# Patient Record
Sex: MALE | Race: WHITE | NOT HISPANIC OR LATINO | URBAN - METROPOLITAN AREA
[De-identification: names, ages, dates, MRNs, and addresses within clinical notes are randomized per-mention and may not be internally consistent; named-entity substitution may affect disease eponyms.]

---

## 2017-05-01 ENCOUNTER — EMERGENCY (EMERGENCY)
Facility: HOSPITAL | Age: 32
LOS: 1 days | Discharge: HOME | End: 2017-05-01
Attending: EMERGENCY MEDICINE | Admitting: EMERGENCY MEDICINE
Payer: COMMERCIAL

## 2017-05-01 VITALS
HEART RATE: 100 BPM | TEMPERATURE: 99 F | DIASTOLIC BLOOD PRESSURE: 79 MMHG | RESPIRATION RATE: 18 BRPM | SYSTOLIC BLOOD PRESSURE: 148 MMHG | OXYGEN SATURATION: 100 %

## 2017-05-01 VITALS
OXYGEN SATURATION: 98 % | HEART RATE: 85 BPM | SYSTOLIC BLOOD PRESSURE: 135 MMHG | RESPIRATION RATE: 18 BRPM | DIASTOLIC BLOOD PRESSURE: 72 MMHG

## 2017-05-01 DIAGNOSIS — R11.0 NAUSEA: ICD-10-CM

## 2017-05-01 DIAGNOSIS — R10.9 UNSPECIFIED ABDOMINAL PAIN: ICD-10-CM

## 2017-05-01 DIAGNOSIS — I10 ESSENTIAL (PRIMARY) HYPERTENSION: ICD-10-CM

## 2017-05-01 PROCEDURE — 76775 US EXAM ABDO BACK WALL LIM: CPT

## 2017-05-01 PROCEDURE — 81001 URINALYSIS AUTO W/SCOPE: CPT

## 2017-05-01 PROCEDURE — 76775 US EXAM ABDO BACK WALL LIM: CPT | Mod: 26

## 2017-05-01 PROCEDURE — 99285 EMERGENCY DEPT VISIT HI MDM: CPT

## 2017-05-01 PROCEDURE — 87086 URINE CULTURE/COLONY COUNT: CPT

## 2017-05-01 PROCEDURE — 96374 THER/PROPH/DIAG INJ IV PUSH: CPT

## 2017-05-01 PROCEDURE — 99284 EMERGENCY DEPT VISIT MOD MDM: CPT | Mod: 25

## 2017-05-01 PROCEDURE — 85027 COMPLETE CBC AUTOMATED: CPT

## 2017-05-01 PROCEDURE — 80053 COMPREHEN METABOLIC PANEL: CPT

## 2017-05-01 RX ORDER — OXYCODONE HYDROCHLORIDE 5 MG/1
5 TABLET ORAL ONCE
Qty: 0 | Refills: 0 | Status: DISCONTINUED | OUTPATIENT
Start: 2017-05-01 | End: 2017-05-01

## 2017-05-01 RX ORDER — SODIUM CHLORIDE 9 MG/ML
2000 INJECTION INTRAMUSCULAR; INTRAVENOUS; SUBCUTANEOUS ONCE
Qty: 0 | Refills: 0 | Status: COMPLETED | OUTPATIENT
Start: 2017-05-01 | End: 2017-05-01

## 2017-05-01 RX ORDER — KETOROLAC TROMETHAMINE 30 MG/ML
15 SYRINGE (ML) INJECTION ONCE
Qty: 0 | Refills: 0 | Status: DISCONTINUED | OUTPATIENT
Start: 2017-05-01 | End: 2017-05-01

## 2017-05-01 RX ORDER — OXYCODONE HYDROCHLORIDE 5 MG/1
1 TABLET ORAL
Qty: 12 | Refills: 0
Start: 2017-05-01 | End: 2017-05-04

## 2017-05-01 RX ADMIN — Medication 15 MILLIGRAM(S): at 18:24

## 2017-05-01 RX ADMIN — SODIUM CHLORIDE 2000 MILLILITER(S): 9 INJECTION INTRAMUSCULAR; INTRAVENOUS; SUBCUTANEOUS at 17:54

## 2017-05-01 RX ADMIN — Medication 15 MILLIGRAM(S): at 17:54

## 2017-05-01 RX ADMIN — OXYCODONE HYDROCHLORIDE 5 MILLIGRAM(S): 5 TABLET ORAL at 18:24

## 2017-05-01 NOTE — ED PROVIDER NOTE - MEDICAL DECISION MAKING DETAILS
31M hx renal colic presents with worsening L flank pain.  well-appearing.  concern for ureteral stone.  No concerning signs/symptoms for infected stone v pyelo.  will obtain urine, renal function studies.  obtain ultrasound or CT a/p stone hunt at this time given known hx and previous radiation exposure.  ivf bolus, analgesia, reassess

## 2017-05-01 NOTE — ED PROVIDER NOTE - PLAN OF CARE
Stay well-hydrated.  Use Motrin 600mg every 6 hours.  Supplement with Tylenol 650mg or Oxycodone 5mg every 6 hours as needed for breakthrough pain.  Do not drive while taking oxycodone.  Follow-up with Urology.  Return for any new/worsening symptoms such as uncontrollable pain, fever/chills, uncontrollable vomiting or any other concerns.

## 2017-05-01 NOTE — ED PROVIDER NOTE - OBJECTIVE STATEMENT
31M hx renal colic, HTN presents with L flank pain. 31M hx renal colic, HTN presents with L flank pain x 3 days.  Constant but waxing and waning in severity.  Associated with mild nausea, no vomiting.  Pain improved with ibuprofen but not dosed today.  Denies fever/chills, abdominal pain, dysuria, hematuria, or testicular pain

## 2017-05-01 NOTE — ED ADULT NURSE NOTE - OBJECTIVE STATEMENT
31 yr old male coming in with left sided flank pain x3 days; radiating to left groin. Pt denies hematuria/dysuria. No fevers/chills. States he has had a kidney stone in the past but patient unable to provide more information regarding past diagnosis. Denies taking meds prior to arrival.

## 2017-05-01 NOTE — ED PROVIDER NOTE - CARE PLAN
Principal Discharge DX:	Flank pain  Instructions for follow-up, activity and diet:	Stay well-hydrated.  Use Motrin 600mg every 6 hours.  Supplement with Tylenol 650mg or Oxycodone 5mg every 6 hours as needed for breakthrough pain.  Do not drive while taking oxycodone.  Follow-up with Urology.  Return for any new/worsening symptoms such as uncontrollable pain, fever/chills, uncontrollable vomiting or any other concerns.

## 2017-05-01 NOTE — ED PROVIDER NOTE - ATTENDING CONTRIBUTION TO CARE
patient with prior renal stones presenting with renal colic. plan to eval for hydronephrosis, jada, analgesia, reassess.

## 2017-05-02 LAB
CULTURE RESULTS: NO GROWTH — SIGNIFICANT CHANGE UP
SPECIMEN SOURCE: SIGNIFICANT CHANGE UP

## 2017-09-11 ENCOUNTER — EMERGENCY (EMERGENCY)
Facility: HOSPITAL | Age: 32
LOS: 1 days | Discharge: HOME | End: 2017-09-11
Attending: EMERGENCY MEDICINE | Admitting: EMERGENCY MEDICINE
Payer: COMMERCIAL

## 2017-09-11 VITALS
SYSTOLIC BLOOD PRESSURE: 152 MMHG | DIASTOLIC BLOOD PRESSURE: 81 MMHG | TEMPERATURE: 100 F | HEART RATE: 81 BPM | RESPIRATION RATE: 18 BRPM | OXYGEN SATURATION: 94 %

## 2017-09-11 VITALS
RESPIRATION RATE: 18 BRPM | DIASTOLIC BLOOD PRESSURE: 70 MMHG | HEART RATE: 76 BPM | SYSTOLIC BLOOD PRESSURE: 118 MMHG | OXYGEN SATURATION: 97 % | TEMPERATURE: 99 F

## 2017-09-11 LAB
ALBUMIN SERPL ELPH-MCNC: 4.8 G/DL — SIGNIFICANT CHANGE UP (ref 3.3–5)
ALP SERPL-CCNC: 64 U/L — SIGNIFICANT CHANGE UP (ref 40–120)
ALT FLD-CCNC: 33 U/L RC — SIGNIFICANT CHANGE UP (ref 10–45)
ANION GAP SERPL CALC-SCNC: 14 MMOL/L — SIGNIFICANT CHANGE UP (ref 5–17)
AST SERPL-CCNC: 20 U/L — SIGNIFICANT CHANGE UP (ref 10–40)
BASOPHILS # BLD AUTO: 0 K/UL — SIGNIFICANT CHANGE UP (ref 0–0.2)
BASOPHILS NFR BLD AUTO: 0.4 % — SIGNIFICANT CHANGE UP (ref 0–2)
BILIRUB SERPL-MCNC: 0.4 MG/DL — SIGNIFICANT CHANGE UP (ref 0.2–1.2)
BUN SERPL-MCNC: 29 MG/DL — HIGH (ref 7–23)
CALCIUM SERPL-MCNC: 9.7 MG/DL — SIGNIFICANT CHANGE UP (ref 8.4–10.5)
CHLORIDE SERPL-SCNC: 102 MMOL/L — SIGNIFICANT CHANGE UP (ref 96–108)
CK MB BLD-MCNC: 1.5 % — SIGNIFICANT CHANGE UP (ref 0–3.5)
CK MB CFR SERPL CALC: 2.3 NG/ML — SIGNIFICANT CHANGE UP (ref 0–6.7)
CK SERPL-CCNC: 155 U/L — SIGNIFICANT CHANGE UP (ref 30–200)
CO2 SERPL-SCNC: 26 MMOL/L — SIGNIFICANT CHANGE UP (ref 22–31)
CREAT SERPL-MCNC: 1.21 MG/DL — SIGNIFICANT CHANGE UP (ref 0.5–1.3)
EOSINOPHIL # BLD AUTO: 0.1 K/UL — SIGNIFICANT CHANGE UP (ref 0–0.5)
EOSINOPHIL NFR BLD AUTO: 1.4 % — SIGNIFICANT CHANGE UP (ref 0–6)
GLUCOSE SERPL-MCNC: 89 MG/DL — SIGNIFICANT CHANGE UP (ref 70–99)
HCT VFR BLD CALC: 39.9 % — SIGNIFICANT CHANGE UP (ref 39–50)
HGB BLD-MCNC: 14 G/DL — SIGNIFICANT CHANGE UP (ref 13–17)
LYMPHOCYTES # BLD AUTO: 1.7 K/UL — SIGNIFICANT CHANGE UP (ref 1–3.3)
LYMPHOCYTES # BLD AUTO: 35.3 % — SIGNIFICANT CHANGE UP (ref 13–44)
MCHC RBC-ENTMCNC: 32 PG — SIGNIFICANT CHANGE UP (ref 27–34)
MCHC RBC-ENTMCNC: 35.1 GM/DL — SIGNIFICANT CHANGE UP (ref 32–36)
MCV RBC AUTO: 91.1 FL — SIGNIFICANT CHANGE UP (ref 80–100)
MONOCYTES # BLD AUTO: 0.5 K/UL — SIGNIFICANT CHANGE UP (ref 0–0.9)
MONOCYTES NFR BLD AUTO: 9.8 % — SIGNIFICANT CHANGE UP (ref 2–14)
NEUTROPHILS # BLD AUTO: 2.6 K/UL — SIGNIFICANT CHANGE UP (ref 1.8–7.4)
NEUTROPHILS NFR BLD AUTO: 53.1 % — SIGNIFICANT CHANGE UP (ref 43–77)
PLATELET # BLD AUTO: 177 K/UL — SIGNIFICANT CHANGE UP (ref 150–400)
POTASSIUM SERPL-MCNC: 4.2 MMOL/L — SIGNIFICANT CHANGE UP (ref 3.5–5.3)
POTASSIUM SERPL-SCNC: 4.2 MMOL/L — SIGNIFICANT CHANGE UP (ref 3.5–5.3)
PROT SERPL-MCNC: 7.7 G/DL — SIGNIFICANT CHANGE UP (ref 6–8.3)
RBC # BLD: 4.38 M/UL — SIGNIFICANT CHANGE UP (ref 4.2–5.8)
RBC # FLD: 11.3 % — SIGNIFICANT CHANGE UP (ref 10.3–14.5)
SODIUM SERPL-SCNC: 142 MMOL/L — SIGNIFICANT CHANGE UP (ref 135–145)
TROPONIN T SERPL-MCNC: <0.01 NG/ML — SIGNIFICANT CHANGE UP (ref 0–0.06)
TROPONIN T SERPL-MCNC: <0.01 NG/ML — SIGNIFICANT CHANGE UP (ref 0–0.06)
WBC # BLD: 4.8 K/UL — SIGNIFICANT CHANGE UP (ref 3.8–10.5)
WBC # FLD AUTO: 4.8 K/UL — SIGNIFICANT CHANGE UP (ref 3.8–10.5)

## 2017-09-11 PROCEDURE — 71020: CPT | Mod: 26

## 2017-09-11 PROCEDURE — 84484 ASSAY OF TROPONIN QUANT: CPT

## 2017-09-11 PROCEDURE — 93010 ELECTROCARDIOGRAM REPORT: CPT

## 2017-09-11 PROCEDURE — 93005 ELECTROCARDIOGRAM TRACING: CPT

## 2017-09-11 PROCEDURE — 99285 EMERGENCY DEPT VISIT HI MDM: CPT | Mod: 25

## 2017-09-11 PROCEDURE — 85027 COMPLETE CBC AUTOMATED: CPT

## 2017-09-11 PROCEDURE — 71046 X-RAY EXAM CHEST 2 VIEWS: CPT

## 2017-09-11 PROCEDURE — 80053 COMPREHEN METABOLIC PANEL: CPT

## 2017-09-11 PROCEDURE — 82553 CREATINE MB FRACTION: CPT

## 2017-09-11 PROCEDURE — 82550 ASSAY OF CK (CPK): CPT

## 2017-09-11 PROCEDURE — 99284 EMERGENCY DEPT VISIT MOD MDM: CPT | Mod: 25

## 2017-09-11 NOTE — ED PROVIDER NOTE - OBJECTIVE STATEMENT
32y Male PMH HTN, renal stones complaining of chest pain. Started to have this for several weeks. Was worse on Friday, happened when he was eating. mainly on the left chest, felt as if he was going to pass out. No obvious sick contacts, no recent travel, no leg swelling or pain. Pain is sharp, comes out of nowhere. No ameliorating or worsening factors. No rash. No cough. Denies fevers, chills, nausea, vomiting, diarrhea, constipation, or dyspnea. Mother with triple bypass at age 37yo, father with MI at 55yo. 32y Male PMH HTN, renal stones complaining of chest pain. Started to have this for several weeks, comes on for a second and resolves. Was worse on Friday and today. mainly on the left chest, felt as if he was going to pass out. No obvious sick contacts, no recent travel, no leg swelling or pain. Pain is sharp, comes out of nowhere. No ameliorating or worsening factors. No rash. No cough. Denies fevers, chills, nausea, vomiting, diarrhea, constipation, or dyspnea. Mother with triple bypass at age 37yo, father with MI at 55yo.    PCP: Mehul Myrick (cardiologist)

## 2017-09-11 NOTE — ED PROVIDER NOTE - ATTENDING CONTRIBUTION TO CARE
ATTENDING MD:  Williams SCHULTZ, personally have seen and examined this patient.  I have discussed all aspects of care with the resident physician. Resident note reviewed and agree on plan of care and except where noted.  See HPI, PE, and MDM for details.     VITALS: reviewed  GEN: NAD, A & O x 4  HEAD/EYES: NCAT, PERRL, EOMI, anicteric sclerae, no conjunctival pallor  ENT: mucus membranes moist, oropharynx WNL, trachea midline, no JVD  CHEST: lungs CTA with equal breath sounds bilaterally, chest wall nontender and atraumatic  CV: heart with reg rhythm S1, S2, no murmurs, rubs, or gallops; distal pulses 2+ and symmetric bilaterally  ABDOMEN: normoactive bowel sounds, soft, nondistended, nontender, no masses  : no CVAT  MSK: extremities atraumatic and nontender, no edema. the back is without midline or lateral tenderness, there is no spinal deformity or stepoff and the back is ranged painlessly.   SKIN: warm, dry, no rash, no bruising, no cyanosis. color appropriate for ethnicity  NEURO: alert, mentating appropriately, no facial asymmetry. gross sensation, motor, coordination are intact  PSYCH: Affect appropriate     MDM: atypical chest pain x 1 month. low risk by heart. well's low risk, perc neg, no concern for PE. no signs of pericarditis on EKG or exam. low risk for emergent chest pain. CXR, EKG, tele, 2 sets cardiac  abbrieviated r/o per low risk heart score, f/u with his PCP who is a cardiologist for further eval and workup if negative.

## 2017-09-11 NOTE — ED PROVIDER NOTE - PLAN OF CARE
1) Please follow-up with your primary care doctor / cardiologist Dr. Myrick within the next 1-2 days.  Please call today or tomorrow for an appointment.  If you cannot follow-up with your doctor(s), please return to the ED for any urgent issues.  2) If you have any worsening of symptoms or any other concerns please return to the ED immediately.  3) Please continue taking your home medications as directed.  4) You may have been given a copy of your labs and/or imaging.  Please go over these with your primary care doctor.

## 2017-09-11 NOTE — ED ADULT NURSE NOTE - OBJECTIVE STATEMENT
33 y/o male c/o chest pain for several weeks, but worse on Friday and today.   Pt report chest pain mainly on the  left  and he feels like he was going to pass out. No recent travel, no leg swelling or pain. No rash. No cough. Denies fevers, chills, n/v/d, constipation, or dyspnea.  No acute respiratory distress noted.

## 2017-09-11 NOTE — ED PROVIDER NOTE - NS ED ROS FT
ROS: GENERAL: no fevers, no chills HEENT: no epistaxis, no eye pain, no ear pain, no throat pain CARDIAC: + chest pain, no shortness of breath PULM: no cough, no shortness of breath GI: no nausea, no vomiting, no diarrhea, no abdominal pain, no hematemesis, no bright red blood per rectum : no dysuria, no hematuria EXTREMITIES: no arm pain, no leg pain, no back pain SKIN: no purpura, no petechiae NEURO: no headache, no neck pain, no loss of strength/sensation HEME: no easy bruising, no easy bleeding

## 2017-09-11 NOTE — ED PROVIDER NOTE - CARE PLAN
Principal Discharge DX:	Chest pain  Instructions for follow-up, activity and diet:	1) Please follow-up with your primary care doctor / cardiologist Dr. Myrick within the next 1-2 days.  Please call today or tomorrow for an appointment.  If you cannot follow-up with your doctor(s), please return to the ED for any urgent issues.  2) If you have any worsening of symptoms or any other concerns please return to the ED immediately.  3) Please continue taking your home medications as directed.  4) You may have been given a copy of your labs and/or imaging.  Please go over these with your primary care doctor. Principal Discharge DX:	Atypical chest pain  Instructions for follow-up, activity and diet:	1) Please follow-up with your primary care doctor / cardiologist Dr. Myrick within the next 1-2 days.  Please call today or tomorrow for an appointment.  If you cannot follow-up with your doctor(s), please return to the ED for any urgent issues.  2) If you have any worsening of symptoms or any other concerns please return to the ED immediately.  3) Please continue taking your home medications as directed.  4) You may have been given a copy of your labs and/or imaging.  Please go over these with your primary care doctor.

## 2019-07-01 ENCOUNTER — EMERGENCY (EMERGENCY)
Facility: HOSPITAL | Age: 34
LOS: 1 days | Discharge: HOME | End: 2019-07-01
Attending: EMERGENCY MEDICINE
Payer: COMMERCIAL

## 2019-07-01 VITALS
SYSTOLIC BLOOD PRESSURE: 135 MMHG | WEIGHT: 199.96 LBS | DIASTOLIC BLOOD PRESSURE: 87 MMHG | HEART RATE: 82 BPM | HEIGHT: 72 IN | TEMPERATURE: 98 F | RESPIRATION RATE: 16 BRPM | OXYGEN SATURATION: 100 %

## 2019-07-01 VITALS
TEMPERATURE: 99 F | OXYGEN SATURATION: 100 % | SYSTOLIC BLOOD PRESSURE: 132 MMHG | RESPIRATION RATE: 16 BRPM | HEART RATE: 79 BPM | DIASTOLIC BLOOD PRESSURE: 80 MMHG

## 2019-07-01 DIAGNOSIS — S69.91XA UNSPECIFIED INJURY OF RIGHT WRIST, HAND AND FINGER(S), INITIAL ENCOUNTER: ICD-10-CM

## 2019-07-01 DIAGNOSIS — M79.89 OTHER SPECIFIED SOFT TISSUE DISORDERS: ICD-10-CM

## 2019-07-01 LAB
ALBUMIN SERPL ELPH-MCNC: 4.9 G/DL — SIGNIFICANT CHANGE UP (ref 3.3–5)
ALP SERPL-CCNC: 61 U/L — SIGNIFICANT CHANGE UP (ref 40–120)
ALT FLD-CCNC: 47 U/L — HIGH (ref 10–45)
ANION GAP SERPL CALC-SCNC: 13 MMOL/L — SIGNIFICANT CHANGE UP (ref 5–17)
APTT BLD: 31.1 SEC — SIGNIFICANT CHANGE UP (ref 27.5–36.3)
AST SERPL-CCNC: 28 U/L — SIGNIFICANT CHANGE UP (ref 10–40)
BASOPHILS # BLD AUTO: 0 K/UL — SIGNIFICANT CHANGE UP (ref 0–0.2)
BASOPHILS NFR BLD AUTO: 0.3 % — SIGNIFICANT CHANGE UP (ref 0–2)
BILIRUB SERPL-MCNC: 1.1 MG/DL — SIGNIFICANT CHANGE UP (ref 0.2–1.2)
BUN SERPL-MCNC: 19 MG/DL — SIGNIFICANT CHANGE UP (ref 7–23)
CALCIUM SERPL-MCNC: 10 MG/DL — SIGNIFICANT CHANGE UP (ref 8.4–10.5)
CHLORIDE SERPL-SCNC: 100 MMOL/L — SIGNIFICANT CHANGE UP (ref 96–108)
CO2 SERPL-SCNC: 27 MMOL/L — SIGNIFICANT CHANGE UP (ref 22–31)
CREAT SERPL-MCNC: 1.1 MG/DL — SIGNIFICANT CHANGE UP (ref 0.5–1.3)
EOSINOPHIL # BLD AUTO: 0 K/UL — SIGNIFICANT CHANGE UP (ref 0–0.5)
EOSINOPHIL NFR BLD AUTO: 1.4 % — SIGNIFICANT CHANGE UP (ref 0–6)
GLUCOSE SERPL-MCNC: 115 MG/DL — HIGH (ref 70–99)
HCT VFR BLD CALC: 42.4 % — SIGNIFICANT CHANGE UP (ref 39–50)
HGB BLD-MCNC: 14.9 G/DL — SIGNIFICANT CHANGE UP (ref 13–17)
INR BLD: 1 RATIO — SIGNIFICANT CHANGE UP (ref 0.88–1.16)
LYMPHOCYTES # BLD AUTO: 0.9 K/UL — LOW (ref 1–3.3)
LYMPHOCYTES # BLD AUTO: 27.9 % — SIGNIFICANT CHANGE UP (ref 13–44)
MCHC RBC-ENTMCNC: 32 PG — SIGNIFICANT CHANGE UP (ref 27–34)
MCHC RBC-ENTMCNC: 35.1 GM/DL — SIGNIFICANT CHANGE UP (ref 32–36)
MCV RBC AUTO: 91.1 FL — SIGNIFICANT CHANGE UP (ref 80–100)
MONOCYTES # BLD AUTO: 0.3 K/UL — SIGNIFICANT CHANGE UP (ref 0–0.9)
MONOCYTES NFR BLD AUTO: 8.9 % — SIGNIFICANT CHANGE UP (ref 2–14)
NEUTROPHILS # BLD AUTO: 1.9 K/UL — SIGNIFICANT CHANGE UP (ref 1.8–7.4)
NEUTROPHILS NFR BLD AUTO: 61.5 % — SIGNIFICANT CHANGE UP (ref 43–77)
PLAT MORPH BLD: NORMAL — SIGNIFICANT CHANGE UP
PLATELET # BLD AUTO: 186 K/UL — SIGNIFICANT CHANGE UP (ref 150–400)
POTASSIUM SERPL-MCNC: 4 MMOL/L — SIGNIFICANT CHANGE UP (ref 3.5–5.3)
POTASSIUM SERPL-SCNC: 4 MMOL/L — SIGNIFICANT CHANGE UP (ref 3.5–5.3)
PROT SERPL-MCNC: 7.8 G/DL — SIGNIFICANT CHANGE UP (ref 6–8.3)
PROTHROM AB SERPL-ACNC: 11.5 SEC — SIGNIFICANT CHANGE UP (ref 10–12.9)
RBC # BLD: 4.65 M/UL — SIGNIFICANT CHANGE UP (ref 4.2–5.8)
RBC # FLD: 12 % — SIGNIFICANT CHANGE UP (ref 10.3–14.5)
RBC BLD AUTO: NORMAL — SIGNIFICANT CHANGE UP
SODIUM SERPL-SCNC: 140 MMOL/L — SIGNIFICANT CHANGE UP (ref 135–145)
WBC # BLD: 3.1 K/UL — LOW (ref 3.8–10.5)
WBC # FLD AUTO: 3.1 K/UL — LOW (ref 3.8–10.5)

## 2019-07-01 PROCEDURE — 99253 IP/OBS CNSLTJ NEW/EST LOW 45: CPT

## 2019-07-01 PROCEDURE — 80053 COMPREHEN METABOLIC PANEL: CPT

## 2019-07-01 PROCEDURE — 93971 EXTREMITY STUDY: CPT

## 2019-07-01 PROCEDURE — 99284 EMERGENCY DEPT VISIT MOD MDM: CPT

## 2019-07-01 PROCEDURE — 85730 THROMBOPLASTIN TIME PARTIAL: CPT

## 2019-07-01 PROCEDURE — 93971 EXTREMITY STUDY: CPT | Mod: 26

## 2019-07-01 PROCEDURE — 85610 PROTHROMBIN TIME: CPT

## 2019-07-01 PROCEDURE — 85027 COMPLETE CBC AUTOMATED: CPT

## 2019-07-01 PROCEDURE — 93931 UPPER EXTREMITY STUDY: CPT

## 2019-07-01 PROCEDURE — 93931 UPPER EXTREMITY STUDY: CPT | Mod: 26

## 2019-07-01 NOTE — ED PROVIDER NOTE - NSPTACCESSSVCSAPPTDETAILS_ED_ALL_ED_FT
Follow up with your medical doctor in 2-3 days or call our clinic at [phone] and state you were seen in the Emergency Department and would like to be seen in clinic. You may also call [phone] to speak with a representative to assist follow up care with medicine, surgery, or specialists.    Take Tylenol/acetaminophen 1 g every six hours and supplement (if allowed by your physician) with ibuprofen 600 mg, with food or milk/maalox, every six hours which can be taken three hours apart from the Tylenol to have a layered effect.     Be sure to take no more than 4000mg or 4g of Tylenol/acetaminophen in a 24 hour period. Be sure to check your other medications to see if they include Tylenol/acetaminophen and include them in your calculations to ensure you do not take more than 4000mg or 4g of Tylenol/acetaminophen a day.    Drink at least 2 Liters or 64 Ounces of water each day (UNLESS you are supposed to restrict fluids or have a history of congestive heart failure (CHF)).    Return for any persistent, worsening symptoms, or ANY concerns at all.

## 2019-07-01 NOTE — ED PROVIDER NOTE - CLINICAL SUMMARY MEDICAL DECISION MAKING FREE TEXT BOX
Patient with right hand swelling, taj test intact, will doppler, will consult vascular surgery and reassess with cbc, cmp, pt/inr  Will follow up on labs, analgesia, imaging, reassess and disposition as clinically indicated.

## 2019-07-01 NOTE — ED PROVIDER NOTE - CARE PROVIDER_API CALL
Sahil Tay)  Vascular Surgery  [address], [address]  [place] [address]  Phone: [phone]  Fax: [phone]  Follow Up Time:

## 2019-07-01 NOTE — ED ADULT NURSE NOTE - OBJECTIVE STATEMENT
33 yr old male amb to ED c/o rt hand numbness at 7am with rt hand cooler to touch than l. 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 Has h/o HTN. Able to move fingers. Pos sensation. Denies fever or chills. Denies chest pain or SOB. parent at bedside. 33 yr old male amb to ED c/o rt hand numbness at 7am with rt hand cooler to touch than l. 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  Has h/o HTN. Able to move fingers. Pos sensation. Cap refill WNL with palp rt rad pulse.  Denies fever or chills. Denies chest pain or SOB. parent at bedside.

## 2019-07-01 NOTE — CONSULT NOTE ADULT - ASSESSMENT
32 y/o with no PMhx presenting with swelling in right hand    - full note to follow 32 y/o with no PMhx presenting with swelling in right hand    - patient seen and examined with attending   - no indication for surgical intervention at this time   - imaging negative for any venous thrombosis no evidence of pseudoaneurysm    - patient okay for discharge may follow-up with Dr. Tay as outpatient 32 y/o with no PMhx presenting with swelling in right hand    - patient seen and examined with attending   - no indication for surgical intervention at this time   - imaging negative for any venous thrombosis no evidence of pseudoaneurysm    - patient  is cleared for d/c  advised pt to avoid rt hand repeated injury and use of jackhammer if possible and to rto if any signs recurr

## 2019-07-01 NOTE — ED PROVIDER NOTE - PHYSICAL EXAMINATION
GEN: NAD, awake, eyes open spontaneously  HEENT: NCAT, MMM, Trachea midline, normal conjunctiva, perrl  CHEST/LUNGS: Non-tachypneic, CTAB, bilateral breath sounds  CARDIAC: Non-tachycardic, normal perfusion  ABDOMEN: Soft, NTND, No rebound/guarding  MSK: No edema, no gross deformity of extremities, right arm with negative Colt's test, temperature feels symmetric and bilateral hands are cool, well perfused, there is slight swelling over the thenar eminence and mild swelling to the skin overlying the dorsal 4th5th digits of the right hand  SKIN: No rashes, no petechiae, no vesicles  NEURO: CN grossly intact, normal coordination, no focal motor or sensory deficits  PSYCH: Alert, appropriate, cooperative, with capacity and insight

## 2019-07-01 NOTE — CONSULT NOTE ADULT - SUBJECTIVE AND OBJECTIVE BOX
CC: 33y old Male admitted with a chief complaint of , now swollen right hand     HPI:  32 y/o with no Pmhx presenting with swelling of his right hand. Reports that right hand was cooler than left when he woke up this morning. Has associated numbness and tingling in the hand. Patient works as a . Denies any previous episodes similar to this in the past.     PMHx: Hypertension  No pertinent past medical history    PSHx:   Medications (inpatient):   Medications (PRN):  Allergies: No Known Allergies  (Intolerances: )  Social Hx:   Family Hx:     Physical Exam  T(C): 36.7  HR: 82 (82 - 82)  BP: 135/87 (135/87 - 135/87)  RR: 16 (16 - 16)  SpO2: 100% (100% - 100%)  Tmax: T(C): , Max: 36.7 (07-01-19 @ 09:44)    General: well developed, well nourished, NAD  Neuro: alert and oriented, no focal deficits, moves all extremities spontaneously  HEENT: NCAT, EOMI, anicteric, mucosa moist  Respiratory: airway patent, respirations unlabored  CVS: regular rate and rhythm  Abdomen: soft, nontender, nondistended  Extremities: no edema, mild decrease sensation in right hand, good capillary refill, radial and ulnar pulses palpable movement grossly intact  Skin: warm, dry, appropriate color    Labs:                        14.9   3.1   )-----------( 186      ( 01 Jul 2019 11:48 )             42.4     PT/INR - ( 01 Jul 2019 11:48 )   PT: 11.5 sec;   INR: 1.00 ratio         PTT - ( 01 Jul 2019 11:48 )  PTT:31.1 sec  07-01    140  |  100  |  19  ----------------------------<  115<H>  4.0   |  27  |  1.10    Ca    10.0      01 Jul 2019 11:48    TPro  7.8  /  Alb  4.9  /  TBili  1.1  /  DBili  x   /  AST  28  /  ALT  47<H>  /  AlkPhos  61  07-01            Imaging and other studies:  < from: [facility] Duplex Upper Extrem Arterial Limited, Right (07.01.19 @ 13:55) >  INTERPRETATION:  History: Painful cold right hand, evaluate arterial   blood supply to the right upper extremity.    There is a normal pattern of antegrade flow through the right innominate   artery.    There is a normal, triphasic pattern of antegrade flow through the right   subclavian, axillary, brachial and ulnar arteries.    Impression: This is a normal examination. CC: 33y old Male admitted with a chief complaint of , now swollen right hand     HPI:  34 y/o with no Pmhx presenting with swelling of his right hand. Reports that right hand was cooler than left when he woke up this morning. Has associated numbness and tingling in the hand. Patient works as a . Denies any previous episodes similar to this in the past.   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       PMHx: Hypertension  No pertinent past medical history    PSHx:   Medications (inpatient):   Medications (PRN):  Allergies: No Known Allergies  (Intolerances: )  Social Hx:   Family Hx:     Physical Exam  T(C): 36.7  HR: 82 (82 - 82)  BP: 135/87 (135/87 - 135/87)  RR: 16 (16 - 16)  SpO2: 100% (100% - 100%)  Tmax: T(C): , Max: 36.7 (07-01-19 @ 09:44)    General: well developed, well nourished, NAD  Neuro: alert and oriented, no focal deficits, moves all extremities spontaneously  HEENT: NCAT, EOMI, anicteric, mucosa moist  Respiratory: airway patent, respirations unlabored  CVS: regular rate and rhythm  Abdomen: soft, nontender, nondistended  Extremities: no edema, mild decrease sensation in right hand, good capillary refill, radial and ulnar pulses palpable movement grossly intact  Skin: warm, dry, appropriate color    Labs:                        14.9   3.1   )-----------( 186      ( 01 Jul 2019 11:48 )             42.4     PT/INR - ( 01 Jul 2019 11:48 )   PT: 11.5 sec;   INR: 1.00 ratio         PTT - ( 01 Jul 2019 11:48 )  PTT:31.1 sec  07-01    140  |  100  |  19  ----------------------------<  115<H>  4.0   |  27  |  1.10    Ca    10.0      01 Jul 2019 11:48    TPro  7.8  /  Alb  4.9  /  TBili  1.1  /  DBili  x   /  AST  28  /  ALT  47<H>  /  AlkPhos  61  07-01            Imaging and other studies:  < from: VA Duplex Upper Extrem Arterial Limited, Right (07.01.19 @ 13:55) >  INTERPRETATION:  History: Painful cold right hand, evaluate arterial   blood supply to the right upper extremity.    There is a normal pattern of antegrade flow through the right innominate   artery.    There is a normal, triphasic pattern of antegrade flow through the right   subclavian, axillary, brachial and ulnar arteries.    Impression: This is a normal examination.

## 2019-07-01 NOTE — CONSULT NOTE ADULT - ATTENDING COMMENTS
Never smoker
I performed a history and physical exam of the patient and discussed  the findings and plan with the house officer. I reviewed the resident note and agree with the findings and plan

## 2019-07-01 NOTE — ED PROVIDER NOTE - OBJECTIVE STATEMENT
Patient with chief complaint of right hand swelling and coolness starting from this AM. patient sleeps on his stomach and states that he places his hands up above his shoulders with sleeping but upon waking this am his right hand was swollen, hand an engorged vein to right thenar eminence. No fever. No chills.   Patient does not use iv drugs, takes protein but no aggressive working out or trauma to the area.

## 2019-07-01 NOTE — ED PROVIDER NOTE - PROGRESS NOTE DETAILS
vascular surgery consulted and will discuss  bedside doppler with intact doppler pulses to radial and ulnar artery Segundo Marsh MD, FACEP Patient seen by attending vascular surgeon, will discharge home to  follow up as an outpatient. Patient serially evaluated throughout emergency department course. There was no acute deterioration up to this time in the department. Patient has demonstrated marked clinical improvement, feels better at this time according to emergency department team. Agree with goals/plan of emergency department care as described in electronic medical record, including diagnostics, therapeutics and consultation as clinically warranted. Will discharge home with close outpatient followup with primary care physician/provider and specialist if necessary. Patient educated on concerning signs and features to return to the emergency department including but not limited to: nausea, vomiting, fever, chills, persistent/worsening symptoms or any concerns at all.  No immediate life threatening issues present on history or clinical exam. Patient is a safe disposition home, has capacity and insight into their condition, is ambulatory in the Emergency Department with no further questions and will follow up with their doctor(s) this week. Patient understands anticipatory guidance and was given strict return and follow up precautions. The patient has been informed of all concerning signs and symptoms to return to Emergency Department, the necessity to follow up with the PMD/Clinic/follow up provided within 2-3 days was explained, and the patient reports understanding of above with capacity and insight.

## 2019-07-01 NOTE — CONSULT NOTE ADULT - VASCULAR DETAILS
kellen ue palp +2 radial and ulnar art pulses   excellent cap refill and perf  all fingers kellen hands

## 2020-01-28 ENCOUNTER — EMERGENCY (EMERGENCY)
Facility: HOSPITAL | Age: 35
LOS: 1 days | Discharge: HOME | End: 2020-01-28
Attending: EMERGENCY MEDICINE
Payer: COMMERCIAL

## 2020-01-28 VITALS
DIASTOLIC BLOOD PRESSURE: 99 MMHG | HEART RATE: 128 BPM | HEIGHT: 72 IN | RESPIRATION RATE: 20 BRPM | SYSTOLIC BLOOD PRESSURE: 157 MMHG | WEIGHT: 214.95 LBS | TEMPERATURE: 98 F | OXYGEN SATURATION: 98 %

## 2020-01-28 LAB
ALBUMIN SERPL ELPH-MCNC: 4.9 G/DL — SIGNIFICANT CHANGE UP (ref 3.3–5)
ALP SERPL-CCNC: 98 U/L — SIGNIFICANT CHANGE UP (ref 40–120)
ALT FLD-CCNC: 170 U/L — HIGH (ref 10–45)
ANION GAP SERPL CALC-SCNC: 14 MMOL/L — SIGNIFICANT CHANGE UP (ref 5–17)
AST SERPL-CCNC: 82 U/L — HIGH (ref 10–40)
BASOPHILS # BLD AUTO: 0.02 K/UL — SIGNIFICANT CHANGE UP (ref 0–0.2)
BASOPHILS NFR BLD AUTO: 0.3 % — SIGNIFICANT CHANGE UP (ref 0–2)
BILIRUB SERPL-MCNC: 0.8 MG/DL — SIGNIFICANT CHANGE UP (ref 0.2–1.2)
BUN SERPL-MCNC: 14 MG/DL — SIGNIFICANT CHANGE UP (ref 7–23)
CALCIUM SERPL-MCNC: 10 MG/DL — SIGNIFICANT CHANGE UP (ref 8.4–10.5)
CHLORIDE SERPL-SCNC: 97 MMOL/L — SIGNIFICANT CHANGE UP (ref 96–108)
CO2 SERPL-SCNC: 26 MMOL/L — SIGNIFICANT CHANGE UP (ref 22–31)
CREAT SERPL-MCNC: 1.16 MG/DL — SIGNIFICANT CHANGE UP (ref 0.5–1.3)
EOSINOPHIL # BLD AUTO: 0.01 K/UL — SIGNIFICANT CHANGE UP (ref 0–0.5)
EOSINOPHIL NFR BLD AUTO: 0.2 % — SIGNIFICANT CHANGE UP (ref 0–6)
GLUCOSE SERPL-MCNC: 103 MG/DL — HIGH (ref 70–99)
HCT VFR BLD CALC: 43.6 % — SIGNIFICANT CHANGE UP (ref 39–50)
HGB BLD-MCNC: 14.6 G/DL — SIGNIFICANT CHANGE UP (ref 13–17)
IMM GRANULOCYTES NFR BLD AUTO: 0.7 % — SIGNIFICANT CHANGE UP (ref 0–1.5)
LIDOCAIN IGE QN: 14 U/L — SIGNIFICANT CHANGE UP (ref 7–60)
LYMPHOCYTES # BLD AUTO: 1.51 K/UL — SIGNIFICANT CHANGE UP (ref 1–3.3)
LYMPHOCYTES # BLD AUTO: 24.8 % — SIGNIFICANT CHANGE UP (ref 13–44)
MCHC RBC-ENTMCNC: 29.9 PG — SIGNIFICANT CHANGE UP (ref 27–34)
MCHC RBC-ENTMCNC: 33.5 GM/DL — SIGNIFICANT CHANGE UP (ref 32–36)
MCV RBC AUTO: 89.2 FL — SIGNIFICANT CHANGE UP (ref 80–100)
MONOCYTES # BLD AUTO: 0.79 K/UL — SIGNIFICANT CHANGE UP (ref 0–0.9)
MONOCYTES NFR BLD AUTO: 13 % — SIGNIFICANT CHANGE UP (ref 2–14)
NEUTROPHILS # BLD AUTO: 3.71 K/UL — SIGNIFICANT CHANGE UP (ref 1.8–7.4)
NEUTROPHILS NFR BLD AUTO: 61 % — SIGNIFICANT CHANGE UP (ref 43–77)
NRBC # BLD: 0 /100 WBCS — SIGNIFICANT CHANGE UP (ref 0–0)
PLATELET # BLD AUTO: 244 K/UL — SIGNIFICANT CHANGE UP (ref 150–400)
POTASSIUM SERPL-MCNC: 3.8 MMOL/L — SIGNIFICANT CHANGE UP (ref 3.5–5.3)
POTASSIUM SERPL-SCNC: 3.8 MMOL/L — SIGNIFICANT CHANGE UP (ref 3.5–5.3)
PROT SERPL-MCNC: 8 G/DL — SIGNIFICANT CHANGE UP (ref 6–8.3)
RBC # BLD: 4.89 M/UL — SIGNIFICANT CHANGE UP (ref 4.2–5.8)
RBC # FLD: 11.9 % — SIGNIFICANT CHANGE UP (ref 10.3–14.5)
SODIUM SERPL-SCNC: 137 MMOL/L — SIGNIFICANT CHANGE UP (ref 135–145)
WBC # BLD: 6.08 K/UL — SIGNIFICANT CHANGE UP (ref 3.8–10.5)
WBC # FLD AUTO: 6.08 K/UL — SIGNIFICANT CHANGE UP (ref 3.8–10.5)

## 2020-01-28 PROCEDURE — 99285 EMERGENCY DEPT VISIT HI MDM: CPT

## 2020-01-28 PROCEDURE — 93010 ELECTROCARDIOGRAM REPORT: CPT

## 2020-01-28 RX ORDER — ONDANSETRON 8 MG/1
4 TABLET, FILM COATED ORAL ONCE
Refills: 0 | Status: COMPLETED | OUTPATIENT
Start: 2020-01-28 | End: 2020-01-28

## 2020-01-28 RX ORDER — SODIUM CHLORIDE 9 MG/ML
1000 INJECTION, SOLUTION INTRAVENOUS ONCE
Refills: 0 | Status: COMPLETED | OUTPATIENT
Start: 2020-01-28 | End: 2020-01-28

## 2020-01-28 RX ORDER — ONDANSETRON 8 MG/1
4 TABLET, FILM COATED ORAL ONCE
Refills: 0 | Status: DISCONTINUED | OUTPATIENT
Start: 2020-01-28 | End: 2020-01-28

## 2020-01-28 RX ADMIN — ONDANSETRON 4 MILLIGRAM(S): 8 TABLET, FILM COATED ORAL at 23:43

## 2020-01-28 RX ADMIN — SODIUM CHLORIDE 2000 MILLILITER(S): 9 INJECTION, SOLUTION INTRAVENOUS at 23:44

## 2020-01-28 NOTE — ED ADULT NURSE NOTE - OBJECTIVE STATEMENT
Patient is a 34y male presenting to the ED ambulatory from home with c/o vomiting. Patient A&Ox4. Patient reports nausea and multiple episodes of vomiting starting today. Denies any blood in emesis. Denies diarrhea. Reports going on a cruise two weeks ago followed by a trip to the [facility] [place] before returning home yesterday. Patient is a 34y male presenting to the ED ambulatory from home with c/o vomiting. Patient A&Ox4. Patient reports nausea and multiple episodes of vomiting starting today. Denies any blood in emesis. Denies diarrhea. Reports going on a cruise two weeks ago followed by a trip to the [facility] [place] before returning home yesterday. Reports having "flu-like" symptoms while in the [facility] [place] approximately 1 week ago which have since resolved. Abdomen is soft, non-distended and non-tender upon palpation. Denies chest pain, SOB, headache, abdominal pain, fever/chills, burning upon urination or difficulty urinating, hematuria. Denies any pertinent medical history or daily medication use. Patient is a 34y male presenting to the ED ambulatory from home with c/o vomiting. Patient A&Ox4. Patient reports nausea and multiple episodes of vomiting starting today. Reports feeling "flushed." Denies any blood in emesis. Denies diarrhea. Reports going on a cruise two weeks ago followed by a trip to the [facility] [place] before returning home yesterday. Reports having "flu-like" symptoms while in the [facility] [place] approximately 1 week ago which have since resolved. Abdomen is soft, non-distended and non-tender upon palpation. Denies chest pain, SOB, headache, abdominal pain, fever/chills, burning upon urination or difficulty urinating, hematuria. Denies any pertinent medical history or daily medication use.

## 2020-01-28 NOTE — ED PROVIDER NOTE - NSFOLLOWUPINSTRUCTIONS_ED_ALL_ED_FT
You were seen in the emergency department for cough and vomiting. Please follow up with your primary doctor. Take ibuprofen 400 milligrams every 4 hours as needed for fever. Take zofran 4 up to every 6 hours if needed for continued nausea/vomiting. Return to the emergency department immediately if you experience difficulty breathing, inability to keep food down or any other concerning symptoms.

## 2020-01-28 NOTE — ED PROVIDER NOTE - PROGRESS NOTE DETAILS
Elizabeth Goldberger PGY-3: flu neg, labs unrem other than for mild transaminitis. Tolerated PO. Will dc Attending MD Sánchez: This patient was seen and orders were placed by the PA.  I was present in the Emergency Department and available to the PA during the time this patient was seen and evaluated.  Although I was available for any questions or concerns, I was not consulted on or asked to evaluate this patient.  I did not perform a face to face diagnostic evaluation nor did I discuss the history, exam and plan of care with the PA.

## 2020-01-28 NOTE — ED PROVIDER NOTE - RAPID ASSESSMENT
34y M w/ PMHx of HTN p/w vomiting that started two weeks ago after a cruise to the [facility], reported feeling better after taking TheraFlu for a few days and then went to another trip to the [place], came back yesterday and reports an increase in urination, nausea with last PO intake 8 am yesterday, a dry cough, and night sweats since yesterday. Also reports his feet feel like they are on "fire." Pt denies any abd pain, diarrhea, sick contact, dysuria, or any discharge in urine.    **Pt seen in waiting room by Balaji Fernando  documentation completed by Carlo Valiente. Pt to be sent to main ED for further evaluation - all orders placed to be followed by MD in the main ED** 34y M w/ PMHx of HTN p/w vomiting.  Patient reports that he developed flu like symptoms 1-2 weeks ago.  Cough has persisted, but other symptoms resolved.  Went on vacation in the [place] and returned yesterday.  Since returning patient c/o nausea, vomiting, night sweats and urinary frequency.      **Pt seen in waiting room by Balaji Fernando  documentation completed by Carlo Valiente. Pt to be sent to main ED for further evaluation - all orders placed to be followed by MD in the main ED**

## 2020-01-28 NOTE — ED PROVIDER NOTE - ATTENDING CONTRIBUTION TO CARE
Attending MD Mahan:  I personally have seen and examined this patient.  Resident note reviewed and agree on plan of care and except where noted.  See HPI, PE, and MDM for details.

## 2020-01-28 NOTE — ED PROVIDER NOTE - OBJECTIVE STATEMENT
34m w hx HTN here today for dry cough x2 wks. Initially also w fever, chills and night sweats all of which resolved other than cough. Sx started after returned from trip to [facility]. Subsequently went on vacation to  and just returned, and for past 1 day has nausea w vomiting. Denies abd pain. No cp or SOB. Has also noted urinary frequency w/o dysuria or hematuria.

## 2020-01-28 NOTE — ED PROVIDER NOTE - PATIENT PORTAL LINK FT
You can access the FollowMyHealth Patient Portal offered by [facility] by registering at the following website: [email or web address]. By joining [facility]’s FollowMyHealth portal, you will also be able to view your health information using other applications (apps) compatible with our system.

## 2020-01-29 VITALS
HEART RATE: 103 BPM | RESPIRATION RATE: 18 BRPM | TEMPERATURE: 98 F | SYSTOLIC BLOOD PRESSURE: 128 MMHG | DIASTOLIC BLOOD PRESSURE: 75 MMHG | OXYGEN SATURATION: 98 %

## 2020-01-29 LAB
APPEARANCE UR: CLEAR — SIGNIFICANT CHANGE UP
BACTERIA # UR AUTO: NEGATIVE — SIGNIFICANT CHANGE UP
BILIRUB UR-MCNC: NEGATIVE — SIGNIFICANT CHANGE UP
COLOR SPEC: SIGNIFICANT CHANGE UP
DIFF PNL FLD: NEGATIVE — SIGNIFICANT CHANGE UP
FLU A RESULT: SIGNIFICANT CHANGE UP
FLU A RESULT: SIGNIFICANT CHANGE UP
FLUAV AG NPH QL: SIGNIFICANT CHANGE UP
FLUBV AG NPH QL: SIGNIFICANT CHANGE UP
GLUCOSE UR QL: NEGATIVE — SIGNIFICANT CHANGE UP
HIV 1 & 2 AB SERPL IA.RAPID: SIGNIFICANT CHANGE UP
KETONES UR-MCNC: NEGATIVE — SIGNIFICANT CHANGE UP
LEUKOCYTE ESTERASE UR-ACNC: NEGATIVE — SIGNIFICANT CHANGE UP
NITRITE UR-MCNC: NEGATIVE — SIGNIFICANT CHANGE UP
PH UR: 6 — SIGNIFICANT CHANGE UP (ref 5–8)
PROT UR-MCNC: NEGATIVE — SIGNIFICANT CHANGE UP
RBC CASTS # UR COMP ASSIST: 1 /HPF — SIGNIFICANT CHANGE UP (ref 0–4)
RSV RESULT: SIGNIFICANT CHANGE UP
RSV RNA RESP QL NAA+PROBE: SIGNIFICANT CHANGE UP
SP GR SPEC: 1.01 — SIGNIFICANT CHANGE UP (ref 1.01–1.02)
UROBILINOGEN FLD QL: NEGATIVE — SIGNIFICANT CHANGE UP
WBC UR QL: SIGNIFICANT CHANGE UP

## 2020-01-29 PROCEDURE — 83735 ASSAY OF MAGNESIUM: CPT

## 2020-01-29 PROCEDURE — 87086 URINE CULTURE/COLONY COUNT: CPT

## 2020-01-29 PROCEDURE — 96374 THER/PROPH/DIAG INJ IV PUSH: CPT

## 2020-01-29 PROCEDURE — 71046 X-RAY EXAM CHEST 2 VIEWS: CPT

## 2020-01-29 PROCEDURE — 80053 COMPREHEN METABOLIC PANEL: CPT

## 2020-01-29 PROCEDURE — 85027 COMPLETE CBC AUTOMATED: CPT

## 2020-01-29 PROCEDURE — 71046 X-RAY EXAM CHEST 2 VIEWS: CPT | Mod: 26

## 2020-01-29 PROCEDURE — 86703 HIV-1/HIV-2 1 RESULT ANTBDY: CPT

## 2020-01-29 PROCEDURE — 87631 RESP VIRUS 3-5 TARGETS: CPT

## 2020-01-29 PROCEDURE — 99284 EMERGENCY DEPT VISIT MOD MDM: CPT | Mod: 25

## 2020-01-29 PROCEDURE — 83690 ASSAY OF LIPASE: CPT

## 2020-01-29 PROCEDURE — 87040 BLOOD CULTURE FOR BACTERIA: CPT

## 2020-01-29 PROCEDURE — 81001 URINALYSIS AUTO W/SCOPE: CPT

## 2020-01-29 PROCEDURE — 93005 ELECTROCARDIOGRAM TRACING: CPT

## 2020-01-29 RX ORDER — ONDANSETRON 8 MG/1
1 TABLET, FILM COATED ORAL
Qty: 12 | Refills: 0
Start: 2020-01-29 | End: 2020-01-31

## 2020-01-30 LAB
CULTURE RESULTS: NO GROWTH — SIGNIFICANT CHANGE UP
SPECIMEN SOURCE: SIGNIFICANT CHANGE UP

## 2020-02-03 LAB
CULTURE RESULTS: SIGNIFICANT CHANGE UP
CULTURE RESULTS: SIGNIFICANT CHANGE UP
SPECIMEN SOURCE: SIGNIFICANT CHANGE UP
SPECIMEN SOURCE: SIGNIFICANT CHANGE UP

## 2020-06-02 ENCOUNTER — TRANSCRIPTION ENCOUNTER (OUTPATIENT)
Age: 35
End: 2020-06-02

## 2020-06-02 ENCOUNTER — APPOINTMENT (OUTPATIENT)
Dept: DERMATOLOGY | Facility: CLINIC | Age: 35
End: 2020-06-02
Payer: COMMERCIAL

## 2020-06-02 VITALS — BODY MASS INDEX: 27.72 KG/M2 | HEIGHT: 71 IN | WEIGHT: 198 LBS

## 2020-06-02 PROCEDURE — 99203 OFFICE O/P NEW LOW 30 MIN: CPT | Mod: 25

## 2020-06-02 PROCEDURE — 17110 DESTRUCTION B9 LES UP TO 14: CPT

## 2020-07-14 ENCOUNTER — APPOINTMENT (OUTPATIENT)
Dept: DERMATOLOGY | Facility: CLINIC | Age: 35
End: 2020-07-14
Payer: COMMERCIAL

## 2020-07-14 VITALS — HEIGHT: 71 IN | WEIGHT: 200 LBS | BODY MASS INDEX: 28 KG/M2

## 2020-07-14 VITALS — TEMPERATURE: 97.5 F

## 2020-07-14 PROCEDURE — 17111 DESTRUCTION B9 LESIONS 15/>: CPT

## 2020-07-14 PROCEDURE — 99213 OFFICE O/P EST LOW 20 MIN: CPT | Mod: 25

## 2020-08-18 ENCOUNTER — APPOINTMENT (OUTPATIENT)
Dept: DERMATOLOGY | Facility: CLINIC | Age: 35
End: 2020-08-18
Payer: COMMERCIAL

## 2020-08-18 VITALS — TEMPERATURE: 97.1 F

## 2020-08-18 VITALS — WEIGHT: 210 LBS | HEIGHT: 71 IN | BODY MASS INDEX: 29.4 KG/M2

## 2020-08-18 PROCEDURE — 99213 OFFICE O/P EST LOW 20 MIN: CPT | Mod: 25

## 2020-08-18 PROCEDURE — 17111 DESTRUCTION B9 LESIONS 15/>: CPT

## 2020-09-21 ENCOUNTER — APPOINTMENT (OUTPATIENT)
Dept: DERMATOLOGY | Facility: CLINIC | Age: 35
End: 2020-09-21
Payer: COMMERCIAL

## 2020-09-21 VITALS — HEIGHT: 71 IN | WEIGHT: 210 LBS | BODY MASS INDEX: 29.4 KG/M2

## 2020-09-21 VITALS — TEMPERATURE: 97.3 F

## 2020-09-21 PROCEDURE — 99213 OFFICE O/P EST LOW 20 MIN: CPT | Mod: 25

## 2020-09-21 PROCEDURE — 17110 DESTRUCTION B9 LES UP TO 14: CPT

## 2020-11-23 ENCOUNTER — APPOINTMENT (OUTPATIENT)
Dept: DERMATOLOGY | Facility: CLINIC | Age: 35
End: 2020-11-23

## 2021-07-30 ENCOUNTER — INPATIENT (INPATIENT)
Facility: HOSPITAL | Age: 36
LOS: 29 days | Discharge: HOME | DRG: 834 | End: 2021-08-29
Attending: INTERNAL MEDICINE | Admitting: HOSPITALIST
Payer: COMMERCIAL

## 2021-07-30 VITALS
HEIGHT: 72 IN | SYSTOLIC BLOOD PRESSURE: 117 MMHG | OXYGEN SATURATION: 100 % | WEIGHT: 229.94 LBS | TEMPERATURE: 98 F | RESPIRATION RATE: 22 BRPM | DIASTOLIC BLOOD PRESSURE: 71 MMHG | HEART RATE: 140 BPM

## 2021-07-30 DIAGNOSIS — Z29.9 ENCOUNTER FOR PROPHYLACTIC MEASURES, UNSPECIFIED: ICD-10-CM

## 2021-07-30 DIAGNOSIS — R10.9 UNSPECIFIED ABDOMINAL PAIN: ICD-10-CM

## 2021-07-30 DIAGNOSIS — D75.89 OTHER SPECIFIED DISEASES OF BLOOD AND BLOOD-FORMING ORGANS: ICD-10-CM

## 2021-07-30 DIAGNOSIS — C95.00 ACUTE LEUKEMIA OF UNSPECIFIED CELL TYPE NOT HAVING ACHIEVED REMISSION: ICD-10-CM

## 2021-07-30 DIAGNOSIS — R65.10 SYSTEMIC INFLAMMATORY RESPONSE SYNDROME (SIRS) OF NON-INFECTIOUS ORIGIN WITHOUT ACUTE ORGAN DYSFUNCTION: ICD-10-CM

## 2021-07-30 DIAGNOSIS — I10 ESSENTIAL (PRIMARY) HYPERTENSION: ICD-10-CM

## 2021-07-30 LAB
ALBUMIN SERPL ELPH-MCNC: 4.4 G/DL — SIGNIFICANT CHANGE UP (ref 3.3–5)
ALBUMIN SERPL ELPH-MCNC: 4.4 G/DL — SIGNIFICANT CHANGE UP (ref 3.3–5)
ALP SERPL-CCNC: 76 U/L — SIGNIFICANT CHANGE UP (ref 40–120)
ALP SERPL-CCNC: 79 U/L — SIGNIFICANT CHANGE UP (ref 40–120)
ALT FLD-CCNC: 30 U/L — SIGNIFICANT CHANGE UP (ref 10–45)
ALT FLD-CCNC: 30 U/L — SIGNIFICANT CHANGE UP (ref 10–45)
ANION GAP SERPL CALC-SCNC: 12 MMOL/L — SIGNIFICANT CHANGE UP (ref 5–17)
ANION GAP SERPL CALC-SCNC: 13 MMOL/L — SIGNIFICANT CHANGE UP (ref 5–17)
ANISOCYTOSIS BLD QL: SLIGHT — SIGNIFICANT CHANGE UP
APPEARANCE UR: CLEAR — SIGNIFICANT CHANGE UP
APTT BLD: 29.7 SEC — SIGNIFICANT CHANGE UP (ref 27.5–35.5)
AST SERPL-CCNC: 15 U/L — SIGNIFICANT CHANGE UP (ref 10–40)
AST SERPL-CCNC: 16 U/L — SIGNIFICANT CHANGE UP (ref 10–40)
BACTERIA # UR AUTO: NEGATIVE — SIGNIFICANT CHANGE UP
BASE EXCESS BLDV CALC-SCNC: 1.5 MMOL/L — SIGNIFICANT CHANGE UP (ref -2–2)
BASE EXCESS BLDV CALC-SCNC: 2.3 MMOL/L — HIGH (ref -2–2)
BASOPHILS # BLD AUTO: 0 K/UL — SIGNIFICANT CHANGE UP (ref 0–0.2)
BASOPHILS NFR BLD AUTO: 0 % — SIGNIFICANT CHANGE UP (ref 0–2)
BILIRUB SERPL-MCNC: 0.4 MG/DL — SIGNIFICANT CHANGE UP (ref 0.2–1.2)
BILIRUB SERPL-MCNC: 0.5 MG/DL — SIGNIFICANT CHANGE UP (ref 0.2–1.2)
BILIRUB UR-MCNC: NEGATIVE — SIGNIFICANT CHANGE UP
BLASTS # FLD: 16.4 % — HIGH (ref 0–0)
BLASTS # FLD: 17.1 % — HIGH (ref 0–0)
BUN SERPL-MCNC: 13 MG/DL — SIGNIFICANT CHANGE UP (ref 7–23)
BUN SERPL-MCNC: 15 MG/DL — SIGNIFICANT CHANGE UP (ref 7–23)
CA-I SERPL-SCNC: 1.14 MMOL/L — SIGNIFICANT CHANGE UP (ref 1.12–1.3)
CA-I SERPL-SCNC: 1.18 MMOL/L — SIGNIFICANT CHANGE UP (ref 1.12–1.3)
CALCIUM SERPL-MCNC: 9.5 MG/DL — SIGNIFICANT CHANGE UP (ref 8.4–10.5)
CALCIUM SERPL-MCNC: 9.9 MG/DL — SIGNIFICANT CHANGE UP (ref 8.4–10.5)
CHLORIDE BLDV-SCNC: 107 MMOL/L — SIGNIFICANT CHANGE UP (ref 96–108)
CHLORIDE BLDV-SCNC: 109 MMOL/L — HIGH (ref 96–108)
CHLORIDE SERPL-SCNC: 102 MMOL/L — SIGNIFICANT CHANGE UP (ref 96–108)
CHLORIDE SERPL-SCNC: 105 MMOL/L — SIGNIFICANT CHANGE UP (ref 96–108)
CK MB BLD-MCNC: 0.9 % — SIGNIFICANT CHANGE UP (ref 0–3.5)
CK MB CFR SERPL CALC: 1 NG/ML — SIGNIFICANT CHANGE UP (ref 0–6.7)
CK SERPL-CCNC: 112 U/L — SIGNIFICANT CHANGE UP (ref 30–200)
CLOSURE TME COLL+EPINEP BLD: 42 K/UL — LOW (ref 150–400)
CO2 BLDV-SCNC: 27 MMOL/L — SIGNIFICANT CHANGE UP (ref 22–30)
CO2 BLDV-SCNC: 28 MMOL/L — SIGNIFICANT CHANGE UP (ref 22–30)
CO2 SERPL-SCNC: 22 MMOL/L — SIGNIFICANT CHANGE UP (ref 22–31)
CO2 SERPL-SCNC: 23 MMOL/L — SIGNIFICANT CHANGE UP (ref 22–31)
COLOR SPEC: COLORLESS — SIGNIFICANT CHANGE UP
CREAT SERPL-MCNC: 1.03 MG/DL — SIGNIFICANT CHANGE UP (ref 0.5–1.3)
CREAT SERPL-MCNC: 1.23 MG/DL — SIGNIFICANT CHANGE UP (ref 0.5–1.3)
DIFF PNL FLD: NEGATIVE — SIGNIFICANT CHANGE UP
EOSINOPHIL # BLD AUTO: 0 K/UL — SIGNIFICANT CHANGE UP (ref 0–0.5)
EOSINOPHIL NFR BLD AUTO: 0 % — SIGNIFICANT CHANGE UP (ref 0–6)
EPI CELLS # UR: 0 /HPF — SIGNIFICANT CHANGE UP
FERRITIN SERPL-MCNC: 834 NG/ML — HIGH (ref 30–400)
FIBRINOGEN PPP-MCNC: 633 MG/DL — HIGH (ref 290–520)
GAS PNL BLDV: 137 MMOL/L — SIGNIFICANT CHANGE UP (ref 135–145)
GAS PNL BLDV: 141 MMOL/L — SIGNIFICANT CHANGE UP (ref 135–145)
GAS PNL BLDV: SIGNIFICANT CHANGE UP
GLUCOSE BLDV-MCNC: 105 MG/DL — HIGH (ref 70–99)
GLUCOSE BLDV-MCNC: 110 MG/DL — HIGH (ref 70–99)
GLUCOSE SERPL-MCNC: 113 MG/DL — HIGH (ref 70–99)
GLUCOSE SERPL-MCNC: 93 MG/DL — SIGNIFICANT CHANGE UP (ref 70–99)
GLUCOSE UR QL: NEGATIVE — SIGNIFICANT CHANGE UP
HAPTOGLOB SERPL-MCNC: 294 MG/DL — HIGH (ref 34–200)
HBV CORE AB SER-ACNC: SIGNIFICANT CHANGE UP
HBV SURFACE AB SER-ACNC: SIGNIFICANT CHANGE UP
HBV SURFACE AG SER-ACNC: SIGNIFICANT CHANGE UP
HCO3 BLDV-SCNC: 26 MMOL/L — SIGNIFICANT CHANGE UP (ref 21–29)
HCO3 BLDV-SCNC: 27 MMOL/L — SIGNIFICANT CHANGE UP (ref 21–29)
HCT VFR BLD CALC: 25.5 % — LOW (ref 39–50)
HCT VFR BLD CALC: 26 % — LOW (ref 39–50)
HCT VFR BLDA CALC: 28 % — LOW (ref 39–50)
HCT VFR BLDA CALC: 30 % — LOW (ref 39–50)
HCV AB S/CO SERPL IA: 0.16 S/CO — SIGNIFICANT CHANGE UP (ref 0–0.99)
HCV AB SERPL-IMP: SIGNIFICANT CHANGE UP
HGB BLD CALC-MCNC: 9 G/DL — LOW (ref 13–17)
HGB BLD CALC-MCNC: 9.8 G/DL — LOW (ref 13–17)
HGB BLD-MCNC: 8.7 G/DL — LOW (ref 13–17)
HGB BLD-MCNC: 8.9 G/DL — LOW (ref 13–17)
HIV 1+2 AB+HIV1 P24 AG SERPL QL IA: SIGNIFICANT CHANGE UP
HYALINE CASTS # UR AUTO: 0 /LPF — SIGNIFICANT CHANGE UP (ref 0–2)
INR BLD: 1.14 RATIO — SIGNIFICANT CHANGE UP (ref 0.88–1.16)
IRON SATN MFR SERPL: 33 % — SIGNIFICANT CHANGE UP (ref 16–55)
IRON SATN MFR SERPL: 83 UG/DL — SIGNIFICANT CHANGE UP (ref 45–165)
KETONES UR-MCNC: NEGATIVE — SIGNIFICANT CHANGE UP
LACTATE BLDV-MCNC: 1.4 MMOL/L — SIGNIFICANT CHANGE UP (ref 0.7–2)
LACTATE BLDV-MCNC: 2.7 MMOL/L — HIGH (ref 0.7–2)
LDH SERPL L TO P-CCNC: 384 U/L — HIGH (ref 50–242)
LDH SERPL L TO P-CCNC: 448 U/L — HIGH (ref 50–242)
LEUKOCYTE ESTERASE UR-ACNC: NEGATIVE — SIGNIFICANT CHANGE UP
LIDOCAIN IGE QN: 25 U/L — SIGNIFICANT CHANGE UP (ref 7–60)
LYMPHOCYTES # BLD AUTO: 1.83 K/UL — SIGNIFICANT CHANGE UP (ref 1–3.3)
LYMPHOCYTES # BLD AUTO: 18.1 % — SIGNIFICANT CHANGE UP (ref 13–44)
MACROCYTES BLD QL: SIGNIFICANT CHANGE UP
MAGNESIUM SERPL-MCNC: 2.2 MG/DL — SIGNIFICANT CHANGE UP (ref 1.6–2.6)
MAGNESIUM SERPL-MCNC: 2.4 MG/DL — SIGNIFICANT CHANGE UP (ref 1.6–2.6)
MANUAL DIF COMMENT BLD-IMP: SIGNIFICANT CHANGE UP
MANUAL SMEAR VERIFICATION: SIGNIFICANT CHANGE UP
MANUAL SMEAR VERIFICATION: SIGNIFICANT CHANGE UP
MCHC RBC-ENTMCNC: 34.1 GM/DL — SIGNIFICANT CHANGE UP (ref 32–36)
MCHC RBC-ENTMCNC: 34.2 GM/DL — SIGNIFICANT CHANGE UP (ref 32–36)
MCHC RBC-ENTMCNC: 35.4 PG — HIGH (ref 27–34)
MCHC RBC-ENTMCNC: 35.5 PG — HIGH (ref 27–34)
MCV RBC AUTO: 103.6 FL — HIGH (ref 80–100)
MCV RBC AUTO: 103.7 FL — HIGH (ref 80–100)
METAMYELOCYTES # FLD: 0.8 % — HIGH (ref 0–0)
MONOCYTES # BLD AUTO: 1.66 K/UL — HIGH (ref 0–0.9)
MONOCYTES NFR BLD AUTO: 16.4 % — HIGH (ref 2–14)
MYELOCYTES NFR BLD: 0.8 % — HIGH (ref 0–0)
MYELOCYTES NFR BLD: 2.6 % — HIGH (ref 0–0)
NEUTROPHILS # BLD AUTO: 4.71 K/UL — SIGNIFICANT CHANGE UP (ref 1.8–7.4)
NEUTROPHILS NFR BLD AUTO: 45.7 % — SIGNIFICANT CHANGE UP (ref 43–77)
NEUTS BAND # BLD: 0.8 % — SIGNIFICANT CHANGE UP (ref 0–8)
NITRITE UR-MCNC: NEGATIVE — SIGNIFICANT CHANGE UP
NRBC # BLD: 0 /100 WBCS — SIGNIFICANT CHANGE UP (ref 0–0)
NRBC # BLD: 1 /100 — HIGH (ref 0–0)
OVALOCYTES BLD QL SMEAR: SLIGHT — SIGNIFICANT CHANGE UP
PCO2 BLDV: 41 MMHG — SIGNIFICANT CHANGE UP (ref 35–50)
PCO2 BLDV: 45 MMHG — SIGNIFICANT CHANGE UP (ref 35–50)
PH BLDV: 7.39 — SIGNIFICANT CHANGE UP (ref 7.35–7.45)
PH BLDV: 7.41 — SIGNIFICANT CHANGE UP (ref 7.35–7.45)
PH UR: 6.5 — SIGNIFICANT CHANGE UP (ref 5–8)
PHOSPHATE SERPL-MCNC: 2.6 MG/DL — SIGNIFICANT CHANGE UP (ref 2.5–4.5)
PHOSPHATE SERPL-MCNC: 3.8 MG/DL — SIGNIFICANT CHANGE UP (ref 2.5–4.5)
PLAT MORPH BLD: NORMAL — SIGNIFICANT CHANGE UP
PLAT MORPH BLD: NORMAL — SIGNIFICANT CHANGE UP
PLATELET # BLD AUTO: 41 K/UL — LOW (ref 150–400)
PLATELET # BLD AUTO: 48 K/UL — LOW (ref 150–400)
PO2 BLDV: 26 MMHG — SIGNIFICANT CHANGE UP (ref 25–45)
PO2 BLDV: 34 MMHG — SIGNIFICANT CHANGE UP (ref 25–45)
POTASSIUM BLDV-SCNC: 4 MMOL/L — SIGNIFICANT CHANGE UP (ref 3.5–5.3)
POTASSIUM BLDV-SCNC: 4 MMOL/L — SIGNIFICANT CHANGE UP (ref 3.5–5.3)
POTASSIUM SERPL-MCNC: 3.9 MMOL/L — SIGNIFICANT CHANGE UP (ref 3.5–5.3)
POTASSIUM SERPL-MCNC: 4.1 MMOL/L — SIGNIFICANT CHANGE UP (ref 3.5–5.3)
POTASSIUM SERPL-SCNC: 3.9 MMOL/L — SIGNIFICANT CHANGE UP (ref 3.5–5.3)
POTASSIUM SERPL-SCNC: 4.1 MMOL/L — SIGNIFICANT CHANGE UP (ref 3.5–5.3)
PROMYELOCYTES # FLD: 0.8 % — HIGH (ref 0–0)
PROT SERPL-MCNC: 7.7 G/DL — SIGNIFICANT CHANGE UP (ref 6–8.3)
PROT SERPL-MCNC: 7.7 G/DL — SIGNIFICANT CHANGE UP (ref 6–8.3)
PROT UR-MCNC: NEGATIVE — SIGNIFICANT CHANGE UP
PROTHROM AB SERPL-ACNC: 13.6 SEC — SIGNIFICANT CHANGE UP (ref 10.6–13.6)
RBC # BLD: 2.46 M/UL — LOW (ref 4.2–5.8)
RBC # BLD: 2.51 M/UL — LOW (ref 4.2–5.8)
RBC # FLD: 14.6 % — HIGH (ref 10.3–14.5)
RBC # FLD: 14.7 % — HIGH (ref 10.3–14.5)
RBC BLD AUTO: ABNORMAL
RBC BLD AUTO: SIGNIFICANT CHANGE UP
RBC CASTS # UR COMP ASSIST: 1 /HPF — SIGNIFICANT CHANGE UP (ref 0–4)
SAO2 % BLDV: 40 % — LOW (ref 67–88)
SAO2 % BLDV: 59 % — LOW (ref 67–88)
SARS-COV-2 RNA SPEC QL NAA+PROBE: SIGNIFICANT CHANGE UP
SODIUM SERPL-SCNC: 136 MMOL/L — SIGNIFICANT CHANGE UP (ref 135–145)
SODIUM SERPL-SCNC: 141 MMOL/L — SIGNIFICANT CHANGE UP (ref 135–145)
SP GR SPEC: 1.05 — HIGH (ref 1.01–1.02)
TIBC SERPL-MCNC: 251 UG/DL — SIGNIFICANT CHANGE UP (ref 220–430)
TROPONIN T, HIGH SENSITIVITY RESULT: <6 NG/L — SIGNIFICANT CHANGE UP (ref 0–51)
UIBC SERPL-MCNC: 168 UG/DL — SIGNIFICANT CHANGE UP (ref 110–370)
URATE SERPL-MCNC: 5.7 MG/DL — SIGNIFICANT CHANGE UP (ref 3.4–8.8)
UROBILINOGEN FLD QL: NEGATIVE — SIGNIFICANT CHANGE UP
WBC # BLD: 10.12 K/UL — SIGNIFICANT CHANGE UP (ref 3.8–10.5)
WBC # BLD: 12 K/UL — HIGH (ref 3.8–10.5)
WBC # FLD AUTO: 10.12 K/UL — SIGNIFICANT CHANGE UP (ref 3.8–10.5)
WBC # FLD AUTO: 12 K/UL — HIGH (ref 3.8–10.5)
WBC UR QL: 6 /HPF — HIGH (ref 0–5)

## 2021-07-30 PROCEDURE — G0452: CPT | Mod: 26

## 2021-07-30 PROCEDURE — 99285 EMERGENCY DEPT VISIT HI MDM: CPT

## 2021-07-30 PROCEDURE — 99223 1ST HOSP IP/OBS HIGH 75: CPT | Mod: GC

## 2021-07-30 PROCEDURE — 93010 ELECTROCARDIOGRAM REPORT: CPT

## 2021-07-30 PROCEDURE — 74177 CT ABD & PELVIS W/CONTRAST: CPT | Mod: 26,MA

## 2021-07-30 RX ORDER — ACETAMINOPHEN 500 MG
650 TABLET ORAL EVERY 6 HOURS
Refills: 0 | Status: DISCONTINUED | OUTPATIENT
Start: 2021-07-30 | End: 2021-08-29

## 2021-07-30 RX ORDER — ACETAMINOPHEN 500 MG
975 TABLET ORAL ONCE
Refills: 0 | Status: COMPLETED | OUTPATIENT
Start: 2021-07-30 | End: 2021-07-30

## 2021-07-30 RX ORDER — ACETAMINOPHEN 500 MG
650 TABLET ORAL EVERY 6 HOURS
Refills: 0 | Status: DISCONTINUED | OUTPATIENT
Start: 2021-07-30 | End: 2021-07-30

## 2021-07-30 RX ORDER — SODIUM CHLORIDE 9 MG/ML
1000 INJECTION INTRAMUSCULAR; INTRAVENOUS; SUBCUTANEOUS
Refills: 0 | Status: DISCONTINUED | OUTPATIENT
Start: 2021-07-30 | End: 2021-07-31

## 2021-07-30 RX ORDER — MORPHINE SULFATE 50 MG/1
2 CAPSULE, EXTENDED RELEASE ORAL ONCE
Refills: 0 | Status: DISCONTINUED | OUTPATIENT
Start: 2021-07-30 | End: 2021-07-30

## 2021-07-30 RX ORDER — HYDROMORPHONE HYDROCHLORIDE 2 MG/ML
0.5 INJECTION INTRAMUSCULAR; INTRAVENOUS; SUBCUTANEOUS ONCE
Refills: 0 | Status: DISCONTINUED | OUTPATIENT
Start: 2021-07-30 | End: 2021-07-30

## 2021-07-30 RX ORDER — HYDROMORPHONE HYDROCHLORIDE 2 MG/ML
1 INJECTION INTRAMUSCULAR; INTRAVENOUS; SUBCUTANEOUS ONCE
Refills: 0 | Status: DISCONTINUED | OUTPATIENT
Start: 2021-07-30 | End: 2021-07-30

## 2021-07-30 RX ORDER — AMLODIPINE BESYLATE 2.5 MG/1
5 TABLET ORAL DAILY
Refills: 0 | Status: DISCONTINUED | OUTPATIENT
Start: 2021-07-30 | End: 2021-08-29

## 2021-07-30 RX ORDER — POLYETHYLENE GLYCOL 3350 17 G/17G
17 POWDER, FOR SOLUTION ORAL DAILY
Refills: 0 | Status: DISCONTINUED | OUTPATIENT
Start: 2021-07-30 | End: 2021-08-29

## 2021-07-30 RX ORDER — ALLOPURINOL 300 MG
300 TABLET ORAL DAILY
Refills: 0 | Status: DISCONTINUED | OUTPATIENT
Start: 2021-07-30 | End: 2021-08-17

## 2021-07-30 RX ORDER — IBUPROFEN 200 MG
600 TABLET ORAL ONCE
Refills: 0 | Status: COMPLETED | OUTPATIENT
Start: 2021-07-30 | End: 2021-07-30

## 2021-07-30 RX ORDER — HYDROMORPHONE HYDROCHLORIDE 2 MG/ML
1 INJECTION INTRAMUSCULAR; INTRAVENOUS; SUBCUTANEOUS EVERY 6 HOURS
Refills: 0 | Status: DISCONTINUED | OUTPATIENT
Start: 2021-07-30 | End: 2021-07-31

## 2021-07-30 RX ORDER — SODIUM CHLORIDE 9 MG/ML
1000 INJECTION INTRAMUSCULAR; INTRAVENOUS; SUBCUTANEOUS ONCE
Refills: 0 | Status: COMPLETED | OUTPATIENT
Start: 2021-07-30 | End: 2021-07-30

## 2021-07-30 RX ORDER — SENNA PLUS 8.6 MG/1
2 TABLET ORAL AT BEDTIME
Refills: 0 | Status: DISCONTINUED | OUTPATIENT
Start: 2021-07-30 | End: 2021-08-29

## 2021-07-30 RX ADMIN — HYDROMORPHONE HYDROCHLORIDE 0.5 MILLIGRAM(S): 2 INJECTION INTRAMUSCULAR; INTRAVENOUS; SUBCUTANEOUS at 11:31

## 2021-07-30 RX ADMIN — Medication 600 MILLIGRAM(S): at 08:07

## 2021-07-30 RX ADMIN — HYDROMORPHONE HYDROCHLORIDE 0.5 MILLIGRAM(S): 2 INJECTION INTRAMUSCULAR; INTRAVENOUS; SUBCUTANEOUS at 18:38

## 2021-07-30 RX ADMIN — MORPHINE SULFATE 2 MILLIGRAM(S): 50 CAPSULE, EXTENDED RELEASE ORAL at 04:51

## 2021-07-30 RX ADMIN — SODIUM CHLORIDE 100 MILLILITER(S): 9 INJECTION INTRAMUSCULAR; INTRAVENOUS; SUBCUTANEOUS at 09:40

## 2021-07-30 RX ADMIN — Medication 975 MILLIGRAM(S): at 07:37

## 2021-07-30 RX ADMIN — Medication 600 MILLIGRAM(S): at 07:37

## 2021-07-30 RX ADMIN — HYDROMORPHONE HYDROCHLORIDE 1 MILLIGRAM(S): 2 INJECTION INTRAMUSCULAR; INTRAVENOUS; SUBCUTANEOUS at 23:19

## 2021-07-30 RX ADMIN — POLYETHYLENE GLYCOL 3350 17 GRAM(S): 17 POWDER, FOR SOLUTION ORAL at 13:05

## 2021-07-30 RX ADMIN — HYDROMORPHONE HYDROCHLORIDE 1 MILLIGRAM(S): 2 INJECTION INTRAMUSCULAR; INTRAVENOUS; SUBCUTANEOUS at 08:07

## 2021-07-30 RX ADMIN — SODIUM CHLORIDE 1000 MILLILITER(S): 9 INJECTION INTRAMUSCULAR; INTRAVENOUS; SUBCUTANEOUS at 03:57

## 2021-07-30 RX ADMIN — HYDROMORPHONE HYDROCHLORIDE 0.5 MILLIGRAM(S): 2 INJECTION INTRAMUSCULAR; INTRAVENOUS; SUBCUTANEOUS at 17:31

## 2021-07-30 RX ADMIN — HYDROMORPHONE HYDROCHLORIDE 0.5 MILLIGRAM(S): 2 INJECTION INTRAMUSCULAR; INTRAVENOUS; SUBCUTANEOUS at 11:01

## 2021-07-30 RX ADMIN — MORPHINE SULFATE 2 MILLIGRAM(S): 50 CAPSULE, EXTENDED RELEASE ORAL at 03:51

## 2021-07-30 RX ADMIN — HYDROMORPHONE HYDROCHLORIDE 0.5 MILLIGRAM(S): 2 INJECTION INTRAMUSCULAR; INTRAVENOUS; SUBCUTANEOUS at 19:08

## 2021-07-30 RX ADMIN — MORPHINE SULFATE 2 MILLIGRAM(S): 50 CAPSULE, EXTENDED RELEASE ORAL at 04:21

## 2021-07-30 RX ADMIN — Medication 975 MILLIGRAM(S): at 08:07

## 2021-07-30 RX ADMIN — Medication 300 MILLIGRAM(S): at 12:31

## 2021-07-30 RX ADMIN — Medication 650 MILLIGRAM(S): at 23:37

## 2021-07-30 RX ADMIN — Medication 650 MILLIGRAM(S): at 21:10

## 2021-07-30 RX ADMIN — HYDROMORPHONE HYDROCHLORIDE 1 MILLIGRAM(S): 2 INJECTION INTRAMUSCULAR; INTRAVENOUS; SUBCUTANEOUS at 04:31

## 2021-07-30 RX ADMIN — HYDROMORPHONE HYDROCHLORIDE 0.5 MILLIGRAM(S): 2 INJECTION INTRAMUSCULAR; INTRAVENOUS; SUBCUTANEOUS at 18:23

## 2021-07-30 NOTE — ED PROVIDER NOTE - NS_BEDUNITTYPES_ED_ALL_ED
Pt called requesting a refill of her oxycodone today as her supply will end on Omaira Day. Pt also had c/o of severe leg cramping and swollen ankles. Pt states the weekend was bad. Pt states she applied and is now wearing compression stockings which has helped minimize her swollen ankles.      MEDICINE TELEMETRY

## 2021-07-30 NOTE — H&P ADULT - PROBLEM SELECTOR PLAN 4
- Hgb low at 8.7 and PLT low at 41  - monitor Hgb and PLT levels  - ferritin elevated at 834 but other iron studies normal  - Transfuse if Hgb < 7.0.  Transfuse if platelets <10K, or <15K if febrile. Transfuse for platelets <50K if bleeding.

## 2021-07-30 NOTE — ED PROVIDER NOTE - ATTENDING CONTRIBUTION TO CARE
35yo M with hx HTN, ?fatty liver, kidney stones presents with severe RUQ pain for 1 week now in severe pain writing around, ruq radiating to back possible renal colic, pain control, ct, labs, ua ordered.

## 2021-07-30 NOTE — H&P ADULT - NSHPLABSRESULTS_GEN_ALL_CORE
CBC Full  -  ( 2021 04:17 )  WBC Count : 12.00 K/uL  Hemoglobin : 8.9 g/dL  Hematocrit : 26.0 %  Platelet Count - Automated : 48 K/uL  Mean Cell Volume : 103.6 fl  Mean Cell Hemoglobin : 35.5 pg  Mean Cell Hemoglobin Concentration : 34.2 gm/dL  Auto Neutrophil # : 4.39 K/uL  Auto Lymphocyte # : 2.83 K/uL  Auto Monocyte # : 2.44 K/uL  Auto Eosinophil # : 0.00 K/uL  Auto Basophil # : 0.00 K/uL  Auto Neutrophil % : 36.6 %  Auto Lymphocyte % : 23.6 %  Auto Monocyte % : 20.3 %  Auto Eosinophil % : 0.0 %  Auto Basophil % : 0.0 %        136  |  102  |  15  ----------------------------<  113<H>  4.1   |  22  |  1.23    Ca    9.9      2021 04:17  Phos  2.6       Mg     2.2         TPro  7.7  /  Alb  4.4  /  TBili  0.4  /  DBili  x   /  AST  15  /  ALT  30  /  AlkPhos  79  0730    LIVER FUNCTIONS - ( 2021 04:17 )  Alb: 4.4 g/dL / Pro: 7.7 g/dL / ALK PHOS: 79 U/L / ALT: 30 U/L / AST: 15 U/L / GGT: x           Urinalysis Basic - ( 2021 06:26 )    Color: Colorless / Appearance: Clear / S.050 / pH: x  Gluc: x / Ketone: Negative  / Bili: Negative / Urobili: Negative   Blood: x / Protein: Negative / Nitrite: Negative   Leuk Esterase: Negative / RBC: 1 /hpf / WBC 6 /HPF   Sq Epi: x / Non Sq Epi: 0 /hpf / Bacteria: Negative      PT/INR - ( 2021 07:06 )   PT: 13.6 sec;   INR: 1.14 ratio         PTT - ( 2021 07:06 )  PTT:29.7 sec  34  26    Creatine Kinase, Serum: 112 U/L (21 @ 04:17)        EKG:       Imaging:    < from: CT Abdomen and Pelvis w/ IV Cont (07.30.21 @ 05:11) >    FINDINGS:  The heart is not enlarged. Trace to small right pleural effusion.    The large and small bowel arenormal in caliber without obstruction. There is no free intraperitoneal air or abdominal abscess.  The appendix is normal. Few sigmoid diverticula. There is no abnormal bowel wall thickening or inflammatory change.    The liver is mildly enlarged andof diffuse lower attenuation than the spleen compatible with fatty infiltration. The gallbladder, spleen, pancreas and adrenal glands are normal.  The kidneys enhance symmetrically without hydronephrosis. Punctate nonobstructing left intrarenal calculus.    The abdominal aorta is normal in caliber. There is no retroperitoneal, pelvic or inguinal adenopathy. There is no ascites.    The urinary bladder is unremarkable. Prostate gland is not enlarged.    The visualized osseous structures demonstrateno acute abnormality.    IMPRESSION:  No acute intra-abdominal or pelvic finding.    Trace to small right pleural effusion.    --- End of Report ---    < end of copied text > CBC Full  -  ( 2021 04:17 )  WBC Count : 12.00 K/uL  Hemoglobin : 8.9 g/dL  Hematocrit : 26.0 %  Platelet Count - Automated : 48 K/uL  Mean Cell Volume : 103.6 fl  Mean Cell Hemoglobin : 35.5 pg  Mean Cell Hemoglobin Concentration : 34.2 gm/dL  Auto Neutrophil # : 4.39 K/uL  Auto Lymphocyte # : 2.83 K/uL  Auto Monocyte # : 2.44 K/uL  Auto Eosinophil # : 0.00 K/uL  Auto Basophil # : 0.00 K/uL  Auto Neutrophil % : 36.6 %  Auto Lymphocyte % : 23.6 %  Auto Monocyte % : 20.3 %  Auto Eosinophil % : 0.0 %  Auto Basophil % : 0.0 %        136  |  102  |  15  ----------------------------<  113<H>  4.1   |  22  |  1.23    Ca    9.9      2021 04:17  Phos  2.6       Mg     2.2         TPro  7.7  /  Alb  4.4  /  TBili  0.4  /  DBili  x   /  AST  15  /  ALT  30  /  AlkPhos  79  0730    LIVER FUNCTIONS - ( 2021 04:17 )  Alb: 4.4 g/dL / Pro: 7.7 g/dL / ALK PHOS: 79 U/L / ALT: 30 U/L / AST: 15 U/L / GGT: x           Urinalysis Basic - ( 2021 06:26 )    Color: Colorless / Appearance: Clear / S.050 / pH: x  Gluc: x / Ketone: Negative  / Bili: Negative / Urobili: Negative   Blood: x / Protein: Negative / Nitrite: Negative   Leuk Esterase: Negative / RBC: 1 /hpf / WBC 6 /HPF   Sq Epi: x / Non Sq Epi: 0 /hpf / Bacteria: Negative      PT/INR - ( 2021 07:06 )   PT: 13.6 sec;   INR: 1.14 ratio         PTT - ( 2021 07:06 )  PTT:29.7 sec  34  26    Creatine Kinase, Serum: 112 U/L (21 @ 04:17)    Lipase, Serum: 25 U/L (21 @ 04:17)       EKG:       Imaging:    < from: CT Abdomen and Pelvis w/ IV Cont (21 @ 05:11) >    FINDINGS:  The heart is not enlarged. Trace to small right pleural effusion.    The large and small bowel are normal in caliber without obstruction. There is no free intraperitoneal air or abdominal abscess.  The appendix is normal. Few sigmoid diverticula. There is no abnormal bowel wall thickening or inflammatory change.    The liver is mildly enlarged and of diffuse lower attenuation than the spleen compatible with fatty infiltration. The gallbladder, spleen, pancreas and adrenal glands are normal.  The kidneys enhance symmetrically without hydronephrosis. Punctate nonobstructing left intrarenal calculus.    The abdominal aorta is normal in caliber. There is no retroperitoneal, pelvic or inguinal adenopathy. There is no ascites.    The urinary bladder is unremarkable. Prostate gland is not enlarged.    The visualized osseous structures demonstrate no acute abnormality.    IMPRESSION:  No acute intra-abdominal or pelvic finding.    Trace to small right pleural effusion.    --- End of Report ---    < end of copied text >

## 2021-07-30 NOTE — H&P ADULT - HISTORY OF PRESENT ILLNESS
36M PMH HTN, ?fatty liver, kidney stones presents with severe RUQ pain for 1 week. Pain is sharp, wrapping around to the back. No association with PO intake. Denies weight loss, night sweats. No urinary symptoms, fevers, chills, or changes in appetite. Endorses constipation. No diarrhea, N/V. No prior cardiac hx, pleuritic chest pain, or SOB. Prior hx of kidney stones but states this pain is worse. Patient also reports diffuse rash over torso and along the neck.  Denies allergies or new medications. No pruritis.    In the ED patient had low-grade elevated temperature to 99.4, tachycardic to 150s, started on fluids and allopurinol per heme/onc recs. CT A/P revealed fatty liver and small R pleural effusion. 36M PMH HTN, ?fatty liver, kidney stones presents with rash, dizziness, fatigue and severe RUQ pain for 3 days. He has felt fatigued for the past few months. Three days ago, he developed sharp RUQ pain that wrapped around to his back. He rated the pain as a 5/10 with no change over the past three days. The pain doesn't change with PO intake or bowel movements. He has some associated dizziness with the pain. He also developed an erythematous rash that is nonpruritic on his upper chest, neck, and upper back over the past couple of days. He has had a twenty pound weight gain over the past year due to a change to sedentary lifestyle 2/2 pandemic. He denies night sweats and fevers. He has had constipation for the past week where he must strain to move his bowels. He has no urinary symptoms, fevers, chills, or changes in appetite. No diarrhea, N/V. No prior cardiac hx, pleuritic chest pain, or SOB. He had kidney stones 5 years ago but states this pain is worse. Denies allergies or new medications. He denies recent travel and works as a . He has had no recent sick contacts. He received all childhood vaccines.    In the ED patient had low-grade elevated temperature to 99.4, tachycardic to 150s, started on fluids and allopurinol per heme/onc recs. He received 1 mg hydromorphone, 4 mg morphine, 600 mg ibuprofen , and 975 mg acetaminophen. CT A/P revealed fatty liver and small R pleural effusion. 36M PMH HTN, ?fatty liver, kidney stones presents with rash, dizziness, fatigue and severe RUQ pain for 3 days. He has felt fatigued for the past few months. Three days ago, he developed sharp RUQ pain that wrapped around to his back. He rated the pain as a 5/10 with no change over the past three days. The pain doesn't change with PO intake or bowel movements. He has some associated dizziness with the pain. He also developed an erythematous rash that is nonpruritic on his upper chest, neck, and upper back over the past couple of days. He has had a twenty pound weight gain over the past year due to a change to sedentary lifestyle 2/2 pandemic. He denies night sweats and fevers. He has had constipation for the past week where he must strain to move his bowels. He has no urinary symptoms, fevers, chills, or changes in appetite. No diarrhea, N/V. No prior cardiac hx, pleuritic chest pain, or SOB. He had kidney stones 5 years ago but states this pain is worse. Denies allergies or new medications. He denies recent travel and works as a . He has had no recent sick contacts. He received all childhood vaccines.    In the ED patient had low-grade elevated temperature to 99.4, tachycardic to 150s, started on fluids and allopurinol per heme/onc recs. He received 1 mg hydromorphone, 4 mg morphine, 600 mg ibuprofen , and 975 mg acetaminophen. CT A/P revealed fatty liver and small R pleural effusion.    Meds: amlodipine  36M PMH HTN, fatty liver, kidney stones presents with rash, dizziness, fatigue and severe RUQ pain for 3 days. He has felt fatigued for the past few months. Three days ago, he developed sharp RUQ pain that wrapped around to his back. He rated the pain as a 5/10 with no change over the past three days. The pain doesn't change with PO intake or bowel movements. He has some associated dizziness with the pain. He also developed an erythematous rash that is nonpruritic on his upper chest, neck, and upper back over the past couple of days. He has had a twenty pound weight gain over the past year due to a change to sedentary lifestyle 2/2 pandemic. He denies night sweats and fevers. He has had constipation for the past week where he must strain to move his bowels. He has no urinary symptoms, fevers, chills, or changes in appetite. No diarrhea, N/V. No prior cardiac hx, pleuritic chest pain, or SOB. He had kidney stones 5 years ago but states this pain is worse. Denies allergies or new medications. He denies recent travel and works as a . He has had no recent sick contacts. He received all childhood vaccines.    In the ED: temp 99.4, tachycardic to 150s, started on fluids and allopurinol per heme/onc recs. He received 1 mg hydromorphone, 4 mg morphine, 600 mg ibuprofen , and 975 mg acetaminophen. CT A/P revealed fatty liver and small R pleural effusion.

## 2021-07-30 NOTE — H&P ADULT - NSHPPHYSICALEXAM_GEN_ALL_CORE
PHYSICAL EXAM:    Vital Signs Last 24 Hrs  T(C): 37.4 (30 Jul 2021 06:23), Max: 37.4 (30 Jul 2021 06:23)  T(F): 99.4 (30 Jul 2021 06:23), Max: 99.4 (30 Jul 2021 06:23)  HR: 150 (30 Jul 2021 07:42) (118 - 150)  BP: 130/79 (30 Jul 2021 07:42) (117/71 - 130/79)  BP(mean): 91 (30 Jul 2021 06:23) (91 - 91)  RR: 20 (30 Jul 2021 07:42) (18 - 22)  SpO2: 100% (30 Jul 2021 07:42) (96% - 100%)    General: No acute distress.  HEENT: NCAT.  PERRL.  EOMI.  No scleral icterus or injection.  Moist MM.  No oropharyngeal exudates.    Neck: Supple.  Full ROM.  No JVD.  No thyromegaly. No lymphadenopathy.   Heart: RRR.  Normal S1 and S2.  No murmurs, rubs, or gallops.   Lungs: CTAB. No wheezes, crackles, or rhonchi.    Abdomen: BS+, soft, NT/ND.  No organomegaly.  Skin: Warm and dry.  No rashes.  Extremities: No edema, clubbing, or cyanosis.  2+ peripheral pulses b/l.  Musculoskeletal: No deformities.  No spinal or paraspinal tenderness.  Neuro: A&Ox3.  CN II-XII intact.  5/5 strength in UE and LE b/l.  Tactile sensation intact in UE and LE b/l.  Cerebellar function intact PHYSICAL EXAM:    Vital Signs Last 24 Hrs  T(C): 37.4 (30 Jul 2021 06:23), Max: 37.4 (30 Jul 2021 06:23)  T(F): 99.4 (30 Jul 2021 06:23), Max: 99.4 (30 Jul 2021 06:23)  HR: 150 (30 Jul 2021 07:42) (118 - 150)  BP: 130/79 (30 Jul 2021 07:42) (117/71 - 130/79)  BP(mean): 91 (30 Jul 2021 06:23) (91 - 91)  RR: 20 (30 Jul 2021 07:42) (18 - 22)  SpO2: 100% (30 Jul 2021 07:42) (96% - 100%)    General: severe pain  HEENT: NCAT.  PERRL.  EOMI.  No scleral icterus or injection.  Moist MM.  No oropharyngeal exudates.    Neck: Supple. Full ROM.  No JVD.  No thyromegaly. No lymphadenopathy.   Heart: tachycardia. Normal S1 and S2.  No murmurs, rubs, or gallops.   Lungs: CTAB. No wheezes, crackles, or rhonchi.    Abdomen: Guarding in LQ; pain in RUQ with palpation; + Fontana's sign; BS+, soft, ND. No organomegaly.  Skin: Warm and dry. Erythematous nonpruritic rash on chest, neck, and upper back  Extremities: No edema, clubbing, or cyanosis. 2+ peripheral pulses b/l.  Musculoskeletal: No deformities.  No spinal or paraspinal tenderness.  Neuro: A&Ox3 PHYSICAL EXAM:    Vital Signs Last 24 Hrs  T(C): 37.4 (30 Jul 2021 06:23), Max: 37.4 (30 Jul 2021 06:23)  T(F): 99.4 (30 Jul 2021 06:23), Max: 99.4 (30 Jul 2021 06:23)  HR: 150 (30 Jul 2021 07:42) (118 - 150)  BP: 130/79 (30 Jul 2021 07:42) (117/71 - 130/79)  BP(mean): 91 (30 Jul 2021 06:23) (91 - 91)  RR: 20 (30 Jul 2021 07:42) (18 - 22)  SpO2: 100% (30 Jul 2021 07:42) (96% - 100%)    General: severe pain  HEENT: NCAT.  PERRL.  EOMI.  No scleral icterus or injection.  Moist MM.  No oropharyngeal exudates.    Neck: Supple. Full ROM.  No JVD.  No thyromegaly. No lymphadenopathy.   Heart: tachycardia. Normal S1 and S2.  No murmurs, rubs, or gallops.   Lungs: CTAB. No wheezes, crackles, or rhonchi.    Abdomen: lower abdominal pain and guarding; pain in RUQ with palpation; + Fontana's sign; BS+, soft, ND. No organomegaly.  Skin: Warm and dry. Erythematous nonpruritic rash on chest, neck, and upper back  Extremities: No edema, clubbing, or cyanosis. 2+ peripheral pulses b/l.  Musculoskeletal: No deformities.  No spinal or paraspinal tenderness.  Neuro: A&Ox3

## 2021-07-30 NOTE — H&P ADULT - NSHPREVIEWOFSYSTEMS_GEN_ALL_CORE
REVIEW OF SYSTEMS:    CONSTITUTIONAL: No weakness, fevers or chills  EYES/ENT: No visual changes;  No vertigo or throat pain   NECK: No pain or stiffness  RESPIRATORY: No cough, wheezing, hemoptysis; No shortness of breath  CARDIOVASCULAR: No chest pain or palpitations  GASTROINTESTINAL: +abdominal pain; nausea, vomiting;  diarrhea or constipation. No hemetemesis, melena or hematochezia.  GENITOURINARY: No dysuria, frequency or hematuria  NEUROLOGICAL: No numbness or weakness  SKIN: +Rash REVIEW OF SYSTEMS:    CONSTITUTIONAL: fatigue, No weakness, fevers or chills  EYES/ENT: No visual changes;  No vertigo or throat pain   NECK: No pain or stiffness  RESPIRATORY: No cough, wheezing, hemoptysis; No shortness of breath  CARDIOVASCULAR: No chest pain or palpitations  GASTROINTESTINAL: +abdominal pain; constipation no nausea, vomiting; no diarrhea. No hematemesis, melena or hematochezia.  GENITOURINARY: No dysuria, frequency or hematuria  NEUROLOGICAL: No numbness or weakness  SKIN: +Rash

## 2021-07-30 NOTE — H&P ADULT - ASSESSMENT
36M PMH HTN, fatty liver, kidney stones, presenting with abdominal pain of uncertain etiology, found with peripheral blasts c/f AML.  36M PMH HTN, fatty liver, kidney stones, presenting with RUQ abdominal pain with a positive Fontana's sign, fatigue, and nonpruritic rash found with peripheral blasts c/f AML or CML.  36M PMH HTN, fatty liver, kidney stones, presenting with RUQ abdominal pain with a positive Fontana's sign, trace pleural effusion, fatigue, and nonpruritic rash found with peripheral blasts, anemia, thrombocytopenia, and leukocytosis c/f AML or CML. 36M PMH HTN, fatty liver, kidney stones, presenting with RUQ abdominal pain with a positive Fontana's sign concerning for cholecystitis. Also with nonpruritic rash found with peripheral blasts, anemia, thrombocytopenia, and leukocytosis concerning for AML vs CML. 36M PMH HTN, fatty liver, kidney stones, presenting with RUQ abdominal pain with a positive Fontana's sign concerning for cholecystitis. Also with nonpruritic rash found with peripheral blasts, anemia, thrombocytopenia, and leukocytosis concerning for acute leukemia.

## 2021-07-30 NOTE — ED PROVIDER NOTE - CLINICAL SUMMARY MEDICAL DECISION MAKING FREE TEXT BOX
See Attending Note See Attending Note    37yo M with hx HTN, ?fatty liver, kidney stones presents with severe RUQ pain for 1 week found to have blasts. VSS notable for tachycardia 2/2 pain. PE notable for torso rash, and RUQ tenderness. Labs notable for blasts 17% with platelets with 48. CT ab/P negative. Pending CT chest to assess for lymphadenopathy. Hematology consulted. Order dic, hemolysis labs. Admit to medicine

## 2021-07-30 NOTE — H&P ADULT - PROBLEM SELECTOR PLAN 1
RUQ pain for the past three days, + Fontana's sign, R pleural effusion on CT  - f/u RUQ ultrasound  - consult GI for potential cholecystitis RUQ pain for the past three days, + Fontana's sign, R pleural effusion on CT  - LDH elevated at 384; LFTs, AlkP, and TBili normal; Lipase normal  - f/u RUQ ultrasound

## 2021-07-30 NOTE — ED PROVIDER NOTE - PHYSICAL EXAMINATION
PHYSICAL EXAM  GENERAL: severe pain, tachycardiac   HEAD:  Atraumatic, Normocephalic  EYES: EOMI b/l, conjunctiva and sclera clear  CHEST/LUNG: Clear to auscultation anterior and axillary; No wheeze or ronchi  HEART: tachycardiac, Regular, rhythm; S1 and S2 present, No murmurs, rubs, or gallops. No chest tenderness  ABDOMEN: Soft, RUQ pain with no rebound, Nondistended; Bowel sounds present  EXTREMITIES:  2+ Peripheral Pulses, No edema  NEURO: AAOx3, non-focal   SKIN: 1cm multiple rash torso, blanchable, diffuse. One opened lesion, no vesicles. Pustular lesions on neck

## 2021-07-30 NOTE — H&P ADULT - NSICDXPASTMEDICALHX_GEN_ALL_CORE_FT
PAST MEDICAL HISTORY:  Hypertension     Kidney stone      PAST MEDICAL HISTORY:  History of genital warts     Hypertension diagnosed 1 year ago    Kidney stone 5 years ago

## 2021-07-30 NOTE — ED ADULT NURSE REASSESSMENT NOTE - NS ED NURSE REASSESS COMMENT FT1
Pt tachycardic but asymptomatic. Repeat EKG done. Admitting team and ER resident aware of the HR. Pt upgraded to telemetry. Pt alert and orientedX4. Pt appropriate at this time. No distress. Calm and cooperative at this time.

## 2021-07-30 NOTE — H&P ADULT - ATTENDING COMMENTS
Anemia/thrombocytopenia with blasts on peripheral smear- hematology evaluating, await flow cytometry  Start IVF and allopurinol, TTE ordered  RUQ pain, CT unremarkable for etiology, RUQ US ordered  D/w patient and father at bedside

## 2021-07-30 NOTE — ED ADULT NURSE NOTE - OBJECTIVE STATEMENT
The pt is a 37 y/o M PMH HTN presenting to the ED c/o RUQ abd pain. The patient reports pain x 2 days with worsening pain today. Upon assessment, pt is AO x 4, pt anxious and diaphoretic, speaking in full and complete sentences, s1 s2 heart sounds, abd tender to the RUQ,  equal strength bilaterally, skin warm, dry and intact, present peripheral pulses, PERRL. Pt denies fever, chills, n/v, urinary symptoms, CP, dizziness, weakness, HA, SOB, abd pain. Pt sinus tach on cardiac monitor, appropriate side rails raised, wheels locked, bed in lowest position, pt denies needs at this time.

## 2021-07-30 NOTE — ED PROVIDER NOTE - OBJECTIVE STATEMENT
35yo M with hx HTN, ?fatty liver, kidney stones presents with severe RUQ pain for 1 week. Pain is sharp, wrapping around to the back. No association with PO intake. No urinary symptoms, fevers, chills, or changes in appeitite. Endorses constipation. No diarrhea, N/V. No prior cardiac hx, pleurtic chest pain, or SOB. Prior hx of kidney stones but states this pain is worse.     In addition, has diffuse rash over torso and along the neck. No dermatome pattern or vesicles. Denies allergies or new medications. No pruritis 37yo M with hx HTN, ?fatty liver, kidney stones presents with severe RUQ pain for 1 week. Pain is sharp, wrapping around to the back. No association with PO intake. No urinary symptoms, fevers, chills, or changes in appetite. Endorses constipation. No diarrhea, N/V. No prior cardiac hx, pleuritic chest pain, or SOB. Prior hx of kidney stones but states this pain is worse.     In addition, has diffuse rash over torso and along the neck. No dermatome pattern or vesicles. Denies allergies or new medications. No pruritis 37yo M with hx HTN, ?fatty liver, kidney stones presents with severe RUQ pain for 1 week. Pain is sharp, wrapping around to the back. No association with PO intake. Denies weight loss, night sweats. No urinary symptoms, fevers, chills, or changes in appetite. Endorses constipation. No diarrhea, N/V. No prior cardiac hx, pleuritic chest pain, or SOB. Prior hx of kidney stones but states this pain is worse.     In addition, has diffuse rash over torso and along the neck. No dermatome pattern or vesicles. Denies allergies or new medications. No pruritis

## 2021-07-30 NOTE — H&P ADULT - PROBLEM SELECTOR PLAN 2
SIRS +; RUQ pain with + Fontana's sign SIRS+ (WBC 12 and ); RUQ pain with +Fontana's sign, and R pleural effusion on CT  - no source of infection determined  - f/u blood and urine cultures  - monitor CBC  - monitor for signs of infection  - WBC already downtrending to 10.12

## 2021-07-30 NOTE — CONSULT NOTE ADULT - SUBJECTIVE AND OBJECTIVE BOX
HPI as per admitting team:   36M PMH HTN, ?fatty liver, kidney stones presents with rash, dizziness, fatigue and severe RUQ pain for 3 days. He has felt fatigued for the past few months. Three days ago, he developed sharp RUQ pain that wrapped around to his back. He rated the pain as a 5/10 with no change over the past three days. The pain doesn't change with PO intake or bowel movements. He has some associated dizziness with the pain. He also developed an erythematous rash that is nonpruritic on his upper chest, neck, and upper back over the past couple of days. He has had a twenty pound weight gain over the past year due to a change to sedentary lifestyle 2/2 pandemic. He denies night sweats and fevers. He has had constipation for the past week where he must strain to move his bowels. He has no urinary symptoms, fevers, chills, or changes in appetite. No diarrhea, N/V. No prior cardiac hx, pleuritic chest pain, or SOB. He had kidney stones 5 years ago but states this pain is worse. Denies allergies or new medications. He denies recent travel and works as a . He has had no recent sick contacts. He received all childhood vaccines.    In the ED patient had low-grade elevated temperature to 99.4, tachycardic to 150s, started on fluids and allopurinol per heme/onc recs. He received 1 mg hydromorphone, 4 mg morphine, 600 mg ibuprofen , and 975 mg acetaminophen. CT A/P revealed fatty liver and small R pleural effusion. (30 Jul 2021 09:22)    REVIEW OF SYSTEMS:  CONSTITUTIONAL: +fatigue, no fevers, night sweats or weight loss  EYES/ENT: No visual changes;  No vertigo or throat pain   NECK: No pain or stiffness  RESPIRATORY: No cough, wheezing, hemoptysis; No shortness of breath  CARDIOVASCULAR: No chest pain or palpitations  GASTROINTESTINAL: No abdominal or epigastric pain. No nausea, vomiting, or hematemesis. No melena or hematochezia. +Constipation  GENITOURINARY: No dysuria, frequency or hematuria  NEUROLOGICAL: No numbness or weakness  SKIN: New rash on legs, chest and abdomen  HEME: + easy bruising     PAST MEDICAL & SURGICAL HISTORY:  Hypertension  diagnosed 1 year ago    Kidney stone  5 years ago    History of genital warts    No significant past surgical history        FAMILY HISTORY:  FH: CAD (coronary artery disease) (Father, Mother)        SOCIAL HISTORY:   Former smoker, social alcohol use, denied marihuana or illicit drug use     Allergies  No Known Allergies    Intolerances        MEDICATIONS  (STANDING):  allopurinol 300 milliGRAM(s) Oral daily  sodium chloride 0.9%. 1000 milliLiter(s) (100 mL/Hr) IV Continuous <Continuous>    MEDICATIONS  (PRN):  acetaminophen   Tablet .. 650 milliGRAM(s) Oral every 6 hours PRN Temp greater or equal to 38C (100.4F), Mild Pain (1 - 3), Moderate Pain (4 - 6)      OBJECTIVE   Height (cm): 182.9 (07-30 @ 03:17)  Weight (kg): 104.3 (07-30 @ 03:17)  BMI (kg/m2): 31.2 (07-30 @ 03:17)  BSA (m2): 2.26 (07-30 @ 03:17)    T(F): 99.4 (07-30-21 @ 06:23), Max: 99.4 (07-30-21 @ 06:23)  HR: 124 (07-30-21 @ 09:45)  BP: 110/71 (07-30-21 @ 09:45)  RR: 21 (07-30-21 @ 09:45)  SpO2: 100% (07-30-21 @ 09:45)  Wt(kg): --    GENERAL: NAD, well-developed  HEAD:  Atraumatic, Normocephalic  EYES: EOMI, PERRLA, conjunctiva and sclera clear  NECK: Supple, No JVD  CHEST/LUNG: Clear to auscultation bilaterally; No wheeze  HEART: Tachycardic, regular rhythm; No murmurs, rubs, or gallops  ABDOMEN: Soft, Nontender, Nondistended; Bowel sounds present  EXTREMITIES:  2+ Peripheral Pulses, No clubbing, cyanosis, or edema  NEUROLOGY: non-focal  SKIN: Multiple erythematous rashes on abdomen and back, blanchable, bruises on abdomen                           8.7    10.12 )-----------( 41       ( 30 Jul 2021 07:20 )             25.5       07-30    136  |  102  |  15  ----------------------------<  113<H>  4.1   |  22  |  1.23    Ca    9.9      30 Jul 2021 04:17  Phos  2.6     07-30  Mg     2.2     07-30    TPro  7.7  /  Alb  4.4  /  TBili  0.4  /  DBili  x   /  AST  15  /  ALT  30  /  AlkPhos  79  07-30      Lactate Dehydrogenase, Serum: 384 U/L (07-30 @ 07:07)  Uric Acid, Serum: 7.1 mg/dL (07-30 @ 07:07)  Magnesium, Serum: 2.2 mg/dL (07-30 @ 04:17)  Phosphorus Level, Serum: 2.6 mg/dL (07-30 @ 04:17)

## 2021-07-30 NOTE — ED PROVIDER NOTE - NS ED ROS FT
Review of Systems:  Constitutional: No fever, No weight loss, good appetite/po intake  Head: No headache or dizziness   Eyes: No blurry vision, No diplopia  Neuro: No tremors, No muscle weakness   Cardiovascular: No chest pain, No palpitations  Respiratory: No SOB, No cough  GI: No nausea, No vomiting, No diarrhea  : No dysuria, No hematuria  Skin: + rash or lesions  MSK: No joint pain or swelling  Psych: No depression or mood changes

## 2021-07-30 NOTE — H&P ADULT - PROBLEM SELECTOR PLAN 3
CBC fatigue for the past few months; rash on chest, neck, and back potentially leukemia cutis  - WBC 12 with 17% peripheral blasts  - peripheral smear reviewed: blasts seen   - appreciate heme recs  - c/w allopurinol 300 mg daily  - c/w NS at 100 cc/hr  - order CBC w/diff daily  - order echo  - F/U coags, fibrinogen, LDH, D-dimer, uric acid, G6PD, hepatitis B serologies (including core antibody total) and hepatitis C, HIV  - f/u peripheral flow cytometry (green top tubes personally given to RN with requisition form for PML-[facility], FLT3)  - Check TLS labs every 12 hours (CMP, Phos, LDH, uric acid)  - give rasburicase 3mg IV for uric acid > 8.0  - Transfuse if Hgb < 7.0.  Transfuse if platelets <10K, or <15K if febrile. Transfuse for platelets <50K if bleeding.

## 2021-07-30 NOTE — CONSULT NOTE ADULT - ATTENDING COMMENTS
36yoM with labs concerning for acute leukemia  -pain control and further work up of RUQ pain  -management as above  -transfer to [facility]m when bed available

## 2021-07-30 NOTE — CONSULT NOTE ADULT - ASSESSMENT
- WBC 12 with 17% peripheral blasts  - peripheral smear reviewed:  - f/u peripheral flow cytometry (green top tubes personally given to RN with requisition form for PML-[employer], FLT3)  - No need for Hydrea at this time  - recommend start Allopurinol 300mg daily now  - recommend IVF: NS at 100cc/hr  - F/U  order: coags, fibrinogen, LDH, D-dimer, uric acid, G6PD, hepatitis B serologies (including core antibody total) and hepatitis C, HIV  - Please order: Echo  - Check CBC w/ DIFF Daily  - Check: TLS labs every 12 hours (CMP, Phos, LDH, uric acid)  - give rasburicase 3mg IV for uric acid > 8.0  - Transfuse if Hgb < 7.0.  Transfuse if platelets <10K, or <15K if febrile. Transfuse for platelets <50K if bleeding. 6M PMH HTN, ?fatty liver, kidney stones presents with rash, dizziness, fatigue and severe RUQ pain for 3 days.  Now with anemia, thrombocytopenia and leukocytosis with 17% blasts, concerning for acute leukemia       R/O AML   - WBC 12 with 17% peripheral blasts  - peripheral smear reviewed: blasts seen   - f/u peripheral flow cytometry (green top tubes personally given to RN with requisition form for PML-[employer], FLT3)  - No need for Hydrea at this time  - recommend start Allopurinol 300mg daily now  - recommend IVF: NS at 100cc/hr  - F/U  order: coags, fibrinogen, LDH, D-dimer, uric acid, G6PD, hepatitis B serologies (including core antibody total) and hepatitis C, HIV  - Please order: Echo  - Check CBC w/ DIFF Daily  - Check: TLS labs every 12 hours (CMP, Phos, LDH, uric acid)  - give rasburicase 3mg IV for uric acid > 8.0  - Transfuse if Hgb < 7.0.  Transfuse if platelets <10K, or <15K if febrile. Transfuse for platelets <50K if bleeding. 36M PMH HTN, ?fatty liver, kidney stones presents with rash, dizziness, fatigue and severe RUQ pain for 3 days.  Now with anemia, thrombocytopenia and leukocytosis with 17% blasts, concerning for acute leukemia       R/O AML   - WBC 12 with 17% peripheral blasts  - peripheral smear reviewed: blasts seen   - f/u peripheral flow cytometry (green top tubes personally given to RN with requisition form for PML-[facility], FLT3)  - No need for Hydrea at this time  - recommend start Allopurinol 300mg daily now  - recommend IVF: NS at 100cc/hr  - F/U  order: coags, fibrinogen, LDH, D-dimer, uric acid, G6PD, hepatitis B serologies (including core antibody total) and hepatitis C, HIV  - Please order: Echo  - Check CBC w/ DIFF Daily  - Check: TLS labs every 12 hours (CMP, Phos, LDH, uric acid)  - give rasburicase 3mg IV for uric acid > 8.0  - Transfuse if Hgb < 7.0.  Transfuse if platelets <10K, or <15K if febrile. Transfuse for platelets <50K if bleeding.

## 2021-07-30 NOTE — H&P ADULT - NSHPSOCIALHISTORY_GEN_ALL_CORE
Patient is currently single. He isn't sexually active. He denies any tobacco, alcohol, or illicit drug use. Over the past year, his exercise and diet have worsened, which he attributes to the pandemic.

## 2021-07-31 ENCOUNTER — TRANSCRIPTION ENCOUNTER (OUTPATIENT)
Age: 36
End: 2021-07-31

## 2021-07-31 LAB
ALBUMIN SERPL ELPH-MCNC: 3.9 G/DL — SIGNIFICANT CHANGE UP (ref 3.3–5)
ALBUMIN SERPL ELPH-MCNC: 4 G/DL — SIGNIFICANT CHANGE UP (ref 3.3–5)
ALP SERPL-CCNC: 66 U/L — SIGNIFICANT CHANGE UP (ref 40–120)
ALP SERPL-CCNC: 67 U/L — SIGNIFICANT CHANGE UP (ref 40–120)
ALT FLD-CCNC: 24 U/L — SIGNIFICANT CHANGE UP (ref 10–45)
ALT FLD-CCNC: 26 U/L — SIGNIFICANT CHANGE UP (ref 10–45)
ANION GAP SERPL CALC-SCNC: 11 MMOL/L — SIGNIFICANT CHANGE UP (ref 5–17)
ANION GAP SERPL CALC-SCNC: 13 MMOL/L — SIGNIFICANT CHANGE UP (ref 5–17)
APPEARANCE UR: CLEAR — SIGNIFICANT CHANGE UP
AST SERPL-CCNC: 13 U/L — SIGNIFICANT CHANGE UP (ref 10–40)
AST SERPL-CCNC: 14 U/L — SIGNIFICANT CHANGE UP (ref 10–40)
BILIRUB SERPL-MCNC: 0.7 MG/DL — SIGNIFICANT CHANGE UP (ref 0.2–1.2)
BILIRUB SERPL-MCNC: 0.8 MG/DL — SIGNIFICANT CHANGE UP (ref 0.2–1.2)
BILIRUB UR-MCNC: NEGATIVE — SIGNIFICANT CHANGE UP
BUN SERPL-MCNC: 10 MG/DL — SIGNIFICANT CHANGE UP (ref 7–23)
BUN SERPL-MCNC: 11 MG/DL — SIGNIFICANT CHANGE UP (ref 7–23)
CALCIUM SERPL-MCNC: 9.3 MG/DL — SIGNIFICANT CHANGE UP (ref 8.4–10.5)
CALCIUM SERPL-MCNC: 9.3 MG/DL — SIGNIFICANT CHANGE UP (ref 8.4–10.5)
CHLORIDE SERPL-SCNC: 103 MMOL/L — SIGNIFICANT CHANGE UP (ref 96–108)
CHLORIDE SERPL-SCNC: 98 MMOL/L — SIGNIFICANT CHANGE UP (ref 96–108)
CO2 SERPL-SCNC: 24 MMOL/L — SIGNIFICANT CHANGE UP (ref 22–31)
CO2 SERPL-SCNC: 24 MMOL/L — SIGNIFICANT CHANGE UP (ref 22–31)
COLOR SPEC: SIGNIFICANT CHANGE UP
COVID-19 SPIKE DOMAIN AB INTERP: POSITIVE
COVID-19 SPIKE DOMAIN ANTIBODY RESULT: >250 U/ML — HIGH
CREAT SERPL-MCNC: 1.11 MG/DL — SIGNIFICANT CHANGE UP (ref 0.5–1.3)
CREAT SERPL-MCNC: 1.15 MG/DL — SIGNIFICANT CHANGE UP (ref 0.5–1.3)
CULTURE RESULTS: NO GROWTH — SIGNIFICANT CHANGE UP
DIFF PNL FLD: NEGATIVE — SIGNIFICANT CHANGE UP
FERRITIN SERPL-MCNC: 883 NG/ML — HIGH (ref 30–400)
FOLATE SERPL-MCNC: >20 NG/ML — SIGNIFICANT CHANGE UP
GLUCOSE SERPL-MCNC: 109 MG/DL — HIGH (ref 70–99)
GLUCOSE SERPL-MCNC: 121 MG/DL — HIGH (ref 70–99)
GLUCOSE UR QL: NEGATIVE — SIGNIFICANT CHANGE UP
HCT VFR BLD CALC: 24.6 % — LOW (ref 39–50)
HGB BLD-MCNC: 8.1 G/DL — LOW (ref 13–17)
IRON SATN MFR SERPL: 30 % — SIGNIFICANT CHANGE UP (ref 16–55)
IRON SATN MFR SERPL: 82 UG/DL — SIGNIFICANT CHANGE UP (ref 45–165)
KETONES UR-MCNC: NEGATIVE — SIGNIFICANT CHANGE UP
LDH SERPL L TO P-CCNC: 402 U/L — HIGH (ref 50–242)
LDH SERPL L TO P-CCNC: 405 U/L — HIGH (ref 50–242)
LEUKOCYTE ESTERASE UR-ACNC: NEGATIVE — SIGNIFICANT CHANGE UP
MAGNESIUM SERPL-MCNC: 2.2 MG/DL — SIGNIFICANT CHANGE UP (ref 1.6–2.6)
MAGNESIUM SERPL-MCNC: 2.2 MG/DL — SIGNIFICANT CHANGE UP (ref 1.6–2.6)
MCHC RBC-ENTMCNC: 32.9 GM/DL — SIGNIFICANT CHANGE UP (ref 32–36)
MCHC RBC-ENTMCNC: 35.1 PG — HIGH (ref 27–34)
MCV RBC AUTO: 106.5 FL — HIGH (ref 80–100)
NITRITE UR-MCNC: NEGATIVE — SIGNIFICANT CHANGE UP
NRBC # BLD: 0 /100 WBCS — SIGNIFICANT CHANGE UP (ref 0–0)
PH UR: 6 — SIGNIFICANT CHANGE UP (ref 5–8)
PHOSPHATE SERPL-MCNC: 3.4 MG/DL — SIGNIFICANT CHANGE UP (ref 2.5–4.5)
PHOSPHATE SERPL-MCNC: 3.9 MG/DL — SIGNIFICANT CHANGE UP (ref 2.5–4.5)
PLATELET # BLD AUTO: 42 K/UL — LOW (ref 150–400)
POTASSIUM SERPL-MCNC: 3.6 MMOL/L — SIGNIFICANT CHANGE UP (ref 3.5–5.3)
POTASSIUM SERPL-MCNC: 4.3 MMOL/L — SIGNIFICANT CHANGE UP (ref 3.5–5.3)
POTASSIUM SERPL-SCNC: 3.6 MMOL/L — SIGNIFICANT CHANGE UP (ref 3.5–5.3)
POTASSIUM SERPL-SCNC: 4.3 MMOL/L — SIGNIFICANT CHANGE UP (ref 3.5–5.3)
PROT SERPL-MCNC: 7.2 G/DL — SIGNIFICANT CHANGE UP (ref 6–8.3)
PROT SERPL-MCNC: 7.4 G/DL — SIGNIFICANT CHANGE UP (ref 6–8.3)
PROT UR-MCNC: NEGATIVE — SIGNIFICANT CHANGE UP
RBC # BLD: 2.31 M/UL — LOW (ref 4.2–5.8)
RBC # FLD: 14.6 % — HIGH (ref 10.3–14.5)
SARS-COV-2 IGG+IGM SERPL QL IA: >250 U/ML — HIGH
SARS-COV-2 IGG+IGM SERPL QL IA: POSITIVE
SODIUM SERPL-SCNC: 135 MMOL/L — SIGNIFICANT CHANGE UP (ref 135–145)
SODIUM SERPL-SCNC: 138 MMOL/L — SIGNIFICANT CHANGE UP (ref 135–145)
SP GR SPEC: 1.01 — SIGNIFICANT CHANGE UP (ref 1.01–1.02)
SPECIMEN SOURCE: SIGNIFICANT CHANGE UP
TIBC SERPL-MCNC: 270 UG/DL — SIGNIFICANT CHANGE UP (ref 220–430)
UIBC SERPL-MCNC: 188 UG/DL — SIGNIFICANT CHANGE UP (ref 110–370)
URATE SERPL-MCNC: 3.6 MG/DL — SIGNIFICANT CHANGE UP (ref 3.4–8.8)
URATE SERPL-MCNC: 5.6 MG/DL — SIGNIFICANT CHANGE UP (ref 3.4–8.8)
UROBILINOGEN FLD QL: NEGATIVE — SIGNIFICANT CHANGE UP
VIT B12 SERPL-MCNC: 740 PG/ML — SIGNIFICANT CHANGE UP (ref 232–1245)
WBC # BLD: 10.75 K/UL — HIGH (ref 3.8–10.5)
WBC # FLD AUTO: 10.75 K/UL — HIGH (ref 3.8–10.5)

## 2021-07-31 PROCEDURE — 99232 SBSQ HOSP IP/OBS MODERATE 35: CPT | Mod: GC

## 2021-07-31 PROCEDURE — 99233 SBSQ HOSP IP/OBS HIGH 50: CPT | Mod: GC

## 2021-07-31 PROCEDURE — 71045 X-RAY EXAM CHEST 1 VIEW: CPT | Mod: 26

## 2021-07-31 RX ORDER — PIPERACILLIN AND TAZOBACTAM 4; .5 G/20ML; G/20ML
3.38 INJECTION, POWDER, LYOPHILIZED, FOR SOLUTION INTRAVENOUS EVERY 8 HOURS
Refills: 0 | Status: DISCONTINUED | OUTPATIENT
Start: 2021-07-31 | End: 2021-08-06

## 2021-07-31 RX ORDER — PIPERACILLIN AND TAZOBACTAM 4; .5 G/20ML; G/20ML
3.38 INJECTION, POWDER, LYOPHILIZED, FOR SOLUTION INTRAVENOUS ONCE
Refills: 0 | Status: COMPLETED | OUTPATIENT
Start: 2021-07-31 | End: 2021-07-31

## 2021-07-31 RX ORDER — SODIUM CHLORIDE 9 MG/ML
1500 INJECTION, SOLUTION INTRAVENOUS ONCE
Refills: 0 | Status: COMPLETED | OUTPATIENT
Start: 2021-07-31 | End: 2021-07-31

## 2021-07-31 RX ORDER — HYDROMORPHONE HYDROCHLORIDE 2 MG/ML
1 INJECTION INTRAMUSCULAR; INTRAVENOUS; SUBCUTANEOUS ONCE
Refills: 0 | Status: DISCONTINUED | OUTPATIENT
Start: 2021-07-31 | End: 2021-07-31

## 2021-07-31 RX ORDER — ACETAMINOPHEN 500 MG
650 TABLET ORAL ONCE
Refills: 0 | Status: COMPLETED | OUTPATIENT
Start: 2021-07-31 | End: 2021-07-31

## 2021-07-31 RX ORDER — HYDROMORPHONE HYDROCHLORIDE 2 MG/ML
1 INJECTION INTRAMUSCULAR; INTRAVENOUS; SUBCUTANEOUS EVERY 4 HOURS
Refills: 0 | Status: DISCONTINUED | OUTPATIENT
Start: 2021-07-31 | End: 2021-08-06

## 2021-07-31 RX ORDER — SODIUM CHLORIDE 9 MG/ML
1000 INJECTION INTRAMUSCULAR; INTRAVENOUS; SUBCUTANEOUS
Refills: 0 | Status: DISCONTINUED | OUTPATIENT
Start: 2021-07-31 | End: 2021-08-01

## 2021-07-31 RX ORDER — ACETAMINOPHEN 500 MG
1000 TABLET ORAL ONCE
Refills: 0 | Status: COMPLETED | OUTPATIENT
Start: 2021-07-31 | End: 2021-07-31

## 2021-07-31 RX ADMIN — AMLODIPINE BESYLATE 5 MILLIGRAM(S): 2.5 TABLET ORAL at 05:41

## 2021-07-31 RX ADMIN — Medication 400 MILLIGRAM(S): at 04:12

## 2021-07-31 RX ADMIN — HYDROMORPHONE HYDROCHLORIDE 1 MILLIGRAM(S): 2 INJECTION INTRAMUSCULAR; INTRAVENOUS; SUBCUTANEOUS at 09:00

## 2021-07-31 RX ADMIN — Medication 1000 MILLIGRAM(S): at 04:42

## 2021-07-31 RX ADMIN — Medication 650 MILLIGRAM(S): at 19:52

## 2021-07-31 RX ADMIN — HYDROMORPHONE HYDROCHLORIDE 1 MILLIGRAM(S): 2 INJECTION INTRAMUSCULAR; INTRAVENOUS; SUBCUTANEOUS at 16:51

## 2021-07-31 RX ADMIN — PIPERACILLIN AND TAZOBACTAM 200 GRAM(S): 4; .5 INJECTION, POWDER, LYOPHILIZED, FOR SOLUTION INTRAVENOUS at 07:37

## 2021-07-31 RX ADMIN — SODIUM CHLORIDE 100 MILLILITER(S): 9 INJECTION INTRAMUSCULAR; INTRAVENOUS; SUBCUTANEOUS at 20:10

## 2021-07-31 RX ADMIN — HYDROMORPHONE HYDROCHLORIDE 1 MILLIGRAM(S): 2 INJECTION INTRAMUSCULAR; INTRAVENOUS; SUBCUTANEOUS at 21:11

## 2021-07-31 RX ADMIN — PIPERACILLIN AND TAZOBACTAM 25 GRAM(S): 4; .5 INJECTION, POWDER, LYOPHILIZED, FOR SOLUTION INTRAVENOUS at 20:09

## 2021-07-31 RX ADMIN — HYDROMORPHONE HYDROCHLORIDE 1 MILLIGRAM(S): 2 INJECTION INTRAMUSCULAR; INTRAVENOUS; SUBCUTANEOUS at 22:11

## 2021-07-31 RX ADMIN — HYDROMORPHONE HYDROCHLORIDE 1 MILLIGRAM(S): 2 INJECTION INTRAMUSCULAR; INTRAVENOUS; SUBCUTANEOUS at 07:37

## 2021-07-31 RX ADMIN — SODIUM CHLORIDE 1500 MILLILITER(S): 9 INJECTION, SOLUTION INTRAVENOUS at 07:37

## 2021-07-31 RX ADMIN — HYDROMORPHONE HYDROCHLORIDE 1 MILLIGRAM(S): 2 INJECTION INTRAMUSCULAR; INTRAVENOUS; SUBCUTANEOUS at 17:51

## 2021-07-31 RX ADMIN — HYDROMORPHONE HYDROCHLORIDE 1 MILLIGRAM(S): 2 INJECTION INTRAMUSCULAR; INTRAVENOUS; SUBCUTANEOUS at 09:30

## 2021-07-31 RX ADMIN — HYDROMORPHONE HYDROCHLORIDE 1 MILLIGRAM(S): 2 INJECTION INTRAMUSCULAR; INTRAVENOUS; SUBCUTANEOUS at 06:17

## 2021-07-31 RX ADMIN — Medication 650 MILLIGRAM(S): at 12:45

## 2021-07-31 RX ADMIN — HYDROMORPHONE HYDROCHLORIDE 1 MILLIGRAM(S): 2 INJECTION INTRAMUSCULAR; INTRAVENOUS; SUBCUTANEOUS at 05:47

## 2021-07-31 RX ADMIN — Medication 650 MILLIGRAM(S): at 19:27

## 2021-07-31 RX ADMIN — Medication 650 MILLIGRAM(S): at 08:17

## 2021-07-31 RX ADMIN — Medication 650 MILLIGRAM(S): at 08:47

## 2021-07-31 RX ADMIN — PIPERACILLIN AND TAZOBACTAM 25 GRAM(S): 4; .5 INJECTION, POWDER, LYOPHILIZED, FOR SOLUTION INTRAVENOUS at 12:05

## 2021-07-31 RX ADMIN — HYDROMORPHONE HYDROCHLORIDE 1 MILLIGRAM(S): 2 INJECTION INTRAMUSCULAR; INTRAVENOUS; SUBCUTANEOUS at 12:43

## 2021-07-31 RX ADMIN — Medication 300 MILLIGRAM(S): at 12:05

## 2021-07-31 RX ADMIN — HYDROMORPHONE HYDROCHLORIDE 1 MILLIGRAM(S): 2 INJECTION INTRAMUSCULAR; INTRAVENOUS; SUBCUTANEOUS at 13:13

## 2021-07-31 RX ADMIN — Medication 650 MILLIGRAM(S): at 19:57

## 2021-07-31 NOTE — PROGRESS NOTE ADULT - ASSESSMENT
36M PMH HTN, fatty liver, kidney stones, presenting with RUQ abdominal pain with a positive Fontana's sign concerning for cholecystitis. Also with nonpruritic rash found with peripheral blasts, anemia, thrombocytopenia, and leukocytosis concerning for acute leukemia.

## 2021-07-31 NOTE — DISCHARGE NOTE PROVIDER - NSRESEARCHGRANT_PROPHYLAXISRECOMFT_GEN_A_CORE
Rivaroxaban 10 mg oral tablet: 1 tab orally once a day for 30 days This is a surgical and/or non-medical patient.

## 2021-07-31 NOTE — DISCHARGE NOTE PROVIDER - NSDCCPCAREPLAN_GEN_ALL_CORE_FT
PRINCIPAL DISCHARGE DIAGNOSIS  Diagnosis: AML (acute myeloid leukemia)  Assessment and Plan of Treatment: Notify MD or report to ER for fever greater or equal to 100.4, persistent nausea, vomiting, diarrhea, bleeding.       PRINCIPAL DISCHARGE DIAGNOSIS  Diagnosis: AML (acute myeloid leukemia)  Assessment and Plan of Treatment: Transfer to OSH for further management       PRINCIPAL DISCHARGE DIAGNOSIS  Diagnosis: AML (acute myeloid leukemia)  Assessment and Plan of Treatment: Maintain counts, remain infection free. Notify MD or go to ED for fever greater than or equal to 100.4, persistant nausea, vomiting, diarrhea or bleeding

## 2021-07-31 NOTE — DISCHARGE NOTE PROVIDER - NSDCMRMEDTOKEN_GEN_ALL_CORE_FT
amLODIPine 5 mg oral tablet: 1 tab(s) orally once a day   acetaminophen 325 mg oral tablet: 2 tab(s) orally every 6 hours, As needed, Temp greater or equal to 38C (100.4F), Mild Pain (1 - 3), Moderate Pain (4 - 6)  allopurinol 300 mg oral tablet: 1 tab(s) orally once a day  amLODIPine 5 mg oral tablet: 1 tab(s) orally once a day  HYDROmorphone: 1 milligram IV push every 4 hours PRN for severe pain   piperacillin-tazobactam: 3.375 grams in dextrose 5% 100 milliliters IV intermittent every 8 hours   polyethylene glycol 3350 oral powder for reconstitution: 17 gram(s) orally once a day  saliva substitutes oral solution: 5 milliliters swish and spit five times per day   senna oral tablet: 2 tab(s) orally once a day (at bedtime)  sodium chloride 0.65% nasal spray: 1 spray both nostrils three times a day prn for nasal congestion   sodium chloride 0.9% injectable solution: 75 ml/hr    amLODIPine 5 mg oral tablet: 1 tab(s) orally once a day  levoFLOXacin 500 mg oral tablet: 1 tab(s) orally once a day    acyclovir 400 mg oral tablet: 1 tab(s) orally every 8 hours   amLODIPine 5 mg oral tablet: 1 tab(s) orally once a day  levoFLOXacin 500 mg oral tablet: 1 tab(s) orally once a day    acyclovir 400 mg oral tablet: 1 tab(s) orally every 8 hours   amLODIPine 5 mg oral tablet: 1 tab(s) orally once a day  Hemorrhoidal rectal ointment: 1 application rectally 2 times a day, As Needed   levoFLOXacin 500 mg oral tablet: 1 tab(s) orally once a day   witch hazel 50% topical pad: Apply topically to affected area 3 times a day, As Needed   acyclovir 400 mg oral tablet: 1 tab(s) orally every 8 hours   amLODIPine 5 mg oral tablet: 1 tab(s) orally once a day  Hemorrhoidal rectal ointment: 1 application rectally 2 times a day, As Needed   levoFLOXacin 500 mg oral tablet: 1 tab(s) orally once a day   senna oral tablet: 2 tab(s) orally once a day (at bedtime)  witch hazel 50% topical pad: Apply topically to affected area 3 times a day, As Needed

## 2021-07-31 NOTE — PROGRESS NOTE ADULT - PROBLEM SELECTOR PLAN 3
fatigue for the past few months; rash on chest, neck, and back potentially leukemia cutis  - WBC 12 with 17% peripheral blasts  - peripheral smear reviewed: blasts seen   - appreciate heme recs  - c/w allopurinol 300 mg daily  - c/w NS at 100 cc/hr  - order CBC w/diff daily  - order echo  - F/U coags, fibrinogen, LDH, D-dimer, uric acid, G6PD, hepatitis B serologies (including core antibody total) and hepatitis C, HIV  - f/u peripheral flow cytometry (green top tubes personally given to RN with requisition form for PML-[employer], FLT3)  - Check TLS labs every 12 hours (CMP, Phos, LDH, uric acid)  - give rasburicase 3mg IV for uric acid > 8.0  - Transfuse if Hgb < 7.0.  Transfuse if platelets <10K, or <15K if febrile. Transfuse for platelets <50K if bleeding.

## 2021-07-31 NOTE — DISCHARGE NOTE PROVIDER - HOSPITAL COURSE
36M PMH HTN, fatty liver, kidney stones presents with rash, dizziness, fatigue and severe RUQ pain for 3 days. He has felt fatigued for the past few months. Three days ago, he developed sharp RUQ pain that wrapped around to his back. He rated the pain as a 5/10 with no change over the past three days. The pain doesn't change with PO intake or bowel movements. He has some associated dizziness with the pain. He also developed an erythematous rash that is nonpruritic on his upper chest, neck, and upper back over the past couple of days. He has had a twenty pound weight gain over the past year due to a change to sedentary lifestyle 2/2 pandemic. He denies night sweats and fevers. He has had constipation for the past week where he must strain to move his bowels. He has no urinary symptoms, fevers, chills, or changes in appetite. No diarrhea, N/V. No prior cardiac hx, pleuritic chest pain, or SOB. He had kidney stones 5 years ago but states this pain is worse. Denies allergies or new medications. He denies recent travel and works as a . He has had no recent sick contacts. He received all childhood vaccines.    In the ED: temp 99.4, tachycardic to 150s, started on fluids and allopurinol per heme/onc recs. He received 1 mg hydromorphone, 4 mg morphine, 600 mg ibuprofen , and 975 mg acetaminophen. CT A/P revealed fatty liver and small R pleural effusion. 36M PMH HTN, fatty liver, kidney stones presents with rash, dizziness, fatigue and severe RUQ pain for 3 days. He has felt fatigued for the past few months. Three days ago, he developed sharp RUQ pain that wrapped around to his back. He rated the pain as a 5/10 with no change over the past three days. The pain doesn't change with PO intake or bowel movements. He has some associated dizziness with the pain. He also developed an erythematous rash that is nonpruritic on his upper chest, neck, and upper back over the past couple of days. He has had a twenty pound weight gain over the past year due to a change to sedentary lifestyle 2/2 pandemic. He denies night sweats and fevers. He has had constipation for the past week where he must strain to move his bowels. He has no urinary symptoms, fevers, chills, or changes in appetite. No diarrhea, N/V. No prior cardiac hx, pleuritic chest pain, or SOB. He had kidney stones 5 years ago but states this pain is worse. Denies allergies or new medications. He denies recent travel and works as a . He has had no recent sick contacts. He received all childhood vaccines.    In the ED: temp 99.4, tachycardic to 150s, started on fluids and allopurinol per heme/onc recs. He received 1 mg hydromorphone, 4 mg morphine, 600 mg ibuprofen , and 975 mg acetaminophen. CT A/P revealed fatty liver and small R pleural effusion. Peripheral flow cytometry c/w 13% myeloblasts. Patient was offered sperm banking, but declined. A bone marrow biopsy was performed on 8/3 which revealed _______. 36M PMH HTN, fatty liver, kidney stones presents with rash, dizziness, fatigue and severe RUQ pain for 3 days. He has felt fatigued for the past few months. Three days ago, he developed sharp RUQ pain that wrapped around to his back. He rated the pain as a 5/10 with no change over the past three days. The pain doesn't change with PO intake or bowel movements. He has some associated dizziness with the pain. He also developed an erythematous rash that is nonpruritic on his upper chest, neck, and upper back over the past couple of days. He has had a twenty pound weight gain over the past year due to a change to sedentary lifestyle 2/2 pandemic. He denies night sweats and fevers. He has had constipation for the past week where he must strain to move his bowels. He has no urinary symptoms, fevers, chills, or changes in appetite. No diarrhea, N/V. No prior cardiac hx, pleuritic chest pain, or SOB. He had kidney stones 5 years ago but states this pain is worse. Denies allergies or new medications. He denies recent travel and works as a . He has had no recent sick contacts. He received all childhood vaccines.    In the ED: temp 99.4, tachycardic to 150s, started on fluids and allopurinol per heme/onc recs. He received 1 mg hydromorphone, 4 mg morphine, 600 mg ibuprofen , and 975 mg acetaminophen. CT A/P revealed fatty liver and small R pleural effusion. Peripheral flow cytometry c/w 13% myeloblasts FLT3(-). Patient was offered sperm banking, but declined. 36M PMH HTN, fatty liver, kidney stones presents with rash, dizziness, fatigue and severe RUQ pain for 3 days. He has felt fatigued for the past few months. Three days ago, he developed sharp RUQ pain that wrapped around to his back. He rated the pain as a 5/10 with no change over the past three days. The pain doesn't change with PO intake or bowel movements. He has some associated dizziness with the pain. He also developed an erythematous rash that is nonpruritic on his upper chest, neck, and upper back over the past couple of days. He has had a twenty pound weight gain over the past year due to a change to sedentary lifestyle 2/2 pandemic. He denies night sweats and fevers. He has had constipation for the past week where he must strain to move his bowels. He has no urinary symptoms, fevers, chills, or changes in appetite. No diarrhea, N/V. No prior cardiac hx, pleuritic chest pain, or SOB. He had kidney stones 5 years ago but states this pain is worse. Denies allergies or new medications. He denies recent travel and works as a . He has had no recent sick contacts. He received all childhood vaccines.    In the ED: temp 99.4, tachycardic to 150s, started on fluids and allopurinol per heme/onc recs. He received 1 mg hydromorphone, 4 mg morphine, 600 mg ibuprofen , and 975 mg acetaminophen. CT A/P revealed fatty liver and small R pleural effusion. Peripheral flow cytometry c/w 13% myeloblasts FLT3(-). Patient was offered sperm banking, but declined.     Patient decided to transfer to [facility] for further management. 36M PMH HTN, fatty liver, kidney stones presents with rash, dizziness, fatigue and severe RUQ pain for 3 days. He has felt fatigued for the past few months. Three days ago, he developed sharp RUQ pain that wrapped around to his back. He rated the pain as a 5/10 with no change over the past three days. The pain doesn't change with PO intake or bowel movements. He has some associated dizziness with the pain. He also developed an erythematous rash that is nonpruritic on his upper chest, neck, and upper back over the past couple of days. He has had a twenty pound weight gain over the past year due to a change to sedentary lifestyle 2/2 pandemic. He denies night sweats and fevers. He has had constipation for the past week where he must strain to move his bowels. He has no urinary symptoms, fevers, chills, or changes in appetite. No diarrhea, N/V. No prior cardiac hx, pleuritic chest pain, or SOB. He had kidney stones 5 years ago but states this pain is worse. Denies allergies or new medications. He denies recent travel and works as a . He has had no recent sick contacts. He received all childhood vaccines.    In the ED: temp 99.4, tachycardic to 150s, started on fluids and allopurinol per heme/onc recs. He received 1 mg hydromorphone, 4 mg morphine, 600 mg ibuprofen , and 975 mg acetaminophen. CT A/P revealed fatty liver and small R pleural effusion. Peripheral flow cytometry c/w 13% myeloblasts FLT3(-). Patient was offered sperm banking, but declined.     8/4: BN bx done, results pending, FLT 3 negative, consented to [place], will need central line depending results of bn bx 36M PMH HTN, fatty liver, kidney stones presents with rash, dizziness, fatigue and severe RUQ pain for 3 days. He has felt fatigued for the past few months. Three days ago, he developed sharp RUQ pain that wrapped around to his back. He rated the pain as a 5/10 with no change over the past three days. The pain doesn't change with PO intake or bowel movements. He has some associated dizziness with the pain. He also developed an erythematous rash that is nonpruritic on his upper chest, neck, and upper back over the past couple of days. He has had a twenty pound weight gain over the past year due to a change to sedentary lifestyle 2/2 pandemic. He denies night sweats and fevers. He has had constipation for the past week where he must strain to move his bowels. He has no urinary symptoms, fevers, chills, or changes in appetite. No diarrhea, N/V. No prior cardiac hx, pleuritic chest pain, or SOB. He had kidney stones 5 years ago but states this pain is worse. Denies allergies or new medications. He denies recent travel and works as a . He has had no recent sick contacts. He received all childhood vaccines.    In the ED: temp 99.4, tachycardic to 150s, started on fluids and allopurinol per heme/onc recs. He received 1 mg hydromorphone, 4 mg morphine, 600 mg ibuprofen , and 975 mg acetaminophen. CT A/P revealed fatty liver and small R pleural effusion. Peripheral flow cytometry c/w 13% myeloblasts FLT3(-). Patient was offered sperm banking, but declined.     8/4: BN bx done, results pending, FLT 3 negative, consented to [place], will need central line depending results of bn bx. BN Bx: AML, 8/6 induction with dauno/cytararbine. On levaquin and posaconazole for ppx for neutropenia    36M PMH HTN, fatty liver, kidney stones presents with rash, dizziness, fatigue and severe RUQ pain for 3 days. In the ED temp 99.4, tachycardic to 150s,  He received 1 mg hydromorphone, 4 mg morphine, 600 mg ibuprofen , and 975 mg acetaminophen. CT A/P revealed fatty liver and small R pleural effusion. +WBC 12k with 17% blasts. He was started on fluids and allopurinol per heme/onc recs. Peripheral flow cytometry c/w 13% myeloblasts FLT3(-). 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 consented to [place]/6 induction with dauno/cytararbinePatient was offered sperm banking, but declined.     . On levaquin and posaconazole for ppx for neutropenia    36M PMH HTN, fatty liver, kidney stones presents with rash, dizziness, fatigue and severe RUQ pain for 3 days. In the ED temp 99.4, tachycardic to 150s,  He received 1 mg hydromorphone, 4 mg morphine, 600 mg ibuprofen , and 975 mg acetaminophen. CT A/P revealed fatty liver and small R pleural effusion. +WBC 12k with 17% blasts. He was started on fluids and allopurinol per heme/onc recs. Peripheral flow cytometry c/w 13% myeloblasts FLT3(-). 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 Pt consented to [place]. On 8/6 induction with Dauno/Cytararbine was started. Patient was offered sperm banking, but declined. He received IVF's, allopurinol daily, electrolytes and CBC monitored daily, repleted as needed. He received a seven day course of Zosyn for presumed RUL PNA, then transitioned to  levaquin, posaconazole and Acyclovir for ppx for neutropenia.   Day 14 Bone Marrow revealed ___.     36M PMH HTN, fatty liver, kidney stones presents with rash, dizziness, fatigue and severe RUQ pain for 3 days. In the ED temp 99.4, tachycardic to 150s,  He received 1 mg hydromorphone, 4 mg morphine, 600 mg ibuprofen , and 975 mg acetaminophen. CT A/P revealed fatty liver and small R pleural effusion. +WBC 12k with 17% blasts. He was started on fluids and allopurinol per heme/onc recs. Peripheral flow cytometry c/w 13% myeloblasts FLT3(-). 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 Pt consented to [place]. On 8/6 induction with Dauno/Cytararbine was started. Patient was offered sperm banking, but declined. He received IVF's, allopurinol daily, electrolytes and CBC monitored daily, repleted as needed. He received a seven day course of Zosyn for presumed RUL PNA, then transitioned to  levaquin, posaconazole and Acyclovir for ppx for neutropenia.   Day 14 Bone Marrow revealed ___.  Hospital course complicated by pancytopenia 2/2 chemotherapy and disease. Fever for which antibiotic changed to Cefepime for empiric coverage. Pt was pancultured. UC/BC NGTD. COVID 19 PCR not detected on 8/19Oral mucositis treated and managed with mouthcare solutions.      Mr. Tian is a 37 y/o M PMH HTN, fatty liver, kidney stones presents with rash, dizziness, fatigue and severe RUQ pain for 3 days. In the ED temp 99.4, tachycardic to 150s,  He received 1 mg hydromorphone, 4 mg morphine, 600 mg ibuprofen , and 975 mg acetaminophen. CT A/P revealed fatty liver and small R pleural effusion. +WBC 12k with 17% blasts. He was started on fluids and allopurinol per heme/onc recs. Peripheral flow cytometry c/w 13% myeloblasts FLT3(-). 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 Pt consented to [place]. On 8/6 induction with Dauno/Cytararbine was started. Patient was offered sperm banking, but declined. He received IVF's, allopurinol daily, electrolytes and CBC monitored daily, repleted as needed. He received a seven day course of Zosyn for presumed RUL PNA, then transitioned to  levaquin, posaconazole and Acyclovir for ppx for neutropenia. Hospital course complicated by pancytopenia 2/2 chemotherapy and disease. Fever for which antibiotic changed to Cefepime for empiric coverage. Pt was pancultured. UC/BC NGTD. COVID 19 PCR not detected on 8/19Oral mucositis treated and managed with mouthcare solutions.     Day 14 Bone Marrow revealed chemotherapeutic effect; however, per hematopathology, appears to be earlier regeneration than would typically seen which is concerning for persistent disease at this point, and the earliest cells are CD34 positive and his myeloblasts on initial presentation were CD34 negative. Thus, this may simply represent early regeneration of his marrow. Plan was to await for count recovery and repeat biopsy.           Mr. Tian is a 37 y/o M PMH HTN, fatty liver, kidney stones presents with rash, dizziness, fatigue and severe RUQ pain for 3 days. In the ED temp 99.4, tachycardic to 150s,  He received 1 mg hydromorphone, 4 mg morphine, 600 mg ibuprofen , and 975 mg acetaminophen. CT A/P revealed fatty liver and small R pleural effusion. +WBC 12k with 17% blasts. He was started on fluids and allopurinol per heme/onc recs. Peripheral flow cytometry c/w 13% myeloblasts FLT3(-). 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 Pt consented to [place]. On 8/6 induction with Dauno/Cytararbine was started. Patient was offered sperm banking, but declined. He received IVF's, allopurinol daily, electrolytes and CBC monitored daily, repleted as needed. He received a seven day course of Zosyn for presumed RUL PNA, then transitioned to  levaquin, posaconazole and Acyclovir for ppx for neutropenia. Hospital course complicated by pancytopenia 2/2 chemotherapy and disease. Fever for which antibiotic changed to Cefepime for empiric coverage. Pt was pancultured. UC/BC NGTD. COVID 19 PCR not detected on 8/19Oral mucositis treated and managed with mouthcare solutions.     Day 14 Bone Marrow revealed chemotherapeutic effect; however, per hematopathology, appears to be earlier regeneration than would typically seen which is concerning for persistent disease at this point, and the earliest cells are CD34 positive and his myeloblasts on initial presentation were CD34 negative. Thus, this may simply represent early regeneration of his marrow. Plan was to await for count recovery and repeat biopsy. Following day 8/27 peripheral blast count dropped to 0.9% (from 6%) as Wbc rising and . Patient discharged home when ANC >500, stable for follow up at [facility].            Mr. Tian is a 35 y/o M PMH HTN, fatty liver, kidney stones presents with rash, dizziness, fatigue and severe RUQ pain for 3 days. In the ED temp 99.4, tachycardic to 150s,  He received 1 mg hydromorphone, 4 mg morphine, 600 mg ibuprofen , and 975 mg acetaminophen. CT A/P revealed fatty liver and small R pleural effusion. +WBC 12k with 17% blasts. He was started on fluids and allopurinol per heme/onc recs. Peripheral flow cytometry c/w 13% myeloblasts FLT3(-). 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 Pt consented to [place]. On 8/6 induction with Dauno/Cytararbine was started. Patient was offered sperm banking, but declined. He received IVF's, allopurinol daily, electrolytes and CBC monitored daily, repleted as needed. He received a seven day course of Zosyn for presumed RUL PNA, then transitioned to  levaquin, posaconazole and Acyclovir for ppx for neutropenia. Hospital course complicated by pancytopenia 2/2 chemotherapy and disease.     Day 14 Bone Marrow revealed chemotherapeutic effect; however, per hematopathology, appears to be earlier regeneration than would typically seen which is concerning for persistent disease at this point, and the earliest cells are CD34 positive and his myeloblasts on initial presentation were CD34 negative. Thus, this may simply represent early regeneration of his marrow. Plan was to await for count recovery and repeat biopsy.  Patient discharged home when ANC >500, stable for follow up at [facility].

## 2021-07-31 NOTE — PROGRESS NOTE ADULT - SUBJECTIVE AND OBJECTIVE BOX
PROGRESS NOTE:   Authored by Chery Canas MD PGY-1  Pager [phone] [facility], [address] [facility]   Please page night float  after 7PM    Patient is a 36y old  Male who presents with a chief complaint of c/f new AML (2021 09:22)      SUBJECTIVE / OVERNIGHT EVENTS:    ADDITIONAL REVIEW OF SYSTEMS:    MEDICATIONS  (STANDING):  allopurinol 300 milliGRAM(s) Oral daily  amLODIPine   Tablet 5 milliGRAM(s) Oral daily  lactated ringers Bolus 1500 milliLiter(s) IV Bolus once  piperacillin/tazobactam IVPB. 3.375 Gram(s) IV Intermittent once  piperacillin/tazobactam IVPB.. 3.375 Gram(s) IV Intermittent every 8 hours  polyethylene glycol 3350 17 Gram(s) Oral daily  senna 2 Tablet(s) Oral at bedtime    MEDICATIONS  (PRN):  acetaminophen   Tablet .. 650 milliGRAM(s) Oral every 6 hours PRN Temp greater or equal to 38C (100.4F), Mild Pain (1 - 3), Moderate Pain (4 - 6)  HYDROmorphone  Injectable 1 milliGRAM(s) IV Push every 6 hours PRN Severe Pain (7 - 10)      CAPILLARY BLOOD GLUCOSE        I&O's Summary    2021 07:01  -  2021 07:00  --------------------------------------------------------  IN: 120 mL / OUT: 0 mL / NET: 120 mL        PHYSICAL EXAM:  Vital Signs Last 24 Hrs  T(C): 37.4 (2021 05:49), Max: 39.4 (2021 03:43)  T(F): 99.4 (2021 05:49), Max: 102.9 (2021 03:43)  HR: 134 (2021 03:43) (108 - 150)  BP: 125/65 (2021 03:43) (110/71 - 136/85)  BP(mean): --  RR: 20 (2021 03:43) (20 - 22)  SpO2: 97% (2021 03:43) (95% - 100%)    CONSTITUTIONAL: NAD, well-developed  RESPIRATORY: Normal respiratory effort; lungs are clear to auscultation bilaterally  CARDIOVASCULAR: Regular rate and rhythm, normal S1 and S2, no murmur/rub/gallop; No lower extremity edema; Peripheral pulses are 2+ bilaterally  ABDOMEN: Nontender to palpation, normoactive bowel sounds, no rebound/guarding  MUSCULOSKELETAL: no clubbing or cyanosis of digits; no joint swelling or tenderness to palpation  PSYCH: A+O to person, place, and time; affect appropriate    LABS:                        8.1    10.75 )-----------( 42       ( 2021 05:50 )             24.6     07-31    138  |  103  |  11  ----------------------------<  121<H>  4.3   |  24  |  1.11    Ca    9.3      2021 05:50  Phos  3.4     -  Mg     2.2         TPro  7.2  /  Alb  4.0  /  TBili  0.7  /  DBili  x   /  AST  13  /  ALT  24  /  AlkPhos  67  07-31    PT/INR - ( 2021 07:06 )   PT: 13.6 sec;   INR: 1.14 ratio         PTT - ( 2021 07:06 )  PTT:29.7 sec  CARDIAC MARKERS ( 2021 04:17 )  x     / x     / 112 U/L / x     / 1.0 ng/mL      Urinalysis Basic - ( 2021 06:26 )    Color: Colorless / Appearance: Clear / S.050 / pH: x  Gluc: x / Ketone: Negative  / Bili: Negative / Urobili: Negative   Blood: x / Protein: Negative / Nitrite: Negative   Leuk Esterase: Negative / RBC: 1 /hpf / WBC 6 /HPF   Sq Epi: x / Non Sq Epi: 0 /hpf / Bacteria: Negative          RADIOLOGY & ADDITIONAL TESTS:  Results Reviewed:   Imaging Personally Reviewed:  Electrocardiogram Personally Reviewed:    COORDINATION OF CARE:  Care Discussed with Consultants/Other Providers [Y/N]:  Prior or Outpatient Records Reviewed [Y/N]:   PROGRESS NOTE:   Authored by Chery Canas MD PGY-1  Pager [phone] [facility], [address] [facility]   Please page night float  after 7PM    Patient is a 36y old  Male who presents with a chief complaint of c/f new AML (2021 09:22)      SUBJECTIVE / OVERNIGHT EVENTS: Patient had two fevers overnight (101.1 and 102.9) and was treated with Tylenol. Patient continues to be in pain and has been using prn Dilaudid 1mg I V q6 around the clock. Patient still endorsing pain this AM and was given Dilaudid for breakthrough pain.     ADDITIONAL REVIEW OF SYSTEMS: negative     MEDICATIONS  (STANDING):  allopurinol 300 milliGRAM(s) Oral daily  amLODIPine   Tablet 5 milliGRAM(s) Oral daily  lactated ringers Bolus 1500 milliLiter(s) IV Bolus once  piperacillin/tazobactam IVPB. 3.375 Gram(s) IV Intermittent once  piperacillin/tazobactam IVPB.. 3.375 Gram(s) IV Intermittent every 8 hours  polyethylene glycol 3350 17 Gram(s) Oral daily  senna 2 Tablet(s) Oral at bedtime    MEDICATIONS  (PRN):  acetaminophen   Tablet .. 650 milliGRAM(s) Oral every 6 hours PRN Temp greater or equal to 38C (100.4F), Mild Pain (1 - 3), Moderate Pain (4 - 6)  HYDROmorphone  Injectable 1 milliGRAM(s) IV Push every 6 hours PRN Severe Pain (7 - 10)      CAPILLARY BLOOD GLUCOSE        I&O's Summary    2021 07:01  -  2021 07:00  --------------------------------------------------------  IN: 120 mL / OUT: 0 mL / NET: 120 mL        PHYSICAL EXAM:  Vital Signs Last 24 Hrs  T(C): 37.4 (2021 05:49), Max: 39.4 (2021 03:43)  T(F): 99.4 (2021 05:49), Max: 102.9 (2021 03:43)  HR: 134 (2021 03:43) (108 - 150)  BP: 125/65 (2021 03:43) (110/71 - 136/85)  BP(mean): --  RR: 20 (2021 03:43) (20 - 22)  SpO2: 97% (2021 03:43) (95% - 100%)    General: patient laying in bed in severe pain  HEENT: NCAT.  PERRL.  EOMI.  No scleral icterus or injection.  Moist MM.  No oropharyngeal exudates.    Neck: Supple. Full ROM.  No JVD.  No thyromegaly. No lymphadenopathy.   Heart: tachycardia. Normal S1 and S2.  No murmurs, rubs, or gallops.   Lungs: CTAB. No wheezes, crackles, or rhonchi.    Abdomen: pain in RUQ with palpation; + Fontana's sign; BS+, soft, ND. No organomegaly.  Skin: Warm and dry. Erythematous nonpruritic rash on chest, neck, and upper back  Extremities: No edema, clubbing, or cyanosis. 2+ peripheral pulses b/l.  Musculoskeletal: No deformities.  No spinal or paraspinal tenderness.  Neuro: A&Ox3    LABS:                        8.1    10.75 )-----------( 42       ( 2021 05:50 )             24.6     07-    138  |  103  |  11  ----------------------------<  121<H>  4.3   |  24  |  1.11    Ca    9.3      2021 05:50  Phos  3.4     07-31  Mg     2.2     07-31    TPro  7.2  /  Alb  4.0  /  TBili  0.7  /  DBili  x   /  AST  13  /  ALT  24  /  AlkPhos  67  07-31    PT/INR - ( 2021 07:06 )   PT: 13.6 sec;   INR: 1.14 ratio         PTT - ( 2021 07:06 )  PTT:29.7 sec  CARDIAC MARKERS ( 2021 04:17 )  x     / x     / 112 U/L / x     / 1.0 ng/mL      Urinalysis Basic - ( 2021 06:26 )    Color: Colorless / Appearance: Clear / S.050 / pH: x  Gluc: x / Ketone: Negative  / Bili: Negative / Urobili: Negative   Blood: x / Protein: Negative / Nitrite: Negative   Leuk Esterase: Negative / RBC: 1 /hpf / WBC 6 /HPF   Sq Epi: x / Non Sq Epi: 0 /hpf / Bacteria: Negative          RADIOLOGY & ADDITIONAL TESTS:  Results Reviewed:   Imaging Personally Reviewed:  Electrocardiogram Personally Reviewed:    COORDINATION OF CARE:  Care Discussed with Consultants/Other Providers [Y/N]:  Prior or Outpatient Records Reviewed [Y/N]:

## 2021-07-31 NOTE — DISCHARGE NOTE PROVIDER - NSDCFUADDAPPT_GEN_ALL_CORE_FT
Follow up at [facility] to see Dr. Yancey on _____  To [facility] on Tuesday, 8/31 at 11:00am for follow-up appointment with Dr. Chelsea Yancey.

## 2021-07-31 NOTE — PROGRESS NOTE ADULT - PROBLEM SELECTOR PLAN 1
RUQ pain for the past three days, + Fontana's sign, R pleural effusion on CT  - LDH elevated at 384; LFTs, AlkP, and TBili normal; Lipase normal  - f/u RUQ ultrasound RUQ pain for the past three days, + Fontana's sign, R pleural effusion on CT  - LDH elevated at 384; LFTs, AlkP, and TBili normal; Lipase normal  - f/u RUQ ultrasound  - Dilaudid 1mg IV q4

## 2021-07-31 NOTE — PROGRESS NOTE ADULT - ASSESSMENT
36M PMH HTN, ?fatty liver, kidney stones presents with rash, dizziness, fatigue and severe RUQ pain for 3 days.  Now with anemia, thrombocytopenia and leukocytosis with 17% blasts, concerning for acute leukemia       R/O AML   - WBC 12 with 17% peripheral blasts  - peripheral smear reviewed: blasts seen   - f/u peripheral flow cytometry (green top tubes personally given to RN with requisition form for PML-PAULIE, FLT3)  - No need for Hydrea at this time  - recommend start Allopurinol 300mg daily now  - recommend IVF: NS at 100cc/hr  - F/U  order: coags, fibrinogen, LDH, D-dimer, uric acid, G6PD, hepatitis B serologies (including core antibody total) and hepatitis C, HIV  - Please order: Echo  - Check CBC w/ DIFF Daily  - Check: TLS labs every 12 hours (CMP, Phos, LDH, uric acid)  - give rasburicase 3mg IV for uric acid > 8.0  - Transfuse if Hgb < 7.0.  Transfuse if platelets <10K, or <15K if febrile. Transfuse for platelets <50K if bleeding.       INMCOMPLETE 36M with h/o PMH HTN, ?fatty liver, kidney stones presents with rash, dizziness, fatigue and severe RUQ pain for 3 days.  Now with anemia, thrombocytopenia and leukocytosis with 17% blasts, concerning for acute leukemia. Patient seen in follow up. Definitive diagnosis lab/path results pending. Continue with supportive and symptomatic management.        R/O AML   - WBC 12 with 17% peripheral blasts  - peripheral smear reviewed: blasts seen   - f/u peripheral flow cytometry (green top tubes personally given to RN with requisition form for PML-PAULIE, FLT3)  - No need for Hydrea at this time  - recommend start Allopurinol 300mg daily now  - recommend IVF: NS at 100cc/hr  - F/U  order: coags, fibrinogen, LDH, D-dimer, uric acid, G6PD, hepatitis B serologies (including core antibody total) and hepatitis C, HIV  - Please order: Echo  - Check CBC w/ DIFF Daily  - Check: TLS labs every 12 hours (CMP, Phos, LDH, uric acid)  - give rasburicase 3mg IV for uric acid > 8.0  - Transfuse if Hgb < 7.0.  Transfuse if platelets <10K, or <15K if febrile. Transfuse for platelets <50K if bleeding.

## 2021-07-31 NOTE — DISCHARGE NOTE PROVIDER - NSDCACTIVITY_GEN_ALL_CORE
Walking - Indoors allowed/Walking - Outdoors allowed Bathing allowed/Showering allowed/Walking - Indoors allowed/Walking - Outdoors allowed

## 2021-07-31 NOTE — DISCHARGE NOTE PROVIDER - CARE PROVIDER_API CALL
Chelsea Yancey  HEMATOLOGY/ONCOLOGY  [address] [place] [address]  Phone: [phone]  Fax: [phone]  Follow Up Time:

## 2021-07-31 NOTE — PROGRESS NOTE ADULT - PROBLEM SELECTOR PLAN 2
SIRS+ (WBC 12 and ); RUQ pain with +Fontana's sign, and R pleural effusion on CT  - no source of infection determined  - f/u blood and urine cultures  - monitor CBC  - monitor for signs of infection  - WBC already downtrending to 10.12 SIRS+ (WBC 12 and ); RUQ pain with +Fontana's sign, and R pleural effusion on CT  - no source of infection determined  - f/u blood and urine cultures  - monitor CBC  - monitor for signs of infection  - patient with fevers overnight, given bolus and started on Zosyn

## 2021-07-31 NOTE — PROGRESS NOTE ADULT - SUBJECTIVE AND OBJECTIVE BOX
INTERVAL HPI/OVERNIGHT EVENTS:  No overnight events.     MEDICATIONS  (STANDING):  allopurinol 300 milliGRAM(s) Oral daily  amLODIPine   Tablet 5 milliGRAM(s) Oral daily  piperacillin/tazobactam IVPB.. 3.375 Gram(s) IV Intermittent every 8 hours  polyethylene glycol 3350 17 Gram(s) Oral daily  senna 2 Tablet(s) Oral at bedtime    MEDICATIONS  (PRN):  acetaminophen   Tablet .. 650 milliGRAM(s) Oral every 6 hours PRN Temp greater or equal to 38C (100.4F), Mild Pain (1 - 3), Moderate Pain (4 - 6)  HYDROmorphone  Injectable 1 milliGRAM(s) IV Push every 4 hours PRN Severe Pain (7 - 10)    Allergies    No Known Allergies    Intolerances          VITAL SIGNS:  T(F): 100.6 (21 @ 11:33)  HR: 123 (21 @ 11:33)  BP: 121/76 (21 @ 11:33)  RR: 20 (21 @ 11:33)  SpO2: 97% (21 @ 11:33)  Wt(kg): --    PHYSICAL EXAM:    Constitutional: NAD, lying comfortably in bed  Eyes: EOMI, PERRLA  Neck: supple, no masses, no JVD  Respiratory: CTAB; no r/r/w  Cardiovascular: RRR, no M/R/G  Gastrointestinal: +BS, soft, NTND, no hepatosplenomegaly  Extremities: no c/c/e  Neurological: AAOx3, nonfocal    LABS:                        8.1    10.75 )-----------( 42       ( 2021 05:50 )             24.6         138  |  103  |  11  ----------------------------<  121<H>  4.3   |  24  |  1.11    Ca    9.3      2021 05:50  Phos  3.4       Mg     2.2         TPro  7.2  /  Alb  4.0  /  TBili  0.7  /  DBili  x   /  AST  13  /  ALT  24  /  AlkPhos  67      PT/INR - ( 2021 07:06 )   PT: 13.6 sec;   INR: 1.14 ratio         PTT - ( 2021 07:06 )  PTT:29.7 sec  Urinalysis Basic - ( 2021 06:26 )    Color: Colorless / Appearance: Clear / S.050 / pH: x  Gluc: x / Ketone: Negative  / Bili: Negative / Urobili: Negative   Blood: x / Protein: Negative / Nitrite: Negative   Leuk Esterase: Negative / RBC: 1 /hpf / WBC 6 /HPF   Sq Epi: x / Non Sq Epi: 0 /hpf / Bacteria: Negative        RADIOLOGY & ADDITIONAL TESTS:  Studies reviewed.

## 2021-07-31 NOTE — DISCHARGE NOTE PROVIDER - NSDCQMSTAIRS_GEN_ALL_CORE
Brief eICU Note    Pt intubated.  At risk for pulling tubes / self extubation     Restraints renewed x 1 day    Electronically Signed by:  Amado Treviño MD  eICU Physician  7/25/2017 8:39 PM           No

## 2021-08-01 DIAGNOSIS — B99.9 UNSPECIFIED INFECTIOUS DISEASE: ICD-10-CM

## 2021-08-01 DIAGNOSIS — C95.00 ACUTE LEUKEMIA OF UNSPECIFIED CELL TYPE NOT HAVING ACHIEVED REMISSION: ICD-10-CM

## 2021-08-01 LAB
ALBUMIN SERPL ELPH-MCNC: 3.6 G/DL — SIGNIFICANT CHANGE UP (ref 3.3–5)
ALBUMIN SERPL ELPH-MCNC: 4.1 G/DL — SIGNIFICANT CHANGE UP (ref 3.3–5)
ALP SERPL-CCNC: 58 U/L — SIGNIFICANT CHANGE UP (ref 40–120)
ALP SERPL-CCNC: 72 U/L — SIGNIFICANT CHANGE UP (ref 40–120)
ALT FLD-CCNC: 24 U/L — SIGNIFICANT CHANGE UP (ref 10–45)
ALT FLD-CCNC: 43 U/L — SIGNIFICANT CHANGE UP (ref 10–45)
ANION GAP SERPL CALC-SCNC: 11 MMOL/L — SIGNIFICANT CHANGE UP (ref 5–17)
ANION GAP SERPL CALC-SCNC: 11 MMOL/L — SIGNIFICANT CHANGE UP (ref 5–17)
ANISOCYTOSIS BLD QL: SLIGHT — SIGNIFICANT CHANGE UP
AST SERPL-CCNC: 12 U/L — SIGNIFICANT CHANGE UP (ref 10–40)
AST SERPL-CCNC: 27 U/L — SIGNIFICANT CHANGE UP (ref 10–40)
BASOPHILS # BLD AUTO: 0 K/UL — SIGNIFICANT CHANGE UP (ref 0–0.2)
BASOPHILS NFR BLD AUTO: 0 % — SIGNIFICANT CHANGE UP (ref 0–2)
BILIRUB SERPL-MCNC: 0.4 MG/DL — SIGNIFICANT CHANGE UP (ref 0.2–1.2)
BILIRUB SERPL-MCNC: 0.7 MG/DL — SIGNIFICANT CHANGE UP (ref 0.2–1.2)
BLASTS # FLD: 11.3 % — HIGH (ref 0–0)
BUN SERPL-MCNC: 10 MG/DL — SIGNIFICANT CHANGE UP (ref 7–23)
BUN SERPL-MCNC: 10 MG/DL — SIGNIFICANT CHANGE UP (ref 7–23)
CALCIUM SERPL-MCNC: 9.2 MG/DL — SIGNIFICANT CHANGE UP (ref 8.4–10.5)
CALCIUM SERPL-MCNC: 9.4 MG/DL — SIGNIFICANT CHANGE UP (ref 8.4–10.5)
CHLORIDE SERPL-SCNC: 100 MMOL/L — SIGNIFICANT CHANGE UP (ref 96–108)
CHLORIDE SERPL-SCNC: 98 MMOL/L — SIGNIFICANT CHANGE UP (ref 96–108)
CO2 SERPL-SCNC: 25 MMOL/L — SIGNIFICANT CHANGE UP (ref 22–31)
CO2 SERPL-SCNC: 28 MMOL/L — SIGNIFICANT CHANGE UP (ref 22–31)
CREAT SERPL-MCNC: 1.11 MG/DL — SIGNIFICANT CHANGE UP (ref 0.5–1.3)
CREAT SERPL-MCNC: 1.12 MG/DL — SIGNIFICANT CHANGE UP (ref 0.5–1.3)
DACRYOCYTES BLD QL SMEAR: SLIGHT — SIGNIFICANT CHANGE UP
ELLIPTOCYTES BLD QL SMEAR: SLIGHT — SIGNIFICANT CHANGE UP
EOSINOPHIL # BLD AUTO: 0 K/UL — SIGNIFICANT CHANGE UP (ref 0–0.5)
EOSINOPHIL NFR BLD AUTO: 0 % — SIGNIFICANT CHANGE UP (ref 0–6)
GLUCOSE SERPL-MCNC: 108 MG/DL — HIGH (ref 70–99)
GLUCOSE SERPL-MCNC: 96 MG/DL — SIGNIFICANT CHANGE UP (ref 70–99)
HAV IGM SER-ACNC: SIGNIFICANT CHANGE UP
HBV CORE IGM SER-ACNC: SIGNIFICANT CHANGE UP
HBV SURFACE AG SER-ACNC: SIGNIFICANT CHANGE UP
HCT VFR BLD CALC: 22.4 % — LOW (ref 39–50)
HCT VFR BLD CALC: 23.2 % — LOW (ref 39–50)
HCV AB S/CO SERPL IA: 0.17 S/CO — SIGNIFICANT CHANGE UP (ref 0–0.99)
HCV AB SERPL-IMP: SIGNIFICANT CHANGE UP
HGB BLD-MCNC: 7.4 G/DL — LOW (ref 13–17)
HGB BLD-MCNC: 7.8 G/DL — LOW (ref 13–17)
LDH SERPL L TO P-CCNC: 362 U/L — HIGH (ref 50–242)
LDH SERPL L TO P-CCNC: 438 U/L — HIGH (ref 50–242)
LYMPHOCYTES # BLD AUTO: 1.79 K/UL — SIGNIFICANT CHANGE UP (ref 1–3.3)
LYMPHOCYTES # BLD AUTO: 15.6 % — SIGNIFICANT CHANGE UP (ref 13–44)
MAGNESIUM SERPL-MCNC: 2.2 MG/DL — SIGNIFICANT CHANGE UP (ref 1.6–2.6)
MAGNESIUM SERPL-MCNC: 2.4 MG/DL — SIGNIFICANT CHANGE UP (ref 1.6–2.6)
MANUAL SMEAR VERIFICATION: SIGNIFICANT CHANGE UP
MCHC RBC-ENTMCNC: 33 GM/DL — SIGNIFICANT CHANGE UP (ref 32–36)
MCHC RBC-ENTMCNC: 33.6 GM/DL — SIGNIFICANT CHANGE UP (ref 32–36)
MCHC RBC-ENTMCNC: 35.2 PG — HIGH (ref 27–34)
MCHC RBC-ENTMCNC: 35.3 PG — HIGH (ref 27–34)
MCV RBC AUTO: 105 FL — HIGH (ref 80–100)
MCV RBC AUTO: 106.7 FL — HIGH (ref 80–100)
METAMYELOCYTES # FLD: 0.9 % — HIGH (ref 0–0)
MICROCYTES BLD QL: SLIGHT — SIGNIFICANT CHANGE UP
MONOCYTES # BLD AUTO: 3.48 K/UL — HIGH (ref 0–0.9)
MONOCYTES NFR BLD AUTO: 30.4 % — HIGH (ref 2–14)
NEUTROPHILS # BLD AUTO: 4.68 K/UL — SIGNIFICANT CHANGE UP (ref 1.8–7.4)
NEUTROPHILS NFR BLD AUTO: 40.9 % — LOW (ref 43–77)
NRBC # BLD: 0 /100 WBCS — SIGNIFICANT CHANGE UP (ref 0–0)
PHOSPHATE SERPL-MCNC: 3.4 MG/DL — SIGNIFICANT CHANGE UP (ref 2.5–4.5)
PHOSPHATE SERPL-MCNC: 3.5 MG/DL — SIGNIFICANT CHANGE UP (ref 2.5–4.5)
PLAT MORPH BLD: NORMAL — SIGNIFICANT CHANGE UP
PLATELET # BLD AUTO: 37 K/UL — LOW (ref 150–400)
PLATELET # BLD AUTO: 46 K/UL — LOW (ref 150–400)
POIKILOCYTOSIS BLD QL AUTO: SLIGHT — SIGNIFICANT CHANGE UP
POLYCHROMASIA BLD QL SMEAR: SLIGHT — SIGNIFICANT CHANGE UP
POTASSIUM SERPL-MCNC: 3.9 MMOL/L — SIGNIFICANT CHANGE UP (ref 3.5–5.3)
POTASSIUM SERPL-MCNC: 4 MMOL/L — SIGNIFICANT CHANGE UP (ref 3.5–5.3)
POTASSIUM SERPL-SCNC: 3.9 MMOL/L — SIGNIFICANT CHANGE UP (ref 3.5–5.3)
POTASSIUM SERPL-SCNC: 4 MMOL/L — SIGNIFICANT CHANGE UP (ref 3.5–5.3)
PROT SERPL-MCNC: 6.8 G/DL — SIGNIFICANT CHANGE UP (ref 6–8.3)
PROT SERPL-MCNC: 7.6 G/DL — SIGNIFICANT CHANGE UP (ref 6–8.3)
RBC # BLD: 2.1 M/UL — LOW (ref 4.2–5.8)
RBC # BLD: 2.21 M/UL — LOW (ref 4.2–5.8)
RBC # FLD: 14.3 % — SIGNIFICANT CHANGE UP (ref 10.3–14.5)
RBC # FLD: 14.6 % — HIGH (ref 10.3–14.5)
RBC BLD AUTO: ABNORMAL
SODIUM SERPL-SCNC: 134 MMOL/L — LOW (ref 135–145)
SODIUM SERPL-SCNC: 139 MMOL/L — SIGNIFICANT CHANGE UP (ref 135–145)
URATE SERPL-MCNC: 3 MG/DL — LOW (ref 3.4–8.8)
URATE SERPL-MCNC: 3.5 MG/DL — SIGNIFICANT CHANGE UP (ref 3.4–8.8)
VARIANT LYMPHS # BLD: 0.9 % — SIGNIFICANT CHANGE UP (ref 0–6)
WBC # BLD: 11.45 K/UL — HIGH (ref 3.8–10.5)
WBC # BLD: 11.57 K/UL — HIGH (ref 3.8–10.5)
WBC # FLD AUTO: 11.45 K/UL — HIGH (ref 3.8–10.5)
WBC # FLD AUTO: 11.57 K/UL — HIGH (ref 3.8–10.5)

## 2021-08-01 PROCEDURE — 76705 ECHO EXAM OF ABDOMEN: CPT | Mod: 26,RT

## 2021-08-01 PROCEDURE — 99232 SBSQ HOSP IP/OBS MODERATE 35: CPT

## 2021-08-01 RX ORDER — SODIUM CHLORIDE 0.65 %
1 AEROSOL, SPRAY (ML) NASAL THREE TIMES A DAY
Refills: 0 | Status: DISCONTINUED | OUTPATIENT
Start: 2021-08-01 | End: 2021-08-29

## 2021-08-01 RX ORDER — SODIUM CHLORIDE 9 MG/ML
1000 INJECTION INTRAMUSCULAR; INTRAVENOUS; SUBCUTANEOUS
Refills: 0 | Status: DISCONTINUED | OUTPATIENT
Start: 2021-08-01 | End: 2021-08-29

## 2021-08-01 RX ORDER — SALIVA SUBSTITUTE COMB NO.11 351 MG
5 POWDER IN PACKET (EA) MUCOUS MEMBRANE
Refills: 0 | Status: DISCONTINUED | OUTPATIENT
Start: 2021-08-01 | End: 2021-08-29

## 2021-08-01 RX ADMIN — HYDROMORPHONE HYDROCHLORIDE 1 MILLIGRAM(S): 2 INJECTION INTRAMUSCULAR; INTRAVENOUS; SUBCUTANEOUS at 05:44

## 2021-08-01 RX ADMIN — POLYETHYLENE GLYCOL 3350 17 GRAM(S): 17 POWDER, FOR SOLUTION ORAL at 12:08

## 2021-08-01 RX ADMIN — Medication 5 MILLILITER(S): at 16:15

## 2021-08-01 RX ADMIN — PIPERACILLIN AND TAZOBACTAM 25 GRAM(S): 4; .5 INJECTION, POWDER, LYOPHILIZED, FOR SOLUTION INTRAVENOUS at 20:24

## 2021-08-01 RX ADMIN — PIPERACILLIN AND TAZOBACTAM 25 GRAM(S): 4; .5 INJECTION, POWDER, LYOPHILIZED, FOR SOLUTION INTRAVENOUS at 04:06

## 2021-08-01 RX ADMIN — Medication 650 MILLIGRAM(S): at 21:08

## 2021-08-01 RX ADMIN — Medication 300 MILLIGRAM(S): at 12:09

## 2021-08-01 RX ADMIN — HYDROMORPHONE HYDROCHLORIDE 1 MILLIGRAM(S): 2 INJECTION INTRAMUSCULAR; INTRAVENOUS; SUBCUTANEOUS at 09:41

## 2021-08-01 RX ADMIN — HYDROMORPHONE HYDROCHLORIDE 1 MILLIGRAM(S): 2 INJECTION INTRAMUSCULAR; INTRAVENOUS; SUBCUTANEOUS at 06:39

## 2021-08-01 RX ADMIN — HYDROMORPHONE HYDROCHLORIDE 1 MILLIGRAM(S): 2 INJECTION INTRAMUSCULAR; INTRAVENOUS; SUBCUTANEOUS at 17:30

## 2021-08-01 RX ADMIN — Medication 5 MILLILITER(S): at 20:21

## 2021-08-01 RX ADMIN — HYDROMORPHONE HYDROCHLORIDE 1 MILLIGRAM(S): 2 INJECTION INTRAMUSCULAR; INTRAVENOUS; SUBCUTANEOUS at 17:07

## 2021-08-01 RX ADMIN — SODIUM CHLORIDE 75 MILLILITER(S): 9 INJECTION INTRAMUSCULAR; INTRAVENOUS; SUBCUTANEOUS at 16:15

## 2021-08-01 RX ADMIN — AMLODIPINE BESYLATE 5 MILLIGRAM(S): 2.5 TABLET ORAL at 05:44

## 2021-08-01 RX ADMIN — Medication 650 MILLIGRAM(S): at 23:04

## 2021-08-01 RX ADMIN — Medication 650 MILLIGRAM(S): at 09:47

## 2021-08-01 RX ADMIN — HYDROMORPHONE HYDROCHLORIDE 1 MILLIGRAM(S): 2 INJECTION INTRAMUSCULAR; INTRAVENOUS; SUBCUTANEOUS at 21:10

## 2021-08-01 RX ADMIN — HYDROMORPHONE HYDROCHLORIDE 1 MILLIGRAM(S): 2 INJECTION INTRAMUSCULAR; INTRAVENOUS; SUBCUTANEOUS at 01:50

## 2021-08-01 RX ADMIN — PIPERACILLIN AND TAZOBACTAM 25 GRAM(S): 4; .5 INJECTION, POWDER, LYOPHILIZED, FOR SOLUTION INTRAVENOUS at 12:05

## 2021-08-01 RX ADMIN — SENNA PLUS 2 TABLET(S): 8.6 TABLET ORAL at 21:09

## 2021-08-01 RX ADMIN — HYDROMORPHONE HYDROCHLORIDE 1 MILLIGRAM(S): 2 INJECTION INTRAMUSCULAR; INTRAVENOUS; SUBCUTANEOUS at 10:00

## 2021-08-01 RX ADMIN — HYDROMORPHONE HYDROCHLORIDE 1 MILLIGRAM(S): 2 INJECTION INTRAMUSCULAR; INTRAVENOUS; SUBCUTANEOUS at 21:45

## 2021-08-01 RX ADMIN — SODIUM CHLORIDE 100 MILLILITER(S): 9 INJECTION INTRAMUSCULAR; INTRAVENOUS; SUBCUTANEOUS at 05:44

## 2021-08-01 RX ADMIN — HYDROMORPHONE HYDROCHLORIDE 1 MILLIGRAM(S): 2 INJECTION INTRAMUSCULAR; INTRAVENOUS; SUBCUTANEOUS at 01:22

## 2021-08-01 NOTE — PROGRESS NOTE ADULT - ATTENDING COMMENTS
36yoM admitted with RUQ pain found to have bloodwork concerning for acute leukemia  -13% myeloblasts on PB  -Flt3 sent, pending  -? if can be enrolled in study  -pain control and further work up of RUQ pain, improved today from Friday  -check echo  -discussed with patient and family at the bedside briefly. Patient is very stoic, not asking many questions. Girlfriend (x/gf?) says he is often that way  -if AML confirmed, place line

## 2021-08-01 NOTE — PROGRESS NOTE ADULT - ASSESSMENT
36M with h/o PMH HTN, ?fatty liver, kidney stones presents with rash, dizziness, fatigue and severe RUQ pain for 3 days.  Now with anemia, thrombocytopenia and leukocytosis with 17% blasts, concerning for acute leukemia. Patient seen in follow up. Definitive diagnosis lab/path results pending. Continue with supportive and symptomatic management.        R/O AML   - WBC 12 with 17% peripheral blasts  - peripheral smear reviewed: blasts seen   - f/u peripheral flow cytometry (green top tubes personally given to RN with requisition form for PML-PAULIE, FLT3)  - No need for Hydrea at this time  - recommend start Allopurinol 300mg daily now  - recommend IVF: NS at 100cc/hr  - F/U  order: coags, fibrinogen, LDH, D-dimer, uric acid, G6PD, hepatitis B serologies (including core antibody total) and hepatitis C, HIV  - Please order: Echo  - Check CBC w/ DIFF Daily  - Check: TLS labs every 12 hours (CMP, Phos, LDH, uric acid)  - give rasburicase 3mg IV for uric acid > 8.0  - Transfuse if Hgb < 7.0.  Transfuse if platelets <10K, or <15K if febrile. Transfuse for platelets <50K if bleeding.    36M with h/o PMH HTN, ?atty liver, kidney stones presents with rash, dizziness, fatigue and severe RUQ pain for 3 days.  Now with anemia, thrombocytopenia and leukocytosis with 17% blasts, concerning for acute leukemia. Transferred to [address] [place] [address] for management. Patient is pancytopenia secondary to disease.

## 2021-08-01 NOTE — PROGRESS NOTE ADULT - PROBLEM SELECTOR PLAN 1
Patient with peripheral blasts  plan for BMbx 8/2  f/u peripheral flow cytometry (green top tubes for PML-PAULIE, FLT3)  No need for Hydrea at this time, continue with Allopurinol 300mg daily now, IV hydration, mouth care, pain control, antiemetics.   HIV (-), Hep (-)  FU Echo, FU MUGA  FU g6pd  Monitor TLS labs every 12 hours   - give rasburicase 3mg IV for uric acid > 8.0  Will need central iv access.   HLA sent 8/2

## 2021-08-01 NOTE — PROGRESS NOTE ADULT - PROBLEM SELECTOR PLAN 2
Patient is febrile, not neutropenic.   Monitor for fever. Cultures if spikes.   Continue with Zosyn.   Cultures NGTD 7/29  Fu cultures from 7/31

## 2021-08-01 NOTE — PROGRESS NOTE ADULT - SUBJECTIVE AND OBJECTIVE BOX
Diagnosis:    Protocol/Chemo Regimen:    Day:     Pt endorsed:    Review of Systems:     Pain scale:     Diet:     Allergies    No Known Allergies    Intolerances        ANTIMICROBIALS  piperacillin/tazobactam IVPB.. 3.375 Gram(s) IV Intermittent every 8 hours      HEME/ONC MEDICATIONS      STANDING MEDICATIONS  allopurinol 300 milliGRAM(s) Oral daily  amLODIPine   Tablet 5 milliGRAM(s) Oral daily  polyethylene glycol 3350 17 Gram(s) Oral daily  senna 2 Tablet(s) Oral at bedtime  sodium chloride 0.9%. 1000 milliLiter(s) IV Continuous <Continuous>      PRN MEDICATIONS  acetaminophen   Tablet .. 650 milliGRAM(s) Oral every 6 hours PRN  HYDROmorphone  Injectable 1 milliGRAM(s) IV Push every 4 hours PRN        Vital Signs Last 24 Hrs  T(C): 37.4 (01 Aug 2021 05:15), Max: 39.2 (31 Jul 2021 18:28)  T(F): 99.4 (01 Aug 2021 05:15), Max: 102.5 (31 Jul 2021 18:28)  HR: 117 (01 Aug 2021 05:15) (117 - 125)  BP: 135/76 (01 Aug 2021 05:15) (113/70 - 135/76)  BP(mean): --  RR: 18 (01 Aug 2021 05:15) (18 - 21)  SpO2: 97% (01 Aug 2021 05:15) (95% - 97%)    PHYSICAL EXAM  General: NAD  HEENT: clear oropharynx, anicteric sclera, pink conjunctiva  Neck: supple  CV: (+) S1/S2 RRR  Lungs: positive air movement b/l ant lungs, clear to auscultation, no wheezes, no rales  Abdomen: soft, non-tender, non-distended  Ext: no clubbing cyanosis or edema  Skin: no rashes and no petechiae  Neuro: alert and oriented X 3, no focal deficits  Central Line:     RECENT CULTURES:  07-30 @ 12:09  Clean Catch Clean Catch (Midstream)  --  --  --    No growth  --  07-30 @ 12:05  .Blood Blood-Peripheral  --  --  --    No growth to date.  --        LABS:                        8.1    10.75 )-----------( 42       ( 31 Jul 2021 05:50 )             24.6         Mean Cell Volume : 106.5 fl  Mean Cell Hemoglobin : 35.1 pg  Mean Cell Hemoglobin Concentration : 32.9 gm/dL  Auto Neutrophil # : x  Auto Lymphocyte # : x  Auto Monocyte # : x  Auto Eosinophil # : x  Auto Basophil # : x  Auto Neutrophil % : x  Auto Lymphocyte % : x  Auto Monocyte % : x  Auto Eosinophil % : x  Auto Basophil % : x      07-31    135  |  98  |  10  ----------------------------<  109<H>  3.6   |  24  |  1.15    Ca    9.3      31 Jul 2021 19:41  Phos  3.9     07-31  Mg     2.2     07-31    TPro  7.4  /  Alb  3.9  /  TBili  0.8  /  DBili  x   /  AST  14  /  ALT  26  /  AlkPhos  66  07-31      Mg 2.2  Phos 3.9            Uric Acid 3.6        RADIOLOGY & ADDITIONAL STUDIES:         Diagnosis: rule out acute leukemia    Protocol/Chemo Regimen: TBD    Day: NA    Pt endorsed: Right side abd pain-stable.     Review of Systems: Denies N/V/D    Pain scale: 2/10    Diet: DASH    Allergies    No Known Allergies    Intolerances        ANTIMICROBIALS  piperacillin/tazobactam IVPB.. 3.375 Gram(s) IV Intermittent every 8 hours      HEME/ONC MEDICATIONS      STANDING MEDICATIONS  allopurinol 300 milliGRAM(s) Oral daily  amLODIPine   Tablet 5 milliGRAM(s) Oral daily  polyethylene glycol 3350 17 Gram(s) Oral daily  senna 2 Tablet(s) Oral at bedtime  sodium chloride 0.9%. 1000 milliLiter(s) IV Continuous <Continuous>      PRN MEDICATIONS  acetaminophen   Tablet .. 650 milliGRAM(s) Oral every 6 hours PRN  HYDROmorphone  Injectable 1 milliGRAM(s) IV Push every 4 hours PRN        Vital Signs Last 24 Hrs  T(C): 37.4 (01 Aug 2021 05:15), Max: 39.2 (31 Jul 2021 18:28)  T(F): 99.4 (01 Aug 2021 05:15), Max: 102.5 (31 Jul 2021 18:28)  HR: 117 (01 Aug 2021 05:15) (117 - 125)  BP: 135/76 (01 Aug 2021 05:15) (113/70 - 135/76)  BP(mean): --  RR: 18 (01 Aug 2021 05:15) (18 - 21)  SpO2: 97% (01 Aug 2021 05:15) (95% - 97%)    PHYSICAL EXAM  General: NAD  HEENT: clear oropharynx,   CV: (+) S1/S2 RRR  Lungs: decreased breath sounds but clear to auscultation, no wheezes, no rales  Abdomen: soft, non-tender, non-distended  Ext: no edema  Skin: no rashes and no petechiae  Neuro: alert and oriented X 3, no focal deficits  Central Line: PIV    LABS:                     7.4    11.45 )-----------( 37       ( 01 Aug 2021 07:13 )             22.4         Mean Cell Volume : 106.7 fl  Mean Cell Hemoglobin : 35.2 pg  Mean Cell Hemoglobin Concentration : 33.0 gm/dL  Auto Neutrophil # : 4.68 K/uL  Auto Lymphocyte # : 1.79 K/uL  Auto Monocyte # : 3.48 K/uL  Auto Eosinophil # : 0.00 K/uL  Auto Basophil # : 0.00 K/uL  Auto Neutrophil % : 40.9 %  Auto Lymphocyte % : 15.6 %  Auto Monocyte % : 30.4 %  Auto Eosinophil % : 0.0 %  Auto Basophil % : 0.0 %      08-01    134<L>  |  98  |  10  ----------------------------<  108<H>  4.0   |  25  |  1.12    Ca    9.2      01 Aug 2021 07:13  Phos  3.5     08-01  Mg     2.2     08-01    TPro  6.8  /  Alb  3.6  /  TBili  0.7  /  DBili  x   /  AST  12  /  ALT  24  /  AlkPhos  58  08-01      Mg 2.2  Phos 3.5  Mg 2.2  Phos 3.9            Uric Acid 3.5      Uric Acid 3.6

## 2021-08-02 LAB
ALBUMIN SERPL ELPH-MCNC: 4.4 G/DL — SIGNIFICANT CHANGE UP (ref 3.3–5)
ALP SERPL-CCNC: 75 U/L — SIGNIFICANT CHANGE UP (ref 40–120)
ALT FLD-CCNC: 46 U/L — HIGH (ref 10–45)
ANION GAP SERPL CALC-SCNC: 12 MMOL/L — SIGNIFICANT CHANGE UP (ref 5–17)
ANISOCYTOSIS BLD QL: SLIGHT — SIGNIFICANT CHANGE UP
APPEARANCE UR: CLEAR — SIGNIFICANT CHANGE UP
APTT BLD: 29.1 SEC — SIGNIFICANT CHANGE UP (ref 27.5–35.5)
AST SERPL-CCNC: 24 U/L — SIGNIFICANT CHANGE UP (ref 10–40)
BASOPHILS # BLD AUTO: 0 K/UL — SIGNIFICANT CHANGE UP (ref 0–0.2)
BASOPHILS NFR BLD AUTO: 0 % — SIGNIFICANT CHANGE UP (ref 0–2)
BILIRUB SERPL-MCNC: 0.5 MG/DL — SIGNIFICANT CHANGE UP (ref 0.2–1.2)
BILIRUB UR-MCNC: NEGATIVE — SIGNIFICANT CHANGE UP
BLASTS # FLD: 6.2 % — HIGH (ref 0–0)
BLD GP AB SCN SERPL QL: NEGATIVE — SIGNIFICANT CHANGE UP
BUN SERPL-MCNC: 12 MG/DL — SIGNIFICANT CHANGE UP (ref 7–23)
CALCIUM SERPL-MCNC: 9.8 MG/DL — SIGNIFICANT CHANGE UP (ref 8.4–10.5)
CHLORIDE SERPL-SCNC: 101 MMOL/L — SIGNIFICANT CHANGE UP (ref 96–108)
CO2 SERPL-SCNC: 25 MMOL/L — SIGNIFICANT CHANGE UP (ref 22–31)
COLOR SPEC: SIGNIFICANT CHANGE UP
CREAT SERPL-MCNC: 1.04 MG/DL — SIGNIFICANT CHANGE UP (ref 0.5–1.3)
DACRYOCYTES BLD QL SMEAR: SLIGHT — SIGNIFICANT CHANGE UP
DIFF PNL FLD: NEGATIVE — SIGNIFICANT CHANGE UP
EOSINOPHIL # BLD AUTO: 0 K/UL — SIGNIFICANT CHANGE UP (ref 0–0.5)
EOSINOPHIL NFR BLD AUTO: 0 % — SIGNIFICANT CHANGE UP (ref 0–6)
GLUCOSE SERPL-MCNC: 118 MG/DL — HIGH (ref 70–99)
GLUCOSE UR QL: NEGATIVE — SIGNIFICANT CHANGE UP
HCT VFR BLD CALC: 23.9 % — LOW (ref 39–50)
HGB BLD-MCNC: 8.2 G/DL — LOW (ref 13–17)
INR BLD: 1.17 RATIO — HIGH (ref 0.88–1.16)
KETONES UR-MCNC: NEGATIVE — SIGNIFICANT CHANGE UP
LDH SERPL L TO P-CCNC: 466 U/L — HIGH (ref 50–242)
LEUKOCYTE ESTERASE UR-ACNC: NEGATIVE — SIGNIFICANT CHANGE UP
LYMPHOCYTES # BLD AUTO: 2.43 K/UL — SIGNIFICANT CHANGE UP (ref 1–3.3)
LYMPHOCYTES # BLD AUTO: 23 % — SIGNIFICANT CHANGE UP (ref 13–44)
MACROCYTES BLD QL: SLIGHT — SIGNIFICANT CHANGE UP
MAGNESIUM SERPL-MCNC: 2.4 MG/DL — SIGNIFICANT CHANGE UP (ref 1.6–2.6)
MANUAL SMEAR VERIFICATION: SIGNIFICANT CHANGE UP
MCHC RBC-ENTMCNC: 34.3 GM/DL — SIGNIFICANT CHANGE UP (ref 32–36)
MCHC RBC-ENTMCNC: 35.7 PG — HIGH (ref 27–34)
MCV RBC AUTO: 103.9 FL — HIGH (ref 80–100)
MONOCYTES # BLD AUTO: 2.05 K/UL — HIGH (ref 0–0.9)
MONOCYTES NFR BLD AUTO: 19.4 % — HIGH (ref 2–14)
NEUTROPHILS # BLD AUTO: 5.33 K/UL — SIGNIFICANT CHANGE UP (ref 1.8–7.4)
NEUTROPHILS NFR BLD AUTO: 48.7 % — SIGNIFICANT CHANGE UP (ref 43–77)
NEUTS BAND # BLD: 1.8 % — SIGNIFICANT CHANGE UP (ref 0–8)
NITRITE UR-MCNC: NEGATIVE — SIGNIFICANT CHANGE UP
PH UR: 6.5 — SIGNIFICANT CHANGE UP (ref 5–8)
PHOSPHATE SERPL-MCNC: 3.5 MG/DL — SIGNIFICANT CHANGE UP (ref 2.5–4.5)
PLAT MORPH BLD: NORMAL — SIGNIFICANT CHANGE UP
PLATELET # BLD AUTO: 48 K/UL — LOW (ref 150–400)
POIKILOCYTOSIS BLD QL AUTO: SLIGHT — SIGNIFICANT CHANGE UP
POTASSIUM SERPL-MCNC: 3.7 MMOL/L — SIGNIFICANT CHANGE UP (ref 3.5–5.3)
POTASSIUM SERPL-SCNC: 3.7 MMOL/L — SIGNIFICANT CHANGE UP (ref 3.5–5.3)
PROMYELOCYTES # FLD: 0.9 % — HIGH (ref 0–0)
PROT SERPL-MCNC: 8.2 G/DL — SIGNIFICANT CHANGE UP (ref 6–8.3)
PROT UR-MCNC: NEGATIVE — SIGNIFICANT CHANGE UP
PROTHROM AB SERPL-ACNC: 13.9 SEC — HIGH (ref 10.6–13.6)
RBC # BLD: 2.3 M/UL — LOW (ref 4.2–5.8)
RBC # FLD: 14.4 % — SIGNIFICANT CHANGE UP (ref 10.3–14.5)
RBC BLD AUTO: ABNORMAL
RH IG SCN BLD-IMP: POSITIVE — SIGNIFICANT CHANGE UP
SODIUM SERPL-SCNC: 138 MMOL/L — SIGNIFICANT CHANGE UP (ref 135–145)
SP GR SPEC: 1.01 — SIGNIFICANT CHANGE UP (ref 1.01–1.02)
URATE SERPL-MCNC: 3.8 MG/DL — SIGNIFICANT CHANGE UP (ref 3.4–8.8)
UROBILINOGEN FLD QL: NEGATIVE — SIGNIFICANT CHANGE UP
WBC # BLD: 10.56 K/UL — HIGH (ref 3.8–10.5)
WBC # FLD AUTO: 10.56 K/UL — HIGH (ref 3.8–10.5)

## 2021-08-02 PROCEDURE — 93306 TTE W/DOPPLER COMPLETE: CPT | Mod: 26

## 2021-08-02 PROCEDURE — 93356 MYOCRD STRAIN IMG SPCKL TRCK: CPT

## 2021-08-02 PROCEDURE — 78472 GATED HEART PLANAR SINGLE: CPT | Mod: 26

## 2021-08-02 PROCEDURE — 99233 SBSQ HOSP IP/OBS HIGH 50: CPT | Mod: GC

## 2021-08-02 PROCEDURE — 71250 CT THORAX DX C-: CPT | Mod: 26

## 2021-08-02 RX ADMIN — Medication 5 MILLILITER(S): at 23:14

## 2021-08-02 RX ADMIN — Medication 5 MILLILITER(S): at 20:35

## 2021-08-02 RX ADMIN — PIPERACILLIN AND TAZOBACTAM 25 GRAM(S): 4; .5 INJECTION, POWDER, LYOPHILIZED, FOR SOLUTION INTRAVENOUS at 12:07

## 2021-08-02 RX ADMIN — HYDROMORPHONE HYDROCHLORIDE 1 MILLIGRAM(S): 2 INJECTION INTRAMUSCULAR; INTRAVENOUS; SUBCUTANEOUS at 19:24

## 2021-08-02 RX ADMIN — SENNA PLUS 2 TABLET(S): 8.6 TABLET ORAL at 21:37

## 2021-08-02 RX ADMIN — HYDROMORPHONE HYDROCHLORIDE 1 MILLIGRAM(S): 2 INJECTION INTRAMUSCULAR; INTRAVENOUS; SUBCUTANEOUS at 12:08

## 2021-08-02 RX ADMIN — HYDROMORPHONE HYDROCHLORIDE 1 MILLIGRAM(S): 2 INJECTION INTRAMUSCULAR; INTRAVENOUS; SUBCUTANEOUS at 02:47

## 2021-08-02 RX ADMIN — HYDROMORPHONE HYDROCHLORIDE 1 MILLIGRAM(S): 2 INJECTION INTRAMUSCULAR; INTRAVENOUS; SUBCUTANEOUS at 20:02

## 2021-08-02 RX ADMIN — POLYETHYLENE GLYCOL 3350 17 GRAM(S): 17 POWDER, FOR SOLUTION ORAL at 12:08

## 2021-08-02 RX ADMIN — Medication 5 MILLILITER(S): at 12:08

## 2021-08-02 RX ADMIN — SODIUM CHLORIDE 75 MILLILITER(S): 9 INJECTION INTRAMUSCULAR; INTRAVENOUS; SUBCUTANEOUS at 05:33

## 2021-08-02 RX ADMIN — Medication 300 MILLIGRAM(S): at 12:08

## 2021-08-02 RX ADMIN — PIPERACILLIN AND TAZOBACTAM 25 GRAM(S): 4; .5 INJECTION, POWDER, LYOPHILIZED, FOR SOLUTION INTRAVENOUS at 20:35

## 2021-08-02 RX ADMIN — HYDROMORPHONE HYDROCHLORIDE 1 MILLIGRAM(S): 2 INJECTION INTRAMUSCULAR; INTRAVENOUS; SUBCUTANEOUS at 03:45

## 2021-08-02 RX ADMIN — AMLODIPINE BESYLATE 5 MILLIGRAM(S): 2.5 TABLET ORAL at 05:34

## 2021-08-02 RX ADMIN — HYDROMORPHONE HYDROCHLORIDE 1 MILLIGRAM(S): 2 INJECTION INTRAMUSCULAR; INTRAVENOUS; SUBCUTANEOUS at 12:23

## 2021-08-02 RX ADMIN — PIPERACILLIN AND TAZOBACTAM 25 GRAM(S): 4; .5 INJECTION, POWDER, LYOPHILIZED, FOR SOLUTION INTRAVENOUS at 04:01

## 2021-08-02 NOTE — PROGRESS NOTE ADULT - ATTENDING COMMENTS
36yoM admitted with RUQ pain found to have bloodwork concerning for acute leukemia  -13% myeloblasts on PB  -Flt3 sent, pending  -? if can be enrolled in study  -pain control and further work up of RUQ pain, improved today from Friday  -check echo  -discussed with patient and family at the bedside briefly. Patient is very stoic, not asking many questions. Girlfriend (x/gf?) says he is often that way  -if AML confirmed, place line 35yo M admitted with RUQ pain found to have bloodwork concerning for acute leukemia  -13% myeloblasts on PB  -Flt3 sent, pending. ? if can be enrolled in Precog study  -pain control, improved today. Abd sono showing hepatomegaly and hepatic steatosis. Right pleural effusion. Will obtain CT chest   -MUGA EF 71%  -discussed with patient and family at the bedside. We also discussed sperm banking -pt is interested, will provide him with forms and information  -febrile, Tm101.1. f/u Cx's. On Zosyn  -if AML confirmed, place line  -supportive care  -transfuse as needed

## 2021-08-02 NOTE — PROGRESS NOTE ADULT - PROBLEM SELECTOR PLAN 3
No VTE ppx due to thrombocytopenia. No VTE ppx due to thrombocytopenia    Contact information: [phone]

## 2021-08-02 NOTE — PROGRESS NOTE ADULT - PROBLEM SELECTOR PLAN 2
Patient is febrile, not neutropenic.   Monitor for fever. Cultures if spikes.   Continue with Zosyn.   Cultures NGTD 7/29  Fu cultures from 7/31 Patient is febrile, not neutropenic   Continue with Zosyn   Cultures NGTD 7/29

## 2021-08-02 NOTE — PROGRESS NOTE ADULT - ASSESSMENT
36M with h/o PMH HTN, ?atty liver, kidney stones presents with rash, dizziness, fatigue and severe RUQ pain for 3 days. Now with anemia, thrombocytopenia and leukocytosis with 17% blasts, concerning for acute leukemia. Transferred to [address] [place] [address] for management. Patient is pancytopenia secondary to disease.

## 2021-08-02 NOTE — PROGRESS NOTE ADULT - PROBLEM SELECTOR PLAN 1
Patient with peripheral blasts  plan for BMbx 8/2  f/u peripheral flow cytometry (green top tubes for PML-PAULIE, FLT3)  No need for Hydrea at this time, continue with Allopurinol 300mg daily now, IV hydration, mouth care, pain control, antiemetics.   HIV (-), Hep (-)  FU Echo, FU MUGA  FU g6pd  Monitor TLS labs every 12 hours   - give rasburicase 3mg IV for uric acid > 8.0  Will need central iv access.   HLA sent 8/2 Patient with peripheral blasts  Peripheral flow cytometry c/w 13% myeloblasts   Plan for BMbx 8/3  Continue with Allopurinol 300mg daily now, IV hydration, mouth care, pain control, antiemetics   HIV (-), Hep (-)  F/U Echo, MUGA EF 56%   F/U g6pd  Monitor TLS labs every 12 hours   Will need central iv access   HLA sent 8/2 Patient with peripheral blasts  Peripheral flow cytometry c/w 13% myeloblasts   Plan for BMbx 8/3  Continue with Allopurinol 300mg daily now, IV hydration, mouth care, pain control, antiemetics   HIV (-), Hep (-)  F/U Echo, MUGA EF 56%   F/U g6pd  Discussed sperm banking. Declined   Monitor TLS labs every 12 hours   Will need central iv access   HLA sent 8/2

## 2021-08-03 LAB
ALBUMIN SERPL ELPH-MCNC: 4.1 G/DL — SIGNIFICANT CHANGE UP (ref 3.3–5)
ALP SERPL-CCNC: 67 U/L — SIGNIFICANT CHANGE UP (ref 40–120)
ALT FLD-CCNC: 48 U/L — HIGH (ref 10–45)
ANION GAP SERPL CALC-SCNC: 12 MMOL/L — SIGNIFICANT CHANGE UP (ref 5–17)
ANISOCYTOSIS BLD QL: SLIGHT — SIGNIFICANT CHANGE UP
APPEARANCE UR: CLEAR — SIGNIFICANT CHANGE UP
AST SERPL-CCNC: 21 U/L — SIGNIFICANT CHANGE UP (ref 10–40)
BASOPHILS # BLD AUTO: 0 K/UL — SIGNIFICANT CHANGE UP (ref 0–0.2)
BASOPHILS NFR BLD AUTO: 0 % — SIGNIFICANT CHANGE UP (ref 0–2)
BILIRUB SERPL-MCNC: 0.4 MG/DL — SIGNIFICANT CHANGE UP (ref 0.2–1.2)
BILIRUB UR-MCNC: NEGATIVE — SIGNIFICANT CHANGE UP
BLASTS # FLD: 9.6 % — HIGH (ref 0–0)
BUN SERPL-MCNC: 13 MG/DL — SIGNIFICANT CHANGE UP (ref 7–23)
CALCIUM SERPL-MCNC: 9.6 MG/DL — SIGNIFICANT CHANGE UP (ref 8.4–10.5)
CHLORIDE SERPL-SCNC: 99 MMOL/L — SIGNIFICANT CHANGE UP (ref 96–108)
CO2 SERPL-SCNC: 26 MMOL/L — SIGNIFICANT CHANGE UP (ref 22–31)
COLOR SPEC: SIGNIFICANT CHANGE UP
CREAT SERPL-MCNC: 1.23 MG/DL — SIGNIFICANT CHANGE UP (ref 0.5–1.3)
CULTURE RESULTS: NO GROWTH — SIGNIFICANT CHANGE UP
DACRYOCYTES BLD QL SMEAR: SLIGHT — SIGNIFICANT CHANGE UP
DIFF PNL FLD: NEGATIVE — SIGNIFICANT CHANGE UP
ELLIPTOCYTES BLD QL SMEAR: SLIGHT — SIGNIFICANT CHANGE UP
EOSINOPHIL # BLD AUTO: 0 K/UL — SIGNIFICANT CHANGE UP (ref 0–0.5)
EOSINOPHIL NFR BLD AUTO: 0 % — SIGNIFICANT CHANGE UP (ref 0–6)
G6PD RBC-CCNC: 11 U/G HGB — SIGNIFICANT CHANGE UP (ref 7–20.5)
GIANT PLATELETS BLD QL SMEAR: PRESENT — SIGNIFICANT CHANGE UP
GLUCOSE SERPL-MCNC: 104 MG/DL — HIGH (ref 70–99)
GLUCOSE UR QL: NEGATIVE — SIGNIFICANT CHANGE UP
HCT VFR BLD CALC: 23.5 % — LOW (ref 39–50)
HGB BLD-MCNC: 7.8 G/DL — LOW (ref 13–17)
KETONES UR-MCNC: NEGATIVE — SIGNIFICANT CHANGE UP
LDH SERPL L TO P-CCNC: 390 U/L — HIGH (ref 50–242)
LEUKOCYTE ESTERASE UR-ACNC: NEGATIVE — SIGNIFICANT CHANGE UP
LYMPHOCYTES # BLD AUTO: 1.82 K/UL — SIGNIFICANT CHANGE UP (ref 1–3.3)
LYMPHOCYTES # BLD AUTO: 21.9 % — SIGNIFICANT CHANGE UP (ref 13–44)
MACROCYTES BLD QL: SLIGHT — SIGNIFICANT CHANGE UP
MAGNESIUM SERPL-MCNC: 2.4 MG/DL — SIGNIFICANT CHANGE UP (ref 1.6–2.6)
MANUAL SMEAR VERIFICATION: SIGNIFICANT CHANGE UP
MCHC RBC-ENTMCNC: 33.2 GM/DL — SIGNIFICANT CHANGE UP (ref 32–36)
MCHC RBC-ENTMCNC: 35.1 PG — HIGH (ref 27–34)
MCV RBC AUTO: 105.9 FL — HIGH (ref 80–100)
MONOCYTES # BLD AUTO: 1.75 K/UL — HIGH (ref 0–0.9)
MONOCYTES NFR BLD AUTO: 21.1 % — HIGH (ref 2–14)
MYELOCYTES NFR BLD: 5.3 % — HIGH (ref 0–0)
NEUTROPHILS # BLD AUTO: 3.42 K/UL — SIGNIFICANT CHANGE UP (ref 1.8–7.4)
NEUTROPHILS NFR BLD AUTO: 40.3 % — LOW (ref 43–77)
NEUTS BAND # BLD: 0.9 % — SIGNIFICANT CHANGE UP (ref 0–8)
NITRITE UR-MCNC: NEGATIVE — SIGNIFICANT CHANGE UP
PH UR: 6 — SIGNIFICANT CHANGE UP (ref 5–8)
PHOSPHATE SERPL-MCNC: 4.5 MG/DL — SIGNIFICANT CHANGE UP (ref 2.5–4.5)
PLAT MORPH BLD: ABNORMAL
PLATELET # BLD AUTO: 41 K/UL — LOW (ref 150–400)
POIKILOCYTOSIS BLD QL AUTO: SLIGHT — SIGNIFICANT CHANGE UP
POTASSIUM SERPL-MCNC: 3.9 MMOL/L — SIGNIFICANT CHANGE UP (ref 3.5–5.3)
POTASSIUM SERPL-SCNC: 3.9 MMOL/L — SIGNIFICANT CHANGE UP (ref 3.5–5.3)
PROMYELOCYTES # FLD: 0.9 % — HIGH (ref 0–0)
PROT SERPL-MCNC: 7.5 G/DL — SIGNIFICANT CHANGE UP (ref 6–8.3)
PROT UR-MCNC: SIGNIFICANT CHANGE UP
RBC # BLD: 2.22 M/UL — LOW (ref 4.2–5.8)
RBC # FLD: 14.4 % — SIGNIFICANT CHANGE UP (ref 10.3–14.5)
RBC BLD AUTO: ABNORMAL
SODIUM SERPL-SCNC: 137 MMOL/L — SIGNIFICANT CHANGE UP (ref 135–145)
SP GR SPEC: 1.02 — SIGNIFICANT CHANGE UP (ref 1.01–1.02)
SPECIMEN SOURCE: SIGNIFICANT CHANGE UP
URATE SERPL-MCNC: 3.6 MG/DL — SIGNIFICANT CHANGE UP (ref 3.4–8.8)
UROBILINOGEN FLD QL: NEGATIVE — SIGNIFICANT CHANGE UP
WBC # BLD: 8.29 K/UL — SIGNIFICANT CHANGE UP (ref 3.8–10.5)
WBC # FLD AUTO: 8.29 K/UL — SIGNIFICANT CHANGE UP (ref 3.8–10.5)

## 2021-08-03 PROCEDURE — 99233 SBSQ HOSP IP/OBS HIGH 50: CPT | Mod: GC

## 2021-08-03 RX ORDER — HYDROMORPHONE HYDROCHLORIDE 2 MG/ML
0 INJECTION INTRAMUSCULAR; INTRAVENOUS; SUBCUTANEOUS
Qty: 0 | Refills: 0 | DISCHARGE
Start: 2021-08-03

## 2021-08-03 RX ORDER — PIPERACILLIN AND TAZOBACTAM 4; .5 G/20ML; G/20ML
0 INJECTION, POWDER, LYOPHILIZED, FOR SOLUTION INTRAVENOUS
Qty: 0 | Refills: 0 | DISCHARGE
Start: 2021-08-03

## 2021-08-03 RX ORDER — SODIUM CHLORIDE 9 MG/ML
0 INJECTION INTRAMUSCULAR; INTRAVENOUS; SUBCUTANEOUS
Qty: 0 | Refills: 0 | DISCHARGE
Start: 2021-08-03

## 2021-08-03 RX ORDER — SENNA PLUS 8.6 MG/1
2 TABLET ORAL
Qty: 0 | Refills: 0 | DISCHARGE
Start: 2021-08-03

## 2021-08-03 RX ORDER — POLYETHYLENE GLYCOL 3350 17 G/17G
17 POWDER, FOR SOLUTION ORAL
Qty: 0 | Refills: 0 | DISCHARGE
Start: 2021-08-03

## 2021-08-03 RX ORDER — DIPHENHYDRAMINE HYDROCHLORIDE AND LIDOCAINE HYDROCHLORIDE AND ALUMINUM HYDROXIDE AND MAGNESIUM HYDRO
10 KIT ONCE
Refills: 0 | Status: COMPLETED | OUTPATIENT
Start: 2021-08-03 | End: 2021-08-03

## 2021-08-03 RX ORDER — ALLOPURINOL 300 MG
1 TABLET ORAL
Qty: 0 | Refills: 0 | DISCHARGE
Start: 2021-08-03

## 2021-08-03 RX ORDER — SALIVA SUBSTITUTE COMB NO.11 351 MG
0 POWDER IN PACKET (EA) MUCOUS MEMBRANE
Qty: 0 | Refills: 0 | DISCHARGE
Start: 2021-08-03

## 2021-08-03 RX ORDER — ACETAMINOPHEN 500 MG
2 TABLET ORAL
Qty: 0 | Refills: 0 | DISCHARGE
Start: 2021-08-03

## 2021-08-03 RX ORDER — SODIUM CHLORIDE 0.65 %
0 AEROSOL, SPRAY (ML) NASAL
Qty: 0 | Refills: 0 | DISCHARGE
Start: 2021-08-03

## 2021-08-03 RX ADMIN — HYDROMORPHONE HYDROCHLORIDE 1 MILLIGRAM(S): 2 INJECTION INTRAMUSCULAR; INTRAVENOUS; SUBCUTANEOUS at 00:03

## 2021-08-03 RX ADMIN — SENNA PLUS 2 TABLET(S): 8.6 TABLET ORAL at 21:36

## 2021-08-03 RX ADMIN — PIPERACILLIN AND TAZOBACTAM 25 GRAM(S): 4; .5 INJECTION, POWDER, LYOPHILIZED, FOR SOLUTION INTRAVENOUS at 20:19

## 2021-08-03 RX ADMIN — HYDROMORPHONE HYDROCHLORIDE 1 MILLIGRAM(S): 2 INJECTION INTRAMUSCULAR; INTRAVENOUS; SUBCUTANEOUS at 10:36

## 2021-08-03 RX ADMIN — HYDROMORPHONE HYDROCHLORIDE 1 MILLIGRAM(S): 2 INJECTION INTRAMUSCULAR; INTRAVENOUS; SUBCUTANEOUS at 21:00

## 2021-08-03 RX ADMIN — Medication 5 MILLILITER(S): at 23:25

## 2021-08-03 RX ADMIN — Medication 5 MILLILITER(S): at 17:54

## 2021-08-03 RX ADMIN — HYDROMORPHONE HYDROCHLORIDE 1 MILLIGRAM(S): 2 INJECTION INTRAMUSCULAR; INTRAVENOUS; SUBCUTANEOUS at 05:55

## 2021-08-03 RX ADMIN — HYDROMORPHONE HYDROCHLORIDE 1 MILLIGRAM(S): 2 INJECTION INTRAMUSCULAR; INTRAVENOUS; SUBCUTANEOUS at 20:32

## 2021-08-03 RX ADMIN — POLYETHYLENE GLYCOL 3350 17 GRAM(S): 17 POWDER, FOR SOLUTION ORAL at 12:31

## 2021-08-03 RX ADMIN — SODIUM CHLORIDE 75 MILLILITER(S): 9 INJECTION INTRAMUSCULAR; INTRAVENOUS; SUBCUTANEOUS at 17:55

## 2021-08-03 RX ADMIN — Medication 5 MILLILITER(S): at 09:04

## 2021-08-03 RX ADMIN — Medication 650 MILLIGRAM(S): at 13:20

## 2021-08-03 RX ADMIN — HYDROMORPHONE HYDROCHLORIDE 1 MILLIGRAM(S): 2 INJECTION INTRAMUSCULAR; INTRAVENOUS; SUBCUTANEOUS at 10:51

## 2021-08-03 RX ADMIN — HYDROMORPHONE HYDROCHLORIDE 1 MILLIGRAM(S): 2 INJECTION INTRAMUSCULAR; INTRAVENOUS; SUBCUTANEOUS at 00:35

## 2021-08-03 RX ADMIN — Medication 5 MILLILITER(S): at 20:19

## 2021-08-03 RX ADMIN — Medication 5 MILLILITER(S): at 12:40

## 2021-08-03 RX ADMIN — PIPERACILLIN AND TAZOBACTAM 25 GRAM(S): 4; .5 INJECTION, POWDER, LYOPHILIZED, FOR SOLUTION INTRAVENOUS at 12:31

## 2021-08-03 RX ADMIN — HYDROMORPHONE HYDROCHLORIDE 1 MILLIGRAM(S): 2 INJECTION INTRAMUSCULAR; INTRAVENOUS; SUBCUTANEOUS at 16:05

## 2021-08-03 RX ADMIN — SODIUM CHLORIDE 75 MILLILITER(S): 9 INJECTION INTRAMUSCULAR; INTRAVENOUS; SUBCUTANEOUS at 05:16

## 2021-08-03 RX ADMIN — DIPHENHYDRAMINE HYDROCHLORIDE AND LIDOCAINE HYDROCHLORIDE AND ALUMINUM HYDROXIDE AND MAGNESIUM HYDRO 10 MILLILITER(S): KIT at 06:46

## 2021-08-03 RX ADMIN — AMLODIPINE BESYLATE 5 MILLIGRAM(S): 2.5 TABLET ORAL at 05:17

## 2021-08-03 RX ADMIN — HYDROMORPHONE HYDROCHLORIDE 1 MILLIGRAM(S): 2 INJECTION INTRAMUSCULAR; INTRAVENOUS; SUBCUTANEOUS at 15:50

## 2021-08-03 RX ADMIN — PIPERACILLIN AND TAZOBACTAM 25 GRAM(S): 4; .5 INJECTION, POWDER, LYOPHILIZED, FOR SOLUTION INTRAVENOUS at 04:35

## 2021-08-03 RX ADMIN — HYDROMORPHONE HYDROCHLORIDE 1 MILLIGRAM(S): 2 INJECTION INTRAMUSCULAR; INTRAVENOUS; SUBCUTANEOUS at 06:27

## 2021-08-03 RX ADMIN — Medication 650 MILLIGRAM(S): at 15:25

## 2021-08-03 RX ADMIN — Medication 300 MILLIGRAM(S): at 12:31

## 2021-08-03 NOTE — PROGRESS NOTE ADULT - ASSESSMENT
36M with h/o PMH HTN, ?atty liver, kidney stones presents with rash, dizziness, fatigue and severe RUQ pain for 3 days. Now with anemia, thrombocytopenia and leukocytosis with 17% blasts, concerning for acute leukemia. Transferred to [address] [place] [address] for management. Patient is pancytopenia secondary to disease.

## 2021-08-03 NOTE — PROGRESS NOTE ADULT - ATTENDING COMMENTS
35yo M admitted with RUQ pain found to have bloodwork concerning for acute leukemia  -13% myeloblasts on PB  -Flt3 sent, pending. ? if can be enrolled in Precog study  -pain control, improved today. Abd sono showing hepatomegaly and hepatic steatosis. Right pleural effusion. Will obtain CT chest   -MUGA EF 71%  -discussed with patient and family at the bedside. We also discussed sperm banking -pt is interested, will provide him with forms and information  -febrile, Tm101.1. f/u Cx's. On Zosyn  -if AML confirmed, place line  -supportive care  -transfuse as needed 35yo M admitted with RUQ pain found to have bloodwork concerning for acute leukemia  -13% myeloblasts on PB  -Flt3 sent, pending. ? if can be enrolled in Precog study. We discussed this briefly and pt is interested if positive. Will plan for BMbx after resulted  -pain control, improved today. Abd sono showing hepatomegaly and hepatic steatosis. Right pleural effusion. Will obtain CT chest   -MUGA EF 71%  -discussed with patient and family at the bedside. We also discussed sperm banking -pt declined  -febrile, Tm101.1. f/u Cx's. On Zosyn  -if AML confirmed, place line  -supportive care  -transfuse as needed  -pt and family considering transfer to hospital in [place]?

## 2021-08-03 NOTE — PROGRESS NOTE ADULT - PROBLEM SELECTOR PLAN 1
Patient with peripheral blasts  Peripheral flow cytometry c/w 13% myeloblasts   Plan for BMbx 8/3  Continue with Allopurinol 300mg daily now, IV hydration, mouth care, pain control, antiemetics   HIV (-), Hep (-)  F/U Echo, MUGA EF 56%   F/U g6pd  Discussed sperm banking. Declined   Monitor TLS labs every 12 hours   Will need central iv access   HLA sent 8/2 Patient with peripheral blasts  Peripheral flow cytometry c/w 13% myeloblasts   Plan for BMbx once FLT3 resulted - may be eligible for trial if FLT3+   Continue with Allopurinol 300mg daily now, IV hydration, mouth care, pain control, antiemetics   HIV (-), Hep (-)  F/U Echo, MUGA EF 56%   F/U g6pd  Discussed sperm banking. Declined   Will need central iv access if AML   HLA sent 8/2  Patient considering transferring to another hospital for further care

## 2021-08-03 NOTE — PROGRESS NOTE ADULT - SUBJECTIVE AND OBJECTIVE BOX
Diagnosis: rule out acute leukemia    Protocol/Chemo Regimen: TBD    Day: N/A    Pt endorsed: Right side abd pain-stable    Review of Systems: Denies nausea, vomiting, diarrhea, chest pain, SOB     Pain scale: 2/10    Diet: DASH    Allergies: No Known Allergies    --------------------             Diagnosis: rule out acute leukemia    Protocol/Chemo Regimen: TBD    Day: N/A    Pt endorsed: Right side abd pain-improved today     Review of Systems: Denies nausea, vomiting, diarrhea, chest pain, SOB     Pain scale: 2/10    Diet: DASH    Allergies: No Known Allergies    ANTIMICROBIALS  piperacillin/tazobactam IVPB.. 3.375 Gram(s) IV Intermittent every 8 hours    STANDING MEDICATIONS  allopurinol 300 milliGRAM(s) Oral daily  amLODIPine   Tablet 5 milliGRAM(s) Oral daily  Biotene Dry Mouth Oral Rinse 5 milliLiter(s) Swish and Spit five times a day  polyethylene glycol 3350 17 Gram(s) Oral daily  senna 2 Tablet(s) Oral at bedtime  sodium chloride 0.9%. 1000 milliLiter(s) IV Continuous <Continuous>    PRN MEDICATIONS  acetaminophen   Tablet .. 650 milliGRAM(s) Oral every 6 hours PRN  HYDROmorphone  Injectable 1 milliGRAM(s) IV Push every 4 hours PRN  sodium chloride 0.65% Nasal 1 Spray(s) Both Nostrils three times a day PRN    Vital Signs Last 24 Hrs  T(C): 37.1 (03 Aug 2021 09:35), Max: 37.7 (02 Aug 2021 21:25)  T(F): 98.8 (03 Aug 2021 09:35), Max: 99.9 (02 Aug 2021 21:25)  HR: 121 (03 Aug 2021 09:35) (98 - 121)  BP: 108/66 (03 Aug 2021 09:35) (108/66 - 147/84)  BP(mean): --  RR: 20 (03 Aug 2021 09:35) (18 - 20)  SpO2: 98% (03 Aug 2021 09:35) (96% - 98%)    PHYSICAL EXAM  General: adult in NAD  HEENT: clear oropharynx, no erythema, no ulcers  Neck: supple  CV: normal S1, S2, RRR  Lungs: clear to auscultation, no wheezes, no rales  Abdomen: soft, nontender, nondistended, normal BS  Ext: no edema  Skin: no rash  Neuro: alert and oriented x 3  Central line: normal     LABS:                        7.8    8.29  )-----------( 41       ( 03 Aug 2021 09:09 )             23.5     Mean Cell Volume : 105.9 fl  Mean Cell Hemoglobin : 35.1 pg  Mean Cell Hemoglobin Concentration : 33.2 gm/dL  Auto Neutrophil # : 3.42 K/uL  Auto Lymphocyte # : 1.82 K/uL  Auto Monocyte # : 1.75 K/uL  Auto Eosinophil # : 0.00 K/uL  Auto Basophil # : 0.00 K/uL  Auto Neutrophil % : 40.3 %  Auto Lymphocyte % : 21.9 %  Auto Monocyte % : 21.1 %  Auto Eosinophil % : 0.0 %  Auto Basophil % : 0.0 %    08-03    137  |  99  |  13  ----------------------------<  104<H>  3.9   |  26  |  1.23    Ca    9.6      03 Aug 2021 09:09  Phos  4.5     08-03  Mg     2.4     08-03    TPro  7.5  /  Alb  4.1  /  TBili  0.4  /  DBili  x   /  AST  21  /  ALT  48<H>  /  AlkPhos  67  08-03    Mg 2.4  Phos 4.5  Mg 2.4  Phos 3.5    PT/INR - ( 02 Aug 2021 12:18 )   PT: 13.9 sec;   INR: 1.17 ratio      PTT - ( 02 Aug 2021 12:18 )  PTT:29.1 sec    Uric Acid 3.6    Uric Acid 3.8    RADIOLOGY & ADDITIONAL STUDIES:  < from: CT Chest No Cont (08.02.21 @ 14:21) >  IMPRESSION: Small right pleural effusion with interval increase since July 30, 2021.  Scattered bilateral areas of linear, subsegmental or compressive atelectasis.

## 2021-08-04 ENCOUNTER — RESULT REVIEW (OUTPATIENT)
Age: 36
End: 2021-08-04

## 2021-08-04 LAB
ALBUMIN SERPL ELPH-MCNC: 4.4 G/DL — SIGNIFICANT CHANGE UP (ref 3.3–5)
ALP SERPL-CCNC: 74 U/L — SIGNIFICANT CHANGE UP (ref 40–120)
ALT FLD-CCNC: 59 U/L — HIGH (ref 10–45)
ANION GAP SERPL CALC-SCNC: 15 MMOL/L — SIGNIFICANT CHANGE UP (ref 5–17)
ANISOCYTOSIS BLD QL: SLIGHT — SIGNIFICANT CHANGE UP
AST SERPL-CCNC: 27 U/L — SIGNIFICANT CHANGE UP (ref 10–40)
BASOPHILS # BLD AUTO: 0 K/UL — SIGNIFICANT CHANGE UP (ref 0–0.2)
BASOPHILS NFR BLD AUTO: 0 % — SIGNIFICANT CHANGE UP (ref 0–2)
BILIRUB SERPL-MCNC: 0.5 MG/DL — SIGNIFICANT CHANGE UP (ref 0.2–1.2)
BLASTS # FLD: 13.1 % — HIGH (ref 0–0)
BLD GP AB SCN SERPL QL: NEGATIVE — SIGNIFICANT CHANGE UP
BUN SERPL-MCNC: 14 MG/DL — SIGNIFICANT CHANGE UP (ref 7–23)
CALCIUM SERPL-MCNC: 10.2 MG/DL — SIGNIFICANT CHANGE UP (ref 8.4–10.5)
CHLORIDE SERPL-SCNC: 99 MMOL/L — SIGNIFICANT CHANGE UP (ref 96–108)
CO2 SERPL-SCNC: 23 MMOL/L — SIGNIFICANT CHANGE UP (ref 22–31)
CREAT SERPL-MCNC: 1.2 MG/DL — SIGNIFICANT CHANGE UP (ref 0.5–1.3)
CULTURE RESULTS: NO GROWTH — SIGNIFICANT CHANGE UP
CULTURE RESULTS: SIGNIFICANT CHANGE UP
CULTURE RESULTS: SIGNIFICANT CHANGE UP
DACRYOCYTES BLD QL SMEAR: SLIGHT — SIGNIFICANT CHANGE UP
ELLIPTOCYTES BLD QL SMEAR: SLIGHT — SIGNIFICANT CHANGE UP
EOSINOPHIL # BLD AUTO: 0 K/UL — SIGNIFICANT CHANGE UP (ref 0–0.5)
EOSINOPHIL NFR BLD AUTO: 0 % — SIGNIFICANT CHANGE UP (ref 0–6)
GLUCOSE SERPL-MCNC: 104 MG/DL — HIGH (ref 70–99)
HCT VFR BLD CALC: 25.4 % — LOW (ref 39–50)
HGB BLD-MCNC: 8.5 G/DL — LOW (ref 13–17)
LDH SERPL L TO P-CCNC: 444 U/L — HIGH (ref 50–242)
LYMPHOCYTES # BLD AUTO: 1.51 K/UL — SIGNIFICANT CHANGE UP (ref 1–3.3)
LYMPHOCYTES # BLD AUTO: 17.4 % — SIGNIFICANT CHANGE UP (ref 13–44)
MACROCYTES BLD QL: SLIGHT — SIGNIFICANT CHANGE UP
MAGNESIUM SERPL-MCNC: 2.6 MG/DL — SIGNIFICANT CHANGE UP (ref 1.6–2.6)
MANUAL SMEAR VERIFICATION: SIGNIFICANT CHANGE UP
MCHC RBC-ENTMCNC: 33.5 GM/DL — SIGNIFICANT CHANGE UP (ref 32–36)
MCHC RBC-ENTMCNC: 35.4 PG — HIGH (ref 27–34)
MCV RBC AUTO: 105.8 FL — HIGH (ref 80–100)
MICROCYTES BLD QL: SLIGHT — SIGNIFICANT CHANGE UP
MONOCYTES # BLD AUTO: 1.96 K/UL — HIGH (ref 0–0.9)
MONOCYTES NFR BLD AUTO: 22.6 % — HIGH (ref 2–14)
MYELOCYTES NFR BLD: 1.7 % — HIGH (ref 0–0)
NEUTROPHILS # BLD AUTO: 3.78 K/UL — SIGNIFICANT CHANGE UP (ref 1.8–7.4)
NEUTROPHILS NFR BLD AUTO: 43.5 % — SIGNIFICANT CHANGE UP (ref 43–77)
PHOSPHATE SERPL-MCNC: 4.4 MG/DL — SIGNIFICANT CHANGE UP (ref 2.5–4.5)
PLAT MORPH BLD: NORMAL — SIGNIFICANT CHANGE UP
PLATELET # BLD AUTO: 47 K/UL — LOW (ref 150–400)
POIKILOCYTOSIS BLD QL AUTO: SLIGHT — SIGNIFICANT CHANGE UP
POLYCHROMASIA BLD QL SMEAR: SLIGHT — SIGNIFICANT CHANGE UP
POTASSIUM SERPL-MCNC: 3.8 MMOL/L — SIGNIFICANT CHANGE UP (ref 3.5–5.3)
POTASSIUM SERPL-SCNC: 3.8 MMOL/L — SIGNIFICANT CHANGE UP (ref 3.5–5.3)
PROMYELOCYTES # FLD: 1.7 % — HIGH (ref 0–0)
PROT SERPL-MCNC: 8.3 G/DL — SIGNIFICANT CHANGE UP (ref 6–8.3)
RBC # BLD: 2.4 M/UL — LOW (ref 4.2–5.8)
RBC # FLD: 14.6 % — HIGH (ref 10.3–14.5)
RBC BLD AUTO: ABNORMAL
RH IG SCN BLD-IMP: POSITIVE — SIGNIFICANT CHANGE UP
SODIUM SERPL-SCNC: 137 MMOL/L — SIGNIFICANT CHANGE UP (ref 135–145)
SPECIMEN SOURCE: SIGNIFICANT CHANGE UP
URATE SERPL-MCNC: 3.7 MG/DL — SIGNIFICANT CHANGE UP (ref 3.4–8.8)
WBC # BLD: 8.68 K/UL — SIGNIFICANT CHANGE UP (ref 3.8–10.5)
WBC # FLD AUTO: 8.68 K/UL — SIGNIFICANT CHANGE UP (ref 3.8–10.5)

## 2021-08-04 PROCEDURE — 99233 SBSQ HOSP IP/OBS HIGH 50: CPT | Mod: GC

## 2021-08-04 PROCEDURE — 38222 DX BONE MARROW BX & ASPIR: CPT | Mod: AS

## 2021-08-04 PROCEDURE — 88313 SPECIAL STAINS GROUP 2: CPT | Mod: 26

## 2021-08-04 PROCEDURE — 88305 TISSUE EXAM BY PATHOLOGIST: CPT | Mod: 26

## 2021-08-04 PROCEDURE — 99232 SBSQ HOSP IP/OBS MODERATE 35: CPT

## 2021-08-04 PROCEDURE — 88189 FLOWCYTOMETRY/READ 16 & >: CPT

## 2021-08-04 PROCEDURE — 88319 ENZYME HISTOCHEMISTRY: CPT | Mod: 26

## 2021-08-04 PROCEDURE — 85097 BONE MARROW INTERPRETATION: CPT

## 2021-08-04 RX ORDER — DIPHENHYDRAMINE HYDROCHLORIDE AND LIDOCAINE HYDROCHLORIDE AND ALUMINUM HYDROXIDE AND MAGNESIUM HYDRO
5 KIT THREE TIMES A DAY
Refills: 0 | Status: DISCONTINUED | OUTPATIENT
Start: 2021-08-04 | End: 2021-08-29

## 2021-08-04 RX ADMIN — Medication 650 MILLIGRAM(S): at 21:28

## 2021-08-04 RX ADMIN — Medication 650 MILLIGRAM(S): at 22:59

## 2021-08-04 RX ADMIN — HYDROMORPHONE HYDROCHLORIDE 1 MILLIGRAM(S): 2 INJECTION INTRAMUSCULAR; INTRAVENOUS; SUBCUTANEOUS at 22:51

## 2021-08-04 RX ADMIN — HYDROMORPHONE HYDROCHLORIDE 1 MILLIGRAM(S): 2 INJECTION INTRAMUSCULAR; INTRAVENOUS; SUBCUTANEOUS at 06:48

## 2021-08-04 RX ADMIN — Medication 650 MILLIGRAM(S): at 09:38

## 2021-08-04 RX ADMIN — HYDROMORPHONE HYDROCHLORIDE 1 MILLIGRAM(S): 2 INJECTION INTRAMUSCULAR; INTRAVENOUS; SUBCUTANEOUS at 15:55

## 2021-08-04 RX ADMIN — Medication 5 MILLILITER(S): at 12:23

## 2021-08-04 RX ADMIN — Medication 5 MILLILITER(S): at 21:20

## 2021-08-04 RX ADMIN — HYDROMORPHONE HYDROCHLORIDE 1 MILLIGRAM(S): 2 INJECTION INTRAMUSCULAR; INTRAVENOUS; SUBCUTANEOUS at 06:31

## 2021-08-04 RX ADMIN — AMLODIPINE BESYLATE 5 MILLIGRAM(S): 2.5 TABLET ORAL at 05:53

## 2021-08-04 RX ADMIN — Medication 300 MILLIGRAM(S): at 12:22

## 2021-08-04 RX ADMIN — PIPERACILLIN AND TAZOBACTAM 25 GRAM(S): 4; .5 INJECTION, POWDER, LYOPHILIZED, FOR SOLUTION INTRAVENOUS at 21:21

## 2021-08-04 RX ADMIN — HYDROMORPHONE HYDROCHLORIDE 1 MILLIGRAM(S): 2 INJECTION INTRAMUSCULAR; INTRAVENOUS; SUBCUTANEOUS at 23:25

## 2021-08-04 RX ADMIN — Medication 5 MILLILITER(S): at 15:56

## 2021-08-04 RX ADMIN — PIPERACILLIN AND TAZOBACTAM 25 GRAM(S): 4; .5 INJECTION, POWDER, LYOPHILIZED, FOR SOLUTION INTRAVENOUS at 12:22

## 2021-08-04 RX ADMIN — Medication 5 MILLILITER(S): at 23:47

## 2021-08-04 RX ADMIN — PIPERACILLIN AND TAZOBACTAM 25 GRAM(S): 4; .5 INJECTION, POWDER, LYOPHILIZED, FOR SOLUTION INTRAVENOUS at 03:52

## 2021-08-04 RX ADMIN — Medication 5 MILLILITER(S): at 08:39

## 2021-08-04 RX ADMIN — SODIUM CHLORIDE 75 MILLILITER(S): 9 INJECTION INTRAMUSCULAR; INTRAVENOUS; SUBCUTANEOUS at 05:53

## 2021-08-04 RX ADMIN — Medication 650 MILLIGRAM(S): at 10:10

## 2021-08-04 RX ADMIN — HYDROMORPHONE HYDROCHLORIDE 1 MILLIGRAM(S): 2 INJECTION INTRAMUSCULAR; INTRAVENOUS; SUBCUTANEOUS at 00:50

## 2021-08-04 RX ADMIN — HYDROMORPHONE HYDROCHLORIDE 1 MILLIGRAM(S): 2 INJECTION INTRAMUSCULAR; INTRAVENOUS; SUBCUTANEOUS at 01:20

## 2021-08-04 RX ADMIN — HYDROMORPHONE HYDROCHLORIDE 1 MILLIGRAM(S): 2 INJECTION INTRAMUSCULAR; INTRAVENOUS; SUBCUTANEOUS at 16:10

## 2021-08-04 RX ADMIN — POLYETHYLENE GLYCOL 3350 17 GRAM(S): 17 POWDER, FOR SOLUTION ORAL at 12:22

## 2021-08-04 RX ADMIN — DIPHENHYDRAMINE HYDROCHLORIDE AND LIDOCAINE HYDROCHLORIDE AND ALUMINUM HYDROXIDE AND MAGNESIUM HYDRO 5 MILLILITER(S): KIT at 21:21

## 2021-08-04 NOTE — PROGRESS NOTE ADULT - ASSESSMENT
36M with h/o PMH HTN, fatty liver, kidney stones presents with rash, dizziness, fatigue and severe RUQ pain for 3 days. Now with anemia, thrombocytopenia and leukocytosis with 17% blasts, concerning for acute leukemia. Transferred to [address] [place] [address] for management. Patient is pancytopenia secondary to disease.

## 2021-08-04 NOTE — PROGRESS NOTE ADULT - ATTENDING COMMENTS
37yo M admitted with RUQ pain found to have bloodwork concerning for acute leukemia  -13% myeloblasts on PB  -Flt3 sent, pending. ? if can be enrolled in Precog study. We discussed this briefly and pt is interested if positive. Will plan for BMbx after resulted  -pain control, improved today. Abd sono showing hepatomegaly and hepatic steatosis. Right pleural effusion. Will obtain CT chest   -MUGA EF 71%  -discussed with patient and family at the bedside. We also discussed sperm banking -pt declined  -febrile, Tm101.1. f/u Cx's. On Zosyn  -if AML confirmed, place line  -supportive care  -transfuse as needed  -pt and family considering transfer to hospital in [place]? 35yo M admitted with RUQ pain found to have bloodwork concerning for acute leukemia  -13% myeloblasts on PB  -Flt3 negative. For BMbx today  -pain control, improved today. Abd sono showing hepatomegaly and hepatic steatosis. Right pleural effusion. CT chest shows small right pleural effusion  -MUGA EF 71%  -discussed with patient and family at the bedside. We also discussed sperm banking -pt declined  -febrile, Tm101.6. Cx's NGTD. On Zosyn  -if AML confirmed, place line  -supportive care  -transfuse as needed

## 2021-08-04 NOTE — CHART NOTE - NSCHARTNOTEFT_GEN_A_CORE
CC: temperature 101.7F    HPI:  Notified by RN patient noted with temperature of 101.7F on routine vitals this eveningf. Patient seen and examined  by me at bedside. Patient is alert, NAD.  Patient denied headache, dizziness, chest pain, shortness of breath, cough, rhinorrhea, N/V/D, urinary symptoms, or abdominal pain.      ROS:  CONSTITUTIONAL:  No fever, chills, rigors  CARDIOVASCULAR:  No chest pain or palpitations  RESPIRATORY:   No SOB, cough, wheezing  GASTROINTESTINAL:  No abdominal pain, N/V/D  EXTREMITIES:  No swelling or joint pain  GENITOURINARY:  No burning on urination, increased frequency or urgency.  No flank pain.  NEUROLOGIC:  No HA, visual disturbances  SKIN: No rashes        PAST MEDICAL & SURGICAL HISTORY:  Hypertension- diagnosed 1 year ago  Kidney stone- 5 years ago  History of genital warts  No significant past surgical history              VITAL SIGNS:  T(C): 38.7 (21 @ 21:25), Max: 38.7 (21 @ 21:25)  HR: 115 (21 @ 21:25) (99 - 115)  BP: 125/79 (21 @ 21:25) (110/66 - 127/79)  RR: 18 (21 @ 21:25) (18 - 18)  SpO2: 93% (21 @ 21:25) (92% - 97%)      Physical Exam:  General: WN/WD NAD, AOx3, nontoxic appearing  Head:  NC/AT  CV: RRR, S1S2   Respiratory: CTA B/L, nonlabored. No rales/rhonchi/wheezes.  Abdominal: (+) bowel sounds x4. Soft, NT, ND, no palpable mass, no guarding, or rebound tenderness  Genitourinary: ? Marc   MSK: No BLLE edema, + peripheral pulses, FROM all 4 extremity  Skin: (+) warm, dry   Psych: Appropriate affect       LABORATORY:                        8.5    8.68  )-----------( 47       ( 04 Aug 2021 07:23 )             25.4           137  |  99  |  14  ----------------------------<  104<H>  3.8   |  23  |  1.20    Ca    10.2      04 Aug 2021 07:19  Phos  4.4     08-  Mg     2.6     -    TPro  8.3  /  Alb  4.4  /  TBili  0.5  /  DBili  x   /  AST  27  /  ALT  59<H>  /  AlkPhos  74  -      Urinalysis Basic - ( 03 Aug 2021 19:39 )    Color: Light Yellow / Appearance: Clear / S.017 / pH: x  Gluc: x / Ketone: Negative  / Bili: Negative / Urobili: Negative   Blood: x / Protein: Trace / Nitrite: Negative   Leuk Esterase: Negative / RBC: x / WBC x   Sq Epi: x / Non Sq Epi: x / Bacteria: x          Culture - Urine (collected 21 @ 23:02)  Source: Clean Catch Clean Catch (Midstream)  Final Report (21 @ 19:29):    No growth            RADIOLOGY:            ASSESSMENT/PLAN:   HPI:  36M PMH HTN, fatty liver, kidney stones presents with rash, dizziness, fatigue and severe RUQ pain for 3 days. He has felt fatigued for the past few months. Three days ago, he developed sharp RUQ pain that wrapped around to his back. He rated the pain as a 5/10 with no change over the past three days. The pain doesn't change with PO intake or bowel movements. He has some associated dizziness with the pain. He also developed an erythematous rash that is nonpruritic on his upper chest, neck, and upper back over the past couple of days. He has had a twenty pound weight gain over the past year due to a change to sedentary lifestyle 2/ pandemic. He denies night sweats and fevers. He has had constipation for the past week where he must strain to move his bowels. He has no urinary symptoms, fevers, chills, or changes in appetite. No diarrhea, N/V. No prior cardiac hx, pleuritic chest pain, or SOB. He had kidney stones 5 years ago but states this pain is worse. Denies allergies or new medications. He denies recent travel and works as a . He has had no recent sick contacts. He received all childhood vaccines.    In the ED: temp 99.4, tachycardic to 150s, started on fluids and allopurinol per heme/onc recs. He received 1 mg hydromorphone, 4 mg morphine, 600 mg ibuprofen , and 975 mg acetaminophen. CT A/P revealed fatty liver and small R pleural effusion. (2021 09:22)      Patient now presenting acutely with temperature of    1) Fever  -Tylenol and cooling measures PRN pyrexia  -BCx x2  -UA/UCx  -CXR  -c/w   -ID  -Will continue to closely monitor patient/vitals   -Will endorse to primary team in MEHRAN Ramirez PA-C  Dept of Medicine CC: temperature 101.7F    HPI:  Notified by RN patient noted with temperature of 101.7F on routine vitals this evening. Patient seen and examined by me at bedside. Patient is alert, awake, NAD.  Patient denied headache, dizziness, chest pain, shortness of breath, cough, rhinorrhea, N/V, urinary symptoms, or abdominal pain. He endorsed loose stools however he also states that he experiences loose stools at home.       ROS:  CONSTITUTIONAL:  (+) fever. NO chills, rigors  CARDIOVASCULAR:  No chest pain or palpitations  RESPIRATORY:   No SOB, cough, wheezing  GASTROINTESTINAL:  No abdominal pain, N/V  GENITOURINARY:  No burning on urination, increased frequency or urgency  NEUROLOGIC:  No HA, visual disturbances  SKIN: No rashes        PAST MEDICAL & SURGICAL HISTORY:  Hypertension- diagnosed 1 year ago  Kidney stone- 5 years ago  History of genital warts  No significant past surgical history          VITAL SIGNS:  T(C): 38.7 (21 @ 21:25), Max: 38.7 (21 @ 21:25)  HR: 115 (21 @ 21:25) (99 - 115)  BP: 125/79 (21 @ 21:25) (110/66 - 127/79)  RR: 18 (21 @ 21:25) (18 - 18)  SpO2: 93% (21 @ 21:25) (92% - 97%)      Physical Exam:  General: WN/WD male laying in bed, NAD, AOx3, nontoxic appearing  Head:  NC/AT  CV: (+) tachycardic, S1S2   Respiratory: CTA B/L, nonlabored on room air  Abdominal: (+) mild tenderness to palpation of RUQ, (+) bowel sounds x4. Soft abdomen, no guarding, or rebound tenderness  MSK: No BLLE edema, + peripheral pulses, FROM all 4 extremity  Skin: (+) warm to touch  Psych: Appropriate affect       LABORATORY:                        8.5    8.68  )-----------( 47       ( 04 Aug 2021 07:23 )             25.4       08-04    137  |  99  |  14  ----------------------------<  104<H>  3.8   |  23  |  1.20    Ca    10.2      04 Aug 2021 07:19  Phos  4.4     08-04  Mg     2.6     08-04    TPro  8.3  /  Alb  4.4  /  TBili  0.5  /  DBili  x   /  AST  27  /  ALT  59<H>  /  AlkPhos  74  08-04      D-Dimer Assay, Quantitative (21 @ 07:06)   D-Dimer Assay, Quantitative: 820 ng/mL DDU       Urinalysis Basic - ( 03 Aug 2021 19:39 )  Color: Light Yellow / Appearance: Clear / S.017 / pH: x  Gluc: x / Ketone: Negative  / Bili: Negative / Urobili: Negative   Blood: x / Protein: Trace / Nitrite: Negative   Leuk Esterase: Negative / RBC: x / WBC x   Sq Epi: x / Non Sq Epi: x / Bacteria: x      Culture - Urine (collected 03 Aug 2021 23:02)  Source: Clean Catch Clean Catch (Midstream)  Final Report (04 Aug 2021 19:29):    No growth    Culture - Urine (collected 02 Aug 2021 06:36)  Source: Clean Catch Clean Catch (Midstream)  Final Report (03 Aug 2021 06:24):    No growth    Culture - Blood (collected 02 Aug 2021 01:12)  Source: .Blood Blood-Peripheral  Preliminary Report (03 Aug 2021 02:02):    No growth to date.    Culture - Blood (collected 02 Aug 2021 01:12)  Source: .Blood Blood-Peripheral  Preliminary Report (03 Aug 2021 02:02):    No growth to date.            RADIOLOGY:  < from: CT Chest No Cont (21 @ 14:21) >  IMPRESSION: Small right pleural effusion with interval increase since 2021.  Scattered bilateral areas of linear, subsegmental or compressive atelectasis.  < end of copied text >    < from: US Abdomen Upper Quadrant Right (21 @ 09:20) >  Normal gallbladder.  Hepatomegaly and hepatic steatosis.  Right pleural effusion.  < end of copied text >    < from: Xray Chest 1 View- PORTABLE-Routine (Xray Chest 1 View- PORTABLE-Routine .) (21 @ 19:48) >  IMPRESSION:  Clear lungs.  < end of copied text >      < from: CT Abdomen and Pelvis w/ IV Cont (21 @ 05:11) >  No acute intra-abdominal or pelvic finding.  Trace to small right pleural effusion.  < end of copied text >          ASSESSMENT/PLAN:   36M with h/o PMH HTN, fatty liver, kidney stones presents with rash, dizziness, fatigue and severe RUQ pain for 3 days. Now with anemia, thrombocytopenia and leukocytosis with 17% blasts, concerning for acute leukemia. Transferred to [address] [place] [address] for management. Patient is pancytopenia secondary to disease.   Patient now presenting acutely with temperature of 101.7F; noted with recurrent fevers this admission. Patient is s/p BMBx on .      #Recurrent Fever- ?secondary to suspected AML vs infectious etiology   -Vital signs hemodynamically stable, notable for   -Labs with anemia and thrombocytopenia; patient is not neutropenic  -Tylenol and cooling measures PRN pyrexia  -BCx x2 ordered  -UA/UCx (8/3) negative  -COVID PCR () negative  -CT chest () with small right pleural effusion and atelectasis  -RUQ US () with hepatomegaly and hepatic steatosis, normal gallbladder  -CXR () with clear lungs  -Consider GI PCR/stool cx if persistent loose stools  -c/w Zosyn  -c/w IVF hydration  -Consider ID evaluation if indicated   -Will continue to closely monitor patient/vitals   -Will endorse to primary team in AM      #Tachycardia- likely mediated by fever vs secondary to disease process  -Vital signs hemodynamically stable, patient is asymptomatic  -Treatment of fever as above  -Monitor and evaluate when patient is afebrile  -Consider CTA chest if concern for PE (elevated D-dimer on admission)  -Consider telemetry monitoring if patient is persistently tachycardic and/or becomes symptomatic  -Continue with vital signs Q4h  -Will obtain EKG if patient is persistently tachycardic      #RUQ tenderness  -RUQ US () without evidence of acute cholecystitis  -Labs with mild transaminitis, monitor  -Leukocytosis resolved  -Consider CT A/P if indicated      Alba Ramirez PA-C  Dept of Medicine  [id number] CC: temperature 101.7F    HPI:  Notified by RN patient noted with temperature of 101.7F on routine vitals this evening. Patient seen and examined by me at bedside. Patient is alert, awake, NAD.  Patient denied headache, dizziness, chest pain, shortness of breath, cough, rhinorrhea, N/V, urinary symptoms, or abdominal pain. He endorsed loose stools however he also states that he experiences loose stools at home.       ROS:  CONSTITUTIONAL:  (+) fever. NO chills, rigors  CARDIOVASCULAR:  No chest pain or palpitations  RESPIRATORY:   No SOB, cough, wheezing  GASTROINTESTINAL:  No abdominal pain, N/V  GENITOURINARY:  No burning on urination, increased frequency or urgency  NEUROLOGIC:  No HA, visual disturbances  SKIN: No rashes        PAST MEDICAL & SURGICAL HISTORY:  Hypertension- diagnosed 1 year ago  Kidney stone- 5 years ago  History of genital warts  No significant past surgical history          VITAL SIGNS:  T(C): 38.7 (21 @ 21:25), Max: 38.7 (21 @ 21:25)  HR: 115 (21 @ 21:25) (99 - 115)  BP: 125/79 (21 @ 21:25) (110/66 - 127/79)  RR: 18 (21 @ 21:25) (18 - 18)  SpO2: 93% (21 @ 21:25) (92% - 97%)      Physical Exam:  General: WN/WD male laying in bed, NAD, AOx3, nontoxic appearing  Head:  NC/AT  CV: (+) tachycardic, S1S2   Respiratory: CTA B/L, nonlabored on room air  Abdominal: (+) mild tenderness to palpation of RUQ, (+) bowel sounds x4. Soft abdomen, no guarding, or rebound tenderness  MSK: No BLLE edema, + peripheral pulses, FROM all 4 extremity  Skin: (+) warm to touch  Psych: Appropriate affect       LABORATORY:                        8.5    8.68  )-----------( 47       ( 04 Aug 2021 07:23 )             25.4       08-04    137  |  99  |  14  ----------------------------<  104<H>  3.8   |  23  |  1.20    Ca    10.2      04 Aug 2021 07:19  Phos  4.4     08-04  Mg     2.6     08-04    TPro  8.3  /  Alb  4.4  /  TBili  0.5  /  DBili  x   /  AST  27  /  ALT  59<H>  /  AlkPhos  74  08-04      D-Dimer Assay, Quantitative (21 @ 07:06)   D-Dimer Assay, Quantitative: 820 ng/mL DDU       Urinalysis Basic - ( 03 Aug 2021 19:39 )  Color: Light Yellow / Appearance: Clear / S.017 / pH: x  Gluc: x / Ketone: Negative  / Bili: Negative / Urobili: Negative   Blood: x / Protein: Trace / Nitrite: Negative   Leuk Esterase: Negative / RBC: x / WBC x   Sq Epi: x / Non Sq Epi: x / Bacteria: x      Culture - Urine (collected 03 Aug 2021 23:02)  Source: Clean Catch Clean Catch (Midstream)  Final Report (04 Aug 2021 19:29):    No growth    Culture - Urine (collected 02 Aug 2021 06:36)  Source: Clean Catch Clean Catch (Midstream)  Final Report (03 Aug 2021 06:24):    No growth    Culture - Blood (collected 02 Aug 2021 01:12)  Source: .Blood Blood-Peripheral  Preliminary Report (03 Aug 2021 02:02):    No growth to date.    Culture - Blood (collected 02 Aug 2021 01:12)  Source: .Blood Blood-Peripheral  Preliminary Report (03 Aug 2021 02:02):    No growth to date.            RADIOLOGY:  < from: CT Chest No Cont (21 @ 14:21) >  IMPRESSION: Small right pleural effusion with interval increase since 2021.  Scattered bilateral areas of linear, subsegmental or compressive atelectasis.  < end of copied text >    < from: US Abdomen Upper Quadrant Right (21 @ 09:20) >  Normal gallbladder.  Hepatomegaly and hepatic steatosis.  Right pleural effusion.  < end of copied text >    < from: Xray Chest 1 View- PORTABLE-Routine (Xray Chest 1 View- PORTABLE-Routine .) (21 @ 19:48) >  IMPRESSION:  Clear lungs.  < end of copied text >      < from: CT Abdomen and Pelvis w/ IV Cont (21 @ 05:11) >  No acute intra-abdominal or pelvic finding.  Trace to small right pleural effusion.  < end of copied text >          ASSESSMENT/PLAN:   36M with h/o PMH HTN, fatty liver, kidney stones presents with rash, dizziness, fatigue and severe RUQ pain for 3 days. Now with anemia, thrombocytopenia and leukocytosis with 17% blasts, concerning for acute leukemia. Transferred to [address] [place] [address] for management. Patient is pancytopenia secondary to disease.   Patient now presenting acutely with temperature of 101.7F; noted with recurrent fevers this admission. Patient is s/p BMBx on .      #Recurrent Fever- ?secondary to suspected AML vs infectious etiology   -Vital signs hemodynamically stable, notable for   -Labs with anemia and thrombocytopenia; patient is not neutropenic  -Tylenol and cooling measures PRN pyrexia  -Patient noted with multiple blankets on him during exam, discussed with patient to remove some of the blankets in light of fever to which he was amenable  -BCx x2 ordered  -UA/UCx (8/3) negative  -COVID PCR () negative  -Urgent CXR ordered  -CT chest () with small right pleural effusion and atelectasis  -RUQ US () with hepatomegaly and hepatic steatosis, normal gallbladder  -CXR () with clear lungs  -Consider GI PCR/stool cx if persistent loose stools  -c/w Zosyn  -c/w IVF hydration  -Consider ID evaluation if indicated   -Will continue to closely monitor patient/vitals   -Will endorse to primary team in AM      #Tachycardia- likely mediated by fever vs secondary to disease process  -Vital signs hemodynamically stable, patient is asymptomatic  -Treatment of fever as above  -Monitor and evaluate when patient is afebrile  -Consider CTA chest if concern for PE (elevated D-dimer on admission)  -Consider telemetry monitoring if patient is persistently tachycardic and/or becomes symptomatic  -Continue with vital signs Q4h  -Will obtain EKG if patient is persistently tachycardic      #RUQ tenderness  -RUQ US () without evidence of acute cholecystitis  -Labs with mild transaminitis, monitor  -Leukocytosis resolved  -Consider CT A/P if indicated      Alba Ramirez PA-C  Dept of Medicine  [id number]

## 2021-08-04 NOTE — PROGRESS NOTE ADULT - PROBLEM SELECTOR PLAN 1
Patient with peripheral blasts  Peripheral flow cytometry c/w 13% myeloblasts   Plan for BMbx once FLT3 resulted - may be eligible for trial if FLT3+   Continue with Allopurinol 300mg daily now, IV hydration, mouth care, pain control, antiemetics   HIV (-), Hep (-)  F/U Echo, MUGA EF 56%   F/U g6pd  Discussed sperm banking. Declined   Will need central iv access if AML   HLA sent 8/2  Patient considering transferring to another hospital for further care Patient with peripheral blasts  Peripheral flow cytometry c/w 13% myeloblasts   FLT3 negative, BM bx done, today 8/4, results pending   consented for [place] study    Continue with Allopurinol 300mg daily now, IV hydration, mouth care, pain control, antiemetics   HIV (-), Hep (-)  F/U Echo, MUGA EF 56%   F/U g6pd  Discussed sperm banking. Declined   Will need central iv access if AML, awaiting results of BM bx   HLA sent 8/2

## 2021-08-04 NOTE — CHART NOTE - NSCHARTNOTEFT_GEN_A_CORE
Hematology/Oncology Procedure Note    Bone Marrow Aspiration/Biopsy    Indication:    Bone marrow aspiration and biopsy procedure description, risks, and benefits were discussed in detail with the patient.  All questions were answered.  Informed consent was obtained and time-out performed.      The area of the right posterior iliac crest was prepped and draped using sterile technique. Local anesthetic with  2% Lidocaine.    Bone marrow aspiration and biopsy  was performed using sterile technique  by myself. Specimens were obtained.    The procedure was well tolerated and no local bleeding or other complications were observed.  Pressure was applied to the procedure site and a wound dressing was placed.  The patient and nursing staff were advised that the patient is to lie flat for 30 minutes post procedure. Tylenol may be used if no contraindications for pain at the procedure site.

## 2021-08-04 NOTE — PROGRESS NOTE ADULT - SUBJECTIVE AND OBJECTIVE BOX
Diagnosis: rule out acute leukemia    Protocol/Chemo Regimen: TBD    Day: N/A    Pt endorsed:     Review of Systems: Denies nausea, vomiting, diarrhea, chest pain, SOB     Pain scale:     Diet: DASH    Allergies: No Known Allergies    ANTIMICROBIALS  piperacillin/tazobactam IVPB.. 3.375 Gram(s) IV Intermittent every 8 hours      HEME/ONC MEDICATIONS      STANDING MEDICATIONS  allopurinol 300 milliGRAM(s) Oral daily  amLODIPine   Tablet 5 milliGRAM(s) Oral daily  Biotene Dry Mouth Oral Rinse 5 milliLiter(s) Swish and Spit five times a day  polyethylene glycol 3350 17 Gram(s) Oral daily  senna 2 Tablet(s) Oral at bedtime  sodium chloride 0.9%. 1000 milliLiter(s) IV Continuous <Continuous>      PRN MEDICATIONS  acetaminophen   Tablet .. 650 milliGRAM(s) Oral every 6 hours PRN  HYDROmorphone  Injectable 1 milliGRAM(s) IV Push every 4 hours PRN  sodium chloride 0.65% Nasal 1 Spray(s) Both Nostrils three times a day PRN        Vital Signs Last 24 Hrs  T(C): 37.1 (04 Aug 2021 05:59), Max: 38.7 (03 Aug 2021 13:25)  T(F): 98.8 (04 Aug 2021 05:59), Max: 101.6 (03 Aug 2021 13:25)  HR: 99 (04 Aug 2021 05:59) (99 - 133)  BP: 119/72 (04 Aug 2021 05:59) (108/66 - 145/70)  BP(mean): --  RR: 18 (04 Aug 2021 05:59) (18 - 20)  SpO2: 94% (04 Aug 2021 05:59) (94% - 98%)    PHYSICAL EXAM  General: NAD  HEENT: PERRLA, EOMOI, clear oropharynx, anicteric sclera, pink conjunctiva  Neck: supple  CV: (+) S1/S2 RRR  Lungs: clear to auscultation, no wheezes or rales  Abdomen: soft, non-tender, non-distended (+) BS  Ext: no clubbing, cyanosis or edema  Skin: no rashes and no petechiae  Neuro: alert and oriented X 3, no focal deficits  Central Line:     RECENT CULTURES:  08-02 @ 06:36  Clean Catch Clean Catch (Midstream)  No growth  --  08-02 @ 01:12  .Blood Blood-Peripheral  No growth to date.  --  08-01 @ 00:44  .Blood Blood-Peripheral  No growth to date.  --  07-30 @ 12:09  Clean Catch Clean Catch (Midstream)  No growth           Diagnosis: rule out acute leukemia    Protocol/Chemo Regimen: TBD    Day: N/A    Pt endorsed: no complaints, no issues over night     Review of Systems: Denies nausea, vomiting, diarrhea, chest pain, SOB     Pain scale:     Diet: DASH    Allergies: No Known Allergies    ANTIMICROBIALS  piperacillin/tazobactam IVPB.. 3.375 Gram(s) IV Intermittent every 8 hours      HEME/ONC MEDICATIONS      STANDING MEDICATIONS  allopurinol 300 milliGRAM(s) Oral daily  amLODIPine   Tablet 5 milliGRAM(s) Oral daily  Biotene Dry Mouth Oral Rinse 5 milliLiter(s) Swish and Spit five times a day  polyethylene glycol 3350 17 Gram(s) Oral daily  senna 2 Tablet(s) Oral at bedtime  sodium chloride 0.9%. 1000 milliLiter(s) IV Continuous <Continuous>      PRN MEDICATIONS  acetaminophen   Tablet .. 650 milliGRAM(s) Oral every 6 hours PRN  HYDROmorphone  Injectable 1 milliGRAM(s) IV Push every 4 hours PRN  sodium chloride 0.65% Nasal 1 Spray(s) Both Nostrils three times a day PRN        Vital Signs Last 24 Hrs  T(C): 37.1 (04 Aug 2021 05:59), Max: 38.7 (03 Aug 2021 13:25)  T(F): 98.8 (04 Aug 2021 05:59), Max: 101.6 (03 Aug 2021 13:25)  HR: 99 (04 Aug 2021 05:59) (99 - 133)  BP: 119/72 (04 Aug 2021 05:59) (108/66 - 145/70)  BP(mean): --  RR: 18 (04 Aug 2021 05:59) (18 - 20)  SpO2: 94% (04 Aug 2021 05:59) (94% - 98%)    PHYSICAL EXAM  General: NAD  HEENT: PERRLA, EOMOI, clear oropharynx, anicteric sclera, pink conjunctiva  Neck: supple  CV: (+) S1/S2 RRR  Lungs: clear to auscultation, no wheezes or rales  Abdomen: soft, non-tender, non-distended (+) BS  Ext: no clubbing, cyanosis or edema  Skin: no rashes and no petechiae  Neuro: alert and oriented X 3, no focal deficits  PIV     RECENT CULTURES:  08-02 @ 06:36  Clean Catch Clean Catch (Midstream)  No growth  --  08-02 @ 01:12  .Blood Blood-Peripheral  No growth to date.  --  08-01 @ 00:44  .Blood Blood-Peripheral  No growth to date.  --  07-30 @ 12:09  Clean Catch Clean Catch (Midstream)  No growth      LABS:                      8.5    8.68  )-----------( 47       ( 04 Aug 2021 07:23 )             25.4         Mean Cell Volume : 105.8 fl  Mean Cell Hemoglobin : 35.4 pg  Mean Cell Hemoglobin Concentration : 33.5 gm/dL  Auto Neutrophil # : 3.78 K/uL  Auto Lymphocyte # : 1.51 K/uL  Auto Monocyte # : 1.96 K/uL  Auto Eosinophil # : 0.00 K/uL  Auto Basophil # : 0.00 K/uL  Auto Neutrophil % : 43.5 %  Auto Lymphocyte % : 17.4 %  Auto Monocyte % : 22.6 %  Auto Eosinophil % : 0.0 %  Auto Basophil % : 0.0 %      08-04    137  |  99  |  14  ----------------------------<  104<H>  3.8   |  23  |  1.20    Ca    10.2      04 Aug 2021 07:19  Phos  4.4     08-04  Mg     2.6     08-04    TPro  8.3  /  Alb  4.4  /  TBili  0.5  /  DBili  x   /  AST  27  /  ALT  59<H>  /  AlkPhos  74  08-04      Mg 2.6  Phos 4.4      Uric Acid 3.7

## 2021-08-04 NOTE — PROGRESS NOTE ADULT - PROBLEM SELECTOR PLAN 2
Patient is febrile, not neutropenic   Continue with Zosyn   Cultures NGTD 7/29, 8/1, 8/2 Patient is febrile, not neutropenic   Continue with Zosyn for possible pna   Cultures NGTD 7/29, 8/1, 8/2

## 2021-08-04 NOTE — ADVANCED PRACTICE NURSE CONSULT - ASSESSMENT
Consenting  This is a 36-year-old male with h/o PMH HTN, fatty liver, kidney stones present with rash, dizziness, fatigue and severe RUQ pain for 3 days.  Now with anemia, thrombocytopenia and leukocytosis with 17% blasts, concerning for acute leukemia. Transferred to [address] [place] [address] for management. Patient is pancytopenia secondary to disease. Patient was seen by Dr Yancey during rounds. In the discussion Dr. Yancey explain in detail to patient about take part in this study for Quantitative Sensitive Detection and Tracking of Residual Disease in Acute Myeloid Leukemia. Patient was given the consent and reviewed. The consent was reviewed over by the research nurse and Dr Yancey in full detail and patient verbalized understanding. After answering all patient questions and he spoke to her family, patient verbalized understanding and sign consent for the trial. Patient was given a copy of the signed consent.  One green vacutainer with bone marrow sent to [facility] research lab. Left patient in bed lying flat after bone marrow biopsy was collected at 0900. Patient very difficult peripheral stick currently. Patient unable to pass unable urine at this time due to lying flat in bed, will collect in am.   Consenting  This is a 36-year-old male with h/o PMH HTN, fatty liver, kidney stones present with rash, dizziness, fatigue and severe RUQ pain for 3 days.  Now with anemia, thrombocytopenia and leukocytosis with 17% blasts, concerning for acute leukemia. Transferred to [address] [place] [address] for management. Patient is pancytopenia secondary to disease. Patient was seen by Dr Yancey during rounds. In the discussion Dr. Yancey explain in detail to patient about take part in this study for Quantitative Sensitive Detection and Tracking of Residual Disease in Acute Myeloid Leukemia. Patient was given the consent and reviewed. The consent was reviewed over by the research nurse and Dr Yancey in full detail and patient verbalized understanding. After answering all patient questions and he spoke to her family, patient verbalized understanding and sign consent for the trial. Patient was given a copy of the signed consent.  One green vacutainer with bone marrow sent to [facility] research lab. Left patient in bed lying flat after bone marrow biopsy was collected at 0900. Patient very difficult peripheral stick currently. Patient gave urine but unable to get peripheral at this time will try again if patient allow.

## 2021-08-05 LAB
ALBUMIN SERPL ELPH-MCNC: 4.1 G/DL — SIGNIFICANT CHANGE UP (ref 3.3–5)
ALP SERPL-CCNC: 71 U/L — SIGNIFICANT CHANGE UP (ref 40–120)
ALT FLD-CCNC: 71 U/L — HIGH (ref 10–45)
ANION GAP SERPL CALC-SCNC: 13 MMOL/L — SIGNIFICANT CHANGE UP (ref 5–17)
ANISOCYTOSIS BLD QL: SLIGHT — SIGNIFICANT CHANGE UP
APTT BLD: 29.1 SEC — SIGNIFICANT CHANGE UP (ref 27.5–35.5)
AST SERPL-CCNC: 33 U/L — SIGNIFICANT CHANGE UP (ref 10–40)
BASOPHILS # BLD AUTO: 0 K/UL — SIGNIFICANT CHANGE UP (ref 0–0.2)
BASOPHILS NFR BLD AUTO: 0 % — SIGNIFICANT CHANGE UP (ref 0–2)
BILIRUB SERPL-MCNC: 0.5 MG/DL — SIGNIFICANT CHANGE UP (ref 0.2–1.2)
BLASTS # FLD: 10.6 % — HIGH (ref 0–0)
BUN SERPL-MCNC: 16 MG/DL — SIGNIFICANT CHANGE UP (ref 7–23)
CALCIUM SERPL-MCNC: 9.1 MG/DL — SIGNIFICANT CHANGE UP (ref 8.4–10.5)
CHLORIDE SERPL-SCNC: 101 MMOL/L — SIGNIFICANT CHANGE UP (ref 96–108)
CO2 SERPL-SCNC: 23 MMOL/L — SIGNIFICANT CHANGE UP (ref 22–31)
CREAT SERPL-MCNC: 1.24 MG/DL — SIGNIFICANT CHANGE UP (ref 0.5–1.3)
DACRYOCYTES BLD QL SMEAR: SLIGHT — SIGNIFICANT CHANGE UP
ELLIPTOCYTES BLD QL SMEAR: SLIGHT — SIGNIFICANT CHANGE UP
EOSINOPHIL # BLD AUTO: 0.07 K/UL — SIGNIFICANT CHANGE UP (ref 0–0.5)
EOSINOPHIL NFR BLD AUTO: 0.9 % — SIGNIFICANT CHANGE UP (ref 0–6)
GIANT PLATELETS BLD QL SMEAR: PRESENT — SIGNIFICANT CHANGE UP
GLUCOSE SERPL-MCNC: 110 MG/DL — HIGH (ref 70–99)
HCT VFR BLD CALC: 21.6 % — LOW (ref 39–50)
HEMATOPATHOLOGY REPORT: SIGNIFICANT CHANGE UP
HGB BLD-MCNC: 7.3 G/DL — LOW (ref 13–17)
INR BLD: 1.18 RATIO — HIGH (ref 0.88–1.16)
LDH SERPL L TO P-CCNC: 405 U/L — HIGH (ref 50–242)
LYMPHOCYTES # BLD AUTO: 1.28 K/UL — SIGNIFICANT CHANGE UP (ref 1–3.3)
LYMPHOCYTES # BLD AUTO: 15.9 % — SIGNIFICANT CHANGE UP (ref 13–44)
MACROCYTES BLD QL: SLIGHT — SIGNIFICANT CHANGE UP
MAGNESIUM SERPL-MCNC: 2.4 MG/DL — SIGNIFICANT CHANGE UP (ref 1.6–2.6)
MANUAL SMEAR VERIFICATION: SIGNIFICANT CHANGE UP
MCHC RBC-ENTMCNC: 33.8 GM/DL — SIGNIFICANT CHANGE UP (ref 32–36)
MCHC RBC-ENTMCNC: 35.4 PG — HIGH (ref 27–34)
MCV RBC AUTO: 104.9 FL — HIGH (ref 80–100)
MONOCYTES # BLD AUTO: 1.28 K/UL — HIGH (ref 0–0.9)
MONOCYTES NFR BLD AUTO: 15.9 % — HIGH (ref 2–14)
NEUTROPHILS # BLD AUTO: 4.55 K/UL — SIGNIFICANT CHANGE UP (ref 1.8–7.4)
NEUTROPHILS NFR BLD AUTO: 56.7 % — SIGNIFICANT CHANGE UP (ref 43–77)
PHOSPHATE SERPL-MCNC: 4.5 MG/DL — SIGNIFICANT CHANGE UP (ref 2.5–4.5)
PLAT MORPH BLD: ABNORMAL
PLATELET # BLD AUTO: 39 K/UL — LOW (ref 150–400)
POIKILOCYTOSIS BLD QL AUTO: SLIGHT — SIGNIFICANT CHANGE UP
POTASSIUM SERPL-MCNC: 3.9 MMOL/L — SIGNIFICANT CHANGE UP (ref 3.5–5.3)
POTASSIUM SERPL-SCNC: 3.9 MMOL/L — SIGNIFICANT CHANGE UP (ref 3.5–5.3)
PROT SERPL-MCNC: 7.6 G/DL — SIGNIFICANT CHANGE UP (ref 6–8.3)
PROTHROM AB SERPL-ACNC: 14.1 SEC — HIGH (ref 10.6–13.6)
RBC # BLD: 2.06 M/UL — LOW (ref 4.2–5.8)
RBC # FLD: 14.5 % — SIGNIFICANT CHANGE UP (ref 10.3–14.5)
RBC BLD AUTO: ABNORMAL
SARS-COV-2 RNA SPEC QL NAA+PROBE: SIGNIFICANT CHANGE UP
SODIUM SERPL-SCNC: 137 MMOL/L — SIGNIFICANT CHANGE UP (ref 135–145)
URATE SERPL-MCNC: 3.8 MG/DL — SIGNIFICANT CHANGE UP (ref 3.4–8.8)
WBC # BLD: 8.02 K/UL — SIGNIFICANT CHANGE UP (ref 3.8–10.5)
WBC # FLD AUTO: 8.02 K/UL — SIGNIFICANT CHANGE UP (ref 3.8–10.5)

## 2021-08-05 PROCEDURE — 99233 SBSQ HOSP IP/OBS HIGH 50: CPT | Mod: GC

## 2021-08-05 PROCEDURE — 71045 X-RAY EXAM CHEST 1 VIEW: CPT | Mod: 26

## 2021-08-05 RX ADMIN — PIPERACILLIN AND TAZOBACTAM 25 GRAM(S): 4; .5 INJECTION, POWDER, LYOPHILIZED, FOR SOLUTION INTRAVENOUS at 20:36

## 2021-08-05 RX ADMIN — Medication 5 MILLILITER(S): at 11:51

## 2021-08-05 RX ADMIN — HYDROMORPHONE HYDROCHLORIDE 1 MILLIGRAM(S): 2 INJECTION INTRAMUSCULAR; INTRAVENOUS; SUBCUTANEOUS at 06:44

## 2021-08-05 RX ADMIN — PIPERACILLIN AND TAZOBACTAM 25 GRAM(S): 4; .5 INJECTION, POWDER, LYOPHILIZED, FOR SOLUTION INTRAVENOUS at 11:52

## 2021-08-05 RX ADMIN — Medication 5 MILLILITER(S): at 09:03

## 2021-08-05 RX ADMIN — DIPHENHYDRAMINE HYDROCHLORIDE AND LIDOCAINE HYDROCHLORIDE AND ALUMINUM HYDROXIDE AND MAGNESIUM HYDRO 5 MILLILITER(S): KIT at 14:04

## 2021-08-05 RX ADMIN — HYDROMORPHONE HYDROCHLORIDE 1 MILLIGRAM(S): 2 INJECTION INTRAMUSCULAR; INTRAVENOUS; SUBCUTANEOUS at 21:07

## 2021-08-05 RX ADMIN — DIPHENHYDRAMINE HYDROCHLORIDE AND LIDOCAINE HYDROCHLORIDE AND ALUMINUM HYDROXIDE AND MAGNESIUM HYDRO 5 MILLILITER(S): KIT at 05:34

## 2021-08-05 RX ADMIN — HYDROMORPHONE HYDROCHLORIDE 1 MILLIGRAM(S): 2 INJECTION INTRAMUSCULAR; INTRAVENOUS; SUBCUTANEOUS at 16:29

## 2021-08-05 RX ADMIN — PIPERACILLIN AND TAZOBACTAM 25 GRAM(S): 4; .5 INJECTION, POWDER, LYOPHILIZED, FOR SOLUTION INTRAVENOUS at 04:00

## 2021-08-05 RX ADMIN — SODIUM CHLORIDE 75 MILLILITER(S): 9 INJECTION INTRAMUSCULAR; INTRAVENOUS; SUBCUTANEOUS at 05:34

## 2021-08-05 RX ADMIN — Medication 5 MILLILITER(S): at 16:26

## 2021-08-05 RX ADMIN — Medication 5 MILLILITER(S): at 20:43

## 2021-08-05 RX ADMIN — HYDROMORPHONE HYDROCHLORIDE 1 MILLIGRAM(S): 2 INJECTION INTRAMUSCULAR; INTRAVENOUS; SUBCUTANEOUS at 07:20

## 2021-08-05 RX ADMIN — HYDROMORPHONE HYDROCHLORIDE 1 MILLIGRAM(S): 2 INJECTION INTRAMUSCULAR; INTRAVENOUS; SUBCUTANEOUS at 16:40

## 2021-08-05 RX ADMIN — HYDROMORPHONE HYDROCHLORIDE 1 MILLIGRAM(S): 2 INJECTION INTRAMUSCULAR; INTRAVENOUS; SUBCUTANEOUS at 20:37

## 2021-08-05 RX ADMIN — AMLODIPINE BESYLATE 5 MILLIGRAM(S): 2.5 TABLET ORAL at 05:34

## 2021-08-05 RX ADMIN — Medication 300 MILLIGRAM(S): at 11:53

## 2021-08-05 RX ADMIN — DIPHENHYDRAMINE HYDROCHLORIDE AND LIDOCAINE HYDROCHLORIDE AND ALUMINUM HYDROXIDE AND MAGNESIUM HYDRO 5 MILLILITER(S): KIT at 22:20

## 2021-08-05 NOTE — PROGRESS NOTE ADULT - PROBLEM SELECTOR PLAN 2
Patient is febrile, not neutropenic   Continue with Zosyn for possible pna   Cultures NGTD 7/29, 8/1, 8/2

## 2021-08-05 NOTE — DIETITIAN INITIAL EVALUATION ADULT. - FACTORS AFF FOOD INTAKE
in house reports he has been eating about 2 meals daily (similar to how he was eating at home, has been able to finish meals); states no chewing/swallowing issues but does report he had a canker sore on his tongue side so he has been avoiding eating on the left side, did not want change in diet texture at this time; reports BMs had been constipated the last few weeks, typically he is not very regular c BMs, now has some diarrhea likely related to bowel regimen and previous constipation

## 2021-08-05 NOTE — DIETITIAN INITIAL EVALUATION ADULT. - PROBLEM SELECTOR PLAN 1
RUQ pain for the past three days, + Fontana's sign, R pleural effusion on CT  - LDH elevated at 384; LFTs, AlkP, and TBili normal; Lipase normal  - f/u RUQ ultrasound

## 2021-08-05 NOTE — DIETITIAN INITIAL EVALUATION ADULT. - PERTINENT MEDS FT
MEDICATIONS  (STANDING):  allopurinol 300 milliGRAM(s) Oral daily  amLODIPine   Tablet 5 milliGRAM(s) Oral daily  Biotene Dry Mouth Oral Rinse 5 milliLiter(s) Swish and Spit five times a day  FIRST- Mouthwash  BLM 5 milliLiter(s) Swish and Spit three times a day  piperacillin/tazobactam IVPB.. 3.375 Gram(s) IV Intermittent every 8 hours  polyethylene glycol 3350 17 Gram(s) Oral daily  senna 2 Tablet(s) Oral at bedtime  sodium chloride 0.9%. 1000 milliLiter(s) (75 mL/Hr) IV Continuous <Continuous>    MEDICATIONS  (PRN):  acetaminophen   Tablet .. 650 milliGRAM(s) Oral every 6 hours PRN Temp greater or equal to 38C (100.4F), Mild Pain (1 - 3), Moderate Pain (4 - 6)  HYDROmorphone  Injectable 1 milliGRAM(s) IV Push every 4 hours PRN Severe Pain (7 - 10)  sodium chloride 0.65% Nasal 1 Spray(s) Both Nostrils three times a day PRN Nasal Congestion

## 2021-08-05 NOTE — PROGRESS NOTE ADULT - ASSESSMENT
36M with h/o PMH HTN, fatty liver, kidney stones presents with rash, dizziness, fatigue and severe RUQ pain for 3 days. Now with anemia, thrombocytopenia and leukocytosis with 17% blasts, concerning for acute leukemia. Transferred to [address] [place] [address] for management. Patient is pancytopenia secondary to disease.        36M with h/o PMH HTN, fatty liver, kidney stones presents with rash, dizziness, fatigue and severe RUQ pain for 3 days. Now with anemia, thrombocytopenia and leukocytosis with 17% blasts, Bone marrow bx c/w AML. Transferred to [address] [place] [address] for management. Patient is pancytopenia secondary to disease.

## 2021-08-05 NOTE — DIETITIAN INITIAL EVALUATION ADULT. - EDUCATION DIETARY MODIFICATIONS
Discussed importance of healthy, balanced meals in house. Discussed availability of healthy entrees, continuing to take fruits, vegetables, lean proteins and whole grains with meals./(2) meets goals/outcomes/verbalization

## 2021-08-05 NOTE — PROGRESS NOTE ADULT - PROBLEM SELECTOR PLAN 1
Patient with peripheral blasts  Peripheral flow cytometry c/w 13% myeloblasts   FLT3 negative, BM bx done, today 8/4, results pending   consented for [place] study    Continue with Allopurinol 300mg daily now, IV hydration, mouth care, pain control, antiemetics   HIV (-), Hep (-)  F/U Echo, MUGA EF 56%   F/U g6pd  Discussed sperm banking. Declined   Will need central iv access if AML, awaiting results of BM bx   HLA sent 8/2 Patient with peripheral blasts  Peripheral flow cytometry c/w 13% myeloblasts   FLT3 negative, BM bx done, today 8/4, results pending   consented for [place] study    Continue with Allopurinol 300mg daily now, IV hydration, mouth care, pain control, antiemetics   HIV (-), Hep (-)  F/U Echo, MUGA EF 56%   Discussed sperm banking. Declined   HLA sent 8/2  For TLC on 8/5

## 2021-08-05 NOTE — DIETITIAN INITIAL EVALUATION ADULT. - ORAL INTAKE PTA/DIET HISTORY
Pt reports over the last 2-3 weeks PTA he was trying to change his eating habits since he feels he was not eating very healthy once the COVID-19 pandemic started. Reports previously he was eating more "junk foods," and calorically dense foods. With his diet change, he was eating more fruits, vegetables, lean proteins and whole grains. Reports NKFA. States he was occasionally taking vitamin D3, 2 and B12 at home.

## 2021-08-05 NOTE — PROGRESS NOTE ADULT - PROBLEM SELECTOR PLAN 3
Patient Education     Laceration: All Closures  A laceration is a cut through the skin. This will usually require stitches (sutures) or staples if it is deep. Minor cuts may be treated with a surgical tape closure or skin glue.    Home care  · Your healthcare provider may prescribe an antibiotic. This is to help prevent infection. Follow all instructions for taking this medicine. Take the medicine every day until it is gone or you are told to stop. You should not have any left over.  · The healthcare provider may prescribe medicines for pain. If no pain medicines were prescribed, you can use over-the-counter pain medicines. Follow instructions for taking any pain medicines. (Note: If you have chronic liver or kidney disease, or ever had a stomach ulcer or gastrointestinal bleeding, talk with your doctor before using these medicines.)  · Follow the healthcare provider’s instructions on how to care for the cut.  · Keep the wound clean and dry. Do not get the wound wet until you are told it is OK to do so. If the area gets wet, gently pat it dry with a clean cloth. Replace the wet bandage with a dry one.  · If a bandage was applied and it becomes wet or dirty, replace it. Otherwise, leave it in place for the first 24 hours.  · Caring for sutures or staples: Once you no longer need to keep them dry, clean the wound daily. First, remove the bandage. Then wash the area gently with soap and warm water, or as directed by the healthcare provider. Use a wet cotton swab to loosen and remove any blood or crust that forms. After cleaning, apply a thin layer of antibiotic ointment if advised. Then put on a new bandage unless you are told not to.  · Caring for skin glue: Don’t put apply liquid, ointment, or cream on the wound while the glue is in place. Avoid activities that cause heavy sweating. Protect the wound from sunlight. Do not scratch, rub, or pick at the adhesive film. Do not place tape directly over the film. The glue  should peel off within 5 to 10 days.   · Caring for surgical tape: Keep the area dry. If it gets wet, blot it dry with a clean towel. Surgical tape usually falls off within 7 to 10 days. If it has not fallen off after 10 days, you can take it off yourself. Put mineral oil or petroleum jelly on a cotton ball and gently rub the tape until it is removed.  · Once you can get the wound wet, you may shower as usual but do not soak the wound in water (no tub baths or swimming)  · Even with proper treatment, a wound infection may sometimes occur. Check the wound daily for signs of infection listed below.  Scalp wounds  During the first 2 days, you may carefully rinse your hair in the shower to remove blood, glass or dirt particles. After two days, you may shower and shampoo your hair normally. Do not soak your scalp in the tub or go swimming until the stitches or staples have been removed. Talk with your healthcare provider before applying any antibiotic ointment to the wound.  Mouth wounds  Eat soft foods to reduce pain. If the cut is inside of your mouth, clean by rinsing after each meal and at bedtime with a mixture of equal parts water and hydrogen peroxide (do not swallow!). Or, you can use a cotton swab to directly apply hydrogen peroxide onto the cut. You may also be prescribed a chlorhexidine solution to rise with. Mouth wounds can be painful when eating. You may use an over-the-counter local numbing solution for pain relief. If this is not available, you may use any numbing solution intended for teething babies. You may apply this directly to the sores with a cotton-tip swab or with your finger.  Follow-up care  Follow up with your healthcare provider as advised. Ask your healthcare provider how long sutures should be left in place. Be sure to return for suture removal as directed. If dissolving stitches were used in the mouth, these should fall out or dissolve without the need for removal. If tape closures were  used, remove them yourself when your provider recommends if they have not fallen off on their own. If skin glue was used, the film will wear off by itself. Generally, you should keep healing wounds out of direct sunlight for the first couple of months to try to lessen scarring.  When to seek medical advice  Call your healthcare provider right away if any of these occur:  · Signs of infection, including increasing pain in the wound, increasing wound redness or swelling, or pus or bad odor coming from the wound  · Fever of 100.4°F (38.ºC) or higher, or as directed by your healthcare provider  · Stitches or staples come apart or fall out or surgical tape falls off before 7 days  · Wound edges reopen  · Wound changes colors  · Numbness around the wound after any numbing medicine should have worn off  · Decreased movement around the injured area  Call 911  Call 911 if you can't control the wound bleeding with direct pressure.  Date Last Reviewed: 5/1/2017  © 9614-7109 [employer]. [address] [place] [address], [place] [address]. All rights reserved. This information is not intended as a substitute for professional medical care. Always follow your healthcare professional's instructions.            No VTE ppx due to thrombocytopenia    Contact information: [phone]

## 2021-08-05 NOTE — DIETITIAN INITIAL EVALUATION ADULT. - PERTINENT LABORATORY DATA
08-05 @ 06:57: Na 137, BUN 16, Cr 1.24, <H>, K+ 3.9, Phos 4.5, Mg 2.4, Alk Phos 71, ALT/SGPT 71<H>, AST/SGOT 33, HbA1c --    Noted elevated glucose levels, would consider checking A1C level

## 2021-08-05 NOTE — PROGRESS NOTE ADULT - SUBJECTIVE AND OBJECTIVE BOX
Diagnosis: rule out acute leukemia    Protocol/Chemo Regimen: TBD    Day: N/A    Pt endorsed:     Review of Systems:     Pain scale:     Diet: DASH    Allergies: No Known Allergies    MEDICATIONS  (STANDING):  allopurinol 300 milliGRAM(s) Oral daily  amLODIPine   Tablet 5 milliGRAM(s) Oral daily  Biotene Dry Mouth Oral Rinse 5 milliLiter(s) Swish and Spit five times a day  FIRST- Mouthwash  BLM 5 milliLiter(s) Swish and Spit three times a day  piperacillin/tazobactam IVPB.. 3.375 Gram(s) IV Intermittent every 8 hours  polyethylene glycol 3350 17 Gram(s) Oral daily  senna 2 Tablet(s) Oral at bedtime  sodium chloride 0.9%. 1000 milliLiter(s) (75 mL/Hr) IV Continuous <Continuous>    MEDICATIONS  (PRN):  acetaminophen   Tablet .. 650 milliGRAM(s) Oral every 6 hours PRN Temp greater or equal to 38C (100.4F), Mild Pain (1 - 3), Moderate Pain (4 - 6)  HYDROmorphone  Injectable 1 milliGRAM(s) IV Push every 4 hours PRN Severe Pain (7 - 10)  sodium chloride 0.65% Nasal 1 Spray(s) Both Nostrils three times a day PRN Nasal Congestion      Vital Signs Last 24 Hrs  T(C): 36.9 (05 Aug 2021 05:35), Max: 38.7 (04 Aug 2021 21:25)  T(F): 98.4 (05 Aug 2021 05:35), Max: 101.7 (04 Aug 2021 21:25)  HR: 100 (05 Aug 2021 05:35) (98 - 115)  BP: 130/78 (05 Aug 2021 05:35) (122/79 - 130/78)  BP(mean): --  RR: 18 (05 Aug 2021 05:35) (18 - 18)  SpO2: 94% (05 Aug 2021 05:35) (92% - 97%)    PHYSICAL EXAM  General: NAD  HEENT: PERRLA, EOMOI, clear oropharynx, anicteric sclera, pink conjunctiva  Neck: supple  CV: (+) S1/S2 RRR  Lungs: clear to auscultation, no wheezes or rales  Abdomen: soft, non-tender, non-distended (+) BS  Ext: no clubbing, cyanosis or edema  Skin: no rashes and no petechiae  Neuro: alert and oriented X 3, no focal deficits  PIV     RECENT CULTURES:      Blood cx's (8/5/21) - p  Culture - Urine (08.03.21 @ 23:02)   Specimen Source: Clean Catch Clean Catch (Midstream)   Culture Results: No growth       08-02 @ 06:36  Clean Catch Clean Catch (Midstream)  No growth  --  08-02 @ 01:12  .Blood Blood-Peripheral  No growth to date.  --  08-01 @ 00:44  .Blood Blood-Peripheral  No growth to date.  --  07-30 @ 12:09  Clean Catch Clean Catch (Midstream)  No growth      LABS:           ------                 Diagnosis:  Acute Myeloid Leukemia, FLT3-    Protocol/Chemo Regimen: 7+3 (Daunorubicin and Cytarabine)    Day: TBD    Pt endorsed: No overnight events, +Febrile,  +LBP post bone marrow bx    Review of Systems: Denies ARMSTRONG, CP, palp's, HA or dizziness    Pain scale: 4/10    Diet: DASH    Allergies: No Known Allergies    MEDICATIONS  (STANDING):  allopurinol 300 milliGRAM(s) Oral daily  amLODIPine   Tablet 5 milliGRAM(s) Oral daily  Biotene Dry Mouth Oral Rinse 5 milliLiter(s) Swish and Spit five times a day  FIRST- Mouthwash  BLM 5 milliLiter(s) Swish and Spit three times a day  piperacillin/tazobactam IVPB.. 3.375 Gram(s) IV Intermittent every 8 hours  polyethylene glycol 3350 17 Gram(s) Oral daily  senna 2 Tablet(s) Oral at bedtime  sodium chloride 0.9%. 1000 milliLiter(s) (75 mL/Hr) IV Continuous <Continuous>    MEDICATIONS  (PRN):  acetaminophen   Tablet .. 650 milliGRAM(s) Oral every 6 hours PRN Temp greater or equal to 38C (100.4F), Mild Pain (1 - 3), Moderate Pain (4 - 6)  HYDROmorphone  Injectable 1 milliGRAM(s) IV Push every 4 hours PRN Severe Pain (7 - 10)  sodium chloride 0.65% Nasal 1 Spray(s) Both Nostrils three times a day PRN Nasal Congestion      Vital Signs Last 24 Hrs  T(C): 36.9 (05 Aug 2021 05:35), Max: 38.7 (04 Aug 2021 21:25)  T(F): 98.4 (05 Aug 2021 05:35), Max: 101.7 (04 Aug 2021 21:25)  HR: 100 (05 Aug 2021 05:35) (98 - 115)  BP: 130/78 (05 Aug 2021 05:35) (122/79 - 130/78)  BP(mean): --  RR: 18 (05 Aug 2021 05:35) (18 - 18)  SpO2: 94% (05 Aug 2021 05:35) (92% - 97%)    PHYSICAL EXAM  General: NAD  HEENT: PERRLA, EOMOI, clear oropharynx, anicteric sclera, pink conjunctiva  Neck: supple  CV: (+) S1/S2, reg  Lungs: clear to auscultation, no wheezes or rales  Abdomen: soft, non-tender, non-distended (+) BS  Ext: no clubbing, cyanosis or edema  Skin: no rashes and no petechiae  Neuro: alert and oriented X 3, no focal deficits  PIV     RECENT CULTURES:      Blood cx's (21) - p  Culture - Urine (21 @ 23:02)   Specimen Source: Clean Catch Clean Catch (Midstream)   Culture Results: No growth        @ 06:36  Clean Catch Clean Catch (Midstream)  No growth  --   @ 01:12  .Blood Blood-Peripheral  No growth to date.  --   @ 00:44  .Blood Blood-Peripheral  No growth to date.  --   @ 12:09  Clean Catch Clean Catch (Midstream)  No growth      LABS:                        7.3    8.02  )-----------( 39       ( 05 Aug 2021 06:58 )             21.6     05 Aug 2021 06:57    137    |  101    |  16     ----------------------------<  110    3.9     |  23     |  1.24     Ca    9.1        05 Aug 2021 06:57  Phos  4.5       05 Aug 2021 06:57  Mg     2.4       05 Aug 2021 06:57    TPro  7.6    /  Alb  4.1    /  TBili  0.5    /  DBili  x      /  AST  33     /  ALT  71     /  AlkPhos  71     05 Aug 2021 06:57    PT/INR - ( 05 Aug 2021 06:58 )   PT: 14.1 sec;   INR: 1.18 ratio    PTT - ( 05 Aug 2021 06:58 )  PTT:29.1 sec        LIVER FUNCTIONS - ( 05 Aug 2021 06:57 )  Alb: 4.1 g/dL / Pro: 7.6 g/dL / ALK PHOS: 71 U/L / ALT: 71 U/L / AST: 33 U/L / GGT: x           Urinalysis Basic - ( 03 Aug 2021 19:39 )    Color: Light Yellow / Appearance: Clear / S.017 / pH: x  Gluc: x / Ketone: Negative  / Bili: Negative / Urobili: Negative   Blood: x / Protein: Trace / Nitrite: Negative   Leuk Esterase: Negative / RBC: x / WBC x   Sq Epi: x / Non Sq Epi: x / Bacteria: x      Radiology      < from: Xray Chest 1 View- PORTABLE-Urgent (Xray Chest 1 View- PORTABLE-Urgent .) (21 @ 02:57) >  IMPRESSION:  Minimal left atelectasis.    --- End of Report ---

## 2021-08-05 NOTE — DIETITIAN INITIAL EVALUATION ADULT. - PROBLEM SELECTOR PLAN 2
SIRS+ (WBC 12 and ); RUQ pain with +Fontana's sign, and R pleural effusion on CT  - no source of infection determined  - f/u blood and urine cultures  - monitor CBC  - monitor for signs of infection  - WBC already downtrending to 10.12

## 2021-08-05 NOTE — DIETITIAN INITIAL EVALUATION ADULT. - OTHER CALCULATIONS
IBW used for estimated nutrient needs in setting of plan for treatment; defer fluid needs to team at this time

## 2021-08-05 NOTE — CONSULT NOTE ADULT - SUBJECTIVE AND OBJECTIVE BOX
Interventional Radiology    Evaluate for Procedure:     HPI: 36y Male with non-contributory PMH who presented with fatigue, rash, dizziness, and RUQ pain. Patient was found to be anemia and thrombocytopenic with concern for leukemia, suspected AML. Patient s/p bone marrow biopsy, final results pending. IR consulted for triple lumen catheter placement for chemotherapy.    Allergies: NKDA  Medications (Abx/Cardiac/Anticoagulation/Blood Products)  amLODIPine   Tablet: 5 milliGRAM(s) Oral (08-05 @ 05:34)  piperacillin/tazobactam IVPB..: 25 mL/Hr IV Intermittent (08-05 @ 11:52)    Data:    T(C): 36.7  HR: 102  BP: 146/83  RR: 20  SpO2: 95%    -WBC 8.02 / HgB 7.3 / Hct 21.6 / Plt 39  -Na 137 / Cl 101 / BUN 16 / Glucose 110  -K 3.9 / CO2 23 / Cr 1.24  -ALT 71 / Alk Phos 71 / T.Bili 0.5  -INR 1.18 / PTT 29.1      Assessment/Plan:   36y Male with anemia and thrombocytopenic with concern for leukemia, suspected AML. Patient s/p bone marrow biopsy, final results pending. IR consulted for triple lumen catheter placement for chemotherapy.  -- IR will plan to perform triple lumen catheter placement on 8/6/21.  -- please maintain COVID PCR within 72 hours of planned procedure.  -- please place IR procedure request order under Dr. Juan F Rosas

## 2021-08-05 NOTE — PROGRESS NOTE ADULT - ATTENDING COMMENTS
35yo M admitted with RUQ pain found to have bloodwork concerning for acute leukemia  -13% myeloblasts on PB  -Flt3 negative. For BMbx today  -pain control, improved today. Abd sono showing hepatomegaly and hepatic steatosis. Right pleural effusion. CT chest shows small right pleural effusion  -MUGA EF 71%  -discussed with patient and family at the bedside. We also discussed sperm banking -pt declined  -febrile, Tm101.6. Cx's NGTD. On Zosyn  -if AML confirmed, place line  -supportive care  -transfuse as needed 35yo M admitted with RUQ pain found to have bloodwork concerning for acute leukemia  -13% myeloblasts on PB  -Flt3 negative. s/p BMbx 8/4 -f/u results  -pain control, improved today. Abd sono showing hepatomegaly and hepatic steatosis. Right pleural effusion. CT chest shows small right pleural effusion  -MUGA EF 71%  -discussed with patient and family at the bedside. We also discussed sperm banking -pt declined  -febrile, Tm101.6. Cx's NGTD. On Zosyn  -if AML confirmed, place TLC  -supportive care  -transfuse as needed

## 2021-08-05 NOTE — DIETITIAN INITIAL EVALUATION ADULT. - OTHER INFO
Pt reports he has been at his healthiest before the pandemic at 180 pounds. Reports he has not been exercising at home either. Reports his wt went as high as 230 pounds during the pandemic; noted stated dosing wt of 230 pounds. Noted in house wt has been 220-227 pounds, likely related to change in eating habits and calorie controlled diet in house. Noted per Christin LOMBARDI, wt of 199 pounds in June 2019, March 2020: 208 pounds, September 2020: 210 pounds.    Pt deferred nutrition focused physical exam at this time as he was eating breakfast, visually well nourished without any overt muscle/fat loss.    Pt expressed he hopes to lose weight and go on a "diet." Discussed importance of adequate intake while on treatment and to focus on healthy eating habits while appetite remains good.

## 2021-08-05 NOTE — DIETITIAN INITIAL EVALUATION ADULT. - DIET TYPE
would consider liberalizing diet at later time depending on intake and appetite, currently pt c very good appetite and intake

## 2021-08-06 LAB
A1C WITH ESTIMATED AVERAGE GLUCOSE RESULT: 6.1 % — HIGH (ref 4–5.6)
ALBUMIN SERPL ELPH-MCNC: 4 G/DL — SIGNIFICANT CHANGE UP (ref 3.3–5)
ALP SERPL-CCNC: 69 U/L — SIGNIFICANT CHANGE UP (ref 40–120)
ALT FLD-CCNC: 86 U/L — HIGH (ref 10–45)
ANION GAP SERPL CALC-SCNC: 13 MMOL/L — SIGNIFICANT CHANGE UP (ref 5–17)
ANISOCYTOSIS BLD QL: SLIGHT — SIGNIFICANT CHANGE UP
AST SERPL-CCNC: 41 U/L — HIGH (ref 10–40)
BASOPHILS # BLD AUTO: 0 K/UL — SIGNIFICANT CHANGE UP (ref 0–0.2)
BASOPHILS NFR BLD AUTO: 0 % — SIGNIFICANT CHANGE UP (ref 0–2)
BILIRUB SERPL-MCNC: 0.4 MG/DL — SIGNIFICANT CHANGE UP (ref 0.2–1.2)
BLASTS # FLD: 8.9 % — HIGH (ref 0–0)
BLD GP AB SCN SERPL QL: NEGATIVE — SIGNIFICANT CHANGE UP
BUN SERPL-MCNC: 17 MG/DL — SIGNIFICANT CHANGE UP (ref 7–23)
CALCIUM SERPL-MCNC: 9 MG/DL — SIGNIFICANT CHANGE UP (ref 8.4–10.5)
CHLORIDE SERPL-SCNC: 102 MMOL/L — SIGNIFICANT CHANGE UP (ref 96–108)
CO2 SERPL-SCNC: 23 MMOL/L — SIGNIFICANT CHANGE UP (ref 22–31)
CREAT SERPL-MCNC: 1.26 MG/DL — SIGNIFICANT CHANGE UP (ref 0.5–1.3)
CULTURE RESULTS: SIGNIFICANT CHANGE UP
CULTURE RESULTS: SIGNIFICANT CHANGE UP
ELLIPTOCYTES BLD QL SMEAR: SLIGHT — SIGNIFICANT CHANGE UP
EOSINOPHIL # BLD AUTO: 0 K/UL — SIGNIFICANT CHANGE UP (ref 0–0.5)
EOSINOPHIL NFR BLD AUTO: 0 % — SIGNIFICANT CHANGE UP (ref 0–6)
ESTIMATED AVERAGE GLUCOSE: 128 MG/DL — HIGH (ref 68–114)
GIANT PLATELETS BLD QL SMEAR: PRESENT — SIGNIFICANT CHANGE UP
GLUCOSE SERPL-MCNC: 103 MG/DL — HIGH (ref 70–99)
HCT VFR BLD CALC: 21.6 % — LOW (ref 39–50)
HGB BLD-MCNC: 7.3 G/DL — LOW (ref 13–17)
LDH SERPL L TO P-CCNC: 408 U/L — HIGH (ref 50–242)
LYMPHOCYTES # BLD AUTO: 1.65 K/UL — SIGNIFICANT CHANGE UP (ref 1–3.3)
LYMPHOCYTES # BLD AUTO: 22.3 % — SIGNIFICANT CHANGE UP (ref 13–44)
MACROCYTES BLD QL: SLIGHT — SIGNIFICANT CHANGE UP
MAGNESIUM SERPL-MCNC: 2.5 MG/DL — SIGNIFICANT CHANGE UP (ref 1.6–2.6)
MANUAL SMEAR VERIFICATION: SIGNIFICANT CHANGE UP
MCHC RBC-ENTMCNC: 33.8 GM/DL — SIGNIFICANT CHANGE UP (ref 32–36)
MCHC RBC-ENTMCNC: 35.8 PG — HIGH (ref 27–34)
MCV RBC AUTO: 105.9 FL — HIGH (ref 80–100)
MICROCYTES BLD QL: SLIGHT — SIGNIFICANT CHANGE UP
MONOCYTES # BLD AUTO: 1.06 K/UL — HIGH (ref 0–0.9)
MONOCYTES NFR BLD AUTO: 14.3 % — HIGH (ref 2–14)
MYELOCYTES NFR BLD: 2.7 % — HIGH (ref 0–0)
NEUTROPHILS # BLD AUTO: 3.63 K/UL — SIGNIFICANT CHANGE UP (ref 1.8–7.4)
NEUTROPHILS NFR BLD AUTO: 49.1 % — SIGNIFICANT CHANGE UP (ref 43–77)
NRBC # BLD: 1 /100 — HIGH (ref 0–0)
PHOSPHATE SERPL-MCNC: 4.3 MG/DL — SIGNIFICANT CHANGE UP (ref 2.5–4.5)
PLAT MORPH BLD: ABNORMAL
PLATELET # BLD AUTO: 43 K/UL — LOW (ref 150–400)
POIKILOCYTOSIS BLD QL AUTO: SLIGHT — SIGNIFICANT CHANGE UP
POTASSIUM SERPL-MCNC: 4 MMOL/L — SIGNIFICANT CHANGE UP (ref 3.5–5.3)
POTASSIUM SERPL-SCNC: 4 MMOL/L — SIGNIFICANT CHANGE UP (ref 3.5–5.3)
PROMYELOCYTES # FLD: 0.9 % — HIGH (ref 0–0)
PROT SERPL-MCNC: 7.4 G/DL — SIGNIFICANT CHANGE UP (ref 6–8.3)
RBC # BLD: 2.04 M/UL — LOW (ref 4.2–5.8)
RBC # FLD: 14.5 % — SIGNIFICANT CHANGE UP (ref 10.3–14.5)
RBC BLD AUTO: ABNORMAL
RH IG SCN BLD-IMP: POSITIVE — SIGNIFICANT CHANGE UP
SODIUM SERPL-SCNC: 138 MMOL/L — SIGNIFICANT CHANGE UP (ref 135–145)
SPECIMEN SOURCE: SIGNIFICANT CHANGE UP
SPECIMEN SOURCE: SIGNIFICANT CHANGE UP
URATE SERPL-MCNC: 3.8 MG/DL — SIGNIFICANT CHANGE UP (ref 3.4–8.8)
VARIANT LYMPHS # BLD: 1.8 % — SIGNIFICANT CHANGE UP (ref 0–6)
WBC # BLD: 7.39 K/UL — SIGNIFICANT CHANGE UP (ref 3.8–10.5)
WBC # FLD AUTO: 7.39 K/UL — SIGNIFICANT CHANGE UP (ref 3.8–10.5)

## 2021-08-06 PROCEDURE — 36556 INSERT NON-TUNNEL CV CATH: CPT | Mod: RT

## 2021-08-06 PROCEDURE — 76937 US GUIDE VASCULAR ACCESS: CPT | Mod: 26

## 2021-08-06 PROCEDURE — 36556 INSERT NON-TUNNEL CV CATH: CPT

## 2021-08-06 PROCEDURE — 77001 FLUOROGUIDE FOR VEIN DEVICE: CPT | Mod: 26

## 2021-08-06 PROCEDURE — 99233 SBSQ HOSP IP/OBS HIGH 50: CPT | Mod: GC

## 2021-08-06 RX ORDER — CHLORHEXIDINE GLUCONATE 213 G/1000ML
1 SOLUTION TOPICAL
Refills: 0 | Status: DISCONTINUED | OUTPATIENT
Start: 2021-08-06 | End: 2021-08-12

## 2021-08-06 RX ORDER — METOCLOPRAMIDE HCL 10 MG
10 TABLET ORAL EVERY 6 HOURS
Refills: 0 | Status: DISCONTINUED | OUTPATIENT
Start: 2021-08-06 | End: 2021-08-29

## 2021-08-06 RX ORDER — SODIUM CHLORIDE 9 MG/ML
10 INJECTION INTRAMUSCULAR; INTRAVENOUS; SUBCUTANEOUS
Refills: 0 | Status: DISCONTINUED | OUTPATIENT
Start: 2021-08-06 | End: 2021-08-29

## 2021-08-06 RX ORDER — DAUNORUBICIN HYDROCHLORIDE 5 MG/ML
101.5 INJECTION INTRAVENOUS EVERY 24 HOURS
Refills: 0 | Status: COMPLETED | OUTPATIENT
Start: 2021-08-06 | End: 2021-08-08

## 2021-08-06 RX ORDER — FOSAPREPITANT DIMEGLUMINE 150 MG/5ML
150 INJECTION, POWDER, LYOPHILIZED, FOR SOLUTION INTRAVENOUS ONCE
Refills: 0 | Status: COMPLETED | OUTPATIENT
Start: 2021-08-06 | End: 2021-08-06

## 2021-08-06 RX ORDER — PIPERACILLIN AND TAZOBACTAM 4; .5 G/20ML; G/20ML
3.38 INJECTION, POWDER, LYOPHILIZED, FOR SOLUTION INTRAVENOUS EVERY 8 HOURS
Refills: 0 | Status: COMPLETED | OUTPATIENT
Start: 2021-08-06 | End: 2021-08-06

## 2021-08-06 RX ORDER — CYTARABINE 100 MG
226 VIAL (EA) INJECTION DAILY
Refills: 0 | Status: COMPLETED | OUTPATIENT
Start: 2021-08-06 | End: 2021-08-12

## 2021-08-06 RX ORDER — PIPERACILLIN AND TAZOBACTAM 4; .5 G/20ML; G/20ML
3.38 INJECTION, POWDER, LYOPHILIZED, FOR SOLUTION INTRAVENOUS ONCE
Refills: 0 | Status: DISCONTINUED | OUTPATIENT
Start: 2021-08-06 | End: 2021-08-06

## 2021-08-06 RX ORDER — ONDANSETRON 8 MG/1
8 TABLET, FILM COATED ORAL EVERY 8 HOURS
Refills: 0 | Status: COMPLETED | OUTPATIENT
Start: 2021-08-06 | End: 2021-08-14

## 2021-08-06 RX ADMIN — DIPHENHYDRAMINE HYDROCHLORIDE AND LIDOCAINE HYDROCHLORIDE AND ALUMINUM HYDROXIDE AND MAGNESIUM HYDRO 5 MILLILITER(S): KIT at 05:39

## 2021-08-06 RX ADMIN — HYDROMORPHONE HYDROCHLORIDE 1 MILLIGRAM(S): 2 INJECTION INTRAMUSCULAR; INTRAVENOUS; SUBCUTANEOUS at 05:51

## 2021-08-06 RX ADMIN — Medication 5 MILLILITER(S): at 00:44

## 2021-08-06 RX ADMIN — DAUNORUBICIN HYDROCHLORIDE 181.8 MILLIGRAM(S): 5 INJECTION INTRAVENOUS at 15:26

## 2021-08-06 RX ADMIN — DIPHENHYDRAMINE HYDROCHLORIDE AND LIDOCAINE HYDROCHLORIDE AND ALUMINUM HYDROXIDE AND MAGNESIUM HYDRO 5 MILLILITER(S): KIT at 21:31

## 2021-08-06 RX ADMIN — AMLODIPINE BESYLATE 5 MILLIGRAM(S): 2.5 TABLET ORAL at 05:39

## 2021-08-06 RX ADMIN — Medication 20.93 MILLIGRAM(S): at 15:45

## 2021-08-06 RX ADMIN — ONDANSETRON 8 MILLIGRAM(S): 8 TABLET, FILM COATED ORAL at 13:51

## 2021-08-06 RX ADMIN — FOSAPREPITANT DIMEGLUMINE 300 MILLIGRAM(S): 150 INJECTION, POWDER, LYOPHILIZED, FOR SOLUTION INTRAVENOUS at 13:51

## 2021-08-06 RX ADMIN — Medication 5 MILLILITER(S): at 15:49

## 2021-08-06 RX ADMIN — SODIUM CHLORIDE 125 MILLILITER(S): 9 INJECTION INTRAMUSCULAR; INTRAVENOUS; SUBCUTANEOUS at 17:24

## 2021-08-06 RX ADMIN — PIPERACILLIN AND TAZOBACTAM 25 GRAM(S): 4; .5 INJECTION, POWDER, LYOPHILIZED, FOR SOLUTION INTRAVENOUS at 04:13

## 2021-08-06 RX ADMIN — SODIUM CHLORIDE 75 MILLILITER(S): 9 INJECTION INTRAMUSCULAR; INTRAVENOUS; SUBCUTANEOUS at 05:39

## 2021-08-06 RX ADMIN — DAUNORUBICIN HYDROCHLORIDE 181.8 MILLIGRAM(S): 5 INJECTION INTRAVENOUS at 15:27

## 2021-08-06 RX ADMIN — Medication 300 MILLIGRAM(S): at 11:56

## 2021-08-06 RX ADMIN — Medication 5 MILLILITER(S): at 07:23

## 2021-08-06 RX ADMIN — HYDROMORPHONE HYDROCHLORIDE 1 MILLIGRAM(S): 2 INJECTION INTRAMUSCULAR; INTRAVENOUS; SUBCUTANEOUS at 06:20

## 2021-08-06 RX ADMIN — CHLORHEXIDINE GLUCONATE 1 APPLICATION(S): 213 SOLUTION TOPICAL at 11:57

## 2021-08-06 RX ADMIN — Medication 5 MILLILITER(S): at 20:36

## 2021-08-06 RX ADMIN — ONDANSETRON 8 MILLIGRAM(S): 8 TABLET, FILM COATED ORAL at 21:31

## 2021-08-06 RX ADMIN — Medication 5 MILLILITER(S): at 11:56

## 2021-08-06 RX ADMIN — PIPERACILLIN AND TAZOBACTAM 25 GRAM(S): 4; .5 INJECTION, POWDER, LYOPHILIZED, FOR SOLUTION INTRAVENOUS at 11:56

## 2021-08-06 RX ADMIN — PIPERACILLIN AND TAZOBACTAM 25 GRAM(S): 4; .5 INJECTION, POWDER, LYOPHILIZED, FOR SOLUTION INTRAVENOUS at 20:36

## 2021-08-06 NOTE — PROGRESS NOTE ADULT - PROBLEM SELECTOR PLAN 2
Patient is febrile, not neutropenic   Continue with Zosyn for possible pna   Cultures NGTD 7/29, 8/1, 8/2 Patient is febrile, not neutropenic   Continue with Zosyn for possible PNA, d/c after 8/6 PM dose  Cultures NGTD 7/29, 8/1, 8/2

## 2021-08-06 NOTE — PROGRESS NOTE ADULT - ASSESSMENT
36M with h/o PMH HTN, fatty liver, kidney stones presents with rash, dizziness, fatigue and severe RUQ pain for 3 days. Now with anemia, thrombocytopenia and leukocytosis with 17% blasts, Bone marrow bx c/w AML. Transferred to [address] [place] [address] for management. Patient is pancytopenia secondary to disease.

## 2021-08-06 NOTE — ADVANCED PRACTICE NURSE CONSULT - ASSESSMENT
Pt. seen in bed a/ox4,denies any discomfort at this time. Chemotherapy teachings done.Pt. verbalized understanding. Family at bedside.Both verbalized understanding . Reading materials provided .Pt. has right lower neck  tripple lumen intact and patent .Dsg dry and intact .Site with no s/s of redness ,swelling or pain.All ports with positive blood return noted and flushing easily with 10 ML NS. Drug verification done by 2 RN.'s.  Lab. values reviewed by Dr. Yancey prior to signing orders. Pt. received zofran 8 mg IVP and emend 150 mg IVSS as premedications. At 1526 first syringe of DAUNOrubicin Injectable (eMAR) {Known as CERUBIDINE (eMAR)}  101.5 milliGRAM(s), IV Push given side armed with freeflowing NS given. Blood return checked in between push with positive return noted .Pt. tolerated well then followed with 2nd syringe of DAUNOrubicin Injectable (eMAR) {Known as CERUBIDINE (eMAR)}  101.5 milliGRAM(s), IV Push given side armed with freeflowing NS. Pt. tolerated well. Then at 1545 pt. started with cytarabine IVPB (eMAR)   226 milliGRAM(s) in sodium chloride 0.9% 500 milliLiter(s), IV Intermittent, daily, infuse over 24 Hour(s), via alaris at the rate of 22.9 ml/hr. Left pt. comfortable in bed.Primary RN aware of present treatment.

## 2021-08-06 NOTE — PROGRESS NOTE ADULT - PROBLEM SELECTOR PLAN 1
Patient with peripheral blasts  Peripheral flow cytometry c/w 13% myeloblasts   FLT3 negative, BM bx done, today 8/4, results pending   consented for [place] study    Continue with Allopurinol 300mg daily now, IV hydration, mouth care, pain control, antiemetics   HIV (-), Hep (-)  F/U Echo, MUGA EF 56%   Discussed sperm banking. Declined   HLA sent 8/2  For TLC on 8/5 FLT3 negative, BM bx c/w AML   consented for [place] study    HIV (-), Hep (-)  Echo 70%, MUGA EF 71%   Discussed sperm banking. Declined   TLC placed on 8/6 8/6 Started 7+# (Cytarabine+Daunorubicin)  Follow daily lytes, CBC. Replete prn  Continue with Allopurinol 300mg daily, IV hydration, mouth care, pain control, antiemetics

## 2021-08-06 NOTE — PROGRESS NOTE ADULT - PROBLEM SELECTOR PLAN 3
No VTE ppx due to thrombocytopenia    Contact information: [phone] No VTE ppx due to thrombocytopenia, d/c when plt count drifts <50k    Contact information: [phone]

## 2021-08-06 NOTE — PROGRESS NOTE ADULT - SUBJECTIVE AND OBJECTIVE BOX
Diagnosis:  Acute Myeloid Leukemia, FLT3-    Protocol/Chemo Regimen: 7+3 (Daunorubicin and Cytarabine)    Day:     Pt endorsed:     Review of Systems:     Pain scale:     Diet: DASH    Allergies: No Known Allergies    MEDICATIONS  (STANDING):  allopurinol 300 milliGRAM(s) Oral daily  amLODIPine   Tablet 5 milliGRAM(s) Oral daily  Biotene Dry Mouth Oral Rinse 5 milliLiter(s) Swish and Spit five times a day  FIRST- Mouthwash  BLM 5 milliLiter(s) Swish and Spit three times a day  piperacillin/tazobactam IVPB.. 3.375 Gram(s) IV Intermittent every 8 hours  polyethylene glycol 3350 17 Gram(s) Oral daily  senna 2 Tablet(s) Oral at bedtime  sodium chloride 0.9%. 1000 milliLiter(s) (75 mL/Hr) IV Continuous <Continuous>    MEDICATIONS  (PRN):  acetaminophen   Tablet .. 650 milliGRAM(s) Oral every 6 hours PRN Temp greater or equal to 38C (100.4F), Mild Pain (1 - 3), Moderate Pain (4 - 6)  HYDROmorphone  Injectable 1 milliGRAM(s) IV Push every 4 hours PRN Severe Pain (7 - 10)  sodium chloride 0.65% Nasal 1 Spray(s) Both Nostrils three times a day PRN Nasal Congestion    Vital Signs Last 24 Hrs  T(C): 36.8 (06 Aug 2021 04:49), Max: 37.5 (05 Aug 2021 17:00)  T(F): 98.2 (06 Aug 2021 04:49), Max: 99.5 (05 Aug 2021 17:00)  HR: 93 (06 Aug 2021 04:49) (93 - 110)  BP: 123/75 (06 Aug 2021 04:49) (98/61 - 146/83)  BP(mean): --  RR: 18 (06 Aug 2021 04:49) (18 - 20)  SpO2: 95% (06 Aug 2021 04:49) (94% - 96%)    PHYSICAL EXAM  General: NAD  HEENT: PERRLA, EOMOI, clear oropharynx, anicteric sclera, pink conjunctiva  Neck: supple  CV: (+) S1/S2, reg  Lungs: clear to auscultation, no wheezes or rales  Abdomen: soft, non-tender, non-distended (+) BS  Ext: no clubbing, cyanosis or edema  Skin: no rashes and no petechiae  Neuro: alert and oriented X 3, no focal deficits  PIV     RECENT CULTURES:      Blood cx's (8/5/21) - p  Culture - Urine (08.03.21 @ 23:02)   Specimen Source: Clean Catch Clean Catch (Midstream)   Culture Results: No growth       08-02 @ 06:36  Clean Catch Clean Catch (Midstream)  No growth  --  08-02 @ 01:12  .Blood Blood-Peripheral  No growth to date.  --  08-01 @ 00:44  .Blood Blood-Peripheral  No growth to date.  --  07-30 @ 12:09  Clean Catch Clean Catch (Midstream)  No growth      LABS:             ----------      Radiology      < from: Xray Chest 1 View- PORTABLE-Urgent (Xray Chest 1 View- PORTABLE-Urgent .) (08.05.21 @ 02:57) >  IMPRESSION:  Minimal left atelectasis.    --- End of Report ---                       Diagnosis:  Acute Myeloid Leukemia, FLT3-    Protocol/Chemo Regimen: 7+3 (Daunorubicin and Cytarabine)    Day: 1    Pt endorsed: No overnight events, afebrile. Taking po's    Review of Systems:     Pain scale:     Diet: DASH    Allergies: No Known Allergies    MEDICATIONS  (STANDING):  allopurinol 300 milliGRAM(s) Oral daily  amLODIPine   Tablet 5 milliGRAM(s) Oral daily  Biotene Dry Mouth Oral Rinse 5 milliLiter(s) Swish and Spit five times a day  FIRST- Mouthwash  BLM 5 milliLiter(s) Swish and Spit three times a day  piperacillin/tazobactam IVPB.. 3.375 Gram(s) IV Intermittent every 8 hours  polyethylene glycol 3350 17 Gram(s) Oral daily  senna 2 Tablet(s) Oral at bedtime  sodium chloride 0.9%. 1000 milliLiter(s) (75 mL/Hr) IV Continuous <Continuous>    MEDICATIONS  (PRN):  acetaminophen   Tablet .. 650 milliGRAM(s) Oral every 6 hours PRN Temp greater or equal to 38C (100.4F), Mild Pain (1 - 3), Moderate Pain (4 - 6)  HYDROmorphone  Injectable 1 milliGRAM(s) IV Push every 4 hours PRN Severe Pain (7 - 10)  sodium chloride 0.65% Nasal 1 Spray(s) Both Nostrils three times a day PRN Nasal Congestion    Vital Signs Last 24 Hrs  T(C): 36.8 (06 Aug 2021 04:49), Max: 37.5 (05 Aug 2021 17:00)  T(F): 98.2 (06 Aug 2021 04:49), Max: 99.5 (05 Aug 2021 17:00)  HR: 93 (06 Aug 2021 04:49) (93 - 110)  BP: 123/75 (06 Aug 2021 04:49) (98/61 - 146/83)  BP(mean): --  RR: 18 (06 Aug 2021 04:49) (18 - 20)  SpO2: 95% (06 Aug 2021 04:49) (94% - 96%)    PHYSICAL EXAM  General: Sitting up in bed in NAD  HEENT: PERRLA, EOMOI, clear oropharynx, anicteric sclera, pink conjunctiva  Neck: supple  CV: (+) S1/S2, reg  Lungs: clear to auscultation b/l, no wheezes or rales  Abdomen: soft, non-tender, non-distended (+) BS  Ext: no clubbing, cyanosis or edema  Skin: no rashes and no petechiae  Neuro: alert and oriented X 3, no focal deficits  Central line: c/d/i    RECENT CULTURES:      Blood cx's (8/5/21) - p  Culture - Urine (08.03.21 @ 23:02)   Specimen Source: Clean Catch Clean Catch (Midstream)   Culture Results: No growth       08-02 @ 06:36  Clean Catch Clean Catch (Midstream)  No growth  --  08-02 @ 01:12  .Blood Blood-Peripheral  No growth to date.  --  08-01 @ 00:44  .Blood Blood-Peripheral  No growth to date.  --  07-30 @ 12:09  Clean Catch Clean Catch (Midstream)  No growth      LABS:                        7.3    7.39  )-----------( 43       ( 06 Aug 2021 07:11 )             21.6     06 Aug 2021 07:09    138    |  102    |  17     ----------------------------<  103    4.0     |  23     |  1.26     Ca    9.0        06 Aug 2021 07:09  Phos  4.3       06 Aug 2021 07:09  Mg     2.5       06 Aug 2021 07:09    TPro  7.4    /  Alb  4.0    /  TBili  0.4    /  DBili  x      /  AST  41     /  ALT  86     /  AlkPhos  69     06 Aug 2021 07:09    PT/INR - ( 05 Aug 2021 06:58 )   PT: 14.1 sec;   INR: 1.18 ratio    PTT - ( 05 Aug 2021 06:58 )  PTT:29.1 sec      LIVER FUNCTIONS - ( 06 Aug 2021 07:09 )  Alb: 4.0 g/dL / Pro: 7.4 g/dL / ALK PHOS: 69 U/L / ALT: 86 U/L / AST: 41 U/L / GGT: x               Radiology      < from: Xray Chest 1 View- PORTABLE-Urgent (Xray Chest 1 View- PORTABLE-Urgent .) (08.05.21 @ 02:57) >  IMPRESSION:  Minimal left atelectasis.    --- End of Report ---

## 2021-08-06 NOTE — PRE PROCEDURE NOTE - PRE PROCEDURE EVALUATION
Interventional Radiology    HPI: 36y Male with AML requiring access for chemotherapy IR requested for TLC placement.     Allergies:   Medications (Abx/Cardiac/Anticoagulation/Blood Products)  amLODIPine   Tablet: 5 milliGRAM(s) Oral (08-06 @ 05:39)  piperacillin/tazobactam IVPB..: 25 mL/Hr IV Intermittent (08-06 @ 04:13)    Data:    T(C): 36.6  HR: 97  BP: 138/84  RR: 18  SpO2: 98%    Exam  General: No acute distress  Chest: Non labored breathing    -WBC 7.39 / HgB 7.3 / Hct 21.6 / Plt 43  -Na 138 / Cl 102 / BUN 17 / Glucose 103  -K 4.0 / CO2 23 / Cr 1.26  -ALT 86 / Alk Phos 69 / T.Bili 0.4  -INR1.18    Imaging:     Plan: 36y Male presents for TLC placement   -Risks/Benefits/alternatives explained with the patient and/or healthcare proxy and witnessed informed consent obtained.

## 2021-08-06 NOTE — PROGRESS NOTE ADULT - ATTENDING COMMENTS
35yo M admitted with RUQ pain found to have bloodwork concerning for acute leukemia  -13% myeloblasts on PB  -Flt3 negative. s/p BMbx 8/4 -f/u results  -pain control, improved today. Abd sono showing hepatomegaly and hepatic steatosis. Right pleural effusion. CT chest shows small right pleural effusion  -MUGA EF 71%  -discussed with patient and family at the bedside. We also discussed sperm banking -pt declined  -febrile, Tm101.6. Cx's NGTD. On Zosyn  -if AML confirmed, place TLC  -supportive care  -transfuse as needed 37yo M admitted with RUQ pain found to have bloodwork concerning for acute leukemia  -13% myeloblasts on PB  -Flt3 negative. s/p BMbx 8/4 -AML. f/u cytogenetics, [facility]  -start 7+3 today, cytarabine 100 and dauno 90 -side effects reviewed and informed consent obtained. Today is day 1  -pain control, improved today. Abd sono showing hepatomegaly and hepatic steatosis. Right pleural effusion. CT chest shows small right pleural effusion  -MUGA EF 71%  -discussed with patient and family at the bedside. We also discussed sperm banking -pt declined  -febrile, Cx's NGTD. On Zosyn, today is day 7 -will d/c after completion of 7d  -TLC placement  -supportive care  -transfuse as needed

## 2021-08-07 LAB
ALBUMIN SERPL ELPH-MCNC: 3.9 G/DL — SIGNIFICANT CHANGE UP (ref 3.3–5)
ALP SERPL-CCNC: 68 U/L — SIGNIFICANT CHANGE UP (ref 40–120)
ALT FLD-CCNC: 82 U/L — HIGH (ref 10–45)
ANION GAP SERPL CALC-SCNC: 14 MMOL/L — SIGNIFICANT CHANGE UP (ref 5–17)
AST SERPL-CCNC: 33 U/L — SIGNIFICANT CHANGE UP (ref 10–40)
BASOPHILS # BLD AUTO: 0 K/UL — SIGNIFICANT CHANGE UP (ref 0–0.2)
BASOPHILS NFR BLD AUTO: 0 % — SIGNIFICANT CHANGE UP (ref 0–2)
BILIRUB SERPL-MCNC: 0.4 MG/DL — SIGNIFICANT CHANGE UP (ref 0.2–1.2)
BLASTS # FLD: 16.5 % — HIGH (ref 0–0)
BUN SERPL-MCNC: 15 MG/DL — SIGNIFICANT CHANGE UP (ref 7–23)
CALCIUM SERPL-MCNC: 9.1 MG/DL — SIGNIFICANT CHANGE UP (ref 8.4–10.5)
CHLORIDE SERPL-SCNC: 104 MMOL/L — SIGNIFICANT CHANGE UP (ref 96–108)
CO2 SERPL-SCNC: 21 MMOL/L — LOW (ref 22–31)
CREAT SERPL-MCNC: 1.23 MG/DL — SIGNIFICANT CHANGE UP (ref 0.5–1.3)
CULTURE RESULTS: SIGNIFICANT CHANGE UP
CULTURE RESULTS: SIGNIFICANT CHANGE UP
EOSINOPHIL # BLD AUTO: 0 K/UL — SIGNIFICANT CHANGE UP (ref 0–0.5)
EOSINOPHIL NFR BLD AUTO: 0 % — SIGNIFICANT CHANGE UP (ref 0–6)
GLUCOSE SERPL-MCNC: 99 MG/DL — SIGNIFICANT CHANGE UP (ref 70–99)
HCT VFR BLD CALC: 19.3 % — CRITICAL LOW (ref 39–50)
HGB BLD-MCNC: 6.4 G/DL — CRITICAL LOW (ref 13–17)
LDH SERPL L TO P-CCNC: 405 U/L — HIGH (ref 50–242)
LYMPHOCYTES # BLD AUTO: 0.55 K/UL — LOW (ref 1–3.3)
LYMPHOCYTES # BLD AUTO: 8.7 % — LOW (ref 13–44)
MAGNESIUM SERPL-MCNC: 2.5 MG/DL — SIGNIFICANT CHANGE UP (ref 1.6–2.6)
MANUAL SMEAR VERIFICATION: SIGNIFICANT CHANGE UP
MCHC RBC-ENTMCNC: 33.2 GM/DL — SIGNIFICANT CHANGE UP (ref 32–36)
MCHC RBC-ENTMCNC: 35 PG — HIGH (ref 27–34)
MCV RBC AUTO: 105.5 FL — HIGH (ref 80–100)
MONOCYTES # BLD AUTO: 0.49 K/UL — SIGNIFICANT CHANGE UP (ref 0–0.9)
MONOCYTES NFR BLD AUTO: 7.8 % — SIGNIFICANT CHANGE UP (ref 2–14)
NEUTROPHILS # BLD AUTO: 4.21 K/UL — SIGNIFICANT CHANGE UP (ref 1.8–7.4)
NEUTROPHILS NFR BLD AUTO: 67 % — SIGNIFICANT CHANGE UP (ref 43–77)
PHOSPHATE SERPL-MCNC: 4.6 MG/DL — HIGH (ref 2.5–4.5)
PLAT MORPH BLD: NORMAL — SIGNIFICANT CHANGE UP
PLATELET # BLD AUTO: 48 K/UL — LOW (ref 150–400)
POTASSIUM SERPL-MCNC: 3.9 MMOL/L — SIGNIFICANT CHANGE UP (ref 3.5–5.3)
POTASSIUM SERPL-SCNC: 3.9 MMOL/L — SIGNIFICANT CHANGE UP (ref 3.5–5.3)
PROT SERPL-MCNC: 7.1 G/DL — SIGNIFICANT CHANGE UP (ref 6–8.3)
RBC # BLD: 1.83 M/UL — LOW (ref 4.2–5.8)
RBC # FLD: 14.5 % — SIGNIFICANT CHANGE UP (ref 10.3–14.5)
RBC BLD AUTO: SIGNIFICANT CHANGE UP
SODIUM SERPL-SCNC: 139 MMOL/L — SIGNIFICANT CHANGE UP (ref 135–145)
SPECIMEN SOURCE: SIGNIFICANT CHANGE UP
SPECIMEN SOURCE: SIGNIFICANT CHANGE UP
URATE SERPL-MCNC: 5.2 MG/DL — SIGNIFICANT CHANGE UP (ref 3.4–8.8)
WBC # BLD: 6.28 K/UL — SIGNIFICANT CHANGE UP (ref 3.8–10.5)
WBC # FLD AUTO: 6.28 K/UL — SIGNIFICANT CHANGE UP (ref 3.8–10.5)

## 2021-08-07 PROCEDURE — 99233 SBSQ HOSP IP/OBS HIGH 50: CPT

## 2021-08-07 RX ADMIN — Medication 5 MILLILITER(S): at 11:57

## 2021-08-07 RX ADMIN — SODIUM CHLORIDE 125 MILLILITER(S): 9 INJECTION INTRAMUSCULAR; INTRAVENOUS; SUBCUTANEOUS at 21:59

## 2021-08-07 RX ADMIN — DIPHENHYDRAMINE HYDROCHLORIDE AND LIDOCAINE HYDROCHLORIDE AND ALUMINUM HYDROXIDE AND MAGNESIUM HYDRO 5 MILLILITER(S): KIT at 13:36

## 2021-08-07 RX ADMIN — AMLODIPINE BESYLATE 5 MILLIGRAM(S): 2.5 TABLET ORAL at 05:27

## 2021-08-07 RX ADMIN — SODIUM CHLORIDE 125 MILLILITER(S): 9 INJECTION INTRAMUSCULAR; INTRAVENOUS; SUBCUTANEOUS at 05:27

## 2021-08-07 RX ADMIN — ONDANSETRON 8 MILLIGRAM(S): 8 TABLET, FILM COATED ORAL at 15:35

## 2021-08-07 RX ADMIN — SODIUM CHLORIDE 125 MILLILITER(S): 9 INJECTION INTRAMUSCULAR; INTRAVENOUS; SUBCUTANEOUS at 17:47

## 2021-08-07 RX ADMIN — Medication 20.93 MILLIGRAM(S): at 16:00

## 2021-08-07 RX ADMIN — ONDANSETRON 8 MILLIGRAM(S): 8 TABLET, FILM COATED ORAL at 05:27

## 2021-08-07 RX ADMIN — Medication 5 MILLILITER(S): at 00:30

## 2021-08-07 RX ADMIN — DAUNORUBICIN HYDROCHLORIDE 181.8 MILLIGRAM(S): 5 INJECTION INTRAVENOUS at 15:39

## 2021-08-07 RX ADMIN — ONDANSETRON 8 MILLIGRAM(S): 8 TABLET, FILM COATED ORAL at 21:52

## 2021-08-07 RX ADMIN — CHLORHEXIDINE GLUCONATE 1 APPLICATION(S): 213 SOLUTION TOPICAL at 09:57

## 2021-08-07 RX ADMIN — Medication 5 MILLILITER(S): at 16:26

## 2021-08-07 RX ADMIN — DIPHENHYDRAMINE HYDROCHLORIDE AND LIDOCAINE HYDROCHLORIDE AND ALUMINUM HYDROXIDE AND MAGNESIUM HYDRO 5 MILLILITER(S): KIT at 21:53

## 2021-08-07 RX ADMIN — DIPHENHYDRAMINE HYDROCHLORIDE AND LIDOCAINE HYDROCHLORIDE AND ALUMINUM HYDROXIDE AND MAGNESIUM HYDRO 5 MILLILITER(S): KIT at 05:27

## 2021-08-07 RX ADMIN — Medication 5 MILLILITER(S): at 09:57

## 2021-08-07 RX ADMIN — Medication 300 MILLIGRAM(S): at 11:59

## 2021-08-07 RX ADMIN — SENNA PLUS 2 TABLET(S): 8.6 TABLET ORAL at 21:52

## 2021-08-07 NOTE — PROGRESS NOTE ADULT - ASSESSMENT
36M with h/o PMH HTN, fatty liver, kidney stones presents with rash, dizziness, fatigue and severe RUQ pain for 3 days. Now with anemia, thrombocytopenia and leukocytosis with 17% blasts, Bone marrow bx c/w AML. Transferred to [address] [place] [address] for management. Patient is pancytopenia secondary to disease.

## 2021-08-07 NOTE — PROGRESS NOTE ADULT - PROBLEM SELECTOR PLAN 2
Patient is febrile, not neutropenic   Continue with Zosyn for possible PNA, d/c after 8/6 PM dose  Cultures NGTD 7/29, 8/1, 8/2 Patient is febrile, not neutropenic   Off  Zosyn for possible PNA, d/c after 8/6 PM dose  Cultures NGTD 7/29, 8/1, 8/2

## 2021-08-07 NOTE — PROGRESS NOTE ADULT - ATTENDING COMMENTS
37yo M admitted with RUQ pain found to have bloodwork concerning for acute leukemia  -13% myeloblasts on PB  -Flt3 negative. s/p BMbx 8/4 -AML. f/u cytogenetics, [facility]  -start 7+3 today, cytarabine 100 and dauno 90 -side effects reviewed and informed consent obtained. Today is day 1  -pain control, improved today. Abd sono showing hepatomegaly and hepatic steatosis. Right pleural effusion. CT chest shows small right pleural effusion  -MUGA EF 71%  -discussed with patient and family at the bedside. We also discussed sperm banking -pt declined  -febrile, Cx's NGTD. On Zosyn, today is day 7 -will d/c after completion of 7d  -TLC placement  -supportive care  -transfuse as needed 35yo M admitted with RUQ pain found to have bloodwork concerning for acute leukemia  -13% myeloblasts on PB  -Flt3 negative. s/p BMbx 8/4 -AML. f/u cytogenetics, [facility]  -start 7+3 today, cytarabine 100 and dauno 90 -side effects reviewed and informed consent obtained. Today is day 2  -pain control, improved today. Abd sono showing hepatomegaly and hepatic steatosis. Right pleural effusion. CT chest shows small right pleural effusion  -MUGA EF 71%  -discussed with patient and family at the bedside. We also discussed sperm banking -pt declined  -febrile, Cx's NGTD. On Zosyn, today is day 7 -will d/c after completion of 7d  -TLC placement  -supportive care  -transfuse as needed

## 2021-08-07 NOTE — PROGRESS NOTE ADULT - PROBLEM SELECTOR PLAN 3
No VTE ppx due to thrombocytopenia, d/c when plt count drifts <50k    Contact information: [phone]

## 2021-08-07 NOTE — PROGRESS NOTE ADULT - PROBLEM SELECTOR PLAN 1
FLT3 negative, BM bx c/w AML   consented for [place] study    HIV (-), Hep (-)  Echo 70%, MUGA EF 71%   Discussed sperm banking. Declined   TLC placed on 8/6 8/6 Started 7+# (Cytarabine+Daunorubicin)  Follow daily lytes, CBC. Replete prn  Continue with Allopurinol 300mg daily, IV hydration, mouth care, pain control, antiemetics FLT3 negative, BM bx c/w AML   consented for [place] study    HIV (-), Hep (-)  Echo 70%, MUGA EF 71%   Discussed sperm banking. Declined   TLC placed on 8/6 8/6 Started 7+# (Cytarabine+Daunorubicin)  Follow daily lytes, CBC. Replete prn  Continue with Allopurinol 300mg daily, IV hydration, mouth care, pain control, antiemetics  Anemia-replace PRBC  monitor for TLS daily

## 2021-08-07 NOTE — PROGRESS NOTE ADULT - SUBJECTIVE AND OBJECTIVE BOX
Diagnosis:  Acute Myeloid Leukemia, FLT3-    Protocol/Chemo Regimen: 7+3 (Daunorubicin and Cytarabine)    Day: 2    Pt endorsed: No overnight events, afebrile. Taking po's    Review of Systems:     Pain scale:     Diet: DASH    Allergies    No Known Allergies    Intolerances        ANTIMICROBIALS      HEME/ONC MEDICATIONS  cytarabine IVPB (eMAR) 226 milliGRAM(s) IV Intermittent daily  DAUNOrubicin Injectable (eMAR) 101.5 milliGRAM(s) IV Push Chemo every 24 hours  DAUNOrubicin Injectable (eMAR) 101.5 milliGRAM(s) IV Push Chemo every 24 hours      STANDING MEDICATIONS  allopurinol 300 milliGRAM(s) Oral daily  amLODIPine   Tablet 5 milliGRAM(s) Oral daily  Biotene Dry Mouth Oral Rinse 5 milliLiter(s) Swish and Spit five times a day  chlorhexidine 4% Liquid 1 Application(s) Topical <User Schedule>  FIRST- Mouthwash  BLM 5 milliLiter(s) Swish and Spit three times a day  ondansetron Injectable 8 milliGRAM(s) IV Push every 8 hours  polyethylene glycol 3350 17 Gram(s) Oral daily  senna 2 Tablet(s) Oral at bedtime  sodium chloride 0.9%. 1000 milliLiter(s) IV Continuous <Continuous>      PRN MEDICATIONS  acetaminophen   Tablet .. 650 milliGRAM(s) Oral every 6 hours PRN  HYDROmorphone  Injectable 1 milliGRAM(s) IV Push every 4 hours PRN  metoclopramide Injectable 10 milliGRAM(s) IV Push every 6 hours PRN  sodium chloride 0.65% Nasal 1 Spray(s) Both Nostrils three times a day PRN  sodium chloride 0.9% lock flush 10 milliLiter(s) IV Push every 1 hour PRN        Vital Signs Last 24 Hrs  T(C): 37.3 (07 Aug 2021 05:13), Max: 37.5 (06 Aug 2021 13:05)  T(F): 99.2 (07 Aug 2021 05:13), Max: 99.5 (06 Aug 2021 13:05)  HR: 100 (07 Aug 2021 05:13) (77 - 101)  BP: 126/77 (07 Aug 2021 05:13) (109/60 - 146/85)  BP(mean): --  RR: 18 (07 Aug 2021 05:13) (16 - 18)  SpO2: 94% (07 Aug 2021 05:13) (93% - 98%)    PHYSICAL EXAM  General: Sitting up in bed in NAD  HEENT: PERRLA, EOMOI, clear oropharynx, anicteric sclera, pink conjunctiva  Neck: supple  CV: (+) S1/S2, reg  Lungs: clear to auscultation b/l, no wheezes or rales  Abdomen: soft, non-tender, non-distended (+) BS  Ext: no clubbing, cyanosis or edema  Skin: no rashes and no petechiae  Neuro: alert and oriented X 3, no focal deficits  Central line: c/d/i    LABS: Diagnosis:  Acute Myeloid Leukemia, FLT3-    Protocol/Chemo Regimen: 7+3 (Daunorubicin and Cytarabine)    Day: 2    Pt endorsed: no complaints. Feels good.     Review of Systems: Denies n/v/d. chest pain, sob, headache, weakmess.     Pain scale: denies    Diet: DASH    Allergies    No Known Allergies    Intolerances        ANTIMICROBIALS      HEME/ONC MEDICATIONS  cytarabine IVPB (eMAR) 226 milliGRAM(s) IV Intermittent daily  DAUNOrubicin Injectable (eMAR) 101.5 milliGRAM(s) IV Push Chemo every 24 hours  DAUNOrubicin Injectable (eMAR) 101.5 milliGRAM(s) IV Push Chemo every 24 hours      STANDING MEDICATIONS  allopurinol 300 milliGRAM(s) Oral daily  amLODIPine   Tablet 5 milliGRAM(s) Oral daily  Biotene Dry Mouth Oral Rinse 5 milliLiter(s) Swish and Spit five times a day  chlorhexidine 4% Liquid 1 Application(s) Topical <User Schedule>  FIRST- Mouthwash  BLM 5 milliLiter(s) Swish and Spit three times a day  ondansetron Injectable 8 milliGRAM(s) IV Push every 8 hours  polyethylene glycol 3350 17 Gram(s) Oral daily  senna 2 Tablet(s) Oral at bedtime  sodium chloride 0.9%. 1000 milliLiter(s) IV Continuous <Continuous>      PRN MEDICATIONS  acetaminophen   Tablet .. 650 milliGRAM(s) Oral every 6 hours PRN  HYDROmorphone  Injectable 1 milliGRAM(s) IV Push every 4 hours PRN  metoclopramide Injectable 10 milliGRAM(s) IV Push every 6 hours PRN  sodium chloride 0.65% Nasal 1 Spray(s) Both Nostrils three times a day PRN  sodium chloride 0.9% lock flush 10 milliLiter(s) IV Push every 1 hour PRN        Vital Signs Last 24 Hrs  T(C): 37.3 (07 Aug 2021 05:13), Max: 37.5 (06 Aug 2021 13:05)  T(F): 99.2 (07 Aug 2021 05:13), Max: 99.5 (06 Aug 2021 13:05)  HR: 100 (07 Aug 2021 05:13) (77 - 101)  BP: 126/77 (07 Aug 2021 05:13) (109/60 - 146/85)  BP(mean): --  RR: 18 (07 Aug 2021 05:13) (16 - 18)  SpO2: 94% (07 Aug 2021 05:13) (93% - 98%)    PHYSICAL EXAM  General: Sitting up in bed in NAD  HEENT: PERRLA, EOMOI, clear oropharynx, anicteric sclera, pink conjunctiva  Neck: supple  CV: (+) S1/S2, reg  Lungs: clear to auscultation b/l, no wheezes or rales  Abdomen: soft, non-tender, non-distended (+) BS  Ext: no clubbing, cyanosis or edema  Skin: no rashes and no petechiae  Neuro: alert and oriented X 3, no focal deficits  Central line: c/d/i    LABS:    Blood Cultures:                           6.4    6.28  )-----------( 48       ( 07 Aug 2021 06:41 )             19.3         Mean Cell Volume : 105.5 fl  Mean Cell Hemoglobin : 35.0 pg  Mean Cell Hemoglobin Concentration : 33.2 gm/dL  Auto Neutrophil # : 4.21 K/uL  Auto Lymphocyte # : 0.55 K/uL  Auto Monocyte # : 0.49 K/uL  Auto Eosinophil # : 0.00 K/uL  Auto Basophil # : 0.00 K/uL  Auto Neutrophil % : 67.0 %  Auto Lymphocyte % : 8.7 %  Auto Monocyte % : 7.8 %  Auto Eosinophil % : 0.0 %  Auto Basophil % : 0.0 %      08-07    139  |  104  |  15  ----------------------------<  99  3.9   |  21<L>  |  1.23    Ca    9.1      07 Aug 2021 06:41  Phos  4.6     08-07  Mg     2.5     08-07    TPro  7.1  /  Alb  3.9  /  TBili  0.4  /  DBili  x   /  AST  33  /  ALT  82<H>  /  AlkPhos  68  08-07      Mg 2.5  Phos 4.6            Uric Acid 5.2

## 2021-08-07 NOTE — ADVANCED PRACTICE NURSE CONSULT - ASSESSMENT
Pt. seen in bed a/ox4,denies any discomfort at this time. Chemotherapy teachings done.Pt. verbalized understanding . Pt. has right lower neck  tripple lumen intact and patent .Dsg dry and intact .Site with no s/s of redness ,swelling or pain.All ports with positive blood return noted and flushing easily with 10 ML NS. Drug verification done by 2 RN.'s.  Lab. values reviewed by Dr. Stevens on rounds . Pt. received zofran 8 mg IVP as premedications. At 1539  first syringe of DAUNOrubicin Injectable (eMAR) {Known as CERUBIDINE (eMAR)}  101.5 milliGRAM(s), IV Push given side armed with freeflowing NS given. Blood return checked in between push with positive return noted .Pt. tolerated well then followed with 2nd syringe of DAUNOrubicin Injectable (eMAR) {Known as CERUBIDINE (eMAR)}  101.5 milliGRAM(s), IV Push given side armed with freeflowing NS. Pt. tolerated well. Then at 1600  pt. started with cytarabine IVPB (eMAR)   226 milliGRAM(s) in sodium chloride 0.9% 500 milliLiter(s), IV Intermittent, daily, infuse over 24 Hour(s), via alaris at the rate of 22.9 ml/hr. Left pt. comfortable in bed.Primary RN aware of present treatment.

## 2021-08-08 LAB
ALBUMIN SERPL ELPH-MCNC: 3.3 G/DL — SIGNIFICANT CHANGE UP (ref 3.3–5)
ALP SERPL-CCNC: 52 U/L — SIGNIFICANT CHANGE UP (ref 40–120)
ALT FLD-CCNC: 65 U/L — HIGH (ref 10–45)
ANION GAP SERPL CALC-SCNC: 12 MMOL/L — SIGNIFICANT CHANGE UP (ref 5–17)
AST SERPL-CCNC: 28 U/L — SIGNIFICANT CHANGE UP (ref 10–40)
BASOPHILS # BLD AUTO: 0 K/UL — SIGNIFICANT CHANGE UP (ref 0–0.2)
BASOPHILS NFR BLD AUTO: 0 % — SIGNIFICANT CHANGE UP (ref 0–2)
BILIRUB SERPL-MCNC: 0.4 MG/DL — SIGNIFICANT CHANGE UP (ref 0.2–1.2)
BLASTS # FLD: 6.1 % — HIGH (ref 0–0)
BUN SERPL-MCNC: 11 MG/DL — SIGNIFICANT CHANGE UP (ref 7–23)
CALCIUM SERPL-MCNC: 7.9 MG/DL — LOW (ref 8.4–10.5)
CHLORIDE SERPL-SCNC: 109 MMOL/L — HIGH (ref 96–108)
CO2 SERPL-SCNC: 18 MMOL/L — LOW (ref 22–31)
CREAT SERPL-MCNC: 0.93 MG/DL — SIGNIFICANT CHANGE UP (ref 0.5–1.3)
EOSINOPHIL # BLD AUTO: 0 K/UL — SIGNIFICANT CHANGE UP (ref 0–0.5)
EOSINOPHIL NFR BLD AUTO: 0 % — SIGNIFICANT CHANGE UP (ref 0–6)
GIANT PLATELETS BLD QL SMEAR: PRESENT — SIGNIFICANT CHANGE UP
GLUCOSE SERPL-MCNC: 88 MG/DL — SIGNIFICANT CHANGE UP (ref 70–99)
HCT VFR BLD CALC: 22.4 % — LOW (ref 39–50)
HGB BLD-MCNC: 7.6 G/DL — LOW (ref 13–17)
LDH SERPL L TO P-CCNC: 343 U/L — HIGH (ref 50–242)
LYMPHOCYTES # BLD AUTO: 0.28 K/UL — LOW (ref 1–3.3)
LYMPHOCYTES # BLD AUTO: 11.4 % — LOW (ref 13–44)
MAGNESIUM SERPL-MCNC: 2.4 MG/DL — SIGNIFICANT CHANGE UP (ref 1.6–2.6)
MANUAL SMEAR VERIFICATION: SIGNIFICANT CHANGE UP
MCHC RBC-ENTMCNC: 33.9 GM/DL — SIGNIFICANT CHANGE UP (ref 32–36)
MCHC RBC-ENTMCNC: 34.4 PG — HIGH (ref 27–34)
MCV RBC AUTO: 101.4 FL — HIGH (ref 80–100)
MONOCYTES # BLD AUTO: 0.11 K/UL — SIGNIFICANT CHANGE UP (ref 0–0.9)
MONOCYTES NFR BLD AUTO: 4.4 % — SIGNIFICANT CHANGE UP (ref 2–14)
NEUTROPHILS # BLD AUTO: 1.92 K/UL — SIGNIFICANT CHANGE UP (ref 1.8–7.4)
NEUTROPHILS NFR BLD AUTO: 78.1 % — HIGH (ref 43–77)
PHOSPHATE SERPL-MCNC: 4.3 MG/DL — SIGNIFICANT CHANGE UP (ref 2.5–4.5)
PLAT MORPH BLD: ABNORMAL
PLATELET # BLD AUTO: 37 K/UL — LOW (ref 150–400)
POTASSIUM SERPL-MCNC: 3.4 MMOL/L — LOW (ref 3.5–5.3)
POTASSIUM SERPL-SCNC: 3.4 MMOL/L — LOW (ref 3.5–5.3)
PROT SERPL-MCNC: 6.1 G/DL — SIGNIFICANT CHANGE UP (ref 6–8.3)
RBC # BLD: 2.21 M/UL — LOW (ref 4.2–5.8)
RBC # FLD: 15.9 % — HIGH (ref 10.3–14.5)
RBC BLD AUTO: SIGNIFICANT CHANGE UP
SODIUM SERPL-SCNC: 139 MMOL/L — SIGNIFICANT CHANGE UP (ref 135–145)
URATE SERPL-MCNC: 5.5 MG/DL — SIGNIFICANT CHANGE UP (ref 3.4–8.8)
WBC # BLD: 2.46 K/UL — LOW (ref 3.8–10.5)
WBC # FLD AUTO: 2.46 K/UL — LOW (ref 3.8–10.5)

## 2021-08-08 PROCEDURE — 99233 SBSQ HOSP IP/OBS HIGH 50: CPT

## 2021-08-08 RX ORDER — POTASSIUM CHLORIDE 20 MEQ
20 PACKET (EA) ORAL ONCE
Refills: 0 | Status: COMPLETED | OUTPATIENT
Start: 2021-08-08 | End: 2021-08-08

## 2021-08-08 RX ADMIN — DIPHENHYDRAMINE HYDROCHLORIDE AND LIDOCAINE HYDROCHLORIDE AND ALUMINUM HYDROXIDE AND MAGNESIUM HYDRO 5 MILLILITER(S): KIT at 05:58

## 2021-08-08 RX ADMIN — ONDANSETRON 8 MILLIGRAM(S): 8 TABLET, FILM COATED ORAL at 05:59

## 2021-08-08 RX ADMIN — Medication 5 MILLILITER(S): at 00:12

## 2021-08-08 RX ADMIN — ONDANSETRON 8 MILLIGRAM(S): 8 TABLET, FILM COATED ORAL at 21:12

## 2021-08-08 RX ADMIN — CHLORHEXIDINE GLUCONATE 1 APPLICATION(S): 213 SOLUTION TOPICAL at 06:43

## 2021-08-08 RX ADMIN — DIPHENHYDRAMINE HYDROCHLORIDE AND LIDOCAINE HYDROCHLORIDE AND ALUMINUM HYDROXIDE AND MAGNESIUM HYDRO 5 MILLILITER(S): KIT at 13:55

## 2021-08-08 RX ADMIN — DAUNORUBICIN HYDROCHLORIDE 181.8 MILLIGRAM(S): 5 INJECTION INTRAVENOUS at 15:09

## 2021-08-08 RX ADMIN — ONDANSETRON 8 MILLIGRAM(S): 8 TABLET, FILM COATED ORAL at 13:56

## 2021-08-08 RX ADMIN — Medication 20.93 MILLIGRAM(S): at 15:30

## 2021-08-08 RX ADMIN — Medication 5 MILLILITER(S): at 21:13

## 2021-08-08 RX ADMIN — Medication 5 MILLILITER(S): at 12:14

## 2021-08-08 RX ADMIN — DIPHENHYDRAMINE HYDROCHLORIDE AND LIDOCAINE HYDROCHLORIDE AND ALUMINUM HYDROXIDE AND MAGNESIUM HYDRO 5 MILLILITER(S): KIT at 21:12

## 2021-08-08 RX ADMIN — Medication 5 MILLILITER(S): at 16:02

## 2021-08-08 RX ADMIN — Medication 300 MILLIGRAM(S): at 12:14

## 2021-08-08 RX ADMIN — Medication 50 MILLIEQUIVALENT(S): at 10:36

## 2021-08-08 RX ADMIN — Medication 5 MILLILITER(S): at 08:33

## 2021-08-08 RX ADMIN — AMLODIPINE BESYLATE 5 MILLIGRAM(S): 2.5 TABLET ORAL at 05:58

## 2021-08-08 NOTE — PROGRESS NOTE ADULT - SUBJECTIVE AND OBJECTIVE BOX
Diagnosis:  Acute Myeloid Leukemia, FLT3-    Protocol/Chemo Regimen: 7+3 (Daunorubicin and Cytarabine)    Day: 3    Pt endorsed: no complaints. Feels good.     Review of Systems: Denies nausea, vomiting, diarrhea, chest pain, SOB     Pain scale: denies    Diet: DASH    Allergies: No Known Allergies    ------------------           Diagnosis:  Acute Myeloid Leukemia, FLT3-    Protocol/Chemo Regimen: 7+3 (Daunorubicin and Cytarabine)    Day: 3    Pt endorsed: no acute complaints     Review of Systems: Denies nausea, vomiting, diarrhea, chest pain, SOB     Pain scale: denies    Diet: DASH    Allergies: No Known Allergies    HEME/ONC MEDICATIONS  cytarabine IVPB (eMAR) 226 milliGRAM(s) IV Intermittent daily  DAUNOrubicin Injectable (eMAR) 101.5 milliGRAM(s) IV Push Chemo every 24 hours  DAUNOrubicin Injectable (eMAR) 101.5 milliGRAM(s) IV Push Chemo every 24 hours    STANDING MEDICATIONS  allopurinol 300 milliGRAM(s) Oral daily  amLODIPine   Tablet 5 milliGRAM(s) Oral daily  Biotene Dry Mouth Oral Rinse 5 milliLiter(s) Swish and Spit five times a day  chlorhexidine 4% Liquid 1 Application(s) Topical <User Schedule>  FIRST- Mouthwash  BLM 5 milliLiter(s) Swish and Spit three times a day  ondansetron Injectable 8 milliGRAM(s) IV Push every 8 hours  polyethylene glycol 3350 17 Gram(s) Oral daily  senna 2 Tablet(s) Oral at bedtime  sodium chloride 0.9%. 1000 milliLiter(s) IV Continuous <Continuous>    PRN MEDICATIONS  acetaminophen   Tablet .. 650 milliGRAM(s) Oral every 6 hours PRN  metoclopramide Injectable 10 milliGRAM(s) IV Push every 6 hours PRN  sodium chloride 0.65% Nasal 1 Spray(s) Both Nostrils three times a day PRN  sodium chloride 0.9% lock flush 10 milliLiter(s) IV Push every 1 hour PRN    Vital Signs Last 24 Hrs  T(C): 37 (08 Aug 2021 09:30), Max: 37.8 (08 Aug 2021 05:44)  T(F): 98.6 (08 Aug 2021 09:30), Max: 100.1 (08 Aug 2021 05:44)  HR: 99 (08 Aug 2021 09:30) (97 - 104)  BP: 136/87 (08 Aug 2021 09:30) (117/76 - 142/78)  BP(mean): --  RR: 18 (08 Aug 2021 09:30) (16 - 18)  SpO2: 97% (08 Aug 2021 09:30) (95% - 97%)    PHYSICAL EXAM  General: adult in NAD  HEENT: clear oropharynx, no erythema, no ulcers  Neck: supple  CV: normal S1, S2, RRR  Lungs: clear to auscultation, no wheezes, no rales  Abdomen: soft, nontender, nondistended, normal BS  Ext: no edema  Skin: no rash  Neuro: alert and oriented x 3  Central line: normal     LABS:                        7.6    2.46  )-----------( 37       ( 08 Aug 2021 07:01 )             22.4     Mean Cell Volume : 101.4 fl  Mean Cell Hemoglobin : 34.4 pg  Mean Cell Hemoglobin Concentration : 33.9 gm/dL  Auto Neutrophil # : 1.92 K/uL  Auto Lymphocyte # : 0.28 K/uL  Auto Monocyte # : 0.11 K/uL  Auto Eosinophil # : 0.00 K/uL  Auto Basophil # : 0.00 K/uL  Auto Neutrophil % : 78.1 %  Auto Lymphocyte % : 11.4 %  Auto Monocyte % : 4.4 %  Auto Eosinophil % : 0.0 %  Auto Basophil % : 0.0 %    08-08  139  |  109<H>  |  11  ----------------------------<  88  3.4<L>   |  18<L>  |  0.93    Ca    7.9<L>      08 Aug 2021 07:01  Phos  4.3     08-08  Mg     2.4     08-08    TPro  6.1  /  Alb  3.3  /  TBili  0.4  /  DBili  x   /  AST  28  /  ALT  65<H>  /  AlkPhos  52  08-08    Mg 2.4  Phos 4.3      Uric Acid 5.5    RADIOLOGY & ADDITIONAL STUDIES:  < from: Xray Chest 1 View- PORTABLE-Urgent (Xray Chest 1 View- PORTABLE-Urgent .) (08.05.21 @ 02:57) >  IMPRESSION:  Minimal left atelectasis.     Diagnosis:  Acute Myeloid Leukemia, FLT3-    Protocol/Chemo Regimen: 7+3 (Daunorubicin and Cytarabine)    Day: 3    Pt endorsed: no acute complaints     Review of Systems: Denies nausea, vomiting, diarrhea, chest pain, SOB     Pain scale: denies    Diet: DASH    Allergies: No Known Allergies    HEME/ONC MEDICATIONS  cytarabine IVPB (eMAR) 226 milliGRAM(s) IV Intermittent daily  DAUNOrubicin Injectable (eMAR) 101.5 milliGRAM(s) IV Push Chemo every 24 hours  DAUNOrubicin Injectable (eMAR) 101.5 milliGRAM(s) IV Push Chemo every 24 hours    STANDING MEDICATIONS  allopurinol 300 milliGRAM(s) Oral daily  amLODIPine   Tablet 5 milliGRAM(s) Oral daily  Biotene Dry Mouth Oral Rinse 5 milliLiter(s) Swish and Spit five times a day  chlorhexidine 4% Liquid 1 Application(s) Topical <User Schedule>  FIRST- Mouthwash  BLM 5 milliLiter(s) Swish and Spit three times a day  ondansetron Injectable 8 milliGRAM(s) IV Push every 8 hours  polyethylene glycol 3350 17 Gram(s) Oral daily  senna 2 Tablet(s) Oral at bedtime  sodium chloride 0.9%. 1000 milliLiter(s) IV Continuous <Continuous>    PRN MEDICATIONS  acetaminophen   Tablet .. 650 milliGRAM(s) Oral every 6 hours PRN  metoclopramide Injectable 10 milliGRAM(s) IV Push every 6 hours PRN  sodium chloride 0.65% Nasal 1 Spray(s) Both Nostrils three times a day PRN  sodium chloride 0.9% lock flush 10 milliLiter(s) IV Push every 1 hour PRN    Vital Signs Last 24 Hrs  T(C): 37 (08 Aug 2021 09:30), Max: 37.8 (08 Aug 2021 05:44)  T(F): 98.6 (08 Aug 2021 09:30), Max: 100.1 (08 Aug 2021 05:44)  HR: 99 (08 Aug 2021 09:30) (97 - 104)  BP: 136/87 (08 Aug 2021 09:30) (117/76 - 142/78)  BP(mean): --  RR: 18 (08 Aug 2021 09:30) (16 - 18)  SpO2: 97% (08 Aug 2021 09:30) (95% - 97%)    PHYSICAL EXAM  General: adult in NAD  HEENT: clear oropharynx, no erythema, ulcer on left lateral tongue   Neck: supple  CV: normal S1, S2, RRR  Lungs: clear to auscultation, no wheezes, no rales  Abdomen: soft, nontender, nondistended, normal BS  Ext: no edema  Skin: no rash  Neuro: alert and oriented x 3  Central line: normal     LABS:                        7.6    2.46  )-----------( 37       ( 08 Aug 2021 07:01 )             22.4     Mean Cell Volume : 101.4 fl  Mean Cell Hemoglobin : 34.4 pg  Mean Cell Hemoglobin Concentration : 33.9 gm/dL  Auto Neutrophil # : 1.92 K/uL  Auto Lymphocyte # : 0.28 K/uL  Auto Monocyte # : 0.11 K/uL  Auto Eosinophil # : 0.00 K/uL  Auto Basophil # : 0.00 K/uL  Auto Neutrophil % : 78.1 %  Auto Lymphocyte % : 11.4 %  Auto Monocyte % : 4.4 %  Auto Eosinophil % : 0.0 %  Auto Basophil % : 0.0 %    08-08  139  |  109<H>  |  11  ----------------------------<  88  3.4<L>   |  18<L>  |  0.93    Ca    7.9<L>      08 Aug 2021 07:01  Phos  4.3     08-08  Mg     2.4     08-08    TPro  6.1  /  Alb  3.3  /  TBili  0.4  /  DBili  x   /  AST  28  /  ALT  65<H>  /  AlkPhos  52  08-08    Mg 2.4  Phos 4.3      Uric Acid 5.5    RADIOLOGY & ADDITIONAL STUDIES:  < from: Xray Chest 1 View- PORTABLE-Urgent (Xray Chest 1 View- PORTABLE-Urgent .) (08.05.21 @ 02:57) >  IMPRESSION:  Minimal left atelectasis.

## 2021-08-08 NOTE — PROGRESS NOTE ADULT - ATTENDING COMMENTS
37yo M admitted with RUQ pain found to have bloodwork concerning for acute leukemia  -13% myeloblasts on PB  -Flt3 negative. s/p BMbx 8/4 -AML. f/u [facility]ogenetics, [facility]  -start 7+3 today, cytarabine 100 and dauno 90 -side effects reviewed and informed consent obtained. Today is day 2  -pain control, improved today. Abd sono showing hepatomegaly and hepatic steatosis. Right pleural effusion. CT chest shows small right pleural effusion  -MUGA EF 71%  -discussed with patient and family at the bedside. We also discussed sperm banking -pt declined  -febrile, Cx's NGTD. On Zosyn, today is day 7 -will d/c after completion of 7d  -TLC placement  -supportive care  -transfuse as needed 37yo M admitted with RUQ pain found to have bloodwork concerning for acute leukemia  -13% myeloblasts on PB  -Flt3 negative. s/p BMbx 8/4 -AML. f/u cytogenetics, [facility]  -start 7+3 today, cytarabine 100 and dauno 90 -side effects reviewed and informed consent obtained. Today is day 3  -pain control, improved today. Abd sono showing hepatomegaly and hepatic steatosis. Right pleural effusion. CT chest shows small right pleural effusion  -MUGA EF 71%  -discussed with patient and family at the bedside. We also discussed sperm banking -pt declined  -febrile, Cx's NGTD. On Zosyn, today is day 7 -will d/c after completion of 7d  -TLC placement  -supportive care  -transfuse as needed

## 2021-08-08 NOTE — ADVANCED PRACTICE NURSE CONSULT - ASSESSMENT
Pt. seen in bed a/ox4,denies any discomfort at this time. Chemotherapy teachings done.Pt. verbalized understanding . Pt. has right lower neck  tripple lumen intact and patent .Dsg dry and intact .Site with no s/s of redness ,swelling or pain.All ports with positive blood return noted and flushing easily with 10 ML NS. Drug verification done by 2 RN.'s.  Lab. values reviewed by Dr. Stevens on rounds . Pt. received zofran 8 mg IVP as premedications. At 1509  first syringe of DAUNOrubicin Injectable (eMAR) {Known as CERUBIDINE (eMAR)}  101.5 milliGRAM(s), IV Push given side armed with freeflowing NS given. Blood return checked in between push with positive return noted .Pt. tolerated well then followed with 2nd syringe of DAUNOrubicin Injectable (eMAR) {Known as CERUBIDINE (eMAR)}  101.5 milliGRAM(s), IV Push given side armed with freeflowing NS. Pt. tolerated well. Then at 1530  pt. started with cytarabine IVPB (eMAR)   226 milliGRAM(s) in sodium chloride 0.9% 500 milliLiter(s), IV Intermittent, daily, infuse over 24 Hour(s), via alaris at the rate of 22.9 ml/hr. Left pt. comfortable in bed.Primary RN aware of present treatment.

## 2021-08-08 NOTE — PROGRESS NOTE ADULT - ASSESSMENT
36M with h/o PMH HTN, fatty liver, kidney stones presents with rash, dizziness, fatigue and severe RUQ pain for 3 days. Now with anemia, thrombocytopenia and leukocytosis with 17% blasts, Bone marrow bx c/w AML. Transferred to [address] [place] [address] for management. Patient is pancytopenia secondary to disease.

## 2021-08-08 NOTE — PROGRESS NOTE ADULT - PROBLEM SELECTOR PLAN 1
FLT3 negative, BM bx c/w AML   consented for [place] study    HIV (-), Hep (-)  Echo 70%, MUGA EF 71%   Discussed sperm banking. Declined   TLC placed on 8/6 8/6 Started 7+# (Cytarabine+Daunorubicin)  Follow daily lytes, CBC. Replete prn  Continue with Allopurinol 300mg daily, IV hydration, mouth care, pain control, antiemetics  monitor for TLS daily FLT3 negative, BM bx c/w AML   consented for [place] study    HIV (-), Hep (-)  Echo 70%, MUGA EF 71%   Discussed sperm banking. Declined   TLC placed on 8/6 8/6 Started 7+# (Cytarabine + Daunorubicin)  Follow daily lytes, CBC. Replete prn  Continue with Allopurinol 300mg daily, IV hydration, mouth care, pain control, antiemetics  Monitor for TLS daily  - Hypokalemia: replace kcl 20 meq iv x 1

## 2021-08-08 NOTE — PROGRESS NOTE ADULT - PROBLEM SELECTOR PLAN 2
Patient is febrile, not neutropenic   Off  Zosyn for possible PNA, d/c after 8/6 PM dose  Cultures NGTD 7/29, 8/1, 8/2 Patient is afebrile, not neutropenic   Off Zosyn for possible PNA, d/c after 8/6 PM dose  Cultures NGTD 7/29, 8/1, 8/2

## 2021-08-08 NOTE — PROGRESS NOTE ADULT - PROBLEM SELECTOR PLAN 3
No VTE ppx due to thrombocytopenia, d/c when plt count drifts <50k    Contact information: [phone] No VTE ppx due to thrombocytopenia     Contact information: [phone]

## 2021-08-09 LAB
ALBUMIN SERPL ELPH-MCNC: 3.5 G/DL — SIGNIFICANT CHANGE UP (ref 3.3–5)
ALP SERPL-CCNC: 55 U/L — SIGNIFICANT CHANGE UP (ref 40–120)
ALT FLD-CCNC: 59 U/L — HIGH (ref 10–45)
ANION GAP SERPL CALC-SCNC: 10 MMOL/L — SIGNIFICANT CHANGE UP (ref 5–17)
APTT BLD: 26.8 SEC — LOW (ref 27.5–35.5)
AST SERPL-CCNC: 22 U/L — SIGNIFICANT CHANGE UP (ref 10–40)
BASOPHILS # BLD AUTO: 0 K/UL — SIGNIFICANT CHANGE UP (ref 0–0.2)
BASOPHILS NFR BLD AUTO: 0 % — SIGNIFICANT CHANGE UP (ref 0–2)
BILIRUB SERPL-MCNC: 0.4 MG/DL — SIGNIFICANT CHANGE UP (ref 0.2–1.2)
BLASTS # FLD: 0.9 % — HIGH (ref 0–0)
BLD GP AB SCN SERPL QL: NEGATIVE — SIGNIFICANT CHANGE UP
BUN SERPL-MCNC: 12 MG/DL — SIGNIFICANT CHANGE UP (ref 7–23)
CALCIUM SERPL-MCNC: 9.3 MG/DL — SIGNIFICANT CHANGE UP (ref 8.4–10.5)
CHLORIDE SERPL-SCNC: 106 MMOL/L — SIGNIFICANT CHANGE UP (ref 96–108)
CO2 SERPL-SCNC: 22 MMOL/L — SIGNIFICANT CHANGE UP (ref 22–31)
CREAT SERPL-MCNC: 0.99 MG/DL — SIGNIFICANT CHANGE UP (ref 0.5–1.3)
EOSINOPHIL # BLD AUTO: 0 K/UL — SIGNIFICANT CHANGE UP (ref 0–0.5)
EOSINOPHIL NFR BLD AUTO: 0 % — SIGNIFICANT CHANGE UP (ref 0–6)
GIANT PLATELETS BLD QL SMEAR: PRESENT — SIGNIFICANT CHANGE UP
GLUCOSE SERPL-MCNC: 98 MG/DL — SIGNIFICANT CHANGE UP (ref 70–99)
HCT VFR BLD CALC: 20.8 % — CRITICAL LOW (ref 39–50)
HGB BLD-MCNC: 7.1 G/DL — LOW (ref 13–17)
INR BLD: 1.18 RATIO — HIGH (ref 0.88–1.16)
LDH SERPL L TO P-CCNC: 348 U/L — HIGH (ref 50–242)
LYMPHOCYTES # BLD AUTO: 0.16 K/UL — LOW (ref 1–3.3)
LYMPHOCYTES # BLD AUTO: 15.9 % — SIGNIFICANT CHANGE UP (ref 13–44)
MAGNESIUM SERPL-MCNC: 2.3 MG/DL — SIGNIFICANT CHANGE UP (ref 1.6–2.6)
MANUAL SMEAR VERIFICATION: SIGNIFICANT CHANGE UP
MCHC RBC-ENTMCNC: 34.1 GM/DL — SIGNIFICANT CHANGE UP (ref 32–36)
MCHC RBC-ENTMCNC: 34.5 PG — HIGH (ref 27–34)
MCV RBC AUTO: 101 FL — HIGH (ref 80–100)
MONOCYTES # BLD AUTO: 0.09 K/UL — SIGNIFICANT CHANGE UP (ref 0–0.9)
MONOCYTES NFR BLD AUTO: 9.4 % — SIGNIFICANT CHANGE UP (ref 2–14)
NEUTROPHILS # BLD AUTO: 0.74 K/UL — LOW (ref 1.8–7.4)
NEUTROPHILS NFR BLD AUTO: 73.8 % — SIGNIFICANT CHANGE UP (ref 43–77)
PHOSPHATE SERPL-MCNC: 4.3 MG/DL — SIGNIFICANT CHANGE UP (ref 2.5–4.5)
PLAT MORPH BLD: NORMAL — SIGNIFICANT CHANGE UP
PLATELET # BLD AUTO: 34 K/UL — LOW (ref 150–400)
POTASSIUM SERPL-MCNC: 4.1 MMOL/L — SIGNIFICANT CHANGE UP (ref 3.5–5.3)
POTASSIUM SERPL-SCNC: 4.1 MMOL/L — SIGNIFICANT CHANGE UP (ref 3.5–5.3)
PROT SERPL-MCNC: 6.3 G/DL — SIGNIFICANT CHANGE UP (ref 6–8.3)
PROTHROM AB SERPL-ACNC: 14.1 SEC — HIGH (ref 10.6–13.6)
RBC # BLD: 2.06 M/UL — LOW (ref 4.2–5.8)
RBC # FLD: 15.8 % — HIGH (ref 10.3–14.5)
RBC BLD AUTO: SIGNIFICANT CHANGE UP
RH IG SCN BLD-IMP: POSITIVE — SIGNIFICANT CHANGE UP
SODIUM SERPL-SCNC: 138 MMOL/L — SIGNIFICANT CHANGE UP (ref 135–145)
URATE SERPL-MCNC: 5 MG/DL — SIGNIFICANT CHANGE UP (ref 3.4–8.8)
WBC # BLD: 1 K/UL — CRITICAL LOW (ref 3.8–10.5)
WBC # FLD AUTO: 1 K/UL — CRITICAL LOW (ref 3.8–10.5)

## 2021-08-09 PROCEDURE — 99232 SBSQ HOSP IP/OBS MODERATE 35: CPT | Mod: GC

## 2021-08-09 RX ORDER — POSACONAZOLE 100 MG/1
300 TABLET, DELAYED RELEASE ORAL DAILY
Refills: 0 | Status: DISCONTINUED | OUTPATIENT
Start: 2021-08-09 | End: 2021-08-29

## 2021-08-09 RX ADMIN — Medication 10 MILLIGRAM(S): at 04:09

## 2021-08-09 RX ADMIN — ONDANSETRON 8 MILLIGRAM(S): 8 TABLET, FILM COATED ORAL at 21:43

## 2021-08-09 RX ADMIN — Medication 5 MILLILITER(S): at 11:37

## 2021-08-09 RX ADMIN — ONDANSETRON 8 MILLIGRAM(S): 8 TABLET, FILM COATED ORAL at 13:13

## 2021-08-09 RX ADMIN — Medication 300 MILLIGRAM(S): at 11:36

## 2021-08-09 RX ADMIN — AMLODIPINE BESYLATE 5 MILLIGRAM(S): 2.5 TABLET ORAL at 06:19

## 2021-08-09 RX ADMIN — Medication 20.93 MILLIGRAM(S): at 14:33

## 2021-08-09 RX ADMIN — CHLORHEXIDINE GLUCONATE 1 APPLICATION(S): 213 SOLUTION TOPICAL at 10:24

## 2021-08-09 RX ADMIN — Medication 5 MILLILITER(S): at 17:15

## 2021-08-09 RX ADMIN — ONDANSETRON 8 MILLIGRAM(S): 8 TABLET, FILM COATED ORAL at 06:17

## 2021-08-09 RX ADMIN — Medication 5 MILLILITER(S): at 23:55

## 2021-08-09 RX ADMIN — DIPHENHYDRAMINE HYDROCHLORIDE AND LIDOCAINE HYDROCHLORIDE AND ALUMINUM HYDROXIDE AND MAGNESIUM HYDRO 5 MILLILITER(S): KIT at 21:43

## 2021-08-09 RX ADMIN — POSACONAZOLE 300 MILLIGRAM(S): 100 TABLET, DELAYED RELEASE ORAL at 11:37

## 2021-08-09 RX ADMIN — Medication 5 MILLILITER(S): at 10:25

## 2021-08-09 RX ADMIN — SODIUM CHLORIDE 125 MILLILITER(S): 9 INJECTION INTRAMUSCULAR; INTRAVENOUS; SUBCUTANEOUS at 23:55

## 2021-08-09 RX ADMIN — DIPHENHYDRAMINE HYDROCHLORIDE AND LIDOCAINE HYDROCHLORIDE AND ALUMINUM HYDROXIDE AND MAGNESIUM HYDRO 5 MILLILITER(S): KIT at 06:19

## 2021-08-09 NOTE — PROGRESS NOTE ADULT - PROBLEM SELECTOR PLAN 1
FLT3 negative, BM bx c/w AML   consented for [place] study    HIV (-), Hep (-)  Echo 70%, MUGA EF 71%   Discussed sperm banking. Declined   TLC placed on 8/6 8/6 Started 7+# (Cytarabine + Daunorubicin)  Follow daily lytes, CBC. Replete prn  Continue with Allopurinol 300mg daily, IV hydration, mouth care, pain control, antiemetics  Monitor for TLS daily FLT3 negative, BM bx c/w AML   consented for [place] study    HIV (-), Hep (-)  Echo 70%, MUGA EF 71%   Discussed sperm banking. Declined   TLC placed on 8/6 8/6 Started 7+# (Cytarabine + Daunorubicin)  Follow daily lytes, CBC. replace, Replete prn  Continue with Allopurinol 300mg daily, IV hydration, mouth care, pain control, antiemetics  Monitor for TLS daily FLT3 negative, BM bx c/w AML   consented for [place] study    HIV (-), Hep (-)  Echo 70%, MUGA EF 71%   Discussed sperm banking. Declined   TLC placed on 8/6 8/6 Started 7+# (Cytarabine + Daunorubicin)  Follow daily lytes, CBC. replace, Replete prn  Continue with Allopurinol 300mg daily, IV hydration, mouth care, pain control, antiemetics  Monitor for TLS daily  Day 14 Bm bx due on 8/19

## 2021-08-09 NOTE — ADVANCED PRACTICE NURSE CONSULT - ASSESSMENT
Pt. seen in bed a/ox4,denies any discomfort at this time. Pt. verbalized understanding re- chemo TX. . w/ R IJ  TLCL , intact and patent .Dsg dry and intact .Site with no s/s of redness ,swelling or pain.All ports with positive blood return noted and flushing easily with 10 ML NS. Drug verification done by 2 RN.'s.  Lab. values reviewed by   on rounds . Pt. received zofran 8 mg IVP as pre-med. Cytarabine  100 mg/m2=  226 milliGRAM(s) in sodium chloride 0.9% 500 milliLiter(s), CIVI,  to infuse over 24 Hour(s), via alaris at the rate of 22.9 ml/hr,started @ 1433 pm.. Left pt. comfortable in bed.Primary RN aware of chemo TX.,will follow.

## 2021-08-09 NOTE — PROGRESS NOTE ADULT - NSICDXPILOT_GEN_ALL_CORE
[place]

## 2021-08-09 NOTE — PROGRESS NOTE ADULT - SUBJECTIVE AND OBJECTIVE BOX
Diagnosis:  Acute Myeloid Leukemia, FLT3-    Protocol/Chemo Regimen: 7+3 (Daunorubicin and Cytarabine)    Day: 4    Pt endorsed:     Review of Systems: Denies nausea, vomiting, diarrhea, chest pain, SOB     Pain scale: denies    Diet: DASH  Allergies    No Known Allergies    HEME/ONC MEDICATIONS  cytarabine IVPB (eMAR) 226 milliGRAM(s) IV Intermittent daily      STANDING MEDICATIONS  allopurinol 300 milliGRAM(s) Oral daily  amLODIPine   Tablet 5 milliGRAM(s) Oral daily  Biotene Dry Mouth Oral Rinse 5 milliLiter(s) Swish and Spit five times a day  chlorhexidine 4% Liquid 1 Application(s) Topical <User Schedule>  FIRST- Mouthwash  BLM 5 milliLiter(s) Swish and Spit three times a day  ondansetron Injectable 8 milliGRAM(s) IV Push every 8 hours  polyethylene glycol 3350 17 Gram(s) Oral daily  senna 2 Tablet(s) Oral at bedtime  sodium chloride 0.9%. 1000 milliLiter(s) IV Continuous <Continuous>      PRN MEDICATIONS  acetaminophen   Tablet .. 650 milliGRAM(s) Oral every 6 hours PRN  metoclopramide Injectable 10 milliGRAM(s) IV Push every 6 hours PRN  sodium chloride 0.65% Nasal 1 Spray(s) Both Nostrils three times a day PRN  sodium chloride 0.9% lock flush 10 milliLiter(s) IV Push every 1 hour PRN        Vital Signs Last 24 Hrs  T(C): 37.5 (09 Aug 2021 05:21), Max: 37.6 (09 Aug 2021 00:08)  T(F): 99.5 (09 Aug 2021 05:21), Max: 99.7 (09 Aug 2021 00:08)  HR: 95 (09 Aug 2021 05:21) (95 - 99)  BP: 135/73 (09 Aug 2021 05:21) (116/73 - 139/83)  BP(mean): --  RR: 18 (09 Aug 2021 05:21) (17 - 18)  SpO2: 94% (09 Aug 2021 05:21) (94% - 98%)    PHYSICAL EXAM  General: NAD  HEENT: PERRLA, EOMOI, clear oropharynx, anicteric sclera, pink conjunctiva  Neck: supple  CV: (+) S1/S2 RRR  Lungs: clear to auscultation, no wheezes or rales  Abdomen: soft, non-tender, non-distended (+) BS  Ext: no clubbing, cyanosis or edema  Skin: no rashes and no petechiae  Neuro: alert and oriented X 3, no focal deficits  Central Line: TLCL c/d/i     RECENT CULTURES:  08-05 @ 09:03  .Blood Blood-Peripheral  No growth to date.  --  08-05 @ 01:53  .Blood Blood-Peripheral  No growth to date.       Diagnosis:  Acute Myeloid Leukemia, FLT3-    Protocol/Chemo Regimen: 7+3 (Daunorubicin and Cytarabine)    Day: 4    Pt endorsed: no complaints, no issues overnight     Review of Systems: Denies nausea, vomiting, diarrhea, chest pain, SOB     Pain scale: denies    Diet: DASH  Allergies    No Known Allergies    HEME/ONC MEDICATIONS  cytarabine IVPB (eMAR) 226 milliGRAM(s) IV Intermittent daily      STANDING MEDICATIONS  allopurinol 300 milliGRAM(s) Oral daily  amLODIPine   Tablet 5 milliGRAM(s) Oral daily  Biotene Dry Mouth Oral Rinse 5 milliLiter(s) Swish and Spit five times a day  chlorhexidine 4% Liquid 1 Application(s) Topical <User Schedule>  FIRST- Mouthwash  BLM 5 milliLiter(s) Swish and Spit three times a day  ondansetron Injectable 8 milliGRAM(s) IV Push every 8 hours  polyethylene glycol 3350 17 Gram(s) Oral daily  senna 2 Tablet(s) Oral at bedtime  sodium chloride 0.9%. 1000 milliLiter(s) IV Continuous <Continuous>      PRN MEDICATIONS  acetaminophen   Tablet .. 650 milliGRAM(s) Oral every 6 hours PRN  metoclopramide Injectable 10 milliGRAM(s) IV Push every 6 hours PRN  sodium chloride 0.65% Nasal 1 Spray(s) Both Nostrils three times a day PRN  sodium chloride 0.9% lock flush 10 milliLiter(s) IV Push every 1 hour PRN        Vital Signs Last 24 Hrs  T(C): 37.5 (09 Aug 2021 05:21), Max: 37.6 (09 Aug 2021 00:08)  T(F): 99.5 (09 Aug 2021 05:21), Max: 99.7 (09 Aug 2021 00:08)  HR: 95 (09 Aug 2021 05:21) (95 - 99)  BP: 135/73 (09 Aug 2021 05:21) (116/73 - 139/83)  BP(mean): --  RR: 18 (09 Aug 2021 05:21) (17 - 18)  SpO2: 94% (09 Aug 2021 05:21) (94% - 98%)    PHYSICAL EXAM  General: NAD  HEENT: PERRLA, EOMOI, clear oropharynx, anicteric sclera, pink conjunctiva  Neck: supple  CV: (+) S1/S2 RRR  Lungs: clear to auscultation, no wheezes or rales  Abdomen: soft, non-tender, non-distended (+) BS  Ext: no clubbing, cyanosis or edema  Skin: no rashes and no petechiae  Neuro: alert and oriented X 3, no focal deficits  Central Line: TLCL c/d/i     RECENT CULTURES:  08-05 @ 09:03  .Blood Blood-Peripheral  No growth to date.  --  08-05 @ 01:53  .Blood Blood-Peripheral  No growth to date.    LABS:                          7.1    1.00  )-----------( 34       ( 09 Aug 2021 06:50 )             20.8         Mean Cell Volume : 101.0 fl  Mean Cell Hemoglobin : 34.5 pg  Mean Cell Hemoglobin Concentration : 34.1 gm/dL  Auto Neutrophil # : 0.74 K/uL  Auto Lymphocyte # : 0.16 K/uL  Auto Monocyte # : 0.09 K/uL  Auto Eosinophil # : 0.00 K/uL  Auto Basophil # : 0.00 K/uL  Auto Neutrophil % : 73.8 %  Auto Lymphocyte % : 15.9 %  Auto Monocyte % : 9.4 %  Auto Eosinophil % : 0.0 %  Auto Basophil % : 0.0 %      08-09    138  |  106  |  12  ----------------------------<  98  4.1   |  22  |  0.99    Ca    9.3      09 Aug 2021 06:50  Phos  4.3     08-09  Mg     2.3     08-09    TPro  6.3  /  Alb  3.5  /  TBili  0.4  /  DBili  x   /  AST  22  /  ALT  59<H>  /  AlkPhos  55  08-09      Mg 2.3  Phos 4.3      PT/INR - ( 09 Aug 2021 06:50 )   PT: 14.1 sec;   INR: 1.18 ratio    PTT - ( 09 Aug 2021 06:50 )  PTT:26.8 sec      Uric Acid 5.0

## 2021-08-09 NOTE — PROGRESS NOTE ADULT - PROBLEM SELECTOR PLAN 2
Patient is afebrile, not neutropenic   Off Zosyn for possible PNA, d/c after 8/6 PM dose  Cultures NGTD 7/29, 8/1, 8/2 Patient is afebrile, neutropenic   Off Zosyn for possible PNA, d/c after 8/6 PM dose  Cultures NGTD 7/29, 8/1, 8/2 8/9 started on levaquin and posaconazole Patient is afebrile, neutropenic   Off Zosyn for possible PNA, d/c after 8/6 PM dose  Cultures NGTD 7/29, 8/1, 8/2 8/9 started on levaquin and posaconazole ppx for neutropenia Patient is afebrile, neutropenic   Off Zosyn for possible PNA, d/c after 8/6 PM dose  Cultures NGTD 7/29, 8/1, 8/2 8/9 started on levaquin and posaconazole ppx for neutropenia  If spikes, change levaquin to cefepime and panculture

## 2021-08-09 NOTE — PROGRESS NOTE ADULT - ATTENDING COMMENTS
37yo M admitted with RUQ pain found to have bloodwork concerning for acute leukemia  -13% myeloblasts on PB  -Flt3 negative. s/p BMbx 8/4 -AML. f/u [facility]ogen[facility], [facility]  -start 7+3 today, cytarabine 100 and dauno 90 -side effects reviewed and informed consent obtained. Today is day 3  -pain control, improved today. Abd sono showing hepatomegaly and hepatic steatosis. Right pleural effusion. CT chest shows small right pleural effusion  -MUGA EF 71%  -discussed with patient and family at the bedside. We also discussed sperm banking -pt declined  -febrile, Cx's NGTD. On Zosyn, today is day 7 -will d/c after completion of 7d  -TLC placement  -supportive care  -transfuse as needed 35yo M admitted with RUQ pain found to have bloodwork concerning for acute leukemia  -13% myeloblasts on PB  -Flt3 negative. s/p BMbx 8/4 -AML. f/u cytogenetics, [facility]  -started 7+3 on 8/6 -cytarabine 100/m2 and dauno 90/m2. Today is day 4  -pain control, improved today. Abd sono showing hepatomegaly and hepatic steatosis. Right pleural effusion. CT chest shows small right pleural effusion  -MUGA EF 71%  -discussed with patient and family at the bedside. We also discussed sperm banking -pt declined  -febrile, Cx's NGTD. Completed 7d course of Zosyn for PNA  -start levaquin and posaconazole PPx as now neutropenic  -TLC placement  -supportive care  -transfuse as needed  -day 14 BMbx on 8/19

## 2021-08-09 NOTE — PROGRESS NOTE ADULT - ASSESSMENT
36M with h/o PMH HTN, fatty liver, kidney stones presents with rash, dizziness, fatigue and severe RUQ pain for 3 days. Now with anemia, thrombocytopenia and leukocytosis with 17% blasts, Bone marrow bx c/w AML. Transferred to [address] [place] [address] for management. Patient is pancytopenia secondary to disease.        36M with h/o PMH HTN, fatty liver, kidney stones presents with rash, dizziness, fatigue and severe RUQ pain for 3 days. Now with anemia, thrombocytopenia and leukocytosis with 17% blasts, Bone marrow bx c/w AML. Transferred to [address] [place] [address] for management. Patient is pancytopenia secondary to disease and/or chemotherapy. Patient being treated with 7+3 (Daunorubicin and Cytarabine)         36M with h/o PMH HTN, fatty liver, kidney stones presents with rash, dizziness, fatigue and severe RUQ pain for 3 days. Now with anemia, thrombocytopenia and leukocytosis with 17% blasts, Bone marrow bx c/w AML. Transferred to [address] [place] [address] for management. Patient is pancytopenia secondary to disease and/or chemotherapy. Patient receiving induction chemotherapy with 7+3 (Daunorubicin and Cytarabine)

## 2021-08-10 LAB
ALBUMIN SERPL ELPH-MCNC: 3.5 G/DL — SIGNIFICANT CHANGE UP (ref 3.3–5)
ALP SERPL-CCNC: 51 U/L — SIGNIFICANT CHANGE UP (ref 40–120)
ALT FLD-CCNC: 47 U/L — HIGH (ref 10–45)
ANION GAP SERPL CALC-SCNC: 12 MMOL/L — SIGNIFICANT CHANGE UP (ref 5–17)
AST SERPL-CCNC: 18 U/L — SIGNIFICANT CHANGE UP (ref 10–40)
BASOPHILS # BLD AUTO: 0 K/UL — SIGNIFICANT CHANGE UP (ref 0–0.2)
BASOPHILS NFR BLD AUTO: 0 % — SIGNIFICANT CHANGE UP (ref 0–2)
BILIRUB SERPL-MCNC: 0.4 MG/DL — SIGNIFICANT CHANGE UP (ref 0.2–1.2)
BUN SERPL-MCNC: 12 MG/DL — SIGNIFICANT CHANGE UP (ref 7–23)
CALCIUM SERPL-MCNC: 9.2 MG/DL — SIGNIFICANT CHANGE UP (ref 8.4–10.5)
CHLORIDE SERPL-SCNC: 106 MMOL/L — SIGNIFICANT CHANGE UP (ref 96–108)
CO2 SERPL-SCNC: 21 MMOL/L — LOW (ref 22–31)
CREAT SERPL-MCNC: 1.03 MG/DL — SIGNIFICANT CHANGE UP (ref 0.5–1.3)
CULTURE RESULTS: SIGNIFICANT CHANGE UP
CULTURE RESULTS: SIGNIFICANT CHANGE UP
EOSINOPHIL # BLD AUTO: 0 K/UL — SIGNIFICANT CHANGE UP (ref 0–0.5)
EOSINOPHIL NFR BLD AUTO: 0 % — SIGNIFICANT CHANGE UP (ref 0–6)
GLUCOSE SERPL-MCNC: 105 MG/DL — HIGH (ref 70–99)
HCT VFR BLD CALC: 20.4 % — CRITICAL LOW (ref 39–50)
HGB BLD-MCNC: 6.7 G/DL — CRITICAL LOW (ref 13–17)
LDH SERPL L TO P-CCNC: 317 U/L — HIGH (ref 50–242)
LYMPHOCYTES # BLD AUTO: 0.25 K/UL — LOW (ref 1–3.3)
LYMPHOCYTES # BLD AUTO: 28 % — SIGNIFICANT CHANGE UP (ref 13–44)
MAGNESIUM SERPL-MCNC: 2.3 MG/DL — SIGNIFICANT CHANGE UP (ref 1.6–2.6)
MANUAL SMEAR VERIFICATION: SIGNIFICANT CHANGE UP
MCHC RBC-ENTMCNC: 32.8 GM/DL — SIGNIFICANT CHANGE UP (ref 32–36)
MCHC RBC-ENTMCNC: 33.3 PG — SIGNIFICANT CHANGE UP (ref 27–34)
MCV RBC AUTO: 101.5 FL — HIGH (ref 80–100)
MONOCYTES # BLD AUTO: 0.1 K/UL — SIGNIFICANT CHANGE UP (ref 0–0.9)
MONOCYTES NFR BLD AUTO: 10.7 % — SIGNIFICANT CHANGE UP (ref 2–14)
NEUTROPHILS # BLD AUTO: 0.56 K/UL — LOW (ref 1.8–7.4)
NEUTROPHILS NFR BLD AUTO: 61.3 % — SIGNIFICANT CHANGE UP (ref 43–77)
PHOSPHATE SERPL-MCNC: 4.2 MG/DL — SIGNIFICANT CHANGE UP (ref 2.5–4.5)
PLAT MORPH BLD: NORMAL — SIGNIFICANT CHANGE UP
PLATELET # BLD AUTO: 30 K/UL — LOW (ref 150–400)
POTASSIUM SERPL-MCNC: 4 MMOL/L — SIGNIFICANT CHANGE UP (ref 3.5–5.3)
POTASSIUM SERPL-SCNC: 4 MMOL/L — SIGNIFICANT CHANGE UP (ref 3.5–5.3)
PROT SERPL-MCNC: 6.1 G/DL — SIGNIFICANT CHANGE UP (ref 6–8.3)
RBC # BLD: 2.01 M/UL — LOW (ref 4.2–5.8)
RBC # FLD: 15.1 % — HIGH (ref 10.3–14.5)
RBC BLD AUTO: SIGNIFICANT CHANGE UP
SODIUM SERPL-SCNC: 139 MMOL/L — SIGNIFICANT CHANGE UP (ref 135–145)
SPECIMEN SOURCE: SIGNIFICANT CHANGE UP
SPECIMEN SOURCE: SIGNIFICANT CHANGE UP
URATE SERPL-MCNC: 4.7 MG/DL — SIGNIFICANT CHANGE UP (ref 3.4–8.8)
WBC # BLD: 0.91 K/UL — CRITICAL LOW (ref 3.8–10.5)
WBC # FLD AUTO: 0.91 K/UL — CRITICAL LOW (ref 3.8–10.5)

## 2021-08-10 PROCEDURE — 99232 SBSQ HOSP IP/OBS MODERATE 35: CPT | Mod: GC

## 2021-08-10 RX ADMIN — DIPHENHYDRAMINE HYDROCHLORIDE AND LIDOCAINE HYDROCHLORIDE AND ALUMINUM HYDROXIDE AND MAGNESIUM HYDRO 5 MILLILITER(S): KIT at 21:07

## 2021-08-10 RX ADMIN — DIPHENHYDRAMINE HYDROCHLORIDE AND LIDOCAINE HYDROCHLORIDE AND ALUMINUM HYDROXIDE AND MAGNESIUM HYDRO 5 MILLILITER(S): KIT at 06:22

## 2021-08-10 RX ADMIN — CHLORHEXIDINE GLUCONATE 1 APPLICATION(S): 213 SOLUTION TOPICAL at 09:23

## 2021-08-10 RX ADMIN — DIPHENHYDRAMINE HYDROCHLORIDE AND LIDOCAINE HYDROCHLORIDE AND ALUMINUM HYDROXIDE AND MAGNESIUM HYDRO 5 MILLILITER(S): KIT at 13:07

## 2021-08-10 RX ADMIN — Medication 5 MILLILITER(S): at 17:35

## 2021-08-10 RX ADMIN — Medication 20.93 MILLIGRAM(S): at 14:51

## 2021-08-10 RX ADMIN — ONDANSETRON 8 MILLIGRAM(S): 8 TABLET, FILM COATED ORAL at 06:22

## 2021-08-10 RX ADMIN — ONDANSETRON 8 MILLIGRAM(S): 8 TABLET, FILM COATED ORAL at 13:06

## 2021-08-10 RX ADMIN — Medication 5 MILLILITER(S): at 09:19

## 2021-08-10 RX ADMIN — Medication 5 MILLILITER(S): at 23:04

## 2021-08-10 RX ADMIN — Medication 5 MILLILITER(S): at 11:10

## 2021-08-10 RX ADMIN — AMLODIPINE BESYLATE 5 MILLIGRAM(S): 2.5 TABLET ORAL at 06:22

## 2021-08-10 RX ADMIN — SODIUM CHLORIDE 100 MILLILITER(S): 9 INJECTION INTRAMUSCULAR; INTRAVENOUS; SUBCUTANEOUS at 21:07

## 2021-08-10 RX ADMIN — Medication 300 MILLIGRAM(S): at 11:10

## 2021-08-10 RX ADMIN — SODIUM CHLORIDE 100 MILLILITER(S): 9 INJECTION INTRAMUSCULAR; INTRAVENOUS; SUBCUTANEOUS at 13:44

## 2021-08-10 RX ADMIN — ONDANSETRON 8 MILLIGRAM(S): 8 TABLET, FILM COATED ORAL at 21:08

## 2021-08-10 RX ADMIN — POSACONAZOLE 300 MILLIGRAM(S): 100 TABLET, DELAYED RELEASE ORAL at 11:10

## 2021-08-10 NOTE — PROGRESS NOTE ADULT - PROBLEM SELECTOR PLAN 1
FLT3 negative, BM bx c/w AML   consented for [place] study    HIV (-), Hep (-)  Echo 70%, MUGA EF 71%   Discussed sperm banking. Declined   TLC placed on 8/6 8/6 Started 7+# (Cytarabine + Daunorubicin)  Follow daily lytes, CBC. replace, Replete prn  Continue with Allopurinol 300mg daily, IV hydration, mouth care, pain control, antiemetics  Monitor for TLS daily  Day 14 Bm bx due on 8/19 FLT3 negative, BM bx c/w AML   consented for [place] study    HIV (-), Hep (-)  Echo 70%, MUGA EF 71%   Discussed sperm banking. Declined   TLC placed on 8/6 8/6 Started induction with  7+3 (Cytarabine + Daunorubicin)  Follow daily lytes, CBC. replace, Replete prn  Continue with Allopurinol 300mg daily, IV hydration, mouth care, pain control, antiemetics  Monitor for TLS daily  Day 14 Bm bx due on 8/19

## 2021-08-10 NOTE — PROGRESS NOTE ADULT - SUBJECTIVE AND OBJECTIVE BOX
Diagnosis:  Acute Myeloid Leukemia, FLT3-    Protocol/Chemo Regimen: 7+3 (Daunorubicin and Cytarabine)    Day: 5    Pt endorsed: no complaints, no issues overnight     Review of Systems: Denies nausea, vomiting, diarrhea, chest pain, SOB     Pain scale: denies    Diet: DASH    Allergies: No Known Allergies    ANTIMICROBIALS  levoFLOXacin  Tablet 500 milliGRAM(s) Oral every 24 hours  posaconazole DR Tablet 300 milliGRAM(s) Oral daily      HEME/ONC MEDICATIONS  cytarabine IVPB (eMAR) 226 milliGRAM(s) IV Intermittent daily      STANDING MEDICATIONS  allopurinol 300 milliGRAM(s) Oral daily  amLODIPine   Tablet 5 milliGRAM(s) Oral daily  Biotene Dry Mouth Oral Rinse 5 milliLiter(s) Swish and Spit five times a day  chlorhexidine 4% Liquid 1 Application(s) Topical <User Schedule>  FIRST- Mouthwash  BLM 5 milliLiter(s) Swish and Spit three times a day  ondansetron Injectable 8 milliGRAM(s) IV Push every 8 hours  polyethylene glycol 3350 17 Gram(s) Oral daily  senna 2 Tablet(s) Oral at bedtime  sodium chloride 0.9%. 1000 milliLiter(s) IV Continuous <Continuous>      PRN MEDICATIONS  acetaminophen   Tablet .. 650 milliGRAM(s) Oral every 6 hours PRN  metoclopramide Injectable 10 milliGRAM(s) IV Push every 6 hours PRN  sodium chloride 0.65% Nasal 1 Spray(s) Both Nostrils three times a day PRN  sodium chloride 0.9% lock flush 10 milliLiter(s) IV Push every 1 hour PRN      Vital Signs Last 24 Hrs  T(C): 36.8 (10 Aug 2021 05:05), Max: 37.9 (10 Aug 2021 00:21)  T(F): 98.3 (10 Aug 2021 05:05), Max: 100.2 (10 Aug 2021 00:21)  HR: 98 (10 Aug 2021 05:05) (89 - 99)  BP: 123/78 (10 Aug 2021 05:05) (104/64 - 123/78)  RR: 18 (10 Aug 2021 05:05) (18 - 20)  SpO2: 99% (10 Aug 2021 05:05) (94% - 99%)    PHYSICAL EXAM  General: NAD  HEENT: PERRLA, EOMOI, clear oropharynx, anicteric sclera, pink conjunctiva  Neck: supple  CV: (+) S1/S2 RRR  Lungs: clear to auscultation, no wheezes or rales  Abdomen: soft, non-tender, non-distended (+) BS  Ext: no clubbing, cyanosis or edema  Skin: no rashes and no petechiae  Neuro: alert and oriented X 3, no focal deficits  Central Line: TLCL c/d/i     RECENT CULTURES:  08-05 @ 09:03  .Blood Blood-Peripheral  No growth to date.    08-05 @ 01:53  .Blood Blood-Peripheral  No growth to date.    LABS:               RADIOLOGY & ADDITIONAL STUDIES:  from: IR Procedure (08.06.21 @ 10:34)   Impression:  Successful triple lumen catheter placement         Diagnosis:  Acute Myeloid Leukemia, FLT3-    Protocol/Chemo Regimen: 7+3 (Daunorubicin and Cytarabine)    Day: 5    Pt endorsed: no complaints, no issues overnight     Review of Systems: Denies nausea, vomiting, diarrhea, chest pain, SOB     Pain scale: denies    Diet: DASH    Allergies: No Known Allergies    ANTIMICROBIALS  levoFLOXacin  Tablet 500 milliGRAM(s) Oral every 24 hours  posaconazole DR Tablet 300 milliGRAM(s) Oral daily      HEME/ONC MEDICATIONS  cytarabine IVPB (eMAR) 226 milliGRAM(s) IV Intermittent daily      STANDING MEDICATIONS  allopurinol 300 milliGRAM(s) Oral daily  amLODIPine   Tablet 5 milliGRAM(s) Oral daily  Biotene Dry Mouth Oral Rinse 5 milliLiter(s) Swish and Spit five times a day  chlorhexidine 4% Liquid 1 Application(s) Topical <User Schedule>  FIRST- Mouthwash  BLM 5 milliLiter(s) Swish and Spit three times a day  ondansetron Injectable 8 milliGRAM(s) IV Push every 8 hours  polyethylene glycol 3350 17 Gram(s) Oral daily  senna 2 Tablet(s) Oral at bedtime  sodium chloride 0.9%. 1000 milliLiter(s) IV Continuous <Continuous>      PRN MEDICATIONS  acetaminophen   Tablet .. 650 milliGRAM(s) Oral every 6 hours PRN  metoclopramide Injectable 10 milliGRAM(s) IV Push every 6 hours PRN  sodium chloride 0.65% Nasal 1 Spray(s) Both Nostrils three times a day PRN  sodium chloride 0.9% lock flush 10 milliLiter(s) IV Push every 1 hour PRN      Vital Signs Last 24 Hrs  T(C): 36.8 (10 Aug 2021 05:05), Max: 37.9 (10 Aug 2021 00:21)  T(F): 98.3 (10 Aug 2021 05:05), Max: 100.2 (10 Aug 2021 00:21)  HR: 98 (10 Aug 2021 05:05) (89 - 99)  BP: 123/78 (10 Aug 2021 05:05) (104/64 - 123/78)  RR: 18 (10 Aug 2021 05:05) (18 - 20)  SpO2: 99% (10 Aug 2021 05:05) (94% - 99%)    PHYSICAL EXAM  General: NAD  HEENT: PERRLA, EOMOI, clear oropharynx, anicteric sclera, pink conjunctiva  Neck: supple  CV: (+) S1/S2 RRR  Lungs: clear to auscultation, no wheezes or rales  Abdomen: soft, non-tender, non-distended (+) BS  Ext: no clubbing, cyanosis or edema  Skin: no rashes and no petechiae  Neuro: alert and oriented X 3, no focal deficits  Central Line: TLCL c/d/i     RECENT CULTURES:  08-05 @ 09:03  .Blood Blood-Peripheral  No growth to date.    08-05 @ 01:53  .Blood Blood-Peripheral  No growth to date.    LABS:                        6.7    0.91  )-----------( 30       ( 10 Aug 2021 07:05 )             20.4     10 Aug 2021 07:05    139    |  106    |  12     ----------------------------<  105    4.0     |  21     |  1.03     Ca    9.2        10 Aug 2021 07:05  Phos  4.2       10 Aug 2021 07:05  Mg     2.3       10 Aug 2021 07:05    TPro  6.1    /  Alb  3.5    /  TBili  0.4    /  DBili  x      /  AST  18     /  ALT  47     /  AlkPhos  51     10 Aug 2021 07:05    PT/INR - ( 09 Aug 2021 06:50 )   PT: 14.1 sec;   INR: 1.18 ratio    PTT - ( 09 Aug 2021 06:50 )  PTT:26.8 sec    LIVER FUNCTIONS - ( 10 Aug 2021 07:05 )  Alb: 3.5 g/dL / Pro: 6.1 g/dL / ALK PHOS: 51 U/L / ALT: 47 U/L / AST: 18 U/L / GGT: x                      RADIOLOGY & ADDITIONAL STUDIES:  from: IR Procedure (08.06.21 @ 10:34)   Impression:  Successful triple lumen catheter placement

## 2021-08-10 NOTE — PROGRESS NOTE ADULT - ATTENDING COMMENTS
35yo M admitted with RUQ pain found to have bloodwork concerning for acute leukemia  -13% myeloblasts on PB  -Flt3 negative. s/p BMbx 8/4 -AML. f/u cytogenetics, [facility]  -started 7+3 on 8/6 -cytarabine 100/m2 and dauno 90/m2. Today is day 4  -pain control, improved today. Abd sono showing hepatomegaly and hepatic steatosis. Right pleural effusion. CT chest shows small right pleural effusion  -MUGA EF 71%  -discussed with patient and family at the bedside. We also discussed sperm banking -pt declined  -febrile, Cx's NGTD. Completed 7d course of Zosyn for PNA  -start levaquin and posaconazole PPx as now neutropenic  -TLC placement  -supportive care  -transfuse as needed  -day 14 BMbx on 8/19 37yo M admitted with RUQ pain found to have bloodwork concerning for acute leukemia  -13% myeloblasts on PB  -Flt3 negative. s/p BMbx 8/4 -AML. f/u cytogenetics, [facility]  -started 7+3 on 8/6 -cytarabine 100/m2 and dauno 90/m2. Today is day 5  -pain control, improved today. Abd sono showing hepatomegaly and hepatic steatosis. Right pleural effusion. CT chest shows small right pleural effusion  -MUGA EF 71%  -discussed with patient and family at the bedside. We also discussed sperm banking -pt declined  -now afebrile, Cx's NGTD. Completed 7d course of Zosyn for PNA  -cont levaquin and posaconazole PPx as now neutropenic  -TLC placement  -supportive care  -transfuse as needed  -day 14 BMbx on 8/19

## 2021-08-10 NOTE — PROGRESS NOTE ADULT - ASSESSMENT
36M with h/o PMH HTN, fatty liver, kidney stones presents with rash, dizziness, fatigue and severe RUQ pain for 3 days. Now with anemia, thrombocytopenia and leukocytosis with 17% blasts, Bone marrow bx c/w AML. Transferred to [address] [place] [address] for management. Patient is pancytopenia secondary to disease and/or chemotherapy. Patient being treated with 7+3 (Daunorubicin and Cytarabine)         Mr. Tian is  a 37 y/o male with h/o PMH HTN, fatty liver, kidney stones presents with rash, dizziness, fatigue and severe RUQ pain for 3 days. Now with anemia, thrombocytopenia and leukocytosis with 17% blasts, Bone marrow bx c/w AML. Transferred to [address] [place] [address] for management. Patient is pancytopenia secondary to disease and/or chemotherapy. Patient receiving induction chemotherapy  with 7+3 (Daunorubicin and Cytarabine). Patient has pancytopenia secondary to chemotherapy and disease process.

## 2021-08-10 NOTE — PROGRESS NOTE ADULT - PROBLEM SELECTOR PLAN 2
Patient is afebrile, neutropenic   Off Zosyn for possible PNA, d/c'd after 8/6 PM dose  Cultures NGTD 7/29, 8/1, 8/2 8/9 started on levaquin and posaconazole ppx for neutropenia

## 2021-08-10 NOTE — ADVANCED PRACTICE NURSE CONSULT - ASSESSMENT
Pt. seen in bed a/ox4,denies any discomfort at this time. Chemotherapy teachings done.Pt. verbalized understanding . Pt. has right lower neck  tripple lumen intact and patent .Dsg dry and intact .Site with no s/s of redness ,swelling or pain.All ports with positive blood return noted and flushing easily with 10 ML NS. Drug verification done by 2 RN.'s.  Lab. values reviewed by Dr. Yancey on rounds . Pt. received zofran 8 mg IVP as premedication. At 1451  pt. started with cytarabine IVPB (eMAR)   226 milliGRAM(s) in sodium chloride 0.9% 500 milliLiter(s), IV Intermittent, daily, infuse over 24 Hour(s), via alaris at the rate of 22.9 ml/hr. Left pt. comfortable in bed.Primary RN aware of present treatment.

## 2021-08-11 LAB
ALBUMIN SERPL ELPH-MCNC: 3.8 G/DL — SIGNIFICANT CHANGE UP (ref 3.3–5)
ALP SERPL-CCNC: 53 U/L — SIGNIFICANT CHANGE UP (ref 40–120)
ALT FLD-CCNC: 47 U/L — HIGH (ref 10–45)
ANION GAP SERPL CALC-SCNC: 12 MMOL/L — SIGNIFICANT CHANGE UP (ref 5–17)
AST SERPL-CCNC: 20 U/L — SIGNIFICANT CHANGE UP (ref 10–40)
BASOPHILS # BLD AUTO: 0 K/UL — SIGNIFICANT CHANGE UP (ref 0–0.2)
BASOPHILS NFR BLD AUTO: 0 % — SIGNIFICANT CHANGE UP (ref 0–2)
BILIRUB SERPL-MCNC: 0.4 MG/DL — SIGNIFICANT CHANGE UP (ref 0.2–1.2)
BLD GP AB SCN SERPL QL: NEGATIVE — SIGNIFICANT CHANGE UP
BUN SERPL-MCNC: 11 MG/DL — SIGNIFICANT CHANGE UP (ref 7–23)
CALCIUM SERPL-MCNC: 8.8 MG/DL — SIGNIFICANT CHANGE UP (ref 8.4–10.5)
CHLORIDE SERPL-SCNC: 105 MMOL/L — SIGNIFICANT CHANGE UP (ref 96–108)
CO2 SERPL-SCNC: 21 MMOL/L — LOW (ref 22–31)
CREAT SERPL-MCNC: 1.11 MG/DL — SIGNIFICANT CHANGE UP (ref 0.5–1.3)
EOSINOPHIL # BLD AUTO: 0.01 K/UL — SIGNIFICANT CHANGE UP (ref 0–0.5)
EOSINOPHIL NFR BLD AUTO: 0.9 % — SIGNIFICANT CHANGE UP (ref 0–6)
GLUCOSE SERPL-MCNC: 95 MG/DL — SIGNIFICANT CHANGE UP (ref 70–99)
HCT VFR BLD CALC: 22 % — LOW (ref 39–50)
HGB BLD-MCNC: 7.5 G/DL — LOW (ref 13–17)
LDH SERPL L TO P-CCNC: 346 U/L — HIGH (ref 50–242)
LYMPHOCYTES # BLD AUTO: 0.15 K/UL — LOW (ref 1–3.3)
LYMPHOCYTES # BLD AUTO: 19.5 % — SIGNIFICANT CHANGE UP (ref 13–44)
MAGNESIUM SERPL-MCNC: 2.3 MG/DL — SIGNIFICANT CHANGE UP (ref 1.6–2.6)
MANUAL SMEAR VERIFICATION: SIGNIFICANT CHANGE UP
MCHC RBC-ENTMCNC: 33.5 PG — SIGNIFICANT CHANGE UP (ref 27–34)
MCHC RBC-ENTMCNC: 34.1 GM/DL — SIGNIFICANT CHANGE UP (ref 32–36)
MCV RBC AUTO: 98.2 FL — SIGNIFICANT CHANGE UP (ref 80–100)
MONOCYTES # BLD AUTO: 0.07 K/UL — SIGNIFICANT CHANGE UP (ref 0–0.9)
MONOCYTES NFR BLD AUTO: 9.7 % — SIGNIFICANT CHANGE UP (ref 2–14)
NEUTROPHILS # BLD AUTO: 0.52 K/UL — LOW (ref 1.8–7.4)
NEUTROPHILS NFR BLD AUTO: 69.9 % — SIGNIFICANT CHANGE UP (ref 43–77)
PHOSPHATE SERPL-MCNC: 3.8 MG/DL — SIGNIFICANT CHANGE UP (ref 2.5–4.5)
PLAT MORPH BLD: NORMAL — SIGNIFICANT CHANGE UP
PLATELET # BLD AUTO: 25 K/UL — LOW (ref 150–400)
POTASSIUM SERPL-MCNC: 4.1 MMOL/L — SIGNIFICANT CHANGE UP (ref 3.5–5.3)
POTASSIUM SERPL-SCNC: 4.1 MMOL/L — SIGNIFICANT CHANGE UP (ref 3.5–5.3)
PROT SERPL-MCNC: 6.5 G/DL — SIGNIFICANT CHANGE UP (ref 6–8.3)
RBC # BLD: 2.24 M/UL — LOW (ref 4.2–5.8)
RBC # FLD: 17.1 % — HIGH (ref 10.3–14.5)
RBC BLD AUTO: SIGNIFICANT CHANGE UP
RH IG SCN BLD-IMP: POSITIVE — SIGNIFICANT CHANGE UP
SODIUM SERPL-SCNC: 138 MMOL/L — SIGNIFICANT CHANGE UP (ref 135–145)
URATE SERPL-MCNC: 4.1 MG/DL — SIGNIFICANT CHANGE UP (ref 3.4–8.8)
WBC # BLD: 0.75 K/UL — CRITICAL LOW (ref 3.8–10.5)
WBC # FLD AUTO: 0.75 K/UL — CRITICAL LOW (ref 3.8–10.5)

## 2021-08-11 PROCEDURE — 99232 SBSQ HOSP IP/OBS MODERATE 35: CPT | Mod: GC

## 2021-08-11 RX ADMIN — SODIUM CHLORIDE 100 MILLILITER(S): 9 INJECTION INTRAMUSCULAR; INTRAVENOUS; SUBCUTANEOUS at 20:54

## 2021-08-11 RX ADMIN — ONDANSETRON 8 MILLIGRAM(S): 8 TABLET, FILM COATED ORAL at 06:54

## 2021-08-11 RX ADMIN — AMLODIPINE BESYLATE 5 MILLIGRAM(S): 2.5 TABLET ORAL at 06:53

## 2021-08-11 RX ADMIN — SODIUM CHLORIDE 100 MILLILITER(S): 9 INJECTION INTRAMUSCULAR; INTRAVENOUS; SUBCUTANEOUS at 18:41

## 2021-08-11 RX ADMIN — DIPHENHYDRAMINE HYDROCHLORIDE AND LIDOCAINE HYDROCHLORIDE AND ALUMINUM HYDROXIDE AND MAGNESIUM HYDRO 5 MILLILITER(S): KIT at 06:54

## 2021-08-11 RX ADMIN — ONDANSETRON 8 MILLIGRAM(S): 8 TABLET, FILM COATED ORAL at 22:11

## 2021-08-11 RX ADMIN — Medication 5 MILLILITER(S): at 11:06

## 2021-08-11 RX ADMIN — ONDANSETRON 8 MILLIGRAM(S): 8 TABLET, FILM COATED ORAL at 16:18

## 2021-08-11 RX ADMIN — Medication 300 MILLIGRAM(S): at 11:05

## 2021-08-11 RX ADMIN — DIPHENHYDRAMINE HYDROCHLORIDE AND LIDOCAINE HYDROCHLORIDE AND ALUMINUM HYDROXIDE AND MAGNESIUM HYDRO 5 MILLILITER(S): KIT at 22:10

## 2021-08-11 RX ADMIN — POSACONAZOLE 300 MILLIGRAM(S): 100 TABLET, DELAYED RELEASE ORAL at 11:06

## 2021-08-11 RX ADMIN — Medication 20.93 MILLIGRAM(S): at 14:09

## 2021-08-11 RX ADMIN — Medication 5 MILLILITER(S): at 08:22

## 2021-08-11 RX ADMIN — CHLORHEXIDINE GLUCONATE 1 APPLICATION(S): 213 SOLUTION TOPICAL at 08:22

## 2021-08-11 RX ADMIN — Medication 5 MILLILITER(S): at 20:54

## 2021-08-11 RX ADMIN — DIPHENHYDRAMINE HYDROCHLORIDE AND LIDOCAINE HYDROCHLORIDE AND ALUMINUM HYDROXIDE AND MAGNESIUM HYDRO 5 MILLILITER(S): KIT at 16:17

## 2021-08-11 NOTE — PROGRESS NOTE ADULT - ASSESSMENT
Mr. Tian is  a 37 y/o male with h/o PMH HTN, fatty liver, kidney stones presents with rash, dizziness, fatigue and severe RUQ pain for 3 days. Now with anemia, thrombocytopenia and leukocytosis with 17% blasts, Bone marrow bx c/w AML. Transferred to [address] [place] [address] for management. Patient is pancytopenia secondary to disease and/or chemotherapy. Patient receiving induction chemotherapy  with 7+3 (Daunorubicin and Cytarabine). Patient has pancytopenia secondary to chemotherapy and disease process.

## 2021-08-11 NOTE — ADVANCED PRACTICE NURSE CONSULT - ASSESSMENT
Pt. seen in bed a/ox4,denies any discomfort at this time. Chemotherapy teachings done.Pt. verbalized understanding . Pt. has right lower neck  tripple lumen intact and patent .Dsg dry and intact .Site with no s/s of redness ,swelling or pain.All ports with positive blood return noted and flushing easily with 10 ML NS. Drug verification done by 2 RN.'s.  Lab. values reviewed by Dr. Yancey on rounds . Pt. received zofran 8 mg IVP as premedication. At 1409  pt. started with cytarabine IVPB (eMAR)   226 milliGRAM(s) in sodium chloride 0.9% 500 milliLiter(s), IV Intermittent, daily, infuse over 24 Hour(s), via alaris at the rate of 22.9 ml/hr. Left pt. comfortable in bed.Primary RN aware of present treatment.

## 2021-08-11 NOTE — PROGRESS NOTE ADULT - PROBLEM SELECTOR PLAN 1
FLT3 negative, BM bx c/w AML   consented for [place] study    HIV (-), Hep (-)  Echo 70%, MUGA EF 71%   Discussed sperm banking. Declined   TLC placed on 8/6 8/6 Started induction with  7+3 (Cytarabine + Daunorubicin)  Follow daily lytes, CBC. replace, Replete prn  Continue with Allopurinol 300mg daily, IV hydration, mouth care, pain control, antiemetics  Monitor for TLS daily  Day 14 Bm bx due on 8/19

## 2021-08-11 NOTE — PROGRESS NOTE ADULT - ATTENDING COMMENTS
37yo M admitted with RUQ pain found to have bloodwork concerning for acute leukemia  -13% myeloblasts on PB  -Flt3 negative. s/p BMbx 8/4 -AML. f/u cytogenetics, [facility]  -started 7+3 on 8/6 -cytarabine 100/m2 and dauno 90/m2. Today is day 5  -pain control, improved today. Abd sono showing hepatomegaly and hepatic steatosis. Right pleural effusion. CT chest shows small right pleural effusion  -MUGA EF 71%  -discussed with patient and family at the bedside. We also discussed sperm banking -pt declined  -now afebrile, Cx's NGTD. Completed 7d course of Zosyn for PNA  -cont levaquin and posaconazole PPx as now neutropenic  -TLC placement  -supportive care  -transfuse as needed  -day 14 BMbx on 8/19 35yo M admitted with RUQ pain found to have bloodwork concerning for acute leukemia  -13% myeloblasts on PB  -Flt3 negative. s/p BMbx 8/4 -AML. f/u cytogenetics, [facility]  -started 7+3 on 8/6 -cytarabine 100/m2 and dauno 90/m2. Today is day 6  -pain control, improved today. Abd sono showing hepatomegaly and hepatic steatosis. Right pleural effusion. CT chest shows small right pleural effusion  -MUGA EF 71%  -discussed with patient and family at the bedside. We also discussed sperm banking -pt declined  -now afebrile, Cx's NGTD. Completed 7d course of Zosyn for PNA  -cont levaquin and posaconazole PPx as now neutropenic  -TLC placement  -supportive care  -transfuse as needed  -day 14 BMbx on 8/19

## 2021-08-11 NOTE — PROGRESS NOTE ADULT - SUBJECTIVE AND OBJECTIVE BOX
Diagnosis:  Acute Myeloid Leukemia, FLT3-    Protocol/Chemo Regimen: 7+3 (Daunorubicin and Cytarabine)    Day: 6    Pt endorsed:     Review of Systems: Denies nausea, vomiting, diarrhea, chest pain, SOB     Pain scale: denies    Diet: DASH  Allergies    No Known Allergies    ANTIMICROBIALS  levoFLOXacin  Tablet 500 milliGRAM(s) Oral every 24 hours  posaconazole DR Tablet 300 milliGRAM(s) Oral daily      HEME/ONC MEDICATIONS  cytarabine IVPB (eMAR) 226 milliGRAM(s) IV Intermittent daily      STANDING MEDICATIONS  allopurinol 300 milliGRAM(s) Oral daily  amLODIPine   Tablet 5 milliGRAM(s) Oral daily  Biotene Dry Mouth Oral Rinse 5 milliLiter(s) Swish and Spit five times a day  chlorhexidine 4% Liquid 1 Application(s) Topical <User Schedule>  FIRST- Mouthwash  BLM 5 milliLiter(s) Swish and Spit three times a day  ondansetron Injectable 8 milliGRAM(s) IV Push every 8 hours  polyethylene glycol 3350 17 Gram(s) Oral daily  senna 2 Tablet(s) Oral at bedtime  sodium chloride 0.9%. 1000 milliLiter(s) IV Continuous <Continuous>      PRN MEDICATIONS  acetaminophen   Tablet .. 650 milliGRAM(s) Oral every 6 hours PRN  metoclopramide Injectable 10 milliGRAM(s) IV Push every 6 hours PRN  sodium chloride 0.65% Nasal 1 Spray(s) Both Nostrils three times a day PRN  sodium chloride 0.9% lock flush 10 milliLiter(s) IV Push every 1 hour PRN        Vital Signs Last 24 Hrs  T(C): 37.3 (11 Aug 2021 05:24), Max: 37.8 (10 Aug 2021 09:25)  T(F): 99.2 (11 Aug 2021 05:24), Max: 100 (10 Aug 2021 09:25)  HR: 90 (11 Aug 2021 05:24) (82 - 114)  BP: 110/71 (11 Aug 2021 05:24) (101/61 - 126/77)  BP(mean): --  RR: 18 (11 Aug 2021 05:24) (18 - 18)  SpO2: 95% (11 Aug 2021 05:24) (95% - 99%)    PHYSICAL EXAM  General: NAD  HEENT: PERRLA, EOMOI, clear oropharynx, anicteric sclera, pink conjunctiva  Neck: supple  CV: (+) S1/S2 RRR  Lungs: clear to auscultation, no wheezes or rales  Abdomen: soft, non-tender, non-distended (+) BS  Ext: no clubbing, cyanosis or edema  Skin: no rashes and no petechiae  Neuro: alert and oriented X 3, no focal deficits  Central Line: TLCL c/d/i     RECENT CULTURES:  08-05 @ 09:03  .Blood Blood-Peripheral  No Growth Final  --  08-05 @ 01:53  .Blood Blood-Peripheral  No Growth Final         Diagnosis:  Acute Myeloid Leukemia, FLT3-    Protocol/Chemo Regimen: 7+3 (Daunorubicin and Cytarabine)    Day: 6    Pt endorsed: no new complaints     Review of Systems: Denies nausea, vomiting, diarrhea, chest pain, SOB     Pain scale: denies    Diet: DASH  Allergies    No Known Allergies    ANTIMICROBIALS  levoFLOXacin  Tablet 500 milliGRAM(s) Oral every 24 hours  posaconazole DR Tablet 300 milliGRAM(s) Oral daily      HEME/ONC MEDICATIONS  cytarabine IVPB (eMAR) 226 milliGRAM(s) IV Intermittent daily      STANDING MEDICATIONS  allopurinol 300 milliGRAM(s) Oral daily  amLODIPine   Tablet 5 milliGRAM(s) Oral daily  Biotene Dry Mouth Oral Rinse 5 milliLiter(s) Swish and Spit five times a day  chlorhexidine 4% Liquid 1 Application(s) Topical <User Schedule>  FIRST- Mouthwash  BLM 5 milliLiter(s) Swish and Spit three times a day  ondansetron Injectable 8 milliGRAM(s) IV Push every 8 hours  polyethylene glycol 3350 17 Gram(s) Oral daily  senna 2 Tablet(s) Oral at bedtime  sodium chloride 0.9%. 1000 milliLiter(s) IV Continuous <Continuous>      PRN MEDICATIONS  acetaminophen   Tablet .. 650 milliGRAM(s) Oral every 6 hours PRN  metoclopramide Injectable 10 milliGRAM(s) IV Push every 6 hours PRN  sodium chloride 0.65% Nasal 1 Spray(s) Both Nostrils three times a day PRN  sodium chloride 0.9% lock flush 10 milliLiter(s) IV Push every 1 hour PRN        Vital Signs Last 24 Hrs  T(C): 37.3 (11 Aug 2021 05:24), Max: 37.8 (10 Aug 2021 09:25)  T(F): 99.2 (11 Aug 2021 05:24), Max: 100 (10 Aug 2021 09:25)  HR: 90 (11 Aug 2021 05:24) (82 - 114)  BP: 110/71 (11 Aug 2021 05:24) (101/61 - 126/77)  BP(mean): --  RR: 18 (11 Aug 2021 05:24) (18 - 18)  SpO2: 95% (11 Aug 2021 05:24) (95% - 99%)    PHYSICAL EXAM  General: NAD  HEENT: PERRLA, EOMOI, clear oropharynx, anicteric sclera, pink conjunctiva  Neck: supple  CV: (+) S1/S2 RRR  Lungs: clear to auscultation, no wheezes or rales  Abdomen: soft, non-tender, non-distended (+) BS  Ext: no clubbing, cyanosis or edema  Skin: no rashes and no petechiae  Neuro: alert and oriented X 3, no focal deficits  Central Line: TLCL c/d/i     RECENT CULTURES:  08-05 @ 09:03  .Blood Blood-Peripheral  No Growth Final  --  08-05 @ 01:53  .Blood Blood-Peripheral  No Growth Final    LABS:                          7.5    0.75  )-----------( 25       ( 11 Aug 2021 08:33 )             22.0         Mean Cell Volume : 98.2 fl  Mean Cell Hemoglobin : 33.5 pg  Mean Cell Hemoglobin Concentration : 34.1 gm/dL  Auto Neutrophil # : x  Auto Lymphocyte # : x  Auto Monocyte # : x  Auto Eosinophil # : x  Auto Basophil # : x  Auto Neutrophil % : x  Auto Lymphocyte % : x  Auto Monocyte % : x  Auto Eosinophil % : x  Auto Basophil % : x      08-11    138  |  105  |  11  ----------------------------<  95  4.1   |  21<L>  |  1.11    Ca    8.8      11 Aug 2021 08:33  Phos  3.8     08-11  Mg     2.3     08-11    TPro  6.5  /  Alb  3.8  /  TBili  0.4  /  DBili  x   /  AST  20  /  ALT  47<H>  /  AlkPhos  53  08-11      Mg 2.3  Phos 3.8        Uric Acid 4.1

## 2021-08-12 LAB
ALBUMIN SERPL ELPH-MCNC: 3.5 G/DL — SIGNIFICANT CHANGE UP (ref 3.3–5)
ALP SERPL-CCNC: 52 U/L — SIGNIFICANT CHANGE UP (ref 40–120)
ALT FLD-CCNC: 38 U/L — SIGNIFICANT CHANGE UP (ref 10–45)
ANION GAP SERPL CALC-SCNC: 11 MMOL/L — SIGNIFICANT CHANGE UP (ref 5–17)
ANISOCYTOSIS BLD QL: SLIGHT — SIGNIFICANT CHANGE UP
APTT BLD: 28.3 SEC — SIGNIFICANT CHANGE UP (ref 27.5–35.5)
AST SERPL-CCNC: 17 U/L — SIGNIFICANT CHANGE UP (ref 10–40)
BASOPHILS # BLD AUTO: 0 K/UL — SIGNIFICANT CHANGE UP (ref 0–0.2)
BASOPHILS NFR BLD AUTO: 0 % — SIGNIFICANT CHANGE UP (ref 0–2)
BILIRUB SERPL-MCNC: 0.4 MG/DL — SIGNIFICANT CHANGE UP (ref 0.2–1.2)
BUN SERPL-MCNC: 10 MG/DL — SIGNIFICANT CHANGE UP (ref 7–23)
CALCIUM SERPL-MCNC: 9.2 MG/DL — SIGNIFICANT CHANGE UP (ref 8.4–10.5)
CHLORIDE SERPL-SCNC: 105 MMOL/L — SIGNIFICANT CHANGE UP (ref 96–108)
CO2 SERPL-SCNC: 21 MMOL/L — LOW (ref 22–31)
CREAT SERPL-MCNC: 1.07 MG/DL — SIGNIFICANT CHANGE UP (ref 0.5–1.3)
DACRYOCYTES BLD QL SMEAR: SLIGHT — SIGNIFICANT CHANGE UP
ELLIPTOCYTES BLD QL SMEAR: SLIGHT — SIGNIFICANT CHANGE UP
EOSINOPHIL # BLD AUTO: 0 K/UL — SIGNIFICANT CHANGE UP (ref 0–0.5)
EOSINOPHIL NFR BLD AUTO: 0 % — SIGNIFICANT CHANGE UP (ref 0–6)
GLUCOSE SERPL-MCNC: 92 MG/DL — SIGNIFICANT CHANGE UP (ref 70–99)
HCT VFR BLD CALC: 20.9 % — CRITICAL LOW (ref 39–50)
HGB BLD-MCNC: 7.1 G/DL — LOW (ref 13–17)
HYPOCHROMIA BLD QL: SLIGHT — SIGNIFICANT CHANGE UP
IMM GRANULOCYTES NFR BLD AUTO: 0 % — SIGNIFICANT CHANGE UP (ref 0–1.5)
INR BLD: 1.21 RATIO — HIGH (ref 0.88–1.16)
LDH SERPL L TO P-CCNC: 317 U/L — HIGH (ref 50–242)
LYMPHOCYTES # BLD AUTO: 0.25 K/UL — LOW (ref 1–3.3)
LYMPHOCYTES # BLD AUTO: 48 % — SIGNIFICANT CHANGE UP (ref 13–44)
MAGNESIUM SERPL-MCNC: 2.3 MG/DL — SIGNIFICANT CHANGE UP (ref 1.6–2.6)
MCHC RBC-ENTMCNC: 33.2 PG — SIGNIFICANT CHANGE UP (ref 27–34)
MCHC RBC-ENTMCNC: 34 GM/DL — SIGNIFICANT CHANGE UP (ref 32–36)
MCV RBC AUTO: 97.7 FL — SIGNIFICANT CHANGE UP (ref 80–100)
MONOCYTES # BLD AUTO: 0.02 K/UL — SIGNIFICANT CHANGE UP (ref 0–0.9)
MONOCYTES NFR BLD AUTO: 4 % — SIGNIFICANT CHANGE UP (ref 2–14)
NEUTROPHILS # BLD AUTO: 0.25 K/UL — SIGNIFICANT CHANGE UP (ref 1.8–7.4)
NEUTROPHILS NFR BLD AUTO: 48 % — SIGNIFICANT CHANGE UP (ref 43–77)
NRBC # BLD: 0 /100 WBCS — SIGNIFICANT CHANGE UP (ref 0–0)
OVALOCYTES BLD QL SMEAR: SLIGHT — SIGNIFICANT CHANGE UP
PHOSPHATE SERPL-MCNC: 3.3 MG/DL — SIGNIFICANT CHANGE UP (ref 2.5–4.5)
PLAT MORPH BLD: NORMAL — SIGNIFICANT CHANGE UP
PLATELET # BLD AUTO: 20 K/UL — CRITICAL LOW (ref 150–400)
POIKILOCYTOSIS BLD QL AUTO: SLIGHT — SIGNIFICANT CHANGE UP
POTASSIUM SERPL-MCNC: 4 MMOL/L — SIGNIFICANT CHANGE UP (ref 3.5–5.3)
POTASSIUM SERPL-SCNC: 4 MMOL/L — SIGNIFICANT CHANGE UP (ref 3.5–5.3)
PROT SERPL-MCNC: 6.4 G/DL — SIGNIFICANT CHANGE UP (ref 6–8.3)
PROTHROM AB SERPL-ACNC: 14.4 SEC — HIGH (ref 10.6–13.6)
RBC # BLD: 2.14 M/UL — LOW (ref 4.2–5.8)
RBC # FLD: 16.3 % — HIGH (ref 10.3–14.5)
RBC BLD AUTO: ABNORMAL
SODIUM SERPL-SCNC: 137 MMOL/L — SIGNIFICANT CHANGE UP (ref 135–145)
URATE SERPL-MCNC: 3.9 MG/DL — SIGNIFICANT CHANGE UP (ref 3.4–8.8)
WBC # BLD: 0.52 K/UL — CRITICAL LOW (ref 3.8–10.5)
WBC # FLD AUTO: 0.52 K/UL — CRITICAL LOW (ref 3.8–10.5)

## 2021-08-12 PROCEDURE — 71045 X-RAY EXAM CHEST 1 VIEW: CPT | Mod: 26

## 2021-08-12 PROCEDURE — 99232 SBSQ HOSP IP/OBS MODERATE 35: CPT | Mod: GC

## 2021-08-12 RX ORDER — CEFEPIME 1 G/1
INJECTION, POWDER, FOR SOLUTION INTRAMUSCULAR; INTRAVENOUS
Refills: 0 | Status: DISCONTINUED | OUTPATIENT
Start: 2021-08-12 | End: 2021-08-20

## 2021-08-12 RX ORDER — CEFEPIME 1 G/1
2000 INJECTION, POWDER, FOR SOLUTION INTRAMUSCULAR; INTRAVENOUS ONCE
Refills: 0 | Status: COMPLETED | OUTPATIENT
Start: 2021-08-12 | End: 2021-08-12

## 2021-08-12 RX ORDER — CHLORHEXIDINE GLUCONATE 213 G/1000ML
1 SOLUTION TOPICAL
Refills: 0 | Status: DISCONTINUED | OUTPATIENT
Start: 2021-08-13 | End: 2021-08-29

## 2021-08-12 RX ORDER — ACYCLOVIR SODIUM 500 MG
400 VIAL (EA) INTRAVENOUS EVERY 8 HOURS
Refills: 0 | Status: DISCONTINUED | OUTPATIENT
Start: 2021-08-12 | End: 2021-08-29

## 2021-08-12 RX ORDER — CEFEPIME 1 G/1
2000 INJECTION, POWDER, FOR SOLUTION INTRAMUSCULAR; INTRAVENOUS EVERY 8 HOURS
Refills: 0 | Status: DISCONTINUED | OUTPATIENT
Start: 2021-08-12 | End: 2021-08-20

## 2021-08-12 RX ADMIN — Medication 400 MILLIGRAM(S): at 22:45

## 2021-08-12 RX ADMIN — ONDANSETRON 8 MILLIGRAM(S): 8 TABLET, FILM COATED ORAL at 06:32

## 2021-08-12 RX ADMIN — Medication 5 MILLILITER(S): at 12:04

## 2021-08-12 RX ADMIN — CEFEPIME 100 MILLIGRAM(S): 1 INJECTION, POWDER, FOR SOLUTION INTRAMUSCULAR; INTRAVENOUS at 22:45

## 2021-08-12 RX ADMIN — DIPHENHYDRAMINE HYDROCHLORIDE AND LIDOCAINE HYDROCHLORIDE AND ALUMINUM HYDROXIDE AND MAGNESIUM HYDRO 5 MILLILITER(S): KIT at 22:45

## 2021-08-12 RX ADMIN — DIPHENHYDRAMINE HYDROCHLORIDE AND LIDOCAINE HYDROCHLORIDE AND ALUMINUM HYDROXIDE AND MAGNESIUM HYDRO 5 MILLILITER(S): KIT at 14:50

## 2021-08-12 RX ADMIN — POSACONAZOLE 300 MILLIGRAM(S): 100 TABLET, DELAYED RELEASE ORAL at 12:03

## 2021-08-12 RX ADMIN — ONDANSETRON 8 MILLIGRAM(S): 8 TABLET, FILM COATED ORAL at 22:45

## 2021-08-12 RX ADMIN — Medication 5 MILLILITER(S): at 16:33

## 2021-08-12 RX ADMIN — DIPHENHYDRAMINE HYDROCHLORIDE AND LIDOCAINE HYDROCHLORIDE AND ALUMINUM HYDROXIDE AND MAGNESIUM HYDRO 5 MILLILITER(S): KIT at 06:32

## 2021-08-12 RX ADMIN — SODIUM CHLORIDE 100 MILLILITER(S): 9 INJECTION INTRAMUSCULAR; INTRAVENOUS; SUBCUTANEOUS at 17:31

## 2021-08-12 RX ADMIN — ONDANSETRON 8 MILLIGRAM(S): 8 TABLET, FILM COATED ORAL at 14:49

## 2021-08-12 RX ADMIN — CEFEPIME 100 MILLIGRAM(S): 1 INJECTION, POWDER, FOR SOLUTION INTRAMUSCULAR; INTRAVENOUS at 18:35

## 2021-08-12 RX ADMIN — Medication 5 MILLILITER(S): at 20:36

## 2021-08-12 RX ADMIN — Medication 650 MILLIGRAM(S): at 17:31

## 2021-08-12 RX ADMIN — Medication 300 MILLIGRAM(S): at 12:03

## 2021-08-12 RX ADMIN — Medication 650 MILLIGRAM(S): at 18:37

## 2021-08-12 RX ADMIN — Medication 400 MILLIGRAM(S): at 14:50

## 2021-08-12 RX ADMIN — AMLODIPINE BESYLATE 5 MILLIGRAM(S): 2.5 TABLET ORAL at 06:32

## 2021-08-12 RX ADMIN — Medication 20.93 MILLIGRAM(S): at 13:14

## 2021-08-12 NOTE — PROGRESS NOTE ADULT - PROBLEM SELECTOR PLAN 2
Patient is afebrile, neutropenic   Off Zosyn for possible PNA, d/c'd after 8/6 PM dose  Cultures NGTD 7/29, 8/1, 8/2 8/9 started on levaquin and posaconazole ppx for neutropenia Patient is afebrile, neutropenic   Off Zosyn for possible PNA, d/c'd after 8/6 PM dose  Cultures NGTD 7/29, 8/1, 8/2 8/9 started on levaquin and posaconazole ppx for neutropenia  8/12 - Started Acyclovir for oral ulcers

## 2021-08-12 NOTE — PROGRESS NOTE ADULT - ASSESSMENT
Mr. Tian is  a 35 y/o male with h/o PMH HTN, fatty liver, kidney stones presents with rash, dizziness, fatigue and severe RUQ pain for 3 days. Now with anemia, thrombocytopenia and leukocytosis with 17% blasts, Bone marrow bx c/w AML. Transferred to [address] [place] [address] for management. Patient is pancytopenia secondary to disease and/or chemotherapy. Patient receiving induction chemotherapy  with 7+3 (Daunorubicin and Cytarabine). Patient has pancytopenia secondary to chemotherapy and disease process.

## 2021-08-12 NOTE — PROGRESS NOTE ADULT - SUBJECTIVE AND OBJECTIVE BOX
Diagnosis:  Acute Myeloid Leukemia, FLT3-    Protocol/Chemo Regimen: 7+3 (Daunorubicin and Cytarabine)    Day: 7    Pt endorsed:    Review of Systems:      Pain scale: denies    Diet: DASH  Allergies    No Known Allergies    MEDICATIONS  (STANDING):  allopurinol 300 milliGRAM(s) Oral daily  amLODIPine   Tablet 5 milliGRAM(s) Oral daily  Biotene Dry Mouth Oral Rinse 5 milliLiter(s) Swish and Spit five times a day  chlorhexidine 4% Liquid 1 Application(s) Topical <User Schedule>  cytarabine IVPB (eMAR) 226 milliGRAM(s) IV Intermittent daily  FIRST- Mouthwash  BLM 5 milliLiter(s) Swish and Spit three times a day  levoFLOXacin  Tablet 500 milliGRAM(s) Oral every 24 hours  ondansetron Injectable 8 milliGRAM(s) IV Push every 8 hours  polyethylene glycol 3350 17 Gram(s) Oral daily  posaconazole DR Tablet 300 milliGRAM(s) Oral daily  senna 2 Tablet(s) Oral at bedtime  sodium chloride 0.9%. 1000 milliLiter(s) (100 mL/Hr) IV Continuous <Continuous>    MEDICATIONS  (PRN):  acetaminophen   Tablet .. 650 milliGRAM(s) Oral every 6 hours PRN Temp greater or equal to 38C (100.4F), Mild Pain (1 - 3), Moderate Pain (4 - 6)  metoclopramide Injectable 10 milliGRAM(s) IV Push every 6 hours PRN nausea/vomiting  sodium chloride 0.65% Nasal 1 Spray(s) Both Nostrils three times a day PRN Nasal Congestion  sodium chloride 0.9% lock flush 10 milliLiter(s) IV Push every 1 hour PRN Pre/post blood products, medications, blood draw, and to maintain line patency    Vital Signs Last 24 Hrs  T(C): 37.7 (12 Aug 2021 00:48), Max: 37.7 (12 Aug 2021 00:48)  T(F): 99.8 (12 Aug 2021 00:48), Max: 99.8 (12 Aug 2021 00:48)  HR: 91 (12 Aug 2021 00:48) (91 - 106)  BP: 120/69 (12 Aug 2021 00:48) (120/69 - 132/86)  BP(mean): --  RR: 18 (12 Aug 2021 00:48) (18 - 18)  SpO2: 95% (12 Aug 2021 00:48) (95% - 99%)    PHYSICAL EXAM  General: NAD  HEENT: PERRLA, EOMOI, clear oropharynx, anicteric sclera, pink conjunctiva  Neck: supple  CV: (+) S1/S2 RRR  Lungs: clear to auscultation, no wheezes or rales  Abdomen: soft, non-tender, non-distended (+) BS  Ext: no clubbing, cyanosis or edema  Skin: no rashes and no petechiae  Neuro: alert and oriented X 3, no focal deficits  Central Line: TLCL c/d/i     RECENT CULTURES:  08-05 @ 09:03  .Blood Blood-Peripheral  No Growth Final  --  08-05 @ 01:53  .Blood Blood-Peripheral  No Growth Final    LABS:               ---------             Diagnosis:  Acute Myeloid Leukemia, FLT3-    Protocol/Chemo Regimen: 7+3 (Daunorubicin and Cytarabine)    Day: 7    Pt endorsed: No overnight events, mouth feels like "Swiss Cheese"    Review of Systems:  Denies n/v, HA or dizziness, cough, diarrhea, dysurea    Pain scale: denies    Diet: DASH  Allergies    No Known Allergies    MEDICATIONS  (STANDING):  allopurinol 300 milliGRAM(s) Oral daily  amLODIPine   Tablet 5 milliGRAM(s) Oral daily  Biotene Dry Mouth Oral Rinse 5 milliLiter(s) Swish and Spit five times a day  chlorhexidine 4% Liquid 1 Application(s) Topical <User Schedule>  cytarabine IVPB (eMAR) 226 milliGRAM(s) IV Intermittent daily  FIRST- Mouthwash  BLM 5 milliLiter(s) Swish and Spit three times a day  levoFLOXacin  Tablet 500 milliGRAM(s) Oral every 24 hours  ondansetron Injectable 8 milliGRAM(s) IV Push every 8 hours  polyethylene glycol 3350 17 Gram(s) Oral daily  posaconazole DR Tablet 300 milliGRAM(s) Oral daily  senna 2 Tablet(s) Oral at bedtime  sodium chloride 0.9%. 1000 milliLiter(s) (100 mL/Hr) IV Continuous <Continuous>    MEDICATIONS  (PRN):  acetaminophen   Tablet .. 650 milliGRAM(s) Oral every 6 hours PRN Temp greater or equal to 38C (100.4F), Mild Pain (1 - 3), Moderate Pain (4 - 6)  metoclopramide Injectable 10 milliGRAM(s) IV Push every 6 hours PRN nausea/vomiting  sodium chloride 0.65% Nasal 1 Spray(s) Both Nostrils three times a day PRN Nasal Congestion  sodium chloride 0.9% lock flush 10 milliLiter(s) IV Push every 1 hour PRN Pre/post blood products, medications, blood draw, and to maintain line patency    Vital Signs Last 24 Hrs  T(C): 37.7 (12 Aug 2021 00:48), Max: 37.7 (12 Aug 2021 00:48)  T(F): 99.8 (12 Aug 2021 00:48), Max: 99.8 (12 Aug 2021 00:48)  HR: 91 (12 Aug 2021 00:48) (91 - 106)  BP: 120/69 (12 Aug 2021 00:48) (120/69 - 132/86)  BP(mean): --  RR: 18 (12 Aug 2021 00:48) (18 - 18)  SpO2: 95% (12 Aug 2021 00:48) (95% - 99%)    PHYSICAL EXAM  General: NAD  HEENT: PERRLA, anicteric sclerae, + ulcer l lingual area, +ulcer bottom inner labrum, clear oropharynx  Neck: supple  CV: (+) S1/S2, reg  Lungs: clear to auscultation, no wheezes or rales  Abdomen: soft, non-tender, non-distended (+) BS  Ext: no clubbing, cyanosis or edema  Skin: no rashes and no petechiae  Neuro: alert and oriented X 3, no focal deficits  Central Line: R TLCL c/d/i     RECENT CULTURES:  08-05 @ 09:03  .Blood Blood-Peripheral  No Growth Final  --  08-05 @ 01:53  .Blood Blood-Peripheral  No Growth Final    LABS:                        7.1    0.52  )-----------( 20       ( 12 Aug 2021 07:24 )             20.9     12 Aug 2021 07:25    137    |  105    |  10     ----------------------------<  92     4.0     |  21     |  1.07     Ca    9.2        12 Aug 2021 07:25  Phos  3.3       12 Aug 2021 07:25  Mg     2.3       12 Aug 2021 07:25    TPro  6.4    /  Alb  3.5    /  TBili  0.4    /  DBili  x      /  AST  17     /  ALT  38     /  AlkPhos  52     12 Aug 2021 07:25    PT/INR - ( 12 Aug 2021 07:26 )   PT: 14.4 sec;   INR: 1.21 ratio    PTT - ( 12 Aug 2021 07:26 )  PTT:28.3 sec        LIVER FUNCTIONS - ( 12 Aug 2021 07:25 )  Alb: 3.5 g/dL / Pro: 6.4 g/dL / ALK PHOS: 52 U/L / ALT: 38 U/L / AST: 17 U/L / GGT: x

## 2021-08-12 NOTE — ADVANCED PRACTICE NURSE CONSULT - REASON FOR CONSULT
Chemotherapy Notes: AML induction  Day 3/7 Cytarabine
Chemotherapy Notes: AML induction Day 1/3 Daunorubicin Day 1/7 Cytarabine
Chemotherapy Notes: AML induction  Day 4/7 Cytarabine CIVI
Chemotherapy Notes: AML induction  Day 6/7 Cytarabine
Chemotherapy Notes: AML induction Day 2/3 Daunorubicin Day 2/7 Cytarabine
Chemotherapy Notes: AML induction Day 3/3 Daunorubicin Day 3/7 Cytarabine
Day 7 of 7 Cytarabine; Induction; Consent on file.
Research Note                                                        PID: [id number]

## 2021-08-12 NOTE — PROGRESS NOTE ADULT - ATTENDING COMMENTS
35yo M admitted with RUQ pain found to have bloodwork concerning for acute leukemia  -13% myeloblasts on PB  -Flt3 negative. s/p BMbx 8/4 -AML. f/u cytogenetics, [facility]  -started 7+3 on 8/6 -cytarabine 100/m2 and dauno 90/m2. Today is day 6  -pain control, improved today. Abd sono showing hepatomegaly and hepatic steatosis. Right pleural effusion. CT chest shows small right pleural effusion  -MUGA EF 71%  -discussed with patient and family at the bedside. We also discussed sperm banking -pt declined  -now afebrile, Cx's NGTD. Completed 7d course of Zosyn for PNA  -cont levaquin and posaconazole PPx as now neutropenic  -TLC placement  -supportive care  -transfuse as needed  -day 14 BMbx on 8/19 35yo M admitted with RUQ pain found to have bloodwork concerning for acute leukemia  -13% myeloblasts on PB. Flt3 negative.   -s/p BMbx 8/4 -AML. f/u cytogenetics, [facility]  -started 7+3 on 8/6 -cytarabine 100/m2 and dauno 90/m2. Today is day 7  -pain control, improved today. Abd sono showing hepatomegaly and hepatic steatosis. Right pleural effusion. CT chest shows small right pleural effusion  -MUGA EF 71%  -discussed with patient and family at the bedside. We also discussed sperm banking -pt declined  -now afebrile, Cx's NGTD. Completed 7d course of Zosyn for PNA  -cont levaquin, posaconazole and acyclovir PPx  -TLC placement  -supportive care  -transfuse as needed  -day 14 BMbx on 8/19

## 2021-08-12 NOTE — ADVANCED PRACTICE NURSE CONSULT - ASSESSMENT
Lab results reviewed by Dr. Marin. Patient with right IJ TLC 3 ports in used patent. Reinforced teachings to patient about his chemo regimen well understood. 2 RNs verification completed prior to start.Patient denies nausea on zofran 8mg IV every 8 hours as antiemetic. Cytarabine 226mg in 500ml NSS started at 1315 x 24hours infusion at 22.5ml/hr via lowest port of NSS line into brown port from right IJ TLC through Alaris IV pump. Primary RN aware of plan of care. Safety maintained.

## 2021-08-12 NOTE — PROGRESS NOTE ADULT - PROBLEM SELECTOR PLAN 1
FLT3 negative, BM bx c/w AML   consented for [facility] study    HIV (-), Hep (-)  Echo 70%, MUGA EF 71%   Discussed sperm banking. Declined   TLC placed on 8/6 8/6 Started induction with  7+3 (Cytarabine + Daunorubicin)  Follow daily lytes, CBC. replace, Replete prn  Continue with Allopurinol 300mg daily, IV hydration, mouth care, pain control, antiemetics  Monitor for TLS daily  Day 14 Bm bx due on 8/19

## 2021-08-13 LAB
ALBUMIN SERPL ELPH-MCNC: 3.6 G/DL — SIGNIFICANT CHANGE UP (ref 3.3–5)
ALP SERPL-CCNC: 49 U/L — SIGNIFICANT CHANGE UP (ref 40–120)
ALT FLD-CCNC: 33 U/L — SIGNIFICANT CHANGE UP (ref 10–45)
ANION GAP SERPL CALC-SCNC: 11 MMOL/L — SIGNIFICANT CHANGE UP (ref 5–17)
APPEARANCE UR: CLEAR — SIGNIFICANT CHANGE UP
AST SERPL-CCNC: 14 U/L — SIGNIFICANT CHANGE UP (ref 10–40)
BILIRUB SERPL-MCNC: 0.4 MG/DL — SIGNIFICANT CHANGE UP (ref 0.2–1.2)
BILIRUB UR-MCNC: NEGATIVE — SIGNIFICANT CHANGE UP
BLD GP AB SCN SERPL QL: NEGATIVE — SIGNIFICANT CHANGE UP
BUN SERPL-MCNC: 10 MG/DL — SIGNIFICANT CHANGE UP (ref 7–23)
CALCIUM SERPL-MCNC: 8.8 MG/DL — SIGNIFICANT CHANGE UP (ref 8.4–10.5)
CHLORIDE SERPL-SCNC: 108 MMOL/L — SIGNIFICANT CHANGE UP (ref 96–108)
CO2 SERPL-SCNC: 21 MMOL/L — LOW (ref 22–31)
COLOR SPEC: SIGNIFICANT CHANGE UP
CREAT SERPL-MCNC: 1.08 MG/DL — SIGNIFICANT CHANGE UP (ref 0.5–1.3)
CULTURE RESULTS: SIGNIFICANT CHANGE UP
DIFF PNL FLD: NEGATIVE — SIGNIFICANT CHANGE UP
GLUCOSE SERPL-MCNC: 94 MG/DL — SIGNIFICANT CHANGE UP (ref 70–99)
GLUCOSE UR QL: NEGATIVE — SIGNIFICANT CHANGE UP
HCT VFR BLD CALC: 19 % — CRITICAL LOW (ref 39–50)
HGB BLD-MCNC: 6.5 G/DL — CRITICAL LOW (ref 13–17)
KETONES UR-MCNC: NEGATIVE — SIGNIFICANT CHANGE UP
LDH SERPL L TO P-CCNC: 313 U/L — HIGH (ref 50–242)
LEUKOCYTE ESTERASE UR-ACNC: NEGATIVE — SIGNIFICANT CHANGE UP
MAGNESIUM SERPL-MCNC: 2.3 MG/DL — SIGNIFICANT CHANGE UP (ref 1.6–2.6)
MCHC RBC-ENTMCNC: 33.7 PG — SIGNIFICANT CHANGE UP (ref 27–34)
MCHC RBC-ENTMCNC: 34.2 GM/DL — SIGNIFICANT CHANGE UP (ref 32–36)
MCV RBC AUTO: 98.4 FL — SIGNIFICANT CHANGE UP (ref 80–100)
NITRITE UR-MCNC: NEGATIVE — SIGNIFICANT CHANGE UP
NRBC # BLD: 0 /100 WBCS — SIGNIFICANT CHANGE UP (ref 0–0)
PH UR: 6 — SIGNIFICANT CHANGE UP (ref 5–8)
PHOSPHATE SERPL-MCNC: 3.3 MG/DL — SIGNIFICANT CHANGE UP (ref 2.5–4.5)
PLATELET # BLD AUTO: 13 K/UL — CRITICAL LOW (ref 150–400)
POTASSIUM SERPL-MCNC: 4.1 MMOL/L — SIGNIFICANT CHANGE UP (ref 3.5–5.3)
POTASSIUM SERPL-SCNC: 4.1 MMOL/L — SIGNIFICANT CHANGE UP (ref 3.5–5.3)
PROT SERPL-MCNC: 6.4 G/DL — SIGNIFICANT CHANGE UP (ref 6–8.3)
PROT UR-MCNC: NEGATIVE — SIGNIFICANT CHANGE UP
RBC # BLD: 1.93 M/UL — LOW (ref 4.2–5.8)
RBC # FLD: 15.9 % — HIGH (ref 10.3–14.5)
RH IG SCN BLD-IMP: POSITIVE — SIGNIFICANT CHANGE UP
SODIUM SERPL-SCNC: 140 MMOL/L — SIGNIFICANT CHANGE UP (ref 135–145)
SP GR SPEC: 1.01 — SIGNIFICANT CHANGE UP (ref 1.01–1.02)
SPECIMEN SOURCE: SIGNIFICANT CHANGE UP
URATE SERPL-MCNC: 3.4 MG/DL — SIGNIFICANT CHANGE UP (ref 3.4–8.8)
UROBILINOGEN FLD QL: NEGATIVE — SIGNIFICANT CHANGE UP
WBC # BLD: 0.38 K/UL — CRITICAL LOW (ref 3.8–10.5)
WBC # FLD AUTO: 0.38 K/UL — CRITICAL LOW (ref 3.8–10.5)

## 2021-08-13 PROCEDURE — 99232 SBSQ HOSP IP/OBS MODERATE 35: CPT

## 2021-08-13 RX ORDER — PETROLATUM,WHITE
1 JELLY (GRAM) TOPICAL
Refills: 0 | Status: DISCONTINUED | OUTPATIENT
Start: 2021-08-13 | End: 2021-08-29

## 2021-08-13 RX ORDER — ACETAMINOPHEN 500 MG
1000 TABLET ORAL ONCE
Refills: 0 | Status: COMPLETED | OUTPATIENT
Start: 2021-08-13 | End: 2021-08-13

## 2021-08-13 RX ADMIN — CHLORHEXIDINE GLUCONATE 1 APPLICATION(S): 213 SOLUTION TOPICAL at 08:44

## 2021-08-13 RX ADMIN — ONDANSETRON 8 MILLIGRAM(S): 8 TABLET, FILM COATED ORAL at 22:08

## 2021-08-13 RX ADMIN — Medication 1000 MILLIGRAM(S): at 02:02

## 2021-08-13 RX ADMIN — Medication 650 MILLIGRAM(S): at 23:28

## 2021-08-13 RX ADMIN — DIPHENHYDRAMINE HYDROCHLORIDE AND LIDOCAINE HYDROCHLORIDE AND ALUMINUM HYDROXIDE AND MAGNESIUM HYDRO 5 MILLILITER(S): KIT at 05:57

## 2021-08-13 RX ADMIN — Medication 300 MILLIGRAM(S): at 12:52

## 2021-08-13 RX ADMIN — Medication 1 APPLICATION(S): at 17:21

## 2021-08-13 RX ADMIN — DIPHENHYDRAMINE HYDROCHLORIDE AND LIDOCAINE HYDROCHLORIDE AND ALUMINUM HYDROXIDE AND MAGNESIUM HYDRO 5 MILLILITER(S): KIT at 22:05

## 2021-08-13 RX ADMIN — Medication 1 APPLICATION(S): at 12:56

## 2021-08-13 RX ADMIN — Medication 400 MILLIGRAM(S): at 01:30

## 2021-08-13 RX ADMIN — CEFEPIME 100 MILLIGRAM(S): 1 INJECTION, POWDER, FOR SOLUTION INTRAMUSCULAR; INTRAVENOUS at 14:26

## 2021-08-13 RX ADMIN — Medication 5 MILLILITER(S): at 12:56

## 2021-08-13 RX ADMIN — Medication 5 MILLILITER(S): at 08:45

## 2021-08-13 RX ADMIN — POSACONAZOLE 300 MILLIGRAM(S): 100 TABLET, DELAYED RELEASE ORAL at 12:52

## 2021-08-13 RX ADMIN — Medication 650 MILLIGRAM(S): at 22:27

## 2021-08-13 RX ADMIN — SODIUM CHLORIDE 100 MILLILITER(S): 9 INJECTION INTRAMUSCULAR; INTRAVENOUS; SUBCUTANEOUS at 06:13

## 2021-08-13 RX ADMIN — DIPHENHYDRAMINE HYDROCHLORIDE AND LIDOCAINE HYDROCHLORIDE AND ALUMINUM HYDROXIDE AND MAGNESIUM HYDRO 5 MILLILITER(S): KIT at 14:26

## 2021-08-13 RX ADMIN — AMLODIPINE BESYLATE 5 MILLIGRAM(S): 2.5 TABLET ORAL at 05:57

## 2021-08-13 RX ADMIN — CEFEPIME 100 MILLIGRAM(S): 1 INJECTION, POWDER, FOR SOLUTION INTRAMUSCULAR; INTRAVENOUS at 05:56

## 2021-08-13 RX ADMIN — Medication 400 MILLIGRAM(S): at 14:26

## 2021-08-13 RX ADMIN — SODIUM CHLORIDE 100 MILLILITER(S): 9 INJECTION INTRAMUSCULAR; INTRAVENOUS; SUBCUTANEOUS at 08:44

## 2021-08-13 RX ADMIN — Medication 650 MILLIGRAM(S): at 17:07

## 2021-08-13 RX ADMIN — ONDANSETRON 8 MILLIGRAM(S): 8 TABLET, FILM COATED ORAL at 14:26

## 2021-08-13 RX ADMIN — Medication 650 MILLIGRAM(S): at 17:08

## 2021-08-13 RX ADMIN — CEFEPIME 100 MILLIGRAM(S): 1 INJECTION, POWDER, FOR SOLUTION INTRAMUSCULAR; INTRAVENOUS at 22:09

## 2021-08-13 RX ADMIN — ONDANSETRON 8 MILLIGRAM(S): 8 TABLET, FILM COATED ORAL at 05:57

## 2021-08-13 RX ADMIN — Medication 5 MILLILITER(S): at 16:28

## 2021-08-13 RX ADMIN — Medication 400 MILLIGRAM(S): at 22:09

## 2021-08-13 RX ADMIN — Medication 400 MILLIGRAM(S): at 05:58

## 2021-08-13 NOTE — CHART NOTE - NSCHARTNOTEFT_GEN_A_CORE
MEDICINE [facility]    Notified by RN patient with temperature of 102.3F.  Seen and examined patient at bedside.  Patient is alert, NAD.  Denies HA, CP, SOB, cough, N/V, or abd pain.    VITAL SIGNS:  T(C): 39.1 (08-13-21 @ 01:13), Max: 39.1 (08-13-21 @ 01:13)  HR: 110 (08-13-21 @ 01:13) (98 - 110)  BP: 112/62 (08-13-21 @ 01:13) (101/67 - 137/76)  RR: 18 (08-13-21 @ 01:13) (18 - 18)  SpO2: 95% (08-13-21 @ 01:13) (95% - 99%)  Wt(kg): --      LABORATORY:                          7.1    0.52  )-----------( 20       ( 12 Aug 2021 07:24 )             20.9       08-12    137  |  105  |  10  ----------------------------<  92  4.0   |  21<L>  |  1.07    Ca    9.2      12 Aug 2021 07:25  Phos  3.3     08-12  Mg     2.3     08-12    TPro  6.4  /  Alb  3.5  /  TBili  0.4  /  DBili  x   /  AST  17  /  ALT  38  /  AlkPhos  52  08-12          MICROBIOLOGY:   -Blood cultures pending results, collected 8/12/2021  -Urine culture pending results, collected 8/12/2021  -Urinalysis pending results, ordered 8/13/2021        RADIOLOGY:< from: Xray Chest 1 View- PORTABLE-Urgent (Xray Chest 1 View- PORTABLE-Urgent .) (08.12.21 @ 17:54) >    ******PRELIMINARY REPORT******      INTERPRETATION:  Central venous line within the SVC.  -Discussed preliminary results with radiologist CARLOS--improved bilateral bibasilar opacities      PHYSICAL EXAM:    Constitutional: AOx3. NAD.    Respiratory: clear lungs bilaterally. No wheezing, rhonchi, or crackles.    Cardiovascular: S1 S2. No murmurs.    Gastrointestinal: BS X4 active. soft. nontender.    Extremities/Vascular: +2 pulses bilaterally. No BLE edema.      ASSESSMENT/PLAN:   HPI:  36M PMH HTN, fatty liver, kidney stones presents with rash, dizziness, fatigue and severe RUQ pain for 3 days. He has felt fatigued for the past few months. Three days ago, he developed sharp RUQ pain that wrapped around to his back. He rated the pain as a 5/10 with no change over the past three days. The pain doesn't change with PO intake or bowel movements. He has some associated dizziness with the pain. He also developed an erythematous rash that is nonpruritic on his upper chest, neck, and upper back over the past couple of days. He has had a twenty pound weight gain over the past year due to a change to sedentary lifestyle 2/2 pandemic. He denies night sweats and fevers. He has had constipation for the past week where he must strain to move his bowels. He has no urinary symptoms, fevers, chills, or changes in appetite. No diarrhea, N/V. No prior cardiac hx, pleuritic chest pain, or SOB. He had kidney stones 5 years ago but states this pain is worse. Denies allergies or new medications. He denies recent travel and works as a . He has had no recent sick contacts. He received all childhood vaccines.    In the ED: temp 99.4, tachycardic to 150s, started on fluids and allopurinol per heme/onc recs. He received 1 mg hydromorphone, 4 mg morphine, 600 mg ibuprofen , and 975 mg acetaminophen. CT A/P revealed fatty liver and small R pleural effusion. (30 Jul 2021 09:22)        1) Neutropenic Fever 102.3F  -1x dose IV Tylenol ordered and administered  -BC x2, UA/UC ordered 8/12/2021 and pending results  -CXR ordered 8/12/2021--preliminary results discussed with radiologist (see above)  -C/w Cefepime 2gm q8h  -F/U primary team in AM    MIGUEL ClarkC   Department of Medicine   #[id number] [facility]    Notified by RN patient with temperature of 102.3F.  Seen and examined patient at bedside.  Patient is alert, NAD.  Denies HA, CP, SOB, cough, N/V, or abd pain.    VITAL SIGNS:  T(C): 39.1 (08-13-21 @ 01:13), Max: 39.1 (08-13-21 @ 01:13)  HR: 110 (08-13-21 @ 01:13) (98 - 110)  BP: 112/62 (08-13-21 @ 01:13) (101/67 - 137/76)  RR: 18 (08-13-21 @ 01:13) (18 - 18)  SpO2: 95% (08-13-21 @ 01:13) (95% - 99%)  Wt(kg): --      LABORATORY:                          7.1    0.52  )-----------( 20       ( 12 Aug 2021 07:24 )             20.9       08-12    137  |  105  |  10  ----------------------------<  92  4.0   |  21<L>  |  1.07    Ca    9.2      12 Aug 2021 07:25  Phos  3.3     08-12  Mg     2.3     08-12    TPro  6.4  /  Alb  3.5  /  TBili  0.4  /  DBili  x   /  AST  17  /  ALT  38  /  AlkPhos  52  08-12          MICROBIOLOGY:   -Blood cultures pending results, collected 8/12/2021  -Urine culture pending results, collected 8/12/2021  -Urinalysis pending results, ordered 8/13/2021        RADIOLOGY:< from: Xray Chest 1 View- PORTABLE-Urgent (Xray Chest 1 View- PORTABLE-Urgent .) (08.12.21 @ 17:54) >    ******PRELIMINARY REPORT******      INTERPRETATION:  Central venous line within the SVC.  -Discussed preliminary results with radiologist VIC Mohr, and showcases clear lungs.      PHYSICAL EXAM:    Constitutional: AOx3. NAD.    Respiratory: clear lungs bilaterally. No wheezing, rhonchi, or crackles.    Cardiovascular: S1 S2. No murmurs.    Gastrointestinal: BS X4 active. soft. nontender.    Extremities/Vascular: +2 pulses bilaterally. No BLE edema.      ASSESSMENT/PLAN:   HPI:  36M PMH HTN, fatty liver, kidney stones presents with rash, dizziness, fatigue and severe RUQ pain for 3 days. He has felt fatigued for the past few months. Three days ago, he developed sharp RUQ pain that wrapped around to his back. He rated the pain as a 5/10 with no change over the past three days. The pain doesn't change with PO intake or bowel movements. He has some associated dizziness with the pain. He also developed an erythematous rash that is nonpruritic on his upper chest, neck, and upper back over the past couple of days. He has had a twenty pound weight gain over the past year due to a change to sedentary lifestyle 2/2 pandemic. He denies night sweats and fevers. He has had constipation for the past week where he must strain to move his bowels. He has no urinary symptoms, fevers, chills, or changes in appetite. No diarrhea, N/V. No prior cardiac hx, pleuritic chest pain, or SOB. He had kidney stones 5 years ago but states this pain is worse. Denies allergies or new medications. He denies recent travel and works as a . He has had no recent sick contacts. He received all childhood vaccines.    In the ED: temp 99.4, tachycardic to 150s, started on fluids and allopurinol per heme/onc recs. He received 1 mg hydromorphone, 4 mg morphine, 600 mg ibuprofen , and 975 mg acetaminophen. CT A/P revealed fatty liver and small R pleural effusion. (30 Jul 2021 09:22)        1) Neutropenic Fever 102.3F  -1x dose IV Tylenol ordered and administered  -BC x2, UA/UC ordered 8/12/2021 and pending results  -CXR ordered 8/12/2021, pending results (preliminary results discussed with radiologist VIC Mohr, see above)  -C/w Cefepime 2gm q8h  -F/U primary team in AM    MIGUEL ClarkC   Department of Medicine   #[id number] [facility]    Notified by RN patient with temperature of 102.3F.  Seen and examined patient at bedside.  Patient is alert, NAD.  Denies HA, CP, SOB, cough, N/V, or abd pain.    VITAL SIGNS:  T(C): 39.1 (08-13-21 @ 01:13), Max: 39.1 (08-13-21 @ 01:13)  HR: 110 (08-13-21 @ 01:13) (98 - 110)  BP: 112/62 (08-13-21 @ 01:13) (101/67 - 137/76)  RR: 18 (08-13-21 @ 01:13) (18 - 18)  SpO2: 95% (08-13-21 @ 01:13) (95% - 99%)  Wt(kg): --      LABORATORY:                          7.1    0.52  )-----------( 20       ( 12 Aug 2021 07:24 )             20.9       08-12    137  |  105  |  10  ----------------------------<  92  4.0   |  21<L>  |  1.07    Ca    9.2      12 Aug 2021 07:25  Phos  3.3     08-12  Mg     2.3     08-12    TPro  6.4  /  Alb  3.5  /  TBili  0.4  /  DBili  x   /  AST  17  /  ALT  38  /  AlkPhos  52  08-12          MICROBIOLOGY:   -Blood cultures pending results, collected 8/12/2021  -Urine culture pending results, collected 8/12/2021  -Urinalysis pending results, ordered 8/13/2021        RADIOLOGY:< from: Xray Chest 1 View- PORTABLE-Urgent (Xray Chest 1 View- PORTABLE-Urgent .) (08.12.21 @ 17:54) >    ******PRELIMINARY REPORT******      INTERPRETATION:  Central venous line within the SVC.  -Discussed preliminary results with radiologist VIC Mohr. CXR shows clear lungs bilaterally.      PHYSICAL EXAM:    Constitutional: AOx3. NAD.    Respiratory: clear lungs bilaterally. No wheezing, rhonchi, or crackles.    Cardiovascular: S1 S2. No murmurs.    Gastrointestinal: BS X4 active. soft. nontender.    Extremities/Vascular: +2 pulses bilaterally. No BLE edema.      ASSESSMENT/PLAN:   HPI:  36M PMH HTN, fatty liver, kidney stones presents with rash, dizziness, fatigue and severe RUQ pain for 3 days. He has felt fatigued for the past few months. Three days ago, he developed sharp RUQ pain that wrapped around to his back. He rated the pain as a 5/10 with no change over the past three days. The pain doesn't change with PO intake or bowel movements. He has some associated dizziness with the pain. He also developed an erythematous rash that is nonpruritic on his upper chest, neck, and upper back over the past couple of days. He has had a twenty pound weight gain over the past year due to a change to sedentary lifestyle 2/2 pandemic. He denies night sweats and fevers. He has had constipation for the past week where he must strain to move his bowels. He has no urinary symptoms, fevers, chills, or changes in appetite. No diarrhea, N/V. No prior cardiac hx, pleuritic chest pain, or SOB. He had kidney stones 5 years ago but states this pain is worse. Denies allergies or new medications. He denies recent travel and works as a . He has had no recent sick contacts. He received all childhood vaccines.    In the ED: temp 99.4, tachycardic to 150s, started on fluids and allopurinol per heme/onc recs. He received 1 mg hydromorphone, 4 mg morphine, 600 mg ibuprofen , and 975 mg acetaminophen. CT A/P revealed fatty liver and small R pleural effusion. (30 Jul 2021 09:22)        1) Neutropenic Fever 102.3F  -1x dose IV Tylenol ordered and administered  -BC x2, UA/UC ordered 8/12/2021 and pending results  -CXR ordered 8/12/2021, pending results (preliminary results discussed with radiologist VIC Mohr, see above)  -C/w Cefepime 2gm q8h  -F/U primary team in AM    MIGUEL ClarkC   Department of Medicine   #[id number]

## 2021-08-13 NOTE — PROGRESS NOTE ADULT - PROBLEM SELECTOR PLAN 2
Patient is afebrile, neutropenic   Off Zosyn for possible PNA, d/c'd after 8/6 PM dose  Cultures NGTD 7/29, 8/1, 8/2 8/9 started on levaquin and posaconazole ppx for neutropenia  8/12 - Started Acyclovir for oral ulcers Patient is afebrile, neutropenic   Off Zosyn for possible PNA, d/c'd after 8/6 PM dose  Cultures NGTD 7/29, 8/1, 8/2 8/9 started on Levaquin and posaconazole ppx for neutropenia  8/12 - Started Acyclovir for oral ulcers

## 2021-08-13 NOTE — PROGRESS NOTE ADULT - SUBJECTIVE AND OBJECTIVE BOX
Diagnosis:  Acute Myeloid Leukemia, FLT3-    Protocol/Chemo Regimen: 7+3 (Daunorubicin and Cytarabine)    Day: 8    Pt endorsed: No overnight events, mouth feels like "Swiss Cheese"    Review of Systems:  Denies n/v, HA or dizziness, cough, diarrhea, dysurea    Pain scale: denies    Diet: DASH  Allergies    No Known Allergies          ANTIMICROBIALS  acyclovir   Oral Tab/Cap 400 milliGRAM(s) Oral every 8 hours  cefepime   IVPB      cefepime   IVPB 2000 milliGRAM(s) IV Intermittent every 8 hours  posaconazole DR Tablet 300 milliGRAM(s) Oral daily      HEME/ONC MEDICATIONS      STANDING MEDICATIONS  allopurinol 300 milliGRAM(s) Oral daily  amLODIPine   Tablet 5 milliGRAM(s) Oral daily  Biotene Dry Mouth Oral Rinse 5 milliLiter(s) Swish and Spit five times a day  chlorhexidine 2% Cloths 1 Application(s) Topical <User Schedule>  FIRST- Mouthwash  BLM 5 milliLiter(s) Swish and Spit three times a day  ondansetron Injectable 8 milliGRAM(s) IV Push every 8 hours  polyethylene glycol 3350 17 Gram(s) Oral daily  senna 2 Tablet(s) Oral at bedtime  sodium chloride 0.9%. 1000 milliLiter(s) IV Continuous <Continuous>      PRN MEDICATIONS  acetaminophen   Tablet .. 650 milliGRAM(s) Oral every 6 hours PRN  metoclopramide Injectable 10 milliGRAM(s) IV Push every 6 hours PRN  sodium chloride 0.65% Nasal 1 Spray(s) Both Nostrils three times a day PRN  sodium chloride 0.9% lock flush 10 milliLiter(s) IV Push every 1 hour PRN        Vital Signs Last 24 Hrs  T(C): 37.1 (13 Aug 2021 05:48), Max: 39.1 (13 Aug 2021 01:13)  T(F): 98.8 (13 Aug 2021 05:48), Max: 102.3 (13 Aug 2021 01:13)  HR: 82 (13 Aug 2021 05:48) (82 - 110)  BP: 108/65 (13 Aug 2021 05:48) (101/67 - 137/76)  BP(mean): --  RR: 18 (13 Aug 2021 05:48) (18 - 18)  SpO2: 96% (13 Aug 2021 05:48) (95% - 99%)      PHYSICAL EXAM  General: NAD  HEENT: PERRLA, anicteric sclerae, + ulcer l lingual area, +ulcer bottom inner labrum, clear oropharynx  Neck: supple  CV: (+) S1/S2, reg  Lungs: clear to auscultation, no wheezes or rales  Abdomen: soft, non-tender, non-distended (+) BS  Ext: no clubbing, cyanosis or edema  Skin: no rashes and no petechiae  Neuro: alert and oriented X 3, no focal deficits  Central Line: R TLCL c/d/i         LABS:                        7.1    0.52  )-----------( 20       ( 12 Aug 2021 07:24 )             20.9         Mean Cell Volume : 97.7 fl  Mean Cell Hemoglobin : 33.2 pg  Mean Cell Hemoglobin Concentration : 34.0 gm/dL  Auto Neutrophil # : 0.25 K/uL  Auto Lymphocyte # : 0.25 K/uL  Auto Monocyte # : 0.02 K/uL  Auto Eosinophil # : 0.00 K/uL  Auto Basophil # : 0 K/uL  Auto Neutrophil % : 48.0 %  Auto Lymphocyte % : 48 %  Auto Monocyte % : 4 %  Auto Eosinophil % : 0.0 %  Auto Basophil % : 0.0 %      08-12    137  |  105  |  10  ----------------------------<  92  4.0   |  21<L>  |  1.07    Ca    9.2      12 Aug 2021 07:25  Phos  3.3     08-12  Mg     2.3     08-12    TPro  6.4  /  Alb  3.5  /  TBili  0.4  /  DBili  x   /  AST  17  /  ALT  38  /  AlkPhos  52  08-12          PT/INR - ( 12 Aug 2021 07:26 )   PT: 14.4 sec;   INR: 1.21 ratio         PTT - ( 12 Aug 2021 07:26 )  PTT:28.3 sec        RECENT CULTURES:      RADIOLOGY & ADDITIONAL STUDIES:         Diagnosis:  Acute Myeloid Leukemia, FLT3-    Protocol/Chemo Regimen: 7+3 (Daunorubicin and Cytarabine)    Day: 8    Pt endorsed: fever  102.3 over night    Review of Systems:  Denies n/v, HA or dizziness, cough, diarrhea, dysurea    Pain scale: denies    Diet: DASH    Allergies:  No Known Allergies      ANTIMICROBIALS  acyclovir   Oral Tab/Cap 400 milliGRAM(s) Oral every 8 hours  cefepime   IVPB      cefepime   IVPB 2000 milliGRAM(s) IV Intermittent every 8 hours  posaconazole DR Tablet 300 milliGRAM(s) Oral daily      STANDING MEDICATIONS  allopurinol 300 milliGRAM(s) Oral daily  amLODIPine   Tablet 5 milliGRAM(s) Oral daily  Biotene Dry Mouth Oral Rinse 5 milliLiter(s) Swish and Spit five times a day  chlorhexidine 2% Cloths 1 Application(s) Topical <User Schedule>  FIRST- Mouthwash  BLM 5 milliLiter(s) Swish and Spit three times a day  ondansetron Injectable 8 milliGRAM(s) IV Push every 8 hours  polyethylene glycol 3350 17 Gram(s) Oral daily  senna 2 Tablet(s) Oral at bedtime  sodium chloride 0.9%. 1000 milliLiter(s) IV Continuous <Continuous>      PRN MEDICATIONS  acetaminophen   Tablet .. 650 milliGRAM(s) Oral every 6 hours PRN  metoclopramide Injectable 10 milliGRAM(s) IV Push every 6 hours PRN  sodium chloride 0.65% Nasal 1 Spray(s) Both Nostrils three times a day PRN  sodium chloride 0.9% lock flush 10 milliLiter(s) IV Push every 1 hour PRN      Vital Signs Last 24 Hrs  T(C): 37.1 (13 Aug 2021 05:48), Max: 39.1 (13 Aug 2021 01:13)  T(F): 98.8 (13 Aug 2021 05:48), Max: 102.3 (13 Aug 2021 01:13)  HR: 82 (13 Aug 2021 05:48) (82 - 110)  BP: 108/65 (13 Aug 2021 05:48) (101/67 - 137/76)  BP(mean): --  RR: 18 (13 Aug 2021 05:48) (18 - 18)  SpO2: 96% (13 Aug 2021 05:48) (95% - 99%)      PHYSICAL EXAM  General: NAD  HEENT:  ulcer left lateral tongue, creamy white lesion on lower lip   CV: (+) S1/S2, reg  Lungs: clear to auscultation, no wheezes or rales  Abdomen: soft, non-tender, non-distended (+) BS  Ext: no  edema  Skin: no rashes   Neuro: alert and oriented X 3  Central Line: R TLCL c/d/i         LABS:                          6.5    0.38  )-----------( 13       ( 13 Aug 2021 07:01 )             19.0         Mean Cell Volume : 98.4 fl  Mean Cell Hemoglobin : 33.7 pg  Mean Cell Hemoglobin Concentration : 34.2 gm/dL  Auto Neutrophil # : x  Auto Lymphocyte # : x  Auto Monocyte # : x  Auto Eosinophil # : x  Auto Basophil # : x  Auto Neutrophil % : x  Auto Lymphocyte % : x  Auto Monocyte % : x  Auto Eosinophil % : x  Auto Basophil % : x      08-13    140  |  108  |  10  ----------------------------<  94  4.1   |  21<L>  |  1.08    Ca    8.8      13 Aug 2021 07:08  Phos  3.3     08-13  Mg     2.3     08-13    TPro  6.4  /  Alb  3.6  /  TBili  0.4  /  DBili  x   /  AST  14  /  ALT  33  /  AlkPhos  49  08-13      PT/INR - ( 12 Aug 2021 07:26 )   PT: 14.4 sec;   INR: 1.21 ratio         PTT - ( 12 Aug 2021 07:26 )  PTT:28.3 sec      Uric Acid 3.4      RECENT CULTURES:    COVID-19 PCR . (08.05.21 @ 15:29)    COVID-19 PCR: NotDetec    Culture - Blood (08.05.21 @ 09:03)    Specimen Source: .Blood Blood-Peripheral    Culture Results:   No Growth Final        RADIOLOGY & ADDITIONAL STUDIES:    < from: Xray Chest 1 View- PORTABLE-Urgent (Xray Chest 1 View- PORTABLE-Urgent .) (08.12.21 @ 17:54) >  prelim. INTERPRETATION:  Central venous line within the SVC.

## 2021-08-13 NOTE — PROGRESS NOTE ADULT - ASSESSMENT
Mr. Tian is  a 37 y/o male with h/o PMH HTN, fatty liver, kidney stones presents with rash, dizziness, fatigue and severe RUQ pain for 3 days. Now with anemia, thrombocytopenia and leukocytosis with 17% blasts, Bone marrow bx c/w AML. Transferred to [address] [place] [address] for management. Patient is pancytopenia secondary to disease and/or chemotherapy. Patient receiving induction chemotherapy  with 7+3 (Daunorubicin and Cytarabine). Patient has pancytopenia secondary to chemotherapy and disease process.

## 2021-08-13 NOTE — PROGRESS NOTE ADULT - PROBLEM SELECTOR PLAN 1
FLT3 negative, BM bx c/w AML   consented for [place] study    HIV (-), Hep (-)  Echo 70%, MUGA EF 71%   Discussed sperm banking. Declined   TLC placed on 8/6 8/6 Started induction with  7+3 (Cytarabine + Daunorubicin)  Follow daily lytes, CBC. replace, Replete prn  Continue with Allopurinol 300mg daily, IV hydration, mouth care, pain control, antiemetics  Monitor for TLS daily  Day 14 Bm bx due on 8/19 FLT3 negative, BM bx c/w AML   consented for [place] study    HIV (-), Hep (-)  Echo 70%, MUGA EF 71%   Discussed sperm banking. Declined   TLC placed on 8/6 8/6 Started induction with  7+3 (Cytarabine + Daunorubicin)  Follow daily lytes, CBC. replace, Replete prn  Continue with Allopurinol 300mg daily, IV hydration, mouth care, pain control, antiemetics  Monitor for TLS daily  Day 14 Bm bx due on 8/19  anemia: PRBC x1   Thrombocytopenia, PLT 13 K with fever: PLT x1

## 2021-08-13 NOTE — CHART NOTE - NSCHARTNOTEFT_GEN_A_CORE
MEDICINE PA    Notified by RN patient with temperature 101.5F. Patient is alert, NAD. Denies HA, CP, SOB, cough, N/V, or abd pain.    VITAL SIGNS:  T(C): 38.6 (08-13-21 @ 22:18), Max: 39.1 (08-13-21 @ 01:13)  HR: 87 (08-13-21 @ 20:58) (82 - 110)  BP: 118/75 (08-13-21 @ 20:58) (108/65 - 128/77)  RR: 18 (08-13-21 @ 20:58) (18 - 19)  SpO2: 96% (08-13-21 @ 20:58) (95% - 99%)  Wt(kg): --      LABORATORY:                          6.5    0.38  )-----------( 13       ( 13 Aug 2021 07:01 )             19.0       08-13    140  |  108  |  10  ----------------------------<  94  4.1   |  21<L>  |  1.08    Ca    8.8      13 Aug 2021 07:08  Phos  3.3     08-13  Mg     2.3     08-13    TPro  6.4  /  Alb  3.6  /  TBili  0.4  /  DBili  x   /  AST  14  /  ALT  33  /  AlkPhos  49  08-13            PHYSICAL EXAM:    Constitutional: NAD.    Respiratory: clear lungs bilaterally.     Cardiovascular: S1 S2. No murmurs.            ASSESSMENT/PLAN:     1) Neutropenic Fever  -tylenol and cooling measures prn for pyrexia  -BC x2, UA/UC ordered on 8/12  -CXR from 8/12 stable  -c/w current abx regimen   -F/U primary team in AM    Deacon Vallejo PA-C   Department of Medicine

## 2021-08-13 NOTE — PROGRESS NOTE ADULT - PROBLEM SELECTOR PLAN 3
No VTE ppx due to thrombocytopenia     Contact information: [phone] No VTE ppx due to thrombocytopenia   Encourage ambulation         Contact information: [phone]

## 2021-08-13 NOTE — PROGRESS NOTE ADULT - ATTENDING COMMENTS
35yo M admitted with RUQ pain found to have bloodwork concerning for acute leukemia  -13% myeloblasts on PB. Flt3 negative.   -s/p BMbx 8/4 -AML. f/u cytogenetics, [facility]  -started 7+3 on 8/6 -cytarabine 100/m2 and dauno 90/m2. Today is day 7  -pain control, improved today. Abd sono showing hepatomegaly and hepatic steatosis. Right pleural effusion. CT chest shows small right pleural effusion  -MUGA EF 71%  -discussed with patient and family at the bedside. We also discussed sperm banking -pt declined  -now afebrile, Cx's NGTD. Completed 7d course of Zosyn for PNA  -cont levaquin, posaconazole and acyclovir PPx  -TLC placement  -supportive care  -transfuse as needed  -day 14 BMbx on 8/19 37yo M admitted with RUQ pain found to have bloodwork concerning for acute leukemia  -13% myeloblasts on PB. Flt3 negative.   -s/p BMbx 8/4 -AML. f/u cytogenetics, [facility]  -started 7+3 on 8/6 -cytarabine 100/m2 and dauno 90/m2. Today is day 8  -pain control, improved today. Abd sono showing hepatomegaly and hepatic steatosis. Right pleural effusion. CT chest shows small right pleural effusion  -MUGA EF 71%  -discussed with patient and family at the bedside. We also discussed sperm banking -pt declined  -now afebrile, Cx's NGTD. Completed 7d course of Zosyn for PNA  -cont levaquin, posaconazole and acyclovir PPx  -TLC placement  -supportive care  -transfuse as needed  -day 14 BMbx on 8/19 35yo M admitted with RUQ pain found to have bloodwork concerning for acute leukemia  -13% myeloblasts on PB. Flt3 negative.   -s/p BMbx 8/4 -AML. f/u cytogenetics, [facility]  -started 7+3 on 8/6 -cytarabine 100/m2 and dauno 90/m2. Today is day 8  -pain control, improved today. Abd sono showing hepatomegaly and hepatic steatosis. Right pleural effusion. CT chest shows small right pleural effusion  -MUGA EF 71%  -discussed with patient and family at the bedside. We also discussed sperm banking -pt declined  -febrile again, Tm 102.3 -changed to cefepime, f/u Cx's and CXR   -cont posaconazole and acyclovir PPx  -TLC placement  -supportive care  -transfuse as needed  -day 14 BMbx on 8/19

## 2021-08-14 LAB
ALBUMIN SERPL ELPH-MCNC: 3.7 G/DL — SIGNIFICANT CHANGE UP (ref 3.3–5)
ALP SERPL-CCNC: 52 U/L — SIGNIFICANT CHANGE UP (ref 40–120)
ALT FLD-CCNC: 32 U/L — SIGNIFICANT CHANGE UP (ref 10–45)
ANION GAP SERPL CALC-SCNC: 12 MMOL/L — SIGNIFICANT CHANGE UP (ref 5–17)
APPEARANCE UR: CLEAR — SIGNIFICANT CHANGE UP
AST SERPL-CCNC: 15 U/L — SIGNIFICANT CHANGE UP (ref 10–40)
BACTERIA # UR AUTO: NEGATIVE — SIGNIFICANT CHANGE UP
BILIRUB SERPL-MCNC: 0.5 MG/DL — SIGNIFICANT CHANGE UP (ref 0.2–1.2)
BILIRUB UR-MCNC: NEGATIVE — SIGNIFICANT CHANGE UP
BUN SERPL-MCNC: 9 MG/DL — SIGNIFICANT CHANGE UP (ref 7–23)
CALCIUM SERPL-MCNC: 9.3 MG/DL — SIGNIFICANT CHANGE UP (ref 8.4–10.5)
CHLORIDE SERPL-SCNC: 105 MMOL/L — SIGNIFICANT CHANGE UP (ref 96–108)
CO2 SERPL-SCNC: 22 MMOL/L — SIGNIFICANT CHANGE UP (ref 22–31)
COLOR SPEC: SIGNIFICANT CHANGE UP
CREAT SERPL-MCNC: 1.08 MG/DL — SIGNIFICANT CHANGE UP (ref 0.5–1.3)
DIFF PNL FLD: NEGATIVE — SIGNIFICANT CHANGE UP
EPI CELLS # UR: 0 /HPF — SIGNIFICANT CHANGE UP
GLUCOSE SERPL-MCNC: 94 MG/DL — SIGNIFICANT CHANGE UP (ref 70–99)
GLUCOSE UR QL: NEGATIVE — SIGNIFICANT CHANGE UP
HCT VFR BLD CALC: 22.4 % — LOW (ref 39–50)
HGB BLD-MCNC: 7.9 G/DL — LOW (ref 13–17)
HYALINE CASTS # UR AUTO: 0 /LPF — SIGNIFICANT CHANGE UP (ref 0–2)
KETONES UR-MCNC: NEGATIVE — SIGNIFICANT CHANGE UP
LDH SERPL L TO P-CCNC: 335 U/L — HIGH (ref 50–242)
LEUKOCYTE ESTERASE UR-ACNC: NEGATIVE — SIGNIFICANT CHANGE UP
MAGNESIUM SERPL-MCNC: 2.4 MG/DL — SIGNIFICANT CHANGE UP (ref 1.6–2.6)
MCHC RBC-ENTMCNC: 33.6 PG — SIGNIFICANT CHANGE UP (ref 27–34)
MCHC RBC-ENTMCNC: 35.3 GM/DL — SIGNIFICANT CHANGE UP (ref 32–36)
MCV RBC AUTO: 95.3 FL — SIGNIFICANT CHANGE UP (ref 80–100)
NITRITE UR-MCNC: NEGATIVE — SIGNIFICANT CHANGE UP
NRBC # BLD: 0 /100 WBCS — SIGNIFICANT CHANGE UP (ref 0–0)
PH UR: 6 — SIGNIFICANT CHANGE UP (ref 5–8)
PHOSPHATE SERPL-MCNC: 2.9 MG/DL — SIGNIFICANT CHANGE UP (ref 2.5–4.5)
PLATELET # BLD AUTO: 20 K/UL — CRITICAL LOW (ref 150–400)
POTASSIUM SERPL-MCNC: 4 MMOL/L — SIGNIFICANT CHANGE UP (ref 3.5–5.3)
POTASSIUM SERPL-SCNC: 4 MMOL/L — SIGNIFICANT CHANGE UP (ref 3.5–5.3)
PROT SERPL-MCNC: 6.5 G/DL — SIGNIFICANT CHANGE UP (ref 6–8.3)
PROT UR-MCNC: NEGATIVE — SIGNIFICANT CHANGE UP
RBC # BLD: 2.35 M/UL — LOW (ref 4.2–5.8)
RBC # FLD: 15.9 % — HIGH (ref 10.3–14.5)
RBC CASTS # UR COMP ASSIST: 2 /HPF — SIGNIFICANT CHANGE UP (ref 0–4)
SODIUM SERPL-SCNC: 139 MMOL/L — SIGNIFICANT CHANGE UP (ref 135–145)
SP GR SPEC: 1.01 — SIGNIFICANT CHANGE UP (ref 1.01–1.02)
URATE SERPL-MCNC: 3 MG/DL — LOW (ref 3.4–8.8)
UROBILINOGEN FLD QL: NEGATIVE — SIGNIFICANT CHANGE UP
WBC # BLD: 0.36 K/UL — CRITICAL LOW (ref 3.8–10.5)
WBC # FLD AUTO: 0.36 K/UL — CRITICAL LOW (ref 3.8–10.5)
WBC UR QL: 2 /HPF — SIGNIFICANT CHANGE UP (ref 0–5)

## 2021-08-14 PROCEDURE — 99232 SBSQ HOSP IP/OBS MODERATE 35: CPT

## 2021-08-14 PROCEDURE — 71260 CT THORAX DX C+: CPT | Mod: 26

## 2021-08-14 PROCEDURE — 74177 CT ABD & PELVIS W/CONTRAST: CPT | Mod: 26

## 2021-08-14 RX ORDER — BENZOCAINE AND MENTHOL 5; 1 G/100ML; G/100ML
1 LIQUID ORAL EVERY 4 HOURS
Refills: 0 | Status: DISCONTINUED | OUTPATIENT
Start: 2021-08-14 | End: 2021-08-29

## 2021-08-14 RX ORDER — SODIUM BICARBONATE 1 MEQ/ML
15 SYRINGE (ML) INTRAVENOUS
Refills: 0 | Status: DISCONTINUED | OUTPATIENT
Start: 2021-08-14 | End: 2021-08-29

## 2021-08-14 RX ADMIN — Medication 10 MILLIGRAM(S): at 22:11

## 2021-08-14 RX ADMIN — CEFEPIME 100 MILLIGRAM(S): 1 INJECTION, POWDER, FOR SOLUTION INTRAMUSCULAR; INTRAVENOUS at 05:41

## 2021-08-14 RX ADMIN — CEFEPIME 100 MILLIGRAM(S): 1 INJECTION, POWDER, FOR SOLUTION INTRAMUSCULAR; INTRAVENOUS at 13:27

## 2021-08-14 RX ADMIN — DIPHENHYDRAMINE HYDROCHLORIDE AND LIDOCAINE HYDROCHLORIDE AND ALUMINUM HYDROXIDE AND MAGNESIUM HYDRO 5 MILLILITER(S): KIT at 21:02

## 2021-08-14 RX ADMIN — DIPHENHYDRAMINE HYDROCHLORIDE AND LIDOCAINE HYDROCHLORIDE AND ALUMINUM HYDROXIDE AND MAGNESIUM HYDRO 5 MILLILITER(S): KIT at 13:28

## 2021-08-14 RX ADMIN — Medication 300 MILLIGRAM(S): at 12:03

## 2021-08-14 RX ADMIN — CEFEPIME 100 MILLIGRAM(S): 1 INJECTION, POWDER, FOR SOLUTION INTRAMUSCULAR; INTRAVENOUS at 21:03

## 2021-08-14 RX ADMIN — Medication 1 APPLICATION(S): at 05:53

## 2021-08-14 RX ADMIN — SODIUM CHLORIDE 100 MILLILITER(S): 9 INJECTION INTRAMUSCULAR; INTRAVENOUS; SUBCUTANEOUS at 05:51

## 2021-08-14 RX ADMIN — Medication 15 MILLILITER(S): at 17:05

## 2021-08-14 RX ADMIN — BENZOCAINE AND MENTHOL 1 LOZENGE: 5; 1 LIQUID ORAL at 17:05

## 2021-08-14 RX ADMIN — Medication 650 MILLIGRAM(S): at 08:54

## 2021-08-14 RX ADMIN — Medication 1 APPLICATION(S): at 12:41

## 2021-08-14 RX ADMIN — Medication 400 MILLIGRAM(S): at 05:39

## 2021-08-14 RX ADMIN — Medication 400 MILLIGRAM(S): at 13:28

## 2021-08-14 RX ADMIN — DIPHENHYDRAMINE HYDROCHLORIDE AND LIDOCAINE HYDROCHLORIDE AND ALUMINUM HYDROXIDE AND MAGNESIUM HYDRO 5 MILLILITER(S): KIT at 05:40

## 2021-08-14 RX ADMIN — CHLORHEXIDINE GLUCONATE 1 APPLICATION(S): 213 SOLUTION TOPICAL at 08:11

## 2021-08-14 RX ADMIN — AMLODIPINE BESYLATE 5 MILLIGRAM(S): 2.5 TABLET ORAL at 05:39

## 2021-08-14 RX ADMIN — POSACONAZOLE 300 MILLIGRAM(S): 100 TABLET, DELAYED RELEASE ORAL at 12:03

## 2021-08-14 RX ADMIN — ONDANSETRON 8 MILLIGRAM(S): 8 TABLET, FILM COATED ORAL at 05:39

## 2021-08-14 RX ADMIN — Medication 5 MILLILITER(S): at 17:04

## 2021-08-14 RX ADMIN — Medication 5 MILLILITER(S): at 21:01

## 2021-08-14 RX ADMIN — Medication 650 MILLIGRAM(S): at 10:00

## 2021-08-14 RX ADMIN — Medication 5 MILLILITER(S): at 12:04

## 2021-08-14 RX ADMIN — BENZOCAINE AND MENTHOL 1 LOZENGE: 5; 1 LIQUID ORAL at 09:05

## 2021-08-14 RX ADMIN — Medication 400 MILLIGRAM(S): at 21:02

## 2021-08-14 RX ADMIN — Medication 1 APPLICATION(S): at 17:05

## 2021-08-14 NOTE — PROGRESS NOTE ADULT - SUBJECTIVE AND OBJECTIVE BOX
Diagnosis:  Acute Myeloid Leukemia, FLT3-    Protocol/Chemo Regimen: 7+3 (Daunorubicin and Cytarabine)    Day: 9   Pt endorsed: fever  102.3 over night    Review of Systems:  Denies n/v, HA or dizziness, cough, diarrhea, dysurea    Pain scale: denies    Diet: DASH    Allergies:  No Known Allergies          ANTIMICROBIALS  acyclovir   Oral Tab/Cap 400 milliGRAM(s) Oral every 8 hours  cefepime   IVPB      cefepime   IVPB 2000 milliGRAM(s) IV Intermittent every 8 hours  posaconazole DR Tablet 300 milliGRAM(s) Oral daily      HEME/ONC MEDICATIONS      STANDING MEDICATIONS  allopurinol 300 milliGRAM(s) Oral daily  amLODIPine   Tablet 5 milliGRAM(s) Oral daily  Biotene Dry Mouth Oral Rinse 5 milliLiter(s) Swish and Spit five times a day  chlorhexidine 2% Cloths 1 Application(s) Topical <User Schedule>  FIRST- Mouthwash  BLM 5 milliLiter(s) Swish and Spit three times a day  petrolatum white Ointment 1 Application(s) Topical four times a day  polyethylene glycol 3350 17 Gram(s) Oral daily  senna 2 Tablet(s) Oral at bedtime  sodium chloride 0.9%. 1000 milliLiter(s) IV Continuous <Continuous>      PRN MEDICATIONS  acetaminophen   Tablet .. 650 milliGRAM(s) Oral every 6 hours PRN  metoclopramide Injectable 10 milliGRAM(s) IV Push every 6 hours PRN  sodium chloride 0.65% Nasal 1 Spray(s) Both Nostrils three times a day PRN  sodium chloride 0.9% lock flush 10 milliLiter(s) IV Push every 1 hour PRN        Vital Signs Last 24 Hrs  T(C): 37.7 (14 Aug 2021 05:51), Max: 38.6 (13 Aug 2021 22:18)  T(F): 99.8 (14 Aug 2021 05:51), Max: 101.5 (13 Aug 2021 22:18)  HR: 89 (14 Aug 2021 05:51) (84 - 108)  BP: 110/71 (14 Aug 2021 05:51) (110/71 - 128/77)  BP(mean): --  RR: 18 (14 Aug 2021 05:51) (18 - 19)  SpO2: 98% (14 Aug 2021 05:51) (94% - 99%)        PHYSICAL EXAM  General: NAD  HEENT:  ulcer left lateral tongue, creamy white lesion on lower lip   CV: (+) S1/S2, reg  Lungs: clear to auscultation, no wheezes or rales  Abdomen: soft, non-tender, non-distended (+) BS  Ext: no  edema  Skin: no rashes   Neuro: alert and oriented X 3  Central Line: R TLCL c/d/i             LABS:                        6.5    0.38  )-----------( 13       ( 13 Aug 2021 07:01 )             19.0         Mean Cell Volume : 98.4 fl  Mean Cell Hemoglobin : 33.7 pg  Mean Cell Hemoglobin Concentration : 34.2 gm/dL  Auto Neutrophil # : x  Auto Lymphocyte # : x  Auto Monocyte # : x  Auto Eosinophil # : x  Auto Basophil # : x  Auto Neutrophil % : x  Auto Lymphocyte % : x  Auto Monocyte % : x  Auto Eosinophil % : x  Auto Basophil % : x      08-13    140  |  108  |  10  ----------------------------<  94  4.1   |  21<L>  |  1.08    Ca    8.8      13 Aug 2021 07:08  Phos  3.3     08-13  Mg     2.3     08-13    TPro  6.4  /  Alb  3.6  /  TBili  0.4  /  DBili  x   /  AST  14  /  ALT  33  /  AlkPhos  49  08-13                  RECENT CULTURES:  08-12 @ 22:09  Clean Catch Clean Catch (Midstream)  --  --  --    <10,000 CFU/mL Normal Urogenital Heather  --  08-12 @ 22:03  .Blood Blood-Catheter  --  --  --    No growth to date.  --      RADIOLOGY & ADDITIONAL STUDIES:      < from: Xray Chest 1 View- PORTABLE-Urgent (Xray Chest 1 View- PORTABLE-Urgent .) (08.12.21 @ 17:54) >  prelim. INTERPRETATION:  Central venous line within the SVC.             Diagnosis:  Acute Myeloid Leukemia, FLT3-    Protocol/Chemo Regimen: 7+3 (Daunorubicin and Cytarabine)    Day: 9     Pt endorsed:  recurrent fever, not sick from it; sore throat since yesterday    Review of Systems:  Denies n/v, HA or dizziness, cough, diarrhea, dysurea    Pain scale: 5/10 throat     Diet: DASH/TLC      Allergies:  No Known Allergies      ANTIMICROBIALS  acyclovir   Oral Tab/Cap 400 milliGRAM(s) Oral every 8 hours  cefepime   IVPB      cefepime   IVPB 2000 milliGRAM(s) IV Intermittent every 8 hours  posaconazole DR Tablet 300 milliGRAM(s) Oral daily      STANDING MEDICATIONS  allopurinol 300 milliGRAM(s) Oral daily  amLODIPine   Tablet 5 milliGRAM(s) Oral daily  Biotene Dry Mouth Oral Rinse 5 milliLiter(s) Swish and Spit five times a day  chlorhexidine 2% Cloths 1 Application(s) Topical <User Schedule>  FIRST- Mouthwash  BLM 5 milliLiter(s) Swish and Spit three times a day  petrolatum white Ointment 1 Application(s) Topical four times a day  polyethylene glycol 3350 17 Gram(s) Oral daily  senna 2 Tablet(s) Oral at bedtime  sodium chloride 0.9%. 1000 milliLiter(s) IV Continuous <Continuous>      PRN MEDICATIONS  acetaminophen   Tablet .. 650 milliGRAM(s) Oral every 6 hours PRN  metoclopramide Injectable 10 milliGRAM(s) IV Push every 6 hours PRN  sodium chloride 0.65% Nasal 1 Spray(s) Both Nostrils three times a day PRN  sodium chloride 0.9% lock flush 10 milliLiter(s) IV Push every 1 hour PRN      Vital Signs Last 24 Hrs  T(C): 37.7 (14 Aug 2021 05:51), Max: 38.6 (13 Aug 2021 22:18)  T(F): 99.8 (14 Aug 2021 05:51), Max: 101.5 (13 Aug 2021 22:18)  HR: 89 (14 Aug 2021 05:51) (84 - 108)  BP: 110/71 (14 Aug 2021 05:51) (110/71 - 128/77)  BP(mean): --  RR: 18 (14 Aug 2021 05:51) (18 - 19)  SpO2: 98% (14 Aug 2021 05:51) (94% - 99%)      PHYSICAL EXAM  General: NAD  HEENT:  ulcer left lateral tongue, erythema back of throat   CV: (+) S1/S2, reg  Lungs: clear to auscultation, no wheezes or rales  Abdomen: soft, non-tender, non-distended (+) BS  Ext: no  edema  Skin: no rashes   Neuro: alert and oriented X 3  Central Line: R TLCL c/d/i         LABS:                        7.9    0.36  )-----------( 20       ( 14 Aug 2021 07:29 )             22.4         Mean Cell Volume : 95.3 fl  Mean Cell Hemoglobin : 33.6 pg  Mean Cell Hemoglobin Concentration : 35.3 gm/dL  Auto Neutrophil # : x  Auto Lymphocyte # : x  Auto Monocyte # : x  Auto Eosinophil # : x  Auto Basophil # : x  Auto Neutrophil % : x  Auto Lymphocyte % : x  Auto Monocyte % : x  Auto Eosinophil % : x  Auto Basophil % : x      08-14    139  |  105  |  9   ----------------------------<  94  4.0   |  22  |  1.08    Ca    9.3      14 Aug 2021 07:24  Phos  2.9     08-14  Mg     2.4     08-14    TPro  6.5  /  Alb  3.7  /  TBili  0.5  /  DBili  x   /  AST  15  /  ALT  32  /  AlkPhos  52  08-14        Uric Acid 3.0      RECENT CULTURES:    08-12 @ 22:09  Clean Catch Clean Catch (Midstream)  <10,000 CFU/mL Normal Urogenital Heather    08-12 @ 22:03  .Blood Blood-Catheter  No growth to date.    Culture - Blood (08.12.21 @ 22:03)    Specimen Source: .Blood Blood-Peripheral    Culture Results:   No growth to date.      RADIOLOGY & ADDITIONAL STUDIES:    < from: Xray Chest 1 View- PORTABLE-Urgent (Xray Chest 1 View- PORTABLE-Urgent .) (08.12.21 @ 17:54) >  IMPRESSION:  No acute pulmonary disease

## 2021-08-14 NOTE — PROGRESS NOTE ADULT - ATTENDING COMMENTS
35yo M admitted with RUQ pain found to have bloodwork concerning for acute leukemia  -13% myeloblasts on PB. Flt3 negative.   -s/p BMbx 8/4 -AML. f/u cytogenetics, [facility]  -started 7+3 on 8/6 -cytarabine 100/m2 and dauno 90/m2. Today is day 8  -pain control, improved today. Abd sono showing hepatomegaly and hepatic steatosis. Right pleural effusion. CT chest shows small right pleural effusion  -MUGA EF 71%  -discussed with patient and family at the bedside. We also discussed sperm banking -pt declined  -febrile again, Tm 102.3 -changed to cefepime, f/u Cx's and CXR   -cont posaconazole and acyclovir PPx  -TLC placement  -supportive care  -transfuse as needed  -day 14 BMbx on 8/19 37yo M admitted with RUQ pain found to have bloodwork concerning for acute leukemia  -13% myeloblasts on PB. Flt3 negative.   -s/p BMbx 8/4 -AML. f/u cytogenetics, [facility]  -started 7+3 on 8/6 -cytarabine 100/m2 and dauno 90/m2. Today is day 9  -pain control, improved today. Abd sono showing hepatomegaly and hepatic steatosis. Right pleural effusion. CT chest shows small right pleural effusion  -MUGA EF 71%  -discussed with patient and family at the bedside. We also discussed sperm banking -pt declined  -febrile again, Tm 102.3 -changed to cefepime, f/u Cx's and CXR   -cont posaconazole and acyclovir PPx  -TLC placement  -supportive care  -transfuse as needed  -day 14 BMbx on 8/19 37yo M admitted with RUQ pain found to have bloodwork concerning for acute leukemia  -13% myeloblasts on PB. Flt3 negative.   -s/p BMbx 8/4 -AML. f/u cytogenetics, [facility]  -started 7+3 on 8/6 -cytarabine 100/m2 and dauno 90/m2. Today is day 9  -pain control, improved today. Abd sono showing hepatomegaly and hepatic steatosis. Right pleural effusion. CT chest shows small right pleural effusion  -MUGA EF 71%  -discussed with patient and family at the bedside. We also discussed sperm banking -pt declined  -febrile again, Tm 102.3 -changed to cefepime, f/u Cx's and CXR   -Persistent fevers with mucositis. Aggressive oral care - adding sodium bicarb. Repeat cultures and check repeat pan CT  -cont posaconazole and acyclovir PPx  -TLC placement  -supportive care  -transfuse as needed  -day 14 BMbx on 8/19

## 2021-08-14 NOTE — PROGRESS NOTE ADULT - ASSESSMENT
Mr. Tian is  a 35 y/o male with h/o PMH HTN, fatty liver, kidney stones presents with rash, dizziness, fatigue and severe RUQ pain for 3 days. Now with anemia, thrombocytopenia and leukocytosis with 17% blasts, Bone marrow bx c/w AML. Transferred to [address] [place] [address] for management. Patient is pancytopenia secondary to disease and/or chemotherapy. Patient receiving induction chemotherapy  with 7+3 (Daunorubicin and Cytarabine). Patient has pancytopenia secondary to chemotherapy and disease process.

## 2021-08-14 NOTE — PROGRESS NOTE ADULT - PROBLEM SELECTOR PLAN 2
Patient is afebrile, neutropenic   Off Zosyn for possible PNA, d/c'd after 8/6 PM dose  Cultures NGTD 7/29, 8/1, 8/2 8/9 started on Levaquin and posaconazole ppx for neutropenia  8/12 - Started Acyclovir for oral ulcers Patient is febrile, neutropenic   Off Zosyn for possible PNA, d/c'd after 8/6 PM dose  Cultures NGTD 7/29, 8/1, 8/2 8/9 started on Levaquin and posaconazole ppx for neutropenia  8/12 - Started Acyclovir for oral ulcers  8/12 CXR No acute pulmonary disease  8/14 pancx and repeat CT CAP to look for fever source  Added sodium bicarb  mouth rinse, Cepacol PRN sore throat   8/14 send CMV PCR

## 2021-08-14 NOTE — PROGRESS NOTE ADULT - PROBLEM SELECTOR PLAN 1
FLT3 negative, BM bx c/w AML   consented for [place] study    HIV (-), Hep (-)  Echo 70%, MUGA EF 71%   Discussed sperm banking. Declined   TLC placed on 8/6 8/6 Started induction with  7+3 (Cytarabine + Daunorubicin)  Follow daily lytes, CBC. replace, Replete prn  Continue with Allopurinol 300mg daily, IV hydration, mouth care, pain control, antiemetics  Monitor for TLS daily  Day 14 Bm bx due on 8/19  anemia: PRBC x1   Thrombocytopenia, PLT 13 K with fever: PLT x1 FLT3 negative, BM bx c/w AML   consented for [place] study    HIV (-), Hep (-)  Echo 70%, MUGA EF 71%   Discussed sperm banking. Declined   TLC placed on 8/6 8/6 Started induction with  7+3 (Cytarabine + Daunorubicin)  Follow daily lytes, CBC. replace, Replete prn  Continue with Allopurinol 300mg daily, IV hydration, mouth care, pain control, antiemetics  Monitor for TLS daily  Day 14 Bm bx due on 8/19

## 2021-08-15 LAB
ALBUMIN SERPL ELPH-MCNC: 3.6 G/DL — SIGNIFICANT CHANGE UP (ref 3.3–5)
ALP SERPL-CCNC: 51 U/L — SIGNIFICANT CHANGE UP (ref 40–120)
ALT FLD-CCNC: 27 U/L — SIGNIFICANT CHANGE UP (ref 10–45)
ANION GAP SERPL CALC-SCNC: 11 MMOL/L — SIGNIFICANT CHANGE UP (ref 5–17)
AST SERPL-CCNC: 14 U/L — SIGNIFICANT CHANGE UP (ref 10–40)
BILIRUB SERPL-MCNC: 0.4 MG/DL — SIGNIFICANT CHANGE UP (ref 0.2–1.2)
BUN SERPL-MCNC: 10 MG/DL — SIGNIFICANT CHANGE UP (ref 7–23)
CALCIUM SERPL-MCNC: 9.2 MG/DL — SIGNIFICANT CHANGE UP (ref 8.4–10.5)
CHLORIDE SERPL-SCNC: 105 MMOL/L — SIGNIFICANT CHANGE UP (ref 96–108)
CO2 SERPL-SCNC: 21 MMOL/L — LOW (ref 22–31)
CREAT SERPL-MCNC: 1.01 MG/DL — SIGNIFICANT CHANGE UP (ref 0.5–1.3)
CULTURE RESULTS: NO GROWTH — SIGNIFICANT CHANGE UP
GLUCOSE SERPL-MCNC: 96 MG/DL — SIGNIFICANT CHANGE UP (ref 70–99)
HCT VFR BLD CALC: 20.9 % — CRITICAL LOW (ref 39–50)
HGB BLD-MCNC: 7.3 G/DL — LOW (ref 13–17)
LDH SERPL L TO P-CCNC: 347 U/L — HIGH (ref 50–242)
MAGNESIUM SERPL-MCNC: 2.4 MG/DL — SIGNIFICANT CHANGE UP (ref 1.6–2.6)
MCHC RBC-ENTMCNC: 32.7 PG — SIGNIFICANT CHANGE UP (ref 27–34)
MCHC RBC-ENTMCNC: 34.9 GM/DL — SIGNIFICANT CHANGE UP (ref 32–36)
MCV RBC AUTO: 93.7 FL — SIGNIFICANT CHANGE UP (ref 80–100)
NRBC # BLD: 0 /100 WBCS — SIGNIFICANT CHANGE UP (ref 0–0)
PHOSPHATE SERPL-MCNC: 3.2 MG/DL — SIGNIFICANT CHANGE UP (ref 2.5–4.5)
PLATELET # BLD AUTO: 10 K/UL — CRITICAL LOW (ref 150–400)
POTASSIUM SERPL-MCNC: 3.9 MMOL/L — SIGNIFICANT CHANGE UP (ref 3.5–5.3)
POTASSIUM SERPL-SCNC: 3.9 MMOL/L — SIGNIFICANT CHANGE UP (ref 3.5–5.3)
PROT SERPL-MCNC: 6.6 G/DL — SIGNIFICANT CHANGE UP (ref 6–8.3)
RBC # BLD: 2.23 M/UL — LOW (ref 4.2–5.8)
RBC # FLD: 15.3 % — HIGH (ref 10.3–14.5)
SODIUM SERPL-SCNC: 137 MMOL/L — SIGNIFICANT CHANGE UP (ref 135–145)
SPECIMEN SOURCE: SIGNIFICANT CHANGE UP
URATE SERPL-MCNC: 2.7 MG/DL — LOW (ref 3.4–8.8)
WBC # BLD: 0.34 K/UL — CRITICAL LOW (ref 3.8–10.5)
WBC # FLD AUTO: 0.34 K/UL — CRITICAL LOW (ref 3.8–10.5)

## 2021-08-15 PROCEDURE — 99232 SBSQ HOSP IP/OBS MODERATE 35: CPT

## 2021-08-15 RX ADMIN — Medication 5 MILLILITER(S): at 20:11

## 2021-08-15 RX ADMIN — BENZOCAINE AND MENTHOL 1 LOZENGE: 5; 1 LIQUID ORAL at 00:27

## 2021-08-15 RX ADMIN — CEFEPIME 100 MILLIGRAM(S): 1 INJECTION, POWDER, FOR SOLUTION INTRAMUSCULAR; INTRAVENOUS at 13:06

## 2021-08-15 RX ADMIN — Medication 5 MILLILITER(S): at 13:07

## 2021-08-15 RX ADMIN — AMLODIPINE BESYLATE 5 MILLIGRAM(S): 2.5 TABLET ORAL at 05:36

## 2021-08-15 RX ADMIN — Medication 1 APPLICATION(S): at 13:23

## 2021-08-15 RX ADMIN — Medication 15 MILLILITER(S): at 13:06

## 2021-08-15 RX ADMIN — Medication 10 MILLIGRAM(S): at 10:07

## 2021-08-15 RX ADMIN — BENZOCAINE AND MENTHOL 1 LOZENGE: 5; 1 LIQUID ORAL at 20:11

## 2021-08-15 RX ADMIN — Medication 300 MILLIGRAM(S): at 13:07

## 2021-08-15 RX ADMIN — Medication 1 APPLICATION(S): at 05:44

## 2021-08-15 RX ADMIN — DIPHENHYDRAMINE HYDROCHLORIDE AND LIDOCAINE HYDROCHLORIDE AND ALUMINUM HYDROXIDE AND MAGNESIUM HYDRO 5 MILLILITER(S): KIT at 05:36

## 2021-08-15 RX ADMIN — CEFEPIME 100 MILLIGRAM(S): 1 INJECTION, POWDER, FOR SOLUTION INTRAMUSCULAR; INTRAVENOUS at 21:03

## 2021-08-15 RX ADMIN — Medication 5 MILLILITER(S): at 07:47

## 2021-08-15 RX ADMIN — POSACONAZOLE 300 MILLIGRAM(S): 100 TABLET, DELAYED RELEASE ORAL at 13:07

## 2021-08-15 RX ADMIN — DIPHENHYDRAMINE HYDROCHLORIDE AND LIDOCAINE HYDROCHLORIDE AND ALUMINUM HYDROXIDE AND MAGNESIUM HYDRO 5 MILLILITER(S): KIT at 13:06

## 2021-08-15 RX ADMIN — SODIUM CHLORIDE 100 MILLILITER(S): 9 INJECTION INTRAMUSCULAR; INTRAVENOUS; SUBCUTANEOUS at 17:22

## 2021-08-15 RX ADMIN — BENZOCAINE AND MENTHOL 1 LOZENGE: 5; 1 LIQUID ORAL at 05:44

## 2021-08-15 RX ADMIN — Medication 1 APPLICATION(S): at 17:07

## 2021-08-15 RX ADMIN — Medication 650 MILLIGRAM(S): at 05:28

## 2021-08-15 RX ADMIN — Medication 5 MILLILITER(S): at 23:24

## 2021-08-15 RX ADMIN — Medication 400 MILLIGRAM(S): at 13:06

## 2021-08-15 RX ADMIN — CEFEPIME 100 MILLIGRAM(S): 1 INJECTION, POWDER, FOR SOLUTION INTRAMUSCULAR; INTRAVENOUS at 05:37

## 2021-08-15 RX ADMIN — Medication 5 MILLILITER(S): at 15:17

## 2021-08-15 RX ADMIN — Medication 15 MILLILITER(S): at 21:05

## 2021-08-15 RX ADMIN — Medication 650 MILLIGRAM(S): at 17:25

## 2021-08-15 RX ADMIN — Medication 1 APPLICATION(S): at 21:13

## 2021-08-15 RX ADMIN — Medication 650 MILLIGRAM(S): at 18:33

## 2021-08-15 RX ADMIN — Medication 400 MILLIGRAM(S): at 05:36

## 2021-08-15 RX ADMIN — Medication 15 MILLILITER(S): at 17:07

## 2021-08-15 RX ADMIN — Medication 650 MILLIGRAM(S): at 00:23

## 2021-08-15 RX ADMIN — Medication 15 MILLILITER(S): at 05:38

## 2021-08-15 RX ADMIN — CHLORHEXIDINE GLUCONATE 1 APPLICATION(S): 213 SOLUTION TOPICAL at 07:46

## 2021-08-15 RX ADMIN — Medication 400 MILLIGRAM(S): at 21:04

## 2021-08-15 RX ADMIN — DIPHENHYDRAMINE HYDROCHLORIDE AND LIDOCAINE HYDROCHLORIDE AND ALUMINUM HYDROXIDE AND MAGNESIUM HYDRO 5 MILLILITER(S): KIT at 21:04

## 2021-08-15 NOTE — PROGRESS NOTE ADULT - ATTENDING COMMENTS
37yo M admitted with RUQ pain found to have bloodwork concerning for acute leukemia  -13% myeloblasts on PB. Flt3 negative.   -s/p BMbx 8/4 -AML. f/u cytogenetics, [facility]  -started 7+3 on 8/6 -cytarabine 100/m2 and dauno 90/m2. Today is day 9  -pain control, improved today. Abd sono showing hepatomegaly and hepatic steatosis. Right pleural effusion. CT chest shows small right pleural effusion  -MUGA EF 71%  -discussed with patient and family at the bedside. We also discussed sperm banking -pt declined  -febrile again, Tm 102.3 -changed to cefepime, f/u Cx's and CXR   -Persistent fevers with mucositis. Aggressive oral care - adding sodium bicarb. Repeat cultures and check repeat pan CT  -cont posaconazole and acyclovir PPx  -TLC placement  -supportive care  -transfuse as needed  -day 14 BMbx on 8/19 37yo M admitted with RUQ pain found to have bloodwork concerning for acute leukemia  -13% myeloblasts on PB. Flt3 negative.   -s/p BMbx 8/4 -AML. f/u cytogenetics, [facility]  -started 7+3 on 8/6 -cytarabine 100/m2 and dauno 90/m2. Today is day 10  -pain control, improved today. Abd sono showing hepatomegaly and hepatic steatosis. Right pleural effusion. CT chest shows small right pleural effusion  -MUGA EF 71%  -discussed with patient and family at the bedside. We also discussed sperm banking -pt declined  -febrile again, Tm 102.3 -changed to cefepime, f/u Cx's and CXR   -Persistent fevers with mucositis. Aggressive oral care - adding sodium bicarb. Cultures negative, repeat CT 8/14 without infectious source.   -cont posaconazole and acyclovir PPx  -TLC placement  -supportive care  -transfuse as needed  -day 14 BMbx on 8/19

## 2021-08-15 NOTE — PROGRESS NOTE ADULT - ASSESSMENT
Mr. Tian is  a 37 y/o male with h/o PMH HTN, fatty liver, kidney stones presents with rash, dizziness, fatigue and severe RUQ pain for 3 days. Now with anemia, thrombocytopenia and leukocytosis with 17% blasts, Bone marrow bx c/w AML. Transferred to [address] [place] [address] for management. Patient is pancytopenia secondary to disease and/or chemotherapy. Patient receiving induction chemotherapy  with 7+3 (Daunorubicin and Cytarabine). Patient has pancytopenia secondary to chemotherapy and disease process.

## 2021-08-15 NOTE — PROGRESS NOTE ADULT - PROBLEM SELECTOR PLAN 2
Patient is febrile, neutropenic   Off Zosyn for possible PNA, d/c'd after 8/6 PM dose  Cultures NGTD 7/29, 8/1, 8/2 8/9 started on Levaquin and posaconazole ppx for neutropenia  8/12 - Started Acyclovir for oral ulcers  8/12 CXR No acute pulmonary disease  8/14 pancx and repeat CT CAP to look for fever source  Added sodium bicarb  mouth rinse, Cepacol PRN sore throat   8/14 send CMV PCR Patient is febrile, neutropenic   Off Zosyn for possible PNA, d/c'd after 8/6 PM dose  Cultures NGTD 7/29, 8/1, 8/2 8/9 started on Levaquin and posaconazole ppx for neutropenia  8/12 - Started Acyclovir for oral ulcers  8/12 CXR No acute pulmonary disease  8/14 pancx and repeat CT CAP to look for fever source revealed Etiology for the patient's fever not identified; Hepatic steatosis.  Added sodium bicarb  mouth rinse, Cepacol PRN sore throat   8/14 sent CMV PCR  8/15 monitor soft stools

## 2021-08-15 NOTE — CHART NOTE - NSCHARTNOTEFT_GEN_A_CORE
MEDICINE PA    Notified by RN patient with temperature 100.8F. Patient is alert, NAD. Denies HA, CP, SOB, cough, N/V, or abd pain.    VITAL SIGNS:  T(C): 38.2 (08-15-21 @ 00:20), Max: 38.6 (08-14-21 @ 08:55)  HR: 106 (08-15-21 @ 00:20) (85 - 115)  BP: 132/72 (08-15-21 @ 00:20) (110/71 - 132/72)  RR: 18 (08-15-21 @ 00:20) (18 - 18)  SpO2: 95% (08-15-21 @ 00:20) (94% - 100%)  Wt(kg): --      LABORATORY:                          7.9    0.36  )-----------( 20       ( 14 Aug 2021 07:29 )             22.4       08-14    139  |  105  |  9   ----------------------------<  94  4.0   |  22  |  1.08    Ca    9.3      14 Aug 2021 07:24  Phos  2.9     08-14  Mg     2.4     08-14    TPro  6.5  /  Alb  3.7  /  TBili  0.5  /  DBili  x   /  AST  15  /  ALT  32  /  AlkPhos  52  08-14              PHYSICAL EXAM:    Constitutional:  NAD.    Respiratory: clear lungs bilaterally. No wheezing, rhonchi, or crackles.    Cardiovascular: S1 S2. No murmurs.    Gastrointestinal: BS X4 active. soft. nontender.      ASSESSMENT/PLAN:   HPI:  36M PMH HTN, fatty liver, kidney stones presents with rash, dizziness, fatigue and severe RUQ pain for 3 days. He has felt fatigued for the past few months. Three days ago, he developed sharp RUQ pain that wrapped around to his back. He rated the pain as a 5/10 with no change over the past three days. The pain doesn't change with PO intake or bowel movements. He has some associated dizziness with the pain. He also developed an erythematous rash that is nonpruritic on his upper chest, neck, and upper back over the past couple of days. He has had a twenty pound weight gain over the past year due to a change to sedentary lifestyle 2/2 pandemic. He denies night sweats and fevers. He has had constipation for the past week where he must strain to move his bowels. He has no urinary symptoms, fevers, chills, or changes in appetite. No diarrhea, N/V. No prior cardiac hx, pleuritic chest pain, or SOB. He had kidney stones 5 years ago but states this pain is worse. Denies allergies or new medications. He denies recent travel and works as a . He has had no recent sick contacts. He received all childhood vaccines.    In the ED: temp 99.4, tachycardic to 150s, started on fluids and allopurinol per heme/onc recs. He received 1 mg hydromorphone, 4 mg morphine, 600 mg ibuprofen , and 975 mg acetaminophen. CT A/P revealed fatty liver and small R pleural effusion. (30 Jul 2021 09:22)        1) Neutropenic Fever  -tylenol and cooling measures prn for pyrexia  -BC x2, UA/UC done on 8/14  -CXR from 8/12 stable  -c/w current abx regimen   -F/U primary team in AM    Deacon Vallejo PA-C   Department of Medicine

## 2021-08-15 NOTE — PROGRESS NOTE ADULT - PROBLEM SELECTOR PLAN 1
FLT3 negative, BM bx c/w AML   consented for [place] study    HIV (-), Hep (-)  Echo 70%, MUGA EF 71%   Discussed sperm banking. Declined   TLC placed on 8/6 8/6 Started induction with  7+3 (Cytarabine + Daunorubicin)  Follow daily lytes, CBC. replace, Replete prn  Continue with Allopurinol 300mg daily, IV hydration, mouth care, pain control, antiemetics  Monitor for TLS daily  Day 14 Bm bx due on 8/19 FLT3 negative, BM bx c/w AML   consented for [place] study    HIV (-), Hep (-)  Echo 70%, MUGA EF 71%   Discussed sperm banking. Declined   TLC placed on 8/6 8/6 Started induction with  7+3 (Cytarabine + Daunorubicin)  Follow daily lytes, CBC. replace, Replete prn  Continue with Allopurinol 300mg daily, IV hydration, mouth care, pain control, antiemetics  Monitor for TLS daily  Day 14 Bm bx due on 8/19  Thrombocytopenia w fever: PLT x1

## 2021-08-15 NOTE — PROGRESS NOTE ADULT - SUBJECTIVE AND OBJECTIVE BOX
Diagnosis:  Acute Myeloid Leukemia, FLT3-    Protocol/Chemo Regimen: 7+3 (Daunorubicin and Cytarabine)    Day: 10    Pt endorsed:  recurrent fever, not sick from it; sore throat since yesterday    Review of Systems:  Denies n/v, HA or dizziness, cough, diarrhea, dysurea    Pain scale: 5/10 throat     Diet: DASH/TLC      Allergies:  No Known Allergies            ANTIMICROBIALS  acyclovir   Oral Tab/Cap 400 milliGRAM(s) Oral every 8 hours  cefepime   IVPB      cefepime   IVPB 2000 milliGRAM(s) IV Intermittent every 8 hours  posaconazole DR Tablet 300 milliGRAM(s) Oral daily      HEME/ONC MEDICATIONS      STANDING MEDICATIONS  allopurinol 300 milliGRAM(s) Oral daily  amLODIPine   Tablet 5 milliGRAM(s) Oral daily  Biotene Dry Mouth Oral Rinse 5 milliLiter(s) Swish and Spit five times a day  chlorhexidine 2% Cloths 1 Application(s) Topical <User Schedule>  FIRST- Mouthwash  BLM 5 milliLiter(s) Swish and Spit three times a day  petrolatum white Ointment 1 Application(s) Topical four times a day  polyethylene glycol 3350 17 Gram(s) Oral daily  senna 2 Tablet(s) Oral at bedtime  sodium bicarbonate Mouth Rinse 15 milliLiter(s) Swish and Spit four times a day  sodium chloride 0.9%. 1000 milliLiter(s) IV Continuous <Continuous>      PRN MEDICATIONS  acetaminophen   Tablet .. 650 milliGRAM(s) Oral every 6 hours PRN  benzocaine 15 mG/menthol 3.6 mG (Sugar-Free) Lozenge 1 Lozenge Oral every 4 hours PRN  metoclopramide Injectable 10 milliGRAM(s) IV Push every 6 hours PRN  sodium chloride 0.65% Nasal 1 Spray(s) Both Nostrils three times a day PRN  sodium chloride 0.9% lock flush 10 milliLiter(s) IV Push every 1 hour PRN        Vital Signs Last 24 Hrs  T(C): 37.2 (15 Aug 2021 05:28), Max: 38.6 (14 Aug 2021 08:55)  T(F): 99 (15 Aug 2021 05:28), Max: 101.5 (14 Aug 2021 08:55)  HR: 86 (15 Aug 2021 05:28) (85 - 115)  BP: 105/63 (15 Aug 2021 05:28) (105/63 - 132/72)  BP(mean): --  RR: 18 (15 Aug 2021 05:28) (18 - 18)  SpO2: 100% (15 Aug 2021 05:28) (95% - 100%)          PHYSICAL EXAM  General: NAD  HEENT:  ulcer left lateral tongue, erythema back of throat   CV: (+) S1/S2, reg  Lungs: clear to auscultation, no wheezes or rales  Abdomen: soft, non-tender, non-distended (+) BS  Ext: no  edema  Skin: no rashes   Neuro: alert and oriented X 3  Central Line: R TLCL c/d/i               LABS:                        7.9    0.36  )-----------( 20       ( 14 Aug 2021 07:29 )             22.4         Mean Cell Volume : 95.3 fl  Mean Cell Hemoglobin : 33.6 pg  Mean Cell Hemoglobin Concentration : 35.3 gm/dL  Auto Neutrophil # : x  Auto Lymphocyte # : x  Auto Monocyte # : x  Auto Eosinophil # : x  Auto Basophil # : x  Auto Neutrophil % : x  Auto Lymphocyte % : x  Auto Monocyte % : x  Auto Eosinophil % : x  Auto Basophil % : x      08-15    137  |  105  |  10  ----------------------------<  96  3.9   |  21<L>  |  1.01    Ca    9.2      15 Aug 2021 06:37  Phos  3.2     08-15  Mg     2.4     08-15    TPro  6.6  /  Alb  3.6  /  TBili  0.4  /  DBili  x   /  AST  14  /  ALT  27  /  AlkPhos  51  08-15      Mg 2.4  Phos 3.2            Uric Acid 2.7        RECENT CULTURES:    08-12 @ 22:09  Clean Catch Clean Catch (Midstream)  <10,000 CFU/mL Normal Urogenital Heather    08-12 @ 22:03  .Blood Blood-Catheter  No growth to date.    Culture - Blood (08.12.21 @ 22:03)    Specimen Source: .Blood Blood-Peripheral    Culture Results:   No growth to date.      RADIOLOGY & ADDITIONAL STUDIES:    < from: Xray Chest 1 View- PORTABLE-Urgent (Xray Chest 1 View- PORTABLE-Urgent .) (08.12.21 @ 17:54) >  IMPRESSION:  No acute pulmonary disease       Diagnosis:  Acute Myeloid Leukemia, FLT3-    Protocol/Chemo Regimen: 7+3 (Daunorubicin and Cytarabine)    Day: 10    Pt endorsed:  fatigue; soft stools 3x today ans 3x yesterday    Review of Systems:  Denies n/v, HA or dizziness, cough, diarrhea, dysurea    Pain scale: denies     Diet: DASH/TLC      Allergies:  No Known Allergies      ANTIMICROBIALS  acyclovir   Oral Tab/Cap 400 milliGRAM(s) Oral every 8 hours  cefepime   IVPB      cefepime   IVPB 2000 milliGRAM(s) IV Intermittent every 8 hours  posaconazole DR Tablet 300 milliGRAM(s) Oral daily      STANDING MEDICATIONS  allopurinol 300 milliGRAM(s) Oral daily  amLODIPine   Tablet 5 milliGRAM(s) Oral daily  Biotene Dry Mouth Oral Rinse 5 milliLiter(s) Swish and Spit five times a day  chlorhexidine 2% Cloths 1 Application(s) Topical <User Schedule>  FIRST- Mouthwash  BLM 5 milliLiter(s) Swish and Spit three times a day  petrolatum white Ointment 1 Application(s) Topical four times a day  polyethylene glycol 3350 17 Gram(s) Oral daily  senna 2 Tablet(s) Oral at bedtime  sodium bicarbonate Mouth Rinse 15 milliLiter(s) Swish and Spit four times a day  sodium chloride 0.9%. 1000 milliLiter(s) IV Continuous <Continuous>      PRN MEDICATIONS  acetaminophen   Tablet .. 650 milliGRAM(s) Oral every 6 hours PRN  benzocaine 15 mG/menthol 3.6 mG (Sugar-Free) Lozenge 1 Lozenge Oral every 4 hours PRN  metoclopramide Injectable 10 milliGRAM(s) IV Push every 6 hours PRN  sodium chloride 0.65% Nasal 1 Spray(s) Both Nostrils three times a day PRN  sodium chloride 0.9% lock flush 10 milliLiter(s) IV Push every 1 hour PRN      Vital Signs Last 24 Hrs  T(C): 37.2 (15 Aug 2021 05:28), Max: 38.6 (14 Aug 2021 08:55)  T(F): 99 (15 Aug 2021 05:28), Max: 101.5 (14 Aug 2021 08:55)  HR: 86 (15 Aug 2021 05:28) (85 - 115)  BP: 105/63 (15 Aug 2021 05:28) (105/63 - 132/72)  BP(mean): --  RR: 18 (15 Aug 2021 05:28) (18 - 18)  SpO2: 100% (15 Aug 2021 05:28) (95% - 100%)      PHYSICAL EXAM  General: NAD  HEENT:  ulcer left lateral tongue, erythema back of throat   CV: (+) S1/S2, reg  Lungs: clear to auscultation, no wheezes or rales  Abdomen: soft, non-tender, non-distended (+) BS  Ext: no  edema  Skin: no rashes   Neuro: alert and oriented X 3  Central Line: R TLCL c/d/i       LABS:                          7.3    0.34  )-----------( 10       ( 15 Aug 2021 06:37 )             20.9         Mean Cell Volume : 93.7 fl  Mean Cell Hemoglobin : 32.7 pg  Mean Cell Hemoglobin Concentration : 34.9 gm/dL  Auto Neutrophil # : x  Auto Lymphocyte # : x  Auto Monocyte # : x  Auto Eosinophil # : x  Auto Basophil # : x  Auto Neutrophil % : x  Auto Lymphocyte % : x  Auto Monocyte % : x  Auto Eosinophil % : x  Auto Basophil % : x      08-15    137  |  105  |  10  ----------------------------<  96  3.9   |  21<L>  |  1.01    Ca    9.2      15 Aug 2021 06:37  Phos  3.2     08-15  Mg     2.4     08-15    TPro  6.6  /  Alb  3.6  /  TBili  0.4  /  DBili  x   /  AST  14  /  ALT  27  /  AlkPhos  51  08-15        Uric Acid 2.7        RECENT CULTURES:    08-12 @ 22:09  Clean Catch Clean Catch (Midstream)  <10,000 CFU/mL Normal Urogenital Heather    08-12 @ 22:03  .Blood Blood-Catheter  No growth to date.    Culture - Blood (08.12.21 @ 22:03)    Specimen Source: .Blood Blood-Peripheral    Culture Results:   No growth to date.      RADIOLOGY & ADDITIONAL STUDIES:    < from: CT Abdomen and Pelvis w/ Oral Cont and w/ IV Cont (08.14.21 @ 21:56) >  IMPRESSION:  *  Etiology for the patient's fever not identified  *  Hepatic steatosis.  *  Resolved right pleural effusion      < from: Xray Chest 1 View- PORTABLE-Urgent (Xray Chest 1 View- PORTABLE-Urgent .) (08.12.21 @ 17:54) >  IMPRESSION:  No acute pulmonary disease

## 2021-08-16 LAB
ALBUMIN SERPL ELPH-MCNC: 3.6 G/DL — SIGNIFICANT CHANGE UP (ref 3.3–5)
ALP SERPL-CCNC: 49 U/L — SIGNIFICANT CHANGE UP (ref 40–120)
ALT FLD-CCNC: 22 U/L — SIGNIFICANT CHANGE UP (ref 10–45)
ANION GAP SERPL CALC-SCNC: 12 MMOL/L — SIGNIFICANT CHANGE UP (ref 5–17)
APTT BLD: 26.6 SEC — LOW (ref 27.5–35.5)
AST SERPL-CCNC: 10 U/L — SIGNIFICANT CHANGE UP (ref 10–40)
BILIRUB SERPL-MCNC: 0.4 MG/DL — SIGNIFICANT CHANGE UP (ref 0.2–1.2)
BLD GP AB SCN SERPL QL: NEGATIVE — SIGNIFICANT CHANGE UP
BUN SERPL-MCNC: 9 MG/DL — SIGNIFICANT CHANGE UP (ref 7–23)
CALCIUM SERPL-MCNC: 8.9 MG/DL — SIGNIFICANT CHANGE UP (ref 8.4–10.5)
CHLORIDE SERPL-SCNC: 106 MMOL/L — SIGNIFICANT CHANGE UP (ref 96–108)
CO2 SERPL-SCNC: 23 MMOL/L — SIGNIFICANT CHANGE UP (ref 22–31)
CREAT SERPL-MCNC: 1.02 MG/DL — SIGNIFICANT CHANGE UP (ref 0.5–1.3)
GLUCOSE SERPL-MCNC: 95 MG/DL — SIGNIFICANT CHANGE UP (ref 70–99)
HCT VFR BLD CALC: 18.8 % — CRITICAL LOW (ref 39–50)
HGB BLD-MCNC: 6.6 G/DL — CRITICAL LOW (ref 13–17)
HSV1 IGG SER-ACNC: 1.64 INDEX — HIGH
HSV1 IGG SERPL QL IA: POSITIVE
HSV2 IGG FLD-ACNC: 0.05 INDEX — SIGNIFICANT CHANGE UP
HSV2 IGG SERPL QL IA: NEGATIVE — SIGNIFICANT CHANGE UP
INR BLD: 1.14 RATIO — SIGNIFICANT CHANGE UP (ref 0.88–1.16)
LDH SERPL L TO P-CCNC: 270 U/L — HIGH (ref 50–242)
MAGNESIUM SERPL-MCNC: 2.4 MG/DL — SIGNIFICANT CHANGE UP (ref 1.6–2.6)
MCHC RBC-ENTMCNC: 32.7 PG — SIGNIFICANT CHANGE UP (ref 27–34)
MCHC RBC-ENTMCNC: 35.1 GM/DL — SIGNIFICANT CHANGE UP (ref 32–36)
MCV RBC AUTO: 93.1 FL — SIGNIFICANT CHANGE UP (ref 80–100)
NRBC # BLD: 0 /100 WBCS — SIGNIFICANT CHANGE UP (ref 0–0)
PHOSPHATE SERPL-MCNC: 3 MG/DL — SIGNIFICANT CHANGE UP (ref 2.5–4.5)
PLATELET # BLD AUTO: 16 K/UL — CRITICAL LOW (ref 150–400)
POTASSIUM SERPL-MCNC: 3.8 MMOL/L — SIGNIFICANT CHANGE UP (ref 3.5–5.3)
POTASSIUM SERPL-SCNC: 3.8 MMOL/L — SIGNIFICANT CHANGE UP (ref 3.5–5.3)
PROT SERPL-MCNC: 6.6 G/DL — SIGNIFICANT CHANGE UP (ref 6–8.3)
PROTHROM AB SERPL-ACNC: 13.6 SEC — SIGNIFICANT CHANGE UP (ref 10.6–13.6)
RBC # BLD: 2.02 M/UL — LOW (ref 4.2–5.8)
RBC # FLD: 14.8 % — HIGH (ref 10.3–14.5)
RH IG SCN BLD-IMP: POSITIVE — SIGNIFICANT CHANGE UP
SODIUM SERPL-SCNC: 141 MMOL/L — SIGNIFICANT CHANGE UP (ref 135–145)
URATE SERPL-MCNC: 2.2 MG/DL — LOW (ref 3.4–8.8)
WBC # BLD: 0.25 K/UL — CRITICAL LOW (ref 3.8–10.5)
WBC # FLD AUTO: 0.25 K/UL — CRITICAL LOW (ref 3.8–10.5)

## 2021-08-16 PROCEDURE — 99232 SBSQ HOSP IP/OBS MODERATE 35: CPT | Mod: GC

## 2021-08-16 RX ADMIN — BENZOCAINE AND MENTHOL 1 LOZENGE: 5; 1 LIQUID ORAL at 21:27

## 2021-08-16 RX ADMIN — Medication 400 MILLIGRAM(S): at 05:03

## 2021-08-16 RX ADMIN — Medication 650 MILLIGRAM(S): at 01:01

## 2021-08-16 RX ADMIN — AMLODIPINE BESYLATE 5 MILLIGRAM(S): 2.5 TABLET ORAL at 05:07

## 2021-08-16 RX ADMIN — CEFEPIME 100 MILLIGRAM(S): 1 INJECTION, POWDER, FOR SOLUTION INTRAMUSCULAR; INTRAVENOUS at 21:14

## 2021-08-16 RX ADMIN — Medication 15 MILLILITER(S): at 11:58

## 2021-08-16 RX ADMIN — POSACONAZOLE 300 MILLIGRAM(S): 100 TABLET, DELAYED RELEASE ORAL at 11:58

## 2021-08-16 RX ADMIN — Medication 1 APPLICATION(S): at 12:00

## 2021-08-16 RX ADMIN — Medication 5 MILLILITER(S): at 16:21

## 2021-08-16 RX ADMIN — Medication 5 MILLILITER(S): at 09:24

## 2021-08-16 RX ADMIN — Medication 5 MILLILITER(S): at 11:57

## 2021-08-16 RX ADMIN — Medication 15 MILLILITER(S): at 05:03

## 2021-08-16 RX ADMIN — Medication 650 MILLIGRAM(S): at 02:00

## 2021-08-16 RX ADMIN — Medication 5 MILLILITER(S): at 20:25

## 2021-08-16 RX ADMIN — Medication 300 MILLIGRAM(S): at 11:58

## 2021-08-16 RX ADMIN — CEFEPIME 100 MILLIGRAM(S): 1 INJECTION, POWDER, FOR SOLUTION INTRAMUSCULAR; INTRAVENOUS at 13:21

## 2021-08-16 RX ADMIN — CHLORHEXIDINE GLUCONATE 1 APPLICATION(S): 213 SOLUTION TOPICAL at 05:15

## 2021-08-16 RX ADMIN — Medication 15 MILLILITER(S): at 18:04

## 2021-08-16 RX ADMIN — Medication 5 MILLILITER(S): at 23:45

## 2021-08-16 RX ADMIN — Medication 650 MILLIGRAM(S): at 10:28

## 2021-08-16 RX ADMIN — DIPHENHYDRAMINE HYDROCHLORIDE AND LIDOCAINE HYDROCHLORIDE AND ALUMINUM HYDROXIDE AND MAGNESIUM HYDRO 5 MILLILITER(S): KIT at 13:22

## 2021-08-16 RX ADMIN — Medication 1 APPLICATION(S): at 18:00

## 2021-08-16 RX ADMIN — BENZOCAINE AND MENTHOL 1 LOZENGE: 5; 1 LIQUID ORAL at 05:11

## 2021-08-16 RX ADMIN — Medication 1 APPLICATION(S): at 05:10

## 2021-08-16 RX ADMIN — DIPHENHYDRAMINE HYDROCHLORIDE AND LIDOCAINE HYDROCHLORIDE AND ALUMINUM HYDROXIDE AND MAGNESIUM HYDRO 5 MILLILITER(S): KIT at 05:03

## 2021-08-16 RX ADMIN — Medication 400 MILLIGRAM(S): at 13:16

## 2021-08-16 RX ADMIN — CEFEPIME 100 MILLIGRAM(S): 1 INJECTION, POWDER, FOR SOLUTION INTRAMUSCULAR; INTRAVENOUS at 05:02

## 2021-08-16 RX ADMIN — Medication 1 APPLICATION(S): at 21:16

## 2021-08-16 RX ADMIN — Medication 650 MILLIGRAM(S): at 09:31

## 2021-08-16 RX ADMIN — Medication 15 MILLILITER(S): at 21:15

## 2021-08-16 RX ADMIN — Medication 400 MILLIGRAM(S): at 21:15

## 2021-08-16 RX ADMIN — DIPHENHYDRAMINE HYDROCHLORIDE AND LIDOCAINE HYDROCHLORIDE AND ALUMINUM HYDROXIDE AND MAGNESIUM HYDRO 5 MILLILITER(S): KIT at 21:14

## 2021-08-16 NOTE — PROGRESS NOTE ADULT - PROBLEM SELECTOR PLAN 1
FLT3 negative, BM bx c/w AML   consented for [place] study    HIV (-), Hep (-)  Echo 70%, MUGA EF 71%   Discussed sperm banking. Declined   TLC placed on 8/6 8/6 Started induction with  7+3 (Cytarabine + Daunorubicin)  Follow daily lytes, CBC. replace, Replete prn  Continue with Allopurinol 300mg daily, IV hydration, mouth care, pain control, antiemetics  Monitor for TLS daily  Day 14 Bm bx due on 8/19  Thrombocytopenia w fever: PLT x1 FLT3 negative, BM bx c/w AML   consented for [place] study    HIV (-), Hep (-)  Echo 70%, MUGA EF 71%   Discussed sperm banking. Declined   TLC placed on 8/6 8/6 Started induction with  7+3 (Cytarabine + Daunorubicin)  Follow daily lytes, CBC. replace, Replete prn  Continue with Allopurinol 300mg daily, IV hydration, mouth care, pain control, antiemetics  Monitor for TLS daily  Day 14 Bm bx due on 8/19  Transfuse Plts if fever recurs (currently 16k)

## 2021-08-16 NOTE — PROGRESS NOTE ADULT - ASSESSMENT
Mr. Tian is  a 35 y/o male with h/o PMH HTN, fatty liver, kidney stones presents with rash, dizziness, fatigue and severe RUQ pain for 3 days. Now with anemia, thrombocytopenia and leukocytosis with 17% blasts, Bone marrow bx c/w AML. Transferred to [address] [place] [address] for management. Patient is pancytopenia secondary to disease and/or chemotherapy. Patient receiving induction chemotherapy  with 7+3 (Daunorubicin and Cytarabine). Patient has pancytopenia secondary to chemotherapy and disease process.          Mr. Tian is  a 37 y/o male with h/o PMH HTN, fatty liver, kidney stones presents with rash, dizziness, fatigue and severe RUQ pain for 3 days. Now with anemia, thrombocytopenia and leukocytosis with 17% blasts, Bone marrow bx c/w AML. Transferred to [address] [place] [address] for management. Patient receiving induction chemotherapy with 7+3 (Daunorubicin and Cytarabine). Patient has pancytopenia secondary to chemotherapy and disease process.

## 2021-08-16 NOTE — PROGRESS NOTE ADULT - PROBLEM SELECTOR PLAN 2
Patient is febrile, neutropenic   Off Zosyn for possible PNA, d/c'd after 8/6 PM dose  Cultures NGTD 7/29, 8/1, 8/2 8/9 started on Levaquin and posaconazole ppx for neutropenia  8/12 - Started Acyclovir for oral ulcers  8/12 CXR No acute pulmonary disease  8/14 pancx and repeat CT CAP to look for fever source revealed Etiology for the patient's fever not identified; Hepatic steatosis.  Added sodium bicarb  mouth rinse, Cepacol PRN sore throat   8/14 sent CMV PCR  8/15 monitor soft stools Patient is febrile, +neutropenic   On Cefepime, All Cultures NGTD   s/p Zosyn for possible PNA, d/c'd after 8/6 PM dose  8/9 started on Levaquin and posaconazole ppx for neutropenia  8/12 - Started Acyclovir for oral ulcers  8/12 CXR No acute pulmonary disease  8/14 pancx and repeat CT CAP to look for fever source revealed Etiology for the patient's fever not identified; Hepatic steatosis.  Added sodium bicarb  mouth rinse, Cepacol PRN sore throat   8/14 sent CMV PCR  8/15 monitor soft stools Patient is febrile, +neutropenic   On Cefepime, All Cultures NGTD  s/p Zosyn for possible PNA, d/c'd after 8/6 PM dose  8/9 started on Levaquin and posaconazole ppx for neutropenia  8/12 - Started Acyclovir for oral ulcers  8/12 CXR No acute pulmonary disease  8/14 pancx and repeat CT CAP to look for fever source revealed Etiology for the patient's fever not identified; Hepatic steatosis.  Added sodium bicarb  mouth rinse, Cepacol PRN sore throat   8/14 sent CMV PCR  8/15 monitor soft stools  - Check HSV serology

## 2021-08-16 NOTE — PROGRESS NOTE ADULT - SUBJECTIVE AND OBJECTIVE BOX
Diagnosis:  Acute Myeloid Leukemia, FLT3-    Protocol/Chemo Regimen: 7+3 (Daunorubicin and Cytarabine)    Day: 11    Pt endorsed:      Review of Systems:     Pain scale: denies     Diet: DASH/TLC      Allergies:  No Known Allergies    MEDICATIONS  (STANDING):  acyclovir   Oral Tab/Cap 400 milliGRAM(s) Oral every 8 hours  allopurinol 300 milliGRAM(s) Oral daily  amLODIPine   Tablet 5 milliGRAM(s) Oral daily  Biotene Dry Mouth Oral Rinse 5 milliLiter(s) Swish and Spit five times a day  cefepime   IVPB      cefepime   IVPB 2000 milliGRAM(s) IV Intermittent every 8 hours  chlorhexidine 2% Cloths 1 Application(s) Topical <User Schedule>  FIRST- Mouthwash  BLM 5 milliLiter(s) Swish and Spit three times a day  petrolatum white Ointment 1 Application(s) Topical four times a day  polyethylene glycol 3350 17 Gram(s) Oral daily  posaconazole DR Tablet 300 milliGRAM(s) Oral daily  senna 2 Tablet(s) Oral at bedtime  sodium bicarbonate Mouth Rinse 15 milliLiter(s) Swish and Spit four times a day  sodium chloride 0.9%. 1000 milliLiter(s) (100 mL/Hr) IV Continuous <Continuous>    MEDICATIONS  (PRN):  acetaminophen   Tablet .. 650 milliGRAM(s) Oral every 6 hours PRN Temp greater or equal to 38C (100.4F), Mild Pain (1 - 3), Moderate Pain (4 - 6)  benzocaine 15 mG/menthol 3.6 mG (Sugar-Free) Lozenge 1 Lozenge Oral every 4 hours PRN Sore Throat  metoclopramide Injectable 10 milliGRAM(s) IV Push every 6 hours PRN nausea/vomiting  sodium chloride 0.65% Nasal 1 Spray(s) Both Nostrils three times a day PRN Nasal Congestion  sodium chloride 0.9% lock flush 10 milliLiter(s) IV Push every 1 hour PRN Pre/post blood products, medications, blood draw, and to maintain line patency      Vital Signs Last 24 Hrs  T(C): 37.5 (16 Aug 2021 05:30), Max: 39.2 (16 Aug 2021 01:10)  T(F): 99.5 (16 Aug 2021 05:30), Max: 102.6 (16 Aug 2021 01:10)  HR: 96 (16 Aug 2021 05:30) (96 - 114)  BP: 123/79 (16 Aug 2021 05:30) (110/65 - 135/74)  BP(mean): --  RR: 18 (16 Aug 2021 05:30) (18 - 18)  SpO2: 97% (16 Aug 2021 05:30) (96% - 99%)      PHYSICAL EXAM  General: NAD  HEENT:  ulcer left lateral tongue, erythema back of throat   CV: (+) S1/S2, reg  Lungs: clear to auscultation, no wheezes or rales  Abdomen: soft, non-tender, non-distended (+) BS  Ext: no  edema  Skin: no rashes   Neuro: alert and oriented X 3  Central Line: R TLCL c/d/i       LABS:    ---------               RECENT CULTURES:    08-12 @ 22:09  Clean Catch Clean Catch (Midstream)  <10,000 CFU/mL Normal Urogenital Heather    08-12 @ 22:03  .Blood Blood-Catheter  No growth to date.    Culture - Blood (08.12.21 @ 22:03)    Specimen Source: .Blood Blood-Peripheral    Culture Results:   No growth to date.      RADIOLOGY & ADDITIONAL STUDIES:    < from: CT Abdomen and Pelvis w/ Oral Cont and w/ IV Cont (08.14.21 @ 21:56) >  IMPRESSION:  *  Etiology for the patient's fever not identified  *  Hepatic steatosis.  *  Resolved right pleural effusion      < from: Xray Chest 1 View- PORTABLE-Urgent (Xray Chest 1 View- PORTABLE-Urgent .) (08.12.21 @ 17:54) >  IMPRESSION:  No acute pulmonary disease       Diagnosis:  Acute Myeloid Leukemia, FLT3-    Protocol/Chemo Regimen: 7+3 (Daunorubicin and Cytarabine)    Day: 11    Pt endorsed: +Febrile, feels intermittently fatigued. +Taking po's     Review of Systems: Denies n/v, cough, HA or dizziness, abdominal pain    Pain scale: denies     Diet: DASH/TLC      Allergies:  No Known Allergies    MEDICATIONS  (STANDING):  acyclovir   Oral Tab/Cap 400 milliGRAM(s) Oral every 8 hours  allopurinol 300 milliGRAM(s) Oral daily  amLODIPine   Tablet 5 milliGRAM(s) Oral daily  Biotene Dry Mouth Oral Rinse 5 milliLiter(s) Swish and Spit five times a day  cefepime   IVPB      cefepime   IVPB 2000 milliGRAM(s) IV Intermittent every 8 hours  chlorhexidine 2% Cloths 1 Application(s) Topical <User Schedule>  FIRST- Mouthwash  BLM 5 milliLiter(s) Swish and Spit three times a day  petrolatum white Ointment 1 Application(s) Topical four times a day  polyethylene glycol 3350 17 Gram(s) Oral daily  posaconazole DR Tablet 300 milliGRAM(s) Oral daily  senna 2 Tablet(s) Oral at bedtime  sodium bicarbonate Mouth Rinse 15 milliLiter(s) Swish and Spit four times a day  sodium chloride 0.9%. 1000 milliLiter(s) (100 mL/Hr) IV Continuous <Continuous>    MEDICATIONS  (PRN):  acetaminophen   Tablet .. 650 milliGRAM(s) Oral every 6 hours PRN Temp greater or equal to 38C (100.4F), Mild Pain (1 - 3), Moderate Pain (4 - 6)  benzocaine 15 mG/menthol 3.6 mG (Sugar-Free) Lozenge 1 Lozenge Oral every 4 hours PRN Sore Throat  metoclopramide Injectable 10 milliGRAM(s) IV Push every 6 hours PRN nausea/vomiting  sodium chloride 0.65% Nasal 1 Spray(s) Both Nostrils three times a day PRN Nasal Congestion  sodium chloride 0.9% lock flush 10 milliLiter(s) IV Push every 1 hour PRN Pre/post blood products, medications, blood draw, and to maintain line patency      Vital Signs Last 24 Hrs  T(C): 37.5 (16 Aug 2021 05:30), Max: 39.2 (16 Aug 2021 01:10)  T(F): 99.5 (16 Aug 2021 05:30), Max: 102.6 (16 Aug 2021 01:10)  HR: 96 (16 Aug 2021 05:30) (96 - 114)  BP: 123/79 (16 Aug 2021 05:30) (110/65 - 135/74)  BP(mean): --  RR: 18 (16 Aug 2021 05:30) (18 - 18)  SpO2: 97% (16 Aug 2021 05:30) (96% - 99%)      PHYSICAL EXAM  General: Sitting up in bed in NAD  HEENT:  ulcer left lateral tongue improved, mild erythema post oropharynx   CV: (+) S1/S2, reg  Lungs: clear to auscultation, no wheezes or rales  Abdomen: soft, non-tender, non-distended (+) BS  Ext: no edema BLE's  Skin: no rashes   Neuro: alert and oriented X 3  Central Line: R TLCL c/d/i       LABS:                        6.6    0.25  )-----------( 16       ( 16 Aug 2021 06:48 )             18.8     16 Aug 2021 06:48    141    |  106    |  9      ----------------------------<  95     3.8     |  23     |  1.02     Ca    8.9        16 Aug 2021 06:48  Phos  3.0       16 Aug 2021 06:48  Mg     2.4       16 Aug 2021 06:48    TPro  6.6    /  Alb  3.6    /  TBili  0.4    /  DBili  x      /  AST  10     /  ALT  22     /  AlkPhos  49     16 Aug 2021 06:48    PT/INR - ( 16 Aug 2021 06:48 )   PT: 13.6 sec;   INR: 1.14 ratio     PTT - ( 16 Aug 2021 06:48 )  PTT:26.6 sec        LIVER FUNCTIONS - ( 16 Aug 2021 06:48 )  Alb: 3.6 g/dL / Pro: 6.6 g/dL / ALK PHOS: 49 U/L / ALT: 22 U/L / AST: 10 U/L / GGT: x           Urinalysis Basic - ( 14 Aug 2021 18:26 )    Color: Light Yellow / Appearance: Clear / S.015 / pH: x  Gluc: x / Ketone: Negative  / Bili: Negative / Urobili: Negative   Blood: x / Protein: Negative / Nitrite: Negative   Leuk Esterase: Negative / RBC: 2 /hpf / WBC 2 /HPF   Sq Epi: x / Non Sq Epi: 0 /hpf / Bacteria: Negative               RECENT CULTURES:     @ 22:09  Clean Catch Clean Catch (Midstream)  <10,000 CFU/mL Normal Urogenital Heather     @ 22:03  .Blood Blood-Catheter  No growth to date.    Culture - Blood (21 @ 22:03)    Specimen Source: .Blood Blood-Peripheral    Culture Results:   No growth to date.      RADIOLOGY & ADDITIONAL STUDIES:    < from: CT Abdomen and Pelvis w/ Oral Cont and w/ IV Cont (21 @ 21:56) >  IMPRESSION:  *  Etiology for the patient's fever not identified  *  Hepatic steatosis.  *  Resolved right pleural effusion      < from: Xray Chest 1 View- PORTABLE-Urgent (Xray Chest 1 View- PORTABLE-Urgent .) (21 @ 17:54) >  IMPRESSION:  No acute pulmonary disease

## 2021-08-16 NOTE — CHART NOTE - NSCHARTNOTEFT_GEN_A_CORE
Medicine PA Fever Note    Patient is a 36y old  Male who presents with a chief complaint of c/f new AML (15 Aug 2021 07:49)      Event Summary:   Notified by RN patient with fever, Temp 102.5F.   Patient seen and examined patient at bedside.  Patient is alert, denies HA, visual changes, CP, SOB, cough, N/V, dysuria, or abd pain.    >VITAL SIGNS:  T(C): 38.3 (08-16-21 @ 01:56), Max: 39.2 (08-16-21 @ 01:10)  T(F): 100.9 (08-16-21 @ 01:56), Max: 102.6 (08-16-21 @ 01:10)  HR: 106 (08-16-21 @ 01:10) (86 - 114)  BP: 114/65 (08-16-21 @ 01:10) (105/63 - 135/74)  RR: 18 (08-16-21 @ 01:10) (18 - 18)  SpO2: 97% (08-16-21 @ 01:10) (96% - 100%)      >Review Of Systems:   CONSTITUTIONAL:  No fever.   No chills  EYES: No visual changes.    ENT:  No  throat pain.  No neck stiffness  RESPIRATORY: No cough, wheezing, hemoptysis; No shortness of breath  CARDIOVASCULAR: No chest pain or palpitations  GASTROINTESTINAL: No abdominal or epigastric pain.  No diarrhea.    GENITOURINARY: No dysuria, frequency or hematuria  NEUROLOGICAL: No numbness or weakness  SKIN: No itching, burning, rashes, or lesions       >PHYSICAL EXAM:  Constitutional: AOx3. NAD.  Neuro:  Grossly intact   Mouth:   Cardiovascular: +S1 S2. RRR.  No murmurs.  No LE edema.  Respiratory:  Even, unlabored.  Clear lungs bilaterally. No wheezing, rhonchi, or crackles.  Gastrointestinal: +BS X4 active. Soft. Nontender. Nondistended   Extremities/Vascular: +2 pulses bilaterally.   Skin:  +Feverish to touch     >MEDICATIONS:    MEDICATIONS  (STANDING):  acyclovir   Oral Tab/Cap 400 milliGRAM(s) Oral every 8 hours  allopurinol 300 milliGRAM(s) Oral daily  amLODIPine   Tablet 5 milliGRAM(s) Oral daily  Biotene Dry Mouth Oral Rinse 5 milliLiter(s) Swish and Spit five times a day  cefepime   IVPB      cefepime   IVPB 2000 milliGRAM(s) IV Intermittent every 8 hours  chlorhexidine 2% Cloths 1 Application(s) Topical <User Schedule>  FIRST- Mouthwash  BLM 5 milliLiter(s) Swish and Spit three times a day  petrolatum white Ointment 1 Application(s) Topical four times a day  polyethylene glycol 3350 17 Gram(s) Oral daily  posaconazole DR Tablet 300 milliGRAM(s) Oral daily  senna 2 Tablet(s) Oral at bedtime  sodium bicarbonate Mouth Rinse 15 milliLiter(s) Swish and Spit four times a day  sodium chloride 0.9%. 1000 milliLiter(s) (100 mL/Hr) IV Continuous <Continuous>      >LABORATORY:                          7.3    0.34  )-----------( 10       ( 15 Aug 2021 06:37 )             20.9       08-15    137  |  105  |  10  ----------------------------<  96  3.9   |  21<L>  |  1.01    Ca    9.2      15 Aug 2021 06:37  Phos  3.2     08-15  Mg     2.4     08-15    TPro  6.6  /  Alb  3.6  /  TBili  0.4  /  DBili  x   /  AST  14  /  ALT  27  /  AlkPhos  51  08-15          >MICROBIOLOGY:     Urine Culture: Culture - Urine (08.14.21 @ 20:49)    Specimen Source: Clean Catch Clean Catch (Midstream)    Culture Results:   No growth    Blood Culture:Culture - Blood (08.14.21 @ 20:46)    Specimen Source: .Blood Blood-Peripheral    Culture Results:   No growth to date.      >RADIOLOGY:    CXR: < from: CT Chest w/ IV Cont (08.14.21 @ 21:56) >      EXAM:  CT CHEST IC                          EXAM:  CT ABDOMEN AND PELVIS OC IC                            PROCEDURE DATE:  08/14/2021            INTERPRETATION:  CLINICAL INFORMATION: AML with pancytopenia presenting with persistent fever, look forsource.    COMPARISON: CT abdomen pelvis 7/30/2021 and 11/10/2015. CT chest 8/2/2021.    CONTRAST/COMPLICATIONS:  IV Contrast: 90 cc of Omnipaque 350 was administered without complication. 10 cc was discarded.  Oral Contrast: Omnipaque 300.  Complications: None.    PROCEDURE:  CT of the Chest, Abdomen and Pelvis was performed.  Sagittal and coronal reformats were performed.    FINDINGS:  CHEST:  LUNGS AND LARGE AIRWAYS: Patent central airways. Subsegmental atelectasis in the right middle and lower lobes and left lingula 2 mm pulmonary nodule in the left upper lobe (8-54).  PLEURA: No pleural effusion.  VESSELS: Right IJ approach central venous catheter is in place.  HEART: Heart size is normal. No pericardial effusion.  MEDIASTINUM AND PRABHJOT: No lymphadenopathy.  CHEST WALL AND LOWER NECK: Within normal limits.    ABDOMEN AND PELVIS:  LIVER: Steatosis.  BILE DUCTS: Normal caliber.  GALLBLADDER: Within normal limits.  SPLEEN: A few cyst in the spleen, measuring up to 7 mm (3-119), appear unchanged.  PANCREAS: A 7 mm low-attenuation lesion at the head of the pancreas (3-168) appears unchanged from 2015. There is no ductal dilatation.  ADRENALS: Within normal limits.  KIDNEYS/URETERS: No renal stones or hydronephrosis. Symmetric enhancement bilaterally.    BLADDER: Within normal limits.  REPRODUCTIVE ORGANS: Prostate within normal limits.    BOWEL: No bowel obstruction. Oral contrast opacifies the small bowel, not yet reaching the large bowel. . Appendix is normal.  PERITONEUM: Trace amount of ascites.  VESSELS: Within normal limits.  RETROPERITONEUM/LYMPH NODES: Nonspecific infiltration of the fat at the root of the mesentery and para-aortic regions  ABDOMINAL WALL: Within normal limits.  BONES: Sclerotic focus in the right humeral head is stable from 2015, likely representing bone island.    IMPRESSION:  *  Etiology for the patient's fever not identified  *  Hepatic steatosis.    *  Resolved right pleural effusion    --- End of Report ---              JOSE MARIA BLACK MD; Resident Radiology  This document has been electronically signed.  MALIHA KINGSTON MD; Attending Radiologist  This document has been electronically signed. Aug 15 2021  8:50AM    < end of copied text >        >ASSESSMENT/PLAN:   Mr. Tian is  a 35 y/o male with h/o PMH HTN, fatty liver, kidney stones presents with rash, dizziness, fatigue and severe RUQ pain for 3 days. Now with anemia, thrombocytopenia and leukocytosis with 17% blasts, Bone marrow bx c/w AML. Transferred to [address] [place] [address] for management. Patient is pancytopenia secondary to disease and/or chemotherapy. Patient receiving induction chemotherapy  with 7+3 (Daunorubicin and Cytarabine). Patient has pancytopenia secondary to chemotherapy and disease process. Now w/ recurrent neutropenic fever.     1) Neutropenic Fever - Recurrent   - Tylenol / Cooling measures prn for Pyrexia  - BC x2, UA/UC NGTD from 08/14  - CT chest above & reviewed  - c/w Current Abx/ Antifungal/ IVF regimen   - Monitor VS  - ID on board, f/u with team  - F/U Primary  care team in AM     Mishel Dowd PA-C  Department of Medicine  Spectralink [phone] Medicine PA Fever Note    Patient is a 36y old  Male who presents with a chief complaint of c/f new AML (15 Aug 2021 07:49)      Event Summary:   Notified by RN patient with fever, Temp 102.5F.   Patient seen and examined patient at bedside, sleeping, in NAD. VSS otherwise. Will continue to monitor.     >VITAL SIGNS:  T(C): 38.3 (08-16-21 @ 01:56), Max: 39.2 (08-16-21 @ 01:10)  T(F): 100.9 (08-16-21 @ 01:56), Max: 102.6 (08-16-21 @ 01:10)  HR: 106 (08-16-21 @ 01:10) (86 - 114)  BP: 114/65 (08-16-21 @ 01:10) (105/63 - 135/74)  RR: 18 (08-16-21 @ 01:10) (18 - 18)  SpO2: 97% (08-16-21 @ 01:10) (96% - 100%)      >Review Of Systems:   CONSTITUTIONAL:  No fever.   No chills  EYES: No visual changes.    ENT:  No  throat pain.  No neck stiffness  RESPIRATORY: No cough, wheezing, hemoptysis; No shortness of breath  CARDIOVASCULAR: No chest pain or palpitations  GASTROINTESTINAL: No abdominal or epigastric pain.  No diarrhea.    GENITOURINARY: No dysuria, frequency or hematuria  NEUROLOGICAL: No numbness or weakness  SKIN: No itching, burning, rashes, or lesions       >MEDICATIONS:    MEDICATIONS  (STANDING):  acyclovir   Oral Tab/Cap 400 milliGRAM(s) Oral every 8 hours  allopurinol 300 milliGRAM(s) Oral daily  amLODIPine   Tablet 5 milliGRAM(s) Oral daily  Biotene Dry Mouth Oral Rinse 5 milliLiter(s) Swish and Spit five times a day  cefepime   IVPB      cefepime   IVPB 2000 milliGRAM(s) IV Intermittent every 8 hours  chlorhexidine 2% Cloths 1 Application(s) Topical <User Schedule>  FIRST- Mouthwash  BLM 5 milliLiter(s) Swish and Spit three times a day  petrolatum white Ointment 1 Application(s) Topical four times a day  polyethylene glycol 3350 17 Gram(s) Oral daily  posaconazole DR Tablet 300 milliGRAM(s) Oral daily  senna 2 Tablet(s) Oral at bedtime  sodium bicarbonate Mouth Rinse 15 milliLiter(s) Swish and Spit four times a day  sodium chloride 0.9%. 1000 milliLiter(s) (100 mL/Hr) IV Continuous <Continuous>      >LABORATORY:                          7.3    0.34  )-----------( 10       ( 15 Aug 2021 06:37 )             20.9       08-15    137  |  105  |  10  ----------------------------<  96  3.9   |  21<L>  |  1.01    Ca    9.2      15 Aug 2021 06:37  Phos  3.2     08-15  Mg     2.4     08-15    TPro  6.6  /  Alb  3.6  /  TBili  0.4  /  DBili  x   /  AST  14  /  ALT  27  /  AlkPhos  51  08-15          >MICROBIOLOGY:     Urine Culture: Culture - Urine (08.14.21 @ 20:49)    Specimen Source: Clean Catch Clean Catch (Midstream)    Culture Results:   No growth    Blood Culture:Culture - Blood (08.14.21 @ 20:46)    Specimen Source: .Blood Blood-Peripheral    Culture Results:   No growth to date.      >RADIOLOGY:    CXR: < from: CT Chest w/ IV Cont (08.14.21 @ 21:56) >      EXAM:  CT CHEST IC                          EXAM:  CT ABDOMEN AND PELVIS OC IC                            PROCEDURE DATE:  08/14/2021            INTERPRETATION:  CLINICAL INFORMATION: AML with pancytopenia presenting with persistent fever, look forsource.    COMPARISON: CT abdomen pelvis 7/30/2021 and 11/10/2015. CT chest 8/2/2021.    CONTRAST/COMPLICATIONS:  IV Contrast: 90 cc of Omnipaque 350 was administered without complication. 10 cc was discarded.  Oral Contrast: Omnipaque 300.  Complications: None.    PROCEDURE:  CT of the Chest, Abdomen and Pelvis was performed.  Sagittal and coronal reformats were performed.    FINDINGS:  CHEST:  LUNGS AND LARGE AIRWAYS: Patent central airways. Subsegmental atelectasis in the right middle and lower lobes and left lingula 2 mm pulmonary nodule in the left upper lobe (8-54).  PLEURA: No pleural effusion.  VESSELS: Right IJ approach central venous catheter is in place.  HEART: Heart size is normal. No pericardial effusion.  MEDIASTINUM AND PRABHJOT: No lymphadenopathy.  CHEST WALL AND LOWER NECK: Within normal limits.    ABDOMEN AND PELVIS:  LIVER: Steatosis.  BILE DUCTS: Normal caliber.  GALLBLADDER: Within normal limits.  SPLEEN: A few cyst in the spleen, measuring up to 7 mm (3-119), appear unchanged.  PANCREAS: A 7 mm low-attenuation lesion at the head of the pancreas (3-168) appears unchanged from 2015. There is no ductal dilatation.  ADRENALS: Within normal limits.  KIDNEYS/URETERS: No renal stones or hydronephrosis. Symmetric enhancement bilaterally.    BLADDER: Within normal limits.  REPRODUCTIVE ORGANS: Prostate within normal limits.    BOWEL: No bowel obstruction. Oral contrast opacifies the small bowel, not yet reaching the large bowel. . Appendix is normal.  PERITONEUM: Trace amount of ascites.  VESSELS: Within normal limits.  RETROPERITONEUM/LYMPH NODES: Nonspecific infiltration of the fat at the root of the mesentery and para-aortic regions  ABDOMINAL WALL: Within normal limits.  BONES: Sclerotic focus in the right humeral head is stable from 2015, likely representing bone island.    IMPRESSION:  *  Etiology for the patient's fever not identified  *  Hepatic steatosis.    *  Resolved right pleural effusion    --- End of Report ---              JOSE MARIA BLACK MD; Resident Radiology  This document has been electronically signed.  MALIHA KINGSTON MD; Attending Radiologist  This document has been electronically signed. Aug 15 2021  8:50AM    < end of copied text >        >ASSESSMENT/PLAN:   Mr. Tian is  a 37 y/o male with h/o PMH HTN, fatty liver, kidney stones presents with rash, dizziness, fatigue and severe RUQ pain for 3 days. Now with anemia, thrombocytopenia and leukocytosis with 17% blasts, Bone marrow bx c/w AML. Transferred to [address] [place] [address] for management. Patient is pancytopenia secondary to disease and/or chemotherapy. Patient receiving induction chemotherapy  with 7+3 (Daunorubicin and Cytarabine). Patient has pancytopenia secondary to chemotherapy and disease process. Now w/ recurrent neutropenic fever.     1) Neutropenic Fever - Recurrent   - Tylenol / Cooling measures prn for Pyrexia  - BC x2, UA/UC NGTD from 08/14  - CT chest above & reviewed  - c/w Current Abx/ Antifungal/ IVF regimen   - Monitor VS  - ID on board, f/u with team  - F/U Primary  care team in      Mishel Dowd PA-C  Department of Medicine  Spectralink [id number]

## 2021-08-16 NOTE — PROGRESS NOTE ADULT - ATTENDING COMMENTS
35yo M admitted with RUQ pain found to have bloodwork concerning for acute leukemia  -13% myeloblasts on PB. Flt3 negative.   -s/p BMbx 8/4 -AML. f/u cytogenetics, [facility]  -started 7+3 on 8/6 -cytarabine 100/m2 and dauno 90/m2. Today is day 10  -pain control, improved today. Abd sono showing hepatomegaly and hepatic steatosis. Right pleural effusion. CT chest shows small right pleural effusion  -MUGA EF 71%  -discussed with patient and family at the bedside. We also discussed sperm banking -pt declined  -febrile again, Tm 102.3 -changed to cefepime, f/u Cx's and CXR   -Persistent fevers with mucositis. Aggressive oral care - adding sodium bicarb. Cultures negative, repeat CT 8/14 without infectious source.   -cont posaconazole and acyclovir PPx  -TLC placement  -supportive care  -transfuse as needed  -day 14 BMbx on 8/19 35yo M admitted with RUQ pain found to have bloodwork concerning for acute leukemia  -13% myeloblasts on PB. Flt3 negative.   -s/p BMbx 8/4 -AML. f/u cytogenetics, [facility]  -started 7+3 on 8/6 -cytarabine 100/m2 and dauno 90/m2. Today is day 11  -pain control, improved today. Abd sono showing hepatomegaly and hepatic steatosis. Right pleural effusion. CT chest shows small right pleural effusion  -MUGA EF 71%  -discussed with patient and family at the bedside. We also discussed sperm banking -pt declined  -febrile again, Tm 102.3 -changed to cefepime, f/u Cx's and CXR   -Persistent fevers with mucositis. Aggressive oral care - adding sodium bicarb. Cultures negative, repeat CT 8/14 without infectious source.   -cont posaconazole and acyclovir PPx  -TLC placement  -supportive care  -transfuse as needed  -day 14 BMbx on 8/19

## 2021-08-17 LAB
ALBUMIN SERPL ELPH-MCNC: 3.8 G/DL — SIGNIFICANT CHANGE UP (ref 3.3–5)
ALP SERPL-CCNC: 52 U/L — SIGNIFICANT CHANGE UP (ref 40–120)
ALT FLD-CCNC: 25 U/L — SIGNIFICANT CHANGE UP (ref 10–45)
ANION GAP SERPL CALC-SCNC: 12 MMOL/L — SIGNIFICANT CHANGE UP (ref 5–17)
APPEARANCE UR: CLEAR — SIGNIFICANT CHANGE UP
AST SERPL-CCNC: 11 U/L — SIGNIFICANT CHANGE UP (ref 10–40)
BILIRUB SERPL-MCNC: 0.5 MG/DL — SIGNIFICANT CHANGE UP (ref 0.2–1.2)
BILIRUB UR-MCNC: NEGATIVE — SIGNIFICANT CHANGE UP
BUN SERPL-MCNC: 8 MG/DL — SIGNIFICANT CHANGE UP (ref 7–23)
CALCIUM SERPL-MCNC: 9.2 MG/DL — SIGNIFICANT CHANGE UP (ref 8.4–10.5)
CHLORIDE SERPL-SCNC: 105 MMOL/L — SIGNIFICANT CHANGE UP (ref 96–108)
CO2 SERPL-SCNC: 21 MMOL/L — LOW (ref 22–31)
COLOR SPEC: SIGNIFICANT CHANGE UP
CREAT SERPL-MCNC: 0.99 MG/DL — SIGNIFICANT CHANGE UP (ref 0.5–1.3)
CULTURE RESULTS: SIGNIFICANT CHANGE UP
CULTURE RESULTS: SIGNIFICANT CHANGE UP
DIFF PNL FLD: NEGATIVE — SIGNIFICANT CHANGE UP
GLUCOSE SERPL-MCNC: 97 MG/DL — SIGNIFICANT CHANGE UP (ref 70–99)
GLUCOSE UR QL: NEGATIVE — SIGNIFICANT CHANGE UP
HCT VFR BLD CALC: 21.2 % — LOW (ref 39–50)
HGB BLD-MCNC: 7.5 G/DL — LOW (ref 13–17)
KETONES UR-MCNC: NEGATIVE — SIGNIFICANT CHANGE UP
LDH SERPL L TO P-CCNC: 250 U/L — HIGH (ref 50–242)
LEUKOCYTE ESTERASE UR-ACNC: NEGATIVE — SIGNIFICANT CHANGE UP
MAGNESIUM SERPL-MCNC: 2.3 MG/DL — SIGNIFICANT CHANGE UP (ref 1.6–2.6)
MCHC RBC-ENTMCNC: 31.4 PG — SIGNIFICANT CHANGE UP (ref 27–34)
MCHC RBC-ENTMCNC: 35.4 GM/DL — SIGNIFICANT CHANGE UP (ref 32–36)
MCV RBC AUTO: 88.7 FL — SIGNIFICANT CHANGE UP (ref 80–100)
NITRITE UR-MCNC: NEGATIVE — SIGNIFICANT CHANGE UP
NRBC # BLD: 0 /100 WBCS — SIGNIFICANT CHANGE UP (ref 0–0)
PH UR: 6 — SIGNIFICANT CHANGE UP (ref 5–8)
PHOSPHATE SERPL-MCNC: 2.9 MG/DL — SIGNIFICANT CHANGE UP (ref 2.5–4.5)
PLATELET # BLD AUTO: 9 K/UL — CRITICAL LOW (ref 150–400)
POTASSIUM SERPL-MCNC: 3.8 MMOL/L — SIGNIFICANT CHANGE UP (ref 3.5–5.3)
POTASSIUM SERPL-SCNC: 3.8 MMOL/L — SIGNIFICANT CHANGE UP (ref 3.5–5.3)
PROT SERPL-MCNC: 6.9 G/DL — SIGNIFICANT CHANGE UP (ref 6–8.3)
PROT UR-MCNC: NEGATIVE — SIGNIFICANT CHANGE UP
RBC # BLD: 2.39 M/UL — LOW (ref 4.2–5.8)
RBC # FLD: 18 % — HIGH (ref 10.3–14.5)
SARS-COV-2 RNA SPEC QL NAA+PROBE: SIGNIFICANT CHANGE UP
SODIUM SERPL-SCNC: 138 MMOL/L — SIGNIFICANT CHANGE UP (ref 135–145)
SP GR SPEC: 1.01 — SIGNIFICANT CHANGE UP (ref 1.01–1.02)
SPECIMEN SOURCE: SIGNIFICANT CHANGE UP
SPECIMEN SOURCE: SIGNIFICANT CHANGE UP
URATE SERPL-MCNC: 1.7 MG/DL — LOW (ref 3.4–8.8)
UROBILINOGEN FLD QL: NEGATIVE — SIGNIFICANT CHANGE UP
WBC # BLD: 0.29 K/UL — CRITICAL LOW (ref 3.8–10.5)
WBC # FLD AUTO: 0.29 K/UL — CRITICAL LOW (ref 3.8–10.5)

## 2021-08-17 PROCEDURE — 99232 SBSQ HOSP IP/OBS MODERATE 35: CPT | Mod: GC

## 2021-08-17 RX ADMIN — Medication 1 APPLICATION(S): at 12:41

## 2021-08-17 RX ADMIN — Medication 1 APPLICATION(S): at 05:27

## 2021-08-17 RX ADMIN — CHLORHEXIDINE GLUCONATE 1 APPLICATION(S): 213 SOLUTION TOPICAL at 05:21

## 2021-08-17 RX ADMIN — Medication 1 APPLICATION(S): at 21:38

## 2021-08-17 RX ADMIN — Medication 400 MILLIGRAM(S): at 05:21

## 2021-08-17 RX ADMIN — Medication 15 MILLILITER(S): at 21:32

## 2021-08-17 RX ADMIN — POSACONAZOLE 300 MILLIGRAM(S): 100 TABLET, DELAYED RELEASE ORAL at 12:42

## 2021-08-17 RX ADMIN — CEFEPIME 100 MILLIGRAM(S): 1 INJECTION, POWDER, FOR SOLUTION INTRAMUSCULAR; INTRAVENOUS at 13:10

## 2021-08-17 RX ADMIN — Medication 650 MILLIGRAM(S): at 05:45

## 2021-08-17 RX ADMIN — DIPHENHYDRAMINE HYDROCHLORIDE AND LIDOCAINE HYDROCHLORIDE AND ALUMINUM HYDROXIDE AND MAGNESIUM HYDRO 5 MILLILITER(S): KIT at 21:32

## 2021-08-17 RX ADMIN — Medication 5 MILLILITER(S): at 12:42

## 2021-08-17 RX ADMIN — Medication 5 MILLILITER(S): at 19:52

## 2021-08-17 RX ADMIN — CEFEPIME 100 MILLIGRAM(S): 1 INJECTION, POWDER, FOR SOLUTION INTRAMUSCULAR; INTRAVENOUS at 21:32

## 2021-08-17 RX ADMIN — Medication 650 MILLIGRAM(S): at 18:05

## 2021-08-17 RX ADMIN — AMLODIPINE BESYLATE 5 MILLIGRAM(S): 2.5 TABLET ORAL at 05:21

## 2021-08-17 RX ADMIN — Medication 15 MILLILITER(S): at 12:42

## 2021-08-17 RX ADMIN — BENZOCAINE AND MENTHOL 1 LOZENGE: 5; 1 LIQUID ORAL at 13:19

## 2021-08-17 RX ADMIN — Medication 5 MILLILITER(S): at 16:02

## 2021-08-17 RX ADMIN — Medication 10 MILLIGRAM(S): at 10:01

## 2021-08-17 RX ADMIN — Medication 15 MILLILITER(S): at 17:19

## 2021-08-17 RX ADMIN — DIPHENHYDRAMINE HYDROCHLORIDE AND LIDOCAINE HYDROCHLORIDE AND ALUMINUM HYDROXIDE AND MAGNESIUM HYDRO 5 MILLILITER(S): KIT at 05:20

## 2021-08-17 RX ADMIN — Medication 1 APPLICATION(S): at 17:23

## 2021-08-17 RX ADMIN — CEFEPIME 100 MILLIGRAM(S): 1 INJECTION, POWDER, FOR SOLUTION INTRAMUSCULAR; INTRAVENOUS at 05:20

## 2021-08-17 RX ADMIN — Medication 400 MILLIGRAM(S): at 21:32

## 2021-08-17 RX ADMIN — Medication 15 MILLILITER(S): at 05:21

## 2021-08-17 RX ADMIN — DIPHENHYDRAMINE HYDROCHLORIDE AND LIDOCAINE HYDROCHLORIDE AND ALUMINUM HYDROXIDE AND MAGNESIUM HYDRO 5 MILLILITER(S): KIT at 13:10

## 2021-08-17 RX ADMIN — Medication 400 MILLIGRAM(S): at 13:10

## 2021-08-17 RX ADMIN — Medication 650 MILLIGRAM(S): at 04:34

## 2021-08-17 RX ADMIN — BENZOCAINE AND MENTHOL 1 LOZENGE: 5; 1 LIQUID ORAL at 21:33

## 2021-08-17 RX ADMIN — Medication 650 MILLIGRAM(S): at 17:18

## 2021-08-17 NOTE — PROGRESS NOTE ADULT - ATTENDING COMMENTS
35yo M admitted with RUQ pain found to have bloodwork concerning for acute leukemia  -13% myeloblasts on PB. Flt3 negative.   -s/p BMbx 8/4 -AML. f/u cytogenetics, [facility]  -started 7+3 on 8/6 -cytarabine 100/m2 and dauno 90/m2. Today is day 11  -pain control, improved today. Abd sono showing hepatomegaly and hepatic steatosis. Right pleural effusion. CT chest shows small right pleural effusion  -MUGA EF 71%  -discussed with patient and family at the bedside. We also discussed sperm banking -pt declined  -febrile again, Tm 102.3 -changed to cefepime, f/u Cx's and CXR   -Persistent fevers with mucositis. Aggressive oral care - adding sodium bicarb. Cultures negative, repeat CT 8/14 without infectious source.   -cont posaconazole and acyclovir PPx  -TLC placement  -supportive care  -transfuse as needed  -day 14 BMbx on 8/19 35yo M admitted with RUQ pain found to have bloodwork concerning for acute leukemia  -13% myeloblasts on PB. Flt3 negative.   -s/p BMbx 8/4 -AML. f/u cytogenetics, [facility]  -started 7+3 on 8/6 -cytarabine 100/m2 and dauno 90/m2. Today is day 12  -pain control, improved today. Abd sono showing hepatomegaly and hepatic steatosis. Right pleural effusion. CT chest shows small right pleural effusion  -MUGA EF 71%  -discussed with patient and family at the bedside. We also discussed sperm banking -pt declined  -febrile again, Tm 102.3 -changed to cefepime, f/u Cx's and CXR   -Persistent fevers with mucositis. Aggressive oral care - adding sodium bicarb. Cultures negative, repeat CT 8/14 without infectious source.   -cont posaconazole and acyclovir PPx  -TLC placement  -supportive care  -transfuse as needed  -day 14 BMbx on 8/19

## 2021-08-17 NOTE — PROGRESS NOTE ADULT - SUBJECTIVE AND OBJECTIVE BOX
Diagnosis:  Acute Myeloid Leukemia, FLT3-    Protocol/Chemo Regimen: 7+3 (Daunorubicin and Cytarabine)    Day: 12    Pt endorsed:     Review of Systems:    Pain scale: denies     Diet: DASH/TLC      Allergies:  No Known Allergies    MEDICATIONS  (STANDING):  acyclovir   Oral Tab/Cap 400 milliGRAM(s) Oral every 8 hours  amLODIPine   Tablet 5 milliGRAM(s) Oral daily  Biotene Dry Mouth Oral Rinse 5 milliLiter(s) Swish and Spit five times a day  cefepime   IVPB      cefepime   IVPB 2000 milliGRAM(s) IV Intermittent every 8 hours  chlorhexidine 2% Cloths 1 Application(s) Topical <User Schedule>  FIRST- Mouthwash  BLM 5 milliLiter(s) Swish and Spit three times a day  petrolatum white Ointment 1 Application(s) Topical four times a day  polyethylene glycol 3350 17 Gram(s) Oral daily  posaconazole DR Tablet 300 milliGRAM(s) Oral daily  senna 2 Tablet(s) Oral at bedtime  sodium bicarbonate Mouth Rinse 15 milliLiter(s) Swish and Spit four times a day  sodium chloride 0.9%. 1000 milliLiter(s) (100 mL/Hr) IV Continuous <Continuous>    MEDICATIONS  (PRN):  acetaminophen   Tablet .. 650 milliGRAM(s) Oral every 6 hours PRN Temp greater or equal to 38C (100.4F), Mild Pain (1 - 3), Moderate Pain (4 - 6)  benzocaine 15 mG/menthol 3.6 mG (Sugar-Free) Lozenge 1 Lozenge Oral every 4 hours PRN Sore Throat  metoclopramide Injectable 10 milliGRAM(s) IV Push every 6 hours PRN nausea/vomiting  sodium chloride 0.65% Nasal 1 Spray(s) Both Nostrils three times a day PRN Nasal Congestion  sodium chloride 0.9% lock flush 10 milliLiter(s) IV Push every 1 hour PRN Pre/post blood products, medications, blood draw, and to maintain line patency      Vital Signs Last 24 Hrs  T(C): 37.4 (17 Aug 2021 07:55), Max: 38.4 (16 Aug 2021 16:50)  T(F): 99.3 (17 Aug 2021 07:55), Max: 101.1 (16 Aug 2021 16:50)  HR: 88 (17 Aug 2021 07:55) (80 - 107)  BP: 118/71 (17 Aug 2021 07:55) (110/58 - 131/75)  BP(mean): --  RR: 18 (17 Aug 2021 07:55) (18 - 19)  SpO2: 98% (17 Aug 2021 07:55) (94% - 99%)      PHYSICAL EXAM  General: Sitting up in bed in NAD  HEENT:  ulcer left lateral tongue improved, mild erythema post oropharynx   CV: (+) S1/S2, reg  Lungs: clear to auscultation, no wheezes or rales  Abdomen: soft, non-tender, non-distended (+) BS  Ext: no edema BLE's  Skin: no rashes   Neuro: alert and oriented X 3  Central Line: R TLCL c/d/i       LABS:                          7.5    0.29  )-----------( 9        ( 17 Aug 2021 06:49 )             21.2     17 Aug 2021 06:46    138    |  105    |  8      ----------------------------<  97     3.8     |  21     |  0.99     Ca    9.2        17 Aug 2021 06:46  Phos  2.9       17 Aug 2021 06:46  Mg     2.3       17 Aug 2021 06:46    TPro  6.9    /  Alb  3.8    /  TBili  0.5    /  DBili  x      /  AST  11     /  ALT  25     /  AlkPhos  52     17 Aug 2021 06:46    PT/INR - ( 16 Aug 2021 06:48 )   PT: 13.6 sec;   INR: 1.14 ratio    PTT - ( 16 Aug 2021 06:48 )  PTT:26.6 sec      LIVER FUNCTIONS - ( 17 Aug 2021 06:46 )  Alb: 3.8 g/dL / Pro: 6.9 g/dL / ALK PHOS: 52 U/L / ALT: 25 U/L / AST: 11 U/L / GGT: x                     LIVER FUNCTIONS - ( 16 Aug 2021 06:48 )  Alb: 3.6 g/dL / Pro: 6.6 g/dL / ALK PHOS: 49 U/L / ALT: 22 U/L / AST: 10 U/L / GGT: x           Urinalysis Basic - ( 14 Aug 2021 18:26 )    Color: Light Yellow / Appearance: Clear / S.015 / pH: x  Gluc: x / Ketone: Negative  / Bili: Negative / Urobili: Negative   Blood: x / Protein: Negative / Nitrite: Negative   Leuk Esterase: Negative / RBC: 2 /hpf / WBC 2 /HPF   Sq Epi: x / Non Sq Epi: 0 /hpf / Bacteria: Negative               RECENT CULTURES:    COVID-19 PCR . (21 @ 17:06) COVID-19 PCR: NotDetec    Culture - Urine (21 @ 20:49)   Specimen Source: Clean Catch Clean Catch (Midstream)   Culture Results: No growth   @ 22:09  Clean Catch Clean Catch (Midstream)  <10,000 CFU/mL Normal Urogenital Heather     @ 22:03  .Blood Blood-Catheter  No growth to date.    Culture - Blood (21 @ 22:03)    Specimen Source: .Blood Blood-Peripheral    Culture Results:   No growth to date.      RADIOLOGY & ADDITIONAL STUDIES:    < from: CT Abdomen and Pelvis w/ Oral Cont and w/ IV Cont (21 @ 21:56) >  IMPRESSION:  *  Etiology for the patient's fever not identified  *  Hepatic steatosis.  *  Resolved right pleural effusion      < from: Xray Chest 1 View- PORTABLE-Urgent (Xray Chest 1 View- PORTABLE-Urgent .) (21 @ 17:54) >  IMPRESSION:  No acute pulmonary disease       Diagnosis:  Acute Myeloid Leukemia, FLT3-    Protocol/Chemo Regimen: 7+3 (Daunorubicin and Cytarabine)    Day: 12    Pt endorsed: +Febrile, taking po's    Review of Systems: Denies n/v, diarrhe    Pain scale: denies     Diet: DASH/TLC      Allergies:  No Known Allergies    MEDICATIONS  (STANDING):  acyclovir   Oral Tab/Cap 400 milliGRAM(s) Oral every 8 hours  amLODIPine   Tablet 5 milliGRAM(s) Oral daily  Biotene Dry Mouth Oral Rinse 5 milliLiter(s) Swish and Spit five times a day  cefepime   IVPB      cefepime   IVPB 2000 milliGRAM(s) IV Intermittent every 8 hours  chlorhexidine 2% Cloths 1 Application(s) Topical <User Schedule>  FIRST- Mouthwash  BLM 5 milliLiter(s) Swish and Spit three times a day  petrolatum white Ointment 1 Application(s) Topical four times a day  polyethylene glycol 3350 17 Gram(s) Oral daily  posaconazole DR Tablet 300 milliGRAM(s) Oral daily  senna 2 Tablet(s) Oral at bedtime  sodium bicarbonate Mouth Rinse 15 milliLiter(s) Swish and Spit four times a day  sodium chloride 0.9%. 1000 milliLiter(s) (100 mL/Hr) IV Continuous <Continuous>    MEDICATIONS  (PRN):  acetaminophen   Tablet .. 650 milliGRAM(s) Oral every 6 hours PRN Temp greater or equal to 38C (100.4F), Mild Pain (1 - 3), Moderate Pain (4 - 6)  benzocaine 15 mG/menthol 3.6 mG (Sugar-Free) Lozenge 1 Lozenge Oral every 4 hours PRN Sore Throat  metoclopramide Injectable 10 milliGRAM(s) IV Push every 6 hours PRN nausea/vomiting  sodium chloride 0.65% Nasal 1 Spray(s) Both Nostrils three times a day PRN Nasal Congestion  sodium chloride 0.9% lock flush 10 milliLiter(s) IV Push every 1 hour PRN Pre/post blood products, medications, blood draw, and to maintain line patency      Vital Signs Last 24 Hrs  T(C): 37.4 (17 Aug 2021 07:55), Max: 38.4 (16 Aug 2021 16:50)  T(F): 99.3 (17 Aug 2021 07:55), Max: 101.1 (16 Aug 2021 16:50)  HR: 88 (17 Aug 2021 07:55) (80 - 107)  BP: 118/71 (17 Aug 2021 07:55) (110/58 - 131/75)  BP(mean): --  RR: 18 (17 Aug 2021 07:55) (18 - 19)  SpO2: 98% (17 Aug 2021 07:55) (94% - 99%)      PHYSICAL EXAM  General: Sitting up in bed in NAD  HEENT:  ulcer left lateral tongue improved, mild erythema post oropharynx   CV: (+) S1/S2, reg  Lungs: clear to auscultation, no wheezes or rales  Abdomen: soft, non-tender, non-distended (+) BS  Ext: no edema BLE's  Skin: no rashes or ecchymosis  Neuro: alert and oriented X 3  Central Line: R TLCL c/d/i       LABS:                          7.5    0.29  )-----------( 9        ( 17 Aug 2021 06:49 )             21.2     17 Aug 2021 06:46    138    |  105    |  8      ----------------------------<  97     3.8     |  21     |  0.99     Ca    9.2        17 Aug 2021 06:46  Phos  2.9       17 Aug 2021 06:46  Mg     2.3       17 Aug 2021 06:46    TPro  6.9    /  Alb  3.8    /  TBili  0.5    /  DBili  x      /  AST  11     /  ALT  25     /  AlkPhos  52     17 Aug 2021 06:46    PT/INR - ( 16 Aug 2021 06:48 )   PT: 13.6 sec;   INR: 1.14 ratio    PTT - ( 16 Aug 2021 06:48 )  PTT:26.6 sec      LIVER FUNCTIONS - ( 17 Aug 2021 06:46 )  Alb: 3.8 g/dL / Pro: 6.9 g/dL / ALK PHOS: 52 U/L / ALT: 25 U/L / AST: 11 U/L / GGT: x                     LIVER FUNCTIONS - ( 16 Aug 2021 06:48 )  Alb: 3.6 g/dL / Pro: 6.6 g/dL / ALK PHOS: 49 U/L / ALT: 22 U/L / AST: 10 U/L / GGT: x           Urinalysis Basic - ( 14 Aug 2021 18:26 )    Color: Light Yellow / Appearance: Clear / S.015 / pH: x  Gluc: x / Ketone: Negative  / Bili: Negative / Urobili: Negative   Blood: x / Protein: Negative / Nitrite: Negative   Leuk Esterase: Negative / RBC: 2 /hpf / WBC 2 /HPF   Sq Epi: x / Non Sq Epi: 0 /hpf / Bacteria: Negative               RECENT CULTURES:    COVID-19 PCR . (21 @ 17:06) COVID-19 PCR: NotDetec    Culture - Urine (21 @ 20:49)   Specimen Source: Clean Catch Clean Catch (Midstream)   Culture Results: No growth   @ 22:09  Clean Catch Clean Catch (Midstream)  <10,000 CFU/mL Normal Urogenital Heather     @ 22:03  .Blood Blood-Catheter  No growth to date.    Culture - Blood (21 @ 22:03)    Specimen Source: .Blood Blood-Peripheral    Culture Results:   No growth to date.      RADIOLOGY & ADDITIONAL STUDIES:    < from: CT Abdomen and Pelvis w/ Oral Cont and w/ IV Cont (21 @ 21:56) >  IMPRESSION:  *  Etiology for the patient's fever not identified  *  Hepatic steatosis.  *  Resolved right pleural effusion      < from: Xray Chest 1 View- PORTABLE-Urgent (Xray Chest 1 View- PORTABLE-Urgent .) (21 @ 17:54) >  IMPRESSION:  No acute pulmonary disease

## 2021-08-17 NOTE — PROVIDER CONTACT NOTE (OTHER) - RECOMMENDATIONS
No interventions at this time
PRN tylenol  BC x 2  UC  chest xray
650mg Tylenol X1 dose given early as per PA
Blood cx x2  UA/UC
650 mg Tylenol PO, no blood cultures at this time
Tylenol
NA
Tylenol 650mg PO  Pt refused ice packs.

## 2021-08-17 NOTE — CHART NOTE - NSCHARTNOTEFT_GEN_A_CORE
Nutrition Follow Up Note  Patient seen for: length of stay follow up. Chart reviewed and events noted.     Per Chart: Pt is a 35 y/o male with Hx of PMH HTN, fatty liver, kidney stones presents with rash, dizziness, fatigue and severe RUQ pain for 3 days. Protocol/Chemo Regimen: 7+3 (Daunorubicin and Cytarabine) Day: 12    Source: [x] Patient       [x] Medical Record        [] RN        [] Family at bedside       [] Other:    -If unable to interview patient: [] Trach/Vent/BiPAP  [] Disoriented/confused/inappropriate to interview    Diet Order:   Diet, Regular:   Supplement Feeding Modality:  Oral  Ensure Clear Cans or Servings Per Day:  2       Frequency:  Daily (08-15-21)    - Is current order appropriate/adequate? [x] Yes  []  No: However, pt would benefit from nutrient dense supplement i.e. Ensure Enlive    - PO intake :   [x] >75%  Adequate    [] 50-75%  Fair       [] <50%  Poor    - Nutrition-related concerns:      - Pt endorses continuos good PO intake and appetite, receiving some food from home. Currently ordered for Ensure Clear, however hasn't been accepting. RD reviewed need for nutrition supplement to promote adequate protein-energy intake.       - K, Phos, and Mg WNL       - Continues on antibiotics.       - Pt drinks Gatorade x1 daily brought in from home.         GI: No known recent N/V or constipation. Loose BMs, reports baseline - continues on antibiotics. Last BM .   Bowel Regimen? [x] Yes   [] No    Weights:   Daily Weight in k.1 (-), 98.8 (08-10)  Dosing wt 99.8 kG  Wt fluctuations noted and likely 2/2 fluid shifts as pt on chemotherapy. RD will continue to trend as new wts available/able.   Nutrition focused physical exam deferred by pt, however visually pt appears well nourished with no overt signs of fat or muscle wasting.     Nutritionally Pertinent MEDICATIONS  (STANDING):  acyclovir   Oral Tab/Cap  amLODIPine   Tablet  cefepime   IVPB  cefepime   IVPB  polyethylene glycol 3350  posaconazole DR Tablet  senna  sodium bicarbonate Mouth Rinse  sodium chloride 0.9%.    Pertinent Labs:  @ 06:46: Na 138, BUN 8, Cr 0.99, BG 97, K+ 3.8, Phos 2.9, Mg 2.3, Alk Phos 52, ALT/SGPT 25, AST/SGOT 11    A1C with Estimated Average Glucose Result: 6.1 % (21 @ 09:26)    Skin per nursing documentation: No pressure injuries noted.  Edema per nursing documentation: None noted.     Estimated Needs:   [x] no change since previous assessment  IBW used for estimated nutrient needs (80.7 kG) 178 lbs  Estimated Energy Needs: (30-35 kcals/kG) 2673-5283 kcals  Estimated Protein Needs: (1.3-1.5 gm/kG) 105-121 gm  Defer fluid needs to team at this time    Previous Nutrition Diagnosis: Predicted inadequate energy intake  Nutrition Diagnosis is: [x] ongoing  [] resolved [] not applicable     Nutrition Care Plan:  [x] In Progress  [] Achieved  [] Not applicable    New Nutrition Diagnosis: [x] Not applicable    Nutrition Interventions:     Education Provided   [x] Yes:  [] No: RD reviewed nutrition therapy for diarrhea with emphasis on foods that bind i.e. bananas and rice. RD also educated pt on the importance of adequate protein intake as pt with low protein intake at breakfast this morning. Reviewed sources of protein in the diet. Encouraged use of nutrition supplement to maximize protein-energy intake. Pt made aware RD to remain available.     Recommendations:      1) Continue Regular diet. RD remains available for diet changes as needed/able.   2) Change supplement to Ensure Enlive x2 daily to optimize protein-energy intake.   3) Reinforce nutrition education as able.     Monitoring and Evaluation:   Continue to monitor nutritional intake, tolerance to diet prescription, weights, labs, skin integrity    RD remains available upon request and will follow up per protocol  Allie Polo, MS, RD, CDN Pager #[phone] Nutrition Follow Up Note  Patient seen for: consult for nutrition education. Chart reviewed and events noted.     Per Chart: Pt is a 37 y/o male with Hx of PMH HTN, fatty liver, kidney stones presents with rash, dizziness, fatigue and severe RUQ pain for 3 days. Protocol/Chemo Regimen: 7+3 (Daunorubicin and Cytarabine) Day: 12    Source: [x] Patient       [x] Medical Record        [] RN        [] Family at bedside       [] Other:    -If unable to interview patient: [] Trach/Vent/BiPAP  [] Disoriented/confused/inappropriate to interview    Diet Order:   Diet, Regular:   Supplement Feeding Modality:  Oral  Ensure Clear Cans or Servings Per Day:  2       Frequency:  Daily (08-15-21)    - Is current order appropriate/adequate? [x] Yes  []  No: However, pt would benefit from nutrient dense supplement i.e. Ensure Enlive    - PO intake :   [x] >75%  Adequate    [] 50-75%  Fair       [] <50%  Poor    - Nutrition-related concerns:      - Pt endorses continuos good PO intake and appetite, receiving some food from home. Currently ordered for Ensure Clear, however hasn't been accepting. RD reviewed need for nutrition supplement to promote adequate protein-energy intake.       - K, Phos, and Mg WNL       - Continues on antibiotics.       - Pt drinks Gatorade x1 daily brought in from home.         GI: No known recent N/V or constipation. Loose BMs, reports baseline - continues on antibiotics. Last BM .   Bowel Regimen? [x] Yes   [] No    Weights:   Daily Weight in k.1 (-), 98.8 (08-10)  Dosing wt 99.8 kG  Wt fluctuations noted and likely 2/2 fluid shifts as pt on chemotherapy. RD will continue to trend as new wts available/able.   Nutrition focused physical exam deferred by pt, however visually pt appears well nourished with no overt signs of fat or muscle wasting.     Nutritionally Pertinent MEDICATIONS  (STANDING):  acyclovir   Oral Tab/Cap  amLODIPine   Tablet  cefepime   IVPB  cefepime   IVPB  polyethylene glycol 3350  posaconazole DR Tablet  senna  sodium bicarbonate Mouth Rinse  sodium chloride 0.9%.    Pertinent Labs:  @ 06:46: Na 138, BUN 8, Cr 0.99, BG 97, K+ 3.8, Phos 2.9, Mg 2.3, Alk Phos 52, ALT/SGPT 25, AST/SGOT 11    A1C with Estimated Average Glucose Result: 6.1 % (21 @ 09:26)    Skin per nursing documentation: No pressure injuries noted.  Edema per nursing documentation: None noted.     Estimated Needs:   [x] no change since previous assessment  IBW used for estimated nutrient needs (80.7 kG) 178 lbs  Estimated Energy Needs: (30-35 kcals/kG) 8401-8887 kcals  Estimated Protein Needs: (1.3-1.5 gm/kG) 105-121 gm  Defer fluid needs to team at this time    Previous Nutrition Diagnosis: Predicted inadequate energy intake  Nutrition Diagnosis is: [x] ongoing  [] resolved [] not applicable     Nutrition Care Plan:  [x] In Progress  [] Achieved  [] Not applicable    New Nutrition Diagnosis: [x] Not applicable    Nutrition Interventions:     Education Provided   [x] Yes:  [] No: RD reviewed nutrition therapy for diarrhea with emphasis on foods that bind i.e. bananas and rice. RD also educated pt on the importance of adequate protein intake as pt with low protein intake at breakfast this morning. Reviewed sources of protein in the diet. Encouraged use of nutrition supplement to maximize protein-energy intake. Pt made aware RD to remain available.     Recommendations:      1) Continue Regular diet. RD remains available for diet changes as needed/able.   2) Change supplement to Ensure Enlive x2 daily to optimize protein-energy intake.   3) Reinforce nutrition education as able.     Monitoring and Evaluation:   Continue to monitor nutritional intake, tolerance to diet prescription, weights, labs, skin integrity    RD remains available upon request and will follow up per protocol  Allie Polo, MS, RD, CDN Pager #[phone]

## 2021-08-17 NOTE — PROGRESS NOTE ADULT - ASSESSMENT
Mr. Tian is  a 35 y/o male with h/o PMH HTN, fatty liver, kidney stones presents with rash, dizziness, fatigue and severe RUQ pain for 3 days. Now with anemia, thrombocytopenia and leukocytosis with 17% blasts, Bone marrow bx c/w AML. Transferred to [address] [place] [address] for management. Patient receiving induction chemotherapy with 7+3 (Daunorubicin and Cytarabine). Patient has pancytopenia secondary to chemotherapy and disease process.

## 2021-08-17 NOTE — CHART NOTE - NSCHARTNOTEFT_GEN_A_CORE
MEDICINE PA    Notified by RN patient with temperature of 101.1F.   Seen and examined patient at bedside.   Patient is alert, NAD.   Denies HA, CP, SOB, cough, N/V, or abd pain.    VITAL SIGNS:  T(C): 38.4 (08-17-21 @ 04:25), Max: 38.4 (08-16-21 @ 16:50)  HR: 99 (08-17-21 @ 04:25) (80 - 107)  BP: 113/64 (08-17-21 @ 04:25) (110/58 - 131/75)  RR: 18 (08-17-21 @ 04:25) (18 - 19)  SpO2: 95% (08-17-21 @ 04:25) (94% - 99%)  Wt(kg): --      LABORATORY:                          6.6    0.25  )-----------( 16       ( 16 Aug 2021 06:48 )             18.8       08-16    141  |  106  |  9   ----------------------------<  95  3.8   |  23  |  1.02    Ca    8.9      16 Aug 2021 06:48  Phos  3.0     08-16  Mg     2.4     08-16    TPro  6.6  /  Alb  3.6  /  TBili  0.4  /  DBili  x   /  AST  10  /  ALT  22  /  AlkPhos  49  08-16          MICROBIOLOGY: Blood Cultures x2, Urine Culture ordered.        RADIOLOGY:  < from: CT Chest w/ IV Cont (08.14.21 @ 21:56) >  FINDINGS:  CHEST:  LUNGS AND LARGE AIRWAYS: Patent central airways. Subsegmental atelectasis in the right middle and lower lobes and left lingula 2 mm pulmonary nodule in the left upper lobe (8-54).      PHYSICAL EXAM:  Constitutional: AOx3. NAD.  Respiratory: clear lungs bilaterally. No wheezing, rhonchi, or crackles.  Cardiovascular: S1 S2. No murmurs.  Gastrointestinal: BS X4 active. soft. nontender.  Extremities/Vascular: +2 pulses bilaterally. No BLE edema.      ASSESSMENT/PLAN:   HPI:  36M PMH HTN, fatty liver, kidney stones presents with rash, dizziness, fatigue and severe RUQ pain for 3 days. He has felt fatigued for the past few months. Three days ago, he developed sharp RUQ pain that wrapped around to his back. He rated the pain as a 5/10 with no change over the past three days. The pain doesn't change with PO intake or bowel movements. He has some associated dizziness with the pain. He also developed an erythematous rash that is nonpruritic on his upper chest, neck, and upper back over the past couple of days. He has had a twenty pound weight gain over the past year due to a change to sedentary lifestyle 2/2 pandemic. He denies night sweats and fevers. He has had constipation for the past week where he must strain to move his bowels. He has no urinary symptoms, fevers, chills, or changes in appetite. No diarrhea, N/V. No prior cardiac hx, pleuritic chest pain, or SOB. He had kidney stones 5 years ago but states this pain is worse. Denies allergies or new medications. He denies recent travel and works as a . He has had no recent sick contacts. He received all childhood vaccines.    In the ED: temp 99.4, tachycardic to 150s, started on fluids and allopurinol per heme/onc recs. He received 1 mg hydromorphone, 4 mg morphine, 600 mg ibuprofen , and 975 mg acetaminophen. CT A/P revealed fatty liver and small R pleural effusion. (30 Jul 2021 09:22)        1.) Neutropenic Fever 101.1F  - Tylenol 650mg PO ordered and administered.  - BC x2, UA/UC ordered.  - CT chest completed 8/14/2021 and up to date.  - C/w Cefepime 2gm q8h  - Will follow up with primary team in AM    MIGUEL ClarkC   Department of Medicine   #[id number] MEDICINE [facility]    Notified by RN patient with temperature of 101.1F.   Seen and examined patient at bedside, nontoxic appearing.   Patient is alert, NAD.   Denies HA, CP, SOB, cough, N/V, or abd pain.    VITAL SIGNS:  T(C): 38.4 (08-17-21 @ 04:25), Max: 38.4 (08-16-21 @ 16:50)  HR: 99 (08-17-21 @ 04:25) (80 - 107)  BP: 113/64 (08-17-21 @ 04:25) (110/58 - 131/75)  RR: 18 (08-17-21 @ 04:25) (18 - 19)  SpO2: 95% (08-17-21 @ 04:25) (94% - 99%)  Wt(kg): --      LABORATORY:                          6.6    0.25  )-----------( 16       ( 16 Aug 2021 06:48 )             18.8       08-16    141  |  106  |  9   ----------------------------<  95  3.8   |  23  |  1.02    Ca    8.9      16 Aug 2021 06:48  Phos  3.0     08-16  Mg     2.4     08-16    TPro  6.6  /  Alb  3.6  /  TBili  0.4  /  DBili  x   /  AST  10  /  ALT  22  /  AlkPhos  49  08-16          MICROBIOLOGY: Blood Cultures x2, Urine Culture ordered.        RADIOLOGY:  < from: CT Chest w/ IV Cont (08.14.21 @ 21:56) >  FINDINGS:  CHEST:  LUNGS AND LARGE AIRWAYS: Patent central airways. Subsegmental atelectasis in the right middle and lower lobes and left lingula 2 mm pulmonary nodule in the left upper lobe (8-54).      PHYSICAL EXAM:  Constitutional: AOx3. NAD.  Respiratory: clear lungs bilaterally. No wheezing, rhonchi, or crackles.  Cardiovascular: S1 S2. No murmurs.  Gastrointestinal: BS X4 active. soft. nontender.  Extremities/Vascular: +2 pulses bilaterally. No BLE edema.      ASSESSMENT/PLAN:   HPI:  36M PMH HTN, fatty liver, kidney stones presents with rash, dizziness, fatigue and severe RUQ pain for 3 days. He has felt fatigued for the past few months. Three days ago, he developed sharp RUQ pain that wrapped around to his back. He rated the pain as a 5/10 with no change over the past three days. The pain doesn't change with PO intake or bowel movements. He has some associated dizziness with the pain. He also developed an erythematous rash that is nonpruritic on his upper chest, neck, and upper back over the past couple of days. He has had a twenty pound weight gain over the past year due to a change to sedentary lifestyle 2/2 pandemic. He denies night sweats and fevers. He has had constipation for the past week where he must strain to move his bowels. He has no urinary symptoms, fevers, chills, or changes in appetite. No diarrhea, N/V. No prior cardiac hx, pleuritic chest pain, or SOB. He had kidney stones 5 years ago but states this pain is worse. Denies allergies or new medications. He denies recent travel and works as a . He has had no recent sick contacts. He received all childhood vaccines.    In the ED: temp 99.4, tachycardic to 150s, started on fluids and allopurinol per heme/onc recs. He received 1 mg hydromorphone, 4 mg morphine, 600 mg ibuprofen , and 975 mg acetaminophen. CT A/P revealed fatty liver and small R pleural effusion. (30 Jul 2021 09:22)        1.) Neutropenic Fever 101.1F  - Tylenol 650mg PO ordered and administered.  - BC x2, UA/UC ordered.  - CT chest completed 8/14/2021 and up to date.  - C/w Cefepime 2gm q8h  - Discussed above plan with patient and agrees to plan.  - Will follow up with primary team in AM    MIGUEL ClarkC   Department of Medicine   #[id number] MEDICINE [facility]    Notified by RN patient with temperature of 101.1F.   Seen and examined patient at bedside, nontoxic appearing.   Patient is alert, NAD. He endorses mild diarrhea.  Denies HA, CP, SOB, cough, N/V, or abd pain.    VITAL SIGNS:  T(C): 38.4 (08-17-21 @ 04:25), Max: 38.4 (08-16-21 @ 16:50)  HR: 99 (08-17-21 @ 04:25) (80 - 107)  BP: 113/64 (08-17-21 @ 04:25) (110/58 - 131/75)  RR: 18 (08-17-21 @ 04:25) (18 - 19)  SpO2: 95% (08-17-21 @ 04:25) (94% - 99%)  Wt(kg): --      LABORATORY:                          6.6    0.25  )-----------( 16       ( 16 Aug 2021 06:48 )             18.8       08-16    141  |  106  |  9   ----------------------------<  95  3.8   |  23  |  1.02    Ca    8.9      16 Aug 2021 06:48  Phos  3.0     08-16  Mg     2.4     08-16    TPro  6.6  /  Alb  3.6  /  TBili  0.4  /  DBili  x   /  AST  10  /  ALT  22  /  AlkPhos  49  08-16          MICROBIOLOGY: Blood Cultures x2, Urine Culture ordered.        RADIOLOGY:  < from: CT Chest w/ IV Cont (08.14.21 @ 21:56) >  FINDINGS:  CHEST:  LUNGS AND LARGE AIRWAYS: Patent central airways. Subsegmental atelectasis in the right middle and lower lobes and left lingula 2 mm pulmonary nodule in the left upper lobe (8-54).      PHYSICAL EXAM:  Constitutional: AOx3. NAD.  Respiratory: clear lungs bilaterally. No wheezing, rhonchi, or crackles.  Cardiovascular: S1 S2. No murmurs.  Gastrointestinal: BS X4 active. soft. nontender.  Extremities/Vascular: +2 pulses bilaterally. No BLE edema.      ASSESSMENT/PLAN:   HPI:  36M PMH HTN, fatty liver, kidney stones presents with rash, dizziness, fatigue and severe RUQ pain for 3 days. He has felt fatigued for the past few months. Three days ago, he developed sharp RUQ pain that wrapped around to his back. He rated the pain as a 5/10 with no change over the past three days. The pain doesn't change with PO intake or bowel movements. He has some associated dizziness with the pain. He also developed an erythematous rash that is nonpruritic on his upper chest, neck, and upper back over the past couple of days. He has had a twenty pound weight gain over the past year due to a change to sedentary lifestyle 2/2 pandemic. He denies night sweats and fevers. He has had constipation for the past week where he must strain to move his bowels. He has no urinary symptoms, fevers, chills, or changes in appetite. No diarrhea, N/V. No prior cardiac hx, pleuritic chest pain, or SOB. He had kidney stones 5 years ago but states this pain is worse. Denies allergies or new medications. He denies recent travel and works as a . He has had no recent sick contacts. He received all childhood vaccines.    In the ED: temp 99.4, tachycardic to 150s, started on fluids and allopurinol per heme/onc recs. He received 1 mg hydromorphone, 4 mg morphine, 600 mg ibuprofen , and 975 mg acetaminophen. CT A/P revealed fatty liver and small R pleural effusion. (30 Jul 2021 09:22)        1.) Neutropenic Fever 101.1F  - Tylenol 650mg PO ordered and administered.  - BC x2, UA/UC ordered.  - CT chest completed 8/14/2021 and up to date.  - C/w Cefepime 2gm q8h  - Discussed above plan with patient and agrees to plan.  - Will follow up with primary team in AM    MIGUEL ClarkC   Department of Medicine   #[id number]

## 2021-08-17 NOTE — PROGRESS NOTE ADULT - PROBLEM SELECTOR PLAN 1
FLT3 negative, BM bx c/w AML   consented for [place] study    HIV (-), Hep (-)  Echo 70%, MUGA EF 71%   Discussed sperm banking. Declined   TLC placed on 8/6 8/6 Started induction with  7+3 (Cytarabine + Daunorubicin)  Follow daily lytes, CBC. replace, Replete prn  Continue with Allopurinol 300mg daily, IV hydration, mouth care, pain control, antiemetics  Monitor for TLS daily  Day 14 Bm bx due on 8/19  Transfuse Plts if fever recurs (currently 16k)

## 2021-08-17 NOTE — PROGRESS NOTE ADULT - PROBLEM SELECTOR PLAN 2
Patient is febrile, +neutropenic   On Cefepime, All Cultures NGTD  s/p Zosyn for possible PNA, d/c'd after 8/6 PM dose  8/9 started on Levaquin and posaconazole ppx for neutropenia  8/12 - Started Acyclovir for oral ulcers  8/12 CXR No acute pulmonary disease  8/14 pancx and repeat CT CAP to look for fever source revealed Etiology for the patient's fever not identified; Hepatic steatosis.  Added sodium bicarb  mouth rinse, Cepacol PRN sore throat   8/14 sent CMV PCR  8/15 monitor soft stools  - Check HSV serology

## 2021-08-18 LAB
ALBUMIN SERPL ELPH-MCNC: 3.6 G/DL — SIGNIFICANT CHANGE UP (ref 3.3–5)
ALP SERPL-CCNC: 53 U/L — SIGNIFICANT CHANGE UP (ref 40–120)
ALT FLD-CCNC: 28 U/L — SIGNIFICANT CHANGE UP (ref 10–45)
ANION GAP SERPL CALC-SCNC: 10 MMOL/L — SIGNIFICANT CHANGE UP (ref 5–17)
AST SERPL-CCNC: 15 U/L — SIGNIFICANT CHANGE UP (ref 10–40)
BILIRUB SERPL-MCNC: 0.5 MG/DL — SIGNIFICANT CHANGE UP (ref 0.2–1.2)
BLD GP AB SCN SERPL QL: NEGATIVE — SIGNIFICANT CHANGE UP
BUN SERPL-MCNC: 8 MG/DL — SIGNIFICANT CHANGE UP (ref 7–23)
CALCIUM SERPL-MCNC: 9.4 MG/DL — SIGNIFICANT CHANGE UP (ref 8.4–10.5)
CHLORIDE SERPL-SCNC: 104 MMOL/L — SIGNIFICANT CHANGE UP (ref 96–108)
CMV DNA CSF QL NAA+PROBE: SIGNIFICANT CHANGE UP
CO2 SERPL-SCNC: 22 MMOL/L — SIGNIFICANT CHANGE UP (ref 22–31)
CREAT SERPL-MCNC: 0.93 MG/DL — SIGNIFICANT CHANGE UP (ref 0.5–1.3)
CULTURE RESULTS: NO GROWTH — SIGNIFICANT CHANGE UP
GLUCOSE SERPL-MCNC: 95 MG/DL — SIGNIFICANT CHANGE UP (ref 70–99)
HCT VFR BLD CALC: 19.7 % — CRITICAL LOW (ref 39–50)
HGB BLD-MCNC: 6.8 G/DL — CRITICAL LOW (ref 13–17)
LDH SERPL L TO P-CCNC: 213 U/L — SIGNIFICANT CHANGE UP (ref 50–242)
MAGNESIUM SERPL-MCNC: 2.2 MG/DL — SIGNIFICANT CHANGE UP (ref 1.6–2.6)
MCHC RBC-ENTMCNC: 31.1 PG — SIGNIFICANT CHANGE UP (ref 27–34)
MCHC RBC-ENTMCNC: 34.5 GM/DL — SIGNIFICANT CHANGE UP (ref 32–36)
MCV RBC AUTO: 90 FL — SIGNIFICANT CHANGE UP (ref 80–100)
NRBC # BLD: 0 /100 WBCS — SIGNIFICANT CHANGE UP (ref 0–0)
PHOSPHATE SERPL-MCNC: 3 MG/DL — SIGNIFICANT CHANGE UP (ref 2.5–4.5)
PLATELET # BLD AUTO: 32 K/UL — LOW (ref 150–400)
POTASSIUM SERPL-MCNC: 3.7 MMOL/L — SIGNIFICANT CHANGE UP (ref 3.5–5.3)
POTASSIUM SERPL-SCNC: 3.7 MMOL/L — SIGNIFICANT CHANGE UP (ref 3.5–5.3)
PROT SERPL-MCNC: 6.5 G/DL — SIGNIFICANT CHANGE UP (ref 6–8.3)
RBC # BLD: 2.19 M/UL — LOW (ref 4.2–5.8)
RBC # FLD: 17.5 % — HIGH (ref 10.3–14.5)
RH IG SCN BLD-IMP: POSITIVE — SIGNIFICANT CHANGE UP
SODIUM SERPL-SCNC: 136 MMOL/L — SIGNIFICANT CHANGE UP (ref 135–145)
SPECIMEN SOURCE: SIGNIFICANT CHANGE UP
URATE SERPL-MCNC: 2.1 MG/DL — LOW (ref 3.4–8.8)
WBC # BLD: 0.26 K/UL — CRITICAL LOW (ref 3.8–10.5)
WBC # FLD AUTO: 0.26 K/UL — CRITICAL LOW (ref 3.8–10.5)

## 2021-08-18 PROCEDURE — 99232 SBSQ HOSP IP/OBS MODERATE 35: CPT | Mod: GC

## 2021-08-18 RX ADMIN — Medication 1 APPLICATION(S): at 11:28

## 2021-08-18 RX ADMIN — Medication 5 MILLILITER(S): at 11:28

## 2021-08-18 RX ADMIN — Medication 5 MILLILITER(S): at 20:47

## 2021-08-18 RX ADMIN — CEFEPIME 100 MILLIGRAM(S): 1 INJECTION, POWDER, FOR SOLUTION INTRAMUSCULAR; INTRAVENOUS at 22:11

## 2021-08-18 RX ADMIN — DIPHENHYDRAMINE HYDROCHLORIDE AND LIDOCAINE HYDROCHLORIDE AND ALUMINUM HYDROXIDE AND MAGNESIUM HYDRO 5 MILLILITER(S): KIT at 22:12

## 2021-08-18 RX ADMIN — Medication 650 MILLIGRAM(S): at 06:19

## 2021-08-18 RX ADMIN — Medication 1 APPLICATION(S): at 06:23

## 2021-08-18 RX ADMIN — Medication 15 MILLILITER(S): at 11:28

## 2021-08-18 RX ADMIN — Medication 400 MILLIGRAM(S): at 14:14

## 2021-08-18 RX ADMIN — Medication 1 APPLICATION(S): at 17:40

## 2021-08-18 RX ADMIN — CHLORHEXIDINE GLUCONATE 1 APPLICATION(S): 213 SOLUTION TOPICAL at 06:21

## 2021-08-18 RX ADMIN — DIPHENHYDRAMINE HYDROCHLORIDE AND LIDOCAINE HYDROCHLORIDE AND ALUMINUM HYDROXIDE AND MAGNESIUM HYDRO 5 MILLILITER(S): KIT at 14:14

## 2021-08-18 RX ADMIN — Medication 400 MILLIGRAM(S): at 06:19

## 2021-08-18 RX ADMIN — CEFEPIME 100 MILLIGRAM(S): 1 INJECTION, POWDER, FOR SOLUTION INTRAMUSCULAR; INTRAVENOUS at 06:20

## 2021-08-18 RX ADMIN — AMLODIPINE BESYLATE 5 MILLIGRAM(S): 2.5 TABLET ORAL at 06:19

## 2021-08-18 RX ADMIN — Medication 15 MILLILITER(S): at 06:23

## 2021-08-18 RX ADMIN — Medication 5 MILLILITER(S): at 08:19

## 2021-08-18 RX ADMIN — BENZOCAINE AND MENTHOL 1 LOZENGE: 5; 1 LIQUID ORAL at 06:21

## 2021-08-18 RX ADMIN — Medication 15 MILLILITER(S): at 17:40

## 2021-08-18 RX ADMIN — Medication 5 MILLILITER(S): at 06:20

## 2021-08-18 RX ADMIN — Medication 400 MILLIGRAM(S): at 22:11

## 2021-08-18 RX ADMIN — POSACONAZOLE 300 MILLIGRAM(S): 100 TABLET, DELAYED RELEASE ORAL at 11:27

## 2021-08-18 RX ADMIN — CEFEPIME 100 MILLIGRAM(S): 1 INJECTION, POWDER, FOR SOLUTION INTRAMUSCULAR; INTRAVENOUS at 14:14

## 2021-08-18 RX ADMIN — DIPHENHYDRAMINE HYDROCHLORIDE AND LIDOCAINE HYDROCHLORIDE AND ALUMINUM HYDROXIDE AND MAGNESIUM HYDRO 5 MILLILITER(S): KIT at 06:21

## 2021-08-18 NOTE — PROGRESS NOTE ADULT - PROBLEM SELECTOR PLAN 1
FLT3 negative, BM bx c/w AML   consented for [place] study    HIV (-), Hep (-)  Echo 70%, MUGA EF 71%   Discussed sperm banking. Declined   TLC placed on 8/6 8/6 Started induction with  7+3 (Cytarabine + Daunorubicin)  Follow daily lytes, CBC. replace, Replete prn  Continue with Allopurinol 300mg daily, IV hydration, mouth care, pain control, antiemetics  Monitor for TLS daily  Day 14 Bm bx due on 8/19  Transfuse Plts if fever recurs (currently 16k) FLT3 negative, BM bx c/w AML   consented for [place] study    HIV (-), Hep (-)  Echo 70%, MUGA EF 71%   Discussed sperm banking. Declined   TLC placed on 8/6 8/6 Started induction with  7+3 (Cytarabine + Daunorubicin)  Follow daily lytes, CBC. replace, Replete prn  Continue with Allopurinol 300mg daily, IV hydration, mouth care, pain control, antiemetics  Monitor for TLS daily  Day 14 Bm bx due on 8/19

## 2021-08-18 NOTE — PROGRESS NOTE ADULT - SUBJECTIVE AND OBJECTIVE BOX
Diagnosis:  Acute Myeloid Leukemia, FLT3-    Protocol/Chemo Regimen: 7+3 (Daunorubicin and Cytarabine)    Day: 13    Pt endorsed: +Febrile, taking po's    Review of Systems: Denies n/v, diarrhe    Pain scale: denies     Diet: DASH/TLC      Allergies    No Known Allergies    Intolerances        ANTIMICROBIALS  acyclovir   Oral Tab/Cap 400 milliGRAM(s) Oral every 8 hours  cefepime   IVPB      cefepime   IVPB 2000 milliGRAM(s) IV Intermittent every 8 hours  posaconazole DR Tablet 300 milliGRAM(s) Oral daily      HEME/ONC MEDICATIONS      STANDING MEDICATIONS  amLODIPine   Tablet 5 milliGRAM(s) Oral daily  Biotene Dry Mouth Oral Rinse 5 milliLiter(s) Swish and Spit five times a day  chlorhexidine 2% Cloths 1 Application(s) Topical <User Schedule>  FIRST- Mouthwash  BLM 5 milliLiter(s) Swish and Spit three times a day  petrolatum white Ointment 1 Application(s) Topical four times a day  polyethylene glycol 3350 17 Gram(s) Oral daily  senna 2 Tablet(s) Oral at bedtime  sodium bicarbonate Mouth Rinse 15 milliLiter(s) Swish and Spit four times a day  sodium chloride 0.9%. 1000 milliLiter(s) IV Continuous <Continuous>      PRN MEDICATIONS  acetaminophen   Tablet .. 650 milliGRAM(s) Oral every 6 hours PRN  benzocaine 15 mG/menthol 3.6 mG (Sugar-Free) Lozenge 1 Lozenge Oral every 4 hours PRN  metoclopramide Injectable 10 milliGRAM(s) IV Push every 6 hours PRN  sodium chloride 0.65% Nasal 1 Spray(s) Both Nostrils three times a day PRN  sodium chloride 0.9% lock flush 10 milliLiter(s) IV Push every 1 hour PRN        Vital Signs Last 24 Hrs  T(C): 38.1 (18 Aug 2021 05:37), Max: 38.3 (17 Aug 2021 17:00)  T(F): 100.6 (18 Aug 2021 05:37), Max: 100.9 (17 Aug 2021 17:00)  HR: 91 (18 Aug 2021 05:37) (88 - 102)  BP: 113/67 (18 Aug 2021 05:37) (107/66 - 143/84)  BP(mean): --  RR: 18 (18 Aug 2021 05:37) (18 - 18)  SpO2: 97% (18 Aug 2021 05:37) (96% - 98%)    PHYSICAL EXAM  General: Sitting up in bed in NAD  HEENT:  ulcer left lateral tongue improved, mild erythema post oropharynx   CV: (+) S1/S2, reg  Lungs: clear to auscultation, no wheezes or rales  Abdomen: soft, non-tender, non-distended (+) BS  Ext: no edema BLE's  Skin: no rashes or ecchymosis  Neuro: alert and oriented X 3  Central Line: R TLCL c/d/i     LABS: Diagnosis:  Acute Myeloid Leukemia, FLT3-    Protocol/Chemo Regimen: 7+3 (Daunorubicin and Cytarabine)    Day: 13    Pt endorsed: No complaints.     Review of Systems: Denies n/v, diarrhe    Pain scale: denies     Diet: DASH/TLC      Allergies    No Known Allergies    Intolerances        ANTIMICROBIALS  acyclovir   Oral Tab/Cap 400 milliGRAM(s) Oral every 8 hours  cefepime   IVPB      cefepime   IVPB 2000 milliGRAM(s) IV Intermittent every 8 hours  posaconazole DR Tablet 300 milliGRAM(s) Oral daily      HEME/ONC MEDICATIONS      STANDING MEDICATIONS  amLODIPine   Tablet 5 milliGRAM(s) Oral daily  Biotene Dry Mouth Oral Rinse 5 milliLiter(s) Swish and Spit five times a day  chlorhexidine 2% Cloths 1 Application(s) Topical <User Schedule>  FIRST- Mouthwash  BLM 5 milliLiter(s) Swish and Spit three times a day  petrolatum white Ointment 1 Application(s) Topical four times a day  polyethylene glycol 3350 17 Gram(s) Oral daily  senna 2 Tablet(s) Oral at bedtime  sodium bicarbonate Mouth Rinse 15 milliLiter(s) Swish and Spit four times a day  sodium chloride 0.9%. 1000 milliLiter(s) IV Continuous <Continuous>      PRN MEDICATIONS  acetaminophen   Tablet .. 650 milliGRAM(s) Oral every 6 hours PRN  benzocaine 15 mG/menthol 3.6 mG (Sugar-Free) Lozenge 1 Lozenge Oral every 4 hours PRN  metoclopramide Injectable 10 milliGRAM(s) IV Push every 6 hours PRN  sodium chloride 0.65% Nasal 1 Spray(s) Both Nostrils three times a day PRN  sodium chloride 0.9% lock flush 10 milliLiter(s) IV Push every 1 hour PRN        Vital Signs Last 24 Hrs  T(C): 38.1 (18 Aug 2021 05:37), Max: 38.3 (17 Aug 2021 17:00)  T(F): 100.6 (18 Aug 2021 05:37), Max: 100.9 (17 Aug 2021 17:00)  HR: 91 (18 Aug 2021 05:37) (88 - 102)  BP: 113/67 (18 Aug 2021 05:37) (107/66 - 143/84)  BP(mean): --  RR: 18 (18 Aug 2021 05:37) (18 - 18)  SpO2: 97% (18 Aug 2021 05:37) (96% - 98%)    PHYSICAL EXAM  General: Sitting up in bed in NAD  HEENT:  ulcer left lateral tongue improved, mild erythema post oropharynx   CV: (+) S1/S2, reg  Lungs: clear to auscultation, no wheezes or rales  Abdomen: soft, non-tender, non-distended (+) BS  Ext: no edema BLE's  Skin: no rashes or ecchymosis  Neuro: alert and oriented X 3  Central Line: R TLCL c/d/i     LABS:    Blood Cultures:                           6.8    0.26  )-----------( 32       ( 18 Aug 2021 07:09 )             19.7         Mean Cell Volume : 90.0 fl  Mean Cell Hemoglobin : 31.1 pg  Mean Cell Hemoglobin Concentration : 34.5 gm/dL  Auto Neutrophil # : x  Auto Lymphocyte # : x  Auto Monocyte # : x  Auto Eosinophil # : x  Auto Basophil # : x  Auto Neutrophil % : x  Auto Lymphocyte % : x  Auto Monocyte % : x  Auto Eosinophil % : x  Auto Basophil % : x      08-18    136  |  104  |  8   ----------------------------<  95  3.7   |  22  |  0.93    Ca    9.4      18 Aug 2021 07:12  Phos  3.0     08-18  Mg     2.2     08-18    TPro  6.5  /  Alb  3.6  /  TBili  0.5  /  DBili  x   /  AST  15  /  ALT  28  /  AlkPhos  53  08-18      Mg 2.2  Phos 3.0    Uric Acid 2.1

## 2021-08-18 NOTE — PROGRESS NOTE ADULT - PROBLEM SELECTOR PLAN 2
Patient is febrile, +neutropenic   On Cefepime, All Cultures NGTD  s/p Zosyn for possible PNA, d/c'd after 8/6 PM dose  8/9 started on Levaquin and posaconazole ppx for neutropenia  8/12 - Started Acyclovir for oral ulcers  8/12 CXR No acute pulmonary disease  8/14 pancx and repeat CT CAP to look for fever source revealed Etiology for the patient's fever not identified; Hepatic steatosis.  Added sodium bicarb  mouth rinse, Cepacol PRN sore throat   8/14 sent CMV PCR  8/15 monitor soft stools  - Check HSV serology Patient is febrile, +neutropenic   On Cefepime, All Cultures NGTD  s/p Zosyn for possible PNA, d/c'd after 8/6 PM dose  8/9 started on Levaquin and posaconazole ppx for neutropenia  8/12 - Started Acyclovir for oral ulcers  8/12 CXR No acute pulmonary disease  8/14 pancx and repeat CT CAP to look for fever source revealed Etiology for the patient's fever not identified; Hepatic steatosis.  Added sodium bicarb, mouth rinse, Cepacol PRN sore throat   8/14 (-) CMV PCR  8/15 monitor soft stools  HSV 1 serology (+)

## 2021-08-18 NOTE — PROGRESS NOTE ADULT - ASSESSMENT
Mr. Tian is  a 35 y/o male with h/o PMH HTN, fatty liver, kidney stones presents with rash, dizziness, fatigue and severe RUQ pain for 3 days. Now with anemia, thrombocytopenia and leukocytosis with 17% blasts, Bone marrow bx c/w AML. Transferred to [address] [place] [address] for management. Patient receiving induction chemotherapy with 7+3 (Daunorubicin and Cytarabine). Patient has pancytopenia secondary to chemotherapy and disease process.

## 2021-08-18 NOTE — PROGRESS NOTE ADULT - ATTENDING COMMENTS
35yo M admitted with RUQ pain found to have bloodwork concerning for acute leukemia  -13% myeloblasts on PB. Flt3 negative.   -s/p BMbx 8/4 -AML. f/u cytogenetics, [facility]  -started 7+3 on 8/6 -cytarabine 100/m2 and dauno 90/m2. Today is day 12  -pain control, improved today. Abd sono showing hepatomegaly and hepatic steatosis. Right pleural effusion. CT chest shows small right pleural effusion  -MUGA EF 71%  -discussed with patient and family at the bedside. We also discussed sperm banking -pt declined  -febrile again, Tm 102.3 -changed to cefepime, f/u Cx's and CXR   -Persistent fevers with mucositis. Aggressive oral care - adding sodium bicarb. Cultures negative, repeat CT 8/14 without infectious source.   -cont posaconazole and acyclovir PPx  -TLC placement  -supportive care  -transfuse as needed  -day 14 BMbx on 8/19 37yo M admitted with RUQ pain found to have bloodwork concerning for acute leukemia  -13% myeloblasts on PB. Flt3 negative.   -s/p BMbx 8/4 -AML. f/u cytogenetics, [facility]  -started 7+3 on 8/6 -cytarabine 100/m2 and dauno 90/m2. Today is day 13  -pain control, improved today. Abd sono showing hepatomegaly and hepatic steatosis. Right pleural effusion. CT chest shows small right pleural effusion  -MUGA EF 71%  -discussed with patient and family at the bedside. We also discussed sperm banking -pt declined  -febrile again, Tm 102.3 -changed to cefepime, f/u Cx's and CXR   -Persistent fevers with mucositis. Aggressive oral care - adding sodium bicarb. Cultures negative, repeat CT 8/14 without infectious source.   -cont posaconazole and acyclovir PPx  -TLC placement  -supportive care  -transfuse as needed  -day 14 BMbx on 8/19

## 2021-08-19 ENCOUNTER — RESULT REVIEW (OUTPATIENT)
Age: 36
End: 2021-08-19

## 2021-08-19 LAB
ALBUMIN SERPL ELPH-MCNC: 3.5 G/DL — SIGNIFICANT CHANGE UP (ref 3.3–5)
ALP SERPL-CCNC: 53 U/L — SIGNIFICANT CHANGE UP (ref 40–120)
ALT FLD-CCNC: 28 U/L — SIGNIFICANT CHANGE UP (ref 10–45)
ANION GAP SERPL CALC-SCNC: 13 MMOL/L — SIGNIFICANT CHANGE UP (ref 5–17)
APTT BLD: 27.8 SEC — SIGNIFICANT CHANGE UP (ref 27.5–35.5)
AST SERPL-CCNC: 13 U/L — SIGNIFICANT CHANGE UP (ref 10–40)
BILIRUB SERPL-MCNC: 0.6 MG/DL — SIGNIFICANT CHANGE UP (ref 0.2–1.2)
BUN SERPL-MCNC: 9 MG/DL — SIGNIFICANT CHANGE UP (ref 7–23)
CALCIUM SERPL-MCNC: 9.4 MG/DL — SIGNIFICANT CHANGE UP (ref 8.4–10.5)
CHLORIDE SERPL-SCNC: 106 MMOL/L — SIGNIFICANT CHANGE UP (ref 96–108)
CO2 SERPL-SCNC: 21 MMOL/L — LOW (ref 22–31)
CREAT SERPL-MCNC: 0.88 MG/DL — SIGNIFICANT CHANGE UP (ref 0.5–1.3)
CULTURE RESULTS: SIGNIFICANT CHANGE UP
CULTURE RESULTS: SIGNIFICANT CHANGE UP
GLUCOSE SERPL-MCNC: 98 MG/DL — SIGNIFICANT CHANGE UP (ref 70–99)
HCT VFR BLD CALC: 20.7 % — CRITICAL LOW (ref 39–50)
HGB BLD-MCNC: 7.2 G/DL — LOW (ref 13–17)
HSV1 AB FLD QL: NEGATIVE — SIGNIFICANT CHANGE UP
HSV2 AB FLD-ACNC: NEGATIVE — SIGNIFICANT CHANGE UP
INR BLD: 1.15 RATIO — SIGNIFICANT CHANGE UP (ref 0.88–1.16)
LDH SERPL L TO P-CCNC: 190 U/L — SIGNIFICANT CHANGE UP (ref 50–242)
MAGNESIUM SERPL-MCNC: 2.2 MG/DL — SIGNIFICANT CHANGE UP (ref 1.6–2.6)
MCHC RBC-ENTMCNC: 30.9 PG — SIGNIFICANT CHANGE UP (ref 27–34)
MCHC RBC-ENTMCNC: 34.8 GM/DL — SIGNIFICANT CHANGE UP (ref 32–36)
MCV RBC AUTO: 88.8 FL — SIGNIFICANT CHANGE UP (ref 80–100)
NRBC # BLD: 0 /100 WBCS — SIGNIFICANT CHANGE UP (ref 0–0)
PHOSPHATE SERPL-MCNC: 3 MG/DL — SIGNIFICANT CHANGE UP (ref 2.5–4.5)
PLATELET # BLD AUTO: 20 K/UL — CRITICAL LOW (ref 150–400)
POTASSIUM SERPL-MCNC: 3.6 MMOL/L — SIGNIFICANT CHANGE UP (ref 3.5–5.3)
POTASSIUM SERPL-SCNC: 3.6 MMOL/L — SIGNIFICANT CHANGE UP (ref 3.5–5.3)
PROT SERPL-MCNC: 6.4 G/DL — SIGNIFICANT CHANGE UP (ref 6–8.3)
PROTHROM AB SERPL-ACNC: 13.7 SEC — HIGH (ref 10.6–13.6)
RBC # BLD: 2.33 M/UL — LOW (ref 4.2–5.8)
RBC # FLD: 17.2 % — HIGH (ref 10.3–14.5)
SARS-COV-2 RNA SPEC QL NAA+PROBE: SIGNIFICANT CHANGE UP
SODIUM SERPL-SCNC: 140 MMOL/L — SIGNIFICANT CHANGE UP (ref 135–145)
SPECIMEN SOURCE: SIGNIFICANT CHANGE UP
SPECIMEN SOURCE: SIGNIFICANT CHANGE UP
URATE SERPL-MCNC: 2.3 MG/DL — LOW (ref 3.4–8.8)
WBC # BLD: 0.23 K/UL — CRITICAL LOW (ref 3.8–10.5)
WBC # FLD AUTO: 0.23 K/UL — CRITICAL LOW (ref 3.8–10.5)

## 2021-08-19 PROCEDURE — 88305 TISSUE EXAM BY PATHOLOGIST: CPT | Mod: 26

## 2021-08-19 PROCEDURE — 99232 SBSQ HOSP IP/OBS MODERATE 35: CPT | Mod: GC

## 2021-08-19 PROCEDURE — 88189 FLOWCYTOMETRY/READ 16 & >: CPT

## 2021-08-19 PROCEDURE — 88341 IMHCHEM/IMCYTCHM EA ADD ANTB: CPT | Mod: 26,59

## 2021-08-19 PROCEDURE — 88313 SPECIAL STAINS GROUP 2: CPT | Mod: 26

## 2021-08-19 PROCEDURE — 88342 IMHCHEM/IMCYTCHM 1ST ANTB: CPT | Mod: 26,59

## 2021-08-19 PROCEDURE — 85097 BONE MARROW INTERPRETATION: CPT

## 2021-08-19 RX ORDER — LIDOCAINE HCL 20 MG/ML
20 VIAL (ML) INJECTION ONCE
Refills: 0 | Status: DISCONTINUED | OUTPATIENT
Start: 2021-08-19 | End: 2021-08-29

## 2021-08-19 RX ORDER — HEPARIN SODIUM 5000 [USP'U]/ML
5000 INJECTION INTRAVENOUS; SUBCUTANEOUS ONCE
Refills: 0 | Status: DISCONTINUED | OUTPATIENT
Start: 2021-08-19 | End: 2021-08-22

## 2021-08-19 RX ORDER — ALPRAZOLAM 0.25 MG
0.25 TABLET ORAL ONCE
Refills: 0 | Status: DISCONTINUED | OUTPATIENT
Start: 2021-08-19 | End: 2021-08-19

## 2021-08-19 RX ORDER — ALPRAZOLAM 0.25 MG
0.25 TABLET ORAL AT BEDTIME
Refills: 0 | Status: DISCONTINUED | OUTPATIENT
Start: 2021-08-19 | End: 2021-08-19

## 2021-08-19 RX ADMIN — Medication 5 MILLILITER(S): at 10:00

## 2021-08-19 RX ADMIN — Medication 1 APPLICATION(S): at 21:12

## 2021-08-19 RX ADMIN — DIPHENHYDRAMINE HYDROCHLORIDE AND LIDOCAINE HYDROCHLORIDE AND ALUMINUM HYDROXIDE AND MAGNESIUM HYDRO 5 MILLILITER(S): KIT at 05:46

## 2021-08-19 RX ADMIN — CHLORHEXIDINE GLUCONATE 1 APPLICATION(S): 213 SOLUTION TOPICAL at 13:15

## 2021-08-19 RX ADMIN — POSACONAZOLE 300 MILLIGRAM(S): 100 TABLET, DELAYED RELEASE ORAL at 17:18

## 2021-08-19 RX ADMIN — Medication 400 MILLIGRAM(S): at 13:15

## 2021-08-19 RX ADMIN — Medication 5 MILLILITER(S): at 17:19

## 2021-08-19 RX ADMIN — Medication 400 MILLIGRAM(S): at 05:48

## 2021-08-19 RX ADMIN — Medication 5 MILLILITER(S): at 00:30

## 2021-08-19 RX ADMIN — DIPHENHYDRAMINE HYDROCHLORIDE AND LIDOCAINE HYDROCHLORIDE AND ALUMINUM HYDROXIDE AND MAGNESIUM HYDRO 5 MILLILITER(S): KIT at 21:10

## 2021-08-19 RX ADMIN — Medication 1 APPLICATION(S): at 18:20

## 2021-08-19 RX ADMIN — Medication 1 APPLICATION(S): at 15:45

## 2021-08-19 RX ADMIN — Medication 400 MILLIGRAM(S): at 21:11

## 2021-08-19 RX ADMIN — Medication 1 APPLICATION(S): at 06:53

## 2021-08-19 RX ADMIN — Medication 0.25 MILLIGRAM(S): at 21:09

## 2021-08-19 RX ADMIN — Medication 5 MILLILITER(S): at 19:26

## 2021-08-19 RX ADMIN — CEFEPIME 100 MILLIGRAM(S): 1 INJECTION, POWDER, FOR SOLUTION INTRAMUSCULAR; INTRAVENOUS at 05:47

## 2021-08-19 RX ADMIN — Medication 0.25 MILLIGRAM(S): at 13:13

## 2021-08-19 RX ADMIN — CEFEPIME 100 MILLIGRAM(S): 1 INJECTION, POWDER, FOR SOLUTION INTRAMUSCULAR; INTRAVENOUS at 21:10

## 2021-08-19 RX ADMIN — CEFEPIME 100 MILLIGRAM(S): 1 INJECTION, POWDER, FOR SOLUTION INTRAMUSCULAR; INTRAVENOUS at 13:14

## 2021-08-19 RX ADMIN — AMLODIPINE BESYLATE 5 MILLIGRAM(S): 2.5 TABLET ORAL at 05:48

## 2021-08-19 RX ADMIN — Medication 1 APPLICATION(S): at 00:31

## 2021-08-19 RX ADMIN — Medication 15 MILLILITER(S): at 17:19

## 2021-08-19 RX ADMIN — DIPHENHYDRAMINE HYDROCHLORIDE AND LIDOCAINE HYDROCHLORIDE AND ALUMINUM HYDROXIDE AND MAGNESIUM HYDRO 5 MILLILITER(S): KIT at 13:15

## 2021-08-19 RX ADMIN — Medication 5 MILLILITER(S): at 13:14

## 2021-08-19 RX ADMIN — Medication 15 MILLILITER(S): at 21:11

## 2021-08-19 RX ADMIN — Medication 5 MILLILITER(S): at 21:10

## 2021-08-19 RX ADMIN — SODIUM CHLORIDE 100 MILLILITER(S): 9 INJECTION INTRAMUSCULAR; INTRAVENOUS; SUBCUTANEOUS at 05:48

## 2021-08-19 RX ADMIN — Medication 15 MILLILITER(S): at 13:14

## 2021-08-19 RX ADMIN — Medication 15 MILLILITER(S): at 00:30

## 2021-08-19 RX ADMIN — Medication 15 MILLILITER(S): at 06:53

## 2021-08-19 NOTE — PROGRESS NOTE ADULT - PROBLEM SELECTOR PLAN 1
FLT3 negative, BM bx c/w AML   consented for [place] study    HIV (-), Hep (-)  Echo 70%, MUGA EF 71%   Discussed sperm banking. Declined   TLC placed on 8/6 8/6 Started induction with  7+3 (Cytarabine + Daunorubicin)  Follow daily lytes, CBC. replace, Replete prn  Continue with Allopurinol 300mg daily, IV hydration, mouth care, pain control, antiemetics  Monitor for TLS daily  Day 14 Bm bx due on 8/19 FLT3 negative, BM bx c/w AML   consented for [place] study    HIV (-), Hep (-)  Echo 70%, MUGA EF 71%   Discussed sperm banking. Declined   TLC placed on 8/6 8/6 Started induction with  7+3 (Cytarabine + Daunorubicin)  Follow daily lytes, CBC. replace, Replete prn  Continue with Allopurinol 300mg daily, IV hydration, mouth care, pain control, antiemetics  Monitor for TLS daily  Day 14 Bm bx FLT3 negative, BM bx c/w AML   consented for [place] study    HIV (-), Hep (-)  Echo 70%, MUGA EF 71%   Discussed sperm banking. Declined   TLC placed on 8/6 8/6 Started induction with  7+3 (Cytarabine + Daunorubicin)  Follow daily lytes, CBC. replace, Replete prn  Continue with Allopurinol 300mg daily, IV hydration, mouth care, pain control, antiemetics  Monitor for TLS daily  Day 14 Bm bx today. Premed with Xanax.

## 2021-08-19 NOTE — PROCEDURE NOTE - NSPOSTCAREGUIDE_GEN_A_CORE
Verbal/written post procedure instructions were given to patient/caregiver/Instructed patient/caregiver to follow-up with primary care physician/Instructed patient/caregiver regarding signs and symptoms of infection/Keep the cast/splint/dressing clean and dry
Verbal/written post procedure instructions were given to patient/caregiver

## 2021-08-19 NOTE — PROCEDURE NOTE - ADDITIONAL PROCEDURE DETAILS
Tolerated procedure well  Aspiration obtained, slides made and flow sent  Bone marrow biopsy obtain and pt able to see specimen   Informed patient to lie flat for 30 minutes post procedure

## 2021-08-19 NOTE — PROGRESS NOTE ADULT - ATTENDING COMMENTS
37yo M admitted with RUQ pain found to have bloodwork concerning for acute leukemia  -13% myeloblasts on PB. Flt3 negative.   -s/p BMbx 8/4 -AML. f/u cytogenetics, [facility]  -started 7+3 on 8/6 -cytarabine 100/m2 and dauno 90/m2. Today is day 13  -pain control, improved today. Abd sono showing hepatomegaly and hepatic steatosis. Right pleural effusion. CT chest shows small right pleural effusion  -MUGA EF 71%  -discussed with patient and family at the bedside. We also discussed sperm banking -pt declined  -febrile again, Tm 102.3 -changed to cefepime, f/u Cx's and CXR   -Persistent fevers with mucositis. Aggressive oral care - adding sodium bicarb. Cultures negative, repeat CT 8/14 without infectious source.   -cont posaconazole and acyclovir PPx  -TLC placement  -supportive care  -transfuse as needed  -day 14 BMbx on 8/19 37yo M admitted with RUQ pain found to have bloodwork concerning for acute leukemia  -13% myeloblasts on PB. Flt3 negative.   -s/p BMbx 8/4 -AML. f/u cytogenetics, [facility]  -started 7+3 on 8/6 -cytarabine 100/m2 and dauno 90/m2. Today is day 14  -pain control, improved today. Abd sono showing hepatomegaly and hepatic steatosis. Right pleural effusion. CT chest shows small right pleural effusion  -MUGA EF 71%  -discussed with patient and family at the bedside. We also discussed sperm banking -pt declined  -febrile again, Tm 102.3 -changed to cefepime, f/u Cx's and CXR   -Persistent fevers with mucositis. Aggressive oral care - adding sodium bicarb. Cultures negative, repeat CT 8/14 without infectious source.   -cont posaconazole and acyclovir PPx  -TLC placement  -supportive care  -transfuse as needed  -day 14 BMbx on 8/19

## 2021-08-19 NOTE — PROCEDURE NOTE - NSICDXPROCEDURE_GEN_ALL_CORE_FT
PROCEDURES:  Bone marrow biopsy 19-Aug-2021 21:16:42  Dwayne Kohler  Bone marrow aspiration 19-Aug-2021 21:16:53  Dwayne Kohler

## 2021-08-19 NOTE — PROGRESS NOTE ADULT - SUBJECTIVE AND OBJECTIVE BOX
Diagnosis:    Protocol/Chemo Regimen:    Day:     Pt endorsed:    Review of Systems:        Pain scale:     Diet:     Allergies    No Known Allergies    Intolerances        ANTIMICROBIALS  acyclovir   Oral Tab/Cap 400 milliGRAM(s) Oral every 8 hours  cefepime   IVPB 2000 milliGRAM(s) IV Intermittent every 8 hours  cefepime   IVPB      posaconazole DR Tablet 300 milliGRAM(s) Oral daily      HEME/ONC MEDICATIONS      STANDING MEDICATIONS  amLODIPine   Tablet 5 milliGRAM(s) Oral daily  Biotene Dry Mouth Oral Rinse 5 milliLiter(s) Swish and Spit five times a day  chlorhexidine 2% Cloths 1 Application(s) Topical <User Schedule>  FIRST- Mouthwash  BLM 5 milliLiter(s) Swish and Spit three times a day  petrolatum white Ointment 1 Application(s) Topical four times a day  polyethylene glycol 3350 17 Gram(s) Oral daily  senna 2 Tablet(s) Oral at bedtime  sodium bicarbonate Mouth Rinse 15 milliLiter(s) Swish and Spit four times a day  sodium chloride 0.9%. 1000 milliLiter(s) IV Continuous <Continuous>      PRN MEDICATIONS  acetaminophen   Tablet .. 650 milliGRAM(s) Oral every 6 hours PRN  benzocaine 15 mG/menthol 3.6 mG (Sugar-Free) Lozenge 1 Lozenge Oral every 4 hours PRN  metoclopramide Injectable 10 milliGRAM(s) IV Push every 6 hours PRN  sodium chloride 0.65% Nasal 1 Spray(s) Both Nostrils three times a day PRN  sodium chloride 0.9% lock flush 10 milliLiter(s) IV Push every 1 hour PRN        Vital Signs Last 24 Hrs  T(C): 37.6 (19 Aug 2021 05:38), Max: 37.6 (18 Aug 2021 21:08)  T(F): 99.6 (19 Aug 2021 05:38), Max: 99.7 (18 Aug 2021 21:08)  HR: 92 (19 Aug 2021 05:38) (90 - 104)  BP: 126/75 (19 Aug 2021 05:38) (101/64 - 146/77)  BP(mean): --  RR: 18 (19 Aug 2021 05:38) (18 - 18)  SpO2: 94% (19 Aug 2021 05:38) (94% - 100%)    PHYSICAL EXAM  General:  HEENT:   CV:   Lungs:   Abdomen:   Ext:   Skin  Neuro:   Central Line:     RECENT CULTURES:  08-17 @ 17:26  Clean Catch Clean Catch (Midstream)  --  --  --    No growth  --  08-17 @ 12:55  .Blood Blood-Peripheral  --  --  --    No growth to date.  --  08-17 @ 10:17  .Blood Blood-Catheter  --  --  --    No growth to date.  --  08-14 @ 20:49  Clean Catch Clean Catch (Midstream)  --  --  --    No growth  --  08-14 @ 20:46  .Blood Blood-Catheter  --  --  --    No growth to date.  --  08-12 @ 22:09  Clean Catch Clean Catch (Midstream)  --  --  --    <10,000 CFU/mL Normal Urogenital Heather  --  08-12 @ 22:03  .Blood Blood-Catheter  --  --  --    No Growth Final  --        LABS:                        7.2    0.23  )-----------( 20       ( 19 Aug 2021 07:06 )             20.7         Mean Cell Volume : 88.8 fl  Mean Cell Hemoglobin : 30.9 pg  Mean Cell Hemoglobin Concentration : 34.8 gm/dL  Auto Neutrophil # : x  Auto Lymphocyte # : x  Auto Monocyte # : x  Auto Eosinophil # : x  Auto Basophil # : x  Auto Neutrophil % : x  Auto Lymphocyte % : x  Auto Monocyte % : x  Auto Eosinophil % : x  Auto Basophil % : x      08-19    140  |  106  |  9   ----------------------------<  98  3.6   |  21<L>  |  0.88    Ca    9.4      19 Aug 2021 07:03  Phos  3.0     08-19  Mg     2.2     08-19    TPro  6.4  /  Alb  3.5  /  TBili  0.6  /  DBili  x   /  AST  13  /  ALT  28  /  AlkPhos  53  08-19      Mg 2.2  Phos 3.0      PT/INR - ( 19 Aug 2021 07:01 )   PT: 13.7 sec;   INR: 1.15 ratio         PTT - ( 19 Aug 2021 07:01 )  PTT:27.8 sec      Uric Acid 2.3        RADIOLOGY & ADDITIONAL STUDIES:           Diagnosis:  Acute Myeloid Leukemia, FLT3-    Protocol/Chemo Regimen: 7+3 (Daunorubicin and Cytarabine)    Day: 14    Pt endorsed: No complaints. " Give me heads up before BM bx"    Review of Systems: Denies n/v, diarrhe    Pain scale: denies     Diet: DASH/TLC      Allergies    No Known Allergies    Intolerances      ANTIMICROBIALS  acyclovir   Oral Tab/Cap 400 milliGRAM(s) Oral every 8 hours  cefepime   IVPB 2000 milliGRAM(s) IV Intermittent every 8 hours  cefepime   IVPB      posaconazole DR Tablet 300 milliGRAM(s) Oral daily      HEME/ONC MEDICATIONS      STANDING MEDICATIONS  amLODIPine   Tablet 5 milliGRAM(s) Oral daily  Biotene Dry Mouth Oral Rinse 5 milliLiter(s) Swish and Spit five times a day  chlorhexidine 2% Cloths 1 Application(s) Topical <User Schedule>  FIRST- Mouthwash  BLM 5 milliLiter(s) Swish and Spit three times a day  petrolatum white Ointment 1 Application(s) Topical four times a day  polyethylene glycol 3350 17 Gram(s) Oral daily  senna 2 Tablet(s) Oral at bedtime  sodium bicarbonate Mouth Rinse 15 milliLiter(s) Swish and Spit four times a day  sodium chloride 0.9%. 1000 milliLiter(s) IV Continuous <Continuous>      PRN MEDICATIONS  acetaminophen   Tablet .. 650 milliGRAM(s) Oral every 6 hours PRN  benzocaine 15 mG/menthol 3.6 mG (Sugar-Free) Lozenge 1 Lozenge Oral every 4 hours PRN  metoclopramide Injectable 10 milliGRAM(s) IV Push every 6 hours PRN  sodium chloride 0.65% Nasal 1 Spray(s) Both Nostrils three times a day PRN  sodium chloride 0.9% lock flush 10 milliLiter(s) IV Push every 1 hour PRN        Vital Signs Last 24 Hrs  T(C): 37.6 (19 Aug 2021 05:38), Max: 37.6 (18 Aug 2021 21:08)  T(F): 99.6 (19 Aug 2021 05:38), Max: 99.7 (18 Aug 2021 21:08)  HR: 92 (19 Aug 2021 05:38) (90 - 104)  BP: 126/75 (19 Aug 2021 05:38) (101/64 - 146/77)  BP(mean): --  RR: 18 (19 Aug 2021 05:38) (18 - 18)  SpO2: 94% (19 Aug 2021 05:38) (94% - 100%)    PHYSICAL EXAM  General:  HEENT:   CV:   Lungs:   Abdomen:   Ext:   Skin  Neuro:   Central Line:     RECENT CULTURES:  08-17 @ 17:26  Clean Catch Clean Catch (Midstream)  --  --  --    No growth  --  08-17 @ 12:55  .Blood Blood-Peripheral  --  --  --    No growth to date.  --  08-17 @ 10:17  .Blood Blood-Catheter  --  --  --    No growth to date.  --  08-14 @ 20:49  Clean Catch Clean Catch (Midstream)  --  --  --    No growth  --  08-14 @ 20:46  .Blood Blood-Catheter  --  --  --    No growth to date.  --  08-12 @ 22:09  Clean Catch Clean Catch (Midstream)  --  --  --    <10,000 CFU/mL Normal Urogenital Heather  --  08-12 @ 22:03  .Blood Blood-Catheter  --  --  --    No Growth Final  --        LABS:                        7.2    0.23  )-----------( 20       ( 19 Aug 2021 07:06 )             20.7         Mean Cell Volume : 88.8 fl  Mean Cell Hemoglobin : 30.9 pg  Mean Cell Hemoglobin Concentration : 34.8 gm/dL  Auto Neutrophil # : x  Auto Lymphocyte # : x  Auto Monocyte # : x  Auto Eosinophil # : x  Auto Basophil # : x  Auto Neutrophil % : x  Auto Lymphocyte % : x  Auto Monocyte % : x  Auto Eosinophil % : x  Auto Basophil % : x      08-19    140  |  106  |  9   ----------------------------<  98  3.6   |  21<L>  |  0.88    Ca    9.4      19 Aug 2021 07:03  Phos  3.0     08-19  Mg     2.2     08-19    TPro  6.4  /  Alb  3.5  /  TBili  0.6  /  DBili  x   /  AST  13  /  ALT  28  /  AlkPhos  53  08-19      Mg 2.2  Phos 3.0      PT/INR - ( 19 Aug 2021 07:01 )   PT: 13.7 sec;   INR: 1.15 ratio         PTT - ( 19 Aug 2021 07:01 )  PTT:27.8 sec      Uric Acid 2.3        RADIOLOGY & ADDITIONAL STUDIES:           Diagnosis:  Acute Myeloid Leukemia, FLT3-    Protocol/Chemo Regimen: 7+3 (Daunorubicin and Cytarabine)    Day: 14    Pt endorsed: No complaints. " Give me heads up before BM bx"    Review of Systems: Denies n/v, diarrhe    Pain scale: denies     Diet: DASH/TLC      Allergies    No Known Allergies    Intolerances      ANTIMICROBIALS  acyclovir   Oral Tab/Cap 400 milliGRAM(s) Oral every 8 hours  cefepime   IVPB 2000 milliGRAM(s) IV Intermittent every 8 hours  cefepime   IVPB      posaconazole DR Tablet 300 milliGRAM(s) Oral daily      HEME/ONC MEDICATIONS      STANDING MEDICATIONS  amLODIPine   Tablet 5 milliGRAM(s) Oral daily  Biotene Dry Mouth Oral Rinse 5 milliLiter(s) Swish and Spit five times a day  chlorhexidine 2% Cloths 1 Application(s) Topical <User Schedule>  FIRST- Mouthwash  BLM 5 milliLiter(s) Swish and Spit three times a day  petrolatum white Ointment 1 Application(s) Topical four times a day  polyethylene glycol 3350 17 Gram(s) Oral daily  senna 2 Tablet(s) Oral at bedtime  sodium bicarbonate Mouth Rinse 15 milliLiter(s) Swish and Spit four times a day  sodium chloride 0.9%. 1000 milliLiter(s) IV Continuous <Continuous>      PRN MEDICATIONS  acetaminophen   Tablet .. 650 milliGRAM(s) Oral every 6 hours PRN  benzocaine 15 mG/menthol 3.6 mG (Sugar-Free) Lozenge 1 Lozenge Oral every 4 hours PRN  metoclopramide Injectable 10 milliGRAM(s) IV Push every 6 hours PRN  sodium chloride 0.65% Nasal 1 Spray(s) Both Nostrils three times a day PRN  sodium chloride 0.9% lock flush 10 milliLiter(s) IV Push every 1 hour PRN        Vital Signs Last 24 Hrs  T(C): 37.6 (19 Aug 2021 05:38), Max: 37.6 (18 Aug 2021 21:08)  T(F): 99.6 (19 Aug 2021 05:38), Max: 99.7 (18 Aug 2021 21:08)  HR: 92 (19 Aug 2021 05:38) (90 - 104)  BP: 126/75 (19 Aug 2021 05:38) (101/64 - 146/77)  BP(mean): --  RR: 18 (19 Aug 2021 05:38) (18 - 18)  SpO2: 94% (19 Aug 2021 05:38) (94% - 100%)    PHYSICAL EXAM  General:NAD  HEENT: PERRL, EOMI, conjunctiva clear, oral ulcer --->left side of tongue. Labial ulcers scabbed  CV: S1S2, RRR, no murmur  Lungs: Unlabored, CTA all fields, no rales, no wheezes  Abdomen: BS(+), soft  nontender, nondistended  Ext: BLE no edema, PPP, no calf tenderness  Skin:warm, dry, no rash  Neuro: AOX3, nonfocal  Central Line: RCW TLC, site C/D/I    RECENT CULTURES:  08-17 @ 17:26  Clean Catch Clean Catch (Midstream)  --  --  --    No growth  --  08-17 @ 12:55  .Blood Blood-Peripheral  --  --  --    No growth to date.  --  08-17 @ 10:17  .Blood Blood-Catheter  --  --  --    No growth to date.  --  08-14 @ 20:49  Clean Catch Clean Catch (Midstream)  --  --  --    No growth  --  08-14 @ 20:46  .Blood Blood-Catheter  --  --  --    No growth to date.  --  08-12 @ 22:09  Clean Catch Clean Catch (Midstream)  --  --  --    <10,000 CFU/mL Normal Urogenital Heather  --  08-12 @ 22:03  .Blood Blood-Catheter  --  --  --    No Growth Final  --        LABS:                        7.2    0.23  )-----------( 20       ( 19 Aug 2021 07:06 )             20.7         Mean Cell Volume : 88.8 fl  Mean Cell Hemoglobin : 30.9 pg  Mean Cell Hemoglobin Concentration : 34.8 gm/dL  Auto Neutrophil # : x  Auto Lymphocyte # : x  Auto Monocyte # : x  Auto Eosinophil # : x  Auto Basophil # : x  Auto Neutrophil % : x  Auto Lymphocyte % : x  Auto Monocyte % : x  Auto Eosinophil % : x  Auto Basophil % : x      08-19    140  |  106  |  9   ----------------------------<  98  3.6   |  21<L>  |  0.88    Ca    9.4      19 Aug 2021 07:03  Phos  3.0     08-19  Mg     2.2     08-19    TPro  6.4  /  Alb  3.5  /  TBili  0.6  /  DBili  x   /  AST  13  /  ALT  28  /  AlkPhos  53  08-19      Mg 2.2  Phos 3.0      PT/INR - ( 19 Aug 2021 07:01 )   PT: 13.7 sec;   INR: 1.15 ratio         PTT - ( 19 Aug 2021 07:01 )  PTT:27.8 sec      Uric Acid 2.3        RADIOLOGY & ADDITIONAL STUDIES:           Diagnosis:  Acute Myeloid Leukemia, FLT3-    Protocol/Chemo Regimen: 7+3 (Daunorubicin and Cytarabine)    Day: 14    Pt endorsed: No complaints. " Give me heads up before BM bx" . Mouth sore improving, no pain.     Review of Systems: Denies fever, sore throat, SOB, chest discomfort, n/v, diarrhea,abdominal discomfort, dysuria.     Pain scale: denies     Diet: DASH/TLC      Allergies    No Known Allergies    Intolerances      ANTIMICROBIALS  acyclovir   Oral Tab/Cap 400 milliGRAM(s) Oral every 8 hours  cefepime   IVPB 2000 milliGRAM(s) IV Intermittent every 8 hours  cefepime   IVPB      posaconazole DR Tablet 300 milliGRAM(s) Oral daily      HEME/ONC MEDICATIONS      STANDING MEDICATIONS  amLODIPine   Tablet 5 milliGRAM(s) Oral daily  Biotene Dry Mouth Oral Rinse 5 milliLiter(s) Swish and Spit five times a day  chlorhexidine 2% Cloths 1 Application(s) Topical <User Schedule>  FIRST- Mouthwash  BLM 5 milliLiter(s) Swish and Spit three times a day  petrolatum white Ointment 1 Application(s) Topical four times a day  polyethylene glycol 3350 17 Gram(s) Oral daily  senna 2 Tablet(s) Oral at bedtime  sodium bicarbonate Mouth Rinse 15 milliLiter(s) Swish and Spit four times a day  sodium chloride 0.9%. 1000 milliLiter(s) IV Continuous <Continuous>      PRN MEDICATIONS  acetaminophen   Tablet .. 650 milliGRAM(s) Oral every 6 hours PRN  benzocaine 15 mG/menthol 3.6 mG (Sugar-Free) Lozenge 1 Lozenge Oral every 4 hours PRN  metoclopramide Injectable 10 milliGRAM(s) IV Push every 6 hours PRN  sodium chloride 0.65% Nasal 1 Spray(s) Both Nostrils three times a day PRN  sodium chloride 0.9% lock flush 10 milliLiter(s) IV Push every 1 hour PRN        Vital Signs Last 24 Hrs  T(C): 37.6 (19 Aug 2021 05:38), Max: 37.6 (18 Aug 2021 21:08)  T(F): 99.6 (19 Aug 2021 05:38), Max: 99.7 (18 Aug 2021 21:08)  HR: 92 (19 Aug 2021 05:38) (90 - 104)  BP: 126/75 (19 Aug 2021 05:38) (101/64 - 146/77)  BP(mean): --  RR: 18 (19 Aug 2021 05:38) (18 - 18)  SpO2: 94% (19 Aug 2021 05:38) (94% - 100%)    PHYSICAL EXAM  General:NAD  HEENT: PERRL, EOMI, conjunctiva clear, oral ulcer --->left side of tongue. Labial ulcers scabbed  CV: S1S2, RRR, no murmur  Lungs: Unlabored, CTA all fields, no rales, no wheezes  Abdomen: BS(+), soft  nontender, nondistended  Ext: BLE no edema, PPP, no calf tenderness  Skin:warm, dry, no rash  Neuro: AOX3, nonfocal  Central Line: RCW TLC, site C/D/I    RECENT CULTURES:  08-17 @ 17:26  Clean Catch Clean Catch (Midstream)  --  --  --    No growth  --  08-17 @ 12:55  .Blood Blood-Peripheral  --  --  --    No growth to date.  --  08-17 @ 10:17  .Blood Blood-Catheter  --  --  --    No growth to date.  --  08-14 @ 20:49  Clean Catch Clean Catch (Midstream)  --  --  --    No growth  --  08-14 @ 20:46  .Blood Blood-Catheter  --  --  --    No growth to date.  --  08-12 @ 22:09  Clean Catch Clean Catch (Midstream)  --  --  --    <10,000 CFU/mL Normal Urogenital Heather  --  08-12 @ 22:03  .Blood Blood-Catheter  --  --  --    No Growth Final  --        LABS:                        7.2    0.23  )-----------( 20       ( 19 Aug 2021 07:06 )             20.7         Mean Cell Volume : 88.8 fl  Mean Cell Hemoglobin : 30.9 pg  Mean Cell Hemoglobin Concentration : 34.8 gm/dL  Auto Neutrophil # : x  Auto Lymphocyte # : x  Auto Monocyte # : x  Auto Eosinophil # : x  Auto Basophil # : x  Auto Neutrophil % : x  Auto Lymphocyte % : x  Auto Monocyte % : x  Auto Eosinophil % : x  Auto Basophil % : x      08-19    140  |  106  |  9   ----------------------------<  98  3.6   |  21<L>  |  0.88    Ca    9.4      19 Aug 2021 07:03  Phos  3.0     08-19  Mg     2.2     08-19    TPro  6.4  /  Alb  3.5  /  TBili  0.6  /  DBili  x   /  AST  13  /  ALT  28  /  AlkPhos  53  08-19      Mg 2.2  Phos 3.0      PT/INR - ( 19 Aug 2021 07:01 )   PT: 13.7 sec;   INR: 1.15 ratio         PTT - ( 19 Aug 2021 07:01 )  PTT:27.8 sec      Uric Acid 2.3        RADIOLOGY & ADDITIONAL STUDIES:

## 2021-08-19 NOTE — PROGRESS NOTE ADULT - ASSESSMENT
Mr. Tian is  a 35 y/o male with h/o PMH HTN, fatty liver, kidney stones presents with rash, dizziness, fatigue and severe RUQ pain for 3 days. Now with anemia, thrombocytopenia and leukocytosis with 17% blasts, Bone marrow bx c/w AML. Transferred to [address] [place] [address] for management. Patient receiving induction chemotherapy with 7+3 (Daunorubicin and Cytarabine). Patient has pancytopenia secondary to chemotherapy and disease process.

## 2021-08-19 NOTE — PROGRESS NOTE ADULT - PROBLEM SELECTOR PLAN 2
Patient is febrile, +neutropenic   On Cefepime, All Cultures NGTD  s/p Zosyn for possible PNA, d/c'd after 8/6 PM dose  8/9 started on Levaquin and posaconazole ppx for neutropenia  8/12 - Started Acyclovir for oral ulcers  8/12 CXR No acute pulmonary disease  8/14 pancx and repeat CT CAP to look for fever source revealed Etiology for the patient's fever not identified; Hepatic steatosis.  Added sodium bicarb, mouth rinse, Cepacol PRN sore throat   8/14 (-) CMV PCR  8/15 monitor soft stools  HSV 1 serology (+) neutropenic   On Cefepime, All Cultures NGTD  s/p Zosyn for possible PNA, d/c'd after 8/6 PM dose  8/9 started on Levaquin and posaconazole ppx for neutropenia  8/12 - Started Acyclovir for oral ulcers  8/12 CXR No acute pulmonary disease  8/14 pancx and repeat CT CAP to look for fever source revealed Etiology for the patient's fever not identified; Hepatic steatosis.  Added sodium bicarb, mouth rinse, Cepacol PRN sore throat   8/14 (-) CMV PCR  8/15 monitor soft stools  HSV 1 serology (+)  8/18 Cefepime for neutropenic fever

## 2021-08-20 LAB
ALBUMIN SERPL ELPH-MCNC: 3.5 G/DL — SIGNIFICANT CHANGE UP (ref 3.3–5)
ALP SERPL-CCNC: 57 U/L — SIGNIFICANT CHANGE UP (ref 40–120)
ALT FLD-CCNC: 27 U/L — SIGNIFICANT CHANGE UP (ref 10–45)
ANION GAP SERPL CALC-SCNC: 13 MMOL/L — SIGNIFICANT CHANGE UP (ref 5–17)
APPEARANCE UR: CLEAR — SIGNIFICANT CHANGE UP
AST SERPL-CCNC: 14 U/L — SIGNIFICANT CHANGE UP (ref 10–40)
BACTERIA # UR AUTO: NEGATIVE — SIGNIFICANT CHANGE UP
BILIRUB SERPL-MCNC: 0.4 MG/DL — SIGNIFICANT CHANGE UP (ref 0.2–1.2)
BILIRUB UR-MCNC: NEGATIVE — SIGNIFICANT CHANGE UP
BLD GP AB SCN SERPL QL: NEGATIVE — SIGNIFICANT CHANGE UP
BUN SERPL-MCNC: 10 MG/DL — SIGNIFICANT CHANGE UP (ref 7–23)
CALCIUM SERPL-MCNC: 9.1 MG/DL — SIGNIFICANT CHANGE UP (ref 8.4–10.5)
CHLORIDE SERPL-SCNC: 106 MMOL/L — SIGNIFICANT CHANGE UP (ref 96–108)
CO2 SERPL-SCNC: 21 MMOL/L — LOW (ref 22–31)
COLOR SPEC: SIGNIFICANT CHANGE UP
CREAT SERPL-MCNC: 0.88 MG/DL — SIGNIFICANT CHANGE UP (ref 0.5–1.3)
DIFF PNL FLD: NEGATIVE — SIGNIFICANT CHANGE UP
EPI CELLS # UR: 0 /HPF — SIGNIFICANT CHANGE UP
GLUCOSE SERPL-MCNC: 107 MG/DL — HIGH (ref 70–99)
GLUCOSE UR QL: NEGATIVE — SIGNIFICANT CHANGE UP
HCT VFR BLD CALC: 20.2 % — CRITICAL LOW (ref 39–50)
HGB BLD-MCNC: 7.1 G/DL — LOW (ref 13–17)
HYALINE CASTS # UR AUTO: 0 /LPF — SIGNIFICANT CHANGE UP (ref 0–2)
KETONES UR-MCNC: NEGATIVE — SIGNIFICANT CHANGE UP
LDH SERPL L TO P-CCNC: 181 U/L — SIGNIFICANT CHANGE UP (ref 50–242)
LEUKOCYTE ESTERASE UR-ACNC: NEGATIVE — SIGNIFICANT CHANGE UP
MAGNESIUM SERPL-MCNC: 2.1 MG/DL — SIGNIFICANT CHANGE UP (ref 1.6–2.6)
MCHC RBC-ENTMCNC: 31.3 PG — SIGNIFICANT CHANGE UP (ref 27–34)
MCHC RBC-ENTMCNC: 35.1 GM/DL — SIGNIFICANT CHANGE UP (ref 32–36)
MCV RBC AUTO: 89 FL — SIGNIFICANT CHANGE UP (ref 80–100)
NITRITE UR-MCNC: NEGATIVE — SIGNIFICANT CHANGE UP
NRBC # BLD: 0 /100 WBCS — SIGNIFICANT CHANGE UP (ref 0–0)
PH UR: 6 — SIGNIFICANT CHANGE UP (ref 5–8)
PHOSPHATE SERPL-MCNC: 3.2 MG/DL — SIGNIFICANT CHANGE UP (ref 2.5–4.5)
PLATELET # BLD AUTO: 12 K/UL — CRITICAL LOW (ref 150–400)
POTASSIUM SERPL-MCNC: 3.3 MMOL/L — LOW (ref 3.5–5.3)
POTASSIUM SERPL-SCNC: 3.3 MMOL/L — LOW (ref 3.5–5.3)
PROT SERPL-MCNC: 6.4 G/DL — SIGNIFICANT CHANGE UP (ref 6–8.3)
PROT UR-MCNC: ABNORMAL
RAPID RVP RESULT: SIGNIFICANT CHANGE UP
RBC # BLD: 2.27 M/UL — LOW (ref 4.2–5.8)
RBC # FLD: 16.7 % — HIGH (ref 10.3–14.5)
RBC CASTS # UR COMP ASSIST: 2 /HPF — SIGNIFICANT CHANGE UP (ref 0–4)
RH IG SCN BLD-IMP: POSITIVE — SIGNIFICANT CHANGE UP
SARS-COV-2 RNA SPEC QL NAA+PROBE: SIGNIFICANT CHANGE UP
SODIUM SERPL-SCNC: 140 MMOL/L — SIGNIFICANT CHANGE UP (ref 135–145)
SP GR SPEC: 1.02 — SIGNIFICANT CHANGE UP (ref 1.01–1.02)
URATE SERPL-MCNC: 2.1 MG/DL — LOW (ref 3.4–8.8)
UROBILINOGEN FLD QL: NEGATIVE — SIGNIFICANT CHANGE UP
WBC # BLD: 0.24 K/UL — CRITICAL LOW (ref 3.8–10.5)
WBC # FLD AUTO: 0.24 K/UL — CRITICAL LOW (ref 3.8–10.5)
WBC UR QL: 5 /HPF — SIGNIFICANT CHANGE UP (ref 0–5)

## 2021-08-20 PROCEDURE — 71045 X-RAY EXAM CHEST 1 VIEW: CPT | Mod: 26

## 2021-08-20 PROCEDURE — 99232 SBSQ HOSP IP/OBS MODERATE 35: CPT

## 2021-08-20 RX ORDER — MEROPENEM 1 G/30ML
1000 INJECTION INTRAVENOUS EVERY 8 HOURS
Refills: 0 | Status: DISCONTINUED | OUTPATIENT
Start: 2021-08-20 | End: 2021-08-26

## 2021-08-20 RX ORDER — ALPRAZOLAM 0.25 MG
0.25 TABLET ORAL ONCE
Refills: 0 | Status: DISCONTINUED | OUTPATIENT
Start: 2021-08-20 | End: 2021-08-20

## 2021-08-20 RX ADMIN — Medication 5 MILLILITER(S): at 05:19

## 2021-08-20 RX ADMIN — Medication 400 MILLIGRAM(S): at 06:22

## 2021-08-20 RX ADMIN — DIPHENHYDRAMINE HYDROCHLORIDE AND LIDOCAINE HYDROCHLORIDE AND ALUMINUM HYDROXIDE AND MAGNESIUM HYDRO 5 MILLILITER(S): KIT at 21:09

## 2021-08-20 RX ADMIN — Medication 0.25 MILLIGRAM(S): at 21:07

## 2021-08-20 RX ADMIN — Medication 15 MILLILITER(S): at 17:01

## 2021-08-20 RX ADMIN — Medication 1 APPLICATION(S): at 11:37

## 2021-08-20 RX ADMIN — Medication 5 MILLILITER(S): at 19:34

## 2021-08-20 RX ADMIN — Medication 15 MILLILITER(S): at 21:14

## 2021-08-20 RX ADMIN — Medication 400 MILLIGRAM(S): at 21:09

## 2021-08-20 RX ADMIN — Medication 1 APPLICATION(S): at 17:01

## 2021-08-20 RX ADMIN — MEROPENEM 100 MILLIGRAM(S): 1 INJECTION INTRAVENOUS at 21:09

## 2021-08-20 RX ADMIN — DIPHENHYDRAMINE HYDROCHLORIDE AND LIDOCAINE HYDROCHLORIDE AND ALUMINUM HYDROXIDE AND MAGNESIUM HYDRO 5 MILLILITER(S): KIT at 13:44

## 2021-08-20 RX ADMIN — AMLODIPINE BESYLATE 5 MILLIGRAM(S): 2.5 TABLET ORAL at 06:22

## 2021-08-20 RX ADMIN — CEFEPIME 100 MILLIGRAM(S): 1 INJECTION, POWDER, FOR SOLUTION INTRAMUSCULAR; INTRAVENOUS at 05:19

## 2021-08-20 RX ADMIN — Medication 400 MILLIGRAM(S): at 13:54

## 2021-08-20 RX ADMIN — Medication 1 APPLICATION(S): at 05:24

## 2021-08-20 RX ADMIN — Medication 15 MILLILITER(S): at 12:46

## 2021-08-20 RX ADMIN — POSACONAZOLE 300 MILLIGRAM(S): 100 TABLET, DELAYED RELEASE ORAL at 12:47

## 2021-08-20 RX ADMIN — Medication 5 MILLILITER(S): at 12:46

## 2021-08-20 RX ADMIN — CHLORHEXIDINE GLUCONATE 1 APPLICATION(S): 213 SOLUTION TOPICAL at 10:01

## 2021-08-20 RX ADMIN — Medication 5 MILLILITER(S): at 16:43

## 2021-08-20 RX ADMIN — Medication 5 MILLILITER(S): at 21:09

## 2021-08-20 RX ADMIN — BENZOCAINE AND MENTHOL 1 LOZENGE: 5; 1 LIQUID ORAL at 20:14

## 2021-08-20 RX ADMIN — DIPHENHYDRAMINE HYDROCHLORIDE AND LIDOCAINE HYDROCHLORIDE AND ALUMINUM HYDROXIDE AND MAGNESIUM HYDRO 5 MILLILITER(S): KIT at 05:24

## 2021-08-20 RX ADMIN — MEROPENEM 100 MILLIGRAM(S): 1 INJECTION INTRAVENOUS at 13:41

## 2021-08-20 RX ADMIN — Medication 1 APPLICATION(S): at 21:13

## 2021-08-20 RX ADMIN — BENZOCAINE AND MENTHOL 1 LOZENGE: 5; 1 LIQUID ORAL at 05:19

## 2021-08-20 RX ADMIN — Medication 15 MILLILITER(S): at 05:24

## 2021-08-20 RX ADMIN — Medication 650 MILLIGRAM(S): at 16:51

## 2021-08-20 RX ADMIN — Medication 650 MILLIGRAM(S): at 06:35

## 2021-08-20 NOTE — PROGRESS NOTE ADULT - ATTENDING COMMENTS
35yo M admitted with RUQ pain found to have bloodwork concerning for acute leukemia  -13% myeloblasts on PB. Flt3 negative.   -s/p BMbx 8/4 -AML. f/u cytogenetics, [facility]  -started 7+3 on 8/6 -cytarabine 100/m2 and dauno 90/m2. Today is day 14  -pain control, improved today. Abd sono showing hepatomegaly and hepatic steatosis. Right pleural effusion. CT chest shows small right pleural effusion  -MUGA EF 71%  -discussed with patient and family at the bedside. We also discussed sperm banking -pt declined  -febrile again, Tm 102.3 -changed to cefepime, f/u Cx's and CXR   -Persistent fevers with mucositis. Aggressive oral care - adding sodium bicarb. Cultures negative, repeat CT 8/14 without infectious source.   -cont posaconazole and acyclovir PPx  -TLC placement  -supportive care  -transfuse as needed  -day 14 BMbx on 8/19 35yo M admitted with RUQ pain found to have bloodwork concerning for acute leukemia  -13% myeloblasts on PB. Flt3 negative.   -s/p BMbx 8/4 -AML. f/u cytogenetics, [facility]  -started 7+3 on 8/6 -cytarabine 100/m2 and dauno 90/m2. Today is day 15  -pain control, improved today. Abd sono showing hepatomegaly and hepatic steatosis. Right pleural effusion. CT chest shows small right pleural effusion  -MUGA EF 71%  -discussed with patient and family at the bedside. We also discussed sperm banking -pt declined  -febrile again today (8/20)- cultures have all been negative, he's been on cefepime. Will swab for MRSA and send RVP.   -Persistent fevers with mucositis. Aggressive oral care - adding sodium bicarb. Cultures negative, repeat CT 8/14 without infectious source.   -cont posaconazole and acyclovir PPx  -TLC placement  -supportive care  -transfuse as needed  -day 14 BMbx on 8/19 - awaiting results.

## 2021-08-20 NOTE — CHART NOTE - NSCHARTNOTEFT_GEN_A_CORE
MEDICINE PA    Notified by RN patient with temperature of 100.4F.   Seen and examined patient at bedside.   Patient is alert, NAD.   Denies HA, CP, SOB, cough, N/V, or abd pain.    VITAL SIGNS:  T(C): 38 (08-20-21 @ 06:01), Max: 38 (08-20-21 @ 06:01)  HR: 101 (08-20-21 @ 06:01) (75 - 101)  BP: 101/63 (08-20-21 @ 06:01) (101/63 - 126/94)  RR: 18 (08-20-21 @ 06:01) (18 - 18)  SpO2: 96% (08-20-21 @ 06:01) (95% - 99%)  Wt(kg): --      LABORATORY:                          7.2    0.23  )-----------( 20       ( 19 Aug 2021 07:06 )             20.7       08-19    140  |  106  |  9   ----------------------------<  98  3.6   |  21<L>  |  0.88    Ca    9.4      19 Aug 2021 07:03  Phos  3.0     08-19  Mg     2.2     08-19    TPro  6.4  /  Alb  3.5  /  TBili  0.6  /  DBili  x   /  AST  13  /  ALT  28  /  AlkPhos  53  08-19          MICROBIOLOGY:   -Blood cultures x2 ordered.  -Urine culture ordered.      RADIOLOGY:  -CXR ordered.        PHYSICAL EXAM:  Constitutional: AOx3. NAD.  Respiratory: clear lungs bilaterally. No wheezing, rhonchi, or crackles.  Cardiovascular: S1 S2. No murmurs.  Gastrointestinal: BS X4 active. soft. nontender.  Extremities/Vascular: +2 pulses bilaterally. No BLE edema.      ASSESSMENT/PLAN:   HPI:  36M PMH HTN, fatty liver, kidney stones presents with rash, dizziness, fatigue and severe RUQ pain for 3 days. He has felt fatigued for the past few months. Three days ago, he developed sharp RUQ pain that wrapped around to his back. He rated the pain as a 5/10 with no change over the past three days. The pain doesn't change with PO intake or bowel movements. He has some associated dizziness with the pain. He also developed an erythematous rash that is nonpruritic on his upper chest, neck, and upper back over the past couple of days. He has had a twenty pound weight gain over the past year due to a change to sedentary lifestyle 2/2 pandemic. He denies night sweats and fevers. He has had constipation for the past week where he must strain to move his bowels. He has no urinary symptoms, fevers, chills, or changes in appetite. No diarrhea, N/V. No prior cardiac hx, pleuritic chest pain, or SOB. He had kidney stones 5 years ago but states this pain is worse. Denies allergies or new medications. He denies recent travel and works as a . He has had no recent sick contacts. He received all childhood vaccines.    In the ED: temp 99.4, tachycardic to 150s, started on fluids and allopurinol per heme/onc recs. He received 1 mg hydromorphone, 4 mg morphine, 600 mg ibuprofen , and 975 mg acetaminophen. CT A/P revealed fatty liver and small R pleural effusion. (30 Jul 2021 09:22)        1.) Neutropenic Fever - Recurrent (100.4F)  - 1x dose Tylenol 650mg PO administered.  - BC x2, UA/UC ordered and pending collection.  - CXR ordered and pending imaging.  - C/w Cefepime 2g q8h.  - Above plan discussed with patient and agreeable.  - Will continue to trend fever and monitor for worsening clinical signs.  - Will endorse to primary team in AM.      MIGUEL ClarkC   Department of Medicine   #[id number]

## 2021-08-20 NOTE — PROGRESS NOTE ADULT - SUBJECTIVE AND OBJECTIVE BOX
Diagnosis:  Acute Myeloid Leukemia, FLT3-    Protocol/Chemo Regimen: 7+3 (Daunorubicin and Cytarabine)    Day: 15    Pt endorsed: No complaints. " Give me heads up before BM bx" . Mouth sore improving, no pain.     Review of Systems: Denies fever, sore throat, SOB, chest discomfort, n/v, diarrhea,abdominal discomfort, dysuria.     Pain scale: denies     Diet: DASH/TLC    Allergies    No Known Allergies    Intolerances        ANTIMICROBIALS  acyclovir   Oral Tab/Cap 400 milliGRAM(s) Oral every 8 hours  cefepime   IVPB      cefepime   IVPB 2000 milliGRAM(s) IV Intermittent every 8 hours  posaconazole DR Tablet 300 milliGRAM(s) Oral daily      HEME/ONC MEDICATIONS  heparin   Injectable 5000 Unit(s) IV Push once      STANDING MEDICATIONS  amLODIPine   Tablet 5 milliGRAM(s) Oral daily  Biotene Dry Mouth Oral Rinse 5 milliLiter(s) Swish and Spit five times a day  chlorhexidine 2% Cloths 1 Application(s) Topical <User Schedule>  FIRST- Mouthwash  BLM 5 milliLiter(s) Swish and Spit three times a day  lidocaine 2% Injectable 20 milliLiter(s) Local Injection once  petrolatum white Ointment 1 Application(s) Topical four times a day  polyethylene glycol 3350 17 Gram(s) Oral daily  senna 2 Tablet(s) Oral at bedtime  sodium bicarbonate Mouth Rinse 15 milliLiter(s) Swish and Spit four times a day  sodium chloride 0.9%. 1000 milliLiter(s) IV Continuous <Continuous>      PRN MEDICATIONS  acetaminophen   Tablet .. 650 milliGRAM(s) Oral every 6 hours PRN  benzocaine 15 mG/menthol 3.6 mG (Sugar-Free) Lozenge 1 Lozenge Oral every 4 hours PRN  metoclopramide Injectable 10 milliGRAM(s) IV Push every 6 hours PRN  sodium chloride 0.65% Nasal 1 Spray(s) Both Nostrils three times a day PRN  sodium chloride 0.9% lock flush 10 milliLiter(s) IV Push every 1 hour PRN        Vital Signs Last 24 Hrs  T(C): 38 (20 Aug 2021 06:01), Max: 38 (20 Aug 2021 06:01)  T(F): 100.4 (20 Aug 2021 06:01), Max: 100.4 (20 Aug 2021 06:01)  HR: 101 (20 Aug 2021 06:01) (75 - 101)  BP: 101/63 (20 Aug 2021 06:01) (101/63 - 126/94)  BP(mean): --  RR: 18 (20 Aug 2021 06:01) (18 - 18)  SpO2: 96% (20 Aug 2021 06:01) (95% - 99%)    PHYSICAL EXAM  General:NAD  HEENT: PERRL, EOMI, conjunctiva clear, oral ulcer --->left side of tongue. Labial ulcers scabbed  CV: S1S2, RRR, no murmur  Lungs: Unlabored, CTA all fields, no rales, no wheezes  Abdomen: BS(+), soft  nontender, nondistended  Ext: BLE no edema, PPP, no calf tenderness  Skin:warm, dry, no rash  Neuro: AOX3, nonfocal  Central Line: RCW TLC, site C/D/I    LABS: Diagnosis:  Acute Myeloid Leukemia, FLT3-    Protocol/Chemo Regimen: 7+3 (Daunorubicin and Cytarabine)    Day: 15    Pt endorsed: No complaints  Review of Systems: Denies fever, sore throat, SOB, chest discomfort, n/v, diarrhea,abdominal discomfort, dysuria.     Pain scale: denies     Diet: DASH/TLC    Allergies    No Known Allergies    Intolerances        ANTIMICROBIALS  acyclovir   Oral Tab/Cap 400 milliGRAM(s) Oral every 8 hours  cefepime   IVPB      cefepime   IVPB 2000 milliGRAM(s) IV Intermittent every 8 hours  posaconazole DR Tablet 300 milliGRAM(s) Oral daily      HEME/ONC MEDICATIONS  heparin   Injectable 5000 Unit(s) IV Push once      STANDING MEDICATIONS  amLODIPine   Tablet 5 milliGRAM(s) Oral daily  Biotene Dry Mouth Oral Rinse 5 milliLiter(s) Swish and Spit five times a day  chlorhexidine 2% Cloths 1 Application(s) Topical <User Schedule>  FIRST- Mouthwash  BLM 5 milliLiter(s) Swish and Spit three times a day  lidocaine 2% Injectable 20 milliLiter(s) Local Injection once  petrolatum white Ointment 1 Application(s) Topical four times a day  polyethylene glycol 3350 17 Gram(s) Oral daily  senna 2 Tablet(s) Oral at bedtime  sodium bicarbonate Mouth Rinse 15 milliLiter(s) Swish and Spit four times a day  sodium chloride 0.9%. 1000 milliLiter(s) IV Continuous <Continuous>      PRN MEDICATIONS  acetaminophen   Tablet .. 650 milliGRAM(s) Oral every 6 hours PRN  benzocaine 15 mG/menthol 3.6 mG (Sugar-Free) Lozenge 1 Lozenge Oral every 4 hours PRN  metoclopramide Injectable 10 milliGRAM(s) IV Push every 6 hours PRN  sodium chloride 0.65% Nasal 1 Spray(s) Both Nostrils three times a day PRN  sodium chloride 0.9% lock flush 10 milliLiter(s) IV Push every 1 hour PRN        Vital Signs Last 24 Hrs  T(C): 38 (20 Aug 2021 06:01), Max: 38 (20 Aug 2021 06:01)  T(F): 100.4 (20 Aug 2021 06:01), Max: 100.4 (20 Aug 2021 06:01)  HR: 101 (20 Aug 2021 06:01) (75 - 101)  BP: 101/63 (20 Aug 2021 06:01) (101/63 - 126/94)  BP(mean): --  RR: 18 (20 Aug 2021 06:01) (18 - 18)  SpO2: 96% (20 Aug 2021 06:01) (95% - 99%)    PHYSICAL EXAM  General:NAD  HEENT: PERRL, EOMI, conjunctiva clear, oral ulcer --->left side of tongue. Labial ulcers scabbed  CV: S1S2, RRR, no murmur  Lungs: Unlabored, CTA all fields, no rales, no wheezes  Abdomen: BS(+), soft  nontender, nondistended  Ext: BLE no edema, PPP, no calf tenderness  Skin:warm, dry, no rash  Neuro: AOX3, nonfocal  Central Line: RCW TLC, site C/D/I    LABS:    Blood Cultures:                           7.1    0.24  )-----------( 12       ( 20 Aug 2021 07:05 )             20.2         Mean Cell Volume : 89.0 fl  Mean Cell Hemoglobin : 31.3 pg  Mean Cell Hemoglobin Concentration : 35.1 gm/dL  Auto Neutrophil # : x  Auto Lymphocyte # : x  Auto Monocyte # : x  Auto Eosinophil # : x  Auto Basophil # : x  Auto Neutrophil % : x  Auto Lymphocyte % : x  Auto Monocyte % : x  Auto Eosinophil % : x  Auto Basophil % : x      08-20    140  |  106  |  10  ----------------------------<  107<H>  3.3<L>   |  21<L>  |  0.88    Ca    9.1      20 Aug 2021 07:05  Phos  3.2     08-20  Mg     2.1     08-20    TPro  6.4  /  Alb  3.5  /  TBili  0.4  /  DBili  x   /  AST  14  /  ALT  27  /  AlkPhos  57  08-20      Mg 2.1  Phos 3.2      PT/INR - ( 19 Aug 2021 07:01 )   PT: 13.7 sec;   INR: 1.15 ratio         PTT - ( 19 Aug 2021 07:01 )  PTT:27.8 sec      Uric Acid 2.1               Diagnosis:  Acute Myeloid Leukemia, FLT3-    Protocol/Chemo Regimen: 7+3 (Daunorubicin and Cytarabine)    Day: 15    Pt endorsed: No complaints except new rash  Review of Systems: Denies fever, sore throat, SOB, chest discomfort, n/v, diarrhea,abdominal discomfort, dysuria.     Pain scale: denies     Diet: DASH/TLC    Allergies    No Known Allergies    Intolerances        ANTIMICROBIALS  acyclovir   Oral Tab/Cap 400 milliGRAM(s) Oral every 8 hours  cefepime   IVPB      cefepime   IVPB 2000 milliGRAM(s) IV Intermittent every 8 hours  posaconazole DR Tablet 300 milliGRAM(s) Oral daily      HEME/ONC MEDICATIONS  heparin   Injectable 5000 Unit(s) IV Push once      STANDING MEDICATIONS  amLODIPine   Tablet 5 milliGRAM(s) Oral daily  Biotene Dry Mouth Oral Rinse 5 milliLiter(s) Swish and Spit five times a day  chlorhexidine 2% Cloths 1 Application(s) Topical <User Schedule>  FIRST- Mouthwash  BLM 5 milliLiter(s) Swish and Spit three times a day  lidocaine 2% Injectable 20 milliLiter(s) Local Injection once  petrolatum white Ointment 1 Application(s) Topical four times a day  polyethylene glycol 3350 17 Gram(s) Oral daily  senna 2 Tablet(s) Oral at bedtime  sodium bicarbonate Mouth Rinse 15 milliLiter(s) Swish and Spit four times a day  sodium chloride 0.9%. 1000 milliLiter(s) IV Continuous <Continuous>      PRN MEDICATIONS  acetaminophen   Tablet .. 650 milliGRAM(s) Oral every 6 hours PRN  benzocaine 15 mG/menthol 3.6 mG (Sugar-Free) Lozenge 1 Lozenge Oral every 4 hours PRN  metoclopramide Injectable 10 milliGRAM(s) IV Push every 6 hours PRN  sodium chloride 0.65% Nasal 1 Spray(s) Both Nostrils three times a day PRN  sodium chloride 0.9% lock flush 10 milliLiter(s) IV Push every 1 hour PRN        Vital Signs Last 24 Hrs  T(C): 38 (20 Aug 2021 06:01), Max: 38 (20 Aug 2021 06:01)  T(F): 100.4 (20 Aug 2021 06:01), Max: 100.4 (20 Aug 2021 06:01)  HR: 101 (20 Aug 2021 06:01) (75 - 101)  BP: 101/63 (20 Aug 2021 06:01) (101/63 - 126/94)  BP(mean): --  RR: 18 (20 Aug 2021 06:01) (18 - 18)  SpO2: 96% (20 Aug 2021 06:01) (95% - 99%)    PHYSICAL EXAM  General:NAD  HEENT: PERRL, EOMI, conjunctiva clear, oral ulcer --->left side of tongue. Labial ulcers scabbed  CV: S1S2, RRR, no murmur  Lungs: Unlabored, CTA all fields, no rales, no wheezes  Abdomen: BS(+), soft  nontender, nondistended  Ext: BLE no edema, PPP, no calf tenderness  Skin:warm, dry, new rash back and chest.   Neuro: AOX3, nonfocal  Central Line: RCW TLC, site C/D/I    LABS:    Blood Cultures:                           7.1    0.24  )-----------( 12       ( 20 Aug 2021 07:05 )             20.2         Mean Cell Volume : 89.0 fl  Mean Cell Hemoglobin : 31.3 pg  Mean Cell Hemoglobin Concentration : 35.1 gm/dL  Auto Neutrophil # : x  Auto Lymphocyte # : x  Auto Monocyte # : x  Auto Eosinophil # : x  Auto Basophil # : x  Auto Neutrophil % : x  Auto Lymphocyte % : x  Auto Monocyte % : x  Auto Eosinophil % : x  Auto Basophil % : x      08-20    140  |  106  |  10  ----------------------------<  107<H>  3.3<L>   |  21<L>  |  0.88    Ca    9.1      20 Aug 2021 07:05  Phos  3.2     08-20  Mg     2.1     08-20    TPro  6.4  /  Alb  3.5  /  TBili  0.4  /  DBili  x   /  AST  14  /  ALT  27  /  AlkPhos  57  08-20      Mg 2.1  Phos 3.2      PT/INR - ( 19 Aug 2021 07:01 )   PT: 13.7 sec;   INR: 1.15 ratio         PTT - ( 19 Aug 2021 07:01 )  PTT:27.8 sec      Uric Acid 2.1

## 2021-08-20 NOTE — PROGRESS NOTE ADULT - PROBLEM SELECTOR PLAN 1
FLT3 negative, BM bx c/w AML   consented for [place] study    HIV (-), Hep (-)  Echo 70%, MUGA EF 71%   Discussed sperm banking. Declined   TLC placed on 8/6 8/6 Started induction with  7+3 (Cytarabine + Daunorubicin)  Follow daily lytes, CBC. replace, Replete prn  Continue with Allopurinol 300mg daily, IV hydration, mouth care, pain control, antiemetics  Monitor for TLS daily  Day 14 Bm bx today. Premed with Xanax. FLT3 negative, BM bx c/w AML   consented for [place] study    HIV (-), Hep (-)  Echo 70%, MUGA EF 71%   Discussed sperm banking. Declined   TLC placed on 8/6 8/6 Started induction with  7+3 (Cytarabine + Daunorubicin)  Follow daily lytes, CBC. replace, Replete prn  Now off Allopurinol, IV hydration, mouth care, pain control, antiemetics  Monitor for TLS daily  fu Day 14 Bm bx 8/19. FLT3 negative, BM bx c/w AML   consented for [place] study    HIV (-), Hep (-)  Echo 70%, MUGA EF 71%   Discussed sperm banking. Declined   TLC placed on 8/6 8/6 Started induction with  7+3 (Cytarabine + Daunorubicin)  Follow daily lytes, CBC. replace, Replete prn  Now off Allopurinol, IV hydration, mouth care, pain control, antiemetics  Monitor for TLS daily  fu Day 14 Bm bx 8/19.  Monitor rash.

## 2021-08-20 NOTE — PROGRESS NOTE ADULT - ASSESSMENT
Mr. Tian is  a 35 y/o male with h/o PMH HTN, fatty liver, kidney stones presents with rash, dizziness, fatigue and severe RUQ pain for 3 days. Now with anemia, thrombocytopenia and leukocytosis with 17% blasts, Bone marrow bx c/w AML. Transferred to [address] [place] [address] for management. Patient receiving induction chemotherapy with 7+3 (Daunorubicin and Cytarabine). Patient has pancytopenia secondary to chemotherapy and disease process.

## 2021-08-20 NOTE — PROGRESS NOTE ADULT - PROBLEM SELECTOR PLAN 2
neutropenic   On Cefepime, All Cultures NGTD  s/p Zosyn for possible PNA, d/c'd after 8/6 PM dose  8/9 started on Levaquin and posaconazole ppx for neutropenia  8/12 - Started Acyclovir for oral ulcers  8/12 CXR No acute pulmonary disease  8/14 pancx and repeat CT CAP to look for fever source revealed Etiology for the patient's fever not identified; Hepatic steatosis.  Added sodium bicarb, mouth rinse, Cepacol PRN sore throat   8/14 (-) CMV PCR  8/15 monitor soft stools  HSV 1 serology (+)  8/18 Cefepime for neutropenic fever neutropenic, febrile  FU cultures 8/20All Cultures NGTD  s/p Zosyn for possible PNA, d/c'd after 8/6 PM dose  8/9 started on Levaquin and posaconazole ppx for neutropenia  8/12 - Started Acyclovir for oral ulcers  8/14 (-) CMV PCR  HSV 1 serology (+)  8/20 Cefepime changed to ingrid due to persistent fever  Consider ID consult if continues.  FU MRSA and RVP swab. neutropenic, febrile  FU cultures 8/20All Cultures NGTD  s/p Zosyn for possible PNA, d/c'd after 8/6 PM dose  8/9 started on Levaquin and posaconazole ppx for neutropenia  8/12 - Started Acyclovir for oral ulcers  8/14 (-) CMV PCR  HSV 1 serology (+)  8/20 Cefepime changed to ingrid due to persistent fever and new rash.   Consider ID consult if continues.  FU MRSA and RVP swab.

## 2021-08-21 LAB
ALBUMIN SERPL ELPH-MCNC: 3.2 G/DL — LOW (ref 3.3–5)
ALP SERPL-CCNC: 54 U/L — SIGNIFICANT CHANGE UP (ref 40–120)
ALT FLD-CCNC: 30 U/L — SIGNIFICANT CHANGE UP (ref 10–45)
ANION GAP SERPL CALC-SCNC: 12 MMOL/L — SIGNIFICANT CHANGE UP (ref 5–17)
AST SERPL-CCNC: 15 U/L — SIGNIFICANT CHANGE UP (ref 10–40)
BILIRUB SERPL-MCNC: 0.4 MG/DL — SIGNIFICANT CHANGE UP (ref 0.2–1.2)
BUN SERPL-MCNC: 6 MG/DL — LOW (ref 7–23)
CALCIUM SERPL-MCNC: 9 MG/DL — SIGNIFICANT CHANGE UP (ref 8.4–10.5)
CHLORIDE SERPL-SCNC: 107 MMOL/L — SIGNIFICANT CHANGE UP (ref 96–108)
CO2 SERPL-SCNC: 22 MMOL/L — SIGNIFICANT CHANGE UP (ref 22–31)
CREAT SERPL-MCNC: 0.83 MG/DL — SIGNIFICANT CHANGE UP (ref 0.5–1.3)
CULTURE RESULTS: NO GROWTH — SIGNIFICANT CHANGE UP
GLUCOSE SERPL-MCNC: 96 MG/DL — SIGNIFICANT CHANGE UP (ref 70–99)
HCT VFR BLD CALC: 18.7 % — CRITICAL LOW (ref 39–50)
HGB BLD-MCNC: 6.5 G/DL — CRITICAL LOW (ref 13–17)
LDH SERPL L TO P-CCNC: 167 U/L — SIGNIFICANT CHANGE UP (ref 50–242)
MAGNESIUM SERPL-MCNC: 2 MG/DL — SIGNIFICANT CHANGE UP (ref 1.6–2.6)
MCHC RBC-ENTMCNC: 30.7 PG — SIGNIFICANT CHANGE UP (ref 27–34)
MCHC RBC-ENTMCNC: 34.8 GM/DL — SIGNIFICANT CHANGE UP (ref 32–36)
MCV RBC AUTO: 88.2 FL — SIGNIFICANT CHANGE UP (ref 80–100)
MRSA PCR RESULT.: SIGNIFICANT CHANGE UP
NRBC # BLD: 0 /100 WBCS — SIGNIFICANT CHANGE UP (ref 0–0)
PHOSPHATE SERPL-MCNC: 3.1 MG/DL — SIGNIFICANT CHANGE UP (ref 2.5–4.5)
PLATELET # BLD AUTO: 5 K/UL — CRITICAL LOW (ref 150–400)
POTASSIUM SERPL-MCNC: 3.5 MMOL/L — SIGNIFICANT CHANGE UP (ref 3.5–5.3)
POTASSIUM SERPL-SCNC: 3.5 MMOL/L — SIGNIFICANT CHANGE UP (ref 3.5–5.3)
PROT SERPL-MCNC: 6.1 G/DL — SIGNIFICANT CHANGE UP (ref 6–8.3)
RBC # BLD: 2.12 M/UL — LOW (ref 4.2–5.8)
RBC # FLD: 16.3 % — HIGH (ref 10.3–14.5)
S AUREUS DNA NOSE QL NAA+PROBE: SIGNIFICANT CHANGE UP
SODIUM SERPL-SCNC: 141 MMOL/L — SIGNIFICANT CHANGE UP (ref 135–145)
SPECIMEN SOURCE: SIGNIFICANT CHANGE UP
URATE SERPL-MCNC: 2 MG/DL — LOW (ref 3.4–8.8)
WBC # BLD: 0.22 K/UL — CRITICAL LOW (ref 3.8–10.5)
WBC # FLD AUTO: 0.22 K/UL — CRITICAL LOW (ref 3.8–10.5)

## 2021-08-21 PROCEDURE — 99233 SBSQ HOSP IP/OBS HIGH 50: CPT

## 2021-08-21 RX ORDER — LANOLIN ALCOHOL/MO/W.PET/CERES
5 CREAM (GRAM) TOPICAL ONCE
Refills: 0 | Status: COMPLETED | OUTPATIENT
Start: 2021-08-21 | End: 2021-08-21

## 2021-08-21 RX ORDER — HYDROCORTISONE 1 %
1 OINTMENT (GRAM) TOPICAL
Refills: 0 | Status: DISCONTINUED | OUTPATIENT
Start: 2021-08-21 | End: 2021-08-26

## 2021-08-21 RX ADMIN — Medication 5 MILLILITER(S): at 11:11

## 2021-08-21 RX ADMIN — Medication 15 MILLILITER(S): at 19:06

## 2021-08-21 RX ADMIN — MEROPENEM 100 MILLIGRAM(S): 1 INJECTION INTRAVENOUS at 21:05

## 2021-08-21 RX ADMIN — Medication 1 APPLICATION(S): at 05:16

## 2021-08-21 RX ADMIN — DIPHENHYDRAMINE HYDROCHLORIDE AND LIDOCAINE HYDROCHLORIDE AND ALUMINUM HYDROXIDE AND MAGNESIUM HYDRO 5 MILLILITER(S): KIT at 05:15

## 2021-08-21 RX ADMIN — Medication 15 MILLILITER(S): at 05:16

## 2021-08-21 RX ADMIN — MEROPENEM 100 MILLIGRAM(S): 1 INJECTION INTRAVENOUS at 13:25

## 2021-08-21 RX ADMIN — MEROPENEM 100 MILLIGRAM(S): 1 INJECTION INTRAVENOUS at 05:15

## 2021-08-21 RX ADMIN — Medication 5 MILLILITER(S): at 19:05

## 2021-08-21 RX ADMIN — POSACONAZOLE 300 MILLIGRAM(S): 100 TABLET, DELAYED RELEASE ORAL at 11:11

## 2021-08-21 RX ADMIN — Medication 15 MILLILITER(S): at 11:12

## 2021-08-21 RX ADMIN — Medication 5 MILLILITER(S): at 05:15

## 2021-08-21 RX ADMIN — Medication 1 APPLICATION(S): at 11:12

## 2021-08-21 RX ADMIN — BENZOCAINE AND MENTHOL 1 LOZENGE: 5; 1 LIQUID ORAL at 17:44

## 2021-08-21 RX ADMIN — Medication 400 MILLIGRAM(S): at 05:15

## 2021-08-21 RX ADMIN — Medication 650 MILLIGRAM(S): at 01:54

## 2021-08-21 RX ADMIN — AMLODIPINE BESYLATE 5 MILLIGRAM(S): 2.5 TABLET ORAL at 05:14

## 2021-08-21 RX ADMIN — Medication 5 MILLILITER(S): at 16:33

## 2021-08-21 RX ADMIN — Medication 1 APPLICATION(S): at 17:46

## 2021-08-21 RX ADMIN — Medication 400 MILLIGRAM(S): at 21:05

## 2021-08-21 RX ADMIN — DIPHENHYDRAMINE HYDROCHLORIDE AND LIDOCAINE HYDROCHLORIDE AND ALUMINUM HYDROXIDE AND MAGNESIUM HYDRO 5 MILLILITER(S): KIT at 21:05

## 2021-08-21 RX ADMIN — DIPHENHYDRAMINE HYDROCHLORIDE AND LIDOCAINE HYDROCHLORIDE AND ALUMINUM HYDROXIDE AND MAGNESIUM HYDRO 5 MILLILITER(S): KIT at 13:23

## 2021-08-21 RX ADMIN — Medication 15 MILLILITER(S): at 21:06

## 2021-08-21 RX ADMIN — Medication 5 MILLILITER(S): at 21:04

## 2021-08-21 RX ADMIN — Medication 5 MILLIGRAM(S): at 21:04

## 2021-08-21 RX ADMIN — Medication 650 MILLIGRAM(S): at 00:37

## 2021-08-21 RX ADMIN — Medication 400 MILLIGRAM(S): at 13:21

## 2021-08-21 RX ADMIN — Medication 1 APPLICATION(S): at 21:06

## 2021-08-21 RX ADMIN — BENZOCAINE AND MENTHOL 1 LOZENGE: 5; 1 LIQUID ORAL at 05:14

## 2021-08-21 RX ADMIN — CHLORHEXIDINE GLUCONATE 1 APPLICATION(S): 213 SOLUTION TOPICAL at 09:31

## 2021-08-21 RX ADMIN — Medication 1 APPLICATION(S): at 17:35

## 2021-08-21 NOTE — PROVIDER CONTACT NOTE (OTHER) - ACTION/TREATMENT ORDERED:
650 mg Tylenol PO, no blood cultures at this time
Give Tylenol 650mg PO. Collect BCx2 and UA/UC. CXR ordered.
650 mg tylenol po, UA/UC, Blood cultures x2
No action taken at this time. Will continue to monitor.
No interventions at this time
Tylenol 650mg PO
Tylenol 650mg po prn to be given as per order.
650 mg PO Tylenol ordered and given.
Blood cx x2  UA/UC
PA made aware. evaluated @ bedside. po tylenol given. CXR ordered and completed. BCX ordered and drawn. IV abx continued. pt resting comfortable. will re-assess temp in flow sheet.
PRN tylenol  UA/UC
1g Tylenol x1 dose ordered
Give Tylenol 650mg PO. Continue to monitor.
PRN tylenol  BC x 2  UC  chest xray  cefepime

## 2021-08-21 NOTE — PROGRESS NOTE ADULT - SUBJECTIVE AND OBJECTIVE BOX
Diagnosis:  Acute Myeloid Leukemia, FLT3-    Protocol/Chemo Regimen: 7+3 (Daunorubicin and Cytarabine)    Day: 16    Pt endorsed: No complaints except new rash  Review of Systems: Denies fever, sore throat, SOB, chest discomfort, n/v, diarrhea,abdominal discomfort, dysuria.     Pain scale: denies     Diet: DASH/TLC    Allergies    No Known Allergies    Allergies    No Known Allergies    Intolerances        ANTIMICROBIALS  acyclovir   Oral Tab/Cap 400 milliGRAM(s) Oral every 8 hours  meropenem  IVPB 1000 milliGRAM(s) IV Intermittent every 8 hours  posaconazole DR Tablet 300 milliGRAM(s) Oral daily      HEME/ONC MEDICATIONS  heparin   Injectable 5000 Unit(s) IV Push once      STANDING MEDICATIONS  amLODIPine   Tablet 5 milliGRAM(s) Oral daily  Biotene Dry Mouth Oral Rinse 5 milliLiter(s) Swish and Spit five times a day  chlorhexidine 2% Cloths 1 Application(s) Topical <User Schedule>  FIRST- Mouthwash  BLM 5 milliLiter(s) Swish and Spit three times a day  lidocaine 2% Injectable 20 milliLiter(s) Local Injection once  petrolatum white Ointment 1 Application(s) Topical four times a day  polyethylene glycol 3350 17 Gram(s) Oral daily  senna 2 Tablet(s) Oral at bedtime  sodium bicarbonate Mouth Rinse 15 milliLiter(s) Swish and Spit four times a day  sodium chloride 0.9%. 1000 milliLiter(s) IV Continuous <Continuous>      PRN MEDICATIONS  acetaminophen   Tablet .. 650 milliGRAM(s) Oral every 6 hours PRN  benzocaine 15 mG/menthol 3.6 mG (Sugar-Free) Lozenge 1 Lozenge Oral every 4 hours PRN  metoclopramide Injectable 10 milliGRAM(s) IV Push every 6 hours PRN  sodium chloride 0.65% Nasal 1 Spray(s) Both Nostrils three times a day PRN  sodium chloride 0.9% lock flush 10 milliLiter(s) IV Push every 1 hour PRN        Vital Signs Last 24 Hrs  T(C): 37.2 (21 Aug 2021 05:12), Max: 38.3 (20 Aug 2021 16:48)  T(F): 98.9 (21 Aug 2021 05:12), Max: 101 (20 Aug 2021 16:48)  HR: 92 (21 Aug 2021 05:12) (92 - 111)  BP: 117/69 (21 Aug 2021 05:12) (110/66 - 135/82)  BP(mean): --  RR: 18 (21 Aug 2021 05:12) (18 - 18)  SpO2: 97% (21 Aug 2021 05:12) (95% - 99%)    PHYSICAL EXAM  General:NAD  HEENT: PERRL, EOMI, conjunctiva clear, oral ulcer --->left side of tongue. Labial ulcers scabbed  CV: S1S2, RRR, no murmur  Lungs: Unlabored, CTA all fields, no rales, no wheezes  Abdomen: BS(+), soft  nontender, nondistended  Ext: BLE no edema, PPP, no calf tenderness  Skin:warm, dry, new rash back and chest.   Neuro: AOX3, nonfocal  Central Line: RCW TLC, site C/D/I    LABS: Diagnosis:  Acute Myeloid Leukemia, FLT3-    Protocol/Chemo Regimen: 7+3 (Daunorubicin and Cytarabine)    Day: 16    Pt endorsed: No complaints Rash persists.       Review of Systems: Denies fever, sore throat, SOB, chest discomfort, n/v, diarrhea,abdominal discomfort, dysuria.     Pain scale: denies     Diet: DASH/TLC    Allergies    No Known Allergies    Allergies    No Known Allergies    Intolerances        ANTIMICROBIALS  acyclovir   Oral Tab/Cap 400 milliGRAM(s) Oral every 8 hours  meropenem  IVPB 1000 milliGRAM(s) IV Intermittent every 8 hours  posaconazole DR Tablet 300 milliGRAM(s) Oral daily      HEME/ONC MEDICATIONS  heparin   Injectable 5000 Unit(s) IV Push once      STANDING MEDICATIONS  amLODIPine   Tablet 5 milliGRAM(s) Oral daily  Biotene Dry Mouth Oral Rinse 5 milliLiter(s) Swish and Spit five times a day  chlorhexidine 2% Cloths 1 Application(s) Topical <User Schedule>  FIRST- Mouthwash  BLM 5 milliLiter(s) Swish and Spit three times a day  lidocaine 2% Injectable 20 milliLiter(s) Local Injection once  petrolatum white Ointment 1 Application(s) Topical four times a day  polyethylene glycol 3350 17 Gram(s) Oral daily  senna 2 Tablet(s) Oral at bedtime  sodium bicarbonate Mouth Rinse 15 milliLiter(s) Swish and Spit four times a day  sodium chloride 0.9%. 1000 milliLiter(s) IV Continuous <Continuous>      PRN MEDICATIONS  acetaminophen   Tablet .. 650 milliGRAM(s) Oral every 6 hours PRN  benzocaine 15 mG/menthol 3.6 mG (Sugar-Free) Lozenge 1 Lozenge Oral every 4 hours PRN  metoclopramide Injectable 10 milliGRAM(s) IV Push every 6 hours PRN  sodium chloride 0.65% Nasal 1 Spray(s) Both Nostrils three times a day PRN  sodium chloride 0.9% lock flush 10 milliLiter(s) IV Push every 1 hour PRN        Vital Signs Last 24 Hrs  T(C): 37.2 (21 Aug 2021 05:12), Max: 38.3 (20 Aug 2021 16:48)  T(F): 98.9 (21 Aug 2021 05:12), Max: 101 (20 Aug 2021 16:48)  HR: 92 (21 Aug 2021 05:12) (92 - 111)  BP: 117/69 (21 Aug 2021 05:12) (110/66 - 135/82)  BP(mean): --  RR: 18 (21 Aug 2021 05:12) (18 - 18)  SpO2: 97% (21 Aug 2021 05:12) (95% - 99%)    PHYSICAL EXAM  General:NAD  HEENT: PERRL, EOMI, conjunctiva clear, oral ulcer --->left side of tongue. Labial ulcers scabbed  CV: S1S2, RRR, no murmur  Lungs: Unlabored, CTA all fields, no rales, no wheezes  Abdomen: BS(+), soft  nontender, nondistended  Ext: BLE no edema, PPP, no calf tenderness  Skin:warm, dry, rash back and chest and thighs.   Neuro: AOX3, nonfocal  Central Line: RCW TLC, site C/D/I    LABS:    Blood Cultures:                           6.5    0.22  )-----------( 5        ( 21 Aug 2021 07:07 )             18.7         Mean Cell Volume : 88.2 fl  Mean Cell Hemoglobin : 30.7 pg  Mean Cell Hemoglobin Concentration : 34.8 gm/dL  Auto Neutrophil # : x  Auto Lymphocyte # : x  Auto Monocyte # : x  Auto Eosinophil # : x  Auto Basophil # : x  Auto Neutrophil % : x  Auto Lymphocyte % : x  Auto Monocyte % : x  Auto Eosinophil % : x  Auto Basophil % : x      08-21    141  |  107  |  6<L>  ----------------------------<  96  3.5   |  22  |  0.83    Ca    9.0      21 Aug 2021 07:07  Phos  3.1     08-21  Mg     2.0     08-21    TPro  6.1  /  Alb  3.2<L>  /  TBili  0.4  /  DBili  x   /  AST  15  /  ALT  30  /  AlkPhos  54  08-21      Mg 2.0  Phos 3.1    Uric Acid 2.0

## 2021-08-21 NOTE — CHART NOTE - NSCHARTNOTEFT_GEN_A_CORE
MEDICINE PA    Notified by RN patient with temperature 100.8F, Patient is alert, NAD. Denies HA, CP, SOB, cough, N/V, or abd pain.    VITAL SIGNS:  T(C): 38.2 (08-21-21 @ 00:23), Max: 38.3 (08-20-21 @ 16:48)  HR: 111 (08-21-21 @ 00:23) (96 - 111)  BP: 135/82 (08-21-21 @ 00:23) (101/63 - 135/82)  RR: 18 (08-21-21 @ 00:23) (18 - 18)  SpO2: 95% (08-21-21 @ 00:23) (95% - 99%)  Wt(kg): --      LABORATORY:                          7.1    0.24  )-----------( 12       ( 20 Aug 2021 07:05 )             20.2       08-20    140  |  106  |  10  ----------------------------<  107<H>  3.3<L>   |  21<L>  |  0.88    Ca    9.1      20 Aug 2021 07:05  Phos  3.2     08-20  Mg     2.1     08-20    TPro  6.4  /  Alb  3.5  /  TBili  0.4  /  DBili  x   /  AST  14  /  ALT  27  /  AlkPhos  57  08-20                PHYSICAL EXAM:    Constitutional: . NAD.    Respiratory: clear lungs bilaterally.     Cardiovascular: S1 S2. No murmurs.    Gastrointestinal: BS X4 active. soft. nontender.          ASSESSMENT/PLAN:   HPI:  36M PMH HTN, fatty liver, kidney stones presents with rash, dizziness, fatigue and severe RUQ pain for 3 days. He has felt fatigued for the past few months. Three days ago, he developed sharp RUQ pain that wrapped around to his back. He rated the pain as a 5/10 with no change over the past three days. The pain doesn't change with PO intake or bowel movements. He has some associated dizziness with the pain. He also developed an erythematous rash that is nonpruritic on his upper chest, neck, and upper back over the past couple of days. He has had a twenty pound weight gain over the past year due to a change to sedentary lifestyle 2/2 pandemic. He denies night sweats and fevers. He has had constipation for the past week where he must strain to move his bowels. He has no urinary symptoms, fevers, chills, or changes in appetite. No diarrhea, N/V. No prior cardiac hx, pleuritic chest pain, or SOB. He had kidney stones 5 years ago but states this pain is worse. Denies allergies or new medications. He denies recent travel and works as a . He has had no recent sick contacts. He received all childhood vaccines.    In the ED: temp 99.4, tachycardic to 150s, started on fluids and allopurinol per heme/onc recs. He received 1 mg hydromorphone, 4 mg morphine, 600 mg ibuprofen , and 975 mg acetaminophen. CT A/P revealed fatty liver and small R pleural effusion. (30 Jul 2021 09:22)        1) Neutropenic Fever  -tylenol and cooling measures prn for pyrexia  -BC x2, UA/UC done from 8/20  -CXR from 8/20 stable  -c/w current abx regimen   -F/U primary team in AM    Deacon Vallejo PA-C   Department of Medicine

## 2021-08-21 NOTE — PROVIDER CONTACT NOTE (OTHER) - REASON
Temp=102.3
pt has temp of 101.1 F
Temp 38C
pt temp 101.1
B/L faint bruising on the underarms
pt febrile. Temp 101.7 oral
Febrile 101
Pt temp 38.2C
Temp=101.5
Patient c/o rashes
Pt has fever 101.5F
Temp= 102.5 degrees F
fever
pt has temp of 101.6 F
Temp= 101.1 degrees F

## 2021-08-21 NOTE — PROVIDER CONTACT NOTE (OTHER) - ASSESSMENT
All other vitals stable. Pt denies chills, resting in bed comfortably.
Pt states he feels hot. Pt denies headache, SOB, body aches/chills
Vital signs stable, no acute distress noted
NAD, pt denies chest pain, SOB
NAD, pt denies SOB, chest pain
Patient A&Ox4; patient denies SOB, headache, chest pain and abdominal pain. No chills or rigors noted. Patient is on meropenem 1g IVPB q8h.
pt in no acute signs of distress
Patient A&Ox4; upon assessment anterior and posterior chest wall have redness and rashes present.  Patient denies pruritis, pain or discomfort. Patient is currently on cefepime 2000mg IVPB q8h for neutropenic fevers. Last dose was administered at 0519 on 8/20/21.
no acute distress noted
NAD, no c/o of pain, pt denies SOB, chest pain
No acute signs of distress. Denies chest pain and difficulty breathing.
No acute signs of distress. Pt denies chest pain or difficulty breathing.
pt in no acute signs of distress

## 2021-08-21 NOTE — PROVIDER CONTACT NOTE (OTHER) - SITUATION
Pt temp 38.2C
pt has temp of 101.6 F
Temp 38C
Patient c/o of rashed noted on chest and abdomen
Pt has fever
Temp=102.3
pt has temp of 101.1 F
pt temp 101.1
Febrile 101
pt A&Ox4. VS assesed. febrile 101.7 oral. denies any other symptoms @ this time.
Temp= 101.1 degrees F
Temp=101.5
B/L faint bruising on the underarms
Pt has fever 101.5F
Temp= 102.5 degrees F

## 2021-08-21 NOTE — PROGRESS NOTE ADULT - ATTENDING COMMENTS
35yo M admitted with RUQ pain found to have bloodwork concerning for acute leukemia  -13% myeloblasts on PB. Flt3 negative.   -s/p BMbx 8/4 -AML. f/u [facility]ogen[facility], [facility]  -started 7+3 on 8/6 -cytarabine 100/m2 and dauno 90/m2. Today is day 15  -pain control, improved today. Abd sono showing hepatomegaly and hepatic steatosis. Right pleural effusion. CT chest shows small right pleural effusion  -MUGA EF 71%  -discussed with patient and family at the bedside. We also discussed sperm banking -pt declined  -febrile again today (8/20)- cultures have all been negative, he's been on cefepime. Will swab for MRSA and send RVP.   -Persistent fevers with mucositis. Aggressive oral care - adding sodium bicarb. Cultures negative, repeat CT 8/14 without infectious source.   -cont posaconazole and acyclovir PPx  -TLC placement  -supportive care  -transfuse as needed  -day 14 BMbx on 8/19 - awaiting results. 37yo M admitted with RUQ pain found to have bloodwork concerning for acute leukemia  -13% myeloblasts on PB. Flt3 negative.   -s/p BMbx 8/4 -AML. f/u cytogenetics, [facility]  -started 7+3 on 8/6 -cytarabine 100/m2 and dauno 90/m2. Today is day 16  -pain control, improved today. Abd sono showing hepatomegaly and hepatic steatosis. Right pleural effusion. CT chest shows small right pleural effusion  -MUGA EF 71%  -discussed with patient and family at the bedside. We also discussed sperm banking -pt declined  -febrile again today (8/20)- cultures have all been negative, he's been on cefepime, changed to meropenem on 8/20 for a rash. Swab for MRSA and RVP negative.   -Persistent fevers with mucositis. Aggressive oral care - adding sodium bicarb. Cultures negative, repeat CT 8/14 without infectious source.   -rash worsening -cefepime changed to meropenem on 8/20. Hydrocortisone cream  -cont posaconazole and acyclovir PPx  -TLC placement  -supportive care  -transfuse as needed  -day 14 BMbx on 8/19 - awaiting results.

## 2021-08-21 NOTE — PROVIDER CONTACT NOTE (OTHER) - DATE AND TIME:
16-Aug-2021 22:00
21-Aug-2021 00:23
13-Aug-2021 01:18
20-Aug-2021 09:05
20-Aug-2021 16:48
04-Aug-2021 21:45
13-Aug-2021 21:18
14-Aug-2021 08:50
01-Aug-2021 21:05
12-Aug-2021 17:17
16-Aug-2021 01:13
17-Aug-2021 04:30
20-Aug-2021 06:05
03-Aug-2021 13:27
15-Aug-2021 17:29

## 2021-08-21 NOTE — PROGRESS NOTE ADULT - ASSESSMENT
Mr. Tian is  a 35 y/o male with h/o PMH HTN, fatty liver, kidney stones presents with rash, dizziness, fatigue and severe RUQ pain for 3 days. Now with anemia, thrombocytopenia and leukocytosis with 17% blasts, Bone marrow bx c/w AML. Transferred to [address] [place] [address] for management. Patient receiving induction chemotherapy with 7+3 (Daunorubicin and Cytarabine). Patient has pancytopenia secondary to chemotherapy and disease process.

## 2021-08-21 NOTE — PROVIDER CONTACT NOTE (OTHER) - NAME OF MD/NP/PA/DO NOTIFIED:
GALINA Oscar
Vianney NUR
Alba MOJICA
GALINA Del Castillo
PARAM Lazaro
Magdalene MOJICA
Saul MOJICA
Crzu MOJICA
Deacon MOJICA
Nani Lazaro, NP
GALINA Florentino
GALINA Del Castillo
Magdalene MOJICA
Mary Decker NP
Nilsa Decker, NP

## 2021-08-21 NOTE — PROGRESS NOTE ADULT - PROBLEM SELECTOR PLAN 1
FLT3 negative, BM bx c/w AML   consented for [place] study    HIV (-), Hep (-)  Echo 70%, MUGA EF 71%   Discussed sperm banking. Declined   TLC placed on 8/6 8/6 Started induction with  7+3 (Cytarabine + Daunorubicin)  Follow daily lytes, CBC. replace, Replete prn  Now off Allopurinol, IV hydration, mouth care, pain control, antiemetics  Monitor for TLS daily  fu Day 14 Bm bx 8/19.  Monitor rash.

## 2021-08-21 NOTE — PROGRESS NOTE ADULT - PROBLEM SELECTOR PLAN 2
neutropenic, febrile  FU cultures 8/20All Cultures NGTD  s/p Zosyn for possible PNA, d/c'd after 8/6 PM dose  8/9 started on Levaquin and posaconazole ppx for neutropenia  8/12 - Started Acyclovir for oral ulcers  8/14 (-) CMV PCR  HSV 1 serology (+)  8/20 Cefepime changed to ingrid due to persistent fever and new rash.   Consider ID consult if continues.  FU MRSA and RVP swab. neutropenic, febrile  FU cultures 8/20 All Cultures NGTD  Completed Zosyn course for suspected pna  8/9 started on Levaquin and posaconazole ppx for neutropenia  8/12 - Started Acyclovir for oral ulcers  8/14 (-) CMV PCR  HSV 1 serology (+)  8/20 Cefepime changed to ingrid due to persistent fever and new rash.   Consider ID consult if continues.  FU MRSA and RVP swab.

## 2021-08-22 LAB
ALBUMIN SERPL ELPH-MCNC: 3.4 G/DL — SIGNIFICANT CHANGE UP (ref 3.3–5)
ALP SERPL-CCNC: 53 U/L — SIGNIFICANT CHANGE UP (ref 40–120)
ALT FLD-CCNC: 28 U/L — SIGNIFICANT CHANGE UP (ref 10–45)
ANION GAP SERPL CALC-SCNC: 14 MMOL/L — SIGNIFICANT CHANGE UP (ref 5–17)
AST SERPL-CCNC: 12 U/L — SIGNIFICANT CHANGE UP (ref 10–40)
BILIRUB SERPL-MCNC: 0.6 MG/DL — SIGNIFICANT CHANGE UP (ref 0.2–1.2)
BUN SERPL-MCNC: 6 MG/DL — LOW (ref 7–23)
CALCIUM SERPL-MCNC: 8.3 MG/DL — LOW (ref 8.4–10.5)
CHLORIDE SERPL-SCNC: 106 MMOL/L — SIGNIFICANT CHANGE UP (ref 96–108)
CO2 SERPL-SCNC: 22 MMOL/L — SIGNIFICANT CHANGE UP (ref 22–31)
CREAT SERPL-MCNC: 0.8 MG/DL — SIGNIFICANT CHANGE UP (ref 0.5–1.3)
CULTURE RESULTS: SIGNIFICANT CHANGE UP
CULTURE RESULTS: SIGNIFICANT CHANGE UP
GLUCOSE SERPL-MCNC: 97 MG/DL — SIGNIFICANT CHANGE UP (ref 70–99)
HCT VFR BLD CALC: 21.2 % — LOW (ref 39–50)
HGB BLD-MCNC: 7.5 G/DL — LOW (ref 13–17)
LDH SERPL L TO P-CCNC: 168 U/L — SIGNIFICANT CHANGE UP (ref 50–242)
MAGNESIUM SERPL-MCNC: 2.1 MG/DL — SIGNIFICANT CHANGE UP (ref 1.6–2.6)
MCHC RBC-ENTMCNC: 30.6 PG — SIGNIFICANT CHANGE UP (ref 27–34)
MCHC RBC-ENTMCNC: 35.4 GM/DL — SIGNIFICANT CHANGE UP (ref 32–36)
MCV RBC AUTO: 86.5 FL — SIGNIFICANT CHANGE UP (ref 80–100)
NRBC # BLD: 4 /100 WBCS — HIGH (ref 0–0)
PHOSPHATE SERPL-MCNC: 2.5 MG/DL — SIGNIFICANT CHANGE UP (ref 2.5–4.5)
PLATELET # BLD AUTO: 13 K/UL — CRITICAL LOW (ref 150–400)
POTASSIUM SERPL-MCNC: 3.5 MMOL/L — SIGNIFICANT CHANGE UP (ref 3.5–5.3)
POTASSIUM SERPL-SCNC: 3.5 MMOL/L — SIGNIFICANT CHANGE UP (ref 3.5–5.3)
PROT SERPL-MCNC: 6.5 G/DL — SIGNIFICANT CHANGE UP (ref 6–8.3)
RBC # BLD: 2.45 M/UL — LOW (ref 4.2–5.8)
RBC # FLD: 16 % — HIGH (ref 10.3–14.5)
SODIUM SERPL-SCNC: 142 MMOL/L — SIGNIFICANT CHANGE UP (ref 135–145)
SPECIMEN SOURCE: SIGNIFICANT CHANGE UP
SPECIMEN SOURCE: SIGNIFICANT CHANGE UP
URATE SERPL-MCNC: 2 MG/DL — LOW (ref 3.4–8.8)
WBC # BLD: 0.28 K/UL — CRITICAL LOW (ref 3.8–10.5)
WBC # FLD AUTO: 0.28 K/UL — CRITICAL LOW (ref 3.8–10.5)

## 2021-08-22 PROCEDURE — 99232 SBSQ HOSP IP/OBS MODERATE 35: CPT

## 2021-08-22 RX ORDER — ALPRAZOLAM 0.25 MG
0.25 TABLET ORAL AT BEDTIME
Refills: 0 | Status: DISCONTINUED | OUTPATIENT
Start: 2021-08-22 | End: 2021-08-27

## 2021-08-22 RX ADMIN — Medication 5 MILLILITER(S): at 13:00

## 2021-08-22 RX ADMIN — POSACONAZOLE 300 MILLIGRAM(S): 100 TABLET, DELAYED RELEASE ORAL at 13:00

## 2021-08-22 RX ADMIN — Medication 400 MILLIGRAM(S): at 05:52

## 2021-08-22 RX ADMIN — Medication 5 MILLILITER(S): at 05:51

## 2021-08-22 RX ADMIN — Medication 5 MILLILITER(S): at 17:44

## 2021-08-22 RX ADMIN — Medication 15 MILLILITER(S): at 17:44

## 2021-08-22 RX ADMIN — CHLORHEXIDINE GLUCONATE 1 APPLICATION(S): 213 SOLUTION TOPICAL at 10:01

## 2021-08-22 RX ADMIN — AMLODIPINE BESYLATE 5 MILLIGRAM(S): 2.5 TABLET ORAL at 05:52

## 2021-08-22 RX ADMIN — MEROPENEM 100 MILLIGRAM(S): 1 INJECTION INTRAVENOUS at 21:26

## 2021-08-22 RX ADMIN — Medication 15 MILLILITER(S): at 05:59

## 2021-08-22 RX ADMIN — Medication 1 APPLICATION(S): at 17:43

## 2021-08-22 RX ADMIN — Medication 1 APPLICATION(S): at 17:47

## 2021-08-22 RX ADMIN — Medication 5 MILLILITER(S): at 23:19

## 2021-08-22 RX ADMIN — Medication 15 MILLILITER(S): at 21:27

## 2021-08-22 RX ADMIN — MEROPENEM 100 MILLIGRAM(S): 1 INJECTION INTRAVENOUS at 05:52

## 2021-08-22 RX ADMIN — DIPHENHYDRAMINE HYDROCHLORIDE AND LIDOCAINE HYDROCHLORIDE AND ALUMINUM HYDROXIDE AND MAGNESIUM HYDRO 5 MILLILITER(S): KIT at 21:27

## 2021-08-22 RX ADMIN — Medication 1 APPLICATION(S): at 21:28

## 2021-08-22 RX ADMIN — DIPHENHYDRAMINE HYDROCHLORIDE AND LIDOCAINE HYDROCHLORIDE AND ALUMINUM HYDROXIDE AND MAGNESIUM HYDRO 5 MILLILITER(S): KIT at 05:51

## 2021-08-22 RX ADMIN — Medication 0.25 MILLIGRAM(S): at 21:27

## 2021-08-22 RX ADMIN — Medication 400 MILLIGRAM(S): at 13:00

## 2021-08-22 RX ADMIN — Medication 5 MILLILITER(S): at 20:43

## 2021-08-22 RX ADMIN — DIPHENHYDRAMINE HYDROCHLORIDE AND LIDOCAINE HYDROCHLORIDE AND ALUMINUM HYDROXIDE AND MAGNESIUM HYDRO 5 MILLILITER(S): KIT at 13:03

## 2021-08-22 RX ADMIN — Medication 1 APPLICATION(S): at 05:59

## 2021-08-22 RX ADMIN — MEROPENEM 100 MILLIGRAM(S): 1 INJECTION INTRAVENOUS at 13:01

## 2021-08-22 RX ADMIN — Medication 15 MILLILITER(S): at 13:02

## 2021-08-22 RX ADMIN — Medication 400 MILLIGRAM(S): at 21:26

## 2021-08-22 RX ADMIN — Medication 1 APPLICATION(S): at 13:04

## 2021-08-22 NOTE — PROGRESS NOTE ADULT - SUBJECTIVE AND OBJECTIVE BOX
Diagnosis:  Acute Myeloid Leukemia, FLT3-    Protocol/Chemo Regimen: 7+3 (Daunorubicin and Cytarabine)    Day: 17    Pt endorsed: No complaints Rash persists.       Review of Systems: Denies fever, sore throat, SOB, chest discomfort, n/v, diarrhea,abdominal discomfort, dysuria.     Pain scale: denies     Diet: DASH/TLC    Allergies    No Known Allergies    Allergies    No Known Allergies    Intolerances        ANTIMICROBIALS  acyclovir   Oral Tab/Cap 400 milliGRAM(s) Oral every 8 hours  meropenem  IVPB 1000 milliGRAM(s) IV Intermittent every 8 hours  posaconazole DR Tablet 300 milliGRAM(s) Oral daily      HEME/ONC MEDICATIONS  heparin   Injectable 5000 Unit(s) IV Push once      STANDING MEDICATIONS  amLODIPine   Tablet 5 milliGRAM(s) Oral daily  Biotene Dry Mouth Oral Rinse 5 milliLiter(s) Swish and Spit five times a day  chlorhexidine 2% Cloths 1 Application(s) Topical <User Schedule>  FIRST- Mouthwash  BLM 5 milliLiter(s) Swish and Spit three times a day  lidocaine 2% Injectable 20 milliLiter(s) Local Injection once  petrolatum white Ointment 1 Application(s) Topical four times a day  polyethylene glycol 3350 17 Gram(s) Oral daily  senna 2 Tablet(s) Oral at bedtime  sodium bicarbonate Mouth Rinse 15 milliLiter(s) Swish and Spit four times a day  sodium chloride 0.9%. 1000 milliLiter(s) IV Continuous <Continuous>      PRN MEDICATIONS  acetaminophen   Tablet .. 650 milliGRAM(s) Oral every 6 hours PRN  benzocaine 15 mG/menthol 3.6 mG (Sugar-Free) Lozenge 1 Lozenge Oral every 4 hours PRN  metoclopramide Injectable 10 milliGRAM(s) IV Push every 6 hours PRN  sodium chloride 0.65% Nasal 1 Spray(s) Both Nostrils three times a day PRN  sodium chloride 0.9% lock flush 10 milliLiter(s) IV Push every 1 hour PRN      Vital Signs Last 24 Hrs  T(C): 37 (22 Aug 2021 05:00), Max: 37.7 (22 Aug 2021 01:12)  T(F): 98.6 (22 Aug 2021 05:00), Max: 99.9 (22 Aug 2021 01:12)  HR: 90 (22 Aug 2021 05:00) (86 - 107)  BP: 128/83 (22 Aug 2021 05:00) (102/62 - 137/87)  BP(mean): --  RR: 18 (22 Aug 2021 05:00) (18 - 18)  SpO2: 96% (22 Aug 2021 05:00) (96% - 100%)    PHYSICAL EXAM  General:NAD  HEENT: PERRL, EOMI, conjunctiva clear, oral ulcer --->left side of tongue. Labial ulcers scabbed  CV: S1S2, RRR, no murmur  Lungs: Unlabored, CTA all fields, no rales, no wheezes  Abdomen: BS(+), soft  nontender, nondistended  Ext: BLE no edema, PPP, no calf tenderness  Skin:warm, dry, rash back and chest and thighs.   Neuro: AOX3, nonfocal  Central Line: RCW TLC, site C/D/I    LABS:    ------        Blood Cultures:      MRSA/MSSA PCR (08.20.21 @ 18:32)   MRSA PCR Result.: Mariely    Culture - Urine (08.20.21 @ 17:59)   Specimen Source: Clean Catch Clean Catch (Midstream)   Culture Results:   No growth   Culture - Blood (08.20.21 @ 13:16)   Specimen Source: .Blood Blood-Peripheral   Culture Results:   No growth to date    Culture - Blood (08.20.21 @ 09:16)   Specimen Source: .Blood Blood-Catheter   Culture Results:   No growth to date  Respiratory Viral Panel with COVID-19 by EUGENE (08.20.21 @ 12:54)   Rapid RVP Result: Mariely              Diagnosis:  Acute Myeloid Leukemia, FLT3-    Protocol/Chemo Regimen: 7+3 (Daunorubicin and Cytarabine)    Day: 17    Pt endorsed: No complaints, anxious for BM results. Rash persists, non pruritic      Review of Systems: Denies chills, sore throat, SOB, chest discomfort, n/v, diarrhea, abdominal discomfort, dysuria.     Pain scale: denies     Diet: DASH/TLC    Allergies    No Known Allergies    Intolerances    ANTIMICROBIALS  acyclovir   Oral Tab/Cap 400 milliGRAM(s) Oral every 8 hours  meropenem  IVPB 1000 milliGRAM(s) IV Intermittent every 8 hours  posaconazole DR Tablet 300 milliGRAM(s) Oral daily      HEME/ONC MEDICATIONS  heparin   Injectable 5000 Unit(s) IV Push once      STANDING MEDICATIONS  amLODIPine   Tablet 5 milliGRAM(s) Oral daily  Biotene Dry Mouth Oral Rinse 5 milliLiter(s) Swish and Spit five times a day  chlorhexidine 2% Cloths 1 Application(s) Topical <User Schedule>  FIRST- Mouthwash  BLM 5 milliLiter(s) Swish and Spit three times a day  lidocaine 2% Injectable 20 milliLiter(s) Local Injection once  petrolatum white Ointment 1 Application(s) Topical four times a day  polyethylene glycol 3350 17 Gram(s) Oral daily  senna 2 Tablet(s) Oral at bedtime  sodium bicarbonate Mouth Rinse 15 milliLiter(s) Swish and Spit four times a day  sodium chloride 0.9%. 1000 milliLiter(s) IV Continuous <Continuous>      PRN MEDICATIONS  acetaminophen   Tablet .. 650 milliGRAM(s) Oral every 6 hours PRN  benzocaine 15 mG/menthol 3.6 mG (Sugar-Free) Lozenge 1 Lozenge Oral every 4 hours PRN  metoclopramide Injectable 10 milliGRAM(s) IV Push every 6 hours PRN  sodium chloride 0.65% Nasal 1 Spray(s) Both Nostrils three times a day PRN  sodium chloride 0.9% lock flush 10 milliLiter(s) IV Push every 1 hour PRN      Vital Signs Last 24 Hrs  T(C): 37 (22 Aug 2021 05:00), Max: 37.7 (22 Aug 2021 01:12)  T(F): 98.6 (22 Aug 2021 05:00), Max: 99.9 (22 Aug 2021 01:12)  HR: 90 (22 Aug 2021 05:00) (86 - 107)  BP: 128/83 (22 Aug 2021 05:00) (102/62 - 137/87)  BP(mean): --  RR: 18 (22 Aug 2021 05:00) (18 - 18)  SpO2: 96% (22 Aug 2021 05:00) (96% - 100%)    PHYSICAL EXAM  General: NAD  HEENT: PERRL, EOMI, conjunctiva clear, oral ulcer --->left side of tongue. Labial ulcers scabbed  CV: S1S2, RRR, no mur apprec  Lungs: Unlabored, CTA all fields, no rales, no wheezes  Abdomen: BS(+), soft  nontender, nondistended  Ext: BLE no edema, PPP, no calf tenderness  Skin: warm, dry, + diffuse papular rash to trunk (front and back) B/L UE/LE's   Neuro: AOX3, nonfocal  Central Line: RCW TLC, site C/D/I    LABS:                        7.5    0.28  )-----------( 13       ( 22 Aug 2021 07:17 )             21.2     22 Aug 2021 07:13    142    |  106    |  6      ----------------------------<  97     3.5     |  22     |  0.80     Ca    8.3        22 Aug 2021 07:13  Phos  2.5       22 Aug 2021 07:13  Mg     2.1       22 Aug 2021 07:13    TPro  6.5    /  Alb  3.4    /  TBili  0.6    /  DBili  x      /  AST  12     /  ALT  28     /  AlkPhos  53     22 Aug 2021 07:13        LIVER FUNCTIONS - ( 22 Aug 2021 07:13 )  Alb: 3.4 g/dL / Pro: 6.5 g/dL / ALK PHOS: 53 U/L / ALT: 28 U/L / AST: 12 U/L / GGT: x           Urinalysis Basic - ( 20 Aug 2021 13:49 )    Color: Light Yellow / Appearance: Clear / S.017 / pH: x  Gluc: x / Ketone: Negative  / Bili: Negative / Urobili: Negative   Blood: x / Protein: Trace / Nitrite: Negative   Leuk Esterase: Negative / RBC: 2 /hpf / WBC 5 /HPF   Sq Epi: x / Non Sq Epi: 0 /hpf / Bacteria: Negative              Blood Cultures:      MRSA/MSSA PCR (21 @ 18:32)   [facility]    Respiratory Viral Panel with COVID-19 by EUGENE (21 @ 12:54)   Rapid RVP Result: Mariely     Culture - Urine (21 @ 17:59)   Specimen Source: Clean Catch Clean Catch (Midstream)   Culture Results:   No growth   Culture - Blood (21 @ 13:16)   Specimen Source: .Blood Blood-Peripheral   Culture Results:   No growth to date    Culture - Blood (21 @ 09:16)   Specimen Source: .Blood Blood-Catheter   Culture Results:   No growth to date  Respiratory Viral Panel with COVID-19 by EUGENE (21 @ 12:54)   Rapid RVP Result: Ke

## 2021-08-22 NOTE — PROGRESS NOTE ADULT - ATTENDING COMMENTS
35yo M admitted with RUQ pain found to have bloodwork concerning for acute leukemia  -13% myeloblasts on PB. Flt3 negative.   -s/p BMbx 8/4 -AML. f/u [facility]ogenetics, [facility]  -started 7+3 on 8/6 -cytarabine 100/m2 and dauno 90/m2. Today is day 16  -pain control, improved today. Abd sono showing hepatomegaly and hepatic steatosis. Right pleural effusion. CT chest shows small right pleural effusion  -MUGA EF 71%  -discussed with patient and family at the bedside. We also discussed sperm banking -pt declined  -febrile again today (8/20)- cultures have all been negative, he's been on cefepime, changed to meropenem on 8/20 for a rash. Swab for MRSA and RVP negative.   -Persistent fevers with mucositis. Aggressive oral care - adding sodium bicarb. Cultures negative, repeat CT 8/14 without infectious source.   -rash worsening -cefepime changed to meropenem on 8/20. Hydrocortisone cream  -cont posaconazole and acyclovir PPx  -TLC placement  -supportive care  -transfuse as needed  -day 14 BMbx on 8/19 - awaiting results. 35yo M admitted with RUQ pain found to have bloodwork concerning for acute leukemia  -13% myeloblasts on PB. Flt3 negative.   -s/p BMbx 8/4 -AML. f/u cytogenetics, [facility]  -started 7+3 on 8/6 -cytarabine 100/m2 and dauno 90/m2. Today is day 17  -pain control, improved today. Abd sono showing hepatomegaly and hepatic steatosis. Right pleural effusion. CT chest shows small right pleural effusion  -MUGA EF 71%  -discussed with patient and family at the bedside. We also discussed sperm banking -pt declined  -febrile again today (8/20)- cultures have all been negative, he's been on cefepime, changed to meropenem on 8/20 for a rash. Swab for MRSA and RVP negative.   -Persistent fevers with mucositis. Aggressive oral care - adding sodium bicarb. Cultures negative, repeat CT 8/14 without infectious source.   -rash worsening -cefepime changed to meropenem on 8/20. Hydrocortisone cream  -cont posaconazole and acyclovir PPx  -TLC placement  -supportive care  -transfuse as needed  -day 14 BMbx on 8/19 - awaiting results.

## 2021-08-22 NOTE — PROGRESS NOTE ADULT - ASSESSMENT
Mr. Tian is  a 37 y/o male with h/o PMH HTN, fatty liver, kidney stones presents with rash, dizziness, fatigue and severe RUQ pain for 3 days. Now with anemia, thrombocytopenia and leukocytosis with 17% blasts, Bone marrow bx c/w AML. Transferred to [address] [place] [address] for management. Patient receiving induction chemotherapy with 7+3 (Daunorubicin and Cytarabine). Patient has pancytopenia secondary to chemotherapy and disease process.          Mr. Tian is  a 35 y/o male with h/o PMH HTN, fatty liver, kidney stones presents with rash, dizziness, fatigue and severe RUQ pain for 3 days. Now with anemia, thrombocytopenia and leukocytosis with 17% blasts, Bone marrow bx c/w AML. Transferred to [address] [place] [address] for management. Patient receiving induction chemotherapy with 7+3 (Daunorubicin and Cytarabine). Course c/b neutropenic fevers. Patient has pancytopenia secondary to chemotherapy and disease process.

## 2021-08-22 NOTE — PROGRESS NOTE ADULT - PROBLEM SELECTOR PLAN 1
FLT3 negative, BM bx c/w AML   consented for [place] study    HIV (-), Hep (-)  Echo 70%, MUGA EF 71%   Discussed sperm banking. Declined   TLC placed on 8/6 8/6 Started induction with  7+3 (Cytarabine + Daunorubicin)  Follow daily lytes, CBC. replace, Replete prn  Now off Allopurinol, IV hydration, mouth care, pain control, antiemetics  Monitor for TLS daily  fu Day 14 Bm bx 8/19.  Monitor rash. FLT3 negative, BM bx c/w AML   consented for [place] study    HIV (-), Hep (-)  Echo 70%, MUGA EF 71%   Discussed sperm banking. Declined   TLC placed on 8/6 8/6 Started induction with  7+3 (Cytarabine + Daunorubicin)  Follow daily lytes, CBC. replace, Replete prn  Now off Allopurinol, IV hydration  mouth care, pain control, antiemetics  Monitor for TLS daily  fu Day 14 Bm bx 8/19.  Monitor rash, receiving steroid cream (hydrocortisone 1%)

## 2021-08-22 NOTE — PROGRESS NOTE ADULT - PROBLEM SELECTOR PLAN 2
neutropenic, febrile  FU cultures 8/20 All Cultures NGTD  Completed Zosyn course for suspected pna  8/9 started on Levaquin and posaconazole ppx for neutropenia  8/12 - Started Acyclovir for oral ulcers  8/14 (-) CMV PCR  HSV 1 serology (+)  8/20 Cefepime changed to ingrid due to persistent fever and new rash.   Consider ID consult if continues.  FU MRSA and RVP swab. neutropenic, febrile  FU cultures 8/20 All Cultures NGTD  Completed Zosyn course for suspected pna  8/9 started on Levaquin and posaconazole ppx for neutropenia  8/12 - Started Acyclovir for oral ulcers  8/14 (-) CMV PCR  HSV 1 serology (+)  8/20 Cefepime changed to ingrid due to persistent fever and new rash  Consider ID consult if continues.  8/21 MRSA and RVP/COVID swabs Neg

## 2021-08-23 LAB
ALBUMIN SERPL ELPH-MCNC: 3.4 G/DL — SIGNIFICANT CHANGE UP (ref 3.3–5)
ALP SERPL-CCNC: 58 U/L — SIGNIFICANT CHANGE UP (ref 40–120)
ALT FLD-CCNC: 29 U/L — SIGNIFICANT CHANGE UP (ref 10–45)
ANION GAP SERPL CALC-SCNC: 10 MMOL/L — SIGNIFICANT CHANGE UP (ref 5–17)
APTT BLD: 29.8 SEC — SIGNIFICANT CHANGE UP (ref 27.5–35.5)
AST SERPL-CCNC: 13 U/L — SIGNIFICANT CHANGE UP (ref 10–40)
BILIRUB SERPL-MCNC: 0.4 MG/DL — SIGNIFICANT CHANGE UP (ref 0.2–1.2)
BLD GP AB SCN SERPL QL: NEGATIVE — SIGNIFICANT CHANGE UP
BUN SERPL-MCNC: 10 MG/DL — SIGNIFICANT CHANGE UP (ref 7–23)
CALCIUM SERPL-MCNC: 9.4 MG/DL — SIGNIFICANT CHANGE UP (ref 8.4–10.5)
CHLORIDE SERPL-SCNC: 106 MMOL/L — SIGNIFICANT CHANGE UP (ref 96–108)
CO2 SERPL-SCNC: 24 MMOL/L — SIGNIFICANT CHANGE UP (ref 22–31)
CREAT SERPL-MCNC: 0.73 MG/DL — SIGNIFICANT CHANGE UP (ref 0.5–1.3)
GLUCOSE SERPL-MCNC: 95 MG/DL — SIGNIFICANT CHANGE UP (ref 70–99)
HCT VFR BLD CALC: 20.6 % — CRITICAL LOW (ref 39–50)
HGB BLD-MCNC: 7.2 G/DL — LOW (ref 13–17)
INR BLD: 1.11 RATIO — SIGNIFICANT CHANGE UP (ref 0.88–1.16)
LDH SERPL L TO P-CCNC: 154 U/L — SIGNIFICANT CHANGE UP (ref 50–242)
MAGNESIUM SERPL-MCNC: 2.1 MG/DL — SIGNIFICANT CHANGE UP (ref 1.6–2.6)
MCHC RBC-ENTMCNC: 30.9 PG — SIGNIFICANT CHANGE UP (ref 27–34)
MCHC RBC-ENTMCNC: 35 GM/DL — SIGNIFICANT CHANGE UP (ref 32–36)
MCV RBC AUTO: 88.4 FL — SIGNIFICANT CHANGE UP (ref 80–100)
NRBC # BLD: 0 /100 WBCS — SIGNIFICANT CHANGE UP (ref 0–0)
PHOSPHATE SERPL-MCNC: 2.9 MG/DL — SIGNIFICANT CHANGE UP (ref 2.5–4.5)
PLATELET # BLD AUTO: 12 K/UL — CRITICAL LOW (ref 150–400)
POTASSIUM SERPL-MCNC: 3.4 MMOL/L — LOW (ref 3.5–5.3)
POTASSIUM SERPL-SCNC: 3.4 MMOL/L — LOW (ref 3.5–5.3)
PROT SERPL-MCNC: 6.3 G/DL — SIGNIFICANT CHANGE UP (ref 6–8.3)
PROTHROM AB SERPL-ACNC: 13.2 SEC — SIGNIFICANT CHANGE UP (ref 10.6–13.6)
RBC # BLD: 2.33 M/UL — LOW (ref 4.2–5.8)
RBC # FLD: 15.4 % — HIGH (ref 10.3–14.5)
RH IG SCN BLD-IMP: POSITIVE — SIGNIFICANT CHANGE UP
SODIUM SERPL-SCNC: 140 MMOL/L — SIGNIFICANT CHANGE UP (ref 135–145)
URATE SERPL-MCNC: 2.1 MG/DL — LOW (ref 3.4–8.8)
WBC # BLD: 0.31 K/UL — CRITICAL LOW (ref 3.8–10.5)
WBC # FLD AUTO: 0.31 K/UL — CRITICAL LOW (ref 3.8–10.5)

## 2021-08-23 PROCEDURE — 99232 SBSQ HOSP IP/OBS MODERATE 35: CPT | Mod: GC

## 2021-08-23 RX ORDER — POTASSIUM CHLORIDE 20 MEQ
40 PACKET (EA) ORAL ONCE
Refills: 0 | Status: COMPLETED | OUTPATIENT
Start: 2021-08-23 | End: 2021-08-23

## 2021-08-23 RX ADMIN — Medication 5 MILLILITER(S): at 17:34

## 2021-08-23 RX ADMIN — MEROPENEM 100 MILLIGRAM(S): 1 INJECTION INTRAVENOUS at 13:08

## 2021-08-23 RX ADMIN — Medication 400 MILLIGRAM(S): at 05:18

## 2021-08-23 RX ADMIN — DIPHENHYDRAMINE HYDROCHLORIDE AND LIDOCAINE HYDROCHLORIDE AND ALUMINUM HYDROXIDE AND MAGNESIUM HYDRO 5 MILLILITER(S): KIT at 13:04

## 2021-08-23 RX ADMIN — Medication 15 MILLILITER(S): at 21:05

## 2021-08-23 RX ADMIN — Medication 1 APPLICATION(S): at 13:07

## 2021-08-23 RX ADMIN — Medication 15 MILLILITER(S): at 05:18

## 2021-08-23 RX ADMIN — CHLORHEXIDINE GLUCONATE 1 APPLICATION(S): 213 SOLUTION TOPICAL at 09:12

## 2021-08-23 RX ADMIN — Medication 5 MILLILITER(S): at 09:12

## 2021-08-23 RX ADMIN — POSACONAZOLE 300 MILLIGRAM(S): 100 TABLET, DELAYED RELEASE ORAL at 13:03

## 2021-08-23 RX ADMIN — Medication 1 APPLICATION(S): at 21:13

## 2021-08-23 RX ADMIN — Medication 400 MILLIGRAM(S): at 21:04

## 2021-08-23 RX ADMIN — Medication 1 APPLICATION(S): at 05:23

## 2021-08-23 RX ADMIN — Medication 1 APPLICATION(S): at 05:18

## 2021-08-23 RX ADMIN — DIPHENHYDRAMINE HYDROCHLORIDE AND LIDOCAINE HYDROCHLORIDE AND ALUMINUM HYDROXIDE AND MAGNESIUM HYDRO 5 MILLILITER(S): KIT at 21:04

## 2021-08-23 RX ADMIN — Medication 1 APPLICATION(S): at 17:36

## 2021-08-23 RX ADMIN — Medication 1 APPLICATION(S): at 17:38

## 2021-08-23 RX ADMIN — MEROPENEM 100 MILLIGRAM(S): 1 INJECTION INTRAVENOUS at 05:16

## 2021-08-23 RX ADMIN — Medication 5 MILLILITER(S): at 19:33

## 2021-08-23 RX ADMIN — Medication 5 MILLILITER(S): at 13:03

## 2021-08-23 RX ADMIN — Medication 15 MILLILITER(S): at 13:04

## 2021-08-23 RX ADMIN — Medication 400 MILLIGRAM(S): at 13:03

## 2021-08-23 RX ADMIN — AMLODIPINE BESYLATE 5 MILLIGRAM(S): 2.5 TABLET ORAL at 05:18

## 2021-08-23 RX ADMIN — MEROPENEM 100 MILLIGRAM(S): 1 INJECTION INTRAVENOUS at 21:04

## 2021-08-23 RX ADMIN — DIPHENHYDRAMINE HYDROCHLORIDE AND LIDOCAINE HYDROCHLORIDE AND ALUMINUM HYDROXIDE AND MAGNESIUM HYDRO 5 MILLILITER(S): KIT at 05:18

## 2021-08-23 RX ADMIN — Medication 40 MILLIEQUIVALENT(S): at 13:03

## 2021-08-23 RX ADMIN — Medication 15 MILLILITER(S): at 17:38

## 2021-08-23 RX ADMIN — Medication 0.25 MILLIGRAM(S): at 21:10

## 2021-08-23 NOTE — PROGRESS NOTE ADULT - ATTENDING COMMENTS
37yo M admitted with RUQ pain found to have bloodwork concerning for acute leukemia  -13% myeloblasts on PB. Flt3 negative.   -s/p BMbx 8/4 -AML. f/u cytogenetics, [facility]  -started 7+3 on 8/6 -cytarabine 100/m2 and dauno 90/m2. Today is day 17  -pain control, improved today. Abd sono showing hepatomegaly and hepatic steatosis. Right pleural effusion. CT chest shows small right pleural effusion  -MUGA EF 71%  -discussed with patient and family at the bedside. We also discussed sperm banking -pt declined  -febrile again today (8/20)- cultures have all been negative, he's been on cefepime, changed to meropenem on 8/20 for a rash. Swab for MRSA and RVP negative.   -Persistent fevers with mucositis. Aggressive oral care - adding sodium bicarb. Cultures negative, repeat CT 8/14 without infectious source.   -rash worsening -cefepime changed to meropenem on 8/20. Hydrocortisone cream  -cont posaconazole and acyclovir PPx  -TLC placement  -supportive care  -transfuse as needed  -day 14 BMbx on 8/19 - awaiting results. 35yo M admitted with RUQ pain found to have bloodwork concerning for acute leukemia  -13% myeloblasts on PB. Flt3 negative.   -s/p BMbx 8/4 -AML. f/u cytogenetics, [facility]  -started 7+3 on 8/6 -cytarabine 100/m2 and dauno 90/m2. Today is day 18  -pain control, improved today. Abd sono showing hepatomegaly and hepatic steatosis. Right pleural effusion. CT chest shows small right pleural effusion  -MUGA EF 71%  -discussed with patient and family at the bedside. We also discussed sperm banking -pt declined  -febrile again today (8/20)- cultures have all been negative, he's been on cefepime, changed to meropenem on 8/20 for a rash. Swab for MRSA and RVP negative.   -Persistent fevers with mucositis. Aggressive oral care - adding sodium bicarb. Cultures negative, repeat CT 8/14 without infectious source.   -rash worsening -cefepime changed to meropenem on 8/20. Hydrocortisone cream. Stabilizing  -cont posaconazole and acyclovir PPx  -TLC placement  -supportive care  -transfuse as needed  -day 14 BMbx on 8/19 - awaiting results.

## 2021-08-23 NOTE — PROGRESS NOTE ADULT - PROBLEM SELECTOR PLAN 1
FLT3 negative, BM bx c/w AML   consented for [place] study    HIV (-), Hep (-)  Echo 70%, MUGA EF 71%   Discussed sperm banking. Declined   TLC placed on 8/6 8/6 Started induction with  7+3 (Cytarabine + Daunorubicin)  Follow daily lytes, CBC. replace, Replete prn  Now off Allopurinol, IV hydration  mouth care, pain control, antiemetics  Monitor for TLS daily  fu Day 14 Bm bx 8/19.  Monitor rash, receiving steroid cream (hydrocortisone 1%) FLT3 negative, BM bx c/w AML   consented for [place] study    HIV (-), Hep (-)  Echo 70%, MUGA EF 71%   Discussed sperm banking. Declined   TLC placed on 8/6 8/6 Started induction with  7+3 (Cytarabine + Daunorubicin)  Follow daily lytes, CBC. replace, Replete prn  KCL 40meq pox 1  Now off Allopurinol, IV hydration  mouth care, pain control, antiemetics  Monitor for TLS daily  f/u Day 14 Bm bx 8/19.  Monitor rash, receiving steroid cream (hydrocortisone 1%)

## 2021-08-23 NOTE — PROGRESS NOTE ADULT - SUBJECTIVE AND OBJECTIVE BOX
Diagnosis:  Acute Myeloid Leukemia, FLT3-    Protocol/Chemo Regimen: 7+3 (Daunorubicin and Cytarabine)    Day: 18    Pt endorsed:       Review of Systems:     Pain scale: denies     Diet: DASH/TLC    Allergies    No Known Allergies    Intolerances    MEDICATIONS  (STANDING):  acyclovir   Oral Tab/Cap 400 milliGRAM(s) Oral every 8 hours  ALPRAZolam 0.25 milliGRAM(s) Oral at bedtime  amLODIPine   Tablet 5 milliGRAM(s) Oral daily  Biotene Dry Mouth Oral Rinse 5 milliLiter(s) Swish and Spit five times a day  chlorhexidine 2% Cloths 1 Application(s) Topical <User Schedule>  FIRST- Mouthwash  BLM 5 milliLiter(s) Swish and Spit three times a day  hydrocortisone 1% Cream 1 Application(s) Topical two times a day  lidocaine 2% Injectable 20 milliLiter(s) Local Injection once  meropenem  IVPB 1000 milliGRAM(s) IV Intermittent every 8 hours  petrolatum white Ointment 1 Application(s) Topical four times a day  polyethylene glycol 3350 17 Gram(s) Oral daily  posaconazole DR Tablet 300 milliGRAM(s) Oral daily  senna 2 Tablet(s) Oral at bedtime  sodium bicarbonate Mouth Rinse 15 milliLiter(s) Swish and Spit four times a day  sodium chloride 0.9%. 1000 milliLiter(s) (20 mL/Hr) IV Continuous <Continuous>    MEDICATIONS  (PRN):  acetaminophen   Tablet .. 650 milliGRAM(s) Oral every 6 hours PRN Temp greater or equal to 38C (100.4F), Mild Pain (1 - 3), Moderate Pain (4 - 6)  benzocaine 15 mG/menthol 3.6 mG (Sugar-Free) Lozenge 1 Lozenge Oral every 4 hours PRN Sore Throat  metoclopramide Injectable 10 milliGRAM(s) IV Push every 6 hours PRN nausea/vomiting  sodium chloride 0.65% Nasal 1 Spray(s) Both Nostrils three times a day PRN Nasal Congestion  sodium chloride 0.9% lock flush 10 milliLiter(s) IV Push every 1 hour PRN Pre/post blood products, medications, blood draw, and to maintain line patency      Vital Signs Last 24 Hrs  T(C): 37.2 (23 Aug 2021 05:39), Max: 37.2 (23 Aug 2021 05:39)  T(F): 98.9 (23 Aug 2021 05:39), Max: 98.9 (23 Aug 2021 05:39)  HR: 86 (23 Aug 2021 05:39) (81 - 98)  BP: 137/83 (23 Aug 2021 05:39) (112/71 - 143/91)  BP(mean): --  RR: 18 (23 Aug 2021 05:39) (17 - 18)  SpO2: 99% (23 Aug 2021 05:39) (95% - 99%)    PHYSICAL EXAM  General: NAD  HEENT: PERRL, EOMI, conjunctiva clear, oral ulcer --->left side of tongue. Labial ulcers scabbed  CV: S1S2, RRR, no mur apprec  Lungs: Unlabored, CTA all fields, no rales, no wheezes  Abdomen: BS(+), soft  nontender, nondistended  Ext: BLE no edema, PPP, no calf tenderness  Skin: warm, dry, + diffuse papular rash to trunk (front and back) B/L UE/LE's   Neuro: AOX3, nonfocal  Central Line: RCW TLC, site C/D/I    LABS:    ----------      Urinalysis Basic - ( 20 Aug 2021 13:49 )    Color: Light Yellow / Appearance: Clear / S.017 / pH: x  Gluc: x / Ketone: Negative  / Bili: Negative / Urobili: Negative   Blood: x / Protein: Trace / Nitrite: Negative   Leuk Esterase: Negative / RBC: 2 /hpf / WBC 5 /HPF   Sq Epi: x / Non Sq Epi: 0 /hpf / Bacteria: Negative              Blood Cultures:      MRSA/MSSA PCR (21 @ 18:32)   MRSA PCR Result.: [facility]    Respiratory Viral Panel with COVID-19 by EUGENE (21 @ 12:54)   Rapid RVP Result: [facility]     Culture - Urine (21 @ 17:59)   Specimen Source: Clean Catch Clean Catch (Midstream)   Culture Results:   No growth   Culture - Blood (21 @ 13:16)   Specimen Source: .Blood Blood-Peripheral   Culture Results:   No growth to date    Culture - Blood (21 @ 09:16)   Specimen Source: .Blood Blood-Catheter   Culture Results:   No growth to date  Respiratory Viral Panel with COVID-19 by EUGENE (21 @ 12:54)   Rapid RVP Result: NotDetec              Diagnosis:  Acute Myeloid Leukemia, FLT3-    Protocol/Chemo Regimen: 7+3 (Daunorubicin and Cytarabine)    Day: 18    Pt endorsed: No overnight events, afebrile. Taking po's, +OOB walking      Review of Systems: Denies ARMSTRONG, abdominal pain, HA or dizziness     Pain scale: denies     Diet: DASH/TLC    Allergies    No Known Allergies    Intolerances    MEDICATIONS  (STANDING):  acyclovir   Oral Tab/Cap 400 milliGRAM(s) Oral every 8 hours  ALPRAZolam 0.25 milliGRAM(s) Oral at bedtime  amLODIPine   Tablet 5 milliGRAM(s) Oral daily  Biotene Dry Mouth Oral Rinse 5 milliLiter(s) Swish and Spit five times a day  chlorhexidine 2% Cloths 1 Application(s) Topical <User Schedule>  FIRST- Mouthwash  BLM 5 milliLiter(s) Swish and Spit three times a day  hydrocortisone 1% Cream 1 Application(s) Topical two times a day  lidocaine 2% Injectable 20 milliLiter(s) Local Injection once  meropenem  IVPB 1000 milliGRAM(s) IV Intermittent every 8 hours  petrolatum white Ointment 1 Application(s) Topical four times a day  polyethylene glycol 3350 17 Gram(s) Oral daily  posaconazole DR Tablet 300 milliGRAM(s) Oral daily  senna 2 Tablet(s) Oral at bedtime  sodium bicarbonate Mouth Rinse 15 milliLiter(s) Swish and Spit four times a day  sodium chloride 0.9%. 1000 milliLiter(s) (20 mL/Hr) IV Continuous <Continuous>    MEDICATIONS  (PRN):  acetaminophen   Tablet .. 650 milliGRAM(s) Oral every 6 hours PRN Temp greater or equal to 38C (100.4F), Mild Pain (1 - 3), Moderate Pain (4 - 6)  benzocaine 15 mG/menthol 3.6 mG (Sugar-Free) Lozenge 1 Lozenge Oral every 4 hours PRN Sore Throat  metoclopramide Injectable 10 milliGRAM(s) IV Push every 6 hours PRN nausea/vomiting  sodium chloride 0.65% Nasal 1 Spray(s) Both Nostrils three times a day PRN Nasal Congestion  sodium chloride 0.9% lock flush 10 milliLiter(s) IV Push every 1 hour PRN Pre/post blood products, medications, blood draw, and to maintain line patency      Vital Signs Last 24 Hrs  T(C): 37.2 (23 Aug 2021 05:39), Max: 37.2 (23 Aug 2021 05:39)  T(F): 98.9 (23 Aug 2021 05:39), Max: 98.9 (23 Aug 2021 05:39)  HR: 86 (23 Aug 2021 05:39) (81 - 98)  BP: 137/83 (23 Aug 2021 05:39) (112/71 - 143/91)  BP(mean): --  RR: 18 (23 Aug 2021 05:39) (17 - 18)  SpO2: 99% (23 Aug 2021 05:39) (95% - 99%)    PHYSICAL EXAM  General: NAD  HEENT: PERRL, EOMI, conjunctiva clear, oral ulcer --->left side of tongue. Labial ulcers scabbed  CV: S1S2, RRR, no mur apprec  Lungs: Unlabored, CTA all fields, no rales, no wheezes  Abdomen: BS(+), soft  nontender, nondistended  Ext: BLE no edema, PPP, no calf tenderness  Skin: warm, dry, + diffuse papular rash to trunk (front and back) B/L UE/LE's   Neuro: AOX3, nonfocal  Central Line: RCW TLC, site C/D/I    LABS:                        7.2    0.31  )-----------( 12       ( 23 Aug 2021 07:07 )             20.6     23 Aug 2021 07:07    140    |  106    |  10     ----------------------------<  95     3.4     |  24     |  0.73     Ca    9.4        23 Aug 2021 07:07  Phos  2.9       23 Aug 2021 07:07  Mg     2.1       23 Aug 2021 07:07    TPro  6.3    /  Alb  3.4    /  TBili  0.4    /  DBili  x      /  AST  13     /  ALT  29     /  AlkPhos  58     23 Aug 2021 07:07    PT/INR - ( 23 Aug 2021 07:10 )   PT: 13.2 sec;   INR: 1.11 ratio    PTT - ( 23 Aug 2021 07:10 )  PTT:29.8 sec      LIVER FUNCTIONS - ( 23 Aug 2021 07:07 )  Alb: 3.4 g/dL / Pro: 6.3 g/dL / ALK PHOS: 58 U/L / ALT: 29 U/L / AST: 13 U/L / GGT: x               Urinalysis Basic - ( 20 Aug 2021 13:49 )    Color: Light Yellow / Appearance: Clear / S.017 / pH: x  Gluc: x / Ketone: Negative  / Bili: Negative / Urobili: Negative   Blood: x / Protein: Trace / Nitrite: Negative   Leuk Esterase: Negative / RBC: 2 /hpf / WBC 5 /HPF   Sq Epi: x / Non Sq Epi: 0 /hpf / Bacteria: Negative              Blood Cultures:      MRSA/MSSA PCR (21 @ 18:32)   [facility]    [facility] Viral Panel with COVID-19 by EUGENE (21 @ 12:54)   [facility]     Culture - Urine (21 @ 17:59)   Specimen Source: Clean Catch Clean Catch (Midstream)   Culture Results:   No growth   Culture - Blood (21 @ 13:16)   Specimen Source: .Blood Blood-Peripheral   Culture Results:   No growth to date    Culture - Blood (21 @ 09:16)   Specimen Source: .Blood Blood-Catheter   Culture Results:   No growth to date  Respiratory Viral Panel with COVID-19 by EUGENE (21 @ 12:54)   Rapid RVP Result: [facility]

## 2021-08-23 NOTE — PROGRESS NOTE ADULT - ASSESSMENT
Mr. Tian is  a 35 y/o male with h/o PMH HTN, fatty liver, kidney stones presents with rash, dizziness, fatigue and severe RUQ pain for 3 days. Now with anemia, thrombocytopenia and leukocytosis with 17% blasts, Bone marrow bx c/w AML. Transferred to [address] [place] [address] for management. Patient receiving induction chemotherapy with 7+3 (Daunorubicin and Cytarabine). Course c/b neutropenic fevers. Patient has pancytopenia secondary to chemotherapy and disease process.

## 2021-08-23 NOTE — PROGRESS NOTE ADULT - PROBLEM SELECTOR PLAN 2
neutropenic, febrile  FU cultures 8/20 All Cultures NGTD  Completed Zosyn course for suspected pna  8/9 started on Levaquin and posaconazole ppx for neutropenia  8/12 - Started Acyclovir for oral ulcers  8/14 (-) CMV PCR  HSV 1 serology (+)  8/20 Cefepime changed to ingrid due to persistent fever and new rash  Consider ID consult if continues.  8/21 MRSA and RVP/COVID swabs Neg

## 2021-08-24 LAB
ALBUMIN SERPL ELPH-MCNC: 3.5 G/DL — SIGNIFICANT CHANGE UP (ref 3.3–5)
ALP SERPL-CCNC: 59 U/L — SIGNIFICANT CHANGE UP (ref 40–120)
ALT FLD-CCNC: 37 U/L — SIGNIFICANT CHANGE UP (ref 10–45)
ANION GAP SERPL CALC-SCNC: 13 MMOL/L — SIGNIFICANT CHANGE UP (ref 5–17)
AST SERPL-CCNC: 16 U/L — SIGNIFICANT CHANGE UP (ref 10–40)
BILIRUB SERPL-MCNC: 0.6 MG/DL — SIGNIFICANT CHANGE UP (ref 0.2–1.2)
BUN SERPL-MCNC: 9 MG/DL — SIGNIFICANT CHANGE UP (ref 7–23)
CALCIUM SERPL-MCNC: 9.4 MG/DL — SIGNIFICANT CHANGE UP (ref 8.4–10.5)
CHLORIDE SERPL-SCNC: 105 MMOL/L — SIGNIFICANT CHANGE UP (ref 96–108)
CO2 SERPL-SCNC: 24 MMOL/L — SIGNIFICANT CHANGE UP (ref 22–31)
CREAT SERPL-MCNC: 0.78 MG/DL — SIGNIFICANT CHANGE UP (ref 0.5–1.3)
GLUCOSE SERPL-MCNC: 91 MG/DL — SIGNIFICANT CHANGE UP (ref 70–99)
HCT VFR BLD CALC: 21.3 % — LOW (ref 39–50)
HGB BLD-MCNC: 7.2 G/DL — LOW (ref 13–17)
LDH SERPL L TO P-CCNC: 152 U/L — SIGNIFICANT CHANGE UP (ref 50–242)
MAGNESIUM SERPL-MCNC: 2.2 MG/DL — SIGNIFICANT CHANGE UP (ref 1.6–2.6)
MCHC RBC-ENTMCNC: 29.8 PG — SIGNIFICANT CHANGE UP (ref 27–34)
MCHC RBC-ENTMCNC: 33.8 GM/DL — SIGNIFICANT CHANGE UP (ref 32–36)
MCV RBC AUTO: 88 FL — SIGNIFICANT CHANGE UP (ref 80–100)
NRBC # BLD: 0 /100 WBCS — SIGNIFICANT CHANGE UP (ref 0–0)
PHOSPHATE SERPL-MCNC: 2.9 MG/DL — SIGNIFICANT CHANGE UP (ref 2.5–4.5)
PLATELET # BLD AUTO: 22 K/UL — LOW (ref 150–400)
POTASSIUM SERPL-MCNC: 3.5 MMOL/L — SIGNIFICANT CHANGE UP (ref 3.5–5.3)
POTASSIUM SERPL-SCNC: 3.5 MMOL/L — SIGNIFICANT CHANGE UP (ref 3.5–5.3)
PROT SERPL-MCNC: 6.6 G/DL — SIGNIFICANT CHANGE UP (ref 6–8.3)
RBC # BLD: 2.42 M/UL — LOW (ref 4.2–5.8)
RBC # FLD: 15.5 % — HIGH (ref 10.3–14.5)
SODIUM SERPL-SCNC: 142 MMOL/L — SIGNIFICANT CHANGE UP (ref 135–145)
URATE SERPL-MCNC: 2.3 MG/DL — LOW (ref 3.4–8.8)
WBC # BLD: 0.33 K/UL — CRITICAL LOW (ref 3.8–10.5)
WBC # FLD AUTO: 0.33 K/UL — CRITICAL LOW (ref 3.8–10.5)

## 2021-08-24 PROCEDURE — 99232 SBSQ HOSP IP/OBS MODERATE 35: CPT | Mod: GC

## 2021-08-24 RX ORDER — POTASSIUM CHLORIDE 20 MEQ
40 PACKET (EA) ORAL ONCE
Refills: 0 | Status: COMPLETED | OUTPATIENT
Start: 2021-08-24 | End: 2021-08-24

## 2021-08-24 RX ADMIN — Medication 15 MILLILITER(S): at 21:36

## 2021-08-24 RX ADMIN — Medication 1 APPLICATION(S): at 12:03

## 2021-08-24 RX ADMIN — DIPHENHYDRAMINE HYDROCHLORIDE AND LIDOCAINE HYDROCHLORIDE AND ALUMINUM HYDROXIDE AND MAGNESIUM HYDRO 5 MILLILITER(S): KIT at 21:36

## 2021-08-24 RX ADMIN — Medication 15 MILLILITER(S): at 17:11

## 2021-08-24 RX ADMIN — Medication 1 APPLICATION(S): at 17:11

## 2021-08-24 RX ADMIN — MEROPENEM 100 MILLIGRAM(S): 1 INJECTION INTRAVENOUS at 13:51

## 2021-08-24 RX ADMIN — Medication 5 MILLILITER(S): at 05:16

## 2021-08-24 RX ADMIN — Medication 5 MILLILITER(S): at 12:01

## 2021-08-24 RX ADMIN — AMLODIPINE BESYLATE 5 MILLIGRAM(S): 2.5 TABLET ORAL at 05:17

## 2021-08-24 RX ADMIN — Medication 1 APPLICATION(S): at 17:22

## 2021-08-24 RX ADMIN — Medication 0.25 MILLIGRAM(S): at 21:40

## 2021-08-24 RX ADMIN — MEROPENEM 100 MILLIGRAM(S): 1 INJECTION INTRAVENOUS at 21:35

## 2021-08-24 RX ADMIN — Medication 15 MILLILITER(S): at 12:01

## 2021-08-24 RX ADMIN — MEROPENEM 100 MILLIGRAM(S): 1 INJECTION INTRAVENOUS at 05:16

## 2021-08-24 RX ADMIN — Medication 1 APPLICATION(S): at 05:17

## 2021-08-24 RX ADMIN — DIPHENHYDRAMINE HYDROCHLORIDE AND LIDOCAINE HYDROCHLORIDE AND ALUMINUM HYDROXIDE AND MAGNESIUM HYDRO 5 MILLILITER(S): KIT at 05:17

## 2021-08-24 RX ADMIN — Medication 5 MILLILITER(S): at 23:40

## 2021-08-24 RX ADMIN — Medication 15 MILLILITER(S): at 05:17

## 2021-08-24 RX ADMIN — Medication 400 MILLIGRAM(S): at 05:16

## 2021-08-24 RX ADMIN — DIPHENHYDRAMINE HYDROCHLORIDE AND LIDOCAINE HYDROCHLORIDE AND ALUMINUM HYDROXIDE AND MAGNESIUM HYDRO 5 MILLILITER(S): KIT at 12:00

## 2021-08-24 RX ADMIN — Medication 5 MILLILITER(S): at 15:45

## 2021-08-24 RX ADMIN — POSACONAZOLE 300 MILLIGRAM(S): 100 TABLET, DELAYED RELEASE ORAL at 12:01

## 2021-08-24 RX ADMIN — Medication 1 APPLICATION(S): at 21:41

## 2021-08-24 RX ADMIN — Medication 1 APPLICATION(S): at 05:29

## 2021-08-24 RX ADMIN — Medication 400 MILLIGRAM(S): at 13:52

## 2021-08-24 RX ADMIN — Medication 5 MILLILITER(S): at 19:54

## 2021-08-24 RX ADMIN — Medication 5 MILLILITER(S): at 08:16

## 2021-08-24 RX ADMIN — Medication 400 MILLIGRAM(S): at 21:36

## 2021-08-24 RX ADMIN — CHLORHEXIDINE GLUCONATE 1 APPLICATION(S): 213 SOLUTION TOPICAL at 08:17

## 2021-08-24 RX ADMIN — Medication 40 MILLIEQUIVALENT(S): at 12:01

## 2021-08-24 NOTE — PROGRESS NOTE ADULT - ATTENDING COMMENTS
35yo M admitted with RUQ pain found to have bloodwork concerning for acute leukemia  -13% myeloblasts on PB. Flt3 negative.   -s/p BMbx 8/4 -AML. f/u [facility]ogenetics, [facility]  -started 7+3 on 8/6 -cytarabine 100/m2 and dauno 90/m2. Today is day 18  -pain control, improved today. Abd sono showing hepatomegaly and hepatic steatosis. Right pleural effusion. CT chest shows small right pleural effusion  -MUGA EF 71%  -discussed with patient and family at the bedside. We also discussed sperm banking -pt declined  -febrile again today (8/20)- cultures have all been negative, he's been on cefepime, changed to meropenem on 8/20 for a rash. Swab for MRSA and RVP negative.   -Persistent fevers with mucositis. Aggressive oral care - adding sodium bicarb. Cultures negative, repeat CT 8/14 without infectious source.   -rash worsening -cefepime changed to meropenem on 8/20. Hydrocortisone cream. Stabilizing  -cont posaconazole and acyclovir PPx  -TLC placement  -supportive care  -transfuse as needed  -day 14 BMbx on 8/19 - awaiting results. 37yo M admitted with RUQ pain found to have bloodwork concerning for acute leukemia  -13% myeloblasts on PB. Flt3 negative.   -s/p BMbx 8/4 -AML. f/u cytogenetics, [facility]  -started 7+3 on 8/6 -cytarabine 100/m2 and dauno 90/m2. Today is day 19  -pain control, improved today. Abd sono showing hepatomegaly and hepatic steatosis. Right pleural effusion. CT chest shows small right pleural effusion  -MUGA EF 71%  -discussed with patient and family at the bedside. We also discussed sperm banking -pt declined  -febrile again today (8/20)- cultures have all been negative, he's been on cefepime, changed to meropenem on 8/20 for a rash. Swab for MRSA and RVP negative.   -Persistent fevers with mucositis. Aggressive oral care - adding sodium bicarb. Cultures negative, repeat CT 8/14 without infectious source.   -rash worsening -cefepime changed to meropenem on 8/20. Hydrocortisone cream. Stabilizing  -cont posaconazole and acyclovir PPx  -TLC placement  -supportive care  -transfuse as needed  -day 14 BMbx on 8/19 - awaiting results will call down.

## 2021-08-24 NOTE — PROGRESS NOTE ADULT - PROBLEM SELECTOR PLAN 1
FLT3 negative, BM bx c/w AML   consented for [place] study    HIV (-), Hep (-)  Echo 70%, MUGA EF 71%   Discussed sperm banking. Declined   TLC placed on 8/6 8/6 Started induction with  7+3 (Cytarabine + Daunorubicin)  Follow daily lytes, CBC. replace, Replete prn  KCL 40meq pox 1  Now off Allopurinol, IV hydration  mouth care, pain control, antiemetics  Monitor for TLS daily  f/u Day 14 Bm bx 8/19.  Monitor rash, receiving steroid cream (hydrocortisone 1%) FLT3 negative, BM bx c/w AML   consented for [place] study    HIV (-), Hep (-)  Echo 70%, MUGA EF 71%   Discussed sperm banking. Declined   TLC placed on 8/6 8/6 Started induction with  7+3 (Cytarabine + Daunorubicin)  Follow daily lytes, CBC. replace, Replete prn  KCL 40meq pox 1  Now off Allopurinol, IV hydration  mouth care, pain control, antiemetics  Monitor for TLS daily  Monitor rash,improving receiving steroid cream (hydrocortisone 1%)  f/u Day 14 Bm bx 8/19.

## 2021-08-24 NOTE — PROGRESS NOTE ADULT - PROBLEM SELECTOR PLAN 2
neutropenic, febrile  FU cultures 8/20 All Cultures NGTD  Completed Zosyn course for suspected pna  8/9 started on Levaquin and posaconazole ppx for neutropenia  8/12 - Started Acyclovir for oral ulcers  8/14 (-) CMV PCR  HSV 1 serology (+)  8/20 Cefepime changed to ingrid due to persistent fever and new rash  Consider ID consult if continues.  8/21 MRSA and RVP/COVID swabs Neg neutropenic, afebrile (since 8/21)  FU cultures 8/20 All Cultures NGTD  Completed Zosyn course for suspected pna  8/9 started on Levaquin and posaconazole ppx for neutropenia  8/12 - Started Acyclovir for oral ulcers  8/14 (-) CMV PCR  HSV 1 serology (+)  8/20 Cefepime changed to ingrid due to persistent fever and new rash  Consider ID consult if continues  8/21 MRSA and RVP/COVID swabs Neg

## 2021-08-24 NOTE — PROGRESS NOTE ADULT - SUBJECTIVE AND OBJECTIVE BOX
Diagnosis:  Acute Myeloid Leukemia, FLT3-    Protocol/Chemo Regimen: 7+3 (Daunorubicin and Cytarabine)    Day: 19    Pt endorsed:       Review of Systems:     Pain scale: denies     Diet: DASH/TLC    Allergies    No Known Allergies    Intolerances    MEDICATIONS  (STANDING):  acyclovir   Oral Tab/Cap 400 milliGRAM(s) Oral every 8 hours  ALPRAZolam 0.25 milliGRAM(s) Oral at bedtime  amLODIPine   Tablet 5 milliGRAM(s) Oral daily  Biotene Dry Mouth Oral Rinse 5 milliLiter(s) Swish and Spit five times a day  chlorhexidine 2% Cloths 1 Application(s) Topical <User Schedule>  FIRST- Mouthwash  BLM 5 milliLiter(s) Swish and Spit three times a day  hydrocortisone 1% Cream 1 Application(s) Topical two times a day  lidocaine 2% Injectable 20 milliLiter(s) Local Injection once  meropenem  IVPB 1000 milliGRAM(s) IV Intermittent every 8 hours  petrolatum white Ointment 1 Application(s) Topical four times a day  polyethylene glycol 3350 17 Gram(s) Oral daily  posaconazole DR Tablet 300 milliGRAM(s) Oral daily  senna 2 Tablet(s) Oral at bedtime  sodium bicarbonate Mouth Rinse 15 milliLiter(s) Swish and Spit four times a day  sodium chloride 0.9%. 1000 milliLiter(s) (20 mL/Hr) IV Continuous <Continuous>    MEDICATIONS  (PRN):  acetaminophen   Tablet .. 650 milliGRAM(s) Oral every 6 hours PRN Temp greater or equal to 38C (100.4F), Mild Pain (1 - 3), Moderate Pain (4 - 6)  benzocaine 15 mG/menthol 3.6 mG (Sugar-Free) Lozenge 1 Lozenge Oral every 4 hours PRN Sore Throat  metoclopramide Injectable 10 milliGRAM(s) IV Push every 6 hours PRN nausea/vomiting  sodium chloride 0.65% Nasal 1 Spray(s) Both Nostrils three times a day PRN Nasal Congestion  sodium chloride 0.9% lock flush 10 milliLiter(s) IV Push every 1 hour PRN Pre/post blood products, medications, blood draw, and to maintain line patency      Vital Signs Last 24 Hrs  T(C): 36.6 (24 Aug 2021 05:32), Max: 37.3 (23 Aug 2021 13:00)  T(F): 97.8 (24 Aug 2021 05:32), Max: 99.1 (23 Aug 2021 13:00)  HR: 90 (24 Aug 2021 05:32) (71 - 93)  BP: 132/82 (24 Aug 2021 05:32) (111/72 - 148/89)  BP(mean): --  RR: 18 (24 Aug 2021 05:32) (18 - 18)  SpO2: 96% (24 Aug 2021 05:32) (96% - 99%)    PHYSICAL EXAM  General: NAD  HEENT: PERRL, EOMI, conjunctiva clear, oral ulcer --->left side of tongue. Labial ulcers scabbed  CV: S1S2, RRR, no mur apprec  Lungs: Unlabored, CTA all fields, no rales, no wheezes  Abdomen: BS(+), soft  nontender, nondistended  Ext: BLE no edema, PPP, no calf tenderness  Skin: warm, dry, + diffuse papular rash to trunk (front and back) B/L UE/LE's   Neuro: AOX3, nonfocal  Central Line: RCW TLC, site C/D/I    LABS:             ---------                    Blood Cultures:      MRSA/MSSA PCR (08.20.21 @ 18:32)   MRSA PCR Result.: [facility]    Respiratory Viral Panel with COVID-19 by EUGENE (08.20.21 @ 12:54)   Rapid RVP Result: [facility]     Culture - Urine (08.20.21 @ 17:59)   Specimen Source: Clean Catch Clean Catch (Midstream)   Culture Results:   No growth   Culture - Blood (08.20.21 @ 13:16)   Specimen Source: .Blood Blood-Peripheral   Culture Results:   No growth to date    Culture - Blood (08.20.21 @ 09:16)   Specimen Source: .Blood Blood-Catheter   Culture Results:   No growth to date  Respiratory Viral Panel with COVID-19 by EUGENE (08.20.21 @ 12:54)   [facility]              Diagnosis:  Acute Myeloid Leukemia, FLT3-    Protocol/Chemo Regimen: 7+3 (Daunorubicin and Cytarabine)    Day: 19    Pt endorsed: No overnight events, taking po's, anxious for BM results. Afebrile    Review of Systems: Denies cough, HA or dizziness, diarrhea, dysurea, abdominal pain    Pain scale: denies     Diet: DASH/TLC    Allergies    No Known Allergies    Intolerances    MEDICATIONS  (STANDING):  acyclovir   Oral Tab/Cap 400 milliGRAM(s) Oral every 8 hours  ALPRAZolam 0.25 milliGRAM(s) Oral at bedtime  amLODIPine   Tablet 5 milliGRAM(s) Oral daily  Biotene Dry Mouth Oral Rinse 5 milliLiter(s) Swish and Spit five times a day  chlorhexidine 2% Cloths 1 Application(s) Topical <User Schedule>  FIRST- Mouthwash  BLM 5 milliLiter(s) Swish and Spit three times a day  hydrocortisone 1% Cream 1 Application(s) Topical two times a day  lidocaine 2% Injectable 20 milliLiter(s) Local Injection once  meropenem  IVPB 1000 milliGRAM(s) IV Intermittent every 8 hours  petrolatum white Ointment 1 Application(s) Topical four times a day  polyethylene glycol 3350 17 Gram(s) Oral daily  posaconazole DR Tablet 300 milliGRAM(s) Oral daily  senna 2 Tablet(s) Oral at bedtime  sodium bicarbonate Mouth Rinse 15 milliLiter(s) Swish and Spit four times a day  sodium chloride 0.9%. 1000 milliLiter(s) (20 mL/Hr) IV Continuous <Continuous>    MEDICATIONS  (PRN):  acetaminophen   Tablet .. 650 milliGRAM(s) Oral every 6 hours PRN Temp greater or equal to 38C (100.4F), Mild Pain (1 - 3), Moderate Pain (4 - 6)  benzocaine 15 mG/menthol 3.6 mG (Sugar-Free) Lozenge 1 Lozenge Oral every 4 hours PRN Sore Throat  metoclopramide Injectable 10 milliGRAM(s) IV Push every 6 hours PRN nausea/vomiting  sodium chloride 0.65% Nasal 1 Spray(s) Both Nostrils three times a day PRN Nasal Congestion  sodium chloride 0.9% lock flush 10 milliLiter(s) IV Push every 1 hour PRN Pre/post blood products, medications, blood draw, and to maintain line patency      Vital Signs Last 24 Hrs  T(C): 36.6 (24 Aug 2021 05:32), Max: 37.3 (23 Aug 2021 13:00)  T(F): 97.8 (24 Aug 2021 05:32), Max: 99.1 (23 Aug 2021 13:00)  HR: 90 (24 Aug 2021 05:32) (71 - 93)  BP: 132/82 (24 Aug 2021 05:32) (111/72 - 148/89)  BP(mean): --  RR: 18 (24 Aug 2021 05:32) (18 - 18)  SpO2: 96% (24 Aug 2021 05:32) (96% - 99%)    PHYSICAL EXAM  General: NAD  HEENT: PERRL, EOMI, conjunctiva clear, oral ulcer --->left side of tongue. Labial ulcers scabbed  CV: S1S2, RRR, no mur apprec  Lungs: Unlabored, CTA all fields, no rales, no wheezes  Abdomen: BS(+), soft  nontender, nondistended  Ext: BLE no edema, PPP, no calf tenderness  Skin: warm, dry, + diffuse papular rash to trunk (front and back), B/L UE/LE's - fading  Neuro: AOX3, nonfocal  Central Line: RCW TLC, site C/D/I    LABS:                        7.2    0.33  )-----------( 22       ( 24 Aug 2021 06:59 )             21.3     24 Aug 2021 06:57    142    |  105    |  9      ----------------------------<  91     3.5     |  24     |  0.78     Ca    9.4        24 Aug 2021 06:57  Phos  2.9       24 Aug 2021 06:57  Mg     2.2       24 Aug 2021 06:57    TPro  6.6    /  Alb  3.5    /  TBili  0.6    /  DBili  x      /  AST  16     /  ALT  37     /  AlkPhos  59     24 Aug 2021 06:57    PT/INR - ( 23 Aug 2021 07:10 )   PT: 13.2 sec;   INR: 1.11 ratio    PTT - ( 23 Aug 2021 07:10 )  PTT:29.8 sec      LIVER FUNCTIONS - ( 24 Aug 2021 06:57 )  Alb: 3.5 g/dL / Pro: 6.6 g/dL / ALK PHOS: 59 U/L / ALT: 37 U/L / AST: 16 U/L / GGT: x             Blood Cultures:      MRSA/MSSA PCR (08.20.21 @ 18:32)   MRSA PCR Result.: [facility]    Respiratory Viral Panel with COVID-19 by EUGENE (08.20.21 @ 12:54)   Rapid RVP Result: [facility]     Culture - Urine (08.20.21 @ 17:59)   Specimen Source: Clean Catch Clean Catch (Midstream)   Culture Results:   No growth   Culture - Blood (08.20.21 @ 13:16)   Specimen Source: .Blood Blood-Peripheral   Culture Results:   No growth to date    Culture - Blood (08.20.21 @ 09:16)   Specimen Source: .Blood Blood-Catheter   Culture Results:   No growth to date  Respiratory Viral Panel with COVID-19 by EUGENE (08.20.21 @ 12:54)   Rapid RVP Result: [facility]

## 2021-08-24 NOTE — PROGRESS NOTE ADULT - PROBLEM SELECTOR PLAN 3
No VTE ppx due to thrombocytopenia   Encourage ambulation         Contact information: [phone] No VTE ppx due to thrombocytopenia   Encourage ambulation     Contact information: [phone]

## 2021-08-24 NOTE — PROGRESS NOTE ADULT - ASSESSMENT
Mr. Tian is  a 37 y/o male with h/o PMH HTN, fatty liver, kidney stones presents with rash, dizziness, fatigue and severe RUQ pain for 3 days. Now with anemia, thrombocytopenia and leukocytosis with 17% blasts, Bone marrow bx c/w AML. Transferred to [address] [place] [address] for management. Patient receiving induction chemotherapy with 7+3 (Daunorubicin and Cytarabine). Course c/b neutropenic fevers. Patient has pancytopenia secondary to chemotherapy and disease process.

## 2021-08-25 LAB
ALBUMIN SERPL ELPH-MCNC: 3.6 G/DL — SIGNIFICANT CHANGE UP (ref 3.3–5)
ALP SERPL-CCNC: 69 U/L — SIGNIFICANT CHANGE UP (ref 40–120)
ALT FLD-CCNC: 46 U/L — HIGH (ref 10–45)
ANION GAP SERPL CALC-SCNC: 12 MMOL/L — SIGNIFICANT CHANGE UP (ref 5–17)
AST SERPL-CCNC: 18 U/L — SIGNIFICANT CHANGE UP (ref 10–40)
BILIRUB SERPL-MCNC: 0.3 MG/DL — SIGNIFICANT CHANGE UP (ref 0.2–1.2)
BLD GP AB SCN SERPL QL: NEGATIVE — SIGNIFICANT CHANGE UP
BUN SERPL-MCNC: 11 MG/DL — SIGNIFICANT CHANGE UP (ref 7–23)
CALCIUM SERPL-MCNC: 9.5 MG/DL — SIGNIFICANT CHANGE UP (ref 8.4–10.5)
CHLORIDE SERPL-SCNC: 105 MMOL/L — SIGNIFICANT CHANGE UP (ref 96–108)
CO2 SERPL-SCNC: 24 MMOL/L — SIGNIFICANT CHANGE UP (ref 22–31)
CREAT SERPL-MCNC: 0.73 MG/DL — SIGNIFICANT CHANGE UP (ref 0.5–1.3)
CULTURE RESULTS: SIGNIFICANT CHANGE UP
CULTURE RESULTS: SIGNIFICANT CHANGE UP
GLUCOSE SERPL-MCNC: 112 MG/DL — HIGH (ref 70–99)
HCT VFR BLD CALC: 21.7 % — LOW (ref 39–50)
HGB BLD-MCNC: 7.5 G/DL — LOW (ref 13–17)
LDH SERPL L TO P-CCNC: 158 U/L — SIGNIFICANT CHANGE UP (ref 50–242)
MAGNESIUM SERPL-MCNC: 2.1 MG/DL — SIGNIFICANT CHANGE UP (ref 1.6–2.6)
MCHC RBC-ENTMCNC: 30.4 PG — SIGNIFICANT CHANGE UP (ref 27–34)
MCHC RBC-ENTMCNC: 34.6 GM/DL — SIGNIFICANT CHANGE UP (ref 32–36)
MCV RBC AUTO: 87.9 FL — SIGNIFICANT CHANGE UP (ref 80–100)
NRBC # BLD: 0 /100 WBCS — SIGNIFICANT CHANGE UP (ref 0–0)
PHOSPHATE SERPL-MCNC: 2.8 MG/DL — SIGNIFICANT CHANGE UP (ref 2.5–4.5)
PLATELET # BLD AUTO: 56 K/UL — LOW (ref 150–400)
POTASSIUM SERPL-MCNC: 3.5 MMOL/L — SIGNIFICANT CHANGE UP (ref 3.5–5.3)
POTASSIUM SERPL-SCNC: 3.5 MMOL/L — SIGNIFICANT CHANGE UP (ref 3.5–5.3)
PROT SERPL-MCNC: 6.6 G/DL — SIGNIFICANT CHANGE UP (ref 6–8.3)
RBC # BLD: 2.47 M/UL — LOW (ref 4.2–5.8)
RBC # FLD: 15.3 % — HIGH (ref 10.3–14.5)
RH IG SCN BLD-IMP: POSITIVE — SIGNIFICANT CHANGE UP
SARS-COV-2 RNA SPEC QL NAA+PROBE: SIGNIFICANT CHANGE UP
SODIUM SERPL-SCNC: 141 MMOL/L — SIGNIFICANT CHANGE UP (ref 135–145)
SPECIMEN SOURCE: SIGNIFICANT CHANGE UP
SPECIMEN SOURCE: SIGNIFICANT CHANGE UP
URATE SERPL-MCNC: 2.2 MG/DL — LOW (ref 3.4–8.8)
WBC # BLD: 0.36 K/UL — CRITICAL LOW (ref 3.8–10.5)
WBC # FLD AUTO: 0.36 K/UL — CRITICAL LOW (ref 3.8–10.5)

## 2021-08-25 PROCEDURE — 99232 SBSQ HOSP IP/OBS MODERATE 35: CPT | Mod: GC

## 2021-08-25 RX ADMIN — Medication 15 MILLILITER(S): at 05:05

## 2021-08-25 RX ADMIN — DIPHENHYDRAMINE HYDROCHLORIDE AND LIDOCAINE HYDROCHLORIDE AND ALUMINUM HYDROXIDE AND MAGNESIUM HYDRO 5 MILLILITER(S): KIT at 05:04

## 2021-08-25 RX ADMIN — Medication 400 MILLIGRAM(S): at 05:05

## 2021-08-25 RX ADMIN — Medication 0.25 MILLIGRAM(S): at 21:51

## 2021-08-25 RX ADMIN — Medication 1 APPLICATION(S): at 17:11

## 2021-08-25 RX ADMIN — Medication 1 APPLICATION(S): at 05:05

## 2021-08-25 RX ADMIN — Medication 15 MILLILITER(S): at 21:53

## 2021-08-25 RX ADMIN — Medication 15 MILLILITER(S): at 11:35

## 2021-08-25 RX ADMIN — Medication 400 MILLIGRAM(S): at 21:52

## 2021-08-25 RX ADMIN — Medication 5 MILLILITER(S): at 15:03

## 2021-08-25 RX ADMIN — AMLODIPINE BESYLATE 5 MILLIGRAM(S): 2.5 TABLET ORAL at 05:05

## 2021-08-25 RX ADMIN — Medication 1 APPLICATION(S): at 11:37

## 2021-08-25 RX ADMIN — Medication 5 MILLILITER(S): at 11:35

## 2021-08-25 RX ADMIN — MEROPENEM 100 MILLIGRAM(S): 1 INJECTION INTRAVENOUS at 13:22

## 2021-08-25 RX ADMIN — MEROPENEM 100 MILLIGRAM(S): 1 INJECTION INTRAVENOUS at 21:53

## 2021-08-25 RX ADMIN — Medication 400 MILLIGRAM(S): at 13:22

## 2021-08-25 RX ADMIN — DIPHENHYDRAMINE HYDROCHLORIDE AND LIDOCAINE HYDROCHLORIDE AND ALUMINUM HYDROXIDE AND MAGNESIUM HYDRO 5 MILLILITER(S): KIT at 11:36

## 2021-08-25 RX ADMIN — POSACONAZOLE 300 MILLIGRAM(S): 100 TABLET, DELAYED RELEASE ORAL at 11:34

## 2021-08-25 RX ADMIN — Medication 1 APPLICATION(S): at 21:57

## 2021-08-25 RX ADMIN — Medication 5 MILLILITER(S): at 21:52

## 2021-08-25 RX ADMIN — Medication 1 APPLICATION(S): at 05:07

## 2021-08-25 RX ADMIN — CHLORHEXIDINE GLUCONATE 1 APPLICATION(S): 213 SOLUTION TOPICAL at 07:25

## 2021-08-25 RX ADMIN — DIPHENHYDRAMINE HYDROCHLORIDE AND LIDOCAINE HYDROCHLORIDE AND ALUMINUM HYDROXIDE AND MAGNESIUM HYDRO 5 MILLILITER(S): KIT at 21:52

## 2021-08-25 RX ADMIN — Medication 1 APPLICATION(S): at 17:14

## 2021-08-25 RX ADMIN — MEROPENEM 100 MILLIGRAM(S): 1 INJECTION INTRAVENOUS at 05:03

## 2021-08-25 RX ADMIN — Medication 5 MILLILITER(S): at 07:25

## 2021-08-25 NOTE — PROGRESS NOTE ADULT - ATTENDING COMMENTS
35yo M admitted with RUQ pain found to have bloodwork concerning for acute leukemia  -13% myeloblasts on PB. Flt3 negative.   -s/p BMbx 8/4 -AML. f/u [facility]ogenetics, [facility]  -started 7+3 on 8/6 -cytarabine 100/m2 and dauno 90/m2. Today is day 19  -pain control, improved today. Abd sono showing hepatomegaly and hepatic steatosis. Right pleural effusion. CT chest shows small right pleural effusion  -MUGA EF 71%  -discussed with patient and family at the bedside. We also discussed sperm banking -pt declined  -febrile again today (8/20)- cultures have all been negative, he's been on cefepime, changed to meropenem on 8/20 for a rash. Swab for MRSA and RVP negative.   -Persistent fevers with mucositis. Aggressive oral care - adding sodium bicarb. Cultures negative, repeat CT 8/14 without infectious source.   -rash worsening -cefepime changed to meropenem on 8/20. Hydrocortisone cream. Stabilizing  -cont posaconazole and acyclovir PPx  -TLC placement  -supportive care  -transfuse as needed  -day 14 BMbx on 8/19 - awaiting results will call down. 37yo M admitted with RUQ pain found to have bloodwork concerning for acute leukemia  -13% myeloblasts on PB. Flt3 negative.   -s/p BMbx 8/4 -AML. f/u cytogenetics, [facility]  -started 7+3 on 8/6 -cytarabine 100/m2 and dauno 90/m2. Today is day 20  -pain control, improved today. Abd sono showing hepatomegaly and hepatic steatosis. Right pleural effusion. CT chest shows small right pleural effusion  -MUGA EF 71%  -discussed with patient and family at the bedside. We also discussed sperm banking -pt declined  -febrile again today (8/20)- cultures have all been negative, he's been on cefepime, changed to meropenem on 8/20 for a rash. Swab for MRSA and RVP negative.   -Persistent fevers with mucositis. Aggressive oral care - adding sodium bicarb. Cultures negative, repeat CT 8/14 without infectious source.   -rash worsening -cefepime changed to meropenem on 8/20. Hydrocortisone cream. Stabilizing  -cont posaconazole and acyclovir PPx  -TLC placement  -supportive care  -transfuse as needed - platelets recovering.   -day 14 BMbx on 8/19 - awaiting results - flow cytometry encouraging at this point.

## 2021-08-25 NOTE — PROGRESS NOTE ADULT - ASSESSMENT
Mr. Tian is  a 37 y/o male with h/o PMH HTN, fatty liver, kidney stones presents with rash, dizziness, fatigue and severe RUQ pain for 3 days. Now with anemia, thrombocytopenia and leukocytosis with 17% blasts, Bone marrow bx c/w AML. Transferred to [address] [place] [address] for management. Patient receiving induction chemotherapy with 7+3 (Daunorubicin and Cytarabine). Course c/b neutropenic fevers. Patient has pancytopenia secondary to chemotherapy and disease process.          Mr. Tian is  a 35 y/o male with h/o PMH HTN, fatty liver, kidney stones presents with rash, dizziness, fatigue and severe RUQ pain for 3 days. Now with anemia, thrombocytopenia and leukocytosis with 17% blasts, Bone marrow bx c/w AML. Transferred to [address] [place] [address] for management. Patient receiving induction chemotherapy with 7+3 (Daunorubicin and Cytarabine). Post induction BM biopsy shows chemotherapeutic effect. Course c/b neutropenic fevers. Patient has pancytopenia secondary to chemotherapy and disease process.

## 2021-08-25 NOTE — PROGRESS NOTE ADULT - PROBLEM SELECTOR PLAN 1
FLT3 negative, BM bx c/w AML   consented for [place] study    HIV (-), Hep (-)  Echo 70%, MUGA EF 71%   Discussed sperm banking. Declined   TLC placed on 8/6 8/6 Started induction with  7+3 (Cytarabine + Daunorubicin)  Follow daily lytes, CBC. replace, Replete prn  KCL 40meq pox 1  Now off Allopurinol, IV hydration  mouth care, pain control, antiemetics  Monitor for TLS daily  Monitor rash,improving receiving steroid cream (hydrocortisone 1%)  f/u Day 14 Bm bx 8/19- results pending; flow negative FLT3 negative, BM bx c/w AML   consented for [place] study    HIV (-), Hep (-)  Echo 70%, MUGA EF 71%   Discussed sperm banking. Declined   TLC placed on 8/6 8/6 Started induction with  7+3 (Cytarabine + Daunorubicin)  Follow daily lytes, CBC. replace, Replete prn  KCL 40meq pox 1  Now off Allopurinol, IV hydration  mouth care, pain control, antiemetics  Monitor for TLS daily  Monitor rash,improving receiving steroid cream (hydrocortisone 1%)  f/u Day 14 Bm bx 8/19- chemotherapeutic effect

## 2021-08-25 NOTE — PROGRESS NOTE ADULT - SUBJECTIVE AND OBJECTIVE BOX
Diagnosis:  Acute Myeloid Leukemia, FLT3-    Protocol/Chemo Regimen: 7+3 (Daunorubicin and Cytarabine)    Day: 20    Pt endorsed: No overnight events, taking po's, anxious for BM results. Afebrile    Review of Systems: Denies cough, HA or dizziness, diarrhea, dysurea, abdominal pain    Pain scale: denies     Diet: DASH/TLC    Allergies: No Known Allergies    Intolerances: cefepime    ANTIMICROBIALS  acyclovir   Oral Tab/Cap 400 milliGRAM(s) Oral every 8 hours  meropenem  IVPB 1000 milliGRAM(s) IV Intermittent every 8 hours  posaconazole DR Tablet 300 milliGRAM(s) Oral daily      STANDING MEDICATIONS  ALPRAZolam 0.25 milliGRAM(s) Oral at bedtime  amLODIPine   Tablet 5 milliGRAM(s) Oral daily  Biotene Dry Mouth Oral Rinse 5 milliLiter(s) Swish and Spit five times a day  chlorhexidine 2% Cloths 1 Application(s) Topical <User Schedule>  FIRST- Mouthwash  BLM 5 milliLiter(s) Swish and Spit three times a day  hydrocortisone 1% Cream 1 Application(s) Topical two times a day  lidocaine 2% Injectable 20 milliLiter(s) Local Injection once  petrolatum white Ointment 1 Application(s) Topical four times a day  polyethylene glycol 3350 17 Gram(s) Oral daily  senna 2 Tablet(s) Oral at bedtime  sodium bicarbonate Mouth Rinse 15 milliLiter(s) Swish and Spit four times a day  sodium chloride 0.9%. 1000 milliLiter(s) IV Continuous <Continuous>      PRN MEDICATIONS  acetaminophen   Tablet .. 650 milliGRAM(s) Oral every 6 hours PRN  benzocaine 15 mG/menthol 3.6 mG (Sugar-Free) Lozenge 1 Lozenge Oral every 4 hours PRN  metoclopramide Injectable 10 milliGRAM(s) IV Push every 6 hours PRN  sodium chloride 0.65% Nasal 1 Spray(s) Both Nostrils three times a day PRN  sodium chloride 0.9% lock flush 10 milliLiter(s) IV Push every 1 hour PRN      Vital Signs Last 24 Hrs  T(C): 36.3 (25 Aug 2021 05:11), Max: 37.4 (25 Aug 2021 00:05)  T(F): 97.3 (25 Aug 2021 05:11), Max: 99.3 (25 Aug 2021 00:05)  HR: 91 (25 Aug 2021 05:11) (83 - 104)  BP: 121/83 (25 Aug 2021 05:11) (117/74 - 144/86)  RR: 18 (25 Aug 2021 05:11) (18 - 18)  SpO2: 97% (25 Aug 2021 05:11) (95% - 99%)    PHYSICAL EXAM  General: NAD  HEENT: PERRL, EOMI, conjunctiva clear, oral ulcer --->left side of tongue. Labial ulcers scabbed  CV: S1S2, RRR, no mur apprec  Lungs: Unlabored, CTA all fields, no rales, no wheezes  Abdomen: BS(+), soft  nontender, nondistended  Ext: BLE no edema, PPP, no calf tenderness  Skin: warm, dry, + diffuse papular rash to trunk (front and back), B/L UE/LE's - fading  Neuro: AOX3, nonfocal  Central Line: RCW TLC, site C/D/I    LABS:                        7.5    0.36  )-----------( 56       ( 25 Aug 2021 07:10 )             21.7       Mean Cell Volume : 87.9 fl  Mean Cell Hemoglobin : 30.4 pg  Mean Cell Hemoglobin Concentration : 34.6 gm/dL  Auto Neutrophil # : x  Auto Lymphocyte # : x  Auto Monocyte # : x  Auto Eosinophil # : x  Auto Basophil # : x  Auto Neutrophil % : x  Auto Lymphocyte % : x  Auto Monocyte % : x  Auto Eosinophil % : x  Auto Basophil % : x      08-25    141  |  105  |  11  ----------------------------<  112<H>  3.5   |  24  |  0.73    Ca    9.5      25 Aug 2021 06:54  Phos  2.8     08-25  Mg     2.1     08-25    TPro  6.6  /  Alb  3.6  /  TBili  0.3  /  DBili  x   /  AST  18  /  ALT  46<H>  /  AlkPhos  69  08-25      Uric Acid 2.2      RADIOLOGY & ADDITIONAL STUDIES:         Diagnosis:  Acute Myeloid Leukemia, FLT3-    Protocol/Chemo Regimen: s/p induction 7+3 (Daunorubicin and Cytarabine)    Day: 20    Pt endorsed: No overnight events, taking po's, anxious for BM results. Afebrile    Review of Systems: Denies cough, HA or dizziness, diarrhea, dysurea, abdominal pain    Pain scale: denies     Diet: DASH/TLC    Allergies: No Known Allergies    Intolerances: cefepime    ANTIMICROBIALS  acyclovir   Oral Tab/Cap 400 milliGRAM(s) Oral every 8 hours  meropenem  IVPB 1000 milliGRAM(s) IV Intermittent every 8 hours  posaconazole DR Tablet 300 milliGRAM(s) Oral daily      STANDING MEDICATIONS  ALPRAZolam 0.25 milliGRAM(s) Oral at bedtime  amLODIPine   Tablet 5 milliGRAM(s) Oral daily  Biotene Dry Mouth Oral Rinse 5 milliLiter(s) Swish and Spit five times a day  chlorhexidine 2% Cloths 1 Application(s) Topical <User Schedule>  FIRST- Mouthwash  BLM 5 milliLiter(s) Swish and Spit three times a day  hydrocortisone 1% Cream 1 Application(s) Topical two times a day  lidocaine 2% Injectable 20 milliLiter(s) Local Injection once  petrolatum white Ointment 1 Application(s) Topical four times a day  polyethylene glycol 3350 17 Gram(s) Oral daily  senna 2 Tablet(s) Oral at bedtime  sodium bicarbonate Mouth Rinse 15 milliLiter(s) Swish and Spit four times a day  sodium chloride 0.9%. 1000 milliLiter(s) IV Continuous <Continuous>      PRN MEDICATIONS  acetaminophen   Tablet .. 650 milliGRAM(s) Oral every 6 hours PRN  benzocaine 15 mG/menthol 3.6 mG (Sugar-Free) Lozenge 1 Lozenge Oral every 4 hours PRN  metoclopramide Injectable 10 milliGRAM(s) IV Push every 6 hours PRN  sodium chloride 0.65% Nasal 1 Spray(s) Both Nostrils three times a day PRN  sodium chloride 0.9% lock flush 10 milliLiter(s) IV Push every 1 hour PRN      Vital Signs Last 24 Hrs  T(C): 36.3 (25 Aug 2021 05:11), Max: 37.4 (25 Aug 2021 00:05)  T(F): 97.3 (25 Aug 2021 05:11), Max: 99.3 (25 Aug 2021 00:05)  HR: 91 (25 Aug 2021 05:11) (83 - 104)  BP: 121/83 (25 Aug 2021 05:11) (117/74 - 144/86)  RR: 18 (25 Aug 2021 05:11) (18 - 18)  SpO2: 97% (25 Aug 2021 05:11) (95% - 99%)    PHYSICAL EXAM  General: NAD  HEENT: PERRL, EOMI, conjunctiva clear, oral ulcer --->left side of tongue. Labial ulcers scabbed  CV: S1S2, RRR, no mur apprec  Lungs: Unlabored, CTA all fields, no rales, no wheezes  Abdomen: BS(+), soft  nontender, nondistended  Ext: BLE no edema, PPP, no calf tenderness  Skin: warm, dry, + diffuse papular rash to trunk (front and back), B/L UE/LE's - fading  Neuro: AOX3, nonfocal  Central Line: RCW TLC, site C/D/I    Cultures:    Culture - Urine (08.20.21 @ 17:59)    Specimen Source: Clean Catch Clean Catch (Midstream)    Culture Results:   No growth    Culture - Blood (08.20.21 @ 13:16)    Specimen Source: .Blood Blood-Peripheral    Culture Results:   No Growth Final    Culture - Blood (08.20.21 @ 09:16)    Specimen Source: .Blood Blood-Catheter    Culture Results:   No Growth Final    LABS:                        7.5    0.36  )-----------( 56       ( 25 Aug 2021 07:10 )             21.7       Mean Cell Volume : 87.9 fl  Mean Cell Hemoglobin : 30.4 pg  Mean Cell Hemoglobin Concentration : 34.6 gm/dL  Auto Neutrophil # : x  Auto Lymphocyte # : x  Auto Monocyte # : x  Auto Eosinophil # : x  Auto Basophil # : x  Auto Neutrophil % : x  Auto Lymphocyte % : x  Auto Monocyte % : x  Auto Eosinophil % : x  Auto Basophil % : x      08-25    141  |  105  |  11  ----------------------------<  112<H>  3.5   |  24  |  0.73    Ca    9.5      25 Aug 2021 06:54  Phos  2.8     08-25  Mg     2.1     08-25    TPro  6.6  /  Alb  3.6  /  TBili  0.3  /  DBili  x   /  AST  18  /  ALT  46<H>  /  AlkPhos  69  08-25      Uric Acid 2.2      RADIOLOGY & ADDITIONAL STUDIES:  from: Xray Chest 1 View- PORTABLE-Urgent (Xray Chest 1 View- PORTABLE-Urgent .) (08.20.21 @ 07:27)   IMPRESSION:  No acute pulmonary disease

## 2021-08-25 NOTE — PROGRESS NOTE ADULT - PROBLEM SELECTOR PLAN 2
neutropenic, afebrile (since 8/21)  FU cultures 8/20 All Cultures NGTD  Completed Zosyn course for suspected pna  8/9 started on Levaquin and posaconazole ppx for neutropenia  8/12 - Started Acyclovir for oral ulcers  8/14 (-) CMV PCR  HSV 1 serology (+)  8/20 Cefepime changed to ingrid due to persistent fever and new rash  Consider ID consult if continues  8/21 MRSA and RVP/COVID swabs Neg neutropenic, afebrile (since 8/21)  cultures 8/20 All Cultures NGTD  Completed Zosyn course for suspected pna  8/9 started on Levaquin and posaconazole ppx for neutropenia  8/12 - Started Acyclovir for oral ulcers  8/14 (-) CMV PCR  HSV 1 serology (+)  8/20 Cefepime changed to ingrid due to persistent fever and new rash  Consider ID consult if continues  8/21 MRSA and RVP/COVID swabs Neg

## 2021-08-26 LAB
ALBUMIN SERPL ELPH-MCNC: 3.5 G/DL — SIGNIFICANT CHANGE UP (ref 3.3–5)
ALP SERPL-CCNC: 69 U/L — SIGNIFICANT CHANGE UP (ref 40–120)
ALT FLD-CCNC: 52 U/L — HIGH (ref 10–45)
ANION GAP SERPL CALC-SCNC: 12 MMOL/L — SIGNIFICANT CHANGE UP (ref 5–17)
APTT BLD: 29.9 SEC — SIGNIFICANT CHANGE UP (ref 27.5–35.5)
AST SERPL-CCNC: 21 U/L — SIGNIFICANT CHANGE UP (ref 10–40)
BASOPHILS # BLD AUTO: 0 K/UL — SIGNIFICANT CHANGE UP (ref 0–0.2)
BASOPHILS NFR BLD AUTO: 0 % — SIGNIFICANT CHANGE UP (ref 0–2)
BILIRUB SERPL-MCNC: 0.4 MG/DL — SIGNIFICANT CHANGE UP (ref 0.2–1.2)
BLASTS # FLD: 6.9 % — HIGH (ref 0–0)
BUN SERPL-MCNC: 10 MG/DL — SIGNIFICANT CHANGE UP (ref 7–23)
CALCIUM SERPL-MCNC: 9.3 MG/DL — SIGNIFICANT CHANGE UP (ref 8.4–10.5)
CHLORIDE SERPL-SCNC: 105 MMOL/L — SIGNIFICANT CHANGE UP (ref 96–108)
CO2 SERPL-SCNC: 23 MMOL/L — SIGNIFICANT CHANGE UP (ref 22–31)
CREAT SERPL-MCNC: 0.74 MG/DL — SIGNIFICANT CHANGE UP (ref 0.5–1.3)
EOSINOPHIL # BLD AUTO: 0 K/UL — SIGNIFICANT CHANGE UP (ref 0–0.5)
EOSINOPHIL NFR BLD AUTO: 0 % — SIGNIFICANT CHANGE UP (ref 0–6)
GIANT PLATELETS BLD QL SMEAR: PRESENT — SIGNIFICANT CHANGE UP
GLUCOSE SERPL-MCNC: 92 MG/DL — SIGNIFICANT CHANGE UP (ref 70–99)
HCT VFR BLD CALC: 22.6 % — LOW (ref 39–50)
HGB BLD-MCNC: 7.8 G/DL — LOW (ref 13–17)
INR BLD: 1.22 RATIO — HIGH (ref 0.88–1.16)
LDH SERPL L TO P-CCNC: 171 U/L — SIGNIFICANT CHANGE UP (ref 50–242)
LYMPHOCYTES # BLD AUTO: 0.38 K/UL — LOW (ref 1–3.3)
LYMPHOCYTES # BLD AUTO: 55.2 % — HIGH (ref 13–44)
MAGNESIUM SERPL-MCNC: 2 MG/DL — SIGNIFICANT CHANGE UP (ref 1.6–2.6)
MANUAL SMEAR VERIFICATION: SIGNIFICANT CHANGE UP
MCHC RBC-ENTMCNC: 30.4 PG — SIGNIFICANT CHANGE UP (ref 27–34)
MCHC RBC-ENTMCNC: 34.5 GM/DL — SIGNIFICANT CHANGE UP (ref 32–36)
MCV RBC AUTO: 87.9 FL — SIGNIFICANT CHANGE UP (ref 80–100)
MONOCYTES # BLD AUTO: 0.11 K/UL — SIGNIFICANT CHANGE UP (ref 0–0.9)
MONOCYTES NFR BLD AUTO: 16.1 % — HIGH (ref 2–14)
MYELOCYTES NFR BLD: 1.1 % — HIGH (ref 0–0)
NEUTROPHILS # BLD AUTO: 0.14 K/UL — LOW (ref 1.8–7.4)
NEUTROPHILS NFR BLD AUTO: 19.5 % — LOW (ref 43–77)
NEUTS BAND # BLD: 1.2 % — SIGNIFICANT CHANGE UP (ref 0–8)
NRBC # BLD: 5 /100 — HIGH (ref 0–0)
PHOSPHATE SERPL-MCNC: 3 MG/DL — SIGNIFICANT CHANGE UP (ref 2.5–4.5)
PLAT MORPH BLD: ABNORMAL
PLATELET # BLD AUTO: 149 K/UL — LOW (ref 150–400)
POTASSIUM SERPL-MCNC: 3.7 MMOL/L — SIGNIFICANT CHANGE UP (ref 3.5–5.3)
POTASSIUM SERPL-SCNC: 3.7 MMOL/L — SIGNIFICANT CHANGE UP (ref 3.5–5.3)
PROT SERPL-MCNC: 6.7 G/DL — SIGNIFICANT CHANGE UP (ref 6–8.3)
PROTHROM AB SERPL-ACNC: 14.5 SEC — HIGH (ref 10.6–13.6)
RBC # BLD: 2.57 M/UL — LOW (ref 4.2–5.8)
RBC # FLD: 15.3 % — HIGH (ref 10.3–14.5)
RBC BLD AUTO: SIGNIFICANT CHANGE UP
SODIUM SERPL-SCNC: 140 MMOL/L — SIGNIFICANT CHANGE UP (ref 135–145)
URATE SERPL-MCNC: 2.5 MG/DL — LOW (ref 3.4–8.8)
WBC # BLD: 0.69 K/UL — CRITICAL LOW (ref 3.8–10.5)
WBC # FLD AUTO: 0.69 K/UL — CRITICAL LOW (ref 3.8–10.5)

## 2021-08-26 PROCEDURE — 99232 SBSQ HOSP IP/OBS MODERATE 35: CPT | Mod: GC

## 2021-08-26 RX ORDER — PHENYLEPHRINE-SHARK LIVER OIL-MINERAL OIL-PETROLATUM RECTAL OINTMENT
1 OINTMENT (GRAM) RECTAL
Refills: 0 | Status: DISCONTINUED | OUTPATIENT
Start: 2021-08-26 | End: 2021-08-29

## 2021-08-26 RX ORDER — AER TRAVELER 0.5 G/1
1 SOLUTION RECTAL; TOPICAL THREE TIMES A DAY
Refills: 0 | Status: DISCONTINUED | OUTPATIENT
Start: 2021-08-26 | End: 2021-08-29

## 2021-08-26 RX ORDER — PHENYLEPHRINE-SHARK LIVER OIL-MINERAL OIL-PETROLATUM RECTAL OINTMENT
1 OINTMENT (GRAM) RECTAL ONCE
Refills: 0 | Status: COMPLETED | OUTPATIENT
Start: 2021-08-26 | End: 2021-08-26

## 2021-08-26 RX ADMIN — Medication 1 APPLICATION(S): at 06:29

## 2021-08-26 RX ADMIN — Medication 5 MILLILITER(S): at 09:04

## 2021-08-26 RX ADMIN — PHENYLEPHRINE-SHARK LIVER OIL-MINERAL OIL-PETROLATUM RECTAL OINTMENT 1 APPLICATION(S): at 06:27

## 2021-08-26 RX ADMIN — Medication 1 APPLICATION(S): at 21:32

## 2021-08-26 RX ADMIN — DIPHENHYDRAMINE HYDROCHLORIDE AND LIDOCAINE HYDROCHLORIDE AND ALUMINUM HYDROXIDE AND MAGNESIUM HYDRO 5 MILLILITER(S): KIT at 15:27

## 2021-08-26 RX ADMIN — Medication 0.25 MILLIGRAM(S): at 21:26

## 2021-08-26 RX ADMIN — Medication 15 MILLILITER(S): at 06:28

## 2021-08-26 RX ADMIN — Medication 1 APPLICATION(S): at 17:47

## 2021-08-26 RX ADMIN — AMLODIPINE BESYLATE 5 MILLIGRAM(S): 2.5 TABLET ORAL at 06:28

## 2021-08-26 RX ADMIN — Medication 400 MILLIGRAM(S): at 14:57

## 2021-08-26 RX ADMIN — Medication 5 MILLILITER(S): at 17:47

## 2021-08-26 RX ADMIN — Medication 400 MILLIGRAM(S): at 21:27

## 2021-08-26 RX ADMIN — DIPHENHYDRAMINE HYDROCHLORIDE AND LIDOCAINE HYDROCHLORIDE AND ALUMINUM HYDROXIDE AND MAGNESIUM HYDRO 5 MILLILITER(S): KIT at 06:28

## 2021-08-26 RX ADMIN — PHENYLEPHRINE-SHARK LIVER OIL-MINERAL OIL-PETROLATUM RECTAL OINTMENT 1 APPLICATION(S): at 17:46

## 2021-08-26 RX ADMIN — AER TRAVELER 1 APPLICATION(S): 0.5 SOLUTION RECTAL; TOPICAL at 21:31

## 2021-08-26 RX ADMIN — CHLORHEXIDINE GLUCONATE 1 APPLICATION(S): 213 SOLUTION TOPICAL at 09:04

## 2021-08-26 RX ADMIN — Medication 400 MILLIGRAM(S): at 06:28

## 2021-08-26 RX ADMIN — SENNA PLUS 2 TABLET(S): 8.6 TABLET ORAL at 21:27

## 2021-08-26 RX ADMIN — Medication 15 MILLILITER(S): at 21:28

## 2021-08-26 RX ADMIN — Medication 5 MILLILITER(S): at 14:56

## 2021-08-26 RX ADMIN — Medication 5 MILLILITER(S): at 21:27

## 2021-08-26 RX ADMIN — Medication 5 MILLILITER(S): at 23:23

## 2021-08-26 RX ADMIN — MEROPENEM 100 MILLIGRAM(S): 1 INJECTION INTRAVENOUS at 06:28

## 2021-08-26 RX ADMIN — POSACONAZOLE 300 MILLIGRAM(S): 100 TABLET, DELAYED RELEASE ORAL at 14:56

## 2021-08-26 RX ADMIN — Medication 15 MILLILITER(S): at 17:46

## 2021-08-26 RX ADMIN — DIPHENHYDRAMINE HYDROCHLORIDE AND LIDOCAINE HYDROCHLORIDE AND ALUMINUM HYDROXIDE AND MAGNESIUM HYDRO 5 MILLILITER(S): KIT at 21:28

## 2021-08-26 RX ADMIN — POLYETHYLENE GLYCOL 3350 17 GRAM(S): 17 POWDER, FOR SOLUTION ORAL at 14:56

## 2021-08-26 RX ADMIN — Medication 15 MILLILITER(S): at 15:28

## 2021-08-26 NOTE — PROGRESS NOTE ADULT - ATTENDING COMMENTS
37yo M admitted with RUQ pain found to have bloodwork concerning for acute leukemia  -13% myeloblasts on PB. Flt3 negative.   -s/p BMbx 8/4 -AML. f/u cytogenetics, [facility]  -started 7+3 on 8/6 -cytarabine 100/m2 and dauno 90/m2. Today is day 20  -pain control, improved today. Abd sono showing hepatomegaly and hepatic steatosis. Right pleural effusion. CT chest shows small right pleural effusion  -MUGA EF 71%  -discussed with patient and family at the bedside. We also discussed sperm banking -pt declined  -febrile again today (8/20)- cultures have all been negative, he's been on cefepime, changed to meropenem on 8/20 for a rash. Swab for MRSA and RVP negative.   -Persistent fevers with mucositis. Aggressive oral care - adding sodium bicarb. Cultures negative, repeat CT 8/14 without infectious source.   -rash worsening -cefepime changed to meropenem on 8/20. Hydrocortisone cream. Stabilizing  -cont posaconazole and acyclovir PPx  -TLC placement  -supportive care  -transfuse as needed - platelets recovering.   -day 14 BMbx on 8/19 - awaiting results - flow cytometry encouraging at this point. 35yo M admitted with RUQ pain found to have bloodwork concerning for acute leukemia  -13% myeloblasts on PB. Flt3 negative.   -s/p BMbx 8/4 -AML. f/u cytogenetics, [facility]  -started 7+3 on 8/6 -cytarabine 100/m2 and dauno 90/m2. Today is day 21  -pain control, improved today. Abd sono showing hepatomegaly and hepatic steatosis. Right pleural effusion. CT chest shows small right pleural effusion  -MUGA EF 71%  -discussed with patient and family at the bedside. We also discussed sperm banking -pt declined  -febrile again today (8/20)- cultures have all been negative, he's been on cefepime, changed to meropenem on 8/20 for a rash. Swab for MRSA and RVP negative.  on 8/26 but he has remained afebrile. Will switch to levaquin on 8/26 to prepare for possible discharge.   -Persistent fevers had been with mucositis. Aggressive oral care  Cultures negative, repeat CT 8/14 without infectious source.   -rash worsening -cefepime changed to meropenem on 8/20. Hydrocortisone cream. resolved  -cont posaconazole and acyclovir PPx  -TLC placement  -supportive care  -transfuse as needed - platelets recovering.   -day 14 BMbx on 8/19 - Discussed with hematopathology today, appears to be earlier regeneration than would typically seen which is concerning for persistent disease at this point, however the earliest cells are CD34 positive and his myeloblasts on presentation were CD34 negative. Thus, this may simply represent early regeneration of his marrow. Will await for count recovery and repeat, his platelets already recovered at this point.

## 2021-08-26 NOTE — PROGRESS NOTE ADULT - PROBLEM SELECTOR PLAN 2
neutropenic, afebrile (since 8/21)  cultures 8/20 All Cultures NGTD  Completed Zosyn course for suspected pna  8/9 started on Levaquin and posaconazole ppx for neutropenia  8/12 - Started Acyclovir for oral ulcers  8/14 (-) CMV PCR  HSV 1 serology (+)  8/20 Cefepime changed to ingrid due to persistent fever and new rash  Consider ID consult if continues  8/21 MRSA and RVP/COVID swabs Neg neutropenic, afebrile (since 8/21)  All Cultures NGTD  Completed Zosyn course for suspected pna  8/9 started on Levaquin and posaconazole ppx for neutropenia  8/12 - Started Acyclovir for oral ulcers  8/14 (-) CMV PCR  HSV 1 serology (+)  8/20 Cefepime changed to ingrid due to persistent fever and new rash  8/21 MRSA and RVP/COVID swabs Neg

## 2021-08-26 NOTE — PROGRESS NOTE ADULT - ASSESSMENT
Mr. Tian is  a 37 y/o male with h/o PMH HTN, fatty liver, kidney stones presents with rash, dizziness, fatigue and severe RUQ pain for 3 days. Now with anemia, thrombocytopenia and leukocytosis with 17% blasts, Bone marrow bx c/w AML. Transferred to [address] [place] [address] for management. Patient receiving induction chemotherapy with 7+3 (Daunorubicin and Cytarabine). Post induction BM biopsy shows chemotherapeutic effect. Course c/b neutropenic fevers. Patient has pancytopenia secondary to chemotherapy and disease process.

## 2021-08-26 NOTE — PROGRESS NOTE ADULT - SUBJECTIVE AND OBJECTIVE BOX
Diagnosis:  Acute Myeloid Leukemia, FLT3-    Protocol/Chemo Regimen: s/p induction 7+3 (Daunorubicin and Cytarabine)    Day: 21    Pt endorsed:    Review of Systems: Denies cough, HA or dizziness, diarrhea, dysurea, abdominal pain    Pain scale: denies     Diet: DASH/TLC    Allergies: No Known Allergies    Intolerances: cefepime    ANTIMICROBIALS  acyclovir   Oral Tab/Cap 400 milliGRAM(s) Oral every 8 hours  meropenem  IVPB 1000 milliGRAM(s) IV Intermittent every 8 hours  posaconazole DR Tablet 300 milliGRAM(s) Oral daily      STANDING MEDICATIONS  ALPRAZolam 0.25 milliGRAM(s) Oral at bedtime  amLODIPine   Tablet 5 milliGRAM(s) Oral daily  Biotene Dry Mouth Oral Rinse 5 milliLiter(s) Swish and Spit five times a day  chlorhexidine 2% Cloths 1 Application(s) Topical <User Schedule>  FIRST- Mouthwash  BLM 5 milliLiter(s) Swish and Spit three times a day  hydrocortisone 1% Cream 1 Application(s) Topical two times a day  lidocaine 2% Injectable 20 milliLiter(s) Local Injection once  petrolatum white Ointment 1 Application(s) Topical four times a day  polyethylene glycol 3350 17 Gram(s) Oral daily  senna 2 Tablet(s) Oral at bedtime  sodium bicarbonate Mouth Rinse 15 milliLiter(s) Swish and Spit four times a day  sodium chloride 0.9%. 1000 milliLiter(s) IV Continuous <Continuous>      PRN MEDICATIONS  acetaminophen   Tablet .. 650 milliGRAM(s) Oral every 6 hours PRN  benzocaine 15 mG/menthol 3.6 mG (Sugar-Free) Lozenge 1 Lozenge Oral every 4 hours PRN  metoclopramide Injectable 10 milliGRAM(s) IV Push every 6 hours PRN  sodium chloride 0.65% Nasal 1 Spray(s) Both Nostrils three times a day PRN  sodium chloride 0.9% lock flush 10 milliLiter(s) IV Push every 1 hour PRN      Vital Signs Last 24 Hrs  T(C): 37.5 (26 Aug 2021 05:38), Max: 37.5 (26 Aug 2021 05:38)  T(F): 99.5 (26 Aug 2021 05:38), Max: 99.5 (26 Aug 2021 05:38)  HR: 86 (26 Aug 2021 05:38) (80 - 97)  BP: 125/73 (26 Aug 2021 05:38) (108/60 - 125/75)   RR: 18 (26 Aug 2021 05:38) (18 - 18)  SpO2: 96% (26 Aug 2021 05:38) (96% - 99%)    PHYSICAL EXAM  General: NAD  HEENT: PERRL, EOMI, conjunctiva clear, oral ulcer --->left side of tongue. Labial ulcers scabbed  CV: S1S2, RRR, no mur apprec  Lungs: Unlabored, CTA all fields, no rales, no wheezes  Abdomen: BS(+), soft  nontender, nondistended  Ext: BLE no edema, PPP, no calf tenderness  Skin: warm, dry, + diffuse papular rash to trunk (front and back), B/L UE/LE's - fading  Neuro: AOX3, nonfocal  Central Line: RCW TLC, site C/D/I    Cultures:    Culture - Urine (08.20.21 @ 17:59)    Specimen Source: Clean Catch Clean Catch (Midstream)    Culture Results:   No growth    Culture - Blood (08.20.21 @ 13:16)    Specimen Source: .Blood Blood-Peripheral    Culture Results:   No Growth Final    Culture - Blood (08.20.21 @ 09:16)    Specimen Source: .Blood Blood-Catheter    Culture Results:   No Growth Final    LABS:                  RADIOLOGY & ADDITIONAL STUDIES:    Hematopathology Report (08.19.21 @ 16:15)      Final Diagnosis  1, 2. Bone marrow biopsy and bone marrow aspirate  - Hypocellular marrow (mostly less than 15%, focally 20%)  with chemotherapeutic effects, patchy areas of early  hematopoietic cells, mild immaturity, and some megakaryocytes  (history of AML, status post treatment day 14).    See note andAncillary studies  Special stains: Bone marrow aspirate iron stain: No spicules are  present to evaluate for iron stores and there are insufficient  nRBC to evaluate for ring sideroblasts.  Flow cytometry of bone marrow aspirate shows no increase in CD34,  CD14 or  positive cells.  Immunohistochemical stains: pending.    Microscopic description:  1. Biopsy: Sections of clot and biopsy show hypocellularity  (mostly less than 15%, focally 20%) with chemotherapeutic  effects, patchy areas of early hematopoietic cells, mild  immaturity, some megakaryocytes (focal clustering), and increased  iron stores.    2. Aspirate: Aparticulate and hemodilute aspirate smear with  mostly lymphocytes, few monocytic cells, and rare early  hematopoietic cells.  A differential count was not performed due  to hemodilution.    Comment  Iron stain (examined to evaluate for iron stores; see microscopic  description) and Giemsa stain (shows appropriate staining  pattern) are performed and       Diagnosis:  Acute Myeloid Leukemia, FLT3-    Protocol/Chemo Regimen: s/p induction 7+3 (Daunorubicin and Cytarabine)    Day: 21    Pt endorsed: hemorrhoidal pain overnight    Review of Systems: Denies cough, HA or dizziness, diarrhea, dysurea, abdominal pain    Pain scale: denies     Diet: DASH/TLC    Allergies: No Known Allergies    Intolerances: cefepime    ANTIMICROBIALS  acyclovir   Oral Tab/Cap 400 milliGRAM(s) Oral every 8 hours  meropenem  IVPB 1000 milliGRAM(s) IV Intermittent every 8 hours  posaconazole DR Tablet 300 milliGRAM(s) Oral daily      STANDING MEDICATIONS  ALPRAZolam 0.25 milliGRAM(s) Oral at bedtime  amLODIPine   Tablet 5 milliGRAM(s) Oral daily  Biotene Dry Mouth Oral Rinse 5 milliLiter(s) Swish and Spit five times a day  chlorhexidine 2% Cloths 1 Application(s) Topical <User Schedule>  FIRST- Mouthwash  BLM 5 milliLiter(s) Swish and Spit three times a day  hydrocortisone 1% Cream 1 Application(s) Topical two times a day  lidocaine 2% Injectable 20 milliLiter(s) Local Injection once  petrolatum white Ointment 1 Application(s) Topical four times a day  polyethylene glycol 3350 17 Gram(s) Oral daily  senna 2 Tablet(s) Oral at bedtime  sodium bicarbonate Mouth Rinse 15 milliLiter(s) Swish and Spit four times a day  sodium chloride 0.9%. 1000 milliLiter(s) IV Continuous <Continuous>      PRN MEDICATIONS  acetaminophen   Tablet .. 650 milliGRAM(s) Oral every 6 hours PRN  benzocaine 15 mG/menthol 3.6 mG (Sugar-Free) Lozenge 1 Lozenge Oral every 4 hours PRN  metoclopramide Injectable 10 milliGRAM(s) IV Push every 6 hours PRN  sodium chloride 0.65% Nasal 1 Spray(s) Both Nostrils three times a day PRN  sodium chloride 0.9% lock flush 10 milliLiter(s) IV Push every 1 hour PRN      Vital Signs Last 24 Hrs  T(C): 37.5 (26 Aug 2021 05:38), Max: 37.5 (26 Aug 2021 05:38)  T(F): 99.5 (26 Aug 2021 05:38), Max: 99.5 (26 Aug 2021 05:38)  HR: 86 (26 Aug 2021 05:38) (80 - 97)  BP: 125/73 (26 Aug 2021 05:38) (108/60 - 125/75)   RR: 18 (26 Aug 2021 05:38) (18 - 18)  SpO2: 96% (26 Aug 2021 05:38) (96% - 99%)    PHYSICAL EXAM  General: NAD  HEENT: PERRL, EOMI, conjunctiva clear, oral ulcer --->left side of tongue. Labial ulcers scabbed  CV: S1S2, RRR, no mur apprec  Lungs: Unlabored, CTA all fields, no rales, no wheezes  Abdomen: BS(+), soft  nontender, nondistended  Ext: BLE no edema, PPP, no calf tenderness  Skin: warm, dry, + diffuse papular rash to trunk (front and back), B/L UE/LE's - fading  Neuro: AOX3, nonfocal  Central Line: RCW TLC, site C/D/I    Cultures:    Culture - Urine (08.20.21 @ 17:59)    Specimen Source: Clean Catch Clean Catch (Midstream)    Culture Results:   No growth    Culture - Blood (08.20.21 @ 13:16)    Specimen Source: .Blood Blood-Peripheral    Culture Results:   No Growth Final    Culture - Blood (08.20.21 @ 09:16)    Specimen Source: .Blood Blood-Catheter    Culture Results:   No Growth Final    LABS:                        7.8    0.69  )-----------( 149      ( 26 Aug 2021 07:03 )             22.6     26 Aug 2021 07:03    140    |  105    |  10     ----------------------------<  92     3.7     |  23     |  0.74     Ca    9.3        26 Aug 2021 07:03  Phos  3.0       26 Aug 2021 07:03  Mg     2.0       26 Aug 2021 07:03    TPro  6.7    /  Alb  3.5    /  TBili  0.4    /  DBili  x      /  AST  21     /  ALT  52     /  AlkPhos  69     26 Aug 2021 07:03    PT/INR - ( 26 Aug 2021 07:03 )   PT: 14.5 sec;   INR: 1.22 ratio    PTT - ( 26 Aug 2021 07:03 )  PTT:29.9 sec  CAPILLARY BLOOD GLUCOSE      LIVER FUNCTIONS - ( 26 Aug 2021 07:03 )  Alb: 3.5 g/dL / Pro: 6.7 g/dL / ALK PHOS: 69 U/L / ALT: 52 U/L / AST: 21 U/L / GGT: x                      RADIOLOGY & ADDITIONAL STUDIES:    Hematopathology Report (08.19.21 @ 16:15)      Final Diagnosis  1, 2. Bone marrow biopsy and bone marrow aspirate  - Hypocellular marrow (mostly less than 15%, focally 20%)  with chemotherapeutic effects, patchy areas of early  hematopoietic cells, mild immaturity, and some megakaryocytes  (history of AML, status post treatment day 14).    See note andAncillary studies  Special stains: Bone marrow aspirate iron stain: No spicules are  present to evaluate for iron stores and there are insufficient  nRBC to evaluate for ring sideroblasts.  Flow cytometry of bone marrow aspirate shows no increase in CD34,  CD14 or  positive cells.  Immunohistochemical stains: pending.    Microscopic description:  1. Biopsy: Sections of clot and biopsy show hypocellularity  (mostly less than 15%, focally 20%) with chemotherapeutic  effects, patchy areas of early hematopoietic cells, mild  immaturity, some megakaryocytes (focal clustering), and increased  iron stores.    2. Aspirate: Aparticulate and hemodilute aspirate smear with  mostly lymphocytes, few monocytic cells, and rare early  hematopoietic cells.  A differential count was not performed due  to hemodilution.    Comment  Iron stain (examined to evaluate for iron stores; see microscopic  description) and Giemsa stain (shows appropriate staining  pattern) are performed and

## 2021-08-26 NOTE — PROGRESS NOTE ADULT - PROBLEM SELECTOR PLAN 1
FLT3 negative, BM bx c/w AML   consented for [place] study    HIV (-), Hep (-)  Echo 70%, MUGA EF 71%   Discussed sperm banking. Declined   TLC placed on 8/6 8/6 Started induction with  7+3 (Cytarabine + Daunorubicin)  Follow daily lytes, CBC. replace, Replete prn  KCL 40meq pox 1  Now off Allopurinol, IV hydration  mouth care, pain control, antiemetics  Monitor for TLS daily  Monitor rash,improving receiving steroid cream (hydrocortisone 1%)  f/u Day 14 Bm bx 8/19- chemotherapeutic effect FLT3 negative, BM bx c/w AML   consented for [place] study    HIV (-), Hep (-)  Echo 70%, MUGA EF 71%   Discussed sperm banking. Declined   TLC placed on 8/6 8/6 Started induction with  7+3 (Cytarabine + Daunorubicin)  Follow daily lytes, CBC. replace, Replete prn  KCL 40meq pox 1  Now off Allopurinol, IV hydration  mouth care, pain control, antiemetics  Monitor for TLS daily  Monitor rash,improving receiving steroid cream (hydrocortisone 1%)  day 14 BMbx on 8/19 - d/w hematopathology today, appears to be earlier regeneration than would typically seen which is concerning for persistent disease at this point, however the earliest cells are CD34 positive and his myeloblasts on presentation were CD34 negative. Thus, this may simply represent early regeneration of his marrow. Will await for count recovery and repeat, his platelets already recovered FLT3 negative, BM bx c/w AML   consented for [place] study    HIV (-), Hep (-)  Echo 70%, MUGA EF 71%   Discussed sperm banking. Declined   TLC placed on 8/6 8/6 Started induction with  7+3 (Cytarabine + Daunorubicin)  Follow daily lytes, CBC. replace, Replete prn  Now off Allopurinol, IV hydration  mouth care, pain control, antiemetics  Monitor for TLS daily  day 14 BMbx on 8/19 - d/w hematopathology today, appears to be earlier regeneration than would typically seen which is concerning for persistent disease at this point, however the earliest cells are CD34 positive and his myeloblasts on presentation were CD34 negative. Thus, this may simply represent early regeneration of his marrow. Will await for count recovery and repeat, his platelets already recovered

## 2021-08-27 PROBLEM — N20.0 CALCULUS OF KIDNEY: Chronic | Status: ACTIVE | Noted: 2021-07-30

## 2021-08-27 PROBLEM — Z86.19 PERSONAL HISTORY OF OTHER INFECTIOUS AND PARASITIC DISEASES: Chronic | Status: ACTIVE | Noted: 2021-07-30

## 2021-08-27 LAB
ALBUMIN SERPL ELPH-MCNC: 3.6 G/DL — SIGNIFICANT CHANGE UP (ref 3.3–5)
ALP SERPL-CCNC: 72 U/L — SIGNIFICANT CHANGE UP (ref 40–120)
ALT FLD-CCNC: 64 U/L — HIGH (ref 10–45)
ANION GAP SERPL CALC-SCNC: 14 MMOL/L — SIGNIFICANT CHANGE UP (ref 5–17)
AST SERPL-CCNC: 28 U/L — SIGNIFICANT CHANGE UP (ref 10–40)
BASOPHILS # BLD AUTO: 0 K/UL — SIGNIFICANT CHANGE UP (ref 0–0.2)
BASOPHILS NFR BLD AUTO: 0 % — SIGNIFICANT CHANGE UP (ref 0–2)
BILIRUB SERPL-MCNC: 0.5 MG/DL — SIGNIFICANT CHANGE UP (ref 0.2–1.2)
BLASTS # FLD: 0.9 % — HIGH (ref 0–0)
BLD GP AB SCN SERPL QL: NEGATIVE — SIGNIFICANT CHANGE UP
BUN SERPL-MCNC: 10 MG/DL — SIGNIFICANT CHANGE UP (ref 7–23)
CALCIUM SERPL-MCNC: 9.8 MG/DL — SIGNIFICANT CHANGE UP (ref 8.4–10.5)
CHLORIDE SERPL-SCNC: 103 MMOL/L — SIGNIFICANT CHANGE UP (ref 96–108)
CO2 SERPL-SCNC: 25 MMOL/L — SIGNIFICANT CHANGE UP (ref 22–31)
CREAT SERPL-MCNC: 0.74 MG/DL — SIGNIFICANT CHANGE UP (ref 0.5–1.3)
EOSINOPHIL # BLD AUTO: 0 K/UL — SIGNIFICANT CHANGE UP (ref 0–0.5)
EOSINOPHIL NFR BLD AUTO: 0 % — SIGNIFICANT CHANGE UP (ref 0–6)
GLUCOSE SERPL-MCNC: 95 MG/DL — SIGNIFICANT CHANGE UP (ref 70–99)
HCT VFR BLD CALC: 23 % — LOW (ref 39–50)
HGB BLD-MCNC: 7.9 G/DL — LOW (ref 13–17)
LDH SERPL L TO P-CCNC: 202 U/L — SIGNIFICANT CHANGE UP (ref 50–242)
LYMPHOCYTES # BLD AUTO: 0.37 K/UL — LOW (ref 1–3.3)
LYMPHOCYTES # BLD AUTO: 38.9 % — SIGNIFICANT CHANGE UP (ref 13–44)
MAGNESIUM SERPL-MCNC: 2.3 MG/DL — SIGNIFICANT CHANGE UP (ref 1.6–2.6)
MANUAL SMEAR VERIFICATION: SIGNIFICANT CHANGE UP
MCHC RBC-ENTMCNC: 30.7 PG — SIGNIFICANT CHANGE UP (ref 27–34)
MCHC RBC-ENTMCNC: 34.3 GM/DL — SIGNIFICANT CHANGE UP (ref 32–36)
MCV RBC AUTO: 89.5 FL — SIGNIFICANT CHANGE UP (ref 80–100)
MONOCYTES # BLD AUTO: 0.23 K/UL — SIGNIFICANT CHANGE UP (ref 0–0.9)
MONOCYTES NFR BLD AUTO: 23.9 % — HIGH (ref 2–14)
NEUTROPHILS # BLD AUTO: 0.34 K/UL — LOW (ref 1.8–7.4)
NEUTROPHILS NFR BLD AUTO: 34.5 % — LOW (ref 43–77)
NEUTS BAND # BLD: 1.8 % — SIGNIFICANT CHANGE UP (ref 0–8)
NRBC # BLD: 4 /100 — HIGH (ref 0–0)
PHOSPHATE SERPL-MCNC: 3.1 MG/DL — SIGNIFICANT CHANGE UP (ref 2.5–4.5)
PLAT MORPH BLD: NORMAL — SIGNIFICANT CHANGE UP
PLATELET # BLD AUTO: 277 K/UL — SIGNIFICANT CHANGE UP (ref 150–400)
POTASSIUM SERPL-MCNC: 3.8 MMOL/L — SIGNIFICANT CHANGE UP (ref 3.5–5.3)
POTASSIUM SERPL-SCNC: 3.8 MMOL/L — SIGNIFICANT CHANGE UP (ref 3.5–5.3)
PROT SERPL-MCNC: 7 G/DL — SIGNIFICANT CHANGE UP (ref 6–8.3)
RBC # BLD: 2.57 M/UL — LOW (ref 4.2–5.8)
RBC # FLD: 15.6 % — HIGH (ref 10.3–14.5)
RBC BLD AUTO: SIGNIFICANT CHANGE UP
RH IG SCN BLD-IMP: POSITIVE — SIGNIFICANT CHANGE UP
SODIUM SERPL-SCNC: 142 MMOL/L — SIGNIFICANT CHANGE UP (ref 135–145)
URATE SERPL-MCNC: 2.6 MG/DL — LOW (ref 3.4–8.8)
WBC # BLD: 0.95 K/UL — CRITICAL LOW (ref 3.8–10.5)
WBC # FLD AUTO: 0.95 K/UL — CRITICAL LOW (ref 3.8–10.5)

## 2021-08-27 PROCEDURE — 99232 SBSQ HOSP IP/OBS MODERATE 35: CPT | Mod: GC

## 2021-08-27 RX ORDER — AMLODIPINE BESYLATE 2.5 MG/1
1 TABLET ORAL
Qty: 30 | Refills: 0
Start: 2021-08-27 | End: 2021-09-25

## 2021-08-27 RX ORDER — DIBUCAINE 1 %
1 OINTMENT (GRAM) RECTAL ONCE
Refills: 0 | Status: COMPLETED | OUTPATIENT
Start: 2021-08-27 | End: 2021-08-27

## 2021-08-27 RX ORDER — ACYCLOVIR SODIUM 500 MG
1 VIAL (EA) INTRAVENOUS
Qty: 90 | Refills: 0
Start: 2021-08-27 | End: 2021-09-25

## 2021-08-27 RX ORDER — MINERAL OIL, PETROLATUM, PHENYLEPHRINE HCL 2.5; 140; 749 MG/G; MG/G; MG/G
1 OINTMENT TOPICAL
Qty: 1 | Refills: 0
Start: 2021-08-27 | End: 2021-09-25

## 2021-08-27 RX ORDER — ALPRAZOLAM 0.25 MG
0.25 TABLET ORAL AT BEDTIME
Refills: 0 | Status: DISCONTINUED | OUTPATIENT
Start: 2021-08-27 | End: 2021-08-29

## 2021-08-27 RX ORDER — AER TRAVELER 0.5 G/1
1 SOLUTION RECTAL; TOPICAL
Qty: 0 | Refills: 0 | DISCHARGE
Start: 2021-08-27

## 2021-08-27 RX ADMIN — Medication 5 MILLILITER(S): at 17:05

## 2021-08-27 RX ADMIN — Medication 5 MILLILITER(S): at 23:03

## 2021-08-27 RX ADMIN — Medication 15 MILLILITER(S): at 06:10

## 2021-08-27 RX ADMIN — POSACONAZOLE 300 MILLIGRAM(S): 100 TABLET, DELAYED RELEASE ORAL at 11:34

## 2021-08-27 RX ADMIN — DIPHENHYDRAMINE HYDROCHLORIDE AND LIDOCAINE HYDROCHLORIDE AND ALUMINUM HYDROXIDE AND MAGNESIUM HYDRO 5 MILLILITER(S): KIT at 06:09

## 2021-08-27 RX ADMIN — Medication 400 MILLIGRAM(S): at 22:08

## 2021-08-27 RX ADMIN — CHLORHEXIDINE GLUCONATE 1 APPLICATION(S): 213 SOLUTION TOPICAL at 08:17

## 2021-08-27 RX ADMIN — Medication 5 MILLILITER(S): at 08:18

## 2021-08-27 RX ADMIN — DIPHENHYDRAMINE HYDROCHLORIDE AND LIDOCAINE HYDROCHLORIDE AND ALUMINUM HYDROXIDE AND MAGNESIUM HYDRO 5 MILLILITER(S): KIT at 14:37

## 2021-08-27 RX ADMIN — Medication 0.25 MILLIGRAM(S): at 22:07

## 2021-08-27 RX ADMIN — Medication 5 MILLILITER(S): at 11:34

## 2021-08-27 RX ADMIN — AER TRAVELER 1 APPLICATION(S): 0.5 SOLUTION RECTAL; TOPICAL at 14:38

## 2021-08-27 RX ADMIN — Medication 1 APPLICATION(S): at 17:05

## 2021-08-27 RX ADMIN — SODIUM CHLORIDE 20 MILLILITER(S): 9 INJECTION INTRAMUSCULAR; INTRAVENOUS; SUBCUTANEOUS at 06:11

## 2021-08-27 RX ADMIN — DIPHENHYDRAMINE HYDROCHLORIDE AND LIDOCAINE HYDROCHLORIDE AND ALUMINUM HYDROXIDE AND MAGNESIUM HYDRO 5 MILLILITER(S): KIT at 22:11

## 2021-08-27 RX ADMIN — Medication 1 APPLICATION(S): at 06:50

## 2021-08-27 RX ADMIN — AER TRAVELER 1 APPLICATION(S): 0.5 SOLUTION RECTAL; TOPICAL at 22:09

## 2021-08-27 RX ADMIN — Medication 1 APPLICATION(S): at 16:02

## 2021-08-27 RX ADMIN — AER TRAVELER 1 APPLICATION(S): 0.5 SOLUTION RECTAL; TOPICAL at 06:10

## 2021-08-27 RX ADMIN — Medication 1 APPLICATION(S): at 22:43

## 2021-08-27 RX ADMIN — PHENYLEPHRINE-SHARK LIVER OIL-MINERAL OIL-PETROLATUM RECTAL OINTMENT 1 APPLICATION(S): at 17:05

## 2021-08-27 RX ADMIN — Medication 15 MILLILITER(S): at 11:35

## 2021-08-27 RX ADMIN — SENNA PLUS 2 TABLET(S): 8.6 TABLET ORAL at 22:11

## 2021-08-27 RX ADMIN — POLYETHYLENE GLYCOL 3350 17 GRAM(S): 17 POWDER, FOR SOLUTION ORAL at 11:34

## 2021-08-27 RX ADMIN — Medication 5 MILLILITER(S): at 20:50

## 2021-08-27 RX ADMIN — Medication 650 MILLIGRAM(S): at 22:10

## 2021-08-27 RX ADMIN — Medication 400 MILLIGRAM(S): at 06:07

## 2021-08-27 RX ADMIN — AMLODIPINE BESYLATE 5 MILLIGRAM(S): 2.5 TABLET ORAL at 06:09

## 2021-08-27 RX ADMIN — PHENYLEPHRINE-SHARK LIVER OIL-MINERAL OIL-PETROLATUM RECTAL OINTMENT 1 APPLICATION(S): at 06:10

## 2021-08-27 RX ADMIN — Medication 400 MILLIGRAM(S): at 14:37

## 2021-08-27 RX ADMIN — Medication 15 MILLILITER(S): at 17:05

## 2021-08-27 RX ADMIN — Medication 1 APPLICATION(S): at 23:05

## 2021-08-27 RX ADMIN — Medication 15 MILLILITER(S): at 22:09

## 2021-08-27 NOTE — PROGRESS NOTE ADULT - ASSESSMENT
Mr. Tian is  a 35 y/o male with h/o PMH HTN, fatty liver, kidney stones presents with rash, dizziness, fatigue and severe RUQ pain for 3 days. Now with anemia, thrombocytopenia and leukocytosis with 17% blasts, Bone marrow bx c/w AML. Transferred to [address] [place] [address] for management. Patient receiving induction chemotherapy with 7+3 (Daunorubicin and Cytarabine). Post induction BM biopsy shows chemotherapeutic effect. Course c/b neutropenic fevers. Patient has pancytopenia secondary to chemotherapy and disease process.

## 2021-08-27 NOTE — PROGRESS NOTE ADULT - ATTENDING COMMENTS
35yo M admitted with RUQ pain found to have bloodwork concerning for acute leukemia  -13% myeloblasts on PB. Flt3 negative.   -s/p BMbx 8/4 -AML. f/[facility]ogen[facility]  -started 7+3 on 8/6 -cytarabine 100/m2 and dauno 90/m2. Today is day 21  -pain control, improved today. Abd sono showing hepatomegaly and hepatic steatosis. Right pleural effusion. CT chest shows small right pleural effusion  -MUGA EF 71%  -discussed with patient and family at the bedside. We also discussed sperm banking -pt declined  -febrile again today (8/20)- cultures have all been negative, he's been on cefepime, changed to meropenem on 8/20 for a rash. Swab for MRSA and RVP negative.  on 8/26 but he has remained afebrile. Will switch to levaquin on 8/26 to prepare for possible discharge.   -Persistent fevers had been with mucositis. Aggressive oral care  Cultures negative, repeat CT 8/14 without infectious source.   -rash worsening -cefepime changed to meropenem on 8/20. Hydrocortisone cream. resolved  -cont posaconazole and acyclovir PPx  -TLC placement  -supportive care  -transfuse as needed - platelets recovering.   -day 14 BMbx on 8/19 - Discussed with hematopathology today, appears to be earlier regeneration than would typically seen which is concerning for persistent disease at this point, however the earliest cells are CD34 positive and his myeloblasts on presentation were CD34 negative. Thus, this may simply represent early regeneration of his marrow. Will await for count recovery and repeat, his platelets already recovered at this point. 37yo M admitted with RUQ pain found to have bloodwork concerning for acute leukemia  -13% myeloblasts on PB. Flt3 negative.   -s/p BMbx 8/4 -AML. f/u [facility]ogenetics, [facility]  -started 7+3 on 8/6 -cytarabine 100/m2 and dauno 90/m2. Today is day 22  -pain control, improved today. Abd sono showing hepatomegaly and hepatic steatosis. Right pleural effusion. CT chest shows small right pleural effusion  -MUGA EF 71%  -discussed with patient and family at the bedside. We also discussed sperm banking -pt declined  -febrile again today (8/20)- cultures have all been negative, he's been on cefepime, changed to meropenem on 8/20 for a rash. Swab for MRSA and RVP negative.  on 8/26 but he has remained afebrile. Will switch to levaquin on 8/26 to prepare for possible discharge.   -Persistent fevers had been with mucositis. Aggressive oral care  Cultures negative, repeat CT 8/14 without infectious source.   -rash worsening -cefepime changed to meropenem on 8/20. Hydrocortisone cream. resolved  -cont posaconazole and acyclovir PPx  -TLC placement  -supportive care  -transfuse as needed - platelets recovering.   -day 14 BMbx on 8/19 - Discussed with hematopathology today, appears to be earlier regeneration than would typically seen which is concerning for persistent disease at this point, however the earliest cells are CD34 positive and his myeloblasts on presentation were CD34 negative. Thus, this may simply represent early regeneration of his marrow. Will await for count recovery and repeat, his platelets already recovered at this point.  -Peripheral blasts decreasing today likely regeneration

## 2021-08-27 NOTE — PROGRESS NOTE ADULT - PROBLEM SELECTOR PLAN 1
FLT3 negative, BM bx c/w AML   consented for [place] study    HIV (-), Hep (-)  Echo 70%, MUGA EF 71%   Discussed sperm banking. Declined   TLC placed on 8/6 8/6 Started induction with  7+3 (Cytarabine + Daunorubicin)  Follow daily lytes, CBC. replace, Replete prn  Now off Allopurinol, IV hydration  mouth care, pain control, antiemetics  Monitor for TLS daily  day 14 BMbx on 8/19 - d/w hematopathology today, appears to be earlier regeneration than would typically seen which is concerning for persistent disease at this point, however the earliest cells are CD34 positive and his myeloblasts on presentation were CD34 negative. Thus, this may simply represent early regeneration of his marrow. Will await for count recovery and repeat, his platelets already recovered FLT3 negative, BM bx c/w AML   consented for [place] study    HIV (-), Hep (-)  Echo 70%, MUGA EF 71%   Discussed sperm banking. Declined   TLC placed on 8/6 8/6 Started induction with  7+3 (Cytarabine + Daunorubicin)  Follow daily lytes, CBC. replace, Replete prn  Now off Allopurinol, IV hydration  mouth care, pain control, antiemetics  Monitor for TLS daily  day 14 BMbx on 8/19 - d/w hematopathology, appears to be earlier regeneration than would typically seen which is concerning for persistent disease at this point, however the earliest cells are CD34 positive and his myeloblasts on presentation were CD34 negative. Thus, this may simply represent early regeneration of his marrow. Will await for count recovery and repeat, his platelets already recovered.  8/27 wbc rising,  today, peripheral blasts down 0.9% today. FLT3 negative, BM bx c/w AML   consented for [place] study    HIV (-), Hep (-)  Echo 70%, MUGA EF 71%   Discussed sperm banking. Declined   TLC placed on 8/6 8/6 Started induction with  7+3 (Cytarabine + Daunorubicin)  Follow daily lytes, CBC. replace, Replete prn  Now off Allopurinol, IV hydration  mouth care, pain control, antiemetics  day 14 BMbx on 8/19 - d/w pathology, appears to be earlier regeneration than would typically seen,  raised concern for persistent disease at this point, however the earliest cells are CD34 positive and his myeloblasts on presentation were CD34 negative. may  represent early regeneration of his marrow. Will await for count recovery and repeat, his platelets already recovered.  8/27 wbc rising,  today, peripheral blasts down 0.9% today  Plan for discharge home this weekend if ANC > 500. Levaquin, Acyclovir sent to [facility]. Patient to follow up with Dr. Yancey, awaiting appointment schedule. FLT3 negative, BM bx c/w AML   consented for [place] study    HIV (-), Hep (-)  Echo 70%, MUGA EF 71%   Discussed sperm banking. Declined   TLC placed on 8/6 8/6 Started induction with  7+3 (Cytarabine + Daunorubicin)  Follow daily lytes, CBC. replace, Replete prn  Now off Allopurinol, IV hydration  mouth care, pain control, antiemetics  day 14 BMbx on 8/19 - d/w pathology, appears to be earlier regeneration than would typically seen,  raised concern for persistent disease at this point, however the earliest cells are CD34 positive and his myeloblasts on presentation were CD34 negative. may  represent early regeneration of his marrow. Will await for count recovery and repeat, his platelets already recovered.  8/27 wbc rising,  today, peripheral blasts down to 0.9% today  Plan for discharge home this weekend if ANC > 500. Levaquin, Acyclovir sent to [facility]. Patient to follow up with Dr. Yancye, awaiting appointment schedule.

## 2021-08-27 NOTE — PROGRESS NOTE ADULT - SUBJECTIVE AND OBJECTIVE BOX
Diagnosis:  Acute Myeloid Leukemia, FLT3-    Protocol/Chemo Regimen: s/p induction 7+3 (Daunorubicin and Cytarabine)    Day: 22    Pt endorsed:     Review of Systems: Denies cough, HA or dizziness, diarrhea, dysurea, abdominal pain    Pain scale: denies     Diet: DASH/TLC    Allergies: No Known Allergies    Intolerance: cefepime (Rash)      ANTIMICROBIALS  acyclovir   Oral Tab/Cap 400 milliGRAM(s) Oral every 8 hours  levoFLOXacin  Tablet 500 milliGRAM(s) Oral every 24 hours  posaconazole DR Tablet 300 milliGRAM(s) Oral daily    STANDING MEDICATIONS  ALPRAZolam 0.25 milliGRAM(s) Oral at bedtime  amLODIPine   Tablet 5 milliGRAM(s) Oral daily  Biotene Dry Mouth Oral Rinse 5 milliLiter(s) Swish and Spit five times a day  chlorhexidine 2% Cloths 1 Application(s) Topical <User Schedule>  FIRST- Mouthwash  BLM 5 milliLiter(s) Swish and Spit three times a day  hemorrhoidal Ointment 1 Application(s) Rectal two times a day  lidocaine 2% Injectable 20 milliLiter(s) Local Injection once  petrolatum white Ointment 1 Application(s) Topical four times a day  polyethylene glycol 3350 17 Gram(s) Oral daily  senna 2 Tablet(s) Oral at bedtime  sodium bicarbonate Mouth Rinse 15 milliLiter(s) Swish and Spit four times a day  sodium chloride 0.9%. 1000 milliLiter(s) IV Continuous <Continuous>  witch hazel Pads 1 Application(s) Topical three times a day      PRN MEDICATIONS  acetaminophen   Tablet .. 650 milliGRAM(s) Oral every 6 hours PRN  benzocaine 15 mG/menthol 3.6 mG (Sugar-Free) Lozenge 1 Lozenge Oral every 4 hours PRN  metoclopramide Injectable 10 milliGRAM(s) IV Push every 6 hours PRN  sodium chloride 0.65% Nasal 1 Spray(s) Both Nostrils three times a day PRN  sodium chloride 0.9% lock flush 10 milliLiter(s) IV Push every 1 hour PRN      Vital Signs Last 24 Hrs  T(C): 36.8 (27 Aug 2021 05:31), Max: 37.2 (26 Aug 2021 13:25)  T(F): 98.3 (27 Aug 2021 05:31), Max: 98.9 (26 Aug 2021 13:25)  HR: 95 (27 Aug 2021 05:31) (83 - 102)  BP: 109/69 (27 Aug 2021 05:31) (109/69 - 133/86)  RR: 18 (27 Aug 2021 05:31) (18 - 18)  SpO2: 95% (27 Aug 2021 05:31) (95% - 98%)    PHYSICAL EXAM  General: NAD  HEENT: PERRL, EOMI, conjunctiva clear, oral ulcer --->left side of tongue. Labial ulcers scabbed  CV: S1S2, RRR, no mur apprec  Lungs: Unlabored, CTA all fields, no rales, no wheezes  Abdomen: BS(+), soft  nontender, nondistended  Ext: BLE no edema, PPP, no calf tenderness  Skin: warm, dry, + diffuse papular rash to trunk (front and back), B/L UE/LE's - fading  Neuro: AOX3, nonfocal  Central Line: RCW TLC, site C/D/I    Cultures:    Culture - Urine (08.20.21 @ 17:59)    Specimen Source: Clean Catch Clean Catch (Midstream)    Culture Results:   No growth    Culture - Blood (08.20.21 @ 13:16)    Specimen Source: .Blood Blood-Peripheral    Culture Results:   No Growth Final    Culture - Blood (08.20.21 @ 09:16)    Specimen Source: .Blood Blood-Catheter    Culture Results:   No Growth Final    LABS:                          7.9    0.95  )-----------( 277      ( 27 Aug 2021 06:59 )             23.0         Mean Cell Volume : 89.5 fl  Mean Cell Hemoglobin : 30.7 pg  Mean Cell Hemoglobin Concentration : 34.3 gm/dL  Auto Neutrophil # : x  Auto Lymphocyte # : x  Auto Monocyte # : x  Auto Eosinophil # : x  Auto Basophil # : x  Auto Neutrophil % : x  Auto Lymphocyte % : x  Auto Monocyte % : x  Auto Eosinophil % : x  Auto Basophil % : x          RADIOLOGY & ADDITIONAL STUDIES:         Diagnosis:  Acute Myeloid Leukemia, FLT3-    Protocol/Chemo Regimen: s/p induction 7+3 (Daunorubicin and Cytarabine)    Day: 22    Pt endorsed: feels good, slept well, hemorrhoidal pain resolved; no fevers overnight    Review of Systems: Denies cough, HA or dizziness, diarrhea, dysurea, abdominal pain    Pain scale: denies     Diet: DASH/TLC    Allergies: No Known Allergies    Intolerance: cefepime (Rash)      ANTIMICROBIALS  acyclovir   Oral Tab/Cap 400 milliGRAM(s) Oral every 8 hours  levoFLOXacin  Tablet 500 milliGRAM(s) Oral every 24 hours  posaconazole DR Tablet 300 milliGRAM(s) Oral daily    STANDING MEDICATIONS  ALPRAZolam 0.25 milliGRAM(s) Oral at bedtime  amLODIPine   Tablet 5 milliGRAM(s) Oral daily  Biotene Dry Mouth Oral Rinse 5 milliLiter(s) Swish and Spit five times a day  chlorhexidine 2% Cloths 1 Application(s) Topical <User Schedule>  FIRST- Mouthwash  BLM 5 milliLiter(s) Swish and Spit three times a day  hemorrhoidal Ointment 1 Application(s) Rectal two times a day  lidocaine 2% Injectable 20 milliLiter(s) Local Injection once  petrolatum white Ointment 1 Application(s) Topical four times a day  polyethylene glycol 3350 17 Gram(s) Oral daily  senna 2 Tablet(s) Oral at bedtime  sodium bicarbonate Mouth Rinse 15 milliLiter(s) Swish and Spit four times a day  sodium chloride 0.9%. 1000 milliLiter(s) IV Continuous <Continuous>  witch hazel Pads 1 Application(s) Topical three times a day      PRN MEDICATIONS  acetaminophen   Tablet .. 650 milliGRAM(s) Oral every 6 hours PRN  benzocaine 15 mG/menthol 3.6 mG (Sugar-Free) Lozenge 1 Lozenge Oral every 4 hours PRN  metoclopramide Injectable 10 milliGRAM(s) IV Push every 6 hours PRN  sodium chloride 0.65% Nasal 1 Spray(s) Both Nostrils three times a day PRN  sodium chloride 0.9% lock flush 10 milliLiter(s) IV Push every 1 hour PRN      Vital Signs Last 24 Hrs  T(C): 36.8 (27 Aug 2021 05:31), Max: 37.2 (26 Aug 2021 13:25)  T(F): 98.3 (27 Aug 2021 05:31), Max: 98.9 (26 Aug 2021 13:25)  HR: 95 (27 Aug 2021 05:31) (83 - 102)  BP: 109/69 (27 Aug 2021 05:31) (109/69 - 133/86)  RR: 18 (27 Aug 2021 05:31) (18 - 18)  SpO2: 95% (27 Aug 2021 05:31) (95% - 98%)    PHYSICAL EXAM  General: NAD  HEENT: PERRL, EOMI, conjunctiva clear, oral ulcer --->left side of tongue. Labial ulcers scabbed  CV: S1S2, RRR, no mur apprec  Lungs: Unlabored, CTA all fields, no rales, no wheezes  Abdomen: BS(+), soft  nontender, nondistended  Ext: BLE no edema, PPP, no calf tenderness  Skin: warm, dry, + diffuse papular rash to trunk (front and back), B/L UE/LE's - fading  Neuro: AOX3, nonfocal  Central Line: RCW TLC, site C/D/I    Cultures:    Culture - Urine (08.20.21 @ 17:59)    Specimen Source: Clean Catch Clean Catch (Midstream)    Culture Results:   No growth    Culture - Blood (08.20.21 @ 13:16)    Specimen Source: .Blood Blood-Peripheral    Culture Results:   No Growth Final    Culture - Blood (08.20.21 @ 09:16)    Specimen Source: .Blood Blood-Catheter    Culture Results:   No Growth Final    LABS:                        7.9    0.95  )-----------( 277      ( 27 Aug 2021 06:59 )             23.0       Mean Cell Volume : 89.5 fl  Mean Cell Hemoglobin : 30.7 pg  Mean Cell Hemoglobin Concentration : 34.3 gm/dL  Auto Neutrophil # : x  Auto Lymphocyte # : x  Auto Monocyte # : x  Auto Eosinophil # : x  Auto Basophil # : x  Auto Neutrophil % : x  Auto Lymphocyte % : x  Auto Monocyte % : x  Auto Eosinophil % : x  Auto Basophil % : x      27 Aug 2021 06:57    142    |  103    |  10     ----------------------------<  95     3.8     |  25     |  0.74     Ca    9.8        27 Aug 2021 06:57  Phos  3.1       27 Aug 2021 06:57  Mg     2.3       27 Aug 2021 06:57    TPro  7.0    /  Alb  3.6    /  TBili  0.5    /  DBili  x      /  AST  28     /  ALT  64     /  AlkPhos  72     27 Aug 2021 06:57    PT/INR - ( 26 Aug 2021 07:03 )   PT: 14.5 sec;   INR: 1.22 ratio    PTT - ( 26 Aug 2021 07:03 )  PTT:29.9 sec      LIVER FUNCTIONS - ( 27 Aug 2021 06:57 )  Alb: 3.6 g/dL / Pro: 7.0 g/dL / ALK PHOS: 72 U/L / ALT: 64 U/L / AST: 28 U/L / GGT: x             RADIOLOGY & ADDITIONAL STUDIES:  from: Xray Chest 1 View- PORTABLE-Urgent (Xray Chest 1 View- PORTABLE-Urgent .) (08.20.21 @ 07:27) >  IMPRESSION:  No acute pulmonary disease

## 2021-08-27 NOTE — PROGRESS NOTE ADULT - PROBLEM SELECTOR PLAN 2
neutropenic, afebrile (since 8/21)  All Cultures NGTD  Completed Zosyn course for suspected pna  8/9 started on Levaquin and posaconazole ppx for neutropenia  8/12 - Started Acyclovir for oral ulcers  8/14 (-) CMV PCR  HSV 1 serology (+)  8/20 Cefepime changed to ingrid due to persistent fever and new rash  8/21 MRSA and RVP/COVID swabs Neg neutropenic, afebrile (since 8/21)  All Cultures NGTD  Completed Zosyn course for suspected pna  8/9 started on Levaquin and posaconazole ppx for neutropenia  8/12 - Started Acyclovir for oral ulcers  8/14 (-) CMV PCR  HSV 1 serology (+)  8/20 Cefepime changed to ingrid due to persistent fever and new rash  8/21 MRSA and RVP/COVID swabs Neg  Plan to discharge home on antimicrobials Levaquin and Acyclovir.

## 2021-08-28 LAB
ALBUMIN SERPL ELPH-MCNC: 3.9 G/DL — SIGNIFICANT CHANGE UP (ref 3.3–5)
ALP SERPL-CCNC: 77 U/L — SIGNIFICANT CHANGE UP (ref 40–120)
ALT FLD-CCNC: 64 U/L — HIGH (ref 10–45)
ANION GAP SERPL CALC-SCNC: 15 MMOL/L — SIGNIFICANT CHANGE UP (ref 5–17)
AST SERPL-CCNC: 23 U/L — SIGNIFICANT CHANGE UP (ref 10–40)
BASOPHILS # BLD AUTO: 0 K/UL — SIGNIFICANT CHANGE UP (ref 0–0.2)
BASOPHILS NFR BLD AUTO: 0 % — SIGNIFICANT CHANGE UP (ref 0–2)
BILIRUB SERPL-MCNC: 0.4 MG/DL — SIGNIFICANT CHANGE UP (ref 0.2–1.2)
BLASTS # FLD: 2.1 % — HIGH (ref 0–0)
BUN SERPL-MCNC: 14 MG/DL — SIGNIFICANT CHANGE UP (ref 7–23)
CALCIUM SERPL-MCNC: 9.9 MG/DL — SIGNIFICANT CHANGE UP (ref 8.4–10.5)
CHLORIDE SERPL-SCNC: 102 MMOL/L — SIGNIFICANT CHANGE UP (ref 96–108)
CO2 SERPL-SCNC: 23 MMOL/L — SIGNIFICANT CHANGE UP (ref 22–31)
CREAT SERPL-MCNC: 0.83 MG/DL — SIGNIFICANT CHANGE UP (ref 0.5–1.3)
EOSINOPHIL # BLD AUTO: 0 K/UL — SIGNIFICANT CHANGE UP (ref 0–0.5)
EOSINOPHIL NFR BLD AUTO: 0 % — SIGNIFICANT CHANGE UP (ref 0–6)
GIANT PLATELETS BLD QL SMEAR: PRESENT — SIGNIFICANT CHANGE UP
GLUCOSE SERPL-MCNC: 89 MG/DL — SIGNIFICANT CHANGE UP (ref 70–99)
HCT VFR BLD CALC: 24.1 % — LOW (ref 39–50)
HGB BLD-MCNC: 8.1 G/DL — LOW (ref 13–17)
LDH SERPL L TO P-CCNC: 202 U/L — SIGNIFICANT CHANGE UP (ref 50–242)
LYMPHOCYTES # BLD AUTO: 0.53 K/UL — LOW (ref 1–3.3)
LYMPHOCYTES # BLD AUTO: 37.9 % — SIGNIFICANT CHANGE UP (ref 13–44)
MAGNESIUM SERPL-MCNC: 2.3 MG/DL — SIGNIFICANT CHANGE UP (ref 1.6–2.6)
MANUAL SMEAR VERIFICATION: SIGNIFICANT CHANGE UP
MCHC RBC-ENTMCNC: 30.2 PG — SIGNIFICANT CHANGE UP (ref 27–34)
MCHC RBC-ENTMCNC: 33.6 GM/DL — SIGNIFICANT CHANGE UP (ref 32–36)
MCV RBC AUTO: 89.9 FL — SIGNIFICANT CHANGE UP (ref 80–100)
MONOCYTES # BLD AUTO: 0.41 K/UL — SIGNIFICANT CHANGE UP (ref 0–0.9)
MONOCYTES NFR BLD AUTO: 29.5 % — HIGH (ref 2–14)
MYELOCYTES NFR BLD: 1.1 % — HIGH (ref 0–0)
NEUTROPHILS # BLD AUTO: 0.41 K/UL — LOW (ref 1.8–7.4)
NEUTROPHILS NFR BLD AUTO: 28.4 % — LOW (ref 43–77)
NEUTS BAND # BLD: 1 % — SIGNIFICANT CHANGE UP (ref 0–8)
NRBC # BLD: 6 /100 — HIGH (ref 0–0)
PHOSPHATE SERPL-MCNC: 4.2 MG/DL — SIGNIFICANT CHANGE UP (ref 2.5–4.5)
PLAT MORPH BLD: NORMAL — SIGNIFICANT CHANGE UP
PLATELET # BLD AUTO: 426 K/UL — HIGH (ref 150–400)
POTASSIUM SERPL-MCNC: 3.8 MMOL/L — SIGNIFICANT CHANGE UP (ref 3.5–5.3)
POTASSIUM SERPL-SCNC: 3.8 MMOL/L — SIGNIFICANT CHANGE UP (ref 3.5–5.3)
PROT SERPL-MCNC: 7.1 G/DL — SIGNIFICANT CHANGE UP (ref 6–8.3)
RBC # BLD: 2.68 M/UL — LOW (ref 4.2–5.8)
RBC # FLD: 15.8 % — HIGH (ref 10.3–14.5)
RBC BLD AUTO: SIGNIFICANT CHANGE UP
SODIUM SERPL-SCNC: 140 MMOL/L — SIGNIFICANT CHANGE UP (ref 135–145)
URATE SERPL-MCNC: 3.2 MG/DL — LOW (ref 3.4–8.8)
WBC # BLD: 1.4 K/UL — LOW (ref 3.8–10.5)
WBC # FLD AUTO: 1.4 K/UL — LOW (ref 3.8–10.5)

## 2021-08-28 PROCEDURE — 99233 SBSQ HOSP IP/OBS HIGH 50: CPT

## 2021-08-28 RX ADMIN — DIPHENHYDRAMINE HYDROCHLORIDE AND LIDOCAINE HYDROCHLORIDE AND ALUMINUM HYDROXIDE AND MAGNESIUM HYDRO 5 MILLILITER(S): KIT at 14:54

## 2021-08-28 RX ADMIN — SODIUM CHLORIDE 20 MILLILITER(S): 9 INJECTION INTRAMUSCULAR; INTRAVENOUS; SUBCUTANEOUS at 17:48

## 2021-08-28 RX ADMIN — Medication 1 APPLICATION(S): at 17:39

## 2021-08-28 RX ADMIN — CHLORHEXIDINE GLUCONATE 1 APPLICATION(S): 213 SOLUTION TOPICAL at 09:55

## 2021-08-28 RX ADMIN — Medication 1 APPLICATION(S): at 06:26

## 2021-08-28 RX ADMIN — DIPHENHYDRAMINE HYDROCHLORIDE AND LIDOCAINE HYDROCHLORIDE AND ALUMINUM HYDROXIDE AND MAGNESIUM HYDRO 5 MILLILITER(S): KIT at 05:30

## 2021-08-28 RX ADMIN — DIPHENHYDRAMINE HYDROCHLORIDE AND LIDOCAINE HYDROCHLORIDE AND ALUMINUM HYDROXIDE AND MAGNESIUM HYDRO 5 MILLILITER(S): KIT at 21:50

## 2021-08-28 RX ADMIN — Medication 5 MILLILITER(S): at 11:45

## 2021-08-28 RX ADMIN — PHENYLEPHRINE-SHARK LIVER OIL-MINERAL OIL-PETROLATUM RECTAL OINTMENT 1 APPLICATION(S): at 05:30

## 2021-08-28 RX ADMIN — AMLODIPINE BESYLATE 5 MILLIGRAM(S): 2.5 TABLET ORAL at 05:32

## 2021-08-28 RX ADMIN — POSACONAZOLE 300 MILLIGRAM(S): 100 TABLET, DELAYED RELEASE ORAL at 11:48

## 2021-08-28 RX ADMIN — Medication 5 MILLILITER(S): at 16:42

## 2021-08-28 RX ADMIN — AER TRAVELER 1 APPLICATION(S): 0.5 SOLUTION RECTAL; TOPICAL at 21:50

## 2021-08-28 RX ADMIN — Medication 1 APPLICATION(S): at 21:53

## 2021-08-28 RX ADMIN — PHENYLEPHRINE-SHARK LIVER OIL-MINERAL OIL-PETROLATUM RECTAL OINTMENT 1 APPLICATION(S): at 17:42

## 2021-08-28 RX ADMIN — AER TRAVELER 1 APPLICATION(S): 0.5 SOLUTION RECTAL; TOPICAL at 14:11

## 2021-08-28 RX ADMIN — AER TRAVELER 1 APPLICATION(S): 0.5 SOLUTION RECTAL; TOPICAL at 05:30

## 2021-08-28 RX ADMIN — Medication 5 MILLILITER(S): at 20:26

## 2021-08-28 RX ADMIN — Medication 0.25 MILLIGRAM(S): at 21:49

## 2021-08-28 RX ADMIN — SENNA PLUS 2 TABLET(S): 8.6 TABLET ORAL at 21:49

## 2021-08-28 RX ADMIN — SODIUM CHLORIDE 20 MILLILITER(S): 9 INJECTION INTRAMUSCULAR; INTRAVENOUS; SUBCUTANEOUS at 21:50

## 2021-08-28 RX ADMIN — Medication 1 APPLICATION(S): at 11:44

## 2021-08-28 RX ADMIN — Medication 5 MILLILITER(S): at 07:57

## 2021-08-28 RX ADMIN — Medication 400 MILLIGRAM(S): at 21:49

## 2021-08-28 RX ADMIN — Medication 15 MILLILITER(S): at 11:48

## 2021-08-28 RX ADMIN — Medication 15 MILLILITER(S): at 21:50

## 2021-08-28 RX ADMIN — Medication 400 MILLIGRAM(S): at 14:11

## 2021-08-28 RX ADMIN — Medication 15 MILLILITER(S): at 17:42

## 2021-08-28 RX ADMIN — Medication 400 MILLIGRAM(S): at 05:31

## 2021-08-28 RX ADMIN — Medication 15 MILLILITER(S): at 05:30

## 2021-08-28 NOTE — PROGRESS NOTE ADULT - ATTENDING COMMENTS
35yo M admitted with RUQ pain found to have bloodwork concerning for acute leukemia  -13% myeloblasts on PB. Flt3 negative.   -s/p BMbx 8/4 -AML. f/u [facility]  -started 7+3 on 8/6 -cytarabine 100/m2 and dauno 90/m2. Today is day 22  -pain control, improved today. Abd sono showing hepatomegaly and hepatic steatosis. Right pleural effusion. CT chest shows small right pleural effusion  -MUGA EF 71%  -discussed with patient and family at the bedside. We also discussed sperm banking -pt declined  -febrile again today (8/20)- cultures have all been negative, he's been on cefepime, changed to meropenem on 8/20 for a rash. Swab for MRSA and RVP negative.  on 8/26 but he has remained afebrile. Will switch to levaquin on 8/26 to prepare for possible discharge.   -Persistent fevers had been with mucositis. Aggressive oral care  Cultures negative, repeat CT 8/14 without infectious source.   -rash worsening -cefepime changed to meropenem on 8/20. Hydrocortisone cream. resolved  -cont posaconazole and acyclovir PPx  -TLC placement  -supportive care  -transfuse as needed - platelets recovering.   -day 14 BMbx on 8/19 - Discussed with hematopathology today, appears to be earlier regeneration than would typically seen which is concerning for persistent disease at this point, however the earliest cells are CD34 positive and his myeloblasts on presentation were CD34 negative. Thus, this may simply represent early regeneration of his marrow. Will await for count recovery and repeat, his platelets already recovered at this point.  -Peripheral blasts decreasing today likely regeneration 35yo M admitted with RUQ pain found to have blood work concerning for acute leukemia  -13% myeloblasts on PB. Flt3 negative.   -s/p BMbx 8/4 -AML. f/u [facility]ogen[facility], [facility]  -started 7+3 on 8/6 -cytarabine 100/m2 and dauno 90/m2. Today is day 23  -pain control, improved today. Abd sono showing hepatomegaly and hepatic steatosis. Right pleural effusion. CT chest shows small right pleural effusion  -MUGA EF 71%  -discussed with patient and family at the bedside. We also discussed sperm banking -pt declined  -febrile again today (8/20)- cultures have all been negative, he's been on cefepime, changed to meropenem on 8/20 for a rash. Swab for MRSA and RVP negative.  on 8/26 but he has remained afebrile. Will switch to levaquin on 8/26 to prepare for possible discharge.   -Persistent fevers had been with mucositis. Aggressive oral care  Cultures negative, repeat CT 8/14 without infectious source.   -rash worsening -cefepime changed to meropenem on 8/20. Hydrocortisone cream. resolved  -cont posaconazole and acyclovir PPx  -TLC placement  -supportive care  -transfuse as needed - platelets recovering.   -day 14 BMbx on 8/19 - Discussed with hematopathology today, appears to be earlier regeneration than would typically seen which is concerning for persistent disease at this point, however the earliest cells are CD34 positive and his myeloblasts on presentation were CD34 negative. Thus, this may simply represent early regeneration of his marrow. Will await for count recovery and repeat, his platelets already recovered at this point.  -Peripheral blasts increasing, possibly due to regeneration

## 2021-08-28 NOTE — PROVIDER CONTACT NOTE (CRITICAL VALUE NOTIFICATION) - ASSESSMENT
NAD
NAD
Patient A&Ox4; patient denies SOB, headache, chest pain and abdominal pain. No signs and symptoms of bleeding.
nad, afebrile  pt denies sob, chest pain
nad, afebrile  pt denies sob, chest pain  no signs of bleeding
no bleeding/afebrile
pt is alert and oriented X4, VSS, pt denies SOB, chest pain, N/V
Pt A&Ox4. VSS, afebrile. No signs or symptoms of bleeding, bruising, swelling. Pt denies chest pain, SOB, headaches, N/V/D. Pt resting in bed comfortably.
pt in no acute signs of distress
nad, afebrile  pt denies sob, chest pain  no signs of bleeding
condition stable
alert
Patient A&Ox4; patient denies SOB, headache, chest pain and abdominal pain. No signs and symptoms of bleeding.
Pt AOX4, VSS except low grade fever at times. PT denies pain.
Pt A&Ox4. VSS, afebrile. No signs or symptoms of bleeding, bruising, swelling. Pt denies chest pain, SOB, headaches, N/V/D. Pt resting in bed comfortably.
nad, afebrile  pt denies sob, chest pain

## 2021-08-28 NOTE — PROGRESS NOTE ADULT - SUBJECTIVE AND OBJECTIVE BOX
Diagnosis:  Acute Myeloid Leukemia, FLT3-    Protocol/Chemo Regimen: s/p induction 7+3 (Daunorubicin and Cytarabine)    Day: 23    Pt endorsed: No acute complaints today    Review of Systems: Patient denies  nausea, vomiting, diarrhea, abdominal pain, SOB, chest pain and headache.    Pain scale: denies     Diet: DASH/TLC    Allergies: No Known Allergies    Intolerance: cefepime (Rash)      ANTIMICROBIALS  acyclovir   Oral Tab/Cap 400 milliGRAM(s) Oral every 8 hours  levoFLOXacin  Tablet 500 milliGRAM(s) Oral every 24 hours  posaconazole DR Tablet 300 milliGRAM(s) Oral daily    STANDING MEDICATIONS  ALPRAZolam 0.25 milliGRAM(s) Oral at bedtime  amLODIPine   Tablet 5 milliGRAM(s) Oral daily  Biotene Dry Mouth Oral Rinse 5 milliLiter(s) Swish and Spit five times a day  chlorhexidine 2% Cloths 1 Application(s) Topical <User Schedule>  FIRST- Mouthwash  BLM 5 milliLiter(s) Swish and Spit three times a day  hemorrhoidal Ointment 1 Application(s) Rectal two times a day  lidocaine 2% Injectable 20 milliLiter(s) Local Injection once  petrolatum white Ointment 1 Application(s) Topical four times a day  polyethylene glycol 3350 17 Gram(s) Oral daily  senna 2 Tablet(s) Oral at bedtime  sodium bicarbonate Mouth Rinse 15 milliLiter(s) Swish and Spit four times a day  sodium chloride 0.9%. 1000 milliLiter(s) IV Continuous <Continuous>  witch hazel Pads 1 Application(s) Topical three times a day      PRN MEDICATIONS  acetaminophen   Tablet .. 650 milliGRAM(s) Oral every 6 hours PRN  benzocaine 15 mG/menthol 3.6 mG (Sugar-Free) Lozenge 1 Lozenge Oral every 4 hours PRN  metoclopramide Injectable 10 milliGRAM(s) IV Push every 6 hours PRN  sodium chloride 0.65% Nasal 1 Spray(s) Both Nostrils three times a day PRN  sodium chloride 0.9% lock flush 10 milliLiter(s) IV Push every 1 hour PRN    Vital Signs Last 24 Hrs  T(C): 36.6 (28 Aug 2021 06:43), Max: 36.9 (27 Aug 2021 18:11)  T(F): 97.9 (28 Aug 2021 06:43), Max: 98.5 (27 Aug 2021 18:11)  HR: 80 (28 Aug 2021 06:43) (80 - 112)  BP: 100/65 (28 Aug 2021 06:43) (100/65 - 129/71)  BP(mean): --  RR: 16 (28 Aug 2021 06:43) (16 - 18)  SpO2: 100% (28 Aug 2021 06:43) (96% - 100%)    PHYSICAL EXAM  General: NAD  HEENT:  oral ulcer --->left side of tongue. Labial ulcers scabbed  CV: S1S2, RRR,   Lungs:  CTA all fields, no rales, no wheezes  Abdomen: BS(+), soft  nontender, nondistended  Ext: BLE no edema  Skin: warm, dry, + diffuse papular rash to trunk (front and back), B/L UE/LE's - fading  Neuro: AOX3,   Central Line: RCW TLC, site C/D/I    LABS:                          8.1    1.40  )-----------( 426      ( 28 Aug 2021 06:47 )             24.1         Mean Cell Volume : 89.9 fl  Mean Cell Hemoglobin : 30.2 pg  Mean Cell Hemoglobin Concentration : 33.6 gm/dL  Auto Neutrophil # : 0.41 K/uL  Auto Lymphocyte # : 0.53 K/uL  Auto Monocyte # : 0.41 K/uL  Auto Eosinophil # : 0.00 K/uL  Auto Basophil # : 0.00 K/uL  Auto Neutrophil % : 28.4 %  Auto Lymphocyte % : 37.9 %  Auto Monocyte % : 29.5 %  Auto Eosinophil % : 0.0 %  Auto Basophil % : 0.0 %      08-28    140  |  102  |  14  ----------------------------<  89  3.8   |  23  |  0.83    Ca    9.9      28 Aug 2021 06:47  Phos  4.2     08-28  Mg     2.3     08-28    TPro  7.1  /  Alb  3.9  /  TBili  0.4  /  DBili  x   /  AST  23  /  ALT  64<H>  /  AlkPhos  77  08-28            Uric Acid 3.2

## 2021-08-28 NOTE — PROGRESS NOTE ADULT - PROBLEM SELECTOR PLAN 1
FLT3 negative, BM bx c/w AML   consented for [place] study    HIV (-), Hep (-)  Discussed sperm banking. Declined   TLC placed on 8/6 8/6 Started induction with  7+3 (Cytarabine + Daunorubicin)  Follow daily lytes, CBC. replace, Replete prn  Now off Allopurinol, IV hydration  mouth care, pain control, antiemetics  day 14 BMbx on 8/19 - d/w pathology, appears to be earlier regeneration than would typically seen,  raised concern for persistent disease at this point, however the earliest cells are CD34 positive and his myeloblasts on presentation were CD34 negative. may  represent early regeneration of his marrow. Will await for count recovery and repeat, his platelets already recovered.  8/28 wbc rising,  today, peripheral blasts down to 2.1 % today  Plan for discharge home this weekend if ANC > 500. Levaquin, Acyclovir sent to [facility]. Patient to follow up with Dr. Yancey, awaiting appointment schedule.

## 2021-08-28 NOTE — PROVIDER CONTACT NOTE (CRITICAL VALUE NOTIFICATION) - PERSON GIVING RESULT:
Keli Lab
Felice/Lab
DENIS Hanley
demetria chatman, lab
christiano hawthorne, lab
demetria
Sybil Monsivais, lab
PERLA Cortés
Felice, Cody
demetria chatman, lab
Keli
Starla Franco
Thiago/Lab
Loi, Lab
Thiago, Lab
Sybil Monsivais

## 2021-08-28 NOTE — PROVIDER CONTACT NOTE (CRITICAL VALUE NOTIFICATION) - RECOMMENDATIONS
PRBC
Transfuse.
to follow up
Maintain bleeding precautions.
1 unit prbc
no new orders at this time  continue to monitor for bleeding
1U PRBCs
no new orders at this time  continue to monitor for bleeding
1 u PLT transfusion
PRBC TX
no new orders at this time
None
1U platelets

## 2021-08-28 NOTE — PROGRESS NOTE ADULT - ASSESSMENT
Mr. Tian is  a 35 y/o male with h/o PMH HTN, fatty liver, kidney stones presents with rash, dizziness, fatigue and severe RUQ pain for 3 days. Now with anemia, thrombocytopenia and leukocytosis with 17% blasts, Bone marrow bx c/w AML. Transferred to [address] [place] [address] for management. Patient receiving induction chemotherapy with 7+3 (Daunorubicin and Cytarabine). Post induction BM biopsy shows chemotherapeutic effect. Course c/b neutropenic fevers. Patient has anemia and thrombocytopenia  secondary to chemotherapy and disease process.

## 2021-08-28 NOTE — PROVIDER CONTACT NOTE (CRITICAL VALUE NOTIFICATION) - ACTION/TREATMENT ORDERED:
PARAM Pacheco notified. Ordered 1U PRBC. Will continue to monitor.
no new orders at this time
tansfusion
1 unit prbc
1U PRBCs
None
no new orders at this time  continue to monitor for bleeding
1 unit of PRBCs ordered.
PA agreed with recommendation
PRBC TX ordered
1 unit PRBC and 1 unit Plt to be ordered. Will continue to monitor.
no new orders at this time
no new orders at this time  continue to monitor for bleeding
to follow up
No action taken at this time.
1U platelets

## 2021-08-28 NOTE — PROGRESS NOTE ADULT - PROBLEM SELECTOR PLAN 2
neutropenic, afebrile   Completed Zosyn course for suspected pna  8/9 started on Levaquin and posaconazole ppx for neutropenia  8/12 - Started Acyclovir for oral ulcers  8/14 (-) CMV PCR  HSV 1 serology (+)  8/20 Cefepime changed to ingrid due to persistent fever and new rash  8/21 MRSA and RVP/COVID swabs Neg  Plan to discharge home on antimicrobials Levaquin and Acyclovir.

## 2021-08-29 ENCOUNTER — TRANSCRIPTION ENCOUNTER (OUTPATIENT)
Age: 36
End: 2021-08-29

## 2021-08-29 VITALS
SYSTOLIC BLOOD PRESSURE: 132 MMHG | RESPIRATION RATE: 18 BRPM | OXYGEN SATURATION: 97 % | TEMPERATURE: 98 F | DIASTOLIC BLOOD PRESSURE: 84 MMHG | HEART RATE: 93 BPM

## 2021-08-29 LAB
ALBUMIN SERPL ELPH-MCNC: 3.7 G/DL — SIGNIFICANT CHANGE UP (ref 3.3–5)
ALP SERPL-CCNC: 78 U/L — SIGNIFICANT CHANGE UP (ref 40–120)
ALT FLD-CCNC: 59 U/L — HIGH (ref 10–45)
ANION GAP SERPL CALC-SCNC: 13 MMOL/L — SIGNIFICANT CHANGE UP (ref 5–17)
AST SERPL-CCNC: 19 U/L — SIGNIFICANT CHANGE UP (ref 10–40)
BASOPHILS # BLD AUTO: 0 K/UL — SIGNIFICANT CHANGE UP (ref 0–0.2)
BASOPHILS NFR BLD AUTO: 0 % — SIGNIFICANT CHANGE UP (ref 0–2)
BILIRUB SERPL-MCNC: 0.3 MG/DL — SIGNIFICANT CHANGE UP (ref 0.2–1.2)
BLASTS # FLD: 1.7 % — HIGH (ref 0–0)
BUN SERPL-MCNC: 14 MG/DL — SIGNIFICANT CHANGE UP (ref 7–23)
CALCIUM SERPL-MCNC: 9.3 MG/DL — SIGNIFICANT CHANGE UP (ref 8.4–10.5)
CHLORIDE SERPL-SCNC: 103 MMOL/L — SIGNIFICANT CHANGE UP (ref 96–108)
CO2 SERPL-SCNC: 23 MMOL/L — SIGNIFICANT CHANGE UP (ref 22–31)
CREAT SERPL-MCNC: 0.76 MG/DL — SIGNIFICANT CHANGE UP (ref 0.5–1.3)
EOSINOPHIL # BLD AUTO: 0 K/UL — SIGNIFICANT CHANGE UP (ref 0–0.5)
EOSINOPHIL NFR BLD AUTO: 0 % — SIGNIFICANT CHANGE UP (ref 0–6)
GIANT PLATELETS BLD QL SMEAR: PRESENT — SIGNIFICANT CHANGE UP
GLUCOSE SERPL-MCNC: 96 MG/DL — SIGNIFICANT CHANGE UP (ref 70–99)
HCT VFR BLD CALC: 23.6 % — LOW (ref 39–50)
HGB BLD-MCNC: 7.9 G/DL — LOW (ref 13–17)
LDH SERPL L TO P-CCNC: 192 U/L — SIGNIFICANT CHANGE UP (ref 50–242)
LYMPHOCYTES # BLD AUTO: 0.31 K/UL — LOW (ref 1–3.3)
LYMPHOCYTES # BLD AUTO: 16.7 % — SIGNIFICANT CHANGE UP (ref 13–44)
MAGNESIUM SERPL-MCNC: 2.2 MG/DL — SIGNIFICANT CHANGE UP (ref 1.6–2.6)
MANUAL SMEAR VERIFICATION: SIGNIFICANT CHANGE UP
MCHC RBC-ENTMCNC: 31 PG — SIGNIFICANT CHANGE UP (ref 27–34)
MCHC RBC-ENTMCNC: 33.5 GM/DL — SIGNIFICANT CHANGE UP (ref 32–36)
MCV RBC AUTO: 92.5 FL — SIGNIFICANT CHANGE UP (ref 80–100)
MONOCYTES # BLD AUTO: 0.59 K/UL — SIGNIFICANT CHANGE UP (ref 0–0.9)
MONOCYTES NFR BLD AUTO: 32.5 % — HIGH (ref 2–14)
NEUTROPHILS # BLD AUTO: 0.87 K/UL — LOW (ref 1.8–7.4)
NEUTROPHILS NFR BLD AUTO: 47.4 % — SIGNIFICANT CHANGE UP (ref 43–77)
PHOSPHATE SERPL-MCNC: 3.8 MG/DL — SIGNIFICANT CHANGE UP (ref 2.5–4.5)
PLAT MORPH BLD: ABNORMAL
PLATELET # BLD AUTO: 542 K/UL — HIGH (ref 150–400)
POTASSIUM SERPL-MCNC: 3.7 MMOL/L — SIGNIFICANT CHANGE UP (ref 3.5–5.3)
POTASSIUM SERPL-SCNC: 3.7 MMOL/L — SIGNIFICANT CHANGE UP (ref 3.5–5.3)
PROMYELOCYTES # FLD: 1.7 % — HIGH (ref 0–0)
PROT SERPL-MCNC: 6.7 G/DL — SIGNIFICANT CHANGE UP (ref 6–8.3)
RBC # BLD: 2.55 M/UL — LOW (ref 4.2–5.8)
RBC # FLD: 15.7 % — HIGH (ref 10.3–14.5)
RBC BLD AUTO: SIGNIFICANT CHANGE UP
SODIUM SERPL-SCNC: 139 MMOL/L — SIGNIFICANT CHANGE UP (ref 135–145)
URATE SERPL-MCNC: 3.1 MG/DL — LOW (ref 3.4–8.8)
WBC # BLD: 1.83 K/UL — LOW (ref 3.8–10.5)
WBC # FLD AUTO: 1.83 K/UL — LOW (ref 3.8–10.5)

## 2021-08-29 PROCEDURE — 96376 TX/PRO/DX INJ SAME DRUG ADON: CPT

## 2021-08-29 PROCEDURE — 86704 HEP B CORE ANTIBODY TOTAL: CPT

## 2021-08-29 PROCEDURE — 85014 HEMATOCRIT: CPT

## 2021-08-29 PROCEDURE — 88184 FLOWCYTOMETRY/ TC 1 MARKER: CPT

## 2021-08-29 PROCEDURE — 81207 BCR/ABL1 GENE MINOR BP: CPT

## 2021-08-29 PROCEDURE — 85027 COMPLETE CBC AUTOMATED: CPT

## 2021-08-29 PROCEDURE — 83550 IRON BINDING TEST: CPT

## 2021-08-29 PROCEDURE — U0005: CPT

## 2021-08-29 PROCEDURE — 84100 ASSAY OF PHOSPHORUS: CPT

## 2021-08-29 PROCEDURE — 36556 INSERT NON-TUNNEL CV CATH: CPT

## 2021-08-29 PROCEDURE — 86850 RBC ANTIBODY SCREEN: CPT

## 2021-08-29 PROCEDURE — 74177 CT ABD & PELVIS W/CONTRAST: CPT | Mod: MA

## 2021-08-29 PROCEDURE — 80074 ACUTE HEPATITIS PANEL: CPT

## 2021-08-29 PROCEDURE — 88341 IMHCHEM/IMCYTCHM EA ADD ANTB: CPT

## 2021-08-29 PROCEDURE — C1751: CPT

## 2021-08-29 PROCEDURE — 84484 ASSAY OF TROPONIN QUANT: CPT

## 2021-08-29 PROCEDURE — 86901 BLOOD TYPING SEROLOGIC RH(D): CPT

## 2021-08-29 PROCEDURE — 93356 MYOCRD STRAIN IMG SPCKL TRCK: CPT

## 2021-08-29 PROCEDURE — 85379 FIBRIN DEGRADATION QUANT: CPT

## 2021-08-29 PROCEDURE — 87641 MR-STAPH DNA AMP PROBE: CPT

## 2021-08-29 PROCEDURE — 85730 THROMBOPLASTIN TIME PARTIAL: CPT

## 2021-08-29 PROCEDURE — 83036 HEMOGLOBIN GLYCOSYLATED A1C: CPT

## 2021-08-29 PROCEDURE — 85049 AUTOMATED PLATELET COUNT: CPT

## 2021-08-29 PROCEDURE — 81001 URINALYSIS AUTO W/SCOPE: CPT

## 2021-08-29 PROCEDURE — 86803 HEPATITIS C AB TEST: CPT

## 2021-08-29 PROCEDURE — 0225U NFCT DS DNA&RNA 21 SARSCOV2: CPT

## 2021-08-29 PROCEDURE — 80053 COMPREHEN METABOLIC PANEL: CPT

## 2021-08-29 PROCEDURE — 83010 ASSAY OF HAPTOGLOBIN QUANT: CPT

## 2021-08-29 PROCEDURE — 86923 COMPATIBILITY TEST ELECTRIC: CPT

## 2021-08-29 PROCEDURE — 77001 FLUOROGUIDE FOR VEIN DEVICE: CPT

## 2021-08-29 PROCEDURE — 82728 ASSAY OF FERRITIN: CPT

## 2021-08-29 PROCEDURE — 76705 ECHO EXAM OF ABDOMEN: CPT

## 2021-08-29 PROCEDURE — 87040 BLOOD CULTURE FOR BACTERIA: CPT

## 2021-08-29 PROCEDURE — 81379 HLA I TYPING COMPLETE HR: CPT

## 2021-08-29 PROCEDURE — 84132 ASSAY OF SERUM POTASSIUM: CPT

## 2021-08-29 PROCEDURE — 71250 CT THORAX DX C-: CPT | Mod: MA

## 2021-08-29 PROCEDURE — A9560: CPT

## 2021-08-29 PROCEDURE — 83735 ASSAY OF MAGNESIUM: CPT

## 2021-08-29 PROCEDURE — 88185 FLOWCYTOMETRY/TC ADD-ON: CPT

## 2021-08-29 PROCEDURE — 82955 ASSAY OF G6PD ENZYME: CPT

## 2021-08-29 PROCEDURE — 84295 ASSAY OF SERUM SODIUM: CPT

## 2021-08-29 PROCEDURE — 81245 FLT3 GENE: CPT

## 2021-08-29 PROCEDURE — 71045 X-RAY EXAM CHEST 1 VIEW: CPT

## 2021-08-29 PROCEDURE — 96374 THER/PROPH/DIAG INJ IV PUSH: CPT

## 2021-08-29 PROCEDURE — 85018 HEMOGLOBIN: CPT

## 2021-08-29 PROCEDURE — 82803 BLOOD GASES ANY COMBINATION: CPT

## 2021-08-29 PROCEDURE — 88271 CYTOGENETICS DNA PROBE: CPT

## 2021-08-29 PROCEDURE — C1894: CPT

## 2021-08-29 PROCEDURE — 99239 HOSP IP/OBS DSCHRG MGMT >30: CPT

## 2021-08-29 PROCEDURE — 86706 HEP B SURFACE ANTIBODY: CPT

## 2021-08-29 PROCEDURE — 86695 HERPES SIMPLEX TYPE 1 TEST: CPT

## 2021-08-29 PROCEDURE — P9040: CPT

## 2021-08-29 PROCEDURE — 82746 ASSAY OF FOLIC ACID SERUM: CPT

## 2021-08-29 PROCEDURE — 93306 TTE W/DOPPLER COMPLETE: CPT

## 2021-08-29 PROCEDURE — 87205 SMEAR GRAM STAIN: CPT

## 2021-08-29 PROCEDURE — 87086 URINE CULTURE/COLONY COUNT: CPT

## 2021-08-29 PROCEDURE — U0003: CPT

## 2021-08-29 PROCEDURE — 87340 HEPATITIS B SURFACE AG IA: CPT

## 2021-08-29 PROCEDURE — 78472 GATED HEART PLANAR SINGLE: CPT

## 2021-08-29 PROCEDURE — 71260 CT THORAX DX C+: CPT

## 2021-08-29 PROCEDURE — 88342 IMHCHEM/IMCYTCHM 1ST ANTB: CPT

## 2021-08-29 PROCEDURE — 88305 TISSUE EXAM BY PATHOLOGIST: CPT

## 2021-08-29 PROCEDURE — 76937 US GUIDE VASCULAR ACCESS: CPT

## 2021-08-29 PROCEDURE — 85384 FIBRINOGEN ACTIVITY: CPT

## 2021-08-29 PROCEDURE — 85097 BONE MARROW INTERPRETATION: CPT

## 2021-08-29 PROCEDURE — 88313 SPECIAL STAINS GROUP 2: CPT

## 2021-08-29 PROCEDURE — 82550 ASSAY OF CK (CPK): CPT

## 2021-08-29 PROCEDURE — 82553 CREATINE MB FRACTION: CPT

## 2021-08-29 PROCEDURE — 88280 CHROMOSOME KARYOTYPE STUDY: CPT

## 2021-08-29 PROCEDURE — C1769: CPT

## 2021-08-29 PROCEDURE — 81003 URINALYSIS AUTO W/O SCOPE: CPT

## 2021-08-29 PROCEDURE — 83540 ASSAY OF IRON: CPT

## 2021-08-29 PROCEDURE — 83615 LACTATE (LD) (LDH) ENZYME: CPT

## 2021-08-29 PROCEDURE — 82607 VITAMIN B-12: CPT

## 2021-08-29 PROCEDURE — 82947 ASSAY GLUCOSE BLOOD QUANT: CPT

## 2021-08-29 PROCEDURE — 86696 HERPES SIMPLEX TYPE 2 TEST: CPT

## 2021-08-29 PROCEDURE — 82330 ASSAY OF CALCIUM: CPT

## 2021-08-29 PROCEDURE — 87640 STAPH A DNA AMP PROBE: CPT

## 2021-08-29 PROCEDURE — 88264 CHROMOSOME ANALYSIS 20-25: CPT

## 2021-08-29 PROCEDURE — 84550 ASSAY OF BLOOD/URIC ACID: CPT

## 2021-08-29 PROCEDURE — 85025 COMPLETE CBC W/AUTO DIFF WBC: CPT

## 2021-08-29 PROCEDURE — 88275 CYTOGENETICS 100-300: CPT

## 2021-08-29 PROCEDURE — 88319 ENZYME HISTOCHEMISTRY: CPT

## 2021-08-29 PROCEDURE — 81382 HLA II TYPING 1 LOC HR: CPT

## 2021-08-29 PROCEDURE — 86900 BLOOD TYPING SEROLOGIC ABO: CPT

## 2021-08-29 PROCEDURE — 83605 ASSAY OF LACTIC ACID: CPT

## 2021-08-29 PROCEDURE — P9037: CPT

## 2021-08-29 PROCEDURE — 88237 TISSUE CULTURE BONE MARROW: CPT

## 2021-08-29 PROCEDURE — 82435 ASSAY OF BLOOD CHLORIDE: CPT

## 2021-08-29 PROCEDURE — 85610 PROTHROMBIN TIME: CPT

## 2021-08-29 PROCEDURE — 36430 TRANSFUSION BLD/BLD COMPNT: CPT

## 2021-08-29 PROCEDURE — 87389 HIV-1 AG W/HIV-1&-2 AB AG IA: CPT

## 2021-08-29 PROCEDURE — 99285 EMERGENCY DEPT VISIT HI MDM: CPT

## 2021-08-29 PROCEDURE — 83690 ASSAY OF LIPASE: CPT

## 2021-08-29 PROCEDURE — 86769 SARS-COV-2 COVID-19 ANTIBODY: CPT

## 2021-08-29 PROCEDURE — 81206 BCR/ABL1 GENE MAJOR BP: CPT

## 2021-08-29 RX ORDER — SENNA PLUS 8.6 MG/1
2 TABLET ORAL
Qty: 0 | Refills: 0 | DISCHARGE
Start: 2021-08-29

## 2021-08-29 RX ADMIN — Medication 5 MILLILITER(S): at 01:11

## 2021-08-29 RX ADMIN — Medication 15 MILLILITER(S): at 06:33

## 2021-08-29 RX ADMIN — PHENYLEPHRINE-SHARK LIVER OIL-MINERAL OIL-PETROLATUM RECTAL OINTMENT 1 APPLICATION(S): at 06:32

## 2021-08-29 RX ADMIN — Medication 15 MILLILITER(S): at 11:46

## 2021-08-29 RX ADMIN — Medication 1 APPLICATION(S): at 11:29

## 2021-08-29 RX ADMIN — Medication 1 APPLICATION(S): at 06:40

## 2021-08-29 RX ADMIN — SODIUM CHLORIDE 20 MILLILITER(S): 9 INJECTION INTRAMUSCULAR; INTRAVENOUS; SUBCUTANEOUS at 06:39

## 2021-08-29 RX ADMIN — Medication 400 MILLIGRAM(S): at 13:49

## 2021-08-29 RX ADMIN — AMLODIPINE BESYLATE 5 MILLIGRAM(S): 2.5 TABLET ORAL at 06:32

## 2021-08-29 RX ADMIN — Medication 400 MILLIGRAM(S): at 06:32

## 2021-08-29 RX ADMIN — Medication 5 MILLILITER(S): at 11:50

## 2021-08-29 RX ADMIN — AER TRAVELER 1 APPLICATION(S): 0.5 SOLUTION RECTAL; TOPICAL at 13:54

## 2021-08-29 RX ADMIN — DIPHENHYDRAMINE HYDROCHLORIDE AND LIDOCAINE HYDROCHLORIDE AND ALUMINUM HYDROXIDE AND MAGNESIUM HYDRO 5 MILLILITER(S): KIT at 06:32

## 2021-08-29 RX ADMIN — CHLORHEXIDINE GLUCONATE 1 APPLICATION(S): 213 SOLUTION TOPICAL at 09:29

## 2021-08-29 RX ADMIN — DIPHENHYDRAMINE HYDROCHLORIDE AND LIDOCAINE HYDROCHLORIDE AND ALUMINUM HYDROXIDE AND MAGNESIUM HYDRO 5 MILLILITER(S): KIT at 13:50

## 2021-08-29 RX ADMIN — AER TRAVELER 1 APPLICATION(S): 0.5 SOLUTION RECTAL; TOPICAL at 06:33

## 2021-08-29 RX ADMIN — POSACONAZOLE 300 MILLIGRAM(S): 100 TABLET, DELAYED RELEASE ORAL at 11:46

## 2021-08-29 RX ADMIN — Medication 5 MILLILITER(S): at 07:55

## 2021-08-29 NOTE — DISCHARGE NOTE NURSING/CASE MANAGEMENT/SOCIAL WORK - NSDCPEFALRISK_GEN_ALL_CORE
For information on Fall & injury Prevention, visit [email or web address]

## 2021-08-29 NOTE — DISCHARGE NOTE NURSING/CASE MANAGEMENT/SOCIAL WORK - PATIENT PORTAL LINK FT
You can access the FollowMyHealth Patient Portal offered by [facility] by registering at the following website: [email or web address]. By joining [employer]’s FollowMyHealth portal, you will also be able to view your health information using other applications (apps) compatible with our system.

## 2021-08-29 NOTE — PROGRESS NOTE ADULT - SUBJECTIVE AND OBJECTIVE BOX
Diagnosis:  Acute Myeloid Leukemia, FLT3-    Protocol/Chemo Regimen: s/p induction 7+3 (Daunorubicin and Cytarabine)    Day: 24    Pt endorsed: hemorrhoidal pain improved      Review of Systems: Patient denies  nausea, vomiting, diarrhea, abdominal pain, SOB, chest pain and headache.    Pain scale: denies     Diet: DASH/TLC    Allergies: No Known Allergies    Intolerance: cefepime (Rash)      ANTIMICROBIALS  acyclovir   Oral Tab/Cap 400 milliGRAM(s) Oral every 8 hours  levoFLOXacin  Tablet 500 milliGRAM(s) Oral every 24 hours  posaconazole DR Tablet 300 milliGRAM(s) Oral daily    STANDING MEDICATIONS  ALPRAZolam 0.25 milliGRAM(s) Oral at bedtime  amLODIPine   Tablet 5 milliGRAM(s) Oral daily  Biotene Dry Mouth Oral Rinse 5 milliLiter(s) Swish and Spit five times a day  chlorhexidine 2% Cloths 1 Application(s) Topical <User Schedule>  FIRST- Mouthwash  BLM 5 milliLiter(s) Swish and Spit three times a day  hemorrhoidal Ointment 1 Application(s) Rectal two times a day  lidocaine 2% Injectable 20 milliLiter(s) Local Injection once  petrolatum white Ointment 1 Application(s) Topical four times a day  polyethylene glycol 3350 17 Gram(s) Oral daily  senna 2 Tablet(s) Oral at bedtime  sodium bicarbonate Mouth Rinse 15 milliLiter(s) Swish and Spit four times a day  sodium chloride 0.9%. 1000 milliLiter(s) IV Continuous <Continuous>  witch hazel Pads 1 Application(s) Topical three times a day      PRN MEDICATIONS  acetaminophen   Tablet .. 650 milliGRAM(s) Oral every 6 hours PRN  benzocaine 15 mG/menthol 3.6 mG (Sugar-Free) Lozenge 1 Lozenge Oral every 4 hours PRN  metoclopramide Injectable 10 milliGRAM(s) IV Push every 6 hours PRN  sodium chloride 0.65% Nasal 1 Spray(s) Both Nostrils three times a day PRN  sodium chloride 0.9% lock flush 10 milliLiter(s) IV Push every 1 hour PRN    Vital Signs Last 24 Hrs  T(C): 36.8 (29 Aug 2021 05:25), Max: 37.3 (29 Aug 2021 00:41)  T(F): 98.3 (29 Aug 2021 05:25), Max: 99.1 (29 Aug 2021 00:41)  HR: 81 (29 Aug 2021 05:25) (81 - 99)  BP: 112/70 (29 Aug 2021 05:25) (105/64 - 121/75)  BP(mean): --  RR: 18 (29 Aug 2021 05:25) (18 - 18)  SpO2: 95% (29 Aug 2021 05:25) (95% - 99%)    PHYSICAL EXAM  General: NAD  HEENT:  oral ulcer --->left side of tongue. Labial ulcers scabbed- improved   CV: S1S2, RRR,   Lungs:  CTA all fields, no rales, no wheezes  Abdomen: BS(+), soft  nontender, nondistended  Ext: BLE no edema  Skin: warm, dry, + diffuse papular rash to trunk (front and back), B/L UE/LE's - fading  Neuro: AOX3,   Central Line: RCW TLC, site C/D/I      LABS:                          7.9    1.83  )-----------( 542      ( 29 Aug 2021 06:44 )             23.6         Mean Cell Volume : 92.5 fl  Mean Cell Hemoglobin : 31.0 pg  Mean Cell Hemoglobin Concentration : 33.5 gm/dL  Auto Neutrophil # : 0.87 K/uL  Auto Lymphocyte # : 0.31 K/uL  Auto Monocyte # : 0.59 K/uL  Auto Eosinophil # : 0.00 K/uL  Auto Basophil # : 0.00 K/uL  Auto Neutrophil % : 47.4 %  Auto Lymphocyte % : 16.7 %  Auto Monocyte % : 32.5 %  Auto Eosinophil % : 0.0 %  Auto Basophil % : 0.0 %      08-29    139  |  103  |  14  ----------------------------<  96  3.7   |  23  |  0.76    Ca    9.3      29 Aug 2021 06:44  Phos  3.8     08-29  Mg     2.2     08-29    TPro  6.7  /  Alb  3.7  /  TBili  0.3  /  DBili  x   /  AST  19  /  ALT  59<H>  /  AlkPhos  78  08-29            Uric Acid 3.1

## 2021-08-29 NOTE — PROGRESS NOTE ADULT - ASSESSMENT
Mr. Tian is  a 37 y/o male with h/o PMH HTN, fatty liver, kidney stones presents with rash, dizziness, fatigue and severe RUQ pain for 3 days. Now with anemia, thrombocytopenia and leukocytosis with 17% blasts, Bone marrow bx c/w AML. Transferred to [address] [place] [address] for management. Patient receiving induction chemotherapy with 7+3 (Daunorubicin and Cytarabine). Post induction BM biopsy shows chemotherapeutic effect. Course c/b neutropenic fevers. Patient has anemia and thrombocytopenia  secondary to chemotherapy and disease process.

## 2021-08-29 NOTE — PROGRESS NOTE ADULT - TIME BILLING
The time was used discussed with patient, family, primary team, consultants, and acute management.
coordinating care
coordinating care
The time was used discussed with patient, family, primary team, consultants, and acute management.
coordinating care

## 2021-08-29 NOTE — CHART NOTE - NSCHARTNOTESELECT_GEN_ALL_CORE
Event Note
FEVER/Event Note
fever/Event Note
Event Note
Event Note
Fever 100.4F/Event Note
Fever 101.1F/Event Note
Neutropenic Fever 102.3F/Event Note
Nut[employer] Services
TLC removal/Event Note
procedure note: bone marrow asp/biopsy/Event Note

## 2021-08-29 NOTE — CHART NOTE - NSCHARTNOTEFT_GEN_A_CORE
Right IJ TLC removed on 8/29/21  at 1530 asper [facility] central line removal policy. Applied direct pressure to the site with sterile guaze. Applied sterile dressing after TLC removal.  Catheter intact  Protocol for site reassessment followed. Patient tolerated the procedure very well. No bleeding noted. Pt denies cough, hematoma, dizziness and respiratory distress.

## 2021-08-29 NOTE — PROGRESS NOTE ADULT - ATTENDING COMMENTS
37yo M admitted with RUQ pain found to have blood work concerning for acute leukemia  -13% myeloblasts on PB. Flt3 negative.   -s/p BMbx 8/4 -AML. f/u cytogenetics, [facility]  -started 7+3 on 8/6 -cytarabine 100/m2 and dauno 90/m2. Today is day 23  -pain control, improved today. Abd sono showing hepatomegaly and hepatic steatosis. Right pleural effusion. CT chest shows small right pleural effusion  -MUGA EF 71%  -discussed with patient and family at the bedside. We also discussed sperm banking -pt declined  -febrile again today (8/20)- cultures have all been negative, he's been on cefepime, changed to meropenem on 8/20 for a rash. Swab for MRSA and RVP negative.  on 8/26 but he has remained afebrile. Will switch to levaquin on 8/26 to prepare for possible discharge.   -Persistent fevers had been with mucositis. Aggressive oral care  Cultures negative, repeat CT 8/14 without infectious source.   -rash worsening -cefepime changed to meropenem on 8/20. Hydrocortisone cream. resolved  -cont posaconazole and acyclovir PPx  -TLC placement  -supportive care  -transfuse as needed - platelets recovering.   -day 14 BMbx on 8/19 - Discussed with hematopathology today, appears to be earlier regeneration than would typically seen which is concerning for persistent disease at this point, however the earliest cells are CD34 positive and his myeloblasts on presentation were CD34 negative. Thus, this may simply represent early regeneration of his marrow. Will await for count recovery and repeat, his platelets already recovered at this point.  -Peripheral blasts increasing, possibly due to regeneration 37yo M admitted with RUQ pain found to have blood work concerning for acute leukemia  -13% myeloblasts on PB. Flt3 negative.   -s/p BMbx 8/4 -AML. f/u cytogenetics, [facility]  -started 7+3 on 8/6 -cytarabine 100/m2 and dauno 90/m2. Today is day 24  -pain control, improved today. Abd sono showing hepatomegaly and hepatic steatosis. Right pleural effusion. CT chest shows small right pleural effusion  -MUGA EF 71%  -discussed with patient and family at the bedside. We also discussed sperm banking -pt declined  -febrile again today (8/20)- cultures have all been negative, he's been on cefepime, changed to meropenem on 8/20 for a rash. Swab for MRSA and RVP negative.  on 8/26 but he has remained afebrile. Will switch to levaquin on 8/26 to prepare for possible discharge.   -Persistent fevers had been with mucositis. Aggressive oral care  Cultures negative, repeat CT 8/14 without infectious source.   -rash worsening -cefepime changed to meropenem on 8/20. Hydrocortisone cream. resolved  -cont posaconazole and acyclovir PPx  -supportive care  -transfuse as needed - platelets recovering.   -day 14 BMbx on 8/19 - Discussed with hematopathology today, appears to be earlier regeneration than would typically seen which is concerning for persistent disease at this point, however the earliest cells are CD34 positive and his myeloblasts on presentation were CD34 negative. Thus, this may simply represent early regeneration of his marrow. Will await for count recovery and repeat, his platelets already recovered at this point.  -Peripheral blasts increasing, possibly due to regeneration  -patient stable for home today 8/29/21.

## 2021-08-29 NOTE — PROGRESS NOTE ADULT - PROVIDER SPECIALTY LIST ADULT
Heme/Onc
Internal Medicine

## 2021-08-29 NOTE — PROGRESS NOTE ADULT - PROBLEM SELECTOR PROBLEM 3
Prophylactic measure
Blast leukemia
Prophylactic measure

## 2021-08-29 NOTE — PROGRESS NOTE ADULT - PROBLEM SELECTOR PROBLEM 2
Infectious disease
SIRS (systemic inflammatory response syndrome)
Infectious disease

## 2021-08-29 NOTE — DISCHARGE NOTE NURSING/CASE MANAGEMENT/SOCIAL WORK - NSDCFUADDAPPT_GEN_ALL_CORE_FT
To [facility] on Tuesday, 8/31 at 11:00am for follow-up appointment with Dr. Chelsea Yancey.

## 2021-08-29 NOTE — PROGRESS NOTE ADULT - PROBLEM SELECTOR PLAN 1
FLT3 negative, BM bx c/w AML   consented for [place] study    HIV (-), Hep (-)  Discussed sperm banking. Declined   TLC placed on 8/6 8/6 Started induction with  7+3 (Cytarabine + Daunorubicin)  Follow daily lytes, CBC. replace, Replete prn  Now off Allopurinol, IV hydration  mouth care, pain control, antiemetics  day 14 BMbx on 8/19 - d/w pathology, appears to be earlier regeneration than would typically seen,  raised concern for persistent disease at this point, however the earliest cells are CD34 positive and his myeloblasts on presentation were CD34 negative. may  represent early regeneration of his marrow. Will await for count recovery and repeat, his platelets already recovered.  D/C home today

## 2021-08-29 NOTE — PROGRESS NOTE ADULT - PROBLEM SELECTOR PROBLEM 1
Acute leukemia
Abdominal pain

## 2021-08-30 ENCOUNTER — OUTPATIENT (OUTPATIENT)
Dept: OUTPATIENT SERVICES | Facility: HOSPITAL | Age: 36
LOS: 1 days | Discharge: HOME | End: 2021-08-30

## 2021-08-30 DIAGNOSIS — C92.00 ACUTE MYELOBLASTIC LEUKEMIA, NOT HAVING ACHIEVED REMISSION: ICD-10-CM

## 2021-08-31 ENCOUNTER — RESULT REVIEW (OUTPATIENT)
Age: 36
End: 2021-08-31

## 2021-08-31 ENCOUNTER — APPOINTMENT (OUTPATIENT)
Dept: HEMATOLOGY ONCOLOGY | Facility: CLINIC | Age: 36
End: 2021-08-31
Payer: COMMERCIAL

## 2021-08-31 VITALS
WEIGHT: 200.84 LBS | HEART RATE: 127 BPM | DIASTOLIC BLOOD PRESSURE: 84 MMHG | RESPIRATION RATE: 16 BRPM | SYSTOLIC BLOOD PRESSURE: 117 MMHG | TEMPERATURE: 97.9 F | HEIGHT: 71.26 IN | BODY MASS INDEX: 27.81 KG/M2 | OXYGEN SATURATION: 97 %

## 2021-08-31 DIAGNOSIS — Z82.49 FAMILY HISTORY OF ISCHEMIC HEART DISEASE AND OTHER DISEASES OF THE CIRCULATORY SYSTEM: ICD-10-CM

## 2021-08-31 DIAGNOSIS — Z86.79 PERSONAL HISTORY OF OTHER DISEASES OF THE CIRCULATORY SYSTEM: ICD-10-CM

## 2021-08-31 DIAGNOSIS — Z78.9 OTHER SPECIFIED HEALTH STATUS: ICD-10-CM

## 2021-08-31 LAB
BASOPHILS # BLD AUTO: 0 K/UL — SIGNIFICANT CHANGE UP (ref 0–0.2)
BASOPHILS NFR BLD AUTO: 0 % — SIGNIFICANT CHANGE UP (ref 0–2)
EOSINOPHIL # BLD AUTO: 0 K/UL — SIGNIFICANT CHANGE UP (ref 0–0.5)
EOSINOPHIL NFR BLD AUTO: 0 % — SIGNIFICANT CHANGE UP (ref 0–6)
HCT VFR BLD CALC: 30.8 % — LOW (ref 39–50)
HGB BLD-MCNC: 10.8 G/DL — LOW (ref 13–17)
LYMPHOCYTES # BLD AUTO: 0.83 K/UL — LOW (ref 1–3.3)
LYMPHOCYTES # BLD AUTO: 22 % — SIGNIFICANT CHANGE UP (ref 13–44)
MCHC RBC-ENTMCNC: 30.8 PG — SIGNIFICANT CHANGE UP (ref 27–34)
MCHC RBC-ENTMCNC: 35.1 G/DL — SIGNIFICANT CHANGE UP (ref 32–36)
MCV RBC AUTO: 87.7 FL — SIGNIFICANT CHANGE UP (ref 80–100)
MONOCYTES # BLD AUTO: 1.1 K/UL — HIGH (ref 0–0.9)
MONOCYTES NFR BLD AUTO: 29 % — HIGH (ref 2–14)
MYELOCYTES NFR BLD: 1 % — HIGH (ref 0–0)
NEUTROPHILS # BLD AUTO: 1.81 K/UL — SIGNIFICANT CHANGE UP (ref 1.8–7.4)
NEUTROPHILS NFR BLD AUTO: 48 % — SIGNIFICANT CHANGE UP (ref 43–77)
NRBC # BLD: 0 /100 — SIGNIFICANT CHANGE UP (ref 0–0)
NRBC # BLD: SIGNIFICANT CHANGE UP /100 WBCS (ref 0–0)
PLAT MORPH BLD: NORMAL — SIGNIFICANT CHANGE UP
PLATELET # BLD AUTO: 779 K/UL — HIGH (ref 150–400)
RBC # BLD: 3.51 M/UL — LOW (ref 4.2–5.8)
RBC # FLD: 16.5 % — HIGH (ref 10.3–14.5)
RBC BLD AUTO: SIGNIFICANT CHANGE UP
SMUDGE CELLS # BLD: PRESENT — SIGNIFICANT CHANGE UP
WBC # BLD: 3.78 K/UL — LOW (ref 3.8–10.5)
WBC # FLD AUTO: 3.78 K/UL — LOW (ref 3.8–10.5)

## 2021-08-31 PROCEDURE — 99215 OFFICE O/P EST HI 40 MIN: CPT

## 2021-08-31 RX ORDER — IMIQUIMOD 50 MG/G
5 CREAM TOPICAL
Qty: 2 | Refills: 2 | Status: DISCONTINUED | COMMUNITY
Start: 2020-06-02 | End: 2021-08-31

## 2021-08-31 RX ORDER — PODOFILOX 5 MG/ML
0.5 SOLUTION TOPICAL TWICE DAILY
Qty: 1 | Refills: 2 | Status: DISCONTINUED | COMMUNITY
Start: 2020-08-18 | End: 2021-08-31

## 2021-08-31 NOTE — ASSESSMENT
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 \par Started induction with Dauno/Cytarabine on 8/6. \par Pt's counts now recovered, will plan for remission marrow later this week\par CBC reviewed with pt and father\par can d/c levaquin as no longer neutropenic\par cont hemorrhoidal ointment and witch hazel. Will send oxycodone for pain relief\par We discussed consolidation with [facility]AC if marrow shows remission\par All questions answered\par RTC after BMbx\par

## 2021-08-31 NOTE — HISTORY OF PRESENT ILLNESS
[de-identified] : 37yo M w/ HTN and newly diagnosed AML here for f/u. \par \par He presented to the hospital on 7/30/21 with complaints of dizziness, fatigue and severe RUQ pain for 3 days. CT A/P revealed fatty liver and small R pleural effusion. WBC 12k with 17% blasts.Peripheral flow cytometry showed 13% myeloblasts. FLT3(-). BMbx was done on 8/4/21 - confirmed AML. 46,XY  {20  }\par Foundation: mutations in DNMT3A R882H, NRAS G12D, NPM1 W288fs*12\par Pt consented to [place]. Patient was offered sperm banking, but declined.\par On 8/6, induction with Dauno/Cytararbine was started (cytarabine 100/m2 and dauno 90/m2) \par He received a seven day course of Zosyn for presumed RUL PNA, then transitioned to  levaquin, posaconazole and Acyclovir for ppx for neutropenia. Course c/b neutropenic fevers, cultures negative, repeat CT 8/14 without infectious source. He was on Cefepime but developed a rash, changed to meropenem. Rash improved. \par Day 14 BMbx on 8/19 was hypocellular with chemotherapeutic effect; however, per hematopathology, appears to be earlier regeneration than would typically seen which is concerning for persistent disease at this point, and the earliest cells are CD34 positive and his myeloblasts on initial presentation were CD34 negative. Thus, this may simply represent early regeneration of his marrow. Plan is to await count recovery and repeat biopsy.  \par Patient discharged home on 8/29/21 when ANC >500 [de-identified] : Eating well since discharge. \par c/o hemorrhoidal pain. Hemorrhoids started a few days prior to discharge. He was sent home with rectal ointment and witch hazel.

## 2021-09-02 ENCOUNTER — RESULT REVIEW (OUTPATIENT)
Age: 36
End: 2021-09-02

## 2021-09-02 ENCOUNTER — LABORATORY RESULT (OUTPATIENT)
Age: 36
End: 2021-09-02

## 2021-09-02 ENCOUNTER — APPOINTMENT (OUTPATIENT)
Dept: HEMATOLOGY ONCOLOGY | Facility: CLINIC | Age: 36
End: 2021-09-02
Payer: COMMERCIAL

## 2021-09-02 VITALS
HEART RATE: 130 BPM | TEMPERATURE: 97.2 F | OXYGEN SATURATION: 98 % | WEIGHT: 201.06 LBS | DIASTOLIC BLOOD PRESSURE: 80 MMHG | BODY MASS INDEX: 27.84 KG/M2 | SYSTOLIC BLOOD PRESSURE: 129 MMHG | RESPIRATION RATE: 16 BRPM

## 2021-09-02 LAB
BASOPHILS # BLD AUTO: 0.01 K/UL — SIGNIFICANT CHANGE UP (ref 0–0.2)
BASOPHILS NFR BLD AUTO: 0.3 % — SIGNIFICANT CHANGE UP (ref 0–2)
EOSINOPHIL # BLD AUTO: 0 K/UL — SIGNIFICANT CHANGE UP (ref 0–0.5)
EOSINOPHIL NFR BLD AUTO: 0 % — SIGNIFICANT CHANGE UP (ref 0–6)
HCT VFR BLD CALC: 31.5 % — LOW (ref 39–50)
HGB BLD-MCNC: 10.8 G/DL — LOW (ref 13–17)
IMM GRANULOCYTES NFR BLD AUTO: 0.8 % — SIGNIFICANT CHANGE UP (ref 0–1.5)
LYMPHOCYTES # BLD AUTO: 0.65 K/UL — LOW (ref 1–3.3)
LYMPHOCYTES # BLD AUTO: 16.3 % — SIGNIFICANT CHANGE UP (ref 13–44)
MCHC RBC-ENTMCNC: 30.7 PG — SIGNIFICANT CHANGE UP (ref 27–34)
MCHC RBC-ENTMCNC: 34.3 G/DL — SIGNIFICANT CHANGE UP (ref 32–36)
MCV RBC AUTO: 89.5 FL — SIGNIFICANT CHANGE UP (ref 80–100)
MONOCYTES # BLD AUTO: 1.52 K/UL — HIGH (ref 0–0.9)
MONOCYTES NFR BLD AUTO: 38 % — HIGH (ref 2–14)
NEUTROPHILS # BLD AUTO: 1.79 K/UL — LOW (ref 1.8–7.4)
NEUTROPHILS NFR BLD AUTO: 44.6 % — SIGNIFICANT CHANGE UP (ref 43–77)
NRBC # BLD: 0 /100 WBCS — SIGNIFICANT CHANGE UP (ref 0–0)
PLATELET # BLD AUTO: 817 K/UL — HIGH (ref 150–400)
RBC # BLD: 3.52 M/UL — LOW (ref 4.2–5.8)
RBC # FLD: 16.4 % — HIGH (ref 10.3–14.5)
WBC # BLD: 4 K/UL — SIGNIFICANT CHANGE UP (ref 3.8–10.5)
WBC # FLD AUTO: 4 K/UL — SIGNIFICANT CHANGE UP (ref 3.8–10.5)

## 2021-09-02 PROCEDURE — 38222 DX BONE MARROW BX & ASPIR: CPT | Mod: LT

## 2021-09-02 NOTE — REASON FOR VISIT
[Bone Marrow Biopsy] : bone marrow biopsy [Bone Marrow Aspiration] : bone marrow aspiration [FreeTextEntry2] : 35 yo Male w/ AML s/p induction w/ Dauno/cytarabine, pre-consolidation BMBx to assess for remission

## 2021-09-02 NOTE — PROCEDURE
[Bone Marrow Biopsy] : bone marrow biopsy [Bone Marrow Aspiration] : bone marrow aspiration  [Patient] : the patient [Patient identification verified] : patient identification verified [Procedure verified and consent obtained] : procedure verified and consent obtained [Correct positioning] : correct positioning [Prone] : prone [Lidocaine was injected and into the periosteum overlying the site.] : Lidocaine was injected and into the periosteum overlying the site. [Aspirate] : aspirate [Cytogenetics] : cytogenetics [FISH] : FISH [Flow Cytometry] : flow cytometry [Biopsy] : biopsy [] : The patient was instructed to remove the bandage the following AM. The patient may bathe. Acetaminophen may be taken for discomfort, as per package directions.If there are any other problems, the patient was instructed to call the office. The patient verbalized understanding, and is aware of the office contact numbers. [The left posterior iliac crest was prepped with betadine and draped, using sterile technique.] : The left posterior iliac crest was prepped with betadine and draped, using sterile technique. [FreeTextEntry1] : 35 yo Male w/ AML s/p induction w/ Dauno/cytarabine, pre-consolidation BMBx to assess for remission  [FreeTextEntry2] : CBC prior to procedure\par WBC 4.0\par Hgb  10.8 Hct 31.5\par Plt 817\par BM Bx and aspiration was performed by GALINA Pedersen. 2 lavender + 2 green top tubes of BM aspirate and 1 cassette of BM core specimen sent to lab.\par \par

## 2021-09-13 ENCOUNTER — APPOINTMENT (OUTPATIENT)
Dept: HEMATOLOGY ONCOLOGY | Facility: CLINIC | Age: 36
End: 2021-09-13
Payer: COMMERCIAL

## 2021-09-13 ENCOUNTER — LABORATORY RESULT (OUTPATIENT)
Age: 36
End: 2021-09-13

## 2021-09-13 ENCOUNTER — RESULT REVIEW (OUTPATIENT)
Age: 36
End: 2021-09-13

## 2021-09-13 VITALS
BODY MASS INDEX: 28.08 KG/M2 | WEIGHT: 202.8 LBS | SYSTOLIC BLOOD PRESSURE: 120 MMHG | HEART RATE: 96 BPM | HEIGHT: 71.26 IN | TEMPERATURE: 97.3 F | OXYGEN SATURATION: 98 % | DIASTOLIC BLOOD PRESSURE: 81 MMHG | RESPIRATION RATE: 16 BRPM

## 2021-09-13 LAB
BASOPHILS # BLD AUTO: 0.03 K/UL — SIGNIFICANT CHANGE UP (ref 0–0.2)
BASOPHILS NFR BLD AUTO: 0.6 % — SIGNIFICANT CHANGE UP (ref 0–2)
EOSINOPHIL # BLD AUTO: 0.02 K/UL — SIGNIFICANT CHANGE UP (ref 0–0.5)
EOSINOPHIL NFR BLD AUTO: 0.4 % — SIGNIFICANT CHANGE UP (ref 0–6)
HCT VFR BLD CALC: 35.9 % — LOW (ref 39–50)
HGB BLD-MCNC: 11.9 G/DL — LOW (ref 13–17)
IMM GRANULOCYTES NFR BLD AUTO: 0.2 % — SIGNIFICANT CHANGE UP (ref 0–1.5)
LYMPHOCYTES # BLD AUTO: 0.64 K/UL — LOW (ref 1–3.3)
LYMPHOCYTES # BLD AUTO: 12.6 % — LOW (ref 13–44)
MCHC RBC-ENTMCNC: 30.8 PG — SIGNIFICANT CHANGE UP (ref 27–34)
MCHC RBC-ENTMCNC: 33.1 G/DL — SIGNIFICANT CHANGE UP (ref 32–36)
MCV RBC AUTO: 93 FL — SIGNIFICANT CHANGE UP (ref 80–100)
MONOCYTES # BLD AUTO: 0.95 K/UL — HIGH (ref 0–0.9)
MONOCYTES NFR BLD AUTO: 18.7 % — HIGH (ref 2–14)
NEUTROPHILS # BLD AUTO: 3.43 K/UL — SIGNIFICANT CHANGE UP (ref 1.8–7.4)
NEUTROPHILS NFR BLD AUTO: 67.5 % — SIGNIFICANT CHANGE UP (ref 43–77)
NRBC # BLD: 0 /100 WBCS — SIGNIFICANT CHANGE UP (ref 0–0)
PLATELET # BLD AUTO: 328 K/UL — SIGNIFICANT CHANGE UP (ref 150–400)
RBC # BLD: 3.86 M/UL — LOW (ref 4.2–5.8)
RBC # FLD: 17.4 % — HIGH (ref 10.3–14.5)
WBC # BLD: 5.08 K/UL — SIGNIFICANT CHANGE UP (ref 3.8–10.5)
WBC # FLD AUTO: 5.08 K/UL — SIGNIFICANT CHANGE UP (ref 3.8–10.5)

## 2021-09-13 PROCEDURE — 99214 OFFICE O/P EST MOD 30 MIN: CPT

## 2021-09-14 LAB
ALBUMIN SERPL ELPH-MCNC: 4.6 G/DL
ALP BLD-CCNC: 96 U/L
ALT SERPL-CCNC: 54 U/L
ANION GAP SERPL CALC-SCNC: 17 MMOL/L
AST SERPL-CCNC: 25 U/L
BILIRUB SERPL-MCNC: 0.4 MG/DL
BUN SERPL-MCNC: 13 MG/DL
CALCIUM SERPL-MCNC: 10.1 MG/DL
CHLORIDE SERPL-SCNC: 99 MMOL/L
CO2 SERPL-SCNC: 24 MMOL/L
CREAT SERPL-MCNC: 0.85 MG/DL
GLUCOSE SERPL-MCNC: 79 MG/DL
POTASSIUM SERPL-SCNC: 4.1 MMOL/L
PROT SERPL-MCNC: 7.3 G/DL
SODIUM SERPL-SCNC: 141 MMOL/L

## 2021-09-14 NOTE — HISTORY OF PRESENT ILLNESS
[de-identified] : 35yo M w/ HTN and newly diagnosed AML here for f/u. \par \par He presented to the hospital on 7/30/21 with complaints of dizziness, fatigue and severe RUQ pain for 3 days. CT A/P revealed fatty liver and small R pleural effusion. WBC 12k with 17% blasts.Peripheral flow cytometry showed 13% myeloblasts. FLT3(-). BMbx was done on 8/4/21 - confirmed AML. 46,XY   {20   }\par Foundation: mutations in DNMT3A R882H, NRAS G12D, NPM1 W288fs*12\par Pt consented to [place]. Patient was offered sperm banking, but declined.\par On 8/6, induction with Dauno/Cytararbine was started (cytarabine 100/m2 and dauno 90/m2) \par He received a seven day course of Zosyn for presumed RUL PNA, then transitioned to  levaquin, posaconazole and Acyclovir for ppx for neutropenia. Course c/b neutropenic fevers, cultures negative, repeat CT 8/14 without infectious source. He was on Cefepime but developed a rash, changed to meropenem. Rash improved. \par Day 14 BMbx on 8/19 was hypocellular with chemotherapeutic effect; however, per hematopathology, appears to be earlier regeneration than would typically seen which is concerning for persistent disease at this point, and the earliest cells are CD34 positive and his myeloblasts on initial presentation were CD34 negative. Thus, this may simply represent early regeneration of his marrow. Plan is to await count recovery and repeat biopsy.  \par Patient discharged home on 8/29/21 when ANC >500 [de-identified] : Eating well since discharge. \par c/o hemorrhoidal pain. Hemorrhoids started a few days prior to discharge. He was sent home with rectal ointment and witch hazel. \par \par BMBx done on 9/2: Cellular bone marrow with trilineage hematopoiesis with maturation and megakaryocytosis (history of AML).\par Pt feels well, CBC today showed count stable

## 2021-09-14 NOTE — ASSESSMENT
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 \par Started induction with Dauno/Cytarabine on 8/6. \par Pt's counts now recovered, remission marrow later done on 9/2- in remission. Dr. Yancey reviewed Bmbx result with pt on the phone, will proceed to consolidation with 4 cycles of HIDAC. Side effect and benefit explained, pt verbalized understanding and agreed the plan. Informed consent obtained today. \par CBC reviewed with pt and father\par Labs and COVID nasal swab done today, will admit for C1 HIDAC on 9/15. \par can d/c levaquin as no longer neutropenic\par cont hemorrhoidal ointment and witch hazel. Will send oxycodone for pain relief\par We discussed consolidation with HIDAC if marrow shows remission\par All questions answered\par RTC after C1\par \par \par Case and management discussed with Dr. Yancey\par \par

## 2021-09-15 NOTE — ED ADULT NURSE NOTE - PAIN RATING/NUMBER SCALE (0-10): REST
10 Universal Safety Interventions My signature below certifies that the above stated patient is homebound and upon completion of the Face-To-Face encounter, has the need for intermittent skilled nursing, physical therapy and/or speech or occupational therapy services in their home for their current diagnosis as outlined in their initial plan of care. These services will continue to be monitored by myself or another physician.

## 2021-09-17 ENCOUNTER — INPATIENT (INPATIENT)
Facility: HOSPITAL | Age: 36
LOS: 4 days | Discharge: HOME HEALTH | DRG: 837 | End: 2021-09-22
Attending: INTERNAL MEDICINE | Admitting: INTERNAL MEDICINE
Payer: COMMERCIAL

## 2021-09-17 ENCOUNTER — TRANSCRIPTION ENCOUNTER (OUTPATIENT)
Age: 36
End: 2021-09-17

## 2021-09-17 VITALS
WEIGHT: 200.84 LBS | DIASTOLIC BLOOD PRESSURE: 88 MMHG | HEART RATE: 82 BPM | RESPIRATION RATE: 18 BRPM | SYSTOLIC BLOOD PRESSURE: 127 MMHG | OXYGEN SATURATION: 100 % | TEMPERATURE: 98 F | HEIGHT: 71.65 IN

## 2021-09-17 DIAGNOSIS — I10 ESSENTIAL (PRIMARY) HYPERTENSION: ICD-10-CM

## 2021-09-17 DIAGNOSIS — C92.00 ACUTE MYELOBLASTIC LEUKEMIA, NOT HAVING ACHIEVED REMISSION: ICD-10-CM

## 2021-09-17 DIAGNOSIS — B99.9 UNSPECIFIED INFECTIOUS DISEASE: ICD-10-CM

## 2021-09-17 DIAGNOSIS — Z29.9 ENCOUNTER FOR PROPHYLACTIC MEASURES, UNSPECIFIED: ICD-10-CM

## 2021-09-17 LAB
ALBUMIN SERPL ELPH-MCNC: 4.7 G/DL — SIGNIFICANT CHANGE UP (ref 3.3–5)
ALP SERPL-CCNC: 91 U/L — SIGNIFICANT CHANGE UP (ref 40–120)
ALT FLD-CCNC: 60 U/L — HIGH (ref 10–45)
ANION GAP SERPL CALC-SCNC: 15 MMOL/L — SIGNIFICANT CHANGE UP (ref 5–17)
AST SERPL-CCNC: 30 U/L — SIGNIFICANT CHANGE UP (ref 10–40)
BASOPHILS # BLD AUTO: 0.03 K/UL — SIGNIFICANT CHANGE UP (ref 0–0.2)
BASOPHILS NFR BLD AUTO: 0.6 % — SIGNIFICANT CHANGE UP (ref 0–2)
BILIRUB SERPL-MCNC: 0.3 MG/DL — SIGNIFICANT CHANGE UP (ref 0.2–1.2)
BUN SERPL-MCNC: 10 MG/DL — SIGNIFICANT CHANGE UP (ref 7–23)
CALCIUM SERPL-MCNC: 9.8 MG/DL — SIGNIFICANT CHANGE UP (ref 8.4–10.5)
CHLORIDE SERPL-SCNC: 105 MMOL/L — SIGNIFICANT CHANGE UP (ref 96–108)
CO2 SERPL-SCNC: 22 MMOL/L — SIGNIFICANT CHANGE UP (ref 22–31)
CREAT SERPL-MCNC: 0.8 MG/DL — SIGNIFICANT CHANGE UP (ref 0.5–1.3)
EOSINOPHIL # BLD AUTO: 0.07 K/UL — SIGNIFICANT CHANGE UP (ref 0–0.5)
EOSINOPHIL NFR BLD AUTO: 1.4 % — SIGNIFICANT CHANGE UP (ref 0–6)
GLUCOSE SERPL-MCNC: 88 MG/DL — SIGNIFICANT CHANGE UP (ref 70–99)
HCT VFR BLD CALC: 37.1 % — LOW (ref 39–50)
HGB BLD-MCNC: 12.1 G/DL — LOW (ref 13–17)
IMM GRANULOCYTES NFR BLD AUTO: 0.2 % — SIGNIFICANT CHANGE UP (ref 0–1.5)
LYMPHOCYTES # BLD AUTO: 0.86 K/UL — LOW (ref 1–3.3)
LYMPHOCYTES # BLD AUTO: 17.1 % — SIGNIFICANT CHANGE UP (ref 13–44)
MAGNESIUM SERPL-MCNC: 2.5 MG/DL — SIGNIFICANT CHANGE UP (ref 1.6–2.6)
MCHC RBC-ENTMCNC: 30.6 PG — SIGNIFICANT CHANGE UP (ref 27–34)
MCHC RBC-ENTMCNC: 32.6 GM/DL — SIGNIFICANT CHANGE UP (ref 32–36)
MCV RBC AUTO: 93.9 FL — SIGNIFICANT CHANGE UP (ref 80–100)
MONOCYTES # BLD AUTO: 0.73 K/UL — SIGNIFICANT CHANGE UP (ref 0–0.9)
MONOCYTES NFR BLD AUTO: 14.5 % — HIGH (ref 2–14)
NEUTROPHILS # BLD AUTO: 3.33 K/UL — SIGNIFICANT CHANGE UP (ref 1.8–7.4)
NEUTROPHILS NFR BLD AUTO: 66.2 % — SIGNIFICANT CHANGE UP (ref 43–77)
NRBC # BLD: 0 /100 WBCS — SIGNIFICANT CHANGE UP (ref 0–0)
PHOSPHATE SERPL-MCNC: 3.1 MG/DL — SIGNIFICANT CHANGE UP (ref 2.5–4.5)
PLATELET # BLD AUTO: 255 K/UL — SIGNIFICANT CHANGE UP (ref 150–400)
POTASSIUM SERPL-MCNC: 4.2 MMOL/L — SIGNIFICANT CHANGE UP (ref 3.5–5.3)
POTASSIUM SERPL-SCNC: 4.2 MMOL/L — SIGNIFICANT CHANGE UP (ref 3.5–5.3)
PROT SERPL-MCNC: 7.3 G/DL — SIGNIFICANT CHANGE UP (ref 6–8.3)
RBC # BLD: 3.95 M/UL — LOW (ref 4.2–5.8)
RBC # FLD: 17.1 % — HIGH (ref 10.3–14.5)
SODIUM SERPL-SCNC: 142 MMOL/L — SIGNIFICANT CHANGE UP (ref 135–145)
WBC # BLD: 5.03 K/UL — SIGNIFICANT CHANGE UP (ref 3.8–10.5)
WBC # FLD AUTO: 5.03 K/UL — SIGNIFICANT CHANGE UP (ref 3.8–10.5)

## 2021-09-17 RX ORDER — ONDANSETRON 8 MG/1
8 TABLET, FILM COATED ORAL EVERY 8 HOURS
Refills: 0 | Status: DISCONTINUED | OUTPATIENT
Start: 2021-09-17 | End: 2021-09-22

## 2021-09-17 RX ORDER — ENOXAPARIN SODIUM 100 MG/ML
40 INJECTION SUBCUTANEOUS DAILY
Refills: 0 | Status: DISCONTINUED | OUTPATIENT
Start: 2021-09-17 | End: 2021-09-22

## 2021-09-17 RX ORDER — INFLUENZA VIRUS VACCINE 15; 15; 15; 15 UG/.5ML; UG/.5ML; UG/.5ML; UG/.5ML
0.5 SUSPENSION INTRAMUSCULAR ONCE
Refills: 0 | Status: DISCONTINUED | OUTPATIENT
Start: 2021-09-17 | End: 2021-09-22

## 2021-09-17 RX ORDER — CYTARABINE 100 MG
6390 VIAL (EA) INJECTION EVERY 12 HOURS
Refills: 0 | Status: COMPLETED | OUTPATIENT
Start: 2021-09-17 | End: 2021-09-18

## 2021-09-17 RX ORDER — AMLODIPINE BESYLATE 2.5 MG/1
5 TABLET ORAL DAILY
Refills: 0 | Status: DISCONTINUED | OUTPATIENT
Start: 2021-09-17 | End: 2021-09-22

## 2021-09-17 RX ORDER — ONDANSETRON 8 MG/1
1 TABLET, FILM COATED ORAL
Qty: 120 | Refills: 0
Start: 2021-09-17 | End: 2021-10-16

## 2021-09-17 RX ORDER — FOSAPREPITANT DIMEGLUMINE 150 MG/5ML
150 INJECTION, POWDER, LYOPHILIZED, FOR SOLUTION INTRAVENOUS ONCE
Refills: 0 | Status: COMPLETED | OUTPATIENT
Start: 2021-09-17 | End: 2021-09-17

## 2021-09-17 RX ORDER — SODIUM CHLORIDE 9 MG/ML
1000 INJECTION INTRAMUSCULAR; INTRAVENOUS; SUBCUTANEOUS
Refills: 0 | Status: DISCONTINUED | OUTPATIENT
Start: 2021-09-17 | End: 2021-09-22

## 2021-09-17 RX ORDER — FLUCONAZOLE 150 MG/1
1 TABLET ORAL
Qty: 30 | Refills: 0
Start: 2021-09-17 | End: 2021-10-16

## 2021-09-17 RX ORDER — ACYCLOVIR SODIUM 500 MG
400 VIAL (EA) INTRAVENOUS EVERY 8 HOURS
Refills: 0 | Status: DISCONTINUED | OUTPATIENT
Start: 2021-09-17 | End: 2021-09-22

## 2021-09-17 RX ORDER — SALIVA SUBSTITUTE COMB NO.11 351 MG
15 POWDER IN PACKET (EA) MUCOUS MEMBRANE THREE TIMES A DAY
Refills: 0 | Status: DISCONTINUED | OUTPATIENT
Start: 2021-09-17 | End: 2021-09-22

## 2021-09-17 RX ORDER — ACETAMINOPHEN 500 MG
650 TABLET ORAL EVERY 6 HOURS
Refills: 0 | Status: DISCONTINUED | OUTPATIENT
Start: 2021-09-17 | End: 2021-09-22

## 2021-09-17 RX ORDER — PREDNISOLONE SODIUM PHOSPHATE 1 %
2 DROPS OPHTHALMIC (EYE)
Qty: 0 | Refills: 0 | DISCHARGE
Start: 2021-09-17

## 2021-09-17 RX ORDER — PREDNISOLONE SODIUM PHOSPHATE 1 %
2 DROPS OPHTHALMIC (EYE) EVERY 6 HOURS
Refills: 0 | Status: DISCONTINUED | OUTPATIENT
Start: 2021-09-17 | End: 2021-09-22

## 2021-09-17 RX ORDER — METOCLOPRAMIDE HCL 10 MG
10 TABLET ORAL EVERY 6 HOURS
Refills: 0 | Status: DISCONTINUED | OUTPATIENT
Start: 2021-09-17 | End: 2021-09-22

## 2021-09-17 RX ORDER — METOCLOPRAMIDE HCL 10 MG
1 TABLET ORAL
Qty: 120 | Refills: 0
Start: 2021-09-17 | End: 2021-10-16

## 2021-09-17 RX ADMIN — FOSAPREPITANT DIMEGLUMINE 300 MILLIGRAM(S): 150 INJECTION, POWDER, LYOPHILIZED, FOR SOLUTION INTRAVENOUS at 16:13

## 2021-09-17 RX ADMIN — Medication 2 DROP(S): at 18:20

## 2021-09-17 RX ADMIN — Medication 15 MILLILITER(S): at 22:48

## 2021-09-17 RX ADMIN — ONDANSETRON 8 MILLIGRAM(S): 8 TABLET, FILM COATED ORAL at 16:07

## 2021-09-17 RX ADMIN — Medication 2 DROP(S): at 23:39

## 2021-09-17 RX ADMIN — ONDANSETRON 8 MILLIGRAM(S): 8 TABLET, FILM COATED ORAL at 23:39

## 2021-09-17 RX ADMIN — Medication 400 MILLIGRAM(S): at 22:48

## 2021-09-17 RX ADMIN — Medication 187.97 MILLIGRAM(S): at 16:43

## 2021-09-17 NOTE — DISCHARGE NOTE PROVIDER - NSDCFUADDAPPT_GEN_ALL_CORE_FT
To [facility] on Friday 9/24 at 1 pm to see Rosy Pedersen  Your lab appointments are following   Tuesday 9/28 at 9 am   Friday 10/1 at 9 am

## 2021-09-17 NOTE — H&P ADULT - ASSESSMENT
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

## 2021-09-17 NOTE — DISCHARGE NOTE PROVIDER - HOSPITAL COURSE
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   Pt was monitored for cerebellar toxicity and pred forte eye drops were ordered to prevent chemical conjunctivitis .    Patient received IVF and antiemetics, strict I/O  and monitoring of CBC and electrolytes. Tolerated chemotherapy without complications. Stable for discharge home and follow up as an outpatient.

## 2021-09-17 NOTE — PATIENT PROFILE ADULT - WILL THE PATIENT ACCEPT THE PFIZER COVID-19 VACCINE IF ELIGIBLE AND IT IS AVAILABLE?
[FreeTextEntry1] : Electrocardiogram reveals a regular sinus rhythm and is within normal limits. Spirometry is normal. Pulse oximetry is 99%.\par \par Blood work and urinalysis will be scheduled.
No

## 2021-09-17 NOTE — DISCHARGE NOTE PROVIDER - CARE PROVIDER_API CALL
Chelsea Yancey  HEMATOLOGY/ONCOLOGY  [address] [place] [address]  Phone: [phone]  Fax: [phone]  Follow Up Time:     Rosy Pedersen)  Physician Assistant Services  [phone] [place] [address]  Phone: [phone]  Fax: [phone]  Follow Up Time:

## 2021-09-17 NOTE — DISCHARGE NOTE PROVIDER - NSDCCPCAREPLAN_GEN_ALL_CORE_FT
PRINCIPAL DISCHARGE DIAGNOSIS  Diagnosis: Acute myeloid leukemia (AML), M1  Assessment and Plan of Treatment:       SECONDARY DISCHARGE DIAGNOSES  Diagnosis: Hypertension  Assessment and Plan of Treatment: Continue Norvasc

## 2021-09-17 NOTE — H&P ADULT - HISTORY OF PRESENT ILLNESS
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 CT A/P revealed fatty liver and small R pleural effusion. WBC 12k with 17% blasts.Peripheral flow cytometry showed 13% myeloblasts. FLT3(-). BMbx was done on 8/4/21 - confirmed AML. 46,XY {20 } Foundation: mutations in DNMT3A R882H, NRAS G12D, NPM1 W288fs*12  Pt consented to [place]. Patient was offered sperm banking, but declined.  On 8/6/21,patient started induction with Dauno/Cytararbine . Day 14 BMbx on 8/19 was hypocellular with chemotherapeutic effect; however, per hematopathology, appears to be earlier regeneration than would typically seen which is concerning for persistent disease at this point, and the earliest cells are CD34 positive and his myeloblasts on initial presentation were CD34 negative. This may have represented early regeneration of his marrow.    Patients induction course complicated by a course of Zosyn for presumed RUL PNA, then transitioned to levaquin, posaconazole and Acyclovir for ppx for neutropenia. Course also complicated by  neutropenic fevers, with no identified source. He was on Cefepime but developed a rash, changed to meropenem. Rash improved. A BM bx on 8/19 showed CR.  Upon admission, pt has no complaints. Patient denies  nausea, vomiting, diarrhea, abdominal pain, SOB, chest pain and headache.

## 2021-09-17 NOTE — DISCHARGE NOTE PROVIDER - CARE PROVIDERS DIRECT ADDRESSES
[email or web address]

## 2021-09-17 NOTE — H&P ADULT - PROBLEM SELECTOR PLAN 1
Admitted for Cycle 1 HiDAC  Continue IV hydration, strict I/O,   Follow CBC and tranfuse prn, Mouth care, daily weight   Discharge planning on 9/22 Admitted for Cycle 1 HiDAC  Cytarabine 3 g/m2 = 6390 mg IV over 3 hrs every 12 hrs on days 1,3,5   IV hydration, Antiemetics, daily weight   Monitor for Cerebellar toxicity, nystagmus checks  Follow CBC/CMP, transfuse/replete prn  Continue predforte eye drops to prevent chemical conjunctivitis  Discharge planning on 9/22

## 2021-09-17 NOTE — H&P ADULT - PROBLEM/PLAN-3
Head, normocephalic, atraumatic, Face, Face within normal limits, Ears, External ears within normal limits, Nose/Nasopharynx, External nose  normal appearance, nares patent, no nasal discharge, Mouth and Throat, Oral cavity appearance normal, Breath odor normal, Lips, Appearance normal
DISPLAY PLAN FREE TEXT

## 2021-09-17 NOTE — H&P ADULT - CARDIOVASCULAR DETAILS
S/p redo bilateral mandibular distraction POD9    Family at bedside, no events overnight     Vitals:    07/11/19 0713   BP:    Pulse: (!) 141   Resp:    Temp:        Awake, alert  Pin sites clean,small amount of draiange, incision clean  Lower face swelling unchanged    Xeroform guaze to pin sites  Continue q8 distractions  Clinda and Cefepime for total of 7 days    Activation: 0.5mm  Cumulative advancement: 10.5 mm    DO Katrin Cummins Plastic Surgery Fellow     Cell: [phone]             positive S1/positive S2

## 2021-09-17 NOTE — H&P ADULT - NSHPLABSRESULTS_GEN_ALL_CORE
LABS:                          12.1   5.03  )-----------( 255      ( 17 Sep 2021 11:16 )             37.1         Mean Cell Volume : 93.9 fl  Mean Cell Hemoglobin : 30.6 pg  Mean Cell Hemoglobin Concentration : 32.6 gm/dL  Auto Neutrophil # : 3.33 K/uL  Auto Lymphocyte # : 0.86 K/uL  Auto Monocyte # : 0.73 K/uL  Auto Eosinophil # : 0.07 K/uL  Auto Basophil # : 0.03 K/uL  Auto Neutrophil % : 66.2 %  Auto Lymphocyte % : 17.1 %  Auto Monocyte % : 14.5 %  Auto Eosinophil % : 1.4 %  Auto Basophil % : 0.6 %      09-17    142  |  105  |  10  ----------------------------<  88  4.2   |  22  |  0.80    Ca    9.8      17 Sep 2021 11:16  Phos  3.1     09-17  Mg     2.5     09-17    TPro  7.3  /  Alb  4.7  /  TBili  0.3  /  DBili  x   /  AST  30  /  ALT  60<H>  /  AlkPhos  91  09-17

## 2021-09-17 NOTE — DISCHARGE NOTE PROVIDER - NSDCFUSCHEDAPPT_GEN_ALL_CORE_FT
JAK DANIELS ; 09/24/2021 ; [place] [facility]  JAK DANIELS ; 09/28/2021 ; JAK Rios ; 10/01/2021 ; [place] [facility]

## 2021-09-17 NOTE — DISCHARGE NOTE PROVIDER - NSDCMRMEDTOKEN_GEN_ALL_CORE_FT
acyclovir 400 mg oral tablet: 1 tab(s) orally every 8 hours   amLODIPine 5 mg oral tablet: 1 tab(s) orally once a day   acyclovir 400 mg oral tablet: 1 tab(s) orally every 8 hours   amLODIPine 5 mg oral tablet: 1 tab(s) orally once a day  Diflucan 200 mg oral tablet: 1 tab(s) orally once a day   Levaquin 500 mg oral tablet: 1 tab(s) orally every 24 hours  metoclopramide 10 mg oral tablet: 1 tab(s) orally every 6 hours, As Needed for nausea  ondansetron 8 mg oral tablet: 1 tab(s) orally 4 times a day, As Needed for nausea  prednisoLONE acetate 1% ophthalmic suspension: 2 drop(s) to each affected eye every 6 hours  Continue for 2 days and then stop

## 2021-09-18 LAB
ALBUMIN SERPL ELPH-MCNC: 4.2 G/DL — SIGNIFICANT CHANGE UP (ref 3.3–5)
ALP SERPL-CCNC: 84 U/L — SIGNIFICANT CHANGE UP (ref 40–120)
ALT FLD-CCNC: 61 U/L — HIGH (ref 10–45)
ANION GAP SERPL CALC-SCNC: 15 MMOL/L — SIGNIFICANT CHANGE UP (ref 5–17)
AST SERPL-CCNC: 33 U/L — SIGNIFICANT CHANGE UP (ref 10–40)
BASOPHILS # BLD AUTO: 0.04 K/UL — SIGNIFICANT CHANGE UP (ref 0–0.2)
BASOPHILS NFR BLD AUTO: 0.6 % — SIGNIFICANT CHANGE UP (ref 0–2)
BILIRUB SERPL-MCNC: 0.5 MG/DL — SIGNIFICANT CHANGE UP (ref 0.2–1.2)
BUN SERPL-MCNC: 15 MG/DL — SIGNIFICANT CHANGE UP (ref 7–23)
CALCIUM SERPL-MCNC: 9.5 MG/DL — SIGNIFICANT CHANGE UP (ref 8.4–10.5)
CHLORIDE SERPL-SCNC: 106 MMOL/L — SIGNIFICANT CHANGE UP (ref 96–108)
CO2 SERPL-SCNC: 19 MMOL/L — LOW (ref 22–31)
COVID-19 SPIKE DOMAIN AB INTERP: POSITIVE
COVID-19 SPIKE DOMAIN ANTIBODY RESULT: >250 U/ML — HIGH
CREAT SERPL-MCNC: 0.86 MG/DL — SIGNIFICANT CHANGE UP (ref 0.5–1.3)
EOSINOPHIL # BLD AUTO: 0.08 K/UL — SIGNIFICANT CHANGE UP (ref 0–0.5)
EOSINOPHIL NFR BLD AUTO: 1.3 % — SIGNIFICANT CHANGE UP (ref 0–6)
GLUCOSE SERPL-MCNC: 88 MG/DL — SIGNIFICANT CHANGE UP (ref 70–99)
HCT VFR BLD CALC: 36.5 % — LOW (ref 39–50)
HGB BLD-MCNC: 12.1 G/DL — LOW (ref 13–17)
IMM GRANULOCYTES NFR BLD AUTO: 0.5 % — SIGNIFICANT CHANGE UP (ref 0–1.5)
LYMPHOCYTES # BLD AUTO: 0.44 K/UL — LOW (ref 1–3.3)
LYMPHOCYTES # BLD AUTO: 7.1 % — LOW (ref 13–44)
MAGNESIUM SERPL-MCNC: 2.4 MG/DL — SIGNIFICANT CHANGE UP (ref 1.6–2.6)
MCHC RBC-ENTMCNC: 31.5 PG — SIGNIFICANT CHANGE UP (ref 27–34)
MCHC RBC-ENTMCNC: 33.2 GM/DL — SIGNIFICANT CHANGE UP (ref 32–36)
MCV RBC AUTO: 95.1 FL — SIGNIFICANT CHANGE UP (ref 80–100)
MONOCYTES # BLD AUTO: 0.62 K/UL — SIGNIFICANT CHANGE UP (ref 0–0.9)
MONOCYTES NFR BLD AUTO: 10 % — SIGNIFICANT CHANGE UP (ref 2–14)
NEUTROPHILS # BLD AUTO: 5 K/UL — SIGNIFICANT CHANGE UP (ref 1.8–7.4)
NEUTROPHILS NFR BLD AUTO: 80.5 % — HIGH (ref 43–77)
NRBC # BLD: 0 /100 WBCS — SIGNIFICANT CHANGE UP (ref 0–0)
PHOSPHATE SERPL-MCNC: 4.7 MG/DL — HIGH (ref 2.5–4.5)
PLATELET # BLD AUTO: 210 K/UL — SIGNIFICANT CHANGE UP (ref 150–400)
POTASSIUM SERPL-MCNC: 4.3 MMOL/L — SIGNIFICANT CHANGE UP (ref 3.5–5.3)
POTASSIUM SERPL-SCNC: 4.3 MMOL/L — SIGNIFICANT CHANGE UP (ref 3.5–5.3)
PROT SERPL-MCNC: 6.9 G/DL — SIGNIFICANT CHANGE UP (ref 6–8.3)
RBC # BLD: 3.84 M/UL — LOW (ref 4.2–5.8)
RBC # FLD: 17.1 % — HIGH (ref 10.3–14.5)
SARS-COV-2 IGG+IGM SERPL QL IA: >250 U/ML — HIGH
SARS-COV-2 IGG+IGM SERPL QL IA: POSITIVE
SODIUM SERPL-SCNC: 140 MMOL/L — SIGNIFICANT CHANGE UP (ref 135–145)
WBC # BLD: 6.21 K/UL — SIGNIFICANT CHANGE UP (ref 3.8–10.5)
WBC # FLD AUTO: 6.21 K/UL — SIGNIFICANT CHANGE UP (ref 3.8–10.5)

## 2021-09-18 PROCEDURE — 99233 SBSQ HOSP IP/OBS HIGH 50: CPT

## 2021-09-18 RX ORDER — SENNA PLUS 8.6 MG/1
2 TABLET ORAL
Refills: 0 | Status: DISCONTINUED | OUTPATIENT
Start: 2021-09-18 | End: 2021-09-22

## 2021-09-18 RX ORDER — SENNA PLUS 8.6 MG/1
2 TABLET ORAL ONCE
Refills: 0 | Status: COMPLETED | OUTPATIENT
Start: 2021-09-18 | End: 2021-09-18

## 2021-09-18 RX ADMIN — Medication 400 MILLIGRAM(S): at 21:27

## 2021-09-18 RX ADMIN — Medication 15 MILLILITER(S): at 12:44

## 2021-09-18 RX ADMIN — Medication 650 MILLIGRAM(S): at 12:43

## 2021-09-18 RX ADMIN — SODIUM CHLORIDE 75 MILLILITER(S): 9 INJECTION INTRAMUSCULAR; INTRAVENOUS; SUBCUTANEOUS at 05:49

## 2021-09-18 RX ADMIN — Medication 15 MILLILITER(S): at 21:27

## 2021-09-18 RX ADMIN — Medication 400 MILLIGRAM(S): at 12:41

## 2021-09-18 RX ADMIN — Medication 2 DROP(S): at 05:47

## 2021-09-18 RX ADMIN — Medication 400 MILLIGRAM(S): at 05:48

## 2021-09-18 RX ADMIN — Medication 650 MILLIGRAM(S): at 22:00

## 2021-09-18 RX ADMIN — SENNA PLUS 2 TABLET(S): 8.6 TABLET ORAL at 21:27

## 2021-09-18 RX ADMIN — Medication 2 DROP(S): at 23:21

## 2021-09-18 RX ADMIN — Medication 187.97 MILLIGRAM(S): at 04:44

## 2021-09-18 RX ADMIN — Medication 650 MILLIGRAM(S): at 13:13

## 2021-09-18 RX ADMIN — ONDANSETRON 8 MILLIGRAM(S): 8 TABLET, FILM COATED ORAL at 23:21

## 2021-09-18 RX ADMIN — AMLODIPINE BESYLATE 5 MILLIGRAM(S): 2.5 TABLET ORAL at 05:48

## 2021-09-18 RX ADMIN — Medication 2 DROP(S): at 12:42

## 2021-09-18 RX ADMIN — Medication 15 MILLILITER(S): at 05:48

## 2021-09-18 RX ADMIN — Medication 650 MILLIGRAM(S): at 21:27

## 2021-09-18 RX ADMIN — SENNA PLUS 2 TABLET(S): 8.6 TABLET ORAL at 12:41

## 2021-09-18 NOTE — PROGRESS NOTE ADULT - PROBLEM SELECTOR PLAN 1
Admitted for Cycle 1 HiDAC  Cytarabine 3 g/m2 = 6390 mg IV over 3 hrs every 12 hrs on days 1,3,5   IV hydration, Antiemetics, daily weight   Monitor for Cerebellar toxicity, nystagmus checks  Follow CBC/CMP, transfuse/replete prn  Continue predforte eye drops to prevent chemical conjunctivitis  Discharge planning on 9/22

## 2021-09-18 NOTE — PROGRESS NOTE ADULT - SUBJECTIVE AND OBJECTIVE BOX
Diagnosis    Protocol/Chemo Regimen:  Day:      Pt endorsed:    Review of Systems:      Pain scale:                                        Location:    Diet:     Allergies    No Known Allergies    Intolerances    cefepime (Rash)      ANTIMICROBIALS  acyclovir   Oral Tab/Cap 400 milliGRAM(s) Oral every 8 hours      HEME/ONC MEDICATIONS  enoxaparin Injectable 40 milliGRAM(s) SubCutaneous daily      STANDING MEDICATIONS  amLODIPine   Tablet 5 milliGRAM(s) Oral daily  Biotene Dry Mouth Oral Rinse 15 milliLiter(s) Swish and Spit three times a day  influenza   Vaccine 0.5 milliLiter(s) IntraMuscular once  ondansetron Injectable 8 milliGRAM(s) IV Push every 8 hours  prednisoLONE acetate 1% Suspension 2 Drop(s) Both EYES every 6 hours  sodium chloride 0.9%. 1000 milliLiter(s) IV Continuous <Continuous>      PRN MEDICATIONS  acetaminophen   Tablet .. 650 milliGRAM(s) Oral every 6 hours PRN  metoclopramide Injectable 10 milliGRAM(s) IV Push every 6 hours PRN        Vital Signs Last 24 Hrs  T(C): 36.1 (18 Sep 2021 05:06), Max: 36.8 (17 Sep 2021 16:48)  T(F): 97 (18 Sep 2021 05:06), Max: 98.2 (17 Sep 2021 16:48)  HR: 74 (18 Sep 2021 05:06) (70 - 82)  BP: 101/65 (18 Sep 2021 05:06) (101/65 - 129/62)  BP(mean): --  RR: 16 (18 Sep 2021 05:06) (16 - 18)  SpO2: 97% (18 Sep 2021 05:06) (97% - 100%)    PHYSICAL EXAM  General: adult in NAD  HEENT: clear oropharynx, anicteric sclera  Neck: supple  CV: normal S1/S2 RRR  Lungs: positive air movement b/l ant lungs,clear to auscultation, no wheezes, no rales  Abdomen: soft, + BS,  non-tender non-distended, no hepatosplenomegaly  Ext: no edema  Skin: no rash  Neuro: alert and oriented X 3, no focal deficits  Central Line: normal        LABS:                           12.1   6.21  )-----------( 210      ( 18 Sep 2021 07:05 )             36.5         Mean Cell Volume : 95.1 fl  Mean Cell Hemoglobin : 31.5 pg  Mean Cell Hemoglobin Concentration : 33.2 gm/dL  Auto Neutrophil # : 5.00 K/uL  Auto Lymphocyte # : 0.44 K/uL  Auto Monocyte # : 0.62 K/uL  Auto Eosinophil # : 0.08 K/uL  Auto Basophil # : 0.04 K/uL  Auto Neutrophil % : 80.5 %  Auto Lymphocyte % : 7.1 %  Auto Monocyte % : 10.0 %  Auto Eosinophil % : 1.3 %  Auto Basophil % : 0.6 %      09-18    140  |  106  |  15  ----------------------------<  88  4.3   |  19<L>  |  0.86    Ca    9.5      18 Sep 2021 07:05  Phos  4.7     09-18  Mg     2.4     09-18    TPro  6.9  /  Alb  4.2  /  TBili  0.5  /  DBili  x   /  AST  33  /  ALT  61<H>  /  AlkPhos  84  09-18      Mg 2.4  Phos 4.7  Mg 2.5  Phos 3.1              RADIOLOGY & ADDITIONAL STUDIES:         Diagnosis: AML    Protocol/Chemo Regimen: C1 HIDAC  Day: 2     Pt endorsed:    Review of Systems: Patient denied nausea, vomiting, odynophagia, chest pain, cough, dyspnea, abdominal pain, constipation, diarrhea, rash, fatigue, headache        Pain scale:    denies                                    Location: na    Diet: regular Enlive tid    Allergies:     No Known Allergies    Intolerances:     cefepime (Rash)      ANTIMICROBIALS  acyclovir   Oral Tab/Cap 400 milliGRAM(s) Oral every 8 hours      HEME/ONC MEDICATIONS  enoxaparin Injectable 40 milliGRAM(s) SubCutaneous daily      STANDING MEDICATIONS  amLODIPine   Tablet 5 milliGRAM(s) Oral daily  Biotene Dry Mouth Oral Rinse 15 milliLiter(s) Swish and Spit three times a day  influenza   Vaccine 0.5 milliLiter(s) IntraMuscular once  ondansetron Injectable 8 milliGRAM(s) IV Push every 8 hours  prednisoLONE acetate 1% Suspension 2 Drop(s) Both EYES every 6 hours  sodium chloride 0.9%. 1000 milliLiter(s) IV Continuous <Continuous>      PRN MEDICATIONS  acetaminophen   Tablet .. 650 milliGRAM(s) Oral every 6 hours PRN  metoclopramide Injectable 10 milliGRAM(s) IV Push every 6 hours PRN      Vital Signs Last 24 Hrs  T(C): 36.1 (18 Sep 2021 05:06), Max: 36.8 (17 Sep 2021 16:48)  T(F): 97 (18 Sep 2021 05:06), Max: 98.2 (17 Sep 2021 16:48)  HR: 74 (18 Sep 2021 05:06) (70 - 82)  BP: 101/65 (18 Sep 2021 05:06) (101/65 - 129/62)  BP(mean): --  RR: 16 (18 Sep 2021 05:06) (16 - 18)  SpO2: 97% (18 Sep 2021 05:06) (97% - 100%)      PHYSICAL EXAM  General: adult in NAD  HEENT:  EOMI, clear oropharynx, anicteric sclera  Neck: supple  CV: normal S1/S2 RRR  Lungs: positive air movement b/l ant lungs,clear to auscultation, no wheezes, no rales  Abdomen: soft, + BS,  non-tender, non distended   Ext: no edema  Skin: no rash  Neuro: alert and oriented X 3, no focal deficits  Central Line:  CDI        LABS:                           12.1   6.21  )-----------( 210      ( 18 Sep 2021 07:05 )             36.5         Mean Cell Volume : 95.1 fl  Mean Cell Hemoglobin : 31.5 pg  Mean Cell Hemoglobin Concentration : 33.2 gm/dL  Auto Neutrophil # : 5.00 K/uL  Auto Lymphocyte # : 0.44 K/uL  Auto Monocyte # : 0.62 K/uL  Auto Eosinophil # : 0.08 K/uL  Auto Basophil # : 0.04 K/uL  Auto Neutrophil % : 80.5 %  Auto Lymphocyte % : 7.1 %  Auto Monocyte % : 10.0 %  Auto Eosinophil % : 1.3 %  Auto Basophil % : 0.6 %      09-18    140  |  106  |  15  ----------------------------<  88  4.3   |  19<L>  |  0.86    Ca    9.5      18 Sep 2021 07:05  Phos  4.7     09-18  Mg     2.4     09-18    TPro  6.9  /  Alb  4.2  /  TBili  0.5  /  DBili  x   /  AST  33  /  ALT  61<H>  /  AlkPhos  84  09-18     Diagnosis: AML    Protocol/Chemo Regimen: C1 HIDAC  Day: 2     Pt endorsed: mild constipation, BM yesterday     Review of Systems: Patient denied nausea, vomiting, odynophagia, chest pain, cough, dyspnea, abdominal pain,  diarrhea, rash, fatigue, headache      Pain scale:    denies                                    Location: na    Diet: regular Enlive tid    Allergies:     No Known Allergies    Intolerances:     cefepime (Rash)      ANTIMICROBIALS  acyclovir   Oral Tab/Cap 400 milliGRAM(s) Oral every 8 hours      HEME/ONC MEDICATIONS  enoxaparin Injectable 40 milliGRAM(s) SubCutaneous daily      STANDING MEDICATIONS  amLODIPine   Tablet 5 milliGRAM(s) Oral daily  Biotene Dry Mouth Oral Rinse 15 milliLiter(s) Swish and Spit three times a day  influenza   Vaccine 0.5 milliLiter(s) IntraMuscular once  ondansetron Injectable 8 milliGRAM(s) IV Push every 8 hours  prednisoLONE acetate 1% Suspension 2 Drop(s) Both EYES every 6 hours  sodium chloride 0.9%. 1000 milliLiter(s) IV Continuous <Continuous>      PRN MEDICATIONS  acetaminophen   Tablet .. 650 milliGRAM(s) Oral every 6 hours PRN  metoclopramide Injectable 10 milliGRAM(s) IV Push every 6 hours PRN      Vital Signs Last 24 Hrs  T(C): 36.1 (18 Sep 2021 05:06), Max: 36.8 (17 Sep 2021 16:48)  T(F): 97 (18 Sep 2021 05:06), Max: 98.2 (17 Sep 2021 16:48)  HR: 74 (18 Sep 2021 05:06) (70 - 82)  BP: 101/65 (18 Sep 2021 05:06) (101/65 - 129/62)  BP(mean): --  RR: 16 (18 Sep 2021 05:06) (16 - 18)  SpO2: 97% (18 Sep 2021 05:06) (97% - 100%)      PHYSICAL EXAM  General: adult in NAD  HEENT:  EOMI, clear oropharynx, anicteric sclera  Neck: supple  CV: normal S1/S2 RRR  Lungs: positive air movement b/l ant lungs,clear to auscultation, no wheezes, no rales  Abdomen: soft, + BS,  non-tender, non distended   Ext: no edema  Skin: no rash  Neuro: alert and oriented X 3, no focal deficits  Central Line:  CDI        LABS:                           12.1   6.21  )-----------( 210      ( 18 Sep 2021 07:05 )             36.5         Mean Cell Volume : 95.1 fl  Mean Cell Hemoglobin : 31.5 pg  Mean Cell Hemoglobin Concentration : 33.2 gm/dL  Auto Neutrophil # : 5.00 K/uL  Auto Lymphocyte # : 0.44 K/uL  Auto Monocyte # : 0.62 K/uL  Auto Eosinophil # : 0.08 K/uL  Auto Basophil # : 0.04 K/uL  Auto Neutrophil % : 80.5 %  Auto Lymphocyte % : 7.1 %  Auto Monocyte % : 10.0 %  Auto Eosinophil % : 1.3 %  Auto Basophil % : 0.6 %      09-18    140  |  106  |  15  ----------------------------<  88  4.3   |  19<L>  |  0.86    Ca    9.5      18 Sep 2021 07:05  Phos  4.7     09-18  Mg     2.4     09-18    TPro  6.9  /  Alb  4.2  /  TBili  0.5  /  DBili  x   /  AST  33  /  ALT  61<H>  /  AlkPhos  84  09-18

## 2021-09-18 NOTE — PROGRESS NOTE ADULT - PROBLEM SELECTOR PLAN 4
Lovenox 40 mg SQ daily  Hold when platelet count < 50  Encourage ambulation Lovenox 40 mg SQ daily  Hold when platelet count < 50  Encourage ambulation  Senna for constipation

## 2021-09-18 NOTE — PROGRESS NOTE ADULT - PROBLEM SELECTOR PLAN 2
Pt is not neutropenic, afebrile  Continue Acyclovir for PPX   If spikes pan culture and CXR Pt is not neutropenic, afebrile  Continue Acyclovir for PPX   If fever, pan culture and CXR

## 2021-09-18 NOTE — PROGRESS NOTE ADULT - ATTENDING COMMENTS
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

## 2021-09-19 LAB
ALBUMIN SERPL ELPH-MCNC: 4.3 G/DL — SIGNIFICANT CHANGE UP (ref 3.3–5)
ALP SERPL-CCNC: 81 U/L — SIGNIFICANT CHANGE UP (ref 40–120)
ALT FLD-CCNC: 62 U/L — HIGH (ref 10–45)
ANION GAP SERPL CALC-SCNC: 14 MMOL/L — SIGNIFICANT CHANGE UP (ref 5–17)
AST SERPL-CCNC: 28 U/L — SIGNIFICANT CHANGE UP (ref 10–40)
BASOPHILS # BLD AUTO: 0.06 K/UL — SIGNIFICANT CHANGE UP (ref 0–0.2)
BASOPHILS NFR BLD AUTO: 1.7 % — SIGNIFICANT CHANGE UP (ref 0–2)
BILIRUB SERPL-MCNC: 0.8 MG/DL — SIGNIFICANT CHANGE UP (ref 0.2–1.2)
BUN SERPL-MCNC: 8 MG/DL — SIGNIFICANT CHANGE UP (ref 7–23)
CALCIUM SERPL-MCNC: 9.9 MG/DL — SIGNIFICANT CHANGE UP (ref 8.4–10.5)
CHLORIDE SERPL-SCNC: 106 MMOL/L — SIGNIFICANT CHANGE UP (ref 96–108)
CO2 SERPL-SCNC: 20 MMOL/L — LOW (ref 22–31)
CREAT SERPL-MCNC: 0.87 MG/DL — SIGNIFICANT CHANGE UP (ref 0.5–1.3)
EOSINOPHIL # BLD AUTO: 0 K/UL — SIGNIFICANT CHANGE UP (ref 0–0.5)
EOSINOPHIL NFR BLD AUTO: 0 % — SIGNIFICANT CHANGE UP (ref 0–6)
GLUCOSE SERPL-MCNC: 97 MG/DL — SIGNIFICANT CHANGE UP (ref 70–99)
HCT VFR BLD CALC: 35.6 % — LOW (ref 39–50)
HGB BLD-MCNC: 11.7 G/DL — LOW (ref 13–17)
LYMPHOCYTES # BLD AUTO: 0.25 K/UL — LOW (ref 1–3.3)
LYMPHOCYTES # BLD AUTO: 7.8 % — LOW (ref 13–44)
MAGNESIUM SERPL-MCNC: 2.2 MG/DL — SIGNIFICANT CHANGE UP (ref 1.6–2.6)
MCHC RBC-ENTMCNC: 30.2 PG — SIGNIFICANT CHANGE UP (ref 27–34)
MCHC RBC-ENTMCNC: 32.9 GM/DL — SIGNIFICANT CHANGE UP (ref 32–36)
MCV RBC AUTO: 92 FL — SIGNIFICANT CHANGE UP (ref 80–100)
MONOCYTES # BLD AUTO: 0.31 K/UL — SIGNIFICANT CHANGE UP (ref 0–0.9)
MONOCYTES NFR BLD AUTO: 9.6 % — SIGNIFICANT CHANGE UP (ref 2–14)
NEUTROPHILS # BLD AUTO: 2.64 K/UL — SIGNIFICANT CHANGE UP (ref 1.8–7.4)
NEUTROPHILS NFR BLD AUTO: 80.9 % — HIGH (ref 43–77)
PHOSPHATE SERPL-MCNC: 4.5 MG/DL — SIGNIFICANT CHANGE UP (ref 2.5–4.5)
PLATELET # BLD AUTO: 183 K/UL — SIGNIFICANT CHANGE UP (ref 150–400)
POTASSIUM SERPL-MCNC: 4.3 MMOL/L — SIGNIFICANT CHANGE UP (ref 3.5–5.3)
POTASSIUM SERPL-SCNC: 4.3 MMOL/L — SIGNIFICANT CHANGE UP (ref 3.5–5.3)
PROT SERPL-MCNC: 6.7 G/DL — SIGNIFICANT CHANGE UP (ref 6–8.3)
RBC # BLD: 3.87 M/UL — LOW (ref 4.2–5.8)
RBC # FLD: 17.1 % — HIGH (ref 10.3–14.5)
SODIUM SERPL-SCNC: 140 MMOL/L — SIGNIFICANT CHANGE UP (ref 135–145)
WBC # BLD: 3.26 K/UL — LOW (ref 3.8–10.5)
WBC # FLD AUTO: 3.26 K/UL — LOW (ref 3.8–10.5)

## 2021-09-19 PROCEDURE — 99233 SBSQ HOSP IP/OBS HIGH 50: CPT

## 2021-09-19 PROCEDURE — 71045 X-RAY EXAM CHEST 1 VIEW: CPT | Mod: 26

## 2021-09-19 RX ORDER — CYTARABINE 100 MG
6390 VIAL (EA) INJECTION EVERY 12 HOURS
Refills: 0 | Status: COMPLETED | OUTPATIENT
Start: 2021-09-19 | End: 2021-09-20

## 2021-09-19 RX ORDER — SODIUM CHLORIDE 9 MG/ML
10 INJECTION INTRAMUSCULAR; INTRAVENOUS; SUBCUTANEOUS
Refills: 0 | Status: DISCONTINUED | OUTPATIENT
Start: 2021-09-19 | End: 2021-09-22

## 2021-09-19 RX ORDER — CHLORHEXIDINE GLUCONATE 213 G/1000ML
1 SOLUTION TOPICAL
Refills: 0 | Status: DISCONTINUED | OUTPATIENT
Start: 2021-09-19 | End: 2021-09-22

## 2021-09-19 RX ADMIN — SENNA PLUS 2 TABLET(S): 8.6 TABLET ORAL at 06:52

## 2021-09-19 RX ADMIN — SODIUM CHLORIDE 75 MILLILITER(S): 9 INJECTION INTRAMUSCULAR; INTRAVENOUS; SUBCUTANEOUS at 08:19

## 2021-09-19 RX ADMIN — Medication 15 MILLILITER(S): at 06:52

## 2021-09-19 RX ADMIN — ONDANSETRON 8 MILLIGRAM(S): 8 TABLET, FILM COATED ORAL at 16:08

## 2021-09-19 RX ADMIN — ONDANSETRON 8 MILLIGRAM(S): 8 TABLET, FILM COATED ORAL at 08:18

## 2021-09-19 RX ADMIN — Medication 15 MILLILITER(S): at 14:42

## 2021-09-19 RX ADMIN — Medication 15 MILLILITER(S): at 22:10

## 2021-09-19 RX ADMIN — Medication 400 MILLIGRAM(S): at 22:15

## 2021-09-19 RX ADMIN — AMLODIPINE BESYLATE 5 MILLIGRAM(S): 2.5 TABLET ORAL at 06:52

## 2021-09-19 RX ADMIN — ONDANSETRON 8 MILLIGRAM(S): 8 TABLET, FILM COATED ORAL at 23:06

## 2021-09-19 RX ADMIN — Medication 400 MILLIGRAM(S): at 14:42

## 2021-09-19 RX ADMIN — Medication 400 MILLIGRAM(S): at 06:52

## 2021-09-19 RX ADMIN — Medication 2 DROP(S): at 12:11

## 2021-09-19 RX ADMIN — Medication 2 DROP(S): at 06:51

## 2021-09-19 RX ADMIN — Medication 2 DROP(S): at 17:43

## 2021-09-19 RX ADMIN — Medication 2 DROP(S): at 23:06

## 2021-09-19 RX ADMIN — Medication 187.97 MILLIGRAM(S): at 16:41

## 2021-09-19 NOTE — PROGRESS NOTE ADULT - SUBJECTIVE AND OBJECTIVE BOX
Diagnosis: AML    Protocol/Chemo Regimen: C1 HIDAC  Day: 3     Pt endorsed: mild constipation, BM yesterday     Review of Systems: Patient denied nausea, vomiting, odynophagia, chest pain, cough, dyspnea, abdominal pain,  diarrhea, rash, fatigue, headache      Pain scale:    denies                                    Location: na    Diet: regular Enlive tid    Allergies:     No Known Allergies    Intolerances:     cefepime (Rash)      ANTIMICROBIALS  acyclovir   Oral Tab/Cap 400 milliGRAM(s) Oral every 8 hours      HEME/ONC MEDICATIONS  enoxaparin Injectable 40 milliGRAM(s) SubCutaneous daily      STANDING MEDICATIONS  amLODIPine   Tablet 5 milliGRAM(s) Oral daily  Biotene Dry Mouth Oral Rinse 15 milliLiter(s) Swish and Spit three times a day  influenza   Vaccine 0.5 milliLiter(s) IntraMuscular once  ondansetron Injectable 8 milliGRAM(s) IV Push every 8 hours  prednisoLONE acetate 1% Suspension 2 Drop(s) Both EYES every 6 hours  sodium chloride 0.9%. 1000 milliLiter(s) IV Continuous <Continuous>      PRN MEDICATIONS  acetaminophen   Tablet .. 650 milliGRAM(s) Oral every 6 hours PRN  metoclopramide Injectable 10 milliGRAM(s) IV Push every 6 hours PRN  senna 2 Tablet(s) Oral two times a day PRN      Vital Signs Last 24 Hrs  T(C): 36.8 (19 Sep 2021 05:32), Max: 37.3 (18 Sep 2021 13:15)  T(F): 98.2 (19 Sep 2021 05:32), Max: 99.2 (18 Sep 2021 13:15)  HR: 69 (19 Sep 2021 05:32) (69 - 97)  BP: 107/67 (19 Sep 2021 05:32) (107/60 - 126/74)  BP(mean): --  RR: 18 (19 Sep 2021 05:32) (18 - 18)  SpO2: 95% (19 Sep 2021 05:32) (95% - 100%)      PHYSICAL EXAM  General: adult in NAD  HEENT:  EOMI, clear oropharynx, anicteric sclera  Neck: supple  CV: normal S1/S2 RRR  Lungs: positive air movement b/l ant lungs,clear to auscultation, no wheezes, no rales  Abdomen: soft, + BS,  non-tender, non distended   Ext: no edema  Skin: no rash  Neuro: alert and oriented X 3, no focal deficits  Central Line:  CDI          LABS:                        11.7   3.26  )-----------( 183      ( 19 Sep 2021 07:09 )             35.6         Mean Cell Volume : 92.0 fl  Mean Cell Hemoglobin : 30.2 pg  Mean Cell Hemoglobin Concentration : 32.9 gm/dL  Auto Neutrophil # : x  Auto Lymphocyte # : x  Auto Monocyte # : x  Auto Eosinophil # : x  Auto Basophil # : x  Auto Neutrophil % : x  Auto Lymphocyte % : x  Auto Monocyte % : x  Auto Eosinophil % : x  Auto Basophil % : x      09-19    140  |  106  |  8   ----------------------------<  97  4.3   |  20<L>  |  0.87    Ca    9.9      19 Sep 2021 07:09  Phos  4.5     09-19  Mg     2.2     09-19    TPro  6.7  /  Alb  4.3  /  TBili  0.8  /  DBili  x   /  AST  28  /  ALT  62<H>  /  AlkPhos  81  09-19      Mg 2.2  Phos 4.5       Diagnosis: AML    Protocol/Chemo Regimen: C1 HIDAC  Day: 3     Pt endorsed: no complaint     Review of Systems: Patient denied nausea, vomiting, odynophagia, chest pain, cough, dyspnea, abdominal pain,  diarrhea, rash, fatigue, headache      Pain scale:    denies                                    Location: na    Diet: regular Enlive tid    Allergies:     No Known Allergies    Intolerances:     cefepime (Rash)      ANTIMICROBIALS  acyclovir   Oral Tab/Cap 400 milliGRAM(s) Oral every 8 hours      HEME/ONC MEDICATIONS  enoxaparin Injectable 40 milliGRAM(s) SubCutaneous daily      STANDING MEDICATIONS  amLODIPine   Tablet 5 milliGRAM(s) Oral daily  Biotene Dry Mouth Oral Rinse 15 milliLiter(s) Swish and Spit three times a day  influenza   Vaccine 0.5 milliLiter(s) IntraMuscular once  ondansetron Injectable 8 milliGRAM(s) IV Push every 8 hours  prednisoLONE acetate 1% Suspension 2 Drop(s) Both EYES every 6 hours  sodium chloride 0.9%. 1000 milliLiter(s) IV Continuous <Continuous>      PRN MEDICATIONS  acetaminophen   Tablet .. 650 milliGRAM(s) Oral every 6 hours PRN  metoclopramide Injectable 10 milliGRAM(s) IV Push every 6 hours PRN  senna 2 Tablet(s) Oral two times a day PRN      Vital Signs Last 24 Hrs  T(C): 36.8 (19 Sep 2021 05:32), Max: 37.3 (18 Sep 2021 13:15)  T(F): 98.2 (19 Sep 2021 05:32), Max: 99.2 (18 Sep 2021 13:15)  HR: 69 (19 Sep 2021 05:32) (69 - 97)  BP: 107/67 (19 Sep 2021 05:32) (107/60 - 126/74)  BP(mean): --  RR: 18 (19 Sep 2021 05:32) (18 - 18)  SpO2: 95% (19 Sep 2021 05:32) (95% - 100%)      PHYSICAL EXAM  General: adult in NAD  HEENT:  EOMI, clear oropharynx, anicteric sclera  Neck: supple  CV: normal S1/S2 RRR  Lungs: positive air movement b/l ant lungs,clear to auscultation, no wheezes, no rales  Abdomen: soft, + BS,  non-tender, non distended   Ext: no edema  Skin: no rash  Neuro: alert and oriented X 3, no focal deficits  Central Line:  R PICC CDI      LABS:                        11.7   3.26  )-----------( 183      ( 19 Sep 2021 07:09 )             35.6         Mean Cell Volume : 92.0 fl  Mean Cell Hemoglobin : 30.2 pg  Mean Cell Hemoglobin Concentration : 32.9 gm/dL  Auto Neutrophil # : 2.64 K/uL  Auto Lymphocyte # : 0.25 K/uL  Auto Monocyte # : 0.31 K/uL  Auto Eosinophil # : 0.00 K/uL  Auto Basophil # : 0.06 K/uL  Auto Neutrophil % : 80.9 %  Auto Lymphocyte % : 7.8 %  Auto Monocyte % : 9.6 %  Auto Eosinophil % : 0.0 %  Auto Basophil % : 1.7 %      09-19    140  |  106  |  8   ----------------------------<  97  4.3   |  20<L>  |  0.87    Ca    9.9      19 Sep 2021 07:09  Phos  4.5     09-19  Mg     2.2     09-19    TPro  6.7  /  Alb  4.3  /  TBili  0.8  /  DBili  x   /  AST  28  /  ALT  62<H>  /  AlkPhos  81  09-19    Radiology Finding:     < from: Xray Chest 1 View- PORTABLE-Urgent (Xray Chest 1 View- PORTABLE-Urgent .) (09.19.21 @ 14:50) >  INTERPRETATION:  right-sided PICC with tip in SVC

## 2021-09-19 NOTE — PROGRESS NOTE ADULT - ATTENDING COMMENTS
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

## 2021-09-19 NOTE — PROGRESS NOTE ADULT - PROBLEM SELECTOR PLAN 2
Pt is not neutropenic, afebrile  Continue Acyclovir for PPX   If fever, pan culture and CXR Pt is not neutropenic, afebrile  Continue Acyclovir for PPX   If fever, pan culture

## 2021-09-19 NOTE — PROGRESS NOTE ADULT - PROBLEM SELECTOR PLAN 4
Lovenox 40 mg SQ daily  Hold when platelet count < 50  Encourage ambulation  Senna for constipation Lovenox 40 mg SQ daily  Hold when platelet count < 50  Encourage ambulation  Senna PRN  for constipation

## 2021-09-19 NOTE — PROGRESS NOTE ADULT - PROBLEM SELECTOR PLAN 1
Admitted for Cycle 1 HiDAC  Cytarabine 3 g/m2 = 6390 mg IV over 3 hrs every 12 hrs on days 1,3,5   IV hydration, Antiemetics, daily weight   Monitor for Cerebellar toxicity, nystagmus checks  Follow CBC/CMP, transfuse/replete prn  Continue predforte eye drops to prevent chemical conjunctivitis  Discharge planning on 9/22 Admitted for Cycle 1 HiDAC  Cytarabine 3 g/m2 = 6390 mg IV over 3 hrs every 12 hrs on days 1,3,5   IV hydration, Antiemetics, daily weight   Monitor for Cerebellar toxicity, nystagmus checks  Follow CBC/CMP, transfuse/replete prn  Continue predforte eye drops to prevent chemical conjunctivitis  Discharge planning on 9/22 9/19 PICC placed for difficult access, care coordinator to check outpatient PICC coverage

## 2021-09-20 LAB
ALBUMIN SERPL ELPH-MCNC: 3.9 G/DL — SIGNIFICANT CHANGE UP (ref 3.3–5)
ALP SERPL-CCNC: 70 U/L — SIGNIFICANT CHANGE UP (ref 40–120)
ALT FLD-CCNC: 73 U/L — HIGH (ref 10–45)
ANION GAP SERPL CALC-SCNC: 15 MMOL/L — SIGNIFICANT CHANGE UP (ref 5–17)
AST SERPL-CCNC: 37 U/L — SIGNIFICANT CHANGE UP (ref 10–40)
BASOPHILS # BLD AUTO: 0.01 K/UL — SIGNIFICANT CHANGE UP (ref 0–0.2)
BASOPHILS NFR BLD AUTO: 0.2 % — SIGNIFICANT CHANGE UP (ref 0–2)
BILIRUB SERPL-MCNC: 0.7 MG/DL — SIGNIFICANT CHANGE UP (ref 0.2–1.2)
BUN SERPL-MCNC: 11 MG/DL — SIGNIFICANT CHANGE UP (ref 7–23)
CALCIUM SERPL-MCNC: 9.2 MG/DL — SIGNIFICANT CHANGE UP (ref 8.4–10.5)
CHLORIDE SERPL-SCNC: 104 MMOL/L — SIGNIFICANT CHANGE UP (ref 96–108)
CO2 SERPL-SCNC: 21 MMOL/L — LOW (ref 22–31)
CREAT SERPL-MCNC: 0.82 MG/DL — SIGNIFICANT CHANGE UP (ref 0.5–1.3)
EOSINOPHIL # BLD AUTO: 0.09 K/UL — SIGNIFICANT CHANGE UP (ref 0–0.5)
EOSINOPHIL NFR BLD AUTO: 1.8 % — SIGNIFICANT CHANGE UP (ref 0–6)
GLUCOSE SERPL-MCNC: 95 MG/DL — SIGNIFICANT CHANGE UP (ref 70–99)
HCT VFR BLD CALC: 29 % — LOW (ref 39–50)
HGB BLD-MCNC: 9.6 G/DL — LOW (ref 13–17)
IMM GRANULOCYTES NFR BLD AUTO: 0.2 % — SIGNIFICANT CHANGE UP (ref 0–1.5)
LYMPHOCYTES # BLD AUTO: 0.22 K/UL — LOW (ref 1–3.3)
LYMPHOCYTES # BLD AUTO: 4.3 % — LOW (ref 13–44)
MAGNESIUM SERPL-MCNC: 2.3 MG/DL — SIGNIFICANT CHANGE UP (ref 1.6–2.6)
MCHC RBC-ENTMCNC: 30.1 PG — SIGNIFICANT CHANGE UP (ref 27–34)
MCHC RBC-ENTMCNC: 33.1 GM/DL — SIGNIFICANT CHANGE UP (ref 32–36)
MCV RBC AUTO: 90.9 FL — SIGNIFICANT CHANGE UP (ref 80–100)
MONOCYTES # BLD AUTO: 0.22 K/UL — SIGNIFICANT CHANGE UP (ref 0–0.9)
MONOCYTES NFR BLD AUTO: 4.3 % — SIGNIFICANT CHANGE UP (ref 2–14)
NEUTROPHILS # BLD AUTO: 4.58 K/UL — SIGNIFICANT CHANGE UP (ref 1.8–7.4)
NEUTROPHILS NFR BLD AUTO: 89.2 % — HIGH (ref 43–77)
NRBC # BLD: 0 /100 WBCS — SIGNIFICANT CHANGE UP (ref 0–0)
PHOSPHATE SERPL-MCNC: 3.6 MG/DL — SIGNIFICANT CHANGE UP (ref 2.5–4.5)
PLATELET # BLD AUTO: 146 K/UL — LOW (ref 150–400)
POTASSIUM SERPL-MCNC: 4 MMOL/L — SIGNIFICANT CHANGE UP (ref 3.5–5.3)
POTASSIUM SERPL-SCNC: 4 MMOL/L — SIGNIFICANT CHANGE UP (ref 3.5–5.3)
PROT SERPL-MCNC: 6.3 G/DL — SIGNIFICANT CHANGE UP (ref 6–8.3)
RBC # BLD: 3.19 M/UL — LOW (ref 4.2–5.8)
RBC # FLD: 16.6 % — HIGH (ref 10.3–14.5)
SODIUM SERPL-SCNC: 140 MMOL/L — SIGNIFICANT CHANGE UP (ref 135–145)
WBC # BLD: 5.13 K/UL — SIGNIFICANT CHANGE UP (ref 3.8–10.5)
WBC # FLD AUTO: 5.13 K/UL — SIGNIFICANT CHANGE UP (ref 3.8–10.5)

## 2021-09-20 PROCEDURE — 99231 SBSQ HOSP IP/OBS SF/LOW 25: CPT

## 2021-09-20 RX ORDER — BACLOFEN 100 %
10 POWDER (GRAM) MISCELLANEOUS ONCE
Refills: 0 | Status: COMPLETED | OUTPATIENT
Start: 2021-09-20 | End: 2021-09-20

## 2021-09-20 RX ADMIN — Medication 650 MILLIGRAM(S): at 16:45

## 2021-09-20 RX ADMIN — Medication 400 MILLIGRAM(S): at 21:29

## 2021-09-20 RX ADMIN — AMLODIPINE BESYLATE 5 MILLIGRAM(S): 2.5 TABLET ORAL at 07:13

## 2021-09-20 RX ADMIN — Medication 400 MILLIGRAM(S): at 14:29

## 2021-09-20 RX ADMIN — Medication 400 MILLIGRAM(S): at 07:13

## 2021-09-20 RX ADMIN — Medication 10 MILLIGRAM(S): at 05:02

## 2021-09-20 RX ADMIN — SENNA PLUS 2 TABLET(S): 8.6 TABLET ORAL at 21:28

## 2021-09-20 RX ADMIN — Medication 650 MILLIGRAM(S): at 11:10

## 2021-09-20 RX ADMIN — Medication 650 MILLIGRAM(S): at 04:35

## 2021-09-20 RX ADMIN — SODIUM CHLORIDE 75 MILLILITER(S): 9 INJECTION INTRAMUSCULAR; INTRAVENOUS; SUBCUTANEOUS at 08:49

## 2021-09-20 RX ADMIN — Medication 187.97 MILLIGRAM(S): at 04:32

## 2021-09-20 RX ADMIN — Medication 2 DROP(S): at 23:53

## 2021-09-20 RX ADMIN — Medication 650 MILLIGRAM(S): at 10:37

## 2021-09-20 RX ADMIN — Medication 2 DROP(S): at 18:08

## 2021-09-20 RX ADMIN — Medication 15 MILLILITER(S): at 07:13

## 2021-09-20 RX ADMIN — Medication 650 MILLIGRAM(S): at 16:12

## 2021-09-20 RX ADMIN — ONDANSETRON 8 MILLIGRAM(S): 8 TABLET, FILM COATED ORAL at 16:29

## 2021-09-20 RX ADMIN — ONDANSETRON 8 MILLIGRAM(S): 8 TABLET, FILM COATED ORAL at 23:53

## 2021-09-20 RX ADMIN — Medication 10 MILLIGRAM(S): at 21:27

## 2021-09-20 RX ADMIN — Medication 2 DROP(S): at 12:22

## 2021-09-20 RX ADMIN — ONDANSETRON 8 MILLIGRAM(S): 8 TABLET, FILM COATED ORAL at 08:45

## 2021-09-20 RX ADMIN — Medication 15 MILLILITER(S): at 14:28

## 2021-09-20 RX ADMIN — Medication 15 MILLILITER(S): at 21:29

## 2021-09-20 RX ADMIN — Medication 2 DROP(S): at 07:13

## 2021-09-20 NOTE — PROGRESS NOTE ADULT - PROBLEM SELECTOR PLAN 4
Lovenox 40 mg SQ daily  Hold when platelet count < 50  Encourage ambulation  Senna PRN  for constipation

## 2021-09-20 NOTE — PROGRESS NOTE ADULT - PROBLEM SELECTOR PLAN 1
Admitted for Cycle 1 HiDAC  Cytarabine 3 g/m2 = 6390 mg IV over 3 hrs every 12 hrs on days 1,3,5   IV hydration, Antiemetics, daily weight   Monitor for Cerebellar toxicity, nystagmus checks  Follow CBC/CMP, transfuse/replete prn  Continue predforte eye drops to prevent chemical conjunctivitis  Discharge planning on 9/22 9/19 PICC placed for difficult access, care coordinator to check outpatient PICC coverage Admitted for Cycle 1 consolidation HiDAC  Cytarabine 3 g/m2 = 6390 mg IV over 3 hrs every 12 hrs on days 1,3,5   IV hydration, Antiemetics, daily weight   Monitor for Cerebellar toxicity, nystagmus checks  Follow CBC/CMP, transfuse/replete prn  Continue predforte eye drops to prevent chemical conjunctivitis  Discharge planning on 9/22 9/19 PICC placed for difficult access, patient has outpatient PICC coverage as per

## 2021-09-20 NOTE — PROGRESS NOTE ADULT - ATTENDING COMMENTS
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 Having calf cramping at end of HiDAC infusion, relieved by stretching. No horizontal nystagmus.    Plan:   Admitted for Cycle 1 HiDAC - day #4  Cytarabine 3 g/m2 = 6390 mg IV over 3 hrs every 12 hrs on days 1,3,5   IV hydration, Antiemetics, daily weight   Monitor for Cerebellar toxicity, nystagmus checks  Follow CBC/CMP, transfuse/replete prn  Continue Predforte eye drops to prevent chemical conjunctivitis  Hemodynamically stable.

## 2021-09-20 NOTE — ADVANCED PRACTICE NURSE CONSULT - RECOMMEDATIONS
Predforted eye drops administered, monitor for N/V, Zoftan administered.
Predforted eye drops administered, monitor for N/V, Zoftan administered.

## 2021-09-20 NOTE — PROGRESS NOTE ADULT - SUBJECTIVE AND OBJECTIVE BOX
Diagnosis: AML    Protocol/Chemo Regimen: C1 HIDAC  Day: 4     Pt endorsed:     Review of Systems: Patient denied nausea, vomiting, odynophagia, chest pain, cough, dyspnea, abdominal pain,  diarrhea, rash, fatigue, headache      Pain scale:    denies                                    Location: na    Diet: regular Enlive tid    Allergies:     No Known Allergies    Intolerances:     cefepime (Rash)      ANTIMICROBIALS  acyclovir   Oral Tab/Cap 400 milliGRAM(s) Oral every 8 hours      HEME/ONC MEDICATIONS  enoxaparin Injectable 40 milliGRAM(s) SubCutaneous daily      STANDING MEDICATIONS  amLODIPine   Tablet 5 milliGRAM(s) Oral daily  Biotene Dry Mouth Oral Rinse 15 milliLiter(s) Swish and Spit three times a day  chlorhexidine 2% Cloths 1 Application(s) Topical <User Schedule>  influenza   Vaccine 0.5 milliLiter(s) IntraMuscular once  ondansetron Injectable 8 milliGRAM(s) IV Push every 8 hours  prednisoLONE acetate 1% Suspension 2 Drop(s) Both EYES every 6 hours  sodium chloride 0.9%. 1000 milliLiter(s) IV Continuous <Continuous>      PRN MEDICATIONS  acetaminophen   Tablet .. 650 milliGRAM(s) Oral every 6 hours PRN  metoclopramide Injectable 10 milliGRAM(s) IV Push every 6 hours PRN  senna 2 Tablet(s) Oral two times a day PRN  sodium chloride 0.9% lock flush 10 milliLiter(s) IV Push every 1 hour PRN        Vital Signs Last 24 Hrs  T(C): 36.5 (20 Sep 2021 05:10), Max: 36.8 (19 Sep 2021 09:08)  T(F): 97.7 (20 Sep 2021 05:10), Max: 98.3 (19 Sep 2021 09:08)  HR: 83 (20 Sep 2021 05:10) (70 - 96)  BP: 122/73 (20 Sep 2021 05:10) (108/70 - 125/72)  BP(mean): --  RR: 18 (20 Sep 2021 05:10) (18 - 18)  SpO2: 95% (20 Sep 2021 05:10) (93% - 100%)    PHYSICAL EXAM  General: NAD  HEENT: PERRLA, EOMOI, clear oropharynx, anicteric sclera, pink conjunctiva  Neck: supple  CV: (+) S1/S2 RRR  Lungs: clear to auscultation, no wheezes or rales  Abdomen: soft, non-tender, non-distended (+) BS  Ext: no clubbing, cyanosis or edema  Skin: no rashes and no petechiae  Neuro: alert and oriented X 3, no focal deficits  Central Line: PICC c/d/i            Diagnosis: AML    Protocol/Chemo Regimen: C1 HIDAC  Day: 4     Pt endorsed: no new complaints     Review of Systems: Patient denied nausea, vomiting, odynophagia, chest pain, cough, dyspnea, abdominal pain,  diarrhea, rash, fatigue, headache      Pain scale:    denies                                    Location: na    Diet: regular Enlive tid    Allergies:     No Known Allergies    Intolerances:     cefepime (Rash)      ANTIMICROBIALS  acyclovir   Oral Tab/Cap 400 milliGRAM(s) Oral every 8 hours      HEME/ONC MEDICATIONS  enoxaparin Injectable 40 milliGRAM(s) SubCutaneous daily      STANDING MEDICATIONS  amLODIPine   Tablet 5 milliGRAM(s) Oral daily  Biotene Dry Mouth Oral Rinse 15 milliLiter(s) Swish and Spit three times a day  chlorhexidine 2% Cloths 1 Application(s) Topical <User Schedule>  influenza   Vaccine 0.5 milliLiter(s) IntraMuscular once  ondansetron Injectable 8 milliGRAM(s) IV Push every 8 hours  prednisoLONE acetate 1% Suspension 2 Drop(s) Both EYES every 6 hours  sodium chloride 0.9%. 1000 milliLiter(s) IV Continuous <Continuous>      PRN MEDICATIONS  acetaminophen   Tablet .. 650 milliGRAM(s) Oral every 6 hours PRN  metoclopramide Injectable 10 milliGRAM(s) IV Push every 6 hours PRN  senna 2 Tablet(s) Oral two times a day PRN  sodium chloride 0.9% lock flush 10 milliLiter(s) IV Push every 1 hour PRN        Vital Signs Last 24 Hrs  T(C): 36.5 (20 Sep 2021 05:10), Max: 36.8 (19 Sep 2021 09:08)  T(F): 97.7 (20 Sep 2021 05:10), Max: 98.3 (19 Sep 2021 09:08)  HR: 83 (20 Sep 2021 05:10) (70 - 96)  BP: 122/73 (20 Sep 2021 05:10) (108/70 - 125/72)  BP(mean): --  RR: 18 (20 Sep 2021 05:10) (18 - 18)  SpO2: 95% (20 Sep 2021 05:10) (93% - 100%)    PHYSICAL EXAM  General: NAD  HEENT: PERRLA, EOMOI, clear oropharynx, anicteric sclera, pink conjunctiva  Neck: supple  CV: (+) S1/S2 RRR  Lungs: clear to auscultation, no wheezes or rales  Abdomen: soft, non-tender, non-distended (+) BS  Ext: no clubbing, cyanosis or edema  Skin: no rashes and no petechiae  Neuro: alert and oriented X 3, no focal deficits  Central Line: PICC c/d/i     LABS:                         9.6    5.13  )-----------( 146      ( 20 Sep 2021 07:48 )             29.0         Mean Cell Volume : 90.9 fl  Mean Cell Hemoglobin : 30.1 pg  Mean Cell Hemoglobin Concentration : 33.1 gm/dL  Auto Neutrophil # : 4.58 K/uL  Auto Lymphocyte # : 0.22 K/uL  Auto Monocyte # : 0.22 K/uL  Auto Eosinophil # : 0.09 K/uL  Auto Basophil # : 0.01 K/uL  Auto Neutrophil % : 89.2 %  Auto Lymphocyte % : 4.3 %  Auto Monocyte % : 4.3 %  Auto Eosinophil % : 1.8 %  Auto Basophil % : 0.2 %      09-20    140  |  104  |  11  ----------------------------<  95  4.0   |  21<L>  |  0.82    Ca    9.2      20 Sep 2021 07:48  Phos  3.6     09-20  Mg     2.3     09-20    TPro  6.3  /  Alb  3.9  /  TBili  0.7  /  DBili  x   /  AST  37  /  ALT  73<H>  /  AlkPhos  70  09-20      Mg 2.3  Phos 3.6

## 2021-09-21 LAB
ALBUMIN SERPL ELPH-MCNC: 4.1 G/DL — SIGNIFICANT CHANGE UP (ref 3.3–5)
ALP SERPL-CCNC: 73 U/L — SIGNIFICANT CHANGE UP (ref 40–120)
ALT FLD-CCNC: 61 U/L — HIGH (ref 10–45)
ANION GAP SERPL CALC-SCNC: 11 MMOL/L — SIGNIFICANT CHANGE UP (ref 5–17)
AST SERPL-CCNC: 26 U/L — SIGNIFICANT CHANGE UP (ref 10–40)
BASOPHILS # BLD AUTO: 0.01 K/UL — SIGNIFICANT CHANGE UP (ref 0–0.2)
BASOPHILS NFR BLD AUTO: 0.4 % — SIGNIFICANT CHANGE UP (ref 0–2)
BILIRUB SERPL-MCNC: 0.8 MG/DL — SIGNIFICANT CHANGE UP (ref 0.2–1.2)
BUN SERPL-MCNC: 9 MG/DL — SIGNIFICANT CHANGE UP (ref 7–23)
CALCIUM SERPL-MCNC: 9.4 MG/DL — SIGNIFICANT CHANGE UP (ref 8.4–10.5)
CHLORIDE SERPL-SCNC: 106 MMOL/L — SIGNIFICANT CHANGE UP (ref 96–108)
CO2 SERPL-SCNC: 23 MMOL/L — SIGNIFICANT CHANGE UP (ref 22–31)
CREAT SERPL-MCNC: 0.84 MG/DL — SIGNIFICANT CHANGE UP (ref 0.5–1.3)
EOSINOPHIL # BLD AUTO: 0.02 K/UL — SIGNIFICANT CHANGE UP (ref 0–0.5)
EOSINOPHIL NFR BLD AUTO: 0.7 % — SIGNIFICANT CHANGE UP (ref 0–6)
GLUCOSE SERPL-MCNC: 99 MG/DL — SIGNIFICANT CHANGE UP (ref 70–99)
HCT VFR BLD CALC: 27.8 % — LOW (ref 39–50)
HGB BLD-MCNC: 9.4 G/DL — LOW (ref 13–17)
IMM GRANULOCYTES NFR BLD AUTO: 0.4 % — SIGNIFICANT CHANGE UP (ref 0–1.5)
LYMPHOCYTES # BLD AUTO: 0.22 K/UL — LOW (ref 1–3.3)
LYMPHOCYTES # BLD AUTO: 8.1 % — LOW (ref 13–44)
MAGNESIUM SERPL-MCNC: 2.3 MG/DL — SIGNIFICANT CHANGE UP (ref 1.6–2.6)
MCHC RBC-ENTMCNC: 30.7 PG — SIGNIFICANT CHANGE UP (ref 27–34)
MCHC RBC-ENTMCNC: 33.8 GM/DL — SIGNIFICANT CHANGE UP (ref 32–36)
MCV RBC AUTO: 90.8 FL — SIGNIFICANT CHANGE UP (ref 80–100)
MONOCYTES # BLD AUTO: 0.05 K/UL — SIGNIFICANT CHANGE UP (ref 0–0.9)
MONOCYTES NFR BLD AUTO: 1.8 % — LOW (ref 2–14)
NEUTROPHILS # BLD AUTO: 2.41 K/UL — SIGNIFICANT CHANGE UP (ref 1.8–7.4)
NEUTROPHILS NFR BLD AUTO: 88.6 % — HIGH (ref 43–77)
NRBC # BLD: 0 /100 WBCS — SIGNIFICANT CHANGE UP (ref 0–0)
PHOSPHATE SERPL-MCNC: 4.5 MG/DL — SIGNIFICANT CHANGE UP (ref 2.5–4.5)
PLATELET # BLD AUTO: 121 K/UL — LOW (ref 150–400)
POTASSIUM SERPL-MCNC: 4.1 MMOL/L — SIGNIFICANT CHANGE UP (ref 3.5–5.3)
POTASSIUM SERPL-SCNC: 4.1 MMOL/L — SIGNIFICANT CHANGE UP (ref 3.5–5.3)
PROT SERPL-MCNC: 6.4 G/DL — SIGNIFICANT CHANGE UP (ref 6–8.3)
RBC # BLD: 3.06 M/UL — LOW (ref 4.2–5.8)
RBC # FLD: 16.3 % — HIGH (ref 10.3–14.5)
SODIUM SERPL-SCNC: 140 MMOL/L — SIGNIFICANT CHANGE UP (ref 135–145)
WBC # BLD: 2.72 K/UL — LOW (ref 3.8–10.5)
WBC # FLD AUTO: 2.72 K/UL — LOW (ref 3.8–10.5)

## 2021-09-21 PROCEDURE — 99232 SBSQ HOSP IP/OBS MODERATE 35: CPT

## 2021-09-21 RX ORDER — OXYCODONE HYDROCHLORIDE 5 MG/1
5 TABLET ORAL EVERY 12 HOURS
Refills: 0 | Status: DISCONTINUED | OUTPATIENT
Start: 2021-09-21 | End: 2021-09-22

## 2021-09-21 RX ORDER — CYTARABINE 100 MG
6390 VIAL (EA) INJECTION EVERY 12 HOURS
Refills: 0 | Status: COMPLETED | OUTPATIENT
Start: 2021-09-21 | End: 2021-09-22

## 2021-09-21 RX ADMIN — SODIUM CHLORIDE 75 MILLILITER(S): 9 INJECTION INTRAMUSCULAR; INTRAVENOUS; SUBCUTANEOUS at 05:43

## 2021-09-21 RX ADMIN — AMLODIPINE BESYLATE 5 MILLIGRAM(S): 2.5 TABLET ORAL at 05:43

## 2021-09-21 RX ADMIN — Medication 187.97 MILLIGRAM(S): at 16:33

## 2021-09-21 RX ADMIN — OXYCODONE HYDROCHLORIDE 5 MILLIGRAM(S): 5 TABLET ORAL at 21:05

## 2021-09-21 RX ADMIN — Medication 15 MILLILITER(S): at 13:09

## 2021-09-21 RX ADMIN — Medication 2 DROP(S): at 18:17

## 2021-09-21 RX ADMIN — Medication 650 MILLIGRAM(S): at 01:00

## 2021-09-21 RX ADMIN — Medication 650 MILLIGRAM(S): at 00:07

## 2021-09-21 RX ADMIN — ONDANSETRON 8 MILLIGRAM(S): 8 TABLET, FILM COATED ORAL at 15:21

## 2021-09-21 RX ADMIN — ONDANSETRON 8 MILLIGRAM(S): 8 TABLET, FILM COATED ORAL at 08:19

## 2021-09-21 RX ADMIN — Medication 2 DROP(S): at 05:43

## 2021-09-21 RX ADMIN — SODIUM CHLORIDE 75 MILLILITER(S): 9 INJECTION INTRAMUSCULAR; INTRAVENOUS; SUBCUTANEOUS at 08:19

## 2021-09-21 RX ADMIN — CHLORHEXIDINE GLUCONATE 1 APPLICATION(S): 213 SOLUTION TOPICAL at 08:19

## 2021-09-21 RX ADMIN — Medication 400 MILLIGRAM(S): at 21:09

## 2021-09-21 RX ADMIN — Medication 15 MILLILITER(S): at 21:09

## 2021-09-21 RX ADMIN — Medication 400 MILLIGRAM(S): at 05:43

## 2021-09-21 RX ADMIN — Medication 15 MILLILITER(S): at 05:43

## 2021-09-21 RX ADMIN — Medication 400 MILLIGRAM(S): at 13:12

## 2021-09-21 RX ADMIN — OXYCODONE HYDROCHLORIDE 5 MILLIGRAM(S): 5 TABLET ORAL at 21:47

## 2021-09-21 RX ADMIN — Medication 2 DROP(S): at 13:10

## 2021-09-21 NOTE — PROGRESS NOTE ADULT - PROBLEM SELECTOR PLAN 4
Lovenox 40 mg SQ daily  Hold when platelet count < 50  Encourage ambulation  Senna PRN  for constipation Lovenox 40 mg SQ daily  Hold when platelet count < 50

## 2021-09-21 NOTE — PROGRESS NOTE ADULT - SUBJECTIVE AND OBJECTIVE BOX
Diagnosis: AML    Protocol/Chemo Regimen: C1 HIDAC  Day: 5     Pt endorsed: no new complaints     Review of Systems: Patient denied nausea, vomiting, odynophagia, chest pain, cough, dyspnea, abdominal pain,  diarrhea, rash, fatigue, headache      Pain scale:    denies                                    Location: na    Diet: regular Enlive tid    Allergies    No Known Allergies    Intolerances    cefepime (Rash)      ANTIMICROBIALS  acyclovir   Oral Tab/Cap 400 milliGRAM(s) Oral every 8 hours      HEME/ONC MEDICATIONS  enoxaparin Injectable 40 milliGRAM(s) SubCutaneous daily      STANDING MEDICATIONS  amLODIPine   Tablet 5 milliGRAM(s) Oral daily  Biotene Dry Mouth Oral Rinse 15 milliLiter(s) Swish and Spit three times a day  chlorhexidine 2% Cloths 1 Application(s) Topical <User Schedule>  influenza   Vaccine 0.5 milliLiter(s) IntraMuscular once  ondansetron Injectable 8 milliGRAM(s) IV Push every 8 hours  prednisoLONE acetate 1% Suspension 2 Drop(s) Both EYES every 6 hours  sodium chloride 0.9%. 1000 milliLiter(s) IV Continuous <Continuous>      PRN MEDICATIONS  acetaminophen   Tablet .. 650 milliGRAM(s) Oral every 6 hours PRN  metoclopramide Injectable 10 milliGRAM(s) IV Push every 6 hours PRN  senna 2 Tablet(s) Oral two times a day PRN  sodium chloride 0.9% lock flush 10 milliLiter(s) IV Push every 1 hour PRN        Vital Signs Last 24 Hrs  T(C): 36.9 (21 Sep 2021 05:00), Max: 37.8 (20 Sep 2021 21:05)  T(F): 98.4 (21 Sep 2021 05:00), Max: 100.1 (20 Sep 2021 21:05)  HR: 69 (21 Sep 2021 05:00) (69 - 107)  BP: 115/72 (21 Sep 2021 05:00) (110/72 - 119/82)  BP(mean): --  RR: 18 (21 Sep 2021 05:00) (18 - 18)  SpO2: 97% (21 Sep 2021 05:00) (96% - 99%)    PHYSICAL EXAM  General: NAD  HEENT: PERRLA, EOMOI, clear oropharynx, anicteric sclera, pink conjunctiva  Neck: supple  CV: (+) S1/S2 RRR  Lungs: clear to auscultation, no wheezes or rales  Abdomen: soft, non-tender, non-distended (+) BS  Ext: no clubbing, cyanosis or edema  Skin: no rashes and no petechiae  Neuro: alert and oriented X 3, no focal deficits  Central Line: PICC c/d/i     RECENT CULTURES:        LABS:                             Diagnosis: AML    Protocol/Chemo Regimen: C1 HIDAC  Day: 5     Pt endorsed: no new complaints, leg cramps following chemotherapy resolved.      Review of Systems: Patient denied nausea, vomiting, odynophagia, chest pain, cough, dyspnea, abdominal pain,  diarrhea, rash, fatigue, headache      Pain scale:    denies                                    Location: na    Diet: regular Enlive tid    Allergies    No Known Allergies    Intolerances    cefepime (Rash)      ANTIMICROBIALS  acyclovir   Oral Tab/Cap 400 milliGRAM(s) Oral every 8 hours      HEME/ONC MEDICATIONS  enoxaparin Injectable 40 milliGRAM(s) SubCutaneous daily      STANDING MEDICATIONS  amLODIPine   Tablet 5 milliGRAM(s) Oral daily  Biotene Dry Mouth Oral Rinse 15 milliLiter(s) Swish and Spit three times a day  chlorhexidine 2% Cloths 1 Application(s) Topical <User Schedule>  influenza   Vaccine 0.5 milliLiter(s) IntraMuscular once  ondansetron Injectable 8 milliGRAM(s) IV Push every 8 hours  prednisoLONE acetate 1% Suspension 2 Drop(s) Both EYES every 6 hours  sodium chloride 0.9%. 1000 milliLiter(s) IV Continuous <Continuous>      PRN MEDICATIONS  acetaminophen   Tablet .. 650 milliGRAM(s) Oral every 6 hours PRN  metoclopramide Injectable 10 milliGRAM(s) IV Push every 6 hours PRN  senna 2 Tablet(s) Oral two times a day PRN  sodium chloride 0.9% lock flush 10 milliLiter(s) IV Push every 1 hour PRN        Vital Signs Last 24 Hrs  T(C): 36.9 (21 Sep 2021 05:00), Max: 37.8 (20 Sep 2021 21:05)  T(F): 98.4 (21 Sep 2021 05:00), Max: 100.1 (20 Sep 2021 21:05)  HR: 69 (21 Sep 2021 05:00) (69 - 107)  BP: 115/72 (21 Sep 2021 05:00) (110/72 - 119/82)  BP(mean): --  RR: 18 (21 Sep 2021 05:00) (18 - 18)  SpO2: 97% (21 Sep 2021 05:00) (96% - 99%)    PHYSICAL EXAM  General: NAD  HEENT: PERRLA, EOMOI, clear oropharynx,   CV: (+) S1/S2 RRR  Lungs: clear to auscultation, no wheezes or rales  Abdomen: soft, non-tender, non-distended (+) BS  Ext: no edema  Skin: no rashes and no petechiae  Neuro: alert and oriented X 3, no focal deficits  Central Line: PICC c/d/i                             9.4    2.72  )-----------( 121      ( 21 Sep 2021 06:46 )             27.8         Mean Cell Volume : 90.8 fl  Mean Cell Hemoglobin : 30.7 pg  Mean Cell Hemoglobin Concentration : 33.8 gm/dL  Auto Neutrophil # : 2.41 K/uL  Auto Lymphocyte # : 0.22 K/uL  Auto Monocyte # : 0.05 K/uL  Auto Eosinophil # : 0.02 K/uL  Auto Basophil # : 0.01 K/uL  Auto Neutrophil % : 88.6 %  Auto Lymphocyte % : 8.1 %  Auto Monocyte % : 1.8 %  Auto Eosinophil % : 0.7 %  Auto Basophil % : 0.4 %      09-21    140  |  106  |  9   ----------------------------<  99  4.1   |  23  |  0.84    Ca    9.4      21 Sep 2021 06:45  Phos  4.5     09-21  Mg     2.3     09-21    TPro  6.4  /  Alb  4.1  /  TBili  0.8  /  DBili  x   /  AST  26  /  ALT  61<H>  /  AlkPhos  73  09-21      Mg 2.3  Phos 4.5

## 2021-09-21 NOTE — PROGRESS NOTE ADULT - PROBLEM SELECTOR PLAN 1
Admitted for Cycle 1 consolidation HiDAC  Cytarabine 3 g/m2 = 6390 mg IV over 3 hrs every 12 hrs on days 1,3,5   IV hydration, Antiemetics, daily weight   Monitor for Cerebellar toxicity, nystagmus checks  Follow CBC/CMP, transfuse/replete prn  Continue predforte eye drops to prevent chemical conjunctivitis  Discharge planning on 9/22 9/19 PICC placed for difficult access, patient has outpatient PICC coverage as per

## 2021-09-21 NOTE — PROGRESS NOTE ADULT - ATTENDING COMMENTS
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 Having calf cramping at end of HiDAC infusion, relieved by stretching. No horizontal nystagmus.    Plan:   Admitted for Cycle 1 HiDAC - day #4  Cytarabine 3 g/m2 = 6390 mg IV over 3 hrs every 12 hrs on days 1,3,5   IV hydration, Antiemetics, daily weight   Monitor for Cerebellar toxicity, nystagmus checks  Follow CBC/CMP, transfuse/replete prn  Continue Predforte eye drops to prevent chemical conjunctivitis  Hemodynamically stable. 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 Calf cramping at end of HiDAC infusion resolved. No horizontal nystagmus.    Plan:   Admitted for Cycle 1 HiDAC - day #5  Cytarabine 3 g/m2 = 6390 mg IV over 3 hrs every 12 hrs on days 1,3,5   IV hydration, Antiemetics, daily weight   Monitor for Cerebellar toxicity, nystagmus checks  Follow CBC/CMP, transfuse/replete prn  Continue Predforte eye drops to prevent chemical conjunctivitis  Hemodynamically stable.

## 2021-09-22 ENCOUNTER — TRANSCRIPTION ENCOUNTER (OUTPATIENT)
Age: 36
End: 2021-09-22

## 2021-09-22 VITALS
HEART RATE: 90 BPM | RESPIRATION RATE: 18 BRPM | TEMPERATURE: 98 F | OXYGEN SATURATION: 100 % | SYSTOLIC BLOOD PRESSURE: 114 MMHG | DIASTOLIC BLOOD PRESSURE: 71 MMHG

## 2021-09-22 LAB
ALBUMIN SERPL ELPH-MCNC: 3.9 G/DL — SIGNIFICANT CHANGE UP (ref 3.3–5)
ALP SERPL-CCNC: 72 U/L — SIGNIFICANT CHANGE UP (ref 40–120)
ALT FLD-CCNC: 82 U/L — HIGH (ref 10–45)
ANION GAP SERPL CALC-SCNC: 13 MMOL/L — SIGNIFICANT CHANGE UP (ref 5–17)
AST SERPL-CCNC: 49 U/L — HIGH (ref 10–40)
BASOPHILS # BLD AUTO: 0.04 K/UL — SIGNIFICANT CHANGE UP (ref 0–0.2)
BASOPHILS NFR BLD AUTO: 1.7 % — SIGNIFICANT CHANGE UP (ref 0–2)
BILIRUB SERPL-MCNC: 0.4 MG/DL — SIGNIFICANT CHANGE UP (ref 0.2–1.2)
BUN SERPL-MCNC: 12 MG/DL — SIGNIFICANT CHANGE UP (ref 7–23)
CALCIUM SERPL-MCNC: 9.1 MG/DL — SIGNIFICANT CHANGE UP (ref 8.4–10.5)
CHLORIDE SERPL-SCNC: 107 MMOL/L — SIGNIFICANT CHANGE UP (ref 96–108)
CO2 SERPL-SCNC: 20 MMOL/L — LOW (ref 22–31)
CREAT SERPL-MCNC: 0.75 MG/DL — SIGNIFICANT CHANGE UP (ref 0.5–1.3)
EOSINOPHIL # BLD AUTO: 0.09 K/UL — SIGNIFICANT CHANGE UP (ref 0–0.5)
EOSINOPHIL NFR BLD AUTO: 4.3 % — SIGNIFICANT CHANGE UP (ref 0–6)
GLUCOSE SERPL-MCNC: 85 MG/DL — SIGNIFICANT CHANGE UP (ref 70–99)
HCT VFR BLD CALC: 26.2 % — LOW (ref 39–50)
HGB BLD-MCNC: 8.6 G/DL — LOW (ref 13–17)
LYMPHOCYTES # BLD AUTO: 0.15 K/UL — LOW (ref 1–3.3)
LYMPHOCYTES # BLD AUTO: 7 % — LOW (ref 13–44)
MAGNESIUM SERPL-MCNC: 2.2 MG/DL — SIGNIFICANT CHANGE UP (ref 1.6–2.6)
MCHC RBC-ENTMCNC: 30.2 PG — SIGNIFICANT CHANGE UP (ref 27–34)
MCHC RBC-ENTMCNC: 32.8 GM/DL — SIGNIFICANT CHANGE UP (ref 32–36)
MCV RBC AUTO: 91.9 FL — SIGNIFICANT CHANGE UP (ref 80–100)
MONOCYTES # BLD AUTO: 0 K/UL — SIGNIFICANT CHANGE UP (ref 0–0.9)
MONOCYTES NFR BLD AUTO: 0 % — LOW (ref 2–14)
NEUTROPHILS # BLD AUTO: 1.89 K/UL — SIGNIFICANT CHANGE UP (ref 1.8–7.4)
NEUTROPHILS NFR BLD AUTO: 87 % — HIGH (ref 43–77)
PHOSPHATE SERPL-MCNC: 4.3 MG/DL — SIGNIFICANT CHANGE UP (ref 2.5–4.5)
PLATELET # BLD AUTO: 118 K/UL — LOW (ref 150–400)
POTASSIUM SERPL-MCNC: 4 MMOL/L — SIGNIFICANT CHANGE UP (ref 3.5–5.3)
POTASSIUM SERPL-SCNC: 4 MMOL/L — SIGNIFICANT CHANGE UP (ref 3.5–5.3)
PROT SERPL-MCNC: 6.3 G/DL — SIGNIFICANT CHANGE UP (ref 6–8.3)
RBC # BLD: 2.85 M/UL — LOW (ref 4.2–5.8)
RBC # FLD: 16.1 % — HIGH (ref 10.3–14.5)
SODIUM SERPL-SCNC: 140 MMOL/L — SIGNIFICANT CHANGE UP (ref 135–145)
WBC # BLD: 2.17 K/UL — LOW (ref 3.8–10.5)
WBC # FLD AUTO: 2.17 K/UL — LOW (ref 3.8–10.5)

## 2021-09-22 PROCEDURE — 99238 HOSP IP/OBS DSCHRG MGMT 30/<: CPT

## 2021-09-22 PROCEDURE — C1751: CPT

## 2021-09-22 PROCEDURE — 84100 ASSAY OF PHOSPHORUS: CPT

## 2021-09-22 PROCEDURE — 80053 COMPREHEN METABOLIC PANEL: CPT

## 2021-09-22 PROCEDURE — 83735 ASSAY OF MAGNESIUM: CPT

## 2021-09-22 PROCEDURE — 71045 X-RAY EXAM CHEST 1 VIEW: CPT

## 2021-09-22 PROCEDURE — 86769 SARS-COV-2 COVID-19 ANTIBODY: CPT

## 2021-09-22 PROCEDURE — 85025 COMPLETE CBC W/AUTO DIFF WBC: CPT

## 2021-09-22 PROCEDURE — 36569 INSJ PICC 5 YR+ W/O IMAGING: CPT

## 2021-09-22 RX ADMIN — Medication 187.97 MILLIGRAM(S): at 04:31

## 2021-09-22 RX ADMIN — OXYCODONE HYDROCHLORIDE 5 MILLIGRAM(S): 5 TABLET ORAL at 09:49

## 2021-09-22 RX ADMIN — SODIUM CHLORIDE 75 MILLILITER(S): 9 INJECTION INTRAMUSCULAR; INTRAVENOUS; SUBCUTANEOUS at 05:20

## 2021-09-22 RX ADMIN — ONDANSETRON 8 MILLIGRAM(S): 8 TABLET, FILM COATED ORAL at 00:26

## 2021-09-22 RX ADMIN — Medication 15 MILLILITER(S): at 05:20

## 2021-09-22 RX ADMIN — Medication 2 DROP(S): at 00:26

## 2021-09-22 RX ADMIN — OXYCODONE HYDROCHLORIDE 5 MILLIGRAM(S): 5 TABLET ORAL at 09:01

## 2021-09-22 RX ADMIN — ONDANSETRON 8 MILLIGRAM(S): 8 TABLET, FILM COATED ORAL at 08:30

## 2021-09-22 RX ADMIN — AMLODIPINE BESYLATE 5 MILLIGRAM(S): 2.5 TABLET ORAL at 05:20

## 2021-09-22 RX ADMIN — SODIUM CHLORIDE 75 MILLILITER(S): 9 INJECTION INTRAMUSCULAR; INTRAVENOUS; SUBCUTANEOUS at 08:30

## 2021-09-22 RX ADMIN — Medication 2 DROP(S): at 12:48

## 2021-09-22 RX ADMIN — CHLORHEXIDINE GLUCONATE 1 APPLICATION(S): 213 SOLUTION TOPICAL at 08:38

## 2021-09-22 RX ADMIN — Medication 400 MILLIGRAM(S): at 05:19

## 2021-09-22 RX ADMIN — Medication 2 DROP(S): at 05:20

## 2021-09-22 NOTE — PROGRESS NOTE ADULT - PROBLEM SELECTOR PROBLEM 1
Acute myeloid leukemia (AML), M2

## 2021-09-22 NOTE — PROGRESS NOTE ADULT - PROBLEM SELECTOR PLAN 1
Admitted for Cycle 1 consolidation HiDAC  Cytarabine 3 g/m2 = 6390 mg IV over 3 hrs every 12 hrs on days 1,3,5   IV hydration, Antiemetics, daily weight   Monitor for Cerebellar toxicity, nystagmus checks  Follow CBC/CMP, transfuse/replete prn  Continue predforte eye drops to prevent chemical conjunctivitis  Discharge planning on 9/22 9/19 PICC placed for difficult access, patient has outpatient PICC coverage as per  Admitted for Cycle 1 consolidation HiDAC  Cytarabine 3 g/m2 = 6390 mg IV over 3 hrs every 12 hrs on days 1,3,5   IV hydration, Antiemetics, daily weight   Monitor for Cerebellar toxicity, nystagmus checks  Follow CBC/CMP, transfuse/replete prn  Continue predforte eye drops to prevent chemical conjunctivitis  Discharge planning on 9/22 9/19 PICC placed for difficult access, patient has outpatient PICC coverage as per   Monitor transaminitis.

## 2021-09-22 NOTE — DISCHARGE NOTE NURSING/CASE MANAGEMENT/SOCIAL WORK - PATIENT PORTAL LINK FT
You can access the FollowMyHealth Patient Portal offered by [facility] by registering at the following website: [email or web address]. By joining [employer]’s FollowMyHealth portal, you will also be able to view your health information using other applications (apps) compatible with our system.

## 2021-09-22 NOTE — PROGRESS NOTE ADULT - ATTENDING COMMENTS
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 Calf cramping at end of HiDAC infusion resolved. No horizontal nystagmus.    Plan:   Admitted for Cycle 1 HiDAC - day #5  Cytarabine 3 g/m2 = 6390 mg IV over 3 hrs every 12 hrs on days 1,3,5   IV hydration, Antiemetics, daily weight   Monitor for Cerebellar toxicity, nystagmus checks  Follow CBC/CMP, transfuse/replete prn  Continue Predforte eye drops to prevent chemical conjunctivitis  Hemodynamically stable. 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 Calf cramping at end of HiDAC infusion resolved. No horizontal nystagmus.    Plan:   D/C home  F/U at [facility]  To ER prn  Continue Predforte eye drops to prevent chemical conjunctivitis

## 2021-09-22 NOTE — PROGRESS NOTE ADULT - SUBJECTIVE AND OBJECTIVE BOX
Diagnosis: AML    Protocol/Chemo Regimen: C1 HIDAC  Day: 6     Pt endorsed: no new complaints, leg cramps following chemotherapy resolved.      Review of Systems: Patient denied nausea, vomiting, odynophagia, chest pain, cough, dyspnea, abdominal pain,  diarrhea, rash, fatigue, headache      Pain scale:    denies                                    Location: na    Diet: regular Enlive tid    Allergies    No Known Allergies    Intolerances    cefepime (Rash)      ANTIMICROBIALS  acyclovir   Oral Tab/Cap 400 milliGRAM(s) Oral every 8 hours      HEME/ONC MEDICATIONS  enoxaparin Injectable 40 milliGRAM(s) SubCutaneous daily      STANDING MEDICATIONS  amLODIPine   Tablet 5 milliGRAM(s) Oral daily  Biotene Dry Mouth Oral Rinse 15 milliLiter(s) Swish and Spit three times a day  chlorhexidine 2% Cloths 1 Application(s) Topical <User Schedule>  influenza   Vaccine 0.5 milliLiter(s) IntraMuscular once  ondansetron Injectable 8 milliGRAM(s) IV Push every 8 hours  prednisoLONE acetate 1% Suspension 2 Drop(s) Both EYES every 6 hours  sodium chloride 0.9%. 1000 milliLiter(s) IV Continuous <Continuous>      PRN MEDICATIONS  acetaminophen   Tablet .. 650 milliGRAM(s) Oral every 6 hours PRN  metoclopramide Injectable 10 milliGRAM(s) IV Push every 6 hours PRN  oxyCODONE    IR 5 milliGRAM(s) Oral every 12 hours PRN  senna 2 Tablet(s) Oral two times a day PRN  sodium chloride 0.9% lock flush 10 milliLiter(s) IV Push every 1 hour PRN        Vital Signs Last 24 Hrs  T(C): 36.7 (22 Sep 2021 04:54), Max: 36.9 (21 Sep 2021 14:04)  T(F): 98.1 (22 Sep 2021 04:54), Max: 98.5 (21 Sep 2021 14:04)  HR: 80 (22 Sep 2021 04:54) (79 - 97)  BP: 109/68 (22 Sep 2021 04:54) (109/68 - 130/85)  BP(mean): --  RR: 18 (22 Sep 2021 04:54) (18 - 18)  SpO2: 99% (22 Sep 2021 04:54) (97% - 100%)    PHYSICAL EXAM  General: NAD  HEENT: PERRLA, EOMOI, clear oropharynx,   CV: (+) S1/S2 RRR  Lungs: clear to auscultation, no wheezes or rales  Abdomen: soft, non-tender, non-distended (+) BS  Ext: no edema  Skin: no rashes and no petechiae  Neuro: alert and oriented X 3, no focal deficits  Central Line: PICC c/d/i         LABS:                        8.6    2.17  )-----------( 118      ( 22 Sep 2021 06:44 )             26.2         Mean Cell Volume : 91.9 fl  Mean Cell Hemoglobin : 30.2 pg  Mean Cell Hemoglobin Concentration : 32.8 gm/dL  Auto Neutrophil # : 1.89 K/uL  Auto Lymphocyte # : 0.15 K/uL  Auto Monocyte # : 0.00 K/uL  Auto Eosinophil # : 0.09 K/uL  Auto Basophil # : 0.04 K/uL  Auto Neutrophil % : 87.0 %  Auto Lymphocyte % : 7.0 %  Auto Monocyte % : 0.0 %  Auto Eosinophil % : 4.3 %  Auto Basophil % : 1.7 %      09-22    140  |  107  |  12  ----------------------------<  85  4.0   |  20<L>  |  0.75    Ca    9.1      22 Sep 2021 06:41  Phos  4.3     09-22  Mg     2.2     09-22    TPro  6.3  /  Alb  3.9  /  TBili  0.4  /  DBili  x   /  AST  49<H>  /  ALT  82<H>  /  AlkPhos  72  09-22      Mg 2.2  Phos 4.3              RADIOLOGY & ADDITIONAL STUDIES:         Diagnosis: AML    Protocol/Chemo Regimen: C1 HIDAC  Day: 6     Pt endorsed: no new complaints, leg cramps following chemotherapy improved with pain medication.      Review of Systems: Patient denied nausea, vomiting, odynophagia, chest pain, cough, dyspnea, abdominal pain,  diarrhea, rash, fatigue, headache      Pain scale:    denies                                    Location: na    Diet: regular Enlive tid    Allergies    No Known Allergies    Intolerances    cefepime (Rash)      ANTIMICROBIALS  acyclovir   Oral Tab/Cap 400 milliGRAM(s) Oral every 8 hours      HEME/ONC MEDICATIONS  enoxaparin Injectable 40 milliGRAM(s) SubCutaneous daily      STANDING MEDICATIONS  amLODIPine   Tablet 5 milliGRAM(s) Oral daily  Biotene Dry Mouth Oral Rinse 15 milliLiter(s) Swish and Spit three times a day  chlorhexidine 2% Cloths 1 Application(s) Topical <User Schedule>  influenza   Vaccine 0.5 milliLiter(s) IntraMuscular once  ondansetron Injectable 8 milliGRAM(s) IV Push every 8 hours  prednisoLONE acetate 1% Suspension 2 Drop(s) Both EYES every 6 hours  sodium chloride 0.9%. 1000 milliLiter(s) IV Continuous <Continuous>      PRN MEDICATIONS  acetaminophen   Tablet .. 650 milliGRAM(s) Oral every 6 hours PRN  metoclopramide Injectable 10 milliGRAM(s) IV Push every 6 hours PRN  oxyCODONE    IR 5 milliGRAM(s) Oral every 12 hours PRN  senna 2 Tablet(s) Oral two times a day PRN  sodium chloride 0.9% lock flush 10 milliLiter(s) IV Push every 1 hour PRN        Vital Signs Last 24 Hrs  T(C): 36.7 (22 Sep 2021 04:54), Max: 36.9 (21 Sep 2021 14:04)  T(F): 98.1 (22 Sep 2021 04:54), Max: 98.5 (21 Sep 2021 14:04)  HR: 80 (22 Sep 2021 04:54) (79 - 97)  BP: 109/68 (22 Sep 2021 04:54) (109/68 - 130/85)  BP(mean): --  RR: 18 (22 Sep 2021 04:54) (18 - 18)  SpO2: 99% (22 Sep 2021 04:54) (97% - 100%)    PHYSICAL EXAM  General: NAD  HEENT: PERRLA, EOMOI, clear oropharynx,   CV: (+) S1/S2 RRR  Lungs: clear to auscultation, no wheezes or rales  Abdomen: soft, non-tender, non-distended (+) BS  Ext: no edema  Skin: no rashes and no petechiae  Neuro: alert and oriented X 3, no focal deficits  Central Line: PICC c/d/i         LABS:                        8.6    2.17  )-----------( 118      ( 22 Sep 2021 06:44 )             26.2         Mean Cell Volume : 91.9 fl  Mean Cell Hemoglobin : 30.2 pg  Mean Cell Hemoglobin Concentration : 32.8 gm/dL  Auto Neutrophil # : 1.89 K/uL  Auto Lymphocyte # : 0.15 K/uL  Auto Monocyte # : 0.00 K/uL  Auto Eosinophil # : 0.09 K/uL  Auto Basophil # : 0.04 K/uL  Auto Neutrophil % : 87.0 %  Auto Lymphocyte % : 7.0 %  Auto Monocyte % : 0.0 %  Auto Eosinophil % : 4.3 %  Auto Basophil % : 1.7 %      09-22    140  |  107  |  12  ----------------------------<  85  4.0   |  20<L>  |  0.75    Ca    9.1      22 Sep 2021 06:41  Phos  4.3     09-22  Mg     2.2     09-22    TPro  6.3  /  Alb  3.9  /  TBili  0.4  /  DBili  x   /  AST  49<H>  /  ALT  82<H>  /  AlkPhos  72  09-22      Mg 2.2  Phos 4.3              RADIOLOGY & ADDITIONAL STUDIES:

## 2021-09-22 NOTE — PROGRESS NOTE ADULT - ASSESSMENT
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

## 2021-09-22 NOTE — DISCHARGE NOTE NURSING/CASE MANAGEMENT/SOCIAL WORK - NSDCFUADDAPPT_GEN_ALL_CORE_FT
To [facility] on Friday 9/24 at 1 pm to see Rosy Pedersen  Your lab appointments are following   Tuesday 9/28 at 9 am   Friday 10/1 at 9 am

## 2021-09-22 NOTE — ADVANCED PRACTICE NURSE CONSULT - REASON FOR CONSULT
Chemotherapy Notes : Day 5/5 HIDAC ,dose 1/2 
HIDAC DAY 1
HIDAC DAY 1, dose #2
chemo infusion.
HIDAC DAY 3
Hidac day 3(every 12 hour dosing) Nystagmus checked done,negative.

## 2021-09-22 NOTE — ADVANCED PRACTICE NURSE CONSULT - ASSESSMENT
Pt alert and oriented X 4. VSS, PT w/  RT DL PICC,  with +blood return and flush easily & briskly. Dressing clean, dry and intact, no swelling or redness. Chemotherapy teaching reinforced, pt verbalized understanding. Drug verification done with 2RNs. Nystagmus check done,  negative result.  At 0450 Cytarabine 3000 gm/m2= 6390 mg iv administered, to infuse over 3 hours. Report was given to primary RN.
Lab results reviewed by Dr. Stevens. Reinforced teachings to patient well understood. Patient with difficult access,requested for PICC line placement. Dr. Stevens and NP li aware. Patient post PICC line placement right arm double lumen,post cxr,stephani to use with order. Nystagmus checked done negative.Got zofran 8mg IV as antiemetic prior. Cytarabine 6390mg in 500ml NSS started at 1641 x 3 hours infusion at 188ml/hr via lowest port of NSS line into red port from right arm PICC through Alaris IV pump. Primary RN aware of plan of care. Safety maintained.
 Pt alert and oriented X 4.OOB to bathroom and hallway .Lab values reviewed on rounds by Dr. Gregory and team.  PT w/  RT DL PICC,  with +blood return and flush easily & briskly. Dressing clean, dry and intact, Site  no swelling or redness noted. Chemotherapy teaching reinforced, pt verbalized understanding. Drug verification done with 2RNs. Nystagmus check done,  negative result.  At 1633 Cytarabine 3 gm/m2= 6390 mg attached to saline line to PICC infusing well at 188ml/hr via alaris pump over 3hours . Pt. tolerating well.  Report was given to primary RN. Left pt. comfortable in bed. 
 Pt alert and oriented X4. VSS, L PIV #22g,  with +blood return and flush easily & briskly. Dressing clean, dry and intact, no swelling or redness. Chemotherapy teaching reinforced, pt verbalized understanding. Drug verification done with secondary, nystagmus check done,  negative result.  At 0450 Cytarabine 3000 gm/m2= 6390 mg iv administered, to infuse over 3 hours. Report was given to primary RN.
Patient alert and oriented x 4. Right DL Picc flushed with normal saline, good blood return noted. Negative nystagmus on assessment.  Cytarabine 3gm/m2 = 6390mg IVover 3hours Q12hrs o days 1, 3, 5. Chemo checked by 2RN's as per protocol . Chemo infusing at this time and no reaction reported.
Found pt up in bed, Pt alert and oriented X4. VSS, L PIV #22g placed today, previously access remain with +blood return and flush easily. Dressing to PIV  dry and intact no swelling or redness at site. Chemotherapy teaching done pt verbalized understanding.  Lab result reviewed by Dr Gregory and order signed to start chemotherapy. Drug verification done with secondary nurse. Pt was examine nystagmus check done negative. Pt was premedicated with Emend 150 mg IVPB and zofran 8 mg IVP as order. Then at 1643 given Cytarabine 3000 gm/m2= 6390 mg iv to run over 3 hours. Left pt up in bed. Report was given to primary RN.
26-Jun-2017 19:12

## 2021-09-24 ENCOUNTER — APPOINTMENT (OUTPATIENT)
Dept: HEMATOLOGY ONCOLOGY | Facility: CLINIC | Age: 36
End: 2021-09-24
Payer: COMMERCIAL

## 2021-09-24 ENCOUNTER — RESULT REVIEW (OUTPATIENT)
Age: 36
End: 2021-09-24

## 2021-09-24 VITALS
SYSTOLIC BLOOD PRESSURE: 107 MMHG | RESPIRATION RATE: 18 BRPM | TEMPERATURE: 97.9 F | OXYGEN SATURATION: 98 % | HEIGHT: 71.26 IN | DIASTOLIC BLOOD PRESSURE: 81 MMHG | BODY MASS INDEX: 27.17 KG/M2 | HEART RATE: 130 BPM | WEIGHT: 196.21 LBS

## 2021-09-24 LAB
BASOPHILS # BLD AUTO: 0 K/UL — SIGNIFICANT CHANGE UP (ref 0–0.2)
BASOPHILS NFR BLD AUTO: 0 % — SIGNIFICANT CHANGE UP (ref 0–2)
EOSINOPHIL # BLD AUTO: 0.05 K/UL — SIGNIFICANT CHANGE UP (ref 0–0.5)
EOSINOPHIL NFR BLD AUTO: 3 % — SIGNIFICANT CHANGE UP (ref 0–6)
HCT VFR BLD CALC: 28.4 % — LOW (ref 39–50)
HGB BLD-MCNC: 10.2 G/DL — LOW (ref 13–17)
LYMPHOCYTES # BLD AUTO: 0.26 K/UL — LOW (ref 1–3.3)
LYMPHOCYTES # BLD AUTO: 17 % — SIGNIFICANT CHANGE UP (ref 13–44)
MCHC RBC-ENTMCNC: 31.6 PG — SIGNIFICANT CHANGE UP (ref 27–34)
MCHC RBC-ENTMCNC: 35.9 G/DL — SIGNIFICANT CHANGE UP (ref 32–36)
MCV RBC AUTO: 87.9 FL — SIGNIFICANT CHANGE UP (ref 80–100)
MONOCYTES # BLD AUTO: 0.06 K/UL — SIGNIFICANT CHANGE UP (ref 0–0.9)
MONOCYTES NFR BLD AUTO: 4 % — SIGNIFICANT CHANGE UP (ref 2–14)
NEUTROPHILS # BLD AUTO: 1.18 K/UL — LOW (ref 1.8–7.4)
NEUTROPHILS NFR BLD AUTO: 76 % — SIGNIFICANT CHANGE UP (ref 43–77)
NRBC # BLD: 0 /100 — SIGNIFICANT CHANGE UP (ref 0–0)
NRBC # BLD: SIGNIFICANT CHANGE UP /100 WBCS (ref 0–0)
PLAT MORPH BLD: NORMAL — SIGNIFICANT CHANGE UP
PLATELET # BLD AUTO: 86 K/UL — LOW (ref 150–400)
RBC # BLD: 3.23 M/UL — LOW (ref 4.2–5.8)
RBC # FLD: 15.1 % — HIGH (ref 10.3–14.5)
RBC BLD AUTO: SIGNIFICANT CHANGE UP
WBC # BLD: 1.55 K/UL — LOW (ref 3.8–10.5)
WBC # FLD AUTO: 1.55 K/UL — LOW (ref 3.8–10.5)

## 2021-09-24 PROCEDURE — 99214 OFFICE O/P EST MOD 30 MIN: CPT

## 2021-09-28 ENCOUNTER — APPOINTMENT (OUTPATIENT)
Dept: HEMATOLOGY ONCOLOGY | Facility: CLINIC | Age: 36
End: 2021-09-28

## 2021-09-28 ENCOUNTER — APPOINTMENT (OUTPATIENT)
Dept: INFUSION THERAPY | Facility: HOSPITAL | Age: 36
End: 2021-09-28

## 2021-09-28 ENCOUNTER — NON-APPOINTMENT (OUTPATIENT)
Age: 36
End: 2021-09-28

## 2021-09-28 ENCOUNTER — RESULT REVIEW (OUTPATIENT)
Age: 36
End: 2021-09-28

## 2021-09-28 LAB
BASOPHILS # BLD AUTO: 0.01 K/UL — SIGNIFICANT CHANGE UP (ref 0–0.2)
BASOPHILS NFR BLD AUTO: 1 % — SIGNIFICANT CHANGE UP (ref 0–2)
EOSINOPHIL # BLD AUTO: 0 K/UL — SIGNIFICANT CHANGE UP (ref 0–0.5)
EOSINOPHIL NFR BLD AUTO: 0 % — SIGNIFICANT CHANGE UP (ref 0–6)
HCT VFR BLD CALC: 23.8 % — LOW (ref 39–50)
HGB BLD-MCNC: 8.5 G/DL — LOW (ref 13–17)
LYMPHOCYTES # BLD AUTO: 0.27 K/UL — LOW (ref 1–3.3)
LYMPHOCYTES # BLD AUTO: 29 % — SIGNIFICANT CHANGE UP (ref 13–44)
MCHC RBC-ENTMCNC: 30.8 PG — SIGNIFICANT CHANGE UP (ref 27–34)
MCHC RBC-ENTMCNC: 35.7 G/DL — SIGNIFICANT CHANGE UP (ref 32–36)
MCV RBC AUTO: 86.2 FL — SIGNIFICANT CHANGE UP (ref 80–100)
MONOCYTES # BLD AUTO: 0.01 K/UL — SIGNIFICANT CHANGE UP (ref 0–0.9)
MONOCYTES NFR BLD AUTO: 1 % — LOW (ref 2–14)
NEUTROPHILS # BLD AUTO: 0.72 K/UL — LOW (ref 1.8–7.4)
NEUTROPHILS NFR BLD AUTO: 69 % — SIGNIFICANT CHANGE UP (ref 43–77)
NRBC # BLD: 0 /100 — SIGNIFICANT CHANGE UP (ref 0–0)
NRBC # BLD: SIGNIFICANT CHANGE UP /100 WBCS (ref 0–0)
PLAT MORPH BLD: NORMAL — SIGNIFICANT CHANGE UP
PLATELET # BLD AUTO: 8 K/UL — CRITICAL LOW (ref 150–400)
RBC # BLD: 2.76 M/UL — LOW (ref 4.2–5.8)
RBC # FLD: 14.5 % — SIGNIFICANT CHANGE UP (ref 10.3–14.5)
RBC BLD AUTO: SIGNIFICANT CHANGE UP
WBC # BLD: 0.94 K/UL — CRITICAL LOW (ref 3.8–10.5)
WBC # FLD AUTO: 0.94 K/UL — CRITICAL LOW (ref 3.8–10.5)

## 2021-09-29 ENCOUNTER — OUTPATIENT (OUTPATIENT)
Dept: OUTPATIENT SERVICES | Facility: HOSPITAL | Age: 36
LOS: 1 days | Discharge: HOME | End: 2021-09-29

## 2021-09-29 DIAGNOSIS — C92.00 ACUTE MYELOBLASTIC LEUKEMIA, NOT HAVING ACHIEVED REMISSION: ICD-10-CM

## 2021-09-29 DIAGNOSIS — Z51.89 ENCOUNTER FOR OTHER SPECIFIED AFTERCARE: ICD-10-CM

## 2021-09-30 ENCOUNTER — NON-APPOINTMENT (OUTPATIENT)
Age: 36
End: 2021-09-30

## 2021-09-30 ENCOUNTER — EMERGENCY (EMERGENCY)
Facility: HOSPITAL | Age: 36
LOS: 1 days | Discharge: HOME | End: 2021-09-30
Attending: EMERGENCY MEDICINE
Payer: COMMERCIAL

## 2021-09-30 VITALS
TEMPERATURE: 98 F | WEIGHT: 199.96 LBS | HEIGHT: 72 IN | SYSTOLIC BLOOD PRESSURE: 133 MMHG | OXYGEN SATURATION: 100 % | RESPIRATION RATE: 20 BRPM | DIASTOLIC BLOOD PRESSURE: 86 MMHG | HEART RATE: 115 BPM

## 2021-09-30 PROCEDURE — 99284 EMERGENCY DEPT VISIT MOD MDM: CPT

## 2021-09-30 RX ORDER — SODIUM CHLORIDE 9 MG/ML
1000 INJECTION INTRAMUSCULAR; INTRAVENOUS; SUBCUTANEOUS ONCE
Refills: 0 | Status: COMPLETED | OUTPATIENT
Start: 2021-09-30 | End: 2021-09-30

## 2021-09-30 NOTE — ED PROVIDER NOTE - CLINICAL SUMMARY MEDICAL DECISION MAKING FREE TEXT BOX
36 M with PMHx of AML now on consolidation presents with one day of nausea and vomiting that has now resolved. Pt is well appearing although tachycardic. Will obtain labs, and administer fluids. Concern for viral gastroenteritis vs early onset sepsis. 36 M with PMHx of AML now on consolidation presents with one day of nausea and vomiting that has now resolved. Pt is well appearing although tachycardic. Will obtain labs, and administer fluids. Concern for viral gastroenteritis vs early onset bacterial infection - Rome Godinez MD, PEM Fellow

## 2021-09-30 NOTE — ED PROVIDER NOTE - ATTENDING CONTRIBUTION TO CARE
Segundo Marsh MD, FACEP: In this physician's medical judgement based on clinical history and physical exam, patient with AML on chemo with recent transfusion and here secondary to nausea/vomiting. Mild to moderate this evening 1 hour after eating something. However, patient denies any chest pain or abdominal pain. No f/c.   concern for emesis from infectious etiology unlikely  will monitor closely  will get iv, cbc, cmp cxr, vbg, draw and hold blood cultures for potential development of fever.     Will follow up on labs, analgesia, imaging, reassess and disposition as clinically indicated.

## 2021-09-30 NOTE — ED PROVIDER NOTE - NSICDXPASTMEDICALHX_GEN_ALL_CORE_FT
PAST MEDICAL HISTORY:  History of genital warts     Hypertension diagnosed 1 year ago    Kidney stone 5 years ago

## 2021-09-30 NOTE — ED PROVIDER NOTE - OBJECTIVE STATEMENT
36 M with PMHx of HTN on amlodipine, AML s/p cycle 1 consolidation with cytarabine on 9/22/21 via PICC line presents to the ER c/o sudden onset vomiting at 21:00. Reports ate one hour prior to episodes of emesis. Pt received platelets x2 days ago at the [facility]. Last known platelet level was 8.  +chills. Denies fever, diarrhea, abd pain, ecchymosis. Oncologist: Dr. Chelsea Yancey. 36 M with PMHx of HTN on amlodipine, AML s/p cycle 1 consolidation with cytarabine on 9/22/21 via PICC line presents to the ER c/o sudden onset vomiting at 21:00. Reports ate one hour prior to episodes of emesis. Pt received platelets x2 days ago at the [facility]. Last known platelet level was 8.  +chills. Denies fever, diarrhea, abd pain, ecchymosis. Oncologist: Dr. Chelsea Yancey.    Medications: Amlodipine, and acyclovir

## 2021-09-30 NOTE — ED PROVIDER NOTE - PHYSICAL EXAMINATION
GEN: NAD, awake, eyes open spontaneously  HEAD: L side scalp with well healed scar  EYES: EOMI, PERRL  ENT: NCAT, MMM, nose without congestion, no lymphadenopathy, no pharyngeal erythema   CHEST/LUNGS: Non-tachypneic, CTAB, bilateral breath sounds  CARDIAC: tachycardic, normal perfusion, no murmurs  ABDOMEN: Soft, NTND, No rebound/guarding  MSK: No edema, no gross deformity of extremities; R upper extremity with PICC line - dressing is clean, dry, and intact. No signs of infection  SKIN: No rashes, no petechiae, no vesicles  NEURO: CN grossly intact, normal coordination, no focal motor or sensory deficits  PSYCH: Alert, appropriate, cooperative, with capacity and insight

## 2021-09-30 NOTE — ED PROVIDER NOTE - PATIENT PORTAL LINK FT
You can access the FollowMyHealth Patient Portal offered by [facility] by registering at the following website: [email or web address]. By joining [employer]’s FollowMyHealth portal, you will also be able to view your health information using other applications (apps) compatible with our system.

## 2021-09-30 NOTE — ED PROVIDER NOTE - NSFOLLOWUPINSTRUCTIONS_ED_ALL_ED_FT
Return should you have continued nausea and vomiting.     Return immediately for any fever/chills.    There were no signs of an emergency medical condition on completion of today's workup.  You will need further medical care and evaluation. A presumptive diagnosis has been made, however further evaluation may be required by your primary care doctor or specialist for a definitive diagnosis.      Follow up with your medical doctor in 2-3 days or call our clinic at [phone] and state you were seen in the Emergency Department and would like to be seen in clinic. You may also call [phone] to speak with a representative to assist follow up care with medicine, surgery, or specialists.    If you are having pain, take Tylenol/acetaminophen 1 g every six hours and supplement (if allowed by your physician, and if you're not having gastric/gastrointestinal/stomach/intestinal problems) with ibuprofen 600 mg, with food or milk/maalox, every six hours which can be taken three hours apart from the Tylenol to have a layered effect.     Be sure to take no more than 4000mg or 4g of Tylenol/acetaminophen in a 24 hour period. Be sure to check your other medications to see if they include Tylenol/acetaminophen and include them in your calculations to ensure you do not take more than 4000mg or 4g of Tylenol/acetaminophen a day.    Drink at least 2 Liters or 64 Ounces of water each day (UNLESS you are supposed to restrict fluids or have a history of congestive heart failure (CHF)).    Return for any persistent, worsening symptoms, or ANY concerns at all.

## 2021-09-30 NOTE — ED PROVIDER NOTE - PROGRESS NOTE DETAILS
Zulma - pt reassessed. vitals normal. no fever. well appearing. labs reviewed with pt. platelets 49 transfusion threshold is 10 for him). he agrees to follow up with his oncologist urgently.

## 2021-10-01 ENCOUNTER — RESULT REVIEW (OUTPATIENT)
Age: 36
End: 2021-10-01

## 2021-10-01 ENCOUNTER — APPOINTMENT (OUTPATIENT)
Dept: HEMATOLOGY ONCOLOGY | Facility: CLINIC | Age: 36
End: 2021-10-01

## 2021-10-01 ENCOUNTER — APPOINTMENT (OUTPATIENT)
Dept: INFUSION THERAPY | Facility: HOSPITAL | Age: 36
End: 2021-10-01

## 2021-10-01 ENCOUNTER — NON-APPOINTMENT (OUTPATIENT)
Age: 36
End: 2021-10-01

## 2021-10-01 ENCOUNTER — OUTPATIENT (OUTPATIENT)
Dept: OUTPATIENT SERVICES | Facility: HOSPITAL | Age: 36
LOS: 1 days | End: 2021-10-01

## 2021-10-01 VITALS
OXYGEN SATURATION: 97 % | TEMPERATURE: 98 F | DIASTOLIC BLOOD PRESSURE: 70 MMHG | HEART RATE: 80 BPM | SYSTOLIC BLOOD PRESSURE: 128 MMHG | RESPIRATION RATE: 18 BRPM

## 2021-10-01 DIAGNOSIS — C92.00 ACUTE MYELOBLASTIC LEUKEMIA, NOT HAVING ACHIEVED REMISSION: ICD-10-CM

## 2021-10-01 LAB
ALBUMIN SERPL ELPH-MCNC: 4.8 G/DL — SIGNIFICANT CHANGE UP (ref 3.3–5)
ALP SERPL-CCNC: 105 U/L — SIGNIFICANT CHANGE UP (ref 40–120)
ALT FLD-CCNC: 130 U/L — HIGH (ref 10–45)
ANION GAP SERPL CALC-SCNC: 14 MMOL/L — SIGNIFICANT CHANGE UP (ref 5–17)
APPEARANCE UR: CLEAR — SIGNIFICANT CHANGE UP
AST SERPL-CCNC: 54 U/L — HIGH (ref 10–40)
BASE EXCESS BLDV CALC-SCNC: 1.1 MMOL/L — SIGNIFICANT CHANGE UP (ref -2–2)
BASOPHILS # BLD AUTO: 0 K/UL — SIGNIFICANT CHANGE UP (ref 0–0.2)
BASOPHILS # BLD AUTO: 0 K/UL — SIGNIFICANT CHANGE UP (ref 0–0.2)
BASOPHILS NFR BLD AUTO: 0 % — SIGNIFICANT CHANGE UP (ref 0–2)
BASOPHILS NFR BLD AUTO: 0 % — SIGNIFICANT CHANGE UP (ref 0–2)
BILIRUB SERPL-MCNC: 0.3 MG/DL — SIGNIFICANT CHANGE UP (ref 0.2–1.2)
BILIRUB UR-MCNC: NEGATIVE — SIGNIFICANT CHANGE UP
BLD GP AB SCN SERPL QL: NEGATIVE — SIGNIFICANT CHANGE UP
BUN SERPL-MCNC: 20 MG/DL — SIGNIFICANT CHANGE UP (ref 7–23)
CA-I SERPL-SCNC: 1.34 MMOL/L — HIGH (ref 1.15–1.33)
CALCIUM SERPL-MCNC: 10 MG/DL — SIGNIFICANT CHANGE UP (ref 8.4–10.5)
CHLORIDE BLDV-SCNC: 106 MMOL/L — SIGNIFICANT CHANGE UP (ref 96–108)
CHLORIDE SERPL-SCNC: 102 MMOL/L — SIGNIFICANT CHANGE UP (ref 96–108)
CO2 BLDV-SCNC: 28 MMOL/L — HIGH (ref 22–26)
CO2 SERPL-SCNC: 23 MMOL/L — SIGNIFICANT CHANGE UP (ref 22–31)
COLOR SPEC: SIGNIFICANT CHANGE UP
CREAT SERPL-MCNC: 0.87 MG/DL — SIGNIFICANT CHANGE UP (ref 0.5–1.3)
DIFF PNL FLD: NEGATIVE — SIGNIFICANT CHANGE UP
EOSINOPHIL # BLD AUTO: 0 K/UL — SIGNIFICANT CHANGE UP (ref 0–0.5)
EOSINOPHIL # BLD AUTO: 0 K/UL — SIGNIFICANT CHANGE UP (ref 0–0.5)
EOSINOPHIL NFR BLD AUTO: 0 % — SIGNIFICANT CHANGE UP (ref 0–6)
EOSINOPHIL NFR BLD AUTO: 0 % — SIGNIFICANT CHANGE UP (ref 0–6)
GAS PNL BLDV: 142 MMOL/L — SIGNIFICANT CHANGE UP (ref 136–145)
GAS PNL BLDV: SIGNIFICANT CHANGE UP
GAS PNL BLDV: SIGNIFICANT CHANGE UP
GLUCOSE BLDV-MCNC: 107 MG/DL — HIGH (ref 70–99)
GLUCOSE SERPL-MCNC: 106 MG/DL — HIGH (ref 70–99)
GLUCOSE UR QL: NEGATIVE — SIGNIFICANT CHANGE UP
HCO3 BLDV-SCNC: 27 MMOL/L — SIGNIFICANT CHANGE UP (ref 22–29)
HCT VFR BLD CALC: 23.6 % — LOW (ref 39–50)
HCT VFR BLD CALC: 24.2 % — LOW (ref 39–50)
HCT VFR BLDA CALC: 25 % — LOW (ref 39–51)
HGB BLD CALC-MCNC: 8.2 G/DL — LOW (ref 12.6–17.4)
HGB BLD-MCNC: 8.2 G/DL — LOW (ref 13–17)
HGB BLD-MCNC: 8.7 G/DL — LOW (ref 13–17)
KETONES UR-MCNC: NEGATIVE — SIGNIFICANT CHANGE UP
LACTATE BLDV-MCNC: 1.3 MMOL/L — SIGNIFICANT CHANGE UP (ref 0.7–2)
LEUKOCYTE ESTERASE UR-ACNC: NEGATIVE — SIGNIFICANT CHANGE UP
LYMPHOCYTES # BLD AUTO: 0.35 K/UL — LOW (ref 1–3.3)
LYMPHOCYTES # BLD AUTO: 0.42 K/UL — LOW (ref 1–3.3)
LYMPHOCYTES # BLD AUTO: 57.2 % — HIGH (ref 13–44)
LYMPHOCYTES # BLD AUTO: 73 % — HIGH (ref 13–44)
MANUAL SMEAR VERIFICATION: SIGNIFICANT CHANGE UP
MCHC RBC-ENTMCNC: 30.5 PG — SIGNIFICANT CHANGE UP (ref 27–34)
MCHC RBC-ENTMCNC: 30.6 PG — SIGNIFICANT CHANGE UP (ref 27–34)
MCHC RBC-ENTMCNC: 34.7 GM/DL — SIGNIFICANT CHANGE UP (ref 32–36)
MCHC RBC-ENTMCNC: 36 G/DL — SIGNIFICANT CHANGE UP (ref 32–36)
MCV RBC AUTO: 85.2 FL — SIGNIFICANT CHANGE UP (ref 80–100)
MCV RBC AUTO: 87.7 FL — SIGNIFICANT CHANGE UP (ref 80–100)
MONOCYTES # BLD AUTO: 0 K/UL — SIGNIFICANT CHANGE UP (ref 0–0.9)
MONOCYTES # BLD AUTO: 0.01 K/UL — SIGNIFICANT CHANGE UP (ref 0–0.9)
MONOCYTES NFR BLD AUTO: 0 % — LOW (ref 2–14)
MONOCYTES NFR BLD AUTO: 1 % — LOW (ref 2–14)
NEUTROPHILS # BLD AUTO: 0.15 K/UL — LOW (ref 1.8–7.4)
NEUTROPHILS # BLD AUTO: 0.26 K/UL — LOW (ref 1.8–7.4)
NEUTROPHILS NFR BLD AUTO: 26 % — LOW (ref 43–77)
NEUTROPHILS NFR BLD AUTO: 41.4 % — LOW (ref 43–77)
NITRITE UR-MCNC: NEGATIVE — SIGNIFICANT CHANGE UP
NRBC # BLD: 0 /100 — SIGNIFICANT CHANGE UP (ref 0–0)
NRBC # BLD: SIGNIFICANT CHANGE UP /100 WBCS (ref 0–0)
PCO2 BLDV: 46 MMHG — SIGNIFICANT CHANGE UP (ref 42–55)
PH BLDV: 7.37 — SIGNIFICANT CHANGE UP (ref 7.32–7.43)
PH UR: 5.5 — SIGNIFICANT CHANGE UP (ref 5–8)
PLAT MORPH BLD: NORMAL — SIGNIFICANT CHANGE UP
PLAT MORPH BLD: NORMAL — SIGNIFICANT CHANGE UP
PLATELET # BLD AUTO: 14 K/UL — CRITICAL LOW (ref 150–400)
PLATELET # BLD AUTO: 49 K/UL — LOW (ref 150–400)
PLATELET # BLD MANUAL: 46 K/UL — LOW (ref 150–400)
PO2 BLDV: 34 MMHG — SIGNIFICANT CHANGE UP (ref 25–45)
POTASSIUM BLDV-SCNC: 3.9 MMOL/L — SIGNIFICANT CHANGE UP (ref 3.5–5.1)
POTASSIUM SERPL-MCNC: 4.4 MMOL/L — SIGNIFICANT CHANGE UP (ref 3.5–5.3)
POTASSIUM SERPL-SCNC: 4.4 MMOL/L — SIGNIFICANT CHANGE UP (ref 3.5–5.3)
PROT SERPL-MCNC: 7.6 G/DL — SIGNIFICANT CHANGE UP (ref 6–8.3)
PROT UR-MCNC: NEGATIVE — SIGNIFICANT CHANGE UP
RBC # BLD: 2.69 M/UL — LOW (ref 4.2–5.8)
RBC # BLD: 2.84 M/UL — LOW (ref 4.2–5.8)
RBC # FLD: 13.9 % — SIGNIFICANT CHANGE UP (ref 10.3–14.5)
RBC # FLD: 14.3 % — SIGNIFICANT CHANGE UP (ref 10.3–14.5)
RBC BLD AUTO: SIGNIFICANT CHANGE UP
RBC BLD AUTO: SIGNIFICANT CHANGE UP
RH IG SCN BLD-IMP: POSITIVE — SIGNIFICANT CHANGE UP
SAO2 % BLDV: 51.3 % — LOW (ref 67–88)
SODIUM SERPL-SCNC: 139 MMOL/L — SIGNIFICANT CHANGE UP (ref 135–145)
SP GR SPEC: 1.01 — SIGNIFICANT CHANGE UP (ref 1.01–1.02)
UROBILINOGEN FLD QL: NEGATIVE — SIGNIFICANT CHANGE UP
VARIANT LYMPHS # BLD: 1.4 % — SIGNIFICANT CHANGE UP (ref 0–6)
WBC # BLD: 0.58 K/UL — CRITICAL LOW (ref 3.8–10.5)
WBC # BLD: 0.62 K/UL — CRITICAL LOW (ref 3.8–10.5)
WBC # FLD AUTO: 0.58 K/UL — CRITICAL LOW (ref 3.8–10.5)
WBC # FLD AUTO: 0.62 K/UL — CRITICAL LOW (ref 3.8–10.5)

## 2021-10-01 PROCEDURE — 71045 X-RAY EXAM CHEST 1 VIEW: CPT | Mod: 26

## 2021-10-01 RX ADMIN — SODIUM CHLORIDE 1000 MILLILITER(S): 9 INJECTION INTRAMUSCULAR; INTRAVENOUS; SUBCUTANEOUS at 01:15

## 2021-10-01 NOTE — ED ADULT NURSE REASSESSMENT NOTE - NS ED NURSE REASSESS COMMENT FT1
Accessed PICC line per Gretchen BROWNING approval s/p CXR. Flushed both lumens of PICC line with 20 cc with +blood return.

## 2021-10-01 NOTE — ED ADULT NURSE NOTE - CINV DISCH TEACH PARTICIP
Patient presents for pp visit and preop visit.    The patient presents for her postpartum visit. She complains of lower back pain with certain positions. She also reports constipation.  Denies fever, chills. She denies pain with urination.  Denies diarrhea.     Type of Delivery:  She had VAVD  Delivery Date: 2019  Delivery MD: mike manzo MD  Gender: male  Baby Name: Esteban  Breast Feeding and bottlefeeding  Vaginal Bleeding: scant  Incontinence: no  Depression: no  [place] yet: no  Contraception discussed and patient desires BTL - will have surgery next week  Work Plans:  Return end of 2019  Support system: [employer]    ROS: negative    PE:   /78   Wt 95.2 kg (209 lb 14.1 oz)   LMP 2018   Breastfeeding? Yes   BMI 32.87 kg/m²   APPEARANCE: Well nourished, well developed, in no acute distress.  ABDOMEN: Soft. No tenderness or masses. No hepatosplenomegaly. No hernias.   PELVIC: Normal external female genitalia without lesions. Normal hair distribution. Adequate perineal body, normal urethral meatus. Vagina moist and well rugated without lesions or discharge. Cervix pink and without lesions. No significant cystocele or rectocele. Bimanual exam deferred      Dr. Manzo' Preop Visit    Diagnosis: desires permanent sterilization  Planned Procedure: LSC bilateral salpingectomy  Date of Planned Procedure:     HPI: Deepa Randall is a 23 y.o. female now  female who desires permanent sterilization.    ROS:  GENERAL: Denies weight gain or weight loss. Feeling well overall.   SKIN: Denies rash or lesions.   HEAD: Denies head injury or headache.   CHEST: Denies chest pain or shortness of breath.   CARDIOVASCULAR: Denies palpitations or left sided chest pain.   ABDOMEN: No abdominal pain, constipation, diarrhea, nausea, vomiting or rectal bleeding.   URINARY: No frequency, dysuria, hematuria, or burning on urination.  REPRODUCTIVE: See HPI.   HEMATOLOGIC: No easy  bruisability or excessive bleeding.   MUSCULOSKELETAL: Denies joint pain or swelling.   NEUROLOGIC: Denies syncope or weakness.   PSYCHIATRIC: Denies depression, anxiety or mood swings.   Positive back pain    PMHx:   Past Medical History:   Diagnosis Date    Asthma     childhood asthma    Ovarian cyst     left       Surgical hx:   Past Surgical History:   Procedure Laterality Date    RECONSTRUCTION, KNEE, ACL, HAMSTRING AUTOGRAFT Right 2018    Performed by Phill Tran [facility] at [facility]    RECONSTRUCTION, LIGAMENT, MCL, USING  ACHILLES TENDON ALLOGRAFT Right 2018    Performed by Phill Tran MD at [facility] OR       GYNhx: Patient's last menstrual period was 2018.    Obhx: ,  x 2    ALLERGY: NKDA    MEDS: Reviewed, reconciled      Current Outpatient Medications:     butalbital-acetaminop-caf-cod -79-30 mg Cap, Take 1 capsule by mouth every 4 (four) hours as needed (headache)., Disp: 30 each, Rfl: 0    ibuprofen (ADVIL,MOTRIN) 800 MG tablet, Take 1 tablet (800 mg total) by mouth every 8 (eight) hours as needed., Disp: 30 tablet, Rfl: 0    oxyCODONE-acetaminophen (PERCOCET) 5-325 mg per tablet, Take 1 tablet by mouth every 6 (six) hours as needed., Disp: 20 tablet, Rfl: 0    PNV,calcium 72-iron-folic acid (PRENATAL VITAMIN PLUS LOW IRON) 27 mg iron- 1 mg Tab, Take 1 tablet (1 each total) by mouth once daily., Disp: 30 tablet, Rfl: 11    Social hx:    Social History     Socioeconomic History    Marital status: Single     Spouse name: Not on file    Number of children: Not on file    Years of education: Not on file    Highest education level: Not on file   Occupational History    Not on file   Social Needs    Financial resource strain: Not on file    Food insecurity:     Worry: Not on file     Inability: Not on file    Transportation needs:     Medical: Not on file     Non-medical: Not on file   Tobacco Use    Smoking status: Former Smoker     Packs/day: 0.33      Types: Cigarettes    Smokeless tobacco: Never Used    Tobacco comment: quit 4 months ago   Substance and Sexual Activity    Alcohol use: No    Drug use: No    Sexual activity: Yes     Partners: Male   Lifestyle    Physical activity:     Days per week: Not on file     Minutes per session: Not on file    Stress: Not on file   Relationships    Social connections:     Talks on phone: Not on file     Gets together: Not on file     Attends [employer] service: Not on file     Active member of club or organization: Not on file     Attends meetings of clubs or organizations: Not on file     Relationship status: Not on file   Other Topics Concern    Not on file   Social History Narrative    Not on file       Family hx:    Family History   Problem Relation Age of Onset    Migraines Mother     Cancer Maternal Aunt        PE:   /78   Wt 95.2 kg (209 lb 14.1 oz)   LMP 08/05/2018   Breastfeeding? Yes   BMI 32.87 kg/m²   APPEARANCE: Well nourished, well developed, in no acute distress.  As above    A/P: Deepa Randall is a 23 y.o. female who presents for postpartum care and preop evaluation.      1) Postpartum visit  -patient doing well,provided with list of OTC meds to take for constipation  -contraception: wants BTL - scheduled  -next Pap due:     2) Surgery:   -Risks and benefits of surgery discussed with the patient.  All questions were answered.  Consents for surgery and blood were signed by the patient today.  -Patient has been instructed to be NPO on night prior to procedure  -Preop labs ordered: cbc, serum bhcg, UPT, type and screen  -Rx for postop pain management provided to patient at today's preop visit motrin/percocet  -postop visit scheduled June 21 at 830am    Patient to proceed now immediately to preop    JENNIFER Manzo MD         Patient/Parent(s)

## 2021-10-01 NOTE — ED ADULT NURSE NOTE - OBJECTIVE STATEMENT
36y M PMHx AML on chemo (last chemo 9/22, currently in remission & receiving consolidated therapy) presents to ED s/p vomiting x4 last night. Pt was concerned because he never had any reaction or side effects to chemo in the past so came in. Upon presentation, pt denies nausea, CP, SOB, H/A, abdominal pain, f/c, dizziness, & urinary symptoms. A&Ox4, well-appearing. No respiratory distress noted on RA. Abdomen soft, nondistended, & nontender to palpation. +Rt PICC line with clean, dry, & intact dressing. Skin warm, dry, & intact. MAEx4, able to ambulate independently. No LE edema noted on exam.

## 2021-10-02 ENCOUNTER — APPOINTMENT (OUTPATIENT)
Dept: INFUSION THERAPY | Facility: HOSPITAL | Age: 36
End: 2021-10-02

## 2021-10-02 PROCEDURE — 85025 COMPLETE CBC W/AUTO DIFF WBC: CPT

## 2021-10-02 PROCEDURE — 82435 ASSAY OF BLOOD CHLORIDE: CPT

## 2021-10-02 PROCEDURE — P9037: CPT

## 2021-10-02 PROCEDURE — 82803 BLOOD GASES ANY COMBINATION: CPT

## 2021-10-02 PROCEDURE — 85018 HEMOGLOBIN: CPT

## 2021-10-02 PROCEDURE — P9040: CPT

## 2021-10-02 PROCEDURE — 80053 COMPREHEN METABOLIC PANEL: CPT

## 2021-10-02 PROCEDURE — 84132 ASSAY OF SERUM POTASSIUM: CPT

## 2021-10-02 PROCEDURE — 83690 ASSAY OF LIPASE: CPT

## 2021-10-02 PROCEDURE — 86901 BLOOD TYPING SEROLOGIC RH(D): CPT

## 2021-10-02 PROCEDURE — 84295 ASSAY OF SERUM SODIUM: CPT

## 2021-10-02 PROCEDURE — 81003 URINALYSIS AUTO W/O SCOPE: CPT

## 2021-10-02 PROCEDURE — 86850 RBC ANTIBODY SCREEN: CPT

## 2021-10-02 PROCEDURE — 86900 BLOOD TYPING SEROLOGIC ABO: CPT

## 2021-10-02 PROCEDURE — 86923 COMPATIBILITY TEST ELECTRIC: CPT

## 2021-10-02 PROCEDURE — 99284 EMERGENCY DEPT VISIT MOD MDM: CPT | Mod: 25

## 2021-10-02 PROCEDURE — 85014 HEMATOCRIT: CPT

## 2021-10-02 PROCEDURE — 71045 X-RAY EXAM CHEST 1 VIEW: CPT

## 2021-10-02 PROCEDURE — 83605 ASSAY OF LACTIC ACID: CPT

## 2021-10-02 PROCEDURE — 82330 ASSAY OF CALCIUM: CPT

## 2021-10-02 PROCEDURE — 36415 COLL VENOUS BLD VENIPUNCTURE: CPT

## 2021-10-02 PROCEDURE — 82947 ASSAY GLUCOSE BLOOD QUANT: CPT

## 2021-10-04 ENCOUNTER — APPOINTMENT (OUTPATIENT)
Dept: INFUSION THERAPY | Facility: HOSPITAL | Age: 36
End: 2021-10-04

## 2021-10-04 ENCOUNTER — NON-APPOINTMENT (OUTPATIENT)
Age: 36
End: 2021-10-04

## 2021-10-04 ENCOUNTER — RESULT REVIEW (OUTPATIENT)
Age: 36
End: 2021-10-04

## 2021-10-04 DIAGNOSIS — Z51.89 ENCOUNTER FOR OTHER SPECIFIED AFTERCARE: ICD-10-CM

## 2021-10-04 LAB
HCT VFR BLD CALC: 30.3 % — LOW (ref 39–50)
HGB BLD-MCNC: 11 G/DL — LOW (ref 13–17)
MCHC RBC-ENTMCNC: 30.7 PG — SIGNIFICANT CHANGE UP (ref 27–34)
MCHC RBC-ENTMCNC: 36.3 G/DL — HIGH (ref 32–36)
MCV RBC AUTO: 84.6 FL — SIGNIFICANT CHANGE UP (ref 80–100)
NRBC # BLD: 0 /100 WBCS — SIGNIFICANT CHANGE UP (ref 0–0)
PLATELET # BLD AUTO: 28 K/UL — LOW (ref 150–400)
RBC # BLD: 3.58 M/UL — LOW (ref 4.2–5.8)
RBC # FLD: 13.4 % — SIGNIFICANT CHANGE UP (ref 10.3–14.5)
WBC # BLD: 0.46 K/UL — CRITICAL LOW (ref 3.8–10.5)
WBC # FLD AUTO: 0.46 K/UL — CRITICAL LOW (ref 3.8–10.5)

## 2021-10-04 NOTE — HISTORY OF PRESENT ILLNESS
[de-identified] : 35yo M w/ HTN and newly diagnosed AML here for f/u. \par \par He presented to the hospital on 7/30/21 with complaints of dizziness, fatigue and severe RUQ pain for 3 days. CT A/P revealed fatty liver and small R pleural effusion. WBC 12k with 17% blasts.Peripheral flow cytometry showed 13% myeloblasts. FLT3(-). BMbx was done on 8/4/21 - confirmed AML. 46,XY    {20    }\par Foundation: mutations in DNMT3A R882H, NRAS G12D, NPM1 W288fs*12\par Pt consented to [place]. Patient was offered sperm banking, but declined.\par On 8/6, induction with Dauno/Cytararbine was started (cytarabine 100/m2 and dauno 90/m2) \par He received a seven day course of Zosyn for presumed RUL PNA, then transitioned to  levaquin, posaconazole and Acyclovir for ppx for neutropenia. Course c/b neutropenic fevers, cultures negative, repeat CT 8/14 without infectious source. He was on Cefepime but developed a rash, changed to meropenem. Rash improved. \par Day 14 BMbx on 8/19 was hypocellular with chemotherapeutic effect; however, per hematopathology, appears to be earlier regeneration than would typically seen which is concerning for persistent disease at this point, and the earliest cells are CD34 positive and his myeloblasts on initial presentation were CD34 negative. Thus, this may simply represent early regeneration of his marrow. Plan is to await count recovery and repeat biopsy.  \par Patient discharged home on 8/29/21 when ANC >500 [de-identified] : Eating well since discharge. \par c/o hemorrhoidal pain. Hemorrhoids started a few days prior to discharge. He was sent home with rectal ointment and witch hazel. \par \par BMBx done on 9/2: Cellular bone marrow with trilineage hematopoiesis with maturation and megakaryocytosis (history of AML).\par Pt feels well, CBC today showed count stable\par \par s/p C1 HIDAC 9/17-9/22 \par \par c/o leg pain after chemo in the hospital

## 2021-10-04 NOTE — ASSESSMENT
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 \par Started induction with Dauno/Cytarabine on 8/6. \par Pt's counts now recovered, remission marrow later done on 9/2- in remission. Dr. Yancey reviewed Bmbx result with pt on the phone, will proceed to consolidation with 4 cycles of HIDAC. Side effect and benefit explained, pt verbalized understanding and agreed the plan. Informed consent obtained today. \par CBC reviewed with pt and father\par s/p C1 HIDAC on 9/17, pt had leg pain with chemo, resolved now. \par ANC today 1.09, hold Levaquin and Fluconazole for now, will check next week and start when ANC low \par platelet today 86, no need for transfusion; will continue possible platelet next T and Thu then 3x a week\par cont hemorrhoidal ointment and witch hazel. Sent oxycodone for pain relief\par All questions answered\par RTC in 3 weeks\par \par \par Case and management discussed with Dr. Yancey\par \par

## 2021-10-04 NOTE — ADDENDUM
[FreeTextEntry1] : Pt started on Levofloxacin and fluconazole since 9/28 when ANC lower than 1000, he is aware of neutropenic fever precaution.

## 2021-10-06 ENCOUNTER — RESULT REVIEW (OUTPATIENT)
Age: 36
End: 2021-10-06

## 2021-10-06 ENCOUNTER — NON-APPOINTMENT (OUTPATIENT)
Age: 36
End: 2021-10-06

## 2021-10-06 ENCOUNTER — APPOINTMENT (OUTPATIENT)
Dept: INFUSION THERAPY | Facility: HOSPITAL | Age: 36
End: 2021-10-06

## 2021-10-06 LAB
BASOPHILS # BLD AUTO: 0 K/UL — SIGNIFICANT CHANGE UP (ref 0–0.2)
BASOPHILS NFR BLD AUTO: 0 % — SIGNIFICANT CHANGE UP (ref 0–2)
EOSINOPHIL # BLD AUTO: 0 K/UL — SIGNIFICANT CHANGE UP (ref 0–0.5)
EOSINOPHIL NFR BLD AUTO: 0 % — SIGNIFICANT CHANGE UP (ref 0–6)
HCT VFR BLD CALC: 29.4 % — LOW (ref 39–50)
HGB BLD-MCNC: 10.9 G/DL — LOW (ref 13–17)
LYMPHOCYTES # BLD AUTO: 0.29 K/UL — LOW (ref 1–3.3)
LYMPHOCYTES # BLD AUTO: 52 % — HIGH (ref 13–44)
MCHC RBC-ENTMCNC: 31.1 PG — SIGNIFICANT CHANGE UP (ref 27–34)
MCHC RBC-ENTMCNC: 37.1 G/DL — HIGH (ref 32–36)
MCV RBC AUTO: 83.8 FL — SIGNIFICANT CHANGE UP (ref 80–100)
MONOCYTES # BLD AUTO: 0.26 K/UL — SIGNIFICANT CHANGE UP (ref 0–0.9)
MONOCYTES NFR BLD AUTO: 46 % — HIGH (ref 2–14)
NEUTROPHILS # BLD AUTO: 0.01 K/UL — LOW (ref 1.8–7.4)
NEUTROPHILS NFR BLD AUTO: 2 % — LOW (ref 43–77)
NRBC # BLD: 0 /100 — SIGNIFICANT CHANGE UP (ref 0–0)
NRBC # BLD: SIGNIFICANT CHANGE UP /100 WBCS (ref 0–0)
PLAT MORPH BLD: NORMAL — SIGNIFICANT CHANGE UP
PLATELET # BLD AUTO: 89 K/UL — LOW (ref 150–400)
RBC # BLD: 3.51 M/UL — LOW (ref 4.2–5.8)
RBC # FLD: 13.4 % — SIGNIFICANT CHANGE UP (ref 10.3–14.5)
RBC BLD AUTO: SIGNIFICANT CHANGE UP
WBC # BLD: 0.56 K/UL — CRITICAL LOW (ref 3.8–10.5)
WBC # FLD AUTO: 0.56 K/UL — CRITICAL LOW (ref 3.8–10.5)

## 2021-10-08 ENCOUNTER — RESULT REVIEW (OUTPATIENT)
Age: 36
End: 2021-10-08

## 2021-10-08 ENCOUNTER — APPOINTMENT (OUTPATIENT)
Dept: INFUSION THERAPY | Facility: HOSPITAL | Age: 36
End: 2021-10-08

## 2021-10-08 ENCOUNTER — LABORATORY RESULT (OUTPATIENT)
Age: 36
End: 2021-10-08

## 2021-10-08 LAB
BASOPHILS # BLD AUTO: 0 K/UL — SIGNIFICANT CHANGE UP (ref 0–0.2)
BASOPHILS NFR BLD AUTO: 0 % — SIGNIFICANT CHANGE UP (ref 0–2)
EOSINOPHIL # BLD AUTO: 0 K/UL — SIGNIFICANT CHANGE UP (ref 0–0.5)
EOSINOPHIL NFR BLD AUTO: 0 % — SIGNIFICANT CHANGE UP (ref 0–6)
HCT VFR BLD CALC: 33.1 % — LOW (ref 39–50)
HGB BLD-MCNC: 11.6 G/DL — LOW (ref 13–17)
LYMPHOCYTES # BLD AUTO: 0.47 K/UL — LOW (ref 1–3.3)
LYMPHOCYTES # BLD AUTO: 30 % — SIGNIFICANT CHANGE UP (ref 13–44)
MCHC RBC-ENTMCNC: 30.1 PG — SIGNIFICANT CHANGE UP (ref 27–34)
MCHC RBC-ENTMCNC: 35 G/DL — SIGNIFICANT CHANGE UP (ref 32–36)
MCV RBC AUTO: 85.8 FL — SIGNIFICANT CHANGE UP (ref 80–100)
MONOCYTES # BLD AUTO: 0.99 K/UL — HIGH (ref 0–0.9)
MONOCYTES NFR BLD AUTO: 63 % — HIGH (ref 2–14)
NEUTROPHILS # BLD AUTO: 0.11 K/UL — LOW (ref 1.8–7.4)
NEUTROPHILS NFR BLD AUTO: 7 % — LOW (ref 43–77)
NRBC # BLD: 1 /100 — HIGH (ref 0–0)
NRBC # BLD: SIGNIFICANT CHANGE UP /100 WBCS (ref 0–0)
PLAT MORPH BLD: NORMAL — SIGNIFICANT CHANGE UP
PLATELET # BLD AUTO: 177 K/UL — SIGNIFICANT CHANGE UP (ref 150–400)
RBC # BLD: 3.86 M/UL — LOW (ref 4.2–5.8)
RBC # FLD: 13.6 % — SIGNIFICANT CHANGE UP (ref 10.3–14.5)
RBC BLD AUTO: SIGNIFICANT CHANGE UP
WBC # BLD: 1.57 K/UL — LOW (ref 3.8–10.5)
WBC # FLD AUTO: 1.57 K/UL — LOW (ref 3.8–10.5)

## 2021-10-09 ENCOUNTER — INPATIENT (INPATIENT)
Facility: HOSPITAL | Age: 36
LOS: 2 days | Discharge: HOME | DRG: 809 | End: 2021-10-12
Attending: INTERNAL MEDICINE | Admitting: STUDENT IN AN ORGANIZED HEALTH CARE EDUCATION/TRAINING PROGRAM
Payer: COMMERCIAL

## 2021-10-09 VITALS
RESPIRATION RATE: 22 BRPM | SYSTOLIC BLOOD PRESSURE: 113 MMHG | TEMPERATURE: 100 F | DIASTOLIC BLOOD PRESSURE: 77 MMHG | OXYGEN SATURATION: 99 % | HEIGHT: 72 IN | WEIGHT: 199.96 LBS | HEART RATE: 125 BPM

## 2021-10-09 DIAGNOSIS — C92.00 ACUTE MYELOBLASTIC LEUKEMIA, NOT HAVING ACHIEVED REMISSION: ICD-10-CM

## 2021-10-09 DIAGNOSIS — D70.9 NEUTROPENIA, UNSPECIFIED: ICD-10-CM

## 2021-10-09 DIAGNOSIS — I10 ESSENTIAL (PRIMARY) HYPERTENSION: ICD-10-CM

## 2021-10-09 LAB
ALBUMIN SERPL ELPH-MCNC: 4.6 G/DL — SIGNIFICANT CHANGE UP (ref 3.3–5)
ALP SERPL-CCNC: 94 U/L — SIGNIFICANT CHANGE UP (ref 40–120)
ALT FLD-CCNC: 47 U/L — HIGH (ref 10–45)
ANION GAP SERPL CALC-SCNC: 17 MMOL/L — SIGNIFICANT CHANGE UP (ref 5–17)
APPEARANCE UR: CLEAR — SIGNIFICANT CHANGE UP
AST SERPL-CCNC: 17 U/L — SIGNIFICANT CHANGE UP (ref 10–40)
BASOPHILS # BLD AUTO: 0 K/UL — SIGNIFICANT CHANGE UP (ref 0–0.2)
BASOPHILS NFR BLD AUTO: 0 % — SIGNIFICANT CHANGE UP (ref 0–2)
BILIRUB SERPL-MCNC: 0.3 MG/DL — SIGNIFICANT CHANGE UP (ref 0.2–1.2)
BILIRUB UR-MCNC: NEGATIVE — SIGNIFICANT CHANGE UP
BUN SERPL-MCNC: 12 MG/DL — SIGNIFICANT CHANGE UP (ref 7–23)
CALCIUM SERPL-MCNC: 10 MG/DL — SIGNIFICANT CHANGE UP (ref 8.4–10.5)
CHLORIDE SERPL-SCNC: 100 MMOL/L — SIGNIFICANT CHANGE UP (ref 96–108)
CO2 SERPL-SCNC: 20 MMOL/L — LOW (ref 22–31)
COLOR SPEC: SIGNIFICANT CHANGE UP
CREAT SERPL-MCNC: 1.08 MG/DL — SIGNIFICANT CHANGE UP (ref 0.5–1.3)
DIFF PNL FLD: NEGATIVE — SIGNIFICANT CHANGE UP
EOSINOPHIL # BLD AUTO: 0 K/UL — SIGNIFICANT CHANGE UP (ref 0–0.5)
EOSINOPHIL NFR BLD AUTO: 0 % — SIGNIFICANT CHANGE UP (ref 0–6)
GAS PNL BLDV: SIGNIFICANT CHANGE UP
GLUCOSE SERPL-MCNC: 105 MG/DL — HIGH (ref 70–99)
GLUCOSE UR QL: NEGATIVE — SIGNIFICANT CHANGE UP
HCT VFR BLD CALC: 30.8 % — LOW (ref 39–50)
HGB BLD-MCNC: 10.3 G/DL — LOW (ref 13–17)
KETONES UR-MCNC: NEGATIVE — SIGNIFICANT CHANGE UP
LEUKOCYTE ESTERASE UR-ACNC: NEGATIVE — SIGNIFICANT CHANGE UP
LYMPHOCYTES # BLD AUTO: 0.55 K/UL — LOW (ref 1–3.3)
LYMPHOCYTES # BLD AUTO: 25.9 % — SIGNIFICANT CHANGE UP (ref 13–44)
MCHC RBC-ENTMCNC: 29.5 PG — SIGNIFICANT CHANGE UP (ref 27–34)
MCHC RBC-ENTMCNC: 33.4 GM/DL — SIGNIFICANT CHANGE UP (ref 32–36)
MCV RBC AUTO: 88.3 FL — SIGNIFICANT CHANGE UP (ref 80–100)
MONOCYTES # BLD AUTO: 1.32 K/UL — HIGH (ref 0–0.9)
MONOCYTES NFR BLD AUTO: 62.5 % — HIGH (ref 2–14)
NEUTROPHILS # BLD AUTO: 0.24 K/UL — LOW (ref 1.8–7.4)
NEUTROPHILS NFR BLD AUTO: 11.6 % — LOW (ref 43–77)
NITRITE UR-MCNC: NEGATIVE — SIGNIFICANT CHANGE UP
PH UR: 6 — SIGNIFICANT CHANGE UP (ref 5–8)
PLATELET # BLD AUTO: 194 K/UL — SIGNIFICANT CHANGE UP (ref 150–400)
POTASSIUM SERPL-MCNC: 4.1 MMOL/L — SIGNIFICANT CHANGE UP (ref 3.5–5.3)
POTASSIUM SERPL-SCNC: 4.1 MMOL/L — SIGNIFICANT CHANGE UP (ref 3.5–5.3)
PROT SERPL-MCNC: 8 G/DL — SIGNIFICANT CHANGE UP (ref 6–8.3)
PROT UR-MCNC: SIGNIFICANT CHANGE UP
RAPID RVP RESULT: SIGNIFICANT CHANGE UP
RBC # BLD: 3.49 M/UL — LOW (ref 4.2–5.8)
RBC # FLD: 13.6 % — SIGNIFICANT CHANGE UP (ref 10.3–14.5)
SARS-COV-2 RNA SPEC QL NAA+PROBE: SIGNIFICANT CHANGE UP
SODIUM SERPL-SCNC: 137 MMOL/L — SIGNIFICANT CHANGE UP (ref 135–145)
SP GR SPEC: 1.02 — SIGNIFICANT CHANGE UP (ref 1.01–1.02)
UROBILINOGEN FLD QL: NEGATIVE — SIGNIFICANT CHANGE UP
WBC # BLD: 2.11 K/UL — LOW (ref 3.8–10.5)
WBC # FLD AUTO: 2.11 K/UL — LOW (ref 3.8–10.5)

## 2021-10-09 PROCEDURE — 99223 1ST HOSP IP/OBS HIGH 75: CPT | Mod: GC

## 2021-10-09 PROCEDURE — 71045 X-RAY EXAM CHEST 1 VIEW: CPT | Mod: 26

## 2021-10-09 PROCEDURE — 99285 EMERGENCY DEPT VISIT HI MDM: CPT

## 2021-10-09 PROCEDURE — 71250 CT THORAX DX C-: CPT | Mod: 26

## 2021-10-09 PROCEDURE — G1004: CPT

## 2021-10-09 PROCEDURE — 99233 SBSQ HOSP IP/OBS HIGH 50: CPT | Mod: GC

## 2021-10-09 PROCEDURE — 99254 IP/OBS CNSLTJ NEW/EST MOD 60: CPT | Mod: GC

## 2021-10-09 PROCEDURE — 74177 CT ABD & PELVIS W/CONTRAST: CPT | Mod: 26,MG

## 2021-10-09 RX ORDER — VANCOMYCIN HCL 1 G
1250 VIAL (EA) INTRAVENOUS EVERY 12 HOURS
Refills: 0 | Status: DISCONTINUED | OUTPATIENT
Start: 2021-10-09 | End: 2021-10-09

## 2021-10-09 RX ORDER — OXYCODONE HYDROCHLORIDE 5 MG/1
5 TABLET ORAL ONCE
Refills: 0 | Status: DISCONTINUED | OUTPATIENT
Start: 2021-10-09 | End: 2021-10-09

## 2021-10-09 RX ORDER — MEROPENEM 1 G/30ML
1000 INJECTION INTRAVENOUS EVERY 8 HOURS
Refills: 0 | Status: DISCONTINUED | OUTPATIENT
Start: 2021-10-09 | End: 2021-10-11

## 2021-10-09 RX ORDER — ACETAMINOPHEN 500 MG
650 TABLET ORAL ONCE
Refills: 0 | Status: COMPLETED | OUTPATIENT
Start: 2021-10-09 | End: 2021-10-09

## 2021-10-09 RX ORDER — SODIUM CHLORIDE 9 MG/ML
1000 INJECTION, SOLUTION INTRAVENOUS ONCE
Refills: 0 | Status: COMPLETED | OUTPATIENT
Start: 2021-10-09 | End: 2021-10-09

## 2021-10-09 RX ORDER — ACETAMINOPHEN 500 MG
650 TABLET ORAL EVERY 6 HOURS
Refills: 0 | Status: DISCONTINUED | OUTPATIENT
Start: 2021-10-09 | End: 2021-10-12

## 2021-10-09 RX ORDER — SODIUM CHLORIDE 9 MG/ML
2000 INJECTION, SOLUTION INTRAVENOUS ONCE
Refills: 0 | Status: COMPLETED | OUTPATIENT
Start: 2021-10-09 | End: 2021-10-09

## 2021-10-09 RX ORDER — ENOXAPARIN SODIUM 100 MG/ML
40 INJECTION SUBCUTANEOUS DAILY
Refills: 0 | Status: DISCONTINUED | OUTPATIENT
Start: 2021-10-09 | End: 2021-10-12

## 2021-10-09 RX ORDER — VANCOMYCIN HCL 1 G
1000 VIAL (EA) INTRAVENOUS ONCE
Refills: 0 | Status: COMPLETED | OUTPATIENT
Start: 2021-10-09 | End: 2021-10-09

## 2021-10-09 RX ADMIN — MEROPENEM 100 MILLIGRAM(S): 1 INJECTION INTRAVENOUS at 05:13

## 2021-10-09 RX ADMIN — Medication 650 MILLIGRAM(S): at 05:10

## 2021-10-09 RX ADMIN — OXYCODONE HYDROCHLORIDE 5 MILLIGRAM(S): 5 TABLET ORAL at 05:40

## 2021-10-09 RX ADMIN — MEROPENEM 100 MILLIGRAM(S): 1 INJECTION INTRAVENOUS at 23:42

## 2021-10-09 RX ADMIN — OXYCODONE HYDROCHLORIDE 5 MILLIGRAM(S): 5 TABLET ORAL at 08:06

## 2021-10-09 RX ADMIN — OXYCODONE HYDROCHLORIDE 5 MILLIGRAM(S): 5 TABLET ORAL at 19:58

## 2021-10-09 RX ADMIN — SODIUM CHLORIDE 200 MILLILITER(S): 9 INJECTION, SOLUTION INTRAVENOUS at 12:22

## 2021-10-09 RX ADMIN — MEROPENEM 100 MILLIGRAM(S): 1 INJECTION INTRAVENOUS at 15:16

## 2021-10-09 RX ADMIN — SODIUM CHLORIDE 2000 MILLILITER(S): 9 INJECTION, SOLUTION INTRAVENOUS at 05:12

## 2021-10-09 RX ADMIN — Medication 650 MILLIGRAM(S): at 08:06

## 2021-10-09 RX ADMIN — Medication 250 MILLIGRAM(S): at 05:39

## 2021-10-09 NOTE — ED PROVIDER NOTE - CLINICAL SUMMARY MEDICAL DECISION MAKING FREE TEXT BOX
36 Y M H/O AML on chemotherapeutics presenting with fevers, chills, rectal pain. Primary concern for neutropenic fever, will inititaite broad spectrum antibiotics, fluids, CT AP to assess for proctitis. 36 Y M H/O AML on chemotherapeutics presenting with fevers, chills, rectal pain. Primary concern for neutropenic fever, will inititaite broad spectrum antibiotics, fluids, CT AP to assess for proctitis.    Attending Statement: Agree with the above.  Neutropenic fever.  Meets sepsis criteria.  Only complaint currently is rectal pain with external hemorrhoid (small) and excoriation/erythema at anus that does not appear infectious; PERRY deferred given neutropenia.  Plan as above; will need CTAP to eval for rectal abscess v proctitis.  Pan cx, fluids.  Admit.  --BMM

## 2021-10-09 NOTE — H&P ADULT - NSHPREVIEWOFSYSTEMS_GEN_ALL_CORE
REVIEW OF SYSTEMS:    CONSTITUTIONAL: No weakness, fevers or chills  EYES/ENT: No visual changes;  No vertigo or throat pain. Positive for dry mouth.   NECK: No pain or stiffness  RESPIRATORY: No cough, wheezing, hemoptysis; No shortness of breath  CARDIOVASCULAR: No chest pain or palpitations  GASTROINTESTINAL: No abdominal or epigastric pain. No nausea, vomiting, or hematemesis; No diarrhea or constipation. No melena or hematochezia. positive rectal pain.   GENITOURINARY: No dysuria, frequency or hematuria  NEUROLOGICAL: No numbness or weakness  SKIN: No itching, burning, rashes, or lesions   MSK: No current leg pain or other arthralgias or myalgias  HEME: no easy bruising or unexplained bleeding  ENDO: no heat intolerance, no cold intolerance, no increased thirst  PSYCH: no SI, or depression  All other review of systems is negative unless indicated above.

## 2021-10-09 NOTE — H&P ADULT - HISTORY OF PRESENT ILLNESS
Mr. Tian is a 36 year old male with a PMH of HTN, Fatty liver, kidney stones, AML diagnosed in July 2021 s/p consolidation with cytarabine (hematologist Chelsea Yancey), who presented to the ED on 10/9 due to fever the night of presentation. The patient went to sleep around 10pm and woke up at 3am feeling warm and clammy. He took his temperature orally at home and it was 100.7. The day prior to presentation (10/8/21), the patient went to [facility] for an appointment to get his platelet count checked. He states he was feeling a bit run down and thought he was going to need a transfusion, but he did not need a transfusion. He was afebrile during the time of the appointment and went home afterwards. Around 3pm, the patient had bilateral crampy leg pain that was similar to the leg pain he felt during his consolidation therapy treatment. He took hydrocodone and the pain improved. The patient had one episode of diarrhea three days prior to presentation and has been bothered by rectal pain associated with his "large hemorrhoids. He denies any bleeding per rectum. He also feels like his mouth has been more dry than usual over the past several days, but denies all other symptoms.

## 2021-10-09 NOTE — CONSULT NOTE ADULT - ATTENDING COMMENTS
36 y.o male with AML sp induction chemo and first cycle of HIDAC, admitted with neutropenic fevers.  F/u culture. Continue broad spectrum antibiotics.  D/c home once stable and afebrile.      Meredith Newberry MD  Heme/Onc Attending  [facility]    [phone]

## 2021-10-09 NOTE — ED PROVIDER NOTE - OBJECTIVE STATEMENT
36 Y M H/O AML S/ consolidation with cytarabine presenting with plat 36 Y M H/O AML S/ consolidation with cytarabine presenting with subjective fever, chills, rectal pain. States 1 day of fevers/chills, with associated intermittent rectal pain. Denies any cough, SOB, abdominal pain, confusion, skin inflammation, diarrhea.

## 2021-10-09 NOTE — ED ADULT NURSE NOTE - NSIMPLEMENTINTERV_GEN_ALL_ED
Implemented All Universal Safety Interventions:  [place] to call system. Call bell, personal items and telephone within reach. Instruct patient to call for assistance. Room bathroom lighting operational. Non-slip footwear when patient is off stretcher. Physically safe environment: no spills, clutter or unnecessary equipment. Stretcher in lowest position, wheels locked, appropriate side rails in place.

## 2021-10-09 NOTE — H&P ADULT - PROBLEM SELECTOR PLAN 2
The patient was diagnosed with AML, NPM1 mutated, FLT-3 negative, in July 2021. He underwent induction chemotherapy with Daunorubicin/Cytarabine in July. In September he was admitted for cycle 1 consolidation with HIDAC (high dose cytarabine). He tolerated chemotherapy without complications. He follows with hematologist Dr. Chelsea Yancey.     - Continue prophylactic Acyclovir, Diflucan, Levaquin The patient was diagnosed with AML, NPM1 mutated, FLT-3 negative, in July 2021. He underwent induction chemotherapy with Daunorubicin/Cytarabine in July. In September he was admitted for cycle 1 consolidation with HIDAC (high dose cytarabine). He tolerated chemotherapy without complications. He follows with hematologist Dr. Cheslea Yancey.     - Continue prophylactic Acyclovir, Diflucan, Levaquin  - Hematology consult

## 2021-10-09 NOTE — H&P ADULT - NSHPSOCIALHISTORY_GEN_ALL_CORE
The patient lives in [place] with his dad. He used to work as a  at the department of [employer], but stopped working in July 2021 after he was diagnosed with AML and started treatment. He has health insurance through his old job. He states that he used to smoke cigarettes "socially" for a few years about 15 years ago. He denies current tobacco use. He denies current alcohol consumption. He denies use of any recreational drugs including cannabis, cocaine, heroine. He is not currently sexually active.

## 2021-10-09 NOTE — CONSULT NOTE ADULT - ATTENDING COMMENTS
36M htn, recently diagnosed AML July 2021 s/p induction (Daunorubicin and Cytarabine) complicated by neutropenic fever.  Possible rash on cefepime which was changed to zosyn.  Now s/p C1 HIDAC consolidation 9/17-9/22.  Brief admission for not feeling well. Here with neutropenic fever. 36M htn, recently diagnosed AML July 2021 s/p induction (Daunorubicin and Cytarabine) complicated by neutropenic fever.  Possible rash on cefepime which was changed to zosyn.  Now s/p C1 HIDAC consolidation 9/17-9/22.  Brief admission for not feeling well. Here with neutropenic fever.    Neutropenic Fever  - agree with meropenem  - can stop vancomycin  - f/u cultures    Cefepime-allergic  - will avoid    Please call the ID service [phone] with questions or concerns over the weekend

## 2021-10-09 NOTE — H&P ADULT - ASSESSMENT
36 year old male with history of AML s/p consolidation therapy with Cytarabine, HTN, who presented to the ED on 10/9/21 due to fever of 100.7 at home. He met SIRS criteria in the ED with fever to 100.5, tachycardic to 125, RR to 24, and was admitted for septic workup in the setting of neutropenic fever.

## 2021-10-09 NOTE — CONSULT NOTE ADULT - ASSESSMENT
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     #AML  #Neutropenic Sepsis   - diagnosed 8/4/21 by BMBx: NPM1 mutated, FLT3-  - s/p induction with 7+3 dauno/cytarabine on 8/6 with chemotherapeutic marrow on day 14 8/19/21, course complicated by neutropenic sepsis   - 9/2/21 BMBx; trilineage hematopoesis   - s/p C1 HIDAC on 9/17/21-9/22/21  - patient has been on prophylactic levaquin, acyclovir, and fluconazole   - presenting with fever 100.7   - CT chest/abdomen/pelvis showing possible pyelonephritis otherwise unremarkable. UA negative   - Blood cultures pending   - COVID negative   -   - c/w acyclovir and fluconazole   - continue with broad spectrum antibiotics   - f/u ID recs   - CBC with diff daily   - transfuse to maintain hemoglobin <7, platelet >10K, >20K if febrile, >50K if bleeding     Rest of care per primary team    David Roth MD   Hematology/Oncology Fellow   pager [phone]  On call fellow for 10/9/21 until 8am 10/10

## 2021-10-09 NOTE — ED ADULT NURSE REASSESSMENT NOTE - NS ED NURSE REASSESS COMMENT FT1
Received pt awake alert and orientedx4 Resp even and nonlab Denies chest pain Denies sob Afebrils Fluids to PICC line and #20 L /a/c No red swell at insertion site.

## 2021-10-09 NOTE — ED ADULT TRIAGE NOTE - CHIEF COMPLAINT QUOTE
On chemo, last treatment 2 weeks ago. Had fever at home and was told to come to ED for anything over 100.4

## 2021-10-09 NOTE — ED ADULT NURSE REASSESSMENT NOTE - NS ED NURSE REASSESS COMMENT FT1
Pt AAOx3, NAD, resp nonlabored, resting comfortably in bed with family at bedside. Pt denies headache, dizziness, chest pain, palpitations, SOB, abd pain, n/v/d, urinary symptoms, fevers, chills, weakness at this time. Pt awaiting fluid completion/repeat labs/imaging results/CT. Safety maintained.

## 2021-10-09 NOTE — CONSULT NOTE ADULT - SUBJECTIVE AND OBJECTIVE BOX
Patient is a 36y old  Male who presents with a chief complaint of   HPI: Mr. Tian is a 36 year old gentlemane with a PMH of HTN, Fatty liver, kidney stones, AML diagnosed in 2021 s/p induction (Daunorubicin and Cytarabine) and s/p C1 HIDAC consolidation - who presented to the ED on 10/9 due to fever to 100.7 with chills the night of presentation.  He also had bilateral crampy leg pain that was similar to the leg pain he felt during his consolidation therapy treatment which resolved after taking hydrocodone. He denied any diarrhea, abdominal pain, has no pain from his haemorrhoides, no blood in stool. Of note he had neutropenic sepsis during his induction chemo, with several negative blood and urine cx. He was on cefepime during that hospital course but report he developed a rash while on cefepime. In ER he was febrile to 100.5 with leukopenia to 1.57 (Previously 0.56) and  (Previously 10). Pt has been on prophylactic Levaquin acyclovir and fluconazole at home. CT chest/abdomen/pelvis showing Area of hypoenhancement suggested in the upper pole of the left kidney, possible pyelonephritis, however UA negative and pt denies dysuria.     REVIEW OF SYSTEMS  [  ] ROS unobtainable because:    [ x ] All other systems negative except as noted below    Constitutional:  [ ] fever [ ] chills  [ ] weight loss  [ ]night sweat  [ ]poor appetite/PO intake [ ]fatigue   Skin:  [ ] rash [ ] phlebitis	  Eyes: [ ] icterus [ ] pain  [ ] discharge	  ENMT: [ ] sore throat  [ ] thrush [ ] ulcers [ ] exudates [ ]anosmia  Respiratory: [ ] dyspnea [ ] hemoptysis [ ] cough [ ] sputum	  Cardiovascular:  [ ] chest pain [ ] palpitations [ ] edema	  Gastrointestinal:  [ ] nausea [ ] vomiting [ ] diarrhea [ ] constipation [ ] pain	  Genitourinary:  [ ] dysuria [ ] frequency [ ] hematuria [ ] discharge [ ] flank pain  [ ] incontinence  Musculoskeletal:  [ ] myalgias [ ] arthralgias [ ] arthritis  [ ] back pain  Neurological:  [ ] headache [ ] weakness [ ] seizures  [ ] confusion/altered mental status    prior hospital charts reviewed [V]  primary team notes reviewed [V]  other consultant notes reviewed [V]    PAST MEDICAL & SURGICAL HISTORY:  Hypertension  diagnosed 1 year ago    Kidney stone  5 years ago    History of genital warts        SOCIAL HISTORY:  - Denied smoking/vaping/alcohol/recreational drug use    FAMILY HISTORY:  FH: CAD (coronary artery disease) (Father, Mother)        Allergies  No Known Allergies        ANTIMICROBIALS:  meropenem  IVPB 1000 every 8 hours  vancomycin  IVPB 1250 every 12 hours      ANTIMICROBIALS (past 90 days):  MEDICATIONS  (STANDING):  meropenem  IVPB   100 mL/Hr IV Intermittent (10-09-21 @ 15:16)   100 mL/Hr IV Intermittent (10-09-21 @ 05:13)    vancomycin  IVPB   250 mL/Hr IV Intermittent (10-09-21 @ 05:39)        OTHER MEDS:   MEDICATIONS  (STANDING):  acetaminophen   Tablet .. 650 every 6 hours PRN  enoxaparin Injectable 40 daily      VITALS:  Vital Signs Last 24 Hrs  T(F): 98.3 (10-09-21 @ 10:36), Max: 100.5 (10-09-21 @ 04:50)    Vital Signs Last 24 Hrs  HR: 103 (10-09-21 @ 10:36) (103 - 125)  BP: 125/80 (10-09-21 @ 10:36) (113/77 - 134/92)  RR: 15 (10-09-21 @ 10:36)  SpO2: 100% (10-09-21 @ 10:36) (97% - 100%)  Wt(kg): --    EXAM:    GA: NAD, AOx3  HEENT: oral cavity no lesion  CV: nl S1/S2, no RMG  Lungs: CTAB, No distress  Abd: BS+, soft, nontender, no rebounding pain  Ext: no edema  Neuro: No focal deficits  Skin: Intact  IV: no phlebitis    Labs:                        10.3   2.11  )-----------( 194      ( 09 Oct 2021 05:04 )             30.8     10-09    137  |  100  |  12  ----------------------------<  105<H>  4.1   |  20<L>  |  1.08    Ca    10.0      09 Oct 2021 05:04    TPro  8.0  /  Alb  4.6  /  TBili  0.3  /  DBili  x   /  AST  17  /  ALT  47<H>  /  AlkPhos  94  10-09      WBC Trend:  WBC Count: 2.11 (10-09-21 @ 05:04)  WBC Count: 1.57 (10-08-21 @ 12:59)  WBC Count: 0.56 (10-06-21 @ 14:12)      Auto Neutrophil #: 0.24 K/uL (10-09-21 @ 05:04)  Auto Neutrophil #: 0.11 K/uL (10-08-21 @ 12:59)  Auto Neutrophil #: 0.01 K/uL (10-06-21 @ 14:12)  Auto Neutrophil #: 0.15 K/uL (10-01-21 @ 15:48)  Auto Neutrophil #: 0.26 K/uL (10-01-21 @ 00:34)      Creatine Trend:  Creatinine, Serum: 1.08 (10-09)      Liver Biochemical Testing Trend:  Alanine Aminotransferase (ALT/SGPT): 47 *H* (10-09)  Alanine Aminotransferase (ALT/SGPT): 130 *H* (10-01)  Alanine Aminotransferase (ALT/SGPT): 82 *H* ()  Alanine Aminotransferase (ALT/SGPT): 61 *H* ()  Alanine Aminotransferase (ALT/SGPT): 73 *H* ()  Aspartate Aminotransferase (AST/SGOT): 17 (10-09-21 @ 05:04)  Aspartate Aminotransferase (AST/SGOT): 54 (10-01-21 @ 00:34)  Aspartate Aminotransferase (AST/SGOT): 49 (21 @ 06:41)  Aspartate Aminotransferase (AST/SGOT): 26 (21 @ 06:45)  Aspartate Aminotransferase (AST/SGOT): 37 (21 @ 07:48)  Bilirubin Total, Serum: 0.3 (10-09)  Bilirubin Total, Serum: 0.3 (10-01)  Bilirubin Total, Serum: 0.4 ()  Bilirubin Total, Serum: 0.8 ()  Bilirubin Total, Serum: 0.7 ()      Trend LDH  21 @ 06:44  192  21 @ 06:47  202  21 @ 06:57  202  21 @ 07:03  171  21 @ 06:54  158      Auto Eosinophil %: 0.0 % (10-09-21 @ 05:04)  Auto Eosinophil %: 0.0 % (10-08-21 @ 12:59)      Urinalysis Basic - ( 09 Oct 2021 06:41 )    Color: Light Yellow / Appearance: Clear / S.020 / pH: x  Gluc: x / Ketone: Negative  / Bili: Negative / Urobili: Negative   Blood: x / Protein: Trace / Nitrite: Negative   Leuk Esterase: Negative / RBC: x / WBC x   Sq Epi: x / Non Sq Epi: x / Bacteria: x        MICROBIOLOGY:    MRSA PCR Result.: NotDetec (21 @ 18:32)      Culture - Urine (collected 20 Aug 2021 17:59)  Source: Clean Catch Clean Catch (Midstream)  Final Report:    No growth    Culture - Blood (collected 20 Aug 2021 13:16)  Source: .Blood Blood-Peripheral  Final Report:    No Growth Final    Culture - Blood (collected 20 Aug 2021 09:16)  Source: .Blood Blood-Catheter  Final Report:    No Growth Final    Culture - Urine (collected 17 Aug 2021 17:26)  Source: Clean Catch Clean Catch (Midstream)  Final Report:    No growth    Culture - Blood (collected 17 Aug 2021 12:55)  Source: .Blood Blood-Peripheral  Final Report:    No Growth Final    Culture - Blood (collected 17 Aug 2021 10:17)  Source: .Blood Blood-Catheter  Final Report:    No Growth Final    Culture - Urine (collected 14 Aug 2021 20:49)  Source: Clean Catch Clean Catch (Midstream)  Final Report:    No growth    Culture - Blood (collected 14 Aug 2021 20:46)  Source: .Blood Blood-Peripheral  Final Report:    No Growth Final    Culture - Blood (collected 14 Aug 2021 20:46)  Source: .Blood Blood-Catheter  Final Report:    No Growth Final    Culture - Urine (collected 12 Aug 2021 22:09)  Source: Clean Catch Clean Catch (Midstream)  Final Report:    <10,000 CFU/mL Normal Urogenital Heather      HIV-1/2 Combo Result: Nonreact (21 @ 08:51)                    Rapid RVP Result: NotDetec (10-09 @ 05:29)    COVID-19 PCR: NotDetec (21 @ 06:56)  COVID-19 PCR: NotDetec (08-19-21 @ 07:11)  COVID-19 PCR: NotDetec (21 @ 17:06)    COVID-19 Addy Domain AB Interp: Positive (21 @ 11:04)  COVID-19 Addy Domain AB Interp: Positive (21 @ 10:30)                    Blood Gas Venous - Lactate: 1.6 (10-09 @ 05:04)    A1C with Estimated Average Glucose Result: 6.1 % (21 @ 09:26)      CSF:                  RADIOLOGY:  imaging below personally reviewed    CT Chest No Cont:   EXAM:  CT CHEST                        PROCEDURE DATE:  10/09/2021    IIMPRESSION:  Clear lungs.            --- End of Report ---            CT Abdomen and Pelvis w/ Oral Cont and w/ IV Cont:   EXAM:  CT ABDOMEN AND PELVIS OC IC                        PROCEDURE DATE:  10/09/2021    No hydronephrosis or perinephric stranding. Punctate nonobstructing left intrarenal calculus. Area of hypoenhancement suggested in the upper pole of the left kidney, not seen on the prior exam 2021  please correlate clinically for possible pyelonephritis.  No rectal abscess.  --- End of Report ---     Patient is a 36y old  Male who presents with a chief complaint of   HPI: Mr. Tian is a 36 year old gentlemane with a PMH of HTN, Fatty liver, kidney stones, AML diagnosed in 2021 s/p induction (Daunorubicin and Cytarabine) and s/p C1 HIDAC consolidation - who presented to the ED on 10/9 due to fever to 100.7 with chills the night of presentation.  He also had bilateral crampy leg pain that was similar to the leg pain he felt during his consolidation therapy treatment which resolved after taking hydrocodone. He denied any diarrhea, abdominal pain, has no pain from his haemorrhoides, no blood in stool. Of note he had neutropenic sepsis during his induction chemo, with several negative blood and urine cx. He was on cefepime during that hospital course but report he developed a rash while on cefepime. In ER he was febrile to 100.5 with leukopenia to 1.57 (Previously 0.56) and  (Previously 10). Pt has been on prophylactic Levaquin acyclovir and fluconazole at home. CT chest/abdomen/pelvis showing Area of hypoenhancement suggested in the upper pole of the left kidney, possible pyelonephritis, however UA negative and pt denies dysuria.     REVIEW OF SYSTEMS  [  ] ROS unobtainable because:    [ x ] All other systems negative except as noted below    Constitutional:  [ ] fever [ ] chills  [ ] weight loss  [ ]night sweat  [ ]poor appetite/PO intake [ ]fatigue   Skin:  [ ] rash [ ] phlebitis	  Eyes: [ ] icterus [ ] pain  [ ] discharge	  ENMT: [ ] sore throat  [ ] thrush [ ] ulcers [ ] exudates [ ]anosmia  Respiratory: [ ] dyspnea [ ] hemoptysis [ ] cough [ ] sputum	  Cardiovascular:  [ ] chest pain [ ] palpitations [ ] edema	  Gastrointestinal:  [ ] nausea [ ] vomiting [ ] diarrhea [ ] constipation [ ] pain	  Genitourinary:  [ ] dysuria [ ] frequency [ ] hematuria [ ] discharge [ ] flank pain  [ ] incontinence  Musculoskeletal:  [ ] myalgias [ ] arthralgias [ ] arthritis  [ ] back pain  Neurological:  [ ] headache [ ] weakness [ ] seizures  [ ] confusion/altered mental status    prior hospital charts reviewed [V]  primary team notes reviewed [V]  other consultant notes reviewed [V]    PAST MEDICAL & SURGICAL HISTORY:  Hypertension  diagnosed 1 year ago    Kidney stone  5 years ago    History of genital warts        SOCIAL HISTORY:  - Denied smoking/vaping/alcohol/recreational drug use    FAMILY HISTORY:  FH: CAD (coronary artery disease) (Father, Mother)        Allergies  No Known Allergies    ANTIMICROBIALS:  meropenem  IVPB 1000 every 8 hours (10/9-)  vancomycin  IVPB 1250 every 12 hours (10/9-)    MEDICATIONS  (STANDING):  acetaminophen   Tablet .. 650 every 6 hours PRN  enoxaparin Injectable 40 daily    VITALS:  Vital Signs Last 24 Hrs  T(F): 98.3 (10-09-21 @ 10:36), Max: 100.5 (10-09-21 @ 04:50)    Vital Signs Last 24 Hrs  HR: 103 (10-09-21 @ 10:36) (103 - 125)  BP: 125/80 (10-09-21 @ 10:36) (113/77 - 134/92)  RR: 15 (10-09-21 @ 10:36)  SpO2: 100% (10-09-21 @ 10:36) (97% - 100%)  Wt(kg): --    EXAM:    GA: NAD, AOx3  HEENT: oral cavity no lesion  CV: nl S1/S2, no RMG  Lungs: CTAB, No distress  Abd: BS+, soft, nontender, no rebounding pain  Ext: no edema  Neuro: No focal deficits  Skin: Intact  IV: no phlebitis, PICC                        10.3   2.11  )-----------( 194      ( 09 Oct 2021 05:04 )             30.8     137  |  100  |  12  ----------------------------<  105<H>  4.1   |  20<L>  |  1.08    Ca    10.0      09 Oct 2021 05:04    TPro  8.0  /  Alb  4.6  /  TBili  0.3  /  DBili  x   /  AST  17  /  ALT  47<H>  /  AlkPhos  94  10-09    WBC Count: 2.11 (10-09-21 @ 05:04)  WBC Count: 1.57 (10-08-21 @ 12:59)  WBC Count: 0.56 (10-06-21 @ 14:12)    Auto Neutrophil #: 0.24 K/uL (10-09-21 @ 05:04)  Auto Neutrophil #: 0.11 K/uL (10-08-21 @ 12:59)  Auto Neutrophil #: 0.01 K/uL (10-06-21 @ 14:12)  Auto Neutrophil #: 0.15 K/uL (10-01-21 @ 15:48)  Auto Neutrophil #: 0.26 K/uL (10-01-21 @ 00:34)    Alanine Aminotransferase (ALT/SGPT): 47 *H* (10-09)  Alanine Aminotransferase (ALT/SGPT): 130 *H* (10-01)  Alanine Aminotransferase (ALT/SGPT): 82 *H* ()  Alanine Aminotransferase (ALT/SGPT): 61 *H* ()  Alanine Aminotransferase (ALT/SGPT): 73 *H* ()    Aspartate Aminotransferase (AST/SGOT): 17 (10-09-21 @ 05:04)  Aspartate Aminotransferase (AST/SGOT): 54 (10-01-21 @ 00:34)  Aspartate Aminotransferase (AST/SGOT): 49 (21 @ 06:41)  Aspartate Aminotransferase (AST/SGOT): 26 (21 @ 06:45)  Aspartate Aminotransferase (AST/SGOT): 37 (21 @ 07:48)    Bilirubin Total, Serum: 0.3 (10-09)  Bilirubin Total, Serum: 0.3 (10-01)  Bilirubin Total, Serum: 0.4 ()  Bilirubin Total, Serum: 0.8 ()  Bilirubin Total, Serum: 0.7 ()    Urinalysis Basic - ( 09 Oct 2021 06:41 )  Color: Light Yellow / Appearance: Clear / S.020 / pH: x  Gluc: x / Ketone: Negative  / Bili: Negative / Urobili: Negative   Blood: x / Protein: Trace / Nitrite: Negative   Leuk Esterase: Negative / RBC: x / WBC x   Sq Epi: x / Non Sq Epi: x / Bacteria: x    MICROBIOLOGY:  MRSA PCR Result.: Ke (21 @ 18:32)    Culture - Urine (collected 20 Aug 2021 17:59)  Source: Clean Catch Clean Catch (Midstream)  Final Report:    No growth    Culture - Blood (collected 20 Aug 2021 13:16)  Source: .Blood Blood-Peripheral  Final Report:    No Growth Final    Culture - Blood (collected 20 Aug 2021 09:16)  Source: .Blood Blood-Catheter  Final Report:    No Growth Final    Culture - Urine (collected 17 Aug 2021 17:26)  Source: Clean Catch Clean Catch (Midstream)  Final Report:    No growth    Culture - Blood (collected 17 Aug 2021 12:55)  Source: .Blood Blood-Peripheral  Final Report:    No Growth Final    Culture - Blood (collected 17 Aug 2021 10:17)  Source: .Blood Blood-Catheter  Final Report:    No Growth Final    Culture - Urine (collected 14 Aug 2021 20:49)  Source: Clean Catch Clean Catch (Midstream)  Final Report:    No growth    Culture - Blood (collected 14 Aug 2021 20:46)  Source: .Blood Blood-Peripheral  Final Report:    No Growth Final    Culture - Blood (collected 14 Aug 2021 20:46)  Source: .Blood Blood-Catheter  Final Report:    No Growth Final    Culture - Urine (collected 12 Aug 2021 22:09)  Source: Clean Catch Clean Catch (Midstream)  Final Report:    <10,000 CFU/mL Normal Urogenital Heather    HIV-1/2 Combo Result: Nonreact (21 @ 08:51)    Rapid RVP Result: NotDetec (10-09 @ 05:29)    COVID-19 PCR: NotDetec (21 @ 06:56)  COVID-19 PCR: NotDetec (21 @ 07:11)  COVID-19 PCR: NotDetec (21 @ 17:06)    COVID-19 Addy Domain AB Interp: Positive (21 @ 11:04)  COVID-19 Addy Domain AB Interp: Positive (21 @ 10:30)    RADIOLOGY:  imaging below personally reviewed    CT Chest No Cont:   EXAM:  CT CHEST                        PROCEDURE DATE:  10/09/2021    IIMPRESSION:  Clear lungs.    CT Abdomen and Pelvis w/ Oral Cont and w/ IV Cont:   EXAM:  CT ABDOMEN AND PELVIS OC IC                        PROCEDURE DATE:  10/09/2021    No hydronephrosis or perinephric stranding. Punctate nonobstructing left intrarenal calculus. Area of hypoenhancement suggested in the upper pole of the left kidney, not seen on the prior exam 2021. please correlate clinically for possible pyelonephritis.  No rectal abscess.    CT Abdomen and Pelvis w/ Oral Cont and w/ IV Cont (21 @ 21:56) >  IMPRESSION:  *  Etiology for the patient's fever not identified  *  Hepatic steatosis.  *  Resolved right pleural effusion     Patient is a 36y old  Male who presents with a chief complaint of   HPI: Mr. Tian is a 36 year old gentlemane with a PMH of HTN, Fatty liver, kidney stones, AML diagnosed in 2021 s/p induction (Daunorubicin and Cytarabine) and s/p C1 HIDAC consolidation - who presented to the ED on 10/9 due to fever to 100.7 with chills the night of presentation.  He also had bilateral crampy leg pain that was similar to the leg pain he felt during his consolidation therapy treatment which resolved after taking hydrocodone. He denied any diarrhea, abdominal pain, has no pain from his haemorrhoides, no blood in stool. Of note he had neutropenic sepsis during his induction chemo, with several negative blood and urine cx. He was on cefepime during that hospital course but report he developed a rash while on cefepime. In ER he was febrile to 100.5 with leukopenia to 1.57 (Previously 0.56) and  (Previously 10). Pt has been on prophylactic Levaquin acyclovir and fluconazole at home. CT chest/abdomen/pelvis showing Area of hypoenhancement suggested in the upper pole of the left kidney, possible pyelonephritis, however UA negative and pt denies dysuria.     REVIEW OF SYSTEMS  [  ] ROS unobtainable because:    [ x ] All other systems negative except as noted below    Constitutional:  [ ] fever [ ] chills  [ ] weight loss  [ ]night sweat  [ ]poor appetite/PO intake [ ]fatigue   Skin:  [ ] rash [ ] phlebitis	  Eyes: [ ] icterus [ ] pain  [ ] discharge	  ENMT: [ ] sore throat  [ ] thrush [ ] ulcers [ ] exudates [ ]anosmia  Respiratory: [ ] dyspnea [ ] hemoptysis [ ] cough [ ] sputum	  Cardiovascular:  [ ] chest pain [ ] palpitations [ ] edema	  Gastrointestinal:  [ ] nausea [ ] vomiting [ ] diarrhea [ ] constipation [ ] pain	  Genitourinary:  [ ] dysuria [ ] frequency [ ] hematuria [ ] discharge [ ] flank pain  [ ] incontinence  Musculoskeletal:  [ ] myalgias [ ] arthralgias [ ] arthritis  [ ] back pain  Neurological:  [ ] headache [ ] weakness [ ] seizures  [ ] confusion/altered mental status    prior hospital charts reviewed [V]  primary team notes reviewed [V]  other consultant notes reviewed [V]    PAST MEDICAL & SURGICAL HISTORY:  Hypertension  diagnosed 1 year ago    Kidney stone  5 years ago    History of genital warts        SOCIAL HISTORY:  - Denied smoking/vaping/alcohol/recreational drug use    FAMILY HISTORY:  FH: CAD (coronary artery disease) (Father, Mother)        Allergies  No Known Allergies    ANTIMICROBIALS:  meropenem  IVPB 1000 every 8 hours (10/9-)  vancomycin  IVPB 1250 every 12 hours (10/9-)    MEDICATIONS  (STANDING):  acetaminophen   Tablet .. 650 every 6 hours PRN  enoxaparin Injectable 40 daily    VITALS:  Vital Signs Last 24 Hrs  T(F): 98.3 (10-09-21 @ 10:36), Max: 100.5 (10-09-21 @ 04:50)    Vital Signs Last 24 Hrs  HR: 103 (10-09-21 @ 10:36) (103 - 125)  BP: 125/80 (10-09-21 @ 10:36) (113/77 - 134/92)  RR: 15 (10-09-21 @ 10:36)  SpO2: 100% (10-09-21 @ 10:36) (97% - 100%)  Wt(kg): --    EXAM:    PHYSICAL EXAM:  General:  non-toxic, AOx3  HEAD/EYES: PERRL, white sclera   ENT:  normal, No thrush and no pharyngeal exudate  Neck: Supple  Cardiovascular:   No murmur,  normal S1 and S2  Respiratory: clear to ausculation bilaterally  GI:  soft, non-tender, normal bowel sounds  : no mendoza, no CVA tenderness   Musculoskeletal:  no synovitis  Neurologic: non-focal exam   Skin: no rash  Lymph:  no lymphadenopathy  Psychiatric: Cooperative, appropriate affect, alert & oriented  Lines:  no phlebitis                             10.3   2.11  )-----------( 194      ( 09 Oct 2021 05:04 )             30.8     137  |  100  |  12  ----------------------------<  105<H>  4.1   |  20<L>  |  1.08    Ca    10.0      09 Oct 2021 05:04    TPro  8.0  /  Alb  4.6  /  TBili  0.3  /  DBili  x   /  AST  17  /  ALT  47<H>  /  AlkPhos  94  10-09    WBC Count: 2.11 (10-09-21 @ 05:04)  WBC Count: 1.57 (10-08-21 @ 12:59)  WBC Count: 0.56 (10-06-21 @ 14:12)    Auto Neutrophil #: 0.24 K/uL (10-09-21 @ 05:04)  Auto Neutrophil #: 0.11 K/uL (10-08-21 @ 12:59)  Auto Neutrophil #: 0.01 K/uL (10-06-21 @ 14:12)  Auto Neutrophil #: 0.15 K/uL (10-01-21 @ 15:48)  Auto Neutrophil #: 0.26 K/uL (10-01-21 @ 00:34)    Alanine Aminotransferase (ALT/SGPT): 47 *H* (10-09)  Alanine Aminotransferase (ALT/SGPT): 130 *H* (10-01)  Alanine Aminotransferase (ALT/SGPT): 82 *H* ()  Alanine Aminotransferase (ALT/SGPT): 61 *H* ()  Alanine Aminotransferase (ALT/SGPT): 73 *H* ()    Aspartate Aminotransferase (AST/SGOT): 17 (10-09-21 @ 05:04)  Aspartate Aminotransferase (AST/SGOT): 54 (10-01-21 @ 00:34)  Aspartate Aminotransferase (AST/SGOT): 49 (21 @ 06:41)  Aspartate Aminotransferase (AST/SGOT): 26 (21 @ 06:45)  Aspartate Aminotransferase (AST/SGOT): 37 (21 @ 07:48)    Bilirubin Total, Serum: 0.3 (10-09)  Bilirubin Total, Serum: 0.3 (10-01)  Bilirubin Total, Serum: 0.4 ()  Bilirubin Total, Serum: 0.8 ()  Bilirubin Total, Serum: 0.7 ()    Urinalysis Basic - ( 09 Oct 2021 06:41 )  Color: Light Yellow / Appearance: Clear / S.020 / pH: x  Gluc: x / Ketone: Negative  / Bili: Negative / Urobili: Negative   Blood: x / Protein: Trace / Nitrite: Negative   Leuk Esterase: Negative / RBC: x / WBC x   Sq Epi: x / Non Sq Epi: x / Bacteria: x    MICROBIOLOGY:  MRSA PCR Result.: NotDetec (21 @ 18:32)    Culture - Urine (collected 20 Aug 2021 17:59)  Source: Clean Catch Clean Catch (Midstream)  Final Report:    No growth    Culture - Blood (collected 20 Aug 2021 13:16)  Source: .Blood Blood-Peripheral  Final Report:    No Growth Final    Culture - Blood (collected 20 Aug 2021 09:16)  Source: .Blood Blood-Catheter  Final Report:    No Growth Final    Culture - Urine (collected 17 Aug 2021 17:26)  Source: Clean Catch Clean Catch (Midstream)  Final Report:    No growth    Culture - Blood (collected 17 Aug 2021 12:55)  Source: .Blood Blood-Peripheral  Final Report:    No Growth Final    Culture - Blood (collected 17 Aug 2021 10:17)  Source: .Blood Blood-Catheter  Final Report:    No Growth Final    Culture - Urine (collected 14 Aug 2021 20:49)  Source: Clean Catch Clean Catch (Midstream)  Final Report:    No growth    Culture - Blood (collected 14 Aug 2021 20:46)  Source: .Blood Blood-Peripheral  Final Report:    No Growth Final    Culture - Blood (collected 14 Aug 2021 20:46)  Source: .Blood Blood-Catheter  Final Report:    No Growth Final    Culture - Urine (collected 12 Aug 2021 22:09)  Source: Clean Catch Clean Catch (Midstream)  Final Report:    <10,000 CFU/mL Normal Urogenital Heather    HIV-1/2 Combo Result: Nonreact (21 @ 08:51)    Rapid RVP Result: NotDetec (10-09 @ 05:29)    COVID-19 PCR: NotDetec (21 @ 06:56)  COVID-19 PCR: NotDetec (21 @ 07:11)  COVID-19 PCR: NotDetec (21 @ 17:06)    COVID-19 Addy Domain AB Interp: Positive (21 @ 11:04)  COVID-19 Addy Domain AB Interp: Positive (21 @ 10:30)    RADIOLOGY:  imaging below personally reviewed    CT Chest No Cont:   EXAM:  CT CHEST                        PROCEDURE DATE:  10/09/2021    IIMPRESSION:  Clear lungs.    CT Abdomen and Pelvis w/ Oral Cont and w/ IV Cont:   EXAM:  CT ABDOMEN AND PELVIS OC IC                        PROCEDURE DATE:  10/09/2021    No hydronephrosis or perinephric stranding. Punctate nonobstructing left intrarenal calculus. Area of hypoenhancement suggested in the upper pole of the left kidney, not seen on the prior exam 2021. please correlate clinically for possible pyelonephritis.  No rectal abscess.    CT Abdomen and Pelvis w/ Oral Cont and w/ IV Cont (21 @ 21:56) >  IMPRESSION:  *  Etiology for the patient's fever not identified  *  Hepatic steatosis.  *  Resolved right pleural effusion

## 2021-10-09 NOTE — ED PROVIDER NOTE - PHYSICAL EXAMINATION
General: WN/WD NAD  Head: Atraumatic  Eyes: EOM grossly in tact, no scleral icterus  ENT: moist mucous membranes  Neurology: A&Ox3, nonfocal, EDGE x 4  Respiratory: normal respiratory effort  Abdomen: soft, non-tender to palpation  CV: Extremities warm and well perfused  Abdominal: Soft, non-distended  Extremities: No edema  Skin: No rashes

## 2021-10-09 NOTE — ED PROVIDER NOTE - NS ED ROS FT
GENERAL: + fever and chills  CARDIAC: No chest pain  PULMONARY: No cough or SOB  GI: No abdominal pain, no nausea or no vomiting, no diarrhea or constipation, + rectal pain  : No changes in urination  SKIN: No rashes  NEURO: No headache, no numbness  MSK: No joint pain  Otherwise as HPI or negative.

## 2021-10-09 NOTE — ED ADULT NURSE NOTE - OBJECTIVE STATEMENT
Pt is a 37 y/o M, PMH , presenting to ED c/o fever on chemo. Pt states his last treatment was 2 weeks ago. Pt reports waking up from his sleep feeling sweaty and clammy, found his temperature to be 100.7F orally. Pt denies headache, dizziness, chest pain, palpitations, cough, SOB, abdominal pain, n/v/d, urinary symptoms, weakness at this time. 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 Pt states his last treatment was 2 weeks ago. Pt reports waking up from his sleep feeling sweaty and clammy, found his temperature to be 100.7F orally. Pt denies headache, dizziness, chest pain, palpitations, cough, SOB, abdominal pain, n/v/d, urinary symptoms, weakness at this time. Pt is A&Ox3, moves

## 2021-10-09 NOTE — H&P ADULT - ATTENDING COMMENTS
36M with history of AML s/p consolidation therapy with Cytarabine, HTN, who presents with neutropenic fever.  S/p vanc and meropenem in ED  F/u blood cultures  Cont meropenem, d/c vanc  F/u CT C/A/P to evaluate for source  ID and heme consult  Low microbial diet  Monitor WBC count daily, appears to be improving  S/p 2L IB+VF in ED; cont LR at 200cc/hr given sinus tachycardia; can decrease to 100cc/hr after 10 hrs  Lovenox for DVT ppx  Rest per resident documentation above

## 2021-10-09 NOTE — H&P ADULT - NSHPPHYSICALEXAM_GEN_ALL_CORE
VITALS:   T(C): 37.1 (10-09-21 @ 07:42), Max: 38.1 (10-09-21 @ 04:50)  HR: 123 (10-09-21 @ 07:42) (109 - 125)  BP: 127/86 (10-09-21 @ 07:42) (113/77 - 134/92)  RR: 20 (10-09-21 @ 07:42) (17 - 24)  SpO2: 100% (10-09-21 @ 07:42) (97% - 100%)    GENERAL: NAD, lying in bed comfortably  HEAD:  Atraumatic, Normocephalic  EYES: EOMI, PERRLA, conjunctiva and sclera clear  ENT: Moist mucous membranes  NECK: Supple, No JVD  CHEST/LUNG: Clear to auscultation bilaterally; No rales, rhonchi, wheezing, or rubs. Unlabored respirations  HEART: Tachycardic rate, regular rhythm; No murmurs, rubs, or gallops  ABDOMEN: Soft, nontender, nondistended, no hepatosplenomegaly.  EXTREMITIES:  2+ Peripheral Pulses, brisk capillary refill. No clubbing, cyanosis, or edema  NERVOUS SYSTEM:  A&Ox3, no focal deficits   SKIN: No rashes or lesions  Psych: Normal speech, normal behavior, normal affect

## 2021-10-09 NOTE — ED PROVIDER NOTE - NSICDXPASTMEDICALHX_GEN_ALL_CORE_FT
PAST MEDICAL HISTORY:  History of genital warts     Hypertension diagnosed 1 year ago    Kidney stone 5 years ago    
Yes

## 2021-10-09 NOTE — H&P ADULT - NSHPLABSRESULTS_GEN_ALL_CORE
LABS:                        10.3   2.11  )-----------( 194      ( 09 Oct 2021 05:04 )             30.8     10-09    137  |  100  |  12  ----------------------------<  105<H>  4.1   |  20<L>  |  1.08    Ca    10.0      09 Oct 2021 05:04    TPro  8.0  /  Alb  4.6  /  TBili  0.3  /  DBili  x   /  AST  17  /  ALT  47<H>  /  AlkPhos  94  10-09        Urinalysis Basic - ( 09 Oct 2021 06:41 )    Color: Light Yellow / Appearance: Clear / S.020 / pH: x  Gluc: x / Ketone: Negative  / Bili: Negative / Urobili: Negative   Blood: x / Protein: Trace / Nitrite: Negative   Leuk Esterase: Negative / RBC: x / WBC x   Sq Epi: x / Non Sq Epi: x / Bacteria: x        RADIOLOGY & ADDITIONAL TESTS:    Imaging Personally Reviewed:  Portable chest radiograph 10/9/21:  Clear lungs    CT Abdomen and Pelvis with IV and oral contrast 10/9/21:  Region of hypoenhancement upper pole left kidney; please correlate clinically for possible pyelonephritis. No hydronephrosis or perinephric stranding. No rectal abscess.

## 2021-10-09 NOTE — H&P ADULT - PROBLEM SELECTOR PLAN 1
The patient reports a fever of 100.7 at home and was febrile to 100.5 in the ED on 10/9/21. WBC count was 2.11 on admission with 11.6% neutrophils. He is currently afebrile. Unclear source of infection at this time. Unlikely to be pulmonary source as the patient denies pulmonary symptoms including SOB / cough and is saturating well on room air. CT A/P suggested possible pyelonephritis, but this is unlikely as patient denies urinary symptoms and had negative UA.     - Started on empiric broad spectrum antibiotics with meropenem and Vancomycin   - F/U blood cultures from 10/9  - F/U urine cultures  - F/U CT chest   - Continue to monitor CBC   - Continue to monitor fever curve   - Tylenol as needed for fever The patient reports a fever of 100.7 at home and was febrile to 100.5 in the ED on 10/9/21. WBC count was 2.11 on admission with 11.6% neutrophils. He is currently afebrile. Unclear source of infection at this time. Unlikely to be pulmonary source as the patient denies pulmonary symptoms including SOB / cough and is saturating well on room air. CT A/P suggested possible pyelonephritis, but this is unlikely as patient denies urinary symptoms and had negative UA.     - Started on empiric broad spectrum antibiotics with meropenem and Vancomycin   - Consult ID  - Consult Hematology  - F/U blood cultures from 10/9  - F/U urine cultures  - F/U CT chest   - Will continue fluids in the setting of tachycardia  - Continue to monitor CBC   - Continue to monitor fever curve   - Tylenol as needed for fever

## 2021-10-09 NOTE — CONSULT NOTE ADULT - SUBJECTIVE AND OBJECTIVE BOX
HPI:  Mr. 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 The patient went to sleep around 10pm and woke up at 3am feeling warm and clammy. He took his temperature orally at home and it was 100.7. The day prior to presentation (10/8/21), the patient went to [email or web address] for an appointment to get his platelet count checked. He states he was feeling a bit run down and thought he was going to need a transfusion, but he did not need a transfusion. He was afebrile during the time of the appointment and went home afterwards. Around 3pm, the patient had bilateral crampy leg pain that was similar to the leg pain he felt during his consolidation therapy treatment. He took hydrocodone and the pain improved. The patient had one episode of diarrhea three days prior to presentation and has been bothered by rectal pain associated with his "large hemorrhoids. He denies any bleeding per rectum. He also feels like his mouth has been more dry than usual over the past several days, but denies all other symptoms.  (09 Oct 2021 08:54)    Hematology History  - 7/30/21 RUQ pain- CT A/P fatty liver. WBC 12K 17% blasts, flow 13% blasts FLT3-. BMBx 8/4/21 consistent with AML 46XX (20)   - Foundation: mutations in DNMT3A, R882H, NRAS, G12D, NPM1, W288fs*12  - 8/6 induction with 7+3, dauno and cytarabine   - course complicated by neutropenic sepsis and pneumonia   - Day 14 BMBx 8/19/21 hypocellular marrow with chemotherapeutic effect however was concerning as earlier regeneration than usual which could be a sign of persistent disease   - 8/29/21 discharged home   - 9/2/21 BMBx: cellular marrow with trilineage hematopoesis with maturation and megakaryocytosis   - s/p C1 HIDAC 9/17-9/22     PAST MEDICAL & SURGICAL HISTORY:  Hypertension  diagnosed 1 year ago    Kidney stone  5 years ago    History of genital warts        Allergies    No Known Allergies    Intolerances    cefepime (Rash)      MEDICATIONS  (STANDING):  enoxaparin Injectable 40 milliGRAM(s) SubCutaneous daily  meropenem  IVPB 1000 milliGRAM(s) IV Intermittent every 8 hours  vancomycin  IVPB 1250 milliGRAM(s) IV Intermittent every 12 hours    MEDICATIONS  (PRN):  acetaminophen   Tablet .. 650 milliGRAM(s) Oral every 6 hours PRN Temp greater or equal to 38C (100.4F), Mild Pain (1 - 3)      FAMILY HISTORY:  FH: CAD (coronary artery disease) (Father, Mother)        SOCIAL HISTORY: No EtOH, no tobacco    REVIEW OF SYSTEMS:    CONSTITUTIONAL: No weakness, fevers or chills  EYES/ENT: No visual changes;  No vertigo or throat pain   NECK: No pain or stiffness  RESPIRATORY: No cough, wheezing, hemoptysis; No shortness of breath  CARDIOVASCULAR: No chest pain or palpitations  GASTROINTESTINAL: No abdominal or epigastric pain. No nausea, vomiting, or hematemesis; No diarrhea or constipation. No melena or hematochezia.  GENITOURINARY: No dysuria, frequency or hematuria  NEUROLOGICAL: No numbness or weakness  SKIN: No itching, burning, rashes, or lesions   All other review of systems is negative unless indicated above.    Height (cm): 182.9 (10-09 @ 04:05)  Weight (kg): 90.7 (10-09 @ 04:05)  BMI (kg/m2): 27.1 (10-09 @ 04:05)  BSA (m2): 2.13 (10-09 @ 04:05)    T(F): 98.3 (10-09-21 @ 10:36), Max: 100.5 (10-09-21 @ 04:50)  HR: 103 (10-09-21 @ 10:36)  BP: 125/80 (10-09-21 @ 10:36)  RR: 15 (10-09-21 @ 10:36)  SpO2: 100% (10-09-21 @ 10:36)  Wt(kg): --    GENERAL: NAD, well-developed  HEAD:  Atraumatic, Normocephalic  EYES: EOMI, PERRLA, conjunctiva and sclera clear  NECK: Supple, No JVD  CHEST/LUNG: Clear to auscultation bilaterally; No wheeze  HEART: Regular rate and rhythm; No murmurs, rubs, or gallops  ABDOMEN: Soft, Nontender, Nondistended; Bowel sounds present  EXTREMITIES:  2+ Peripheral Pulses, No clubbing, cyanosis, or edema  NEUROLOGY: non-focal  SKIN: No rashes or lesions                          10.3   2.11  )-----------( 194      ( 09 Oct 2021 05:04 )             30.8       10-09    137  |  100  |  12  ----------------------------<  105<H>  4.1   |  20<L>  |  1.08    Ca    10.0      09 Oct 2021 05:04    TPro  8.0  /  Alb  4.6  /  TBili  0.3  /  DBili  x   /  AST  17  /  ALT  47<H>  /  AlkPhos  94  10-09              Clean Catch Clean Catch (Midstream)  08-20 @ 17:59   No growth  --  --      .Blood Blood-Peripheral  08-20 @ 13:16   No Growth Final  --  --      .Blood Blood-Catheter  08-20 @ 09:16   No Growth Final  --  --      Clean Catch Clean Catch (Midstream)  08-17 @ 17:26   No growth  --  --      .Blood Blood-Peripheral  08-17 @ 12:55   No Growth Final  --  --      .Blood Blood-Catheter  08-17 @ 10:17   No Growth Final  --  --      Clean Catch Clean Catch (Midstream)  08-14 @ 20:49   No growth  --  --      .Blood Blood-Catheter  08-14 @ 20:46   No Growth Final  --  --      Clean Catch Clean Catch (Midstream)  08-12 @ 22:09   <10,000 CFU/mL Normal Urogenital Heather  --  --      .Blood Blood-Catheter  08-12 @ 22:03   No Growth Final  --  --      .Blood Blood-Peripheral  08-05 @ 09:03   No Growth Final  --  --      .Blood Blood-Peripheral  08-05 @ 01:53   No Growth Final  --  --      Clean Catch Clean Catch (Midstream)  08-03 @ 23:02   No growth  --  --      Clean Catch Clean Catch (Midstream)  08-02 @ 06:36   No growth  --  --      .Blood Blood-Peripheral  08-02 @ 01:12   No Growth Final  --  --      .Blood Blood-Peripheral  08-01 @ 00:44   No Growth Final  --  --      Clean Catch Clean Catch (Midstream)  07-30 @ 12:09   No growth  --  --      .Blood Blood-Peripheral  07-30 @ 12:05   No Growth Final  --  --

## 2021-10-09 NOTE — CONSULT NOTE ADULT - ASSESSMENT
Mr. Tian is a 36 year old gentlemane with a PMH of HTN, Fatty liver, kidney stones, AML diagnosed in July 2021 s/p induction (Daunorubicin and Cytarabine) and s/p C1 HIDAC consolidation 9/17-9/22 who presented to the ED on 10/9 due to fever to 100.7 with chills the night of presentation.  He also had bilateral crampy leg pain that was similar to the leg pain he felt during his consolidation therapy treatment which resolved after taking hydrocodone.  Pt has been on prophylactic Levaquin acyclovir and fluconazole at home. Of note he had neutropenic sepsis during his induction chemo, with several negative blood and urine cx. He was on cefepime during that hospital course but report he developed a rash while on cefepime. In ER he was febrile to 100.5 with leukopenia to 1.57 (Previously 0.56) and  (Previously 10).    CT chest/abdomen/pelvis showing possible pyelonephritis otherwise unremarkable. UA negative     IMPRESSIONs  Neutropenic Fevers  Right Arm PICC  AML s/p induction (Daunorubicin and Cytarabine) and s/p C1 HIDAC consolidation 9/17-9/22    RECOMMENDATIONs  Blood cultures pending   c/w acyclovir and fluconazole for prophylaxis  Marrow appears to be recovering from chemo  Currently on Vancomycin and meropenem  Reports developed rash on cefepime previously    Pt seen and examined    Jose Ramon Calvin MD, PGY4   ID fellow  Pager: [phone]  [facility] pager ID: [phone] (would prefer to text page for any new consult or question, please include name/location and best call back number)  After 5pm/weekends call [phone] Mr. Tian is a 36 year old gentlemane with a PMH of HTN, Fatty liver, kidney stones, AML diagnosed in July 2021 s/p induction (Daunorubicin and Cytarabine) and s/p C1 HIDAC consolidation 9/17-9/22 who presented to the ED on 10/9 due to fever to 100.7 with chills the night of presentation.  He also had bilateral crampy leg pain that was similar to the leg pain he felt during his consolidation therapy treatment which resolved after taking hydrocodone.  Pt has been on prophylactic Levaquin acyclovir and fluconazole at home. Of note he had neutropenic sepsis during his induction chemo, with several negative blood and urine cx. He was on cefepime during that hospital course but report he developed a rash while on cefepime. In ER he was febrile to 100.5 with leukopenia to 1.57 (Previously 0.56) and  (Previously 10).    CT chest/abdomen/pelvis showing possible pyelonephritis otherwise unremarkable. UA negative     IMPRESSIONs  Neutropenic Fevers  Right Arm PICC  AML s/p induction (Daunorubicin and Cytarabine) and s/p C1 HIDAC consolidation 9/17-9/22    RECOMMENDATIONs  Blood cultures pending   c/w acyclovir and fluconazole for prophylaxis  Marrow appears to be recovering from chemo  Currently on Vancomycin and meropenem  -> Recommend stopping vanc at this time and c/w meropenem  Reports developed rash on cefepime previously    Pt seen and examined. Case d/w attending and primary team.     Jose Ramon Calvin MD, PGY4   ID fellow  Pager: [phone]  [facility] pager ID: [phone] (would prefer to text page for any new consult or question, please include name/location and best call back number)  After 5pm/weekends call [phone]

## 2021-10-10 DIAGNOSIS — Z29.9 ENCOUNTER FOR PROPHYLACTIC MEASURES, UNSPECIFIED: ICD-10-CM

## 2021-10-10 DIAGNOSIS — B99.9 UNSPECIFIED INFECTIOUS DISEASE: ICD-10-CM

## 2021-10-10 LAB
ANION GAP SERPL CALC-SCNC: 15 MMOL/L — SIGNIFICANT CHANGE UP (ref 5–17)
BASOPHILS # BLD AUTO: 0 K/UL — SIGNIFICANT CHANGE UP (ref 0–0.2)
BASOPHILS NFR BLD AUTO: 0 % — SIGNIFICANT CHANGE UP (ref 0–2)
BUN SERPL-MCNC: 9 MG/DL — SIGNIFICANT CHANGE UP (ref 7–23)
CALCIUM SERPL-MCNC: 9.6 MG/DL — SIGNIFICANT CHANGE UP (ref 8.4–10.5)
CHLORIDE SERPL-SCNC: 101 MMOL/L — SIGNIFICANT CHANGE UP (ref 96–108)
CO2 SERPL-SCNC: 25 MMOL/L — SIGNIFICANT CHANGE UP (ref 22–31)
COVID-19 SPIKE DOMAIN AB INTERP: POSITIVE
COVID-19 SPIKE DOMAIN ANTIBODY RESULT: >250 U/ML — HIGH
CREAT SERPL-MCNC: 0.86 MG/DL — SIGNIFICANT CHANGE UP (ref 0.5–1.3)
CULTURE RESULTS: NO GROWTH — SIGNIFICANT CHANGE UP
EOSINOPHIL # BLD AUTO: 0 K/UL — SIGNIFICANT CHANGE UP (ref 0–0.5)
EOSINOPHIL NFR BLD AUTO: 0 % — SIGNIFICANT CHANGE UP (ref 0–6)
GLUCOSE SERPL-MCNC: 88 MG/DL — SIGNIFICANT CHANGE UP (ref 70–99)
HCT VFR BLD CALC: 26.2 % — LOW (ref 39–50)
HGB BLD-MCNC: 9 G/DL — LOW (ref 13–17)
LACTATE SERPL-SCNC: 0.7 MMOL/L — SIGNIFICANT CHANGE UP (ref 0.7–2)
LDH SERPL L TO P-CCNC: 212 U/L — SIGNIFICANT CHANGE UP (ref 50–242)
LYMPHOCYTES # BLD AUTO: 0.38 K/UL — LOW (ref 1–3.3)
LYMPHOCYTES # BLD AUTO: 17.4 % — SIGNIFICANT CHANGE UP (ref 13–44)
MAGNESIUM SERPL-MCNC: 2.2 MG/DL — SIGNIFICANT CHANGE UP (ref 1.6–2.6)
MAGNESIUM SERPL-MCNC: 2.3 MG/DL — SIGNIFICANT CHANGE UP (ref 1.6–2.6)
MANUAL SMEAR VERIFICATION: SIGNIFICANT CHANGE UP
MCHC RBC-ENTMCNC: 30 PG — SIGNIFICANT CHANGE UP (ref 27–34)
MCHC RBC-ENTMCNC: 34.4 GM/DL — SIGNIFICANT CHANGE UP (ref 32–36)
MCV RBC AUTO: 87.3 FL — SIGNIFICANT CHANGE UP (ref 80–100)
MONOCYTES # BLD AUTO: 1.42 K/UL — HIGH (ref 0–0.9)
MONOCYTES NFR BLD AUTO: 64.3 % — HIGH (ref 2–14)
NEUTROPHILS # BLD AUTO: 0.4 K/UL — LOW (ref 1.8–7.4)
NEUTROPHILS NFR BLD AUTO: 18.3 % — LOW (ref 43–77)
PHOSPHATE SERPL-MCNC: 3.4 MG/DL — SIGNIFICANT CHANGE UP (ref 2.5–4.5)
PHOSPHATE SERPL-MCNC: 4.1 MG/DL — SIGNIFICANT CHANGE UP (ref 2.5–4.5)
PLAT MORPH BLD: NORMAL — SIGNIFICANT CHANGE UP
PLATELET # BLD AUTO: 204 K/UL — SIGNIFICANT CHANGE UP (ref 150–400)
POTASSIUM SERPL-MCNC: 3.9 MMOL/L — SIGNIFICANT CHANGE UP (ref 3.5–5.3)
POTASSIUM SERPL-SCNC: 3.9 MMOL/L — SIGNIFICANT CHANGE UP (ref 3.5–5.3)
RBC # BLD: 3 M/UL — LOW (ref 4.2–5.8)
RBC # FLD: 13.8 % — SIGNIFICANT CHANGE UP (ref 10.3–14.5)
RBC BLD AUTO: SIGNIFICANT CHANGE UP
SARS-COV-2 IGG+IGM SERPL QL IA: >250 U/ML — HIGH
SARS-COV-2 IGG+IGM SERPL QL IA: POSITIVE
SODIUM SERPL-SCNC: 141 MMOL/L — SIGNIFICANT CHANGE UP (ref 135–145)
SPECIMEN SOURCE: SIGNIFICANT CHANGE UP
URATE SERPL-MCNC: 6.8 MG/DL — SIGNIFICANT CHANGE UP (ref 3.4–8.8)
WBC # BLD: 2.21 K/UL — LOW (ref 3.8–10.5)
WBC # FLD AUTO: 2.21 K/UL — LOW (ref 3.8–10.5)

## 2021-10-10 PROCEDURE — 99232 SBSQ HOSP IP/OBS MODERATE 35: CPT

## 2021-10-10 RX ORDER — ACYCLOVIR SODIUM 500 MG
400 VIAL (EA) INTRAVENOUS EVERY 8 HOURS
Refills: 0 | Status: DISCONTINUED | OUTPATIENT
Start: 2021-10-10 | End: 2021-10-12

## 2021-10-10 RX ORDER — INFLUENZA VIRUS VACCINE 15; 15; 15; 15 UG/.5ML; UG/.5ML; UG/.5ML; UG/.5ML
0.5 SUSPENSION INTRAMUSCULAR ONCE
Refills: 0 | Status: DISCONTINUED | OUTPATIENT
Start: 2021-10-10 | End: 2021-10-12

## 2021-10-10 RX ORDER — AMLODIPINE BESYLATE 2.5 MG/1
5 TABLET ORAL DAILY
Refills: 0 | Status: DISCONTINUED | OUTPATIENT
Start: 2021-10-10 | End: 2021-10-12

## 2021-10-10 RX ORDER — SENNA PLUS 8.6 MG/1
2 TABLET ORAL AT BEDTIME
Refills: 0 | Status: DISCONTINUED | OUTPATIENT
Start: 2021-10-10 | End: 2021-10-12

## 2021-10-10 RX ORDER — FLUCONAZOLE 150 MG/1
200 TABLET ORAL DAILY
Refills: 0 | Status: DISCONTINUED | OUTPATIENT
Start: 2021-10-10 | End: 2021-10-12

## 2021-10-10 RX ORDER — SODIUM CHLORIDE 0.65 %
1 AEROSOL, SPRAY (ML) NASAL THREE TIMES A DAY
Refills: 0 | Status: DISCONTINUED | OUTPATIENT
Start: 2021-10-10 | End: 2021-10-12

## 2021-10-10 RX ORDER — POLYETHYLENE GLYCOL 3350 17 G/17G
17 POWDER, FOR SOLUTION ORAL DAILY
Refills: 0 | Status: DISCONTINUED | OUTPATIENT
Start: 2021-10-10 | End: 2021-10-12

## 2021-10-10 RX ADMIN — MEROPENEM 100 MILLIGRAM(S): 1 INJECTION INTRAVENOUS at 05:59

## 2021-10-10 RX ADMIN — POLYETHYLENE GLYCOL 3350 17 GRAM(S): 17 POWDER, FOR SOLUTION ORAL at 16:16

## 2021-10-10 RX ADMIN — FLUCONAZOLE 200 MILLIGRAM(S): 150 TABLET ORAL at 16:17

## 2021-10-10 RX ADMIN — MEROPENEM 100 MILLIGRAM(S): 1 INJECTION INTRAVENOUS at 22:22

## 2021-10-10 RX ADMIN — MEROPENEM 100 MILLIGRAM(S): 1 INJECTION INTRAVENOUS at 13:48

## 2021-10-10 RX ADMIN — AMLODIPINE BESYLATE 5 MILLIGRAM(S): 2.5 TABLET ORAL at 16:17

## 2021-10-10 RX ADMIN — SENNA PLUS 2 TABLET(S): 8.6 TABLET ORAL at 23:19

## 2021-10-10 RX ADMIN — Medication 400 MILLIGRAM(S): at 22:23

## 2021-10-10 NOTE — PROGRESS NOTE ADULT - ATTENDING COMMENTS
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   10/9- BCX(-); 10/9- UCX (-)  10/9- CT chest  (-)  Patient initiated on Meropenem; resume Acyclovir, Diflucan prophylaxis  ID followup  Monitor labs  Mouth care  OOB/ambulate

## 2021-10-10 NOTE — PROGRESS NOTE ADULT - SUBJECTIVE AND OBJECTIVE BOX
Diagnosis:    Protocol/Chemo Regimen:    Day:     Pt endorsed:    Review of Systems:     Pain scale:     Diet:     Allergies    No Known Allergies    Intolerances    cefepime (Rash)      ANTIMICROBIALS  meropenem  IVPB 1000 milliGRAM(s) IV Intermittent every 8 hours      HEME/ONC MEDICATIONS  enoxaparin Injectable 40 milliGRAM(s) SubCutaneous daily      STANDING MEDICATIONS  amLODIPine   Tablet 5 milliGRAM(s) Oral daily  influenza   Vaccine 0.5 milliLiter(s) IntraMuscular once  polyethylene glycol 3350 17 Gram(s) Oral daily      PRN MEDICATIONS  acetaminophen   Tablet .. 650 milliGRAM(s) Oral every 6 hours PRN        Vital Signs Last 24 Hrs  T(C): 37.2 (10 Oct 2021 13:36), Max: 37.2 (10 Oct 2021 10:33)  T(F): 99 (10 Oct 2021 13:36), Max: 99 (10 Oct 2021 10:33)  HR: 84 (10 Oct 2021 13:36) (82 - 103)  BP: 123/82 (10 Oct 2021 13:36) (112/66 - 131/83)  BP(mean): --  RR: 18 (10 Oct 2021 13:36) (15 - 18)  SpO2: 98% (10 Oct 2021 13:36) (93% - 100%)    PHYSICAL EXAM  General: NAD  HEENT: PERRLA, EOMOI, clear oropharynx, anicteric sclera, pink conjunctiva  Neck: supple  CV: (+) S1/S2 RRR  Lungs: clear to auscultation, no wheezes or rales  Abdomen: soft, non-tender, non-distended (+) BS  Ext: no clubbing, cyanosis or edema  Skin: no rashes and no petechiae  Neuro: alert and oriented X 3, no focal deficits  Central Line:     RECENT CULTURES:  10-09 @ 10:17  .Blood Blood-Peripheral  --  --  --    No growth to date.  --  10-09 @ 10:15  Clean Catch Clean Catch (Midstream)  --  --  --    No growth  --        LABS:                        9.0    2.21  )-----------( 204      ( 10 Oct 2021 07:10 )             26.2         Mean Cell Volume : 87.3 fl  Mean Cell Hemoglobin : 30.0 pg  Mean Cell Hemoglobin Concentration : 34.4 gm/dL  Auto Neutrophil # : 0.40 K/uL  Auto Lymphocyte # : 0.38 K/uL  Auto Monocyte # : 1.42 K/uL  Auto Eosinophil # : 0.00 K/uL  Auto Basophil # : 0.00 K/uL  Auto Neutrophil % : 18.3 %  Auto Lymphocyte % : 17.4 %  Auto Monocyte % : 64.3 %  Auto Eosinophil % : 0.0 %  Auto Basophil % : 0.0 %      10-10    141  |  101  |  9   ----------------------------<  88  3.9   |  25  |  0.86    Ca    9.6      10 Oct 2021 07:10  Phos  3.4     10-10  Mg     2.3     10-10    TPro  8.0  /  Alb  4.6  /  TBili  0.3  /  DBili  x   /  AST  17  /  ALT  47<H>  /  AlkPhos  94  10-09      Mg 2.3  Phos 3.4  Mg 2.2  Phos 4.1            Uric Acid 6.8        RADIOLOGY & ADDITIONAL STUDIES:         Diagnosis: AML FLT 3 (-)    Protocol/Chemo Regimen: Cycle #1 HIDAC     Day: 24    Pt endorsed: +fatigue  + blood tinged nasal discharge    Review of Systems: Denies any chest pain, palpitation, SOB, abdominal paion or dysuria.    Pain scale: Deines    Diet: Regular    Allergies: No Known Allergies    Intolerances: cefepime (Rash)    ANTIMICROBIALS  meropenem  IVPB 1000 milliGRAM(s) IV Intermittent every 8 hours    HEME/ONC MEDICATIONS  enoxaparin Injectable 40 milliGRAM(s) SubCutaneous daily    STANDING MEDICATIONS  amLODIPine   Tablet 5 milliGRAM(s) Oral daily  influenza   Vaccine 0.5 milliLiter(s) IntraMuscular once  polyethylene glycol 3350 17 Gram(s) Oral daily    PRN MEDICATIONS  acetaminophen   Tablet .. 650 milliGRAM(s) Oral every 6 hours PRN    Vital Signs Last 24 Hrs  T(C): 37.2 (10 Oct 2021 13:36), Max: 37.2 (10 Oct 2021 10:33)  T(F): 99 (10 Oct 2021 13:36), Max: 99 (10 Oct 2021 10:33)  HR: 84 (10 Oct 2021 13:36) (82 - 103)  BP: 123/82 (10 Oct 2021 13:36) (112/66 - 131/83)  BP(mean): --  RR: 18 (10 Oct 2021 13:36) (15 - 18)  SpO2: 98% (10 Oct 2021 13:36) (93% - 100%)    PHYSICAL EXAM  General: NAD  HEENT: PERRLA, EOMOI, clear oropharynx, anicteric sclera, pink conjunctiva  Neck: supple  CV: (+) S1/S2 RRR  Lungs: clear to auscultation, no wheezes or rales  Abdomen: soft, non-tender, non-distended (+) BS  Ext: no clubbing, cyanosis or edema  Skin: no rashes and no petechiae  Neuro: alert and oriented X 3, no focal deficits  Central Line: Right arm D/L PICC, CDI    RECENT CULTURES:  10-09 @ 10:17  .Blood Blood-Peripheral  No growth to date.    10-09 @ 10:15  Clean Catch Clean Catch (Midstream)  No growth    LABS:                        9.0    2.21  )-----------( 204      ( 10 Oct 2021 07:10 )             26.2     Mean Cell Volume : 87.3 fl  Mean Cell Hemoglobin : 30.0 pg  Mean Cell Hemoglobin Concentration : 34.4 gm/dL  Auto Neutrophil # : 0.40 K/uL  Auto Lymphocyte # : 0.38 K/uL  Auto Monocyte # : 1.42 K/uL  Auto Eosinophil # : 0.00 K/uL  Auto Basophil # : 0.00 K/uL  Auto Neutrophil % : 18.3 %  Auto Lymphocyte % : 17.4 %  Auto Monocyte % : 64.3 %  Auto Eosinophil % : 0.0 %  Auto Basophil % : 0.0 %      10-10    141  |  101  |  9   ----------------------------<  88  3.9   |  25  |  0.86    Ca    9.6      10 Oct 2021 07:10  Phos  3.4     10-10  Mg     2.3     10-10    TPro  8.0  /  Alb  4.6  /  TBili  0.3  /  DBili  x   /  AST  17  /  ALT  47<H>  /  AlkPhos  94  10-09  Mg 2.3  Phos 3.4  Mg 2.2  Phos 4.1    Uric Acid 6.8    RADIOLOGY & ADDITIONAL STUDIES:   CT Chest No Cont (10.09.21 @ 10:54) >  Clear lungs.           Diagnosis: AML FLT 3 (-)    Protocol/Chemo Regimen: Cycle #1 HIDAC     Day: 24    Pt endorsed: +fatigue  + blood tinged nasal discharge    Review of Systems: Denies chest pain, palpitation, SOB, abdominal pain or dysuria.    Pain scale: Deines    Diet: Regular    Allergies: No Known Allergies    Intolerances: cefepime (Rash)    ANTIMICROBIALS  meropenem  IVPB 1000 milliGRAM(s) IV Intermittent every 8 hours    HEME/ONC MEDICATIONS  enoxaparin Injectable 40 milliGRAM(s) SubCutaneous daily    STANDING MEDICATIONS  amLODIPine   Tablet 5 milliGRAM(s) Oral daily  influenza   Vaccine 0.5 milliLiter(s) IntraMuscular once  polyethylene glycol 3350 17 Gram(s) Oral daily    PRN MEDICATIONS  acetaminophen   Tablet .. 650 milliGRAM(s) Oral every 6 hours PRN    Vital Signs Last 24 Hrs  T(C): 37.2 (10 Oct 2021 13:36), Max: 37.2 (10 Oct 2021 10:33)  T(F): 99 (10 Oct 2021 13:36), Max: 99 (10 Oct 2021 10:33)  HR: 84 (10 Oct 2021 13:36) (82 - 103)  BP: 123/82 (10 Oct 2021 13:36) (112/66 - 131/83)  BP(mean): --  RR: 18 (10 Oct 2021 13:36) (15 - 18)  SpO2: 98% (10 Oct 2021 13:36) (93% - 100%)    PHYSICAL EXAM  General: NAD  HEENT: clear oropharynx, anicteric sclera  Neck: supple  CV: (+) S1/S2 RRR  Lungs: clear to auscultation, no wheezes or rales  Abdomen: soft, non-tender, non-distended (+) BS  Ext: no edema  Skin: no rash and no petechiae  Neuro: alert and oriented X 3  Central Line: Right arm D/L PICC, CDI    RECENT CULTURES:  10-09 @ 10:17  .Blood Blood-Peripheral  No growth to date.    10-09 @ 10:15  Clean Catch Clean Catch (Midstream)  No growth    LABS:                        9.0    2.21  )-----------( 204      ( 10 Oct 2021 07:10 )             26.2     Mean Cell Volume : 87.3 fl  Mean Cell Hemoglobin : 30.0 pg  Mean Cell Hemoglobin Concentration : 34.4 gm/dL  Auto Neutrophil # : 0.40 K/uL  Auto Lymphocyte # : 0.38 K/uL  Auto Monocyte # : 1.42 K/uL  Auto Eosinophil # : 0.00 K/uL  Auto Basophil # : 0.00 K/uL  Auto Neutrophil % : 18.3 %  Auto Lymphocyte % : 17.4 %  Auto Monocyte % : 64.3 %  Auto Eosinophil % : 0.0 %  Auto Basophil % : 0.0 %      10-10    141  |  101  |  9   ----------------------------<  88  3.9   |  25  |  0.86    Ca    9.6      10 Oct 2021 07:10  Phos  3.4     10-10  Mg     2.3     10-10    TPro  8.0  /  Alb  4.6  /  TBili  0.3  /  DBili  x   /  AST  17  /  ALT  47<H>  /  AlkPhos  94  10-09  Mg 2.3  Phos 3.4  Mg 2.2  Phos 4.1    Uric Acid 6.8    RADIOLOGY & ADDITIONAL STUDIES:   CT Chest No Cont (10.09.21 @ 10:54) >  Clear lungs.

## 2021-10-10 NOTE — PROGRESS NOTE ADULT - NUTRITIONAL ASSESSMENT
36 year old male with a PMH of HTN, Fatty liver, kidney stones, AML FLT 3 (-), diagnosed in July 2021, initial induction with Dauno/Cytarabine followed by  cycle # 1 consolidation with high dose Cytarabine on 9/17,  who presented to the ED on 10/9 due to fever 100.7. Upon admission patient met sepsis criteria, received Vanco X 1  dose, patient has pancytopenia secondary to chemotherapy and disease condition.

## 2021-10-10 NOTE — PROGRESS NOTE ADULT - PROBLEM SELECTOR PLAN 3
Neutropenic, afebrile now, if febrile pan culture, f/u culture results.  Continue Meropenem, Acyclovir, Diflucan.  10/9- BCX(-)  10/9- UCX (-)  10/9- CT chest  (-)  10/9- F/u by ID

## 2021-10-10 NOTE — PROGRESS NOTE ADULT - ASSESSMENT
36 year old male with  AML s/p cycle # 1 consolidation therapy with HIDAC   presented to the ED on 10/9/21 due to fever of 100.7 at home. Upon admission patient met sepsis criteria, patient has pancytopenia secondary to chemotherapy and disease condition.   3

## 2021-10-10 NOTE — PROGRESS NOTE ADULT - PROBLEM SELECTOR PLAN 1
AML FLT 3 (-).  Monitor CBC with diff. Transfuse PRN.  Monitor electrolytes, supplement as needed.  Strict I/O.  Daily weights.  Mouth care.

## 2021-10-11 ENCOUNTER — TRANSCRIPTION ENCOUNTER (OUTPATIENT)
Age: 36
End: 2021-10-11

## 2021-10-11 ENCOUNTER — APPOINTMENT (OUTPATIENT)
Dept: INFUSION THERAPY | Facility: HOSPITAL | Age: 36
End: 2021-10-11

## 2021-10-11 LAB
ALBUMIN SERPL ELPH-MCNC: 4.2 G/DL — SIGNIFICANT CHANGE UP (ref 3.3–5)
ALP SERPL-CCNC: 81 U/L — SIGNIFICANT CHANGE UP (ref 40–120)
ALT FLD-CCNC: 36 U/L — SIGNIFICANT CHANGE UP (ref 10–45)
ANION GAP SERPL CALC-SCNC: 14 MMOL/L — SIGNIFICANT CHANGE UP (ref 5–17)
APTT BLD: 22 SEC — LOW (ref 27.5–35.5)
AST SERPL-CCNC: 19 U/L — SIGNIFICANT CHANGE UP (ref 10–40)
BASOPHILS # BLD AUTO: 0 K/UL — SIGNIFICANT CHANGE UP (ref 0–0.2)
BASOPHILS NFR BLD AUTO: 0 % — SIGNIFICANT CHANGE UP (ref 0–2)
BILIRUB SERPL-MCNC: 0.2 MG/DL — SIGNIFICANT CHANGE UP (ref 0.2–1.2)
BLD GP AB SCN SERPL QL: NEGATIVE — SIGNIFICANT CHANGE UP
BUN SERPL-MCNC: 9 MG/DL — SIGNIFICANT CHANGE UP (ref 7–23)
CALCIUM SERPL-MCNC: 9.9 MG/DL — SIGNIFICANT CHANGE UP (ref 8.4–10.5)
CHLORIDE SERPL-SCNC: 103 MMOL/L — SIGNIFICANT CHANGE UP (ref 96–108)
CO2 SERPL-SCNC: 23 MMOL/L — SIGNIFICANT CHANGE UP (ref 22–31)
CREAT SERPL-MCNC: 0.75 MG/DL — SIGNIFICANT CHANGE UP (ref 0.5–1.3)
EOSINOPHIL # BLD AUTO: 0 K/UL — SIGNIFICANT CHANGE UP (ref 0–0.5)
EOSINOPHIL NFR BLD AUTO: 0 % — SIGNIFICANT CHANGE UP (ref 0–6)
GLUCOSE SERPL-MCNC: 90 MG/DL — SIGNIFICANT CHANGE UP (ref 70–99)
HCT VFR BLD CALC: 28 % — LOW (ref 39–50)
HGB BLD-MCNC: 9.6 G/DL — LOW (ref 13–17)
INR BLD: 1.04 RATIO — SIGNIFICANT CHANGE UP (ref 0.88–1.16)
LDH SERPL L TO P-CCNC: 153 U/L — SIGNIFICANT CHANGE UP (ref 50–242)
LYMPHOCYTES # BLD AUTO: 0.45 K/UL — LOW (ref 1–3.3)
LYMPHOCYTES # BLD AUTO: 19 % — SIGNIFICANT CHANGE UP (ref 13–44)
MAGNESIUM SERPL-MCNC: 2.4 MG/DL — SIGNIFICANT CHANGE UP (ref 1.6–2.6)
MANUAL SMEAR VERIFICATION: SIGNIFICANT CHANGE UP
MCHC RBC-ENTMCNC: 29.9 PG — SIGNIFICANT CHANGE UP (ref 27–34)
MCHC RBC-ENTMCNC: 34.3 GM/DL — SIGNIFICANT CHANGE UP (ref 32–36)
MCV RBC AUTO: 87.2 FL — SIGNIFICANT CHANGE UP (ref 80–100)
MONOCYTES # BLD AUTO: 1.29 K/UL — HIGH (ref 0–0.9)
MONOCYTES NFR BLD AUTO: 54 % — HIGH (ref 2–14)
NEUTROPHILS # BLD AUTO: 0.64 K/UL — LOW (ref 1.8–7.4)
NEUTROPHILS NFR BLD AUTO: 27 % — LOW (ref 43–77)
NRBC # BLD: 0 /100 — SIGNIFICANT CHANGE UP (ref 0–0)
PHOSPHATE SERPL-MCNC: 3.9 MG/DL — SIGNIFICANT CHANGE UP (ref 2.5–4.5)
PLAT MORPH BLD: NORMAL — SIGNIFICANT CHANGE UP
PLATELET # BLD AUTO: 264 K/UL — SIGNIFICANT CHANGE UP (ref 150–400)
POTASSIUM SERPL-MCNC: 4.1 MMOL/L — SIGNIFICANT CHANGE UP (ref 3.5–5.3)
POTASSIUM SERPL-SCNC: 4.1 MMOL/L — SIGNIFICANT CHANGE UP (ref 3.5–5.3)
PROT SERPL-MCNC: 6.8 G/DL — SIGNIFICANT CHANGE UP (ref 6–8.3)
PROTHROM AB SERPL-ACNC: 12.4 SEC — SIGNIFICANT CHANGE UP (ref 10.6–13.6)
RBC # BLD: 3.21 M/UL — LOW (ref 4.2–5.8)
RBC # FLD: 13.8 % — SIGNIFICANT CHANGE UP (ref 10.3–14.5)
RBC BLD AUTO: SIGNIFICANT CHANGE UP
RH IG SCN BLD-IMP: POSITIVE — SIGNIFICANT CHANGE UP
SODIUM SERPL-SCNC: 140 MMOL/L — SIGNIFICANT CHANGE UP (ref 135–145)
URATE SERPL-MCNC: 5.8 MG/DL — SIGNIFICANT CHANGE UP (ref 3.4–8.8)
WBC # BLD: 2.38 K/UL — LOW (ref 3.8–10.5)
WBC # FLD AUTO: 2.38 K/UL — LOW (ref 3.8–10.5)

## 2021-10-11 PROCEDURE — 99232 SBSQ HOSP IP/OBS MODERATE 35: CPT

## 2021-10-11 RX ORDER — CHLORHEXIDINE GLUCONATE 213 G/1000ML
1 SOLUTION TOPICAL
Refills: 0 | Status: DISCONTINUED | OUTPATIENT
Start: 2021-10-11 | End: 2021-10-12

## 2021-10-11 RX ORDER — AMLODIPINE BESYLATE 2.5 MG/1
1 TABLET ORAL
Qty: 30 | Refills: 0
Start: 2021-10-11 | End: 2021-11-09

## 2021-10-11 RX ORDER — FLUCONAZOLE 150 MG/1
1 TABLET ORAL
Qty: 30 | Refills: 0
Start: 2021-10-11 | End: 2021-11-09

## 2021-10-11 RX ORDER — ACYCLOVIR SODIUM 500 MG
1 VIAL (EA) INTRAVENOUS
Qty: 90 | Refills: 0
Start: 2021-10-11 | End: 2021-11-09

## 2021-10-11 RX ORDER — OXYCODONE HYDROCHLORIDE 5 MG/1
1 TABLET ORAL
Qty: 0 | Refills: 0 | DISCHARGE

## 2021-10-11 RX ORDER — CIPROFLOXACIN LACTATE 400MG/40ML
1 VIAL (ML) INTRAVENOUS
Qty: 30 | Refills: 0
Start: 2021-10-11 | End: 2021-11-09

## 2021-10-11 RX ADMIN — POLYETHYLENE GLYCOL 3350 17 GRAM(S): 17 POWDER, FOR SOLUTION ORAL at 12:26

## 2021-10-11 RX ADMIN — AMLODIPINE BESYLATE 5 MILLIGRAM(S): 2.5 TABLET ORAL at 06:43

## 2021-10-11 RX ADMIN — SENNA PLUS 2 TABLET(S): 8.6 TABLET ORAL at 22:22

## 2021-10-11 RX ADMIN — Medication 400 MILLIGRAM(S): at 22:21

## 2021-10-11 RX ADMIN — Medication 400 MILLIGRAM(S): at 14:11

## 2021-10-11 RX ADMIN — FLUCONAZOLE 200 MILLIGRAM(S): 150 TABLET ORAL at 12:26

## 2021-10-11 RX ADMIN — MEROPENEM 100 MILLIGRAM(S): 1 INJECTION INTRAVENOUS at 06:43

## 2021-10-11 RX ADMIN — Medication 400 MILLIGRAM(S): at 06:43

## 2021-10-11 NOTE — PROGRESS NOTE ADULT - PROBLEM SELECTOR PLAN 1
AML FLT 3 (-).  Monitor CBC with diff. Transfuse PRN.  Monitor electrolytes, supplement as needed.  Strict I/O.  Daily weights.  Mouth care. AML FLT 3 (-).  Monitor CBC with diff. Transfuse PRN.  Monitor electrolytes, supplement as needed.  Strict I/O.  Daily weights.  Mouth care.  Poss dc home on 10/12 if stay afebrile and cx (-)

## 2021-10-11 NOTE — PROGRESS NOTE ADULT - ASSESSMENT
36 year old male with a PMH of HTN, Fatty liver, kidney stones, AML FLT 3 (-), diagnosed in July 2021, initial induction with Dauno/Cytarabine followed by  cycle # 1 consolidation with high dose Cytarabine on 9/17,  who presented to the ED on 10/9 due to fever 100.7. Upon admission patient met sepsis criteria, received Vanco X 1  dose, patient has pancytopenia secondary to chemotherapy and disease condition. 36 year old male with a PMH of HTN, Fatty liver, kidney stones, AML FLT 3 (-), diagnosed in July 2021, initial induction with Dauno/Cytarabine followed by  cycle # 1 consolidation with high dose Cytarabine on 9/17,  who presented to the ED on 10/9 due to fever 100.7. Upon admission patient met sepsis criteria, received Vanco X 1  dose, patient has pancytopenia secondary to chemotherapy and  or disease condition.

## 2021-10-11 NOTE — DISCHARGE NOTE PROVIDER - CARE PROVIDER_API CALL
Chelsea Yancey  HEMATOLOGY/ONCOLOGY  [address] [place] [address]  Phone: [phone]  Fax: [phone]  Follow Up Time:

## 2021-10-11 NOTE — DISCHARGE NOTE PROVIDER - NSDCCPCAREPLAN_GEN_ALL_CORE_FT
PRINCIPAL DISCHARGE DIAGNOSIS  Diagnosis: AML (acute myeloid leukemia)  Assessment and Plan of Treatment: Notify MD or report to ER for fever greater or equal to 100.4, persistent nausea, vomiting, diarrhea, bleeding.

## 2021-10-11 NOTE — PROGRESS NOTE ADULT - SUBJECTIVE AND OBJECTIVE BOX
Diagnosis: AML FLT 3 (-)    Protocol/Chemo Regimen: Cycle #1 HIDAC     Day: 25    Pt endorsed: +fatigue  + blood tinged nasal discharge    Review of Systems: Denies any chest pain, palpitation, SOB, abdominal paion or dysuria.    Pain scale: Deines    Diet: Regular    Allergies: No Known Allergies    Intolerances: cefepime (Rash)      ANTIMICROBIALS  acyclovir   Oral Tab/Cap 400 milliGRAM(s) Oral every 8 hours  fluconAZOLE   Tablet 200 milliGRAM(s) Oral daily  meropenem  IVPB 1000 milliGRAM(s) IV Intermittent every 8 hours      HEME/ONC MEDICATIONS  enoxaparin Injectable 40 milliGRAM(s) SubCutaneous daily      STANDING MEDICATIONS  amLODIPine   Tablet 5 milliGRAM(s) Oral daily  influenza   Vaccine 0.5 milliLiter(s) IntraMuscular once  polyethylene glycol 3350 17 Gram(s) Oral daily  senna 2 Tablet(s) Oral at bedtime      PRN MEDICATIONS  acetaminophen   Tablet .. 650 milliGRAM(s) Oral every 6 hours PRN  sodium chloride 0.65% Nasal 1 Spray(s) Both Nostrils three times a day PRN      Vital Signs Last 24 Hrs  T(C): 36.7 (11 Oct 2021 05:00), Max: 37.2 (10 Oct 2021 10:33)  T(F): 98.1 (11 Oct 2021 05:00), Max: 99 (10 Oct 2021 10:33)  HR: 86 (11 Oct 2021 05:00) (84 - 98)  BP: 116/75 (11 Oct 2021 05:00) (111/70 - 130/78)  BP(mean): --  RR: 18 (11 Oct 2021 05:00) (18 - 18)  SpO2: 97% (11 Oct 2021 05:00) (97% - 98%)      PHYSICAL EXAM  General: NAD  HEENT: PERRLA, EOMOI, clear oropharynx, anicteric sclera, pink conjunctiva  Neck: supple  CV: (+) S1/S2 RRR  Lungs: clear to auscultation, no wheezes or rales  Abdomen: soft, non-tender, non-distended (+) BS  Ext: no clubbing, cyanosis or edema  Skin: no rashes and no petechiae  Neuro: alert and oriented X 3, no focal deficits  Central Line: Right arm D/L PICC, CDI        LABS:                        9.0    2.21  )-----------( 204      ( 10 Oct 2021 07:10 )             26.2         Mean Cell Volume : 87.3 fl  Mean Cell Hemoglobin : 30.0 pg  Mean Cell Hemoglobin Concentration : 34.4 gm/dL  Auto Neutrophil # : 0.40 K/uL  Auto Lymphocyte # : 0.38 K/uL  Auto Monocyte # : 1.42 K/uL  Auto Eosinophil # : 0.00 K/uL  Auto Basophil # : 0.00 K/uL  Auto Neutrophil % : 18.3 %  Auto Lymphocyte % : 17.4 %  Auto Monocyte % : 64.3 %  Auto Eosinophil % : 0.0 %  Auto Basophil % : 0.0 %      10-10    141  |  101  |  9   ----------------------------<  88  3.9   |  25  |  0.86    Ca    9.6      10 Oct 2021 07:10  Phos  3.4     10-10  Mg     2.3     10-10        Mg 2.3  Phos 3.4              RECENT CULTURES:    Culture - Blood (10.09.21 @ 10:17)    Specimen Source: .Blood Blood-Cord    Culture Results:   No growth to date.    10-09 @ 10:17  .Blood Blood-Peripheral  No growth to date.    10-09 @ 10:15  Clean Catch Clean Catch (Midstream)  No growth      RADIOLOGY & ADDITIONAL STUDIES:    < from: CT Abdomen and Pelvis w/ Oral Cont and w/ IV Cont (10.09.21 @ 07:11) >  IMPRESSION:  Region of hypoenhancement upper pole left kidney; please correlate clinically for possible pyelonephritis. No hydronephrosis or perinephric stranding. No rectal abscess.     CT Chest No Cont (10.09.21 @ 10:54) >  Clear lungs.               Diagnosis: AML FLT 3 (-)    Protocol/Chemo Regimen: Cycle #1 HIDAC     Day: 25    Pt endorsed:  no complaint     Review of Systems:  Patient denied nausea, vomiting, odynophagia, chest pain, cough, dyspnea, abdominal pain, constipation, diarrhea, rash, fatigue, headache    Pain scale: Deines    Diet: Regular    Allergies: No Known Allergies    Intolerances: cefepime (Rash)    ANTIMICROBIALS  acyclovir   Oral Tab/Cap 400 milliGRAM(s) Oral every 8 hours  fluconAZOLE   Tablet 200 milliGRAM(s) Oral daily  meropenem  IVPB 1000 milliGRAM(s) IV Intermittent every 8 hours      HEME/ONC MEDICATIONS  enoxaparin Injectable 40 milliGRAM(s) SubCutaneous daily      STANDING MEDICATIONS  amLODIPine   Tablet 5 milliGRAM(s) Oral daily  influenza   Vaccine 0.5 milliLiter(s) IntraMuscular once  polyethylene glycol 3350 17 Gram(s) Oral daily  senna 2 Tablet(s) Oral at bedtime      PRN MEDICATIONS  acetaminophen   Tablet .. 650 milliGRAM(s) Oral every 6 hours PRN  sodium chloride 0.65% Nasal 1 Spray(s) Both Nostrils three times a day PRN      Vital Signs Last 24 Hrs  T(C): 36.7 (11 Oct 2021 05:00), Max: 37.2 (10 Oct 2021 10:33)  T(F): 98.1 (11 Oct 2021 05:00), Max: 99 (10 Oct 2021 10:33)  HR: 86 (11 Oct 2021 05:00) (84 - 98)  BP: 116/75 (11 Oct 2021 05:00) (111/70 - 130/78)  BP(mean): --  RR: 18 (11 Oct 2021 05:00) (18 - 18)  SpO2: 97% (11 Oct 2021 05:00) (97% - 98%)      PHYSICAL EXAM  General: NAD  HEENT:  EOMOI, clear oropharynx, anicteric sclera  Neck: supple  CV: (+) S1/S2 RRR  Lungs: clear to auscultation, no wheezes or rales  Abdomen: soft, non-tender, non-distended (+) BS  Ext: no  edema  Skin: no rashes  Neuro: alert and oriented X 3, no focal deficits  Central Line: Right arm D/L PICC, CDI      LABS:                          9.6    2.38  )-----------( 264      ( 11 Oct 2021 07:27 )             28.0         Mean Cell Volume : 87.2 fl  Mean Cell Hemoglobin : 29.9 pg  Mean Cell Hemoglobin Concentration : 34.3 gm/dL  Auto Neutrophil # : 0.64 K/uL  Auto Lymphocyte # : 0.45 K/uL  Auto Monocyte # : 1.29 K/uL  Auto Eosinophil # : 0.00 K/uL  Auto Basophil # : 0.00 K/uL  Auto Neutrophil % : 27.0 %  Auto Lymphocyte % : 19.0 %  Auto Monocyte % : 54.0 %  Auto Eosinophil % : 0.0 %  Auto Basophil % : 0.0 %      10-11    140  |  103  |  9   ----------------------------<  90  4.1   |  23  |  0.75    Ca    9.9      11 Oct 2021 07:27  Phos  3.9     10-11  Mg     2.4     10-11    TPro  6.8  /  Alb  4.2  /  TBili  0.2  /  DBili  x   /  AST  19  /  ALT  36  /  AlkPhos  81  10-11      PT/INR - ( 11 Oct 2021 07:27 )   PT: 12.4 sec;   INR: 1.04 ratio         PTT - ( 11 Oct 2021 07:27 )  PTT:22.0 sec      Uric Acid 5.8        RECENT CULTURES:    Culture - Blood (10.09.21 @ 10:17)    Specimen Source: .Blood Blood-Cord    Culture Results:   No growth to date.    10-09 @ 10:17  .Blood Blood-Peripheral  No growth to date.    10-09 @ 10:15  Clean Catch Clean Catch (Midstream)  No growth      RADIOLOGY & ADDITIONAL STUDIES:    < from: CT Abdomen and Pelvis w/ Oral Cont and w/ IV Cont (10.09.21 @ 07:11) >  IMPRESSION:  Region of hypoenhancement upper pole left kidney; please correlate clinically for possible pyelonephritis. No hydronephrosis or perinephric stranding. No rectal abscess.     CT Chest No Cont (10.09.21 @ 10:54) >  Clear lungs.

## 2021-10-11 NOTE — DISCHARGE NOTE PROVIDER - HOSPITAL COURSE
Mr. Tian is a 36 year old male with a PMH of HTN, Fatty liver, kidney stones, AML diagnosed in July 2021 s/p consolidation with cytarabine (hematologist Chelsea Yancey), who presented to the ED on 10/9 due to fever the night of presentation. The patient went to sleep around 10pm and woke up at 3am feeling warm and clammy. He took his temperature orally at home and it was 100.7. The day prior to presentation (10/8/21), the patient went to [facility] for an appointment to get his platelet count checked. He states he was feeling a bit run down and thought he was going to need a transfusion, but he did not need a transfusion. He was afebrile during the time of the appointment and went home afterwards. Around 3pm, the patient had bilateral crampy leg pain that was similar to the leg pain he felt during his consolidation therapy treatment. He took hydrocodone and the pain improved. The patient had one episode of diarrhea three days prior to presentation and has been bothered by rectal pain associated with his "large hemorrhoids. He denies any bleeding per rectum. He also feels like his mouth has been more dry than usual over the past several days, but denies all other symptoms.   CT Abdomen and Pelvis w/ Oral Cont and w/ IV Conttrast on 10/9 revealed Region of hypoenhancement upper pole left kidney; please correlate clinically for possible pyelonephritis. No hydronephrosis or perinephric stranding. No rectal abscess.  CT Chest No Contrast on 10/9  revealed Clear lungs.  10/9- BCX NGTD and  10/9- UCX (-)  Patient initiated on Meropenem; resumed Acyclovir, Diflucan prophylaxis  He's been afebrile since admission and ANC is up to 640 on 10/11 and it's uptrending. Meropenem  switched  to Levaquin today and if afebrile overnight can plan for discharge  on 10/12.      Mr. Tian is a 36 year old male with a PMH of HTN, Fatty liver, kidney stones, AML diagnosed in July 2021 s/p consolidation with cytarabine (hematologist Chelsea Yancey), who presented to the ED on 10/9 due to fever the night of presentation. The patient went to sleep around 10pm and woke up at 3am feeling warm and clammy. He took his temperature orally at home and it was 100.7. The day prior to presentation (10/8/21), the patient went to [email or web address] for an appointment to get his platelet count checked. He states he was feeling a bit run down and thought he was going to need a transfusion, but he did not need a transfusion. He was afebrile during the time of the appointment and went home afterwards. Around 3pm, the patient had bilateral crampy leg pain that was similar to the leg pain he felt during his consolidation therapy treatment. He took hydrocodone and the pain improved. The patient had one episode of diarrhea three days prior to presentation and has been bothered by rectal pain associated with his "large hemorrhoids. He denies any bleeding per rectum. He also feels like his mouth has been more dry than usual over the past several days, but denies all other symptoms.   CT Abdomen and Pelvis w/ Oral Cont and w/ IV Conttrast on 10/9 revealed Region of hypoenhancement upper pole left kidney; please correlate clinically for possible pyelonephritis. No hydronephrosis or perinephric stranding. No rectal abscess.  CT Chest No Contrast on 10/9  revealed Clear lungs.  10/9- BCX NGTD and  10/9- UCX (-)  Patient initiated on Meropenem; resumed Acyclovir, Diflucan prophylaxis  He's been afebrile since admission and ANC is up to 640 on 10/11 and it's uptrending. Meropenem  switched  to Levaquin today and if afebrile overnight can plan for discharge  on 10/12. Patient remained afebrile and can be discharged home.

## 2021-10-11 NOTE — DISCHARGE NOTE PROVIDER - NSDCMRMEDTOKEN_GEN_ALL_CORE_FT
acyclovir 400 mg oral tablet: 1 tab(s) orally every 8 hours     Note:Last filled in aug for 1 month supply  amLODIPine 5 mg oral tablet: 1 tab(s) orally once a     Note:Last filled in aug for 1 month supply  Diflucan 200 mg oral tablet: 1 tab(s) orally once a day       levoFLOXacin 500 mg oral tablet: 1 tab(s) orally every 24 hours   oxyCODONE 5 mg oral tablet: 1 tab(s) orally , As Needed pain     Note:Last filled in august for 60 pills.  Reglan 10 mg oral tablet: 1 tab(s) orally every 6 hours, As Needed  nausea  Zofran 8 mg oral tablet: 1 tab(s) orally every 8 hours, As Needed nausea   acyclovir 400 mg oral tablet: 1 tab(s) orally every 8 hours     Note:Last filled in aug for 1 month supply  amLODIPine 5 mg oral tablet: 1 tab(s) orally once a     Note:Last filled in aug for 1 month supply  Diflucan 200 mg oral tablet: 1 tab(s) orally once a day       levoFLOXacin 500 mg oral tablet: 1 tab(s) orally every 24 hours   Reglan 10 mg oral tablet: 1 tab(s) orally every 6 hours, As Needed  nausea  Zofran 8 mg oral tablet: 1 tab(s) orally every 8 hours, As Needed nausea   amLODIPine 5 mg oral tablet: 1 tab(s) orally once a     Note:Last filled in aug for 1 month supply  Reglan 10 mg oral tablet: 1 tab(s) orally every 6 hours, As Needed  nausea  Zofran 8 mg oral tablet: 1 tab(s) orally every 8 hours, As Needed nausea

## 2021-10-11 NOTE — DISCHARGE NOTE PROVIDER - NSDCFUSCHEDAPPT_GEN_ALL_CORE_FT
JAK DANIELS ; 10/13/2021 ; [facility] JAK DANIELS ; 10/13/2021 ; [place]  JAK DANIELS ; 10/13/2021 ; [facility]

## 2021-10-11 NOTE — PROGRESS NOTE ADULT - PROBLEM SELECTOR PLAN 4
Lovenox 40 mg s/c daily, d/c when platelets 50 K. Lovenox 40 mg s/c daily, d/c when platelets 50 K.  Encourage ambulation

## 2021-10-11 NOTE — PROGRESS NOTE ADULT - PROBLEM SELECTOR PLAN 3
Neutropenic, afebrile now, if febrile pan culture, f/u culture results.  Continue Meropenem, Acyclovir, Diflucan.  10/9- BCX, NGTD  10/9- UCX (-)  10/9- CT chest  (-)  CT AP Region of hypoenhancement upper pole left kidney; please correlate clinically for possible pyelonephritis. No hydronephrosis or perinephric stranding. No rectal abscess.  10/9- F/u by ID Neutropenic, afebrile now, if febrile pan culture, f/u culture results.  Continue Meropenem, Acyclovir, Diflucan.  10/9- BCX, NGTD  10/9- UCX (-)  10/9- CT chest  (-)  CT AP Region of hypoenhancement upper pole left kidney; please correlate clinically for possible pyelonephritis. No hydronephrosis or perinephric stranding. No rectal abscess.  10/9- F/u by ID  10/11 ANC 0.64, switched Meropenem to Levaquin

## 2021-10-11 NOTE — PROGRESS NOTE ADULT - ASSESSMENT
36M htn, recently diagnosed AML July 2021 s/p induction (Daunorubicin and Cytarabine) complicated by neutropenic fever.  Possible rash on cefepime which was changed to zosyn.  Now s/p C1 HIDAC consolidation 9/17-9/22.  Brief admission for not feeling well. Here with neutropenic fever.  ANC improved and fever resolved    Neutropenic Fever  -  now  - can d/c meropenem  - for discharge on levaquin    Cefepime-allergic  - will avoid    I have discussed plan of care as detailed above with heme/onc    Please call Infectious Diseases if there is a change in status.  Thank you.  [phone].

## 2021-10-11 NOTE — PROGRESS NOTE ADULT - ATTENDING COMMENTS
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   10/9- BCX(-); 10/9- UCX (-)  10/9- CT chest  (-)  Patient initiated on Meropenem; resume Acyclovir, Diflucan prophylaxis  He's been afebrile since admission and ANC is up to 640 on 10/11. Uptrending. Will switch to Levaquin today and if afebrile overnight can plan for discharge tomorrow.   ID followup  Monitor labs  Mouth care  OOB/ambulate

## 2021-10-11 NOTE — PROGRESS NOTE ADULT - SUBJECTIVE AND OBJECTIVE BOX
Patient is a 36y old  Male who presents with a chief complaint of  fever    f/u neutropenic fever    Interval History/ROS:  no further fever.  ANC improved.  dad at bedside.  no no n/v/d.  no abdominal pain.  no dysuria.  Remainder of ROS otherwise negative.    PAST MEDICAL & SURGICAL HISTORY:  Hypertension diagnosed 1 year ago  Kidney stone 5 years ago  History of genital warts    Allergies  No Known Allergies    ANTIMICROBIALS:  vancomycin  IVPB 1250 every 12 hours (10/9 x1)    active:  acyclovir   Oral Tab/Cap 400 every 8 hours  meropenem  IVPB 1000 every 8 hours (10/9-)  fluconAZOLE   Tablet 200 daily    MEDICATIONS  (STANDING):  amLODIPine   Tablet 5 daily  enoxaparin Injectable 40 daily  influenza   Vaccine 0.5 once  polyethylene glycol 3350 17 daily  senna 2 at bedtime    Vital Signs Last 24 Hrs  T(F): 97.8 (10-11-21 @ 09:50), Max: 98.3 (10-10-21 @ 21:30)  HR: 85 (10-11-21 @ 09:50)  BP: 118/76 (10-11-21 @ 09:50)  RR: 18 (10-11-21 @ 09:50)  SpO2: 98% (10-11-21 @ 09:50) (97% - 98%)    PHYSICAL EXAM:  Constitutional: non-toxic  HEAD/EYES: anicteric  ENT:  supple  Cardiovascular:   normal S1, S2  Respiratory:  clear BS bilaterally  GI:  soft, non-tender, normal bowel sounds  :  no mendoza  Musculoskeletal:  no synovitis  Neurologic: awake and alert, normal strength, no focal findings  Skin:  no rash, R picc c/d/i   Psychiatric:  awake, alert, appropriate mood                        9.6    2.38  )-----------( 264      ( 11 Oct 2021 07:27 )             28.0 10-11    140  |  103  |  9   ----------------------------<  90  4.1   |  23  |  0.75  Ca    9.9      11 Oct 2021 07:27Phos  3.9     10-11Mg     2.4     10-11  TPro  6.8  /  Alb  4.2  /  TBili  0.2  /  DBili  x   /  AST  19  /  ALT  36  /  AlkPhos  81  10-    Auto Neutrophil #: 0.64 K/uL (10-11-21 @ 07:27)  Auto Neutrophil #: 0.40 K/uL (10-10-21 @ 07:10)  Auto Neutrophil #: 0.24 K/uL (10-09-21 @ 05:04)    Urinalysis Basic - ( 09 Oct 2021 06:41 )  Color: Light Yellow / Appearance: Clear / S.020 / pH: x  Gluc: x / Ketone: Negative  / Bili: Negative / Urobili: Negative   Blood: x / Protein: Trace / Nitrite: Negative   Leuk Esterase: Negative / RBC: x / WBC x   Sq Epi: x / Non Sq Epi: x / Bacteria: x    MICROBIOLOGY:  MRSA PCR Result.: NotDetec (21 @ 18:32)    Culture - Urine (collected 20 Aug 2021 17:59)  Source: Clean Catch Clean Catch (Midstream)  Final Report:    No growth    Culture - Blood (collected 20 Aug 2021 13:16)  Source: .Blood Blood-Peripheral  Final Report:    No Growth Final    Culture - Blood (collected 20 Aug 2021 09:16)  Source: .Blood Blood-Catheter  Final Report:    No Growth Final    Culture - Urine (collected 17 Aug 2021 17:26)  Source: Clean Catch Clean Catch (Midstream)  Final Report:    No growth    Culture - Blood (collected 17 Aug 2021 12:55)  Source: .Blood Blood-Peripheral  Final Report:    No Growth Final    Culture - Blood (collected 17 Aug 2021 10:17)  Source: .Blood Blood-Catheter  Final Report:    No Growth Final    Culture - Urine (collected 14 Aug 2021 20:49)  Source: Clean Catch Clean Catch (Midstream)  Final Report:    No growth    Culture - Blood (collected 14 Aug 2021 20:46)  Source: .Blood Blood-Peripheral  Final Report:    No Growth Final    Culture - Blood (collected 14 Aug 2021 20:46)  Source: .Blood Blood-Catheter  Final Report:    No Growth Final    Culture - Urine (collected 12 Aug 2021 22:09)  Source: Clean Catch Clean Catch (Midstream)  Final Report:    <10,000 CFU/mL Normal Urogenital Heather    HIV-1/2 Combo Result: Nonreact (21 @ 08:51)    Rapid RVP Result: NotDetec (10-09 @ 05:29)    COVID-19 PCR: NotDetec (21 @ 06:56)  COVID-19 PCR: NotDetec (21 @ 07:11)  COVID-19 PCR: NotDetec (21 @ 17:06)    COVID-19 Addy Domain AB Interp: Positive (21 @ 11:04)  COVID-19 Addy Domain AB Interp: Positive (21 @ 10:30)    RADIOLOGY:  imaging below personally reviewed    CT Chest No Cont:   EXAM:  CT CHEST                        PROCEDURE DATE:  10/09/2021    IIMPRESSION:  Clear lungs.    CT Abdomen and Pelvis w/ Oral Cont and w/ IV Cont:   EXAM:  CT ABDOMEN AND PELVIS OC IC                        PROCEDURE DATE:  10/09/2021    No hydronephrosis or perinephric stranding. Punctate nonobstructing left intrarenal calculus. Area of hypoenhancement suggested in the upper pole of the left kidney, not seen on the prior exam 2021. please correlate clinically for possible pyelonephritis.  No rectal abscess.    CT Abdomen and Pelvis w/ Oral Cont and w/ IV Cont (21 @ 21:56) >  IMPRESSION:  *  Etiology for the patient's fever not identified  *  Hepatic steatosis.  *  Resolved right pleural effusion

## 2021-10-11 NOTE — DISCHARGE NOTE PROVIDER - NSDCFUADDAPPT_GEN_ALL_CORE_FT
To [facility] to see GALINA Gallegos on Wednesday 10/13 at 10 am and possible platelet transfusion same day at 2:20pm

## 2021-10-11 NOTE — DISCHARGE NOTE PROVIDER - NSDCFUADDINST_GEN_ALL_CORE_FT
Please keep the antibiotics that you got from the pharmacy for the next time your counts are low. You do not need to take them now.  Please keep the antibiotics (diflucan, acyclovir, levaquin). that you got from the pharmacy for the next time your counts are low. You do not need to take them now.

## 2021-10-12 ENCOUNTER — TRANSCRIPTION ENCOUNTER (OUTPATIENT)
Age: 36
End: 2021-10-12

## 2021-10-12 VITALS
DIASTOLIC BLOOD PRESSURE: 84 MMHG | SYSTOLIC BLOOD PRESSURE: 125 MMHG | HEART RATE: 90 BPM | RESPIRATION RATE: 18 BRPM | OXYGEN SATURATION: 97 % | WEIGHT: 192.9 LBS | TEMPERATURE: 99 F

## 2021-10-12 LAB
ALBUMIN SERPL ELPH-MCNC: 4.2 G/DL — SIGNIFICANT CHANGE UP (ref 3.3–5)
ALP SERPL-CCNC: 80 U/L — SIGNIFICANT CHANGE UP (ref 40–120)
ALT FLD-CCNC: 37 U/L — SIGNIFICANT CHANGE UP (ref 10–45)
ANION GAP SERPL CALC-SCNC: 15 MMOL/L — SIGNIFICANT CHANGE UP (ref 5–17)
AST SERPL-CCNC: 19 U/L — SIGNIFICANT CHANGE UP (ref 10–40)
BASOPHILS # BLD AUTO: 0 K/UL — SIGNIFICANT CHANGE UP (ref 0–0.2)
BASOPHILS NFR BLD AUTO: 0 % — SIGNIFICANT CHANGE UP (ref 0–2)
BILIRUB SERPL-MCNC: 0.2 MG/DL — SIGNIFICANT CHANGE UP (ref 0.2–1.2)
BUN SERPL-MCNC: 15 MG/DL — SIGNIFICANT CHANGE UP (ref 7–23)
CALCIUM SERPL-MCNC: 9.6 MG/DL — SIGNIFICANT CHANGE UP (ref 8.4–10.5)
CHLORIDE SERPL-SCNC: 103 MMOL/L — SIGNIFICANT CHANGE UP (ref 96–108)
CO2 SERPL-SCNC: 21 MMOL/L — LOW (ref 22–31)
CREAT SERPL-MCNC: 0.77 MG/DL — SIGNIFICANT CHANGE UP (ref 0.5–1.3)
EOSINOPHIL # BLD AUTO: 0 K/UL — SIGNIFICANT CHANGE UP (ref 0–0.5)
EOSINOPHIL NFR BLD AUTO: 0 % — SIGNIFICANT CHANGE UP (ref 0–6)
GLUCOSE SERPL-MCNC: 92 MG/DL — SIGNIFICANT CHANGE UP (ref 70–99)
HCT VFR BLD CALC: 28.1 % — LOW (ref 39–50)
HGB BLD-MCNC: 9.9 G/DL — LOW (ref 13–17)
LDH SERPL L TO P-CCNC: 172 U/L — SIGNIFICANT CHANGE UP (ref 50–242)
LYMPHOCYTES # BLD AUTO: 0.54 K/UL — LOW (ref 1–3.3)
LYMPHOCYTES # BLD AUTO: 19.2 % — SIGNIFICANT CHANGE UP (ref 13–44)
MAGNESIUM SERPL-MCNC: 2.3 MG/DL — SIGNIFICANT CHANGE UP (ref 1.6–2.6)
MANUAL SMEAR VERIFICATION: SIGNIFICANT CHANGE UP
MCHC RBC-ENTMCNC: 30.6 PG — SIGNIFICANT CHANGE UP (ref 27–34)
MCHC RBC-ENTMCNC: 35.2 GM/DL — SIGNIFICANT CHANGE UP (ref 32–36)
MCV RBC AUTO: 86.7 FL — SIGNIFICANT CHANGE UP (ref 80–100)
MONOCYTES # BLD AUTO: 1.13 K/UL — HIGH (ref 0–0.9)
MONOCYTES NFR BLD AUTO: 40.4 % — HIGH (ref 2–14)
NEUTROPHILS # BLD AUTO: 1.13 K/UL — LOW (ref 1.8–7.4)
NEUTROPHILS NFR BLD AUTO: 40.4 % — LOW (ref 43–77)
NRBC # BLD: 3 /100 — HIGH (ref 0–0)
PHOSPHATE SERPL-MCNC: 4.1 MG/DL — SIGNIFICANT CHANGE UP (ref 2.5–4.5)
PLAT MORPH BLD: NORMAL — SIGNIFICANT CHANGE UP
PLATELET # BLD AUTO: 295 K/UL — SIGNIFICANT CHANGE UP (ref 150–400)
POTASSIUM SERPL-MCNC: 3.9 MMOL/L — SIGNIFICANT CHANGE UP (ref 3.5–5.3)
POTASSIUM SERPL-SCNC: 3.9 MMOL/L — SIGNIFICANT CHANGE UP (ref 3.5–5.3)
PROT SERPL-MCNC: 6.8 G/DL — SIGNIFICANT CHANGE UP (ref 6–8.3)
RBC # BLD: 3.24 M/UL — LOW (ref 4.2–5.8)
RBC # FLD: 13.9 % — SIGNIFICANT CHANGE UP (ref 10.3–14.5)
RBC BLD AUTO: SIGNIFICANT CHANGE UP
SODIUM SERPL-SCNC: 139 MMOL/L — SIGNIFICANT CHANGE UP (ref 135–145)
URATE SERPL-MCNC: 6.4 MG/DL — SIGNIFICANT CHANGE UP (ref 3.4–8.8)
WBC # BLD: 2.8 K/UL — LOW (ref 3.8–10.5)
WBC # FLD AUTO: 2.8 K/UL — LOW (ref 3.8–10.5)

## 2021-10-12 PROCEDURE — 85610 PROTHROMBIN TIME: CPT

## 2021-10-12 PROCEDURE — 84132 ASSAY OF SERUM POTASSIUM: CPT

## 2021-10-12 PROCEDURE — 86901 BLOOD TYPING SEROLOGIC RH(D): CPT

## 2021-10-12 PROCEDURE — 36415 COLL VENOUS BLD VENIPUNCTURE: CPT

## 2021-10-12 PROCEDURE — 82947 ASSAY GLUCOSE BLOOD QUANT: CPT

## 2021-10-12 PROCEDURE — 93005 ELECTROCARDIOGRAM TRACING: CPT

## 2021-10-12 PROCEDURE — 80048 BASIC METABOLIC PNL TOTAL CA: CPT

## 2021-10-12 PROCEDURE — 83605 ASSAY OF LACTIC ACID: CPT

## 2021-10-12 PROCEDURE — 84550 ASSAY OF BLOOD/URIC ACID: CPT

## 2021-10-12 PROCEDURE — 82330 ASSAY OF CALCIUM: CPT

## 2021-10-12 PROCEDURE — 86769 SARS-COV-2 COVID-19 ANTIBODY: CPT

## 2021-10-12 PROCEDURE — G1004: CPT

## 2021-10-12 PROCEDURE — 85018 HEMOGLOBIN: CPT

## 2021-10-12 PROCEDURE — 85025 COMPLETE CBC W/AUTO DIFF WBC: CPT

## 2021-10-12 PROCEDURE — 74177 CT ABD & PELVIS W/CONTRAST: CPT | Mod: MG

## 2021-10-12 PROCEDURE — 86900 BLOOD TYPING SEROLOGIC ABO: CPT

## 2021-10-12 PROCEDURE — 96375 TX/PRO/DX INJ NEW DRUG ADDON: CPT

## 2021-10-12 PROCEDURE — 86850 RBC ANTIBODY SCREEN: CPT

## 2021-10-12 PROCEDURE — 71045 X-RAY EXAM CHEST 1 VIEW: CPT

## 2021-10-12 PROCEDURE — 82435 ASSAY OF BLOOD CHLORIDE: CPT

## 2021-10-12 PROCEDURE — 84100 ASSAY OF PHOSPHORUS: CPT

## 2021-10-12 PROCEDURE — 80053 COMPREHEN METABOLIC PANEL: CPT

## 2021-10-12 PROCEDURE — 96374 THER/PROPH/DIAG INJ IV PUSH: CPT

## 2021-10-12 PROCEDURE — 99239 HOSP IP/OBS DSCHRG MGMT >30: CPT | Mod: GC

## 2021-10-12 PROCEDURE — 99285 EMERGENCY DEPT VISIT HI MDM: CPT

## 2021-10-12 PROCEDURE — 83615 LACTATE (LD) (LDH) ENZYME: CPT

## 2021-10-12 PROCEDURE — 87086 URINE CULTURE/COLONY COUNT: CPT

## 2021-10-12 PROCEDURE — 84295 ASSAY OF SERUM SODIUM: CPT

## 2021-10-12 PROCEDURE — 85730 THROMBOPLASTIN TIME PARTIAL: CPT

## 2021-10-12 PROCEDURE — 83735 ASSAY OF MAGNESIUM: CPT

## 2021-10-12 PROCEDURE — 0225U NFCT DS DNA&RNA 21 SARSCOV2: CPT

## 2021-10-12 PROCEDURE — 82803 BLOOD GASES ANY COMBINATION: CPT

## 2021-10-12 PROCEDURE — 87040 BLOOD CULTURE FOR BACTERIA: CPT

## 2021-10-12 PROCEDURE — 85014 HEMATOCRIT: CPT

## 2021-10-12 PROCEDURE — 81003 URINALYSIS AUTO W/O SCOPE: CPT

## 2021-10-12 PROCEDURE — 71250 CT THORAX DX C-: CPT

## 2021-10-12 RX ADMIN — Medication 400 MILLIGRAM(S): at 06:41

## 2021-10-12 RX ADMIN — AMLODIPINE BESYLATE 5 MILLIGRAM(S): 2.5 TABLET ORAL at 06:42

## 2021-10-12 RX ADMIN — CHLORHEXIDINE GLUCONATE 1 APPLICATION(S): 213 SOLUTION TOPICAL at 10:47

## 2021-10-12 NOTE — PROGRESS NOTE ADULT - PROBLEM SELECTOR PLAN 1
AML FLT 3 (-).  Monitor CBC with diff. Transfuse PRN.  Monitor electrolytes, supplement as needed.  Strict I/O.  Daily weights.  Mouth care.  Poss dc home on 10/12 if stay afebrile and cx (-) AML FLT 3 (-).  Monitor CBC with diff. Transfuse PRN.  Monitor electrolytes, supplement as needed.  Strict I/O.  Daily weights.  Mouth care.  DC home today

## 2021-10-12 NOTE — DISCHARGE NOTE NURSING/CASE MANAGEMENT/SOCIAL WORK - PATIENT PORTAL LINK FT
You can access the FollowMyHealth Patient Portal offered by [facility] by registering at the following website: [email or web address]. By joining [employer]’s FollowMyHealth portal, you will also be able to view your health information using other applications (apps) compatible with our system.

## 2021-10-12 NOTE — PROGRESS NOTE ADULT - PROBLEM SELECTOR PLAN 3
Neutropenic, afebrile now, if febrile pan culture, f/u culture results.  Continue Meropenem, Acyclovir, Diflucan.  10/9- BCX, NGTD  10/9- UCX (-)  10/9- CT chest  (-)  CT AP Region of hypoenhancement upper pole left kidney; please correlate clinically for possible pyelonephritis. No hydronephrosis or perinephric stranding. No rectal abscess.  10/9- F/u by ID  10/11 ANC 0.64, switched Meropenem to Levaquin Neutropenic, afebrile now, if febrile pan culture,   Off abx ANC today 1.1 today.   10/9- BCX, NGTD  10/9- UCX (-)  10/9- CT chest  (-)  CT AP Region of hypoenhancement upper pole left kidney; please correlate clinically for possible pyelonephritis. No hydronephrosis or perinephric stranding. No rectal abscess.

## 2021-10-12 NOTE — PROGRESS NOTE ADULT - ATTENDING COMMENTS
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   10/9- BCX(-); 10/9- UCX (-)  10/9- CT chest  (-)  Patient initiated on Meropenem; resume Acyclovir, Diflucan prophylaxis  He's been afebrile since admission and ANC is up to 640 on 10/11. Uptrending. Will switch to Levaquin today and if afebrile overnight can plan for discharge tomorrow.   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   10/9- BCX(-); 10/9- UCX (-)  10/9- CT chest  (-)  Patient initiated on Meropenem; resume Acyclovir, Diflucan prophylaxis  He's been afebrile since admission and ANC is up to 1,100 on Levaquin. Plan for discharge today. Follow up in the office prior to next HIDAC admission has been set up.

## 2021-10-12 NOTE — PROGRESS NOTE ADULT - SUBJECTIVE AND OBJECTIVE BOX
Diagnosis: AML FLT 3 (-)    Protocol/Chemo Regimen: Cycle #1 HIDAC     Day: 26    Pt endorsed:  no complaint     Review of Systems:  Patient denied nausea, vomiting, odynophagia, chest pain, cough, dyspnea, abdominal pain, constipation, diarrhea, rash, fatigue, headache    Pain scale: Deines    Diet: Regular    Allergies    No Known Allergies    Intolerances    cefepime (Rash)      ANTIMICROBIALS  acyclovir   Oral Tab/Cap 400 milliGRAM(s) Oral every 8 hours  fluconAZOLE   Tablet 200 milliGRAM(s) Oral daily  levoFLOXacin  Tablet 500 milliGRAM(s) Oral every 24 hours      HEME/ONC MEDICATIONS  enoxaparin Injectable 40 milliGRAM(s) SubCutaneous daily      STANDING MEDICATIONS  amLODIPine   Tablet 5 milliGRAM(s) Oral daily  chlorhexidine 2% Cloths 1 Application(s) Topical <User Schedule>  influenza   Vaccine 0.5 milliLiter(s) IntraMuscular once  polyethylene glycol 3350 17 Gram(s) Oral daily  senna 2 Tablet(s) Oral at bedtime      PRN MEDICATIONS  acetaminophen   Tablet .. 650 milliGRAM(s) Oral every 6 hours PRN  sodium chloride 0.65% Nasal 1 Spray(s) Both Nostrils three times a day PRN        Vital Signs Last 24 Hrs  T(C): 36.7 (12 Oct 2021 05:05), Max: 36.9 (11 Oct 2021 17:21)  T(F): 98 (12 Oct 2021 05:05), Max: 98.5 (11 Oct 2021 17:21)  HR: 84 (12 Oct 2021 05:05) (80 - 90)  BP: 107/69 (12 Oct 2021 05:05) (107/69 - 131/71)  BP(mean): --  RR: 18 (12 Oct 2021 05:05) (18 - 18)  SpO2: 98% (12 Oct 2021 05:05) (98% - 100%)    PHYSICAL EXAM  General: NAD  HEENT:  EOMOI, clear oropharynx, anicteric sclera  Neck: supple  CV: (+) S1/S2 RRR  Lungs: clear to auscultation, no wheezes or rales  Abdomen: soft, non-tender, non-distended (+) BS  Ext: no  edema  Skin: no rashes  Neuro: alert and oriented X 3, no focal deficits  Central Line: Right arm D/L PICC, CDI    LABS:      Diagnosis: AML FLT 3 (-)    Protocol/Chemo Regimen: Cycle #1 HIDAC     Day: 26    Pt endorsed:  no complaint     Review of Systems:  Patient denied nausea, vomiting, odynophagia, chest pain, cough, dyspnea, abdominal pain, constipation, diarrhea, rash, fatigue, headache    Pain scale: Deines    Diet: Regular    Allergies    No Known Allergies    Intolerances    cefepime (Rash)      ANTIMICROBIALS  acyclovir   Oral Tab/Cap 400 milliGRAM(s) Oral every 8 hours  fluconAZOLE   Tablet 200 milliGRAM(s) Oral daily  levoFLOXacin  Tablet 500 milliGRAM(s) Oral every 24 hours      HEME/ONC MEDICATIONS  enoxaparin Injectable 40 milliGRAM(s) SubCutaneous daily      STANDING MEDICATIONS  amLODIPine   Tablet 5 milliGRAM(s) Oral daily  chlorhexidine 2% Cloths 1 Application(s) Topical <User Schedule>  influenza   Vaccine 0.5 milliLiter(s) IntraMuscular once  polyethylene glycol 3350 17 Gram(s) Oral daily  senna 2 Tablet(s) Oral at bedtime      PRN MEDICATIONS  acetaminophen   Tablet .. 650 milliGRAM(s) Oral every 6 hours PRN  sodium chloride 0.65% Nasal 1 Spray(s) Both Nostrils three times a day PRN        Vital Signs Last 24 Hrs  T(C): 36.7 (12 Oct 2021 05:05), Max: 36.9 (11 Oct 2021 17:21)  T(F): 98 (12 Oct 2021 05:05), Max: 98.5 (11 Oct 2021 17:21)  HR: 84 (12 Oct 2021 05:05) (80 - 90)  BP: 107/69 (12 Oct 2021 05:05) (107/69 - 131/71)  BP(mean): --  RR: 18 (12 Oct 2021 05:05) (18 - 18)  SpO2: 98% (12 Oct 2021 05:05) (98% - 100%)    PHYSICAL EXAM  General: NAD  HEENT:  EOMOI, clear oropharynx, anicteric sclera  Neck: supple  CV: (+) S1/S2 RRR  Lungs: clear to auscultation, no wheezes or rales  Abdomen: soft, non-tender, non-distended (+) BS  Ext: no  edema  Skin: no rashes  Neuro: alert and oriented X 3, no focal deficits  Central Line: Right arm D/L PICC, CDI                            9.9    2.80  )-----------( 295      ( 12 Oct 2021 07:00 )             28.1         Mean Cell Volume : 86.7 fl  Mean Cell Hemoglobin : 30.6 pg  Mean Cell Hemoglobin Concentration : 35.2 gm/dL  Auto Neutrophil # : 1.13 K/uL  Auto Lymphocyte # : 0.54 K/uL  Auto Monocyte # : 1.13 K/uL  Auto Eosinophil # : 0.00 K/uL  Auto Basophil # : 0.00 K/uL  Auto Neutrophil % : 40.4 %  Auto Lymphocyte % : 19.2 %  Auto Monocyte % : 40.4 %  Auto Eosinophil % : 0.0 %  Auto Basophil % : 0.0 %      10-12    139  |  103  |  15  ----------------------------<  92  3.9   |  21<L>  |  0.77    Ca    9.6      12 Oct 2021 06:59  Phos  4.1     10-12  Mg     2.3     10-12    TPro  6.8  /  Alb  4.2  /  TBili  0.2  /  DBili  x   /  AST  19  /  ALT  37  /  AlkPhos  80  10-12      Mg 2.3  Phos 4.1      PT/INR - ( 11 Oct 2021 07:27 )   PT: 12.4 sec;   INR: 1.04 ratio         PTT - ( 11 Oct 2021 07:27 )  PTT:22.0 sec      Uric Acid 6.4

## 2021-10-12 NOTE — DISCHARGE NOTE NURSING/CASE MANAGEMENT/SOCIAL WORK - NSDCFUADDAPPT_GEN_ALL_CORE_FT
To [facility] to see GALINA Gallegos on Wednesday 10/13 at 10 am and possible platelet transfusion same day at 2:20pm

## 2021-10-12 NOTE — PROGRESS NOTE ADULT - ASSESSMENT
36 year old male with a PMH of HTN, Fatty liver, kidney stones, AML FLT 3 (-), diagnosed in July 2021, initial induction with Dauno/Cytarabine followed by  cycle # 1 consolidation with high dose Cytarabine on 9/17,  who presented to the ED on 10/9 due to fever 100.7. Upon admission patient met sepsis criteria, received Vanco X 1  dose, patient has pancytopenia secondary to chemotherapy and  or disease condition.

## 2021-10-12 NOTE — DISCHARGE NOTE NURSING/CASE MANAGEMENT/SOCIAL WORK - NSDCPEFALRISK_GEN_ALL_CORE
For information on Fall & injury Prevention, visit [email or web address]

## 2021-10-13 ENCOUNTER — RESULT REVIEW (OUTPATIENT)
Age: 36
End: 2021-10-13

## 2021-10-13 ENCOUNTER — APPOINTMENT (OUTPATIENT)
Dept: INFUSION THERAPY | Facility: HOSPITAL | Age: 36
End: 2021-10-13

## 2021-10-13 ENCOUNTER — APPOINTMENT (OUTPATIENT)
Dept: HEMATOLOGY ONCOLOGY | Facility: CLINIC | Age: 36
End: 2021-10-13
Payer: COMMERCIAL

## 2021-10-13 VITALS
WEIGHT: 195.77 LBS | BODY MASS INDEX: 27.11 KG/M2 | HEART RATE: 107 BPM | RESPIRATION RATE: 16 BRPM | HEIGHT: 71.26 IN | DIASTOLIC BLOOD PRESSURE: 72 MMHG | OXYGEN SATURATION: 96 % | SYSTOLIC BLOOD PRESSURE: 108 MMHG | TEMPERATURE: 97.2 F

## 2021-10-13 LAB
BASOPHILS # BLD AUTO: 0.01 K/UL — SIGNIFICANT CHANGE UP (ref 0–0.2)
BASOPHILS NFR BLD AUTO: 0.2 % — SIGNIFICANT CHANGE UP (ref 0–2)
EOSINOPHIL # BLD AUTO: 0 K/UL — SIGNIFICANT CHANGE UP (ref 0–0.5)
EOSINOPHIL NFR BLD AUTO: 0 % — SIGNIFICANT CHANGE UP (ref 0–6)
HCT VFR BLD CALC: 35.9 % — LOW (ref 39–50)
HGB BLD-MCNC: 12.2 G/DL — LOW (ref 13–17)
IMM GRANULOCYTES NFR BLD AUTO: 0.4 % — SIGNIFICANT CHANGE UP (ref 0–1.5)
LYMPHOCYTES # BLD AUTO: 0.48 K/UL — LOW (ref 1–3.3)
LYMPHOCYTES # BLD AUTO: 10.4 % — LOW (ref 13–44)
MCHC RBC-ENTMCNC: 30.1 PG — SIGNIFICANT CHANGE UP (ref 27–34)
MCHC RBC-ENTMCNC: 34 G/DL — SIGNIFICANT CHANGE UP (ref 32–36)
MCV RBC AUTO: 88.6 FL — SIGNIFICANT CHANGE UP (ref 80–100)
MONOCYTES # BLD AUTO: 1.47 K/UL — HIGH (ref 0–0.9)
MONOCYTES NFR BLD AUTO: 31.9 % — HIGH (ref 2–14)
NEUTROPHILS # BLD AUTO: 2.63 K/UL — SIGNIFICANT CHANGE UP (ref 1.8–7.4)
NEUTROPHILS NFR BLD AUTO: 57.1 % — SIGNIFICANT CHANGE UP (ref 43–77)
NRBC # BLD: 0 /100 WBCS — SIGNIFICANT CHANGE UP (ref 0–0)
PLATELET # BLD AUTO: 400 K/UL — SIGNIFICANT CHANGE UP (ref 150–400)
RBC # BLD: 4.05 M/UL — LOW (ref 4.2–5.8)
RBC # FLD: 14.4 % — SIGNIFICANT CHANGE UP (ref 10.3–14.5)
WBC # BLD: 4.61 K/UL — SIGNIFICANT CHANGE UP (ref 3.8–10.5)
WBC # FLD AUTO: 4.61 K/UL — SIGNIFICANT CHANGE UP (ref 3.8–10.5)

## 2021-10-13 PROCEDURE — 99214 OFFICE O/P EST MOD 30 MIN: CPT

## 2021-10-13 NOTE — ASSESSMENT
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 \par Started induction with Dauno/Cytarabine on 8/6. \par Pt's counts now recovered, remission marrow later done on 9/2- in remission. Dr. Yancey reviewed Bmbx result with pt on the phone, will proceed to consolidation with 4 cycles of HIDAC. C1 HIDAC on 9/17, c/b neutropenic fever and diarrhea. \par CBC reviewed with pt and father, count recovered, no need for platelet transfusion today\par C2 HIDAC is due on 10/15, patient had upsetting stomach and diarrhea this morning after ate cold salad last night. Is on Imodium.  Advised pt to eat home-made and well made food.  He prefers defer admission to Monday 10/18 after diarrhea improved. Labs done today, will have COVID 19 test prior to admission \par cont hemorrhoidal ointment and witch hazel. Sent oxycodone for pain relief\par All questions answered\par RTC after C2 HIDAC\par \par \par \par

## 2021-10-13 NOTE — HISTORY OF PRESENT ILLNESS
[de-identified] : 35yo M w/ HTN and newly diagnosed AML here for f/u. \par \par He presented to the hospital on 7/30/21 with complaints of dizziness, fatigue and severe RUQ pain for 3 days. CT A/P revealed fatty liver and small R pleural effusion. WBC 12k with 17% blasts.Peripheral flow cytometry showed 13% myeloblasts. FLT3(-). BMbx was done on 8/4/21 - confirmed AML. 46,XY     {20     }\par Foundation: mutations in DNMT3A R882H, NRAS G12D, NPM1 W288fs*12\par Pt consented to [employer]. Patient was offered sperm banking, but declined.\par On 8/6, induction with Dauno/Cytararbine was started (cytarabine 100/m2 and dauno 90/m2) \par He received a seven day course of Zosyn for presumed RUL PNA, then transitioned to  levaquin, posaconazole and Acyclovir for ppx for neutropenia. Course c/b neutropenic fevers, cultures negative, repeat CT 8/14 without infectious source. He was on Cefepime but developed a rash, changed to meropenem. Rash improved. \par Day 14 BMbx on 8/19 was hypocellular with chemotherapeutic effect; however, per hematopathology, appears to be earlier regeneration than would typically seen which is concerning for persistent disease at this point, and the earliest cells are CD34 positive and his myeloblasts on initial presentation were CD34 negative. Thus, this may simply represent early regeneration of his marrow. Plan is to await count recovery and repeat biopsy.  \par Patient discharged home on 8/29/21 when ANC >500 [de-identified] : Eating well since discharge. \par c/o hemorrhoidal pain. Hemorrhoids started a few days prior to discharge. He was sent home with rectal ointment and witch hazel. \par \par BMBx done on 9/2: Cellular bone marrow with trilineage hematopoiesis with maturation and megakaryocytosis (history of AML).\par Pt feels well, CBC today showed count stable\par \par s/p C1 HIDAC 9/17-9/22 \par \par c/o leg pain after chemo in the hospital \par \par He presented to the ED on 10/9 due to fever the night of presentation. The patient went to sleep around 10pm and woke up at 3am feeling warm and clammy. He took his temperature orally at home and it was 100.7. The day prior to presentation (10/8/21), the patient went to [facility] for an appointment to get his platelet count checked. He states he was feeling a bit run down and thought he was going to need a transfusion, but he did not need a transfusion. He was afebrile during the time of the appointment and went home afterwards. Around 3pm, the patient had bilateral crampy leg pain that was similar to the leg pain he felt during his consolidation therapy treatment. He took hydrocodone and the pain improved. The patient had one episode of diarrhea three days prior to presentation and has been bothered by rectal pain associated with his "large hemorrhoids. He denies any bleeding per rectum. He also feels like his mouth has been more dry than usual over the past several days, but denies all other symptoms. \par CT Abdomen and Pelvis w/ Oral Cont and w/ IV Conttrast on 10/9 revealed Region of hypoenhancement upper pole left kidney; please correlate clinically for possible pyelonephritis. No hydronephrosis or perinephric stranding. No rectal abscess.\par CT Chest No Contrast on 10/9 revealed Clear lungs.\par 10/9- BCX NGTD and 10/9- UCX (-)\par \par He ate some cold salad late last night, had upsetting stomach and diarrhea this morning. Will take Imodium for diarrhea. \par C2 is due on 10/15, pt wants deferring to next Monday after his diarrhea improved. \par

## 2021-10-14 LAB
ALBUMIN SERPL ELPH-MCNC: 5 G/DL
ALP BLD-CCNC: 96 U/L
ALT SERPL-CCNC: 53 U/L
ANION GAP SERPL CALC-SCNC: 17 MMOL/L
AST SERPL-CCNC: 27 U/L
BILIRUB SERPL-MCNC: 0.3 MG/DL
BUN SERPL-MCNC: 18 MG/DL
CALCIUM SERPL-MCNC: 10.4 MG/DL
CHLORIDE SERPL-SCNC: 102 MMOL/L
CO2 SERPL-SCNC: 21 MMOL/L
CREAT SERPL-MCNC: 0.84 MG/DL
CULTURE RESULTS: SIGNIFICANT CHANGE UP
GLUCOSE SERPL-MCNC: 120 MG/DL
POTASSIUM SERPL-SCNC: 4.1 MMOL/L
PROT SERPL-MCNC: 8 G/DL
SODIUM SERPL-SCNC: 141 MMOL/L
SPECIMEN SOURCE: SIGNIFICANT CHANGE UP

## 2021-10-15 ENCOUNTER — INPATIENT (INPATIENT)
Facility: HOSPITAL | Age: 36
LOS: 1 days | Discharge: HOME HEALTH | DRG: 394 | End: 2021-10-17
Attending: INTERNAL MEDICINE | Admitting: INTERNAL MEDICINE
Payer: COMMERCIAL

## 2021-10-15 VITALS
SYSTOLIC BLOOD PRESSURE: 109 MMHG | HEIGHT: 72 IN | HEART RATE: 127 BPM | OXYGEN SATURATION: 98 % | TEMPERATURE: 98 F | DIASTOLIC BLOOD PRESSURE: 81 MMHG | RESPIRATION RATE: 20 BRPM | WEIGHT: 195.11 LBS

## 2021-10-15 DIAGNOSIS — K52.9 NONINFECTIVE GASTROENTERITIS AND COLITIS, UNSPECIFIED: ICD-10-CM

## 2021-10-15 LAB
ALBUMIN SERPL ELPH-MCNC: 4.7 G/DL — SIGNIFICANT CHANGE UP (ref 3.3–5)
ALP SERPL-CCNC: 93 U/L — SIGNIFICANT CHANGE UP (ref 40–120)
ALT FLD-CCNC: 77 U/L — HIGH (ref 10–45)
ANION GAP SERPL CALC-SCNC: 17 MMOL/L — SIGNIFICANT CHANGE UP (ref 5–17)
AST SERPL-CCNC: 38 U/L — SIGNIFICANT CHANGE UP (ref 10–40)
BASOPHILS # BLD AUTO: 0 K/UL — SIGNIFICANT CHANGE UP (ref 0–0.2)
BASOPHILS NFR BLD AUTO: 0 % — SIGNIFICANT CHANGE UP (ref 0–2)
BILIRUB SERPL-MCNC: 0.4 MG/DL — SIGNIFICANT CHANGE UP (ref 0.2–1.2)
BUN SERPL-MCNC: 12 MG/DL — SIGNIFICANT CHANGE UP (ref 7–23)
C DIFF GDH STL QL: NEGATIVE — SIGNIFICANT CHANGE UP
C DIFF GDH STL QL: SIGNIFICANT CHANGE UP
CALCIUM SERPL-MCNC: 9.9 MG/DL — SIGNIFICANT CHANGE UP (ref 8.4–10.5)
CHLORIDE SERPL-SCNC: 99 MMOL/L — SIGNIFICANT CHANGE UP (ref 96–108)
CO2 SERPL-SCNC: 18 MMOL/L — LOW (ref 22–31)
CREAT SERPL-MCNC: 0.77 MG/DL — SIGNIFICANT CHANGE UP (ref 0.5–1.3)
CULTURE RESULTS: SIGNIFICANT CHANGE UP
EOSINOPHIL # BLD AUTO: 0 K/UL — SIGNIFICANT CHANGE UP (ref 0–0.5)
EOSINOPHIL NFR BLD AUTO: 0 % — SIGNIFICANT CHANGE UP (ref 0–6)
GLUCOSE SERPL-MCNC: 101 MG/DL — HIGH (ref 70–99)
HCT VFR BLD CALC: 32.3 % — LOW (ref 39–50)
HGB BLD-MCNC: 11.1 G/DL — LOW (ref 13–17)
LYMPHOCYTES # BLD AUTO: 0.37 K/UL — LOW (ref 1–3.3)
LYMPHOCYTES # BLD AUTO: 8.8 % — LOW (ref 13–44)
MAGNESIUM SERPL-MCNC: 1.9 MG/DL — SIGNIFICANT CHANGE UP (ref 1.6–2.6)
MANUAL SMEAR VERIFICATION: SIGNIFICANT CHANGE UP
MCHC RBC-ENTMCNC: 29.8 PG — SIGNIFICANT CHANGE UP (ref 27–34)
MCHC RBC-ENTMCNC: 34.4 GM/DL — SIGNIFICANT CHANGE UP (ref 32–36)
MCV RBC AUTO: 86.6 FL — SIGNIFICANT CHANGE UP (ref 80–100)
MONOCYTES # BLD AUTO: 1.29 K/UL — HIGH (ref 0–0.9)
MONOCYTES NFR BLD AUTO: 31 % — HIGH (ref 2–14)
NEUTROPHILS # BLD AUTO: 2.5 K/UL — SIGNIFICANT CHANGE UP (ref 1.8–7.4)
NEUTROPHILS NFR BLD AUTO: 59.3 % — SIGNIFICANT CHANGE UP (ref 43–77)
NEUTS BAND # BLD: 0.9 % — SIGNIFICANT CHANGE UP (ref 0–8)
PHOSPHATE SERPL-MCNC: 3.7 MG/DL — SIGNIFICANT CHANGE UP (ref 2.5–4.5)
PLAT MORPH BLD: NORMAL — SIGNIFICANT CHANGE UP
PLATELET # BLD AUTO: 373 K/UL — SIGNIFICANT CHANGE UP (ref 150–400)
POTASSIUM SERPL-MCNC: 3.5 MMOL/L — SIGNIFICANT CHANGE UP (ref 3.5–5.3)
POTASSIUM SERPL-SCNC: 3.5 MMOL/L — SIGNIFICANT CHANGE UP (ref 3.5–5.3)
PROT SERPL-MCNC: 7.6 G/DL — SIGNIFICANT CHANGE UP (ref 6–8.3)
RBC # BLD: 3.73 M/UL — LOW (ref 4.2–5.8)
RBC # FLD: 14.6 % — HIGH (ref 10.3–14.5)
RBC BLD AUTO: SIGNIFICANT CHANGE UP
SARS-COV-2 RNA SPEC QL NAA+PROBE: SIGNIFICANT CHANGE UP
SODIUM SERPL-SCNC: 134 MMOL/L — LOW (ref 135–145)
SPECIMEN SOURCE: SIGNIFICANT CHANGE UP
WBC # BLD: 4.15 K/UL — SIGNIFICANT CHANGE UP (ref 3.8–10.5)
WBC # FLD AUTO: 4.15 K/UL — SIGNIFICANT CHANGE UP (ref 3.8–10.5)

## 2021-10-15 PROCEDURE — 71045 X-RAY EXAM CHEST 1 VIEW: CPT | Mod: 26

## 2021-10-15 PROCEDURE — 99254 IP/OBS CNSLTJ NEW/EST MOD 60: CPT | Mod: GC

## 2021-10-15 PROCEDURE — 99284 EMERGENCY DEPT VISIT MOD MDM: CPT

## 2021-10-15 PROCEDURE — 93010 ELECTROCARDIOGRAM REPORT: CPT

## 2021-10-15 RX ORDER — HEPARIN SODIUM 5000 [USP'U]/ML
5000 INJECTION INTRAVENOUS; SUBCUTANEOUS EVERY 8 HOURS
Refills: 0 | Status: DISCONTINUED | OUTPATIENT
Start: 2021-10-15 | End: 2021-10-17

## 2021-10-15 RX ORDER — INFLUENZA VIRUS VACCINE 15; 15; 15; 15 UG/.5ML; UG/.5ML; UG/.5ML; UG/.5ML
0.5 SUSPENSION INTRAMUSCULAR ONCE
Refills: 0 | Status: DISCONTINUED | OUTPATIENT
Start: 2021-10-15 | End: 2021-10-17

## 2021-10-15 RX ORDER — AMLODIPINE BESYLATE 2.5 MG/1
5 TABLET ORAL DAILY
Refills: 0 | Status: DISCONTINUED | OUTPATIENT
Start: 2021-10-15 | End: 2021-10-17

## 2021-10-15 RX ORDER — SODIUM CHLORIDE 9 MG/ML
1000 INJECTION, SOLUTION INTRAVENOUS ONCE
Refills: 0 | Status: COMPLETED | OUTPATIENT
Start: 2021-10-15 | End: 2021-10-15

## 2021-10-15 RX ORDER — SODIUM CHLORIDE 9 MG/ML
1000 INJECTION INTRAMUSCULAR; INTRAVENOUS; SUBCUTANEOUS
Refills: 0 | Status: DISCONTINUED | OUTPATIENT
Start: 2021-10-15 | End: 2021-10-17

## 2021-10-15 RX ADMIN — Medication 10 MILLIGRAM(S): at 10:24

## 2021-10-15 RX ADMIN — SODIUM CHLORIDE 100 MILLILITER(S): 9 INJECTION INTRAMUSCULAR; INTRAVENOUS; SUBCUTANEOUS at 22:23

## 2021-10-15 RX ADMIN — SODIUM CHLORIDE 100 MILLILITER(S): 9 INJECTION INTRAMUSCULAR; INTRAVENOUS; SUBCUTANEOUS at 15:59

## 2021-10-15 RX ADMIN — SODIUM CHLORIDE 1000 MILLILITER(S): 9 INJECTION, SOLUTION INTRAVENOUS at 10:06

## 2021-10-15 NOTE — H&P ADULT - NSHPPHYSICALEXAM_GEN_ALL_CORE
Vital Signs Last 24 Hrs  T(C): 36.5 (15 Oct 2021 08:48), Max: 36.5 (15 Oct 2021 08:48)  T(F): 97.7 (15 Oct 2021 08:48), Max: 97.7 (15 Oct 2021 08:48)  HR: 98 (15 Oct 2021 11:55) (98 - 127)  BP: 125/86 (15 Oct 2021 11:55) (109/81 - 125/86)  BP(mean): --  RR: 16 (15 Oct 2021 11:55) (16 - 20)  SpO2: 100% (15 Oct 2021 11:55) (98% - 100%)    Appearance: Normal	  HEENT:   Normal oral mucosa, PERRL, EOMI	  Lymphatic: No lymphadenopathy , no edema  Cardiovascular: Normal S1 S2, No JVD, No murmurs , Peripheral pulses palpable 2+ bilaterally  Respiratory: Lungs clear to auscultation, normal effort 	  Gastrointestinal:  Soft, Non-tender, + BS	  Skin: No rashes, No ecchymoses, No cyanosis, warm to touch  Musculoskeletal: Normal range of motion, normal strength  Psychiatry:  Mood & affect appropriate  Ext: No edema

## 2021-10-15 NOTE — ED ADULT NURSE NOTE - OBJECTIVE STATEMENT
36 y m came to the ed c/o severe and multiple episodes of diarrhea. patient was d/c from the hospital on tuesday after being treated for a neutropenic fever. patient said diarrhea started shortly after that around 230am on wednesday morning. since then he has been having a lot of diarrhea daily. says he has been staying hydrated drinking a lot of fluids. patient was treated with abx while in the hospital. patient is on chemotherapy for AML last treatment was about 3 weeks ago and patient is due for his next treatment on 10/18. patient is a/ox3. denies fevers, chills, chest pain, sob. abdomen is soft and nontender to palpation although patient endorses abdominal cramping. skin is warm and dry.

## 2021-10-15 NOTE — ED PROVIDER NOTE - NS ED ROS FT
CONSTITUTIONAL: No weakness, fevers or chills  RESPIRATORY: No cough; No shortness of breath  CARDIOVASCULAR: No chest pain   GASTROINTESTINAL: as per HPI  GENITOURINARY: No dysuria, decrease in urinary frequency  NEUROLOGICAL: No numbness or weakness  MUSCULOSKELETAL: No myalgias, arthralgias, or joint swelling  SKIN: No itching, rashes

## 2021-10-15 NOTE — ED PROVIDER NOTE - ATTENDING CONTRIBUTION TO CARE
35 yo male hx of AML p/w copious diarrhea.  nontoxic on exam, mild tachycardia, normotensive.  concern for c.diff.  d/w heme/onc -- will send c.diff studies, check basics, IVF, admit for further management, defer empiric oral vanco for the time being.

## 2021-10-15 NOTE — ED ADULT TRIAGE NOTE - HEIGHT IN INCHES
Results reviewed and letter sent. The cholesterol is elevated and has gone up from last year. Follow tips below to help lower cholesterol by diet and exercise. We should recheck it in 6-12 months.  The remainder of the labs are normal. 0

## 2021-10-15 NOTE — ED ADULT TRIAGE NOTE - NSTRIAGECARE_GEN_A_ER
Addended by: Edgar Choudhary on: 9/30/2019 10:25 AM     Modules accepted: Level of Service Face Mask

## 2021-10-15 NOTE — ED ADULT TRIAGE NOTE - CHIEF COMPLAINT QUOTE
diarrhea for three days, recent admission for neutropenic fever, last chemo treatment for AML was three weeks ago

## 2021-10-15 NOTE — H&P ADULT - NSHPADDITIONALINFOADULT_GEN_ALL_CORE
Differential diagnosis and plan of care discussed with patient after the evaluation.   Advanced care planning/advanced directives discussed with patient/family. DNR status including forceful chest compressions to attempt to restart the heart, ventilator support/artificial breathing, electric shock, artificial nutrition, health care proxy, Molst form all discussed with pt. Pt wishes to consider.   OMT on six regions for acute somatic dysfunctions done at the bedside   Importance of Fall prevention discussed.  I had a prolonged conversation with patient. More than 50% of the visit was spent counseling and/or coordinating care by the attending physician.

## 2021-10-15 NOTE — H&P ADULT - ASSESSMENT
Patient is a 36 year old male with PMHx of AML in remission and HTN presents with 3 days of diarrhea 10-15 episodes per day. He was discharged from the hospital on 10/12 after a stay for neutropenic fever. He was on multiple antibiotics while inpatient including levaquin and a source was not found for his fever. His diarrhea started at 2AM his first night home and he describes it as explosive watery diarrhea. He denies fevers, chills, abdominal pain, nausea, vomiting. He says he does not have a sense of smell so doesn't know if it has a foul smelling odor.      # Acute Diarrhea   Recent hospitalization for neutropenic fever   S/P IV antibiotics  Now with acute watery diarrhea for days   R/PO C diff, stool sent   IV hydration  isolation  ID eval PRN     # AML   s/p induction therapy dauno/cytarabine 8/6; consolidation therapy HIDAC C1 9/17  Monitor CBC with diff daily  transfuse prn to keep Hgb >7  monitor electrolytes, supplement as needed  IV hydration  onc follow up appreciated     # HTN  resume home BP meds  adjust as tolearted     DVT and gI PPX

## 2021-10-15 NOTE — ED PROVIDER NOTE - IV ALTEPLASE EXCL ABS HIDDEN
October 14, 2019    SCHOOL      RE:   Judy Guerin  [address] [address]    This is to certify that Judy Guerin was seen today in my office 10/14/2019.        SIGNATURE: __________________________________________                                           Diamond López MD      [facility]  [address] [address]   show

## 2021-10-15 NOTE — ED PROVIDER NOTE - OBJECTIVE STATEMENT
36 year old male with PMH of AML in remission and HTN presents with 3 days of diarrhea 10-15 episodes per day. He was discharged from the hospital on 10/12 after a stay for neutropenic fever. He was on multiple antibiotics while inpatient including levaquin and a source was not found for his fever. His diarrhea started at 2AM his first night home and he describes it as explosive watery diarrhea. He denies fevers, chills, abdominal pain, nausea, vomiting. He says he does not have a sense of smell so doesn't know if it has a foul smelling odor.

## 2021-10-15 NOTE — ED PROVIDER NOTE - CLINICAL SUMMARY MEDICAL DECISION MAKING FREE TEXT BOX
36 year old male with PMH of AML in admission s/p recent admission for neutropenic fever presents with 3 days of explosive watery diarrhea, 10-15 episodes per day that started the evening of discharge concerning for c. dificile infection. Will obtain CBC, CMP, mag, phos, C. dificile stool testing. Will give LR. 36 year old male with PMH of AML in admission s/p recent admission for neutropenic fever presents with 3 days of explosive watery diarrhea, 10-15 episodes per day that started the evening of discharge concerning for c. dificile infection. Will obtain CBC, CMP, mag, phos, C. dificile stool testing. Will give LR. Reassess.

## 2021-10-15 NOTE — CONSULT NOTE ADULT - ASSESSMENT
35 yo M with PMH of AML with recent admission for neutropenic fever presents with diarrhea. Hematology consulted given hx of AML.    #Diarrhea    #AML      Juaquin Ray  PGY1   37 yo M with PMH of AML with recent admission for neutropenic fever presents with diarrhea. Hematology consulted given hx of AML.    #Diarrhea    #AML  - NPM mutated, FLT3 -  - monitor CBC with diff  - transfuse prn to keep Hgb >7  - monitor electrolytes, supplement as needed      Juaquin Ray  PGY1   35 yo M with PMH of AML with recent admission for neutropenic fever presents with diarrhea. Hematology consulted given hx of AML.    #Diarrhea  - potentially c.diff in setting of recent antibiotic use  - c.diff pending, GI PCR pending    #AML  - NPM mutated, FLT3 -  - monitor CBC with diff  - transfuse prn to keep Hgb >7  - monitor electrolytes, supplement as needed      Juaquin Ray  PGY1   35 yo M with PMH of AML with recent admission for neutropenic fever presents with diarrhea. Hematology consulted given hx of AML.    #Diarrhea  - potentially c.diff in setting of recent antibiotic use  - c.diff pending, GI PCR pending  - supportive care and treatment per primary team    #AML  - NPM mutated, FLT3 -  - s/p induction therapy dauno/cytarabine 8/6; consolidation therapy HIDAC C1 9/17  - monitor CBC with diff daily  - transfuse prn to keep Hgb >7  - monitor electrolytes, supplement as needed      Juaquin Ray  PGY1   35 yo M with PMH of AML with recent admission for neutropenic fever presents with diarrhea. Hematology consulted given hx of AML.    #Diarrhea  - potentially c.diff in setting of recent antibiotic use  - ?potentially related to cytarabine treatment or viral gastroenteritis   - c.diff pending, GI PCR pending  - supportive care and treatment per primary team    #AML  - NPM1 mutated, FLT3 -  - s/p induction therapy dauno/cytarabine 8/6/21; consolidation therapy HIDAC C1 9/17/21  - no chemotherapy planned inpatient  - monitor CBC with diff daily  - transfuse prn to keep Hgb >7  - monitor electrolytes, supplement as needed      Juaquin Ray  PGY1

## 2021-10-15 NOTE — ED PROVIDER NOTE - HIV OFFER
Continue Claritin D  Has been OK with decongestants  No changes  Previously Negative (within the last year)

## 2021-10-15 NOTE — CONSULT NOTE ADULT - SUBJECTIVE AND OBJECTIVE BOX
Juaquin Ray, PGY1    DATE OF SERVICE: 10-15-21 @ 11:11    Patient is a 36y old  Male who presents with a chief complaint of diarrhea. Hematology is consulted for hx of AML.     SUBJECTIVE / OVERNIGHT EVENTS:    MEDICATIONS  (STANDING):    MEDICATIONS  (PRN):      Vital Signs Last 24 Hrs  T(C): 36.5 (15 Oct 2021 08:48), Max: 36.5 (15 Oct 2021 08:48)  T(F): 97.7 (15 Oct 2021 08:48), Max: 97.7 (15 Oct 2021 08:48)  HR: 127 (15 Oct 2021 08:48) (127 - 127)  BP: 109/81 (15 Oct 2021 08:48) (109/81 - 109/81)  BP(mean): --  RR: 20 (15 Oct 2021 08:48) (20 - 20)  SpO2: 98% (15 Oct 2021 08:48) (98% - 98%)  CAPILLARY BLOOD GLUCOSE        I&O's Summary      PHYSICAL EXAM:  GENERAL: NAD, well-developed  HEAD:  Atraumatic, Normocephalic  EYES: EOMI, PERRLA, conjunctiva and sclera clear  NECK: Supple, No JVD  CHEST/LUNG: Clear to auscultation bilaterally; No wheeze  HEART: Regular rate and rhythm; No murmurs, rubs, or gallops  ABDOMEN: Soft, Nontender, Nondistended; Bowel sounds present  EXTREMITIES:  2+ Peripheral Pulses, No clubbing, cyanosis, or edema  PSYCH: AAOx3  NEUROLOGY: non-focal  SKIN: No rashes or lesions    LABS:                        11.1   4.15  )-----------( 373      ( 15 Oct 2021 10:10 )             32.3     10-15    134<L>  |  99  |  12  ----------------------------<  101<H>  3.5   |  18<L>  |  0.77    Ca    9.9      15 Oct 2021 10:10  Phos  3.7     10-15  Mg     1.9     10-15    TPro  7.6  /  Alb  4.7  /  TBili  0.4  /  DBili  x   /  AST  38  /  ALT  77<H>  /  AlkPhos  93  10-15              RADIOLOGY & ADDITIONAL TESTS:    Imaging Personally Reviewed:    Consultant(s) Notes Reviewed:      Care Discussed with Consultants/Other Providers:   Juaquin Ray, PGY1    DATE OF SERVICE: 10-15-21 @ 11:11    Patient is a 36y old  Male who presents with a chief complaint of diarrhea. Hematology is consulted for hx of AML.     HPI: Patient is a 37yo M with PMH of HTN and recently diagnosed AML presenting with diarrhea. Patient has been having diarrhea with 10-15 episodes per day for the past 3 days. Patient was recently discharged from hospital on 10/12 after a stay for neutropenic fever with no clear source. Patient was on various antibiotics including vanc (1x), meropenem and Levaquin.     Patient seen by Dr. Yancey as outpatient. AML NPM1 mutated, FLT3 -. Daunorubicin/cytarabine induction on 8/6. Remission marrow on 9/2 in remission. Consolidation therapy with 1st cycle high-dose cytarabine (HICDAC) on 9/17. C2 HIDAC was planned 10/15/21 but patient presented to hospital with diarrhea.     SUBJECTIVE / OVERNIGHT EVENTS:    MEDICATIONS  (STANDING):    MEDICATIONS  (PRN):      Vital Signs Last 24 Hrs  T(C): 36.5 (15 Oct 2021 08:48), Max: 36.5 (15 Oct 2021 08:48)  T(F): 97.7 (15 Oct 2021 08:48), Max: 97.7 (15 Oct 2021 08:48)  HR: 127 (15 Oct 2021 08:48) (127 - 127)  BP: 109/81 (15 Oct 2021 08:48) (109/81 - 109/81)  BP(mean): --  RR: 20 (15 Oct 2021 08:48) (20 - 20)  SpO2: 98% (15 Oct 2021 08:48) (98% - 98%)  CAPILLARY BLOOD GLUCOSE        I&O's Summary      PHYSICAL EXAM:  GENERAL: NAD, well-developed  HEAD:  Atraumatic, Normocephalic  EYES: EOMI, PERRLA, conjunctiva and sclera clear  NECK: Supple, No JVD  CHEST/LUNG: Clear to auscultation bilaterally; No wheeze  HEART: Regular rate and rhythm; No murmurs, rubs, or gallops  ABDOMEN: Soft, Nontender, Nondistended; Bowel sounds present  EXTREMITIES:  2+ Peripheral Pulses, No clubbing, cyanosis, or edema  PSYCH: AAOx3  NEUROLOGY: non-focal  SKIN: No rashes or lesions    LABS:                        11.1   4.15  )-----------( 373      ( 15 Oct 2021 10:10 )             32.3     10-15    134<L>  |  99  |  12  ----------------------------<  101<H>  3.5   |  18<L>  |  0.77    Ca    9.9      15 Oct 2021 10:10  Phos  3.7     10-15  Mg     1.9     10-15    TPro  7.6  /  Alb  4.7  /  TBili  0.4  /  DBili  x   /  AST  38  /  ALT  77<H>  /  AlkPhos  93  10-15              RADIOLOGY & ADDITIONAL TESTS:    Imaging Personally Reviewed:    Consultant(s) Notes Reviewed:      Care Discussed with Consultants/Other Providers:   Juaquin Ray, PGY1    DATE OF SERVICE: 10-15-21 @ 11:11    Patient is a 36y old  Male who presents with a chief complaint of diarrhea. Hematology is consulted for hx of AML.     HPI: Patient is a 37yo M with PMH of HTN and recently diagnosed AML presenting with diarrhea. Patient has been having diarrhea with 10-15 episodes per day for the past 3 days. Patient was recently discharged from hospital on 10/12 after a stay for neutropenic fever with no clear source. Patient was on various antibiotics including vanc (1x), meropenem and Levaquin.     Patient seen by Dr. Yancey as outpatient. AML NPM1 mutated, FLT3 -. Daunorubicin/cytarabine induction on 8/6. Remission marrow on 9/2 in remission. Consolidation therapy with 1st cycle high-dose cytarabine (HIDAC) on 9/17. C2 HIDAC was planned 10/15/21 but patient presented to hospital with diarrhea.     SUBJECTIVE / OVERNIGHT EVENTS:    MEDICATIONS  (STANDING):    MEDICATIONS  (PRN):      Vital Signs Last 24 Hrs  T(C): 36.5 (15 Oct 2021 08:48), Max: 36.5 (15 Oct 2021 08:48)  T(F): 97.7 (15 Oct 2021 08:48), Max: 97.7 (15 Oct 2021 08:48)  HR: 127 (15 Oct 2021 08:48) (127 - 127)  BP: 109/81 (15 Oct 2021 08:48) (109/81 - 109/81)  BP(mean): --  RR: 20 (15 Oct 2021 08:48) (20 - 20)  SpO2: 98% (15 Oct 2021 08:48) (98% - 98%)  CAPILLARY BLOOD GLUCOSE        I&O's Summary      PHYSICAL EXAM:  GENERAL: NAD, well-developed  HEAD:  Atraumatic, Normocephalic  EYES: EOMI, PERRLA, conjunctiva and sclera clear  NECK: Supple, No JVD  CHEST/LUNG: Clear to auscultation bilaterally; No wheeze  HEART: Regular rate and rhythm; No murmurs, rubs, or gallops  ABDOMEN: Soft, Nontender, Nondistended; Bowel sounds present  EXTREMITIES:  2+ Peripheral Pulses, No clubbing, cyanosis, or edema  PSYCH: AAOx3  NEUROLOGY: non-focal  SKIN: No rashes or lesions    LABS:                        11.1   4.15  )-----------( 373      ( 15 Oct 2021 10:10 )             32.3     10-15    134<L>  |  99  |  12  ----------------------------<  101<H>  3.5   |  18<L>  |  0.77    Ca    9.9      15 Oct 2021 10:10  Phos  3.7     10-15  Mg     1.9     10-15    TPro  7.6  /  Alb  4.7  /  TBili  0.4  /  DBili  x   /  AST  38  /  ALT  77<H>  /  AlkPhos  93  10-15              RADIOLOGY & ADDITIONAL TESTS:    Imaging Personally Reviewed:    Consultant(s) Notes Reviewed:      Care Discussed with Consultants/Other Providers:   Juaquin Ray, PGY1    DATE OF SERVICE: 10-15-21 @ 11:11    Patient is a 36y old  Male who presents with a chief complaint of diarrhea. Hematology is consulted for hx of AML.     HPI: Patient is a 37yo M with PMH of HTN and recently diagnosed AML presenting with diarrhea. Patient has been having diarrhea with 10-15 episodes per day for the past 3 days. Patient was recently discharged from hospital on 10/12 after a stay for neutropenic fever with no clear source. Patient was on various antibiotics including vanc (1x), meropenem and Levaquin.     Patient seen by Dr. Yancey as outpatient. AML NPM1 mutated, FLT3 -. Daunorubicin/cytarabine induction on 8/6. Remission marrow on 9/2 in remission. Consolidation therapy with 1st cycle high-dose cytarabine (HIDAC) on 9/17. C2 HIDAC was planned 10/15/21 but patient presented to hospital with diarrhea.     SUBJECTIVE / OVERNIGHT EVENTS: On interview, patient endorses generalized fatigue after several episodes of watery BMs. Patient notes he's already had 10 today. He also endorses feeling "gassy" and overall upset stomach. He notes decreased PO intake and is afraid to eat because "everything just passes through him." No worsening bleeding, bruising.     MEDICATIONS  (STANDING):    MEDICATIONS  (PRN):      Vital Signs Last 24 Hrs  T(C): 36.5 (15 Oct 2021 08:48), Max: 36.5 (15 Oct 2021 08:48)  T(F): 97.7 (15 Oct 2021 08:48), Max: 97.7 (15 Oct 2021 08:48)  HR: 127 (15 Oct 2021 08:48) (127 - 127)  BP: 109/81 (15 Oct 2021 08:48) (109/81 - 109/81)  BP(mean): --  RR: 20 (15 Oct 2021 08:48) (20 - 20)  SpO2: 98% (15 Oct 2021 08:48) (98% - 98%)  CAPILLARY BLOOD GLUCOSE        I&O's Summary      PHYSICAL EXAM:  GENERAL: NAD, well-developed  HEAD:  Atraumatic, Normocephalic  EYES: EOMI, PERRLA, conjunctiva and sclera clear  NECK: Supple, No JVD  CHEST/LUNG: Clear to auscultation bilaterally; No wheeze  HEART: Regular rate and rhythm; No murmurs, rubs, or gallops  ABDOMEN: Soft, Nontender, Nondistended; Bowel sounds present  EXTREMITIES:  2+ Peripheral Pulses, No clubbing, cyanosis, or edema  PSYCH: AAOx3  NEUROLOGY: non-focal  SKIN: No rashes or lesions    LABS:                        11.1   4.15  )-----------( 373      ( 15 Oct 2021 10:10 )             32.3     10-15    134<L>  |  99  |  12  ----------------------------<  101<H>  3.5   |  18<L>  |  0.77    Ca    9.9      15 Oct 2021 10:10  Phos  3.7     10-15  Mg     1.9     10-15    TPro  7.6  /  Alb  4.7  /  TBili  0.4  /  DBili  x   /  AST  38  /  ALT  77<H>  /  AlkPhos  93  10-15              RADIOLOGY & ADDITIONAL TESTS:    Imaging Personally Reviewed:    Consultant(s) Notes Reviewed:      Care Discussed with Consultants/Other Providers:   Juaquin Ray, PGY1    DATE OF SERVICE: 10-15-21 @ 11:11    Patient is a 36y old  Male who presents with a chief complaint of diarrhea. Hematology is consulted for hx of AML.     HPI: Patient is a 35yo M with PMH of HTN and recently diagnosed AML presenting with diarrhea. Patient has been having diarrhea with 10-15 episodes per day for the past 3 days. Patient was recently discharged from hospital on 10/12 after a stay for neutropenic fever with no clear source. Patient was on various antibiotics including vanc (1x), meropenem and Levaquin.     Patient seen by Dr. Yancey as outpatient. AML NPM1 mutated, FLT3 -. Daunorubicin/cytarabine induction on 8/6. Remission marrow on 9/2 in remission. Consolidation therapy with 1st cycle high-dose cytarabine (HIDAC) on 9/17. C2 HIDAC was planned 10/15/21 but patient presented to hospital with diarrhea.     SUBJECTIVE / OVERNIGHT EVENTS: On interview, patient endorses generalized fatigue after several episodes of watery BMs. Patient notes he's already had 10 today. He also endorses feeling "gassy" and overall upset stomach. He notes decreased PO intake and is afraid to eat because "everything just passes through him." No worsening bleeding, bruising.     MEDICATIONS  (STANDING):    MEDICATIONS  (PRN):      Vital Signs Last 24 Hrs  T(C): 36.5 (15 Oct 2021 08:48), Max: 36.5 (15 Oct 2021 08:48)  T(F): 97.7 (15 Oct 2021 08:48), Max: 97.7 (15 Oct 2021 08:48)  HR: 127 (15 Oct 2021 08:48) (127 - 127)  BP: 109/81 (15 Oct 2021 08:48) (109/81 - 109/81)  BP(mean): --  RR: 20 (15 Oct 2021 08:48) (20 - 20)  SpO2: 98% (15 Oct 2021 08:48) (98% - 98%)  CAPILLARY BLOOD GLUCOSE        I&O's Summary      PHYSICAL EXAM:  GENERAL:  appears fatigued, laying in bed  HEAD:  Atraumatic, Normocephalic  EYES: EOMI, PERRLA, conjunctiva and sclera clear  NECK: Supple, No JVD  CHEST/LUNG: Clear to auscultation bilaterally; No wheeze  HEART: Regular rate and rhythm; No murmurs, rubs, or gallops  ABDOMEN: Soft, Nontender, Nondistended; Bowel sounds present  EXTREMITIES:  2+ Peripheral Pulses, No clubbing, cyanosis, or edema; right upper arm PICC in place  PSYCH: AAOx4  NEUROLOGY: non-focal  SKIN: No rashes or lesions    LABS:                        11.1   4.15  )-----------( 373      ( 15 Oct 2021 10:10 )             32.3     10-15    134<L>  |  99  |  12  ----------------------------<  101<H>  3.5   |  18<L>  |  0.77    Ca    9.9      15 Oct 2021 10:10  Phos  3.7     10-15  Mg     1.9     10-15    TPro  7.6  /  Alb  4.7  /  TBili  0.4  /  DBili  x   /  AST  38  /  ALT  77<H>  /  AlkPhos  93  10-15              RADIOLOGY & ADDITIONAL TESTS:    Imaging Personally Reviewed:    Consultant(s) Notes Reviewed:      Care Discussed with Consultants/Other Providers:

## 2021-10-15 NOTE — ED PROVIDER NOTE - PHYSICAL EXAMINATION
CONSTITUTIONAL: NAD, well-developed, well-groomed  EYES: sclera clear  NECK: Supple  RESPIRATORY: Normal respiratory effort; CTAB  CARDIOVASCULAR: Regular rate and rhythm, normal S1 and S2, no murmur/rub/gallop; No lower extremity edema  ABDOMEN: soft, nontender to palpation; lower extremities non edematous  MUSCULOSKELETAL:  Normal gait  PSYCH: A+O to person, place, and time; affect appropriate  NEUROLOGY: grossly intact  SKIN: No rashes

## 2021-10-15 NOTE — ED PROVIDER NOTE - INTERPRETATION
EKG reviewed for rate, rhythm, axis, intervals and segments, including QRS morphology, P wave appearance T wave appearance, KS interval, and QT interval.  I find the EKG to be unremarkable in all of these regards except as follows: sinus tach

## 2021-10-15 NOTE — H&P ADULT - NSHPREVIEWOFSYSTEMS_GEN_ALL_CORE
REVIEW OF SYSTEMS:    CONSTITUTIONAL: No weakness, fevers or chills  EYES/ENT: No visual changes;  No vertigo or throat pain   NECK: No pain or stiffness  RESPIRATORY: No cough, wheezing, hemoptysis; No shortness of breath  CARDIOVASCULAR: No chest pain or palpitations  GASTROINTESTINAL: + diarrhea   GENITOURINARY: No dysuria, frequency or hematuria  NEUROLOGICAL: No numbness or weakness  SKIN: No itching, burning, rashes, or lesions   All other review of systems is negative unless indicated above.

## 2021-10-16 LAB
A1C WITH ESTIMATED AVERAGE GLUCOSE RESULT: 5.4 % — SIGNIFICANT CHANGE UP (ref 4–5.6)
ALBUMIN SERPL ELPH-MCNC: 3.9 G/DL — SIGNIFICANT CHANGE UP (ref 3.3–5)
ALP SERPL-CCNC: 83 U/L — SIGNIFICANT CHANGE UP (ref 40–120)
ALT FLD-CCNC: 123 U/L — HIGH (ref 10–45)
ANION GAP SERPL CALC-SCNC: 12 MMOL/L — SIGNIFICANT CHANGE UP (ref 5–17)
AST SERPL-CCNC: 62 U/L — HIGH (ref 10–40)
BILIRUB SERPL-MCNC: 0.3 MG/DL — SIGNIFICANT CHANGE UP (ref 0.2–1.2)
BUN SERPL-MCNC: 12 MG/DL — SIGNIFICANT CHANGE UP (ref 7–23)
CALCIUM SERPL-MCNC: 9.1 MG/DL — SIGNIFICANT CHANGE UP (ref 8.4–10.5)
CHLORIDE SERPL-SCNC: 108 MMOL/L — SIGNIFICANT CHANGE UP (ref 96–108)
CHOLEST SERPL-MCNC: 177 MG/DL — SIGNIFICANT CHANGE UP
CO2 SERPL-SCNC: 20 MMOL/L — LOW (ref 22–31)
COVID-19 SPIKE DOMAIN AB INTERP: POSITIVE
COVID-19 SPIKE DOMAIN ANTIBODY RESULT: >250 U/ML — HIGH
CREAT SERPL-MCNC: 0.77 MG/DL — SIGNIFICANT CHANGE UP (ref 0.5–1.3)
ESTIMATED AVERAGE GLUCOSE: 108 MG/DL — SIGNIFICANT CHANGE UP (ref 68–114)
GLUCOSE SERPL-MCNC: 87 MG/DL — SIGNIFICANT CHANGE UP (ref 70–99)
HCT VFR BLD CALC: 27.6 % — LOW (ref 39–50)
HDLC SERPL-MCNC: 21 MG/DL — LOW
HGB BLD-MCNC: 9.1 G/DL — LOW (ref 13–17)
LIPID PNL WITH DIRECT LDL SERPL: 106 MG/DL — HIGH
MAGNESIUM SERPL-MCNC: 2 MG/DL — SIGNIFICANT CHANGE UP (ref 1.6–2.6)
MCHC RBC-ENTMCNC: 28.9 PG — SIGNIFICANT CHANGE UP (ref 27–34)
MCHC RBC-ENTMCNC: 33 GM/DL — SIGNIFICANT CHANGE UP (ref 32–36)
MCV RBC AUTO: 87.6 FL — SIGNIFICANT CHANGE UP (ref 80–100)
NON HDL CHOLESTEROL: 156 MG/DL — HIGH
NRBC # BLD: 0 /100 WBCS — SIGNIFICANT CHANGE UP (ref 0–0)
PHOSPHATE SERPL-MCNC: 3.9 MG/DL — SIGNIFICANT CHANGE UP (ref 2.5–4.5)
PLATELET # BLD AUTO: 275 K/UL — SIGNIFICANT CHANGE UP (ref 150–400)
POTASSIUM SERPL-MCNC: 3.5 MMOL/L — SIGNIFICANT CHANGE UP (ref 3.5–5.3)
POTASSIUM SERPL-SCNC: 3.5 MMOL/L — SIGNIFICANT CHANGE UP (ref 3.5–5.3)
PROT SERPL-MCNC: 6.3 G/DL — SIGNIFICANT CHANGE UP (ref 6–8.3)
RBC # BLD: 3.15 M/UL — LOW (ref 4.2–5.8)
RBC # FLD: 14.7 % — HIGH (ref 10.3–14.5)
SARS-COV-2 IGG+IGM SERPL QL IA: >250 U/ML — HIGH
SARS-COV-2 IGG+IGM SERPL QL IA: POSITIVE
SODIUM SERPL-SCNC: 140 MMOL/L — SIGNIFICANT CHANGE UP (ref 135–145)
TRIGL SERPL-MCNC: 247 MG/DL — HIGH
TSH SERPL-MCNC: 1.07 UIU/ML — SIGNIFICANT CHANGE UP (ref 0.27–4.2)
WBC # BLD: 2.94 K/UL — LOW (ref 3.8–10.5)
WBC # FLD AUTO: 2.94 K/UL — LOW (ref 3.8–10.5)

## 2021-10-16 PROCEDURE — 99253 IP/OBS CNSLTJ NEW/EST LOW 45: CPT | Mod: GC

## 2021-10-16 PROCEDURE — 99232 SBSQ HOSP IP/OBS MODERATE 35: CPT | Mod: GC

## 2021-10-16 RX ADMIN — SODIUM CHLORIDE 100 MILLILITER(S): 9 INJECTION INTRAMUSCULAR; INTRAVENOUS; SUBCUTANEOUS at 19:25

## 2021-10-16 RX ADMIN — SODIUM CHLORIDE 100 MILLILITER(S): 9 INJECTION INTRAMUSCULAR; INTRAVENOUS; SUBCUTANEOUS at 08:15

## 2021-10-16 NOTE — CONSULT NOTE ADULT - ATTENDING COMMENTS
36M with AML, on induction chemo with diarrhea  -no abd pain  -better  -no fever  -cdiff and stool pcr negative  -not septic  -hold off abx  -?related to chemo vs recent abx use    Jd Ocampo  Attending Physician   Division of Infectious Disease  Pager #[phone]  Available on Microsoft Teams also  After 5pm/weekend or no response, call #[phone]

## 2021-10-16 NOTE — CONSULT NOTE ADULT - ASSESSMENT
37 y/o M PMHx AML (diagnosed 07/2021, s/p 1 cycle of consolidation on 9/17) and HTN, presents with 3 days of diarrhea.     #Diarrhea  Endorsing 10-15 watery BMs daily x 3 days,  in setting of recent chemo and antibiotic course  Notes that he has not had a BM yet today.  Currently non-toxic appearing, HD stable. Abdominal exam benign  Labs significant for mild leukopenia, normal ANC  GI PCR neg, C Diff neg  Given negative stool studies, suspect diarrhea was likely chemo-related.     Recs:  - monitor off antibiotics  - monitor WBC  - if clinically worsens or spikes fever, would obtain CT A/P       Mathew Plummer MD PGY-4  Infectious Disease Fellow  Pager: [phone]  Before 9AM or after 5PM: [phone]     37 y/o M PMHx AML (diagnosed 07/2021, s/p 1 cycle of consolidation on 9/17) and HTN, presents with 3 days of diarrhea.     #Diarrhea  Endorsing 10-15 watery BMs daily x 3 days,  in setting of recent chemo and antibiotic course  Notes that he has not had a BM yet today.  Currently non-toxic appearing, HD stable. Abdominal exam benign  Labs significant for mild leukopenia, normal ANC  GI PCR neg, C Diff neg  Given negative stool studies, suspect diarrhea was likely chemo-related.     Recs:  - monitor off antibiotics  - no further ID workup at this time      Mathew Plummer MD PGY-4  Infectious Disease Fellow  Pager: [phone]  Before 9AM or after 5PM: [phone]

## 2021-10-16 NOTE — CONSULT NOTE ADULT - SUBJECTIVE AND OBJECTIVE BOX
Patient is a 36y old  Male who presents with a chief complaint of Diarrhea (15 Oct 2021 15:15)    HPI:  35 y/o M PMHx AML (diagnosed 07/2021, s/p 1 cycle of consolidation) and HTN, presents with 3 days of diarrhea 10-15 episodes per day. He was discharged from the hospital on 10/12 after a stay for neutropenic fever. He was on multiple antibiotics while inpatient including levaquin and a source was not found for his fever. His diarrhea started at 2AM his first night home and he describes it as explosive watery diarrhea. He denies fevers, chills, abdominal pain, nausea, vomiting. He says he does not have a sense of smell so doesn't know if it has a foul smelling odor    REVIEW OF SYSTEMS  [ x ] ROS unobtainable because:    [  ] All other systems negative except as noted below    Constitutional:  [ ] fever [ ] chills  [ ] weight loss  [ ]night sweat  [ ]poor appetite/PO intake [ ]fatigue   Skin:  [ ] rash [ ] phlebitis	  Eyes: [ ] icterus [ ] pain  [ ] discharge	  ENMT: [ ] sore throat  [ ] thrush [ ] ulcers [ ] exudates [ ]anosmia  Respiratory: [ ] dyspnea [ ] hemoptysis [ ] cough [ ] sputum	  Cardiovascular:  [ ] chest pain [ ] palpitations [ ] edema	  Gastrointestinal:  [ ] nausea [ ] vomiting [ ] diarrhea [ ] constipation [ ] pain	  Genitourinary:  [ ] dysuria [ ] frequency [ ] hematuria [ ] discharge [ ] flank pain  [ ] incontinence  Musculoskeletal:  [ ] myalgias [ ] arthralgias [ ] arthritis  [ ] back pain  Neurological:  [ ] headache [ ] weakness [ ] seizures  [ ] confusion/altered mental status    prior hospital charts reviewed [V]  primary team notes reviewed [V]  other consultant notes reviewed [V]    PAST MEDICAL & SURGICAL HISTORY:  Hypertension  diagnosed 1 year ago    Kidney stone  5 years ago    History of genital warts        FAMILY HISTORY:  FH: CAD (coronary artery disease) (Father, Mother)      SOCIAL HISTORY:  - Denied smoking/vaping/alcohol/recreational drug use    Allergies  No Known Allergies        ANTIMICROBIALS:      ANTIMICROBIALS (past 90 days):   MEDICATIONS  (STANDING):        MEDICATIONS  (STANDING):  amLODIPine   Tablet 5 daily  heparin   Injectable 5000 every 8 hours  influenza   Vaccine 0.5 once      VITALS:  Vital Signs Last 24 Hrs  T(F): 97.7 (10-16-21 @ 05:09), Max: 99 (10-10-21 @ 10:33)  Vital Signs Last 24 Hrs  HR: 75 (10-16-21 @ 05:09) (75 - 90)  BP: 92/62 (10-16-21 @ 05:09) (92/62 - 128/89)  RR: 18 (10-16-21 @ 05:09)  SpO2: 98% (10-16-21 @ 05:09) (95% - 100%)  Wt(kg): --    PHYSICAL EXAM:  Constitutional: no distress  HEAD/EYES: anicteric, no conjunctival injection  ENT:  no mucositis, no thrush  Cardiovascular:   normal S1/S2, no murmur, no edema  Respiratory:  clear BS bilaterally, no wheezes, no rales  GI:  soft, non-tender, +bowel sounds  :  no mendoza, no CVA tenderness  Musculoskeletal:  +RUE PICC, no synovitis, normal ROM  Neurologic: awake and alert,  no focal findings  Skin:  no rash, no erythema, no phlebitis  Heme/Onc: no lymphadenopathy   Psychiatric:  awake, alert, appropriate mood      Labs:                        9.1    2.94  )-----------( 275      ( 16 Oct 2021 07:06 )             27.6     10-16    140  |  108  |  12  ----------------------------<  87  3.5   |  20<L>  |  0.77    Ca    9.1      16 Oct 2021 07:05  Phos  3.9     10-16  Mg     2.0     10-16    TPro  6.3  /  Alb  3.9  /  TBili  0.3  /  DBili  x   /  AST  62<H>  /  ALT  123<H>  /  AlkPhos  83  10-16      WBC Trend:  WBC Count: 2.94 (10-16-21 @ 07:06)  WBC Count: 4.15 (10-15-21 @ 10:10)  WBC Count: 4.61 (10-13-21 @ 09:56)  WBC Count: 2.80 (10-12-21 @ 07:00)    Auto Neutrophil #: 2.50 K/uL (10-15-21 @ 10:10)  Band Neutrophils %: 0.9 % (10-15-21 @ 10:10)  Auto Neutrophil #: 2.63 K/uL (10-13-21 @ 09:56)  Auto Neutrophil #: 1.13 K/uL (10-12-21 @ 07:00)  Auto Neutrophil #: 0.64 K/uL (10-11-21 @ 07:27)    Auto Eosinophil %: 0.0 % (10-15-21 @ 10:10)        MICROBIOLOGY:    MRSA PCR Result.: NotDetec (08-20-21 @ 18:32)      GI PCR Panel, Stool (collected 15 Oct 2021 17:19)  Source: .Stool Feces  Final Report:    GI PCR Results: NOT detected    *******Please Note:*******    GI panel PCR evaluates for:    Campylobacter, Plesiomonas shigelloides, Salmonella,    Vibrio, Yersinia enterocolitica, Enteroaggregative    Escherichia coli (EAEC), Enteropathogenic E.coli (EPEC),    Enterotoxigenic E. coli (ETEC) lt/st, Shiga-like    toxin-producing E. coli (STEC) stx1/stx2,    Shigella/ Enteroinvasive E. coli (EIEC), Cryptosporidium,    Cyclospora cayetanensis, Entamoeba histolytica,    Giardia lamblia, Adenovirus F 40/41, Astrovirus,    Norovirus GI/GII, Rotavirus A, Sapovirus    Culture - Blood (collected 09 Oct 2021 10:17)  Source: .Blood Blood-Cord  Final Report:    No Growth Final    Culture - Blood (collected 09 Oct 2021 10:17)  Source: .Blood Blood-Peripheral  Final Report:    No Growth Final    Culture - Blood (collected 09 Oct 2021 10:17)  Source: .Blood Blood-Peripheral  Final Report:    No Growth Final    Culture - Urine (collected 09 Oct 2021 10:15)  Source: Clean Catch Clean Catch (Midstream)  Final Report:    No growth    Culture - Urine (collected 20 Aug 2021 17:59)  Source: Clean Catch Clean Catch (Midstream)  Final Report:    No growth    Culture - Blood (collected 20 Aug 2021 13:16)  Source: .Blood Blood-Peripheral  Final Report:    No Growth Final    Culture - Blood (collected 20 Aug 2021 09:16)  Source: .Blood Blood-Catheter  Final Report:    No Growth Final    Culture - Urine (collected 17 Aug 2021 17:26)  Source: Clean Catch Clean Catch (Midstream)  Final Report:    No growth    Culture - Blood (collected 17 Aug 2021 12:55)  Source: .Blood Blood-Peripheral  Final Report:    No Growth Final      HIV-1/2 Combo Result: Nonreact (07-30-21 @ 08:51)          COVID-19 PCR: NotDetec (10-15-21 @ 11:48)  COVID-19 PCR: NotDetec (08-25-21 @ 06:56)  COVID-19 PCR: NotDetec (08-19-21 @ 07:11)    COVID-19 Addy Domain AB Interp: Positive (10-16-21 @ 10:48)  COVID-19 Addy Domain AB Interp: Positive (10-10-21 @ 09:34)  COVID-19 Addy Domain AB Interp: Positive (09-18-21 @ 11:04)        RADIOLOGY:  imaging below personally reviewed    < from: Xray Chest 1 View- PORTABLE-Urgent (Xray Chest 1 View- PORTABLE-Urgent .) (10.15.21 @ 09:52) >  EXAM:  XR CHEST PORTABLE URGENT 1V                            PROCEDURE DATE:  10/15/2021    FINDINGS:  Right PICC with tip in the SVC.  The heart is normal in size.  The lungs are clear.  There is no pneumothorax or pleural effusion.    IMPRESSION:  Right PICC in satisfactory position

## 2021-10-16 NOTE — PROGRESS NOTE ADULT - NSPROGADDITIONALINFOA_GEN_ALL_CORE
Differential diagnosis and plan of care discussed with patient after the evaluation.   Advanced care planning/advanced directives discussed with patient/family. DNR status including forceful chest compressions to attempt to restart the heart, ventilator support/artificial breathing, electric shock, artificial nutrition, health care proxy, Molst form all discussed with pt. Pt wishes to consider.   OMT on six regions for acute somatic dysfunctions done at the bedide   Importance of Fall prevention discussed.  I had a prolonged conversation with patient. More than 50% of the visit was spent counseling and/or coordinating care by the attending physician.  total of sixty seven spent on total encounter

## 2021-10-17 ENCOUNTER — TRANSCRIPTION ENCOUNTER (OUTPATIENT)
Age: 36
End: 2021-10-17

## 2021-10-17 VITALS
RESPIRATION RATE: 18 BRPM | HEART RATE: 83 BPM | SYSTOLIC BLOOD PRESSURE: 113 MMHG | DIASTOLIC BLOOD PRESSURE: 77 MMHG | TEMPERATURE: 98 F | OXYGEN SATURATION: 99 %

## 2021-10-17 LAB
ALBUMIN SERPL ELPH-MCNC: 3.9 G/DL — SIGNIFICANT CHANGE UP (ref 3.3–5)
ALP SERPL-CCNC: 88 U/L — SIGNIFICANT CHANGE UP (ref 40–120)
ALT FLD-CCNC: 145 U/L — HIGH (ref 10–45)
ANION GAP SERPL CALC-SCNC: 12 MMOL/L — SIGNIFICANT CHANGE UP (ref 5–17)
AST SERPL-CCNC: 59 U/L — HIGH (ref 10–40)
BILIRUB SERPL-MCNC: 0.2 MG/DL — SIGNIFICANT CHANGE UP (ref 0.2–1.2)
BUN SERPL-MCNC: 13 MG/DL — SIGNIFICANT CHANGE UP (ref 7–23)
CALCIUM SERPL-MCNC: 9 MG/DL — SIGNIFICANT CHANGE UP (ref 8.4–10.5)
CHLORIDE SERPL-SCNC: 108 MMOL/L — SIGNIFICANT CHANGE UP (ref 96–108)
CO2 SERPL-SCNC: 21 MMOL/L — LOW (ref 22–31)
CREAT SERPL-MCNC: 0.68 MG/DL — SIGNIFICANT CHANGE UP (ref 0.5–1.3)
GLUCOSE SERPL-MCNC: 83 MG/DL — SIGNIFICANT CHANGE UP (ref 70–99)
HCT VFR BLD CALC: 25.7 % — LOW (ref 39–50)
HGB BLD-MCNC: 8.9 G/DL — LOW (ref 13–17)
MCHC RBC-ENTMCNC: 30.4 PG — SIGNIFICANT CHANGE UP (ref 27–34)
MCHC RBC-ENTMCNC: 34.6 GM/DL — SIGNIFICANT CHANGE UP (ref 32–36)
MCV RBC AUTO: 87.7 FL — SIGNIFICANT CHANGE UP (ref 80–100)
NRBC # BLD: 0 /100 WBCS — SIGNIFICANT CHANGE UP (ref 0–0)
PLATELET # BLD AUTO: 265 K/UL — SIGNIFICANT CHANGE UP (ref 150–400)
POTASSIUM SERPL-MCNC: 3.5 MMOL/L — SIGNIFICANT CHANGE UP (ref 3.5–5.3)
POTASSIUM SERPL-SCNC: 3.5 MMOL/L — SIGNIFICANT CHANGE UP (ref 3.5–5.3)
PROT SERPL-MCNC: 6.1 G/DL — SIGNIFICANT CHANGE UP (ref 6–8.3)
RBC # BLD: 2.93 M/UL — LOW (ref 4.2–5.8)
RBC # FLD: 15.1 % — HIGH (ref 10.3–14.5)
SODIUM SERPL-SCNC: 141 MMOL/L — SIGNIFICANT CHANGE UP (ref 135–145)
WBC # BLD: 3.26 K/UL — LOW (ref 3.8–10.5)
WBC # FLD AUTO: 3.26 K/UL — LOW (ref 3.8–10.5)

## 2021-10-17 PROCEDURE — 85025 COMPLETE CBC W/AUTO DIFF WBC: CPT

## 2021-10-17 PROCEDURE — 83036 HEMOGLOBIN GLYCOSYLATED A1C: CPT

## 2021-10-17 PROCEDURE — 36592 COLLECT BLOOD FROM PICC: CPT

## 2021-10-17 PROCEDURE — 99285 EMERGENCY DEPT VISIT HI MDM: CPT

## 2021-10-17 PROCEDURE — 86769 SARS-COV-2 COVID-19 ANTIBODY: CPT

## 2021-10-17 PROCEDURE — 93005 ELECTROCARDIOGRAM TRACING: CPT

## 2021-10-17 PROCEDURE — U0003: CPT

## 2021-10-17 PROCEDURE — 83735 ASSAY OF MAGNESIUM: CPT

## 2021-10-17 PROCEDURE — 80053 COMPREHEN METABOLIC PANEL: CPT

## 2021-10-17 PROCEDURE — 84443 ASSAY THYROID STIM HORMONE: CPT

## 2021-10-17 PROCEDURE — 36415 COLL VENOUS BLD VENIPUNCTURE: CPT

## 2021-10-17 PROCEDURE — 84100 ASSAY OF PHOSPHORUS: CPT

## 2021-10-17 PROCEDURE — 71045 X-RAY EXAM CHEST 1 VIEW: CPT

## 2021-10-17 PROCEDURE — 87507 IADNA-DNA/RNA PROBE TQ 12-25: CPT

## 2021-10-17 PROCEDURE — 80061 LIPID PANEL: CPT

## 2021-10-17 PROCEDURE — U0005: CPT

## 2021-10-17 PROCEDURE — 87324 CLOSTRIDIUM AG IA: CPT

## 2021-10-17 PROCEDURE — 87449 NOS EACH ORGANISM AG IA: CPT

## 2021-10-17 RX ORDER — AMLODIPINE BESYLATE 2.5 MG/1
1 TABLET ORAL
Qty: 30 | Refills: 0
Start: 2021-10-17 | End: 2021-11-15

## 2021-10-17 RX ORDER — METOCLOPRAMIDE HCL 10 MG
1 TABLET ORAL
Qty: 0 | Refills: 0 | DISCHARGE

## 2021-10-17 RX ORDER — ONDANSETRON 8 MG/1
1 TABLET, FILM COATED ORAL
Qty: 0 | Refills: 0 | DISCHARGE

## 2021-10-17 RX ADMIN — SODIUM CHLORIDE 100 MILLILITER(S): 9 INJECTION INTRAMUSCULAR; INTRAVENOUS; SUBCUTANEOUS at 05:42

## 2021-10-17 NOTE — DISCHARGE NOTE PROVIDER - PROVIDER TOKENS
PROVIDER:[TOKEN:[[id number]]] PROVIDER:[TOKEN:[[id number]]],PROVIDER:[TOKEN:[[id number]]]

## 2021-10-17 NOTE — PROGRESS NOTE ADULT - SUBJECTIVE AND OBJECTIVE BOX
Name of Patient : JAK DANIELS  MRN: [id number]  Date of visit: 10-16-21      Subjective: Patient seen and examined. No new events except as noted.   doing better  decerase diarrhea  C diff PCR neagtive     REVIEW OF SYSTEMS:    CONSTITUTIONAL: No weakness, fevers or chills  EYES/ENT: No visual changes;  No vertigo or throat pain   NECK: No pain or stiffness  RESPIRATORY: No cough, wheezing, hemoptysis; No shortness of breath  CARDIOVASCULAR: No chest pain or palpitations  GASTROINTESTINAL: + Diarrhea   GENITOURINARY: No dysuria, frequency or hematuria  NEUROLOGICAL: No numbness or weakness  SKIN: No itching, burning, rashes, or lesions   All other review of systems is negative unless indicated above.    MEDICATIONS:  MEDICATIONS  (STANDING):  amLODIPine   Tablet 5 milliGRAM(s) Oral daily  heparin   Injectable 5000 Unit(s) SubCutaneous every 8 hours  influenza   Vaccine 0.5 milliLiter(s) IntraMuscular once  sodium chloride 0.9%. 1000 milliLiter(s) (100 mL/Hr) IV Continuous <Continuous>      PHYSICAL EXAM:  T(C): 36.8 (10-16-21 @ 14:32), Max: 36.9 (10-15-21 @ 20:05)  HR: 84 (10-16-21 @ 14:32) (75 - 90)  BP: 104/56 (10-16-21 @ 14:32) (92/62 - 128/89)  RR: 18 (10-16-21 @ 14:32) (18 - 20)  SpO2: 99% (10-16-21 @ 14:32) (95% - 99%)  Wt(kg): --  I&O's Summary    16 Oct 2021 07:01  -  16 Oct 2021 19:32  --------------------------------------------------------  IN: 240 mL / OUT: 400 mL / NET: -160 mL          Appearance: Normal	  HEENT:  PERRLA   Lymphatic: No lymphadenopathy   Cardiovascular: Normal S1 S2, no JVD  Respiratory: normal effort , clear  Gastrointestinal:  Soft, Non-tender  Skin: No rashes,  warm to touch  Psychiatry:  Mood & affect appropriate  Musculuskeletal: No edema      All labs, Imaging and EKGs personally reviewed         10-16-21 @ 07:01  -  10-16-21 @ 19:32  --------------------------------------------------------  IN: 240 mL / OUT: 400 mL / NET: -160 mL                          9.1    2.94  )-----------( 275      ( 16 Oct 2021 07:06 )             27.6               10-16    140  |  108  |  12  ----------------------------<  87  3.5   |  20<L>  |  0.77    Ca    9.1      16 Oct 2021 07:05  Phos  3.9     10-16  Mg     2.0     10-16    TPro  6.3  /  Alb  3.9  /  TBili  0.3  /  DBili  x   /  AST  62<H>  /  ALT  123<H>  /  AlkPhos  83  10-16                                 
Name of Patient : JAK DANIELS  MRN: [id number]  Date of visit: 10-17-21       Subjective: Patient seen and examined. No new events except as noted.   Doing okay  diarrhea stopped     REVIEW OF SYSTEMS:    CONSTITUTIONAL: No weakness, fevers or chills  EYES/ENT: No visual changes;  No vertigo or throat pain   NECK: No pain or stiffness  RESPIRATORY: No cough, wheezing, hemoptysis; No shortness of breath  CARDIOVASCULAR: No chest pain or palpitations  GASTROINTESTINAL: No abdominal or epigastric pain.   GENITOURINARY: No dysuria, frequency or hematuria  NEUROLOGICAL: No numbness or weakness  SKIN: No itching, burning, rashes, or lesions   All other review of systems is negative unless indicated above.    MEDICATIONS:  MEDICATIONS  (STANDING):  amLODIPine   Tablet 5 milliGRAM(s) Oral daily  heparin   Injectable 5000 Unit(s) SubCutaneous every 8 hours  influenza   Vaccine 0.5 milliLiter(s) IntraMuscular once  sodium chloride 0.9%. 1000 milliLiter(s) (100 mL/Hr) IV Continuous <Continuous>      PHYSICAL EXAM:  T(C): 36.7 (10-17-21 @ 13:57), Max: 36.7 (10-17-21 @ 13:57)  HR: 83 (10-17-21 @ 13:57) (83 - 83)  BP: 113/77 (10-17-21 @ 13:57) (107/73 - 113/77)  RR: 18 (10-17-21 @ 13:57) (18 - 18)  SpO2: 99% (10-17-21 @ 13:57) (99% - 99%)  Wt(kg): --  I&O's Summary    16 Oct 2021 07:01  -  17 Oct 2021 07:00  --------------------------------------------------------  IN: 1440 mL / OUT: 400 mL / NET: 1040 mL          Appearance: Normal	  HEENT:  PERRLA   Lymphatic: No lymphadenopathy   Cardiovascular: Normal S1 S2, no JVD  Respiratory: normal effort , clear  Gastrointestinal:  Soft, Non-tender  Skin: No rashes,  warm to touch  Psychiatry:  Mood & affect appropriate  Musculuskeletal: No edema      All labs, Imaging and EKGs personally reviewed     10-16-21 @ 07:01  -  10-17-21 @ 07:00  --------------------------------------------------------  IN: 1440 mL / OUT: 400 mL / NET: 1040 mL                          8.9    3.26  )-----------( 265      ( 17 Oct 2021 07:12 )             25.7               10-17    141  |  108  |  13  ----------------------------<  83  3.5   |  21<L>  |  0.68    Ca    9.0      17 Oct 2021 07:10  Phos  3.9     10-16  Mg     2.0     10-16    TPro  6.1  /  Alb  3.9  /  TBili  0.2  /  DBili  x   /  AST  59<H>  /  ALT  145<H>  /  AlkPhos  88  10-17

## 2021-10-17 NOTE — PROGRESS NOTE ADULT - ASSESSMENT
Patient is a 36 year old male with PMHx of AML in remission and HTN presents with 3 days of diarrhea 10-15 episodes per day. He was discharged from the hospital on 10/12 after a stay for neutropenic fever. He was on multiple antibiotics while inpatient including levaquin and a source was not found for his fever. His diarrhea started at 2AM his first night home and he describes it as explosive watery diarrhea. He denies fevers, chills, abdominal pain, nausea, vomiting. He says he does not have a sense of smell so doesn't know if it has a foul smelling odor.      # Acute Diarrhea   Recent hospitalization for neutropenic fever   S/P IV antibiotics on previous admission   Now with acute watery diarrhea for days   R/PO C diff, stool sent . negative  IV hydration cont   isolation  ID eval appreicated, no further intervention       # AML   s/p induction therapy dauno/cytarabine 8/6; consolidation therapy HIDAC C1 9/17  Monitor CBC with diff daily  transfuse prn to keep Hgb >7  monitor electrolytes, supplement as needed  IV hydration  onc follow up appreciated     # HTN  resume home BP meds  adjust as tolerated     DVT and gI PPX 
Patient is a 36 year old male with PMHx of AML in remission and HTN presents with 3 days of diarrhea 10-15 episodes per day. He was discharged from the hospital on 10/12 after a stay for neutropenic fever. He was on multiple antibiotics while inpatient including levaquin and a source was not found for his fever. His diarrhea started at 2AM his first night home and he describes it as explosive watery diarrhea. He denies fevers, chills, abdominal pain, nausea, vomiting. He says he does not have a sense of smell so doesn't know if it has a foul smelling odor.      # Acute Diarrhea   Recent hospitalization for neutropenic fever   S/P IV antibiotics on previous admission   Now with acute watery diarrhea for days   R/PO C diff, stool sent . negative  IV hydration cont   isolation  ID eval appreicated, no further intervention       # AML   s/p induction therapy dauno/cytarabine 8/6; consolidation therapy HIDAC C1 9/17  Monitor CBC with diff daily  transfuse prn to keep Hgb >7  monitor electrolytes, supplement as needed  IV hydration  onc follow up appreciated     # HTN  resume home BP meds  adjust as tolerated     DVT and gI PPX

## 2021-10-17 NOTE — DISCHARGE NOTE PROVIDER - CARE PROVIDER_API CALL
Chelsea Yancey  HEMATOLOGY/ONCOLOGY  [address] [place] [address]  Phone: [phone]  Fax: [phone]  Follow Up Time:    Chelsea Yancey  HEMATOLOGY/ONCOLOGY  [address] [place] [address]  Phone: [phone]  Fax: [phone]  Follow Up Time:     Martin Hudson ()  [address] [address]  Phone: [phone]  Fax: [phone]  Follow Up Time:

## 2021-10-17 NOTE — DISCHARGE NOTE NURSING/CASE MANAGEMENT/SOCIAL WORK - NSSCTYPOFSERV_GEN_ALL_CORE
Next visit for PICC care on Wed 10/20  confirmed with [facility], spoke with Zaynab   Patient has PICC supplies for daily flush

## 2021-10-17 NOTE — DISCHARGE NOTE PROVIDER - NSDCMRMEDTOKEN_GEN_ALL_CORE_FT
amLODIPine 5 mg oral tablet: 1 tab(s) orally once a     Note:Last filled in aug for 1 month supply  Reglan 10 mg oral tablet: 1 tab(s) orally every 6 hours, As Needed  nausea  Zofran 8 mg oral tablet: 1 tab(s) orally every 8 hours, As Needed nausea

## 2021-10-17 NOTE — DISCHARGE NOTE PROVIDER - CARE PROVIDERS DIRECT ADDRESSES
[email or web address],DirectAddress_Unknown

## 2021-10-17 NOTE — DISCHARGE NOTE NURSING/CASE MANAGEMENT/SOCIAL WORK - PATIENT PORTAL LINK FT
You can access the FollowMyHealth Patient Portal offered by [facility] by registering at the following website: [email or web address]. By joining [employer]’s FollowMyHealth portal, you will also be able to view your health information using other applications (apps) compatible with our system.

## 2021-10-17 NOTE — DISCHARGE NOTE PROVIDER - HOSPITAL COURSE
Patient is a 36 year old male with PMHx of AML in remission and HTN presents with 3 days of diarrhea 10-15 episodes per day. He was discharged from the hospital on 10/12 after a stay for neutropenic fever. He was on multiple antibiotics while inpatient including levaquin and a source was not found for his fever. His diarrhea started at 2AM his first night home and he describes it as explosive watery diarrhea. He denies fevers, chills, abdominal pain, nausea, vomiting. He says he does not have a sense of smell so doesn't know if it has a foul smelling odor.      # Acute Diarrhea   Recent hospitalization for neutropenic fever   S/P IV antibiotics on previous admission   Now with acute watery diarrhea for days   R/PO C diff, stool sent . negative  IV hydration cont   isolation  ID eval appreicated, no further intervention       # AML   s/p induction therapy dauno/cytarabine 8/6; consolidation therapy HIDAC C1 9/17  Monitor CBC with diff daily  transfuse prn to keep Hgb >7  monitor electrolytes, supplement as needed  IV hydration  onc follow up appreciated     # HTN  resume home BP meds  adjust as tolerated    Patient is a 36 year old male with PMHx of AML in remission and HTN presents with 3 days of diarrhea 10-15 episodes per day. He was discharged from the hospital on 10/12 after a stay for neutropenic fever. He was on multiple antibiotics while inpatient including levaquin and a source was not found for his fever.  C diff, stool sent and negative.  Evaluated by ID and most likely secondary to chemo or recent antibiotics.  Received IV hydration.  Bowel movements have significantly decreased and patient medically cleared for d/c home with follow up to hematologist to determine date of next treatment. Patient is a 36 year old male with PMHx of AML in remission and HTN presents with 3 days of diarrhea 10-15 episodes per day. He was discharged from the hospital on 10/12 after a stay for neutropenic fever. He was on multiple antibiotics while inpatient including levaquin and a source was not found for his fever.  C diff, stool sent and negative.  Evaluated by ID and most likely secondary to chemo or recent antibiotics.  Received IV hydration.  Bowel movements have significantly decreased and patient medically cleared for d/c home with follow up to hematologist to determine date of next treatment and Dr. Hudson in 1-2 weeks.

## 2021-10-17 NOTE — DISCHARGE NOTE PROVIDER - NSDCCPCAREPLAN_GEN_ALL_CORE_FT
PRINCIPAL DISCHARGE DIAGNOSIS  Diagnosis: Severe diarrhea  Assessment and Plan of Treatment: Improved.  C-diff negative.  Maintain adequate hydration.  Follow up with you hematologist within one week of discharge.      SECONDARY DISCHARGE DIAGNOSES  Diagnosis: Hypertension  Assessment and Plan of Treatment: Continue to take your norvasc daily as prescribed.    Diagnosis: AML (acute myeloid leukemia)  Assessment and Plan of Treatment: Follow up with Dr. Yancey to discus date of next treatment.     PRINCIPAL DISCHARGE DIAGNOSIS  Diagnosis: Severe diarrhea  Assessment and Plan of Treatment: Improved.  C-diff negative.  Maintain adequate hydration.   Follow up with Dr. Hudson (Internal medicine) within 1-2 weeks of discharge.  Please call to schedule your appointment.      SECONDARY DISCHARGE DIAGNOSES  Diagnosis: AML (acute myeloid leukemia)  Assessment and Plan of Treatment: Follow up with Dr. Yancey within one week of discharge to discus date of next treatment.    Diagnosis: Hypertension  Assessment and Plan of Treatment: Continue to take your norvasc daily as prescribed.

## 2021-10-20 ENCOUNTER — RESULT REVIEW (OUTPATIENT)
Age: 36
End: 2021-10-20

## 2021-10-20 ENCOUNTER — APPOINTMENT (OUTPATIENT)
Dept: HEMATOLOGY ONCOLOGY | Facility: CLINIC | Age: 36
End: 2021-10-20

## 2021-10-20 LAB
BASOPHILS # BLD AUTO: 0.01 K/UL — SIGNIFICANT CHANGE UP (ref 0–0.2)
BASOPHILS NFR BLD AUTO: 0.2 % — SIGNIFICANT CHANGE UP (ref 0–2)
EOSINOPHIL # BLD AUTO: 0.01 K/UL — SIGNIFICANT CHANGE UP (ref 0–0.5)
EOSINOPHIL NFR BLD AUTO: 0.2 % — SIGNIFICANT CHANGE UP (ref 0–6)
HCT VFR BLD CALC: 35.5 % — LOW (ref 39–50)
HGB BLD-MCNC: 12.3 G/DL — LOW (ref 13–17)
IMM GRANULOCYTES NFR BLD AUTO: 0.7 % — SIGNIFICANT CHANGE UP (ref 0–1.5)
LYMPHOCYTES # BLD AUTO: 0.64 K/UL — LOW (ref 1–3.3)
LYMPHOCYTES # BLD AUTO: 14.6 % — SIGNIFICANT CHANGE UP (ref 13–44)
MCHC RBC-ENTMCNC: 31.1 PG — SIGNIFICANT CHANGE UP (ref 27–34)
MCHC RBC-ENTMCNC: 34.6 G/DL — SIGNIFICANT CHANGE UP (ref 32–36)
MCV RBC AUTO: 89.6 FL — SIGNIFICANT CHANGE UP (ref 80–100)
MONOCYTES # BLD AUTO: 0.71 K/UL — SIGNIFICANT CHANGE UP (ref 0–0.9)
MONOCYTES NFR BLD AUTO: 16.2 % — HIGH (ref 2–14)
NEUTROPHILS # BLD AUTO: 2.99 K/UL — SIGNIFICANT CHANGE UP (ref 1.8–7.4)
NEUTROPHILS NFR BLD AUTO: 68.1 % — SIGNIFICANT CHANGE UP (ref 43–77)
NRBC # BLD: 0 /100 WBCS — SIGNIFICANT CHANGE UP (ref 0–0)
PLATELET # BLD AUTO: 251 K/UL — SIGNIFICANT CHANGE UP (ref 150–400)
RBC # BLD: 3.96 M/UL — LOW (ref 4.2–5.8)
RBC # FLD: 16.5 % — HIGH (ref 10.3–14.5)
WBC # BLD: 4.39 K/UL — SIGNIFICANT CHANGE UP (ref 3.8–10.5)
WBC # FLD AUTO: 4.39 K/UL — SIGNIFICANT CHANGE UP (ref 3.8–10.5)

## 2021-10-22 LAB
ALBUMIN SERPL ELPH-MCNC: 4.6 G/DL
ALP BLD-CCNC: 107 U/L
ALT SERPL-CCNC: 155 U/L
ANION GAP SERPL CALC-SCNC: 16 MMOL/L
AST SERPL-CCNC: 51 U/L
BILIRUB SERPL-MCNC: 0.3 MG/DL
BUN SERPL-MCNC: 14 MG/DL
CALCIUM SERPL-MCNC: 10 MG/DL
CHLORIDE SERPL-SCNC: 105 MMOL/L
CO2 SERPL-SCNC: 22 MMOL/L
CREAT SERPL-MCNC: 0.8 MG/DL
GLUCOSE SERPL-MCNC: 90 MG/DL
POTASSIUM SERPL-SCNC: 3.7 MMOL/L
PROT SERPL-MCNC: 7.3 G/DL
SARS-COV-2 N GENE NPH QL NAA+PROBE: NOT DETECTED
SODIUM SERPL-SCNC: 143 MMOL/L

## 2021-10-25 ENCOUNTER — INPATIENT (INPATIENT)
Facility: HOSPITAL | Age: 36
LOS: 5 days | Discharge: HOME | DRG: 837 | End: 2021-10-31
Attending: INTERNAL MEDICINE | Admitting: INTERNAL MEDICINE
Payer: COMMERCIAL

## 2021-10-25 ENCOUNTER — TRANSCRIPTION ENCOUNTER (OUTPATIENT)
Age: 36
End: 2021-10-25

## 2021-10-25 VITALS — HEIGHT: 70.87 IN | WEIGHT: 198.64 LBS

## 2021-10-25 DIAGNOSIS — C92.00 ACUTE MYELOBLASTIC LEUKEMIA, NOT HAVING ACHIEVED REMISSION: ICD-10-CM

## 2021-10-25 LAB
ALBUMIN SERPL ELPH-MCNC: 4.2 G/DL — SIGNIFICANT CHANGE UP (ref 3.3–5)
ALP SERPL-CCNC: 89 U/L — SIGNIFICANT CHANGE UP (ref 40–120)
ALT FLD-CCNC: 105 U/L — HIGH (ref 10–45)
ANION GAP SERPL CALC-SCNC: 11 MMOL/L — SIGNIFICANT CHANGE UP (ref 5–17)
AST SERPL-CCNC: 31 U/L — SIGNIFICANT CHANGE UP (ref 10–40)
BILIRUB SERPL-MCNC: 0.2 MG/DL — SIGNIFICANT CHANGE UP (ref 0.2–1.2)
BUN SERPL-MCNC: 14 MG/DL — SIGNIFICANT CHANGE UP (ref 7–23)
CALCIUM SERPL-MCNC: 9.3 MG/DL — SIGNIFICANT CHANGE UP (ref 8.4–10.5)
CHLORIDE SERPL-SCNC: 104 MMOL/L — SIGNIFICANT CHANGE UP (ref 96–108)
CO2 SERPL-SCNC: 23 MMOL/L — SIGNIFICANT CHANGE UP (ref 22–31)
CREAT SERPL-MCNC: 0.71 MG/DL — SIGNIFICANT CHANGE UP (ref 0.5–1.3)
GLUCOSE SERPL-MCNC: 85 MG/DL — SIGNIFICANT CHANGE UP (ref 70–99)
POTASSIUM SERPL-MCNC: 4.1 MMOL/L — SIGNIFICANT CHANGE UP (ref 3.5–5.3)
POTASSIUM SERPL-SCNC: 4.1 MMOL/L — SIGNIFICANT CHANGE UP (ref 3.5–5.3)
PROT SERPL-MCNC: 6.2 G/DL — SIGNIFICANT CHANGE UP (ref 6–8.3)
SODIUM SERPL-SCNC: 138 MMOL/L — SIGNIFICANT CHANGE UP (ref 135–145)

## 2021-10-25 PROCEDURE — 71045 X-RAY EXAM CHEST 1 VIEW: CPT | Mod: 26

## 2021-10-25 PROCEDURE — 99221 1ST HOSP IP/OBS SF/LOW 40: CPT

## 2021-10-25 RX ORDER — SODIUM CHLORIDE 9 MG/ML
10 INJECTION INTRAMUSCULAR; INTRAVENOUS; SUBCUTANEOUS
Refills: 0 | Status: DISCONTINUED | OUTPATIENT
Start: 2021-10-25 | End: 2021-10-31

## 2021-10-25 RX ORDER — FOSAPREPITANT DIMEGLUMINE 150 MG/5ML
150 INJECTION, POWDER, LYOPHILIZED, FOR SOLUTION INTRAVENOUS ONCE
Refills: 0 | Status: COMPLETED | OUTPATIENT
Start: 2021-10-25 | End: 2021-10-25

## 2021-10-25 RX ORDER — CYTARABINE 100 MG
6300 VIAL (EA) INJECTION EVERY 12 HOURS
Refills: 0 | Status: COMPLETED | OUTPATIENT
Start: 2021-10-25 | End: 2021-10-26

## 2021-10-25 RX ORDER — CHLORHEXIDINE GLUCONATE 213 G/1000ML
1 SOLUTION TOPICAL
Refills: 0 | Status: DISCONTINUED | OUTPATIENT
Start: 2021-10-25 | End: 2021-10-31

## 2021-10-25 RX ORDER — AMLODIPINE BESYLATE 2.5 MG/1
5 TABLET ORAL DAILY
Refills: 0 | Status: DISCONTINUED | OUTPATIENT
Start: 2021-10-25 | End: 2021-10-31

## 2021-10-25 RX ORDER — METOCLOPRAMIDE HCL 10 MG
10 TABLET ORAL EVERY 6 HOURS
Refills: 0 | Status: DISCONTINUED | OUTPATIENT
Start: 2021-10-25 | End: 2021-10-31

## 2021-10-25 RX ORDER — INFLUENZA VIRUS VACCINE 15; 15; 15; 15 UG/.5ML; UG/.5ML; UG/.5ML; UG/.5ML
0.5 SUSPENSION INTRAMUSCULAR ONCE
Refills: 0 | Status: DISCONTINUED | OUTPATIENT
Start: 2021-10-25 | End: 2021-10-31

## 2021-10-25 RX ORDER — PREDNISOLONE SODIUM PHOSPHATE 1 %
2 DROPS OPHTHALMIC (EYE) EVERY 6 HOURS
Refills: 0 | Status: DISCONTINUED | OUTPATIENT
Start: 2021-10-25 | End: 2021-10-31

## 2021-10-25 RX ORDER — SALIVA SUBSTITUTE COMB NO.11 351 MG
15 POWDER IN PACKET (EA) MUCOUS MEMBRANE
Refills: 0 | Status: DISCONTINUED | OUTPATIENT
Start: 2021-10-25 | End: 2021-10-31

## 2021-10-25 RX ORDER — ONDANSETRON 8 MG/1
8 TABLET, FILM COATED ORAL EVERY 8 HOURS
Refills: 0 | Status: COMPLETED | OUTPATIENT
Start: 2021-10-25 | End: 2021-10-31

## 2021-10-25 RX ORDER — ACETAMINOPHEN 500 MG
650 TABLET ORAL EVERY 6 HOURS
Refills: 0 | Status: DISCONTINUED | OUTPATIENT
Start: 2021-10-25 | End: 2021-10-31

## 2021-10-25 RX ORDER — SODIUM CHLORIDE 9 MG/ML
1000 INJECTION INTRAMUSCULAR; INTRAVENOUS; SUBCUTANEOUS
Refills: 0 | Status: DISCONTINUED | OUTPATIENT
Start: 2021-10-25 | End: 2021-10-31

## 2021-10-25 RX ADMIN — ONDANSETRON 8 MILLIGRAM(S): 8 TABLET, FILM COATED ORAL at 14:16

## 2021-10-25 RX ADMIN — Medication 15 MILLILITER(S): at 21:02

## 2021-10-25 RX ADMIN — Medication 187.67 MILLIGRAM(S): at 16:10

## 2021-10-25 RX ADMIN — Medication 15 MILLILITER(S): at 17:22

## 2021-10-25 RX ADMIN — CHLORHEXIDINE GLUCONATE 1 APPLICATION(S): 213 SOLUTION TOPICAL at 14:18

## 2021-10-25 RX ADMIN — Medication 2 DROP(S): at 17:22

## 2021-10-25 RX ADMIN — Medication 2 DROP(S): at 23:18

## 2021-10-25 RX ADMIN — ONDANSETRON 8 MILLIGRAM(S): 8 TABLET, FILM COATED ORAL at 21:02

## 2021-10-25 RX ADMIN — FOSAPREPITANT DIMEGLUMINE 300 MILLIGRAM(S): 150 INJECTION, POWDER, LYOPHILIZED, FOR SOLUTION INTRAVENOUS at 14:18

## 2021-10-25 NOTE — H&P ADULT - ASSESSMENT
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 Now admitted for cycle 2 Consolidation with HIDAC

## 2021-10-25 NOTE — DISCHARGE NOTE PROVIDER - NSDCCPCAREPLAN_GEN_ALL_CORE_FT
PRINCIPAL DISCHARGE DIAGNOSIS  Diagnosis: AML (acute myeloid leukemia)  Assessment and Plan of Treatment: Notify MD or report to ER for fever greater or equal to 100.4, persistent nausea, vomiting, diarrhea, bleeding.         PRINCIPAL DISCHARGE DIAGNOSIS  Diagnosis: AML (acute myeloid leukemia)  Assessment and Plan of Treatment: Notify MD or report to ER for fever greater or equal to 100.4, persistent nausea, vomiting, diarrhea, bleeding.  Complete 2 days of predforte eye drops

## 2021-10-25 NOTE — H&P ADULT - PROBLEM SELECTOR PLAN 1
Admitted for Cycle 1 HiDAC  Cytarabine 3 g/m2 IV over 3 hrs every 12 hrs on days 1,3,5   IV hydration, Antiemetics, daily weight   Monitor for Cerebellar toxicity, nystagmus checks  Follow CBC/CMP, transfuse/replete prn  Continue predforte eye drops to prevent chemical conjunctivitis  Discharge planning on Admitted for Cycle 2 HiDAC  Cytarabine 3 g/m2 IV over 3 hrs every 12 hrs on days 1,3,5   IV hydration, Antiemetics, daily weight   Monitor for Cerebellar toxicity, nystagmus checks  Follow CBC/CMP, transfuse/replete prn  Continue predforte eye drops to prevent chemical conjunctivitis  Discharge planning on Admitted for Cycle 2 HiDAC  Cytarabine 3 g/m2 IV over 3 hrs every 12 hrs on days 1,3,5   IV hydration, Antiemetics, daily weight   Monitor for Cerebellar toxicity, nystagmus checks  Follow CBC/CMP, transfuse/replete prn  Continue predforte eye drops to prevent chemical conjunctivitis  Discharge planning on 10/30/21

## 2021-10-25 NOTE — DISCHARGE NOTE PROVIDER - CARE PROVIDER_API CALL
Chelsea Yancey  HEMATOLOGY/ONCOLOGY  [address] [place] [address]  Phone: [phone]  Fax: [phone]  Follow Up Time:    Chelsea Yancey  HEMATOLOGY/ONCOLOGY  [address] [place] [address]  Phone: [phone]  Fax: [phone]  Follow Up Time:     Rosy Pedersen)  Physician Assistant Services  [id number] [place] [address]  Phone: [phone]  Fax: [phone]  Follow Up Time:

## 2021-10-25 NOTE — DISCHARGE NOTE PROVIDER - NSDCMRMEDTOKEN_GEN_ALL_CORE_FT
Norvasc 5 mg oral tablet: 1 tab(s) orally once a day hold if SBP&lt;110   Norvasc 5 mg oral tablet: 1 tab(s) orally once a day hold if SBP&lt;110  prednisoLONE acetate 1% ophthalmic suspension: 2 drop(s) to each affected eye every 6 hours for 2 days and then stop.   Diflucan 200 mg oral tablet: 1 tab(s) orally once a day  Levaquin 500 mg oral tablet: 1 tab(s) orally every 24 hours  Norvasc 5 mg oral tablet: 1 tab(s) orally once a day hold if SBP&lt;110  prednisoLONE acetate 1% ophthalmic suspension: 2 drop(s) to each affected eye every 6 hours for 2 days and then stop.  Reglan 10 mg oral tablet: orally every 6 hours, As Needed  Zofran 8 mg oral tablet: 1 tab(s) orally every 8 hours, As Needed   Diflucan 200 mg oral tablet: 1 tab(s) orally once a day  Levaquin 500 mg oral tablet: 1 tab(s) orally every 24 hours  Norvasc 5 mg oral tablet: 1 tab(s) orally once a day hold if SBP&lt;110  PICC care: heparin 10units/ml (3ml), 3 milliliters intravenous once a day:   PICC care: normal saline 10 milliliters intravenous to each lumen weekly:   prednisoLONE acetate 1% ophthalmic suspension: 2 drop(s) to each affected eye every 6 hours for 2 days and then stop.  Reglan 10 mg oral tablet: orally every 6 hours, As Needed  Zofran 8 mg oral tablet: 1 tab(s) orally every 8 hours, As Needed

## 2021-10-25 NOTE — DISCHARGE NOTE PROVIDER - NSDCFUSCHEDAPPT_GEN_ALL_CORE_FT
JAK DANIELS ; 11/01/2021 ; [facility] JAK DANIELS ; 11/01/2021 ; [facility]  JAK DANIELS ; 11/01/2021 ; [facility] JAK DANIELS ; 11/01/2021 ; [place]  JAK DANIELS ; 11/01/2021 ; [facility]  JAK DANIELS ; 11/04/2021 ; [facility]

## 2021-10-25 NOTE — DISCHARGE NOTE PROVIDER - NSDCQMERRANDS_GEN_ALL_CORE
[facility] Consult Note    Reason For Consult:  ESRD    History Of Present Illness  Binh is a 74 year old male with end-stage renal disease on peritoneal dialysis came in with complaining of generalized weakness and dizziness.  We are asked to help in management of end-stage renal disease    Patient states he has been having generalized weakness since 1 week.  Also complains of dizziness mainly when he sits up or stands up.  States his blood pressure was running low at home hence he stopped all antihypertensive medications.  His appetite has been poor.  Denies any vomiting or diarrhea.  Denies any fever or chills.  States he was compliant with his PD.  He was using 2 bags of 2.5 dextrose solution and 1 bag of 1.5 dextrose solution for PD .  States his UF was good.  Denies any discoloration of PD fluid.  Denies any abdominal pain or fever.  In ED he was found to be orthostatic.  Received 500 mL of IV fluid bolus.  Appears comfortable on room air  In regards to his renal disease he has end-stage renal disease due to diabetic nephropathy(biopsy-proven) .  He has been on peritoneal dialysis close to 2 years.  His PD prescription is as follows total volume is 12,000 mL.  Dialysis duration is 8.5 hours, 6 exchanges, fill volume is 2300 mL at last fill of 500 ml     Past Medical History  Past Medical History:   Diagnosis Date   • CAD (coronary artery disease) 02/28/2020   • Chronic kidney disease     stage 5 CKD   • Diabetes mellitus (CMS/[facility])    • Dyslipidemia    • Essential (primary) hypertension    • High cholesterol    • MRSA (methicillin resistant Staphylococcus aureus)     had MRSA of right foot/ amputated   • Neuropathy     bilateral feet   • PAD (peripheral artery disease) (CMS/[facility])         Surgical History  Past Surgical History:   Procedure Laterality Date   • Amputation foot,transmetatarsal Right    • Anes exc pilonidal cyst/sinus; simpl Bilateral    • Appendectomy      • Fracture surgery Right     ankle   • Peritoneal catheter insertion N/A         Social History  Social History     Tobacco Use   • Smoking status: Former Smoker     Packs/day: 0.00   • Smokeless tobacco: Never Used   • Tobacco comment: quit over 40 years ago   Substance Use Topics   • Alcohol use: Yes     Alcohol/week: 1.0 standard drinks     Types: 1 Cans of beer per week     Comment: drinks beer   • Drug use: Never       Family History  Family History   Problem Relation Age of Onset   • Coronary Artery Disease Neg Hx    • Aneurysm Neg Hx         Allergies  ALLERGIES:  Lisinopril and Unknown    Medications  Medications Prior to Admission   Medication Sig Dispense Refill   • hydrALAZINE (APRESOLINE) 50 MG tablet Take 100 mg by mouth 3 times daily.     • calcium acetate gelcap (PHOSLO) 667 MG capsule Take 1,334 mg by mouth 3 times daily. With meals     • NIFEdipine CC (ADALAT CC) 60 MG 24 hr tablet Take 1 tablet by mouth daily. 30 tablet 3   • pioglitazone (ACTOS) 30 MG tablet Take 30 mg by mouth daily.  0   • torsemide (DEMADEX) 20 MG tablet Take 20 mg by mouth daily.     • Multiple Vitamins-Minerals (CENTRUM ADULTS) Tab Take by mouth daily.     • metoPROLOL succinate (TOPROL-XL) 100 MG 24 hr tablet Take 100 mg by mouth daily.      • simvastatin (ZOCOR) 20 MG tablet Take 20 mg by mouth nightly.      • Vitamin D, Cholecalciferol, 25 mcg (1,000 units) tablet Take 1,000 Units by mouth daily.        Current Facility-Administered Medications   Medication Dose Route Frequency Provider Last Rate Last Admin   • sodium chloride 0.9 % flush bag 25 mL  25 mL Intravenous PRN Dong Moore MD       • sodium chloride (PF) 0.9 % injection 2 mL  2 mL Intracatheter 2 times per day Dong Moore MD       • sodium chloride (NORMAL SALINE) 0.9 % bolus 500 mL  500 mL Intravenous PRN Dong Moore MD       • heparin (porcine) injection 5,000 Units  5,000 Units Subcutaneous 3 times per day Dong Moore MD       • pioglitazone  (ACTOS) tablet 30 mg  30 mg Oral Daily Dong Moore MD       • atorvastatin (LIPITOR) tablet 10 mg  10 mg Oral Nightly Dong Moore MD       • sodium chloride 0.9% infusion   Intravenous Continuous David Khan MD           Review of Systems  Review of Systems   Constitutional: Positive for fatigue.         Physical Exam  Physical Exam  Constitutional:       General: He is not in acute distress.     Appearance: Normal appearance.   HENT:      Head: Normocephalic and atraumatic.      Nose: Nose normal.      Mouth/Throat:      Mouth: Mucous membranes are dry.   Eyes:      Pupils: Pupils are equal, round, and reactive to light.   Neck:      Musculoskeletal: No neck rigidity.   Cardiovascular:      Rate and Rhythm: Normal rate and regular rhythm.      Pulses: Normal pulses.      Heart sounds: No gallop.    Pulmonary:      Effort: Pulmonary effort is normal.      Breath sounds: Normal breath sounds. No wheezing or rales.   Abdominal:      General: There is no distension.      Palpations: Abdomen is soft.      Tenderness: There is no abdominal tenderness. There is no guarding.      Comments: PD catheter present   Musculoskeletal:         General: No swelling or tenderness.      Comments: Chronic skin changes   Skin:     Coloration: Skin is not jaundiced or pale.   Neurological:      General: No focal deficit present.      Mental Status: He is alert and oriented to person, place, and time.   Psychiatric:         Mood and Affect: Mood normal.         Behavior: Behavior normal.          Last Recorded Vitals  Visit Vitals  /81 (BP Location: RUE - Right upper extremity)   Pulse 87   Temp 97.9 °F (36.6 °C) (Oral)   Resp 16   Ht 6' 1\" (1.854 m)   Wt 81.1 kg (178 lb 12.7 oz)   SpO2 100%   BMI 23.59 kg/m²       Labs  Admission on 01/29/2021   Component Date Value Ref Range Status   • Sodium 01/29/2021 130* 135 - 145 mmol/L Final   • Potassium 01/29/2021 3.0* 3.4 - 5.1 mmol/L Final   • Chloride 01/29/2021 91* 98 - 107  mmol/L Final   • Carbon Dioxide 01/29/2021 24  21 - 32 mmol/L Final   • Anion Gap 01/29/2021 18  10 - 20 mmol/L Final   • Glucose 01/29/2021 137* 65 - 99 mg/dL Final   • BUN 01/29/2021 53* 6 - 20 mg/dL Final   • Creatinine 01/29/2021 10.20* 0.67 - 1.17 mg/dL Final   • Glomerular Filtration Rate 01/29/2021 4* >90 mL/min/1.73m2 Final    eGFR <15 mL/min/1.73m2 = Kidney failure or Stage 5 CKD (chronic kidney disease).   • BUN/ Creatinine Ratio 01/29/2021 5* 7 - 25 Final   • Calcium 01/29/2021 8.6  8.4 - 10.2 mg/dL Final   • Bilirubin, Total 01/29/2021 2.1* 0.2 - 1.0 mg/dL Final   • GOT/AST 01/29/2021 90* <=37 Units/L Final   • GPT/ALT 01/29/2021 61  <64 Units/L Final   • Alkaline Phosphatase 01/29/2021 937* 45 - 117 Units/L Final   • Albumin 01/29/2021 2.4* 3.6 - 5.1 g/dL Final   • Protein, Total 01/29/2021 6.2* 6.4 - 8.2 g/dL Final   • Globulin 01/29/2021 3.8  2.0 - 4.0 g/dL Final   • A/G Ratio 01/29/2021 0.6* 1.0 - 2.4 Final   • WBC 01/29/2021 9.9  4.2 - 11.0 K/mcL Final   • RBC 01/29/2021 3.67* 4.50 - 5.90 mil/mcL Final   • HGB 01/29/2021 12.1* 13.0 - 17.0 g/dL Final   • HCT 01/29/2021 36.9* 39.0 - 51.0 % Final   • MCV 01/29/2021 100.5* 78.0 - 100.0 fl Final   • MCH 01/29/2021 33.0  26.0 - 34.0 pg Final   • MCHC 01/29/2021 32.8  32.0 - 36.5 g/dL Final   • RDW-CV 01/29/2021 15.7* 11.0 - 15.0 % Final   • RDW-SD 01/29/2021 58.4* 39.0 - 50.0 fL Final   • PLT 01/29/2021 201  140 - 450 K/mcL Final   • NRBC 01/29/2021 0  <=0 /100 WBC Final   • Neutrophil, Percent 01/29/2021 80  % Final   • Lymphocytes, Percent 01/29/2021 8  % Final   • Mono, Percent 01/29/2021 8  % Final   • Eosinophils, Percent 01/29/2021 2  % Final   • Basophils, Percent 01/29/2021 1  % Final   • Immature Granulocytes 01/29/2021 1  % Final   • Absolute Neutrophils 01/29/2021 7.9* 1.8 - 7.7 K/mcL Final   • Absolute Lymphocytes 01/29/2021 0.8* 1.0 - 4.0 K/mcL Final   • Absolute Monocytes 01/29/2021 0.8  0.3 - 0.9 K/mcL Final   • Absolute Eosinophils   01/29/2021 0.2  0.0 - 0.5 K/mcL Final   • Absolute Basophils 01/29/2021 0.1  0.0 - 0.3 K/mcL Final   • Absolute Immmature Granulocytes 01/29/2021 0.1  0.0 - 0.2 K/mcL Final   • Ventricular Rate EKG/Min (BPM) 01/29/2021 84   Final   • Atrial Rate (BPM) 01/29/2021 84   Final   • MO-Interval (MSEC) 01/29/2021 256   Final   • QRS-Interval (MSEC) 01/29/2021 92   Final   • QT-Interval (MSEC) 01/29/2021 434   Final   • QTc 01/29/2021 513   Final   • P Axis (Degrees) 01/29/2021 42   Final   • R Axis (Degrees) 01/29/2021 -12   Final   • T Axis (Degrees) 01/29/2021 46   Final   • REPORT TEXT 01/29/2021    Final                    Value:Sinus rhythm  with 1st degree AV block  Nonspecific ST abnormality  Prolonged QT  Abnormal ECG  When compared with ECG of  28-FEB-2020 17:11,  QT has lengthened  Confirmed by GIDEON FLORES MD ([id number]) on 1/29/2021 5:53:48 PM     • Rapid SARS-COV-2 by PCR 01/29/2021 Not Detected  Not Detected / Detected / Presumptive Positive / Inhibitors present Final   • Isolation Guidelines 01/29/2021    Final    Do not use this test result as the sole decision-maker for discontinuation of isolation.   Clinical evaluation should be considered for other respiratory illness requiring transmission-based isolation.    •       No fever (<99.0 F/37.2 C) for at least 24 hours without the use of fever-reducing medications    AND  •       Respiratory symptoms have improved or resolved (e.g. cough, shortness of breath)     AND  •       COVID-19 negative test    See COVID-19 Deisolation Resource Guide   • Procedural Comment 01/29/2021    Final    SARS-CoV-2 nucleic acid has not been detected indicating the absence of COVID-19.       Testing was performed using the [employer] Xpert Xpress SARS-CoV-2 RT-PCR assay that has been given Emergency Use Authorization (EUA) by the [place] Food and Drug Administration (FDA).  These results are considered definitive and do not need to be confirmed by another method.           Imaging   LAST ECHO/ECHO STRESS:  No procedure found.    LAST CT:  No results found for this or any previous visit.    LAST X-RAY:  03/02/20   XR ABDOMEN 1 VIEW  Narrative  STUDY: Intraoperative fluoroscopic images.REASON FOR EXAM:   Male, 73 years old.  73-year-old male, repositioning of peritoneal dialysis catheter.TECHNIQUE:   Single intraoperative fluoroscopic images obtained.COMPARISON:   Abdominal radiograph from 02/24/2020.___________________________________FINDINGS:Nondiagnostic study and is for documentation purposes only.___________________________________  Impression  Please see performing surgeon's operative report for details.Electronically Signed by: NOLAN ARTEAGA DO Signed on: 3/2/2020 1:11 PM     02/28/20   XR CHEST PA AND LATERAL 2 VIEWS  Narrative  EXAM: XR CHEST PA AND LATERAL 2 VIEWSCLINICAL INDICATION: Chronic kidney disease.  Preoperative evaluation.COMPARISON: None.FINDINGS:  There is borderline enlargement of the cardiac silhouette.  Mediastinal contour appears within normal limits.  There is mild elevation of the right diaphragm with mild atelectasis at the right lung base.  There is linear atelectasis or scarring at the periphery of the right midlung.  No conclusive infiltrate is identified.  There is no evidence of pleural effusion or pneumothorax.  There are degenerative changes involving the thoracic spine.  Impression  1.  Mild elevation of the right diaphragm with mild atelectasis or scarring at the right lung base.2.  No conclusive infiltrate or evidence of congestive failure.Electronically Signed by: DANIEL TRAN M.D. Signed on: 2/28/2020 6:01 PM     02/24/20   XR ABDOMEN 1 VIEW  Narrative  EXAM: XR ABDOMEN 1 VIEWCOMPARISON: None.CLINICAL INDICATION: Encounter for change of removal of drainsFINDINGS: Supine AP view is submitted.  Calcifications are seen along the vas deferens bilaterally.  Vascular calcifications are noted.  No dilated air-filled bowel loops are appreciated.   Coiled catheter over the lower abdomen and pelvis.  Impression  Unremarkable bowel gas pattern.Electronically Signed by: ZANDRA DONOVAN D.O. Signed on: 2/24/2020 4:11 PM     LAST U/S:  01/13/20   US ADVOCATE PROCEDURE  Narrative  Accession #[id number]: US KIDNEY BILCLINICAL INDICATION: Acute kidney injury on chronic kidney disease.COMPARISON: None.FINDINGS: The right kidney measures 10.3 cm in length.  The left kidney measures 10.3 cm in length. Parenchymal echogenicity is borderline increased which could represent borderline medical renal disease.  Correlate clinically.  There is also perinephric infiltration/edema suggesting medical renal disease.  No hydronephrosis or stones.  No focal renal lesions.  There is lobulation of the left renal upper pole.  No focal bladder abnormalities.  Impression  1.  Findings suggesting medical renal disease.2.  No hydronephrosis or renal lesions.-----  F I N A L  -----Transcribed By: LUMA 01/13/20 10:43 amDictated By:            JAMES LOUIS MDElectronically Reviewed and Approved By:           JAMES LOUIS MD  01/13/20 10:44 am    Assessment  Orthostatic hypotension-due to hypovolemia likely due to aggressive UF with dialysis.  We will start him on gentle IV hydration.  Hold all antihypertensive medications  End-stage renal disease-on PD.  Will hold dialysis tonight.    Hypertension-hold antihypertensive medication for now  Diabetes mellitus  Anemia CKD  Secondary hyperparathyroidism  History of peripheral vascular disease  Hyperlipidemia  Hypokalemia  Plan  Potassium replacement  Cautious IV hydration normal saline at 50 mL/h  Hold all antihypertensive medication and diuretics  We will hold peritoneal dialysis tonight  Plan to restart peritoneal dialysis tomorrow with 1.5% dextrose solution  Repeat renal panel in a.m.    Discussed in detail with the patient  Discussed with nursing staff  Thank you for the consult      David Khan MD  1/29/2021      No

## 2021-10-25 NOTE — H&P ADULT - NSRESEARCHGRNTASSESS_GEN_ALL_CORE FT
IMPROVE-DD Assessment completed: Sep 17 2021  6:03PM IMPROVE-DD Assessment completed: Oct 26 2021  7:55AM

## 2021-10-25 NOTE — DISCHARGE NOTE PROVIDER - CARE PROVIDERS DIRECT ADDRESSES
[email or web address]

## 2021-10-25 NOTE — H&P ADULT - NSHPLABSRESULTS_GEN_ALL_CORE
LABS:    10/20 CBC: WBC 4.39, ANC 2.99, HB 12.3, HCT 35.5,   COVID PCR(-) LABS:    10-25    138  |  104  |  14  ----------------------------<  85  4.1   |  23  |  0.71    Ca    9.3      25 Oct 2021 11:31    TPro  6.2  /  Alb  4.2  /  TBili  0.2  /  DBili  x   /  AST  31  /  ALT  105<H>  /  AlkPhos  89  10-25          10/20 CBC: WBC 4.39, ANC 2.99, HB 12.3, HCT 35.5,   COVID PCR(-)

## 2021-10-25 NOTE — H&P ADULT - HISTORY OF PRESENT ILLNESS
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 Now admitted for cycle 2 Consolidation with HIDAC    Patient initially presented to the hospital on 7/30/21 with complaints of dizziness, fatigue and severe RUQ pain for 3 days. CT A/P revealed fatty liver and small R pleural effusion. WBC 12k with 17% blasts.Peripheral flow cytometry showed 13% myeloblasts. FLT3(-). BMbx was done on 8/4/21 - confirmed AML. 46,XY {20 } Foundation: mutations in DNMT3A R882H, NRAS G12D, NPM1 W288fs*12  Pt consented to [place]. Patient was offered sperm banking, but declined.  On 8/6/21,patient started induction with Dauno/Cytararbine . Day 14 BMbx on 8/19 was hypocellular with chemotherapeutic effect; however, per hematopathology, appears to be earlier regeneration than would typically seen which is concerning for persistent disease at this point, and the earliest cells are CD34 positive and his myeloblasts on initial presentation were CD34 negative. This may have represented early regeneration of his marrow.    Patients induction course complicated by a course of Zosyn for presumed RUL PNA, then transitioned to levaquin, posaconazole and Acyclovir for ppx for neutropenia. Course also complicated by  neutropenic fevers, with no identified source. He was on Cefepime but developed a rash, changed to meropenem. Rash improved. A BM bx on 8/19 showed CR. Pt received cycle 1 consolidation on 9/17 and pt admitted with neutropenic fever on 10/9.      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 Now admitted for cycle 2 Consolidation with HIDAC    Patient initially presented to the hospital on 7/30/21 with complaints of dizziness, fatigue and severe RUQ pain for 3 days. CT A/P revealed fatty liver and small R pleural effusion. WBC 12k with 17% blasts.Peripheral flow cytometry showed 13% myeloblasts. FLT3(-). BMbx was done on 8/4/21 - confirmed AML. 46,XY {20 } Foundation: mutations in DNMT3A R882H, NRAS G12D, NPM1 W288fs*12  Pt consented to [place]. Patient was offered sperm banking, but declined.  On 8/6/21,patient started induction with Dauno/Cytararbine . Day 14 BMbx on 8/19 was hypocellular with chemotherapeutic effect; however, per hematopathology, appears to be earlier regeneration than would typically seen which is concerning for persistent disease at this point, and the earliest cells are CD34 positive and his myeloblasts on initial presentation were CD34 negative. This may have represented early regeneration of his marrow.    Patients induction course complicated by a course of Zosyn for presumed RUL PNA, then transitioned to levaquin, posaconazole and Acyclovir for ppx for neutropenia. Course also complicated by  neutropenic fevers, with no identified source. He was on Cefepime but developed a rash, changed to meropenem. Rash improved. A BM bx on 8/19 showed CR. Pt received cycle 1 consolidation on 9/17. Pt was admitted with neutropenic fever on 10/9-12 and  admitted with diarrhea 10/15-17.     Pt denies any complaint today

## 2021-10-25 NOTE — DISCHARGE NOTE PROVIDER - PROVIDER TOKENS
PROVIDER:[TOKEN:[[id number]]] PROVIDER:[TOKEN:[[id number]]],PROVIDER:[TOKEN:[[id number]]]

## 2021-10-25 NOTE — DISCHARGE NOTE PROVIDER - HOSPITAL COURSE
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 Now admitted for cycle 2 Consolidation with HIDAC  Pt was monitored for cerebellar toxicity and pred forte eye drops were ordered to prevent chemical conjunctivitis .    Patient received IVF and antiemetics, strict I/O  and monitoring of CBC and electrolytes. Tolerated chemotherapy without complications. Stable for discharge home and follow up as an outpatient.   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 Now admitted for cycle 2 Consolidation with HIDAC. PAtient received IV hydration, strict I/O, antiemetics, monitoring of CBC and CMP and for cerebellar toxicity. PRedforte eye drops given to prevent chemical conjunctivitis. Patient with fever throughout course of treatment. Cultures negative. Due to fever probably related to HIDAC, patient received dexamethasone prior to the last 2 doses of cytatabine.

## 2021-10-25 NOTE — DISCHARGE NOTE PROVIDER - NSDCFUADDAPPT_GEN_ALL_CORE_FT
To [facility] on   To [facility] on Monday 11/1 at 11 am to see PARAM Pedersen and at 2:40pm for possible Platelet transfusion  To [facility] on Monday 11/1 at 11 am to see PARAM Pedersen and at 2:40pm for possible Platelet transfusion  To [facility] on Monday 11/1 at 11 am to see PARAM Pedersen and at 2:40pm for possible Platelet transfusion     To [facility] on Thursday 11/4 at 3:10Pm for blood work and possible Platelet transfusion

## 2021-10-25 NOTE — H&P ADULT - PROBLEM SELECTOR PLAN 2
Pt is not neutropenic, afebrile  Continue Acyclovir for PPX   If fever,  pan culture Pt is not neutropenic, afebrile  Continue Acyclovir for PPX   If fever,  pan culture  HIDAC can cause fever

## 2021-10-26 LAB
ALBUMIN SERPL ELPH-MCNC: 3.7 G/DL — SIGNIFICANT CHANGE UP (ref 3.3–5)
ALP SERPL-CCNC: 93 U/L — SIGNIFICANT CHANGE UP (ref 40–120)
ALT FLD-CCNC: 89 U/L — HIGH (ref 10–45)
ANION GAP SERPL CALC-SCNC: 13 MMOL/L — SIGNIFICANT CHANGE UP (ref 5–17)
APPEARANCE UR: CLEAR — SIGNIFICANT CHANGE UP
AST SERPL-CCNC: 30 U/L — SIGNIFICANT CHANGE UP (ref 10–40)
BACTERIA # UR AUTO: NEGATIVE — SIGNIFICANT CHANGE UP
BASOPHILS # BLD AUTO: 0.01 K/UL — SIGNIFICANT CHANGE UP (ref 0–0.2)
BASOPHILS NFR BLD AUTO: 0.1 % — SIGNIFICANT CHANGE UP (ref 0–2)
BILIRUB SERPL-MCNC: 0.3 MG/DL — SIGNIFICANT CHANGE UP (ref 0.2–1.2)
BILIRUB UR-MCNC: NEGATIVE — SIGNIFICANT CHANGE UP
BUN SERPL-MCNC: 18 MG/DL — SIGNIFICANT CHANGE UP (ref 7–23)
CALCIUM SERPL-MCNC: 9 MG/DL — SIGNIFICANT CHANGE UP (ref 8.4–10.5)
CHLORIDE SERPL-SCNC: 104 MMOL/L — SIGNIFICANT CHANGE UP (ref 96–108)
CO2 SERPL-SCNC: 22 MMOL/L — SIGNIFICANT CHANGE UP (ref 22–31)
COLOR SPEC: COLORLESS — SIGNIFICANT CHANGE UP
COVID-19 SPIKE DOMAIN AB INTERP: POSITIVE
COVID-19 SPIKE DOMAIN ANTIBODY RESULT: >250 U/ML — HIGH
CREAT SERPL-MCNC: 0.75 MG/DL — SIGNIFICANT CHANGE UP (ref 0.5–1.3)
DIFF PNL FLD: NEGATIVE — SIGNIFICANT CHANGE UP
EOSINOPHIL # BLD AUTO: 0.02 K/UL — SIGNIFICANT CHANGE UP (ref 0–0.5)
EOSINOPHIL NFR BLD AUTO: 0.3 % — SIGNIFICANT CHANGE UP (ref 0–6)
EPI CELLS # UR: 0 /HPF — SIGNIFICANT CHANGE UP
GLUCOSE SERPL-MCNC: 100 MG/DL — HIGH (ref 70–99)
GLUCOSE UR QL: NEGATIVE — SIGNIFICANT CHANGE UP
HCT VFR BLD CALC: 30.3 % — LOW (ref 39–50)
HGB BLD-MCNC: 10.2 G/DL — LOW (ref 13–17)
IMM GRANULOCYTES NFR BLD AUTO: 0.3 % — SIGNIFICANT CHANGE UP (ref 0–1.5)
KETONES UR-MCNC: NEGATIVE — SIGNIFICANT CHANGE UP
LEUKOCYTE ESTERASE UR-ACNC: NEGATIVE — SIGNIFICANT CHANGE UP
LYMPHOCYTES # BLD AUTO: 0.21 K/UL — LOW (ref 1–3.3)
LYMPHOCYTES # BLD AUTO: 2.9 % — LOW (ref 13–44)
MAGNESIUM SERPL-MCNC: 2 MG/DL — SIGNIFICANT CHANGE UP (ref 1.6–2.6)
MCHC RBC-ENTMCNC: 31.4 PG — SIGNIFICANT CHANGE UP (ref 27–34)
MCHC RBC-ENTMCNC: 33.7 GM/DL — SIGNIFICANT CHANGE UP (ref 32–36)
MCV RBC AUTO: 93.2 FL — SIGNIFICANT CHANGE UP (ref 80–100)
MONOCYTES # BLD AUTO: 0.55 K/UL — SIGNIFICANT CHANGE UP (ref 0–0.9)
MONOCYTES NFR BLD AUTO: 7.6 % — SIGNIFICANT CHANGE UP (ref 2–14)
NEUTROPHILS # BLD AUTO: 6.44 K/UL — SIGNIFICANT CHANGE UP (ref 1.8–7.4)
NEUTROPHILS NFR BLD AUTO: 88.8 % — HIGH (ref 43–77)
NITRITE UR-MCNC: NEGATIVE — SIGNIFICANT CHANGE UP
NRBC # BLD: 0 /100 WBCS — SIGNIFICANT CHANGE UP (ref 0–0)
PH UR: 6 — SIGNIFICANT CHANGE UP (ref 5–8)
PHOSPHATE SERPL-MCNC: 3.1 MG/DL — SIGNIFICANT CHANGE UP (ref 2.5–4.5)
PLATELET # BLD AUTO: 144 K/UL — LOW (ref 150–400)
POTASSIUM SERPL-MCNC: 4 MMOL/L — SIGNIFICANT CHANGE UP (ref 3.5–5.3)
POTASSIUM SERPL-SCNC: 4 MMOL/L — SIGNIFICANT CHANGE UP (ref 3.5–5.3)
PROT SERPL-MCNC: 6 G/DL — SIGNIFICANT CHANGE UP (ref 6–8.3)
PROT UR-MCNC: NEGATIVE — SIGNIFICANT CHANGE UP
RBC # BLD: 3.25 M/UL — LOW (ref 4.2–5.8)
RBC # FLD: 18.9 % — HIGH (ref 10.3–14.5)
RBC CASTS # UR COMP ASSIST: 0 /HPF — SIGNIFICANT CHANGE UP (ref 0–4)
SARS-COV-2 IGG+IGM SERPL QL IA: >250 U/ML — HIGH
SARS-COV-2 IGG+IGM SERPL QL IA: POSITIVE
SODIUM SERPL-SCNC: 139 MMOL/L — SIGNIFICANT CHANGE UP (ref 135–145)
SP GR SPEC: 1.01 — SIGNIFICANT CHANGE UP (ref 1.01–1.02)
UROBILINOGEN FLD QL: NEGATIVE — SIGNIFICANT CHANGE UP
WBC # BLD: 7.25 K/UL — SIGNIFICANT CHANGE UP (ref 3.8–10.5)
WBC # FLD AUTO: 7.25 K/UL — SIGNIFICANT CHANGE UP (ref 3.8–10.5)
WBC UR QL: 0 /HPF — SIGNIFICANT CHANGE UP (ref 0–5)

## 2021-10-26 PROCEDURE — 99232 SBSQ HOSP IP/OBS MODERATE 35: CPT

## 2021-10-26 RX ORDER — ACETAMINOPHEN 500 MG
1000 TABLET ORAL ONCE
Refills: 0 | Status: COMPLETED | OUTPATIENT
Start: 2021-10-26 | End: 2021-10-26

## 2021-10-26 RX ORDER — ENOXAPARIN SODIUM 100 MG/ML
40 INJECTION SUBCUTANEOUS AT BEDTIME
Refills: 0 | Status: DISCONTINUED | OUTPATIENT
Start: 2021-10-26 | End: 2021-10-31

## 2021-10-26 RX ORDER — OXYCODONE HYDROCHLORIDE 5 MG/1
5 TABLET ORAL ONCE
Refills: 0 | Status: DISCONTINUED | OUTPATIENT
Start: 2021-10-26 | End: 2021-10-26

## 2021-10-26 RX ORDER — PREDNISOLONE SODIUM PHOSPHATE 1 %
2 DROPS OPHTHALMIC (EYE)
Qty: 0 | Refills: 0 | DISCHARGE
Start: 2021-10-26

## 2021-10-26 RX ORDER — OXYCODONE AND ACETAMINOPHEN 5; 325 MG/1; MG/1
1 TABLET ORAL EVERY 4 HOURS
Refills: 0 | Status: DISCONTINUED | OUTPATIENT
Start: 2021-10-26 | End: 2021-10-28

## 2021-10-26 RX ADMIN — Medication 187.67 MILLIGRAM(S): at 04:45

## 2021-10-26 RX ADMIN — Medication 1000 MILLIGRAM(S): at 18:20

## 2021-10-26 RX ADMIN — ONDANSETRON 8 MILLIGRAM(S): 8 TABLET, FILM COATED ORAL at 13:55

## 2021-10-26 RX ADMIN — Medication 2 DROP(S): at 17:37

## 2021-10-26 RX ADMIN — AMLODIPINE BESYLATE 5 MILLIGRAM(S): 2.5 TABLET ORAL at 05:36

## 2021-10-26 RX ADMIN — Medication 2 DROP(S): at 23:35

## 2021-10-26 RX ADMIN — Medication 2 DROP(S): at 05:36

## 2021-10-26 RX ADMIN — Medication 15 MILLILITER(S): at 21:37

## 2021-10-26 RX ADMIN — CHLORHEXIDINE GLUCONATE 1 APPLICATION(S): 213 SOLUTION TOPICAL at 05:46

## 2021-10-26 RX ADMIN — Medication 650 MILLIGRAM(S): at 06:20

## 2021-10-26 RX ADMIN — ONDANSETRON 8 MILLIGRAM(S): 8 TABLET, FILM COATED ORAL at 21:37

## 2021-10-26 RX ADMIN — ONDANSETRON 8 MILLIGRAM(S): 8 TABLET, FILM COATED ORAL at 05:35

## 2021-10-26 RX ADMIN — Medication 15 MILLILITER(S): at 05:36

## 2021-10-26 RX ADMIN — OXYCODONE HYDROCHLORIDE 5 MILLIGRAM(S): 5 TABLET ORAL at 11:20

## 2021-10-26 RX ADMIN — OXYCODONE AND ACETAMINOPHEN 1 TABLET(S): 5; 325 TABLET ORAL at 21:00

## 2021-10-26 RX ADMIN — OXYCODONE HYDROCHLORIDE 5 MILLIGRAM(S): 5 TABLET ORAL at 12:15

## 2021-10-26 RX ADMIN — Medication 400 MILLIGRAM(S): at 17:24

## 2021-10-26 RX ADMIN — OXYCODONE AND ACETAMINOPHEN 1 TABLET(S): 5; 325 TABLET ORAL at 20:22

## 2021-10-26 RX ADMIN — Medication 650 MILLIGRAM(S): at 05:46

## 2021-10-26 RX ADMIN — Medication 15 MILLILITER(S): at 11:20

## 2021-10-26 RX ADMIN — Medication 15 MILLILITER(S): at 17:34

## 2021-10-26 RX ADMIN — Medication 2 DROP(S): at 11:21

## 2021-10-26 NOTE — PROGRESS NOTE ADULT - SUBJECTIVE AND OBJECTIVE BOX
Diagnosis: AML    Protocol/Chemo Regimen: Cycle2 HIDAC  Day: 2     Pt endorsed: no complaint     Review of Systems: Patient denied nausea, vomiting, odynophagia, chest pain, cough, dyspnea, abdominal pain, constipation, diarrhea, rash, fatigue, headache      Pain scale:     denies                                   Location: NA    Diet: regular    Allergies:  No Known Allergies    Intolerances    cefepime (Rash)      STANDING MEDICATIONS  amLODIPine   Tablet 5 milliGRAM(s) Oral daily  Biotene Dry Mouth Oral Rinse 15 milliLiter(s) Swish and Spit four times a day  chlorhexidine 4% Liquid 1 Application(s) Topical <User Schedule>  influenza   Vaccine 0.5 milliLiter(s) IntraMuscular once  ondansetron Injectable 8 milliGRAM(s) IV Push every 8 hours  prednisoLONE acetate 1% Suspension 2 Drop(s) Both EYES every 6 hours  sodium chloride 0.9%. 1000 milliLiter(s) IV Continuous <Continuous>      PRN MEDICATIONS  acetaminophen     Tablet .. 650 milliGRAM(s) Oral every 6 hours PRN  metoclopramide Injectable 10 milliGRAM(s) IV Push every 6 hours PRN  sodium chloride 0.9% lock flush 10 milliLiter(s) IV Push every 1 hour PRN      Vital Signs Last 24 Hrs  T(C): 36.8 (26 Oct 2021 05:14), Max: 36.9 (25 Oct 2021 17:06)  T(F): 98.3 (26 Oct 2021 05:14), Max: 98.4 (25 Oct 2021 17:06)  HR: 81 (26 Oct 2021 05:14) (74 - 93)  BP: 121/84 (26 Oct 2021 05:14) (99/60 - 125/77)  BP(mean): --  RR: 16 (26 Oct 2021 05:14) (16 - 18)  SpO2: 98% (26 Oct 2021 05:14) (98% - 98%)      PHYSICAL EXAM  General: adult in NAD  HEENT: clear oropharynx, anicteric sclera  Neck: supple  CV: normal S1/S2 RRR  Lungs: positive air movement b/l ant lungs,clear to auscultation, no wheezes, no rales  Abdomen: soft, + BS,  non-tender non-distended  Ext: no edema  Skin: no rash  Neuro: alert and oriented X 3, no focal deficits  Central Line: PICC CDI    LABS:       10-25    138  |  104  |  14  ----------------------------<  85  4.1   |  23  |  0.71    Ca    9.3      25 Oct 2021 11:31    TPro  6.2  /  Alb  4.2  /  TBili  0.2  /  DBili  x   /  AST  31  /  ALT  105<H>  /  AlkPhos  89  10-25        RADIOLOGY & ADDITIONAL STUDIES:    < from: Xray Chest 1 View- PORTABLE-Urgent (Xray Chest 1 View- PORTABLE-Urgent .) (10.25.21 @ 09:18) >  IMPRESSION:  The heart is normalin size. The Lungs are clear. No pleural effusion. No pneumothorax. A PICC line is seen on the right and the tip is in superior vena cava.   Diagnosis: AML    Protocol/Chemo Regimen: Cycle2 HIDAC  Day: 2     Pt endorsed: severe leg spasmatic pain, not sure it is bone or muscle    Review of Systems: Patient denied nausea, vomiting, odynophagia, chest pain, cough, dyspnea, abdominal pain, constipation, diarrhea, rash, fatigue, headache      Pain scale:  8/10                                  Location:  legs     Diet: regular    Allergies:  No Known Allergies    Intolerances:     cefepime (Rash)      STANDING MEDICATIONS  amLODIPine   Tablet 5 milliGRAM(s) Oral daily  Biotene Dry Mouth Oral Rinse 15 milliLiter(s) Swish and Spit four times a day  chlorhexidine 4% Liquid 1 Application(s) Topical <User Schedule>  influenza   Vaccine 0.5 milliLiter(s) IntraMuscular once  ondansetron Injectable 8 milliGRAM(s) IV Push every 8 hours  prednisoLONE acetate 1% Suspension 2 Drop(s) Both EYES every 6 hours  sodium chloride 0.9%. 1000 milliLiter(s) IV Continuous <Continuous>      PRN MEDICATIONS  acetaminophen     Tablet .. 650 milliGRAM(s) Oral every 6 hours PRN  metoclopramide Injectable 10 milliGRAM(s) IV Push every 6 hours PRN  sodium chloride 0.9% lock flush 10 milliLiter(s) IV Push every 1 hour PRN      Vital Signs Last 24 Hrs  T(C): 36.8 (26 Oct 2021 05:14), Max: 36.9 (25 Oct 2021 17:06)  T(F): 98.3 (26 Oct 2021 05:14), Max: 98.4 (25 Oct 2021 17:06)  HR: 81 (26 Oct 2021 05:14) (74 - 93)  BP: 121/84 (26 Oct 2021 05:14) (99/60 - 125/77)  BP(mean): --  RR: 16 (26 Oct 2021 05:14) (16 - 18)  SpO2: 98% (26 Oct 2021 05:14) (98% - 98%)      PHYSICAL EXAM  General: adult in NAD  HEENT: EOMI, clear oropharynx, anicteric sclera  Neck: supple  CV: normal S1/S2 RRR  Lungs: positive air movement b/l ant lungs,clear to auscultation, no wheezes, no rales  Abdomen: soft, + BS,  non-tender non-distended  Ext: no edema  Skin: no rash  Neuro: alert and oriented X 3, no focal deficits, nose to finger point test WNL   Central Line: PICC CDI      LABS:                          10.2   7.25  )-----------( 144      ( 26 Oct 2021 07:57 )             30.3         Mean Cell Volume : 93.2 fl  Mean Cell Hemoglobin : 31.4 pg  Mean Cell Hemoglobin Concentration : 33.7 gm/dL  Auto Neutrophil # : 6.44 K/uL  Auto Lymphocyte # : 0.21 K/uL  Auto Monocyte # : 0.55 K/uL  Auto Eosinophil # : 0.02 K/uL  Auto Basophil # : 0.01 K/uL  Auto Neutrophil % : 88.8 %  Auto Lymphocyte % : 2.9 %  Auto Monocyte % : 7.6 %  Auto Eosinophil % : 0.3 %  Auto Basophil % : 0.1 %      10-26    139  |  104  |  18  ----------------------------<  100<H>  4.0   |  22  |  0.75    Ca    9.0      26 Oct 2021 07:57  Phos  3.1     10-26  Mg     2.0     10-26    TPro  6.0  /  Alb  3.7  /  TBili  0.3  /  DBili  x   /  AST  30  /  ALT  89<H>  /  AlkPhos  93  10-26        RADIOLOGY & ADDITIONAL STUDIES:    < from: Xray Chest 1 View- PORTABLE-Urgent (Xray Chest 1 View- PORTABLE-Urgent .) (10.25.21 @ 09:18) >  IMPRESSION:  The heart is normal in size. The Lungs are clear. No pleural effusion. No pneumothorax. A PICC line is seen on the right and the tip is in superior vena cava.

## 2021-10-26 NOTE — CONSULT NOTE ADULT - ASSESSMENT
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 Now admitted for cycle 2 Consolidation with HIDAC      # AML   care as per onc team  chemo cycle as per orders  IV Hydration   patient states that he gets LE muscle cramping after each session, monitor fo electrolytes abnormalities  fall precautions     # Fever   T max 103  Pan cx/ RVP   broad spectrum antibiotics   IV hydration     # HTN  resume home BP meds   monitor vitals     DVT and gI PPX

## 2021-10-26 NOTE — PROGRESS NOTE ADULT - ATTENDING COMMENTS
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

## 2021-10-26 NOTE — PROGRESS NOTE ADULT - PROBLEM SELECTOR PLAN 1
Admitted for Cycle 2 HiDAC  Cytarabine 3 g/m2 IV over 3 hrs every 12 hrs on days 1,3,5   IV hydration, Antiemetics, daily weight   Monitor for Cerebellar toxicity, nystagmus checks  Follow CBC/CMP, transfuse/replete prn  Continue predforte eye drops to prevent chemical conjunctivitis  Discharge planning on Saturday  Monitor ALT transaminitis Admitted for Cycle 2 HiDAC  Cytarabine 3 g/m2 IV over 3 hrs every 12 hrs on days 1,3,5   IV hydration, Antiemetics, daily weight   Monitor for Cerebellar toxicity, nystagmus checks  Follow CBC/CMP, transfuse/replete prn  Continue predforte eye drops to prevent chemical conjunctivitis  Discharge planning on Saturday  Monitor ALT transaminitis  Monitor leg pain, oxycodone or Tylenol PRN

## 2021-10-26 NOTE — PROGRESS NOTE ADULT - ASSESSMENT
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 Now admitted for cycle 2 Consolidation with HIDAC

## 2021-10-26 NOTE — CONSULT NOTE ADULT - SUBJECTIVE AND OBJECTIVE BOX
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 Now admitted for cycle 2 Consolidation with HIDAC    REVIEW OF SYSTEMS:    CONSTITUTIONAL: + weakness  EYES/ENT: No visual changes;  No vertigo or throat pain   NECK: No pain or stiffness  RESPIRATORY: No cough, wheezing, hemoptysis; No shortness of breath  CARDIOVASCULAR: No chest pain or palpitations  GASTROINTESTINAL: No abdominal or epigastric pain.   GENITOURINARY: No dysuria, frequency or hematuria  NEUROLOGICAL: fatigued   SKIN: No itching, burning, rashes, or lesions   All other review of systems is negative unless indicated above.    PAST MEDICAL & SURGICAL HISTORY:  Hypertension  diagnosed 1 year ago    Kidney stone  5 years ago    History of genital warts    MEDICATIONS  (STANDING):  amLODIPine   Tablet 5 milliGRAM(s) Oral daily  Biotene Dry Mouth Oral Rinse 15 milliLiter(s) Swish and Spit four times a day  chlorhexidine 4% Liquid 1 Application(s) Topical <User Schedule>  enoxaparin Injectable 40 milliGRAM(s) SubCutaneous at bedtime  influenza   Vaccine 0.5 milliLiter(s) IntraMuscular once  ondansetron Injectable 8 milliGRAM(s) IV Push every 8 hours  prednisoLONE acetate 1% Suspension 2 Drop(s) Both EYES every 6 hours  sodium chloride 0.9%. 1000 milliLiter(s) (75 mL/Hr) IV Continuous <Continuous>    MEDICATIONS  (PRN):  acetaminophen     Tablet .. 650 milliGRAM(s) Oral every 6 hours PRN Temp greater or equal to 38C (100.4F), Mild Pain (1 - 3)  metoclopramide Injectable 10 milliGRAM(s) IV Push every 6 hours PRN Nausea/Vomiting  oxycodone    5 mG/acetaminophen 325 mG 1 Tablet(s) Oral every 4 hours PRN Moderate Pain (4 - 6)  sodium chloride 0.9% lock flush 10 milliLiter(s) IV Push every 1 hour PRN Pre/post blood products, medications, blood draw, and to maintain line patency      Home Medications:  Diflucan 200 mg oral tablet: 1 tab(s) orally once a day (26 Oct 2021 16:43)  Levaquin 500 mg oral tablet: 1 tab(s) orally every 24 hours (26 Oct 2021 16:43)  Norvasc 5 mg oral tablet: 1 tab(s) orally once a day hold if SBP&lt;110 (25 Oct 2021 10:07)  prednisoLONE acetate 1% ophthalmic suspension: 2 drop(s) to each affected eye every 6 hours for 2 days and then stop. (26 Oct 2021 07:58)  Reglan 10 mg oral tablet: orally every 6 hours, As Needed (26 Oct 2021 16:43)  Zofran 8 mg oral tablet: 1 tab(s) orally every 8 hours, As Needed (26 Oct 2021 16:43)      Allergies    No Known Allergies    Intolerances    cefepime (Rash)    Vital Signs Last 24 Hrs  T(C): 38.1 (26 Oct 2021 21:00), Max: 39.9 (26 Oct 2021 17:07)  T(F): 100.5 (26 Oct 2021 21:00), Max: 103.9 (26 Oct 2021 17:07)  HR: 113 (26 Oct 2021 21:00) (81 - 146)  BP: 109/70 (26 Oct 2021 21:00) (99/60 - 141/66)  BP(mean): --  RR: 20 (26 Oct 2021 21:00) (16 - 20)  SpO2: 94% (26 Oct 2021 21:00) (94% - 98%)    Appearance: Normal	  HEENT:   Normal oral mucosa, PERRL, EOMI	  Lymphatic: No lymphadenopathy , no edema  Cardiovascular: Normal S1 S2, No JVD, No murmurs   Respiratory: Lungs clear to auscultation, normal effort 	  Gastrointestinal:  Soft, Non-tender, + BS	  Skin: No rashes, No ecchymoses, No cyanosis, warm to touch  Musculoskeletal: Normal range of motion, normal strength  Psychiatry:  Mood & affect appropriate  Ext: No edema                          10.2   7.25  )-----------( 144      ( 26 Oct 2021 07:57 )             30.3               10    139  |  104  |  18  ----------------------------<  100<H>  4.0   |  22  |  0.75    Ca    9.0      26 Oct 2021 07:57  Phos  3.1     10-26  Mg     2.0     10-26    TPro  6.0  /  Alb  3.7  /  TBili  0.3  /  DBili  x   /  AST  30  /  ALT  89<H>  /  AlkPhos  93  10-26                       Urinalysis Basic - ( 26 Oct 2021 18:35 )    Color: Colorless / Appearance: Clear / S.010 / pH: x  Gluc: x / Ketone: Negative  / Bili: Negative / Urobili: Negative   Blood: x / Protein: Negative / Nitrite: Negative   Leuk Esterase: Negative / RBC: 0 /hpf / WBC 0 /HPF   Sq Epi: x / Non Sq Epi: 0 /hpf / Bacteria: Negative

## 2021-10-27 LAB
ALBUMIN SERPL ELPH-MCNC: 4 G/DL — SIGNIFICANT CHANGE UP (ref 3.3–5)
ALP SERPL-CCNC: 82 U/L — SIGNIFICANT CHANGE UP (ref 40–120)
ALT FLD-CCNC: 68 U/L — HIGH (ref 10–45)
ANION GAP SERPL CALC-SCNC: 12 MMOL/L — SIGNIFICANT CHANGE UP (ref 5–17)
AST SERPL-CCNC: 17 U/L — SIGNIFICANT CHANGE UP (ref 10–40)
BASOPHILS # BLD AUTO: 0 K/UL — SIGNIFICANT CHANGE UP (ref 0–0.2)
BASOPHILS NFR BLD AUTO: 0 % — SIGNIFICANT CHANGE UP (ref 0–2)
BILIRUB SERPL-MCNC: 0.5 MG/DL — SIGNIFICANT CHANGE UP (ref 0.2–1.2)
BUN SERPL-MCNC: 8 MG/DL — SIGNIFICANT CHANGE UP (ref 7–23)
CALCIUM SERPL-MCNC: 9.2 MG/DL — SIGNIFICANT CHANGE UP (ref 8.4–10.5)
CHLORIDE SERPL-SCNC: 103 MMOL/L — SIGNIFICANT CHANGE UP (ref 96–108)
CO2 SERPL-SCNC: 24 MMOL/L — SIGNIFICANT CHANGE UP (ref 22–31)
CREAT SERPL-MCNC: 0.86 MG/DL — SIGNIFICANT CHANGE UP (ref 0.5–1.3)
EOSINOPHIL # BLD AUTO: 0 K/UL — SIGNIFICANT CHANGE UP (ref 0–0.5)
EOSINOPHIL NFR BLD AUTO: 0 % — SIGNIFICANT CHANGE UP (ref 0–6)
GLUCOSE SERPL-MCNC: 97 MG/DL — SIGNIFICANT CHANGE UP (ref 70–99)
HCT VFR BLD CALC: 28.1 % — LOW (ref 39–50)
HGB BLD-MCNC: 9.7 G/DL — LOW (ref 13–17)
IMM GRANULOCYTES NFR BLD AUTO: 0.2 % — SIGNIFICANT CHANGE UP (ref 0–1.5)
LYMPHOCYTES # BLD AUTO: 0.2 K/UL — LOW (ref 1–3.3)
LYMPHOCYTES # BLD AUTO: 4.8 % — LOW (ref 13–44)
MAGNESIUM SERPL-MCNC: 2.2 MG/DL — SIGNIFICANT CHANGE UP (ref 1.6–2.6)
MCHC RBC-ENTMCNC: 31.2 PG — SIGNIFICANT CHANGE UP (ref 27–34)
MCHC RBC-ENTMCNC: 34.5 GM/DL — SIGNIFICANT CHANGE UP (ref 32–36)
MCV RBC AUTO: 90.4 FL — SIGNIFICANT CHANGE UP (ref 80–100)
MONOCYTES # BLD AUTO: 0.19 K/UL — SIGNIFICANT CHANGE UP (ref 0–0.9)
MONOCYTES NFR BLD AUTO: 4.6 % — SIGNIFICANT CHANGE UP (ref 2–14)
NEUTROPHILS # BLD AUTO: 3.76 K/UL — SIGNIFICANT CHANGE UP (ref 1.8–7.4)
NEUTROPHILS NFR BLD AUTO: 90.4 % — HIGH (ref 43–77)
NRBC # BLD: 0 /100 WBCS — SIGNIFICANT CHANGE UP (ref 0–0)
PHOSPHATE SERPL-MCNC: 4.2 MG/DL — SIGNIFICANT CHANGE UP (ref 2.5–4.5)
PLATELET # BLD AUTO: 129 K/UL — LOW (ref 150–400)
POTASSIUM SERPL-MCNC: 3.8 MMOL/L — SIGNIFICANT CHANGE UP (ref 3.5–5.3)
POTASSIUM SERPL-SCNC: 3.8 MMOL/L — SIGNIFICANT CHANGE UP (ref 3.5–5.3)
PROT SERPL-MCNC: 6.2 G/DL — SIGNIFICANT CHANGE UP (ref 6–8.3)
RBC # BLD: 3.11 M/UL — LOW (ref 4.2–5.8)
RBC # FLD: 18.5 % — HIGH (ref 10.3–14.5)
SODIUM SERPL-SCNC: 139 MMOL/L — SIGNIFICANT CHANGE UP (ref 135–145)
WBC # BLD: 4.16 K/UL — SIGNIFICANT CHANGE UP (ref 3.8–10.5)
WBC # FLD AUTO: 4.16 K/UL — SIGNIFICANT CHANGE UP (ref 3.8–10.5)

## 2021-10-27 PROCEDURE — 99233 SBSQ HOSP IP/OBS HIGH 50: CPT

## 2021-10-27 RX ORDER — ACETAMINOPHEN 500 MG
1000 TABLET ORAL ONCE
Refills: 0 | Status: COMPLETED | OUTPATIENT
Start: 2021-10-27 | End: 2021-10-27

## 2021-10-27 RX ORDER — CYTARABINE 100 MG
6300 VIAL (EA) INJECTION EVERY 12 HOURS
Refills: 0 | Status: COMPLETED | OUTPATIENT
Start: 2021-10-27 | End: 2021-10-28

## 2021-10-27 RX ADMIN — Medication 15 MILLILITER(S): at 12:21

## 2021-10-27 RX ADMIN — CHLORHEXIDINE GLUCONATE 1 APPLICATION(S): 213 SOLUTION TOPICAL at 06:36

## 2021-10-27 RX ADMIN — Medication 2 DROP(S): at 22:33

## 2021-10-27 RX ADMIN — Medication 2 DROP(S): at 17:18

## 2021-10-27 RX ADMIN — Medication 2 DROP(S): at 12:21

## 2021-10-27 RX ADMIN — ONDANSETRON 8 MILLIGRAM(S): 8 TABLET, FILM COATED ORAL at 06:37

## 2021-10-27 RX ADMIN — Medication 1000 MILLIGRAM(S): at 03:03

## 2021-10-27 RX ADMIN — Medication 187.67 MILLIGRAM(S): at 16:22

## 2021-10-27 RX ADMIN — Medication 15 MILLILITER(S): at 06:38

## 2021-10-27 RX ADMIN — ONDANSETRON 8 MILLIGRAM(S): 8 TABLET, FILM COATED ORAL at 22:33

## 2021-10-27 RX ADMIN — ONDANSETRON 8 MILLIGRAM(S): 8 TABLET, FILM COATED ORAL at 13:26

## 2021-10-27 RX ADMIN — Medication 2 DROP(S): at 06:41

## 2021-10-27 RX ADMIN — AMLODIPINE BESYLATE 5 MILLIGRAM(S): 2.5 TABLET ORAL at 06:37

## 2021-10-27 RX ADMIN — Medication 400 MILLIGRAM(S): at 02:11

## 2021-10-27 RX ADMIN — Medication 15 MILLILITER(S): at 17:18

## 2021-10-27 RX ADMIN — Medication 15 MILLILITER(S): at 22:32

## 2021-10-27 NOTE — PROGRESS NOTE ADULT - ASSESSMENT
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 Now admitted for cycle 2 Consolidation with HIDAC      # AML   care as per onc team  chemo cycle as per orders  IV Hydration   patient states that he gets LE muscle cramping after each session, monitor fo electrolytes abnormalities  fall precautions     # Fever , continuous   T max 103  Pan cx/ RVP   broad spectrum antibiotics PRN if positive W/U  IV hydration   its likely due to active chemo  monitor CBC     # HTN  resume home BP meds   monitor vitals     DVT and gI PPX       Discussed in detail with patient and his father at the bedside

## 2021-10-27 NOTE — PROGRESS NOTE ADULT - ATTENDING COMMENTS
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

## 2021-10-27 NOTE — PROGRESS NOTE ADULT - PROBLEM SELECTOR PLAN 1
continue Cycle 2 HiDAC  Cytarabine 3 g/m2 IV over 3 hrs every 12 hrs on days 1,3,5   IV hydration, Antiemetics, daily weight   Monitor for Cerebellar toxicity, nystagmus checks  Follow CBC/CMP, transfuse/replete prn  Continue predforte eye drops to prevent chemical conjunctivitis  Discharge planning on Saturday 10/30  Monitor ALT transaminitis  Monitor leg pain, oxycodone or Tylenol PRN

## 2021-10-27 NOTE — PROVIDER CONTACT NOTE (OTHER) - ASSESSMENT
Patient A&Ox4; patient denies SOB, headache, chest pain and abdominal pain. No chills or rigors noted.
No acute signs of distress. Denies chest pain or difficulty breathing.

## 2021-10-27 NOTE — PROGRESS NOTE ADULT - ASSESSMENT
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 Now admitted for cycle 2 Consolidation with HIDAC

## 2021-10-27 NOTE — PROVIDER CONTACT NOTE (OTHER) - ACTION/TREATMENT ORDERED:
BC x2, UA, UCx, IV Tylenol 1g ordered. Ice packs applied. Will continue to monitor.
Give Tylenol 1g IVPB. Use cooling measures. Continue to monitor.

## 2021-10-27 NOTE — PROGRESS NOTE ADULT - SUBJECTIVE AND OBJECTIVE BOX
Diagnosis: AML    Protocol/Chemo Regimen: Cycle2 HIDAC  Day: 3     Pt endorsed: severe leg spasmatic pain, not sure it is bone or muscle    Review of Systems: Patient denied nausea, vomiting, odynophagia, chest pain, cough, dyspnea, abdominal pain, constipation, diarrhea, rash, fatigue, headache    Pain scale:  8/10                                  Location:  legs     Diet: regular    Allergies:  No Known Allergies    Intolerances: cefepime (Rash)      HEME/ONC MEDICATIONS  cytarabine IVPB (eMAR) 6300 milliGRAM(s) IV Intermittent every 12 hours  enoxaparin Injectable 40 milliGRAM(s) SubCutaneous at bedtime      STANDING MEDICATIONS  amLODIPine   Tablet 5 milliGRAM(s) Oral daily  Biotene Dry Mouth Oral Rinse 15 milliLiter(s) Swish and Spit four times a day  chlorhexidine 4% Liquid 1 Application(s) Topical <User Schedule>  influenza   Vaccine 0.5 milliLiter(s) IntraMuscular once  ondansetron Injectable 8 milliGRAM(s) IV Push every 8 hours  prednisoLONE acetate 1% Suspension 2 Drop(s) Both EYES every 6 hours  sodium chloride 0.9%. 1000 milliLiter(s) IV Continuous <Continuous>      PRN MEDICATIONS  acetaminophen     Tablet .. 650 milliGRAM(s) Oral every 6 hours PRN  metoclopramide Injectable 10 milliGRAM(s) IV Push every 6 hours PRN  oxycodone    5 mG/acetaminophen 325 mG 1 Tablet(s) Oral every 4 hours PRN  sodium chloride 0.9% lock flush 10 milliLiter(s) IV Push every 1 hour PRN      Vital Signs Last 24 Hrs  T(C): 36.3 (27 Oct 2021 06:34), Max: 39.9 (26 Oct 2021 17:07)  T(F): 97.3 (27 Oct 2021 06:34), Max: 103.9 (26 Oct 2021 17:07)  HR: 89 (27 Oct 2021 06:34) (89 - 146)  BP: 111/75 (27 Oct 2021 06:34) (107/58 - 141/66)  RR: 18 (27 Oct 2021 06:34) (18 - 20)  SpO2: 99% (27 Oct 2021 06:34) (94% - 99%)    PHYSICAL EXAM  General: adult in NAD  HEENT: EOMI, clear oropharynx, anicteric sclera  Neck: supple  CV: normal S1/S2 RRR  Lungs: positive air movement b/l ant lungs,clear to auscultation, no wheezes, no rales  Abdomen: soft, + BS,  non-tender non-distended  Ext: no edema  Skin: no rash  Neuro: alert and oriented X 3, no focal deficits, nose to finger point test WNL   Central Line: PICC CDI    LABS:                        9.7    4.16  )-----------( 129      ( 27 Oct 2021 07:03 )             28.1       Mean Cell Volume : 90.4 fl  Mean Cell Hemoglobin : 31.2 pg  Mean Cell Hemoglobin Concentration : 34.5 gm/dL  Auto Neutrophil # : 3.76 K/uL  Auto Lymphocyte # : 0.20 K/uL  Auto Monocyte # : 0.19 K/uL  Auto Eosinophil # : 0.00 K/uL  Auto Basophil # : 0.00 K/uL  Auto Neutrophil % : 90.4 %  Auto Lymphocyte % : 4.8 %  Auto Monocyte % : 4.6 %  Auto Eosinophil % : 0.0 %  Auto Basophil % : 0.0 %          RADIOLOGY & ADDITIONAL STUDIES:  from: Xray Chest 1 View- PORTABLE-Urgent (Xray Chest 1 View- PORTABLE-Urgent .) (10.25.21 @ 09:18) >  IMPRESSION:  The heart is normalin size. The Lungs are clear. No pleural effusion. No pneumothorax. A PICC line is seen on the right and the tip is in superior vena cava.           Diagnosis: AML    Protocol/Chemo Regimen: Cycle2 HIDAC  Day: 3     Pt endorsed: severe leg spasmatic pain, not sure it is bone or muscle    Review of Systems: Patient denied nausea, vomiting, odynophagia, chest pain, cough, dyspnea, abdominal pain, constipation, diarrhea, rash, fatigue, headache    Pain scale:  8/10                                  Location:  legs     Diet: regular    Allergies:  No Known Allergies    Intolerances: cefepime (Rash)      HEME/ONC MEDICATIONS  cytarabine IVPB (eMAR) 6300 milliGRAM(s) IV Intermittent every 12 hours  enoxaparin Injectable 40 milliGRAM(s) SubCutaneous at bedtime      STANDING MEDICATIONS  amLODIPine   Tablet 5 milliGRAM(s) Oral daily  Biotene Dry Mouth Oral Rinse 15 milliLiter(s) Swish and Spit four times a day  chlorhexidine 4% Liquid 1 Application(s) Topical <User Schedule>  influenza   Vaccine 0.5 milliLiter(s) IntraMuscular once  ondansetron Injectable 8 milliGRAM(s) IV Push every 8 hours  prednisoLONE acetate 1% Suspension 2 Drop(s) Both EYES every 6 hours  sodium chloride 0.9%. 1000 milliLiter(s) IV Continuous <Continuous>      PRN MEDICATIONS  acetaminophen     Tablet .. 650 milliGRAM(s) Oral every 6 hours PRN  metoclopramide Injectable 10 milliGRAM(s) IV Push every 6 hours PRN  oxycodone    5 mG/acetaminophen 325 mG 1 Tablet(s) Oral every 4 hours PRN  sodium chloride 0.9% lock flush 10 milliLiter(s) IV Push every 1 hour PRN      Vital Signs Last 24 Hrs  T(C): 36.3 (27 Oct 2021 06:34), Max: 39.9 (26 Oct 2021 17:07)  T(F): 97.3 (27 Oct 2021 06:34), Max: 103.9 (26 Oct 2021 17:07)  HR: 89 (27 Oct 2021 06:34) (89 - 146)  BP: 111/75 (27 Oct 2021 06:34) (107/58 - 141/66)  RR: 18 (27 Oct 2021 06:34) (18 - 20)  SpO2: 99% (27 Oct 2021 06:34) (94% - 99%)    PHYSICAL EXAM  General: adult in NAD  HEENT: EOMI, clear oropharynx, anicteric sclera  Neck: supple  CV: normal S1/S2 RRR  Lungs: positive air movement b/l ant lungs,clear to auscultation, no wheezes, no rales  Abdomen: soft, + BS,  non-tender non-distended  Ext: no edema  Skin: no rash  Neuro: alert and oriented X 3, no focal deficits, nose to finger point test WNL   Central Line: PICC CDI    LABS:                        9.7    4.16  )-----------( 129      ( 27 Oct 2021 07:03 )             28.1       Mean Cell Volume : 90.4 fl  Mean Cell Hemoglobin : 31.2 pg  Mean Cell Hemoglobin Concentration : 34.5 gm/dL  Auto Neutrophil # : 3.76 K/uL  Auto Lymphocyte # : 0.20 K/uL  Auto Monocyte # : 0.19 K/uL  Auto Eosinophil # : 0.00 K/uL  Auto Basophil # : 0.00 K/uL  Auto Neutrophil % : 90.4 %  Auto Lymphocyte % : 4.8 %  Auto Monocyte % : 4.6 %  Auto Eosinophil % : 0.0 %  Auto Basophil % : 0.0 %    27 Oct 2021 07:01    139    |  103    |  8      ----------------------------<  97     3.8     |  24     |  0.86     Ca    9.2        27 Oct 2021 07:01  Phos  4.2       27 Oct 2021 07:01  Mg     2.2       27 Oct 2021 07:01    TPro  6.2    /  Alb  4.0    /  TBili  0.5    /  DBili  x      /  AST  17     /  ALT  68     /  AlkPhos  82     27 Oct 2021 07:01      LIVER FUNCTIONS - ( 27 Oct 2021 07:01 )  Alb: 4.0 g/dL / Pro: 6.2 g/dL / ALK PHOS: 82 U/L / ALT: 68 U/L / AST: 17 U/L / GGT: x           Urinalysis Basic - ( 26 Oct 2021 18:35 )    Color: Colorless / Appearance: Clear / S.010 / pH: x  Gluc: x / Ketone: Negative  / Bili: Negative / Urobili: Negative   Blood: x / Protein: Negative / Nitrite: Negative   Leuk Esterase: Negative / RBC: 0 /hpf / WBC 0 /HPF   Sq Epi: x / Non Sq Epi: 0 /hpf / Bacteria: Negative      RADIOLOGY & ADDITIONAL STUDIES:  from: Xray Chest 1 View- PORTABLE-Urgent (Xray Chest 1 View- PORTABLE-Urgent .) (10.25.21 @ 09:18) >  IMPRESSION:  The heart is normalin size. The Lungs are clear. No pleural effusion. No pneumothorax. A PICC line is seen on the right and the tip is in superior vena cava.

## 2021-10-27 NOTE — CHART NOTE - NSCHARTNOTEFT_GEN_A_CORE
MEDICINE PA    Notified by RN patient with temperature 102.9. Seen and examined patient at bedside. Patient is alert, NAD. Denies HA, CP, SOB, cough, N/V, or abd pain.      VITAL SIGNS:  T(C): 39.4 (10-27-21 @ 01:59), Max: 39.9 (10-26-21 @ 17:07)  HR: 125 (10-27-21 @ 01:59) (81 - 146)  BP: 107/58 (10-27-21 @ 01:59) (107/58 - 141/66)  RR: 18 (10-27-21 @ 01:59) (16 - 20)  SpO2: 95% (10-27-21 @ 01:59) (94% - 98%)  Wt(kg): --      LABORATORY:                          10.2   7.25  )-----------( 144      ( 26 Oct 2021 07:57 )             30.3       10-26    139  |  104  |  18  ----------------------------<  100<H>  4.0   |  22  |  0.75    Ca    9.0      26 Oct 2021 07:57  Phos  3.1     10-26  Mg     2.0     10-26    TPro  6.0  /  Alb  3.7  /  TBili  0.3  /  DBili  x   /  AST  30  /  ALT  89<H>  /  AlkPhos  93  10-26      MICROBIOLOGY:     Culture - Blood (10.09.21 @ 10:17)   Specimen Source: .Blood Blood-Peripheral   Culture Results:   No Growth Final  Culture - Blood (10.09.21 @ 10:17)   Specimen Source: .Blood Blood-Peripheral   Culture Results:   No Growth Final   Culture - Urine (10.09.21 @ 10:15)   Specimen Source: Clean Catch Clean Catch (Midstream)   Culture Results:   No growth       RADIOLOGY:  < from: Xray Chest 1 View- PORTABLE-Urgent (Xray Chest 1 View- PORTABLE-Urgent .) (10.25.21 @ 09:18) >      PROCEDURE DATE:  10/25/2021            INTERPRETATION:  A single chest x-ray was obtained on October 25, 2021.    INDICATION: Position of PICC line.    IMPRESSION:    The heart is normalin size. The Lungs are clear. No pleural effusion. No pneumothorax. A PICC line is seen on the right and the tip is in superior vena cava.    --- End of Report ---    < end of copied text >      PHYSICAL EXAM:    Constitutional: AOx3. NAD.  Respiratory: clear lungs bilaterally. No wheezing, rhonchi, or crackles.  Cardiovascular: S1 S2, tachycardic, No murmurs.  Gastrointestinal: BS X4 active. soft. nontender.  Extremities/Vascular: +2 pulses bilaterally. No BLE edema.      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 Now admitted for cycle 2 Consolidation with HIDAC    Patient initially presented to the hospital on 7/30/21 with complaints of dizziness, fatigue and severe RUQ pain for 3 days. CT A/P revealed fatty liver and small R pleural effusion. WBC 12k with 17% blasts.Peripheral flow cytometry showed 13% myeloblasts. FLT3(-). BMbx was done on 8/4/21 - confirmed AML. 46,XY {20 } Foundation: mutations in DNMT3A R882H, NRAS G12D, NPM1 W288fs*12  Pt consented to [facility]. Patient was offered sperm banking, but declined.  On 8/6/21,patient started induction with Dauno/Cytararbine . Day 14 BMbx on 8/19 was hypocellular with chemotherapeutic effect; however, per hematopathology, appears to be earlier regeneration than would typically seen which is concerning for persistent disease at this point, and the earliest cells are CD34 positive and his myeloblasts on initial presentation were CD34 negative. This may have represented early regeneration of his marrow.    Patients induction course complicated by a course of Zosyn for presumed RUL PNA, then transitioned to levaquin, posaconazole and Acyclovir for ppx for neutropenia. Course also complicated by  neutropenic fevers, with no identified source. He was on Cefepime but developed a rash, changed to meropenem. Rash improved. A BM bx on 8/19 showed CR. Pt received cycle 1 consolidation on 9/17. Pt was admitted with neutropenic fever on 10/9-12 and  admitted with diarrhea 10/15-17.      Now presents with known recurrent fevers.    1) Fever  -tylenol and cooling measures prn for pyrexia  -BC x2, UA/UC sent on 10/26, currently pending in lab  -CXR performed on 10/25, noted   -continue to monitor vitals closely overnight   -endorse/sign out to day team on overnight events   -RN aware of management     Karey Pritchett PA-C   Dept of Medicine   [id number]

## 2021-10-27 NOTE — PROGRESS NOTE ADULT - SUBJECTIVE AND OBJECTIVE BOX
Name of Patient : JAK DANIELS  MRN: [id number]  Date of visit: 10-27-21       Subjective: Patient seen and examined. No new events except as noted.   feeling tired  cont ot have fever   sepsis W>U in progress   no diarrhea , SOB     REVIEW OF SYSTEMS:    CONSTITUTIONAL: + fever, weakness  EYES/ENT: No visual changes;  No vertigo or throat pain   NECK: No pain or stiffness  RESPIRATORY: No cough, wheezing, hemoptysis; No shortness of breath  CARDIOVASCULAR: No chest pain or palpitations  GASTROINTESTINAL: No abdominal or epigastric pain. No nausea, vomiting, or hematemesis; No diarrhea or constipation. No melena or hematochezia.  GENITOURINARY: No dysuria, frequency or hematuria  NEUROLOGICAL: No numbness or weakness  SKIN: No itching, burning, rashes, or lesions   All other review of systems is negative unless indicated above.    MEDICATIONS:  MEDICATIONS  (STANDING):  amLODIPine   Tablet 5 milliGRAM(s) Oral daily  Biotene Dry Mouth Oral Rinse 15 milliLiter(s) Swish and Spit four times a day  chlorhexidine 4% Liquid 1 Application(s) Topical <User Schedule>  cytarabine IVPB (eMAR) 6300 milliGRAM(s) IV Intermittent every 12 hours  enoxaparin Injectable 40 milliGRAM(s) SubCutaneous at bedtime  influenza   Vaccine 0.5 milliLiter(s) IntraMuscular once  ondansetron Injectable 8 milliGRAM(s) IV Push every 8 hours  prednisoLONE acetate 1% Suspension 2 Drop(s) Both EYES every 6 hours  sodium chloride 0.9%. 1000 milliLiter(s) (75 mL/Hr) IV Continuous <Continuous>      PHYSICAL EXAM:  T(C): 36.9 (10-27-21 @ 17:11), Max: 39.4 (10-27-21 @ 01:59)  HR: 89 (10-27-21 @ 17:11) (89 - 125)  BP: 112/75 (10-27-21 @ 17:11) (107/58 - 112/75)  RR: 18 (10-27-21 @ 17:11) (18 - 18)  SpO2: 99% (10-27-21 @ 17:11) (95% - 99%)  Wt(kg): --  I&O's Summary    26 Oct 2021 07:01  -  27 Oct 2021 07:00  --------------------------------------------------------  IN: 3875 mL / OUT: 4125 mL / NET: -250 mL    27 Oct 2021 07:01  -  27 Oct 2021 21:17  --------------------------------------------------------  IN: 1501 mL / OUT: 400 mL / NET: 1101 mL          Appearance: Normal	  HEENT:  PERRLA   Lymphatic: No lymphadenopathy   Cardiovascular: Normal S1 S2, no JVD  Respiratory: normal effort , clear  Gastrointestinal:  Soft, Non-tender  Skin: No rashes,  warm to touch  Psychiatry:  Mood & affect appropriate  Musculuskeletal: No edema      All labs, Imaging and EKGs personally reviewed       10-26-21 @ 07:01  -  10-27-21 @ 07:00  --------------------------------------------------------  IN: 3875 mL / OUT: 4125 mL / NET: -250 mL    10-27-21 @ 07:01  -  10-27-21 @ 21:17  --------------------------------------------------------  IN: 1501 mL / OUT: 400 mL / NET: 1101 mL                          9.7    4.16  )-----------( 129      ( 27 Oct 2021 07:03 )             28.1               10-27    139  |  103  |  8   ----------------------------<  97  3.8   |  24  |  0.86    Ca    9.2      27 Oct 2021 07:01  Phos  4.2     10-27  Mg     2.2     10-27    TPro  6.2  /  Alb  4.0  /  TBili  0.5  /  DBili  x   /  AST  17  /  ALT  68<H>  /  AlkPhos  82  10-27                       Urinalysis Basic - ( 26 Oct 2021 18:35 )    Color: Colorless / Appearance: Clear / S.010 / pH: x  Gluc: x / Ketone: Negative  / Bili: Negative / Urobili: Negative   Blood: x / Protein: Negative / Nitrite: Negative   Leuk Esterase: Negative / RBC: 0 /hpf / WBC 0 /HPF   Sq Epi: x / Non Sq Epi: 0 /hpf / Bacteria: Negative

## 2021-10-27 NOTE — PROGRESS NOTE ADULT - PROBLEM SELECTOR PLAN 2
Pt is not neutropenic, febrile Tmax 102  Fevers likely from HIDAC - follow up cultures  Hold  Abx for now  Continue Acyclovir for PPX   If fever,  pan culture

## 2021-10-28 ENCOUNTER — OUTPATIENT (OUTPATIENT)
Dept: OUTPATIENT SERVICES | Facility: HOSPITAL | Age: 36
LOS: 1 days | Discharge: HOME | End: 2021-10-28

## 2021-10-28 DIAGNOSIS — C92.00 ACUTE MYELOBLASTIC LEUKEMIA, NOT HAVING ACHIEVED REMISSION: ICD-10-CM

## 2021-10-28 LAB
ALBUMIN SERPL ELPH-MCNC: 3.7 G/DL — SIGNIFICANT CHANGE UP (ref 3.3–5)
ALP SERPL-CCNC: 73 U/L — SIGNIFICANT CHANGE UP (ref 40–120)
ALT FLD-CCNC: 90 U/L — HIGH (ref 10–45)
ANION GAP SERPL CALC-SCNC: 11 MMOL/L — SIGNIFICANT CHANGE UP (ref 5–17)
AST SERPL-CCNC: 40 U/L — SIGNIFICANT CHANGE UP (ref 10–40)
BASOPHILS # BLD AUTO: 0.01 K/UL — SIGNIFICANT CHANGE UP (ref 0–0.2)
BASOPHILS NFR BLD AUTO: 0.3 % — SIGNIFICANT CHANGE UP (ref 0–2)
BILIRUB SERPL-MCNC: 0.5 MG/DL — SIGNIFICANT CHANGE UP (ref 0.2–1.2)
BUN SERPL-MCNC: 10 MG/DL — SIGNIFICANT CHANGE UP (ref 7–23)
CALCIUM SERPL-MCNC: 9.4 MG/DL — SIGNIFICANT CHANGE UP (ref 8.4–10.5)
CHLORIDE SERPL-SCNC: 103 MMOL/L — SIGNIFICANT CHANGE UP (ref 96–108)
CO2 SERPL-SCNC: 24 MMOL/L — SIGNIFICANT CHANGE UP (ref 22–31)
CREAT SERPL-MCNC: 0.85 MG/DL — SIGNIFICANT CHANGE UP (ref 0.5–1.3)
CULTURE RESULTS: NO GROWTH — SIGNIFICANT CHANGE UP
EOSINOPHIL # BLD AUTO: 0.03 K/UL — SIGNIFICANT CHANGE UP (ref 0–0.5)
EOSINOPHIL NFR BLD AUTO: 0.8 % — SIGNIFICANT CHANGE UP (ref 0–6)
GLUCOSE SERPL-MCNC: 95 MG/DL — SIGNIFICANT CHANGE UP (ref 70–99)
HCT VFR BLD CALC: 26.5 % — LOW (ref 39–50)
HGB BLD-MCNC: 9.2 G/DL — LOW (ref 13–17)
IMM GRANULOCYTES NFR BLD AUTO: 0.3 % — SIGNIFICANT CHANGE UP (ref 0–1.5)
LYMPHOCYTES # BLD AUTO: 0.14 K/UL — LOW (ref 1–3.3)
LYMPHOCYTES # BLD AUTO: 3.7 % — LOW (ref 13–44)
MAGNESIUM SERPL-MCNC: 2.2 MG/DL — SIGNIFICANT CHANGE UP (ref 1.6–2.6)
MCHC RBC-ENTMCNC: 31.5 PG — SIGNIFICANT CHANGE UP (ref 27–34)
MCHC RBC-ENTMCNC: 34.7 GM/DL — SIGNIFICANT CHANGE UP (ref 32–36)
MCV RBC AUTO: 90.8 FL — SIGNIFICANT CHANGE UP (ref 80–100)
MONOCYTES # BLD AUTO: 0.07 K/UL — SIGNIFICANT CHANGE UP (ref 0–0.9)
MONOCYTES NFR BLD AUTO: 1.8 % — LOW (ref 2–14)
NEUTROPHILS # BLD AUTO: 3.54 K/UL — SIGNIFICANT CHANGE UP (ref 1.8–7.4)
NEUTROPHILS NFR BLD AUTO: 93.1 % — HIGH (ref 43–77)
NRBC # BLD: 0 /100 WBCS — SIGNIFICANT CHANGE UP (ref 0–0)
PHOSPHATE SERPL-MCNC: 3.8 MG/DL — SIGNIFICANT CHANGE UP (ref 2.5–4.5)
PLATELET # BLD AUTO: 110 K/UL — LOW (ref 150–400)
POTASSIUM SERPL-MCNC: 4 MMOL/L — SIGNIFICANT CHANGE UP (ref 3.5–5.3)
POTASSIUM SERPL-SCNC: 4 MMOL/L — SIGNIFICANT CHANGE UP (ref 3.5–5.3)
PROT SERPL-MCNC: 6.2 G/DL — SIGNIFICANT CHANGE UP (ref 6–8.3)
RBC # BLD: 2.92 M/UL — LOW (ref 4.2–5.8)
RBC # FLD: 17.9 % — HIGH (ref 10.3–14.5)
SODIUM SERPL-SCNC: 138 MMOL/L — SIGNIFICANT CHANGE UP (ref 135–145)
SPECIMEN SOURCE: SIGNIFICANT CHANGE UP
WBC # BLD: 3.8 K/UL — SIGNIFICANT CHANGE UP (ref 3.8–10.5)
WBC # FLD AUTO: 3.8 K/UL — SIGNIFICANT CHANGE UP (ref 3.8–10.5)

## 2021-10-28 PROCEDURE — 99232 SBSQ HOSP IP/OBS MODERATE 35: CPT

## 2021-10-28 RX ORDER — ACETAMINOPHEN 500 MG
650 TABLET ORAL ONCE
Refills: 0 | Status: COMPLETED | OUTPATIENT
Start: 2021-10-28 | End: 2021-10-28

## 2021-10-28 RX ORDER — OXYCODONE HYDROCHLORIDE 5 MG/1
2.5 TABLET ORAL EVERY 4 HOURS
Refills: 0 | Status: DISCONTINUED | OUTPATIENT
Start: 2021-10-28 | End: 2021-10-31

## 2021-10-28 RX ORDER — OXYCODONE HYDROCHLORIDE 5 MG/1
2.5 TABLET ORAL EVERY 6 HOURS
Refills: 0 | Status: DISCONTINUED | OUTPATIENT
Start: 2021-10-28 | End: 2021-10-28

## 2021-10-28 RX ORDER — ACETAMINOPHEN 500 MG
325 TABLET ORAL ONCE
Refills: 0 | Status: COMPLETED | OUTPATIENT
Start: 2021-10-28 | End: 2021-10-28

## 2021-10-28 RX ORDER — SENNA PLUS 8.6 MG/1
2 TABLET ORAL AT BEDTIME
Refills: 0 | Status: DISCONTINUED | OUTPATIENT
Start: 2021-10-28 | End: 2021-10-31

## 2021-10-28 RX ORDER — POLYETHYLENE GLYCOL 3350 17 G/17G
17 POWDER, FOR SOLUTION ORAL DAILY
Refills: 0 | Status: DISCONTINUED | OUTPATIENT
Start: 2021-10-28 | End: 2021-10-31

## 2021-10-28 RX ADMIN — Medication 650 MILLIGRAM(S): at 16:36

## 2021-10-28 RX ADMIN — POLYETHYLENE GLYCOL 3350 17 GRAM(S): 17 POWDER, FOR SOLUTION ORAL at 12:21

## 2021-10-28 RX ADMIN — OXYCODONE HYDROCHLORIDE 2.5 MILLIGRAM(S): 5 TABLET ORAL at 10:08

## 2021-10-28 RX ADMIN — AMLODIPINE BESYLATE 5 MILLIGRAM(S): 2.5 TABLET ORAL at 05:03

## 2021-10-28 RX ADMIN — Medication 650 MILLIGRAM(S): at 20:56

## 2021-10-28 RX ADMIN — SENNA PLUS 2 TABLET(S): 8.6 TABLET ORAL at 22:18

## 2021-10-28 RX ADMIN — Medication 650 MILLIGRAM(S): at 17:45

## 2021-10-28 RX ADMIN — OXYCODONE HYDROCHLORIDE 2.5 MILLIGRAM(S): 5 TABLET ORAL at 22:19

## 2021-10-28 RX ADMIN — OXYCODONE HYDROCHLORIDE 2.5 MILLIGRAM(S): 5 TABLET ORAL at 16:43

## 2021-10-28 RX ADMIN — Medication 325 MILLIGRAM(S): at 22:02

## 2021-10-28 RX ADMIN — Medication 15 MILLILITER(S): at 12:22

## 2021-10-28 RX ADMIN — ONDANSETRON 8 MILLIGRAM(S): 8 TABLET, FILM COATED ORAL at 22:18

## 2021-10-28 RX ADMIN — SODIUM CHLORIDE 75 MILLILITER(S): 9 INJECTION INTRAMUSCULAR; INTRAVENOUS; SUBCUTANEOUS at 22:19

## 2021-10-28 RX ADMIN — Medication 15 MILLILITER(S): at 05:03

## 2021-10-28 RX ADMIN — Medication 2 DROP(S): at 12:22

## 2021-10-28 RX ADMIN — Medication 187.67 MILLIGRAM(S): at 04:43

## 2021-10-28 RX ADMIN — ONDANSETRON 8 MILLIGRAM(S): 8 TABLET, FILM COATED ORAL at 14:32

## 2021-10-28 RX ADMIN — ONDANSETRON 8 MILLIGRAM(S): 8 TABLET, FILM COATED ORAL at 05:03

## 2021-10-28 RX ADMIN — CHLORHEXIDINE GLUCONATE 1 APPLICATION(S): 213 SOLUTION TOPICAL at 10:08

## 2021-10-28 RX ADMIN — OXYCODONE HYDROCHLORIDE 2.5 MILLIGRAM(S): 5 TABLET ORAL at 22:49

## 2021-10-28 RX ADMIN — Medication 2 DROP(S): at 17:50

## 2021-10-28 RX ADMIN — Medication 650 MILLIGRAM(S): at 02:44

## 2021-10-28 RX ADMIN — Medication 2 DROP(S): at 05:06

## 2021-10-28 RX ADMIN — Medication 15 MILLILITER(S): at 17:50

## 2021-10-28 RX ADMIN — OXYCODONE HYDROCHLORIDE 2.5 MILLIGRAM(S): 5 TABLET ORAL at 17:45

## 2021-10-28 RX ADMIN — OXYCODONE HYDROCHLORIDE 2.5 MILLIGRAM(S): 5 TABLET ORAL at 11:00

## 2021-10-28 RX ADMIN — Medication 650 MILLIGRAM(S): at 01:44

## 2021-10-28 NOTE — PROGRESS NOTE ADULT - ASSESSMENT
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 Now admitted for cycle 2 Consolidation with HIDAC      # AML   care as per onc team  chemo cycle as per orders  IV Hydration   patient states that he gets LE muscle cramping after each session, monitor fo electrolytes abnormalities  fall precautions   oral feeding     # Fever , continuous   T max 103  Pan cx/ RVP negative to date   broad spectrum antibiotics PRN if positive W/U  IV hydration   its likely due to active chemo  monitor CBC     # HTN  resume home BP meds   monitor vitals     DVT and gI PPX       Discussed in detail with patient and his father at the bedside

## 2021-10-28 NOTE — PROGRESS NOTE ADULT - PROBLEM SELECTOR PLAN 1
continue Cycle 2 HiDAC  Cytarabine 3 g/m2 IV over 3 hrs every 12 hrs on days 1,3,5   IV hydration, Antiemetics, daily weight   Monitor for Cerebellar toxicity, nystagmus checks  Follow CBC/CMP, transfuse/replete prn  Continue predforte eye drops to prevent chemical conjunctivitis  Discharge planning on Saturday 10/30  Monitor ALT transaminitis  Monitor leg pain, oxycodone or Tylenol PRN continue Cycle 2 HiDAC  Cytarabine 3 g/m2 IV over 3 hrs every 12 hrs on days 1,3,5   IV hydration, Antiemetics, daily weight   Monitor for Cerebellar toxicity, nystagmus checks  Follow CBC/CMP, transfuse/replete prn  Continue predforte eye drops to prevent chemical conjunctivitis  Discharge planning on Saturday 10/30  Monitor ALT transaminitis  Monitor leg pain, oxycodone or Tylenol PRN.  10/28- Constipation- Will start Miralax.

## 2021-10-28 NOTE — PROGRESS NOTE ADULT - ATTENDING COMMENTS
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

## 2021-10-28 NOTE — PROGRESS NOTE ADULT - SUBJECTIVE AND OBJECTIVE BOX
Diagnosis:    Protocol/Chemo Regimen:    Day:     Pt endorsed:    Review of Systems:     Pain scale:     Diet:     Allergies    No Known Allergies    Intolerances    cefepime (Rash)      ANTIMICROBIALS      HEME/ONC MEDICATIONS  enoxaparin Injectable 40 milliGRAM(s) SubCutaneous at bedtime      STANDING MEDICATIONS  amLODIPine   Tablet 5 milliGRAM(s) Oral daily  Biotene Dry Mouth Oral Rinse 15 milliLiter(s) Swish and Spit four times a day  chlorhexidine 4% Liquid 1 Application(s) Topical <User Schedule>  influenza   Vaccine 0.5 milliLiter(s) IntraMuscular once  ondansetron Injectable 8 milliGRAM(s) IV Push every 8 hours  prednisoLONE acetate 1% Suspension 2 Drop(s) Both EYES every 6 hours  sodium chloride 0.9%. 1000 milliLiter(s) IV Continuous <Continuous>      PRN MEDICATIONS  acetaminophen     Tablet .. 650 milliGRAM(s) Oral every 6 hours PRN  metoclopramide Injectable 10 milliGRAM(s) IV Push every 6 hours PRN  oxycodone    5 mG/acetaminophen 325 mG 1 Tablet(s) Oral every 4 hours PRN  sodium chloride 0.9% lock flush 10 milliLiter(s) IV Push every 1 hour PRN        Vital Signs Last 24 Hrs  T(C): 37 (28 Oct 2021 05:04), Max: 37.1 (27 Oct 2021 21:40)  T(F): 98.6 (28 Oct 2021 05:04), Max: 98.7 (27 Oct 2021 21:40)  HR: 101 (28 Oct 2021 05:04) (77 - 101)  BP: 116/72 (28 Oct 2021 05:04) (105/66 - 116/72)  BP(mean): --  RR: 18 (28 Oct 2021 05:04) (18 - 18)  SpO2: 94% (28 Oct 2021 05:04) (94% - 99%)    PHYSICAL EXAM  General: NAD  HEENT: PERRLA, EOMOI, clear oropharynx, anicteric sclera, pink conjunctiva  Neck: supple  CV: (+) S1/S2 RRR  Lungs: clear to auscultation, no wheezes or rales  Abdomen: soft, non-tender, non-distended (+) BS  Ext: no clubbing, cyanosis or edema  Skin: no rashes and no petechiae  Neuro: alert and oriented X 3, no focal deficits  Central Line:     RECENT CULTURES:  10-26 @ 22:05  Clean Catch Clean Catch (Midstream)  --  --  --    No growth  --  10-26 @ 22:00  .Blood Blood-Catheter  --  --  --    No growth to date.  --        LABS:                        9.2    3.80  )-----------( 110      ( 28 Oct 2021 06:56 )             26.5         Mean Cell Volume : 90.8 fl  Mean Cell Hemoglobin : 31.5 pg  Mean Cell Hemoglobin Concentration : 34.7 gm/dL  Auto Neutrophil # : 3.54 K/uL  Auto Lymphocyte # : 0.14 K/uL  Auto Monocyte # : 0.07 K/uL  Auto Eosinophil # : 0.03 K/uL  Auto Basophil # : 0.01 K/uL  Auto Neutrophil % : 93.1 %  Auto Lymphocyte % : 3.7 %  Auto Monocyte % : 1.8 %  Auto Eosinophil % : 0.8 %  Auto Basophil % : 0.3 %      10-27    139  |  103  |  8   ----------------------------<  97  3.8   |  24  |  0.86    Ca    9.2      27 Oct 2021 07:01  Phos  4.2     10-27  Mg     2.2     10-27    TPro  6.2  /  Alb  4.0  /  TBili  0.5  /  DBili  x   /  AST  17  /  ALT  68<H>  /  AlkPhos  82  10-27                  RADIOLOGY & ADDITIONAL STUDIES:         Diagnosis: AML    Protocol/Chemo Regimen: Cycle2 HIDAC  Day: 4     Pt endorsed: + bone pain  + constipation    Review of Systems: Patient denied nausea, vomiting, odynophagia, chest pain, cough, dyspnea, abdominal pain, constipation, diarrhea, rash, fatigue, headache    Pain scale:  8/10                                  Location:  generalized    Diet: regular    Allergies:  No Known Allergies    Intolerances: cefepime (Rash)    HEME/ONC MEDICATIONS  enoxaparin Injectable 40 milliGRAM(s) SubCutaneous at bedtime      STANDING MEDICATIONS  amLODIPine   Tablet 5 milliGRAM(s) Oral daily  Biotene Dry Mouth Oral Rinse 15 milliLiter(s) Swish and Spit four times a day  chlorhexidine 4% Liquid 1 Application(s) Topical <User Schedule>  influenza   Vaccine 0.5 milliLiter(s) IntraMuscular once  ondansetron Injectable 8 milliGRAM(s) IV Push every 8 hours  prednisoLONE acetate 1% Suspension 2 Drop(s) Both EYES every 6 hours  sodium chloride 0.9%. 1000 milliLiter(s) IV Continuous <Continuous>    PRN MEDICATIONS  acetaminophen     Tablet .. 650 milliGRAM(s) Oral every 6 hours PRN  metoclopramide Injectable 10 milliGRAM(s) IV Push every 6 hours PRN  oxycodone    5 mG/acetaminophen 325 mG 1 Tablet(s) Oral every 4 hours PRN  sodium chloride 0.9% lock flush 10 milliLiter(s) IV Push every 1 hour PRN    Vital Signs Last 24 Hrs  T(C): 37 (28 Oct 2021 05:04), Max: 37.1 (27 Oct 2021 21:40)  T(F): 98.6 (28 Oct 2021 05:04), Max: 98.7 (27 Oct 2021 21:40)  HR: 101 (28 Oct 2021 05:04) (77 - 101)  BP: 116/72 (28 Oct 2021 05:04) (105/66 - 116/72)  BP(mean): --  RR: 18 (28 Oct 2021 05:04) (18 - 18)  SpO2: 94% (28 Oct 2021 05:04) (94% - 99%)    PHYSICAL EXAM  General: NAD  HEENT: PERRLA, EOMOI, clear oropharynx, anicteric sclera, pink conjunctiva  Neck: supple  CV: (+) S1/S2 RRR  Lungs: clear to auscultation, no wheezes or rales  Abdomen: soft, non-tender, non-distended (+) BS  Ext: no clubbing, cyanosis or edema  Skin: no rashes and no petechiae  Neuro: alert and oriented X 3, no focal deficits, nystagmus(-).  Central Line: CDI    RECENT CULTURES:  10-26 @ 22:05  Clean Catch Clean Catch (Midstream)  No growth    10-26 @ 22:00  .Blood Blood-Catheter  No growth to date.    LABS:                       9.2    3.80  )-----------( 110      ( 28 Oct 2021 06:56 )             26.5     Mean Cell Volume : 90.8 fl  Mean Cell Hemoglobin : 31.5 pg  Mean Cell Hemoglobin Concentration : 34.7 gm/dL  Auto Neutrophil # : 3.54 K/uL  Auto Lymphocyte # : 0.14 K/uL  Auto Monocyte # : 0.07 K/uL  Auto Eosinophil # : 0.03 K/uL  Auto Basophil # : 0.01 K/uL  Auto Neutrophil % : 93.1 %  Auto Lymphocyte % : 3.7 %  Auto Monocyte % : 1.8 %  Auto Eosinophil % : 0.8 %  Auto Basophil % : 0.3 %      10-28    138  |  103  |  10  ----------------------------<  95  4.0   |  24  |  0.85    Ca    9.4      28 Oct 2021 06:53  Phos  3.8     10-28  Mg     2.2     10-28    TPro  6.2  /  Alb  3.7  /  TBili  0.5  /  DBili  x   /  AST  40  /  ALT  90<H>  /  AlkPhos  73  10-28  Mg 2.2  Phos 3.8    RADIOLOGY & ADDITIONAL STUDIES:  Xray Chest 1 View- PORTABLE-Urgent (Xray Chest 1 View- PORTABLE-Urgent .) (10.25.21 @ 09:18) >  The heart is normalin size. The Lungs are clear. No pleural effusion. No pneumothorax. A PICC line is seen on the right and the tip is in superior vena cava

## 2021-10-28 NOTE — PROGRESS NOTE ADULT - PROBLEM SELECTOR PLAN 2
Pt is not neutropenic, febrile Tmax 102  Fevers likely from HIDAC - follow up cultures  Hold  Abx for now  Continue Acyclovir for PPX   If fever,  pan culture Pt is not neutropenic, febrile Tmax 102  Fevers likely from HIDAC - follow up cultures  Hold  Abx for now  Continue Acyclovir for PPX   If fever,  pan culture  10/26 - BCX (-).

## 2021-10-28 NOTE — PROGRESS NOTE ADULT - SUBJECTIVE AND OBJECTIVE BOX
Name of Patient : JAK DANIELS  MRN: [id number]  Date of visit: 10-28-21      Subjective: Patient seen and examined. No new events except as noted.   feeling weak, no energy   Afebrile       REVIEW OF SYSTEMS:    CONSTITUTIONAL: + weakness  EYES/ENT: No visual changes;  No vertigo or throat pain   NECK: No pain or stiffness  RESPIRATORY: No cough, wheezing, hemoptysis; No shortness of breath  CARDIOVASCULAR: No chest pain or palpitations  GASTROINTESTINAL: No abdominal or epigastric pain.   GENITOURINARY: No dysuria, frequency or hematuria  NEUROLOGICAL: No numbness or weakness  SKIN: No itching, burning, rashes, or lesions   All other review of systems is negative unless indicated above.    MEDICATIONS:  MEDICATIONS  (STANDING):  acetaminophen     Tablet .. 325 milliGRAM(s) Oral once  amLODIPine   Tablet 5 milliGRAM(s) Oral daily  Biotene Dry Mouth Oral Rinse 15 milliLiter(s) Swish and Spit four times a day  chlorhexidine 4% Liquid 1 Application(s) Topical <User Schedule>  enoxaparin Injectable 40 milliGRAM(s) SubCutaneous at bedtime  influenza   Vaccine 0.5 milliLiter(s) IntraMuscular once  ondansetron Injectable 8 milliGRAM(s) IV Push every 8 hours  polyethylene glycol 3350 17 Gram(s) Oral daily  prednisoLONE acetate 1% Suspension 2 Drop(s) Both EYES every 6 hours  senna 2 Tablet(s) Oral at bedtime  sodium chloride 0.9%. 1000 milliLiter(s) (75 mL/Hr) IV Continuous <Continuous>      PHYSICAL EXAM:  T(C): 38.3 (10-28-21 @ 19:55), Max: 38.4 (10-28-21 @ 16:30)  HR: 116 (10-28-21 @ 19:55) (81 - 125)  BP: 110/71 (10-28-21 @ 19:55) (103/61 - 116/72)  RR: 18 (10-28-21 @ 19:55) (18 - 18)  SpO2: 98% (10-28-21 @ 19:55) (94% - 100%)  Wt(kg): --  I&O's Summary    27 Oct 2021 07:01  -  28 Oct 2021 07:00  --------------------------------------------------------  IN: 2902 mL / OUT: 1800 mL / NET: 1102 mL    28 Oct 2021 07:01  -  28 Oct 2021 22:02  --------------------------------------------------------  IN: 990 mL / OUT: 4150 mL / NET: -3160 mL          Appearance: Normal	  HEENT:  PERRLA   Lymphatic: No lymphadenopathy   Cardiovascular: Normal S1 S2, no JVD  Respiratory: normal effort , clear  Gastrointestinal:  Soft, Non-tender  Skin: No rashes,  warm to touch  Psychiatry:  Mood & affect appropriate  Musculuskeletal: No edema      All labs, Imaging and EKGs personally reviewed     10-27-21 @ 07:01  -  10-28-21 @ 07:00  --------------------------------------------------------  IN: 2902 mL / OUT: 1800 mL / NET: 1102 mL    10-28-21 @ 07:01  -  10-28-21 @ 22:02  --------------------------------------------------------  IN: 990 mL / OUT: 4150 mL / NET: -3160 mL                          9.2    3.80  )-----------( 110      ( 28 Oct 2021 06:56 )             26.5               10-28    138  |  103  |  10  ----------------------------<  95  4.0   |  24  |  0.85    Ca    9.4      28 Oct 2021 06:53  Phos  3.8     10-28  Mg     2.2     10-28    TPro  6.2  /  Alb  3.7  /  TBili  0.5  /  DBili  x   /  AST  40  /  ALT  90<H>  /  AlkPhos  73  10-28

## 2021-10-28 NOTE — CHART NOTE - NSCHARTNOTEFT_GEN_A_CORE
Medicine PA Fever Note    Notified by RN patient with temperature 102.2F. Pt. seen and examined at bedside c/o ____ . Patient is alert and in NAD. Denies HA, CP, SOB, cough, N/V, or abd pain.    VITAL SIGNS:  T(C): 39 (10-28-21 @ 21:50), Max: 39 (10-28-21 @ 21:50)  HR: 126 (10-28-21 @ 21:50) (81 - 126)  BP: 104/64 (10-28-21 @ 21:50) (103/61 - 116/72)  RR: 18 (10-28-21 @ 21:50) (18 - 18)  SpO2: 96% (10-28-21 @ 21:50) (94% - 100%)  Wt(kg): --      LABORATORY:                          9.2    3.80  )-----------( 110      ( 28 Oct 2021 06:56 )             26.5       10-28    138  |  103  |  10  ----------------------------<  95  4.0   |  24  |  0.85    Ca    9.4      28 Oct 2021 06:53  Phos  3.8     10-28  Mg     2.2     10-28    TPro  6.2  /  Alb  3.7  /  TBili  0.5  /  DBili  x   /  AST  40  /  ALT  90<H>  /  AlkPhos  73  10-28      MEDICATIONS:    MICROBIOLOGY:           RADIOLOGY:          PHYSICAL EXAM:    Constitutional: AOx3. NAD.    Respiratory: clear lungs bilaterally. No wheezing, rhonchi, or crackles.    Cardiovascular: S1 S2. No murmurs, rubs or gallops.    Gastrointestinal: BS X4 active. soft. nontender.    Extremities/Vascular: +2 pulses bilaterally. No BLE edema.      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 Now admitted for cycle 2 Consolidation with HIDAC    Patient initially presented to the hospital on 7/30/21 with complaints of dizziness, fatigue and severe RUQ pain for 3 days. CT A/P revealed fatty liver and small R pleural effusion. WBC 12k with 17% blasts.Peripheral flow cytometry showed 13% myeloblasts. FLT3(-). BMbx was done on 8/4/21 - confirmed AML. 46,XY {20 } Foundation: mutations in DNMT3A R882H, NRAS G12D, NPM1 W288fs*12  Pt consented to [place]. Patient was offered sperm banking, but declined.  On 8/6/21,patient started induction with Dauno/Cytararbine . Day 14 BMbx on 8/19 was hypocellular with chemotherapeutic effect; however, per hematopathology, appears to be earlier regeneration than would typically seen which is concerning for persistent disease at this point, and the earliest cells are CD34 positive and his myeloblasts on initial presentation were CD34 negative. This may have represented early regeneration of his marrow.    Patients induction course complicated by a course of Zosyn for presumed RUL PNA, then transitioned to levaquin, posaconazole and Acyclovir for ppx for neutropenia. Course also complicated by  neutropenic fevers, with no identified source. He was on Cefepime but developed a rash, changed to meropenem. Rash improved. A BM bx on 8/19 showed CR. Pt received cycle 1 consolidation on 9/17. Pt was admitted with neutropenic fever on 10/9-12 and  admitted with diarrhea 10/15-17.     Pt denies any complaint today     (25 Oct 2021 09:32)        # Neutropenic Fever  - Tylenol and cooling measures prn for pyrexia  - BC x2, UA/UC ordered  - CXR ordered  - C/W  - F/U primary team in AM    Follow up with Attending in AM    Celestina Rust PA-C   Department of Medicine Medicine PA Fever Note    Notified by RN patient with temperature 102.2F. Pt. seen and examined at bedside with c/o of headache that started earlier in the day. Pt denies changes in vision, weakness, dizziness. Patient is alert and in NAD. Denies CP, SOB, cough, N/V, or abd pain.    VITAL SIGNS:  T(C): 39 (10-28-21 @ 21:50), Max: 39 (10-28-21 @ 21:50)  HR: 126 (10-28-21 @ 21:50) (81 - 126)  BP: 104/64 (10-28-21 @ 21:50) (103/61 - 116/72)  RR: 18 (10-28-21 @ 21:50) (18 - 18)  SpO2: 96% (10-28-21 @ 21:50) (94% - 100%)  Wt(kg): --      LABORATORY:                          9.2    3.80  )-----------( 110      ( 28 Oct 2021 06:56 )             26.5       10-28    138  |  103  |  10  ----------------------------<  95  4.0   |  24  |  0.85    Ca    9.4      28 Oct 2021 06:53  Phos  3.8     10-28  Mg     2.2     10-28    TPro  6.2  /  Alb  3.7  /  TBili  0.5  /  DBili  x   /  AST  40  /  ALT  90<H>  /  AlkPhos  73  10-28      MEDICATIONS:    MICROBIOLOGY:           RADIOLOGY:          PHYSICAL EXAM:    Constitutional: AOx3. NAD.    Respiratory: clear lungs bilaterally. No wheezing, rhonchi, or crackles.    Cardiovascular: S1 S2. No murmurs, rubs or gallops.    Gastrointestinal: BS X4 active. soft. nontender.    Extremities/Vascular: +2 pulses bilaterally. No BLE edema.    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 Now admitted for cycle 2 Consolidation with HIDAC. Patient initially presented to the hospital on 7/30/21 with complaints of dizziness, fatigue and severe RUQ pain for 3 days. CT A/P revealed fatty liver and small R pleural effusion. WBC 12k with 17% blasts.Peripheral flow cytometry showed 13% myeloblasts. FLT3(-). BMbx was done on 8/4/21 - confirmed AML. 46,XY {20 } Foundation: mutations in DNMT3A R882H, NRAS G12D, NPM1 W288fs*12  Pt consented to [place]. Patient was offered sperm banking, but declined. On 8/6/21,patient started induction with Dauno/Cytararbine . Day 14 BMbx on 8/19 was hypocellular with chemotherapeutic effect; however, per hematopathology, appears to be earlier regeneration than would typically seen which is concerning for persistent disease at this point, and the earliest cells are CD34 positive and his myeloblasts on initial presentation were CD34 negative. This may have represented early regeneration of his marrow.Patients induction course complicated by a course of Zosyn for presumed RUL PNA, then transitioned to levaquin, posaconazole and Acyclovir for ppx for neutropenia. Course also complicated by  neutropenic fevers, with no identified source. He was on Cefepime but developed a rash, changed to meropenem. Rash improved. A BM bx on 8/19 showed CR. Pt received cycle 1 consolidation on 9/17. Pt was admitted with neutropenic fever on 10/9-12 and  admitted with diarrhea 10/15-17. Pt now presenting with recurrent fevers most likely 2/2 to cytarobine infusions.      # Fever 2/2 HIDAC  - Tylenol ordered and cooling measures prn for pyrexia  - BC x2 ordered  - CXR noted from 10/25 - normal  - C/W to monitor off abx  - If pt becomes hemodynamically unstable will consider starting Pt on empiric abx   - F/U primary team in AM    Follow up with Attending in AM    Celestina Rust PA-C   Department of Medicine

## 2021-10-29 ENCOUNTER — TRANSCRIPTION ENCOUNTER (OUTPATIENT)
Age: 36
End: 2021-10-29

## 2021-10-29 LAB
ALBUMIN SERPL ELPH-MCNC: 4 G/DL — SIGNIFICANT CHANGE UP (ref 3.3–5)
ALP SERPL-CCNC: 70 U/L — SIGNIFICANT CHANGE UP (ref 40–120)
ALT FLD-CCNC: 69 U/L — HIGH (ref 10–45)
ANION GAP SERPL CALC-SCNC: 15 MMOL/L — SIGNIFICANT CHANGE UP (ref 5–17)
AST SERPL-CCNC: 25 U/L — SIGNIFICANT CHANGE UP (ref 10–40)
BASOPHILS # BLD AUTO: 0.01 K/UL — SIGNIFICANT CHANGE UP (ref 0–0.2)
BASOPHILS NFR BLD AUTO: 0.3 % — SIGNIFICANT CHANGE UP (ref 0–2)
BILIRUB SERPL-MCNC: 0.7 MG/DL — SIGNIFICANT CHANGE UP (ref 0.2–1.2)
BLD GP AB SCN SERPL QL: NEGATIVE — SIGNIFICANT CHANGE UP
BUN SERPL-MCNC: 8 MG/DL — SIGNIFICANT CHANGE UP (ref 7–23)
CALCIUM SERPL-MCNC: 9.4 MG/DL — SIGNIFICANT CHANGE UP (ref 8.4–10.5)
CHLORIDE SERPL-SCNC: 100 MMOL/L — SIGNIFICANT CHANGE UP (ref 96–108)
CO2 SERPL-SCNC: 24 MMOL/L — SIGNIFICANT CHANGE UP (ref 22–31)
CREAT SERPL-MCNC: 0.82 MG/DL — SIGNIFICANT CHANGE UP (ref 0.5–1.3)
EOSINOPHIL # BLD AUTO: 0.01 K/UL — SIGNIFICANT CHANGE UP (ref 0–0.5)
EOSINOPHIL NFR BLD AUTO: 0.3 % — SIGNIFICANT CHANGE UP (ref 0–6)
GLUCOSE SERPL-MCNC: 92 MG/DL — SIGNIFICANT CHANGE UP (ref 70–99)
HCT VFR BLD CALC: 26.2 % — LOW (ref 39–50)
HGB BLD-MCNC: 8.8 G/DL — LOW (ref 13–17)
IMM GRANULOCYTES NFR BLD AUTO: 0.3 % — SIGNIFICANT CHANGE UP (ref 0–1.5)
LYMPHOCYTES # BLD AUTO: 0.14 K/UL — LOW (ref 1–3.3)
LYMPHOCYTES # BLD AUTO: 3.7 % — LOW (ref 13–44)
MAGNESIUM SERPL-MCNC: 2.1 MG/DL — SIGNIFICANT CHANGE UP (ref 1.6–2.6)
MCHC RBC-ENTMCNC: 31 PG — SIGNIFICANT CHANGE UP (ref 27–34)
MCHC RBC-ENTMCNC: 33.6 GM/DL — SIGNIFICANT CHANGE UP (ref 32–36)
MCV RBC AUTO: 92.3 FL — SIGNIFICANT CHANGE UP (ref 80–100)
MONOCYTES # BLD AUTO: 0.03 K/UL — SIGNIFICANT CHANGE UP (ref 0–0.9)
MONOCYTES NFR BLD AUTO: 0.8 % — LOW (ref 2–14)
NEUTROPHILS # BLD AUTO: 3.54 K/UL — SIGNIFICANT CHANGE UP (ref 1.8–7.4)
NEUTROPHILS NFR BLD AUTO: 94.6 % — HIGH (ref 43–77)
NRBC # BLD: 0 /100 WBCS — SIGNIFICANT CHANGE UP (ref 0–0)
PHOSPHATE SERPL-MCNC: 4.3 MG/DL — SIGNIFICANT CHANGE UP (ref 2.5–4.5)
PLATELET # BLD AUTO: 108 K/UL — LOW (ref 150–400)
POTASSIUM SERPL-MCNC: 3.8 MMOL/L — SIGNIFICANT CHANGE UP (ref 3.5–5.3)
POTASSIUM SERPL-SCNC: 3.8 MMOL/L — SIGNIFICANT CHANGE UP (ref 3.5–5.3)
PROT SERPL-MCNC: 6.6 G/DL — SIGNIFICANT CHANGE UP (ref 6–8.3)
RBC # BLD: 2.84 M/UL — LOW (ref 4.2–5.8)
RBC # FLD: 17.5 % — HIGH (ref 10.3–14.5)
RH IG SCN BLD-IMP: POSITIVE — SIGNIFICANT CHANGE UP
SODIUM SERPL-SCNC: 139 MMOL/L — SIGNIFICANT CHANGE UP (ref 135–145)
WBC # BLD: 3.74 K/UL — LOW (ref 3.8–10.5)
WBC # FLD AUTO: 3.74 K/UL — LOW (ref 3.8–10.5)

## 2021-10-29 PROCEDURE — 99233 SBSQ HOSP IP/OBS HIGH 50: CPT

## 2021-10-29 RX ORDER — DEXAMETHASONE 0.5 MG/5ML
8 ELIXIR ORAL
Refills: 0 | Status: COMPLETED | OUTPATIENT
Start: 2021-10-29 | End: 2021-10-30

## 2021-10-29 RX ORDER — CYTARABINE 100 MG
6300 VIAL (EA) INJECTION EVERY 12 HOURS
Refills: 0 | Status: COMPLETED | OUTPATIENT
Start: 2021-10-29 | End: 2021-10-30

## 2021-10-29 RX ADMIN — SODIUM CHLORIDE 75 MILLILITER(S): 9 INJECTION INTRAMUSCULAR; INTRAVENOUS; SUBCUTANEOUS at 23:02

## 2021-10-29 RX ADMIN — Medication 15 MILLILITER(S): at 05:47

## 2021-10-29 RX ADMIN — ONDANSETRON 8 MILLIGRAM(S): 8 TABLET, FILM COATED ORAL at 05:46

## 2021-10-29 RX ADMIN — Medication 2 DROP(S): at 18:07

## 2021-10-29 RX ADMIN — Medication 101.6 MILLIGRAM(S): at 15:31

## 2021-10-29 RX ADMIN — ONDANSETRON 8 MILLIGRAM(S): 8 TABLET, FILM COATED ORAL at 14:48

## 2021-10-29 RX ADMIN — Medication 2 DROP(S): at 05:46

## 2021-10-29 RX ADMIN — Medication 187.67 MILLIGRAM(S): at 16:06

## 2021-10-29 RX ADMIN — Medication 15 MILLILITER(S): at 00:57

## 2021-10-29 RX ADMIN — OXYCODONE HYDROCHLORIDE 2.5 MILLIGRAM(S): 5 TABLET ORAL at 23:31

## 2021-10-29 RX ADMIN — OXYCODONE HYDROCHLORIDE 2.5 MILLIGRAM(S): 5 TABLET ORAL at 23:01

## 2021-10-29 RX ADMIN — CHLORHEXIDINE GLUCONATE 1 APPLICATION(S): 213 SOLUTION TOPICAL at 05:47

## 2021-10-29 RX ADMIN — POLYETHYLENE GLYCOL 3350 17 GRAM(S): 17 POWDER, FOR SOLUTION ORAL at 11:30

## 2021-10-29 RX ADMIN — Medication 2 DROP(S): at 00:56

## 2021-10-29 RX ADMIN — Medication 15 MILLILITER(S): at 11:30

## 2021-10-29 RX ADMIN — SENNA PLUS 2 TABLET(S): 8.6 TABLET ORAL at 23:11

## 2021-10-29 RX ADMIN — Medication 15 MILLILITER(S): at 18:08

## 2021-10-29 RX ADMIN — SODIUM CHLORIDE 75 MILLILITER(S): 9 INJECTION INTRAMUSCULAR; INTRAVENOUS; SUBCUTANEOUS at 05:47

## 2021-10-29 RX ADMIN — Medication 2 DROP(S): at 11:30

## 2021-10-29 RX ADMIN — ONDANSETRON 8 MILLIGRAM(S): 8 TABLET, FILM COATED ORAL at 23:02

## 2021-10-29 NOTE — PROGRESS NOTE ADULT - ASSESSMENT
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 Now admitted for cycle 2 Consolidation with HIDAC.

## 2021-10-29 NOTE — PROGRESS NOTE ADULT - ASSESSMENT
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 Now admitted for cycle 2 Consolidation with HIDAC      # AML   care as per onc team  chemo cycle as per orders  IV Hydration   patient states that he gets LE muscle cramping after each session, monitor fo electrolytes abnormalities  fall precautions   oral feeding     # ANemia  COnt to drop hgb level  no gross bleeding   monitor levels  hematology follow up   transfuse to keep hgb above 7       # Fever , continuous   T max 103  Pan cx/ RVP negative to date   broad spectrum antibiotics PRN if positive W/U  IV hydration   its likely due to active chemo  monitor CBC     # HTN  resume home BP meds   monitor vitals     DVT and gI PPX       Discussed in detail with patient and his father at the bedside

## 2021-10-29 NOTE — DISCHARGE NOTE NURSING/CASE MANAGEMENT/SOCIAL WORK - PATIENT PORTAL LINK FT
You can access the FollowMyHealth Patient Portal offered by [facility] by registering at the following website: [email or web address]. By joining [facility]’s FollowMyHealth portal, you will also be able to view your health information using other applications (apps) compatible with our system.

## 2021-10-29 NOTE — PROGRESS NOTE ADULT - ATTENDING COMMENTS
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 Febrile to 102.2 again last night - f/u repeat Cx's, likely secondary to Cytarabine. Will premed with Dex for day 5 doses  Pain control for bone pains  Hemodynamically stable.  Discharge on Sunday (will observe extra day)

## 2021-10-29 NOTE — PROGRESS NOTE ADULT - SUBJECTIVE AND OBJECTIVE BOX
Diagnosis:    Protocol/Chemo Regimen:    Day:     Pt endorsed:    Review of Systems:     Pain scale:     Diet:     Allergies    No Known Allergies    Intolerances    cefepime (Rash)      ANTIMICROBIALS      HEME/ONC MEDICATIONS  enoxaparin Injectable 40 milliGRAM(s) SubCutaneous at bedtime      STANDING MEDICATIONS  amLODIPine   Tablet 5 milliGRAM(s) Oral daily  Biotene Dry Mouth Oral Rinse 15 milliLiter(s) Swish and Spit four times a day  chlorhexidine 4% Liquid 1 Application(s) Topical <User Schedule>  influenza   Vaccine 0.5 milliLiter(s) IntraMuscular once  ondansetron Injectable 8 milliGRAM(s) IV Push every 8 hours  polyethylene glycol 3350 17 Gram(s) Oral daily  prednisoLONE acetate 1% Suspension 2 Drop(s) Both EYES every 6 hours  senna 2 Tablet(s) Oral at bedtime  sodium chloride 0.9%. 1000 milliLiter(s) IV Continuous <Continuous>      PRN MEDICATIONS  acetaminophen     Tablet .. 650 milliGRAM(s) Oral every 6 hours PRN  metoclopramide Injectable 10 milliGRAM(s) IV Push every 6 hours PRN  oxyCODONE    IR 2.5 milliGRAM(s) Oral every 4 hours PRN  sodium chloride 0.9% lock flush 10 milliLiter(s) IV Push every 1 hour PRN        Vital Signs Last 24 Hrs  T(C): 37.3 (29 Oct 2021 05:13), Max: 39 (28 Oct 2021 21:50)  T(F): 99.1 (29 Oct 2021 05:13), Max: 102.2 (28 Oct 2021 21:50)  HR: 92 (29 Oct 2021 05:13) (92 - 126)  BP: 106/70 (29 Oct 2021 05:13) (99/65 - 110/71)  BP(mean): --  RR: 18 (29 Oct 2021 05:13) (18 - 18)  SpO2: 96% (29 Oct 2021 05:13) (96% - 100%)    PHYSICAL EXAM  General: NAD  HEENT: PERRLA, EOMOI, clear oropharynx, anicteric sclera, pink conjunctiva  Neck: supple  CV: (+) S1/S2 RRR  Lungs: clear to auscultation, no wheezes or rales  Abdomen: soft, non-tender, non-distended (+) BS  Ext: no clubbing, cyanosis or edema  Skin: no rashes and no petechiae  Neuro: alert and oriented X 3, no focal deficits  Central Line:     RECENT CULTURES:  10-26 @ 22:05  Clean Catch Clean Catch (Midstream)  --  --  --    No growth  --  10-26 @ 22:00  .Blood Blood-Catheter  --  --  --    No growth to date.  --        LABS:                        9.2    3.80  )-----------( 110      ( 28 Oct 2021 06:56 )             26.5         Mean Cell Volume : 90.8 fl  Mean Cell Hemoglobin : 31.5 pg  Mean Cell Hemoglobin Concentration : 34.7 gm/dL  Auto Neutrophil # : 3.54 K/uL  Auto Lymphocyte # : 0.14 K/uL  Auto Monocyte # : 0.07 K/uL  Auto Eosinophil # : 0.03 K/uL  Auto Basophil # : 0.01 K/uL  Auto Neutrophil % : 93.1 %  Auto Lymphocyte % : 3.7 %  Auto Monocyte % : 1.8 %  Auto Eosinophil % : 0.8 %  Auto Basophil % : 0.3 %      10-28    138  |  103  |  10  ----------------------------<  95  4.0   |  24  |  0.85    Ca    9.4      28 Oct 2021 06:53  Phos  3.8     10-28  Mg     2.2     10-28    TPro  6.2  /  Alb  3.7  /  TBili  0.5  /  DBili  x   /  AST  40  /  ALT  90<H>  /  AlkPhos  73  10-28                  RADIOLOGY & ADDITIONAL STUDIES:

## 2021-10-29 NOTE — PROGRESS NOTE ADULT - PROBLEM SELECTOR PLAN 1
continue Cycle 2 HiDAC  Cytarabine 3 g/m2 IV over 3 hrs every 12 hrs on days 1,3,5   IV hydration, Antiemetics, daily weight   Monitor for Cerebellar toxicity, nystagmus checks  Follow CBC/CMP, transfuse/replete prn  Continue predforte eye drops to prevent chemical conjunctivitis  Discharge planning on Saturday 10/30  Monitor ALT transaminitis  Monitor leg pain, oxycodone or Tylenol PRN.  10/28- Constipation- Will start Miralax. continue chemotherapy with Cycle 2 HiDAC  Cytarabine 3 g/m2 IV over 3 hrs every 12 hrs on days 1,3,5   IV hydration with strict I/O  Antiemetics, daily weight   Monitor for Cerebellar toxicity, nystagmus checks  Follow CBC/CMP, transfuse/replete prn  Continue predforte eye drops to prevent chemical conjunctivitis  Discharge planning on Saturday 10/30  Monitor ALT transaminitis  Monitor leg pain, oxycodone or Tylenol PRN.  10/28- Constipation- Will start Miralax. Continue chemotherapy with Cycle 2 HiDAC  Cytarabine 3 g/m2 IV over 3 hrs every 12 hrs on days 1,3,5   IV hydration with strict I/O  Antiemetics, daily weight   Monitor for Cerebellar toxicity, nystagmus checks  Follow CBC and transfuse prn  Monitor CMP andreplete prn  Continue predforte eye drops to prevent chemical conjunctivitis  Discharge planning for Saturday 10/30  Monitor ALT transaminitis  Monitor leg pain, oxycodone or Tylenol PRN.  10/28 Constipation- Will start Miralax. Continue chemotherapy with Cycle 2 HiDAC  Cytarabine 3 g/m2 IV over 3 hrs every 12 hrs on days 1,3,5   IV hydration with strict I/O  Antiemetics, daily weight   Monitor for Cerebellar toxicity, nystagmus checks  Follow CBC and transfuse prn  Monitor CMP andreplete prn  Continue predforte eye drops to prevent chemical conjunctivitis  Discharge planning for Sunday. (delay one day secondary to fevers) Patient is aware.  Monitor ALT transaminitis  Monitor leg pain, oxycodone or Tylenol PRN.  10/28 Constipation- Will start Miralax.  10/29 Fever most probably related to cytarabine. Will give dexamethasone prior to last 2 doses of cytarabine.

## 2021-10-29 NOTE — PROGRESS NOTE ADULT - PROBLEM SELECTOR PLAN 1
continue Cycle 2 HiDAC  Cytarabine 3 g/m2 IV over 3 hrs every 12 hrs on days 1,3,5   IV hydration, Antiemetics, daily weight   Monitor for Cerebellar toxicity, nystagmus checks  Follow CBC/CMP, transfuse/replete prn  Continue predforte eye drops to prevent chemical conjunctivitis  Discharge planning on Saturday 10/30  Monitor ALT transaminitis  Monitor leg pain, oxycodone or Tylenol PRN.  10/28- Constipation- Will start Miralax.

## 2021-10-29 NOTE — PROGRESS NOTE ADULT - PROBLEM SELECTOR PLAN 2
Pt is not neutropenic, febrile Tmax 102  Fevers likely from HIDAC - follow up cultures  Hold  Abx for now  Continue Acyclovir for PPX   If fever,  pan culture  10/26 - BCX (-).

## 2021-10-29 NOTE — DISCHARGE NOTE NURSING/CASE MANAGEMENT/SOCIAL WORK - NSDCFUADDAPPT_GEN_ALL_CORE_FT
To [facility] on Monday 11/1 at 11 am to see PARAM Pedersen and at 2:40pm for possible Platelet transfusion     To [facility] on Thursday 11/4 at 3:10Pm for blood work and possible Platelet transfusion

## 2021-10-29 NOTE — PROGRESS NOTE ADULT - SUBJECTIVE AND OBJECTIVE BOX
Diagnosis    Protocol/Chemo Regimen:  Day:      Pt endorsed:    Review of Systems:      Pain scale:                                        Location:    Diet:     Allergies    No Known Allergies    Intolerances    cefepime (Rash)      ANTIMICROBIALS      HEME/ONC MEDICATIONS  enoxaparin Injectable 40 milliGRAM(s) SubCutaneous at bedtime      STANDING MEDICATIONS  amLODIPine   Tablet 5 milliGRAM(s) Oral daily  Biotene Dry Mouth Oral Rinse 15 milliLiter(s) Swish and Spit four times a day  chlorhexidine 4% Liquid 1 Application(s) Topical <User Schedule>  influenza   Vaccine 0.5 milliLiter(s) IntraMuscular once  ondansetron Injectable 8 milliGRAM(s) IV Push every 8 hours  polyethylene glycol 3350 17 Gram(s) Oral daily  prednisoLONE acetate 1% Suspension 2 Drop(s) Both EYES every 6 hours  senna 2 Tablet(s) Oral at bedtime  sodium chloride 0.9%. 1000 milliLiter(s) IV Continuous <Continuous>      PRN MEDICATIONS  acetaminophen     Tablet .. 650 milliGRAM(s) Oral every 6 hours PRN  metoclopramide Injectable 10 milliGRAM(s) IV Push every 6 hours PRN  oxyCODONE    IR 2.5 milliGRAM(s) Oral every 4 hours PRN  sodium chloride 0.9% lock flush 10 milliLiter(s) IV Push every 1 hour PRN        Vital Signs Last 24 Hrs  T(C): 37.3 (29 Oct 2021 05:13), Max: 39 (28 Oct 2021 21:50)  T(F): 99.1 (29 Oct 2021 05:13), Max: 102.2 (28 Oct 2021 21:50)  HR: 92 (29 Oct 2021 05:13) (92 - 126)  BP: 106/70 (29 Oct 2021 05:13) (99/65 - 110/71)  BP(mean): --  RR: 18 (29 Oct 2021 05:13) (18 - 18)  SpO2: 96% (29 Oct 2021 05:13) (96% - 100%)    PHYSICAL EXAM  General: adult in NAD  HEENT: clear oropharynx, anicteric sclera, pink conjunctiva  Neck: supple  CV: normal S1/S2 RRR  Lungs: positive air movement b/l ant lungs,clear to auscultation, no wheezes, no rales  Abdomen: soft non-tender non-distended, no hepatosplenomegaly  Ext: no clubbing cyanosis or edema  Skin: no rashes and no petechiae  Neuro: alert and oriented X 3, no focal deficits  Central Line: normal    LABS:                     Diagnosis : AML FLT 3 (-)    Protocol/Chemo Regimen: Cycle 2 consolidation HIDAC  Day: 5     Pt endorsed:    Review of Systems:      Pain scale:                                        Location:    Diet: Regular    Allergies: No Known Allergies    Intolerances    cefepime (Rash)      ANTIMICROBIALS      HEME/ONC MEDICATIONS  enoxaparin Injectable 40 milliGRAM(s) SubCutaneous at bedtime      STANDING MEDICATIONS  amLODIPine   Tablet 5 milliGRAM(s) Oral daily  Biotene Dry Mouth Oral Rinse 15 milliLiter(s) Swish and Spit four times a day  chlorhexidine 4% Liquid 1 Application(s) Topical <User Schedule>  influenza   Vaccine 0.5 milliLiter(s) IntraMuscular once  ondansetron Injectable 8 milliGRAM(s) IV Push every 8 hours  polyethylene glycol 3350 17 Gram(s) Oral daily  prednisoLONE acetate 1% Suspension 2 Drop(s) Both EYES every 6 hours  senna 2 Tablet(s) Oral at bedtime  sodium chloride 0.9%. 1000 milliLiter(s) IV Continuous <Continuous>      PRN MEDICATIONS  acetaminophen     Tablet .. 650 milliGRAM(s) Oral every 6 hours PRN  metoclopramide Injectable 10 milliGRAM(s) IV Push every 6 hours PRN  oxyCODONE    IR 2.5 milliGRAM(s) Oral every 4 hours PRN  sodium chloride 0.9% lock flush 10 milliLiter(s) IV Push every 1 hour PRN        Vital Signs Last 24 Hrs  T(C): 37.3 (29 Oct 2021 05:13), Max: 39 (28 Oct 2021 21:50)  T(F): 99.1 (29 Oct 2021 05:13), Max: 102.2 (28 Oct 2021 21:50)  HR: 92 (29 Oct 2021 05:13) (92 - 126)  BP: 106/70 (29 Oct 2021 05:13) (99/65 - 110/71)  BP(mean): --  RR: 18 (29 Oct 2021 05:13) (18 - 18)  SpO2: 96% (29 Oct 2021 05:13) (96% - 100%)    PHYSICAL EXAM  General: adult in NAD  HEENT: clear oropharynx   CV: normal S1/S2 RRR  Lungs: ,clear to auscultation, no wheezes, no rales  Abdomen: soft non-tender non-distended   Ext: no edema  Skin: no rashes and no petechiae  Neuro: alert and oriented X 3  Central Line: PICC     LABS:                     Diagnosis : AML FLT 3 (-)    Protocol/Chemo Regimen: Cycle 2 consolidation HIDAC  Day: 5     Pt endorsed: No appetite, constipated, still with fevers, no chills, occasional headache with fever, No further leg pain    Review of Systems: Patient denies nausea, vomiting, chest pain, cough, dyspnea,  rash,    Pain scale:   N/A                                     Location: N/A    Diet: Regular    Allergies: No Known Allergies    Intolerances    cefepime (Rash)      ANTIMICROBIALS      HEME/ONC MEDICATIONS  enoxaparin Injectable 40 milliGRAM(s) SubCutaneous at bedtime      STANDING MEDICATIONS  amLODIPine   Tablet 5 milliGRAM(s) Oral daily  Biotene Dry Mouth Oral Rinse 15 milliLiter(s) Swish and Spit four times a day  chlorhexidine 4% Liquid 1 Application(s) Topical <User Schedule>  influenza   Vaccine 0.5 milliLiter(s) IntraMuscular once  ondansetron Injectable 8 milliGRAM(s) IV Push every 8 hours  polyethylene glycol 3350 17 Gram(s) Oral daily  prednisoLONE acetate 1% Suspension 2 Drop(s) Both EYES every 6 hours  senna 2 Tablet(s) Oral at bedtime  sodium chloride 0.9%. 1000 milliLiter(s) IV Continuous <Continuous>      PRN MEDICATIONS  acetaminophen     Tablet .. 650 milliGRAM(s) Oral every 6 hours PRN  metoclopramide Injectable 10 milliGRAM(s) IV Push every 6 hours PRN  oxyCODONE    IR 2.5 milliGRAM(s) Oral every 4 hours PRN  sodium chloride 0.9% lock flush 10 milliLiter(s) IV Push every 1 hour PRN        Vital Signs Last 24 Hrs  T(C): 37.3 (29 Oct 2021 05:13), Max: 39 (28 Oct 2021 21:50)  T(F): 99.1 (29 Oct 2021 05:13), Max: 102.2 (28 Oct 2021 21:50)  HR: 92 (29 Oct 2021 05:13) (92 - 126)  BP: 106/70 (29 Oct 2021 05:13) (99/65 - 110/71)  BP(mean): --  RR: 18 (29 Oct 2021 05:13) (18 - 18)  SpO2: 96% (29 Oct 2021 05:13) (96% - 100%)    PHYSICAL EXAM  General: adult in NAD  HEENT: clear oropharynx   CV: normal S1/S2 RRR  Lungs: ,clear to auscultation, no wheezes, no rales  Abdomen: soft non-tender non-distended   Ext: no edema  Skin: no rashes and no petechiae  Neuro: alert and oriented X 3 no nystagmus  Central Line: PICC     LABS:                        8.8    3.74  )-----------( 108      ( 29 Oct 2021 07:01 )             26.2         Mean Cell Volume : 92.3 fl  Mean Cell Hemoglobin : 31.0 pg  Mean Cell Hemoglobin Concentration : 33.6 gm/dL  Auto Neutrophil # : 3.54 K/uL  Auto Lymphocyte # : 0.14 K/uL  Auto Monocyte # : 0.03 K/uL  Auto Eosinophil # : 0.01 K/uL  Auto Basophil # : 0.01 K/uL  Auto Neutrophil % : 94.6 %  Auto Lymphocyte % : 3.7 %  Auto Monocyte % : 0.8 %  Auto Eosinophil % : 0.3 %  Auto Basophil % : 0.3 %      10-29    139  |  100  |  8   ----------------------------<  92  3.8   |  24  |  0.82    Ca    9.4      29 Oct 2021 07:00  Phos  4.3     10-29  Mg     2.1     10-29    TPro  6.6  /  Alb  4.0  /  TBili  0.7  /  DBili  x   /  AST  25  /  ALT  69<H>  /  AlkPhos  70  10-29

## 2021-10-29 NOTE — PROGRESS NOTE ADULT - PROBLEM SELECTOR PLAN 4
Lovenox 40 mg SQ daily  Hold when platelet count < 50  Encourage ambulation Lovenox 40 mg SQ daily for VTE prophylaxis  Hold lovenox when platelet count < 50  Encourage ambulation        contact: [phone]

## 2021-10-29 NOTE — PROGRESS NOTE ADULT - ATTENDING COMMENTS
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

## 2021-10-29 NOTE — PROGRESS NOTE ADULT - PROBLEM SELECTOR PLAN 2
Pt is not neutropenic, febrile Tmax 102  Fevers likely from HIDAC - follow up cultures  Hold  Abx for now  Continue Acyclovir for PPX   If fever,  pan culture  10/26 - BCX (-). Pt is not neutropenic however remains febrile Tmax 102.2  Fevers could be related to high dose cytarabine   Follow cultures  Remains off antibiotics as not neutropenic  Continue Acyclovir for prophylaxis  Continue to panculture when febrile  Cultures negative to date

## 2021-10-30 LAB
ALBUMIN SERPL ELPH-MCNC: 4 G/DL — SIGNIFICANT CHANGE UP (ref 3.3–5)
ALP SERPL-CCNC: 79 U/L — SIGNIFICANT CHANGE UP (ref 40–120)
ALT FLD-CCNC: 120 U/L — HIGH (ref 10–45)
ANION GAP SERPL CALC-SCNC: 14 MMOL/L — SIGNIFICANT CHANGE UP (ref 5–17)
AST SERPL-CCNC: 56 U/L — HIGH (ref 10–40)
BASOPHILS # BLD AUTO: 0 K/UL — SIGNIFICANT CHANGE UP (ref 0–0.2)
BASOPHILS NFR BLD AUTO: 0 % — SIGNIFICANT CHANGE UP (ref 0–2)
BILIRUB SERPL-MCNC: 0.4 MG/DL — SIGNIFICANT CHANGE UP (ref 0.2–1.2)
BUN SERPL-MCNC: 13 MG/DL — SIGNIFICANT CHANGE UP (ref 7–23)
CALCIUM SERPL-MCNC: 9.5 MG/DL — SIGNIFICANT CHANGE UP (ref 8.4–10.5)
CHLORIDE SERPL-SCNC: 103 MMOL/L — SIGNIFICANT CHANGE UP (ref 96–108)
CO2 SERPL-SCNC: 21 MMOL/L — LOW (ref 22–31)
CREAT SERPL-MCNC: 0.64 MG/DL — SIGNIFICANT CHANGE UP (ref 0.5–1.3)
EOSINOPHIL # BLD AUTO: 0 K/UL — SIGNIFICANT CHANGE UP (ref 0–0.5)
EOSINOPHIL NFR BLD AUTO: 0 % — SIGNIFICANT CHANGE UP (ref 0–6)
GLUCOSE SERPL-MCNC: 145 MG/DL — HIGH (ref 70–99)
HCT VFR BLD CALC: 25.7 % — LOW (ref 39–50)
HGB BLD-MCNC: 9 G/DL — LOW (ref 13–17)
LYMPHOCYTES # BLD AUTO: 0.1 K/UL — LOW (ref 1–3.3)
LYMPHOCYTES # BLD AUTO: 2.8 % — LOW (ref 13–44)
MAGNESIUM SERPL-MCNC: 2.2 MG/DL — SIGNIFICANT CHANGE UP (ref 1.6–2.6)
MANUAL SMEAR VERIFICATION: SIGNIFICANT CHANGE UP
MCHC RBC-ENTMCNC: 31.6 PG — SIGNIFICANT CHANGE UP (ref 27–34)
MCHC RBC-ENTMCNC: 35 GM/DL — SIGNIFICANT CHANGE UP (ref 32–36)
MCV RBC AUTO: 90.2 FL — SIGNIFICANT CHANGE UP (ref 80–100)
MONOCYTES # BLD AUTO: 0 K/UL — SIGNIFICANT CHANGE UP (ref 0–0.9)
MONOCYTES NFR BLD AUTO: 0 % — LOW (ref 2–14)
NEUTROPHILS # BLD AUTO: 3.48 K/UL — SIGNIFICANT CHANGE UP (ref 1.8–7.4)
NEUTROPHILS NFR BLD AUTO: 96.2 % — HIGH (ref 43–77)
NEUTS BAND # BLD: 1 % — SIGNIFICANT CHANGE UP (ref 0–8)
PHOSPHATE SERPL-MCNC: 2.1 MG/DL — LOW (ref 2.5–4.5)
PHOSPHATE SERPL-MCNC: 2.8 MG/DL — SIGNIFICANT CHANGE UP (ref 2.5–4.5)
PLAT MORPH BLD: NORMAL — SIGNIFICANT CHANGE UP
PLATELET # BLD AUTO: 103 K/UL — LOW (ref 150–400)
POTASSIUM SERPL-MCNC: 4.1 MMOL/L — SIGNIFICANT CHANGE UP (ref 3.5–5.3)
POTASSIUM SERPL-SCNC: 4.1 MMOL/L — SIGNIFICANT CHANGE UP (ref 3.5–5.3)
PROT SERPL-MCNC: 6.7 G/DL — SIGNIFICANT CHANGE UP (ref 6–8.3)
RBC # BLD: 2.85 M/UL — LOW (ref 4.2–5.8)
RBC # FLD: 17.1 % — HIGH (ref 10.3–14.5)
RBC BLD AUTO: SIGNIFICANT CHANGE UP
SODIUM SERPL-SCNC: 138 MMOL/L — SIGNIFICANT CHANGE UP (ref 135–145)
WBC # BLD: 3.58 K/UL — LOW (ref 3.8–10.5)
WBC # FLD AUTO: 3.58 K/UL — LOW (ref 3.8–10.5)

## 2021-10-30 PROCEDURE — 99233 SBSQ HOSP IP/OBS HIGH 50: CPT

## 2021-10-30 RX ADMIN — ONDANSETRON 8 MILLIGRAM(S): 8 TABLET, FILM COATED ORAL at 06:50

## 2021-10-30 RX ADMIN — Medication 187.67 MILLIGRAM(S): at 04:14

## 2021-10-30 RX ADMIN — Medication 2 DROP(S): at 17:28

## 2021-10-30 RX ADMIN — Medication 250 MILLIMOLE(S): at 09:38

## 2021-10-30 RX ADMIN — Medication 101.6 MILLIGRAM(S): at 03:33

## 2021-10-30 RX ADMIN — Medication 2 DROP(S): at 00:21

## 2021-10-30 RX ADMIN — OXYCODONE HYDROCHLORIDE 2.5 MILLIGRAM(S): 5 TABLET ORAL at 07:04

## 2021-10-30 RX ADMIN — Medication 2 DROP(S): at 06:49

## 2021-10-30 RX ADMIN — OXYCODONE HYDROCHLORIDE 2.5 MILLIGRAM(S): 5 TABLET ORAL at 12:05

## 2021-10-30 RX ADMIN — Medication 15 MILLILITER(S): at 11:26

## 2021-10-30 RX ADMIN — Medication 15 MILLILITER(S): at 06:49

## 2021-10-30 RX ADMIN — Medication 15 MILLILITER(S): at 00:21

## 2021-10-30 RX ADMIN — OXYCODONE HYDROCHLORIDE 2.5 MILLIGRAM(S): 5 TABLET ORAL at 07:50

## 2021-10-30 RX ADMIN — OXYCODONE HYDROCHLORIDE 2.5 MILLIGRAM(S): 5 TABLET ORAL at 17:28

## 2021-10-30 RX ADMIN — SODIUM CHLORIDE 75 MILLILITER(S): 9 INJECTION INTRAMUSCULAR; INTRAVENOUS; SUBCUTANEOUS at 22:37

## 2021-10-30 RX ADMIN — Medication 2 DROP(S): at 11:20

## 2021-10-30 RX ADMIN — CHLORHEXIDINE GLUCONATE 1 APPLICATION(S): 213 SOLUTION TOPICAL at 06:49

## 2021-10-30 RX ADMIN — SODIUM CHLORIDE 75 MILLILITER(S): 9 INJECTION INTRAMUSCULAR; INTRAVENOUS; SUBCUTANEOUS at 06:50

## 2021-10-30 RX ADMIN — AMLODIPINE BESYLATE 5 MILLIGRAM(S): 2.5 TABLET ORAL at 06:50

## 2021-10-30 RX ADMIN — Medication 15 MILLILITER(S): at 17:29

## 2021-10-30 RX ADMIN — ONDANSETRON 8 MILLIGRAM(S): 8 TABLET, FILM COATED ORAL at 22:36

## 2021-10-30 RX ADMIN — ONDANSETRON 8 MILLIGRAM(S): 8 TABLET, FILM COATED ORAL at 13:47

## 2021-10-30 RX ADMIN — OXYCODONE HYDROCHLORIDE 2.5 MILLIGRAM(S): 5 TABLET ORAL at 11:24

## 2021-10-30 NOTE — PROGRESS NOTE ADULT - ASSESSMENT
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 Now admitted for cycle 2 Consolidation with HIDAC      # AML   care as per onc team  chemo cycle as per orders  IV Hydration   patient states that he gets LE muscle cramping after each session, monitor fo electrolytes abnormalities  fall precautions   oral feeding     # ANemia  Monitor hgb level  no gross bleeding   monitor levels  hematology follow up   transfuse to keep hgb above 7       # Fever , continuous   T max 103  Pan cx/ RVP negative to date   broad spectrum antibiotics PRN if positive W/U  IV hydration   its likely due to active chemo  monitor CBC     # HTN  resume home BP meds   monitor vitals     DVT and gI PPX       Discussed in detail with patient and his father at the bedside   D.C planing for likely tomorrow

## 2021-10-30 NOTE — ADVANCED PRACTICE NURSE CONSULT - REASON FOR CONSULT
Chemotherapy Notes : Day 5/5 HIDAC ,dose 1/2 cycle 2 consent in file 
Infusion of 10/30/2021 - Cytarabine 3gm/m2 = 6300 mg IV over 3hrs.  Given on Days 1, 3 & 5.
Chemotherapy Notes : Day 1/5 HIDAC ,dose 1/2 cycle 2 consent in file 
Chemotherapy Notes : Day 3/5 HIDAC ,dose 1/2 cycle 2 consent in file 
Chemotherapy cytarabine 
Chemotherapy Note;  Hidac  Day 1/5 of Cytarabine

## 2021-10-30 NOTE — PROGRESS NOTE ADULT - SUBJECTIVE AND OBJECTIVE BOX
Diagnosis : AML FLT 3 (-)    Protocol/Chemo Regimen: Cycle 2 consolidation HIDAC  Day: 6     Pt endorsed: No appetite, constipated, still with fevers, no chills, occasional headache with fever, No further leg pain    Review of Systems: Patient denies nausea, vomiting, chest pain, cough, dyspnea,  rash,    Pain scale:   N/A                                     Location: N/A    Diet: Regular    Allergies    No Known Allergies    Intolerances    cefepime (Rash)      ANTIMICROBIALS      HEME/ONC MEDICATIONS  enoxaparin Injectable 40 milliGRAM(s) SubCutaneous at bedtime      STANDING MEDICATIONS  amLODIPine   Tablet 5 milliGRAM(s) Oral daily  Biotene Dry Mouth Oral Rinse 15 milliLiter(s) Swish and Spit four times a day  chlorhexidine 4% Liquid 1 Application(s) Topical <User Schedule>  influenza   Vaccine 0.5 milliLiter(s) IntraMuscular once  ondansetron Injectable 8 milliGRAM(s) IV Push every 8 hours  polyethylene glycol 3350 17 Gram(s) Oral daily  prednisoLONE acetate 1% Suspension 2 Drop(s) Both EYES every 6 hours  senna 2 Tablet(s) Oral at bedtime  sodium chloride 0.9%. 1000 milliLiter(s) IV Continuous <Continuous>      PRN MEDICATIONS  acetaminophen     Tablet .. 650 milliGRAM(s) Oral every 6 hours PRN  metoclopramide Injectable 10 milliGRAM(s) IV Push every 6 hours PRN  oxyCODONE    IR 2.5 milliGRAM(s) Oral every 4 hours PRN  sodium chloride 0.9% lock flush 10 milliLiter(s) IV Push every 1 hour PRN    Vital Signs Last 24 Hrs  T(C): 36.4 (30 Oct 2021 05:45), Max: 36.8 (29 Oct 2021 09:00)  T(F): 97.6 (30 Oct 2021 05:45), Max: 98.3 (29 Oct 2021 09:00)  HR: 69 (30 Oct 2021 05:45) (69 - 108)  BP: 111/69 (30 Oct 2021 05:45) (101/65 - 122/77)  BP(mean): --  RR: 18 (30 Oct 2021 05:45) (18 - 18)  SpO2: 97% (30 Oct 2021 05:45) (96% - 99%)    PHYSICAL EXAM  General: adult in NAD  HEENT: clear oropharynx   CV: normal S1/S2 RRR  Lungs: ,clear to auscultation, no wheezes, no rales  Abdomen: soft non-tender non-distended   Ext: no edema  Skin: no rashes and no petechiae  Neuro: alert and oriented X 3 no nystagmus  Central Line: PICC       LABS:                        Diagnosis : AML FLT 3 (-)    Protocol/Chemo Regimen: Cycle 2 consolidation HIDAC  Day: 6     Pt endorsed: No complaints.     Review of Systems: Patient denies nausea, vomiting, chest pain, cough, dyspnea,  rash,    Pain scale:   N/A                                     Location: N/A    Diet: Regular    Allergies    No Known Allergies    Intolerances    cefepime (Rash)      ANTIMICROBIALS      HEME/ONC MEDICATIONS  enoxaparin Injectable 40 milliGRAM(s) SubCutaneous at bedtime      STANDING MEDICATIONS  amLODIPine   Tablet 5 milliGRAM(s) Oral daily  Biotene Dry Mouth Oral Rinse 15 milliLiter(s) Swish and Spit four times a day  chlorhexidine 4% Liquid 1 Application(s) Topical <User Schedule>  influenza   Vaccine 0.5 milliLiter(s) IntraMuscular once  ondansetron Injectable 8 milliGRAM(s) IV Push every 8 hours  polyethylene glycol 3350 17 Gram(s) Oral daily  prednisoLONE acetate 1% Suspension 2 Drop(s) Both EYES every 6 hours  senna 2 Tablet(s) Oral at bedtime  sodium chloride 0.9%. 1000 milliLiter(s) IV Continuous <Continuous>      PRN MEDICATIONS  acetaminophen     Tablet .. 650 milliGRAM(s) Oral every 6 hours PRN  metoclopramide Injectable 10 milliGRAM(s) IV Push every 6 hours PRN  oxyCODONE    IR 2.5 milliGRAM(s) Oral every 4 hours PRN  sodium chloride 0.9% lock flush 10 milliLiter(s) IV Push every 1 hour PRN    Vital Signs Last 24 Hrs  T(C): 36.4 (30 Oct 2021 05:45), Max: 36.8 (29 Oct 2021 09:00)  T(F): 97.6 (30 Oct 2021 05:45), Max: 98.3 (29 Oct 2021 09:00)  HR: 69 (30 Oct 2021 05:45) (69 - 108)  BP: 111/69 (30 Oct 2021 05:45) (101/65 - 122/77)  BP(mean): --  RR: 18 (30 Oct 2021 05:45) (18 - 18)  SpO2: 97% (30 Oct 2021 05:45) (96% - 99%)    PHYSICAL EXAM  General: adult in NAD  HEENT: clear oropharynx   CV: normal S1/S2 RRR  Lungs: ,clear to auscultation, no wheezes, no rales  Abdomen: soft non-tender non-distended   Ext: no edema  Skin: no rashes and no petechiae  Neuro: alert and oriented X 3 no nystagmus  Central Line: PICC     LABS:    Blood Cultures:                           9.0    3.58  )-----------( 103      ( 30 Oct 2021 07:17 )             25.7         Mean Cell Volume : 90.2 fl  Mean Cell Hemoglobin : 31.6 pg  Mean Cell Hemoglobin Concentration : 35.0 gm/dL  Auto Neutrophil # : 3.48 K/uL  Auto Lymphocyte # : 0.10 K/uL  Auto Monocyte # : 0.00 K/uL  Auto Eosinophil # : 0.00 K/uL  Auto Basophil # : 0.00 K/uL  Auto Neutrophil % : 96.2 %  Auto Lymphocyte % : 2.8 %  Auto Monocyte % : 0.0 %  Auto Eosinophil % : 0.0 %  Auto Basophil % : 0.0 %      10-30    138  |  103  |  13  ----------------------------<  145<H>  4.1   |  21<L>  |  0.64    Ca    9.5      30 Oct 2021 07:17  Phos  2.1     10-30  Mg     2.2     10-30    TPro  6.7  /  Alb  4.0  /  TBili  0.4  /  DBili  x   /  AST  56<H>  /  ALT  120<H>  /  AlkPhos  79  10-30      Mg 2.2  Phos 2.1

## 2021-10-30 NOTE — PROGRESS NOTE ADULT - PROBLEM SELECTOR PLAN 2
Pt is not neutropenic however remains febrile Tmax 102.2  Fevers could be related to high dose cytarabine   Follow cultures  Remains off antibiotics as not neutropenic  Continue Acyclovir for prophylaxis  Continue to panculture when febrile  Cultures negative to date Pt is not neutropenic however intermittent fever.   Fevers could be related to high dose cytarabine   Follow cultures  Remains off antibiotics as not neutropenic  Continue Acyclovir for prophylaxis  Continue to panculture when febrile  Cultures negative to date

## 2021-10-30 NOTE — PROGRESS NOTE ADULT - PROBLEM SELECTOR PLAN 1
Continue chemotherapy with Cycle 2 HiDAC  Cytarabine 3 g/m2 IV over 3 hrs every 12 hrs on days 1,3,5   IV hydration with strict I/O  Antiemetics, daily weight   Monitor for Cerebellar toxicity, nystagmus checks  Follow CBC and transfuse prn  Monitor CMP andreplete prn  Continue predforte eye drops to prevent chemical conjunctivitis  Discharge planning for Sunday. (delay one day secondary to fevers) Patient is aware.  Monitor ALT transaminitis  Monitor leg pain, oxycodone or Tylenol PRN.  10/28 Constipation- Will start Miralax.  10/29 Fever most probably related to cytarabine. Will give dexamethasone prior to last 2 doses of cytarabine. Continue chemotherapy with Cycle 2 HiDAC  Cytarabine 3 g/m2 IV over 3 hrs every 12 hrs on days 1,3,5   IV hydration with strict I/O, Antiemetics, daily weight , Monitor for Cerebellar toxicity, nystagmus checks  Follow CBC and transfuse prn, Monitor CMP andreplete prn  Continue predforte eye drops to prevent chemical conjunctivitis  Discharge planning for Sunday. (delay one day secondary to fevers) Patient is aware.  Monitor ALT transaminitis  10/29 Fever most probably related to cytarabine. Will give dexamethasone prior to last 2 doses of cytarabine.  hypophosphatemia-replace phos.

## 2021-10-30 NOTE — PROGRESS NOTE ADULT - SUBJECTIVE AND OBJECTIVE BOX
Name of Patient : JAK DANIELS  MRN: [id number]  Date of visit: 10-30-21 @ 17:00      Subjective: Patient seen and examined. No new events except as noted.   doing okay   afebrile  appetite back     REVIEW OF SYSTEMS:    CONSTITUTIONAL: No weakness, fevers or chills  EYES/ENT: No visual changes;  No vertigo or throat pain   NECK: No pain or stiffness  RESPIRATORY: No cough, wheezing, hemoptysis; No shortness of breath  CARDIOVASCULAR: No chest pain or palpitations  GASTROINTESTINAL: No abdominal or epigastric pain. No nausea, vomiting, or hematemesis; No diarrhea or constipation. No melena or hematochezia.  GENITOURINARY: No dysuria, frequency or hematuria  NEUROLOGICAL: No numbness or weakness  SKIN: No itching, burning, rashes, or lesions   All other review of systems is negative unless indicated above.    MEDICATIONS:  MEDICATIONS  (STANDING):  amLODIPine   Tablet 5 milliGRAM(s) Oral daily  Biotene Dry Mouth Oral Rinse 15 milliLiter(s) Swish and Spit four times a day  chlorhexidine 4% Liquid 1 Application(s) Topical <User Schedule>  enoxaparin Injectable 40 milliGRAM(s) SubCutaneous at bedtime  influenza   Vaccine 0.5 milliLiter(s) IntraMuscular once  ondansetron Injectable 8 milliGRAM(s) IV Push every 8 hours  polyethylene glycol 3350 17 Gram(s) Oral daily  prednisoLONE acetate 1% Suspension 2 Drop(s) Both EYES every 6 hours  senna 2 Tablet(s) Oral at bedtime  sodium chloride 0.9%. 1000 milliLiter(s) (75 mL/Hr) IV Continuous <Continuous>      PHYSICAL EXAM:  T(C): 36.7 (10-30-21 @ 13:30), Max: 36.7 (10-29-21 @ 21:31)  HR: 101 (10-30-21 @ 13:30) (69 - 101)  BP: 122/77 (10-30-21 @ 13:30) (104/63 - 122/77)  RR: 18 (10-30-21 @ 13:30) (18 - 18)  SpO2: 96% (10-30-21 @ 13:30) (96% - 99%)  Wt(kg): --  I&O's Summary    29 Oct 2021 07:01  -  30 Oct 2021 07:00  --------------------------------------------------------  IN: 3416 mL / OUT: 1000 mL / NET: 2416 mL    30 Oct 2021 07:01  -  30 Oct 2021 17:00  --------------------------------------------------------  IN: 600 mL / OUT: 0 mL / NET: 600 mL          Appearance: Normal	  HEENT:  PERRLA   Lymphatic: No lymphadenopathy   Cardiovascular: Normal S1 S2, no JVD  Respiratory: normal effort , clear  Gastrointestinal:  Soft, Non-tender  Skin: No rashes,  warm to touch  Psychiatry:  Mood & affect appropriate  Musculuskeletal: No edema      All labs, Imaging and EKGs personally reviewed     10-29-21 @ 07:01  -  10-30-21 @ 07:00  --------------------------------------------------------  IN: 3416 mL / OUT: 1000 mL / NET: 2416 mL    10-30-21 @ 07:01  -  10-30-21 @ 17:00  --------------------------------------------------------  IN: 600 mL / OUT: 0 mL / NET: 600 mL                          9.0    3.58  )-----------( 103      ( 30 Oct 2021 07:17 )             25.7               10-30    138  |  103  |  13  ----------------------------<  145<H>  4.1   |  21<L>  |  0.64    Ca    9.5      30 Oct 2021 07:17  Phos  2.1     10-30  Mg     2.2     10-30    TPro  6.7  /  Alb  4.0  /  TBili  0.4  /  DBili  x   /  AST  56<H>  /  ALT  120<H>  /  AlkPhos  79  10-30

## 2021-10-30 NOTE — PROGRESS NOTE ADULT - ATTENDING COMMENTS
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 Febrile to 102.2 again last night - f/u repeat Cx's, likely secondary to Cytarabine. Will premed with Dex for day 5 doses  Pain control for bone pains  Hemodynamically stable.  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 Febrile to 102.2 again night of 10/28 - f/u repeat Cx's, likely secondary to Cytarabine. Will premed with Dex for day 5 doses  Pain control for bone pains  Hemodynamically stable.  Discharge on Sunday (will observe extra day)

## 2021-10-30 NOTE — PROGRESS NOTE ADULT - PROBLEM SELECTOR PLAN 4
Lovenox 40 mg SQ daily for VTE prophylaxis  Hold lovenox when platelet count < 50  Encourage ambulation        contact: [phone] Lovenox 40 mg SQ daily for VTE prophylaxis  Hold lovenox when platelet count < 50          contact: [phone]

## 2021-10-30 NOTE — ADVANCED PRACTICE NURSE CONSULT - ASSESSMENT
Pt alert and oriented X 4.OOB to bathroom .Lab values reviewed on rounds by Dr. Yancey on rounds   with the team.  PT. w/  RT DL PICC . Dressing clean, dry and intact, Site  no swelling or redness noted.Both with positive blood return noted and flushing easily with 10 ML NS. Chemotherapy teaching reinforced, pt verbalized understanding. Drug verification done with 2RNs. Nystagmus check done,  negative result. Pt. received zofran 8 mg IVP  as premedication . At 1622 Cytarabine 3 gm/m2= 6300 mg attached to saline line to PICC infusing well at 188ml/hr via alaris pump over 3hours . Pt. tolerating well.  Report was given to primary RN. Left pt. comfortable in bed. 
 Pt alert and oriented X 4.OOB to bathroom and hallway .Lab values reviewed on rounds by Dr. Yancey on rounds   with the team.  PT. w/  RT DL PICC, S/P CXR ,  with +blood return and flushing well with 10 ml NS.  Dressing clean, dry and intact, Site  no swelling or redness noted. Chemotherapy teaching reinforced, pt verbalized understanding. Drug verification done with 2RNs. Nystagmus check done,  negative result. Pt. received zofran 8 mg IVP and emend 150 mg IVSS as premedication . At 1610 Cytarabine 3 gm/m2= 6300 mg attached to saline line to PICC infusing well at 188ml/hr via alaris pump over 3hours . Pt. tolerating well.  Report was given to primary RN. Left pt. comfortable in bed. 
Pt lying in bed in no apparent distress.  Pt with R dbl lumen PICC that flushes easily with NS and has a brisk blood return.  Nystagmus negative labs  reviewed.  
 Pt alert and oriented X 4.OOB to bathroom .Lab values reviewed on rounds by Dr. Yancey on rounds  with the team.  Pt. with  RT DL PICC, Dressing clean, dry and intact, Site  no swelling or redness noted. Both with positive blood return noted and flushing easily with 10 ML NS. Chemotherapy teaching reinforced, pt verbalized understanding. Drug verification done with 2RNs. Nystagmus check done,  negative result. Pt. received zofran 8 mg IVP  as premedication . At 1606 Cytarabine 3 gm/m2= 6300 mg attached to saline line to PICC infusing well at 188ml/hr via alaris pump over 3hours . Pt. tolerating well.  Report was given to primary RN. Left pt. comfortable in bed. 
Pt seen in bed, awake, alert and oriented x 3. Rt arm double lumen Piccline in place with dressing dry and intact with no s/s of bleeding, swelling or redness. Which has positive blood return and flushing well with 10cc Normal saline. Lab results wnl, and MD aware . Reeducate pt about chemotherapy administration, possible side effects and expected outcome. Pt verbalized understandings. Zofran 8 mg ivss given Q8hrs as ordered..Pt denies any nausea or vomitting. Chemo drug dosage verified with another RN. Bilateral Nystagmous remains negative. Day 1 of Cytarabine 3gm /m2 = 6300 mg iv infusion given at 0445 on 10/26/21 over 3 hrs through Rt arm Piccline with positive blood return via Alaris pump.Pt resting in bed comfortably.   
Nystagmus check (-), alert & oriented X 4, fatigued, c/o generalized pain, Right DL PICC, patent, with positive blood return. Infusion was checked & verified by 2RNs.

## 2021-10-31 VITALS
OXYGEN SATURATION: 97 % | RESPIRATION RATE: 18 BRPM | SYSTOLIC BLOOD PRESSURE: 120 MMHG | TEMPERATURE: 98 F | DIASTOLIC BLOOD PRESSURE: 79 MMHG | HEART RATE: 98 BPM

## 2021-10-31 LAB
ALBUMIN SERPL ELPH-MCNC: 3.5 G/DL — SIGNIFICANT CHANGE UP (ref 3.3–5)
ALP SERPL-CCNC: 68 U/L — SIGNIFICANT CHANGE UP (ref 40–120)
ALT FLD-CCNC: 129 U/L — HIGH (ref 10–45)
ANION GAP SERPL CALC-SCNC: 10 MMOL/L — SIGNIFICANT CHANGE UP (ref 5–17)
AST SERPL-CCNC: 63 U/L — HIGH (ref 10–40)
BASOPHILS # BLD AUTO: 0 K/UL — SIGNIFICANT CHANGE UP (ref 0–0.2)
BASOPHILS NFR BLD AUTO: 0 % — SIGNIFICANT CHANGE UP (ref 0–2)
BILIRUB SERPL-MCNC: 0.3 MG/DL — SIGNIFICANT CHANGE UP (ref 0.2–1.2)
BUN SERPL-MCNC: 13 MG/DL — SIGNIFICANT CHANGE UP (ref 7–23)
CALCIUM SERPL-MCNC: 8.9 MG/DL — SIGNIFICANT CHANGE UP (ref 8.4–10.5)
CHLORIDE SERPL-SCNC: 110 MMOL/L — HIGH (ref 96–108)
CO2 SERPL-SCNC: 22 MMOL/L — SIGNIFICANT CHANGE UP (ref 22–31)
CREAT SERPL-MCNC: 0.74 MG/DL — SIGNIFICANT CHANGE UP (ref 0.5–1.3)
CULTURE RESULTS: SIGNIFICANT CHANGE UP
CULTURE RESULTS: SIGNIFICANT CHANGE UP
EOSINOPHIL # BLD AUTO: 0 K/UL — SIGNIFICANT CHANGE UP (ref 0–0.5)
EOSINOPHIL NFR BLD AUTO: 0 % — SIGNIFICANT CHANGE UP (ref 0–6)
GLUCOSE SERPL-MCNC: 91 MG/DL — SIGNIFICANT CHANGE UP (ref 70–99)
HCT VFR BLD CALC: 22.5 % — LOW (ref 39–50)
HGB BLD-MCNC: 7.6 G/DL — LOW (ref 13–17)
LYMPHOCYTES # BLD AUTO: 0.03 K/UL — LOW (ref 1–3.3)
LYMPHOCYTES # BLD AUTO: 0.9 % — LOW (ref 13–44)
MAGNESIUM SERPL-MCNC: 2.1 MG/DL — SIGNIFICANT CHANGE UP (ref 1.6–2.6)
MANUAL SMEAR VERIFICATION: SIGNIFICANT CHANGE UP
MCHC RBC-ENTMCNC: 31 PG — SIGNIFICANT CHANGE UP (ref 27–34)
MCHC RBC-ENTMCNC: 33.8 GM/DL — SIGNIFICANT CHANGE UP (ref 32–36)
MCV RBC AUTO: 91.8 FL — SIGNIFICANT CHANGE UP (ref 80–100)
MONOCYTES # BLD AUTO: 0 K/UL — SIGNIFICANT CHANGE UP (ref 0–0.9)
MONOCYTES NFR BLD AUTO: 0 % — LOW (ref 2–14)
NEUTROPHILS # BLD AUTO: 3.3 K/UL — SIGNIFICANT CHANGE UP (ref 1.8–7.4)
NEUTROPHILS NFR BLD AUTO: 99.1 % — HIGH (ref 43–77)
PHOSPHATE SERPL-MCNC: 3.8 MG/DL — SIGNIFICANT CHANGE UP (ref 2.5–4.5)
PLAT MORPH BLD: NORMAL — SIGNIFICANT CHANGE UP
PLATELET # BLD AUTO: 78 K/UL — LOW (ref 150–400)
POTASSIUM SERPL-MCNC: 3.7 MMOL/L — SIGNIFICANT CHANGE UP (ref 3.5–5.3)
POTASSIUM SERPL-SCNC: 3.7 MMOL/L — SIGNIFICANT CHANGE UP (ref 3.5–5.3)
PROT SERPL-MCNC: 5.9 G/DL — LOW (ref 6–8.3)
RBC # BLD: 2.45 M/UL — LOW (ref 4.2–5.8)
RBC # FLD: 17.4 % — HIGH (ref 10.3–14.5)
RBC BLD AUTO: SIGNIFICANT CHANGE UP
SODIUM SERPL-SCNC: 142 MMOL/L — SIGNIFICANT CHANGE UP (ref 135–145)
SPECIMEN SOURCE: SIGNIFICANT CHANGE UP
SPECIMEN SOURCE: SIGNIFICANT CHANGE UP
WBC # BLD: 3.33 K/UL — LOW (ref 3.8–10.5)
WBC # FLD AUTO: 3.33 K/UL — LOW (ref 3.8–10.5)

## 2021-10-31 PROCEDURE — 99239 HOSP IP/OBS DSCHRG MGMT >30: CPT

## 2021-10-31 RX ADMIN — Medication 2 DROP(S): at 06:57

## 2021-10-31 RX ADMIN — Medication 15 MILLILITER(S): at 00:53

## 2021-10-31 RX ADMIN — Medication 2 DROP(S): at 00:53

## 2021-10-31 RX ADMIN — Medication 15 MILLILITER(S): at 11:26

## 2021-10-31 RX ADMIN — Medication 2 DROP(S): at 11:25

## 2021-10-31 RX ADMIN — SODIUM CHLORIDE 75 MILLILITER(S): 9 INJECTION INTRAMUSCULAR; INTRAVENOUS; SUBCUTANEOUS at 07:00

## 2021-10-31 RX ADMIN — CHLORHEXIDINE GLUCONATE 1 APPLICATION(S): 213 SOLUTION TOPICAL at 06:59

## 2021-10-31 RX ADMIN — Medication 15 MILLILITER(S): at 06:59

## 2021-10-31 RX ADMIN — ONDANSETRON 8 MILLIGRAM(S): 8 TABLET, FILM COATED ORAL at 06:57

## 2021-10-31 NOTE — PROGRESS NOTE ADULT - SUBJECTIVE AND OBJECTIVE BOX
Diagnosis : AML FLT 3 (-)    Protocol/Chemo Regimen: Cycle 2 consolidation HIDAC  Day: 7     Pt endorsed: No complaints.     Review of Systems: Patient denies nausea, vomiting, chest pain, cough, dyspnea,  rash,    Pain scale:   N/A                                     Location: N/A    Diet: Regular    Allergies    No Known Allergies    Intolerances    cefepime (Rash)      ANTIMICROBIALS      HEME/ONC MEDICATIONS  enoxaparin Injectable 40 milliGRAM(s) SubCutaneous at bedtime      STANDING MEDICATIONS  amLODIPine   Tablet 5 milliGRAM(s) Oral daily  Biotene Dry Mouth Oral Rinse 15 milliLiter(s) Swish and Spit four times a day  chlorhexidine 4% Liquid 1 Application(s) Topical <User Schedule>  influenza   Vaccine 0.5 milliLiter(s) IntraMuscular once  polyethylene glycol 3350 17 Gram(s) Oral daily  prednisoLONE acetate 1% Suspension 2 Drop(s) Both EYES every 6 hours  senna 2 Tablet(s) Oral at bedtime  sodium chloride 0.9%. 1000 milliLiter(s) IV Continuous <Continuous>      PRN MEDICATIONS  acetaminophen     Tablet .. 650 milliGRAM(s) Oral every 6 hours PRN  metoclopramide Injectable 10 milliGRAM(s) IV Push every 6 hours PRN  oxyCODONE    IR 2.5 milliGRAM(s) Oral every 4 hours PRN  sodium chloride 0.9% lock flush 10 milliLiter(s) IV Push every 1 hour PRN        Vital Signs Last 24 Hrs  T(C): 36.7 (31 Oct 2021 04:47), Max: 36.8 (30 Oct 2021 17:12)  T(F): 98.1 (31 Oct 2021 04:47), Max: 98.2 (30 Oct 2021 17:12)  HR: 76 (31 Oct 2021 04:47) (76 - 101)  BP: 99/62 (31 Oct 2021 04:47) (99/62 - 122/77)  BP(mean): --  RR: 18 (31 Oct 2021 04:47) (18 - 18)  SpO2: 97% (31 Oct 2021 04:47) (96% - 99%)    PHYSICAL EXAM  General: adult in NAD  HEENT: clear oropharynx   CV: normal S1/S2 RRR  Lungs: ,clear to auscultation, no wheezes, no rales  Abdomen: soft non-tender non-distended   Ext: no edema  Skin: no rashes and no petechiae  Neuro: alert and oriented X 3 no nystagmus  Central Line: PICC     LABS:     Diagnosis : AML FLT 3 (-)    Protocol/Chemo Regimen: Cycle 2 consolidation HIDAC  Day: 7     Pt endorsed: No complaints.     Review of Systems: Patient denies nausea, vomiting, chest pain, cough, dyspnea,  rash,    Pain scale:   N/A                                     Location: N/A    Diet: Regular    Allergies    No Known Allergies    Intolerances    cefepime (Rash)      ANTIMICROBIALS      HEME/ONC MEDICATIONS  enoxaparin Injectable 40 milliGRAM(s) SubCutaneous at bedtime      STANDING MEDICATIONS  amLODIPine   Tablet 5 milliGRAM(s) Oral daily  Biotene Dry Mouth Oral Rinse 15 milliLiter(s) Swish and Spit four times a day  chlorhexidine 4% Liquid 1 Application(s) Topical <User Schedule>  influenza   Vaccine 0.5 milliLiter(s) IntraMuscular once  polyethylene glycol 3350 17 Gram(s) Oral daily  prednisoLONE acetate 1% Suspension 2 Drop(s) Both EYES every 6 hours  senna 2 Tablet(s) Oral at bedtime  sodium chloride 0.9%. 1000 milliLiter(s) IV Continuous <Continuous>      PRN MEDICATIONS  acetaminophen     Tablet .. 650 milliGRAM(s) Oral every 6 hours PRN  metoclopramide Injectable 10 milliGRAM(s) IV Push every 6 hours PRN  oxyCODONE    IR 2.5 milliGRAM(s) Oral every 4 hours PRN  sodium chloride 0.9% lock flush 10 milliLiter(s) IV Push every 1 hour PRN        Vital Signs Last 24 Hrs  T(C): 36.7 (31 Oct 2021 04:47), Max: 36.8 (30 Oct 2021 17:12)  T(F): 98.1 (31 Oct 2021 04:47), Max: 98.2 (30 Oct 2021 17:12)  HR: 76 (31 Oct 2021 04:47) (76 - 101)  BP: 99/62 (31 Oct 2021 04:47) (99/62 - 122/77)  BP(mean): --  RR: 18 (31 Oct 2021 04:47) (18 - 18)  SpO2: 97% (31 Oct 2021 04:47) (96% - 99%)    PHYSICAL EXAM  General: adult in NAD  HEENT: clear oropharynx   CV: normal S1/S2 RRR  Lungs: ,clear to auscultation, no wheezes, no rales  Abdomen: soft non-tender non-distended   Ext: no edema  Skin: no rashes and no petechiae  Neuro: alert and oriented X 3 no nystagmus, no cerebellar toxicity.   Central Line: PICC     LABS:    Blood Cultures:                           7.6    3.33  )-----------( 78       ( 31 Oct 2021 07:24 )             22.5         Mean Cell Volume : 91.8 fl  Mean Cell Hemoglobin : 31.0 pg  Mean Cell Hemoglobin Concentration : 33.8 gm/dL  Auto Neutrophil # : 3.30 K/uL  Auto Lymphocyte # : 0.03 K/uL  Auto Monocyte # : 0.00 K/uL  Auto Eosinophil # : 0.00 K/uL  Auto Basophil # : 0.00 K/uL  Auto Neutrophil % : 99.1 %  Auto Lymphocyte % : 0.9 %  Auto Monocyte % : 0.0 %  Auto Eosinophil % : 0.0 %  Auto Basophil % : 0.0 %      10-31    142  |  110<H>  |  13  ----------------------------<  91  3.7   |  22  |  0.74    Ca    8.9      31 Oct 2021 07:18  Phos  3.8     10-31  Mg     2.1     10-31    TPro  5.9<L>  /  Alb  3.5  /  TBili  0.3  /  DBili  x   /  AST  63<H>  /  ALT  129<H>  /  AlkPhos  68  10-31      Mg 2.1  Phos 3.8  Mg --  Phos 2.8

## 2021-10-31 NOTE — PROGRESS NOTE ADULT - ASSESSMENT
37yo M with h/o  HTN fatty liver, kidney stones,  and now newly diagnosed AML, NPM1 mutated, FLT-3 negative s/p induction chemotherapy with Daunorubicin/Cytarabine in CR, s/p cycle 1 consolidation with HIDAC (high dose cytarabine) complicated by hospital admission for neutropenic fever and diarrhea. Now admitted for cycle 2 Consolidation with HIDAC.

## 2021-10-31 NOTE — PROGRESS NOTE ADULT - PROBLEM SELECTOR PROBLEM 1
Acute myeloid leukemia (AML), M2

## 2021-10-31 NOTE — PROGRESS NOTE ADULT - REASON FOR ADMISSION
chemotherapy

## 2021-10-31 NOTE — PROGRESS NOTE ADULT - ATTENDING COMMENTS
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 Febrile to 102.2 again night of 10/28 - f/u repeat Cx's, likely secondary to Cytarabine. Will premed with Dex for day 5 doses  Pain control for bone pains  Hemodynamically stable.  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 Febrile to 102.2 again night of 10/28 - f/u repeat Cx's, likely secondary to Cytarabine. Will premed with Dex for day 5 doses  Pain control for bone pains  Hemodynamically stable.  Discharge today s/p 1 unit PRBC.

## 2021-10-31 NOTE — PROGRESS NOTE ADULT - SUBJECTIVE AND OBJECTIVE BOX
Name of Patient : JAK DANIELS  MRN: [id number]  Date of visit: 10-31-21      Subjective: Patient seen and examined. No new events except as noted.   Doing okay   DC Planing with outpatient follow up     REVIEW OF SYSTEMS:    CONSTITUTIONAL: No weakness, fevers or chills  EYES/ENT: No visual changes;  No vertigo or throat pain   NECK: No pain or stiffness  RESPIRATORY: No cough, wheezing, hemoptysis; No shortness of breath  CARDIOVASCULAR: No chest pain or palpitations  GASTROINTESTINAL: No abdominal or epigastric pain. No nausea, vomiting, or hematemesis; No diarrhea or constipation. No melena or hematochezia.  GENITOURINARY: No dysuria, frequency or hematuria  NEUROLOGICAL: No numbness or weakness  SKIN: No itching, burning, rashes, or lesions   All other review of systems is negative unless indicated above.    MEDICATIONS:  MEDICATIONS  (STANDING):  amLODIPine   Tablet 5 milliGRAM(s) Oral daily  Biotene Dry Mouth Oral Rinse 15 milliLiter(s) Swish and Spit four times a day  chlorhexidine 4% Liquid 1 Application(s) Topical <User Schedule>  enoxaparin Injectable 40 milliGRAM(s) SubCutaneous at bedtime  influenza   Vaccine 0.5 milliLiter(s) IntraMuscular once  polyethylene glycol 3350 17 Gram(s) Oral daily  prednisoLONE acetate 1% Suspension 2 Drop(s) Both EYES every 6 hours  senna 2 Tablet(s) Oral at bedtime  sodium chloride 0.9%. 1000 milliLiter(s) (75 mL/Hr) IV Continuous <Continuous>      PHYSICAL EXAM:  T(C): 36.6 (10-31-21 @ 14:19), Max: 36.8 (10-31-21 @ 00:19)  HR: 98 (10-31-21 @ 14:19) (76 - 98)  BP: 120/79 (10-31-21 @ 14:19) (99/62 - 128/71)  RR: 18 (10-31-21 @ 14:19) (18 - 18)  SpO2: 97% (10-31-21 @ 14:19) (97% - 100%)  Wt(kg): --  I&O's Summary    30 Oct 2021 07:01  -  31 Oct 2021 07:00  --------------------------------------------------------  IN: 2851 mL / OUT: 1900 mL / NET: 951 mL    31 Oct 2021 07:01  -  31 Oct 2021 22:11  --------------------------------------------------------  IN: 240 mL / OUT: 2 mL / NET: 238 mL          Appearance: Normal	  HEENT:  PERRLA   Lymphatic: No lymphadenopathy   Cardiovascular: Normal S1 S2, no JVD  Respiratory: normal effort , clear  Gastrointestinal:  Soft, Non-tender  Skin: No rashes,  warm to touch  Psychiatry:  Mood & affect appropriate  Musculuskeletal: No edema      All labs, Imaging and EKGs personally reviewed       10-30-21 @ 07:01  -  10-31-21 @ 07:00  --------------------------------------------------------  IN: 2851 mL / OUT: 1900 mL / NET: 951 mL    10-31-21 @ 07:01  -  10-31-21 @ 22:11  --------------------------------------------------------  IN: 240 mL / OUT: 2 mL / NET: 238 mL                          7.6    3.33  )-----------( 78       ( 31 Oct 2021 07:24 )             22.5               10-31    142  |  110<H>  |  13  ----------------------------<  91  3.7   |  22  |  0.74    Ca    8.9      31 Oct 2021 07:18  Phos  3.8     10-31  Mg     2.1     10-31    TPro  5.9<L>  /  Alb  3.5  /  TBili  0.3  /  DBili  x   /  AST  63<H>  /  ALT  129<H>  /  AlkPhos  68  10-31

## 2021-10-31 NOTE — PROGRESS NOTE ADULT - PROBLEM SELECTOR PLAN 4
Lovenox 40 mg SQ daily for VTE prophylaxis  Hold lovenox when platelet count < 50          contact: [phone]

## 2021-10-31 NOTE — PROGRESS NOTE ADULT - PROBLEM SELECTOR PLAN 2
Pt is not neutropenic however intermittent fever.   Fevers could be related to high dose cytarabine   Follow cultures  Remains off antibiotics as not neutropenic  Continue Acyclovir for prophylaxis  Continue to panculture when febrile  Cultures negative to date

## 2021-10-31 NOTE — PROGRESS NOTE ADULT - PROVIDER SPECIALTY LIST ADULT
Internal Medicine
Heme/Onc
Internal Medicine
Internal Medicine
Heme/Onc

## 2021-10-31 NOTE — PROGRESS NOTE ADULT - ASSESSMENT
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 Now admitted for cycle 2 Consolidation with HIDAC      # AML   care as per onc team  chemo cycle as per orders  IV Hydration   patient states that he gets LE muscle cramping after each session, monitor fo electrolytes abnormalities  fall precautions   oral feeding     # ANemia  Monitor hgb level  no gross bleeding   monitor levels  hematology follow up   transfuse to keep hgb above 7       # Fever , continuous   T max 103  Pan cx/ RVP negative to date   broad spectrum antibiotics PRN if positive W/U  IV hydration   its likely due to active chemo  monitor CBC     # HTN  resume home BP meds   monitor vitals     DVT and gI PPX       Discussed in detail with patient and his father at the bedside   D.C planing

## 2021-10-31 NOTE — PROGRESS NOTE ADULT - PROBLEM SELECTOR PLAN 1
Continue chemotherapy with Cycle 2 HiDAC  Cytarabine 3 g/m2 IV over 3 hrs every 12 hrs on days 1,3,5   IV hydration with strict I/O, Antiemetics, daily weight , Monitor for Cerebellar toxicity, nystagmus checks  Follow CBC and transfuse prn, Monitor CMP andreplete prn  Continue predforte eye drops to prevent chemical conjunctivitis  Discharge planning for Sunday. (delay one day secondary to fevers) Patient is aware.  Monitor ALT transaminitis  10/29 Fever most probably related to cytarabine. Will give dexamethasone prior to last 2 doses of cytarabine.  hypophosphatemia-replace phos. Continue chemotherapy with Cycle 2 HiDAC  Cytarabine 3 g/m2 IV over 3 hrs every 12 hrs on days 1,3,5   IV hydration with strict I/O, Antiemetics, daily weight , Monitor for Cerebellar toxicity, nystagmus checks  Follow CBC and transfuse prn, Monitor CMP andreplete prn  Continue predforte eye drops to prevent chemical conjunctivitis  Discharge planning for Sunday. (delay one day secondary to fevers) Patient is aware.  Monitor ALT transaminitis  10/29 Fever most probably related to cytarabine. Will give dexamethasone prior to last 2 doses of cytarabine.  hypophosphatemia-replace phos.  10/31 discharge planning. PRBC today for anemia.

## 2021-11-01 ENCOUNTER — APPOINTMENT (OUTPATIENT)
Dept: INFUSION THERAPY | Facility: HOSPITAL | Age: 36
End: 2021-11-01

## 2021-11-01 ENCOUNTER — RESULT REVIEW (OUTPATIENT)
Age: 36
End: 2021-11-01

## 2021-11-01 ENCOUNTER — APPOINTMENT (OUTPATIENT)
Dept: HEMATOLOGY ONCOLOGY | Facility: CLINIC | Age: 36
End: 2021-11-01
Payer: COMMERCIAL

## 2021-11-01 VITALS
HEART RATE: 91 BPM | OXYGEN SATURATION: 100 % | WEIGHT: 200.62 LBS | TEMPERATURE: 98 F | RESPIRATION RATE: 16 BRPM | BODY MASS INDEX: 27.78 KG/M2 | SYSTOLIC BLOOD PRESSURE: 138 MMHG | DIASTOLIC BLOOD PRESSURE: 76 MMHG

## 2021-11-01 LAB
ALBUMIN SERPL ELPH-MCNC: 4.3 G/DL
ALP BLD-CCNC: 94 U/L
ALT SERPL-CCNC: 289 U/L
ANION GAP SERPL CALC-SCNC: 14 MMOL/L
AST SERPL-CCNC: 140 U/L
BASOPHILS # BLD AUTO: 0 K/UL — SIGNIFICANT CHANGE UP (ref 0–0.2)
BASOPHILS NFR BLD AUTO: 0 % — SIGNIFICANT CHANGE UP (ref 0–2)
BILIRUB SERPL-MCNC: 0.5 MG/DL
BUN SERPL-MCNC: 13 MG/DL
CALCIUM SERPL-MCNC: 9.5 MG/DL
CHLORIDE SERPL-SCNC: 105 MMOL/L
CO2 SERPL-SCNC: 23 MMOL/L
CREAT SERPL-MCNC: 0.76 MG/DL
EOSINOPHIL # BLD AUTO: 0 K/UL — SIGNIFICANT CHANGE UP (ref 0–0.5)
EOSINOPHIL NFR BLD AUTO: 0 % — SIGNIFICANT CHANGE UP (ref 0–6)
GLUCOSE SERPL-MCNC: 84 MG/DL
HCT VFR BLD CALC: 31.6 % — LOW (ref 39–50)
HGB BLD-MCNC: 10.9 G/DL — LOW (ref 13–17)
LYMPHOCYTES # BLD AUTO: 0.1 K/UL — LOW (ref 1–3.3)
LYMPHOCYTES # BLD AUTO: 7 % — LOW (ref 13–44)
MCHC RBC-ENTMCNC: 31.3 PG — SIGNIFICANT CHANGE UP (ref 27–34)
MCHC RBC-ENTMCNC: 34.5 G/DL — SIGNIFICANT CHANGE UP (ref 32–36)
MCV RBC AUTO: 90.8 FL — SIGNIFICANT CHANGE UP (ref 80–100)
MONOCYTES # BLD AUTO: 0 K/UL — SIGNIFICANT CHANGE UP (ref 0–0.9)
MONOCYTES NFR BLD AUTO: 0 % — LOW (ref 2–14)
NEUTROPHILS # BLD AUTO: 1.32 K/UL — LOW (ref 1.8–7.4)
NEUTROPHILS NFR BLD AUTO: 93 % — HIGH (ref 43–77)
NRBC # BLD: 0 /100 — SIGNIFICANT CHANGE UP (ref 0–0)
NRBC # BLD: SIGNIFICANT CHANGE UP /100 WBCS (ref 0–0)
PLAT MORPH BLD: NORMAL — SIGNIFICANT CHANGE UP
PLATELET # BLD AUTO: 57 K/UL — LOW (ref 150–400)
POTASSIUM SERPL-SCNC: 3.9 MMOL/L
PROT SERPL-MCNC: 6.7 G/DL
RBC # BLD: 3.48 M/UL — LOW (ref 4.2–5.8)
RBC # FLD: 17 % — HIGH (ref 10.3–14.5)
RBC BLD AUTO: SIGNIFICANT CHANGE UP
SODIUM SERPL-SCNC: 141 MMOL/L
WBC # BLD: 1.42 K/UL — LOW (ref 3.8–10.5)
WBC # FLD AUTO: 1.42 K/UL — LOW (ref 3.8–10.5)

## 2021-11-01 PROCEDURE — 99214 OFFICE O/P EST MOD 30 MIN: CPT

## 2021-11-02 NOTE — HISTORY OF PRESENT ILLNESS
[de-identified] : 37yo M w/ HTN and newly diagnosed AML here for f/u. \par \par He presented to the hospital on 7/30/21 with complaints of dizziness, fatigue and severe RUQ pain for 3 days. CT A/P revealed fatty liver and small R pleural effusion. WBC 12k with 17% blasts.Peripheral flow cytometry showed 13% myeloblasts. FLT3(-). BMbx was done on 8/4/21 - confirmed AML. 46,XY      {20      }\par Foundation: mutations in DNMT3A R882H, NRAS G12D, NPM1 W288fs*12\par Pt consented to [place]. Patient was offered sperm banking, but declined.\par On 8/6, induction with Dauno/Cytararbine was started (cytarabine 100/m2 and dauno 90/m2) \par He received a seven day course of Zosyn for presumed RUL PNA, then transitioned to  levaquin, posaconazole and Acyclovir for ppx for neutropenia. Course c/b neutropenic fevers, cultures negative, repeat CT 8/14 without infectious source. He was on Cefepime but developed a rash, changed to meropenem. Rash improved. \par Day 14 BMbx on 8/19 was hypocellular with chemotherapeutic effect; however, per hematopathology, appears to be earlier regeneration than would typically seen which is concerning for persistent disease at this point, and the earliest cells are CD34 positive and his myeloblasts on initial presentation were CD34 negative. Thus, this may simply represent early regeneration of his marrow. Plan is to await count recovery and repeat biopsy.  \par Patient discharged home on 8/29/21 when ANC >500 [de-identified] : Eating well since discharge. \par c/o hemorrhoidal pain. Hemorrhoids started a few days prior to discharge. He was sent home with rectal ointment and witch hazel. \par \par BMBx done on 9/2: Cellular bone marrow with trilineage hematopoiesis with maturation and megakaryocytosis (history of AML).\par Pt feels well, CBC today showed count stable\par \par s/p C1 HIDAC 9/17-9/22 \par \par c/o leg pain after chemo in the hospital \par \par He presented to the ED on 10/9 due to fever the night of presentation. The patient went to sleep around 10pm and woke up at 3am feeling warm and clammy. He took his temperature orally at home and it was 100.7. The day prior to presentation (10/8/21), the patient went to [facility] for an appointment to get his platelet count checked. He states he was feeling a bit run down and thought he was going to need a transfusion, but he did not need a transfusion. He was afebrile during the time of the appointment and went home afterwards. Around 3pm, the patient had bilateral crampy leg pain that was similar to the leg pain he felt during his consolidation therapy treatment. He took hydrocodone and the pain improved. The patient had one episode of diarrhea three days prior to presentation and has been bothered by rectal pain associated with his "large hemorrhoids. He denies any bleeding per rectum. He also feels like his mouth has been more dry than usual over the past several days, but denies all other symptoms. \par CT Abdomen and Pelvis w/ Oral Cont and w/ IV Conttrast on 10/9 revealed Region of hypoenhancement upper pole left kidney; please correlate clinically for possible pyelonephritis. No hydronephrosis or perinephric stranding. No rectal abscess.\par CT Chest No Contrast on 10/9 revealed Clear lungs.\par 10/9- BCX NGTD and 10/9- UCX (-)\par \par He ate some cold salad late last night, had upsetting stomach and diarrhea this morning. Will take Imodium for diarrhea. \par C2 is due on 10/15, pt wants deferring to next Monday after his diarrhea improved. \par \par Admission of  C2 was postponed due to worsening diarrhea. Went to ED on 10/15 due to worsening watery diarrhea. GI PCR neg, C Diff neg\par Given negative stool studies, suspect diarrhea was likely chemo-related. S/p cycle 2 Consolidation with HIDAC started on 10/25. Patient received IV hydration, strict I/O, antiemetics, monitoring of CBC and CMP and for cerebellar toxicity. PRedforte eye drops given to prevent chemical conjunctivitis. Patient with fever throughout course of treatment. Cultures negative. Due to fever probably related to HIDAC, patient received dexamethasone prior to the last 2 doses of cytatabine. \par He is seen today accompanied by his father, pt feels fatigue after chemo, other than that he feels fine. Denied any fever, chills diarrhea. \par \par \par

## 2021-11-02 NOTE — ASSESSMENT
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 \par Started induction with Dauno/Cytarabine on 8/6. \par Pt's counts now recovered, remission marrow later done on 9/2- in remission. Dr. Yancey reviewed Bmbx result with pt on the phone, will proceed to consolidation with 4 cycles of HIDAC. C1 HIDAC on 9/17, c/b neutropenic fever and diarrhea. \par CBC reviewed with pt and father, count recovered, no need for platelet transfusion today\par C2 HIDAC on 10/25, was postponed for 1 week due to admission for diarrhea, Given negative stool studies, suspect diarrhea was likely chemo-related. \par Reviewed CBC today with pt, Platelet today trending down to 57, he does not need platelet transfusion today, will have possible platelet this Thursday then 3 x a week for the following 3 weeks. \par ANC today is 1.32, he is not on levaquine and fluconazole givne he is not neutropenic yet, but will check CBC closely when he comes for possible platelet. Will need to restart Levaquine and fluconazole if ANC < 1.0. Neutropenic fever precaution explained in detail to pt, pt is aware of ED visit for temp >100.4F in the setting of neutropenia. Patient verbalized understanding and agreed.\par cont hemorrhoidal ointment and witch hazel. Sent oxycodone for pain relief\par All questions answered\par RTC in 2-3 weeks \par \par \par Case and management discussed with Dr. Yancey\par \par \par \par \par

## 2021-11-04 ENCOUNTER — APPOINTMENT (OUTPATIENT)
Dept: INFUSION THERAPY | Facility: HOSPITAL | Age: 36
End: 2021-11-04

## 2021-11-04 ENCOUNTER — RESULT REVIEW (OUTPATIENT)
Age: 36
End: 2021-11-04

## 2021-11-04 ENCOUNTER — NON-APPOINTMENT (OUTPATIENT)
Age: 36
End: 2021-11-04

## 2021-11-04 LAB
BASOPHILS # BLD AUTO: 0 K/UL — SIGNIFICANT CHANGE UP (ref 0–0.2)
BASOPHILS NFR BLD AUTO: 0 % — SIGNIFICANT CHANGE UP (ref 0–2)
EOSINOPHIL # BLD AUTO: 0 K/UL — SIGNIFICANT CHANGE UP (ref 0–0.5)
EOSINOPHIL NFR BLD AUTO: 0 % — SIGNIFICANT CHANGE UP (ref 0–6)
HCT VFR BLD CALC: 28.9 % — LOW (ref 39–50)
HGB BLD-MCNC: 10.5 G/DL — LOW (ref 13–17)
IMM GRANULOCYTES NFR BLD AUTO: 1.8 % — HIGH (ref 0–1.5)
LYMPHOCYTES # BLD AUTO: 0.26 K/UL — LOW (ref 1–3.3)
LYMPHOCYTES # BLD AUTO: 45.6 % — HIGH (ref 13–44)
MCHC RBC-ENTMCNC: 31.5 PG — SIGNIFICANT CHANGE UP (ref 27–34)
MCHC RBC-ENTMCNC: 36.3 G/DL — HIGH (ref 32–36)
MCV RBC AUTO: 86.8 FL — SIGNIFICANT CHANGE UP (ref 80–100)
MONOCYTES # BLD AUTO: 0.02 K/UL — SIGNIFICANT CHANGE UP (ref 0–0.9)
MONOCYTES NFR BLD AUTO: 3.5 % — SIGNIFICANT CHANGE UP (ref 2–14)
NEUTROPHILS # BLD AUTO: 0.28 K/UL — LOW (ref 1.8–7.4)
NEUTROPHILS NFR BLD AUTO: 49.1 % — SIGNIFICANT CHANGE UP (ref 43–77)
NRBC # BLD: 0 /100 WBCS — SIGNIFICANT CHANGE UP (ref 0–0)
PLATELET # BLD AUTO: 16 K/UL — CRITICAL LOW (ref 150–400)
RBC # BLD: 3.33 M/UL — LOW (ref 4.2–5.8)
RBC # FLD: 15.3 % — HIGH (ref 10.3–14.5)
WBC # BLD: 0.57 K/UL — CRITICAL LOW (ref 3.8–10.5)
WBC # FLD AUTO: 0.57 K/UL — CRITICAL LOW (ref 3.8–10.5)

## 2021-11-04 PROCEDURE — P9040: CPT

## 2021-11-04 PROCEDURE — 36415 COLL VENOUS BLD VENIPUNCTURE: CPT

## 2021-11-04 PROCEDURE — 86769 SARS-COV-2 COVID-19 ANTIBODY: CPT

## 2021-11-04 PROCEDURE — 80053 COMPREHEN METABOLIC PANEL: CPT

## 2021-11-04 PROCEDURE — P9037: CPT

## 2021-11-04 PROCEDURE — 86901 BLOOD TYPING SEROLOGIC RH(D): CPT

## 2021-11-04 PROCEDURE — 86900 BLOOD TYPING SEROLOGIC ABO: CPT

## 2021-11-04 PROCEDURE — 36430 TRANSFUSION BLD/BLD COMPNT: CPT

## 2021-11-04 PROCEDURE — 83735 ASSAY OF MAGNESIUM: CPT

## 2021-11-04 PROCEDURE — 85025 COMPLETE CBC W/AUTO DIFF WBC: CPT

## 2021-11-04 PROCEDURE — 71045 X-RAY EXAM CHEST 1 VIEW: CPT

## 2021-11-04 PROCEDURE — 87086 URINE CULTURE/COLONY COUNT: CPT

## 2021-11-04 PROCEDURE — 84100 ASSAY OF PHOSPHORUS: CPT

## 2021-11-04 PROCEDURE — 87040 BLOOD CULTURE FOR BACTERIA: CPT

## 2021-11-04 PROCEDURE — 81001 URINALYSIS AUTO W/SCOPE: CPT

## 2021-11-04 PROCEDURE — 86923 COMPATIBILITY TEST ELECTRIC: CPT

## 2021-11-04 PROCEDURE — 86850 RBC ANTIBODY SCREEN: CPT

## 2021-11-05 DIAGNOSIS — Z51.89 ENCOUNTER FOR OTHER SPECIFIED AFTERCARE: ICD-10-CM

## 2021-11-08 ENCOUNTER — RESULT REVIEW (OUTPATIENT)
Age: 36
End: 2021-11-08

## 2021-11-08 ENCOUNTER — APPOINTMENT (OUTPATIENT)
Dept: INFUSION THERAPY | Facility: HOSPITAL | Age: 36
End: 2021-11-08

## 2021-11-08 ENCOUNTER — NON-APPOINTMENT (OUTPATIENT)
Age: 36
End: 2021-11-08

## 2021-11-08 LAB
HCT VFR BLD CALC: 27.2 % — LOW (ref 39–50)
HGB BLD-MCNC: 10 G/DL — LOW (ref 13–17)
MCHC RBC-ENTMCNC: 31.7 PG — SIGNIFICANT CHANGE UP (ref 27–34)
MCHC RBC-ENTMCNC: 36.8 G/DL — HIGH (ref 32–36)
MCV RBC AUTO: 86.3 FL — SIGNIFICANT CHANGE UP (ref 80–100)
NRBC # BLD: 0 /100 WBCS — SIGNIFICANT CHANGE UP (ref 0–0)
PLATELET # BLD AUTO: 6 K/UL — CRITICAL LOW (ref 150–400)
RBC # BLD: 3.15 M/UL — LOW (ref 4.2–5.8)
RBC # FLD: 14.8 % — HIGH (ref 10.3–14.5)
WBC # BLD: 0.39 K/UL — CRITICAL LOW (ref 3.8–10.5)
WBC # FLD AUTO: 0.39 K/UL — CRITICAL LOW (ref 3.8–10.5)

## 2021-11-09 ENCOUNTER — OUTPATIENT (OUTPATIENT)
Dept: OUTPATIENT SERVICES | Facility: HOSPITAL | Age: 36
LOS: 1 days | End: 2021-11-09

## 2021-11-09 DIAGNOSIS — C92.00 ACUTE MYELOBLASTIC LEUKEMIA, NOT HAVING ACHIEVED REMISSION: ICD-10-CM

## 2021-11-10 ENCOUNTER — APPOINTMENT (OUTPATIENT)
Dept: INFUSION THERAPY | Facility: HOSPITAL | Age: 36
End: 2021-11-10

## 2021-11-10 ENCOUNTER — NON-APPOINTMENT (OUTPATIENT)
Age: 36
End: 2021-11-10

## 2021-11-10 ENCOUNTER — RESULT REVIEW (OUTPATIENT)
Age: 36
End: 2021-11-10

## 2021-11-10 LAB
HCT VFR BLD CALC: 26.4 % — LOW (ref 39–50)
HGB BLD-MCNC: 9.8 G/DL — LOW (ref 13–17)
MCHC RBC-ENTMCNC: 31.6 PG — SIGNIFICANT CHANGE UP (ref 27–34)
MCHC RBC-ENTMCNC: 37.1 G/DL — HIGH (ref 32–36)
MCV RBC AUTO: 85.2 FL — SIGNIFICANT CHANGE UP (ref 80–100)
NRBC # BLD: 0 /100 WBCS — SIGNIFICANT CHANGE UP (ref 0–0)
PLATELET # BLD AUTO: 21 K/UL — LOW (ref 150–400)
RBC # BLD: 3.1 M/UL — LOW (ref 4.2–5.8)
RBC # FLD: 14.5 % — SIGNIFICANT CHANGE UP (ref 10.3–14.5)
WBC # BLD: 0.46 K/UL — CRITICAL LOW (ref 3.8–10.5)
WBC # FLD AUTO: 0.46 K/UL — CRITICAL LOW (ref 3.8–10.5)

## 2021-11-11 ENCOUNTER — EMERGENCY (EMERGENCY)
Facility: HOSPITAL | Age: 36
LOS: 1 days | Discharge: HOME | End: 2021-11-11
Attending: EMERGENCY MEDICINE
Payer: COMMERCIAL

## 2021-11-11 VITALS
OXYGEN SATURATION: 98 % | DIASTOLIC BLOOD PRESSURE: 79 MMHG | TEMPERATURE: 99 F | HEART RATE: 114 BPM | SYSTOLIC BLOOD PRESSURE: 110 MMHG | RESPIRATION RATE: 16 BRPM

## 2021-11-11 VITALS
SYSTOLIC BLOOD PRESSURE: 108 MMHG | OXYGEN SATURATION: 99 % | WEIGHT: 199.96 LBS | DIASTOLIC BLOOD PRESSURE: 77 MMHG | TEMPERATURE: 99 F | RESPIRATION RATE: 18 BRPM | HEART RATE: 146 BPM | HEIGHT: 72 IN

## 2021-11-11 LAB
ALBUMIN SERPL ELPH-MCNC: 4.4 G/DL — SIGNIFICANT CHANGE UP (ref 3.3–5)
ALP SERPL-CCNC: 104 U/L — SIGNIFICANT CHANGE UP (ref 40–120)
ALT FLD-CCNC: 87 U/L — HIGH (ref 10–45)
ANION GAP SERPL CALC-SCNC: 15 MMOL/L — SIGNIFICANT CHANGE UP (ref 5–17)
APTT BLD: 31.5 SEC — SIGNIFICANT CHANGE UP (ref 27.5–35.5)
AST SERPL-CCNC: 20 U/L — SIGNIFICANT CHANGE UP (ref 10–40)
BILIRUB SERPL-MCNC: 0.5 MG/DL — SIGNIFICANT CHANGE UP (ref 0.2–1.2)
BLD GP AB SCN SERPL QL: NEGATIVE — SIGNIFICANT CHANGE UP
BUN SERPL-MCNC: 14 MG/DL — SIGNIFICANT CHANGE UP (ref 7–23)
CALCIUM SERPL-MCNC: 10.1 MG/DL — SIGNIFICANT CHANGE UP (ref 8.4–10.5)
CHLORIDE SERPL-SCNC: 99 MMOL/L — SIGNIFICANT CHANGE UP (ref 96–108)
CO2 SERPL-SCNC: 21 MMOL/L — LOW (ref 22–31)
CREAT SERPL-MCNC: 0.91 MG/DL — SIGNIFICANT CHANGE UP (ref 0.5–1.3)
GLUCOSE SERPL-MCNC: 101 MG/DL — HIGH (ref 70–99)
HCT VFR BLD CALC: 23.3 % — LOW (ref 39–50)
HGB BLD-MCNC: 8.5 G/DL — LOW (ref 13–17)
INR BLD: 1.14 RATIO — SIGNIFICANT CHANGE UP (ref 0.88–1.16)
MCHC RBC-ENTMCNC: 30.8 PG — SIGNIFICANT CHANGE UP (ref 27–34)
MCHC RBC-ENTMCNC: 36.5 GM/DL — HIGH (ref 32–36)
MCV RBC AUTO: 84.4 FL — SIGNIFICANT CHANGE UP (ref 80–100)
NRBC # BLD: 0 /100 WBCS — SIGNIFICANT CHANGE UP (ref 0–0)
PLATELET # BLD AUTO: 38 K/UL — LOW (ref 150–400)
POTASSIUM SERPL-MCNC: 3.8 MMOL/L — SIGNIFICANT CHANGE UP (ref 3.5–5.3)
POTASSIUM SERPL-SCNC: 3.8 MMOL/L — SIGNIFICANT CHANGE UP (ref 3.5–5.3)
PROT SERPL-MCNC: 7.5 G/DL — SIGNIFICANT CHANGE UP (ref 6–8.3)
PROTHROM AB SERPL-ACNC: 13.6 SEC — SIGNIFICANT CHANGE UP (ref 10.6–13.6)
RBC # BLD: 2.76 M/UL — LOW (ref 4.2–5.8)
RBC # FLD: 14.3 % — SIGNIFICANT CHANGE UP (ref 10.3–14.5)
RH IG SCN BLD-IMP: POSITIVE — SIGNIFICANT CHANGE UP
SODIUM SERPL-SCNC: 135 MMOL/L — SIGNIFICANT CHANGE UP (ref 135–145)
WBC # BLD: 0.23 K/UL — CRITICAL LOW (ref 3.8–10.5)
WBC # FLD AUTO: 0.23 K/UL — CRITICAL LOW (ref 3.8–10.5)

## 2021-11-11 PROCEDURE — 99285 EMERGENCY DEPT VISIT HI MDM: CPT | Mod: 25

## 2021-11-11 PROCEDURE — 85730 THROMBOPLASTIN TIME PARTIAL: CPT

## 2021-11-11 PROCEDURE — 80053 COMPREHEN METABOLIC PANEL: CPT

## 2021-11-11 PROCEDURE — 73120 X-RAY EXAM OF HAND: CPT | Mod: 26,RT

## 2021-11-11 PROCEDURE — 71045 X-RAY EXAM CHEST 1 VIEW: CPT

## 2021-11-11 PROCEDURE — 99284 EMERGENCY DEPT VISIT MOD MDM: CPT | Mod: 25

## 2021-11-11 PROCEDURE — 85025 COMPLETE CBC W/AUTO DIFF WBC: CPT

## 2021-11-11 PROCEDURE — 86901 BLOOD TYPING SEROLOGIC RH(D): CPT

## 2021-11-11 PROCEDURE — 12001 RPR S/N/AX/GEN/TRNK 2.5CM/<: CPT

## 2021-11-11 PROCEDURE — 86850 RBC ANTIBODY SCREEN: CPT

## 2021-11-11 PROCEDURE — 93005 ELECTROCARDIOGRAM TRACING: CPT | Mod: XU

## 2021-11-11 PROCEDURE — 86900 BLOOD TYPING SEROLOGIC ABO: CPT

## 2021-11-11 PROCEDURE — 73120 X-RAY EXAM OF HAND: CPT

## 2021-11-11 PROCEDURE — 85610 PROTHROMBIN TIME: CPT

## 2021-11-11 PROCEDURE — 36415 COLL VENOUS BLD VENIPUNCTURE: CPT

## 2021-11-11 PROCEDURE — 71045 X-RAY EXAM CHEST 1 VIEW: CPT | Mod: 26

## 2021-11-11 RX ORDER — LIDOCAINE HCL 20 MG/ML
10 VIAL (ML) INJECTION ONCE
Refills: 0 | Status: DISCONTINUED | OUTPATIENT
Start: 2021-11-11 | End: 2021-11-15

## 2021-11-11 RX ORDER — OXYCODONE HYDROCHLORIDE 5 MG/1
5 TABLET ORAL ONCE
Refills: 0 | Status: DISCONTINUED | OUTPATIENT
Start: 2021-11-11 | End: 2021-11-11

## 2021-11-11 RX ORDER — ACETAMINOPHEN 500 MG
325 TABLET ORAL ONCE
Refills: 0 | Status: COMPLETED | OUTPATIENT
Start: 2021-11-11 | End: 2021-11-11

## 2021-11-11 RX ADMIN — Medication 325 MILLIGRAM(S): at 16:09

## 2021-11-11 RX ADMIN — OXYCODONE HYDROCHLORIDE 5 MILLIGRAM(S): 5 TABLET ORAL at 16:09

## 2021-11-11 NOTE — ED PROVIDER NOTE - NSICDXPASTMEDICALHX_GEN_ALL_CORE_FT
PAST MEDICAL HISTORY:  AML (acute myeloid leukemia)     History of genital warts     Hypertension diagnosed 1 year ago    Kidney stone 5 years ago

## 2021-11-11 NOTE — ED ADULT NURSE NOTE - OBJECTIVE STATEMENT
36 year old Male, PMH: AML, HTN. Patient comes to the ER after a plate broke cutting his right thumb approx 2cm laceration. Blood initially starting "squirting" out", bleed for about 1hr. Patient gets PLT checked every 2 days, yesterday PLT 20 hemo 9.8, received 1unit PLT. Gets chemo every 3 weeks. Patient reports dizziness today and yesterday as well as body aches and feeling tired today. Patient has a double lumen PICC upon arrival to right [facility]. Reports "throbbing" pain to finger. No active bleeding upon arrival. HR 140s, placed on cardiac monitor. Denies palpitations, chest pain, shortness of breath, abdominal pain, bruising, headache, vision changes, urinary symptoms. Bed locked and in lowest position with family at bedside.

## 2021-11-11 NOTE — ED PROVIDER NOTE - NSFOLLOWUPINSTRUCTIONS_ED_ALL_ED_FT
You were seen in the emergency department for laceration of right thumb. You were given tylenol and oxycodone in the emergency department.     Please return to the emergency department in 7-10 days for suture removal.  Please keep your regular appointment.   Please follow up with your primary care doctor within 48 hours.     Return to the emergency department if you experience any new/concerning/worsening symptoms such as but not limited to: fever (>100.3F), chest pain, shortness of breath, abdominal pain, or any other concerning symptoms.

## 2021-11-11 NOTE — ED PROVIDER NOTE - CLINICAL SUMMARY MEDICAL DECISION MAKING FREE TEXT BOX
36 year-old-male with history of AML (dx 7/21, undergoing consolidation chemo), hypertension and fatty liver presents with right thumb laceration. Vitals significant for tachycardia. Laceration on right thumb has stopped bleeding. Check PICC line placement for access. Ordered CBC, CMP, T&S. 36 year-old-male with history of AML (dx 7/21, undergoing consolidation chemo), hypertension and fatty liver presents with right thumb laceration. Vitals significant for tachycardia. Laceration on right thumb has stopped bleeding. Check PICC line placement for access. Ordered CBC, CMP, coag, T&S. 36 year-old-male with history of AML (dx 7/21, undergoing consolidation chemo), hypertension and fatty liver presents with right thumb laceration. Vitals significant for tachycardia. Laceration on right thumb has stopped bleeding. Check PICC line placement for access. Ordered CBC, CMP, coag, T&S.    Jose Luis: 36 year old male with history of AML here with right thumb laceration. glass broke while washing dishes. + laceration that is not actively bleeding.  recently transfused plts. will get labs. xray, wound closure, reassess

## 2021-11-11 NOTE — ED PROVIDER NOTE - PATIENT PORTAL LINK FT
You can access the FollowMyHealth Patient Portal offered by [facility] by registering at the following website: [email or web address]. By joining [facility]’s FollowMyHealth portal, you will also be able to view your health information using other applications (apps) compatible with our system.

## 2021-11-11 NOTE — ED PROVIDER NOTE - OBJECTIVE STATEMENT
Patient is a 36 year-old-male with history of AML (dx 7/21, undergoing consolidation chemo), hypertension and fatty liver presents with right thumb laceration. Was washing dishes today and cut his right thumb with a piece of broken dish. Blood started "spurting out" into the sink and reports filled 1/4 cup. + lightheadedness. No fever/chills. No nausea/vomiting. Had bloodwork every two days and received platelet yesterday. Patient is a 36 year-old-male with history of AML (dx 7/21, undergoing consolidation chemo), hypertension and fatty liver presents with right thumb laceration. Was washing dishes today and cut his right thumb with a piece of broken dish. Blood started "spurting out" into the sink and reports filled 1/4 cup. + lightheadedness. No fever/chills. No nausea/vomiting. No chest pain, no shortness of breath, no palpitation. Had bloodwork every two days and received platelet yesterday. No other complaints today.

## 2021-11-11 NOTE — ED ADULT NURSE NOTE - NSIMPLEMENTINTERV_GEN_ALL_ED
Implemented All Fall with Harm Risk Interventions:  [place] to call system. Call bell, personal items and telephone within reach. Instruct patient to call for assistance. Room bathroom lighting operational. Non-slip footwear when patient is off stretcher. Physically safe environment: no spills, clutter or unnecessary equipment. Stretcher in lowest position, wheels locked, appropriate side rails in place. Provide visual cue, wrist band, yellow gown, etc. Monitor gait and stability. Monitor for mental status changes and reorient to person, place, and time. Review medications for side effects contributing to fall risk. Reinforce activity limits and safety measures with patient and family. Provide visual clues: red socks.

## 2021-11-11 NOTE — ED PROVIDER NOTE - PHYSICAL EXAMINATION
General: non-toxic appearing, in no respiratory distress  HEENT: atraumatic, normocephalic; pupils are equal, round and react to light, extraocular movements intact bilaterally without deficits, no conjunctival pallor, mucous membranes moist  Neck: no jugular venous distension, full range of motion  Chest/Lung: clear to auscultation bilaterally, no wheezes/rhonchi/rales  Heart: regular rate and rhythm, no murmur/gallops/rubs  Abdomen: normal bowel sounds, soft, non-tender, non-distended  Extremities: no lower extremity edema, +2 radial pulses bilaterally, +2 dorsalis pedis pulses bilaterally, PICC line in right arm   Musculoskeletal: full range of motion of all 4 extremities, 5/5 strength and normal sensation  Nervous System: alert and oriented, no motor deficits or sensory deficits; CNII-XII grossly intact; no focal neurologic deficits  Skin: laceration noted in right thumb, not actively bleeding

## 2021-11-11 NOTE — ED PROVIDER NOTE - PROGRESS NOTE DETAILS
Platelet 38. Safe to do laceration repair.   Hgb 8.5 trending down from 9.8 yesterday. Patient has an appointment tomorrow for CBC check and also possible transfusion. Laceration to right thumb repaired. No complication. Patient tolerated procedure well.

## 2021-11-12 ENCOUNTER — RESULT REVIEW (OUTPATIENT)
Age: 36
End: 2021-11-12

## 2021-11-12 ENCOUNTER — INPATIENT (INPATIENT)
Facility: HOSPITAL | Age: 36
LOS: 4 days | Discharge: HOME HEALTH | DRG: 602 | End: 2021-11-17
Attending: INTERNAL MEDICINE | Admitting: INTERNAL MEDICINE
Payer: COMMERCIAL

## 2021-11-12 ENCOUNTER — NON-APPOINTMENT (OUTPATIENT)
Age: 36
End: 2021-11-12

## 2021-11-12 ENCOUNTER — APPOINTMENT (OUTPATIENT)
Dept: INFUSION THERAPY | Facility: HOSPITAL | Age: 36
End: 2021-11-12

## 2021-11-12 VITALS
TEMPERATURE: 100 F | SYSTOLIC BLOOD PRESSURE: 128 MMHG | WEIGHT: 195.11 LBS | HEART RATE: 143 BPM | HEIGHT: 72 IN | DIASTOLIC BLOOD PRESSURE: 70 MMHG | RESPIRATION RATE: 18 BRPM | OXYGEN SATURATION: 100 %

## 2021-11-12 LAB
ALBUMIN SERPL ELPH-MCNC: 4.1 G/DL — SIGNIFICANT CHANGE UP (ref 3.3–5)
ALP SERPL-CCNC: 88 U/L — SIGNIFICANT CHANGE UP (ref 40–120)
ALT FLD-CCNC: 65 U/L — HIGH (ref 10–45)
AMMONIA BLD-MCNC: 19 UMOL/L — SIGNIFICANT CHANGE UP (ref 11–55)
ANION GAP SERPL CALC-SCNC: 15 MMOL/L — SIGNIFICANT CHANGE UP (ref 5–17)
APTT BLD: 30.2 SEC — SIGNIFICANT CHANGE UP (ref 27.5–35.5)
AST SERPL-CCNC: 22 U/L — SIGNIFICANT CHANGE UP (ref 10–40)
BASE EXCESS BLDV CALC-SCNC: 3.1 MMOL/L — HIGH (ref -2–2)
BASOPHILS # BLD AUTO: 0 K/UL — SIGNIFICANT CHANGE UP (ref 0–0.2)
BASOPHILS NFR BLD AUTO: 0 % — SIGNIFICANT CHANGE UP (ref 0–2)
BILIRUB SERPL-MCNC: 0.4 MG/DL — SIGNIFICANT CHANGE UP (ref 0.2–1.2)
BLD GP AB SCN SERPL QL: NEGATIVE — SIGNIFICANT CHANGE UP
BUN SERPL-MCNC: 15 MG/DL — SIGNIFICANT CHANGE UP (ref 7–23)
CA-I SERPL-SCNC: 1.27 MMOL/L — SIGNIFICANT CHANGE UP (ref 1.15–1.33)
CALCIUM SERPL-MCNC: 9.6 MG/DL — SIGNIFICANT CHANGE UP (ref 8.4–10.5)
CHLORIDE BLDV-SCNC: 102 MMOL/L — SIGNIFICANT CHANGE UP (ref 96–108)
CHLORIDE SERPL-SCNC: 97 MMOL/L — SIGNIFICANT CHANGE UP (ref 96–108)
CK SERPL-CCNC: 79 U/L — SIGNIFICANT CHANGE UP (ref 30–200)
CO2 BLDV-SCNC: 26 MMOL/L — SIGNIFICANT CHANGE UP (ref 22–26)
CO2 SERPL-SCNC: 22 MMOL/L — SIGNIFICANT CHANGE UP (ref 22–31)
CREAT SERPL-MCNC: 0.99 MG/DL — SIGNIFICANT CHANGE UP (ref 0.5–1.3)
EOSINOPHIL # BLD AUTO: 0 K/UL — SIGNIFICANT CHANGE UP (ref 0–0.5)
EOSINOPHIL NFR BLD AUTO: 0 % — SIGNIFICANT CHANGE UP (ref 0–6)
GAS PNL BLDV: 135 MMOL/L — LOW (ref 136–145)
GAS PNL BLDV: SIGNIFICANT CHANGE UP
GAS PNL BLDV: SIGNIFICANT CHANGE UP
GLUCOSE BLDV-MCNC: 109 MG/DL — HIGH (ref 70–99)
GLUCOSE SERPL-MCNC: 109 MG/DL — HIGH (ref 70–99)
HCO3 BLDV-SCNC: 25 MMOL/L — SIGNIFICANT CHANGE UP (ref 22–29)
HCT VFR BLD CALC: 21.2 % — LOW (ref 39–50)
HCT VFR BLD CALC: 24.7 % — LOW (ref 39–50)
HCT VFR BLDA CALC: 24 % — LOW (ref 39–51)
HGB BLD CALC-MCNC: 8.1 G/DL — LOW (ref 12.6–17.4)
HGB BLD-MCNC: 7.8 G/DL — LOW (ref 13–17)
HGB BLD-MCNC: 9.3 G/DL — LOW (ref 13–17)
IMM GRANULOCYTES NFR BLD AUTO: 0 % — SIGNIFICANT CHANGE UP (ref 0–1.5)
INR BLD: 1.2 RATIO — HIGH (ref 0.88–1.16)
LACTATE BLDV-MCNC: 1.7 MMOL/L — SIGNIFICANT CHANGE UP (ref 0.7–2)
LDH SERPL L TO P-CCNC: 121 U/L — SIGNIFICANT CHANGE UP (ref 50–242)
LYMPHOCYTES # BLD AUTO: 0.29 K/UL — LOW (ref 1–3.3)
LYMPHOCYTES # BLD AUTO: 50 % — HIGH (ref 13–44)
MAGNESIUM SERPL-MCNC: 2 MG/DL — SIGNIFICANT CHANGE UP (ref 1.6–2.6)
MCHC RBC-ENTMCNC: 30.8 PG — SIGNIFICANT CHANGE UP (ref 27–34)
MCHC RBC-ENTMCNC: 31.7 PG — SIGNIFICANT CHANGE UP (ref 27–34)
MCHC RBC-ENTMCNC: 36.8 GM/DL — HIGH (ref 32–36)
MCHC RBC-ENTMCNC: 37.7 G/DL — HIGH (ref 32–36)
MCV RBC AUTO: 83.8 FL — SIGNIFICANT CHANGE UP (ref 80–100)
MCV RBC AUTO: 84.3 FL — SIGNIFICANT CHANGE UP (ref 80–100)
MONOCYTES # BLD AUTO: 0.1 K/UL — SIGNIFICANT CHANGE UP (ref 0–0.9)
MONOCYTES NFR BLD AUTO: 17.2 % — HIGH (ref 2–14)
NEUTROPHILS # BLD AUTO: 0.19 K/UL — LOW (ref 1.8–7.4)
NEUTROPHILS NFR BLD AUTO: 32.8 % — LOW (ref 43–77)
NRBC # BLD: 0 /100 WBCS — SIGNIFICANT CHANGE UP (ref 0–0)
PCO2 BLDV: 29 MMHG — LOW (ref 42–55)
PH BLDV: 7.55 — HIGH (ref 7.32–7.43)
PHOSPHATE SERPL-MCNC: 3 MG/DL — SIGNIFICANT CHANGE UP (ref 2.5–4.5)
PLATELET # BLD AUTO: 25 K/UL — LOW (ref 150–400)
PLATELET # BLD AUTO: 33 K/UL — LOW (ref 150–400)
PO2 BLDV: 42 MMHG — SIGNIFICANT CHANGE UP (ref 25–45)
POTASSIUM BLDV-SCNC: 3.7 MMOL/L — SIGNIFICANT CHANGE UP (ref 3.5–5.1)
POTASSIUM SERPL-MCNC: 3.5 MMOL/L — SIGNIFICANT CHANGE UP (ref 3.5–5.3)
POTASSIUM SERPL-SCNC: 3.5 MMOL/L — SIGNIFICANT CHANGE UP (ref 3.5–5.3)
PROT SERPL-MCNC: 7.2 G/DL — SIGNIFICANT CHANGE UP (ref 6–8.3)
PROTHROM AB SERPL-ACNC: 14.3 SEC — HIGH (ref 10.6–13.6)
RBC # BLD: 2.53 M/UL — LOW (ref 4.2–5.8)
RBC # BLD: 2.93 M/UL — LOW (ref 4.2–5.8)
RBC # FLD: 14.1 % — SIGNIFICANT CHANGE UP (ref 10.3–14.5)
RBC # FLD: 14.1 % — SIGNIFICANT CHANGE UP (ref 10.3–14.5)
RH IG SCN BLD-IMP: POSITIVE — SIGNIFICANT CHANGE UP
SAO2 % BLDV: 77.2 % — SIGNIFICANT CHANGE UP (ref 67–88)
SODIUM SERPL-SCNC: 134 MMOL/L — LOW (ref 135–145)
WBC # BLD: 0.46 K/UL — CRITICAL LOW (ref 3.8–10.5)
WBC # BLD: 0.58 K/UL — CRITICAL LOW (ref 3.8–10.5)
WBC # FLD AUTO: 0.46 K/UL — CRITICAL LOW (ref 3.8–10.5)
WBC # FLD AUTO: 0.58 K/UL — CRITICAL LOW (ref 3.8–10.5)

## 2021-11-12 PROCEDURE — 71045 X-RAY EXAM CHEST 1 VIEW: CPT | Mod: 26

## 2021-11-12 PROCEDURE — 93010 ELECTROCARDIOGRAM REPORT: CPT

## 2021-11-12 PROCEDURE — 73120 X-RAY EXAM OF HAND: CPT | Mod: 26,RT

## 2021-11-12 PROCEDURE — 99285 EMERGENCY DEPT VISIT HI MDM: CPT | Mod: CS

## 2021-11-12 RX ORDER — MORPHINE SULFATE 50 MG/1
4 CAPSULE, EXTENDED RELEASE ORAL ONCE
Refills: 0 | Status: DISCONTINUED | OUTPATIENT
Start: 2021-11-12 | End: 2021-11-12

## 2021-11-12 RX ORDER — ACETAMINOPHEN 500 MG
975 TABLET ORAL ONCE
Refills: 0 | Status: COMPLETED | OUTPATIENT
Start: 2021-11-12 | End: 2021-11-12

## 2021-11-12 RX ORDER — AZTREONAM 2 G
2000 VIAL (EA) INJECTION ONCE
Refills: 0 | Status: COMPLETED | OUTPATIENT
Start: 2021-11-12 | End: 2021-11-12

## 2021-11-12 RX ORDER — VANCOMYCIN HCL 1 G
1000 VIAL (EA) INTRAVENOUS ONCE
Refills: 0 | Status: COMPLETED | OUTPATIENT
Start: 2021-11-12 | End: 2021-11-12

## 2021-11-12 RX ORDER — SODIUM CHLORIDE 9 MG/ML
2000 INJECTION INTRAMUSCULAR; INTRAVENOUS; SUBCUTANEOUS ONCE
Refills: 0 | Status: COMPLETED | OUTPATIENT
Start: 2021-11-12 | End: 2021-11-12

## 2021-11-12 RX ADMIN — MORPHINE SULFATE 4 MILLIGRAM(S): 50 CAPSULE, EXTENDED RELEASE ORAL at 22:41

## 2021-11-12 RX ADMIN — SODIUM CHLORIDE 2000 MILLILITER(S): 9 INJECTION INTRAMUSCULAR; INTRAVENOUS; SUBCUTANEOUS at 22:33

## 2021-11-12 RX ADMIN — Medication 250 MILLIGRAM(S): at 22:35

## 2021-11-12 RX ADMIN — Medication 975 MILLIGRAM(S): at 23:08

## 2021-11-12 NOTE — ED PROVIDER NOTE - PHYSICAL EXAMINATION
Skin: 2cm x2cm left inner thigh bruising, erythema to right thumb circumferential at the site of stitches

## 2021-11-12 NOTE — ED ADULT TRIAGE NOTE - CHIEF COMPLAINT QUOTE
Fever/chills that began this morning, was evaluated in the ED yesterday for a finger laceration,  finger now red/swollen   Currently receiving chemo for hx of AML

## 2021-11-12 NOTE — ED PROVIDER NOTE - ATTENDING CONTRIBUTION TO CARE
35 y/o male PMHx AML (dx 7/21, undergoing consolidation chemo every 3 weeks and last dose was 2 weeks ago), hypertension and fatty liver s/p right first digit laceration repair yesterday here for fever. Patient has had myalgias x 3 days and "felt like shit" over the last 3 days. This was reportedly endorsed to [facility] today. Of note, patient was seen in the ED yesterday for R thumb laceration and has severe dorsal hand pain to the R thumb, worse with movement that started immediately after the lidocaine wore off. Patient has PICC line to [facility].     Constitutional: +chills  Eyes: No visual changes  HEENT: No throat pain  CV: No chest pain  Resp: No SOB no cough  GI: No abd pain, nausea or vomiting  : No dysuria  MSK: R thumb pain  Skin: Erythema to R thumb  Neuro: No headache     PE: NAD, NCAT, MMM, Trachea midline, Normal conjunctiva, lungs CTAB, tachycardic, Normal perfusion, 2+ radial pulses bilat, Abdomen Soft, NTND, No rebound/guarding, No LE edema, No deformity of extremities, surrounding erythema to R dorsal thumb without edema, suture site itself c/d, no drainage, Moving all extremities equally.    MDM: 36M with neutropenic fever. FUO. Possible viral syndrome as patient had myalgias preceding fever. Patient is not vaccinated for covid 19. Nec fasc is on the differential given pain out of proportion of R hand but given pain started immediately after hand injury, less likely. Will closely monitor, cover with abx including clinda for toxin mediated syndrome and reassess. Will obtain XR and repeat labs. No obvious clinical evidence of PICC line infection. 35 y/o male PMHx AML (dx 7/21, undergoing consolidation chemo every 3 weeks and last dose was 2 weeks ago), hypertension and fatty liver s/p right first digit laceration repair yesterday here for fever. Patient has had myalgias x 3 days and "felt like shit" over the last 3 days. This was reportedly endorsed to [facility] today. Of note, patient was seen in the ED yesterday for R thumb laceration and has severe dorsal hand pain to the R thumb, worse with movement that started immediately after the lidocaine wore off. Patient has PICC line to [facility].     Constitutional: +chills  Eyes: No visual changes  HEENT: No throat pain  CV: No chest pain  Resp: No SOB no cough  GI: No abd pain, nausea or vomiting  : No dysuria  MSK: R thumb pain  Skin: Erythema to R thumb  Neuro: No headache     PE: NAD, NCAT, MMM, Trachea midline, Normal conjunctiva, lungs CTAB, tachycardic, Normal perfusion, 2+ radial pulses bilat, Abdomen Soft, NTND, No rebound/guarding, No LE edema, No deformity of extremities, surrounding erythema to R dorsal thumb without edema, suture site itself c/d, no drainage, Moving all extremities equally.    MDM: 36M with neutropenic fever. FUO. Possible viral syndrome as patient had myalgias preceding fever. Patient is not vaccinated for covid 19. Nec fasc is on the differential given pain out of proportion of R hand but given pain started immediately after hand injury, less likely. Will closely monitor, cover with abx including clinda for toxin mediated syndrome and reassess. Less likely flexor tenosynovitis because has no Kanavels sign. Low clinical suspicion for septic joint. Will obtain XR and repeat labs. No obvious clinical evidence of PICC line infection.

## 2021-11-12 NOTE — ED PROVIDER NOTE - PROGRESS NOTE DETAILS
Dr. Garber: Patien't pain improved, erythema improved. Patient smiling and joking. Less likely nec fasc but will endorse to inpatient team to monitor closely. Area demarcated. Pending call back from hospitalist for admission. Dr. Garber: d/w hosptialist who recommends admission to Dr. Loredo given recent admission.

## 2021-11-12 NOTE — ED PROVIDER NOTE - OBJECTIVE STATEMENT
35 y/o male PMHx AML (dx 7/21, undergoing consolidation chemo every 3 weeks and last dose was 2 weeks ago), hypertension and fatty liver s/p right first digit laceration repair yesterday now presenting with erythema and pain at the site of laceration repair. Patient reported he was feeling unwell prior to suture placement with body aches, chills, night sweats and subjective fevers. Patient last BM 4 days ago. Has bruise to left inner thigh. Patient reports he keeps his PICC site clean and intact which was placed in 9/2021. Denied CP, SOB, abdominal pain, N/V/D, cough, back pain. Patient has not had COVID and is not vaccinated against COVID

## 2021-11-13 DIAGNOSIS — C92.00 ACUTE MYELOBLASTIC LEUKEMIA, NOT HAVING ACHIEVED REMISSION: ICD-10-CM

## 2021-11-13 DIAGNOSIS — K64.4 RESIDUAL HEMORRHOIDAL SKIN TAGS: ICD-10-CM

## 2021-11-13 DIAGNOSIS — B99.9 UNSPECIFIED INFECTIOUS DISEASE: ICD-10-CM

## 2021-11-13 DIAGNOSIS — D70.9 NEUTROPENIA, UNSPECIFIED: ICD-10-CM

## 2021-11-13 DIAGNOSIS — Z29.9 ENCOUNTER FOR PROPHYLACTIC MEASURES, UNSPECIFIED: ICD-10-CM

## 2021-11-13 DIAGNOSIS — T14.8XXA OTHER INJURY OF UNSPECIFIED BODY REGION, INITIAL ENCOUNTER: ICD-10-CM

## 2021-11-13 LAB
ALBUMIN SERPL ELPH-MCNC: 3.9 G/DL — SIGNIFICANT CHANGE UP (ref 3.3–5)
ALP SERPL-CCNC: 81 U/L — SIGNIFICANT CHANGE UP (ref 40–120)
ALT FLD-CCNC: 52 U/L — HIGH (ref 10–45)
ANION GAP SERPL CALC-SCNC: 14 MMOL/L — SIGNIFICANT CHANGE UP (ref 5–17)
APPEARANCE UR: CLEAR — SIGNIFICANT CHANGE UP
AST SERPL-CCNC: 15 U/L — SIGNIFICANT CHANGE UP (ref 10–40)
BACTERIA # UR AUTO: NEGATIVE — SIGNIFICANT CHANGE UP
BASOPHILS # BLD AUTO: 0 K/UL — SIGNIFICANT CHANGE UP (ref 0–0.2)
BASOPHILS NFR BLD AUTO: 0 % — SIGNIFICANT CHANGE UP (ref 0–2)
BILIRUB SERPL-MCNC: 0.5 MG/DL — SIGNIFICANT CHANGE UP (ref 0.2–1.2)
BILIRUB UR-MCNC: NEGATIVE — SIGNIFICANT CHANGE UP
BUN SERPL-MCNC: 9 MG/DL — SIGNIFICANT CHANGE UP (ref 7–23)
CALCIUM SERPL-MCNC: 9.6 MG/DL — SIGNIFICANT CHANGE UP (ref 8.4–10.5)
CHLORIDE SERPL-SCNC: 102 MMOL/L — SIGNIFICANT CHANGE UP (ref 96–108)
CO2 SERPL-SCNC: 23 MMOL/L — SIGNIFICANT CHANGE UP (ref 22–31)
COLOR SPEC: COLORLESS — SIGNIFICANT CHANGE UP
CREAT SERPL-MCNC: 0.86 MG/DL — SIGNIFICANT CHANGE UP (ref 0.5–1.3)
DIFF PNL FLD: NEGATIVE — SIGNIFICANT CHANGE UP
EOSINOPHIL # BLD AUTO: 0 K/UL — SIGNIFICANT CHANGE UP (ref 0–0.5)
EOSINOPHIL NFR BLD AUTO: 0 % — SIGNIFICANT CHANGE UP (ref 0–6)
EPI CELLS # UR: 0 /HPF — SIGNIFICANT CHANGE UP
ERYTHROCYTE [SEDIMENTATION RATE] IN BLOOD: 47 MM/HR — HIGH (ref 0–15)
GLUCOSE SERPL-MCNC: 103 MG/DL — HIGH (ref 70–99)
GLUCOSE UR QL: NEGATIVE — SIGNIFICANT CHANGE UP
HAPTOGLOB SERPL-MCNC: 351 MG/DL — HIGH (ref 34–200)
HCT VFR BLD CALC: 19.7 % — CRITICAL LOW (ref 39–50)
HGB BLD-MCNC: 7.2 G/DL — LOW (ref 13–17)
KETONES UR-MCNC: NEGATIVE — SIGNIFICANT CHANGE UP
LEUKOCYTE ESTERASE UR-ACNC: NEGATIVE — SIGNIFICANT CHANGE UP
LYMPHOCYTES # BLD AUTO: 0.27 K/UL — LOW (ref 1–3.3)
LYMPHOCYTES # BLD AUTO: 52 % — HIGH (ref 13–44)
MAGNESIUM SERPL-MCNC: 2 MG/DL — SIGNIFICANT CHANGE UP (ref 1.6–2.6)
MANUAL SMEAR VERIFICATION: SIGNIFICANT CHANGE UP
MCHC RBC-ENTMCNC: 30.6 PG — SIGNIFICANT CHANGE UP (ref 27–34)
MCHC RBC-ENTMCNC: 36.5 GM/DL — HIGH (ref 32–36)
MCV RBC AUTO: 83.8 FL — SIGNIFICANT CHANGE UP (ref 80–100)
MONOCYTES # BLD AUTO: 0.23 K/UL — SIGNIFICANT CHANGE UP (ref 0–0.9)
MONOCYTES NFR BLD AUTO: 44 % — HIGH (ref 2–14)
NEUTROPHILS # BLD AUTO: 0.02 K/UL — LOW (ref 1.8–7.4)
NEUTROPHILS NFR BLD AUTO: 0 % — LOW (ref 43–77)
NEUTS BAND # BLD: 4 % — SIGNIFICANT CHANGE UP (ref 0–8)
NITRITE UR-MCNC: NEGATIVE — SIGNIFICANT CHANGE UP
NRBC # BLD: 0 /100 WBCS — SIGNIFICANT CHANGE UP (ref 0–0)
NRBC # BLD: 0 /100 — SIGNIFICANT CHANGE UP (ref 0–0)
PH UR: 6 — SIGNIFICANT CHANGE UP (ref 5–8)
PHOSPHATE SERPL-MCNC: 3.2 MG/DL — SIGNIFICANT CHANGE UP (ref 2.5–4.5)
PLAT MORPH BLD: NORMAL — SIGNIFICANT CHANGE UP
PLATELET # BLD AUTO: 18 K/UL — CRITICAL LOW (ref 150–400)
POTASSIUM SERPL-MCNC: 3.8 MMOL/L — SIGNIFICANT CHANGE UP (ref 3.5–5.3)
POTASSIUM SERPL-SCNC: 3.8 MMOL/L — SIGNIFICANT CHANGE UP (ref 3.5–5.3)
PROT SERPL-MCNC: 6.9 G/DL — SIGNIFICANT CHANGE UP (ref 6–8.3)
PROT UR-MCNC: NEGATIVE — SIGNIFICANT CHANGE UP
RAPID RVP RESULT: SIGNIFICANT CHANGE UP
RBC # BLD: 2.35 M/UL — LOW (ref 4.2–5.8)
RBC # FLD: 14.1 % — SIGNIFICANT CHANGE UP (ref 10.3–14.5)
RBC BLD AUTO: SIGNIFICANT CHANGE UP
RBC CASTS # UR COMP ASSIST: 0 /HPF — SIGNIFICANT CHANGE UP (ref 0–4)
SARS-COV-2 RNA SPEC QL NAA+PROBE: SIGNIFICANT CHANGE UP
SODIUM SERPL-SCNC: 139 MMOL/L — SIGNIFICANT CHANGE UP (ref 135–145)
SP GR SPEC: 1.01 — LOW (ref 1.01–1.02)
UROBILINOGEN FLD QL: NEGATIVE — SIGNIFICANT CHANGE UP
WBC # BLD: 0.52 K/UL — CRITICAL LOW (ref 3.8–10.5)
WBC # FLD AUTO: 0.52 K/UL — CRITICAL LOW (ref 3.8–10.5)
WBC UR QL: 0 /HPF — SIGNIFICANT CHANGE UP (ref 0–5)

## 2021-11-13 PROCEDURE — 99255 IP/OBS CONSLTJ NEW/EST HI 80: CPT | Mod: GC

## 2021-11-13 RX ORDER — HYDROMORPHONE HYDROCHLORIDE 2 MG/ML
0.5 INJECTION INTRAMUSCULAR; INTRAVENOUS; SUBCUTANEOUS EVERY 6 HOURS
Refills: 0 | Status: DISCONTINUED | OUTPATIENT
Start: 2021-11-13 | End: 2021-11-13

## 2021-11-13 RX ORDER — INFLUENZA VIRUS VACCINE 15; 15; 15; 15 UG/.5ML; UG/.5ML; UG/.5ML; UG/.5ML
0.5 SUSPENSION INTRAMUSCULAR ONCE
Refills: 0 | Status: DISCONTINUED | OUTPATIENT
Start: 2021-11-13 | End: 2021-11-17

## 2021-11-13 RX ORDER — PIPERACILLIN AND TAZOBACTAM 4; .5 G/20ML; G/20ML
3.38 INJECTION, POWDER, LYOPHILIZED, FOR SOLUTION INTRAVENOUS ONCE
Refills: 0 | Status: COMPLETED | OUTPATIENT
Start: 2021-11-13 | End: 2021-11-13

## 2021-11-13 RX ORDER — SODIUM CHLORIDE 9 MG/ML
1000 INJECTION INTRAMUSCULAR; INTRAVENOUS; SUBCUTANEOUS
Refills: 0 | Status: DISCONTINUED | OUTPATIENT
Start: 2021-11-13 | End: 2021-11-14

## 2021-11-13 RX ORDER — AMLODIPINE BESYLATE 2.5 MG/1
5 TABLET ORAL DAILY
Refills: 0 | Status: DISCONTINUED | OUTPATIENT
Start: 2021-11-13 | End: 2021-11-17

## 2021-11-13 RX ORDER — MORPHINE SULFATE 50 MG/1
2 CAPSULE, EXTENDED RELEASE ORAL ONCE
Refills: 0 | Status: DISCONTINUED | OUTPATIENT
Start: 2021-11-13 | End: 2021-11-13

## 2021-11-13 RX ORDER — PHENYLEPHRINE-SHARK LIVER OIL-MINERAL OIL-PETROLATUM RECTAL OINTMENT
1 OINTMENT (GRAM) RECTAL THREE TIMES A DAY
Refills: 0 | Status: DISCONTINUED | OUTPATIENT
Start: 2021-11-13 | End: 2021-11-17

## 2021-11-13 RX ORDER — SODIUM CHLORIDE 9 MG/ML
500 INJECTION INTRAMUSCULAR; INTRAVENOUS; SUBCUTANEOUS ONCE
Refills: 0 | Status: COMPLETED | OUTPATIENT
Start: 2021-11-13 | End: 2021-11-13

## 2021-11-13 RX ORDER — AZTREONAM 2 G
2000 VIAL (EA) INJECTION EVERY 8 HOURS
Refills: 0 | Status: DISCONTINUED | OUTPATIENT
Start: 2021-11-13 | End: 2021-11-13

## 2021-11-13 RX ORDER — VANCOMYCIN HCL 1 G
1250 VIAL (EA) INTRAVENOUS EVERY 12 HOURS
Refills: 0 | Status: DISCONTINUED | OUTPATIENT
Start: 2021-11-13 | End: 2021-11-15

## 2021-11-13 RX ORDER — SENNA PLUS 8.6 MG/1
2 TABLET ORAL AT BEDTIME
Refills: 0 | Status: DISCONTINUED | OUTPATIENT
Start: 2021-11-13 | End: 2021-11-17

## 2021-11-13 RX ORDER — HYDROMORPHONE HYDROCHLORIDE 2 MG/ML
0.5 INJECTION INTRAMUSCULAR; INTRAVENOUS; SUBCUTANEOUS EVERY 4 HOURS
Refills: 0 | Status: DISCONTINUED | OUTPATIENT
Start: 2021-11-13 | End: 2021-11-14

## 2021-11-13 RX ORDER — PIPERACILLIN AND TAZOBACTAM 4; .5 G/20ML; G/20ML
3.38 INJECTION, POWDER, LYOPHILIZED, FOR SOLUTION INTRAVENOUS EVERY 8 HOURS
Refills: 0 | Status: DISCONTINUED | OUTPATIENT
Start: 2021-11-13 | End: 2021-11-17

## 2021-11-13 RX ORDER — ACETAMINOPHEN 500 MG
325 TABLET ORAL ONCE
Refills: 0 | Status: COMPLETED | OUTPATIENT
Start: 2021-11-13 | End: 2021-11-13

## 2021-11-13 RX ORDER — FLUCONAZOLE 150 MG/1
200 TABLET ORAL DAILY
Refills: 0 | Status: DISCONTINUED | OUTPATIENT
Start: 2021-11-13 | End: 2021-11-17

## 2021-11-13 RX ORDER — POLYETHYLENE GLYCOL 3350 17 G/17G
17 POWDER, FOR SOLUTION ORAL DAILY
Refills: 0 | Status: DISCONTINUED | OUTPATIENT
Start: 2021-11-13 | End: 2021-11-17

## 2021-11-13 RX ORDER — ACETAMINOPHEN 500 MG
650 TABLET ORAL EVERY 6 HOURS
Refills: 0 | Status: DISCONTINUED | OUTPATIENT
Start: 2021-11-13 | End: 2021-11-17

## 2021-11-13 RX ORDER — ACETAMINOPHEN 500 MG
650 TABLET ORAL ONCE
Refills: 0 | Status: COMPLETED | OUTPATIENT
Start: 2021-11-13 | End: 2021-11-13

## 2021-11-13 RX ADMIN — SENNA PLUS 2 TABLET(S): 8.6 TABLET ORAL at 21:05

## 2021-11-13 RX ADMIN — MORPHINE SULFATE 2 MILLIGRAM(S): 50 CAPSULE, EXTENDED RELEASE ORAL at 05:03

## 2021-11-13 RX ADMIN — Medication 166.67 MILLIGRAM(S): at 18:18

## 2021-11-13 RX ADMIN — HYDROMORPHONE HYDROCHLORIDE 0.5 MILLIGRAM(S): 2 INJECTION INTRAMUSCULAR; INTRAVENOUS; SUBCUTANEOUS at 18:00

## 2021-11-13 RX ADMIN — SODIUM CHLORIDE 500 MILLILITER(S): 9 INJECTION INTRAMUSCULAR; INTRAVENOUS; SUBCUTANEOUS at 04:32

## 2021-11-13 RX ADMIN — HYDROMORPHONE HYDROCHLORIDE 0.5 MILLIGRAM(S): 2 INJECTION INTRAMUSCULAR; INTRAVENOUS; SUBCUTANEOUS at 18:38

## 2021-11-13 RX ADMIN — Medication 650 MILLIGRAM(S): at 05:48

## 2021-11-13 RX ADMIN — HYDROMORPHONE HYDROCHLORIDE 0.5 MILLIGRAM(S): 2 INJECTION INTRAMUSCULAR; INTRAVENOUS; SUBCUTANEOUS at 12:51

## 2021-11-13 RX ADMIN — Medication 650 MILLIGRAM(S): at 17:06

## 2021-11-13 RX ADMIN — Medication 325 MILLIGRAM(S): at 21:33

## 2021-11-13 RX ADMIN — FLUCONAZOLE 200 MILLIGRAM(S): 150 TABLET ORAL at 16:21

## 2021-11-13 RX ADMIN — Medication 650 MILLIGRAM(S): at 21:05

## 2021-11-13 RX ADMIN — HYDROMORPHONE HYDROCHLORIDE 0.5 MILLIGRAM(S): 2 INJECTION INTRAMUSCULAR; INTRAVENOUS; SUBCUTANEOUS at 13:14

## 2021-11-13 RX ADMIN — Medication 325 MILLIGRAM(S): at 22:33

## 2021-11-13 RX ADMIN — PHENYLEPHRINE-SHARK LIVER OIL-MINERAL OIL-PETROLATUM RECTAL OINTMENT 1 APPLICATION(S): at 16:22

## 2021-11-13 RX ADMIN — AMLODIPINE BESYLATE 5 MILLIGRAM(S): 2.5 TABLET ORAL at 16:21

## 2021-11-13 RX ADMIN — MORPHINE SULFATE 2 MILLIGRAM(S): 50 CAPSULE, EXTENDED RELEASE ORAL at 05:46

## 2021-11-13 RX ADMIN — Medication 650 MILLIGRAM(S): at 09:56

## 2021-11-13 RX ADMIN — Medication 650 MILLIGRAM(S): at 22:00

## 2021-11-13 RX ADMIN — POLYETHYLENE GLYCOL 3350 17 GRAM(S): 17 POWDER, FOR SOLUTION ORAL at 06:44

## 2021-11-13 RX ADMIN — Medication 100 MILLIGRAM(S): at 01:11

## 2021-11-13 RX ADMIN — PIPERACILLIN AND TAZOBACTAM 200 GRAM(S): 4; .5 INJECTION, POWDER, LYOPHILIZED, FOR SOLUTION INTRAVENOUS at 16:57

## 2021-11-13 RX ADMIN — PIPERACILLIN AND TAZOBACTAM 25 GRAM(S): 4; .5 INJECTION, POWDER, LYOPHILIZED, FOR SOLUTION INTRAVENOUS at 21:06

## 2021-11-13 RX ADMIN — Medication 650 MILLIGRAM(S): at 14:57

## 2021-11-13 RX ADMIN — PHENYLEPHRINE-SHARK LIVER OIL-MINERAL OIL-PETROLATUM RECTAL OINTMENT 1 APPLICATION(S): at 12:18

## 2021-11-13 RX ADMIN — Medication 260 MILLIGRAM(S): at 04:35

## 2021-11-13 RX ADMIN — Medication 5 MILLIGRAM(S): at 16:22

## 2021-11-13 RX ADMIN — SODIUM CHLORIDE 100 MILLILITER(S): 9 INJECTION INTRAMUSCULAR; INTRAVENOUS; SUBCUTANEOUS at 13:19

## 2021-11-13 RX ADMIN — Medication 100 MILLIGRAM(S): at 06:41

## 2021-11-13 RX ADMIN — Medication 100 MILLIGRAM(S): at 14:01

## 2021-11-13 RX ADMIN — Medication 650 MILLIGRAM(S): at 11:30

## 2021-11-13 NOTE — CONSULT NOTE ADULT - SUBJECTIVE AND OBJECTIVE BOX
HPI:  Patient is a 36 year old male with a PMHx of AML (dx , undergoing consolidation chemo every 3 weeks and last dose was 2 weeks ago), hypertension and fatty liver s/p right first digit laceration repair yesterday now presenting with erythema and pain at the site of laceration repair. Patient reported he was feeling unwell prior to suture placement with body aches, chills, night sweats and subjective fevers. Patient last BM 4 days ago. Has bruise to left inner thigh. Patient reports he keeps his PICC site clean and intact which was placed in 2021. Denied CP, SOB, abdominal pain, N/V/D, cough, back pain. Patient has not had COVID and is not vaccinated against COVID (2021 10:49)      PAST MEDICAL & SURGICAL HISTORY:  Hypertension  diagnosed 1 year ago    Kidney stone  5 years ago    History of genital warts    AML (acute myeloid leukemia)    No significant past surgical history        Allergies    No Known Allergies    Intolerances    cefepime (Rash)      ANTIMICROBIALS:  fluconAZOLE   Tablet 200 daily  piperacillin/tazobactam IVPB. 3.375 once  piperacillin/tazobactam IVPB.. 3.375 every 8 hours  vancomycin  IVPB 1250 every 12 hours      OTHER MEDS:  acetaminophen     Tablet .. 650 milliGRAM(s) Oral every 6 hours PRN  amLODIPine   Tablet 5 milliGRAM(s) Oral daily  bisacodyl 5 milliGRAM(s) Oral every 12 hours PRN  hemorrhoidal Ointment 1 Application(s) Rectal three times a day PRN  HYDROmorphone  Injectable 0.5 milliGRAM(s) IV Push every 6 hours PRN  influenza   Vaccine 0.5 milliLiter(s) IntraMuscular once  polyethylene glycol 3350 17 Gram(s) Oral daily  sodium chloride 0.9%. 1000 milliLiter(s) IV Continuous <Continuous>      SOCIAL HISTORY:  lives with his father, has a dog  no smoking, alcohol or drug abuse  no recent travel    FAMILY HISTORY:  FH: CAD (coronary artery disease) (Father, Mother)        ROS:    All other systems negative     Constitutional: + fever,  chill  Eye: no eye pain, no redness, no vision changes  ENT:  no sore throat, no rhinorrhea  Cardiovascular:  no chest pain, no palpitation  Respiratory:  no SOB, no cough  GI:  no abd pain, no vomiting, no diarrhea  urinary: no dysuria, no hematuria, no flank pain  : no penile discharge or bleeding  musculoskeletal:  no joint pain, no joint swelling  skin:  no rash, R thumb sutures with erythema and swelling  neurology:  no headache, no seizure, no change in mental status  psych: no anxiety, no depression     Physical Exam:    General:    NAD, non toxic  Head: atraumatic, normocephalic  Eyes: normal sclera and conjunctiva  ENT:   no oropharyngeal lesions, no LAD, neck supple  Cardio:    regular S1,S2, no murmur  Respiratory:   clear b/l, no wheezing  abd:   soft, BS +, not tender  :     no CVAT, no suprapubic tenderness, no mendoza  Musculoskeletal : no joint swelling, no edema  Skin:    no rash, R thumb sutures, slight erythema and edema significantly improved based on the marks  vascular: no phlebitis, RUE picc with no tenderness or erythema  Neurologic:     no focal deficits  psych: normal affect      Drug Dosing Weight  Height (cm): 180.3 (2021 05:48)  Weight (kg): 89.4 (2021 05:48)  BMI (kg/m2): 27.5 (2021 05:48)  BSA (m2): 2.1 (2021 05:48)    Vital Signs Last 24 Hrs  T(F): 101.4 (21 @ 14:55), Max: 101.4 (21 @ 09:45)    Vital Signs Last 24 Hrs  HR: 135 (21 @ 14:55) (115 - 167)  BP: 114/70 (21 @ 12:15) (96/68 - 128/70)  RR: 18 (21 @ 14:55)  SpO2: 98% (21 @ 14:55) (98% - 100%)  Wt(kg): --                          7.2    0.52  )-----------( 18       ( 2021 12:07 )             19.7           139  |  102  |  9   ----------------------------<  103<H>  3.8   |  23  |  0.86    Ca    9.6      2021 12:07  Phos  3.2       Mg     2.0     11-13    TPro  6.9  /  Alb  3.9  /  TBili  0.5  /  DBili  x   /  AST  15  /  ALT  52<H>  /  AlkPhos  81  -      Urinalysis Basic - ( 2021 05:22 )    Color: Colorless / Appearance: Clear / S.007 / pH: x  Gluc: x / Ketone: Negative  / Bili: Negative / Urobili: Negative   Blood: x / Protein: Negative / Nitrite: Negative   Leuk Esterase: Negative / RBC: 0 /hpf / WBC 0 /HPF   Sq Epi: x / Non Sq Epi: 0 /hpf / Bacteria: Negative        MICROBIOLOGY:  v  .Blood Blood-Peripheral  10-29-21   No Growth Final  --  --      .Blood Blood-Peripheral  10-29-21   No Growth Final  --  --      Clean Catch Clean Catch (Midstream)  10-26-21   No growth  --  --      .Blood Blood-Catheter  10-26-21   No Growth Final  --  --      .Stool Feces  10-15-21   GI PCR Results: NOT detected  *******Please Note:*******  GI panel PCR evaluates for:  Campylobacter, Plesiomonas shigelloides, Salmonella,  Vibrio, Yersinia enterocolitica, Enteroaggregative  Escherichia coli (EAEC), Enteropathogenic E.coli (EPEC),  Enterotoxigenic E. coli (ETEC) lt/st, Shiga-like  toxin-producing E. coli (STEC) stx1/stx2,  Shigella/ Enteroinvasive E. coli (EIEC), Cryptosporidium,  Cyclospora cayetanensis, Entamoeba histolytica,  Giardia lamblia, Adenovirus F 40/41, Astrovirus,  Norovirus GI/GII, Rotavirus A, Sapovirus  --  --          Rapid RVP Result: NotDetec ( @ 01:28)          RADIOLOGY:    Images independently visualized and reviewed personally,  findings as below    < from: Xray Chest 1 View AP/PA (21 @ 22:36) >  IMPRESSION:  Clear lungs.    < end of copied text >  < from: Xray Hand 2 Views, Right (21 @ 22:36) >  IMPRESSION:  No acute fracture or dislocation. A new density is seen at the dorsal ulnar aspect of the proximal third digit, correlate with direct visualization for possible foreign body versus external. No radiopaque foreign body of the first digit. No osseous erosions are acute periosteal reaction.  Preserved joint spaces.    < end of copied text >

## 2021-11-13 NOTE — PROGRESS NOTE ADULT - PROBLEM SELECTOR PLAN 1
Admit to 7 [facility] for neutropenic fever  Patient is s/p cycle 2 HIDAC   Monitor labs, replace blood and lytes prn, pain control, antiemetics.

## 2021-11-13 NOTE — H&P ADULT - NSHPPHYSICALEXAM_GEN_ALL_CORE
Vital Signs Last 24 Hrs  T(C): 38.6 (13 Nov 2021 09:45), Max: 38.6 (13 Nov 2021 09:45)  T(F): 101.4 (13 Nov 2021 09:45), Max: 101.4 (13 Nov 2021 09:45)  HR: 137 (13 Nov 2021 09:45) (115 - 167)  BP: 114/66 (13 Nov 2021 09:45) (96/68 - 128/70)  BP(mean): 77 (13 Nov 2021 01:00) (77 - 77)  RR: 18 (13 Nov 2021 09:45) (16 - 30)  SpO2: 99% (13 Nov 2021 09:45) (99% - 100%) Vital Signs Last 24 Hrs  T(C): 38.6 (13 Nov 2021 09:45), Max: 38.6 (13 Nov 2021 09:45)  T(F): 101.4 (13 Nov 2021 09:45), Max: 101.4 (13 Nov 2021 09:45)  HR: 137 (13 Nov 2021 09:45) (115 - 167)  BP: 114/66 (13 Nov 2021 09:45) (96/68 - 128/70)  BP(mean): 77 (13 Nov 2021 01:00) (77 - 77)  RR: 18 (13 Nov 2021 09:45) (16 - 30)  SpO2: 99% (13 Nov 2021 09:45) (99% - 100%)    Appearance: Pale, weak, 	  HEENT:   Normal oral mucosa, PERRL, EOMI	  Lymphatic: No lymphadenopathy , no edema  Cardiovascular: Tachycardic   Respiratory: Lungs clear to auscultation, normal effort 	  Gastrointestinal:  Soft, Non-tender, + BS	  Skin: Erythema and mild swelling to R thumb with laceration repair. Erythema has decreased from previously demarcated area in Emergency department   Musculoskeletal: Normal range of motion, normal strength  Psychiatry:  Mood & affect appropriate  Ext: No edema

## 2021-11-13 NOTE — ED ADULT NURSE NOTE - OBJECTIVE STATEMENT
36 year old male presents to ED c/o redness, pain and swelling to the site of sutures placed yesterday. Pt denies chest pain, shortness of breath, headache, nausea, vomiting, diarrhaea, abdominal pain, urinary symptoms, lightheadedness, dizziness, changes in vision

## 2021-11-13 NOTE — PROGRESS NOTE ADULT - ASSESSMENT
Patient is a 36 year old male with a PMHx of AML status post cycle 2 HIDAC consolidation. PMHX hypertension and fatty liver. Now with right first digit laceration required stitches from a broken dish. Presented with erythema and pain at the site of laceration repair. Patient has pancytopenia due to disease and treatment with chemotherapy.          #Febrile/ Tachycardic   -Blood Culture X2, pending results   -Urine Culture pending results   -Tylenol for fever   -Started on Aztreonam and Vancomycin   -Started on NS 100cc/hr   -Monitor Temperature curve  -Monitor vitals       #? Cellulitis of Right thumb  -Right thumb s/p laceration repair with erythema and swelling. Patient reports pain has improved, erythema and swelling improved.  -Erythema has decreased from previously demarcated area in emergency department  -ID evaluation   -Started on Aztreonam and Vancomycin   -Monitor       #HTN  -Continue with home medication of Amlodipine 5mg   -Monitor BP      Patient is a 36 year old male with a PMHx of AML status post cycle 2 HIDAC consolidation. PMHX hypertension and fatty liver. Now with right first digit laceration required stitches from a broken dish. Presented with erythema and pain at the site of laceration repair. Patient has pancytopenia due to disease and treatment with chemotherapy.

## 2021-11-13 NOTE — CHART NOTE - NSCHARTNOTEFT_GEN_A_CORE
Medicine PA Note     JAK DANIELS  MRN-[id number]  Allergies    No Known Allergies    Intolerances    cefepime (Rash)       Notified by RN for patient with HR 140s, patient seen and evaluated at bedside in [facility], patient endorsing chills and "feeling warm" Patient denies any CP/SOB/N/V/D/headache/dizziness/weakness/abdominal pain/palpitations/lightheadedness. Patient stating typically when he has fevers he becomes tachycardic.     Vital Signs Last 24 Hrs  T(C): 37.8 (11-12-21 @ 22:30), Max: 37.8 (11-12-21 @ 22:30)  T(F): 100.1 (11-12-21 @ 22:30), Max: 100.1 (11-12-21 @ 22:30)  HR: 115 (11-13-21 @ 01:00) (115 - 143)  BP: 96/68 (11-13-21 @ 01:00) (96/68 - 128/70)  BP(mean): 77 (11-13-21 @ 01:00) (77 - 77)  RR: 19 (11-13-21 @ 01:00) (17 - 25)  SpO2: 99% (11-13-21 @ 01:00) (99% - 100%)  Daily Height in cm: 182.88 (12 Nov 2021 21:29)    Daily   I&O's Summary    CAPILLARY BLOOD GLUCOSE                          7.8    0.46  )-----------( 25       ( 12 Nov 2021 22:42 )             21.2     11-12    134<L>  |  97  |  15  ----------------------------<  109<H>  3.5   |  22  |  0.99    Ca    9.6      12 Nov 2021 22:44  Phos  3.0     11-12  Mg     2.0     11-12    TPro  7.2  /  Alb  4.1  /  TBili  0.4  /  DBili  x   /  AST  22  /  ALT  65<H>  /  AlkPhos  88  11-12    PT/INR - ( 12 Nov 2021 22:42 )   PT: 14.3 sec;   INR: 1.20 ratio         PTT - ( 12 Nov 2021 22:42 )  PTT:30.2 sec  CARDIAC MARKERS ( 12 Nov 2021 22:45 )  x     / x     / 79 U/L / x     / x                PHYSICAL EXAM:  GENERAL: NAD,   CHEST/LUNG: Clear to auscultation bilaterally; No wheeze  HEART: tachycardia   ABDOMEN: Soft, Nontender, Nondistended; Bowel sounds present  PSYCH: AAOx3    Assessment/Plan: Tachycardia --- sinus tachycardia likely 2/2 neutropenic fevers due to possible infectious etiology,     > VS stable aside from (temp 100.5F) HR, 120s-140s, on initial presentation to ED HR is 140s, and has been 110s-120s in ED since   > patient in NAD only complaint is feeling warm / chills   > EKG stat showing sinus tachycardia 130s  > Iv tylenol x 1   > panculture sent   > IVF bolus 500cc x 1   > patient s/p aztreonam / vanco / clindamycin   > c/w tele monitoring   > consider cardiology consult if persistent tachycardia   > consider CT-A if concern for PE   > awaiting admission orders, will monitor closely         Deacon Vallejo PA-C,   Department of Medicine Medicine PA Note     JAK DANIELS  MRN-[id number]  Allergies    No Known Allergies    Intolerances    cefepime (Rash)       Notified by RN for patient with HR 140s, patient seen and evaluated at bedside in [facility], patient endorsing chills and "feeling warm" Patient denies any CP/SOB/N/V/D/headache/dizziness/weakness/abdominal pain/palpitations/lightheadedness. Patient stating typically when he has fevers he becomes tachycardic.     Vital Signs Last 24 Hrs  T(C): 37.8 (11-12-21 @ 22:30), Max: 37.8 (11-12-21 @ 22:30)  T(F): 100.1 (11-12-21 @ 22:30), Max: 100.1 (11-12-21 @ 22:30)  HR: 115 (11-13-21 @ 01:00) (115 - 143)  BP: 96/68 (11-13-21 @ 01:00) (96/68 - 128/70)  BP(mean): 77 (11-13-21 @ 01:00) (77 - 77)  RR: 19 (11-13-21 @ 01:00) (17 - 25)  SpO2: 99% (11-13-21 @ 01:00) (99% - 100%)  Daily Height in cm: 182.88 (12 Nov 2021 21:29)    Daily   I&O's Summary    CAPILLARY BLOOD GLUCOSE                          7.8    0.46  )-----------( 25       ( 12 Nov 2021 22:42 )             21.2     11-12    134<L>  |  97  |  15  ----------------------------<  109<H>  3.5   |  22  |  0.99    Ca    9.6      12 Nov 2021 22:44  Phos  3.0     11-12  Mg     2.0     11-12    TPro  7.2  /  Alb  4.1  /  TBili  0.4  /  DBili  x   /  AST  22  /  ALT  65<H>  /  AlkPhos  88  11-12    PT/INR - ( 12 Nov 2021 22:42 )   PT: 14.3 sec;   INR: 1.20 ratio         PTT - ( 12 Nov 2021 22:42 )  PTT:30.2 sec  CARDIAC MARKERS ( 12 Nov 2021 22:45 )  x     / x     / 79 U/L / x     / x                PHYSICAL EXAM:  GENERAL: NAD,   CHEST/LUNG: Clear to auscultation bilaterally; No wheeze  HEART: tachycardia   ABDOMEN: Soft, Nontender, Nondistended; Bowel sounds present  PSYCH: AAOx3    Assessment/Plan: Tachycardia --- sinus tachycardia likely 2/2 neutropenic fevers due to possible infectious etiology,     > VS stable aside from (temp 100.5F) HR, 120s-140s, on initial presentation to ED HR is 140s, and has been 110s-120s in ED since   > patient in NAD only complaint is feeling warm / chills   > EKG stat showing sinus tachycardia 130s  > Iv tylenol x 1   > panculture sent   > IVF bolus 500cc x 1   > patient s/p aztreonam / vanco / clindamycin   > replete electrolytes as required   > c/w tele monitoring   > consider cardiology consult if persistent tachycardia   > consider CT-A if concern for PE   > awaiting admission orders, will monitor closely         Deacon Vallejo PA-C,   Department of Medicine Medicine PA Note     JAK DANIELS  MRN-[id number]  Allergies    No Known Allergies    Intolerances    cefepime (Rash)       Notified by RN for patient with HR 130s-140s and briefly 160s, patient seen and evaluated at bedside in NAD, patient endorsing chills and "feeling warm" Patient denies any CP/SOB/N/V/D/headache/dizziness/weakness/abdominal pain/palpitations/lightheadedness. Patient stating typically when he has fevers he becomes tachycardic.     Vital Signs Last 24 Hrs  T(C): 37.8 (11-12-21 @ 22:30), Max: 37.8 (11-12-21 @ 22:30)  T(F): 100.1 (11-12-21 @ 22:30), Max: 100.1 (11-12-21 @ 22:30)  HR: 115 (11-13-21 @ 01:00) (115 - 143)  BP: 96/68 (11-13-21 @ 01:00) (96/68 - 128/70)  BP(mean): 77 (11-13-21 @ 01:00) (77 - 77)  RR: 19 (11-13-21 @ 01:00) (17 - 25)  SpO2: 99% (11-13-21 @ 01:00) (99% - 100%)  Daily Height in cm: 182.88 (12 Nov 2021 21:29)    Daily   I&O's Summary    CAPILLARY BLOOD GLUCOSE                          7.8    0.46  )-----------( 25       ( 12 Nov 2021 22:42 )             21.2     11-12    134<L>  |  97  |  15  ----------------------------<  109<H>  3.5   |  22  |  0.99    Ca    9.6      12 Nov 2021 22:44  Phos  3.0     11-12  Mg     2.0     11-12    TPro  7.2  /  Alb  4.1  /  TBili  0.4  /  DBili  x   /  AST  22  /  ALT  65<H>  /  AlkPhos  88  11-12    PT/INR - ( 12 Nov 2021 22:42 )   PT: 14.3 sec;   INR: 1.20 ratio         PTT - ( 12 Nov 2021 22:42 )  PTT:30.2 sec  CARDIAC MARKERS ( 12 Nov 2021 22:45 )  x     / x     / 79 U/L / x     / x                PHYSICAL EXAM:  GENERAL: NAD,   CHEST/LUNG: Clear to auscultation bilaterally; No wheeze  HEART: tachycardia   ABDOMEN: Soft, Nontender, Nondistended; Bowel sounds present  PSYCH: AAOx3    Assessment/Plan: Tachycardia --- sinus tachycardia likely 2/2 neutropenic fevers due to possible infectious etiology,     > VS stable aside from (temp 100.5F) HR, 120s-140s, on initial presentation to ED HR is 140s, and has been 110s-120s in ED since // RR 18 on exam  > patient in NAD only complaint is feeling warm / chills   > EKG stat showing sinus tachycardia 130s  > Iv tylenol x 1   > panculture sent   > IVF bolus 500cc x 1   > patient s/p aztreonam / vanco / clindamycin   > replete electrolytes as required   > c/w tele monitoring   > consider cardiology consult if persistent tachycardia   > consider CT-A if concern for PE   > awaiting admission orders, will monitor closely         Deacon Vallejo PA-C,   Department of Medicine

## 2021-11-13 NOTE — H&P ADULT - ATTENDING COMMENTS
Pt care and plan discussed and reviewed with PA. Plan as outlined above edited by me to reflect our discussion. I had a prolonged conversation with the patient regarding hospital course, differential diagnosis and results of diagnostic tests.  Plan of care discussed with patient after the evaluation. Patient expresses clear understanding and satisfaction with the plan of care. OMT on six regions for acute somatic dysfunctions done at the bedside. one hundred tenty minutes spent on encounter, of which more than fifty percent of the encounter was spent on counseling and/or coordinating care by the attending physician.

## 2021-11-13 NOTE — H&P ADULT - NSHPREVIEWOFSYSTEMS_GEN_ALL_CORE
REVIEW OF SYSTEMS:    CONSTITUTIONAL: No weakness, fevers or chills  EYES/ENT: No visual changes;  No vertigo or throat pain   NECK: No pain or stiffness  RESPIRATORY: No cough, wheezing, hemoptysis; No shortness of breath  CARDIOVASCULAR: No chest pain or palpitations  GASTROINTESTINAL: No abdominal or epigastric pain. No nausea, vomiting, or hematemesis; No diarrhea or constipation. No melena or hematochezia.  GENITOURINARY: No dysuria, frequency or hematuria  NEUROLOGICAL: No numbness or weakness  SKIN: No itching, burning, rashes, or lesions   All other review of systems is negative unless indicated above. REVIEW OF SYSTEMS:    CONSTITUTIONAL: + Weakness; + Fever; + Chills; + Body aches   EYES/ENT: No visual changes;  No vertigo or throat pain   NECK: No pain or stiffness  RESPIRATORY: No cough, wheezing, hemoptysis; No shortness of breath  CARDIOVASCULAR: Tachycardic   GASTROINTESTINAL: + Hemorrhoid; No abdominal or epigastric pain. No nausea, vomiting, or hematemesis; No diarrhea or constipation. No melena or hematochezia.  GENITOURINARY: No dysuria, frequency or hematuria  NEUROLOGICAL: No numbness or weakness  SKIN: + Right thumb erythema and swelling. Patient reports improvement to erythema, swelling, pain  All other review of systems is negative unless indicated above.

## 2021-11-13 NOTE — H&P ADULT - ASSESSMENT
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 Patient reported he was feeling unwell prior to suture placement with body aches, chills, night sweats and subjective fevers. Patient last BM 4 days ago. Has bruise to left inner thigh. Patient reports he keeps his PICC site clean and intact which was placed in 9/2021. Denied CP, SOB, abdominal pain, N/V/D, cough, back pain.      #AML  -Hematology/oncology evaluation  -PICC line   -Continue with home medications Fluconazole and Levaquin       #Febrile/ Tachycardic   -Blood Culture X2, pending results   -Urine Culture pending results   -Tylenol for fever   -Started on Aztreonam and Vancomycin   -Started on NS 100cc/hr   -Monitor Temperature curve  -Monitor vitals       #? Cellulitis of Right thumb  -Right thumb s/p laceration repair with erythema and swelling. Patient reports pain has improved, erythema and swelling improved.  -Erythema has decreased from previously demarcated area in emergency department  -ID evaluation   -Started on Aztreonam and Vancomycin   -Monitor       #HTN  -Continue with home medication of Amlodipine 5mg   -Monitor BP      Patient is a 36 year old male with a PMHx of AML (dx 7/21, undergoing consolidation chemo every 3 weeks and last dose was 2 weeks ago), hypertension and fatty liver s/p right first digit laceration repair yesterday now presenting with erythema and pain at the site of laceration repair. Patient reported he was feeling unwell prior to suture placement with body aches, chills, night sweats and subjective fevers. Patient last BM 4 days ago. Has bruise to left inner thigh. Patient reports he keeps his PICC site clean and intact which was placed in 9/2021. Denied CP, SOB, abdominal pain, N/V/D, cough, back pain.      #AML  -Hematology/oncology evaluation  -Patient has been transferred to Oncology team   -PICC line   -Continue with home medications Fluconazole and Levaquin       #Febrile/ Tachycardic   -Blood Culture X2, pending results   -Urine Culture pending results   -Tylenol for fever   -Started on Aztreonam and Vancomycin   -Started on NS 100cc/hr   -Monitor Temperature curve  -Monitor vitals       #? Cellulitis of Right thumb  -Right thumb s/p laceration repair with erythema and swelling. Patient reports pain has improved, erythema and swelling improved.  -Erythema has decreased from previously demarcated area in emergency department  -ID evaluation   -Started on Aztreonam and Vancomycin   -Monitor     #Hemorrhoid  -GI consult   -Hemorrhoidal ointment PRN    #HTN  -Continue with home medication of Amlodipine 5mg   -Monitor BP      Patient is a 36 year old male with a PMHx of AML (dx 7/21, undergoing consolidation chemo every 3 weeks and last dose was 2 weeks ago), hypertension and fatty liver s/p right first digit laceration repair yesterday now presenting with erythema and pain at the site of laceration repair. Patient reported he was feeling unwell prior to suture placement with body aches, chills, night sweats and subjective fevers. Patient last BM 4 days ago. Has bruise to left inner thigh. Patient reports he keeps his PICC site clean and intact which was placed in 9/2021. Denied CP, SOB, abdominal pain, N/V/D, cough, back pain.      #AML  -Hematology/oncology evaluation  -Patient has been transferred to Oncology team   -PICC line   -Continue with home medications Fluconazole and Levaquin       #Febrile/ Tachycardic   -Blood Culture X2, pending results   -Urine Culture pending results   -Tylenol for fever   -Started on Aztreonam and Vancomycin   -Started on NS 100cc/hr   -Monitor Temperature curve  -Monitor vitals       #? Cellulitis of Right thumb  -Right thumb s/p laceration repair with erythema and swelling. Patient reports pain has improved, erythema and swelling improved.  -Erythema has decreased from previously demarcated area in emergency department  -ID evaluation   -Started on Aztreonam and Vancomycin   -Monitor     #Hemorrhoid  -GI consult   -Hemorrhoidal ointment PRN    #HTN  -Continue with home medication of Amlodipine 5mg   -Monitor BP       DVT and gI PPX

## 2021-11-13 NOTE — PROGRESS NOTE ADULT - PROBLEM SELECTOR PLAN 3
Patient cut his finger on a broken dish.   Patient will need stitches removed 7-10 days from 11/12. Patient is to return to the ER to do so.

## 2021-11-13 NOTE — H&P ADULT - NSHPLABSRESULTS_GEN_ALL_CORE
7.2    0.52  )-----------( 18       ( 2021 12:07 )             19.7               11-13    139  |  102  |  9   ----------------------------<  103<H>  3.8   |  23  |  0.86    Ca    9.6      2021 12:07  Phos  3.2       Mg     2.0         TPro  6.9  /  Alb  3.9  /  TBili  0.5  /  DBili  x   /  AST  15  /  ALT  52<H>  /  AlkPhos  81  11-13    PT/INR - ( 2021 22:42 )   PT: 14.3 sec;   INR: 1.20 ratio         PTT - ( 2021 22:42 )  PTT:30.2 sec       CARDIAC MARKERS ( 2021 22:45 )  x     / x     / 79 U/L / x     / x                  Urinalysis Basic - ( 2021 05:22 )    Color: Colorless / Appearance: Clear / S.007 / pH: x  Gluc: x / Ketone: Negative  / Bili: Negative / Urobili: Negative   Blood: x / Protein: Negative / Nitrite: Negative   Leuk Esterase: Negative / RBC: 0 /hpf / WBC 0 /HPF   Sq Epi: x / Non Sq Epi: 0 /hpf / Bacteria: Negative      < from: Xray Chest 1 View AP/PA (21 @ 22:36) >      EXAM:  XR CHEST AP OR PA 1V                            PROCEDURE DATE:  2021            INTERPRETATION:  CLINICAL INFORMATION: Screening.    TECHNIQUE: Frontal radiograph of the chest.    COMPARISON: Chest x-ray 2021.    FINDINGS:  Right upper extremity PICC line with tip in the SVC.  The lungs are clear. No pleural effusion or pneumothorax.  The heart is normal in size.  The visualized osseous structures are unremarkable.    IMPRESSION:  Clear lungs.    --- End of Report ---    < end of copied text >        < from: Xray Hand 2 Views, Right (21 @ 22:36) >      EXAM:  XR HAND 2 VIEWS RT                            PROCEDURE DATE:  2021            INTERPRETATION:  CLINICAL INFORMATION: Finger infection. Erythema and pain at site of first digit laceration repair    EXAM: Frontal and lateral radiographsof the right hand.    COMPARISON: Right hand radiographs 2021.    IMPRESSION:  No acute fracture or dislocation. A new density is seen at the dorsal ulnar aspect of the proximal third digit, correlate with direct visualization for possible foreign body versus external. No radiopaque foreign body of the first digit. No osseous erosions are acute periosteal reaction.  Preserved joint spaces.    --- End of Report ---    < end of copied text >

## 2021-11-13 NOTE — H&P ADULT - HISTORY OF PRESENT ILLNESS
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 Patient reported he was feeling unwell prior to suture placement with body aches, chills, night sweats and subjective fevers. Patient last BM 4 days ago. Has bruise to left inner thigh. Patient reports he keeps his PICC site clean and intact which was placed in 9/2021. Denied CP, SOB, abdominal pain, N/V/D, cough, back pain. Patient has not had COVID and is not vaccinated against COVID

## 2021-11-13 NOTE — PROGRESS NOTE ADULT - SUBJECTIVE AND OBJECTIVE BOX
Diagnosis: AML    Protocol/Chemo Regimen: s/p cycle 2 consolidation    Day: 20    Pt endorsed: weakness, hemorrhoidal pain. constipation    Review of Systems: Denies nausea, vomiting, diarrhea, chest pain, shortness of breath, headache.     Pain scale: intermittent rectal pain.     Diet: regular    Allergies    No Known Allergies    Intolerances    cefepime (Rash)      ANTIMICROBIALS  aztreonam  IVPB 2000 milliGRAM(s) IV Intermittent every 8 hours  fluconAZOLE   Tablet 200 milliGRAM(s) Oral daily  vancomycin  IVPB 1250 milliGRAM(s) IV Intermittent every 12 hours      HEME/ONC MEDICATIONS      STANDING MEDICATIONS  amLODIPine   Tablet 5 milliGRAM(s) Oral daily  influenza   Vaccine 0.5 milliLiter(s) IntraMuscular once  polyethylene glycol 3350 17 Gram(s) Oral daily  sodium chloride 0.9%. 1000 milliLiter(s) IV Continuous <Continuous>      PRN MEDICATIONS  acetaminophen     Tablet .. 650 milliGRAM(s) Oral every 6 hours PRN  hemorrhoidal Ointment 1 Application(s) Rectal three times a day PRN  HYDROmorphone  Injectable 0.5 milliGRAM(s) IV Push every 6 hours PRN        Vital Signs Last 24 Hrs  T(C): 37.3 (13 Nov 2021 12:15), Max: 38.6 (13 Nov 2021 09:45)  T(F): 99.2 (13 Nov 2021 12:15), Max: 101.4 (13 Nov 2021 09:45)  HR: 122 (13 Nov 2021 12:15) (115 - 167)  BP: 114/70 (13 Nov 2021 12:15) (96/68 - 128/70)  BP(mean): 77 (13 Nov 2021 01:00) (77 - 77)  RR: 18 (13 Nov 2021 12:15) (16 - 30)  SpO2: 100% (13 Nov 2021 12:15) (99% - 100%)    PHYSICAL EXAM  General: NAD  HEENT: clear oropharynx, anicteric sclera, pink conjunctiva  Neck: supple  CV: (+) S1/S2 RRR  Lungs: positive air movement b/l ant lungs, clear to auscultation, no wheezes, no rales  Abdomen: soft, non-tender, non-distended  Ext: no clubbing cyanosis or edema  Skin: no rashes and no petechiae  Neuro: alert and oriented X 3, no focal deficits  Central Line:     RECENT CULTURES:        LABS:                        7.2    0.52  )-----------( 18       ( 13 Nov 2021 12:07 )             19.7         Mean Cell Volume : 83.8 fl  Mean Cell Hemoglobin : 30.6 pg  Mean Cell Hemoglobin Concentration : 36.5 gm/dL  Auto Neutrophil # : 0.02 K/uL  Auto Lymphocyte # : 0.27 K/uL  Auto Monocyte # : 0.23 K/uL  Auto Eosinophil # : 0.00 K/uL  Auto Basophil # : 0.00 K/uL  Auto Neutrophil % : 0.0 %  Auto Lymphocyte % : 52.0 %  Auto Monocyte % : 44.0 %  Auto Eosinophil % : 0.0 %  Auto Basophil % : 0.0 %      11-13    139  |  102  |  9   ----------------------------<  103<H>  3.8   |  23  |  0.86    Ca    9.6      13 Nov 2021 12:07  Phos  3.2     11-13  Mg     2.0     11-13    TPro  6.9  /  Alb  3.9  /  TBili  0.5  /  DBili  x   /  AST  15  /  ALT  52<H>  /  AlkPhos  81  11-13      Mg 2.0  Phos 3.2  Mg 2.0  Phos 3.0      PT/INR - ( 12 Nov 2021 22:42 )   PT: 14.3 sec;   INR: 1.20 ratio         PTT - ( 12 Nov 2021 22:42 )  PTT:30.2 sec      Uric Acid 6.6        RADIOLOGY & ADDITIONAL STUDIES:

## 2021-11-13 NOTE — PROGRESS NOTE ADULT - SUBJECTIVE AND OBJECTIVE BOX
Diagnosis: AML    Protocol/Chemo Regimen: Cycle 2     Day: 20    Pt endorsed: constipation, fatigue, hemorrhoid pain.     Review of Systems: Denies nausea vomiting diarrhea, chest pain, sob, ha, weakness.     Pain scale: intermittent hemorrhoid pain.     Diet: regular    Allergies    No Known Allergies    Intolerances    cefepime (Rash)      ANTIMICROBIALS  aztreonam  IVPB 2000 milliGRAM(s) IV Intermittent every 8 hours  fluconAZOLE   Tablet 200 milliGRAM(s) Oral daily  vancomycin  IVPB 1250 milliGRAM(s) IV Intermittent every 12 hours      HEME/ONC MEDICATIONS      STANDING MEDICATIONS  amLODIPine   Tablet 5 milliGRAM(s) Oral daily  influenza   Vaccine 0.5 milliLiter(s) IntraMuscular once  polyethylene glycol 3350 17 Gram(s) Oral daily  sodium chloride 0.9%. 1000 milliLiter(s) IV Continuous <Continuous>      PRN MEDICATIONS  acetaminophen     Tablet .. 650 milliGRAM(s) Oral every 6 hours PRN  hemorrhoidal Ointment 1 Application(s) Rectal three times a day PRN  HYDROmorphone  Injectable 0.5 milliGRAM(s) IV Push every 6 hours PRN        Vital Signs Last 24 Hrs  T(C): 37.3 (13 Nov 2021 12:15), Max: 38.6 (13 Nov 2021 09:45)  T(F): 99.2 (13 Nov 2021 12:15), Max: 101.4 (13 Nov 2021 09:45)  HR: 122 (13 Nov 2021 12:15) (115 - 167)  BP: 114/70 (13 Nov 2021 12:15) (96/68 - 128/70)  BP(mean): 77 (13 Nov 2021 01:00) (77 - 77)  RR: 18 (13 Nov 2021 12:15) (16 - 30)  SpO2: 100% (13 Nov 2021 12:15) (99% - 100%)    PHYSICAL EXAM  General: NAD  HEENT: clear oropharynx, anicteric sclera, pink conjunctiva  Neck: supple  CV: (+) S1/S2 RRR  Lungs: positive air movement b/l ant lungs, clear to auscultation, no wheezes, no rales  Abdomen: soft, non-tender, non-distended  Ext: no clubbing cyanosis or edema  Skin: no rashes and no petechiae  Neuro: alert and oriented X 3, no focal deficits  Central Line:     RECENT CULTURES:        LABS:                        7.2    0.52  )-----------( 18       ( 13 Nov 2021 12:07 )             19.7         Mean Cell Volume : 83.8 fl  Mean Cell Hemoglobin : 30.6 pg  Mean Cell Hemoglobin Concentration : 36.5 gm/dL  Auto Neutrophil # : 0.02 K/uL  Auto Lymphocyte # : 0.27 K/uL  Auto Monocyte # : 0.23 K/uL  Auto Eosinophil # : 0.00 K/uL  Auto Basophil # : 0.00 K/uL  Auto Neutrophil % : 0.0 %  Auto Lymphocyte % : 52.0 %  Auto Monocyte % : 44.0 %  Auto Eosinophil % : 0.0 %  Auto Basophil % : 0.0 %      11-13    139  |  102  |  9   ----------------------------<  103<H>  3.8   |  23  |  0.86    Ca    9.6      13 Nov 2021 12:07  Phos  3.2     11-13  Mg     2.0     11-13    TPro  6.9  /  Alb  3.9  /  TBili  0.5  /  DBili  x   /  AST  15  /  ALT  52<H>  /  AlkPhos  81  11-13      PT/INR - ( 12 Nov 2021 22:42 )   PT: 14.3 sec;   INR: 1.20 ratio         PTT - ( 12 Nov 2021 22:42 )  PTT:30.2 sec      Uric Acid 6.6        RADIOLOGY & ADDITIONAL STUDIES:         Diagnosis: AML    Protocol/Chemo Regimen: Cycle 2     Day: 20    Pt endorsed: constipation, fatigue, hemorrhoid pain.     Review of Systems: Denies nausea vomiting diarrhea, chest pain, sob, ha, weakness.     Pain scale: intermittent hemorrhoid pain.     Diet: regular    Allergies    No Known Allergies    Intolerances    cefepime (Rash)      ANTIMICROBIALS  aztreonam  IVPB 2000 milliGRAM(s) IV Intermittent every 8 hours  fluconAZOLE   Tablet 200 milliGRAM(s) Oral daily  vancomycin  IVPB 1250 milliGRAM(s) IV Intermittent every 12 hours      HEME/ONC MEDICATIONS      STANDING MEDICATIONS  amLODIPine   Tablet 5 milliGRAM(s) Oral daily  influenza   Vaccine 0.5 milliLiter(s) IntraMuscular once  polyethylene glycol 3350 17 Gram(s) Oral daily  sodium chloride 0.9%. 1000 milliLiter(s) IV Continuous <Continuous>      PRN MEDICATIONS  acetaminophen     Tablet .. 650 milliGRAM(s) Oral every 6 hours PRN  hemorrhoidal Ointment 1 Application(s) Rectal three times a day PRN  HYDROmorphone  Injectable 0.5 milliGRAM(s) IV Push every 6 hours PRN        Vital Signs Last 24 Hrs  T(C): 37.3 (13 Nov 2021 12:15), Max: 38.6 (13 Nov 2021 09:45)  T(F): 99.2 (13 Nov 2021 12:15), Max: 101.4 (13 Nov 2021 09:45)  HR: 122 (13 Nov 2021 12:15) (115 - 167)  BP: 114/70 (13 Nov 2021 12:15) (96/68 - 128/70)  BP(mean): 77 (13 Nov 2021 01:00) (77 - 77)  RR: 18 (13 Nov 2021 12:15) (16 - 30)  SpO2: 100% (13 Nov 2021 12:15) (99% - 100%)    PHYSICAL EXAM  General: NAD  HEENT: clear oropharynx  CV: (+) S1/S2 RRR  Lungs: positive air movement b/l ant lungs, clear to auscultation, no wheezes, no rales  Abdomen: soft, non-tender, non-distended  Ext: no edema  Skin: no rashes and no petechiae  Neuro: alert and oriented X 3, no focal deficits  Central Line: PICC line    LABS:                        7.2    0.52  )-----------( 18       ( 13 Nov 2021 12:07 )             19.7         Mean Cell Volume : 83.8 fl  Mean Cell Hemoglobin : 30.6 pg  Mean Cell Hemoglobin Concentration : 36.5 gm/dL  Auto Neutrophil # : 0.02 K/uL  Auto Lymphocyte # : 0.27 K/uL  Auto Monocyte # : 0.23 K/uL  Auto Eosinophil # : 0.00 K/uL  Auto Basophil # : 0.00 K/uL  Auto Neutrophil % : 0.0 %  Auto Lymphocyte % : 52.0 %  Auto Monocyte % : 44.0 %  Auto Eosinophil % : 0.0 %  Auto Basophil % : 0.0 %      11-13    139  |  102  |  9   ----------------------------<  103<H>  3.8   |  23  |  0.86    Ca    9.6      13 Nov 2021 12:07  Phos  3.2     11-13  Mg     2.0     11-13    TPro  6.9  /  Alb  3.9  /  TBili  0.5  /  DBili  x   /  AST  15  /  ALT  52<H>  /  AlkPhos  81  11-13      PT/INR - ( 12 Nov 2021 22:42 )   PT: 14.3 sec;   INR: 1.20 ratio         PTT - ( 12 Nov 2021 22:42 )  PTT:30.2 sec      Uric Acid 6.6        RADIOLOGY & ADDITIONAL STUDIES:

## 2021-11-13 NOTE — PROVIDER CONTACT NOTE (OTHER) - ACTION/TREATMENT ORDERED:
Tylenol 650mg given. Will continue to monitor.
cooling blanket as ordered and additional 325 mg Tylenol to be given

## 2021-11-13 NOTE — CONSULT NOTE ADULT - ASSESSMENT
36 m with HTN, fatty liver, AML (dx 7/21, undergoing consolidation chemo every 3 weeks and last dose was 2 weeks ago), s/p right first digit laceration repair yesterday now p/w erythema and pain at the site of laceration repair  febrile to 101.4, tachy  WBC: 0.52  xray:  No acute fracture or dislocation. A new density is seen at the dorsal ulnar aspect of the proximal third digit, correlate with direct visualization for possible foreign body versus external. No radiopaque foreign body of the first digit. No osseous erosions are acute periosteal reaction.    fever, tachycardia, sepsis due to thumb cellulitis after laceration with glass and sutured yesterday  pancytopenia, neutropenia, AML on chemo    * f/u the blood cx  * c/w vanco 1250 q 12 and check the trough before the 4th dose  * pt has cefepime intolerance but no allergy to penicillin, discontinue aztreonam and start zosyn  * monitor the CBC/diff and temp curve    The above assessment and plan was discussed with the primary team    Arleen Calero MD  Pager [phone]  After 5pm and on weekends call [phone]

## 2021-11-13 NOTE — PROGRESS NOTE ADULT - ATTENDING COMMENTS
36 year old man with AML C2 D20 of consolidation admitted with hand laceration and then had neutropenic fever  -switch aztreonam to zosyn per id  -continue vanco  -management of hemorrhoids  -stool softeners  -f/u cultures

## 2021-11-14 LAB
ALBUMIN SERPL ELPH-MCNC: 4.3 G/DL — SIGNIFICANT CHANGE UP (ref 3.3–5)
ALP SERPL-CCNC: 77 U/L — SIGNIFICANT CHANGE UP (ref 40–120)
ALT FLD-CCNC: 53 U/L — HIGH (ref 10–45)
ANION GAP SERPL CALC-SCNC: 13 MMOL/L — SIGNIFICANT CHANGE UP (ref 5–17)
AST SERPL-CCNC: 19 U/L — SIGNIFICANT CHANGE UP (ref 10–40)
BASOPHILS # BLD AUTO: 0 K/UL — SIGNIFICANT CHANGE UP (ref 0–0.2)
BASOPHILS NFR BLD AUTO: 0 % — SIGNIFICANT CHANGE UP (ref 0–2)
BILIRUB SERPL-MCNC: 0.5 MG/DL — SIGNIFICANT CHANGE UP (ref 0.2–1.2)
BUN SERPL-MCNC: 8 MG/DL — SIGNIFICANT CHANGE UP (ref 7–23)
CALCIUM SERPL-MCNC: 9.7 MG/DL — SIGNIFICANT CHANGE UP (ref 8.4–10.5)
CHLORIDE SERPL-SCNC: 100 MMOL/L — SIGNIFICANT CHANGE UP (ref 96–108)
CO2 SERPL-SCNC: 23 MMOL/L — SIGNIFICANT CHANGE UP (ref 22–31)
COVID-19 NUCLEOCAPSID GAM AB INTERP: POSITIVE
COVID-19 NUCLEOCAPSID TOTAL GAM ANTIBODY RESULT: 14.4 INDEX — HIGH
COVID-19 SPIKE DOMAIN AB INTERP: POSITIVE
COVID-19 SPIKE DOMAIN ANTIBODY RESULT: >250 U/ML — HIGH
CREAT SERPL-MCNC: 0.96 MG/DL — SIGNIFICANT CHANGE UP (ref 0.5–1.3)
CULTURE RESULTS: NO GROWTH — SIGNIFICANT CHANGE UP
EOSINOPHIL # BLD AUTO: 0 K/UL — SIGNIFICANT CHANGE UP (ref 0–0.5)
EOSINOPHIL NFR BLD AUTO: 0 % — SIGNIFICANT CHANGE UP (ref 0–6)
GLUCOSE SERPL-MCNC: 106 MG/DL — HIGH (ref 70–99)
HCT VFR BLD CALC: 20.2 % — CRITICAL LOW (ref 39–50)
HGB BLD-MCNC: 7.1 G/DL — LOW (ref 13–17)
LYMPHOCYTES # BLD AUTO: 0.19 K/UL — LOW (ref 1–3.3)
LYMPHOCYTES # BLD AUTO: 19.3 % — SIGNIFICANT CHANGE UP (ref 13–44)
MAGNESIUM SERPL-MCNC: 2 MG/DL — SIGNIFICANT CHANGE UP (ref 1.6–2.6)
MANUAL SMEAR VERIFICATION: SIGNIFICANT CHANGE UP
MCHC RBC-ENTMCNC: 30.6 PG — SIGNIFICANT CHANGE UP (ref 27–34)
MCHC RBC-ENTMCNC: 35.1 GM/DL — SIGNIFICANT CHANGE UP (ref 32–36)
MCV RBC AUTO: 87.1 FL — SIGNIFICANT CHANGE UP (ref 80–100)
MONOCYTES # BLD AUTO: 0.6 K/UL — SIGNIFICANT CHANGE UP (ref 0–0.9)
MONOCYTES NFR BLD AUTO: 61.4 % — HIGH (ref 2–14)
MRSA PCR RESULT.: SIGNIFICANT CHANGE UP
NEUTROPHILS # BLD AUTO: 0.19 K/UL — LOW (ref 1.8–7.4)
NEUTROPHILS NFR BLD AUTO: 19.3 % — LOW (ref 43–77)
PHOSPHATE SERPL-MCNC: 4.2 MG/DL — SIGNIFICANT CHANGE UP (ref 2.5–4.5)
PLAT MORPH BLD: NORMAL — SIGNIFICANT CHANGE UP
PLATELET # BLD AUTO: 19 K/UL — CRITICAL LOW (ref 150–400)
POTASSIUM SERPL-MCNC: 3.6 MMOL/L — SIGNIFICANT CHANGE UP (ref 3.5–5.3)
POTASSIUM SERPL-SCNC: 3.6 MMOL/L — SIGNIFICANT CHANGE UP (ref 3.5–5.3)
PROT SERPL-MCNC: 6.9 G/DL — SIGNIFICANT CHANGE UP (ref 6–8.3)
RBC # BLD: 2.32 M/UL — LOW (ref 4.2–5.8)
RBC # FLD: 14.5 % — SIGNIFICANT CHANGE UP (ref 10.3–14.5)
RBC BLD AUTO: SIGNIFICANT CHANGE UP
S AUREUS DNA NOSE QL NAA+PROBE: SIGNIFICANT CHANGE UP
SARS-COV-2 IGG+IGM SERPL QL IA: 14.4 INDEX — HIGH
SARS-COV-2 IGG+IGM SERPL QL IA: >250 U/ML — HIGH
SARS-COV-2 IGG+IGM SERPL QL IA: POSITIVE
SARS-COV-2 IGG+IGM SERPL QL IA: POSITIVE
SODIUM SERPL-SCNC: 136 MMOL/L — SIGNIFICANT CHANGE UP (ref 135–145)
SPECIMEN SOURCE: SIGNIFICANT CHANGE UP
WBC # BLD: 0.97 K/UL — CRITICAL LOW (ref 3.8–10.5)
WBC # FLD AUTO: 0.97 K/UL — CRITICAL LOW (ref 3.8–10.5)

## 2021-11-14 PROCEDURE — 99232 SBSQ HOSP IP/OBS MODERATE 35: CPT

## 2021-11-14 RX ORDER — SODIUM CHLORIDE 9 MG/ML
1000 INJECTION INTRAMUSCULAR; INTRAVENOUS; SUBCUTANEOUS
Refills: 0 | Status: DISCONTINUED | OUTPATIENT
Start: 2021-11-14 | End: 2021-11-17

## 2021-11-14 RX ORDER — HYDROMORPHONE HYDROCHLORIDE 2 MG/ML
0.5 INJECTION INTRAMUSCULAR; INTRAVENOUS; SUBCUTANEOUS
Refills: 0 | Status: DISCONTINUED | OUTPATIENT
Start: 2021-11-14 | End: 2021-11-15

## 2021-11-14 RX ORDER — ACETAMINOPHEN 500 MG
1000 TABLET ORAL ONCE
Refills: 0 | Status: COMPLETED | OUTPATIENT
Start: 2021-11-14 | End: 2021-11-14

## 2021-11-14 RX ADMIN — HYDROMORPHONE HYDROCHLORIDE 0.5 MILLIGRAM(S): 2 INJECTION INTRAMUSCULAR; INTRAVENOUS; SUBCUTANEOUS at 05:00

## 2021-11-14 RX ADMIN — Medication 5 MILLIGRAM(S): at 21:14

## 2021-11-14 RX ADMIN — FLUCONAZOLE 200 MILLIGRAM(S): 150 TABLET ORAL at 13:10

## 2021-11-14 RX ADMIN — Medication 650 MILLIGRAM(S): at 10:06

## 2021-11-14 RX ADMIN — Medication 400 MILLIGRAM(S): at 04:10

## 2021-11-14 RX ADMIN — HYDROMORPHONE HYDROCHLORIDE 0.5 MILLIGRAM(S): 2 INJECTION INTRAMUSCULAR; INTRAVENOUS; SUBCUTANEOUS at 10:06

## 2021-11-14 RX ADMIN — Medication 166.67 MILLIGRAM(S): at 18:32

## 2021-11-14 RX ADMIN — Medication 5 MILLIGRAM(S): at 05:11

## 2021-11-14 RX ADMIN — Medication 166.67 MILLIGRAM(S): at 05:10

## 2021-11-14 RX ADMIN — PIPERACILLIN AND TAZOBACTAM 25 GRAM(S): 4; .5 INJECTION, POWDER, LYOPHILIZED, FOR SOLUTION INTRAVENOUS at 05:11

## 2021-11-14 RX ADMIN — PIPERACILLIN AND TAZOBACTAM 25 GRAM(S): 4; .5 INJECTION, POWDER, LYOPHILIZED, FOR SOLUTION INTRAVENOUS at 14:13

## 2021-11-14 RX ADMIN — Medication 1000 MILLIGRAM(S): at 05:03

## 2021-11-14 RX ADMIN — PIPERACILLIN AND TAZOBACTAM 25 GRAM(S): 4; .5 INJECTION, POWDER, LYOPHILIZED, FOR SOLUTION INTRAVENOUS at 21:13

## 2021-11-14 RX ADMIN — HYDROMORPHONE HYDROCHLORIDE 0.5 MILLIGRAM(S): 2 INJECTION INTRAMUSCULAR; INTRAVENOUS; SUBCUTANEOUS at 10:45

## 2021-11-14 RX ADMIN — HYDROMORPHONE HYDROCHLORIDE 0.5 MILLIGRAM(S): 2 INJECTION INTRAMUSCULAR; INTRAVENOUS; SUBCUTANEOUS at 04:24

## 2021-11-14 RX ADMIN — Medication 650 MILLIGRAM(S): at 00:00

## 2021-11-14 RX ADMIN — POLYETHYLENE GLYCOL 3350 17 GRAM(S): 17 POWDER, FOR SOLUTION ORAL at 13:09

## 2021-11-14 RX ADMIN — SENNA PLUS 2 TABLET(S): 8.6 TABLET ORAL at 21:13

## 2021-11-14 RX ADMIN — Medication 650 MILLIGRAM(S): at 17:04

## 2021-11-14 RX ADMIN — AMLODIPINE BESYLATE 5 MILLIGRAM(S): 2.5 TABLET ORAL at 05:11

## 2021-11-14 NOTE — PROGRESS NOTE ADULT - ATTENDING COMMENTS
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

## 2021-11-14 NOTE — PROGRESS NOTE ADULT - PROBLEM SELECTOR PLAN 2
Patient is neutropenic, febrile  FU cultures from 11/12  continue with current abx.   FU vanco trough 2200 11/14 Patient is neutropenic, febrile  FU cultures from 11/12  continue with current abx.   FU vanco trough 2200 11/14  ID following. Patient is neutropenic, febrile  Cultures (-) NGTD  continue with current abx.   FU vanco trough 2200 11/14  ID following.

## 2021-11-14 NOTE — PROGRESS NOTE ADULT - SUBJECTIVE AND OBJECTIVE BOX
Diagnosis: AML    Protocol/Chemo Regimen: Cycle 2     Day: 21    Pt endorsed: constipation, fatigue, hemorrhoid pain.     Review of Systems: Denies nausea vomiting diarrhea, chest pain, sob, ha, weakness.     Pain scale: intermittent hemorrhoid pain.     Diet: regular    Allergies    No Known Allergies    Intolerances    cefepime (Rash)      ANTIMICROBIALS  fluconAZOLE   Tablet 200 milliGRAM(s) Oral daily  piperacillin/tazobactam IVPB.. 3.375 Gram(s) IV Intermittent every 8 hours  vancomycin  IVPB 1250 milliGRAM(s) IV Intermittent every 12 hours      HEME/ONC MEDICATIONS      STANDING MEDICATIONS  amLODIPine   Tablet 5 milliGRAM(s) Oral daily  influenza   Vaccine 0.5 milliLiter(s) IntraMuscular once  polyethylene glycol 3350 17 Gram(s) Oral daily  senna 2 Tablet(s) Oral at bedtime  sodium chloride 0.9%. 1000 milliLiter(s) IV Continuous <Continuous>      PRN MEDICATIONS  acetaminophen     Tablet .. 650 milliGRAM(s) Oral every 6 hours PRN  bisacodyl 5 milliGRAM(s) Oral every 12 hours PRN  hemorrhoidal Ointment 1 Application(s) Rectal three times a day PRN  HYDROmorphone  Injectable 0.5 milliGRAM(s) IV Push every 4 hours PRN        Vital Signs Last 24 Hrs  T(C): 36.7 (14 Nov 2021 05:32), Max: 39.4 (13 Nov 2021 21:18)  T(F): 98.1 (14 Nov 2021 05:32), Max: 102.9 (13 Nov 2021 21:18)  HR: 111 (14 Nov 2021 05:27) (111 - 137)  BP: 113/69 (14 Nov 2021 05:27) (106/64 - 115/78)  BP(mean): --  RR: 18 (14 Nov 2021 05:27) (18 - 18)  SpO2: 96% (14 Nov 2021 05:27) (96% - 100%)    PHYSICAL EXAM  General: NAD  HEENT: clear oropharynx  CV: (+) S1/S2 RRR  Lungs: positive air movement b/l ant lungs, clear to auscultation, no wheezes, no rales  Abdomen: soft, non-tender, non-distended  Ext: no edema  Skin: no rashes and no petechiae  Neuro: alert and oriented X 3, no focal deficits  Central Line: PICC line    LABS: Diagnosis: AML    Protocol/Chemo Regimen: s/p Cycle 2 HIDAC now admitted with finger laceration and neutropenic fever.     Day: 21    Pt endorsed: constipation, fatigue, hemorrhoid pain.     Review of Systems: Denies nausea vomiting diarrhea, chest pain, sob, ha, weakness.     Pain scale: intermittent hemorrhoid pain.     Diet: regular    Allergies    No Known Allergies    Intolerances    cefepime (Rash)      ANTIMICROBIALS  fluconAZOLE   Tablet 200 milliGRAM(s) Oral daily  piperacillin/tazobactam IVPB.. 3.375 Gram(s) IV Intermittent every 8 hours  vancomycin  IVPB 1250 milliGRAM(s) IV Intermittent every 12 hours      HEME/ONC MEDICATIONS      STANDING MEDICATIONS  amLODIPine   Tablet 5 milliGRAM(s) Oral daily  influenza   Vaccine 0.5 milliLiter(s) IntraMuscular once  polyethylene glycol 3350 17 Gram(s) Oral daily  senna 2 Tablet(s) Oral at bedtime  sodium chloride 0.9%. 1000 milliLiter(s) IV Continuous <Continuous>      PRN MEDICATIONS  acetaminophen     Tablet .. 650 milliGRAM(s) Oral every 6 hours PRN  bisacodyl 5 milliGRAM(s) Oral every 12 hours PRN  hemorrhoidal Ointment 1 Application(s) Rectal three times a day PRN  HYDROmorphone  Injectable 0.5 milliGRAM(s) IV Push every 4 hours PRN        Vital Signs Last 24 Hrs  T(C): 36.7 (14 Nov 2021 05:32), Max: 39.4 (13 Nov 2021 21:18)  T(F): 98.1 (14 Nov 2021 05:32), Max: 102.9 (13 Nov 2021 21:18)  HR: 111 (14 Nov 2021 05:27) (111 - 137)  BP: 113/69 (14 Nov 2021 05:27) (106/64 - 115/78)  BP(mean): --  RR: 18 (14 Nov 2021 05:27) (18 - 18)  SpO2: 96% (14 Nov 2021 05:27) (96% - 100%)    PHYSICAL EXAM  General: NAD  HEENT: clear oropharynx  CV: (+) S1/S2 RRR  Lungs: positive air movement b/l ant lungs, clear to auscultation, no wheezes, no rales  Abdomen: soft, non-tender, non-distended  Ext: no edema, sutures in finger.   Skin: no rashes and no petechiae  Neuro: alert and oriented X 3, no focal deficits  Central Line: PICC line    LABS:                          7.1    0.97  )-----------( 19       ( 14 Nov 2021 07:19 )             20.2         Mean Cell Volume : 87.1 fl  Mean Cell Hemoglobin : 30.6 pg  Mean Cell Hemoglobin Concentration : 35.1 gm/dL  Auto Neutrophil # : 0.19 K/uL  Auto Lymphocyte # : 0.19 K/uL  Auto Monocyte # : 0.60 K/uL  Auto Eosinophil # : 0.00 K/uL  Auto Basophil # : 0.00 K/uL  Auto Neutrophil % : 19.3 %  Auto Lymphocyte % : 19.3 %  Auto Monocyte % : 61.4 %  Auto Eosinophil % : 0.0 %  Auto Basophil % : 0.0 %      11-14    136  |  100  |  8   ----------------------------<  106<H>  3.6   |  23  |  0.96    Ca    9.7      14 Nov 2021 07:11  Phos  4.2     11-14  Mg     2.0     11-14    TPro  6.9  /  Alb  4.3  /  TBili  0.5  /  DBili  x   /  AST  19  /  ALT  53<H>  /  AlkPhos  77  11-14      Mg 2.0  Phos 4.2      PT/INR - ( 12 Nov 2021 22:42 )   PT: 14.3 sec;   INR: 1.20 ratio         PTT - ( 12 Nov 2021 22:42 )  PTT:30.2 sec

## 2021-11-14 NOTE — PROGRESS NOTE ADULT - ASSESSMENT
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 Patient reported he was feeling unwell prior to suture placement with body aches, chills, night sweats and subjective fevers. Patient last BM 4 days ago. Has bruise to left inner thigh. Patient reports he keeps his PICC site clean and intact which was placed in 9/2021. Denied CP, SOB, abdominal pain, N/V/D, cough, back pain.      #AML/ Neurtopenia Fever   -Hematology/oncology evaluation  -Patient has been transferred to Oncology team   -PICC line   -Continue with home medications Fluconazole  - antibiotics per ID   - Pan Cx negative todate       #Febrile/ Tachycardic   - Neuropenic fever   -Blood Culture X2, pending results   -Urine Culture pending results   -Tylenol for fever   - cont on zosyn and Vancomycin   -aggressive IV hydration   -Monitor Temperature curve  -Monitor vitals       #? Cellulitis of Right thumb  -Right thumb s/p laceration repair with erythema and swelling. Patient reports pain has improved, erythema and swelling improved.  -Erythema has decreased from previously demarcated area in emergency department  -ID evaluation   -IV antibitoics   -Monitor     #Hemorrhoid  -GI consult   -Hemorrhoidal ointment PRN  - monitor clinically     #HTN  -Continue with home medication of Amlodipine 5mg   -Monitor BP       DVT and gI PPX

## 2021-11-14 NOTE — PROGRESS NOTE ADULT - NSPROGADDITIONALINFOA_GEN_ALL_CORE
Differential diagnosis and plan of care discussed with patient after the evaluation.   Advanced care planning/advanced directives discussed with patient/family. DNR status including forceful chest compressions to attempt to restart the heart, ventilator support/artificial breathing, electric shock, artificial nutrition, health care proxy, Molst form all discussed with pt. Pt wishes to consider.   OMT on six regions for acute somatic dysfunctions done at the bedside   Importance of Fall prevention discussed. I had a prolonged conversation with patient. More than 50% of the visit was spent counseling and/or coordinating care by the attending physician.  total ofsixty eight mins spent on total encounter

## 2021-11-14 NOTE — PROGRESS NOTE ADULT - ASSESSMENT
Patient is a 36 year old male with a PMHx of AML status post cycle 2 HIDAC consolidation. PMHX hypertension and fatty liver. Now with right first digit laceration required stitches from a broken dish. Presented with erythema and pain at the site of laceration repair. Patient has pancytopenia due to disease and treatment with chemotherapy.                        Patient is a 36 year old male with a PMHx of AML status post cycle 2 HIDAC consolidation 10/25. PMHX hypertension and fatty liver. Now with right first digit laceration required stitches from a broken dish. Presented with erythema and pain at the site of laceration repair and fevers in setting of neutropenia. Patient has pancytopenia due to disease and treatment with chemotherapy.

## 2021-11-14 NOTE — PROGRESS NOTE ADULT - SUBJECTIVE AND OBJECTIVE BOX
Name of Patient : JAK DANIELS  MRN: [id number]  Date of visit: 21       Subjective: Patient seen and examined. No new events except as noted.   cont to have low grade fever  had bowel movement  internal hemorrhoid pain     REVIEW OF SYSTEMS:    CONSTITUTIONAL: fever, + weakness  EYES/ENT: No visual changes;  No vertigo or throat pain   NECK: No pain or stiffness  RESPIRATORY: No cough, wheezing, hemoptysis; No shortness of breath  CARDIOVASCULAR: No chest pain or palpitations  GASTROINTESTINAL: No abdominal or epigastric pain. No nausea, vomiting, or hematemesis; No diarrhea or constipation. No melena or hematochezia.  GENITOURINARY: No dysuria, frequency or hematuria  NEUROLOGICAL: No numbness or weakness  SKIN: No itching, burning, rashes, or lesions   All other review of systems is negative unless indicated above.    MEDICATIONS:  MEDICATIONS  (STANDING):  amLODIPine   Tablet 5 milliGRAM(s) Oral daily  fluconAZOLE   Tablet 200 milliGRAM(s) Oral daily  influenza   Vaccine 0.5 milliLiter(s) IntraMuscular once  piperacillin/tazobactam IVPB.. 3.375 Gram(s) IV Intermittent every 8 hours  polyethylene glycol 3350 17 Gram(s) Oral daily  senna 2 Tablet(s) Oral at bedtime  sodium chloride 0.9%. 1000 milliLiter(s) (80 mL/Hr) IV Continuous <Continuous>  vancomycin  IVPB 1250 milliGRAM(s) IV Intermittent every 12 hours      PHYSICAL EXAM:  T(C): 36.7 (21 @ 21:10), Max: 39.2 (21 @ 04:00)  HR: 101 (21 @ 21:10) (101 - 124)  BP: 99/62 (21 @ 21:10) (96/54 - 120/78)  RR: 18 (21 @ 21:10) (18 - 18)  SpO2: 99% (21 @ 21:10) (95% - 100%)  Wt(kg): --  I&O's Summary    2021 07:01  -  2021 07:00  --------------------------------------------------------  IN: 509 mL / OUT: 0 mL / NET: 509 mL    2021 07:01  -  2021 23:03  --------------------------------------------------------  IN: 425 mL / OUT: 0 mL / NET: 425 mL          Appearance: Normal	  HEENT:  PERRLA   Lymphatic: No lymphadenopathy   Cardiovascular: Normal S1 S2, no JVD  Respiratory: normal effort , clear  Gastrointestinal:  Soft, Non-tender  Skin: No rashes,  warm to touch  Psychiatry:  Mood & affect appropriate  Musculuskeletal: thumb skim lac intact, dry, no cellulitic chanegs       All labs, Imaging and EKGs personally reviewed     21 @ 07:01  -  21 @ 07:00  --------------------------------------------------------  IN: 509 mL / OUT: 0 mL / NET: 509 mL    21 @ 07:  -  21 @ 23:03  --------------------------------------------------------  IN: 425 mL / OUT: 0 mL / NET: 425 mL                            7.1    0.97  )-----------(        ( 2021 07:19 )             20.2                   136  |  100  |  8   ----------------------------<  106<H>  3.6   |  23  |  0.96    Ca    9.7      2021 07:11  Phos  4.2     11-14  Mg     2.0         TPro  6.9  /  Alb  4.3  /  TBili  0.5  /  DBili  x   /  AST  19  /  ALT  53<H>  /  AlkPhos  77  11-14                       Urinalysis Basic - ( 2021 05:22 )    Color: Colorless / Appearance: Clear / S.007 / pH: x  Gluc: x / Ketone: Negative  / Bili: Negative / Urobili: Negative   Blood: x / Protein: Negative / Nitrite: Negative   Leuk Esterase: Negative / RBC: 0 /hpf / WBC 0 /HPF   Sq Epi: x / Non Sq Epi: 0 /hpf / Bacteria: Negative

## 2021-11-14 NOTE — PROGRESS NOTE ADULT - PROBLEM SELECTOR PLAN 1
Admit to 7 [facility] for neutropenic fever  Patient is s/p cycle 2 HIDAC   Monitor labs, replace blood and lytes prn, pain control, antiemetics. Admit to 7 [facility] for neutropenic fever  Patient is s/p cycle 2 HIDAC 10/25  Monitor labs, replace blood and lytes prn, pain control, antiemetics.  palliative care consult for pain management in rectal area.

## 2021-11-14 NOTE — PROGRESS NOTE ADULT - PROBLEM SELECTOR PLAN 5
GI consult  Continue with ointment and pain control. GI consult (emailed)  Continue with ointment and pain control. GI consult (emailed and aware)  Continue with ointment and pain control.  sitz bath

## 2021-11-15 ENCOUNTER — APPOINTMENT (OUTPATIENT)
Dept: INFUSION THERAPY | Facility: HOSPITAL | Age: 36
End: 2021-11-15

## 2021-11-15 DIAGNOSIS — K59.00 CONSTIPATION, UNSPECIFIED: ICD-10-CM

## 2021-11-15 DIAGNOSIS — R53.81 OTHER MALAISE: ICD-10-CM

## 2021-11-15 DIAGNOSIS — K62.89 OTHER SPECIFIED DISEASES OF ANUS AND RECTUM: ICD-10-CM

## 2021-11-15 DIAGNOSIS — Z51.5 ENCOUNTER FOR PALLIATIVE CARE: ICD-10-CM

## 2021-11-15 LAB
ALBUMIN SERPL ELPH-MCNC: 3.7 G/DL — SIGNIFICANT CHANGE UP (ref 3.3–5)
ALP SERPL-CCNC: 71 U/L — SIGNIFICANT CHANGE UP (ref 40–120)
ALT FLD-CCNC: 51 U/L — HIGH (ref 10–45)
ANION GAP SERPL CALC-SCNC: 13 MMOL/L — SIGNIFICANT CHANGE UP (ref 5–17)
APTT BLD: 28.4 SEC — SIGNIFICANT CHANGE UP (ref 27.5–35.5)
AST SERPL-CCNC: 17 U/L — SIGNIFICANT CHANGE UP (ref 10–40)
BASOPHILS # BLD AUTO: 0 K/UL — SIGNIFICANT CHANGE UP (ref 0–0.2)
BASOPHILS NFR BLD AUTO: 0 % — SIGNIFICANT CHANGE UP (ref 0–2)
BILIRUB SERPL-MCNC: 0.3 MG/DL — SIGNIFICANT CHANGE UP (ref 0.2–1.2)
BUN SERPL-MCNC: 7 MG/DL — SIGNIFICANT CHANGE UP (ref 7–23)
CALCIUM SERPL-MCNC: 9.4 MG/DL — SIGNIFICANT CHANGE UP (ref 8.4–10.5)
CHLORIDE SERPL-SCNC: 102 MMOL/L — SIGNIFICANT CHANGE UP (ref 96–108)
CO2 SERPL-SCNC: 24 MMOL/L — SIGNIFICANT CHANGE UP (ref 22–31)
CREAT SERPL-MCNC: 0.89 MG/DL — SIGNIFICANT CHANGE UP (ref 0.5–1.3)
EOSINOPHIL # BLD AUTO: 0 K/UL — SIGNIFICANT CHANGE UP (ref 0–0.5)
EOSINOPHIL NFR BLD AUTO: 0 % — SIGNIFICANT CHANGE UP (ref 0–6)
GLUCOSE SERPL-MCNC: 96 MG/DL — SIGNIFICANT CHANGE UP (ref 70–99)
HCT VFR BLD CALC: 19.3 % — CRITICAL LOW (ref 39–50)
HGB BLD-MCNC: 6.9 G/DL — CRITICAL LOW (ref 13–17)
INR BLD: 1.07 RATIO — SIGNIFICANT CHANGE UP (ref 0.88–1.16)
LYMPHOCYTES # BLD AUTO: 0.33 K/UL — LOW (ref 1–3.3)
LYMPHOCYTES # BLD AUTO: 24 % — SIGNIFICANT CHANGE UP (ref 13–44)
MAGNESIUM SERPL-MCNC: 2.3 MG/DL — SIGNIFICANT CHANGE UP (ref 1.6–2.6)
MANUAL SMEAR VERIFICATION: SIGNIFICANT CHANGE UP
MCHC RBC-ENTMCNC: 30.8 PG — SIGNIFICANT CHANGE UP (ref 27–34)
MCHC RBC-ENTMCNC: 35.8 GM/DL — SIGNIFICANT CHANGE UP (ref 32–36)
MCV RBC AUTO: 86.2 FL — SIGNIFICANT CHANGE UP (ref 80–100)
MONOCYTES # BLD AUTO: 0.63 K/UL — SIGNIFICANT CHANGE UP (ref 0–0.9)
MONOCYTES NFR BLD AUTO: 46 % — HIGH (ref 2–14)
NEUTROPHILS # BLD AUTO: 0.41 K/UL — LOW (ref 1.8–7.4)
NEUTROPHILS NFR BLD AUTO: 30 % — LOW (ref 43–77)
NRBC # BLD: 0 /100 — SIGNIFICANT CHANGE UP (ref 0–0)
PHOSPHATE SERPL-MCNC: 4.2 MG/DL — SIGNIFICANT CHANGE UP (ref 2.5–4.5)
PLAT MORPH BLD: NORMAL — SIGNIFICANT CHANGE UP
PLATELET # BLD AUTO: 23 K/UL — LOW (ref 150–400)
POTASSIUM SERPL-MCNC: 3.6 MMOL/L — SIGNIFICANT CHANGE UP (ref 3.5–5.3)
POTASSIUM SERPL-SCNC: 3.6 MMOL/L — SIGNIFICANT CHANGE UP (ref 3.5–5.3)
PROT SERPL-MCNC: 6.7 G/DL — SIGNIFICANT CHANGE UP (ref 6–8.3)
PROTHROM AB SERPL-ACNC: 12.8 SEC — SIGNIFICANT CHANGE UP (ref 10.6–13.6)
RBC # BLD: 2.24 M/UL — LOW (ref 4.2–5.8)
RBC # FLD: 14.6 % — HIGH (ref 10.3–14.5)
RBC BLD AUTO: SIGNIFICANT CHANGE UP
SODIUM SERPL-SCNC: 139 MMOL/L — SIGNIFICANT CHANGE UP (ref 135–145)
VANCOMYCIN TROUGH SERPL-MCNC: 11.6 UG/ML — SIGNIFICANT CHANGE UP (ref 10–20)
WBC # BLD: 1.36 K/UL — LOW (ref 3.8–10.5)
WBC # FLD AUTO: 1.36 K/UL — LOW (ref 3.8–10.5)

## 2021-11-15 PROCEDURE — 99232 SBSQ HOSP IP/OBS MODERATE 35: CPT

## 2021-11-15 PROCEDURE — 99223 1ST HOSP IP/OBS HIGH 75: CPT

## 2021-11-15 PROCEDURE — 99223 1ST HOSP IP/OBS HIGH 75: CPT | Mod: GC

## 2021-11-15 RX ORDER — POLYETHYLENE GLYCOL 3350 17 G/17G
17 POWDER, FOR SOLUTION ORAL DAILY
Refills: 0 | Status: DISCONTINUED | OUTPATIENT
Start: 2021-11-15 | End: 2021-11-15

## 2021-11-15 RX ORDER — SODIUM CHLORIDE 0.65 %
1 AEROSOL, SPRAY (ML) NASAL THREE TIMES A DAY
Refills: 0 | Status: DISCONTINUED | OUTPATIENT
Start: 2021-11-15 | End: 2021-11-17

## 2021-11-15 RX ORDER — HYDROMORPHONE HYDROCHLORIDE 2 MG/ML
1 INJECTION INTRAMUSCULAR; INTRAVENOUS; SUBCUTANEOUS EVERY 4 HOURS
Refills: 0 | Status: DISCONTINUED | OUTPATIENT
Start: 2021-11-15 | End: 2021-11-17

## 2021-11-15 RX ORDER — CHLORHEXIDINE GLUCONATE 213 G/1000ML
1 SOLUTION TOPICAL
Refills: 0 | Status: DISCONTINUED | OUTPATIENT
Start: 2021-11-15 | End: 2021-11-17

## 2021-11-15 RX ADMIN — PIPERACILLIN AND TAZOBACTAM 25 GRAM(S): 4; .5 INJECTION, POWDER, LYOPHILIZED, FOR SOLUTION INTRAVENOUS at 21:05

## 2021-11-15 RX ADMIN — Medication 166.67 MILLIGRAM(S): at 05:08

## 2021-11-15 RX ADMIN — FLUCONAZOLE 200 MILLIGRAM(S): 150 TABLET ORAL at 11:12

## 2021-11-15 RX ADMIN — SENNA PLUS 2 TABLET(S): 8.6 TABLET ORAL at 21:05

## 2021-11-15 RX ADMIN — POLYETHYLENE GLYCOL 3350 17 GRAM(S): 17 POWDER, FOR SOLUTION ORAL at 11:12

## 2021-11-15 RX ADMIN — SODIUM CHLORIDE 80 MILLILITER(S): 9 INJECTION INTRAMUSCULAR; INTRAVENOUS; SUBCUTANEOUS at 10:09

## 2021-11-15 RX ADMIN — SODIUM CHLORIDE 80 MILLILITER(S): 9 INJECTION INTRAMUSCULAR; INTRAVENOUS; SUBCUTANEOUS at 21:05

## 2021-11-15 RX ADMIN — PIPERACILLIN AND TAZOBACTAM 25 GRAM(S): 4; .5 INJECTION, POWDER, LYOPHILIZED, FOR SOLUTION INTRAVENOUS at 12:45

## 2021-11-15 RX ADMIN — PIPERACILLIN AND TAZOBACTAM 25 GRAM(S): 4; .5 INJECTION, POWDER, LYOPHILIZED, FOR SOLUTION INTRAVENOUS at 05:08

## 2021-11-15 RX ADMIN — AMLODIPINE BESYLATE 5 MILLIGRAM(S): 2.5 TABLET ORAL at 12:44

## 2021-11-15 RX ADMIN — SODIUM CHLORIDE 80 MILLILITER(S): 9 INJECTION INTRAMUSCULAR; INTRAVENOUS; SUBCUTANEOUS at 05:08

## 2021-11-15 NOTE — PROGRESS NOTE ADULT - ATTENDING COMMENTS
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

## 2021-11-15 NOTE — PROGRESS NOTE ADULT - ASSESSMENT
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 Patient reported he was feeling unwell prior to suture placement with body aches, chills, night sweats and subjective fevers. Patient last BM 4 days ago. Has bruise to left inner thigh. Patient reports he keeps his PICC site clean and intact which was placed in 9/2021. Denied CP, SOB, abdominal pain, N/V/D, cough, back pain.      #AML/ Neurtopenia Fever   -Hematology/oncology evaluation  -Patient has been transferred to Oncology team   -PICC line   -Continue with home medications Fluconazole  -Antibiotics per ID   -Bowman Cx negative to date   -Urine Cx and Blood Cx X 2 with no growth   Hemoglobin noted to be 6.9 today 11/15. Will receive 1 unit of PRBCs  -Monitor hemoglobin level     #Febrile/ Tachycardic   - Neuropenic fever   -Blood Culture X2, with no growth to date   -Urine Culture with no growth   -Tylenol for fever   -Cont on zosyn and Vancomycin   -aggressive IV hydration   -Monitor Temperature curve  -Monitor vitals       #? Cellulitis of Right thumb  -Right thumb s/p laceration repair with erythema and swelling. Patient reports pain has improved, erythema and swelling improved.  -Erythema has decreased from previously demarcated area in emergency department  -ID evaluation   -IV antibiotics per ID    -Monitor     #Hemorrhoid  -GI consult   -Hemorrhoidal ointment PRN  -Monitor clinically     #HTN  -Continue with home medication of Amlodipine 5mg   -Monitor BP       DVT and gI PPX    Patient is a 36 year old male with a PMHx of AML (dx 7/21, undergoing consolidation chemo every 3 weeks and last dose was 2 weeks ago), hypertension and fatty liver s/p right first digit laceration repair yesterday now presenting with erythema and pain at the site of laceration repair. Patient reported he was feeling unwell prior to suture placement with body aches, chills, night sweats and subjective fevers. Patient last BM 4 days ago. Has bruise to left inner thigh. Patient reports he keeps his PICC site clean and intact which was placed in 9/2021. Denied CP, SOB, abdominal pain, N/V/D, cough, back pain.      #AML/ Neurtopenia Fever   -Hematology/oncology evaluation  -Patient has been transferred to Oncology team   -PICC line   -Continue with home medications Fluconazole  -Antibiotics per ID-- as per ID, continue with Vanco, discontinue Aztreonam and start on Zosyn  -Pan Cx negative to date   -Urine Cx and Blood Cx X 2 with no growth   -Hemoglobin noted to be 6.9 today 11/15. Will receive 1 unit of PRBCs  -Monitor hemoglobin level     #Febrile/ Tachycardic   - Neuropenic fever   -Blood Culture X2, with no growth to date   -Urine Culture with no growth   -Tylenol for fever   -Cont on zosyn and Vancomycin   -aggressive IV hydration   -Monitor Temperature curve  -Monitor vitals       #? Cellulitis of Right thumb  -Right thumb s/p laceration repair with erythema and swelling. Patient reports pain has improved, erythema and swelling improved.  -Erythema has decreased from previously demarcated area in emergency department  -ID evaluation   -IV antibiotics per ID    -Monitor     #Hemorrhoid  -GI consult, f/u GI recommendations   -Hemorrhoidal ointment PRN  -Monitor clinically     #HTN  -Continue with home medication of Amlodipine 5mg   -Monitor BP       DVT and gI PPX    Patient is a 36 year old male with a PMHx of AML (dx 7/21, undergoing consolidation chemo every 3 weeks and last dose was 2 weeks ago), hypertension and fatty liver s/p right first digit laceration repair yesterday now presenting with erythema and pain at the site of laceration repair. Patient reported he was feeling unwell prior to suture placement with body aches, chills, night sweats and subjective fevers. Patient last BM 4 days ago. Has bruise to left inner thigh. Patient reports he keeps his PICC site clean and intact which was placed in 9/2021. Denied CP, SOB, abdominal pain, N/V/D, cough, back pain.      #AML/ Neurtopenia Fever   -Hematology/oncology evaluation  -Patient has been transferred to Oncology team   -PICC line   -Continue with home medications Fluconazole  -Antibiotics per ID-- as per ID, continue with Vanco, discontinue Aztreonam and start on Zosyn  -Pan Cx negative to date   -Urine Cx and Blood Cx X 2 with no growth   -Hemoglobin noted to be 6.9 today 11/15. Will receive 1 unit of PRBCs  -Monitor hemoglobin level     #Febrile/ Tachycardic   -Neutropenic fever   -Blood Culture X2, with no growth to date   -Urine Culture with no growth   -Tylenol for fever   -Cont on zosyn and Vancomycin   -aggressive IV hydration   -Monitor Temperature curve  -Monitor vitals       #? Cellulitis of Right thumb  -Right thumb s/p laceration repair with erythema and swelling. Patient reports pain has improved, erythema and swelling improved.  -Erythema has decreased from previously demarcated area in emergency department  -ID evaluation   -IV antibiotics per ID    -Monitor       #Hemorrhoid  -GI consult, appreciate their recommendations   -Care as per GI  -Hemorrhoidal ointment PRN  -Monitor clinically     #HTN  -Continue with home medication of Amlodipine 5mg   -Monitor BP       DVT and GI PPX    Patient is a 36 year old male with a PMHx of AML (dx 7/21, undergoing consolidation chemo every 3 weeks and last dose was 2 weeks ago), hypertension and fatty liver s/p right first digit laceration repair yesterday now presenting with erythema and pain at the site of laceration repair. Patient reported he was feeling unwell prior to suture placement with body aches, chills, night sweats and subjective fevers. Patient last BM 4 days ago. Has bruise to left inner thigh. Patient reports he keeps his PICC site clean and intact which was placed in 9/2021. Denied CP, SOB, abdominal pain, N/V/D, cough, back pain.      #AML/ Neurtopenia Fever   -Hematology/oncology evaluation  -Patient has been transferred to Oncology team   -PICC line   -Continue with home medications Fluconazole  -Antibiotics per ID-- as per ID, continue with Vanco, discontinue Aztreonam and start on Zosyn  -Pan Cx negative to date   -Urine Cx and Blood Cx X 2 with no growth   -Hemoglobin noted to be 6.9 today 11/15. Will receive 1 unit of PRBCs  -Monitor hemoglobin level     #Febrile/ Tachycardic   -Neutropenic fever   -Blood Culture X2, with no growth to date   -Urine Culture with no growth   -Tylenol for fever   -Cont on zosyn and Vancomycin   -aggressive IV hydration   -Monitor Temperature curve  -Monitor vitals       #? Cellulitis of Right thumb  -Right thumb s/p laceration repair with erythema and swelling. Patient reports pain has improved, erythema and swelling improved.  -Erythema has decreased from previously demarcated area in emergency department  -ID evaluation   -IV antibiotics per ID    -Monitor       #Hemorrhoid  -GI consult, appreciate their recommendations   -Care as per GI  -Hemorrhoidal ointment PRN  -Monitor clinically   - Pain management eval     #HTN  -Continue with home medication of Amlodipine 5mg   -Monitor BP       DVT and GI PPX

## 2021-11-15 NOTE — CONSULT NOTE ADULT - SUBJECTIVE AND OBJECTIVE BOX
HPI:  Patient is a 36 year old male with a PMHx of AML (dx 7/21, undergoing consolidation chemo every 3 weeks and last dose was 2 weeks ago), hypertension and fatty liver s/p right first digit laceration repair yesterday now presenting with erythema and pain at the site of laceration repair. Patient reported he was feeling unwell prior to suture placement with body aches, chills, night sweats and subjective fevers. Patient last BM 4 days ago. Has bruise to left inner thigh. Patient reports he keeps his PICC site clean and intact which was placed in 9/2021. Denied CP, SOB, abdominal pain, N/V/D, cough, back pain. Patient has not had COVID and is not vaccinated against COVID (13 Nov 2021 10:49)      Extensive with the patient and his father.  Seen for rectal pain.  See below for the full narrative.      PERTINENT PM/SXH:   No pertinent past medical history    Hypertension    Kidney stone    History of genital warts    AML (acute myeloid leukemia)      No significant past surgical history    No significant past surgical history      FAMILY HISTORY:  FH: CAD (coronary artery disease) (Father, Mother)      ITEMS NOT CHECKED ARE NOT PRESENT    SOCIAL HISTORY:   Significant other/partner[ ]  Children[x ]  Quaker/Spirituality:  Substance hx:  [ ]   Tobacco hx:  [ ]   Alcohol hx: [ ]   Home Opioid hx:  [ ] I-Stop Reference No:  Living Situation: [ x]Home  [ ]Long term care  [ ]Rehab [ ]Other    ADVANCE DIRECTIVES:    DNR  MOLST  [ ]  Living Will  [ ]   DECISION MAKER(s):  [ ] Health Care Proxy(s)  [x ] Surrogate(s)  [ ] Guardian           Name(s): Phone Number(s):    BASELINE (I)ADL(s) (prior to admission):  [place]: [ ]Total  [ ] Moderate [ ]Dependent    Allergies    No Known Allergies    Intolerances    cefepime (Rash)  MEDICATIONS  (STANDING):  amLODIPine   Tablet 5 milliGRAM(s) Oral daily  chlorhexidine 2% Cloths 1 Application(s) Topical <User Schedule>  fluconAZOLE   Tablet 200 milliGRAM(s) Oral daily  influenza   Vaccine 0.5 milliLiter(s) IntraMuscular once  piperacillin/tazobactam IVPB.. 3.375 Gram(s) IV Intermittent every 8 hours  polyethylene glycol 3350 17 Gram(s) Oral daily  senna 2 Tablet(s) Oral at bedtime  sodium chloride 0.9%. 1000 milliLiter(s) (80 mL/Hr) IV Continuous <Continuous>    MEDICATIONS  (PRN):  acetaminophen     Tablet .. 650 milliGRAM(s) Oral every 6 hours PRN Temp greater or equal to 38C (100.4F), Mild Pain (1 - 3)  bisacodyl 5 milliGRAM(s) Oral every 12 hours PRN Constipation  hemorrhoidal Ointment 1 Application(s) Rectal three times a day PRN pain  HYDROmorphone   Solution 1 milliGRAM(s) Oral every 4 hours PRN Moderate Pain (4 - 6)  sodium chloride 0.65% Nasal 1 Spray(s) Both Nostrils three times a day PRN Nasal Congestion    PRESENT SYMPTOMS: [ ]Unable to obtain due to poor mentation   Source if other than patient:  [ ]Family   [ ]Team     Pain: [x ]yes [ ]no  QOL impact - significant  Location -  rectum                  Aggravating factors - post chemo initiation  Quality - burning  Radiation - none  Timing- sporadic  Severity (0-10 scale): 10   Minimal acceptable level (0-10 scale):  <3    PAIN AD Score:     http://geriatrictoolkit.[email or web address]/painad.pdf (press ctrl +  left click to view)    Dyspnea:                           [ ]Mild [ ]Moderate [ ]Severe  Anxiety:                             [x ]Mild [ ]Moderate [ ]Severe-regarding the rectal pain  Fatigue:                             [ ]Mild [ ]Moderate [ ]Severe  Nausea:                             [ ]Mild [ ]Moderate [ ]Severe  Loss of appetite:              [ ]Mild [ ]Moderate [ ]Severe  Constipation:                    [ ]Mild [x ]Moderate [ ]Severe    Other Symptoms:  [x ]All other review of systems negative     Palliative Performance Status Version 2:      40   %    http://npcrc.org/files/news/palliative_performance_scale_ppsv2.pdf  PHYSICAL EXAM:  Vital Signs Last 24 Hrs  T(C): 36.8 (15 Nov 2021 13:33), Max: 38 (14 Nov 2021 17:00)  T(F): 98.2 (15 Nov 2021 13:33), Max: 100.4 (14 Nov 2021 17:00)  HR: 101 (15 Nov 2021 13:33) (89 - 124)  BP: 99/66 (15 Nov 2021 13:33) (99/59 - 124/76)  BP(mean): --  RR: 18 (15 Nov 2021 13:33) (18 - 18)  SpO2: 97% (15 Nov 2021 13:33) (95% - 100%) I&O's Summary    14 Nov 2021 07:01  -  15 Nov 2021 07:00  --------------------------------------------------------  IN: 1221 mL / OUT: 0 mL / NET: 1221 mL    15 Nov 2021 07:01  -  15 Nov 2021 14:41  --------------------------------------------------------  IN: 540 mL / OUT: 0 mL / NET: 540 mL      GENERAL:  [x ]Alert  [x ]Oriented x  3  [ ]Lethargic  [ ]Cachexia  [ ]Unarousable  [ ]Verbal  [ ]Non-Verbal  Behavioral:   [x ] Anxiety-mild  [ ] Delirium [ ] Agitation [  ] Other   HEENT:  [x ]Normal   [ ]Dry mouth   [ ]ET Tube/Trach  [ ]Oral lesions  PULMONARY: No taCHypnea  [ ]Clear [ ]Tachypnea  [ ]Audible excessive secretions   [ ]Rhonchi        [ ]Right [ ]Left [ ]Bilateral  [ ]Crackles        [ ]Right [ ]Left [ ]Bilateral  [ ]Wheezing     [ ]Right [ ]Left [ ]Bilateral  [ ]Diminished breath sounds [ ]right [ ]left [ ]bilateral  CARDIOVASCULAR:  No JVD  [  ]Regular [ ]Irregular [ ]Tachy  [ ]Otiloi [ ]Murmur [ ]Other  GASTROINTESTINAL: external hemorrhoids reported  [  Soft  [ ]Distended   [ ]+BS  [x ]Non tender [ ]Tender  [ ]PEG [ ]OGT/ NGT  Last BM:   GENITOURINARY:  [ x]Normal [ ] Incontinent   [ ]Oliguria/Anuria   [ ]Marc  MUSCULOSKELETAL:   [ ]Normal   [x ]Weakness  [ ]Bed/Wheelchair bound [ ]Edema  NEUROLOGIC:   [ x]No focal deficits  [ ]Cognitive impairment  [ ]Dysphagia [ ]Dysarthria [ ]Paresis [ ]Other   SKIN:   [x ]Normal    [ ]Rash  [ ]Pressure ulcer(s)       Present on admission [ ]y [ ]n    CRITICAL CARE:  [ ] Shock Present  [ ]Septic [ ]Cardiogenic [ ]Neurologic [ ]Hypovolemic  [ ]  Vasopressors [ ]  Inotropes   [ ]Respiratory failure present [ ]Mechanical ventilation [ ]Non-invasive ventilatory support [ ]High flow  [ ]Acute  [ ]Chronic [ ]Hypoxic  [ ]Hypercarbic [ ]Other  [ ]Other organ failure     LABS:                        6.9    1.36  )-----------( 23       ( 15 Nov 2021 07:19 )             19.3   11-15    139  |  102  |  7   ----------------------------<  96  3.6   |  24  |  0.89    Ca    9.4      15 Nov 2021 07:19  Phos  4.2     11-15  Mg     2.3     11-15    TPro  6.7  /  Alb  3.7  /  TBili  0.3  /  DBili  x   /  AST  17  /  ALT  51<H>  /  AlkPhos  71  11-15  PT/INR - ( 15 Nov 2021 07:19 )   PT: 12.8 sec;   INR: 1.07 ratio         PTT - ( 15 Nov 2021 07:19 )  PTT:28.4 sec      RADIOLOGY & ADDITIONAL STUDIES:    PROTEIN CALORIE MALNUTRITION PRESENT: [ ]mild [ ]moderate [ ]severe [ ]underweight [ ]morbid obesity  https://www.andeal.org/vault/2440/web/files/ONC/Table_Clinical%20Characteristics%20to%20Document%20Malnutrition-White%20JV%20et%20al%202012.pdf    Height (cm): 180.3 (11-13-21 @ 05:48), 175 (11-12-21 @ 09:53), 182.9 (11-11-21 @ 14:42)  Weight (kg): 89.4 (11-13-21 @ 05:48), 88.2 (11-12-21 @ 09:53), 90.7 (11-11-21 @ 14:42)  BMI (kg/m2): 27.5 (11-13-21 @ 05:48), 28.8 (11-12-21 @ 09:53), 27.1 (11-11-21 @ 14:42)    [ ]PPSV2 < or = to 30% [ ]significant weight loss  [ ]poor nutritional intake  [ ]anasarca      [ ]Artificial Nutrition      REFERRALS:   [ ]Chaplaincy  [ ]Hospice  [ ]Child Life  [ ]Social Work  [ ]Case management [ ]Holistic Therapy     Goals of Care Document:

## 2021-11-15 NOTE — CONSULT NOTE ADULT - SUBJECTIVE AND OBJECTIVE BOX
HPI:  Patient is a 36 year old male with a PMHx of AML (dx , undergoing consolidation chemo every 3 weeks and last dose was 2 weeks ago), hypertension and fatty liver s/p right first digit laceration repair yesterday now presenting with erythema and pain at the site of laceration repair. Patient reported he was feeling unwell prior to suture placement with body aches, chills, night sweats and subjective fevers. Patient admitted for neutropenic fever, GI called for hemorrhoid pain    Allergies:  cefepime (Rash)  No Known Allergies        Hospital Medications:  acetaminophen     Tablet .. 650 milliGRAM(s) Oral every 6 hours PRN  amLODIPine   Tablet 5 milliGRAM(s) Oral daily  bisacodyl 5 milliGRAM(s) Oral every 12 hours PRN  fluconAZOLE   Tablet 200 milliGRAM(s) Oral daily  hemorrhoidal Ointment 1 Application(s) Rectal three times a day PRN  HYDROmorphone  Injectable 0.5 milliGRAM(s) IV Push every 3 hours PRN  influenza   Vaccine 0.5 milliLiter(s) IntraMuscular once  piperacillin/tazobactam IVPB.. 3.375 Gram(s) IV Intermittent every 8 hours  polyethylene glycol 3350 17 Gram(s) Oral daily  senna 2 Tablet(s) Oral at bedtime  sodium chloride 0.9%. 1000 milliLiter(s) IV Continuous <Continuous>  vancomycin  IVPB 1250 milliGRAM(s) IV Intermittent every 12 hours      PMHX/PSHX:  No pertinent past medical history    Hypertension    Kidney stone    History of genital warts    AML (acute myeloid leukemia)    No significant past surgical history    No significant past surgical history        Family history:  FH: CAD (coronary artery disease) (Father, Mother)        Social History: no smoking    ROS:   General:  No fevers, chills or night sweats  ENT:  No sore throat or dysphagia  CV:  No pain or palpitations  Resp:  No dyspnea, cough or  wheezing  GI:  as above  Skin:  No rash or edema  Neuro: no weakness   Hematologic: no bleeding  Musculoskeletal: no muscle pain or join pain  Psych: no agitation     : no dysuria      PHYSICAL EXAM:   GENERAL:  NAD, Appears stated age  HEENT:  NC/AT,  conjunctivae clear and pink, sclera -anicteric  CHEST:  CTA B/L, Normal effort  HEART:  RRR S1/S2,  ABDOMEN:  Soft, non-tender, non-distended,  no masses   EXTREMITIES:  No cyanosis or Edema  SKIN:  Warm & Dry. No rash or erythema  NEURO:  Alert, oriented, no focal deficit    Vital Signs:  Vital Signs Last 24 Hrs  T(C): 36.8 (15 Nov 2021 04:55), Max: 38 (2021 17:00)  T(F): 98.2 (15 Nov 2021 04:55), Max: 100.4 (2021 17:00)  HR: 96 (15 Nov 2021 04:55) (96 - 124)  BP: 102/65 (15 Nov 2021 04:55) (96/54 - 120/78)  BP(mean): --  RR: 18 (15 Nov 2021 04:55) (18 - 18)  SpO2: 95% (15 Nov 2021 04:55) (95% - 100%)  Daily     Daily Weight in k.4 (2021 09:56)    LABS:                        7.1    0.97  )-----------( 19       ( 2021 07:19 )             20.2     Mean Cell Volume: 87.1 fl (-21 @ 07:19)        136  |  100  |  8   ----------------------------<  106<H>  3.6   |  23  |  0.96    Ca    9.7      2021 07:11  Phos  4.2       Mg     2.0         TPro  6.9  /  Alb  4.3  /  TBili  0.5  /  DBili  x   /  AST  19  /  ALT  53<H>  /  AlkPhos  77  -14    LIVER FUNCTIONS - ( 2021 07:11 )  Alb: 4.3 g/dL / Pro: 6.9 g/dL / ALK PHOS: 77 U/L / ALT: 53 U/L / AST: 19 U/L / GGT: x                                       7.1    0.97  )-----------( 19       ( 2021 07:19 )             20.2                         7.2    0.52  )-----------( 18       ( 2021 12:07 )             19.7                         7.8    0.46  )-----------( 25       ( 2021 22:42 )             21.2                         9.3    0.58  )-----------( 33       ( 2021 09:48 )             24.7     Imaging:   HPI:  Patient is a 36 year old male with a PMHx of AML (dx , undergoing consolidation chemo every 3 weeks and last dose was 2 weeks ago), hypertension and fatty liver s/p right first digit laceration repair yesterday now presenting with erythema and pain at the site of laceration repair. Patient reported he was feeling unwell prior to suture placement with body aches, chills, night sweats and subjective fevers. Patient admitted for neutropenic fever, GI called for hemorrhoid pain.    Pain has been ongoing for about 1 week. The is excruciating with bowel movements, but is present regardless. Denies straining. Denies blood in the stool. Denies trauma to the perianal area, has not had previous anal issues or abscesses before.     Allergies:  cefepime (Rash)  No Known Allergies        Hospital Medications:  acetaminophen     Tablet .. 650 milliGRAM(s) Oral every 6 hours PRN  amLODIPine   Tablet 5 milliGRAM(s) Oral daily  bisacodyl 5 milliGRAM(s) Oral every 12 hours PRN  fluconAZOLE   Tablet 200 milliGRAM(s) Oral daily  hemorrhoidal Ointment 1 Application(s) Rectal three times a day PRN  HYDROmorphone  Injectable 0.5 milliGRAM(s) IV Push every 3 hours PRN  influenza   Vaccine 0.5 milliLiter(s) IntraMuscular once  piperacillin/tazobactam IVPB.. 3.375 Gram(s) IV Intermittent every 8 hours  polyethylene glycol 3350 17 Gram(s) Oral daily  senna 2 Tablet(s) Oral at bedtime  sodium chloride 0.9%. 1000 milliLiter(s) IV Continuous <Continuous>  vancomycin  IVPB 1250 milliGRAM(s) IV Intermittent every 12 hours      PMHX/PSHX:  No pertinent past medical history    Hypertension    Kidney stone    History of genital warts    AML (acute myeloid leukemia)    No significant past surgical history    No significant past surgical history        Family history:  FH: CAD (coronary artery disease) (Father, Mother)        Social History: no smoking    ROS:   General:  No fevers, chills or night sweats  ENT:  No sore throat or dysphagia  CV:  No pain or palpitations  Resp:  No dyspnea, cough or  wheezing  GI:  as above  Skin:  No rash or edema  Neuro: no weakness   Hematologic: no bleeding  Musculoskeletal: no muscle pain or join pain  Psych: no agitation     : no dysuria      PHYSICAL EXAM:   GENERAL:  NAD, Appears stated age  HEENT:  NC/AT,  conjunctivae clear and pink, sclera -anicteric  CHEST:  CTA B/L, Normal effort  HEART:  RRR S1/S2,  ABDOMEN:  Soft, non-tender, non-distended,  no masses   EXTREMITIES:  No cyanosis or Edema  SKIN:  Warm & Dry. No rash or erythema  NEURO:  Alert, oriented, no focal deficit  RECTAL: Medium sized tender external hemorrhoid without bleeding; no digital exam done due to pancytopenia; left sided linear skin break measuring about 0.8cm, no clear discharge, no clear fluctuance.    Vital Signs:  Vital Signs Last 24 Hrs  T(C): 36.8 (15 Nov 2021 04:55), Max: 38 (2021 17:00)  T(F): 98.2 (15 Nov 2021 04:55), Max: 100.4 (2021 17:00)  HR: 96 (15 Nov 2021 04:55) (96 - 124)  BP: 102/65 (15 Nov 2021 04:55) (96/54 - 120/78)  BP(mean): --  RR: 18 (15 Nov 2021 04:55) (18 - 18)  SpO2: 95% (15 Nov 2021 04:55) (95% - 100%)  Daily     Daily Weight in k.4 (2021 09:56)    LABS:                        7.1    0.97  )-----------( 19       ( 2021 07:19 )             20.2     Mean Cell Volume: 87.1 fl (-21 @ 07:19)        136  |  100  |  8   ----------------------------<  106<H>  3.6   |  23  |  0.96    Ca    9.7      2021 07:11  Phos  4.2       Mg     2.0         TPro  6.9  /  Alb  4.3  /  TBili  0.5  /  DBili  x   /  AST  19  /  ALT  53<H>  /  AlkPhos  77      LIVER FUNCTIONS - ( 2021 07:11 )  Alb: 4.3 g/dL / Pro: 6.9 g/dL / ALK PHOS: 77 U/L / ALT: 53 U/L / AST: 19 U/L / GGT: x                                       7.1    0.97  )-----------( 19       ( 2021 07:19 )             20.2                         7.2    0.52  )-----------( 18       ( 2021 12:07 )             19.7                         7.8    0.46  )-----------( 25       ( 2021 22:42 )             21.2                         9.3    0.58  )-----------( 33       ( 2021 09:48 )             24.7

## 2021-11-15 NOTE — CONSULT NOTE ADULT - ASSESSMENT
36 year old male with a PMHx of AML status post cycle 2 HIDAC consolidation 10/25. PMHX hypertension and fatty liver. Now with right first digit laceration required stitches from a broken dish. Presented with erythema and pain at the site of laceration repair and fevers in setting of neutropenia. Patient has pancytopenia due to disease and treatment with chemotherapy, admitted for Neutropenic fever. Hospital course complicated with hemorrhoidal pain for which GI was consulted.     #Hemorrhoid pain  Patient reports this all started ever since he start chemo around July. He reports having weekly episodes that are precipitated by constipation and resolves few hrs to 1 day after having a BM. Using senna at home but not sufficient.At bedside evaluation, patient currently not having any more pain as he had a BM yesterday and has resolved.       #Neutropenic fever: on abx    Recs:  -Start Fiber supplement daily (Psyllium husk)  -Would consider adding Miralax daily if not having BM every 1-2 day with fiber  -Hydrocortisone supp PRN, can consider ointment if unable to due to Neutropenia   -Continue with Sitz bath  -Continue with hemorrhoidal ointment if helping     No further work up from our perspective.    Ramez Claros MD  Gastroenterology/Hepatology Fellow  1st option: [phone] (text or call), ONLY available from 7:00 am to 5:00 pm.   **Contact on-call GI fellow via answering service ([phone]) from 5pm-7am AND on weekends/holidays**  2nd option: Available via [employer] Teams  3rd option: Pager: [phone]    NON-URGENT CONSULTS:  Please email [email or web address] OR  [email or web address]  AT NIGHT AND ON WEEKENDS:  Contact on-call GI fellow via answering service ([phone]) from 5pm-8am AND on weekends/holidays             36 year old male with a PMHx of AML status post cycle 2 HIDAC consolidation 10/25. PMHX hypertension and fatty liver. Now with right first digit laceration required stitches from a broken dish. Presented with erythema and pain at the site of laceration repair and fevers in setting of neutropenia. Patient has pancytopenia due to disease and treatment with chemotherapy, admitted for Neutropenic fever. Hospital course complicated with hemorrhoidal pain for which GI was consulted.     #Hemorrhoid pain  Patient reports this all started ever since he start chemo around July. He reports having weekly episodes that are precipitated by constipation and resolves few hrs to 1 day after having a BM. Using senna at home but not sufficient.At bedside evaluation, patient currently not having any more pain as he had a BM yesterday and has resolved.       #Neutropenic fever: on abx    Recs:  -Start Fiber supplement daily (Psyllium husk)  -Would consider adding Miralax daily if not having BM every 1-2 day with fiber  -Can trial hydrocortisone cream only to the hemorrhoid, not to the skin break  -Continue antibiotics  -If no improvement over the next few days, would consider MRI pelvis to rule out underlying abscess or fistula (less likely)        Ramez Claros MD  Gastroenterology/Hepatology Fellow  1st option: [phone] (text or call), ONLY available from 7:00 am to 5:00 pm.   **Contact on-call GI fellow via answering service ([phone]) from 5pm-7am AND on weekends/holidays**  2nd option: Available via [employer] Teams  3rd option: Pager: [phone]    NON-URGENT CONSULTS:  Please email [email or web address]  [email or web address]  AT NIGHT AND ON WEEKENDS:  Contact on-call GI fellow via answering service ([phone]) from 5pm-8am AND on weekends/holidays

## 2021-11-15 NOTE — PROGRESS NOTE ADULT - PROBLEM SELECTOR PLAN 2
Patient is neutropenic, febrile  Cultures (-) NGTD  continue with current abx.   FU vanco trough 2200 11/14  ID following. Patient is neutropenic, febrile  Cultures (-) NGTD  Continue with zosyn, vanco , diflucan  FU vanco trough 11.6, Continue at present dose.  ID following. Patient is neutropenic, febrile  Cultures (-) NGTD  Continue with zosyn, vanco , diflucan  FU vanco trough 11.6, Continue at present dose.- Infectious disease stopped the vancomycin  ID following.  Culture for fever Patient is neutropenic, febrile  Cultures (-) NGTD  Continue with zosyn, vanco , diflucan  FU vanco trough 11.6, Continue at present dose.- Infectious disease stopped the vancomycin today 11/15/21  ID following.  Culture for fever

## 2021-11-15 NOTE — CONSULT NOTE ADULT - PROBLEM SELECTOR RECOMMENDATION 9
Predominant symptom: pain  Likely due to hemorrhoids which intensify and flare with anticancer treatment  Degree of control: suboptimal at times  He reports it feels as full as "three tennis balls" and like someone " jams a hot poker" inside of him for hours.  He also reports that this can last for day. Conservative measures like Sitz bath and water based treatment are very helpful. The patient reports straining, while his father communicates long periods of sitting in hopes of having a bowel movement may worsen the hemorrhoids, which have shifted from internal to external. He claims these issue significantly amplify during the course of treatment as an outpatient and foster considerably debility since movement can augment the discomfort.  Relative to previous day: tolerable for now  Pain (scale is out of 10) : Max: 10   Min: 4       Personalized pain goal (PPG): below 3  Duration of PRN: IV in the past is brief  Current treatment regimen: IV dilaudid 0.5 mg, not used  Recommendations: Consider PO dilaudid 1mg for a brief trial. Explained to the patient that this medication, as well as his hydrocodone ought to be used sparingly as an adjunctive therapy noted above. Ensure soft stools  Risk mitigation/Bowel regimen: Narcan, bowel regimen  Adverse events noted: none

## 2021-11-15 NOTE — PROGRESS NOTE ADULT - ASSESSMENT
36 m with HTN, fatty liver, AML (dx 7/21, undergoing consolidation chemo every 3 weeks and last dose was 2 weeks ago), s/p right first digit laceration repair yesterday now p/w erythema and pain at the site of laceration repair  febrile to 101.4, tachy  WBC: 0.52  xray:  No acute fracture or dislocation. A new density is seen at the dorsal ulnar aspect of the proximal third digit, correlate with direct visualization for possible foreign body versus external. No radiopaque foreign body of the first digit. No osseous erosions are acute periosteal reaction.    fever, tachycardia, sepsis due to thumb cellulitis after laceration with glass and sutured the day PTA  pancytopenia, neutropenia, AML on chemo    * blood cx negative, no more fevers and resolved cellulitis  * discontinue vanco   * c/w  zosyn for now as pt still neutropenic  * monitor the CBC/diff and temp curve    The above assessment and plan was discussed with the primary team    Arleen Calero MD  Pager [phone]  After 5pm and on weekends call [phone]

## 2021-11-15 NOTE — PROGRESS NOTE ADULT - SUBJECTIVE AND OBJECTIVE BOX
Diagnosis    Protocol/Chemo Regimen:  Day:      Pt endorsed:    Review of Systems:      Pain scale:                                        Location:    Diet:     Allergies    No Known Allergies    Intolerances    cefepime (Rash)      ANTIMICROBIALS  fluconAZOLE   Tablet 200 milliGRAM(s) Oral daily  piperacillin/tazobactam IVPB.. 3.375 Gram(s) IV Intermittent every 8 hours  vancomycin  IVPB 1250 milliGRAM(s) IV Intermittent every 12 hours      HEME/ONC MEDICATIONS      STANDING MEDICATIONS  amLODIPine   Tablet 5 milliGRAM(s) Oral daily  influenza   Vaccine 0.5 milliLiter(s) IntraMuscular once  polyethylene glycol 3350 17 Gram(s) Oral daily  senna 2 Tablet(s) Oral at bedtime  sodium chloride 0.9%. 1000 milliLiter(s) IV Continuous <Continuous>      PRN MEDICATIONS  acetaminophen     Tablet .. 650 milliGRAM(s) Oral every 6 hours PRN  bisacodyl 5 milliGRAM(s) Oral every 12 hours PRN  hemorrhoidal Ointment 1 Application(s) Rectal three times a day PRN  HYDROmorphone  Injectable 0.5 milliGRAM(s) IV Push every 3 hours PRN        Vital Signs Last 24 Hrs  T(C): 36.8 (15 Nov 2021 04:55), Max: 38 (14 Nov 2021 17:00)  T(F): 98.2 (15 Nov 2021 04:55), Max: 100.4 (14 Nov 2021 17:00)  HR: 96 (15 Nov 2021 04:55) (96 - 124)  BP: 102/65 (15 Nov 2021 04:55) (96/54 - 120/78)  BP(mean): --  RR: 18 (15 Nov 2021 04:55) (18 - 18)  SpO2: 95% (15 Nov 2021 04:55) (95% - 100%)    PHYSICAL EXAM  General: adult in NAD  HEENT: clear oropharynx, anicteric sclera, pink conjunctiva  Neck: supple  CV: normal S1/S2 RRR  Lungs: positive air movement b/l ant lungs,clear to auscultation, no wheezes, no rales  Abdomen: soft non-tender non-distended, no hepatosplenomegaly  Ext: no clubbing cyanosis or edema  Skin: no rashes and no petechiae  Neuro: alert and oriented X 3, no focal deficits  Central Line: normal    LABS:                               Diagnosis: AML    Protocol/Chemo Regimen: s/p cycle 2 consolidation with HIDAC  Day: 22     Pt endorsed:    Review of Systems:      Pain scale:   hemorrhoidal pain                                     Location:    Diet: regular    Allergies: No Known Allergies    Intolerances: cefepime (Rash)      ANTIMICROBIALS  fluconAZOLE   Tablet 200 milliGRAM(s) Oral daily  piperacillin/tazobactam IVPB.. 3.375 Gram(s) IV Intermittent every 8 hours  vancomycin  IVPB 1250 milliGRAM(s) IV Intermittent every 12 hours      HEME/ONC MEDICATIONS      STANDING MEDICATIONS  amLODIPine   Tablet 5 milliGRAM(s) Oral daily  influenza   Vaccine 0.5 milliLiter(s) IntraMuscular once  polyethylene glycol 3350 17 Gram(s) Oral daily  senna 2 Tablet(s) Oral at bedtime  sodium chloride 0.9%. 1000 milliLiter(s) IV Continuous <Continuous>      PRN MEDICATIONS  acetaminophen     Tablet .. 650 milliGRAM(s) Oral every 6 hours PRN  bisacodyl 5 milliGRAM(s) Oral every 12 hours PRN  hemorrhoidal Ointment 1 Application(s) Rectal three times a day PRN  HYDROmorphone  Injectable 0.5 milliGRAM(s) IV Push every 3 hours PRN        Vital Signs Last 24 Hrs  T(C): 36.8 (15 Nov 2021 04:55), Max: 38 (14 Nov 2021 17:00)  T(F): 98.2 (15 Nov 2021 04:55), Max: 100.4 (14 Nov 2021 17:00)  HR: 96 (15 Nov 2021 04:55) (96 - 124)  BP: 102/65 (15 Nov 2021 04:55) (96/54 - 120/78)  BP(mean): --  RR: 18 (15 Nov 2021 04:55) (18 - 18)  SpO2: 95% (15 Nov 2021 04:55) (95% - 100%)    PHYSICAL EXAM  General: adult in NAD  HEENT: clear oropharynx, anicteric sclera, pink conjunctiva  Neck: supple  CV: normal S1/S2 RRR  Lungs: positive air movement b/l ant lungs,clear to auscultation, no wheezes, no rales  Abdomen: soft non-tender non-distended, no hepatosplenomegaly  Ext: no clubbing cyanosis or edema  Skin: no rashes and no petechiae  Neuro: alert and oriented X 3, no focal deficits  Central Line: normal    LABS:                               Diagnosis: AML    Protocol/Chemo Regimen: s/p cycle 2 consolidation with HIDAC  Day: 22     Pt endorsed:    Review of Systems:      Pain scale:   hemorrhoidal pain                                     Location:    Diet: regular    Allergies: No Known Allergies    Intolerances: cefepime (Rash)      ANTIMICROBIALS  fluconAZOLE   Tablet 200 milliGRAM(s) Oral daily  piperacillin/tazobactam IVPB.. 3.375 Gram(s) IV Intermittent every 8 hours  vancomycin  IVPB 1250 milliGRAM(s) IV Intermittent every 12 hours      HEME/ONC MEDICATIONS      STANDING MEDICATIONS  amLODIPine   Tablet 5 milliGRAM(s) Oral daily  influenza   Vaccine 0.5 milliLiter(s) IntraMuscular once  polyethylene glycol 3350 17 Gram(s) Oral daily  senna 2 Tablet(s) Oral at bedtime  sodium chloride 0.9%. 1000 milliLiter(s) IV Continuous <Continuous>      PRN MEDICATIONS  acetaminophen     Tablet .. 650 milliGRAM(s) Oral every 6 hours PRN  bisacodyl 5 milliGRAM(s) Oral every 12 hours PRN  hemorrhoidal Ointment 1 Application(s) Rectal three times a day PRN  HYDROmorphone  Injectable 0.5 milliGRAM(s) IV Push every 3 hours PRN        Vital Signs Last 24 Hrs  T(C): 36.8 (15 Nov 2021 04:55), Max: 38 (14 Nov 2021 17:00)  T(F): 98.2 (15 Nov 2021 04:55), Max: 100.4 (14 Nov 2021 17:00)  HR: 96 (15 Nov 2021 04:55) (96 - 124)  BP: 102/65 (15 Nov 2021 04:55) (96/54 - 120/78)  BP(mean): --  RR: 18 (15 Nov 2021 04:55) (18 - 18)  SpO2: 95% (15 Nov 2021 04:55) (95% - 100%)    PHYSICAL EXAM  General: adult in NAD  HEENT: clear oropharynx,   CV: normal S1/S2 RRR  Lungs:clear to auscultation, no wheezes, no rales  Abdomen: soft non-tender non-distended  Ext: no  edema  Skin: no rashes and no petechiae  Neuro: alert and oriented X 3, no focal deficits  Central Line: PIV    LABS:                          6.9    1.36  )-----------( 23       ( 15 Nov 2021 07:19 )             19.3         Mean Cell Volume : 86.2 fl  Mean Cell Hemoglobin : 30.8 pg  Mean Cell Hemoglobin Concentration : 35.8 gm/dL  Auto Neutrophil # : x  Auto Lymphocyte # : x  Auto Monocyte # : x  Auto Eosinophil # : x  Auto Basophil # : x  Auto Neutrophil % : x  Auto Lymphocyte % : x  Auto Monocyte % : x  Auto Eosinophil % : x  Auto Basophil % : x      11-15    139  |  102  |  7   ----------------------------<  96  3.6   |  24  |  0.89    Ca    9.4      15 Nov 2021 07:19  Phos  4.2     11-15  Mg     2.3     11-15    TPro  6.7  /  Alb  3.7  /  TBili  0.3  /  DBili  x   /  AST  17  /  ALT  51<H>  /  AlkPhos  71  11-15    PT/INR - ( 15 Nov 2021 07:19 )   PT: 12.8 sec;   INR: 1.07 ratio    PTT - ( 15 Nov 2021 07:19 )  PTT:28.4 sec    RECENT CULTURES:  11-13 @ 10:09 Clean Catch Clean Catch (Midstream) : No growth    11-13 @ 03:50 .Blood Blood-Peripheral  No growth to date.                                                     Diagnosis: AML    Protocol/Chemo Regimen: s/p cycle 2 consolidation with HIDAC  Day: 22     Pt endorsed: Feels well, tired.    Review of Systems: Patient denied nausea, vomiting, chest pain, cough, dyspnea, headache     Pain scale:   hemorrhoidal pain                                     Location: 0    Diet: regular    Allergies: No Known Allergies    Intolerances: cefepime (Rash)      ANTIMICROBIALS  fluconAZOLE   Tablet 200 milliGRAM(s) Oral daily  piperacillin/tazobactam IVPB.. 3.375 Gram(s) IV Intermittent every 8 hours  vancomycin  IVPB 1250 milliGRAM(s) IV Intermittent every 12 hours      HEME/ONC MEDICATIONS      STANDING MEDICATIONS  amLODIPine   Tablet 5 milliGRAM(s) Oral daily  influenza   Vaccine 0.5 milliLiter(s) IntraMuscular once  polyethylene glycol 3350 17 Gram(s) Oral daily  senna 2 Tablet(s) Oral at bedtime  sodium chloride 0.9%. 1000 milliLiter(s) IV Continuous <Continuous>      PRN MEDICATIONS  acetaminophen     Tablet .. 650 milliGRAM(s) Oral every 6 hours PRN  bisacodyl 5 milliGRAM(s) Oral every 12 hours PRN  hemorrhoidal Ointment 1 Application(s) Rectal three times a day PRN  HYDROmorphone  Injectable 0.5 milliGRAM(s) IV Push every 3 hours PRN        Vital Signs Last 24 Hrs  T(C): 36.8 (15 Nov 2021 04:55), Max: 38 (14 Nov 2021 17:00)  T(F): 98.2 (15 Nov 2021 04:55), Max: 100.4 (14 Nov 2021 17:00)  HR: 96 (15 Nov 2021 04:55) (96 - 124)  BP: 102/65 (15 Nov 2021 04:55) (96/54 - 120/78)  BP(mean): --  RR: 18 (15 Nov 2021 04:55) (18 - 18)  SpO2: 95% (15 Nov 2021 04:55) (95% - 100%)    PHYSICAL EXAM  General: adult in NAD  HEENT: clear oropharynx,   CV: normal S1/S2 RRR  Lungs:clear to auscultation, no wheezes, no rales  Abdomen: soft non-tender non-distended  Ext: no  edema  Skin: no rashes and no petechiae  Neuro: alert and oriented X 3, no focal deficits  Central Line: PIV    LABS:                          6.9    1.36  )-----------( 23       ( 15 Nov 2021 07:19 )             19.3         Mean Cell Volume : 86.2 fl  Mean Cell Hemoglobin : 30.8 pg  Mean Cell Hemoglobin Concentration : 35.8 gm/dL  Auto Neutrophil # : x  Auto Lymphocyte # : x  Auto Monocyte # : x  Auto Eosinophil # : x  Auto Basophil # : x  Auto Neutrophil % : x  Auto Lymphocyte % : x  Auto Monocyte % : x  Auto Eosinophil % : x  Auto Basophil % : x      11-15    139  |  102  |  7   ----------------------------<  96  3.6   |  24  |  0.89    Ca    9.4      15 Nov 2021 07:19  Phos  4.2     11-15  Mg     2.3     11-15    TPro  6.7  /  Alb  3.7  /  TBili  0.3  /  DBili  x   /  AST  17  /  ALT  51<H>  /  AlkPhos  71  11-15    PT/INR - ( 15 Nov 2021 07:19 )   PT: 12.8 sec;   INR: 1.07 ratio    PTT - ( 15 Nov 2021 07:19 )  PTT:28.4 sec    RECENT CULTURES:  11-13 @ 10:09 Clean Catch Clean Catch (Midstream) : No growth    11-13 @ 03:50 .Blood Blood-Peripheral  No growth to date.                                                     Diagnosis: AML    Protocol/Chemo Regimen: s/p cycle 2 consolidation with HIDAC  Day: 22     Pt endorsed: Feels well, tired. Tolerating PO.    Review of Systems: Patient denied nausea, vomiting, chest pain, cough, dyspnea, headache     Pain scale:   hemorrhoidal pain                                     Location: 0    Diet: regular    Allergies: No Known Allergies    Intolerances: cefepime (Rash)      ANTIMICROBIALS  fluconAZOLE   Tablet 200 milliGRAM(s) Oral daily  piperacillin/tazobactam IVPB.. 3.375 Gram(s) IV Intermittent every 8 hours  vancomycin  IVPB 1250 milliGRAM(s) IV Intermittent every 12 hours      HEME/ONC MEDICATIONS      STANDING MEDICATIONS  amLODIPine   Tablet 5 milliGRAM(s) Oral daily  influenza   Vaccine 0.5 milliLiter(s) IntraMuscular once  polyethylene glycol 3350 17 Gram(s) Oral daily  senna 2 Tablet(s) Oral at bedtime  sodium chloride 0.9%. 1000 milliLiter(s) IV Continuous <Continuous>      PRN MEDICATIONS  acetaminophen     Tablet .. 650 milliGRAM(s) Oral every 6 hours PRN  bisacodyl 5 milliGRAM(s) Oral every 12 hours PRN  hemorrhoidal Ointment 1 Application(s) Rectal three times a day PRN  HYDROmorphone  Injectable 0.5 milliGRAM(s) IV Push every 3 hours PRN        Vital Signs Last 24 Hrs  T(C): 36.8 (15 Nov 2021 04:55), Max: 38 (14 Nov 2021 17:00)  T(F): 98.2 (15 Nov 2021 04:55), Max: 100.4 (14 Nov 2021 17:00)  HR: 96 (15 Nov 2021 04:55) (96 - 124)  BP: 102/65 (15 Nov 2021 04:55) (96/54 - 120/78)  BP(mean): --  RR: 18 (15 Nov 2021 04:55) (18 - 18)  SpO2: 95% (15 Nov 2021 04:55) (95% - 100%)    PHYSICAL EXAM  General: adult in NAD  HEENT: clear oropharynx,   CV: normal S1/S2 RRR  Lungs:clear to auscultation, no wheezes, no rales  Abdomen: soft non-tender non-distended  Ext: no  edema  Skin: no rashes and no petechiae  Neuro: alert and oriented X 3, no focal deficits  Central Line: PIV    LABS:                          6.9    1.36  )-----------( 23       ( 15 Nov 2021 07:19 )             19.3         Mean Cell Volume : 86.2 fl  Mean Cell Hemoglobin : 30.8 pg  Mean Cell Hemoglobin Concentration : 35.8 gm/dL  Auto Neutrophil # : x  Auto Lymphocyte # : x  Auto Monocyte # : x  Auto Eosinophil # : x  Auto Basophil # : x  Auto Neutrophil % : x  Auto Lymphocyte % : x  Auto Monocyte % : x  Auto Eosinophil % : x  Auto Basophil % : x      11-15    139  |  102  |  7   ----------------------------<  96  3.6   |  24  |  0.89    Ca    9.4      15 Nov 2021 07:19  Phos  4.2     11-15  Mg     2.3     11-15    TPro  6.7  /  Alb  3.7  /  TBili  0.3  /  DBili  x   /  AST  17  /  ALT  51<H>  /  AlkPhos  71  11-15    PT/INR - ( 15 Nov 2021 07:19 )   PT: 12.8 sec;   INR: 1.07 ratio    PTT - ( 15 Nov 2021 07:19 )  PTT:28.4 sec    RECENT CULTURES:  11-13 @ 10:09 Clean Catch Clean Catch (Midstream) : No growth    11-13 @ 03:50 .Blood Blood-Peripheral  No growth to date.                                                     Diagnosis: AML    Protocol/Chemo Regimen: s/p cycle 2 consolidation with HIDAC  Day: 22     Pt endorsed: Feels well, tired. Tolerating PO.    Review of Systems: Patient denied nausea, vomiting, chest pain, cough, dyspnea, headache     Pain scale:   hemorrhoidal pain                                     Location: 0    Diet: regular    Allergies: No Known Allergies    Intolerances: cefepime (Rash)      ANTIMICROBIALS  fluconAZOLE   Tablet 200 milliGRAM(s) Oral daily  piperacillin/tazobactam IVPB.. 3.375 Gram(s) IV Intermittent every 8 hours  vancomycin  IVPB 1250 milliGRAM(s) IV Intermittent every 12 hours      HEME/ONC MEDICATIONS      STANDING MEDICATIONS  amLODIPine   Tablet 5 milliGRAM(s) Oral daily  influenza   Vaccine 0.5 milliLiter(s) IntraMuscular once  polyethylene glycol 3350 17 Gram(s) Oral daily  senna 2 Tablet(s) Oral at bedtime  sodium chloride 0.9%. 1000 milliLiter(s) IV Continuous <Continuous>      PRN MEDICATIONS  acetaminophen     Tablet .. 650 milliGRAM(s) Oral every 6 hours PRN  bisacodyl 5 milliGRAM(s) Oral every 12 hours PRN  hemorrhoidal Ointment 1 Application(s) Rectal three times a day PRN  HYDROmorphone  Injectable 0.5 milliGRAM(s) IV Push every 3 hours PRN        Vital Signs Last 24 Hrs  T(C): 36.8 (15 Nov 2021 04:55), Max: 38 (14 Nov 2021 17:00)  T(F): 98.2 (15 Nov 2021 04:55), Max: 100.4 (14 Nov 2021 17:00)  HR: 96 (15 Nov 2021 04:55) (96 - 124)  BP: 102/65 (15 Nov 2021 04:55) (96/54 - 120/78)  BP(mean): --  RR: 18 (15 Nov 2021 04:55) (18 - 18)  SpO2: 95% (15 Nov 2021 04:55) (95% - 100%)    PHYSICAL EXAM  General: adult in NAD  HEENT: clear oropharynx,   CV: normal S1/S2 RRR  Lungs:clear to auscultation, no wheezes, no rales  Abdomen: soft non-tender non-distended  Ext: no  edema  Skin: no rashes and no petechiae  Neuro: alert and oriented X 3, no focal deficits  Central Line: PIV    LABS:                          6.9    1.36  )-----------( 23       ( 15 Nov 2021 07:19 )             19.3         Mean Cell Volume : 86.2 fl  Mean Cell Hemoglobin : 30.8 pg  Mean Cell Hemoglobin Concentration : 35.8 gm/dL  Auto Neutrophil # : x  Auto Lymphocyte # : x  Auto Monocyte # : x  Auto Eosinophil # : x  Auto Basophil # : x  Auto Neutrophil % : x  Auto Lymphocyte % : x  Auto Monocyte % : x  Auto Eosinophil % : x  Auto Basophil % : x      11-15    139  |  102  |  7   ----------------------------<  96  3.6   |  24  |  0.89    Ca    9.4      15 Nov 2021 07:19  Phos  4.2     11-15  Mg     2.3     11-15    TPro  6.7  /  Alb  3.7  /  TBili  0.3  /  DBili  x   /  AST  17  /  ALT  51<H>  /  AlkPhos  71  11-15    PT/INR - ( 15 Nov 2021 07:19 )   PT: 12.8 sec;   INR: 1.07 ratio    PTT - ( 15 Nov 2021 07:19 )  PTT:28.4 sec    RECENT CULTURES:  11-13 @ 10:09 Clean Catch Clean Catch (Midstream) : No growth    11-13 @ 03:50 .Blood Blood-Peripheral  No growth to date.                                                 Diagnosis: AML    Protocol/Chemo Regimen: s/p cycle 2 consolidation with HIDAC  Day: 22     Pt endorsed: Feels well, tired. Tolerating PO.    Review of Systems: Patient denied nausea, vomiting, chest pain, cough, dyspnea, headache     Pain scale:   hemorrhoidal pain                                     Location: 0    Diet: regular    Allergies: No Known Allergies    Intolerances: cefepime (Rash)      ANTIMICROBIALS  fluconAZOLE   Tablet 200 milliGRAM(s) Oral daily  piperacillin/tazobactam IVPB.. 3.375 Gram(s) IV Intermittent every 8 hours  vancomycin  IVPB 1250 milliGRAM(s) IV Intermittent every 12 hours      HEME/ONC MEDICATIONS      STANDING MEDICATIONS  amLODIPine   Tablet 5 milliGRAM(s) Oral daily  influenza   Vaccine 0.5 milliLiter(s) IntraMuscular once  polyethylene glycol 3350 17 Gram(s) Oral daily  senna 2 Tablet(s) Oral at bedtime  sodium chloride 0.9%. 1000 milliLiter(s) IV Continuous <Continuous>      PRN MEDICATIONS  acetaminophen     Tablet .. 650 milliGRAM(s) Oral every 6 hours PRN  bisacodyl 5 milliGRAM(s) Oral every 12 hours PRN  hemorrhoidal Ointment 1 Application(s) Rectal three times a day PRN  HYDROmorphone  Injectable 0.5 milliGRAM(s) IV Push every 3 hours PRN        Vital Signs Last 24 Hrs  T(C): 36.8 (15 Nov 2021 04:55), Max: 38 (14 Nov 2021 17:00)  T(F): 98.2 (15 Nov 2021 04:55), Max: 100.4 (14 Nov 2021 17:00)  HR: 96 (15 Nov 2021 04:55) (96 - 124)  BP: 102/65 (15 Nov 2021 04:55) (96/54 - 120/78)  BP(mean): --  RR: 18 (15 Nov 2021 04:55) (18 - 18)  SpO2: 95% (15 Nov 2021 04:55) (95% - 100%)    PHYSICAL EXAM  General: adult in NAD  HEENT: clear oropharynx,   CV: normal S1/S2 RRR  Lungs:clear to auscultation, no wheezes, no rales  Abdomen: soft non-tender non-distended  Ext: no  edema  Skin: no rashes and no petechiae, R thumb with sutures, clean dry and intact  Neuro: alert and oriented X 3, no focal deficits  Central Line: PIV    LABS:                          6.9    1.36  )-----------( 23       ( 15 Nov 2021 07:19 )             19.3         Mean Cell Volume : 86.2 fl  Mean Cell Hemoglobin : 30.8 pg  Mean Cell Hemoglobin Concentration : 35.8 gm/dL  Auto Neutrophil # : x  Auto Lymphocyte # : x  Auto Monocyte # : x  Auto Eosinophil # : x  Auto Basophil # : x  Auto Neutrophil % : x  Auto Lymphocyte % : x  Auto Monocyte % : x  Auto Eosinophil % : x  Auto Basophil % : x      11-15    139  |  102  |  7   ----------------------------<  96  3.6   |  24  |  0.89    Ca    9.4      15 Nov 2021 07:19  Phos  4.2     11-15  Mg     2.3     11-15    TPro  6.7  /  Alb  3.7  /  TBili  0.3  /  DBili  x   /  AST  17  /  ALT  51<H>  /  AlkPhos  71  11-15    PT/INR - ( 15 Nov 2021 07:19 )   PT: 12.8 sec;   INR: 1.07 ratio    PTT - ( 15 Nov 2021 07:19 )  PTT:28.4 sec    RECENT CULTURES:  11-13 @ 10:09 Clean Catch Clean Catch (Midstream) : No growth    11-13 @ 03:50 .Blood Blood-Peripheral  No growth to date.

## 2021-11-15 NOTE — PROGRESS NOTE ADULT - ASSESSMENT
Patient is a 36 year old male with a PMHx of AML status post cycle 2 HIDAC consolidation 10/25. PMHX hypertension and fatty liver. Now with right first digit laceration required stitches from a broken dish. Presented with erythema and pain at the site of laceration repair and fevers in setting of neutropenia. Patient has pancytopenia due to disease and treatment with chemotherapy.                        Patient is a 36 year old male with a PMHx of HTN, fatty liver and now  AML, FLT 3 (-) NPM1 mutated,  s/p cycle 2 HIDAC consolidation 10/25.  Patient is s/p right first digit laceration required stitches from a broken dish and presented with erythema and pain at the site of laceration repair and fevers in setting of neutropenia. Patient has pancytopenia due to disease process  and  chemotherapy. .

## 2021-11-15 NOTE — PROVIDER CONTACT NOTE (CRITICAL VALUE NOTIFICATION) - ASSESSMENT
A/Ox4, pt denies any headache, SOB, signs of bruising/bleeding. No signs of acute distress.
a/ox3
pt stable, resting comfortably in bed, denies pain or discomfort at this time. does not appear to be in any distress.

## 2021-11-15 NOTE — CONSULT NOTE ADULT - PROBLEM SELECTOR RECOMMENDATION 5
Actions:  [x] Rapport building     [x] Symptom assessment    [] Eliciting preferences of goals   [] Prognostic understanding    [] Emotional Support  [] Coping skill development  []  Other: counseling  Interdisciplinary Referrals: None  Communication: d/w primary team  Documentation Review: [] Primary Team [] Consultants [] Interdisciplinary team  Content: n/a  Outpatient follow up with Dr. Sonam Carrasquillo

## 2021-11-15 NOTE — CONSULT NOTE ADULT - PROBLEM SELECTOR RECOMMENDATION 2
Predominant symptom: constipation  Likely due to change in diet during inpatient and lack of mobility  Degree of control: moderate  Relative to previous day: comparable  Current treatment regimen: senna and miralax  Recommendations: agree with this regimen for now  Risk mitigation: maintain to minimize OIC risk. Counseled extensively  Adverse events noted: n/a

## 2021-11-15 NOTE — PROGRESS NOTE ADULT - ATTENDING COMMENTS
Pt care and plan discussed and reviewed with PA. Plan as outlined above edited by me to reflect our discussion. I had a prolonged conversation with the patient regarding hospital course, differential diagnosis and results of diagnostic tests.  Plan of care discussed with patient after the evaluation. Patient expresses clear understanding and satisfaction with the plan of care. OMT on six regions for acute somatic dysfunctions done at the bedside. Sixty five minutes spent on encounter, of which more than fifty percent of the encounter was spent on counseling and/or coordinating care by the attending physician.

## 2021-11-15 NOTE — PROGRESS NOTE ADULT - SUBJECTIVE AND OBJECTIVE BOX
Name of Patient : JAK DANIELS  MRN: [id number]  Date of visit: 11-15-21 @ 10:11      Subjective: Patient seen and examined. No new events except as noted.   Patient's hemoglobin was noted to be 6.9 today 11/15. 1 Unit PRBCs transfusion today 11/15.   Patient was febrile yesterday; Afebrile today.     REVIEW OF SYSTEMS:    CONSTITUTIONAL: +weakness   EYES/ENT: No visual changes;  No vertigo or throat pain   NECK: No pain or stiffness  RESPIRATORY: No cough, wheezing, hemoptysis; No shortness of breath  CARDIOVASCULAR: No chest pain or palpitations  GASTROINTESTINAL: No abdominal or epigastric pain. No nausea, vomiting, or hematemesis; No diarrhea or constipation. No melena or hematochezia.  GENITOURINARY: No dysuria, frequency or hematuria  NEUROLOGICAL: No numbness or weakness  SKIN: No itching, burning, rashes, or lesions   All other review of systems is negative unless indicated above.    MEDICATIONS:  MEDICATIONS  (STANDING):  amLODIPine   Tablet 5 milliGRAM(s) Oral daily  fluconAZOLE   Tablet 200 milliGRAM(s) Oral daily  influenza   Vaccine 0.5 milliLiter(s) IntraMuscular once  piperacillin/tazobactam IVPB.. 3.375 Gram(s) IV Intermittent every 8 hours  polyethylene glycol 3350 17 Gram(s) Oral daily  senna 2 Tablet(s) Oral at bedtime  sodium chloride 0.9%. 1000 milliLiter(s) (80 mL/Hr) IV Continuous <Continuous>      PHYSICAL EXAM:  T(C): 36.6 (11-15-21 @ 09:50), Max: 38 (11-14-21 @ 17:00)  HR: 89 (11-15-21 @ 09:50) (89 - 124)  BP: 105/62 (11-15-21 @ 09:50) (99/59 - 120/78)  RR: 18 (11-15-21 @ 09:50) (18 - 18)  SpO2: 100% (11-15-21 @ 09:50) (95% - 100%)  Wt(kg): --  I&O's Summary    14 Nov 2021 07:01  -  15 Nov 2021 07:00  --------------------------------------------------------  IN: 1221 mL / OUT: 0 mL / NET: 1221 mL    15 Nov 2021 07:01  -  15 Nov 2021 10:11  --------------------------------------------------------  IN: 300 mL / OUT: 0 mL / NET: 300 mL          Appearance: Normal	  HEENT:  PERRLA   Lymphatic: No lymphadenopathy   Cardiovascular: Normal S1 S2, no JVD  Respiratory: normal effort , clear  Gastrointestinal:  Soft, Non-tender  Skin: No rashes,  warm to touch  Psychiatry:  Mood & affect appropriate  Musculoskeletal: Thumb skin laceration, clean dry, intact without cellulitic changes       All labs, Imaging and EKGs personally reviewed       11-14-21 @ 07:01  -  11-15-21 @ 07:00  --------------------------------------------------------  IN: 1221 mL / OUT: 0 mL / NET: 1221 mL    11-15-21 @ 07:01  -  11-15-21 @ 10:11  --------------------------------------------------------  IN: 300 mL / OUT: 0 mL / NET: 300 mL                            6.9    1.36  )-----------( 23       ( 15 Nov 2021 07:19 )             19.3               11-15    139  |  102  |  7   ----------------------------<  96  3.6   |  24  |  0.89    Ca    9.4      15 Nov 2021 07:19  Phos  4.2     11-15  Mg     2.3     11-15    TPro  6.7  /  Alb  3.7  /  TBili  0.3  /  DBili  x   /  AST  17  /  ALT  51<H>  /  AlkPhos  71  11-15    PT/INR - ( 15 Nov 2021 07:19 )   PT: 12.8 sec;   INR: 1.07 ratio         PTT - ( 15 Nov 2021 07:19 )  PTT:28.4 sec                        Culture - Urine (11.13.21 @ 10:09)   Specimen Source: Clean Catch Clean Catch (Midstream)   Culture Results: No growth     Culture - Blood (11.13.21 @ 03:50)   Specimen Source: .Blood PICC Tip   Culture Results: No growth to date.     Culture - Blood (11.13.21 @ 03:50)   Specimen Source: .Blood Blood-Peripheral   Culture Results: No growth to date.     MRSA/MSSA PCR (11.13.21 @ 20:31)   MRSA PCR Result.: NotDetec   Staph Aureus PCR Result: NotDetec  Name of Patient : JAK DANIELS  MRN: [id number]  Date of visit: 11-15-21 @ 10:11      Subjective: Patient seen and examined. No new events except as noted.   Patient's hemoglobin was noted to be 6.9 today 11/15. 1 Unit PRBCs transfusion today 11/15.   Patient was febrile yesterday; Afebrile today.     REVIEW OF SYSTEMS:    CONSTITUTIONAL: +weakness; chills   EYES/ENT: No visual changes;  No vertigo or throat pain   NECK: No pain or stiffness  RESPIRATORY: No cough, wheezing, hemoptysis; No shortness of breath  CARDIOVASCULAR: No chest pain or palpitations  GASTROINTESTINAL: No abdominal or epigastric pain. No nausea, vomiting, or hematemesis; No diarrhea or constipation. No melena or hematochezia.  GENITOURINARY: No dysuria, frequency or hematuria  NEUROLOGICAL: No numbness or weakness  SKIN: No itching, burning, rashes, or lesions   All other review of systems is negative unless indicated above.    MEDICATIONS:  MEDICATIONS  (STANDING):  amLODIPine   Tablet 5 milliGRAM(s) Oral daily  fluconAZOLE   Tablet 200 milliGRAM(s) Oral daily  influenza   Vaccine 0.5 milliLiter(s) IntraMuscular once  piperacillin/tazobactam IVPB.. 3.375 Gram(s) IV Intermittent every 8 hours  polyethylene glycol 3350 17 Gram(s) Oral daily  senna 2 Tablet(s) Oral at bedtime  sodium chloride 0.9%. 1000 milliLiter(s) (80 mL/Hr) IV Continuous <Continuous>      PHYSICAL EXAM:  T(C): 36.6 (11-15-21 @ 09:50), Max: 38 (11-14-21 @ 17:00)  HR: 89 (11-15-21 @ 09:50) (89 - 124)  BP: 105/62 (11-15-21 @ 09:50) (99/59 - 120/78)  RR: 18 (11-15-21 @ 09:50) (18 - 18)  SpO2: 100% (11-15-21 @ 09:50) (95% - 100%)  Wt(kg): --  I&O's Summary    14 Nov 2021 07:01  -  15 Nov 2021 07:00  --------------------------------------------------------  IN: 1221 mL / OUT: 0 mL / NET: 1221 mL    15 Nov 2021 07:01  -  15 Nov 2021 10:11  --------------------------------------------------------  IN: 300 mL / OUT: 0 mL / NET: 300 mL          Appearance: Normal	  HEENT:  PERRLA   Lymphatic: No lymphadenopathy   Cardiovascular: Normal S1 S2, no JVD  Respiratory: normal effort , clear  Gastrointestinal:  Soft, Non-tender  Skin: No rashes,  warm to touch  Psychiatry:  Mood & affect appropriate  Musculoskeletal: Thumb skin laceration, clean dry, intact without cellulitic changes       All labs, Imaging and EKGs personally reviewed       11-14-21 @ 07:01  -  11-15-21 @ 07:00  --------------------------------------------------------  IN: 1221 mL / OUT: 0 mL / NET: 1221 mL    11-15-21 @ 07:01  -  11-15-21 @ 10:11  --------------------------------------------------------  IN: 300 mL / OUT: 0 mL / NET: 300 mL                            6.9    1.36  )-----------( 23       ( 15 Nov 2021 07:19 )             19.3               11-15    139  |  102  |  7   ----------------------------<  96  3.6   |  24  |  0.89    Ca    9.4      15 Nov 2021 07:19  Phos  4.2     11-15  Mg     2.3     11-15    TPro  6.7  /  Alb  3.7  /  TBili  0.3  /  DBili  x   /  AST  17  /  ALT  51<H>  /  AlkPhos  71  11-15    PT/INR - ( 15 Nov 2021 07:19 )   PT: 12.8 sec;   INR: 1.07 ratio         PTT - ( 15 Nov 2021 07:19 )  PTT:28.4 sec                        Culture - Urine (11.13.21 @ 10:09)   Specimen Source: Clean Catch Clean Catch (Midstream)   Culture Results: No growth     Culture - Blood (11.13.21 @ 03:50)   Specimen Source: .Blood PICC Tip   Culture Results: No growth to date.     Culture - Blood (11.13.21 @ 03:50)   Specimen Source: .Blood Blood-Peripheral   Culture Results: No growth to date.     MRSA/MSSA PCR (11.13.21 @ 20:31)   MRSA PCR Result.: NotDetec   Staph Aureus PCR Result: NotDetec  Name of Patient : JAK DANIELS  MRN: [id number]  Date of visit: 11-15-21 @ 10:11      Subjective: Patient seen and examined. No new events except as noted.   Patient's hemoglobin was noted to be 6.9 today 11/15. 1 Unit PRBCs transfusion today 11/15.   Patient was febrile yesterday; Afebrile today.   Patient is doing okay. States his pain is well controlled and eating.     REVIEW OF SYSTEMS:    CONSTITUTIONAL: Afebrile  EYES/ENT: No visual changes;  No vertigo or throat pain   NECK: No pain or stiffness  RESPIRATORY: No cough, wheezing, hemoptysis; No shortness of breath  CARDIOVASCULAR: No chest pain or palpitations  GASTROINTESTINAL: No abdominal or epigastric pain. No nausea, vomiting, or hematemesis; No diarrhea or constipation. No melena or hematochezia.  GENITOURINARY: No dysuria, frequency or hematuria  NEUROLOGICAL: No numbness or weakness  SKIN: No itching, burning, rashes, or lesions   All other review of systems is negative unless indicated above.    MEDICATIONS:  MEDICATIONS  (STANDING):  amLODIPine   Tablet 5 milliGRAM(s) Oral daily  fluconAZOLE   Tablet 200 milliGRAM(s) Oral daily  influenza   Vaccine 0.5 milliLiter(s) IntraMuscular once  piperacillin/tazobactam IVPB.. 3.375 Gram(s) IV Intermittent every 8 hours  polyethylene glycol 3350 17 Gram(s) Oral daily  senna 2 Tablet(s) Oral at bedtime  sodium chloride 0.9%. 1000 milliLiter(s) (80 mL/Hr) IV Continuous <Continuous>      PHYSICAL EXAM:  T(C): 36.6 (11-15-21 @ 09:50), Max: 38 (11-14-21 @ 17:00)  HR: 89 (11-15-21 @ 09:50) (89 - 124)  BP: 105/62 (11-15-21 @ 09:50) (99/59 - 120/78)  RR: 18 (11-15-21 @ 09:50) (18 - 18)  SpO2: 100% (11-15-21 @ 09:50) (95% - 100%)  Wt(kg): --  I&O's Summary    14 Nov 2021 07:01  -  15 Nov 2021 07:00  --------------------------------------------------------  IN: 1221 mL / OUT: 0 mL / NET: 1221 mL    15 Nov 2021 07:01  -  15 Nov 2021 10:11  --------------------------------------------------------  IN: 300 mL / OUT: 0 mL / NET: 300 mL          Appearance: Normal	  HEENT:  PERRLA   Lymphatic: No lymphadenopathy   Cardiovascular: Normal S1 S2, no JVD  Respiratory: normal effort , clear  Gastrointestinal:  Soft, Non-tender  Skin: No rashes,  warm to touch  Psychiatry:  Mood & affect appropriate  Musculoskeletal: Thumb skin laceration, clean dry, intact without cellulitic changes; No lesions or skin changes noted on third digit. Patient able to move digit with Active and Passive ROM.     All labs, Imaging and EKGs personally reviewed       11-14-21 @ 07:01  -  11-15-21 @ 07:00  --------------------------------------------------------  IN: 1221 mL / OUT: 0 mL / NET: 1221 mL    11-15-21 @ 07:01  -  11-15-21 @ 10:11  --------------------------------------------------------  IN: 300 mL / OUT: 0 mL / NET: 300 mL                            6.9    1.36  )-----------( 23       ( 15 Nov 2021 07:19 )             19.3               11-15    139  |  102  |  7   ----------------------------<  96  3.6   |  24  |  0.89    Ca    9.4      15 Nov 2021 07:19  Phos  4.2     11-15  Mg     2.3     11-15    TPro  6.7  /  Alb  3.7  /  TBili  0.3  /  DBili  x   /  AST  17  /  ALT  51<H>  /  AlkPhos  71  11-15    PT/INR - ( 15 Nov 2021 07:19 )   PT: 12.8 sec;   INR: 1.07 ratio         PTT - ( 15 Nov 2021 07:19 )  PTT:28.4 sec                        Culture - Urine (11.13.21 @ 10:09)   Specimen Source: Clean Catch Clean Catch (Midstream)   Culture Results: No growth     Culture - Blood (11.13.21 @ 03:50)   Specimen Source: .Blood PICC Tip   Culture Results: No growth to date.     Culture - Blood (11.13.21 @ 03:50)   Specimen Source: .Blood Blood-Peripheral   Culture Results: No growth to date.     MRSA/MSSA PCR (11.13.21 @ 20:31)   MRSA PCR Result.: NotDetec   Staph Aureus PCR Result: NotDetec           < from: Xray Hand 2 Views, Right (11.12.21 @ 22:36) >    PROCEDURE DATE:  11/12/2021        INTERPRETATION:  CLINICAL INFORMATION: Finger infection. Erythema and pain at site of first digit laceration repair    EXAM: Frontal and lateral radiographsof the right hand.    COMPARISON: Right hand radiographs 11/11/2021.    IMPRESSION:  No acute fracture or dislocation. A new density is seen at the dorsal ulnar aspect of the proximal third digit, correlate with direct visualization for possible foreign body versus external. No radiopaque foreign body of the first digit. No osseous erosions are acute periosteal reaction.  Preserved joint spaces.    --- End of Report ---    < end of copied text >

## 2021-11-15 NOTE — PROGRESS NOTE ADULT - PROBLEM SELECTOR PLAN 1
Admit to [facility] for neutropenic fever  Patient is s/p cycle 2 HIDAC 10/25  Monitor labs, replace blood and lytes prn, pain control, antiemetics.  palliative care consult for pain management in rectal area. Patient is s/p cycle 2 HIDAC 10/25  IV hydration, strict I/O  Monitor CBC and transfuse prn  Monitor electrolytes and replete prn  palliative care consult for pain management in rectal area. Patient is s/p cycle 2 HIDAC 10/25  IV hydration, strict I/O  Monitor CBC and transfuse prn  Monitor electrolytes and replete prn  palliative care consult for pain management in rectal area.  Hgb 6.9 today. Will transfuse 1u PRBC Patient is s/p cycle 2 HIDAC 10/25  IV hydration, strict I/O  Monitor CBC and transfuse prn  Monitor electrolytes and replete prn  11/14 palliative care consult for pain management in rectal area. Changed dilaudid to PO today  Hgb 6.9 today. Will transfuse 1u PRBC

## 2021-11-15 NOTE — PROGRESS NOTE ADULT - SUBJECTIVE AND OBJECTIVE BOX
Follow Up:  neutropenic fever, cellulitis    Interval History: pt afebrile and improved thumb erythema, WBC also improving,  today    ROS:      All other systems negative    Constitutional: no fever, no chills    Cardiovascular:  no chest pain, no palpitation  Respiratory:  no SOB, no cough  GI:  no abd pain, no vomiting, no diarrhea  urinary: no dysuria, no hematuria, no flank pain  musculoskeletal: improved R thumb pain and swelling  skin:  no rash  neurology:  no headache, no seizure      Allergies  No Known Allergies        ANTIMICROBIALS:  fluconAZOLE   Tablet 200 daily  piperacillin/tazobactam IVPB.. 3.375 every 8 hours      OTHER MEDS:  acetaminophen     Tablet .. 650 milliGRAM(s) Oral every 6 hours PRN  amLODIPine   Tablet 5 milliGRAM(s) Oral daily  bisacodyl 5 milliGRAM(s) Oral every 12 hours PRN  chlorhexidine 2% Cloths 1 Application(s) Topical <User Schedule>  hemorrhoidal Ointment 1 Application(s) Rectal three times a day PRN  HYDROmorphone   Solution 1 milliGRAM(s) Oral every 4 hours PRN  influenza   Vaccine 0.5 milliLiter(s) IntraMuscular once  polyethylene glycol 3350 17 Gram(s) Oral daily  senna 2 Tablet(s) Oral at bedtime  sodium chloride 0.65% Nasal 1 Spray(s) Both Nostrils three times a day PRN  sodium chloride 0.9%. 1000 milliLiter(s) IV Continuous <Continuous>      Vital Signs Last 24 Hrs  T(C): 36.8 (15 Nov 2021 13:33), Max: 38 (14 Nov 2021 17:00)  T(F): 98.2 (15 Nov 2021 13:33), Max: 100.4 (14 Nov 2021 17:00)  HR: 101 (15 Nov 2021 13:33) (89 - 124)  BP: 99/66 (15 Nov 2021 13:33) (99/59 - 124/76)  BP(mean): --  RR: 18 (15 Nov 2021 13:33) (18 - 18)  SpO2: 97% (15 Nov 2021 13:33) (95% - 100%)    Physical Exam:  General:    NAD, non toxic  Cardio:    regular S1,S2, no murmur  Respiratory:   clear b/l, no wheezing  abd:   soft, BS +, not tender  :     no CVAT, no suprapubic tenderness, no mendoza  Musculoskeletal : no joint swelling, no edema  Skin:    no rash, R thumb sutures, resolved erythema and edema   vascular: no phlebitis, RUE picc with no tenderness or erythema                            6.9    1.36  )-----------( 23       ( 15 Nov 2021 07:19 )             19.3       11-15    139  |  102  |  7   ----------------------------<  96  3.6   |  24  |  0.89    Ca    9.4      15 Nov 2021 07:19  Phos  4.2     11-15  Mg     2.3     11-15    TPro  6.7  /  Alb  3.7  /  TBili  0.3  /  DBili  x   /  AST  17  /  ALT  51<H>  /  AlkPhos  71  11-15          MICROBIOLOGY:  Vancomycin Level, Trough: 11.6 ug/mL (11-15-21 @ 04:24)  v  Clean Catch Clean Catch (Midstream)  11-13-21   No growth  --  --      .Blood Blood-Peripheral  11-13-21   No growth to date.  --  --      .Blood Blood-Peripheral  10-29-21   No Growth Final  --  --      .Blood Blood-Peripheral  10-29-21   No Growth Final  --  --      Clean Catch Clean Catch (Midstream)  10-26-21   No growth  --  --      .Blood Blood-Catheter  10-26-21   No Growth Final  --  --          Rapid RVP Result: NotDetec (11-13 @ 01:28)        RADIOLOGY:  Images independently visualized and reviewed personally, findings as below  < from: Xray Chest 1 View AP/PA (11.12.21 @ 22:36) >  IMPRESSION:  Clear lungs.    < end of copied text >  < from: Xray Hand 2 Views, Right (11.12.21 @ 22:36) >  IMPRESSION:  No acute fracture or dislocation. A new density is seen at the dorsal ulnar aspect of the proximal third digit, correlate with direct visualization for possible foreign body versus external. No radiopaque foreign body of the first digit. No osseous erosions are acute periosteal reaction.  Preserved joint spaces.    < end of copied text >

## 2021-11-15 NOTE — PROGRESS NOTE ADULT - PROBLEM SELECTOR PLAN 5
GI consult (emailed and aware)  Continue with ointment and pain control.  [facility] consulted and recommending fiber supplements and miralax prn along with hemorrhoidal ointment.  Follow GI recommendations  Continue with ointment and pain control.  [facility] prn          contact: [phone] GI consulted and recommending fiber supplements and miralax prn along with hemorrhoidal ointment.  Follow GI recommendations  Continue with hemorrhoidal ointment and pain control.  [facility] prn          contact: [phone]

## 2021-11-15 NOTE — CONSULT NOTE ADULT - ATTENDING COMMENTS
Patient seen and examined, agree with above. Rectal pain is due to external hemorrhoid that appears partially thrombosed, as well as likely a small skin break in the left side of the buttock. No clear fluctuance or abscess, but unable to probe further due to pain and pancytopenia.    Would avoid hard stools, give Miralax as above to keep stool soft. If no improvement in the left buttock skin break, consider MRI of pelvis to rule out underlying lesion as he's pancytopenic and likely cannot have detailed exam with colorectal surgery.

## 2021-11-15 NOTE — CONSULT NOTE ADULT - PROBLEM SELECTOR RECOMMENDATION 3
PPSv2 prior to admission: 70  Current functional PPSv2:50  Nursing care required: Some assistance with ADLs  Code status documented: Full code  A review of the paper chart has been conducted  HCP form present:               Yes and valid []               Yes but invalid []                No [x]   MOLST present:                   Yes and valid []               Yes but invalid []                No [x]  Incapacity form present:   Yes and valid []                Yes but invalid []                No []                 N/A [x]  Living Will:                            Yes and valid []                Yes but invalid []                No [x]      Family Health Care Decision Act (FHCDA) Surrogate Decision Maker Hierarchy in the absence of a health care agent  [facility] Article 81 Guardian-->Spouse or domestic partner--> Adult child-->Parent-->Sibling--> Close friend

## 2021-11-15 NOTE — PROGRESS NOTE ADULT - PROBLEM SELECTOR PLAN 3
Patient cut his finger on a broken dish.   Patient will need stitches removed 7-10 days from 11/12. Patient is to return to the ER to do so. Patient cut his finger on a broken dish.   Patient will need stitches removed 7-10 days from 11/12.  Patient is to return to ER for stitches to be removed.

## 2021-11-16 ENCOUNTER — TRANSCRIPTION ENCOUNTER (OUTPATIENT)
Age: 36
End: 2021-11-16

## 2021-11-16 DIAGNOSIS — R74.01 ELEVATION OF LEVELS OF LIVER TRANSAMINASE LEVELS: ICD-10-CM

## 2021-11-16 LAB
ALBUMIN SERPL ELPH-MCNC: 3.5 G/DL — SIGNIFICANT CHANGE UP (ref 3.3–5)
ALP SERPL-CCNC: 70 U/L — SIGNIFICANT CHANGE UP (ref 40–120)
ALT FLD-CCNC: 49 U/L — HIGH (ref 10–45)
ANION GAP SERPL CALC-SCNC: 10 MMOL/L — SIGNIFICANT CHANGE UP (ref 5–17)
AST SERPL-CCNC: 17 U/L — SIGNIFICANT CHANGE UP (ref 10–40)
BASOPHILS # BLD AUTO: 0 K/UL — SIGNIFICANT CHANGE UP (ref 0–0.2)
BASOPHILS NFR BLD AUTO: 0 % — SIGNIFICANT CHANGE UP (ref 0–2)
BILIRUB SERPL-MCNC: 0.1 MG/DL — LOW (ref 0.2–1.2)
BUN SERPL-MCNC: 10 MG/DL — SIGNIFICANT CHANGE UP (ref 7–23)
CALCIUM SERPL-MCNC: 9.3 MG/DL — SIGNIFICANT CHANGE UP (ref 8.4–10.5)
CHLORIDE SERPL-SCNC: 110 MMOL/L — HIGH (ref 96–108)
CO2 SERPL-SCNC: 23 MMOL/L — SIGNIFICANT CHANGE UP (ref 22–31)
CREAT SERPL-MCNC: 0.84 MG/DL — SIGNIFICANT CHANGE UP (ref 0.5–1.3)
EOSINOPHIL # BLD AUTO: 0 K/UL — SIGNIFICANT CHANGE UP (ref 0–0.5)
EOSINOPHIL NFR BLD AUTO: 0 % — SIGNIFICANT CHANGE UP (ref 0–6)
GLUCOSE SERPL-MCNC: 112 MG/DL — HIGH (ref 70–99)
HCT VFR BLD CALC: 21.5 % — LOW (ref 39–50)
HGB BLD-MCNC: 7.8 G/DL — LOW (ref 13–17)
LYMPHOCYTES # BLD AUTO: 0.33 K/UL — LOW (ref 1–3.3)
LYMPHOCYTES # BLD AUTO: 23.4 % — SIGNIFICANT CHANGE UP (ref 13–44)
MAGNESIUM SERPL-MCNC: 2.3 MG/DL — SIGNIFICANT CHANGE UP (ref 1.6–2.6)
MANUAL SMEAR VERIFICATION: SIGNIFICANT CHANGE UP
MCHC RBC-ENTMCNC: 31.2 PG — SIGNIFICANT CHANGE UP (ref 27–34)
MCHC RBC-ENTMCNC: 36.3 GM/DL — HIGH (ref 32–36)
MCV RBC AUTO: 86 FL — SIGNIFICANT CHANGE UP (ref 80–100)
MONOCYTES # BLD AUTO: 0.61 K/UL — SIGNIFICANT CHANGE UP (ref 0–0.9)
MONOCYTES NFR BLD AUTO: 43.9 % — HIGH (ref 2–14)
NEUTROPHILS # BLD AUTO: 0.45 K/UL — LOW (ref 1.8–7.4)
NEUTROPHILS NFR BLD AUTO: 32.7 % — LOW (ref 43–77)
NRBC # BLD: 1 /100 — HIGH (ref 0–0)
PHOSPHATE SERPL-MCNC: 4.2 MG/DL — SIGNIFICANT CHANGE UP (ref 2.5–4.5)
PLAT MORPH BLD: NORMAL — SIGNIFICANT CHANGE UP
PLATELET # BLD AUTO: 32 K/UL — LOW (ref 150–400)
POTASSIUM SERPL-MCNC: 3.4 MMOL/L — LOW (ref 3.5–5.3)
POTASSIUM SERPL-SCNC: 3.4 MMOL/L — LOW (ref 3.5–5.3)
PROT SERPL-MCNC: 6.3 G/DL — SIGNIFICANT CHANGE UP (ref 6–8.3)
RBC # BLD: 2.5 M/UL — LOW (ref 4.2–5.8)
RBC # FLD: 14.6 % — HIGH (ref 10.3–14.5)
RBC BLD AUTO: SIGNIFICANT CHANGE UP
SODIUM SERPL-SCNC: 143 MMOL/L — SIGNIFICANT CHANGE UP (ref 135–145)
WBC # BLD: 1.39 K/UL — LOW (ref 3.8–10.5)
WBC # FLD AUTO: 1.39 K/UL — LOW (ref 3.8–10.5)

## 2021-11-16 PROCEDURE — 99232 SBSQ HOSP IP/OBS MODERATE 35: CPT

## 2021-11-16 RX ORDER — POTASSIUM CHLORIDE 20 MEQ
20 PACKET (EA) ORAL
Refills: 0 | Status: COMPLETED | OUTPATIENT
Start: 2021-11-16 | End: 2021-11-16

## 2021-11-16 RX ORDER — CIPROFLOXACIN LACTATE 400MG/40ML
1 VIAL (ML) INTRAVENOUS
Qty: 10 | Refills: 0
Start: 2021-11-16 | End: 2021-11-25

## 2021-11-16 RX ADMIN — POLYETHYLENE GLYCOL 3350 17 GRAM(S): 17 POWDER, FOR SOLUTION ORAL at 12:38

## 2021-11-16 RX ADMIN — CHLORHEXIDINE GLUCONATE 1 APPLICATION(S): 213 SOLUTION TOPICAL at 08:01

## 2021-11-16 RX ADMIN — PIPERACILLIN AND TAZOBACTAM 25 GRAM(S): 4; .5 INJECTION, POWDER, LYOPHILIZED, FOR SOLUTION INTRAVENOUS at 12:37

## 2021-11-16 RX ADMIN — SODIUM CHLORIDE 80 MILLILITER(S): 9 INJECTION INTRAMUSCULAR; INTRAVENOUS; SUBCUTANEOUS at 08:18

## 2021-11-16 RX ADMIN — Medication 20 MILLIEQUIVALENT(S): at 12:38

## 2021-11-16 RX ADMIN — PIPERACILLIN AND TAZOBACTAM 25 GRAM(S): 4; .5 INJECTION, POWDER, LYOPHILIZED, FOR SOLUTION INTRAVENOUS at 21:29

## 2021-11-16 RX ADMIN — PIPERACILLIN AND TAZOBACTAM 25 GRAM(S): 4; .5 INJECTION, POWDER, LYOPHILIZED, FOR SOLUTION INTRAVENOUS at 05:39

## 2021-11-16 RX ADMIN — SODIUM CHLORIDE 80 MILLILITER(S): 9 INJECTION INTRAMUSCULAR; INTRAVENOUS; SUBCUTANEOUS at 21:29

## 2021-11-16 RX ADMIN — AMLODIPINE BESYLATE 5 MILLIGRAM(S): 2.5 TABLET ORAL at 05:40

## 2021-11-16 RX ADMIN — SENNA PLUS 2 TABLET(S): 8.6 TABLET ORAL at 21:29

## 2021-11-16 RX ADMIN — Medication 20 MILLIEQUIVALENT(S): at 09:21

## 2021-11-16 RX ADMIN — FLUCONAZOLE 200 MILLIGRAM(S): 150 TABLET ORAL at 12:38

## 2021-11-16 NOTE — DISCHARGE NOTE PROVIDER - NSDCFUSCHEDAPPT_GEN_ALL_CORE_FT
JAK DANIELS ; 11/17/2021 ; [facility]  JAK DANIELS ; 11/19/2021 ; [facility]  JAK DANIELS ; 11/19/2021 ; NPP [facility]  JAK DANIELS ; 11/22/2021 ; NP [facility]  JAK DANIELS ; 11/24/2021 ; [facility] JAK DANIELS ; 11/19/2021 ; [facility]  JAK DANIELS ; 11/19/2021 ; [facility]  JAK DANIELS ; 11/22/2021 ; [facility]  JAK DANIELS ; 11/24/2021 ; [facility]  JAK DANIELS ; 12/13/2021 ; [facility]

## 2021-11-16 NOTE — DISCHARGE NOTE PROVIDER - NSDCMRMEDTOKEN_GEN_ALL_CORE_FT
Diflucan 200 mg oral tablet: 1 tab(s) orally once a day  Levaquin 500 mg oral tablet: 1 tab(s) orally every 24 hours  Norvasc 5 mg oral tablet: 1 tab(s) orally once a day hold if SBP&lt;110  PICC care: heparin 10units/ml (3ml), 3 milliliters intravenous once a day:   PICC care: normal saline 10 milliliters intravenous to each lumen weekly:   prednisoLONE acetate 1% ophthalmic suspension: 2 drop(s) to each affected eye every 6 hours for 2 days and then stop.  Reglan 10 mg oral tablet: orally every 6 hours, As Needed  Zofran 8 mg oral tablet: 1 tab(s) orally every 8 hours, As Needed   Augmentin 875 mg-125 mg oral tablet: 875 milligram(s) orally every 12 hours MDD:1  Diflucan 200 mg oral tablet: 1 tab(s) orally once a day  Levaquin 500 mg oral tablet: 1 tab(s) orally every 24 hours  levoFLOXacin 500 mg oral tablet: 1 tab(s) orally every 24 hours MDD:1  PICC care: heparin 10units/ml (3ml), 3 milliliters intravenous once a day:   PICC care: normal saline 10 milliliters intravenous to each lumen weekly:   Reglan 10 mg oral tablet: orally every 6 hours, As Needed  Zofran 8 mg oral tablet: 1 tab(s) orally every 8 hours, As Needed   Augmentin 875 mg-125 mg oral tablet: 875 milligram(s) orally every 12 hours MDD:1  TO BE TAKEN THROUGH END OF THE DAY ON 11/21  HYDROmorphone 1 mg/mL oral liquid: 1 milliliter(s) orally every 4 hours, As needed, Moderate Pain (4 - 6) MDD:4  Levaquin 500 mg oral tablet: 1 tab(s) orally every 24 hours  Please start and stop when told to do so by your Dr.   PICC care: heparin 10units/ml (3ml), 3 milliliters intravenous once a day:   PICC care: normal saline 10 milliliters intravenous to each lumen weekly:   Reglan 10 mg oral tablet: orally every 6 hours, As Needed  Zofran 8 mg oral tablet: 1 tab(s) orally every 8 hours, As Needed

## 2021-11-16 NOTE — PROGRESS NOTE ADULT - ASSESSMENT
Patient is a 36 year old male with a PMHx of HTN, fatty liver and now  AML, FLT 3 (-) NPM1 mutated,  s/p cycle 2 HIDAC consolidation 10/25.  Patient is s/p right first digit laceration required stitches from a broken dish and presented with erythema and pain at the site of laceration repair and fevers in setting of neutropenia. Patient has pancytopenia due to disease process  and  chemotherapy. .

## 2021-11-16 NOTE — PROGRESS NOTE ADULT - ATTENDING COMMENTS
Pt care and plan discussed and reviewed with PA. Plan as outlined above edited by me to reflect our discussion. I had a prolonged conversation with the patient regarding hospital course, differential diagnosis and results of diagnostic tests.  Plan of care discussed with patient after the evaluation. Patient expresses clear understanding and satisfaction with the plan of care. OMT on six regions for acute somatic dysfunctions done at the bedside. thirty seven minutes spent on encounter, of which more than fifty percent of the encounter was spent on counseling and/or coordinating care by the attending physician.

## 2021-11-16 NOTE — PROGRESS NOTE ADULT - SUBJECTIVE AND OBJECTIVE BOX
Diagnosis:    Protocol/Chemo Regimen:    Day:     Pt endorsed:    Review of Systems:     Pain scale:     Diet:     Allergies    No Known Allergies    Intolerances    cefepime (Rash)      ANTIMICROBIALS  fluconAZOLE   Tablet 200 milliGRAM(s) Oral daily  piperacillin/tazobactam IVPB.. 3.375 Gram(s) IV Intermittent every 8 hours      HEME/ONC MEDICATIONS      STANDING MEDICATIONS  amLODIPine   Tablet 5 milliGRAM(s) Oral daily  chlorhexidine 2% Cloths 1 Application(s) Topical <User Schedule>  influenza   Vaccine 0.5 milliLiter(s) IntraMuscular once  polyethylene glycol 3350 17 Gram(s) Oral daily  senna 2 Tablet(s) Oral at bedtime  sodium chloride 0.9%. 1000 milliLiter(s) IV Continuous <Continuous>      PRN MEDICATIONS  acetaminophen     Tablet .. 650 milliGRAM(s) Oral every 6 hours PRN  bisacodyl 5 milliGRAM(s) Oral every 12 hours PRN  hemorrhoidal Ointment 1 Application(s) Rectal three times a day PRN  HYDROmorphone   Solution 1 milliGRAM(s) Oral every 4 hours PRN  sodium chloride 0.65% Nasal 1 Spray(s) Both Nostrils three times a day PRN        Vital Signs Last 24 Hrs  T(C): 36.7 (16 Nov 2021 05:08), Max: 36.9 (15 Nov 2021 16:44)  T(F): 98.1 (16 Nov 2021 05:08), Max: 98.4 (15 Nov 2021 16:44)  HR: 82 (16 Nov 2021 05:08) (82 - 102)  BP: 104/65 (16 Nov 2021 05:08) (99/66 - 124/76)  BP(mean): --  RR: 18 (16 Nov 2021 05:08) (18 - 18)  SpO2: 99% (16 Nov 2021 05:08) (97% - 100%)    PHYSICAL EXAM  General: NAD  HEENT: PERRLA, EOMOI, clear oropharynx, anicteric sclera, pink conjunctiva  Neck: supple  CV: (+) S1/S2 RRR  Lungs: clear to auscultation, no wheezes or rales  Abdomen: soft, non-tender, non-distended (+) BS  Ext: no clubbing, cyanosis or edema  Skin: no rashes and no petechiae  Neuro: alert and oriented X 3, no focal deficits  Central Line:     RECENT CULTURES:  11-13 @ 10:09  Clean Catch Clean Catch (Midstream)  --  --  --    No growth  --  11-13 @ 03:50  .Blood Blood-Peripheral  --  --  --    No growth to date.  --        LABS:                        6.9    1.36  )-----------( 23       ( 15 Nov 2021 07:19 )             19.3         Mean Cell Volume : 86.2 fl  Mean Cell Hemoglobin : 30.8 pg  Mean Cell Hemoglobin Concentration : 35.8 gm/dL  Auto Neutrophil # : 0.41 K/uL  Auto Lymphocyte # : 0.33 K/uL  Auto Monocyte # : 0.63 K/uL  Auto Eosinophil # : 0.00 K/uL  Auto Basophil # : 0.00 K/uL  Auto Neutrophil % : 30.0 %  Auto Lymphocyte % : 24.0 %  Auto Monocyte % : 46.0 %  Auto Eosinophil % : 0.0 %  Auto Basophil % : 0.0 %      11-15    139  |  102  |  7   ----------------------------<  96  3.6   |  24  |  0.89    Ca    9.4      15 Nov 2021 07:19  Phos  4.2     11-15  Mg     2.3     11-15    TPro  6.7  /  Alb  3.7  /  TBili  0.3  /  DBili  x   /  AST  17  /  ALT  51<H>  /  AlkPhos  71  11-15      Mg 2.3  Phos 4.2      PT/INR - ( 15 Nov 2021 07:19 )   PT: 12.8 sec;   INR: 1.07 ratio         PTT - ( 15 Nov 2021 07:19 )  PTT:28.4 sec        RADIOLOGY & ADDITIONAL STUDIES:           Diagnosis: AML    Protocol/Chemo Regimen: s/p cycle 2 consolidation with HIDAC    Day: 23     Pt endorsed: Feels well, tired. Tolerating PO.    Review of Systems: Patient denied nausea, vomiting, chest pain, cough, dyspnea, headache     Pain scale:   hemorrhoidal pain                                     Location: 0    Diet: regular    Allergies: No Known Allergies    Intolerances: cefepime (Rash)    ANTIMICROBIALS  fluconAZOLE   Tablet 200 milliGRAM(s) Oral daily  piperacillin/tazobactam IVPB.. 3.375 Gram(s) IV Intermittent every 8 hours    STANDING MEDICATIONS  amLODIPine   Tablet 5 milliGRAM(s) Oral daily  chlorhexidine 2% Cloths 1 Application(s) Topical <User Schedule>  influenza   Vaccine 0.5 milliLiter(s) IntraMuscular once  polyethylene glycol 3350 17 Gram(s) Oral daily  senna 2 Tablet(s) Oral at bedtime  sodium chloride 0.9%. 1000 milliLiter(s) IV Continuous <Continuous>    PRN MEDICATIONS  acetaminophen     Tablet .. 650 milliGRAM(s) Oral every 6 hours PRN  bisacodyl 5 milliGRAM(s) Oral every 12 hours PRN  hemorrhoidal Ointment 1 Application(s) Rectal three times a day PRN  HYDROmorphone   Solution 1 milliGRAM(s) Oral every 4 hours PRN  sodium chloride 0.65% Nasal 1 Spray(s) Both Nostrils three times a day PRN    Vital Signs Last 24 Hrs  T(C): 36.7 (16 Nov 2021 05:08), Max: 36.9 (15 Nov 2021 16:44)  T(F): 98.1 (16 Nov 2021 05:08), Max: 98.4 (15 Nov 2021 16:44)  HR: 82 (16 Nov 2021 05:08) (82 - 102)  BP: 104/65 (16 Nov 2021 05:08) (99/66 - 124/76)  BP(mean): --  RR: 18 (16 Nov 2021 05:08) (18 - 18)  SpO2: 99% (16 Nov 2021 05:08) (97% - 100%)    PHYSICAL EXAM  General: adult in NAD  HEENT: clear oropharynx,   CV: normal S1/S2 RRR  Lungs:clear to auscultation, no wheezes, no rales  Abdomen: soft non-tender non-distended  Ext: no  edema  Skin: no rashes and no petechiae, R thumb with sutures, clean dry and intact, no erythema noted.  Neuro: alert and oriented X 3, no focal deficits  Central Line: PIV    RECENT CULTURES:  11-13 @ 10:09  Clean Catch Clean Catch (Midstream)  No growth    11-13 @ 03:50  .Blood Blood-Peripheral  No growth to date.    LABS:                             RADIOLOGY & ADDITIONAL STUDIES:   Xray Chest 1 View AP/PA (11.12.21 @ 22:36) >  Clear lungs.           Diagnosis: AML    Protocol/Chemo Regimen: s/p cycle 2 consolidation with HIDAC    Day: 23     Pt endorsed: Feels well, tired. Tolerating PO.    Review of Systems: Patient denied nausea, vomiting, chest pain, cough, dyspnea, headache     Pain scale:   hemorrhoidal pain                                     Location: 0    Diet: regular    Allergies: No Known Allergies    Intolerances: cefepime (Rash)    ANTIMICROBIALS  fluconAZOLE   Tablet 200 milliGRAM(s) Oral daily  piperacillin/tazobactam IVPB.. 3.375 Gram(s) IV Intermittent every 8 hours    STANDING MEDICATIONS  amLODIPine   Tablet 5 milliGRAM(s) Oral daily  chlorhexidine 2% Cloths 1 Application(s) Topical <User Schedule>  influenza   Vaccine 0.5 milliLiter(s) IntraMuscular once  polyethylene glycol 3350 17 Gram(s) Oral daily  senna 2 Tablet(s) Oral at bedtime  sodium chloride 0.9%. 1000 milliLiter(s) IV Continuous <Continuous>    PRN MEDICATIONS  acetaminophen     Tablet .. 650 milliGRAM(s) Oral every 6 hours PRN  bisacodyl 5 milliGRAM(s) Oral every 12 hours PRN  hemorrhoidal Ointment 1 Application(s) Rectal three times a day PRN  HYDROmorphone   Solution 1 milliGRAM(s) Oral every 4 hours PRN  sodium chloride 0.65% Nasal 1 Spray(s) Both Nostrils three times a day PRN    Vital Signs Last 24 Hrs  T(C): 36.7 (16 Nov 2021 05:08), Max: 36.9 (15 Nov 2021 16:44)  T(F): 98.1 (16 Nov 2021 05:08), Max: 98.4 (15 Nov 2021 16:44)  HR: 82 (16 Nov 2021 05:08) (82 - 102)  BP: 104/65 (16 Nov 2021 05:08) (99/66 - 124/76)  BP(mean): --  RR: 18 (16 Nov 2021 05:08) (18 - 18)  SpO2: 99% (16 Nov 2021 05:08) (97% - 100%)    PHYSICAL EXAM  General: adult in NAD  HEENT: clear oropharynx,   CV: normal S1/S2 RRR  Lungs:clear to auscultation, no wheezes, no rales  Abdomen: soft non-tender non-distended  Ext: no  edema  Skin: no rashes and no petechiae, R thumb with sutures, clean dry and intact, no erythema noted.  Neuro: alert and oriented X 3, no focal deficits  Central Line: PIV    RECENT CULTURES:  11-13 @ 10:09  Clean Catch Clean Catch (Midstream)  No growth    11-13 @ 03:50  .Blood Blood-Peripheral  No growth to date.    LABS:                                   7.8    1.39  )-----------( 32       ( 16 Nov 2021 07:13 )             21.5     Mean Cell Volume : 86.0 fl  Mean Cell Hemoglobin : 31.2 pg  Mean Cell Hemoglobin Concentration : 36.3 gm/dL  Auto Neutrophil # : 0.45 K/uL  Auto Lymphocyte # : 0.33 K/uL  Auto Monocyte # : 0.61 K/uL  Auto Eosinophil # : 0.00 K/uL  Auto Basophil # : 0.00 K/uL  Auto Neutrophil % : 32.7 %  Auto Lymphocyte % : 23.4 %  Auto Monocyte % : 43.9 %  Auto Eosinophil % : 0.0 %  Auto Basophil % : 0.0 %    11-16    143  |  110<H>  |  10  ----------------------------<  112<H>  3.4<L>   |  23  |  0.84    Ca    9.3      16 Nov 2021 07:15  Phos  4.2     11-16  Mg     2.3     11-16    TPro  6.3  /  Alb  3.5  /  TBili  0.1<L>  /  DBili  x   /  AST  17  /  ALT  49<H>  /  AlkPhos  70  11-16  Mg 2.3  Phos 4.2  PT/INR - ( 15 Nov 2021 07:19 )   PT: 12.8 sec;   INR: 1.07 ratio    PTT - ( 15 Nov 2021 07:19 )  PTT:28.4 sec    RADIOLOGY & ADDITIONAL STUDIES:   Xray Chest 1 View AP/PA (11.12.21 @ 22:36) >  Clear lungs.         Diagnosis: AML    Protocol/Chemo Regimen: s/p cycle 2 consolidation with HIDAC    Day: 23     Pt endorsed: Feels well,  rectal pain resolved.    Review of Systems: Patient denied nausea, vomiting, chest pain, cough, dyspnea, headache     Pain scale:   Denies                                    Diet: regular    Allergies: No Known Allergies    Intolerances: cefepime (Rash)    ANTIMICROBIALS  fluconAZOLE   Tablet 200 milliGRAM(s) Oral daily  piperacillin/tazobactam IVPB.. 3.375 Gram(s) IV Intermittent every 8 hours    STANDING MEDICATIONS  amLODIPine   Tablet 5 milliGRAM(s) Oral daily  chlorhexidine 2% Cloths 1 Application(s) Topical <User Schedule>  influenza   Vaccine 0.5 milliLiter(s) IntraMuscular once  polyethylene glycol 3350 17 Gram(s) Oral daily  senna 2 Tablet(s) Oral at bedtime  sodium chloride 0.9%. 1000 milliLiter(s) IV Continuous <Continuous>    PRN MEDICATIONS  acetaminophen     Tablet .. 650 milliGRAM(s) Oral every 6 hours PRN  bisacodyl 5 milliGRAM(s) Oral every 12 hours PRN  hemorrhoidal Ointment 1 Application(s) Rectal three times a day PRN  HYDROmorphone   Solution 1 milliGRAM(s) Oral every 4 hours PRN  sodium chloride 0.65% Nasal 1 Spray(s) Both Nostrils three times a day PRN    Vital Signs Last 24 Hrs  T(C): 36.7 (16 Nov 2021 05:08), Max: 36.9 (15 Nov 2021 16:44)  T(F): 98.1 (16 Nov 2021 05:08), Max: 98.4 (15 Nov 2021 16:44)  HR: 82 (16 Nov 2021 05:08) (82 - 102)  BP: 104/65 (16 Nov 2021 05:08) (99/66 - 124/76)  BP(mean): --  RR: 18 (16 Nov 2021 05:08) (18 - 18)  SpO2: 99% (16 Nov 2021 05:08) (97% - 100%)    PHYSICAL EXAM  General: adult in NAD  HEENT: clear oropharynx,   CV: normal S1/S2 RRR  Lungs:clear to auscultation, no wheezes, no rales  Abdomen: soft non-tender non-distended  Ext: no  edema  Skin: no rashes and no petechiae, R thumb with sutures, clean dry and intact, no erythema noted.  Neuro: alert and oriented X 3, no focal deficits  Central Line: PIV    RECENT CULTURES:  11-13 @ 10:09  Clean Catch Clean Catch (Midstream)  No growth    11-13 @ 03:50  .Blood Blood-Peripheral  No growth to date.    LABS:                                   7.8    1.39  )-----------( 32       ( 16 Nov 2021 07:13 )             21.5     Mean Cell Volume : 86.0 fl  Mean Cell Hemoglobin : 31.2 pg  Mean Cell Hemoglobin Concentration : 36.3 gm/dL  Auto Neutrophil # : 0.45 K/uL  Auto Lymphocyte # : 0.33 K/uL  Auto Monocyte # : 0.61 K/uL  Auto Eosinophil # : 0.00 K/uL  Auto Basophil # : 0.00 K/uL  Auto Neutrophil % : 32.7 %  Auto Lymphocyte % : 23.4 %  Auto Monocyte % : 43.9 %  Auto Eosinophil % : 0.0 %  Auto Basophil % : 0.0 %    11-16    143  |  110<H>  |  10  ----------------------------<  112<H>  3.4<L>   |  23  |  0.84    Ca    9.3      16 Nov 2021 07:15  Phos  4.2     11-16  Mg     2.3     11-16    TPro  6.3  /  Alb  3.5  /  TBili  0.1<L>  /  DBili  x   /  AST  17  /  ALT  49<H>  /  AlkPhos  70  11-16  Mg 2.3  Phos 4.2  PT/INR - ( 15 Nov 2021 07:19 )   PT: 12.8 sec;   INR: 1.07 ratio    PTT - ( 15 Nov 2021 07:19 )  PTT:28.4 sec    RADIOLOGY & ADDITIONAL STUDIES:   Xray Chest 1 View AP/PA (11.12.21 @ 22:36) >  Clear lungs.

## 2021-11-16 NOTE — PROGRESS NOTE ADULT - PROBLEM SELECTOR PLAN 5
GI consulted and recommending fiber supplements and miralax prn along with hemorrhoidal ointment.  Follow GI recommendations  Continue with hemorrhoidal ointment and pain control.  destin bath prn          contact: [phone] GI consulted and recommending fiber supplements and miralax prn along with hemorrhoidal ointment.  Follow GI recommendations  Continue with hemorrhoidal ointment and pain control.  sitz bath prn

## 2021-11-16 NOTE — PROGRESS NOTE ADULT - PROBLEM SELECTOR PLAN 1
Patient is s/p cycle 2 HIDAC 10/25  IV hydration, strict I/O  Monitor CBC and transfuse prn  Monitor electrolytes and replete prn  11/14 palliative care consult for pain management in rectal area. Changed dilaudid to PO today  Hgb 6.9 today. Will transfuse 1u PRBC Patient is s/p cycle 2 HIDAC 10/25  IV hydration, strict I/O  Monitor CBC and transfuse prn  Monitor electrolytes and replete prn  11/14 palliative care consult for pain management in rectal area. Changed dilaudid to PO today  11/16- Plan for discharge home tomorrow with PO antibiotics.  Hypokalemia- Supplement K+.

## 2021-11-16 NOTE — DISCHARGE NOTE PROVIDER - HOSPITAL COURSE
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 Patient reported he was feeling unwell prior to suture placement with body aches, chills, night sweats and subjective fevers. Patient last BM 4 days ago. Has bruise to left inner thigh. Patient reports he keeps his PICC site clean and intact which was placed in 9/2021.  Upon admission to the hospital ID was consulted started on Zosyn , GI consulted for rectal pain secondary to hemorrhoids and palliative consulted for pain control. Patient is stable for discharge with outpatient follow up. 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 Patient reported he was feeling unwell prior to suture placement with body aches, chills, night sweats and subjective fevers. Patient last BM 4 days ago. Has bruise to left inner thigh. Patient reports he keeps his PICC site clean and intact which was placed in 9/2021.  Upon admission to the hospital ID was consulted started on Zosyn , GI consulted for rectal pain secondary to hemorrhoids and palliative consulted for pain control. Patient is stable for discharge with outpatient follow up. Outpatient pain management with [facility] office will reach patient for appointment.

## 2021-11-16 NOTE — PROGRESS NOTE ADULT - ASSESSMENT
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 Patient reported he was feeling unwell prior to suture placement with body aches, chills, night sweats and subjective fevers. Patient last BM 4 days ago. Has bruise to left inner thigh. Patient reports he keeps his PICC site clean and intact which was placed in 9/2021. Denied CP, SOB, abdominal pain, N/V/D, cough, back pain.      #AML/ Neurtopenia Fever   -Hematology/oncology evaluation  -Patient has been transferred to Oncology team   -PICC line   -Continue with home medications Fluconazole  -Antibiotics per ID-- as per ID, continue with Vanco, discontinue Aztreonam and start on Zosyn  -Pan Cx negative to date   -Urine Cx and Blood Cx X 2 with no growth   -Hemoglobin noted to be 6.9 on 11/15. S/P 1 unit of PRBCs  -Monitor hemoglobin level     #Febrile/ Tachycardic   -Neutropenic fever   -Blood Culture X2, with no growth to date   -Urine Culture with no growth   -Tylenol for fever   -Cont on zosyn   -Vancomycin discontinued on 11/15 as per ID   -aggressive IV hydration   -Monitor Temperature curve  -Monitor vitals     #Hypokalemia  -K level noted to be 3.4 today  -Monitor and replete electrolytes   -K Supplement given today, 11/16  -Monitor levels       #? Cellulitis of Right thumb  -Right thumb s/p laceration repair with erythema and swelling. Patient reports pain has improved, erythema and swelling improved.  -Erythema has decreased from previously demarcated area in emergency department  -ID evaluation   -IV antibiotics per ID    -Monitor       #Hemorrhoid  -GI consult, appreciate their recommendations   -Care as per GI  -Hemorrhoidal ointment PRN  -Monitor clinically   -Pain management eval- F/U palliative care recommendations    #Constipation  -Continue with Senna and Miralax for now  -    #HTN  -Continue with home medication of Amlodipine 5mg   -Monitor BP       DVT and GI PPX    Patient is a 36 year old male with a PMHx of AML (dx 7/21, undergoing consolidation chemo every 3 weeks and last dose was 2 weeks ago), hypertension and fatty liver s/p right first digit laceration repair yesterday now presenting with erythema and pain at the site of laceration repair. Patient reported he was feeling unwell prior to suture placement with body aches, chills, night sweats and subjective fevers. Patient last BM 4 days ago. Has bruise to left inner thigh. Patient reports he keeps his PICC site clean and intact which was placed in 9/2021. Denied CP, SOB, abdominal pain, N/V/D, cough, back pain.      #AML/ Neurtopenia Fever   -Hematology/oncology evaluation  -Patient has been transferred to Oncology team   -PICC line   -Continue with home medications Fluconazole  -Antibiotics per ID-- as per ID, continue with Vanco, discontinue Aztreonam and start on Zosyn  -Pan Cx negative to date   -Urine Cx and Blood Cx X 2 with no growth   -Hemoglobin noted to be 6.9 on 11/15. S/P 1 unit of PRBCs  -Monitor hemoglobin level     #Febrile/ Tachycardic   -Neutropenic fever   -Blood Culture X2, with no growth to date   -Urine Culture with no growth   -Tylenol for fever   -Cont on zosyn   -Vancomycin discontinued on 11/15 as per ID   -aggressive IV hydration   -Monitor Temperature curve  -Monitor vitals     #Hypokalemia  -K level noted to be 3.4 today  -Monitor and replete electrolytes   -K Supplement given today, 11/16  -Monitor levels       #? Cellulitis of Right thumb  -Right thumb s/p laceration repair with erythema and swelling. Patient reports pain has improved, erythema and swelling improved.  -Erythema has decreased from previously demarcated area in emergency department  -ID evaluation   -IV antibiotics per ID -- As per ID, continue with Zosyn for now, and on discharge switch to Augmentin for a total of 10 day course of antibiotics  -As per ID, may add Levofloxacin 500 if neutropenic at time of discharge   -Monitor       #Hemorrhoid  -GI consult, appreciate their recommendations   -Care as per GI  -Hemorrhoidal ointment PRN  -Monitor clinically   -Pain management eval- F/U palliative care recommendations    #Constipation  -Continue with Senna and Miralax for now      #HTN  -Continue with home medication of Amlodipine 5mg   -Monitor BP       DVT and GI PPX

## 2021-11-16 NOTE — PROGRESS NOTE ADULT - PROBLEM SELECTOR PLAN 3
Patient cut his finger on a broken dish.   Patient will need stitches removed 7-10 days from 11/12.  Patient is to return to ER for stitches to be removed.

## 2021-11-16 NOTE — DISCHARGE NOTE PROVIDER - CARE PROVIDER_API CALL
Chelsea Yancey  HEMATOLOGY/ONCOLOGY  [address] [place] [address]  Phone: [phone]  Fax: [phone]  Follow Up Time:

## 2021-11-16 NOTE — PROGRESS NOTE ADULT - SUBJECTIVE AND OBJECTIVE BOX
Follow Up:  neutropenic fever, cellulitis    Interval History: pt afebrile and resolved thumb erythema, WBC also improving,  today    ROS:      All other systems negative    Constitutional: no fever, no chills    Cardiovascular:  no chest pain, no palpitation  Respiratory:  no SOB, no cough  GI:  no abd pain, no vomiting, no diarrhea  urinary: no dysuria, no hematuria, no flank pain  musculoskeletal: improved R thumb pain and swelling  skin:  no rash  neurology:  no headache, no seizure        Allergies  No Known Allergies        ANTIMICROBIALS:  fluconAZOLE   Tablet 200 daily  piperacillin/tazobactam IVPB.. 3.375 every 8 hours      OTHER MEDS:  acetaminophen     Tablet .. 650 milliGRAM(s) Oral every 6 hours PRN  amLODIPine   Tablet 5 milliGRAM(s) Oral daily  bisacodyl 5 milliGRAM(s) Oral every 12 hours PRN  chlorhexidine 2% Cloths 1 Application(s) Topical <User Schedule>  hemorrhoidal Ointment 1 Application(s) Rectal three times a day PRN  HYDROmorphone   Solution 1 milliGRAM(s) Oral every 4 hours PRN  influenza   Vaccine 0.5 milliLiter(s) IntraMuscular once  polyethylene glycol 3350 17 Gram(s) Oral daily  potassium chloride    Tablet ER 20 milliEquivalent(s) Oral every 2 hours  senna 2 Tablet(s) Oral at bedtime  sodium chloride 0.65% Nasal 1 Spray(s) Both Nostrils three times a day PRN  sodium chloride 0.9%. 1000 milliLiter(s) IV Continuous <Continuous>      Vital Signs Last 24 Hrs  T(C): 36.3 (16 Nov 2021 09:06), Max: 36.9 (15 Nov 2021 16:44)  T(F): 97.3 (16 Nov 2021 09:06), Max: 98.4 (15 Nov 2021 16:44)  HR: 88 (16 Nov 2021 09:06) (82 - 102)  BP: 147/63 (16 Nov 2021 09:06) (99/66 - 147/63)  BP(mean): --  RR: 18 (16 Nov 2021 09:06) (18 - 18)  SpO2: 100% (16 Nov 2021 09:06) (97% - 100%)    Physical Exam:  General:    NAD, non toxic  Cardio:    regular S1,S2, no murmur  Respiratory:   clear b/l, no wheezing  abd:   soft, BS +, not tender  :     no CVAT, no suprapubic tenderness, no mendoza  Musculoskeletal : no joint swelling, no edema  Skin:    no rash, R thumb sutures, resolved erythema and edema   vascular: no phlebitis, RUE picc with no tenderness or erythema                              7.8    1.39  )-----------( 32       ( 16 Nov 2021 07:13 )             21.5       11-16    143  |  110<H>  |  10  ----------------------------<  112<H>  3.4<L>   |  23  |  0.84    Ca    9.3      16 Nov 2021 07:15  Phos  4.2     11-16  Mg     2.3     11-16    TPro  6.3  /  Alb  3.5  /  TBili  0.1<L>  /  DBili  x   /  AST  17  /  ALT  49<H>  /  AlkPhos  70  11-16          MICROBIOLOGY:  v  Clean Catch Clean Catch (Midstream)  11-13-21   No growth  --  --      .Blood Blood-Peripheral  11-13-21   No growth to date.  --  --      .Blood Blood-Peripheral  10-29-21   No Growth Final  --  --      .Blood Blood-Peripheral  10-29-21   No Growth Final  --  --      Clean Catch Clean Catch (Midstream)  10-26-21   No growth  --  --      .Blood Blood-Catheter  10-26-21   No Growth Final  --  --          Rapid RVP Result: NotDetec (11-13 @ 01:28)        RADIOLOGY:  Images independently visualized and reviewed personally, findings as below  < from: Xray Chest 1 View AP/PA (11.12.21 @ 22:36) >  IMPRESSION:  Clear lungs.    < end of copied text >

## 2021-11-16 NOTE — PROGRESS NOTE ADULT - SUBJECTIVE AND OBJECTIVE BOX
Name of Patient : JAK DANIELS  MRN: [id number]  Date of visit: 11-16-21 @ 10:26      Subjective: Patient seen and examined. No new events except as noted.   Patient is Afebrile.      REVIEW OF SYSTEMS:    CONSTITUTIONAL: Afebrile   EYES/ENT: No visual changes;  No vertigo or throat pain   NECK: No pain or stiffness  RESPIRATORY: No cough, wheezing, hemoptysis; No shortness of breath  CARDIOVASCULAR: No chest pain or palpitations  GASTROINTESTINAL: No abdominal or epigastric pain. No nausea, vomiting, or hematemesis; No diarrhea or constipation. No melena or hematochezia.  GENITOURINARY: No dysuria, frequency or hematuria  NEUROLOGICAL: No numbness or weakness  SKIN: No itching, burning, rashes, or lesions   All other review of systems is negative unless indicated above.    MEDICATIONS:  MEDICATIONS  (STANDING):  amLODIPine   Tablet 5 milliGRAM(s) Oral daily  chlorhexidine 2% Cloths 1 Application(s) Topical <User Schedule>  fluconAZOLE   Tablet 200 milliGRAM(s) Oral daily  influenza   Vaccine 0.5 milliLiter(s) IntraMuscular once  piperacillin/tazobactam IVPB.. 3.375 Gram(s) IV Intermittent every 8 hours  polyethylene glycol 3350 17 Gram(s) Oral daily  potassium chloride    Tablet ER 20 milliEquivalent(s) Oral every 2 hours  senna 2 Tablet(s) Oral at bedtime  sodium chloride 0.9%. 1000 milliLiter(s) (80 mL/Hr) IV Continuous <Continuous>      PHYSICAL EXAM:  T(C): 36.3 (11-16-21 @ 09:06), Max: 36.9 (11-15-21 @ 16:44)  HR: 88 (11-16-21 @ 09:06) (82 - 102)  BP: 147/63 (11-16-21 @ 09:06) (99/66 - 147/63)  RR: 18 (11-16-21 @ 09:06) (18 - 18)  SpO2: 100% (11-16-21 @ 09:06) (97% - 100%)  Wt(kg): --  I&O's Summary    15 Nov 2021 07:01  -  16 Nov 2021 07:00  --------------------------------------------------------  IN: 2703 mL / OUT: 0 mL / NET: 2703 mL    16 Nov 2021 07:01  -  16 Nov 2021 10:26  --------------------------------------------------------  IN: 240 mL / OUT: 0 mL / NET: 240 mL          Appearance: Normal	  HEENT:  PERRLA   Lymphatic: No lymphadenopathy   Cardiovascular: Normal S1 S2, no JVD  Respiratory: normal effort , clear  Gastrointestinal:  Soft, Non-tender  Skin: No rashes,  warm to touch  Psychiatry:  Mood & affect appropriate  Musculoskeletal: Thumb skin laceration with sutures in place, clean dry, intact without cellulitic changes; No lesions or skin changes noted on third digit. Patient able to move digit with Active and Passive ROM.     All labs, Imaging and EKGs personally reviewed       11-15-21 @ 07:01  -  11-16-21 @ 07:00  --------------------------------------------------------  IN: 2703 mL / OUT: 0 mL / NET: 2703 mL    11-16-21 @ 07:01  -  11-16-21 @ 10:26  --------------------------------------------------------  IN: 240 mL / OUT: 0 mL / NET: 240 mL                          7.8    1.39  )-----------( 32       ( 16 Nov 2021 07:13 )             21.5               11-16    143  |  110<H>  |  10  ----------------------------<  112<H>  3.4<L>   |  23  |  0.84    Ca    9.3      16 Nov 2021 07:15  Phos  4.2     11-16  Mg     2.3     11-16    TPro  6.3  /  Alb  3.5  /  TBili  0.1<L>  /  DBili  x   /  AST  17  /  ALT  49<H>  /  AlkPhos  70  11-16    PT/INR - ( 15 Nov 2021 07:19 )   PT: 12.8 sec;   INR: 1.07 ratio         PTT - ( 15 Nov 2021 07:19 )  PTT:28.4 sec                           Culture - Urine (11.13.21 @ 10:09)   Specimen Source: Clean Catch Clean Catch (Midstream)   Culture Results: No growth     Culture - Blood (11.13.21 @ 03:50)   Specimen Source: .Blood PICC Tip   Culture Results: No growth to date.       Culture - Blood (11.13.21 @ 03:50)   Specimen Source: .Blood Blood-Peripheral   Culture Results: No growth to date.    Name of Patient : JAK DANIELS  MRN: [id number]  Date of visit: 11-16-21 @ 10:26      Subjective: Patient seen and examined. No new events except as noted.   Patient is Afebrile.  Patient reports feeling better and has no pain.       REVIEW OF SYSTEMS:    CONSTITUTIONAL: Afebrile   EYES/ENT: No visual changes;  No vertigo or throat pain   NECK: No pain or stiffness  RESPIRATORY: No cough, wheezing, hemoptysis; No shortness of breath  CARDIOVASCULAR: No chest pain or palpitations  GASTROINTESTINAL: No abdominal or epigastric pain. No nausea, vomiting, or hematemesis; No diarrhea or constipation. No melena or hematochezia.  GENITOURINARY: No dysuria, frequency or hematuria  NEUROLOGICAL: No numbness or weakness  SKIN: No itching, burning, rashes, or lesions   All other review of systems is negative unless indicated above.    MEDICATIONS:  MEDICATIONS  (STANDING):  amLODIPine   Tablet 5 milliGRAM(s) Oral daily  chlorhexidine 2% Cloths 1 Application(s) Topical <User Schedule>  fluconAZOLE   Tablet 200 milliGRAM(s) Oral daily  influenza   Vaccine 0.5 milliLiter(s) IntraMuscular once  piperacillin/tazobactam IVPB.. 3.375 Gram(s) IV Intermittent every 8 hours  polyethylene glycol 3350 17 Gram(s) Oral daily  potassium chloride    Tablet ER 20 milliEquivalent(s) Oral every 2 hours  senna 2 Tablet(s) Oral at bedtime  sodium chloride 0.9%. 1000 milliLiter(s) (80 mL/Hr) IV Continuous <Continuous>      PHYSICAL EXAM:  T(C): 36.3 (11-16-21 @ 09:06), Max: 36.9 (11-15-21 @ 16:44)  HR: 88 (11-16-21 @ 09:06) (82 - 102)  BP: 147/63 (11-16-21 @ 09:06) (99/66 - 147/63)  RR: 18 (11-16-21 @ 09:06) (18 - 18)  SpO2: 100% (11-16-21 @ 09:06) (97% - 100%)  Wt(kg): --  I&O's Summary    15 Nov 2021 07:01  -  16 Nov 2021 07:00  --------------------------------------------------------  IN: 2703 mL / OUT: 0 mL / NET: 2703 mL    16 Nov 2021 07:01  -  16 Nov 2021 10:26  --------------------------------------------------------  IN: 240 mL / OUT: 0 mL / NET: 240 mL          Appearance: Normal	  HEENT:  PERRLA   Lymphatic: No lymphadenopathy   Cardiovascular: Normal S1 S2, no JVD  Respiratory: normal effort , clear  Gastrointestinal:  Soft, Non-tender  Skin: No rashes,  warm to touch  Psychiatry:  Mood & affect appropriate  Musculoskeletal: Thumb skin laceration with sutures in place, clean dry, intact without cellulitic changes; No lesions or skin changes noted on third digit. Patient able to move digit with Active and Passive ROM.     All labs, Imaging and EKGs personally reviewed       11-15-21 @ 07:01  -  11-16-21 @ 07:00  --------------------------------------------------------  IN: 2703 mL / OUT: 0 mL / NET: 2703 mL    11-16-21 @ 07:01  -  11-16-21 @ 10:26  --------------------------------------------------------  IN: 240 mL / OUT: 0 mL / NET: 240 mL                          7.8    1.39  )-----------( 32       ( 16 Nov 2021 07:13 )             21.5               11-16    143  |  110<H>  |  10  ----------------------------<  112<H>  3.4<L>   |  23  |  0.84    Ca    9.3      16 Nov 2021 07:15  Phos  4.2     11-16  Mg     2.3     11-16    TPro  6.3  /  Alb  3.5  /  TBili  0.1<L>  /  DBili  x   /  AST  17  /  ALT  49<H>  /  AlkPhos  70  11-16    PT/INR - ( 15 Nov 2021 07:19 )   PT: 12.8 sec;   INR: 1.07 ratio         PTT - ( 15 Nov 2021 07:19 )  PTT:28.4 sec                           Culture - Urine (11.13.21 @ 10:09)   Specimen Source: Clean Catch Clean Catch (Midstream)   Culture Results: No growth     Culture - Blood (11.13.21 @ 03:50)   Specimen Source: .Blood PICC Tip   Culture Results: No growth to date.       Culture - Blood (11.13.21 @ 03:50)   Specimen Source: .Blood Blood-Peripheral   Culture Results: No growth to date.

## 2021-11-16 NOTE — DISCHARGE NOTE PROVIDER - NSDCCPCAREPLAN_GEN_ALL_CORE_FT
PRINCIPAL DISCHARGE DIAGNOSIS  Diagnosis: AML (acute myelogenous leukemia)  Assessment and Plan of Treatment: Please call your Dr. or report to the ER if you develop fever > 100.4, severe persistent nausea, vomiting, diarrhea, chest pain, shortness of breath, headache, rectal pain. YOU DO NOT NEED TO TAKE THE LEVAQUIN. Your counts are recovered. Keep it until the next cycle when you need it. You will need to take the Augmentin for your finger infection through end of the day on 11/21. Please be sure that Dr. Mcrae-Horace office sets up an appointment for you.      SECONDARY DISCHARGE DIAGNOSES  Diagnosis: Tachycardia  Assessment and Plan of Treatment:

## 2021-11-16 NOTE — DISCHARGE NOTE PROVIDER - NSDCFUADDINST_GEN_ALL_CORE_FT
Save the Levaquin for your next round of chemotherapy. You do not need to take it now. You also do not need to take diflucan. If you have that at home, save it for the future.

## 2021-11-16 NOTE — PROGRESS NOTE ADULT - ATTENDING COMMENTS
36 year old man with AML C2 D21 of consolidation admitted with hand laceration and then had neutropenic fever  -on zosyn and diflucan  -management of hemorrhoids  -stool softeners, had bm this am  -clinically looks and feels improved today  -wbc slowly rising, monitor for count recovery  ANC rising, 0.41 today  Afebrile today  -f/u culture  OOB 36 year old man with AML C2 D22 of consolidation admitted with hand laceration and then had neutropenic fever  -laceration clean, no erythema  -on zosyn and diflucan  -management of hemorrhoids  -stool softeners, had bm this am  -clinically looks and feels improved today  -wbc slowly rising, monitor for count recovery  ANC rising, 0.45, plt 32K (plt rising)  Afebrile 48 hrs  -f/u culture  OOB

## 2021-11-16 NOTE — PROGRESS NOTE ADULT - PROBLEM SELECTOR PLAN 2
Patient is neutropenic, febrile  Cultures (-) NGTD  Continue with zosyn, vanco , diflucan  FU vanco trough 11.6, Continue at present dose.- Infectious disease stopped the vancomycin today 11/15/21  ID following.  Culture for fever Patient is neutropenic, afebrile  Cultures (-) NGTD  Continue with zosyn, diflucan  FU vanco trough 11.6, Continue at present dose.- Infectious disease stopped the vancomycin today 11/15/21  ID following.

## 2021-11-16 NOTE — PROGRESS NOTE ADULT - ASSESSMENT
36 m with HTN, fatty liver, AML (dx 7/21, undergoing consolidation chemo every 3 weeks and last dose was 2 weeks ago), s/p right first digit laceration repair yesterday now p/w erythema and pain at the site of laceration repair  febrile to 101.4, tachy  WBC: 0.52  xray:  No acute fracture or dislocation. A new density is seen at the dorsal ulnar aspect of the proximal third digit, correlate with direct visualization for possible foreign body versus external. No radiopaque foreign body of the first digit. No osseous erosions are acute periosteal reaction.    fever, tachycardia, sepsis due to thumb cellulitis after laceration with glass and sutured the day PTA  pancytopenia, neutropenia, AML on chemo    * blood cx negative, no more fevers and resolved cellulitis  * c/w  zosyn for now as pt still neutropenic, now day 5 of antibiotics, on discharge switch to augmentin to complete a total 10 day course for cellulitis  * if still neutropenic on discharge, would add levofloxacin 500 as well, until counts recover  * monitor the CBC/diff and temp curve    The above assessment and plan was discussed with the primary team    Arleen Calero MD  Pager [phone]  After 5pm and on weekends call [phone]

## 2021-11-17 ENCOUNTER — APPOINTMENT (OUTPATIENT)
Dept: INFUSION THERAPY | Facility: HOSPITAL | Age: 36
End: 2021-11-17

## 2021-11-17 ENCOUNTER — TRANSCRIPTION ENCOUNTER (OUTPATIENT)
Age: 36
End: 2021-11-17

## 2021-11-17 VITALS
RESPIRATION RATE: 18 BRPM | DIASTOLIC BLOOD PRESSURE: 65 MMHG | SYSTOLIC BLOOD PRESSURE: 103 MMHG | OXYGEN SATURATION: 98 % | HEART RATE: 86 BPM | TEMPERATURE: 98 F

## 2021-11-17 LAB
ALBUMIN SERPL ELPH-MCNC: 3.8 G/DL — SIGNIFICANT CHANGE UP (ref 3.3–5)
ALP SERPL-CCNC: 67 U/L — SIGNIFICANT CHANGE UP (ref 40–120)
ALT FLD-CCNC: 46 U/L — HIGH (ref 10–45)
ANION GAP SERPL CALC-SCNC: 14 MMOL/L — SIGNIFICANT CHANGE UP (ref 5–17)
AST SERPL-CCNC: 17 U/L — SIGNIFICANT CHANGE UP (ref 10–40)
BASOPHILS # BLD AUTO: 0 K/UL — SIGNIFICANT CHANGE UP (ref 0–0.2)
BASOPHILS NFR BLD AUTO: 0 % — SIGNIFICANT CHANGE UP (ref 0–2)
BILIRUB SERPL-MCNC: 0.2 MG/DL — SIGNIFICANT CHANGE UP (ref 0.2–1.2)
BLD GP AB SCN SERPL QL: NEGATIVE — SIGNIFICANT CHANGE UP
BUN SERPL-MCNC: 15 MG/DL — SIGNIFICANT CHANGE UP (ref 7–23)
CALCIUM SERPL-MCNC: 9.6 MG/DL — SIGNIFICANT CHANGE UP (ref 8.4–10.5)
CHLORIDE SERPL-SCNC: 105 MMOL/L — SIGNIFICANT CHANGE UP (ref 96–108)
CO2 SERPL-SCNC: 22 MMOL/L — SIGNIFICANT CHANGE UP (ref 22–31)
CREAT SERPL-MCNC: 0.83 MG/DL — SIGNIFICANT CHANGE UP (ref 0.5–1.3)
EOSINOPHIL # BLD AUTO: 0 K/UL — SIGNIFICANT CHANGE UP (ref 0–0.5)
EOSINOPHIL NFR BLD AUTO: 0 % — SIGNIFICANT CHANGE UP (ref 0–6)
GLUCOSE SERPL-MCNC: 107 MG/DL — HIGH (ref 70–99)
HCT VFR BLD CALC: 23 % — LOW (ref 39–50)
HGB BLD-MCNC: 8 G/DL — LOW (ref 13–17)
LYMPHOCYTES # BLD AUTO: 0.26 K/UL — LOW (ref 1–3.3)
LYMPHOCYTES # BLD AUTO: 13.8 % — SIGNIFICANT CHANGE UP (ref 13–44)
MAGNESIUM SERPL-MCNC: 2 MG/DL — SIGNIFICANT CHANGE UP (ref 1.6–2.6)
MANUAL SMEAR VERIFICATION: SIGNIFICANT CHANGE UP
MCHC RBC-ENTMCNC: 30.8 PG — SIGNIFICANT CHANGE UP (ref 27–34)
MCHC RBC-ENTMCNC: 34.8 GM/DL — SIGNIFICANT CHANGE UP (ref 32–36)
MCV RBC AUTO: 88.5 FL — SIGNIFICANT CHANGE UP (ref 80–100)
MONOCYTES # BLD AUTO: 0.72 K/UL — SIGNIFICANT CHANGE UP (ref 0–0.9)
MONOCYTES NFR BLD AUTO: 37.9 % — HIGH (ref 2–14)
NEUTROPHILS # BLD AUTO: 0.92 K/UL — LOW (ref 1.8–7.4)
NEUTROPHILS NFR BLD AUTO: 48.3 % — SIGNIFICANT CHANGE UP (ref 43–77)
PHOSPHATE SERPL-MCNC: 4.1 MG/DL — SIGNIFICANT CHANGE UP (ref 2.5–4.5)
PLAT MORPH BLD: NORMAL — SIGNIFICANT CHANGE UP
PLATELET # BLD AUTO: 44 K/UL — LOW (ref 150–400)
POTASSIUM SERPL-MCNC: 3.6 MMOL/L — SIGNIFICANT CHANGE UP (ref 3.5–5.3)
POTASSIUM SERPL-SCNC: 3.6 MMOL/L — SIGNIFICANT CHANGE UP (ref 3.5–5.3)
PROT SERPL-MCNC: 6.6 G/DL — SIGNIFICANT CHANGE UP (ref 6–8.3)
RBC # BLD: 2.6 M/UL — LOW (ref 4.2–5.8)
RBC # FLD: 14.6 % — HIGH (ref 10.3–14.5)
RBC BLD AUTO: SIGNIFICANT CHANGE UP
RH IG SCN BLD-IMP: POSITIVE — SIGNIFICANT CHANGE UP
SODIUM SERPL-SCNC: 141 MMOL/L — SIGNIFICANT CHANGE UP (ref 135–145)
WBC # BLD: 1.9 K/UL — LOW (ref 3.8–10.5)
WBC # FLD AUTO: 1.9 K/UL — LOW (ref 3.8–10.5)

## 2021-11-17 PROCEDURE — 85045 AUTOMATED RETICULOCYTE COUNT: CPT

## 2021-11-17 PROCEDURE — 84550 ASSAY OF BLOOD/URIC ACID: CPT

## 2021-11-17 PROCEDURE — 86900 BLOOD TYPING SEROLOGIC ABO: CPT

## 2021-11-17 PROCEDURE — 84100 ASSAY OF PHOSPHORUS: CPT

## 2021-11-17 PROCEDURE — 36415 COLL VENOUS BLD VENIPUNCTURE: CPT

## 2021-11-17 PROCEDURE — 87040 BLOOD CULTURE FOR BACTERIA: CPT

## 2021-11-17 PROCEDURE — 85018 HEMOGLOBIN: CPT

## 2021-11-17 PROCEDURE — 86769 SARS-COV-2 COVID-19 ANTIBODY: CPT

## 2021-11-17 PROCEDURE — 82550 ASSAY OF CK (CPK): CPT

## 2021-11-17 PROCEDURE — 85025 COMPLETE CBC W/AUTO DIFF WBC: CPT

## 2021-11-17 PROCEDURE — 99285 EMERGENCY DEPT VISIT HI MDM: CPT | Mod: 25

## 2021-11-17 PROCEDURE — 86901 BLOOD TYPING SEROLOGIC RH(D): CPT

## 2021-11-17 PROCEDURE — 36430 TRANSFUSION BLD/BLD COMPNT: CPT

## 2021-11-17 PROCEDURE — 82803 BLOOD GASES ANY COMBINATION: CPT

## 2021-11-17 PROCEDURE — 87641 MR-STAPH DNA AMP PROBE: CPT

## 2021-11-17 PROCEDURE — 82140 ASSAY OF AMMONIA: CPT

## 2021-11-17 PROCEDURE — 83735 ASSAY OF MAGNESIUM: CPT

## 2021-11-17 PROCEDURE — 71045 X-RAY EXAM CHEST 1 VIEW: CPT

## 2021-11-17 PROCEDURE — 83010 ASSAY OF HAPTOGLOBIN QUANT: CPT

## 2021-11-17 PROCEDURE — 85014 HEMATOCRIT: CPT

## 2021-11-17 PROCEDURE — 86140 C-REACTIVE PROTEIN: CPT

## 2021-11-17 PROCEDURE — 86850 RBC ANTIBODY SCREEN: CPT

## 2021-11-17 PROCEDURE — 86923 COMPATIBILITY TEST ELECTRIC: CPT

## 2021-11-17 PROCEDURE — 84132 ASSAY OF SERUM POTASSIUM: CPT

## 2021-11-17 PROCEDURE — 83605 ASSAY OF LACTIC ACID: CPT

## 2021-11-17 PROCEDURE — 81001 URINALYSIS AUTO W/SCOPE: CPT

## 2021-11-17 PROCEDURE — 82330 ASSAY OF CALCIUM: CPT

## 2021-11-17 PROCEDURE — 82435 ASSAY OF BLOOD CHLORIDE: CPT

## 2021-11-17 PROCEDURE — 80202 ASSAY OF VANCOMYCIN: CPT

## 2021-11-17 PROCEDURE — P9040: CPT

## 2021-11-17 PROCEDURE — 85610 PROTHROMBIN TIME: CPT

## 2021-11-17 PROCEDURE — 83615 LACTATE (LD) (LDH) ENZYME: CPT

## 2021-11-17 PROCEDURE — 85730 THROMBOPLASTIN TIME PARTIAL: CPT

## 2021-11-17 PROCEDURE — 84295 ASSAY OF SERUM SODIUM: CPT

## 2021-11-17 PROCEDURE — 80053 COMPREHEN METABOLIC PANEL: CPT

## 2021-11-17 PROCEDURE — 99232 SBSQ HOSP IP/OBS MODERATE 35: CPT

## 2021-11-17 PROCEDURE — 99238 HOSP IP/OBS DSCHRG MGMT 30/<: CPT

## 2021-11-17 PROCEDURE — 0225U NFCT DS DNA&RNA 21 SARSCOV2: CPT

## 2021-11-17 PROCEDURE — 82565 ASSAY OF CREATININE: CPT

## 2021-11-17 PROCEDURE — 87086 URINE CULTURE/COLONY COUNT: CPT

## 2021-11-17 PROCEDURE — 87640 STAPH A DNA AMP PROBE: CPT

## 2021-11-17 PROCEDURE — 85652 RBC SED RATE AUTOMATED: CPT

## 2021-11-17 PROCEDURE — 82947 ASSAY GLUCOSE BLOOD QUANT: CPT

## 2021-11-17 PROCEDURE — 73120 X-RAY EXAM OF HAND: CPT

## 2021-11-17 RX ORDER — HYDROMORPHONE HYDROCHLORIDE 2 MG/ML
1 INJECTION INTRAMUSCULAR; INTRAVENOUS; SUBCUTANEOUS
Qty: 60 | Refills: 0
Start: 2021-11-17 | End: 2021-11-26

## 2021-11-17 RX ORDER — CIPROFLOXACIN LACTATE 400MG/40ML
1 VIAL (ML) INTRAVENOUS
Qty: 10 | Refills: 0
Start: 2021-11-17 | End: 2021-11-26

## 2021-11-17 RX ADMIN — POLYETHYLENE GLYCOL 3350 17 GRAM(S): 17 POWDER, FOR SOLUTION ORAL at 12:33

## 2021-11-17 RX ADMIN — PIPERACILLIN AND TAZOBACTAM 25 GRAM(S): 4; .5 INJECTION, POWDER, LYOPHILIZED, FOR SOLUTION INTRAVENOUS at 12:31

## 2021-11-17 RX ADMIN — AMLODIPINE BESYLATE 5 MILLIGRAM(S): 2.5 TABLET ORAL at 05:35

## 2021-11-17 RX ADMIN — CHLORHEXIDINE GLUCONATE 1 APPLICATION(S): 213 SOLUTION TOPICAL at 12:34

## 2021-11-17 RX ADMIN — PIPERACILLIN AND TAZOBACTAM 25 GRAM(S): 4; .5 INJECTION, POWDER, LYOPHILIZED, FOR SOLUTION INTRAVENOUS at 05:34

## 2021-11-17 RX ADMIN — FLUCONAZOLE 200 MILLIGRAM(S): 150 TABLET ORAL at 12:33

## 2021-11-17 NOTE — PROGRESS NOTE ADULT - ATTENDING COMMENTS
36 year old man with AML C2 D22 of consolidation admitted with hand laceration and then had neutropenic fever  -laceration clean, no erythema  -on zosyn and diflucan  -management of hemorrhoids  -stool softeners, had bm this am  -clinically looks and feels improved today  -wbc slowly rising, monitor for count recovery  ANC rising, 0.45, plt 32K (plt rising)  Afebrile 48 hrs  -f/u culture  OOB 36 year old man with AML C2 D24 of consolidation admitted with hand laceration and then had neutropenic fever  -laceration clean, no erythema  -on zosyn and diflucan  -management of hemorrhoids - less pain  -stool softeners, had bm this am  -, plt 44K  -d/c home today on augmentin alone    ANC rising, 0.45, plt 32K (plt rising)  Afebrile 72 hrs  d/c home today on augmentin  OPD f/u with Dr. Bc [place] mgmt in [facility] with Dr. Celaya

## 2021-11-17 NOTE — PROGRESS NOTE ADULT - PROBLEM SELECTOR PLAN 6
11/16- Grade 1 transaminitis, continue Diflucan.  Will monitor closely. 11/16- Grade 1 transaminitis  Will monitor closely.

## 2021-11-17 NOTE — PROGRESS NOTE ADULT - PROBLEM SELECTOR PLAN 4
No VTE ppx due to thrombocytopenia.

## 2021-11-17 NOTE — PROGRESS NOTE ADULT - PROBLEM SELECTOR PROBLEM 1
AML (acute myelogenous leukemia)

## 2021-11-17 NOTE — PROGRESS NOTE ADULT - PROBLEM SELECTOR PLAN 1
Patient is s/p cycle 2 HIDAC 10/25  IV hydration, strict I/O  Monitor CBC and transfuse prn  Monitor electrolytes and replete prn  11/14 palliative care consult for pain management in rectal area. Changed dilaudid to PO today  11/16- Plan for discharge home tomorrow with PO antibiotics.  Hypokalemia- Supplement K+. Patient is s/p cycle 2 HIDAC 10/25  IV hydration, strict I/O  Monitor CBC and transfuse prn  Monitor electrolytes and replete prn  11/14 palliative care consult for pain management in rectal area. Changed dilaudid to PO   11/16- Plan for discharge home with PO antibiotics.  Anemia-replace prbc prior to discharge.

## 2021-11-17 NOTE — PROGRESS NOTE ADULT - SUBJECTIVE AND OBJECTIVE BOX
Follow Up:  neutropenic fever, cellulitis    Interval History: pt afebrile and resolved thumb erythema, WBC also improving,  today    ROS:      All other systems negative    Constitutional: no fever, no chills    Cardiovascular:  no chest pain, no palpitation  Respiratory:  no SOB, no cough  GI:  no abd pain, no vomiting, no diarrhea  urinary: no dysuria, no hematuria, no flank pain  musculoskeletal: improved R thumb pain and swelling  skin:  no rash  neurology:  no headache, no seizure        Allergies  No Known Allergies        ANTIMICROBIALS:  fluconAZOLE   Tablet 200 daily  piperacillin/tazobactam IVPB.. 3.375 every 8 hours      OTHER MEDS:  acetaminophen     Tablet .. 650 milliGRAM(s) Oral every 6 hours PRN  amLODIPine   Tablet 5 milliGRAM(s) Oral daily  bisacodyl 5 milliGRAM(s) Oral every 12 hours PRN  chlorhexidine 2% Cloths 1 Application(s) Topical <User Schedule>  hemorrhoidal Ointment 1 Application(s) Rectal three times a day PRN  HYDROmorphone   Solution 1 milliGRAM(s) Oral every 4 hours PRN  influenza   Vaccine 0.5 milliLiter(s) IntraMuscular once  polyethylene glycol 3350 17 Gram(s) Oral daily  senna 2 Tablet(s) Oral at bedtime  sodium chloride 0.65% Nasal 1 Spray(s) Both Nostrils three times a day PRN  sodium chloride 0.9%. 1000 milliLiter(s) IV Continuous <Continuous>      Vital Signs Last 24 Hrs  T(C): 36.8 (17 Nov 2021 09:05), Max: 36.8 (16 Nov 2021 17:42)  T(F): 98.2 (17 Nov 2021 09:05), Max: 98.3 (16 Nov 2021 17:42)  HR: 77 (17 Nov 2021 09:05) (77 - 111)  BP: 116/83 (17 Nov 2021 09:05) (105/65 - 116/83)  BP(mean): --  RR: 18 (17 Nov 2021 09:05) (18 - 18)  SpO2: 100% (17 Nov 2021 09:05) (97% - 100%)    Physical Exam:  General:    NAD, non toxic  Cardio:    regular S1,S2, no murmur  Respiratory:   clear b/l, no wheezing  abd:   soft, BS +, not tender  :     no CVAT, no suprapubic tenderness, no mendoza  Musculoskeletal : no joint swelling, no edema  Skin:    no rash, R thumb sutures, resolved erythema and edema   vascular: no phlebitis, RUE picc with no tenderness or erythema             8.0    1.90  )-----------( 44       ( 17 Nov 2021 07:10 )             23.0       11-17    141  |  105  |  15  ----------------------------<  107<H>  3.6   |  22  |  0.83    Ca    9.6      17 Nov 2021 07:10  Phos  4.1     11-17  Mg     2.0     11-17    TPro  6.6  /  Alb  3.8  /  TBili  0.2  /  DBili  x   /  AST  17  /  ALT  46<H>  /  AlkPhos  67  11-17          MICROBIOLOGY:  v  Clean Catch Clean Catch (Midstream)  11-13-21   No growth  --  --      .Blood Blood-Peripheral  11-13-21   No growth to date.  --  --      .Blood Blood-Peripheral  10-29-21   No Growth Final  --  --      .Blood Blood-Peripheral  10-29-21   No Growth Final  --  --      Clean Catch Clean Catch (Midstream)  10-26-21   No growth  --  --      .Blood Blood-Catheter  10-26-21   No Growth Final  --  --          Rapid RVP Result: NotDetec (11-13 @ 01:28)        RADIOLOGY:  Images independently visualized and reviewed personally, findings as below  < from: Xray Chest 1 View AP/PA (11.12.21 @ 22:36) >  IMPRESSION:  Clear lungs.    < end of copied text >  < from: Xray Hand 2 Views, Right (11.12.21 @ 22:36) >    IMPRESSION:  No acute fracture or dislocation. A new density is seen at the dorsal ulnar aspect of the proximal third digit, correlate with direct visualization for possible foreign body versus external. No radiopaque foreign body of the first digit. No osseous erosions are acute periosteal reaction.  Preserved joint spaces.    < end of copied text >

## 2021-11-17 NOTE — PROGRESS NOTE ADULT - ASSESSMENT
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 Patient reported he was feeling unwell prior to suture placement with body aches, chills, night sweats and subjective fevers. Patient last BM 4 days ago. Has bruise to left inner thigh. Patient reports he keeps his PICC site clean and intact which was placed in 9/2021. Denied CP, SOB, abdominal pain, N/V/D, cough, back pain.      #AML/ Neurtopenia Fever   -Hematology/oncology evaluation  -Patient has been transferred to Oncology team   -PICC line   -Continue with home medications Fluconazole  -Antibiotics per ID-- as per ID, continue with Vanco, discontinue Aztreonam and start on Zosyn  -Pan Cx negative to date   -Urine Cx and Blood Cx X 2 with no growth   -Hemoglobin noted to be 6.9 on 11/15. S/P 1 unit of PRBCs  -Monitor hemoglobin level   -IV antibiotics per ID -- As per ID, continue with Zosyn for now, and on discharge switch to Augmentin for a total of 10 day course of antibiotics; if patient is neutropenic at time of discharge add Levofloxacin 500 per ID recommendations     #Febrile/ Tachycardic   -Neutropenic fever   -Blood Culture X2, with no growth to date   -Urine Culture with no growth   -Tylenol for fever   -Cont on zosyn   -Vancomycin discontinued on 11/15 as per ID   -aggressive IV hydration   -Monitor Temperature curve  -Monitor vitals     #Hypokalemia  -Monitor and replete electrolytes   -K Supplement given 11/16  -Monitor levels       #? Cellulitis of Right thumb  -Right thumb s/p laceration repair with erythema and swelling. Patient reports pain has improved, erythema and swelling improved.  -Erythema has decreased from previously demarcated area in emergency department  -ID evaluation   -IV antibiotics per ID -- As per ID, continue with Zosyn for now, and on discharge switch to Augmentin for a total of 10 day course of antibiotics  -As per ID, may add Levofloxacin 500 if neutropenic at time of discharge   -Monitor       #Hemorrhoid  -GI consult, appreciate their recommendations   -Care as per GI  -Hemorrhoidal ointment PRN  -Monitor clinically   -Pain management eval- F/U palliative care recommendations    #Constipation  -Continue with Senna and Miralax for now      #HTN  -Continue with home medication of Amlodipine 5mg   -Monitor BP       DVT and GI PPX    Patient is a 36 year old male with a PMHx of AML (dx 7/21, undergoing consolidation chemo every 3 weeks and last dose was 2 weeks ago), hypertension and fatty liver s/p right first digit laceration repair yesterday now presenting with erythema and pain at the site of laceration repair. Patient reported he was feeling unwell prior to suture placement with body aches, chills, night sweats and subjective fevers. Patient last BM 4 days ago. Has bruise to left inner thigh. Patient reports he keeps his PICC site clean and intact which was placed in 9/2021. Denied CP, SOB, abdominal pain, N/V/D, cough, back pain.      #AML/ Neurtopenia Fever   -Hematology/oncology evaluation  -Patient has been transferred to Oncology team   -PICC line   -Continue with home medications Fluconazole  -Antibiotics per ID-- as per ID, continue with Zosyn, Aztreonam and Vanco have been discontinued   -Pan Cx negative to date   -Urine Cx and Blood Cx X 2 with no growth   -Hemoglobin noted to be 6.9 on 11/15. S/P 1 unit of PRBCs  -Monitor hemoglobin level-- patient receiving 1 unit of PRBCs prior to discharge   -IV antibiotics per ID -- As per ID, continue with Zosyn for now, day 6 of antibiotics, and on discharge switch to Augmentin for a total of 10 day course of antibiotics    #Febrile/ Tachycardic   -Neutropenic fever   -Blood Culture X2, with no growth to date   -Urine Culture with no growth   -Tylenol for fever   -Cont on zosyn day 6 of antibiotic treatment   -Vancomycin discontinued on 11/15 as per ID   -aggressive IV hydration   -Monitor Temperature curve  -Monitor vitals     #Hypokalemia  -Monitor and replete electrolytes   -K Supplement given 11/16  -Monitor levels       #? Cellulitis of Right thumb  -Right thumb s/p laceration repair with erythema and swelling. Patient reports pain has improved, erythema and swelling improved.  -Erythema has decreased from previously demarcated area in emergency department  -ID evaluation   -IV antibiotics per ID -- As per ID, continue with Zosyn for now, and on discharge switch to PO Augmentin for a total of 10 day course of antibiotics  -As per ID, may add Levofloxacin 500 if neutropenic at time of discharge   -Monitor       #Hemorrhoid  -GI consult, appreciate their recommendations   -Care as per GI  -Hemorrhoidal ointment PRN  -Monitor clinically   -Pain management eval- F/U palliative care recommendations    #Constipation  -Continue with Senna and Miralax for now      #HTN  -Continue with home medication of Amlodipine 5mg   -Monitor BP       DVT and GI PPX

## 2021-11-17 NOTE — DISCHARGE NOTE NURSING/CASE MANAGEMENT/SOCIAL WORK - PATIENT PORTAL LINK FT
You can access the FollowMyHealth Patient Portal offered by [facility] by registering at the following website: [email or web address]. By joining [facility]s FollowMyHealth portal, you will also be able to view your health information using other applications (apps) compatible with our system.

## 2021-11-17 NOTE — PROGRESS NOTE ADULT - PROBLEM SELECTOR PLAN 2
Patient is neutropenic, afebrile  Cultures (-) NGTD  Continue with zosyn, diflucan  FU vanco trough 11.6, Continue at present dose.- Infectious disease stopped the vancomycin today 11/15/21  ID following. Patient is not neutropenic, afebrile  Patient does not need Levaquin for discharge ANC is 900. Will continue Augmentin in place of zosyn for dc to home to complete a total of 10 day course through 11/21.   Cultures (-) NGTD  Continue with zosyn, diflucan  ID following.

## 2021-11-17 NOTE — PROGRESS NOTE ADULT - PROBLEM SELECTOR PLAN 5
GI consulted and recommending fiber supplements and miralax prn along with hemorrhoidal ointment.  Follow GI recommendations  Continue with hemorrhoidal ointment and pain control.  sitz bath prn

## 2021-11-17 NOTE — PROGRESS NOTE ADULT - PROVIDER SPECIALTY LIST ADULT
Heme/Onc
Internal Medicine
Heme/Onc
Infectious Disease
Infectious Disease
Internal Medicine
Internal Medicine
Infectious Disease
Internal Medicine
Heme/Onc

## 2021-11-17 NOTE — DISCHARGE NOTE NURSING/CASE MANAGEMENT/SOCIAL WORK - NSDCFUADDAPPT_GEN_ALL_CORE_FT
You have an appointment with Rosy MOJICA on Friday, 11/19 at 2 PM.   You have an appointment for possible platelet transfusion on 11/19 at 2:30 PM.  Dr. Tabares's  will call you with appointment.

## 2021-11-17 NOTE — PROGRESS NOTE ADULT - ASSESSMENT
36 m with HTN, fatty liver, AML (dx 7/21, undergoing consolidation chemo every 3 weeks and last dose was 2 weeks ago), s/p right first digit laceration repair yesterday now p/w erythema and pain at the site of laceration repair  febrile to 101.4, tachy  WBC: 0.52  xray:  No acute fracture or dislocation. A new density is seen at the dorsal ulnar aspect of the proximal third digit, correlate with direct visualization for possible foreign body versus external. No radiopaque foreign body of the first digit. No osseous erosions are acute periosteal reaction.    fever, tachycardia, sepsis due to thumb cellulitis after laceration with glass and sutured the day PTA  pancytopenia, neutropenia, AML on chemo    * blood cx negative, no more fevers and resolved cellulitis  * c/w  zosyn for now as pt still neutropenic, now day 6 of antibiotics, on discharge switch to augmentin to complete a total 10 day course for cellulitis  * pt is not neutropenic anymore, so no need for adding levofloxacin   * monitor the CBC/diff and temp curve    The above assessment and plan was discussed with the primary team    Arleen Calero MD  Pager [phone]  After 5pm and on weekends call [phone]

## 2021-11-17 NOTE — PROGRESS NOTE ADULT - SUBJECTIVE AND OBJECTIVE BOX
Diagnosis: AML    Protocol/Chemo Regimen: s/p cycle 2 consolidation with HIDAC    Day: 24     Pt endorsed: Feels well,  rectal pain resolved.    Review of Systems: Patient denied nausea, vomiting, chest pain, cough, dyspnea, headache     Pain scale:   Denies                                    Diet: regular    Allergies    No Known Allergies    Intolerances    cefepime (Rash)      ANTIMICROBIALS  fluconAZOLE   Tablet 200 milliGRAM(s) Oral daily  piperacillin/tazobactam IVPB.. 3.375 Gram(s) IV Intermittent every 8 hours      HEME/ONC MEDICATIONS      STANDING MEDICATIONS  amLODIPine   Tablet 5 milliGRAM(s) Oral daily  chlorhexidine 2% Cloths 1 Application(s) Topical <User Schedule>  influenza   Vaccine 0.5 milliLiter(s) IntraMuscular once  polyethylene glycol 3350 17 Gram(s) Oral daily  senna 2 Tablet(s) Oral at bedtime  sodium chloride 0.9%. 1000 milliLiter(s) IV Continuous <Continuous>      PRN MEDICATIONS  acetaminophen     Tablet .. 650 milliGRAM(s) Oral every 6 hours PRN  bisacodyl 5 milliGRAM(s) Oral every 12 hours PRN  hemorrhoidal Ointment 1 Application(s) Rectal three times a day PRN  HYDROmorphone   Solution 1 milliGRAM(s) Oral every 4 hours PRN  sodium chloride 0.65% Nasal 1 Spray(s) Both Nostrils three times a day PRN        Vital Signs Last 24 Hrs  T(C): 36.4 (17 Nov 2021 05:05), Max: 36.9 (16 Nov 2021 13:11)  T(F): 97.6 (17 Nov 2021 05:05), Max: 98.4 (16 Nov 2021 13:11)  HR: 77 (17 Nov 2021 05:05) (77 - 111)  BP: 112/72 (17 Nov 2021 05:05) (100/66 - 147/63)  BP(mean): --  RR: 18 (17 Nov 2021 05:05) (18 - 18)  SpO2: 98% (17 Nov 2021 05:05) (97% - 100%)    PHYSICAL EXAM  General: adult in NAD  HEENT: clear oropharynx,   CV: normal S1/S2 RRR  Lungs:clear to auscultation, no wheezes, no rales  Abdomen: soft non-tender non-distended  Ext: no  edema  Skin: no rashes and no petechiae, R thumb with sutures, clean dry and intact, no erythema noted.  Neuro: alert and oriented X 3, no focal deficits  Central Line: PIV      LABS:    Blood Cultures:                           8.0    1.90  )-----------( 44       ( 17 Nov 2021 07:10 )             23.0         Mean Cell Volume : 88.5 fl  Mean Cell Hemoglobin : 30.8 pg  Mean Cell Hemoglobin Concentration : 34.8 gm/dL  Auto Neutrophil # : x  Auto Lymphocyte # : x  Auto Monocyte # : x  Auto Eosinophil # : x  Auto Basophil # : x  Auto Neutrophil % : x  Auto Lymphocyte % : x  Auto Monocyte % : x  Auto Eosinophil % : x  Auto Basophil % : x      11-17    141  |  105  |  15  ----------------------------<  107<H>  3.6   |  22  |  0.83    Ca    9.6      17 Nov 2021 07:10  Phos  4.1     11-17  Mg     2.0     11-17    TPro  6.6  /  Alb  3.8  /  TBili  0.2  /  DBili  x   /  AST  17  /  ALT  46<H>  /  AlkPhos  67  11-17      Mg 2.0  Phos 4.1                             Diagnosis: AML    Protocol/Chemo Regimen: s/p cycle 2 consolidation with HIDAC    Day: 24     Pt endorsed: Feels well,  rectal pain resolved.    Review of Systems: Patient denied nausea, vomiting, chest pain, cough, dyspnea, headache     Pain scale:   Denies                                    Diet: regular    Allergies    No Known Allergies    Intolerances    cefepime (Rash)      ANTIMICROBIALS  fluconAZOLE   Tablet 200 milliGRAM(s) Oral daily  piperacillin/tazobactam IVPB.. 3.375 Gram(s) IV Intermittent every 8 hours      HEME/ONC MEDICATIONS      STANDING MEDICATIONS  amLODIPine   Tablet 5 milliGRAM(s) Oral daily  chlorhexidine 2% Cloths 1 Application(s) Topical <User Schedule>  influenza   Vaccine 0.5 milliLiter(s) IntraMuscular once  polyethylene glycol 3350 17 Gram(s) Oral daily  senna 2 Tablet(s) Oral at bedtime  sodium chloride 0.9%. 1000 milliLiter(s) IV Continuous <Continuous>      PRN MEDICATIONS  acetaminophen     Tablet .. 650 milliGRAM(s) Oral every 6 hours PRN  bisacodyl 5 milliGRAM(s) Oral every 12 hours PRN  hemorrhoidal Ointment 1 Application(s) Rectal three times a day PRN  HYDROmorphone   Solution 1 milliGRAM(s) Oral every 4 hours PRN  sodium chloride 0.65% Nasal 1 Spray(s) Both Nostrils three times a day PRN        Vital Signs Last 24 Hrs  T(C): 36.4 (17 Nov 2021 05:05), Max: 36.9 (16 Nov 2021 13:11)  T(F): 97.6 (17 Nov 2021 05:05), Max: 98.4 (16 Nov 2021 13:11)  HR: 77 (17 Nov 2021 05:05) (77 - 111)  BP: 112/72 (17 Nov 2021 05:05) (100/66 - 147/63)  BP(mean): --  RR: 18 (17 Nov 2021 05:05) (18 - 18)  SpO2: 98% (17 Nov 2021 05:05) (97% - 100%)    PHYSICAL EXAM  General: adult in NAD  HEENT: clear oropharynx,   CV: normal S1/S2 RRR  Lungs:clear to auscultation, no wheezes, no rales  Abdomen: soft non-tender non-distended  Ext: no  edema  Skin: no rashes and no petechiae, R thumb with sutures, clean dry and intact, no erythema noted.  Neuro: alert and oriented X 3, no focal deficits  Central Line: PIV      LABS:                    8.0    1.90  )-----------( 44       ( 17 Nov 2021 07:10 )             23.0         Mean Cell Volume : 88.5 fl  Mean Cell Hemoglobin : 30.8 pg  Mean Cell Hemoglobin Concentration : 34.8 gm/dL  Auto Neutrophil # : x  Auto Lymphocyte # : x  Auto Monocyte # : x  Auto Eosinophil # : x  Auto Basophil # : x  Auto Neutrophil % : x  Auto Lymphocyte % : x  Auto Monocyte % : x  Auto Eosinophil % : x  Auto Basophil % : x      11-17    141  |  105  |  15  ----------------------------<  107<H>  3.6   |  22  |  0.83    Ca    9.6      17 Nov 2021 07:10  Phos  4.1     11-17  Mg     2.0     11-17    TPro  6.6  /  Alb  3.8  /  TBili  0.2  /  DBili  x   /  AST  17  /  ALT  46<H>  /  AlkPhos  67  11-17      Mg 2.0  Phos 4.1

## 2021-11-17 NOTE — PROGRESS NOTE ADULT - SUBJECTIVE AND OBJECTIVE BOX
Name of Patient : JAK DANIELS  MRN: [id number]  Date of visit: 11-17-21 @ 10:01      Subjective: Patient seen and examined. No new events except as noted.     REVIEW OF SYSTEMS:    CONSTITUTIONAL: No weakness, fevers or chills  EYES/ENT: No visual changes;  No vertigo or throat pain   NECK: No pain or stiffness  RESPIRATORY: No cough, wheezing, hemoptysis; No shortness of breath  CARDIOVASCULAR: No chest pain or palpitations  GASTROINTESTINAL: No abdominal or epigastric pain. No nausea, vomiting, or hematemesis; No diarrhea or constipation. No melena or hematochezia.  GENITOURINARY: No dysuria, frequency or hematuria  NEUROLOGICAL: No numbness or weakness  SKIN: No itching, burning, rashes, or lesions   All other review of systems is negative unless indicated above.    MEDICATIONS:  MEDICATIONS  (STANDING):  amLODIPine   Tablet 5 milliGRAM(s) Oral daily  chlorhexidine 2% Cloths 1 Application(s) Topical <User Schedule>  fluconAZOLE   Tablet 200 milliGRAM(s) Oral daily  influenza   Vaccine 0.5 milliLiter(s) IntraMuscular once  piperacillin/tazobactam IVPB.. 3.375 Gram(s) IV Intermittent every 8 hours  polyethylene glycol 3350 17 Gram(s) Oral daily  senna 2 Tablet(s) Oral at bedtime  sodium chloride 0.9%. 1000 milliLiter(s) (80 mL/Hr) IV Continuous <Continuous>      PHYSICAL EXAM:  T(C): 36.8 (11-17-21 @ 09:05), Max: 36.9 (11-16-21 @ 13:11)  HR: 77 (11-17-21 @ 09:05) (77 - 111)  BP: 116/83 (11-17-21 @ 09:05) (100/66 - 116/83)  RR: 18 (11-17-21 @ 09:05) (18 - 18)  SpO2: 100% (11-17-21 @ 09:05) (97% - 100%)  Wt(kg): --  I&O's Summary    16 Nov 2021 07:01  -  17 Nov 2021 07:00  --------------------------------------------------------  IN: 2496 mL / OUT: 0 mL / NET: 2496 mL          Appearance: Normal	  HEENT:  PERRLA   Lymphatic: No lymphadenopathy   Cardiovascular: Normal S1 S2, no JVD  Respiratory: normal effort , clear  Gastrointestinal:  Soft, Non-tender  Skin: No rashes,  warm to touch  Psychiatry:  Mood & affect appropriate  Musculuskeletal: No edema      All labs, Imaging and EKGs personally reviewed     11-16-21 @ 07:01  -  11-17-21 @ 07:00  --------------------------------------------------------  IN: 2496 mL / OUT: 0 mL / NET: 2496 mL                            8.0    1.90  )-----------( 44       ( 17 Nov 2021 07:10 )             23.0               11-17    141  |  105  |  15  ----------------------------<  107<H>  3.6   |  22  |  0.83    Ca    9.6      17 Nov 2021 07:10  Phos  4.1     11-17  Mg     2.0     11-17    TPro  6.6  /  Alb  3.8  /  TBili  0.2  /  DBili  x   /  AST  17  /  ALT  46<H>  /  AlkPhos  67  11-17      Culture - Urine (11.13.21 @ 10:09)   Specimen Source: Clean Catch Clean Catch (Midstream)   Culture Results:   No growth     Culture - Blood (11.13.21 @ 03:50)   Specimen Source: .Blood PICC Tip   Culture Results:   No growth to date.     Culture - Blood (11.13.21 @ 03:50)   Specimen Source: .Blood Blood-Peripheral   Culture Results:   No growth to date.                          Name of Patient : JAK DANIELS  MRN: [id number]  Date of visit: 11-17-21 @ 10:01      Subjective: Patient seen and examined. No new events except as noted.   Patient is laying in bed and doing okay.   Discharge planning once cleared by heme/onc.       REVIEW OF SYSTEMS:    CONSTITUTIONAL: Afebrile   EYES/ENT: No visual changes;  No vertigo or throat pain   NECK: No pain or stiffness  RESPIRATORY: No cough, wheezing, hemoptysis; No shortness of breath  CARDIOVASCULAR: No chest pain or palpitations  GASTROINTESTINAL: No abdominal or epigastric pain. No nausea, vomiting, or hematemesis; No diarrhea or constipation. No melena or hematochezia.  GENITOURINARY: No dysuria, frequency or hematuria  NEUROLOGICAL: No numbness or weakness  SKIN: No itching, burning, rashes, or lesions   All other review of systems is negative unless indicated above.    MEDICATIONS:  MEDICATIONS  (STANDING):  amLODIPine   Tablet 5 milliGRAM(s) Oral daily  chlorhexidine 2% Cloths 1 Application(s) Topical <User Schedule>  fluconAZOLE   Tablet 200 milliGRAM(s) Oral daily  influenza   Vaccine 0.5 milliLiter(s) IntraMuscular once  piperacillin/tazobactam IVPB.. 3.375 Gram(s) IV Intermittent every 8 hours  polyethylene glycol 3350 17 Gram(s) Oral daily  senna 2 Tablet(s) Oral at bedtime  sodium chloride 0.9%. 1000 milliLiter(s) (80 mL/Hr) IV Continuous <Continuous>      PHYSICAL EXAM:  T(C): 36.8 (11-17-21 @ 09:05), Max: 36.9 (11-16-21 @ 13:11)  HR: 77 (11-17-21 @ 09:05) (77 - 111)  BP: 116/83 (11-17-21 @ 09:05) (100/66 - 116/83)  RR: 18 (11-17-21 @ 09:05) (18 - 18)  SpO2: 100% (11-17-21 @ 09:05) (97% - 100%)  Wt(kg): --  I&O's Summary    16 Nov 2021 07:01  -  17 Nov 2021 07:00  --------------------------------------------------------  IN: 2496 mL / OUT: 0 mL / NET: 2496 mL          Appearance: Normal	  HEENT:  PERRLA   Lymphatic: No lymphadenopathy   Cardiovascular: Normal S1 S2, no JVD  Respiratory: normal effort , clear  Gastrointestinal:  Soft, Non-tender  Skin: No rashes,  warm to touch  Psychiatry:  Mood & affect appropriate  Musculoskeletal: Thumb laceration with sutures in place; clean, dry and intact without cellulitic changes; No lesions or skin changes noted on third digit.       All labs, Imaging and EKGs personally reviewed     11-16-21 @ 07:01  -  11-17-21 @ 07:00  --------------------------------------------------------  IN: 2496 mL / OUT: 0 mL / NET: 2496 mL                            8.0    1.90  )-----------( 44       ( 17 Nov 2021 07:10 )             23.0               11-17    141  |  105  |  15  ----------------------------<  107<H>  3.6   |  22  |  0.83    Ca    9.6      17 Nov 2021 07:10  Phos  4.1     11-17  Mg     2.0     11-17    TPro  6.6  /  Alb  3.8  /  TBili  0.2  /  DBili  x   /  AST  17  /  ALT  46<H>  /  AlkPhos  67  11-17      Culture - Urine (11.13.21 @ 10:09)   Specimen Source: Clean Catch Clean Catch (Midstream)   Culture Results:   No growth     Culture - Blood (11.13.21 @ 03:50)   Specimen Source: .Blood PICC Tip   Culture Results:   No growth to date.     Culture - Blood (11.13.21 @ 03:50)   Specimen Source: .Blood Blood-Peripheral   Culture Results:   No growth to date.

## 2021-11-19 ENCOUNTER — NON-APPOINTMENT (OUTPATIENT)
Age: 36
End: 2021-11-19

## 2021-11-19 ENCOUNTER — RESULT REVIEW (OUTPATIENT)
Age: 36
End: 2021-11-19

## 2021-11-19 ENCOUNTER — APPOINTMENT (OUTPATIENT)
Dept: INFUSION THERAPY | Facility: HOSPITAL | Age: 36
End: 2021-11-19

## 2021-11-19 ENCOUNTER — APPOINTMENT (OUTPATIENT)
Dept: HEMATOLOGY ONCOLOGY | Facility: CLINIC | Age: 36
End: 2021-11-19
Payer: COMMERCIAL

## 2021-11-19 VITALS
DIASTOLIC BLOOD PRESSURE: 83 MMHG | HEIGHT: 70.67 IN | TEMPERATURE: 97.7 F | OXYGEN SATURATION: 99 % | BODY MASS INDEX: 27 KG/M2 | RESPIRATION RATE: 18 BRPM | SYSTOLIC BLOOD PRESSURE: 139 MMHG | WEIGHT: 192.88 LBS | HEART RATE: 101 BPM

## 2021-11-19 LAB
BASOPHILS # BLD AUTO: 0 K/UL — SIGNIFICANT CHANGE UP (ref 0–0.2)
BASOPHILS NFR BLD AUTO: 0 % — SIGNIFICANT CHANGE UP (ref 0–2)
EOSINOPHIL # BLD AUTO: 0 K/UL — SIGNIFICANT CHANGE UP (ref 0–0.5)
EOSINOPHIL NFR BLD AUTO: 0 % — SIGNIFICANT CHANGE UP (ref 0–6)
HCT VFR BLD CALC: 30.1 % — LOW (ref 39–50)
HGB BLD-MCNC: 10.7 G/DL — LOW (ref 13–17)
IMM GRANULOCYTES NFR BLD AUTO: 1.9 % — HIGH (ref 0–1.5)
LYMPHOCYTES # BLD AUTO: 0.43 K/UL — LOW (ref 1–3.3)
LYMPHOCYTES # BLD AUTO: 14 % — SIGNIFICANT CHANGE UP (ref 13–44)
MCHC RBC-ENTMCNC: 30.2 PG — SIGNIFICANT CHANGE UP (ref 27–34)
MCHC RBC-ENTMCNC: 35.5 G/DL — SIGNIFICANT CHANGE UP (ref 32–36)
MCV RBC AUTO: 85 FL — SIGNIFICANT CHANGE UP (ref 80–100)
MONOCYTES # BLD AUTO: 1.17 K/UL — HIGH (ref 0–0.9)
MONOCYTES NFR BLD AUTO: 38 % — HIGH (ref 2–14)
NEUTROPHILS # BLD AUTO: 1.42 K/UL — LOW (ref 1.8–7.4)
NEUTROPHILS NFR BLD AUTO: 46.1 % — SIGNIFICANT CHANGE UP (ref 43–77)
NRBC # BLD: 0 /100 WBCS — SIGNIFICANT CHANGE UP (ref 0–0)
PLATELET # BLD AUTO: 107 K/UL — LOW (ref 150–400)
RBC # BLD: 3.54 M/UL — LOW (ref 4.2–5.8)
RBC # FLD: 14.9 % — HIGH (ref 10.3–14.5)
WBC # BLD: 3.08 K/UL — LOW (ref 3.8–10.5)
WBC # FLD AUTO: 3.08 K/UL — LOW (ref 3.8–10.5)

## 2021-11-19 PROCEDURE — 99214 OFFICE O/P EST MOD 30 MIN: CPT

## 2021-11-22 ENCOUNTER — APPOINTMENT (OUTPATIENT)
Dept: INFUSION THERAPY | Facility: HOSPITAL | Age: 36
End: 2021-11-22

## 2021-11-22 ENCOUNTER — NON-APPOINTMENT (OUTPATIENT)
Age: 36
End: 2021-11-22

## 2021-11-22 ENCOUNTER — EMERGENCY (EMERGENCY)
Facility: HOSPITAL | Age: 36
LOS: 1 days | Discharge: HOME | End: 2021-11-22
Attending: EMERGENCY MEDICINE
Payer: COMMERCIAL

## 2021-11-22 VITALS
WEIGHT: 199.96 LBS | SYSTOLIC BLOOD PRESSURE: 119 MMHG | DIASTOLIC BLOOD PRESSURE: 86 MMHG | TEMPERATURE: 98 F | RESPIRATION RATE: 16 BRPM | HEIGHT: 71 IN | HEART RATE: 101 BPM | OXYGEN SATURATION: 100 %

## 2021-11-22 VITALS
TEMPERATURE: 98 F | DIASTOLIC BLOOD PRESSURE: 93 MMHG | OXYGEN SATURATION: 100 % | SYSTOLIC BLOOD PRESSURE: 122 MMHG | HEART RATE: 97 BPM | RESPIRATION RATE: 19 BRPM

## 2021-11-22 PROCEDURE — L9995: CPT

## 2021-11-22 PROCEDURE — G0463: CPT

## 2021-11-22 PROCEDURE — 99232 SBSQ HOSP IP/OBS MODERATE 35: CPT

## 2021-11-22 NOTE — ED PROVIDER NOTE - SKIN WOUND DESCRIPTION
healed 1cm lacteration to base of R thumb with 2 sutures in place c/d/i no surrounding erythema, no drainage, no fluctuance

## 2021-11-22 NOTE — ED PROVIDER NOTE - CLINICAL SUMMARY MEDICAL DECISION MAKING FREE TEXT BOX
RGUJRAL 37yo male hx listed presents with R hand first digit suture removal. Denies any acute complains, pain, rash, fever. Pt w recent admission for fever w resolution of symptoms. Wound healing well, full ROM, sensation intact. Sutures removed and wound care discussed.

## 2021-11-22 NOTE — ED PROVIDER NOTE - NSFOLLOWUPINSTRUCTIONS_ED_ALL_ED_FT
Apply sunscreen and keep the affected area covered from the sun when possible to minimize visible scar tissue in the area.   Use lotion to keep affected area moisturized.  Continue to take all other medications as directed.    Return to the emergency room immediately if the wound reopens, if you notice purulent discharge (yellow/green drainage), redness, swelling, new rash, fever/chills, or for any other concerning symptoms.

## 2021-11-22 NOTE — ED PROVIDER NOTE - PATIENT PORTAL LINK FT
You can access the FollowMyHealth Patient Portal offered by [facility] by registering at the following website: [email or web address]. By joining [employer]’s FollowMyHealth portal, you will also be able to view your health information using other applications (apps) compatible with our system.

## 2021-11-22 NOTE — ED PROVIDER NOTE - OBJECTIVE STATEMENT
37yo M with PMH of AML (undergoing chemo), HTN, s/p right first digit laceration and repair with 2 sutures (11/11/21), admitted 11/13-11/17 for neutropenic fever and R first digit cellulitis (sutures remained in place), given IV abx and d/c on augmentin x 10d presenting for suture removal. Pt reports he completed abx course yesterday. Denies fever/chills, spreading redness around wound, drainage from wound, R first digit pain/swelling.

## 2021-11-22 NOTE — ED ADULT NURSE NOTE - OBJECTIVE STATEMENT
came in for suture removal s/p cut with a glass a week ago. Denies pain, fever or discharges from the site. With full ROM. No swelling noted. Completed full dose of antibiotics.

## 2021-11-24 ENCOUNTER — APPOINTMENT (OUTPATIENT)
Dept: HEMATOLOGY ONCOLOGY | Facility: CLINIC | Age: 36
End: 2021-11-24

## 2021-11-24 ENCOUNTER — RESULT REVIEW (OUTPATIENT)
Age: 36
End: 2021-11-24

## 2021-11-24 ENCOUNTER — APPOINTMENT (OUTPATIENT)
Dept: INFUSION THERAPY | Facility: HOSPITAL | Age: 36
End: 2021-11-24

## 2021-11-24 LAB
ANISOCYTOSIS BLD QL: SLIGHT — SIGNIFICANT CHANGE UP
BASOPHILS # BLD AUTO: 0 K/UL — SIGNIFICANT CHANGE UP (ref 0–0.2)
BASOPHILS NFR BLD AUTO: 0 % — SIGNIFICANT CHANGE UP (ref 0–2)
EOSINOPHIL # BLD AUTO: 0 K/UL — SIGNIFICANT CHANGE UP (ref 0–0.5)
EOSINOPHIL NFR BLD AUTO: 0 % — SIGNIFICANT CHANGE UP (ref 0–6)
HCT VFR BLD CALC: 34.6 % — LOW (ref 39–50)
HGB BLD-MCNC: 11.7 G/DL — LOW (ref 13–17)
LYMPHOCYTES # BLD AUTO: 0.22 K/UL — LOW (ref 1–3.3)
LYMPHOCYTES # BLD AUTO: 8 % — LOW (ref 13–44)
MCHC RBC-ENTMCNC: 30.5 PG — SIGNIFICANT CHANGE UP (ref 27–34)
MCHC RBC-ENTMCNC: 33.8 G/DL — SIGNIFICANT CHANGE UP (ref 32–36)
MCV RBC AUTO: 90.1 FL — SIGNIFICANT CHANGE UP (ref 80–100)
MONOCYTES # BLD AUTO: 0.61 K/UL — SIGNIFICANT CHANGE UP (ref 0–0.9)
MONOCYTES NFR BLD AUTO: 22 % — HIGH (ref 2–14)
NEUTROPHILS # BLD AUTO: 1.95 K/UL — SIGNIFICANT CHANGE UP (ref 1.8–7.4)
NEUTROPHILS NFR BLD AUTO: 70 % — SIGNIFICANT CHANGE UP (ref 43–77)
NRBC # BLD: 2 /100 — HIGH (ref 0–0)
NRBC # BLD: SIGNIFICANT CHANGE UP /100 WBCS (ref 0–0)
OVALOCYTES BLD QL SMEAR: SLIGHT — SIGNIFICANT CHANGE UP
PLAT MORPH BLD: NORMAL — SIGNIFICANT CHANGE UP
PLATELET # BLD AUTO: 165 K/UL — SIGNIFICANT CHANGE UP (ref 150–400)
POIKILOCYTOSIS BLD QL AUTO: SLIGHT — SIGNIFICANT CHANGE UP
POLYCHROMASIA BLD QL SMEAR: SLIGHT — SIGNIFICANT CHANGE UP
RBC # BLD: 3.84 M/UL — LOW (ref 4.2–5.8)
RBC # FLD: 15.9 % — HIGH (ref 10.3–14.5)
RBC BLD AUTO: ABNORMAL
WBC # BLD: 2.78 K/UL — LOW (ref 3.8–10.5)
WBC # FLD AUTO: 2.78 K/UL — LOW (ref 3.8–10.5)

## 2021-11-26 ENCOUNTER — TRANSCRIPTION ENCOUNTER (OUTPATIENT)
Age: 36
End: 2021-11-26

## 2021-11-26 ENCOUNTER — INPATIENT (INPATIENT)
Facility: HOSPITAL | Age: 36
LOS: 4 days | Discharge: HOME HEALTH | DRG: 837 | End: 2021-12-01
Attending: INTERNAL MEDICINE | Admitting: INTERNAL MEDICINE
Payer: COMMERCIAL

## 2021-11-26 VITALS
OXYGEN SATURATION: 100 % | TEMPERATURE: 97 F | WEIGHT: 194.01 LBS | HEIGHT: 70.87 IN | SYSTOLIC BLOOD PRESSURE: 109 MMHG | DIASTOLIC BLOOD PRESSURE: 67 MMHG | RESPIRATION RATE: 18 BRPM | HEART RATE: 90 BPM

## 2021-11-26 DIAGNOSIS — C92.00 ACUTE MYELOBLASTIC LEUKEMIA, NOT HAVING ACHIEVED REMISSION: ICD-10-CM

## 2021-11-26 DIAGNOSIS — B99.9 UNSPECIFIED INFECTIOUS DISEASE: ICD-10-CM

## 2021-11-26 DIAGNOSIS — I10 ESSENTIAL (PRIMARY) HYPERTENSION: ICD-10-CM

## 2021-11-26 DIAGNOSIS — R74.01 ELEVATION OF LEVELS OF LIVER TRANSAMINASE LEVELS: ICD-10-CM

## 2021-11-26 DIAGNOSIS — Z29.9 ENCOUNTER FOR PROPHYLACTIC MEASURES, UNSPECIFIED: ICD-10-CM

## 2021-11-26 LAB
ALBUMIN SERPL ELPH-MCNC: 4.5 G/DL — SIGNIFICANT CHANGE UP (ref 3.3–5)
ALP SERPL-CCNC: 86 U/L — SIGNIFICANT CHANGE UP (ref 40–120)
ALT FLD-CCNC: 96 U/L — HIGH (ref 10–45)
ANION GAP SERPL CALC-SCNC: 11 MMOL/L — SIGNIFICANT CHANGE UP (ref 5–17)
AST SERPL-CCNC: 30 U/L — SIGNIFICANT CHANGE UP (ref 10–40)
BILIRUB SERPL-MCNC: 0.2 MG/DL — SIGNIFICANT CHANGE UP (ref 0.2–1.2)
BUN SERPL-MCNC: 14 MG/DL — SIGNIFICANT CHANGE UP (ref 7–23)
CALCIUM SERPL-MCNC: 9.6 MG/DL — SIGNIFICANT CHANGE UP (ref 8.4–10.5)
CHLORIDE SERPL-SCNC: 102 MMOL/L — SIGNIFICANT CHANGE UP (ref 96–108)
CO2 SERPL-SCNC: 26 MMOL/L — SIGNIFICANT CHANGE UP (ref 22–31)
CREAT SERPL-MCNC: 0.8 MG/DL — SIGNIFICANT CHANGE UP (ref 0.5–1.3)
GLUCOSE SERPL-MCNC: 90 MG/DL — SIGNIFICANT CHANGE UP (ref 70–99)
MAGNESIUM SERPL-MCNC: 2.4 MG/DL — SIGNIFICANT CHANGE UP (ref 1.6–2.6)
PHOSPHATE SERPL-MCNC: 2.5 MG/DL — SIGNIFICANT CHANGE UP (ref 2.5–4.5)
POTASSIUM SERPL-MCNC: 3.8 MMOL/L — SIGNIFICANT CHANGE UP (ref 3.5–5.3)
POTASSIUM SERPL-SCNC: 3.8 MMOL/L — SIGNIFICANT CHANGE UP (ref 3.5–5.3)
PROT SERPL-MCNC: 7 G/DL — SIGNIFICANT CHANGE UP (ref 6–8.3)
SODIUM SERPL-SCNC: 139 MMOL/L — SIGNIFICANT CHANGE UP (ref 135–145)

## 2021-11-26 PROCEDURE — 71045 X-RAY EXAM CHEST 1 VIEW: CPT | Mod: 26

## 2021-11-26 PROCEDURE — 99221 1ST HOSP IP/OBS SF/LOW 40: CPT

## 2021-11-26 RX ORDER — CIPROFLOXACIN LACTATE 400MG/40ML
1 VIAL (ML) INTRAVENOUS
Qty: 0 | Refills: 0 | DISCHARGE

## 2021-11-26 RX ORDER — POLYETHYLENE GLYCOL 3350 17 G/17G
17 POWDER, FOR SOLUTION ORAL DAILY
Refills: 0 | Status: DISCONTINUED | OUTPATIENT
Start: 2021-11-26 | End: 2021-12-01

## 2021-11-26 RX ORDER — OXYCODONE HYDROCHLORIDE 5 MG/1
1 TABLET ORAL
Qty: 0 | Refills: 0 | DISCHARGE

## 2021-11-26 RX ORDER — ONDANSETRON 8 MG/1
8 TABLET, FILM COATED ORAL EVERY 8 HOURS
Refills: 0 | Status: DISCONTINUED | OUTPATIENT
Start: 2021-11-26 | End: 2021-12-01

## 2021-11-26 RX ORDER — AMLODIPINE BESYLATE 2.5 MG/1
5 TABLET ORAL DAILY
Refills: 0 | Status: DISCONTINUED | OUTPATIENT
Start: 2021-11-26 | End: 2021-12-01

## 2021-11-26 RX ORDER — RAMIPRIL 5 MG
0 CAPSULE ORAL
Qty: 0 | Refills: 0 | DISCHARGE

## 2021-11-26 RX ORDER — FLUCONAZOLE 150 MG/1
1 TABLET ORAL
Qty: 0 | Refills: 0 | DISCHARGE

## 2021-11-26 RX ORDER — METOCLOPRAMIDE HCL 10 MG
10 TABLET ORAL EVERY 6 HOURS
Refills: 0 | Status: DISCONTINUED | OUTPATIENT
Start: 2021-11-26 | End: 2021-12-01

## 2021-11-26 RX ORDER — AMLODIPINE BESYLATE 2.5 MG/1
1 TABLET ORAL
Qty: 0 | Refills: 0 | DISCHARGE

## 2021-11-26 RX ORDER — PREDNISOLONE SODIUM PHOSPHATE 1 %
2 DROPS OPHTHALMIC (EYE)
Qty: 0 | Refills: 0 | DISCHARGE
Start: 2021-11-26

## 2021-11-26 RX ORDER — ONDANSETRON 8 MG/1
1 TABLET, FILM COATED ORAL
Qty: 0 | Refills: 0 | DISCHARGE

## 2021-11-26 RX ORDER — CYTARABINE 100 MG
6240 VIAL (EA) INJECTION EVERY 12 HOURS
Refills: 0 | Status: COMPLETED | OUTPATIENT
Start: 2021-11-26 | End: 2021-11-27

## 2021-11-26 RX ORDER — INFLUENZA VIRUS VACCINE 15; 15; 15; 15 UG/.5ML; UG/.5ML; UG/.5ML; UG/.5ML
0.5 SUSPENSION INTRAMUSCULAR ONCE
Refills: 0 | Status: DISCONTINUED | OUTPATIENT
Start: 2021-11-26 | End: 2021-12-01

## 2021-11-26 RX ORDER — CHLORHEXIDINE GLUCONATE 213 G/1000ML
1 SOLUTION TOPICAL
Refills: 0 | Status: DISCONTINUED | OUTPATIENT
Start: 2021-11-26 | End: 2021-12-01

## 2021-11-26 RX ORDER — SODIUM CHLORIDE 9 MG/ML
1000 INJECTION INTRAMUSCULAR; INTRAVENOUS; SUBCUTANEOUS
Refills: 0 | Status: DISCONTINUED | OUTPATIENT
Start: 2021-11-26 | End: 2021-12-01

## 2021-11-26 RX ORDER — PREDNISOLONE SODIUM PHOSPHATE 1 %
2 DROPS OPHTHALMIC (EYE) EVERY 6 HOURS
Refills: 0 | Status: DISCONTINUED | OUTPATIENT
Start: 2021-11-26 | End: 2021-12-01

## 2021-11-26 RX ORDER — FOSAPREPITANT DIMEGLUMINE 150 MG/5ML
150 INJECTION, POWDER, LYOPHILIZED, FOR SOLUTION INTRAVENOUS ONCE
Refills: 0 | Status: COMPLETED | OUTPATIENT
Start: 2021-11-26 | End: 2021-11-26

## 2021-11-26 RX ORDER — ENOXAPARIN SODIUM 100 MG/ML
40 INJECTION SUBCUTANEOUS DAILY
Refills: 0 | Status: DISCONTINUED | OUTPATIENT
Start: 2021-11-26 | End: 2021-12-01

## 2021-11-26 RX ORDER — METOCLOPRAMIDE HCL 10 MG
0 TABLET ORAL
Qty: 0 | Refills: 0 | DISCHARGE

## 2021-11-26 RX ADMIN — Medication 2 DROP(S): at 16:52

## 2021-11-26 RX ADMIN — FOSAPREPITANT DIMEGLUMINE 300 MILLIGRAM(S): 150 INJECTION, POWDER, LYOPHILIZED, FOR SOLUTION INTRAVENOUS at 16:11

## 2021-11-26 RX ADMIN — Medication 187.47 MILLIGRAM(S): at 16:45

## 2021-11-26 RX ADMIN — ONDANSETRON 8 MILLIGRAM(S): 8 TABLET, FILM COATED ORAL at 16:43

## 2021-11-26 NOTE — DISCHARGE NOTE PROVIDER - HOSPITAL COURSE
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 Now admitted for cycle 3 HIDAC. 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 Now admitted for cycle 3 HIDAC.  Patient received IVF and   antiemetics, strict I/O  and monitoring of CBC and electrolytes. Tolerated chemotherapy without complications. Stable for discharge home and follow up as an outpatient.   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  Now admitted for cycle 3 HIDAC. Patient received IV hydration,   strict I/O, antiemetics, monitoring of CBC and CMP and for cerebellar toxicity. Pred forte eye drops given to prevent chemical conjunctivitis.   Tolerated chemotherapy without complications. Stable for discharge home and follow up as an outpatient.   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  Now admitted for cycle 3 HIDAC. Patient received IV hydration,   strict I/O, antiemetics, monitoring of CBC and CMP and for cerebellar toxicity. Pred forte eye drops given to prevent chemical conjunctivitis.   Tolerated chemotherapy without complications. Stable for discharge home and follow up as an outpatient.    12/9/2021 addendum  Pt had pancytopenia due to chemotherapy. leukopenia and anemia are present on admission  Thrombocytopenia occurred on 11/28( after admission)

## 2021-11-26 NOTE — DISCHARGE NOTE PROVIDER - NSDCFUSCHEDAPPT_GEN_ALL_CORE_FT
JAK DANIELS ; 12/13/2021 ; [facility] JAK DANIELS ; 12/02/2021 ; [facility]  JAK DANIELS ; 12/06/2021 ; [facility]  JAK DANIELS ; 12/09/2021 ; [facility]  JAK DANIELS ; 12/13/2021 ; [facility]  JAK DANIELS ; 12/13/2021 ; [facility] JAK DANIELS ; 12/02/2021 ; [facility]  JAK DANIELS ; 12/02/2021 ; [facility]  JAK DANIELS ; 12/06/2021 ; [facility]  JAK DANIELS ; 12/09/2021 ; [facility]  JAK DANIELS ; 12/13/2021 ; [facility]  JAK DANIELS ; 12/13/2021 ; [facility] JAK DANIELS ; 12/10/2021 ; [facility]  JAK DANIELS ; 12/13/2021 ; [facility]  JAK DANIELS ; 12/13/2021 ; [facility]  JAK DANIELS ; 12/15/2021 ; [facility]  JAK DANIELS ; 12/17/2021 ; [facility]  JAK DANIELS ; 12/20/2021 ; [facility]  JAK DANIELS ; 12/20/2021 ; [facility]  JAK DANIELS ; 12/22/2021 ; [facility]

## 2021-11-26 NOTE — H&P ADULT - PROBLEM SELECTOR PLAN 1
Admitted for Cycle 2 HiDAC  Cytarabine 3 g/m2 IV over 3 hrs every 12 hrs on days 1,3,5   IV hydration, Antiemetics, daily weight   Monitor for Cerebellar toxicity, nystagmus checks  Follow CBC/CMP, transfuse/replete prn  Continue predforte eye drops to prevent chemical conjunctivitis  Discharge planning on 12/1/21 Admitted for Cycle 2 HiDAC  Cytarabine 3 g/m2 IV over 3 hrs every 12 hrs on days 1,3,5   IV hydration, Antiemetics, daily weight   Monitor for Cerebellar toxicity, nystagmus checks  Follow CBC/CMP, transfuse/replete prn  Continue predforte eye drops to prevent chemical conjunctivitis  Discharge planning on 12/1/21  Grade 1 Transaminitis - Ok to start Chemotherapy per Dr Yancey.

## 2021-11-26 NOTE — DISCHARGE NOTE PROVIDER - NSDCMRMEDTOKEN_GEN_ALL_CORE_FT
Reglan 10 mg oral tablet: 1 tab(s) orally every 6 hours, As Needed for nausea  Zofran 8 mg oral tablet: 1 tab(s) orally every 8 hours, As Needed for nausea   Levaquin 500 mg oral tablet: 1 tab(s) orally every 24 hours  prednisoLONE acetate 1% ophthalmic suspension: 2 drop(s) to each affected eye every 6 hours  Continue for 2 days and then stop  Reglan 10 mg oral tablet: 1 tab(s) orally every 6 hours, As Needed for nausea  Zofran 8 mg oral tablet: 1 tab(s) orally every 8 hours, As Needed for nausea   Levaquin 500 mg oral tablet: 1 tab(s) orally every 24 hours  HOLD until you are instructed to start by your physician.   prednisoLONE acetate 1% ophthalmic suspension: 2 drop(s) to each affected eye every 6 hours  Continue for 2 days and then stop  Reglan 10 mg oral tablet: 1 tab(s) orally every 6 hours, As Needed for nausea  Zofran 8 mg oral tablet: 1 tab(s) orally every 8 hours, As Needed for nausea   Levaquin 500 mg oral tablet: 1 tab(s) orally every 24 hours   until you are instructed to stop by your physician.  Power PICC: Power PICC  Normal Saline 10mL  in each lumen weekly  Power PICC: Power PICC  Heparin  10 Units per mL (3mL)  Daily to each Lumen.   prednisoLONE acetate 1% ophthalmic suspension: 2 drop(s) to each affected eye every 6 hours  Continue for 2 days and then stop  Reglan 10 mg oral tablet: 1 tab(s) orally every 6 hours, As Needed for nausea  Zofran 8 mg oral tablet: 1 tab(s) orally every 8 hours, As Needed for nausea   Levaquin 500 mg oral tablet: 1 tab(s) orally every 24 hours   until you are instructed to stop by your physician.  oxyCODONE 5 mg oral tablet: 1 tab(s) orally every 6 hours, As needed, Moderate Pain (4 - 6)  Power PICC: Power PICC  Normal Saline 10mL  in each lumen weekly  Power PICC: Power PICC  Heparin  10 Units per mL (3mL)  Daily to each Lumen.   prednisoLONE acetate 1% ophthalmic suspension: 2 drop(s) to each affected eye every 6 hours  Continue for 2 days and then stop  Reglan 10 mg oral tablet: 1 tab(s) orally every 6 hours, As Needed for nausea  Zofran 8 mg oral tablet: 1 tab(s) orally every 8 hours, As Needed for nausea   fluconazole 200 mg oral tablet: 1 tab(s) orally once a day, ask you outpatient provider when to stop it  Levaquin 500 mg oral tablet: 1 tab(s) orally every 24 hours  Ask your outpatient provider when to stop it   oxyCODONE 5 mg oral tablet: 1 tab(s) orally every 6 hours, As needed, Moderate Pain (4 - 6)  Power PICC: Power PICC  Normal Saline 10mL  in each lumen weekly  Power PICC: Power PICC  Heparin  10 Units per mL (3mL)  Daily to each Lumen.   prednisoLONE acetate 1% ophthalmic suspension: 2 drop(s) to each affected eye every 6 hours  Continue for 2 days and then stop  Reglan 10 mg oral tablet: 1 tab(s) orally every 6 hours, As Needed for nausea  Zofran 8 mg oral tablet: 1 tab(s) orally every 8 hours, As Needed for nausea

## 2021-11-26 NOTE — DISCHARGE NOTE PROVIDER - NSDCFUADDAPPT_GEN_ALL_CORE_FT
To [facility] on Thursday 12/2 at 3 pm  to see Rosy MOJICA   Your lab appointments  are the following   To [facility] on Monday 12/6 at 3:15 pm  To [facility] on Thursday 12/9 at 8:15 am   To [facility] on Monday 12/13 at 2:45 pm   To [facility] on Thursday 12/2 at 3 pm  to see Rosy MOJICA, you will stay for 4PM injection of Fulphila  Your lab appointments  are the following   To [facility] on Monday 12/6 at 3:15 pm  To [facility] on Thursday 12/9 at 8:15 am   To [facility] on Monday 12/13 at 2:45 pm

## 2021-11-26 NOTE — H&P ADULT - NSHPLABSRESULTS_GEN_ALL_CORE
LABS:            11-26    139  |  102  |  14  ----------------------------<  90  3.8   |  26  |  0.80    Ca    9.6      26 Nov 2021 11:53  Phos  2.5     11-26  Mg     2.4     11-26    TPro  7.0  /  Alb  4.5  /  TBili  0.2  /  DBili  x   /  AST  30  /  ALT  96<H>  /  AlkPhos  86  11-26

## 2021-11-26 NOTE — H&P ADULT - LYMPHATIC
posterior cervical L/posterior cervical R/anterior cervical L/anterior cervical R/supraclavicular L/supraclavicular R/axillary L/axillary R

## 2021-11-26 NOTE — H&P ADULT - ASSESSMENT
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 Now admitted for cycle 3 HIDAC.

## 2021-11-26 NOTE — DISCHARGE NOTE PROVIDER - CARE PROVIDER_API CALL
Chelsea Yancey)  HematologyOncology; Internal Medicine; Medical Oncology  [address] [place] [address]  Phone: [phone]  Fax: [phone]  Follow Up Time:

## 2021-11-26 NOTE — DISCHARGE NOTE PROVIDER - NSDCCPCAREPLAN_GEN_ALL_CORE_FT
PRINCIPAL DISCHARGE DIAGNOSIS  Diagnosis: AML with maturation  Assessment and Plan of Treatment: Notify your physician if bleeding; swelling; persistent nausea and vomiting; unable to urinate; pain not relieved by medications; fever; numbness, tingling; excessive diarrhea, inability to tolerate liquids or foods; increased irritability or sluggishness, rash         PRINCIPAL DISCHARGE DIAGNOSIS  Diagnosis: AML with maturation  Assessment and Plan of Treatment: Notify your physician if bleeding; swelling; persistent nausea and vomiting; unable to urinate; pain not relieved by medications; fever; numbness, tingling; excessive diarrhea, inability to tolerate liquids or foods; increased irritability or sluggishness, rash        SECONDARY DISCHARGE DIAGNOSES  Diagnosis: Hypertension  Assessment and Plan of Treatment: Continue Norvasc

## 2021-11-26 NOTE — H&P ADULT - HISTORY OF PRESENT ILLNESS
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 Now admitted for cycle 3 HIDAC.    Patient initially presented to the hospital on 7/30/21 with complaints of dizziness, fatigue and severe RUQ pain for 3 days. CT A/P revealed fatty liver and small R pleural effusion. WBC 12k with 17% blasts.Peripheral flow cytometry showed 13% myeloblasts. FLT3(-). BMbx was done on 8/4/21 - confirmed AML. 46,XY {20 } Foundation: mutations in DNMT3A R882H, NRAS G12D, NPM1   Pt consented to [place]. Patient was offered sperm banking, but declined.  On 8/6/21,patient started induction with Dauno/Cytararbine . Day 14 BMbx on 8/19 was hypocellular with chemotherapeutic effect; however, per hematopathology, appears to be earlier regeneration than would typically seen which is concerning for persistent disease at this point, and the earliest cells are CD34 positive and his myeloblasts on initial presentation were CD34 negative. This may have represented early regeneration of his marrow.    Patients induction course complicated by a course of Zosyn for presumed RUL PNA, then transitioned to levaquin, posaconazole and Acyclovir for ppx for neutropenia. Course also complicated by  neutropenic fevers, with no identified source. He was on Cefepime but developed a rash, changed to meropenem. Rash improved. A BM bx on 8/19 showed CR. Pt received cycle 1 consolidation on 9/17, and  cycle 2 consolidation on 10/25. Pt admitted with neutropenic fever on 10/9.   Upon admission, pt has no complaints, pt denies nausea, vomiting, abdominal pain, headache, and SOB

## 2021-11-27 LAB
ALBUMIN SERPL ELPH-MCNC: 4.1 G/DL — SIGNIFICANT CHANGE UP (ref 3.3–5)
ALP SERPL-CCNC: 74 U/L — SIGNIFICANT CHANGE UP (ref 40–120)
ALT FLD-CCNC: 84 U/L — HIGH (ref 10–45)
ANION GAP SERPL CALC-SCNC: 12 MMOL/L — SIGNIFICANT CHANGE UP (ref 5–17)
AST SERPL-CCNC: 28 U/L — SIGNIFICANT CHANGE UP (ref 10–40)
BASOPHILS # BLD AUTO: 0 K/UL — SIGNIFICANT CHANGE UP (ref 0–0.2)
BASOPHILS NFR BLD AUTO: 0 % — SIGNIFICANT CHANGE UP (ref 0–2)
BILIRUB SERPL-MCNC: 0.2 MG/DL — SIGNIFICANT CHANGE UP (ref 0.2–1.2)
BUN SERPL-MCNC: 14 MG/DL — SIGNIFICANT CHANGE UP (ref 7–23)
CALCIUM SERPL-MCNC: 9.4 MG/DL — SIGNIFICANT CHANGE UP (ref 8.4–10.5)
CHLORIDE SERPL-SCNC: 106 MMOL/L — SIGNIFICANT CHANGE UP (ref 96–108)
CO2 SERPL-SCNC: 21 MMOL/L — LOW (ref 22–31)
COVID-19 NUCLEOCAPSID GAM AB INTERP: POSITIVE
COVID-19 NUCLEOCAPSID TOTAL GAM ANTIBODY RESULT: 13.8 INDEX — HIGH
COVID-19 SPIKE DOMAIN AB INTERP: POSITIVE
COVID-19 SPIKE DOMAIN ANTIBODY RESULT: 205 U/ML — HIGH
CREAT SERPL-MCNC: 0.84 MG/DL — SIGNIFICANT CHANGE UP (ref 0.5–1.3)
EOSINOPHIL # BLD AUTO: 0 K/UL — SIGNIFICANT CHANGE UP (ref 0–0.5)
EOSINOPHIL NFR BLD AUTO: 0 % — SIGNIFICANT CHANGE UP (ref 0–6)
GLUCOSE SERPL-MCNC: 90 MG/DL — SIGNIFICANT CHANGE UP (ref 70–99)
HCT VFR BLD CALC: 27.8 % — LOW (ref 39–50)
HGB BLD-MCNC: 9.3 G/DL — LOW (ref 13–17)
LYMPHOCYTES # BLD AUTO: 0.15 K/UL — LOW (ref 1–3.3)
LYMPHOCYTES # BLD AUTO: 5.3 % — LOW (ref 13–44)
MAGNESIUM SERPL-MCNC: 2.2 MG/DL — SIGNIFICANT CHANGE UP (ref 1.6–2.6)
MCHC RBC-ENTMCNC: 30.4 PG — SIGNIFICANT CHANGE UP (ref 27–34)
MCHC RBC-ENTMCNC: 33.5 GM/DL — SIGNIFICANT CHANGE UP (ref 32–36)
MCV RBC AUTO: 90.8 FL — SIGNIFICANT CHANGE UP (ref 80–100)
MONOCYTES # BLD AUTO: 0.38 K/UL — SIGNIFICANT CHANGE UP (ref 0–0.9)
MONOCYTES NFR BLD AUTO: 13.1 % — SIGNIFICANT CHANGE UP (ref 2–14)
NEUTROPHILS # BLD AUTO: 2.32 K/UL — SIGNIFICANT CHANGE UP (ref 1.8–7.4)
NEUTROPHILS NFR BLD AUTO: 80.7 % — HIGH (ref 43–77)
PHOSPHATE SERPL-MCNC: 3.9 MG/DL — SIGNIFICANT CHANGE UP (ref 2.5–4.5)
PLATELET # BLD AUTO: 152 K/UL — SIGNIFICANT CHANGE UP (ref 150–400)
POTASSIUM SERPL-MCNC: 4.2 MMOL/L — SIGNIFICANT CHANGE UP (ref 3.5–5.3)
POTASSIUM SERPL-SCNC: 4.2 MMOL/L — SIGNIFICANT CHANGE UP (ref 3.5–5.3)
PROT SERPL-MCNC: 6.4 G/DL — SIGNIFICANT CHANGE UP (ref 6–8.3)
RBC # BLD: 3.06 M/UL — LOW (ref 4.2–5.8)
RBC # FLD: 16.3 % — HIGH (ref 10.3–14.5)
SARS-COV-2 IGG+IGM SERPL QL IA: 13.8 INDEX — HIGH
SARS-COV-2 IGG+IGM SERPL QL IA: 205 U/ML — HIGH
SARS-COV-2 IGG+IGM SERPL QL IA: POSITIVE
SARS-COV-2 IGG+IGM SERPL QL IA: POSITIVE
SODIUM SERPL-SCNC: 139 MMOL/L — SIGNIFICANT CHANGE UP (ref 135–145)
WBC # BLD: 2.87 K/UL — LOW (ref 3.8–10.5)
WBC # FLD AUTO: 2.87 K/UL — LOW (ref 3.8–10.5)

## 2021-11-27 PROCEDURE — 99233 SBSQ HOSP IP/OBS HIGH 50: CPT | Mod: GC

## 2021-11-27 RX ORDER — OXYCODONE HYDROCHLORIDE 5 MG/1
5 TABLET ORAL ONCE
Refills: 0 | Status: DISCONTINUED | OUTPATIENT
Start: 2021-11-27 | End: 2021-11-27

## 2021-11-27 RX ORDER — ACETAMINOPHEN 500 MG
650 TABLET ORAL EVERY 6 HOURS
Refills: 0 | Status: DISCONTINUED | OUTPATIENT
Start: 2021-11-27 | End: 2021-12-01

## 2021-11-27 RX ORDER — SENNA PLUS 8.6 MG/1
2 TABLET ORAL ONCE
Refills: 0 | Status: COMPLETED | OUTPATIENT
Start: 2021-11-27 | End: 2021-11-27

## 2021-11-27 RX ADMIN — OXYCODONE HYDROCHLORIDE 5 MILLIGRAM(S): 5 TABLET ORAL at 21:00

## 2021-11-27 RX ADMIN — ONDANSETRON 8 MILLIGRAM(S): 8 TABLET, FILM COATED ORAL at 22:05

## 2021-11-27 RX ADMIN — OXYCODONE HYDROCHLORIDE 5 MILLIGRAM(S): 5 TABLET ORAL at 21:30

## 2021-11-27 RX ADMIN — Medication 187.47 MILLIGRAM(S): at 04:55

## 2021-11-27 RX ADMIN — Medication 2 DROP(S): at 18:34

## 2021-11-27 RX ADMIN — SENNA PLUS 2 TABLET(S): 8.6 TABLET ORAL at 21:58

## 2021-11-27 RX ADMIN — Medication 2 DROP(S): at 00:36

## 2021-11-27 RX ADMIN — Medication 2 DROP(S): at 12:57

## 2021-11-27 RX ADMIN — Medication 650 MILLIGRAM(S): at 12:56

## 2021-11-27 RX ADMIN — ONDANSETRON 8 MILLIGRAM(S): 8 TABLET, FILM COATED ORAL at 00:35

## 2021-11-27 RX ADMIN — Medication 650 MILLIGRAM(S): at 14:00

## 2021-11-27 RX ADMIN — ONDANSETRON 8 MILLIGRAM(S): 8 TABLET, FILM COATED ORAL at 05:05

## 2021-11-27 RX ADMIN — SODIUM CHLORIDE 75 MILLILITER(S): 9 INJECTION INTRAMUSCULAR; INTRAVENOUS; SUBCUTANEOUS at 05:36

## 2021-11-27 RX ADMIN — CHLORHEXIDINE GLUCONATE 1 APPLICATION(S): 213 SOLUTION TOPICAL at 12:59

## 2021-11-27 RX ADMIN — ONDANSETRON 8 MILLIGRAM(S): 8 TABLET, FILM COATED ORAL at 14:21

## 2021-11-27 RX ADMIN — AMLODIPINE BESYLATE 5 MILLIGRAM(S): 2.5 TABLET ORAL at 05:28

## 2021-11-27 RX ADMIN — Medication 2 DROP(S): at 05:28

## 2021-11-27 RX ADMIN — POLYETHYLENE GLYCOL 3350 17 GRAM(S): 17 POWDER, FOR SOLUTION ORAL at 21:58

## 2021-11-27 NOTE — PROGRESS NOTE ADULT - ATTENDING COMMENTS
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  OOB to chair   Mouth care

## 2021-11-27 NOTE — PROGRESS NOTE ADULT - SUBJECTIVE AND OBJECTIVE BOX
Diagnosis: AML,. FLT 3 (-)    Protocol/Chemo Regimen: cycle 3 HIDAC    Day: 2    Pt endorsed: No acute complaints     Review of Systems: Patient denies  nausea, vomiting, diarrhea, abdominal pain, SOB, chest pain and headache.      Pain scale: denies     Diet: Regular     Allergies    No Known Allergies    Intolerances    cefepime (Rash)      ANTIMICROBIALS      HEME/ONC MEDICATIONS  enoxaparin Injectable 40 milliGRAM(s) SubCutaneous daily      STANDING MEDICATIONS  amLODIPine   Tablet 5 milliGRAM(s) Oral daily  chlorhexidine 2% Cloths 1 Application(s) Topical <User Schedule>  influenza   Vaccine 0.5 milliLiter(s) IntraMuscular once  ondansetron Injectable 8 milliGRAM(s) IV Push every 8 hours  prednisoLONE acetate 1% Suspension 2 Drop(s) Both EYES every 6 hours  sodium chloride 0.9%. 1000 milliLiter(s) IV Continuous <Continuous>      PRN MEDICATIONS  metoclopramide Injectable 10 milliGRAM(s) IV Push every 6 hours PRN  polyethylene glycol 3350 17 Gram(s) Oral daily PRN        Vital Signs Last 24 Hrs  T(C): 36.8 (27 Nov 2021 09:20), Max: 36.8 (26 Nov 2021 13:50)  T(F): 98.2 (27 Nov 2021 09:20), Max: 98.2 (26 Nov 2021 13:50)  HR: 94 (27 Nov 2021 09:20) (84 - 108)  BP: 120/78 (27 Nov 2021 09:20) (105/66 - 123/84)  BP(mean): --  RR: 18 (27 Nov 2021 09:20) (18 - 18)  SpO2: 100% (27 Nov 2021 09:20) (96% - 100%)    PHYSICAL EXAM  General: NAD  HEENT: PERRLA, EOMOI, clear oropharynx, anicteric sclera, pink conjunctiva  Neck: supple  CV: (+) S1/S2 RRR  Lungs: clear to auscultation, no wheezes or rales  Abdomen: soft, non-tender, non-distended (+) BS  Ext: no clubbing, cyanosis or edema  Skin: no rashes and no petechiae  Neuro: alert and oriented X 3, no focal deficits  Central Line:     RECENT CULTURES:        LABS:                        9.3    2.87  )-----------( 152      ( 27 Nov 2021 07:10 )             27.8         Mean Cell Volume : 90.8 fl  Mean Cell Hemoglobin : 30.4 pg  Mean Cell Hemoglobin Concentration : 33.5 gm/dL  Auto Neutrophil # : 2.32 K/uL  Auto Lymphocyte # : 0.15 K/uL  Auto Monocyte # : 0.38 K/uL  Auto Eosinophil # : 0.00 K/uL  Auto Basophil # : 0.00 K/uL  Auto Neutrophil % : 80.7 %  Auto Lymphocyte % : 5.3 %  Auto Monocyte % : 13.1 %  Auto Eosinophil % : 0.0 %  Auto Basophil % : 0.0 %      11-27    139  |  106  |  14  ----------------------------<  90  4.2   |  21<L>  |  0.84    Ca    9.4      27 Nov 2021 07:10  Phos  3.9     11-27  Mg     2.2     11-27    TPro  6.4  /  Alb  4.1  /  TBili  0.2  /  DBili  x   /  AST  28  /  ALT  84<H>  /  AlkPhos  74  11-27              RADIOLOGY & ADDITIONAL STUDIES:                      RADIOLOGY & ADDITIONAL STUDIES:           Diagnosis: AML,. FLT 3 (-)    Protocol/Chemo Regimen: cycle 3 HIDAC    Day: 2    Pt endorsed: No acute complaints     Review of Systems: Patient denies  nausea, vomiting, diarrhea, abdominal pain, SOB, chest pain and headache.      Pain scale: denies     Diet: Regular     Allergies    No Known Allergies    Intolerances    cefepime (Rash)      ANTIMICROBIALS      HEME/ONC MEDICATIONS  enoxaparin Injectable 40 milliGRAM(s) SubCutaneous daily      STANDING MEDICATIONS  amLODIPine   Tablet 5 milliGRAM(s) Oral daily  chlorhexidine 2% Cloths 1 Application(s) Topical <User Schedule>  influenza   Vaccine 0.5 milliLiter(s) IntraMuscular once  ondansetron Injectable 8 milliGRAM(s) IV Push every 8 hours  prednisoLONE acetate 1% Suspension 2 Drop(s) Both EYES every 6 hours  sodium chloride 0.9%. 1000 milliLiter(s) IV Continuous <Continuous>      PRN MEDICATIONS  metoclopramide Injectable 10 milliGRAM(s) IV Push every 6 hours PRN  polyethylene glycol 3350 17 Gram(s) Oral daily PRN        Vital Signs Last 24 Hrs  T(C): 36.8 (27 Nov 2021 09:20), Max: 36.8 (26 Nov 2021 13:50)  T(F): 98.2 (27 Nov 2021 09:20), Max: 98.2 (26 Nov 2021 13:50)  HR: 94 (27 Nov 2021 09:20) (84 - 108)  BP: 120/78 (27 Nov 2021 09:20) (105/66 - 123/84)  BP(mean): --  RR: 18 (27 Nov 2021 09:20) (18 - 18)  SpO2: 100% (27 Nov 2021 09:20) (96% - 100%)    PHYSICAL EXAM  General: NAD  HEENT: clear oropharynx, anicteric sclera,  CV: (+) S1/S2 RRR  Lungs: clear to auscultation, no wheezes or rales  Abdomen: soft, non-tender, non-distended (+) BS  Ext: no edema  Skin: no rash  Neuro: alert and oriented X 3  Central Line: PICC CDI            LABS:                        9.3    2.87  )-----------( 152      ( 27 Nov 2021 07:10 )             27.8         Mean Cell Volume : 90.8 fl  Mean Cell Hemoglobin : 30.4 pg  Mean Cell Hemoglobin Concentration : 33.5 gm/dL  Auto Neutrophil # : 2.32 K/uL  Auto Lymphocyte # : 0.15 K/uL  Auto Monocyte # : 0.38 K/uL  Auto Eosinophil # : 0.00 K/uL  Auto Basophil # : 0.00 K/uL  Auto Neutrophil % : 80.7 %  Auto Lymphocyte % : 5.3 %  Auto Monocyte % : 13.1 %  Auto Eosinophil % : 0.0 %  Auto Basophil % : 0.0 %      11-27    139  |  106  |  14  ----------------------------<  90  4.2   |  21<L>  |  0.84    Ca    9.4      27 Nov 2021 07:10  Phos  3.9     11-27  Mg     2.2     11-27    TPro  6.4  /  Alb  4.1  /  TBili  0.2  /  DBili  x   /  AST  28  /  ALT  84<H>  /  AlkPhos  74  11-27

## 2021-11-27 NOTE — PROGRESS NOTE ADULT - PROBLEM SELECTOR PLAN 1
Admitted for Cycle 2 HiDAC  Cytarabine 3 g/m2 IV over 3 hrs every 12 hrs on days 1,3,5   IV hydration, Antiemetics, daily weight   Monitor for Cerebellar toxicity, nystagmus checks  Follow CBC/CMP, transfuse/replete prn  Continue predforte eye drops to prevent chemical conjunctivitis  Grade 1 Transaminitis - Ok to start Chemotherapy per Dr Yancey.  Discharge planning on 12/1/21

## 2021-11-27 NOTE — PROGRESS NOTE ADULT - ASSESSMENT
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 Now admitted for cycle 3 HIDAC. 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 Now admitted for cycle 3 HIDAC.  Pt has anemia secondary to chemotherapy

## 2021-11-27 NOTE — PROVIDER CONTACT NOTE (OTHER) - ASSESSMENT
A/Ox4, VSS. pt c/o 5/10 headache. No c/o blurred vision, chest pain, SOB. No signs of acute distress.

## 2021-11-28 LAB
ALBUMIN SERPL ELPH-MCNC: 3.9 G/DL — SIGNIFICANT CHANGE UP (ref 3.3–5)
ALP SERPL-CCNC: 72 U/L — SIGNIFICANT CHANGE UP (ref 40–120)
ALT FLD-CCNC: 71 U/L — HIGH (ref 10–45)
ANION GAP SERPL CALC-SCNC: 14 MMOL/L — SIGNIFICANT CHANGE UP (ref 5–17)
AST SERPL-CCNC: 25 U/L — SIGNIFICANT CHANGE UP (ref 10–40)
BASOPHILS # BLD AUTO: 0 K/UL — SIGNIFICANT CHANGE UP (ref 0–0.2)
BASOPHILS NFR BLD AUTO: 0 % — SIGNIFICANT CHANGE UP (ref 0–2)
BILIRUB SERPL-MCNC: 0.3 MG/DL — SIGNIFICANT CHANGE UP (ref 0.2–1.2)
BUN SERPL-MCNC: 8 MG/DL — SIGNIFICANT CHANGE UP (ref 7–23)
CALCIUM SERPL-MCNC: 9.5 MG/DL — SIGNIFICANT CHANGE UP (ref 8.4–10.5)
CHLORIDE SERPL-SCNC: 104 MMOL/L — SIGNIFICANT CHANGE UP (ref 96–108)
CO2 SERPL-SCNC: 21 MMOL/L — LOW (ref 22–31)
CREAT SERPL-MCNC: 0.91 MG/DL — SIGNIFICANT CHANGE UP (ref 0.5–1.3)
EOSINOPHIL # BLD AUTO: 0 K/UL — SIGNIFICANT CHANGE UP (ref 0–0.5)
EOSINOPHIL NFR BLD AUTO: 0 % — SIGNIFICANT CHANGE UP (ref 0–6)
GLUCOSE SERPL-MCNC: 88 MG/DL — SIGNIFICANT CHANGE UP (ref 70–99)
HCT VFR BLD CALC: 25.2 % — LOW (ref 39–50)
HGB BLD-MCNC: 8.7 G/DL — LOW (ref 13–17)
IMM GRANULOCYTES NFR BLD AUTO: 0.6 % — SIGNIFICANT CHANGE UP (ref 0–1.5)
LYMPHOCYTES # BLD AUTO: 0.15 K/UL — LOW (ref 1–3.3)
LYMPHOCYTES # BLD AUTO: 9.4 % — LOW (ref 13–44)
MAGNESIUM SERPL-MCNC: 2 MG/DL — SIGNIFICANT CHANGE UP (ref 1.6–2.6)
MCHC RBC-ENTMCNC: 30.9 PG — SIGNIFICANT CHANGE UP (ref 27–34)
MCHC RBC-ENTMCNC: 34.5 GM/DL — SIGNIFICANT CHANGE UP (ref 32–36)
MCV RBC AUTO: 89.4 FL — SIGNIFICANT CHANGE UP (ref 80–100)
MONOCYTES # BLD AUTO: 0.27 K/UL — SIGNIFICANT CHANGE UP (ref 0–0.9)
MONOCYTES NFR BLD AUTO: 16.9 % — HIGH (ref 2–14)
NEUTROPHILS # BLD AUTO: 1.17 K/UL — LOW (ref 1.8–7.4)
NEUTROPHILS NFR BLD AUTO: 73.1 % — SIGNIFICANT CHANGE UP (ref 43–77)
NRBC # BLD: 0 /100 WBCS — SIGNIFICANT CHANGE UP (ref 0–0)
PHOSPHATE SERPL-MCNC: 4.3 MG/DL — SIGNIFICANT CHANGE UP (ref 2.5–4.5)
PLATELET # BLD AUTO: 132 K/UL — LOW (ref 150–400)
POTASSIUM SERPL-MCNC: 4.1 MMOL/L — SIGNIFICANT CHANGE UP (ref 3.5–5.3)
POTASSIUM SERPL-SCNC: 4.1 MMOL/L — SIGNIFICANT CHANGE UP (ref 3.5–5.3)
PROT SERPL-MCNC: 6.2 G/DL — SIGNIFICANT CHANGE UP (ref 6–8.3)
RBC # BLD: 2.82 M/UL — LOW (ref 4.2–5.8)
RBC # FLD: 15.9 % — HIGH (ref 10.3–14.5)
SODIUM SERPL-SCNC: 139 MMOL/L — SIGNIFICANT CHANGE UP (ref 135–145)
WBC # BLD: 1.6 K/UL — LOW (ref 3.8–10.5)
WBC # FLD AUTO: 1.6 K/UL — LOW (ref 3.8–10.5)

## 2021-11-28 PROCEDURE — 99233 SBSQ HOSP IP/OBS HIGH 50: CPT | Mod: GC

## 2021-11-28 RX ORDER — CYTARABINE 100 MG
6240 VIAL (EA) INJECTION EVERY 12 HOURS
Refills: 0 | Status: COMPLETED | OUTPATIENT
Start: 2021-11-28 | End: 2021-11-29

## 2021-11-28 RX ADMIN — Medication 2 DROP(S): at 06:12

## 2021-11-28 RX ADMIN — AMLODIPINE BESYLATE 5 MILLIGRAM(S): 2.5 TABLET ORAL at 06:13

## 2021-11-28 RX ADMIN — ONDANSETRON 8 MILLIGRAM(S): 8 TABLET, FILM COATED ORAL at 13:32

## 2021-11-28 RX ADMIN — Medication 187.47 MILLIGRAM(S): at 16:56

## 2021-11-28 RX ADMIN — Medication 5 MILLIGRAM(S): at 13:32

## 2021-11-28 RX ADMIN — Medication 2 DROP(S): at 16:59

## 2021-11-28 RX ADMIN — ONDANSETRON 8 MILLIGRAM(S): 8 TABLET, FILM COATED ORAL at 06:12

## 2021-11-28 RX ADMIN — SODIUM CHLORIDE 75 MILLILITER(S): 9 INJECTION INTRAMUSCULAR; INTRAVENOUS; SUBCUTANEOUS at 06:12

## 2021-11-28 RX ADMIN — Medication 2 DROP(S): at 12:30

## 2021-11-28 RX ADMIN — CHLORHEXIDINE GLUCONATE 1 APPLICATION(S): 213 SOLUTION TOPICAL at 12:52

## 2021-11-28 RX ADMIN — ONDANSETRON 8 MILLIGRAM(S): 8 TABLET, FILM COATED ORAL at 23:00

## 2021-11-28 RX ADMIN — Medication 2 DROP(S): at 00:58

## 2021-11-28 NOTE — PROGRESS NOTE ADULT - SUBJECTIVE AND OBJECTIVE BOX
Diagnosis: AML,. FLT 3 (-)    Protocol/Chemo Regimen: cycle 3 HIDAC    Day: 3    Pt endorsed: No acute complaints     Review of Systems: Patient denies  nausea, vomiting, diarrhea, abdominal pain, SOB, chest pain and headache.      Pain scale: denies     Diet: Regular     Allergies    No Known Allergies    Intolerances    cefepime (Rash)      ANTIMICROBIALS      HEME/ONC MEDICATIONS  enoxaparin Injectable 40 milliGRAM(s) SubCutaneous daily      STANDING MEDICATIONS  amLODIPine   Tablet 5 milliGRAM(s) Oral daily  chlorhexidine 2% Cloths 1 Application(s) Topical <User Schedule>  influenza   Vaccine 0.5 milliLiter(s) IntraMuscular once  ondansetron Injectable 8 milliGRAM(s) IV Push every 8 hours  prednisoLONE acetate 1% Suspension 2 Drop(s) Both EYES every 6 hours  sodium chloride 0.9%. 1000 milliLiter(s) IV Continuous <Continuous>      PRN MEDICATIONS  metoclopramide Injectable 10 milliGRAM(s) IV Push every 6 hours PRN  polyethylene glycol 3350 17 Gram(s) Oral daily PRN      Vital Signs Last 24 Hrs  T(C): 36.9 (28 Nov 2021 10:08), Max: 37.1 (27 Nov 2021 13:24)  T(F): 98.5 (28 Nov 2021 10:08), Max: 98.8 (27 Nov 2021 13:24)  HR: 86 (28 Nov 2021 10:08) (82 - 111)  BP: 126/81 (28 Nov 2021 10:08) (113/70 - 143/82)  BP(mean): --  RR: 18 (28 Nov 2021 10:08) (17 - 18)  SpO2: 98% (28 Nov 2021 10:08) (96% - 99%)    PHYSICAL EXAM  General: NAD  HEENT: clear oropharynx, anicteric sclera,  CV: (+) S1/S2 RRR  Lungs: clear to auscultation, no wheezes or rales  Abdomen: soft, non-tender, non-distended (+) BS  Ext: no edema  Skin: no rash  Neuro: alert and oriented X 3  Central Line: PICC CDI        LABS:                          8.7    1.60  )-----------( 132      ( 28 Nov 2021 07:34 )             25.2         Mean Cell Volume : 89.4 fl  Mean Cell Hemoglobin : 30.9 pg  Mean Cell Hemoglobin Concentration : 34.5 gm/dL  Auto Neutrophil # : 1.17 K/uL  Auto Lymphocyte # : 0.15 K/uL  Auto Monocyte # : 0.27 K/uL  Auto Eosinophil # : 0.00 K/uL  Auto Basophil # : 0.00 K/uL  Auto Neutrophil % : 73.1 %  Auto Lymphocyte % : 9.4 %  Auto Monocyte % : 16.9 %  Auto Eosinophil % : 0.0 %  Auto Basophil % : 0.0 %      11-28    139  |  104  |  8   ----------------------------<  88  4.1   |  21<L>  |  0.91    Ca    9.5      28 Nov 2021 07:24  Phos  4.3     11-28  Mg     2.0     11-28    TPro  6.2  /  Alb  3.9  /  TBili  0.3  /  DBili  x   /  AST  25  /  ALT  71<H>  /  AlkPhos  72  11-28

## 2021-11-28 NOTE — PROGRESS NOTE ADULT - PROBLEM SELECTOR PLAN 1
Admitted for Cycle 2 HiDAC  Cytarabine 3 g/m2 IV over 3 hrs every 12 hrs on days 1,3,5   IV hydration, Antiemetics, daily weight   Monitor for Cerebellar toxicity, nystagmus checks  Follow CBC/CMP, transfuse/replete prn  Continue predforte eye drops to prevent chemical conjunctivitis  Grade 1 Transaminitis - Ok to start Chemotherapy per Dr Yancey.  Discharge planning on 12/1/21 Admitted for Cycle 2 HiDAC  Cytarabine 3 g/m2 IV over 3 hrs every 12 hrs on days 1,3,5   IV hydration, Antiemetics, daily weight   Monitor for Cerebellar toxicity, nystagmus checks  Follow CBC/CMP, transfuse/replete prn  Continue predforte eye drops to prevent chemical conjunctivitis  Discharge planning on 12/1/21

## 2021-11-28 NOTE — PROGRESS NOTE ADULT - ATTENDING COMMENTS
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  OOB to chair   Mouth care

## 2021-11-28 NOTE — PROGRESS NOTE ADULT - ASSESSMENT
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 Now admitted for cycle 3 HIDAC.  Pt has anemia secondary to chemotherapy

## 2021-11-29 LAB
ALBUMIN SERPL ELPH-MCNC: 3.9 G/DL — SIGNIFICANT CHANGE UP (ref 3.3–5)
ALBUMIN SERPL ELPH-MCNC: 4.8 G/DL
ALP BLD-CCNC: 94 U/L
ALP SERPL-CCNC: 79 U/L — SIGNIFICANT CHANGE UP (ref 40–120)
ALT FLD-CCNC: 90 U/L — HIGH (ref 10–45)
ALT SERPL-CCNC: 113 U/L
ANION GAP SERPL CALC-SCNC: 13 MMOL/L — SIGNIFICANT CHANGE UP (ref 5–17)
ANION GAP SERPL CALC-SCNC: 16 MMOL/L
AST SERPL-CCNC: 37 U/L — SIGNIFICANT CHANGE UP (ref 10–40)
AST SERPL-CCNC: 53 U/L
BASOPHILS # BLD AUTO: 0 K/UL — SIGNIFICANT CHANGE UP (ref 0–0.2)
BASOPHILS NFR BLD AUTO: 0 % — SIGNIFICANT CHANGE UP (ref 0–2)
BILIRUB SERPL-MCNC: 0.3 MG/DL
BILIRUB SERPL-MCNC: 0.3 MG/DL — SIGNIFICANT CHANGE UP (ref 0.2–1.2)
BUN SERPL-MCNC: 11 MG/DL — SIGNIFICANT CHANGE UP (ref 7–23)
BUN SERPL-MCNC: 17 MG/DL
CALCIUM SERPL-MCNC: 10.4 MG/DL
CALCIUM SERPL-MCNC: 9.2 MG/DL — SIGNIFICANT CHANGE UP (ref 8.4–10.5)
CHLORIDE SERPL-SCNC: 102 MMOL/L
CHLORIDE SERPL-SCNC: 103 MMOL/L — SIGNIFICANT CHANGE UP (ref 96–108)
CO2 SERPL-SCNC: 22 MMOL/L
CO2 SERPL-SCNC: 22 MMOL/L — SIGNIFICANT CHANGE UP (ref 22–31)
CREAT SERPL-MCNC: 0.83 MG/DL
CREAT SERPL-MCNC: 0.84 MG/DL — SIGNIFICANT CHANGE UP (ref 0.5–1.3)
EOSINOPHIL # BLD AUTO: 0 K/UL — SIGNIFICANT CHANGE UP (ref 0–0.5)
EOSINOPHIL NFR BLD AUTO: 0 % — SIGNIFICANT CHANGE UP (ref 0–6)
GLUCOSE SERPL-MCNC: 100 MG/DL — HIGH (ref 70–99)
GLUCOSE SERPL-MCNC: 93 MG/DL
HCT VFR BLD CALC: 25.1 % — LOW (ref 39–50)
HGB BLD-MCNC: 8.7 G/DL — LOW (ref 13–17)
IMM GRANULOCYTES NFR BLD AUTO: 0.5 % — SIGNIFICANT CHANGE UP (ref 0–1.5)
LYMPHOCYTES # BLD AUTO: 0.09 K/UL — LOW (ref 1–3.3)
LYMPHOCYTES # BLD AUTO: 4.1 % — LOW (ref 13–44)
MAGNESIUM SERPL-MCNC: 2.1 MG/DL — SIGNIFICANT CHANGE UP (ref 1.6–2.6)
MCHC RBC-ENTMCNC: 31.1 PG — SIGNIFICANT CHANGE UP (ref 27–34)
MCHC RBC-ENTMCNC: 34.7 GM/DL — SIGNIFICANT CHANGE UP (ref 32–36)
MCV RBC AUTO: 89.6 FL — SIGNIFICANT CHANGE UP (ref 80–100)
MONOCYTES # BLD AUTO: 0.08 K/UL — SIGNIFICANT CHANGE UP (ref 0–0.9)
MONOCYTES NFR BLD AUTO: 3.7 % — SIGNIFICANT CHANGE UP (ref 2–14)
NEUTROPHILS # BLD AUTO: 1.99 K/UL — SIGNIFICANT CHANGE UP (ref 1.8–7.4)
NEUTROPHILS NFR BLD AUTO: 91.7 % — HIGH (ref 43–77)
NRBC # BLD: 0 /100 WBCS — SIGNIFICANT CHANGE UP (ref 0–0)
PHOSPHATE SERPL-MCNC: 3.7 MG/DL — SIGNIFICANT CHANGE UP (ref 2.5–4.5)
PLATELET # BLD AUTO: 138 K/UL — LOW (ref 150–400)
POTASSIUM SERPL-MCNC: 4.1 MMOL/L — SIGNIFICANT CHANGE UP (ref 3.5–5.3)
POTASSIUM SERPL-SCNC: 4.1 MMOL/L — SIGNIFICANT CHANGE UP (ref 3.5–5.3)
POTASSIUM SERPL-SCNC: 5.2 MMOL/L
PROT SERPL-MCNC: 6.3 G/DL — SIGNIFICANT CHANGE UP (ref 6–8.3)
PROT SERPL-MCNC: 7.8 G/DL
RBC # BLD: 2.8 M/UL — LOW (ref 4.2–5.8)
RBC # FLD: 15.9 % — HIGH (ref 10.3–14.5)
SARS-COV-2 N GENE NPH QL NAA+PROBE: NOT DETECTED
SODIUM SERPL-SCNC: 138 MMOL/L — SIGNIFICANT CHANGE UP (ref 135–145)
SODIUM SERPL-SCNC: 140 MMOL/L
WBC # BLD: 2.17 K/UL — LOW (ref 3.8–10.5)
WBC # FLD AUTO: 2.17 K/UL — LOW (ref 3.8–10.5)

## 2021-11-29 PROCEDURE — 99232 SBSQ HOSP IP/OBS MODERATE 35: CPT

## 2021-11-29 RX ORDER — OXYCODONE HYDROCHLORIDE 5 MG/1
5 TABLET ORAL EVERY 6 HOURS
Refills: 0 | Status: DISCONTINUED | OUTPATIENT
Start: 2021-11-29 | End: 2021-12-01

## 2021-11-29 RX ADMIN — POLYETHYLENE GLYCOL 3350 17 GRAM(S): 17 POWDER, FOR SOLUTION ORAL at 06:21

## 2021-11-29 RX ADMIN — OXYCODONE HYDROCHLORIDE 5 MILLIGRAM(S): 5 TABLET ORAL at 09:50

## 2021-11-29 RX ADMIN — Medication 2 DROP(S): at 05:13

## 2021-11-29 RX ADMIN — OXYCODONE HYDROCHLORIDE 5 MILLIGRAM(S): 5 TABLET ORAL at 09:20

## 2021-11-29 RX ADMIN — Medication 650 MILLIGRAM(S): at 17:30

## 2021-11-29 RX ADMIN — SODIUM CHLORIDE 75 MILLILITER(S): 9 INJECTION INTRAMUSCULAR; INTRAVENOUS; SUBCUTANEOUS at 05:13

## 2021-11-29 RX ADMIN — ONDANSETRON 8 MILLIGRAM(S): 8 TABLET, FILM COATED ORAL at 21:36

## 2021-11-29 RX ADMIN — SODIUM CHLORIDE 75 MILLILITER(S): 9 INJECTION INTRAMUSCULAR; INTRAVENOUS; SUBCUTANEOUS at 17:01

## 2021-11-29 RX ADMIN — ONDANSETRON 8 MILLIGRAM(S): 8 TABLET, FILM COATED ORAL at 05:12

## 2021-11-29 RX ADMIN — Medication 187.47 MILLIGRAM(S): at 05:10

## 2021-11-29 RX ADMIN — AMLODIPINE BESYLATE 5 MILLIGRAM(S): 2.5 TABLET ORAL at 05:13

## 2021-11-29 RX ADMIN — ONDANSETRON 8 MILLIGRAM(S): 8 TABLET, FILM COATED ORAL at 13:11

## 2021-11-29 RX ADMIN — Medication 2 DROP(S): at 17:02

## 2021-11-29 RX ADMIN — Medication 650 MILLIGRAM(S): at 17:00

## 2021-11-29 RX ADMIN — Medication 2 DROP(S): at 11:10

## 2021-11-29 RX ADMIN — Medication 2 DROP(S): at 00:24

## 2021-11-29 NOTE — PROGRESS NOTE ADULT - ASSESSMENT
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 Now admitted for cycle 3 HIDAC.  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 Now admitted for cycle 3 HIDAC.  Pt has anemia secondary to chemotherapy and disease

## 2021-11-29 NOTE — ASSESSMENT
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 \par Started induction with Dauno/Cytarabine on 8/6. \par Pt's counts now recovered, remission marrow later done on 9/2- in remission. Dr. Yancey reviewed Bmbx result with pt on the phone, will proceed to consolidation with 4 cycles of HIDAC. C1 HIDAC on 9/17, c/b neutropenic fever and diarrhea. \par CBC reviewed with pt and father, count recovered, no need for platelet transfusion today\par C2 HIDAC on 10/25, was postponed for 1 week due to admission for diarrhea, Given negative stool studies, suspect diarrhea was likely chemo-related. Pt was admitted again 11/13-11/17 for sepsis due to thumb cellulitis after laceration.\par Reviewed CBC today with pt, Platelet 107 ANC 1.42 recovered , he does not need platelet transfusion today, will cancel possible platelet next week as well. \par ANC today is 1.42, he is not on levaquine and fluconazole given he is not neutropenic any more, will restart Levaquine and fluconazole if ANC < 1.0. Neutropenic fever precaution explained in detail to pt, pt is aware of ED visit for temp >100.4F in the setting of neutropenia. Patient verbalized understanding and agreed.\par will arrange admission for C3 HIDAC on 11/26, will have COVID test on 11/24\par cont hemorrhoidal ointment and witch hazel. Sent oxycodone for pain relief\par All questions answered\par RTC after C3\par \par Case and management discussed with Dr. Yancey\par \par \par \par \par

## 2021-11-29 NOTE — PROGRESS NOTE ADULT - ATTENDING COMMENTS
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  - OOB to chair   - Mouth care

## 2021-11-29 NOTE — PROGRESS NOTE ADULT - PROBLEM SELECTOR PLAN 1
Admitted for Cycle 2 HiDAC  Cytarabine 3 g/m2 IV over 3 hrs every 12 hrs on days 1,3,5   IV hydration, Antiemetics, daily weight   Monitor for Cerebellar toxicity, nystagmus checks  Follow CBC/CMP, transfuse/replete prn  Continue predforte eye drops to prevent chemical conjunctivitis  Discharge planning on 12/1/21 Admitted for Cycle 3 HiDAC  Cytarabine 3 g/m2 IV over 3 hrs every 12 hrs on days 1,3,5   IV hydration, Antiemetics, daily weight   Monitor for Cerebellar toxicity, nystagmus checks  Follow CBC/CMP, transfuse/replete prn  Continue predforte eye drops to prevent chemical conjunctivitis  Discharge planning on 12/1/21

## 2021-11-29 NOTE — HISTORY OF PRESENT ILLNESS
[de-identified] : 37yo M w/ HTN and newly diagnosed AML here for f/u. \par \par He presented to the hospital on 7/30/21 with complaints of dizziness, fatigue and severe RUQ pain for 3 days. CT A/P revealed fatty liver and small R pleural effusion. WBC 12k with 17% blasts.Peripheral flow cytometry showed 13% myeloblasts. FLT3(-). BMbx was done on 8/4/21 - confirmed AML. 46,XY       {20       }\par Foundation: mutations in DNMT3A R882H, NRAS G12D, NPM1 W288fs*12\par Pt consented to [place]. Patient was offered sperm banking, but declined.\par On 8/6, induction with Dauno/Cytararbine was started (cytarabine 100/m2 and dauno 90/m2) \par He received a seven day course of Zosyn for presumed RUL PNA, then transitioned to  levaquin, posaconazole and Acyclovir for ppx for neutropenia. Course c/b neutropenic fevers, cultures negative, repeat CT 8/14 without infectious source. He was on Cefepime but developed a rash, changed to meropenem. Rash improved. \par Day 14 BMbx on 8/19 was hypocellular with chemotherapeutic effect; however, per hematopathology, appears to be earlier regeneration than would typically seen which is concerning for persistent disease at this point, and the earliest cells are CD34 positive and his myeloblasts on initial presentation were CD34 negative. Thus, this may simply represent early regeneration of his marrow. Plan is to await count recovery and repeat biopsy.  \par Patient discharged home on 8/29/21 when ANC >500 [de-identified] : Eating well since discharge. \par c/o hemorrhoidal pain. Hemorrhoids started a few days prior to discharge. He was sent home with rectal ointment and witch hazel. \par \par BMBx done on 9/2: Cellular bone marrow with trilineage hematopoiesis with maturation and megakaryocytosis (history of AML).\par Pt feels well, CBC today showed count stable\par \par s/p C1 HIDAC 9/17-9/22 \par \par c/o leg pain after chemo in the hospital \par \par He presented to the ED on 10/9 due to fever the night of presentation. The patient went to sleep around 10pm and woke up at 3am feeling warm and clammy. He took his temperature orally at home and it was 100.7. The day prior to presentation (10/8/21), the patient went to [facility] for an appointment to get his platelet count checked. He states he was feeling a bit run down and thought he was going to need a transfusion, but he did not need a transfusion. He was afebrile during the time of the appointment and went home afterwards. Around 3pm, the patient had bilateral crampy leg pain that was similar to the leg pain he felt during his consolidation therapy treatment. He took hydrocodone and the pain improved. The patient had one episode of diarrhea three days prior to presentation and has been bothered by rectal pain associated with his "large hemorrhoids. He denies any bleeding per rectum. He also feels like his mouth has been more dry than usual over the past several days, but denies all other symptoms. \par CT Abdomen and Pelvis w/ Oral Cont and w/ IV Conttrast on 10/9 revealed Region of hypoenhancement upper pole left kidney; please correlate clinically for possible pyelonephritis. No hydronephrosis or perinephric stranding. No rectal abscess.\par CT Chest No Contrast on 10/9 revealed Clear lungs.\par 10/9- BCX NGTD and 10/9- UCX (-)\par \par He ate some cold salad late last night, had upsetting stomach and diarrhea this morning. Will take Imodium for diarrhea. \par C2 is due on 10/15, pt wants deferring to next Monday after his diarrhea improved. \par \par Admission of  C2 was postponed due to worsening diarrhea. Went to ED on 10/15 due to worsening watery diarrhea. GI PCR neg, C Diff neg\par Given negative stool studies, suspect diarrhea was likely chemo-related. S/p cycle 2 Consolidation with HIDAC started on 10/25. Patient received IV hydration, strict I/O, antiemetics, monitoring of CBC and CMP and for cerebellar toxicity. PRedforte eye drops given to prevent chemical conjunctivitis. Patient with fever throughout course of treatment. Cultures negative. Due to fever probably related to HIDAC, patient received dexamethasone prior to the last 2 doses of cytatabine. \par He is seen today accompanied by his father, pt feels fatigue after chemo, other than that he feels fine. Denied any fever, chills diarrhea. \par \par Pt was admitted on 11/13-11/17 s/p right first digit laceration repair on 11/12 and presenting with erythema and pain at the site of laceration repair. Patient reported he was feeling unwell prior to suture placement with body aches, chills, night sweats and subjective fevers. Patient last BM 4 days ago. Has bruise to left inner thigh. Patient reports he keeps his PICC site clean and intact which was placed in 9/2021. Upon admission to the hospital ID was consulted started on Zosyn , GI consulted for rectal pain secondary to hemorrhoids and palliative consulted for pain control.\par  \par He was seen today after discharge, accompanied by his father. CBC today showed count recovered. He admitted feeling tired, denied any f/c, diarrhea. Right hand suture site healing well, no abnormal erythema or discharge. \par

## 2021-11-29 NOTE — PHYSICAL EXAM
[Fully active, able to carry on all pre-disease performance without restriction] : Status 0 - Fully active, able to carry on all pre-disease performance without restriction [Normal] : affect appropriate [de-identified] : a 1 cm long laceration with sutures on the right thrum, healing well

## 2021-11-29 NOTE — PROGRESS NOTE ADULT - SUBJECTIVE AND OBJECTIVE BOX
Diagnosis: AML,. FLT 3 (-)    Protocol/Chemo Regimen: cycle 3 HIDAC    Day: 4    Pt endorsed: No acute complaints     Review of Systems: Patient denies  nausea, vomiting, diarrhea, abdominal pain, SOB, chest pain and headache.      Pain scale: denies     Diet: Regular     Allergies    No Known Allergies    Intolerances    cefepime (Rash)      ANTIMICROBIALS      HEME/ONC MEDICATIONS  enoxaparin Injectable 40 milliGRAM(s) SubCutaneous daily      STANDING MEDICATIONS  amLODIPine   Tablet 5 milliGRAM(s) Oral daily  chlorhexidine 2% Cloths 1 Application(s) Topical <User Schedule>  influenza   Vaccine 0.5 milliLiter(s) IntraMuscular once  ondansetron Injectable 8 milliGRAM(s) IV Push every 8 hours  prednisoLONE acetate 1% Suspension 2 Drop(s) Both EYES every 6 hours  sodium chloride 0.9%. 1000 milliLiter(s) IV Continuous <Continuous>      PRN MEDICATIONS  metoclopramide Injectable 10 milliGRAM(s) IV Push every 6 hours PRN  polyethylene glycol 3350 17 Gram(s) Oral daily PRN      Vital Signs Last 24 Hrs  T(C): 36.7 (29 Nov 2021 06:01), Max: 37.1 (29 Nov 2021 01:31)  T(F): 98 (29 Nov 2021 06:01), Max: 98.7 (29 Nov 2021 01:31)  HR: 87 (29 Nov 2021 06:01) (79 - 94)  BP: 114/79 (29 Nov 2021 06:01) (110/76 - 126/81)  BP(mean): --  RR: 18 (29 Nov 2021 06:01) (16 - 18)  SpO2: 97% (29 Nov 2021 06:01) (96% - 99%)    PHYSICAL EXAM  General: NAD  HEENT: clear oropharynx, anicteric sclera,  CV: (+) S1/S2 RRR  Lungs: clear to auscultation, no wheezes or rales  Abdomen: soft, non-tender, non-distended (+) BS  Ext: no edema  Skin: no rash  Neuro: alert and oriented X 3  Central Line: PICC CDI        LABS:                                     8.7    2.17  )-----------( 138      ( 29 Nov 2021 06:43 )             25.1       Mean Cell Volume : 89.6 fl  Mean Cell Hemoglobin : 31.1 pg  Mean Cell Hemoglobin Concentration : 34.7 gm/dL  Auto Neutrophil # : 1.99 K/uL  Auto Lymphocyte # : 0.09 K/uL  Auto Monocyte # : 0.08 K/uL  Auto Eosinophil # : 0.00 K/uL  Auto Basophil # : 0.00 K/uL  Auto Neutrophil % : 91.7 %  Auto Lymphocyte % : 4.1 %  Auto Monocyte % : 3.7 %  Auto Eosinophil % : 0.0 %  Auto Basophil % : 0.0 %        11-29    138  |  103  |  11  ----------------------------<  100<H>  4.1   |  22  |  0.84    Ca    9.2      29 Nov 2021 06:43  Phos  3.7     11-29  Mg     2.1     11-29    TPro  6.3  /  Alb  3.9  /  TBili  0.3  /  DBili  x   /  AST  37  /  ALT  90<H>  /  AlkPhos  79  11-29                                             Diagnosis: AML,. FLT 3 (-)    Protocol/Chemo Regimen: cycle 3 HIDAC    Day: 4    Pt endorsed: b/l leg pain pt states "it feels like bone pain and it always happens after the second bag."    Review of Systems: Patient denies  nausea, vomiting, diarrhea, abdominal pain, SOB, chest pain and headache.      Pain scale: 6/10 b/l leg pain    Diet: Regular     Allergies    No Known Allergies    Intolerances    cefepime (Rash)      ANTIMICROBIALS      HEME/ONC MEDICATIONS  enoxaparin Injectable 40 milliGRAM(s) SubCutaneous daily      STANDING MEDICATIONS  amLODIPine   Tablet 5 milliGRAM(s) Oral daily  chlorhexidine 2% Cloths 1 Application(s) Topical <User Schedule>  influenza   Vaccine 0.5 milliLiter(s) IntraMuscular once  ondansetron Injectable 8 milliGRAM(s) IV Push every 8 hours  prednisoLONE acetate 1% Suspension 2 Drop(s) Both EYES every 6 hours  sodium chloride 0.9%. 1000 milliLiter(s) IV Continuous <Continuous>      PRN MEDICATIONS  metoclopramide Injectable 10 milliGRAM(s) IV Push every 6 hours PRN  polyethylene glycol 3350 17 Gram(s) Oral daily PRN      Vital Signs Last 24 Hrs  T(C): 36.7 (29 Nov 2021 06:01), Max: 37.1 (29 Nov 2021 01:31)  T(F): 98 (29 Nov 2021 06:01), Max: 98.7 (29 Nov 2021 01:31)  HR: 87 (29 Nov 2021 06:01) (79 - 94)  BP: 114/79 (29 Nov 2021 06:01) (110/76 - 126/81)  BP(mean): --  RR: 18 (29 Nov 2021 06:01) (16 - 18)  SpO2: 97% (29 Nov 2021 06:01) (96% - 99%)    PHYSICAL EXAM  General: NAD  HEENT: clear oropharynx, anicteric sclera,  CV: (+) S1/S2 RRR  Lungs: clear to auscultation, no wheezes or rales  Abdomen: soft, non-tender, non-distended (+) BS  Ext: no edema, no redness/bruising/tenderness.  Skin: no rash  Neuro: alert and oriented X 3  Central Line: PICC CDI        LABS:                                     8.7    2.17  )-----------( 138      ( 29 Nov 2021 06:43 )             25.1       Mean Cell Volume : 89.6 fl  Mean Cell Hemoglobin : 31.1 pg  Mean Cell Hemoglobin Concentration : 34.7 gm/dL  Auto Neutrophil # : 1.99 K/uL  Auto Lymphocyte # : 0.09 K/uL  Auto Monocyte # : 0.08 K/uL  Auto Eosinophil # : 0.00 K/uL  Auto Basophil # : 0.00 K/uL  Auto Neutrophil % : 91.7 %  Auto Lymphocyte % : 4.1 %  Auto Monocyte % : 3.7 %  Auto Eosinophil % : 0.0 %  Auto Basophil % : 0.0 %        11-29    138  |  103  |  11  ----------------------------<  100<H>  4.1   |  22  |  0.84    Ca    9.2      29 Nov 2021 06:43  Phos  3.7     11-29  Mg     2.1     11-29    TPro  6.3  /  Alb  3.9  /  TBili  0.3  /  DBili  x   /  AST  37  /  ALT  90<H>  /  AlkPhos  79  11-29                                             Diagnosis: AML,. FLT 3 (-)    Protocol/Chemo Regimen: cycle 3 HIDAC    Day: 4    Pt endorsed: b/l leg pain pt states "it feels like bone pain and it always happens after the second bag."    Review of Systems: Patient denies  nausea, vomiting, diarrhea, abdominal pain, SOB, chest pain and headache.      Pain scale: 6/10 b/l leg pain    Diet: Regular     Allergies    No Known Allergies    Intolerances    cefepime (Rash)      ANTIMICROBIALS      HEME/ONC MEDICATIONS  enoxaparin Injectable 40 milliGRAM(s) SubCutaneous daily      STANDING MEDICATIONS  amLODIPine   Tablet 5 milliGRAM(s) Oral daily  chlorhexidine 2% Cloths 1 Application(s) Topical <User Schedule>  influenza   Vaccine 0.5 milliLiter(s) IntraMuscular once  ondansetron Injectable 8 milliGRAM(s) IV Push every 8 hours  prednisoLONE acetate 1% Suspension 2 Drop(s) Both EYES every 6 hours  sodium chloride 0.9%. 1000 milliLiter(s) IV Continuous <Continuous>      PRN MEDICATIONS  metoclopramide Injectable 10 milliGRAM(s) IV Push every 6 hours PRN  polyethylene glycol 3350 17 Gram(s) Oral daily PRN      Vital Signs Last 24 Hrs  T(C): 36.7 (29 Nov 2021 06:01), Max: 37.1 (29 Nov 2021 01:31)  T(F): 98 (29 Nov 2021 06:01), Max: 98.7 (29 Nov 2021 01:31)  HR: 87 (29 Nov 2021 06:01) (79 - 94)  BP: 114/79 (29 Nov 2021 06:01) (110/76 - 126/81)  BP(mean): --  RR: 18 (29 Nov 2021 06:01) (16 - 18)  SpO2: 97% (29 Nov 2021 06:01) (96% - 99%)    PHYSICAL EXAM  General: NAD  HEENT: clear oropharynx, anicteric sclera,  CV: (+) S1/S2 RRR  Lungs: clear to auscultation, no wheezes or rales  Abdomen: soft, non-tender, non-distended (+) BS  Ext: no edema, no redness/bruising/tenderness.  Skin: no rash  Neuro: alert and oriented X 3, EOMI, no nystagmus  Central Line: PICC CDI        LABS:                                     8.7    2.17  )-----------( 138      ( 29 Nov 2021 06:43 )             25.1       Mean Cell Volume : 89.6 fl  Mean Cell Hemoglobin : 31.1 pg  Mean Cell Hemoglobin Concentration : 34.7 gm/dL  Auto Neutrophil # : 1.99 K/uL  Auto Lymphocyte # : 0.09 K/uL  Auto Monocyte # : 0.08 K/uL  Auto Eosinophil # : 0.00 K/uL  Auto Basophil # : 0.00 K/uL  Auto Neutrophil % : 91.7 %  Auto Lymphocyte % : 4.1 %  Auto Monocyte % : 3.7 %  Auto Eosinophil % : 0.0 %  Auto Basophil % : 0.0 %        11-29    138  |  103  |  11  ----------------------------<  100<H>  4.1   |  22  |  0.84    Ca    9.2      29 Nov 2021 06:43  Phos  3.7     11-29  Mg     2.1     11-29    TPro  6.3  /  Alb  3.9  /  TBili  0.3  /  DBili  x   /  AST  37  /  ALT  90<H>  /  AlkPhos  79  11-29

## 2021-11-30 LAB
ALBUMIN SERPL ELPH-MCNC: 4.2 G/DL — SIGNIFICANT CHANGE UP (ref 3.3–5)
ALP SERPL-CCNC: 76 U/L — SIGNIFICANT CHANGE UP (ref 40–120)
ALT FLD-CCNC: 91 U/L — HIGH (ref 10–45)
ANION GAP SERPL CALC-SCNC: 9 MMOL/L — SIGNIFICANT CHANGE UP (ref 5–17)
AST SERPL-CCNC: 36 U/L — SIGNIFICANT CHANGE UP (ref 10–40)
BASOPHILS # BLD AUTO: 0 K/UL — SIGNIFICANT CHANGE UP (ref 0–0.2)
BASOPHILS NFR BLD AUTO: 0 % — SIGNIFICANT CHANGE UP (ref 0–2)
BILIRUB SERPL-MCNC: 0.6 MG/DL — SIGNIFICANT CHANGE UP (ref 0.2–1.2)
BUN SERPL-MCNC: 10 MG/DL — SIGNIFICANT CHANGE UP (ref 7–23)
CALCIUM SERPL-MCNC: 9.7 MG/DL — SIGNIFICANT CHANGE UP (ref 8.4–10.5)
CHLORIDE SERPL-SCNC: 103 MMOL/L — SIGNIFICANT CHANGE UP (ref 96–108)
CO2 SERPL-SCNC: 25 MMOL/L — SIGNIFICANT CHANGE UP (ref 22–31)
CREAT SERPL-MCNC: 0.92 MG/DL — SIGNIFICANT CHANGE UP (ref 0.5–1.3)
EOSINOPHIL # BLD AUTO: 0 K/UL — SIGNIFICANT CHANGE UP (ref 0–0.5)
EOSINOPHIL NFR BLD AUTO: 0 % — SIGNIFICANT CHANGE UP (ref 0–6)
GLUCOSE SERPL-MCNC: 97 MG/DL — SIGNIFICANT CHANGE UP (ref 70–99)
HCT VFR BLD CALC: 24.1 % — LOW (ref 39–50)
HGB BLD-MCNC: 8.4 G/DL — LOW (ref 13–17)
LYMPHOCYTES # BLD AUTO: 0.11 K/UL — LOW (ref 1–3.3)
LYMPHOCYTES # BLD AUTO: 6.1 % — LOW (ref 13–44)
MAGNESIUM SERPL-MCNC: 2.3 MG/DL — SIGNIFICANT CHANGE UP (ref 1.6–2.6)
MCHC RBC-ENTMCNC: 30.4 PG — SIGNIFICANT CHANGE UP (ref 27–34)
MCHC RBC-ENTMCNC: 34.9 GM/DL — SIGNIFICANT CHANGE UP (ref 32–36)
MCV RBC AUTO: 87.3 FL — SIGNIFICANT CHANGE UP (ref 80–100)
MONOCYTES # BLD AUTO: 0.02 K/UL — SIGNIFICANT CHANGE UP (ref 0–0.9)
MONOCYTES NFR BLD AUTO: 0.9 % — LOW (ref 2–14)
NEUTROPHILS # BLD AUTO: 1.65 K/UL — LOW (ref 1.8–7.4)
NEUTROPHILS NFR BLD AUTO: 93 % — HIGH (ref 43–77)
PHOSPHATE SERPL-MCNC: 4.1 MG/DL — SIGNIFICANT CHANGE UP (ref 2.5–4.5)
PLATELET # BLD AUTO: 116 K/UL — LOW (ref 150–400)
POTASSIUM SERPL-MCNC: 4.1 MMOL/L — SIGNIFICANT CHANGE UP (ref 3.5–5.3)
POTASSIUM SERPL-SCNC: 4.1 MMOL/L — SIGNIFICANT CHANGE UP (ref 3.5–5.3)
PROT SERPL-MCNC: 6.7 G/DL — SIGNIFICANT CHANGE UP (ref 6–8.3)
RBC # BLD: 2.76 M/UL — LOW (ref 4.2–5.8)
RBC # FLD: 15.6 % — HIGH (ref 10.3–14.5)
SODIUM SERPL-SCNC: 137 MMOL/L — SIGNIFICANT CHANGE UP (ref 135–145)
WBC # BLD: 1.77 K/UL — LOW (ref 3.8–10.5)
WBC # FLD AUTO: 1.77 K/UL — LOW (ref 3.8–10.5)

## 2021-11-30 PROCEDURE — 99232 SBSQ HOSP IP/OBS MODERATE 35: CPT

## 2021-11-30 RX ORDER — OXYCODONE HYDROCHLORIDE 5 MG/1
1 TABLET ORAL
Qty: 0 | Refills: 0 | DISCHARGE
Start: 2021-11-30

## 2021-11-30 RX ORDER — CIPROFLOXACIN LACTATE 400MG/40ML
1 VIAL (ML) INTRAVENOUS
Qty: 0 | Refills: 0 | DISCHARGE

## 2021-11-30 RX ORDER — CYTARABINE 100 MG
6240 VIAL (EA) INJECTION EVERY 12 HOURS
Refills: 0 | Status: COMPLETED | OUTPATIENT
Start: 2021-11-30 | End: 2021-12-01

## 2021-11-30 RX ADMIN — ONDANSETRON 8 MILLIGRAM(S): 8 TABLET, FILM COATED ORAL at 13:09

## 2021-11-30 RX ADMIN — AMLODIPINE BESYLATE 5 MILLIGRAM(S): 2.5 TABLET ORAL at 06:21

## 2021-11-30 RX ADMIN — Medication 2 DROP(S): at 13:09

## 2021-11-30 RX ADMIN — Medication 2 DROP(S): at 00:01

## 2021-11-30 RX ADMIN — SODIUM CHLORIDE 75 MILLILITER(S): 9 INJECTION INTRAMUSCULAR; INTRAVENOUS; SUBCUTANEOUS at 06:21

## 2021-11-30 RX ADMIN — Medication 187.47 MILLIGRAM(S): at 17:00

## 2021-11-30 RX ADMIN — ONDANSETRON 8 MILLIGRAM(S): 8 TABLET, FILM COATED ORAL at 06:21

## 2021-11-30 RX ADMIN — ONDANSETRON 8 MILLIGRAM(S): 8 TABLET, FILM COATED ORAL at 22:36

## 2021-11-30 RX ADMIN — Medication 2 DROP(S): at 17:04

## 2021-11-30 RX ADMIN — Medication 2 DROP(S): at 06:21

## 2021-11-30 RX ADMIN — SODIUM CHLORIDE 75 MILLILITER(S): 9 INJECTION INTRAMUSCULAR; INTRAVENOUS; SUBCUTANEOUS at 16:58

## 2021-11-30 NOTE — PROGRESS NOTE ADULT - PROBLEM SELECTOR PLAN 1
Admitted for Cycle 3 HiDAC  Cytarabine 3 g/m2 IV over 3 hrs every 12 hrs on days 1,3,5   IV hydration, Antiemetics, daily weight   Monitor for Cerebellar toxicity, nystagmus checks  Follow CBC/CMP, transfuse/replete prn  Continue predforte eye drops to prevent chemical conjunctivitis  Discharge planning on 12/1/21

## 2021-11-30 NOTE — PROGRESS NOTE ADULT - ASSESSMENT
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 Now admitted for cycle 3 HIDAC.  Pt has anemia secondary to chemotherapy and disease

## 2021-11-30 NOTE — PROGRESS NOTE ADULT - SUBJECTIVE AND OBJECTIVE BOX
Diagnosis: AML,. FLT 3 (-)    Protocol/Chemo Regimen: cycle 3 HIDAC    Day: 5    Pt endorsed: b/l leg pain pt states "it feels like bone pain and it always happens after the second bag."    Review of Systems: Patient denies  nausea, vomiting, diarrhea, abdominal pain, SOB, chest pain and headache.      Pain scale: 6/10 b/l leg pain    Diet: Regular     Allergies    No Known Allergies    Intolerances    cefepime (Rash)      ANTIMICROBIALS      HEME/ONC MEDICATIONS  enoxaparin Injectable 40 milliGRAM(s) SubCutaneous daily      STANDING MEDICATIONS  amLODIPine   Tablet 5 milliGRAM(s) Oral daily  chlorhexidine 2% Cloths 1 Application(s) Topical <User Schedule>  influenza   Vaccine 0.5 milliLiter(s) IntraMuscular once  ondansetron Injectable 8 milliGRAM(s) IV Push every 8 hours  prednisoLONE acetate 1% Suspension 2 Drop(s) Both EYES every 6 hours  sodium chloride 0.9%. 1000 milliLiter(s) IV Continuous <Continuous>      PRN MEDICATIONS  metoclopramide Injectable 10 milliGRAM(s) IV Push every 6 hours PRN  polyethylene glycol 3350 17 Gram(s) Oral daily PRN      Vital Signs Last 24 Hrs  T(C): 36.7 (29 Nov 2021 06:01), Max: 37.1 (29 Nov 2021 01:31)  T(F): 98 (29 Nov 2021 06:01), Max: 98.7 (29 Nov 2021 01:31)  HR: 87 (29 Nov 2021 06:01) (79 - 94)  BP: 114/79 (29 Nov 2021 06:01) (110/76 - 126/81)  BP(mean): --  RR: 18 (29 Nov 2021 06:01) (16 - 18)  SpO2: 97% (29 Nov 2021 06:01) (96% - 99%)    PHYSICAL EXAM  General: NAD  HEENT: clear oropharynx, anicteric sclera,  CV: (+) S1/S2 RRR  Lungs: clear to auscultation, no wheezes or rales  Abdomen: soft, non-tender, non-distended (+) BS  Ext: no edema, no redness/bruising/tenderness.  Skin: no rash  Neuro: alert and oriented X 3, EOMI, no nystagmus  Central Line: PICC CDI        LABS:                                     8.7    2.17  )-----------( 138      ( 29 Nov 2021 06:43 )             25.1       Mean Cell Volume : 89.6 fl  Mean Cell Hemoglobin : 31.1 pg  Mean Cell Hemoglobin Concentration : 34.7 gm/dL  Auto Neutrophil # : 1.99 K/uL  Auto Lymphocyte # : 0.09 K/uL  Auto Monocyte # : 0.08 K/uL  Auto Eosinophil # : 0.00 K/uL  Auto Basophil # : 0.00 K/uL  Auto Neutrophil % : 91.7 %  Auto Lymphocyte % : 4.1 %  Auto Monocyte % : 3.7 %  Auto Eosinophil % : 0.0 %  Auto Basophil % : 0.0 %        11-29    138  |  103  |  11  ----------------------------<  100<H>  4.1   |  22  |  0.84    Ca    9.2      29 Nov 2021 06:43  Phos  3.7     11-29  Mg     2.1     11-29    TPro  6.3  /  Alb  3.9  /  TBili  0.3  /  DBili  x   /  AST  37  /  ALT  90<H>  /  AlkPhos  79  11-29                                             Diagnosis: AML,. FLT 3 (-)    Protocol/Chemo Regimen: cycle 3 HIDAC    Day: 5    Pt endorsed: no complaints at this time. pt states "I am ready to go home."    Review of Systems: Denies chest pain/sob/pain of any kind/vision changes/dizziness/weakness.      Pain scale: 0/10    Diet: Regular     Allergies    No Known Allergies    Intolerances    cefepime (Rash)      ANTIMICROBIALS      HEME/ONC MEDICATIONS  enoxaparin Injectable 40 milliGRAM(s) SubCutaneous daily      STANDING MEDICATIONS  amLODIPine   Tablet 5 milliGRAM(s) Oral daily  chlorhexidine 2% Cloths 1 Application(s) Topical <User Schedule>  influenza   Vaccine 0.5 milliLiter(s) IntraMuscular once  ondansetron Injectable 8 milliGRAM(s) IV Push every 8 hours  prednisoLONE acetate 1% Suspension 2 Drop(s) Both EYES every 6 hours  sodium chloride 0.9%. 1000 milliLiter(s) IV Continuous <Continuous>      PRN MEDICATIONS  metoclopramide Injectable 10 milliGRAM(s) IV Push every 6 hours PRN  polyethylene glycol 3350 17 Gram(s) Oral daily PRN      Vital Signs Last 24 Hrs  T(C): 36.8 (30 Nov 2021 09:30), Max: 37.1 (30 Nov 2021 05:22)  T(F): 98.2 (30 Nov 2021 09:30), Max: 98.7 (30 Nov 2021 05:22)  HR: 88 (30 Nov 2021 09:30) (62 - 106)  BP: 100/62 (30 Nov 2021 09:30) (100/62 - 125/71)  BP(mean): --  RR: 18 (30 Nov 2021 09:30) (18 - 19)  SpO2: 95% (30 Nov 2021 09:30) (95% - 99%)    PHYSICAL EXAM  General: NAD  HEENT: clear oropharynx, anicteric sclera,  CV: (+) S1/S2 RRR  Lungs: clear to auscultation, no wheezes or rales  Abdomen: soft, non-tender, non-distended (+) BS  Ext: no edema, no redness/bruising/tenderness.  Skin: no rash  Neuro: alert and oriented X 3, EOMI, no nystagmus  Central Line: PICC CDI        LABS:                                                8.4    1.77  )-----------( 116      ( 30 Nov 2021 07:23 )             24.1       Mean Cell Volume : 87.3 fl  Mean Cell Hemoglobin : 30.4 pg  Mean Cell Hemoglobin Concentration : 34.9 gm/dL  Auto Neutrophil # : 1.65 K/uL  Auto Lymphocyte # : 0.11 K/uL  Auto Monocyte # : 0.02 K/uL  Auto Eosinophil # : 0.00 K/uL  Auto Basophil # : 0.00 K/uL  Auto Neutrophil % : 93.0 %  Auto Lymphocyte % : 6.1 %  Auto Monocyte % : 0.9 %  Auto Eosinophil % : 0.0 %  Auto Basophil % : 0.0 %        11-30    137  |  103  |  10  ----------------------------<  97  4.1   |  25  |  0.92    Ca    9.7      30 Nov 2021 07:19  Phos  4.1     11-30  Mg     2.3     11-30    TPro  6.7  /  Alb  4.2  /  TBili  0.6  /  DBili  x   /  AST  36  /  ALT  91<H>  /  AlkPhos  76  11-3                 Diagnosis: AML,. FLT 3 (-)    Protocol/Chemo Regimen: cycle 3 HIDAC    Day: 5    Pt endorsed: no complaints at this time. pt states "I am ready to go home."    Review of Systems: Denies chest pain/sob/pain of any kind/vision changes/dizziness/weakness.      Pain scale: 0/10    Diet: Regular     Allergies    No Known Allergies    Intolerances    cefepime (Rash)      ANTIMICROBIALS      HEME/ONC MEDICATIONS  enoxaparin Injectable 40 milliGRAM(s) SubCutaneous daily      STANDING MEDICATIONS  amLODIPine   Tablet 5 milliGRAM(s) Oral daily  chlorhexidine 2% Cloths 1 Application(s) Topical <User Schedule>  influenza   Vaccine 0.5 milliLiter(s) IntraMuscular once  ondansetron Injectable 8 milliGRAM(s) IV Push every 8 hours  prednisoLONE acetate 1% Suspension 2 Drop(s) Both EYES every 6 hours  sodium chloride 0.9%. 1000 milliLiter(s) IV Continuous <Continuous>    PRN MEDICATIONS  metoclopramide Injectable 10 milliGRAM(s) IV Push every 6 hours PRN  polyethylene glycol 3350 17 Gram(s) Oral daily PRN    Vital Signs Last 24 Hrs  T(C): 36.9 (30 Nov 2021 14:00), Max: 37.1 (30 Nov 2021 05:22)  T(F): 98.4 (30 Nov 2021 14:00), Max: 98.7 (30 Nov 2021 05:22)  HR: 93 (30 Nov 2021 14:00) (62 - 106)  BP: 110/73 (30 Nov 2021 14:00) (100/62 - 125/71)  BP(mean): --  RR: 18 (30 Nov 2021 14:00) (18 - 19)  SpO2: 99% (30 Nov 2021 14:00) (95% - 99%)    PHYSICAL EXAM  General: NAD  HEENT: clear oropharynx, anicteric sclera,  CV: (+) S1/S2 RRR  Lungs: clear to auscultation, no wheezes or rales  Abdomen: soft, non-tender, non-distended (+) BS  Ext: no edema, no redness/bruising/tenderness.  Skin: no rash  Neuro: alert and oriented X 3, EOMI, no nystagmus  Central Line: PICC CDI      LABS:                        8.4    1.77  )-----------( 116      ( 30 Nov 2021 07:23 )             24.1     Mean Cell Volume : 87.3 fl  Mean Cell Hemoglobin : 30.4 pg  Mean Cell Hemoglobin Concentration : 34.9 gm/dL  Auto Neutrophil # : 1.65 K/uL  Auto Lymphocyte # : 0.11 K/uL  Auto Monocyte # : 0.02 K/uL  Auto Eosinophil # : 0.00 K/uL  Auto Basophil # : 0.00 K/uL  Auto Neutrophil % : 93.0 %  Auto Lymphocyte % : 6.1 %  Auto Monocyte % : 0.9 %  Auto Eosinophil % : 0.0 %  Auto Basophil % : 0.0 %        11-30    137  |  103  |  10  ----------------------------<  97  4.1   |  25  |  0.92    Ca    9.7      30 Nov 2021 07:19  Phos  4.1     11-30  Mg     2.3     11-30    TPro  6.7  /  Alb  4.2  /  TBili  0.6  /  DBili  x   /  AST  36  /  ALT  91<H>  /  AlkPhos  76  11-3

## 2021-11-30 NOTE — PROGRESS NOTE ADULT - ATTENDING COMMENTS
37yo M w/ HTN fatty liver, kidney stones,  and now newly diagnosed AML, NPM1 mutated, FLT-3 negative s/p induction chemotherapy with Daunorubicin/Cytarabine in CR, admitted for cycle 3 consolidation with HIDAC (high dose cytarabine)  Cytarabine 3 g/m2 IV over 3 hrs every 12 hrs on days 1,3,5   Cycle 3 HiDAC day 4    - TODAY: Complains of lower leg pain which happens when on HiDAC  - IV hydration, Antiemetics, daily weight   - Monitor for Cerebellar toxicity, nystagmus checks  - Follow CBC/CMP, transfuse/replete prn  - Continue predforte eye drops to prevent chemical conjunctivitis  - Hemodynamically stable.  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  - OOB to chair   - Mouth care  - DC tomorrow

## 2021-12-01 ENCOUNTER — OUTPATIENT (OUTPATIENT)
Dept: OUTPATIENT SERVICES | Facility: HOSPITAL | Age: 36
LOS: 1 days | Discharge: HOME | End: 2021-12-01

## 2021-12-01 ENCOUNTER — TRANSCRIPTION ENCOUNTER (OUTPATIENT)
Age: 36
End: 2021-12-01

## 2021-12-01 VITALS
DIASTOLIC BLOOD PRESSURE: 73 MMHG | WEIGHT: 200.4 LBS | SYSTOLIC BLOOD PRESSURE: 134 MMHG | OXYGEN SATURATION: 100 % | TEMPERATURE: 99 F | RESPIRATION RATE: 20 BRPM | HEIGHT: 68.11 IN | HEART RATE: 101 BPM

## 2021-12-01 DIAGNOSIS — C92.00 ACUTE MYELOBLASTIC LEUKEMIA, NOT HAVING ACHIEVED REMISSION: ICD-10-CM

## 2021-12-01 LAB
ALBUMIN SERPL ELPH-MCNC: 3.8 G/DL — SIGNIFICANT CHANGE UP (ref 3.3–5)
ALP SERPL-CCNC: 79 U/L — SIGNIFICANT CHANGE UP (ref 40–120)
ALT FLD-CCNC: 107 U/L — HIGH (ref 10–45)
ANION GAP SERPL CALC-SCNC: 14 MMOL/L — SIGNIFICANT CHANGE UP (ref 5–17)
AST SERPL-CCNC: 48 U/L — HIGH (ref 10–40)
BASOPHILS # BLD AUTO: 0 K/UL — SIGNIFICANT CHANGE UP (ref 0–0.2)
BASOPHILS NFR BLD AUTO: 0 % — SIGNIFICANT CHANGE UP (ref 0–2)
BILIRUB SERPL-MCNC: 0.3 MG/DL — SIGNIFICANT CHANGE UP (ref 0.2–1.2)
BUN SERPL-MCNC: 15 MG/DL — SIGNIFICANT CHANGE UP (ref 7–23)
CALCIUM SERPL-MCNC: 9.1 MG/DL — SIGNIFICANT CHANGE UP (ref 8.4–10.5)
CHLORIDE SERPL-SCNC: 105 MMOL/L — SIGNIFICANT CHANGE UP (ref 96–108)
CO2 SERPL-SCNC: 21 MMOL/L — LOW (ref 22–31)
CREAT SERPL-MCNC: 0.79 MG/DL — SIGNIFICANT CHANGE UP (ref 0.5–1.3)
EOSINOPHIL # BLD AUTO: 0 K/UL — SIGNIFICANT CHANGE UP (ref 0–0.5)
EOSINOPHIL NFR BLD AUTO: 0 % — SIGNIFICANT CHANGE UP (ref 0–6)
GLUCOSE SERPL-MCNC: 102 MG/DL — HIGH (ref 70–99)
HCT VFR BLD CALC: 23.3 % — LOW (ref 39–50)
HGB BLD-MCNC: 8 G/DL — LOW (ref 13–17)
LYMPHOCYTES # BLD AUTO: 0.07 K/UL — LOW (ref 1–3.3)
LYMPHOCYTES # BLD AUTO: 5.8 % — LOW (ref 13–44)
MAGNESIUM SERPL-MCNC: 2.1 MG/DL — SIGNIFICANT CHANGE UP (ref 1.6–2.6)
MCHC RBC-ENTMCNC: 30.7 PG — SIGNIFICANT CHANGE UP (ref 27–34)
MCHC RBC-ENTMCNC: 34.3 GM/DL — SIGNIFICANT CHANGE UP (ref 32–36)
MCV RBC AUTO: 89.3 FL — SIGNIFICANT CHANGE UP (ref 80–100)
MONOCYTES # BLD AUTO: 0.01 K/UL — SIGNIFICANT CHANGE UP (ref 0–0.9)
MONOCYTES NFR BLD AUTO: 0.8 % — LOW (ref 2–14)
NEUTROPHILS # BLD AUTO: 1.12 K/UL — LOW (ref 1.8–7.4)
NEUTROPHILS NFR BLD AUTO: 93.4 % — HIGH (ref 43–77)
NRBC # BLD: 0 /100 WBCS — SIGNIFICANT CHANGE UP (ref 0–0)
PHOSPHATE SERPL-MCNC: 3.5 MG/DL — SIGNIFICANT CHANGE UP (ref 2.5–4.5)
PLATELET # BLD AUTO: 101 K/UL — LOW (ref 150–400)
POTASSIUM SERPL-MCNC: 4 MMOL/L — SIGNIFICANT CHANGE UP (ref 3.5–5.3)
POTASSIUM SERPL-SCNC: 4 MMOL/L — SIGNIFICANT CHANGE UP (ref 3.5–5.3)
PROT SERPL-MCNC: 6.4 G/DL — SIGNIFICANT CHANGE UP (ref 6–8.3)
RBC # BLD: 2.61 M/UL — LOW (ref 4.2–5.8)
RBC # FLD: 15.5 % — HIGH (ref 10.3–14.5)
SODIUM SERPL-SCNC: 140 MMOL/L — SIGNIFICANT CHANGE UP (ref 135–145)
WBC # BLD: 1.2 K/UL — LOW (ref 3.8–10.5)
WBC # FLD AUTO: 1.2 K/UL — LOW (ref 3.8–10.5)

## 2021-12-01 PROCEDURE — 86769 SARS-COV-2 COVID-19 ANTIBODY: CPT

## 2021-12-01 PROCEDURE — 36415 COLL VENOUS BLD VENIPUNCTURE: CPT

## 2021-12-01 PROCEDURE — 71045 X-RAY EXAM CHEST 1 VIEW: CPT

## 2021-12-01 PROCEDURE — 85025 COMPLETE CBC W/AUTO DIFF WBC: CPT

## 2021-12-01 PROCEDURE — 83735 ASSAY OF MAGNESIUM: CPT

## 2021-12-01 PROCEDURE — 80053 COMPREHEN METABOLIC PANEL: CPT

## 2021-12-01 PROCEDURE — 84100 ASSAY OF PHOSPHORUS: CPT

## 2021-12-01 PROCEDURE — 99232 SBSQ HOSP IP/OBS MODERATE 35: CPT

## 2021-12-01 RX ORDER — CIPROFLOXACIN LACTATE 400MG/40ML
1 VIAL (ML) INTRAVENOUS
Qty: 0 | Refills: 0 | DISCHARGE

## 2021-12-01 RX ADMIN — CHLORHEXIDINE GLUCONATE 1 APPLICATION(S): 213 SOLUTION TOPICAL at 10:41

## 2021-12-01 RX ADMIN — Medication 2 DROP(S): at 13:09

## 2021-12-01 RX ADMIN — SODIUM CHLORIDE 75 MILLILITER(S): 9 INJECTION INTRAMUSCULAR; INTRAVENOUS; SUBCUTANEOUS at 05:23

## 2021-12-01 RX ADMIN — ONDANSETRON 8 MILLIGRAM(S): 8 TABLET, FILM COATED ORAL at 05:24

## 2021-12-01 RX ADMIN — Medication 2 DROP(S): at 05:41

## 2021-12-01 RX ADMIN — Medication 2 DROP(S): at 00:24

## 2021-12-01 RX ADMIN — Medication 187.47 MILLIGRAM(S): at 05:26

## 2021-12-01 RX ADMIN — AMLODIPINE BESYLATE 5 MILLIGRAM(S): 2.5 TABLET ORAL at 05:27

## 2021-12-01 NOTE — DISCHARGE NOTE NURSING/CASE MANAGEMENT/SOCIAL WORK - [id number]_GEN_ALL_CORE
For information on Fall & Injury Prevention, visit: [email or web address] OR  [email or web address] OR  https://www.cdc.gov/steadi/patient.html

## 2021-12-01 NOTE — PROGRESS NOTE ADULT - PROBLEM SELECTOR PLAN 5
Lovenox 40 mg SQ Daily   Hold when platelet count < 50

## 2021-12-01 NOTE — PROGRESS NOTE ADULT - PROBLEM SELECTOR PLAN 3
unknown etiology  Monitor LFTS daily   Avoid hepatotoxic medications

## 2021-12-01 NOTE — ADVANCED PRACTICE NURSE CONSULT - REASON FOR CONSULT
Chemotherapy Note;    HIDAC   Cytarabine
Chemotherapy Notes : Day 1/5 HIDAC ,dose 1/2  cycle 3 consent in file 
Chemotherapy Note;    HIDAC   Cytarabine DAy 3/5
Chemotherapy Note;    HIDAC   Cytarabine DAy 5/5 dose 1 of 2
Chemotherapy Note;    HIDAC   day 5 of Cytarabine
Chemotherapy Note;    HIDAC   Cytarabine

## 2021-12-01 NOTE — PROGRESS NOTE ADULT - PROBLEM SELECTOR PLAN 2
Pt is not neutropenic , afebrile   If spikes pan culture and CXR Pt is not neutropenic , afebrile   If fever,  pan culture

## 2021-12-01 NOTE — PROGRESS NOTE ADULT - PROBLEM SELECTOR PLAN 1
Admitted for Cycle 3 HiDAC  Cytarabine 3 g/m2 IV over 3 hrs every 12 hrs on days 1,3,5   IV hydration, Antiemetics, daily weight   Monitor for Cerebellar toxicity, nystagmus checks  Follow CBC/CMP, transfuse/replete prn  Continue predforte eye drops to prevent chemical conjunctivitis  Discharge planning on 12/1/21 Admitted for Cycle 3 HiDAC  Cytarabine 3 g/m2 IV over 3 hrs every 12 hrs on days 1,3,5   IV hydration, Antiemetics, daily weight   Monitor for Cerebellar toxicity, nystagmus checks  Follow CBC/CMP, transfuse/replete prn  Continue predforte eye drops to prevent chemical conjunctivitis  Discharge pt today

## 2021-12-01 NOTE — ADVANCED PRACTICE NURSE CONSULT - ASSESSMENT
Pt seen in bed, awake, alert and oriented x 3. Rt arm Piccline in place with dressing dry and intact with no s/s of bleeding, swelling or redness. Which has positive blood return and flushing well with 10cc Normal saline. Lab results wnl, and MD aware . Reeducate pt about chemotherapy administration, possible side effects and expected outcome. Pt verbalized understandings. Zofran 8 mg ivss given Q8hrs as ordered..Pt denies any nausea or vomitting. Chemo drug dosage verified with another RN. Bilateral Nystagmous remains negative. Cytarabine 3gm/m2 = 6240 mg  iv infusion given at 0526 over 3 hrs, on Dec 1st  through Rt arm Piccline with positive blood return via Alaris pump.Pt resting in bed comfortably. 
Pt seen in bed, awake, alert and oriented x 3. Rt arm Piccline in place with dressing dry and intact with no s/s of bleeding, swelling or redness. Which has positive blood return and flushing well with 10cc Normal saline. Lab results wnl, and MD aware . Reeducate pt about chemotherapy administration, possible side effects and expected outcome. Pt verbalized understandings. Zofran 8 mg ivss given Q8hrs as ordered..Pt denies any nausea or vomitting. Chemo drug dosage verified with another RN. Bilateral Nystagmous remains negative. Cytarabine 3gm/m2 = 6240 mg  iv infusion given at 1656 over 3 hrs, on Rt arm Piccline with positive blood return via Alaris pump.Pt resting in bed comfortably. 
Pt seen in bed, awake, alert and oriented x 4. Rt arm Piccline in place with dressing dry and intact with no s/s of bleeding, swelling or redness. Which has positive blood return and flushing well with 10cc Normal saline. Lab results wnl, and MD aware . Reeducate pt about chemotherapy administration, possible side effects and expected outcome. Pt verbalized understandings. Zofran 8 mg ivss given Q8hrs as ordered..Pt denies any nausea or vomitting. Chemo drug dosage verified with another RN. Bilateral Nystagmus remains negative. Cytarabine 3gm/m2 = 6240 mg  iv infusion given at 1700 over 3 hrs, on Rt arm Piccline with positive blood return via Alaris pump.Pt resting in bed comfortably. 
 Pt is admitted today for chemotherapy , alert and oriented X 4.OOB to bathroom and hallway .ALT  96 Lab values reviewed  by CLEVE Maxwell   .  Pt with  RT DL PICC, S/P CXR ,  with +blood return and flushing well with 10 ml NS.  Dressing clean, dry and intact, Site  no swelling or redness noted. Chemotherapy teaching reinforced, pt verbalized understanding. Drug verification done with 2RNs. Nystagmus check done,  negative result. Pt. received zofran 8 mg IVP and emend 150 mg IVSS as premedication . At 1645 Cytarabine 3 gm/m2= 6240 mg  iv attached to saline line to PICC infusing well at 188ml/hr via alaris pump over 3hours . Pt. tolerating well.  Report given to primary RN. Left pt. comfortable in bed. 
Pt seen in bed, awake, alert and oriented x 3. Rt arm Piccline in place with dressing dry and intact with no s/s of bleeding, swelling or redness. Which has positive blood return and flushing well with 10cc Normal saline. Lab results wnl, and MD aware . Reeducate pt about chemotherapy administration, possible side effects and expected outcome. Pt verbalized understandings. Zofran 8 mg ivss given Q8hrs as ordered..Pt denies any nausea or vomitting. Chemo drug dosage verified with another RN. Bilateral Nystagmous remains negative. Cytarabine 3gm/m2 = 6240 mg  iv infusion given at 0555 over 3 hrs, on Nov 27 through Rt arm Piccline with positive blood return via Alaris pump.Pt resting in bed comfortably. 
Pt seen in bed, awake, alert and oriented x 3. Rt arm Piccline in place with dressing dry and intact with no s/s of bleeding, swelling or redness. Which has positive blood return and flushing well with 10cc Normal saline. Lab results wnl, and MD aware . Reeducate pt about chemotherapy administration, possible side effects and expected outcome. Pt verbalized understandings. Zofran 8 mg ivss given Q8hrs as ordered..Pt denies any nausea or vomitting. Chemo drug dosage verified with another RN. Bilateral Nystagmous remains negative. Cytarabine 3gm/m2 = 6240 mg  iv infusion given at 0510 over 3 hrs, on Nov 27 through Rt arm Piccline with positive blood return via Alaris pump.Pt resting in bed comfortably.

## 2021-12-01 NOTE — PROGRESS NOTE ADULT - PROBLEM SELECTOR PLAN 4
Continue Norvasc  Monitor BP closely

## 2021-12-01 NOTE — ADVANCED PRACTICE NURSE CONSULT - RECOMMEDATIONS
Primary RN made aware of present treatment. Monitor Patient 

## 2021-12-01 NOTE — DISCHARGE NOTE NURSING/CASE MANAGEMENT/SOCIAL WORK - PATIENT PORTAL LINK FT
You can access the FollowMyHealth Patient Portal offered by [facility] by registering at the following website: [email or web address]. By joining [facility]’s FollowMyHealth portal, you will also be able to view your health information using other applications (apps) compatible with our system.

## 2021-12-01 NOTE — DISCHARGE NOTE NURSING/CASE MANAGEMENT/SOCIAL WORK - NSDCPNINST_GEN_ALL_CORE
call your doctor or come to the emergency department for any fever greater than 100.4; severe nausea, vomiting, diarrhea, severe pain, headache, and bleeding

## 2021-12-01 NOTE — PROGRESS NOTE ADULT - ATTENDING COMMENTS
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  - OOB to chair   - Mouth care  - DC today

## 2021-12-01 NOTE — PROGRESS NOTE ADULT - SUBJECTIVE AND OBJECTIVE BOX
Diagnosis: AML,. FLT 3 (-)    Protocol/Chemo Regimen: cycle 3 HIDAC    Day: 6    Pt endorsed: no complaints at this time. pt states "I am ready to go home."    Review of Systems: Denies chest pain/sob/pain of any kind/vision changes/dizziness/weakness.      Pain scale: 0/10    Diet: Regular     Allergies    No Known Allergies    Intolerances    cefepime (Rash)      HEME/ONC MEDICATIONS  enoxaparin Injectable 40 milliGRAM(s) SubCutaneous daily      STANDING MEDICATIONS  amLODIPine   Tablet 5 milliGRAM(s) Oral daily  chlorhexidine 2% Cloths 1 Application(s) Topical <User Schedule>  influenza   Vaccine 0.5 milliLiter(s) IntraMuscular once  ondansetron Injectable 8 milliGRAM(s) IV Push every 8 hours  prednisoLONE acetate 1% Suspension 2 Drop(s) Both EYES every 6 hours  sodium chloride 0.9%. 1000 milliLiter(s) IV Continuous <Continuous>      PRN MEDICATIONS  acetaminophen     Tablet .. 650 milliGRAM(s) Oral every 6 hours PRN  bisacodyl 5 milliGRAM(s) Oral every 12 hours PRN  metoclopramide Injectable 10 milliGRAM(s) IV Push every 6 hours PRN  oxyCODONE    IR 5 milliGRAM(s) Oral every 6 hours PRN  polyethylene glycol 3350 17 Gram(s) Oral daily PRN      Vital Signs Last 24 Hrs  T(C): 36.7 (01 Dec 2021 05:58), Max: 36.9 (30 Nov 2021 14:00)  T(F): 98.1 (01 Dec 2021 05:58), Max: 98.4 (30 Nov 2021 14:00)  HR: 82 (01 Dec 2021 05:58) (79 - 93)  BP: 108/65 (01 Dec 2021 05:58) (100/62 - 118/76)  BP(mean): --  RR: 18 (01 Dec 2021 05:58) (18 - 18)  SpO2: 100% (01 Dec 2021 05:58) (95% - 100%)      PHYSICAL EXAM  General: NAD  HEENT: clear oropharynx, anicteric sclera,  CV: (+) S1/S2 RRR  Lungs: clear to auscultation, no wheezes or rales  Abdomen: soft, non-tender, non-distended (+) BS  Ext: no edema, no redness/bruising/tenderness.  Skin: no rash  Neuro: alert and oriented X 3, EOMI, no nystagmus  Central Line: PICC CDI      LABS:                        8.0    1.20  )-----------( 101      ( 01 Dec 2021 07:15 )             23.3         Mean Cell Volume : 89.3 fl  Mean Cell Hemoglobin : 30.7 pg  Mean Cell Hemoglobin Concentration : 34.3 gm/dL  Auto Neutrophil # : 1.12 K/uL  Auto Lymphocyte # : 0.07 K/uL  Auto Monocyte # : 0.01 K/uL  Auto Eosinophil # : 0.00 K/uL  Auto Basophil # : 0.00 K/uL  Auto Neutrophil % : 93.4 %  Auto Lymphocyte % : 5.8 %  Auto Monocyte % : 0.8 %  Auto Eosinophil % : 0.0 %  Auto Basophil % : 0.0 %      11-30    137  |  103  |  10  ----------------------------<  97  4.1   |  25  |  0.92    Ca    9.7      30 Nov 2021 07:19  Phos  4.1     11-30  Mg     2.3     11-30    TPro  6.7  /  Alb  4.2  /  TBili  0.6  /  DBili  x   /  AST  36  /  ALT  91<H>  /  AlkPhos  76  11-30        RADIOLOGY & ADDITIONAL STUDIES:    < from: Xray Chest 1 View- PORTABLE-Urgent (Xray Chest 1 View- PORTABLE-Urgent .) (11.26.21 @ 10:35) >  FINDINGS/  IMPRESSION:  Right arm PICC line tip overlies SVC.  The cardiomediastinal silhouette is within normal limits.  Lungs are clear. No pleural effusion or pneumothorax.  The bony structures are intact.       Diagnosis: AML,. FLT 3 (-)    Protocol/Chemo Regimen: cycle 3 HIDAC    Day: 6    Pt endorsed: no complaints at this time. pt states "I am ready to go home."    Review of Systems: Denies chest pain/sob/pain of any kind/vision changes/dizziness/weakness.      Pain scale: 0/10    Diet: Regular     Allergies    No Known Allergies    Intolerances    cefepime (Rash)      HEME/ONC MEDICATIONS  enoxaparin Injectable 40 milliGRAM(s) SubCutaneous daily      STANDING MEDICATIONS  amLODIPine   Tablet 5 milliGRAM(s) Oral daily  chlorhexidine 2% Cloths 1 Application(s) Topical <User Schedule>  influenza   Vaccine 0.5 milliLiter(s) IntraMuscular once  ondansetron Injectable 8 milliGRAM(s) IV Push every 8 hours  prednisoLONE acetate 1% Suspension 2 Drop(s) Both EYES every 6 hours  sodium chloride 0.9%. 1000 milliLiter(s) IV Continuous <Continuous>      PRN MEDICATIONS  acetaminophen     Tablet .. 650 milliGRAM(s) Oral every 6 hours PRN  bisacodyl 5 milliGRAM(s) Oral every 12 hours PRN  metoclopramide Injectable 10 milliGRAM(s) IV Push every 6 hours PRN  oxyCODONE    IR 5 milliGRAM(s) Oral every 6 hours PRN  polyethylene glycol 3350 17 Gram(s) Oral daily PRN      Vital Signs Last 24 Hrs  T(C): 36.7 (01 Dec 2021 05:58), Max: 36.9 (30 Nov 2021 14:00)  T(F): 98.1 (01 Dec 2021 05:58), Max: 98.4 (30 Nov 2021 14:00)  HR: 82 (01 Dec 2021 05:58) (79 - 93)  BP: 108/65 (01 Dec 2021 05:58) (100/62 - 118/76)  BP(mean): --  RR: 18 (01 Dec 2021 05:58) (18 - 18)  SpO2: 100% (01 Dec 2021 05:58) (95% - 100%)      PHYSICAL EXAM  General: NAD  HEENT: clear oropharynx, anicteric sclera,  CV: (+) S1/S2 RRR  Lungs: clear to auscultation, no wheezes or rales  Abdomen: soft, non-tender, non-distended (+) BS  Ext: no edema, no redness/bruising/tenderness.  Skin: no rash  Neuro: alert and oriented X 3, EOMI, no nystagmus  Central Line: PICC CDI      LABS:                          8.0    1.20  )-----------( 101      ( 01 Dec 2021 07:15 )             23.3         Mean Cell Volume : 89.3 fl  Mean Cell Hemoglobin : 30.7 pg  Mean Cell Hemoglobin Concentration : 34.3 gm/dL  Auto Neutrophil # : 1.12 K/uL  Auto Lymphocyte # : 0.07 K/uL  Auto Monocyte # : 0.01 K/uL  Auto Eosinophil # : 0.00 K/uL  Auto Basophil # : 0.00 K/uL  Auto Neutrophil % : 93.4 %  Auto Lymphocyte % : 5.8 %  Auto Monocyte % : 0.8 %  Auto Eosinophil % : 0.0 %  Auto Basophil % : 0.0 %      12-01    140  |  105  |  15  ----------------------------<  102<H>  4.0   |  21<L>  |  0.79    Ca    9.1      01 Dec 2021 07:15  Phos  3.5     12-01  Mg     2.1     12-01    TPro  6.4  /  Alb  3.8  /  TBili  0.3  /  DBili  x   /  AST  48<H>  /  ALT  107<H>  /  AlkPhos  79  12-01      RADIOLOGY & ADDITIONAL STUDIES:    < from: Xray Chest 1 View- PORTABLE-Urgent (Xray Chest 1 View- PORTABLE-Urgent .) (11.26.21 @ 10:35) >  FINDINGS/  IMPRESSION:  Right arm PICC line tip overlies SVC.  The cardiomediastinal silhouette is within normal limits.  Lungs are clear. No pleural effusion or pneumothorax.  The bony structures are intact.

## 2021-12-01 NOTE — DISCHARGE NOTE NURSING/CASE MANAGEMENT/SOCIAL WORK - NSDCFUADDAPPT_GEN_ALL_CORE_FT
To [facility] on Thursday 12/2 at 3 pm  to see Rosy MOJICA, you will stay for 4PM injection of Fulphila  Your lab appointments  are the following   To [facility] on Monday 12/6 at 3:15 pm  To [facility] on Thursday 12/9 at 8:15 am   To [facility] on Monday 12/13 at 2:45 pm

## 2021-12-02 ENCOUNTER — APPOINTMENT (OUTPATIENT)
Dept: INFUSION THERAPY | Facility: HOSPITAL | Age: 36
End: 2021-12-02

## 2021-12-02 ENCOUNTER — APPOINTMENT (OUTPATIENT)
Dept: HEMATOLOGY ONCOLOGY | Facility: CLINIC | Age: 36
End: 2021-12-02
Payer: COMMERCIAL

## 2021-12-02 ENCOUNTER — RESULT REVIEW (OUTPATIENT)
Age: 36
End: 2021-12-02

## 2021-12-02 VITALS
HEART RATE: 114 BPM | TEMPERATURE: 97.1 F | OXYGEN SATURATION: 100 % | SYSTOLIC BLOOD PRESSURE: 117 MMHG | BODY MASS INDEX: 27.62 KG/M2 | DIASTOLIC BLOOD PRESSURE: 83 MMHG | RESPIRATION RATE: 16 BRPM | WEIGHT: 196.21 LBS

## 2021-12-02 LAB
BASOPHILS # BLD AUTO: 0 K/UL — SIGNIFICANT CHANGE UP (ref 0–0.2)
BASOPHILS NFR BLD AUTO: 0 % — SIGNIFICANT CHANGE UP (ref 0–2)
EOSINOPHIL # BLD AUTO: 0 K/UL — SIGNIFICANT CHANGE UP (ref 0–0.5)
EOSINOPHIL NFR BLD AUTO: 0 % — SIGNIFICANT CHANGE UP (ref 0–6)
HCT VFR BLD CALC: 25.6 % — LOW (ref 39–50)
HGB BLD-MCNC: 8.9 G/DL — LOW (ref 13–17)
LYMPHOCYTES # BLD AUTO: 0.08 K/UL — LOW (ref 1–3.3)
LYMPHOCYTES # BLD AUTO: 7 % — LOW (ref 13–44)
MCHC RBC-ENTMCNC: 31 PG — SIGNIFICANT CHANGE UP (ref 27–34)
MCHC RBC-ENTMCNC: 34.8 G/DL — SIGNIFICANT CHANGE UP (ref 32–36)
MCV RBC AUTO: 89.2 FL — SIGNIFICANT CHANGE UP (ref 80–100)
MONOCYTES # BLD AUTO: 0 K/UL — SIGNIFICANT CHANGE UP (ref 0–0.9)
MONOCYTES NFR BLD AUTO: 0 % — LOW (ref 2–14)
NEUTROPHILS # BLD AUTO: 1.13 K/UL — LOW (ref 1.8–7.4)
NEUTROPHILS NFR BLD AUTO: 93 % — HIGH (ref 43–77)
NRBC # BLD: 0 /100 — SIGNIFICANT CHANGE UP (ref 0–0)
NRBC # BLD: SIGNIFICANT CHANGE UP /100 WBCS (ref 0–0)
PLAT MORPH BLD: NORMAL — SIGNIFICANT CHANGE UP
PLATELET # BLD AUTO: 76 K/UL — LOW (ref 150–400)
RBC # BLD: 2.87 M/UL — LOW (ref 4.2–5.8)
RBC # FLD: 15.4 % — HIGH (ref 10.3–14.5)
RBC BLD AUTO: SIGNIFICANT CHANGE UP
WBC # BLD: 1.21 K/UL — LOW (ref 3.8–10.5)
WBC # FLD AUTO: 1.21 K/UL — LOW (ref 3.8–10.5)

## 2021-12-02 PROCEDURE — 99214 OFFICE O/P EST MOD 30 MIN: CPT

## 2021-12-03 ENCOUNTER — OUTPATIENT (OUTPATIENT)
Dept: OUTPATIENT SERVICES | Facility: HOSPITAL | Age: 36
LOS: 1 days | End: 2021-12-03
Payer: COMMERCIAL

## 2021-12-03 DIAGNOSIS — Z51.89 ENCOUNTER FOR OTHER SPECIFIED AFTERCARE: ICD-10-CM

## 2021-12-03 DIAGNOSIS — C92.00 ACUTE MYELOBLASTIC LEUKEMIA, NOT HAVING ACHIEVED REMISSION: ICD-10-CM

## 2021-12-03 LAB
ALBUMIN SERPL ELPH-MCNC: 4.6 G/DL
ALP BLD-CCNC: 86 U/L
ALT SERPL-CCNC: 131 U/L
ANION GAP SERPL CALC-SCNC: 14 MMOL/L
AST SERPL-CCNC: 59 U/L
BILIRUB SERPL-MCNC: 0.7 MG/DL
BUN SERPL-MCNC: 15 MG/DL
CALCIUM SERPL-MCNC: 9.6 MG/DL
CHLORIDE SERPL-SCNC: 103 MMOL/L
CO2 SERPL-SCNC: 23 MMOL/L
CREAT SERPL-MCNC: 0.95 MG/DL
GLUCOSE SERPL-MCNC: 86 MG/DL
POTASSIUM SERPL-SCNC: 3.7 MMOL/L
PROT SERPL-MCNC: 7.1 G/DL
SODIUM SERPL-SCNC: 141 MMOL/L

## 2021-12-03 NOTE — HISTORY OF PRESENT ILLNESS
[de-identified] : 35yo M w/ HTN and newly diagnosed AML here for f/u. \par \par He presented to the hospital on 7/30/21 with complaints of dizziness, fatigue and severe RUQ pain for 3 days. CT A/P revealed fatty liver and small R pleural effusion. WBC 12k with 17% blasts.Peripheral flow cytometry showed 13% myeloblasts. FLT3(-). BMbx was done on 8/4/21 - confirmed AML. 46,XY        {20        }\par Foundation: mutations in DNMT3A R882H, NRAS G12D, NPM1 W288fs*12\par Pt consented to [place]. Patient was offered sperm banking, but declined.\par On 8/6, induction with Dauno/Cytararbine was started (cytarabine 100/m2 and dauno 90/m2) \par He received a seven day course of Zosyn for presumed RUL PNA, then transitioned to  levaquin, posaconazole and Acyclovir for ppx for neutropenia. Course c/b neutropenic fevers, cultures negative, repeat CT 8/14 without infectious source. He was on Cefepime but developed a rash, changed to meropenem. Rash improved. \par Day 14 BMbx on 8/19 was hypocellular with chemotherapeutic effect; however, per hematopathology, appears to be earlier regeneration than would typically seen which is concerning for persistent disease at this point, and the earliest cells are CD34 positive and his myeloblasts on initial presentation were CD34 negative. Thus, this may simply represent early regeneration of his marrow. Plan is to await count recovery and repeat biopsy.  \par Patient discharged home on 8/29/21 when ANC >500 [de-identified] : Eating well since discharge. \par c/o hemorrhoidal pain. Hemorrhoids started a few days prior to discharge. He was sent home with rectal ointment and witch hazel. \par \par BMBx done on 9/2: Cellular bone marrow with trilineage hematopoiesis with maturation and megakaryocytosis (history of AML).\par Pt feels well, CBC today showed count stable\par \par s/p C1 HIDAC 9/17-9/22 \par c/o leg pain after chemo in the hospital \par \par He presented to the ED on 10/9 due to fever the night of presentation. The patient went to sleep around 10pm and woke up at 3am feeling warm and clammy. He took his temperature orally at home and it was 100.7. The day prior to presentation (10/8/21), the patient went to [facility] for an appointment to get his platelet count checked. He states he was feeling a bit run down and thought he was going to need a transfusion, but he did not need a transfusion. He was afebrile during the time of the appointment and went home afterwards. Around 3pm, the patient had bilateral crampy leg pain that was similar to the leg pain he felt during his consolidation therapy treatment. He took hydrocodone and the pain improved. The patient had one episode of diarrhea three days prior to presentation and has been bothered by rectal pain associated with his "large hemorrhoids. He denies any bleeding per rectum. He also feels like his mouth has been more dry than usual over the past several days, but denies all other symptoms. \par CT Abdomen and Pelvis w/ Oral Cont and w/ IV Conttrast on 10/9 revealed Region of hypoenhancement upper pole left kidney; please correlate clinically for possible pyelonephritis. No hydronephrosis or perinephric stranding. No rectal abscess.\par CT Chest No Contrast on 10/9 revealed Clear lungs.\par 10/9- BCX NGTD and 10/9- UCX (-)\par He ate some cold salad late last night, had upsetting stomach and diarrhea this morning. Will take Imodium for diarrhea. \par \par C2 is due on 10/15, pt wants deferring to next Monday after his diarrhea improved. \par Admission of  C2 was postponed due to worsening diarrhea. Went to ED on 10/15 due to worsening watery diarrhea. GI PCR neg, C Diff neg\par Given negative stool studies, suspect diarrhea was likely chemo-related. S/p cycle 2 Consolidation with HIDAC started on 10/25. Patient received IV hydration, strict I/O, antiemetics, monitoring of CBC and CMP and for cerebellar toxicity. PRedforte eye drops given to prevent chemical conjunctivitis. Patient with fever throughout course of treatment. Cultures negative. Due to fever probably related to HIDAC, patient received dexamethasone prior to the last 2 doses of cytatabine. \par He is seen today accompanied by his father, pt feels fatigue after chemo, other than that he feels fine. Denied any fever, chills diarrhea. \par \par Pt was admitted on 11/13-11/17 s/p right first digit laceration repair on 11/12 and presenting with erythema and pain at the site of laceration repair. Patient reported he was feeling unwell prior to suture placement with body aches, chills, night sweats and subjective fevers. Patient last BM 4 days ago. Has bruise to left inner thigh. Patient reports he keeps his PICC site clean and intact which was placed in 9/2021. Upon admission to the hospital ID was consulted started on Zosyn , GI consulted for rectal pain secondary to hemorrhoids and palliative consulted for pain control.\par  \par C3 on 11/26, tolerated well. He was seen today after discharge, accompanied by his father. He admitted feeling tired, denied any f/c, diarrhea. Right hand suture site healing well, no abnormal erythema or discharge. \par He will have Fulphilia today. \par C3 HIDAC 11/26-12/1

## 2021-12-03 NOTE — ASSESSMENT
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 \par Started induction with Dauno/Cytarabine on 8/6. \par Pt's counts now recovered, remission marrow later done on 9/2- in remission. Proceed to consolidation with 4 cycles of HIDAC. C1 HIDAC on 9/17, c/b neutropenic fever and diarrhea. C2 HIDAC on 10/25, was postponed for 1 week due to admission for diarrhea, given negative stool studies, suspect diarrhea was likely chemo-related. Pt was admitted again 11/13-11/17 for sepsis due to thumb cellulitis after laceration. C3 on 11/26, will have Fulphilia today. No complication so far\par Reviewed CBC today with pt, Platelet 76, he does not need platelet transfusion today, will continue possible platelet M, W and F until count recovered\par ANC today is 1.13, he is not on levaquine and fluconazole given he is not neutropenic yet, will restart Levaquine and fluconazole if ANC < 1.0. Neutropenic fever precaution explained in detail to pt, pt is aware of ED visit for temp >100.4F in the setting of neutropenia. Patient verbalized understanding and agreed.\par will arrange admission for C4 after count recovered\par cont hemorrhoidal ointment and witch hazel. Sent oxycodone for pain relief\par All questions answered\par RTC in 3 weeks\par \par Case and management discussed with Dr. Yancey\par \par \par \par \par

## 2021-12-03 NOTE — PHYSICAL EXAM
[Fully active, able to carry on all pre-disease performance without restriction] : Status 0 - Fully active, able to carry on all pre-disease performance without restriction [Normal] : affect appropriate [de-identified] : a 1 cm long laceration with sutures on the right thrum, healing well

## 2021-12-06 ENCOUNTER — RESULT REVIEW (OUTPATIENT)
Age: 36
End: 2021-12-06

## 2021-12-06 ENCOUNTER — APPOINTMENT (OUTPATIENT)
Dept: INFUSION THERAPY | Facility: HOSPITAL | Age: 36
End: 2021-12-06

## 2021-12-06 ENCOUNTER — APPOINTMENT (OUTPATIENT)
Dept: HEMATOLOGY ONCOLOGY | Facility: CLINIC | Age: 36
End: 2021-12-06

## 2021-12-06 ENCOUNTER — NON-APPOINTMENT (OUTPATIENT)
Age: 36
End: 2021-12-06

## 2021-12-06 LAB
HCT VFR BLD CALC: 19.6 % — CRITICAL LOW (ref 39–50)
HGB BLD-MCNC: 7.2 G/DL — LOW (ref 13–17)
MCHC RBC-ENTMCNC: 31.2 PG — SIGNIFICANT CHANGE UP (ref 27–34)
MCHC RBC-ENTMCNC: 36.7 G/DL — HIGH (ref 32–36)
MCV RBC AUTO: 84.8 FL — SIGNIFICANT CHANGE UP (ref 80–100)
NRBC # BLD: 0 /100 WBCS — SIGNIFICANT CHANGE UP (ref 0–0)
PLATELET # BLD AUTO: 5 K/UL — CRITICAL LOW (ref 150–400)
RBC # BLD: 2.31 M/UL — LOW (ref 4.2–5.8)
RBC # FLD: 15.1 % — HIGH (ref 10.3–14.5)
WBC # BLD: 0.39 K/UL — CRITICAL LOW (ref 3.8–10.5)
WBC # FLD AUTO: 0.39 K/UL — CRITICAL LOW (ref 3.8–10.5)

## 2021-12-06 PROCEDURE — 86923 COMPATIBILITY TEST ELECTRIC: CPT

## 2021-12-06 PROCEDURE — 86850 RBC ANTIBODY SCREEN: CPT

## 2021-12-06 PROCEDURE — 86900 BLOOD TYPING SEROLOGIC ABO: CPT

## 2021-12-06 PROCEDURE — 86901 BLOOD TYPING SEROLOGIC RH(D): CPT

## 2021-12-07 ENCOUNTER — APPOINTMENT (OUTPATIENT)
Dept: INFUSION THERAPY | Facility: HOSPITAL | Age: 36
End: 2021-12-07

## 2021-12-08 ENCOUNTER — NON-APPOINTMENT (OUTPATIENT)
Age: 36
End: 2021-12-08

## 2021-12-08 ENCOUNTER — RESULT REVIEW (OUTPATIENT)
Age: 36
End: 2021-12-08

## 2021-12-08 ENCOUNTER — APPOINTMENT (OUTPATIENT)
Dept: INFUSION THERAPY | Facility: HOSPITAL | Age: 36
End: 2021-12-08

## 2021-12-08 ENCOUNTER — LABORATORY RESULT (OUTPATIENT)
Age: 36
End: 2021-12-08

## 2021-12-08 LAB
HCT VFR BLD CALC: 25.3 % — LOW (ref 39–50)
HGB BLD-MCNC: 9.5 G/DL — LOW (ref 13–17)
MCHC RBC-ENTMCNC: 31.1 PG — SIGNIFICANT CHANGE UP (ref 27–34)
MCHC RBC-ENTMCNC: 37.5 G/DL — HIGH (ref 32–36)
MCV RBC AUTO: 83 FL — SIGNIFICANT CHANGE UP (ref 80–100)
NRBC # BLD: 0 /100 WBCS — SIGNIFICANT CHANGE UP (ref 0–0)
PLATELET # BLD AUTO: 2 K/UL — CRITICAL LOW (ref 150–400)
PLATELET # BLD MANUAL: 20 K/UL — CRITICAL LOW (ref 150–400)
RBC # BLD: 3.05 M/UL — LOW (ref 4.2–5.8)
RBC # FLD: 13.8 % — SIGNIFICANT CHANGE UP (ref 10.3–14.5)
WBC # BLD: 0.25 K/UL — CRITICAL LOW (ref 3.8–10.5)
WBC # FLD AUTO: 0.25 K/UL — CRITICAL LOW (ref 3.8–10.5)

## 2021-12-09 ENCOUNTER — APPOINTMENT (OUTPATIENT)
Dept: HEMATOLOGY ONCOLOGY | Facility: CLINIC | Age: 36
End: 2021-12-09

## 2021-12-10 ENCOUNTER — NON-APPOINTMENT (OUTPATIENT)
Age: 36
End: 2021-12-10

## 2021-12-10 ENCOUNTER — RESULT REVIEW (OUTPATIENT)
Age: 36
End: 2021-12-10

## 2021-12-10 ENCOUNTER — APPOINTMENT (OUTPATIENT)
Dept: INFUSION THERAPY | Facility: HOSPITAL | Age: 36
End: 2021-12-10

## 2021-12-10 LAB
HCT VFR BLD CALC: 23.7 % — LOW (ref 39–50)
HGB BLD-MCNC: 8.5 G/DL — LOW (ref 13–17)
MCHC RBC-ENTMCNC: 30.1 PG — SIGNIFICANT CHANGE UP (ref 27–34)
MCHC RBC-ENTMCNC: 35.9 G/DL — SIGNIFICANT CHANGE UP (ref 32–36)
MCV RBC AUTO: 84 FL — SIGNIFICANT CHANGE UP (ref 80–100)
NRBC # BLD: 0 /100 WBCS — SIGNIFICANT CHANGE UP (ref 0–0)
PLATELET # BLD AUTO: 7 K/UL — CRITICAL LOW (ref 150–400)
RBC # BLD: 2.82 M/UL — LOW (ref 4.2–5.8)
RBC # FLD: 13.3 % — SIGNIFICANT CHANGE UP (ref 10.3–14.5)
WBC # BLD: 0.48 K/UL — CRITICAL LOW (ref 3.8–10.5)
WBC # FLD AUTO: 0.48 K/UL — CRITICAL LOW (ref 3.8–10.5)

## 2021-12-11 ENCOUNTER — INPATIENT (INPATIENT)
Facility: HOSPITAL | Age: 36
LOS: 4 days | Discharge: HOME | DRG: 150 | End: 2021-12-16
Attending: INTERNAL MEDICINE | Admitting: INTERNAL MEDICINE
Payer: COMMERCIAL

## 2021-12-11 VITALS
DIASTOLIC BLOOD PRESSURE: 85 MMHG | HEIGHT: 71 IN | SYSTOLIC BLOOD PRESSURE: 126 MMHG | TEMPERATURE: 98 F | WEIGHT: 195.11 LBS | RESPIRATION RATE: 20 BRPM | OXYGEN SATURATION: 100 % | HEART RATE: 138 BPM

## 2021-12-11 LAB
ALBUMIN SERPL ELPH-MCNC: 4.5 G/DL — SIGNIFICANT CHANGE UP (ref 3.3–5)
ALP SERPL-CCNC: 109 U/L — SIGNIFICANT CHANGE UP (ref 40–120)
ALT FLD-CCNC: 57 U/L — HIGH (ref 10–45)
ANION GAP SERPL CALC-SCNC: 15 MMOL/L — SIGNIFICANT CHANGE UP (ref 5–17)
APTT BLD: 43.4 SEC — HIGH (ref 27.5–35.5)
AST SERPL-CCNC: 18 U/L — SIGNIFICANT CHANGE UP (ref 10–40)
BASOPHILS # BLD AUTO: 0 K/UL — SIGNIFICANT CHANGE UP (ref 0–0.2)
BASOPHILS NFR BLD AUTO: 0 % — SIGNIFICANT CHANGE UP (ref 0–2)
BILIRUB SERPL-MCNC: 0.4 MG/DL — SIGNIFICANT CHANGE UP (ref 0.2–1.2)
BLD GP AB SCN SERPL QL: NEGATIVE — SIGNIFICANT CHANGE UP
BUN SERPL-MCNC: 18 MG/DL — SIGNIFICANT CHANGE UP (ref 7–23)
CALCIUM SERPL-MCNC: 10.2 MG/DL — SIGNIFICANT CHANGE UP (ref 8.4–10.5)
CHLORIDE SERPL-SCNC: 101 MMOL/L — SIGNIFICANT CHANGE UP (ref 96–108)
CO2 SERPL-SCNC: 22 MMOL/L — SIGNIFICANT CHANGE UP (ref 22–31)
CREAT SERPL-MCNC: 0.96 MG/DL — SIGNIFICANT CHANGE UP (ref 0.5–1.3)
EOSINOPHIL # BLD AUTO: 0 K/UL — SIGNIFICANT CHANGE UP (ref 0–0.5)
EOSINOPHIL NFR BLD AUTO: 0 % — SIGNIFICANT CHANGE UP (ref 0–6)
GLUCOSE SERPL-MCNC: 101 MG/DL — HIGH (ref 70–99)
HCT VFR BLD CALC: 23.9 % — LOW (ref 39–50)
HGB BLD-MCNC: 8.7 G/DL — LOW (ref 13–17)
INR BLD: 1.16 RATIO — SIGNIFICANT CHANGE UP (ref 0.88–1.16)
LYMPHOCYTES # BLD AUTO: 0.48 K/UL — LOW (ref 1–3.3)
LYMPHOCYTES # BLD AUTO: 71 % — HIGH (ref 13–44)
MCHC RBC-ENTMCNC: 30.6 PG — SIGNIFICANT CHANGE UP (ref 27–34)
MCHC RBC-ENTMCNC: 36.4 GM/DL — HIGH (ref 32–36)
MCV RBC AUTO: 84.2 FL — SIGNIFICANT CHANGE UP (ref 80–100)
MONOCYTES # BLD AUTO: 0.02 K/UL — SIGNIFICANT CHANGE UP (ref 0–0.9)
MONOCYTES NFR BLD AUTO: 3 % — SIGNIFICANT CHANGE UP (ref 2–14)
NEUTROPHILS # BLD AUTO: 0.1 K/UL — LOW (ref 1.8–7.4)
NEUTROPHILS NFR BLD AUTO: 14 % — LOW (ref 43–77)
PLATELET # BLD AUTO: 15 K/UL — CRITICAL LOW (ref 150–400)
POTASSIUM SERPL-MCNC: 3.7 MMOL/L — SIGNIFICANT CHANGE UP (ref 3.5–5.3)
POTASSIUM SERPL-SCNC: 3.7 MMOL/L — SIGNIFICANT CHANGE UP (ref 3.5–5.3)
PROT SERPL-MCNC: 7.5 G/DL — SIGNIFICANT CHANGE UP (ref 6–8.3)
PROTHROM AB SERPL-ACNC: 13.8 SEC — HIGH (ref 10.6–13.6)
RBC # BLD: 2.84 M/UL — LOW (ref 4.2–5.8)
RBC # FLD: 13.2 % — SIGNIFICANT CHANGE UP (ref 10.3–14.5)
RH IG SCN BLD-IMP: POSITIVE — SIGNIFICANT CHANGE UP
SODIUM SERPL-SCNC: 138 MMOL/L — SIGNIFICANT CHANGE UP (ref 135–145)
WBC # BLD: 0.68 K/UL — CRITICAL LOW (ref 3.8–10.5)
WBC # FLD AUTO: 0.68 K/UL — CRITICAL LOW (ref 3.8–10.5)

## 2021-12-11 PROCEDURE — 71045 X-RAY EXAM CHEST 1 VIEW: CPT | Mod: 26

## 2021-12-11 PROCEDURE — 99291 CRITICAL CARE FIRST HOUR: CPT | Mod: 25

## 2021-12-11 PROCEDURE — 93010 ELECTROCARDIOGRAM REPORT: CPT

## 2021-12-11 RX ORDER — ACETAMINOPHEN 500 MG
650 TABLET ORAL ONCE
Refills: 0 | Status: COMPLETED | OUTPATIENT
Start: 2021-12-11 | End: 2021-12-11

## 2021-12-11 RX ORDER — OXYCODONE HYDROCHLORIDE 5 MG/1
5 TABLET ORAL ONCE
Refills: 0 | Status: DISCONTINUED | OUTPATIENT
Start: 2021-12-11 | End: 2021-12-11

## 2021-12-11 RX ORDER — MAGNESIUM SULFATE 500 MG/ML
1 VIAL (ML) INJECTION ONCE
Refills: 0 | Status: COMPLETED | OUTPATIENT
Start: 2021-12-11 | End: 2021-12-11

## 2021-12-11 RX ORDER — POTASSIUM CHLORIDE 20 MEQ
40 PACKET (EA) ORAL ONCE
Refills: 0 | Status: COMPLETED | OUTPATIENT
Start: 2021-12-11 | End: 2021-12-11

## 2021-12-11 RX ADMIN — Medication 650 MILLIGRAM(S): at 22:00

## 2021-12-11 RX ADMIN — OXYCODONE HYDROCHLORIDE 5 MILLIGRAM(S): 5 TABLET ORAL at 19:19

## 2021-12-11 RX ADMIN — Medication 650 MILLIGRAM(S): at 21:54

## 2021-12-11 RX ADMIN — OXYCODONE HYDROCHLORIDE 5 MILLIGRAM(S): 5 TABLET ORAL at 20:00

## 2021-12-11 NOTE — ED PROVIDER NOTE - NS ED ROS FT
Constitutional:  See HPI  Eyes:  No visual changes  ENMT: No neck pain or stiffness; +L epistaxis  Cardiac:  No chest pain  Respiratory:  No cough or respiratory distress.   GI:  No nausea, vomiting, diarrhea or abdominal pain.  MS:  No back pain.  Neuro:  No headache   Skin:  No easy bruising   Except as documented in the HPI,  all other systems are negative

## 2021-12-11 NOTE — ED ADULT NURSE NOTE - OBJECTIVE STATEMENT
Pt is a 37 yo M who came to the ED amb c/o lt sided nosebleed 2 hours ago. Hx AML, is on his 3rd round of chemo. He received Neulasta shot on 12/6, a unit of PRBC's on 12/7 and one unit of platelets on 12/6, 12/8 and 12/10. States he feels pressure on the left side of his face, like he has a sinus infection, and feels a "booger" in the back of his throat. No active bleeding at present, but pt is coughing up blood from the back of his throat. Denies fever/chills, A/O x3.  Rt PICC line in place.

## 2021-12-11 NOTE — ED PROVIDER NOTE - OBJECTIVE STATEMENT
37yo M with PMH of HTN, AML on chemo presents for epistaxis. Last chemo 12/1, since then had steadily worsening pancytopenia. Received platelets 12/6, 8, 10 and blood x2 PRBC U on 12/7. No hx of bleeding issues prior. Was blowing nose 2hr prior to arrival when L nostril started to bleed, held pressure w/o improvement so came to ED. Feels mild L sinus congestion, no fever, cough, SOB, abd pain, CP, NDVC. No headache or neuro findings. Does feel a little weaker than usual.

## 2021-12-11 NOTE — ED ADULT NURSE REASSESSMENT NOTE - NS ED NURSE REASSESS COMMENT FT1
Patient febrile to 100.3 upon pre VS for blood transfusion. MD Sharma made aware- okay to start blood transfusion. patient remains tachycardic to 130s; MD aware.

## 2021-12-11 NOTE — ED PROVIDER NOTE - PROGRESS NOTE DETAILS
guadalupe pgy1: spoke to heme fellow - pt may have refractory thrombocytopenia. Doesn't seem to be responding appropriately to transfusion. Will check labs, if plt <15, transfuse 1U. Check CBC 1hr after, if not appropriate admit. Needs 1/2 U over 3hr every 12hr for refractory thrombocytopenia. guadalupe pgy1: pt was becoming more tachy, BP remained borderline but pt was clearly uncomfortable and continuing to ooze from L nostril Pt also irritating area and pulling gauze/onoff. Told to hold pressure, tried ice and given everything will give blood and plts.    Spoke to heme - agree to admit. Had temp 100.3, and given severe neutropenia would suggest just levaquin and cultures. Aware to be admitted, will see in AM. Attending Max: brief run of SVT per war room. Lasted seconds. Pt now sinus tach 120s. Will continue to monitor. Will plan for placing on tele when admitted. Spoke w/ Dr. Hudson who will be accepting hospitalist. Marck, PGY-2  Mild oozing noted in b/l nares; packed with TXA soaked gauze

## 2021-12-11 NOTE — ED PROVIDER NOTE - CLINICAL SUMMARY MEDICAL DECISION MAKING FREE TEXT BOX
guadalupe pgy1: 35yo M with AML and thrombocytopenia presents to ED for epistaxis. Last plt 7, has received 3U plts this week alone. check labs, transfuse if plt <15. Heme consult. guadalupe pgy1: 37yo M with AML and thrombocytopenia presents to ED for epistaxis. Last plt 7, has received 3U plts this week alone. check labs, transfuse if plt <15. Heme consult.    Attending Max: 35 y/o M w/ PMH of HTN, AML on chemo (last session approx 10 days) presenting w/ epistaxis. Seen in [place], w/ father. Reporting approx 2 hours prior to arrival guadalupe pgy1: 35yo M with AML and thrombocytopenia presents to ED for epistaxis. Last plt 7, has received 3U plts this week alone. check labs, transfuse if plt <15. Heme consult.    Attending Max: 35 y/o M w/ PMH of HTN, AML on chemo (last session approx 10 days) presenting w/ epistaxis. Seen in [place], w/ father. Reporting approx 2 hours prior to arrival. guadalupe pgy1: 37yo M with AML and thrombocytopenia presents to ED for epistaxis. Last plt 7, has received 3U plts this week alone. check labs, transfuse if plt <15. Heme consult.    Attending Max: 35 y/o M w/ PMH of HTN, AML on chemo (last session approx 10 days) presenting w/ epistaxis. Seen in [place], w/ father. Reporting approx 2 hours prior to arrival L nares epistaxis that did not stop until after ED arrival. Reporting feeling sinus congestion on L side and when he blew his nose, bleeding started. Denies trauma to the area. Has received PRBCs and Platelets over the past week due to anemia and thrombocytopenia, last platelets was yesterday. No acute distress on exam. Lungs clear. HR tachycardic. Abd nondistended/soft/nontender. L nares w/o active bleeding, scant amount of blood in posterior oropharynx. Bleeding controlled at this time, will obs to ensure bleeding does not reoccur. W/ hx of product transfusion, will eval cell counts. Concerned for continued bleeding w/ hx of thrombocytopenia. Will touch base w/ heme/onc. Plan for labs, meds PRN, possible blood products. Will reassess the need for additional interventions as clinically warranted.

## 2021-12-11 NOTE — ED PROVIDER NOTE - CARE PLAN
1 Principal Discharge DX:	Epistaxis   Principal Discharge DX:	Epistaxis  Secondary Diagnosis:	Anemia  Secondary Diagnosis:	Thrombocytopenia  Secondary Diagnosis:	Neutropenic

## 2021-12-11 NOTE — ED PROVIDER NOTE - PHYSICAL EXAMINATION
CONSTITUTIONAL: NAD  SKIN: Warm dry, no petechiae or bruising noted  HEAD: NCAT  EYES: NL inspection  ENT: MMM; L nostril some erythema on medial septal wall not active bleeding, some blood at back of oral pharynx w/o erythema   NECK: Supple; non tender.  CARD: RRR  RESP: CTAB  ABD: S/NT no R/G  EXT: no pedal edema; R arm PICC in place.   NEURO: Grossly unremarkable  PSYCH: Cooperative, appropriate.

## 2021-12-11 NOTE — ED ADULT NURSE REASSESSMENT NOTE - NS ED NURSE REASSESS COMMENT FT1
Patient denies cp, fevers, back pain, any other transfusion reaction symptoms upon 15 minute reassessment at 2205. Will give platelets following PRBC. Will send 1 hour CBC following blood products along with blood cultures as per MD Sharma. okay to hold on antibiotics. awaiting dispo.

## 2021-12-12 DIAGNOSIS — R04.0 EPISTAXIS: ICD-10-CM

## 2021-12-12 LAB
APPEARANCE UR: CLEAR — SIGNIFICANT CHANGE UP
BILIRUB UR-MCNC: NEGATIVE — SIGNIFICANT CHANGE UP
COLOR SPEC: SIGNIFICANT CHANGE UP
CULTURE RESULTS: NO GROWTH — SIGNIFICANT CHANGE UP
DIFF PNL FLD: NEGATIVE — SIGNIFICANT CHANGE UP
GLUCOSE UR QL: NEGATIVE — SIGNIFICANT CHANGE UP
HCT VFR BLD CALC: 23.1 % — LOW (ref 39–50)
HCT VFR BLD CALC: 25.3 % — LOW (ref 39–50)
HGB BLD-MCNC: 8.2 G/DL — LOW (ref 13–17)
HGB BLD-MCNC: 9 G/DL — LOW (ref 13–17)
KETONES UR-MCNC: NEGATIVE — SIGNIFICANT CHANGE UP
LEUKOCYTE ESTERASE UR-ACNC: NEGATIVE — SIGNIFICANT CHANGE UP
MCHC RBC-ENTMCNC: 30.4 PG — SIGNIFICANT CHANGE UP (ref 27–34)
MCHC RBC-ENTMCNC: 30.7 PG — SIGNIFICANT CHANGE UP (ref 27–34)
MCHC RBC-ENTMCNC: 35.5 GM/DL — SIGNIFICANT CHANGE UP (ref 32–36)
MCHC RBC-ENTMCNC: 35.6 GM/DL — SIGNIFICANT CHANGE UP (ref 32–36)
MCV RBC AUTO: 85.6 FL — SIGNIFICANT CHANGE UP (ref 80–100)
MCV RBC AUTO: 86.3 FL — SIGNIFICANT CHANGE UP (ref 80–100)
NITRITE UR-MCNC: NEGATIVE — SIGNIFICANT CHANGE UP
NRBC # BLD: 0 /100 WBCS — SIGNIFICANT CHANGE UP (ref 0–0)
NRBC # BLD: 0 /100 WBCS — SIGNIFICANT CHANGE UP (ref 0–0)
PH UR: 6.5 — SIGNIFICANT CHANGE UP (ref 5–8)
PLATELET # BLD AUTO: 15 K/UL — CRITICAL LOW (ref 150–400)
PLATELET # BLD AUTO: 22 K/UL — LOW (ref 150–400)
PROT UR-MCNC: NEGATIVE — SIGNIFICANT CHANGE UP
RBC # BLD: 2.7 M/UL — LOW (ref 4.2–5.8)
RBC # BLD: 2.93 M/UL — LOW (ref 4.2–5.8)
RBC # FLD: 13.4 % — SIGNIFICANT CHANGE UP (ref 10.3–14.5)
RBC # FLD: 13.5 % — SIGNIFICANT CHANGE UP (ref 10.3–14.5)
SARS-COV-2 RNA SPEC QL NAA+PROBE: SIGNIFICANT CHANGE UP
SP GR SPEC: 1.01 — SIGNIFICANT CHANGE UP (ref 1.01–1.02)
SPECIMEN SOURCE: SIGNIFICANT CHANGE UP
UROBILINOGEN FLD QL: NEGATIVE — SIGNIFICANT CHANGE UP
WBC # BLD: 0.62 K/UL — CRITICAL LOW (ref 3.8–10.5)
WBC # BLD: 0.73 K/UL — CRITICAL LOW (ref 3.8–10.5)
WBC # FLD AUTO: 0.62 K/UL — CRITICAL LOW (ref 3.8–10.5)
WBC # FLD AUTO: 0.73 K/UL — CRITICAL LOW (ref 3.8–10.5)

## 2021-12-12 PROCEDURE — 99233 SBSQ HOSP IP/OBS HIGH 50: CPT | Mod: GC

## 2021-12-12 PROCEDURE — 99222 1ST HOSP IP/OBS MODERATE 55: CPT | Mod: GC

## 2021-12-12 RX ORDER — LORATADINE 10 MG/1
10 TABLET ORAL DAILY
Refills: 0 | Status: DISCONTINUED | OUTPATIENT
Start: 2021-12-12 | End: 2021-12-16

## 2021-12-12 RX ORDER — OXYCODONE HYDROCHLORIDE 5 MG/1
5 TABLET ORAL ONCE
Refills: 0 | Status: DISCONTINUED | OUTPATIENT
Start: 2021-12-12 | End: 2021-12-12

## 2021-12-12 RX ORDER — SODIUM CHLORIDE 9 MG/ML
1000 INJECTION INTRAMUSCULAR; INTRAVENOUS; SUBCUTANEOUS
Refills: 0 | Status: DISCONTINUED | OUTPATIENT
Start: 2021-12-12 | End: 2021-12-16

## 2021-12-12 RX ORDER — FLUCONAZOLE 150 MG/1
200 TABLET ORAL DAILY
Refills: 0 | Status: DISCONTINUED | OUTPATIENT
Start: 2021-12-12 | End: 2021-12-16

## 2021-12-12 RX ORDER — METOCLOPRAMIDE HCL 10 MG
1 TABLET ORAL
Qty: 0 | Refills: 0 | DISCHARGE

## 2021-12-12 RX ORDER — ONDANSETRON 8 MG/1
1 TABLET, FILM COATED ORAL
Qty: 0 | Refills: 0 | DISCHARGE

## 2021-12-12 RX ORDER — HYDROMORPHONE HYDROCHLORIDE 2 MG/ML
1 INJECTION INTRAMUSCULAR; INTRAVENOUS; SUBCUTANEOUS EVERY 6 HOURS
Refills: 0 | Status: DISCONTINUED | OUTPATIENT
Start: 2021-12-12 | End: 2021-12-16

## 2021-12-12 RX ORDER — HYDROMORPHONE HYDROCHLORIDE 2 MG/ML
1 INJECTION INTRAMUSCULAR; INTRAVENOUS; SUBCUTANEOUS EVERY 6 HOURS
Refills: 0 | Status: DISCONTINUED | OUTPATIENT
Start: 2021-12-12 | End: 2021-12-12

## 2021-12-12 RX ORDER — TRANEXAMIC ACID 100 MG/ML
1 INJECTION, SOLUTION INTRAVENOUS ONCE
Refills: 0 | Status: DISCONTINUED | OUTPATIENT
Start: 2021-12-12 | End: 2021-12-12

## 2021-12-12 RX ORDER — TRANEXAMIC ACID 100 MG/ML
5 INJECTION, SOLUTION INTRAVENOUS ONCE
Refills: 0 | Status: COMPLETED | OUTPATIENT
Start: 2021-12-12 | End: 2021-12-12

## 2021-12-12 RX ORDER — ACETAMINOPHEN 500 MG
975 TABLET ORAL ONCE
Refills: 0 | Status: COMPLETED | OUTPATIENT
Start: 2021-12-12 | End: 2021-12-12

## 2021-12-12 RX ADMIN — Medication 975 MILLIGRAM(S): at 05:57

## 2021-12-12 RX ADMIN — SODIUM CHLORIDE 100 MILLILITER(S): 9 INJECTION INTRAMUSCULAR; INTRAVENOUS; SUBCUTANEOUS at 12:55

## 2021-12-12 RX ADMIN — OXYCODONE HYDROCHLORIDE 5 MILLIGRAM(S): 5 TABLET ORAL at 06:01

## 2021-12-12 RX ADMIN — OXYCODONE HYDROCHLORIDE 5 MILLIGRAM(S): 5 TABLET ORAL at 02:23

## 2021-12-12 RX ADMIN — HYDROMORPHONE HYDROCHLORIDE 1 MILLIGRAM(S): 2 INJECTION INTRAMUSCULAR; INTRAVENOUS; SUBCUTANEOUS at 20:50

## 2021-12-12 RX ADMIN — LORATADINE 10 MILLIGRAM(S): 10 TABLET ORAL at 21:36

## 2021-12-12 RX ADMIN — TRANEXAMIC ACID 5 MILLILITER(S): 100 INJECTION, SOLUTION INTRAVENOUS at 04:26

## 2021-12-12 RX ADMIN — Medication 975 MILLIGRAM(S): at 03:33

## 2021-12-12 RX ADMIN — Medication 100 GRAM(S): at 00:05

## 2021-12-12 RX ADMIN — OXYCODONE HYDROCHLORIDE 5 MILLIGRAM(S): 5 TABLET ORAL at 01:37

## 2021-12-12 RX ADMIN — Medication 40 MILLIEQUIVALENT(S): at 00:13

## 2021-12-12 RX ADMIN — HYDROMORPHONE HYDROCHLORIDE 1 MILLIGRAM(S): 2 INJECTION INTRAMUSCULAR; INTRAVENOUS; SUBCUTANEOUS at 12:55

## 2021-12-12 RX ADMIN — FLUCONAZOLE 200 MILLIGRAM(S): 150 TABLET ORAL at 12:57

## 2021-12-12 NOTE — CONSULT NOTE ADULT - COMMENTS
Vital Signs Last 24 Hrs  T(C): 36.7 (12 Dec 2021 07:50), Max: 38.1 (12 Dec 2021 03:12)  T(F): 98 (12 Dec 2021 07:50), Max: 100.6 (12 Dec 2021 03:12)  HR: 89 (12 Dec 2021 07:50) (89 - 139)  BP: 110/78 (12 Dec 2021 07:50) (100/66 - 138/85)  BP(mean): 91 (12 Dec 2021 06:43) (77 - 94)  RR: 15 (12 Dec 2021 07:50) (15 - 21)  SpO2: 97% (12 Dec 2021 07:50) (96% - 100%)

## 2021-12-12 NOTE — CONSULT NOTE ADULT - NOSE
nasal/sinus endoscopy: maxillary sinus with serosanguinous fluid b/l via inferior meatus, +RR in nasal cavity, ss clear b/l/clear discharge/dried blood/inflamed mucosa/congestion

## 2021-12-12 NOTE — H&P ADULT - HISTORY OF PRESENT ILLNESS
Patient is a 36 year old Male with a PMHx of HTN, AML on chemo who presents to the hospital due to epistaxis. Last chemo 12/1, since then had steadily worsening pancytopenia. Received platelets 12/6, 8, 10 and blood x2 PRBC U on 12/7. No hx of bleeding issues prior. Was blowing nose 2hr prior to arrival when L nostril started to bleed, held pressure w/o improvement so came to ED. Feels mild L sinus congestion, no fever, cough, SOB, abd pain, CP, NDVC. No headache or neuro findings. Patient is a 36 year old Male with a PMHx of HTN, AML on chemo who presents to the hospital due to epistaxis. Last chemo 12/1, since then had steadily worsening pancytopenia. Patient states that he received platelet transfusions on 12/06/2021, 12/08/2021 and 12/10/2021. Patient states he received 2 units of blood transfusion on Tuesday 12/07/2021. Per patient, he reports that he was feeling sinus congestion and pressure on his L side, blew his nose and bleeding began from L nare without improvement prompting arrival to the emergency department.  Feels mild L sinus congestion, denies fever, cough, SOB, abd pain, CP, NDVC. No headache or neuro findings.

## 2021-12-12 NOTE — CONSULT NOTE ADULT - ATTENDING COMMENTS
36 y.o male with AML in CR admitted with epistaxis found to be pancytopenic.  Transfuse blood and platelets prn.  If epistaxis not improved , recommend ENT consult.  F/U blood culture, UA, Urinalysis, CXR.  Agree with broad spectrum antibiotics for neutropenic fever.    Meredith Newberry MD  Attending Physician  [facility]    [phone]
36 year old male with  HTN, AML (on daunorubicin/cytarabine now s/p C3 of HIDAC on 12/21), and genital warts who is admitted today 12/21/21  to the hospital due to epistaxis  cautery and packing by ENT  transient fever last night  history of rash to cefepime in August 2021    Suggest  nasal packing as per ENT  transfusional support  continue Levofloxacin, Acyclovir  trend CBC    monitor temps:  if febrile Zosyn    will follow

## 2021-12-12 NOTE — H&P ADULT - NSHPPHYSICALEXAM_GEN_ALL_CORE
Vital Signs Last 24 Hrs  T(C): 36.7 (12 Dec 2021 07:50), Max: 38.1 (12 Dec 2021 03:12)  T(F): 98 (12 Dec 2021 07:50), Max: 100.6 (12 Dec 2021 03:12)  HR: 89 (12 Dec 2021 07:50) (89 - 139)  BP: 110/78 (12 Dec 2021 07:50) (100/66 - 138/85)  BP(mean): 91 (12 Dec 2021 06:43) (77 - 94)  RR: 15 (12 Dec 2021 07:50) (15 - 21)  SpO2: 97% (12 Dec 2021 07:50) (96% - 100%)    Appearance: Normal	  HEENT:   Normal oral mucosa, PERRL, EOMI	  Lymphatic: No lymphadenopathy , no edema  Cardiovascular: Normal S1 S2, No JVD, No murmurs , Peripheral pulses palpable 2+ bilaterally  Respiratory: Lungs clear to auscultation, normal effort 	  Gastrointestinal:  Soft, Non-tender, + BS	  Skin: No rashes, No ecchymoses, No cyanosis, warm to touch  Musculoskeletal: Normal range of motion, normal strength  Psychiatry:  Mood & affect appropriate  Ext: No edema Vital Signs Last 24 Hrs  T(C): 36.7 (12 Dec 2021 07:50), Max: 38.1 (12 Dec 2021 03:12)  T(F): 98 (12 Dec 2021 07:50), Max: 100.6 (12 Dec 2021 03:12)  HR: 89 (12 Dec 2021 07:50) (89 - 139)  BP: 110/78 (12 Dec 2021 07:50) (100/66 - 138/85)  BP(mean): 91 (12 Dec 2021 06:43) (77 - 94)  RR: 15 (12 Dec 2021 07:50) (15 - 21)  SpO2: 97% (12 Dec 2021 07:50) (96% - 100%)    Appearance: Weak, laying in bed, Packing in B/L nares	  HEENT:   + Packing in B/L nares; L nare packing with mild saturation  Lymphatic: No lymphadenopathy , no edema  Cardiovascular: +Tachycardic   Respiratory: Lungs clear to auscultation, normal effort 	  Gastrointestinal:  Soft, Non-tender, + BS	  Skin: No rashes, No ecchymoses, No cyanosis, warm to touch  Musculoskeletal: Normal range of motion, normal strength  Psychiatry:  Mood & affect appropriate  Ext: No edema Vital Signs Last 24 Hrs  T(C): 36.7 (12 Dec 2021 07:50), Max: 38.1 (12 Dec 2021 03:12)  T(F): 98 (12 Dec 2021 07:50), Max: 100.6 (12 Dec 2021 03:12)  HR: 89 (12 Dec 2021 07:50) (89 - 139)  BP: 110/78 (12 Dec 2021 07:50) (100/66 - 138/85)  BP(mean): 91 (12 Dec 2021 06:43) (77 - 94)  RR: 15 (12 Dec 2021 07:50) (15 - 21)  SpO2: 97% (12 Dec 2021 07:50) (96% - 100%)    Appearance: Weak, laying in bed, Packing in B/L nares	  HEENT:   + Packing in B/L nares; L nare packing with mild saturation  Lymphatic: No lymphadenopathy , no edema  Cardiovascular: +Tachycardic   Respiratory: Lungs clear to auscultation, normal effort 	  Gastrointestinal:  Soft, Non-tender, + BS	  Skin: No rashes, No ecchymoses, No cyanosis, warm to touch  Musculoskeletal: R UE PICC line; Normal range of motion, normal strength  Psychiatry:  Mood & affect appropriate  Ext: No edema

## 2021-12-12 NOTE — ED ADULT NURSE REASSESSMENT NOTE - NS ED NURSE REASSESS COMMENT FT1
Called the admitting team at [phone] to request pain medications for patient. Patient endorses 7/10 generalized abdominal pain. MD states she will place orders for 5mg of po oxycodone. RN awaiting orders.

## 2021-12-12 NOTE — CONSULT NOTE ADULT - ASSESSMENT
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 He is now s/p C3 on 12/1 with plans for 4 cycles   - pancytopenic in the setting of chemotherapy- . Received long acting GCSF on 12/2.   - had a temp of 100.3- panculture. Continue levaquin and diflucan for now. If clinically deteriorates or has fever would transition levaquin to meropenem and consider ID consult (had a rash on cefepime on prior admission which resolved after switching to meropenem).   - CBC with diff daily. Will need to maintain on ppx until ANC>1000.   - transfusional support: plts> 10K, >20K if febrile, >50K if bleeding. Hb>7  - was receiving three times a week count check with possible transfusion outpatient   - after discharge can follow up with Dr. Yancey at [facility] [place]. Will notify her of his admission.     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 He is now s/p C3 on 12/1 with plans for 4 cycles   - pancytopenic in the setting of chemotherapy- . Received long acting GCSF on 12/2.   - had a temp of 100.3- panculture. Continue levaquin and diflucan for now. If clinically deteriorates or has fever would transition levaquin to meropenem and consider ID consult (had a rash on cefepime on prior admission which resolved after switching to meropenem).   - CBC with diff daily. Will need to maintain on ppx until ANC>1000.   - transfusional support: plts> 10K, >20K if febrile, >50K if bleeding. Hb>7  - If epistaxis continues and does not resolve with conservative measures may need ENT consultation   - was receiving three times a week count check with possible transfusion outpatient   - after discharge [place] [facility] [place] her of his admission.     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 He is now s/p C3 on 12/1 with plans for 4 cycles   - pancytopenic in the setting of chemotherapy- . Received long acting GCSF on 12/2.   - had a temp of 100.3- panculture. Continue levaquin and diflucan for now. If clinically deteriorates or has fever would transition levaquin to meropenem and consider ID consult (had a rash on cefepime on prior admission which resolved after switching to meropenem).   - CBC with diff daily. Will need to maintain on ppx until ANC>1000.   - transfusional support: plts> 10K, >20K if febrile, >50K if bleeding. Hb>7. Has not needed to receive HLA matched platelets in the past. If patient not responding to platelet transfusion can consider refractory however patient received HIDAC and expected to require transfusional support. For continue plt transfusions as standard protocol.   - If epistaxis continues and does not resolve with conservative measures may need ENT consultation. S/P nasal packing soaked in TXA in ED.   - was receiving three times a week count check with possible transfusion outpatient   - after discharge [place] [facility] [place] her of his admission.    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 He is now s/p C3 on 12/1 with plans for 4 cycles   - pancytopenic in the setting of chemotherapy- . Received long acting GCSF on 12/2.   - had a temp of 100.3- panculture. Continue levaquin and diflucan for now. If clinically deteriorates or has fever would transition levaquin to meropenem and consider ID consult (had a rash on cefepime on prior admission which resolved after switching to meropenem).   - CBC with diff daily. Will need to maintain on ppx until ANC>1000.   - transfusional support: plts> 10K, >20K if febrile, >50K if bleeding. Hb>7. Has not needed to receive HLA matched platelets in the past. If patient not responding to platelet transfusion can consider refractory however patient received HIDAC and expected to require transfusional support. For continue plt transfusions as standard protocol.   - Consult ENT as patient still having some oozing with left nasal packing saturated. S/P nasal packing soaked in TXA in ED.   - was receiving three times a week count check with possible transfusion outpatient   - after discharge can follow up with Dr. Yancey at [facility] [place]. Will notify her of his admission.    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 He is now s/p C3 on 12/1 with plans for 4 cycles   - pancytopenic in the setting of chemotherapy- . Received long acting GCSF on 12/2.   - had a temp of 100.3- panculture. Continue levaquin and diflucan for now. If clinically deteriorates or has fever would transition levaquin to meropenem and consider ID consult (had a rash on cefepime on prior admission which resolved after switching to meropenem).   - CBC with diff daily. Will need to maintain on ppx until ANC>1000.   - transfusional support: plts> 10K, >20K if febrile, >50K if bleeding. Hb>7. Has not needed to receive HLA matched platelets in the past. If patient not responding to platelet transfusion can consider refractory however patient received HIDAC and expected to require transfusional support. For continue plt transfusions as standard protocol.   - Consult ENT as patient still having some oozing with left nasal packing saturated. S/P nasal packing soaked in TXA in ED.   - was receiving three times a week count check with possible transfusion outpatient   - can give Claritin for GCSF related bone pain that patient is reporting  - after discharge can follow up with Dr. Yancey at [facility] [place]. Notified of admission.     David Roth MD  Hematology/Oncology Fellow   pager [phone]  Covering Fellow for 12/12

## 2021-12-12 NOTE — H&P ADULT - NSHPREVIEWOFSYSTEMS_GEN_ALL_CORE
REVIEW OF SYSTEMS:    CONSTITUTIONAL: No weakness, fevers or chills  EYES/ENT: + L Sided Epistaxis  NECK: No pain or stiffness  RESPIRATORY: No cough, wheezing, hemoptysis; No shortness of breath  CARDIOVASCULAR: No chest pain or palpitations  GASTROINTESTINAL: No abdominal or epigastric pain. No nausea, vomiting, or hematemesis; No diarrhea or constipation. No melena or hematochezia.  GENITOURINARY: No dysuria, frequency or hematuria  NEUROLOGICAL: No numbness or weakness  SKIN: No itching, burning, rashes, or lesions   All other review of systems is negative unless indicated above. REVIEW OF SYSTEMS:    CONSTITUTIONAL: +weakness   EYES/ENT: + L Sided Epistaxis  NECK: No pain or stiffness  RESPIRATORY: No cough, wheezing, hemoptysis; No shortness of breath  CARDIOVASCULAR: No chest pain or palpitations  GASTROINTESTINAL: No abdominal or epigastric pain. No nausea, vomiting, or hematemesis; No diarrhea or constipation. No melena or hematochezia.  GENITOURINARY: No dysuria, frequency or hematuria  NEUROLOGICAL: No numbness or weakness  SKIN: No itching, burning, rashes, or lesions   All other review of systems is negative unless indicated above.

## 2021-12-12 NOTE — CONSULT NOTE ADULT - ENMT COMMENTS
Flexible FIberoptic Laryngoscopy: clear nasopharynx to glottis, tvc mobile b/l, +blood in posterior nasal cavity

## 2021-12-12 NOTE — H&P ADULT - ATTENDING COMMENTS
Pt care and plan discussed and reviewed with PA. Plan as outlined above edited by me to reflect our discussion. I had a prolonged conversation with the patient regarding hospital course, differential diagnosis and results of diagnostic tests.  Plan of care discussed with patient after the evaluation. Patient expresses clear understanding and satisfaction with the plan of care. OMT on six regions for acute somatic dysfunctions done at the bedside. one hundred twenty five minutes spent on encounter, of which more than fifty percent of the encounter was spent on counseling and/or coordinating care by the attending physician. Advanced care planning/advanced directives discussed with patient/family. DNR status including forceful chest compressions to attempt to restart the heart, ventilator support/artificial breathing, electric shock, artificial nutrition, health care proxy, Molst form all discussed with pt. Pt wishes to consider.

## 2021-12-12 NOTE — H&P ADULT - NSHPLABSRESULTS_GEN_ALL_CORE
9.0    0.73  )-----------( 15       ( 12 Dec 2021 07:51 )             25.3                   138  |  101  |  18  ----------------------------<  101<H>  3.7   |  22  |  0.96    Ca    10.2      11 Dec 2021 19:19    TPro  7.5  /  Alb  4.5  /  TBili  0.4  /  DBili  x   /  AST  18  /  ALT  57<H>  /  AlkPhos  109  12-11    PT/INR - ( 11 Dec 2021 19:19 )   PT: 13.8 sec;   INR: 1.16 ratio         PTT - ( 11 Dec 2021 19:19 )  PTT:43.4 sec                   Urinalysis Basic - ( 12 Dec 2021 00:12 )    Color: Light Yellow / Appearance: Clear / S.011 / pH: x  Gluc: x / Ketone: Negative  / Bili: Negative / Urobili: Negative   Blood: x / Protein: Negative / Nitrite: Negative   Leuk Esterase: Negative / RBC: x / WBC x   Sq Epi: x / Non Sq Epi: x / Bacteria: x          < from: Xray Chest 1 View AP/PA (21 @ 19:40) >      ******PRELIMINARY REPORT******      ******PRELIMINARY REPORT******       ACC: [id number] EXAM:  XR CHEST AP OR PA 1V                          PROCEDURE DATE:  2021    ******PRELIMINARY REPORT******      ******PRELIMINARY REPORT******           INTERPRETATION:  CLINICAL INFORMATION: Evaluate PICC placement.    TECHNIQUE: AP view of the chest.    COMPARISON: Chest x-ray dated 2021.    FINDINGS:    Right upper extremity PICC terminates in the superior vena cava.  The lungs are clear. No pleural effusion or pneumothorax.  Normal cardiomediastinal silhouette.  No acute bony pathology.    IMPRESSION:    Right upper extremity PICC terminates in the superior vena cava.  Clear lungs.        ******PRELIMINARY REPORT******      ******PRELIMINARY REPORT******        KATIE DOWNING MD; Resident Radiology    < end of copied text >

## 2021-12-12 NOTE — CONSULT NOTE ADULT - ASSESSMENT
Assessment:  The patient is a 36 year old male with a past medical history significant for HTN, AML on chemo presents the emergency room at [facility] with a chief complaint of of the nose and mouth for the past several hours.  DDx: GERD and posterior epistaxis    Plan:  1) RR x 3 days  2) will follow and remove to attempt cautery at bedside  3) ENT to follow Assessment:  The patient is a 36 year old male with a past medical history significant for HTN, AML on chemo presents the emergency room at [facility] with a chief complaint of of the nose and mouth for the past several hours.  DDx: GERD and posterior epistaxis    Plan:  1) left nasal cavity RR x 3 days  2) will follow and remove to attempt cautery at bedside  3) ENT to follow Assessment:  The patient is a 36 year old male with a past medical history significant for HTN, AML on chemo presents the emergency room at [facility] with a chief complaint of of the nose and mouth for the past several hours.  DDx: GERD and left posterior epistaxis    Plan:  1) absorbable packing placed in  left nasal cavity   2) afrin nasal spray 2 sprays BID each nostril beginning 12/14/21  3) f/u in ENT clinic after discharge [phone]

## 2021-12-12 NOTE — H&P ADULT - ASSESSMENT
This note is not complete until signed by the attending physician.  Patient is a 36 year old Male with a PMHx of HTN, AML on chemo who presents to the hospital due to epistaxis. Last chemo 12/1, since then had steadily worsening pancytopenia. Patient states that he received platelet transfusions on 12/06/2021, 12/08/2021 and 12/10/2021. Patient states he received 2 units of blood transfusion on Tuesday 12/07/2021. Per patient, he reports that he was feeling sinus congestion and pressure on his L side, blew his nose and bleeding began from L nare without improvement prompting arrival to the emergency department.  Feels mild L sinus congestion.        Epistaxis  --Monitor CBC with Differential daily   --Monitor for signs and symptoms of bleeding  --Transfuse Blood products and platelets as per Heme/Onc recommendations  --S/P 1 unit Platelet transfusion and 2 units PRBC transfusion in the ED  --ENT Consultation     Fever  --Patient was febrile to 100.3   --ID consultation  --Per heme/Onc, continue with Levaquin and Diflucan for now  --Pan culture obtained--- F/U Urine culture and Blood culture X 2 results from 12/12  --Monitor CBC and Temperature curve closely   --Per Heme/Onc if fever re-occurs, switch Levaquin to Meropenem       Pancytopenia   --S/P multiple platelet transfusions on 12/06, 12/08, 12/10 and 2 unit PRBCs transfusion on 12/07   --Monitor Platelet levels  --Transfuse as per Heme/Onc recommendations  --S/P 1 unit platelet transfusion and 2 units PRBCs in the emergency department   --Continue to monitor CBC with diff daily  --Transfuse as per Heme/Onc recommendations       SVT  --Patient with a period of SVT in the Emergency department  --When seen in the ED, Patient was tachycardic and was on monitor; denies CP, Palpitations or SOB  --Continue to monitor patient closely   --Continue with monitor      AML  --S/P Chemo on 12/01  --Monitor CBC with differential daily   --F/U Heme/Onc recommendations      Hypertension  --Continue to monitor off of medication for now  --Monitor BP closely           This note is not complete until signed by the attending physician.  Patient is a 36 year old Male with a PMHx of HTN, AML on chemo who presents to the hospital due to epistaxis. Last chemo 12/1, since then had steadily worsening pancytopenia. Patient states that he received platelet transfusions on 12/06/2021, 12/08/2021 and 12/10/2021. Patient states he received 2 units of blood transfusion on Tuesday 12/07/2021. Per patient, he reports that he was feeling sinus congestion and pressure on his L side, blew his nose and bleeding began from L nare without improvement prompting arrival to the emergency department.  Feels mild L sinus congestion.        Epistaxis  --Monitor CBC with Differential daily   --Monitor for signs and symptoms of bleeding  --Transfuse Blood products and platelets as per Heme/Onc recommendations  --S/P 1 unit Platelet transfusion and 2 units PRBC transfusion in the ED  --ENT Consultation     Fever  --Patient was febrile to 100.3   --ID consultation  --Per heme/Onc, continue with Levaquin and Diflucan for now  --Pan culture obtained--- F/U Urine culture and Blood culture X 2 results from 12/12  --Monitor CBC and Temperature curve closely   --Per Heme/Onc if fever re-occurs, switch Levaquin to Meropenem   --Started on IVF NS at 100cc/hr       Pancytopenia   --S/P multiple platelet transfusions on 12/06, 12/08, 12/10 and 2 unit PRBCs transfusion on 12/07   --Monitor Platelet levels  --Transfuse as per Heme/Onc recommendations  --S/P 1 unit platelet transfusion and 2 units PRBCs in the emergency department   --Continue to monitor CBC with diff daily  --Transfuse as per Heme/Onc recommendations       SVT  --Patient with a period of SVT in the Emergency department  --When seen in the ED, Patient was tachycardic and was on monitor; denies CP, Palpitations or SOB  --Continue to monitor patient closely   --Continue with monitor      AML  --S/P Chemo on 12/01  --Monitor CBC with differential daily   --F/U Heme/Onc recommendations      Hypertension  --Continue to monitor off of medication for now  --Monitor BP closely     Pain control   --Dilaudid       DVT PPX with Compression devices      This note is not complete until signed by the attending physician.  Patient is a 36 year old Male with a PMHx of HTN, AML on chemo who presents to the hospital due to epistaxis. Last chemo 12/1, since then had steadily worsening pancytopenia. Patient states that he received platelet transfusions on 12/06/2021, 12/08/2021 and 12/10/2021. Patient states he received 2 units of blood transfusion on Tuesday 12/07/2021. Per patient, he reports that he was feeling sinus congestion and pressure on his L side, blew his nose and bleeding began from L nare without improvement prompting arrival to the emergency department.  Feels mild L sinus congestion.        Epistaxis  --Monitor CBC with Differential daily   --Monitor for signs and symptoms of bleeding  --Transfuse Blood products and platelets as per Heme/Onc recommendations  --S/P 1 unit Platelet transfusion and 2 units PRBC transfusion in the ED  --ENT Consultation for evaluation     Fever  -- Neutropenic fever   --Patient was febrile to 100.3   --ID consultation  --Per heme/Onc, continue with Levaquin and Diflucan for now  --Pan culture obtained--- F/U Urine culture and Blood culture X 2 results from 12/12  --Monitor CBC and Temperature curve closely   --Per Heme/Onc if fever re-occurs, switch Levaquin to Meropenem   --Started on IVF NS at 100cc/hr   --ID eval called for further recs       Pancytopenia   --S/P multiple platelet transfusions on 12/06, 12/08, 12/10 and 2 unit PRBCs transfusion on 12/07   --Monitor Platelet levels  --Transfuse as per Heme/Onc recommendations  --S/P 1 unit platelet transfusion and 2 units PRBCs in the emergency department   --Continue to monitor CBC with diff daily  --Transfuse as per Heme/Onc recommendations       SVT  --Patient with a period of SVT in the Emergency department  --When seen in the ED, Patient was tachycardic and was on monitor; denies CP, Palpitations or SOB  --Continue to monitor patient closely   --Continue with monitor      AML  --S/P Chemo on 12/01  --Monitor CBC with differential daily   --F/U Heme/Onc recommendations      Hypertension  --Continue to monitor off of medication for now  --Monitor BP closely     Pain control   --Dilaudid       DVT PPX with Compression devices

## 2021-12-12 NOTE — CONSULT NOTE ADULT - ASSESSMENT
Assessment:  The patient is a 36 year old male with a PMHx of HTN, AML (on daunorubicin/cytarabine now s/p C3 of HIDAC on 12/21), and genital warts who presents to the hospital due to epistaxis. He was recently admitted in mid November for hand cellulitis treated w/ a course of IV zosyn and oral Augmentin He reports that his last chemotherapy was 12/1, since then had steadily worsening pancytopenia. He reports multiple episodes of diarrhea in the past that has delayed his chemotherapy treatments due to suspected mucositis. He reports that he was feeling sinus congestion and pressure on his left side, blew his nose and bleeding began from the left nare without improvement prompting arrival to [facility]. He was admitted for workup of neutropenic fever and ID was consulted for further recommendations.      Plan:  # Neutropenic fever  - switch PO levofloxacin to IV cefepime  - c/w PO fluconazole  - remove PICC line as potential source of infection  - f/u BCx x 2 and UCx w/ sensitivities  - monitor fever curve  - follow white count and renal function daily  - ID will continue to follow    Case not yet discussed w/ attending    Brett Hutchinson MD PGY-5  Fellow, Infectious Diseases   Pager: [phone]  If no response, after 5pm and on weekends: Call [phone]   Assessment:  The patient is a 36 year old male with a PMHx of HTN, AML (on daunorubicin/cytarabine now s/p C3 of HIDAC on 12/21), and genital warts who presents to the hospital due to epistaxis. He was recently admitted in mid November for hand cellulitis treated w/ a course of IV zosyn and oral Augmentin He reports that his last chemotherapy was 12/1, since then had steadily worsening pancytopenia. He reports multiple episodes of diarrhea in the past that has delayed his chemotherapy treatments due to suspected mucositis. He reports that he was feeling sinus congestion and pressure on his left side, blew his nose and bleeding began from the left nare without improvement prompting arrival to [facility]. He was admitted for workup of neutropenic fever and ID was consulted for further recommendations.      Plan:  # Pancytopenia due to chemotherapy   - c/w PO levofloxacin and PO fluconazole for now  - reports rash w/ cefepime  - f/u BCx x 2 and UCx w/ sensitivities  - monitor fever curve  - follow white count and renal function daily  - ID will continue to follow    Patient seen w/ attending Dr. Arnulfo Luevano MD at bedside     Brett Hutchinson MD PGY-5  Fellow, Infectious Diseases   Pager: [phone]  If no response, after 5pm and on weekends: Call [phone]

## 2021-12-12 NOTE — CONSULT NOTE ADULT - CONSULT REASON
Fever
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
bleeding from nose and mouth

## 2021-12-12 NOTE — CONSULT NOTE ADULT - SUBJECTIVE AND OBJECTIVE BOX
Patient is a 36 year old male who presents with a chief complaint of sinus congestion.    HPI:  The patient is a 36 year old male with a PMHx of HTN, AML (on daunorubicin/cytarabine now s/p C3 of HIDAC on ), and genital warts who presents to the hospital due to epistaxis. He was recently admitted in mid November for hand cellulitis treated w/ a course of IV zosyn and oral Augmentin He reports that his last chemotherapy was , since then had steadily worsening pancytopenia. He states that he received platelet transfusions on 2021, 2021 and 12/10/2021. Patient states he received 2 units of blood transfusion on 2021. He reports multiple episodes of diarrhea in the past that has delayed his chemotherapy treatments due to suspected mucositis. He reports that he was feeling sinus congestion and pressure on his left side, blew his nose and bleeding began from the left nare without improvement prompting arrival to the emergency department. He is on oral acyclovir and oral levofloxacin. He feels mild L sinus congestion, but denies fever, headache, cough, dyspnea, abdominal pain, chest pain, abdominal pain, nausea, vomiting, diarrhea, constipation or urinary changes.     Labs showed pancytopenia w/ reactive lymphocytosis w/ . ALT at 57. UA was negative. COVID-19 PCR was negative. CXR showed RUE PICC line w/o acute lung pathology. He received IV levofloxacin and started on oral fluconazole and oral levofloxacin. BCx x 2 and UCx are in process.        prior hospital charts reviewed [x]  primary team notes reviewed [x]  other consultant notes reviewed [x]    PAST MEDICAL & SURGICAL HISTORY:  Hypertension diagnosed 1 year ago  Kidney stone 5 years ago  History of genital warts  AML (acute myeloid leukemia)  No significant past surgical history        Allergies  No Known Allergies    ANTIMICROBIALS (past 90 days)  MEDICATIONS  (STANDING):  fluconAZOLE   Tablet   200 milliGRAM(s) Oral (21 @ 12:57)    levoFLOXacin  Tablet   500 milliGRAM(s) Oral (21 @ 09:11)    levoFLOXacin IVPB   50 mL/Hr IV Intermittent (21 @ 01:20)        fluconAZOLE   Tablet 200 daily  levoFLOXacin  Tablet 500 every 24 hours    OTHER MEDS: MEDICATIONS  (STANDING):  HYDROmorphone  Injectable 1 every 6 hours PRN  loratadine 10 daily    SOCIAL HISTORY:  does not smoke, drink alcohol, or use recreational drugs     FAMILY HISTORY:  FH: CAD (coronary artery disease) (Father, Mother)      REVIEW OF SYSTEMS  [  ] ROS unobtainable because:    [x] All other systems negative except as noted below:	    Constitutional:  [x] fever [ ] chills  [ ] weight loss  [x] weakness  Skin:  [ ] rash [ ] phlebitis	  Eyes: [ ] icterus [ ] pain  [ ] discharge	  ENMT: [ ] sore throat  [ ] thrush [ ] ulcers [ ] exudates [x] epistaxis  Respiratory: [ ] dyspnea [ ] hemoptysis [ ] cough [ ] sputum	  Cardiovascular:  [ ] chest pain [ ] palpitations [ ] edema	  Gastrointestinal:  [ ] nausea [ ] vomiting [ ] diarrhea [ ] constipation [ ] pain	  Genitourinary:  [ ] dysuria [ ] frequency [ ] hematuria [ ] discharge [ ] flank pain  [ ] incontinence  Musculoskeletal:  [ ] myalgias [ ] arthralgias [ ] arthritis  [ ] back pain  Neurological:  [ ] headache [ ] seizures  [ ] confusion/altered mental status  Psychiatric:  [ ] anxiety [ ] depression	  Hematology/Lymphatics:  [ ] lymphadenopathy  Endocrine:  [ ] adrenal [ ] thyroid  Allergic/Immunologic:	 [ ] transplant [ ] seasonal    Vital Signs Last 24 Hrs  T(F): 98 (21 @ 07:50), Max: 100.6 (21 @ 03:12)  Vital Signs Last 24 Hrs  HR: 89 (21 @ 07:50) (89 - 139)  BP: 110/78 (21 @ 07:50) (100/66 - 138/85)  RR: 15 (21 @ 07:50)  SpO2: 97% (21 @ 07:50) (96% - 100%)  Wt(kg): --    PHYSICAL EXAM:  Constitutional: non-toxic, no distress  HEAD/EYES: anicteric, no conjunctival injection  ENT:  supple, no thrush  Cardiovascular:   normal S1, S2, no murmur, no edema  Respiratory:  clear BS bilaterally, no wheezes, no rales  GI:  soft, non-tender, normal bowel sounds  :  no CVA tenderness  Musculoskeletal:  no synovitis, normal ROM  Neurologic: awake and alert, normal strength, no focal findings  Skin:  no rash, no erythema, no phlebitis  Heme/Onc: no lymphadenopathy   Psychiatric:  awake, alert, appropriate mood                            9.0    0.73  )-----------( 15       ( 12 Dec 2021 07:51 )             25.3   12-11    138  |  101  |  18  ----------------------------<  101<H>  3.7   |  22  |  0.96    Ca    10.2      11 Dec 2021 19:19    TPro  7.5  /  Alb  4.5  /  TBili  0.4  /  DBili  x   /  AST  18  /  ALT  57<H>  /  AlkPhos  109  12-    Urinalysis Basic - ( 12 Dec 2021 00:12 )    Color: Light Yellow / Appearance: Clear / S.011 / pH: x  Gluc: x / Ketone: Negative  / Bili: Negative / Urobili: Negative   Blood: x / Protein: Negative / Nitrite: Negative   Leuk Esterase: Negative / RBC: x / WBC x   Sq Epi: x / Non Sq Epi: x / Bacteria: x    MICROBIOLOGY:    BCx x 2 in process    UCx in process    COVID-19 PCR negative    Prior microbiology    21 + COVID-19 spike Ab, + COVID-19 nucleocapsid Ab  21 HSV 1 IgG +, HSV 2 IgG -  21 HIV Ag/Ab -, HBV sAb -, HBV sAg -, HBV cAb -, HCV Ab -             RADIOLOGY:    < from: Xray Chest 1 View AP/PA (21 @ 19:40) >  IMPRESSION:    Right upper extremity PICC terminates in the superior vena cava.  Clear lungs.    --- End of Report ---    < end of copied text >   Patient is a 36 year old male who presents with a chief complaint of sinus congestion.    HPI:  The patient is a 36 year old male with a PMHx of HTN, AML (on daunorubicin/cytarabine now s/p C3 of HIDAC on ), and genital warts who presents to the hospital due to epistaxis. He was recently admitted in mid November for hand cellulitis treated w/ a course of IV zosyn and oral Augmentin He reports that his last chemotherapy was , since then had steadily worsening pancytopenia. He states that he received platelet transfusions on 2021, 2021 and 12/10/2021. Patient states he received 2 units of blood transfusion on 2021. He reports multiple episodes of diarrhea in the past that has delayed his chemotherapy treatments due to suspected mucositis. He reports that he was feeling sinus congestion and pressure on his left side, blew his nose and bleeding began from the left nare without improvement prompting arrival to the emergency department. He is on oral acyclovir and oral levofloxacin. He feels mild L sinus congestion, but denies fever, headache, cough, dyspnea, abdominal pain, chest pain, abdominal pain, nausea, vomiting, diarrhea, constipation or urinary changes. He did not receive his COVID-19 vaccine due to chemotherapy.     Labs showed pancytopenia w/ reactive lymphocytosis w/ . ALT at 57. UA was negative. COVID-19 PCR was negative. CXR showed RUE PICC line w/o acute lung pathology. He received IV levofloxacin and started on oral fluconazole and oral levofloxacin. BCx x 2 and UCx are in process. ID was consulted for antibiotic recommendations.        prior hospital charts reviewed [x]  primary team notes reviewed [x]  other consultant notes reviewed [x]    PAST MEDICAL & SURGICAL HISTORY:  Hypertension diagnosed 1 year ago  Kidney stone 5 years ago  History of genital warts  AML (acute myeloid leukemia)  No significant past surgical history        Allergies  No Known Allergies    ANTIMICROBIALS (past 90 days)  MEDICATIONS  (STANDING):  fluconAZOLE   Tablet   200 milliGRAM(s) Oral (21 @ 12:57)    levoFLOXacin  Tablet   500 milliGRAM(s) Oral (21 @ 09:11)    levoFLOXacin IVPB   50 mL/Hr IV Intermittent (21 @ 01:20)        fluconAZOLE   Tablet 200 daily  levoFLOXacin  Tablet 500 every 24 hours    OTHER MEDS: MEDICATIONS  (STANDING):  HYDROmorphone  Injectable 1 every 6 hours PRN  loratadine 10 daily    SOCIAL HISTORY:  does not smoke, drink alcohol, or use recreational drugs     FAMILY HISTORY:  FH: CAD (coronary artery disease) (Father, Mother)      REVIEW OF SYSTEMS  [  ] ROS unobtainable because:    [x] All other systems negative except as noted below:	    Constitutional:  [x] fever [ ] chills  [ ] weight loss  [x] weakness  Skin:  [ ] rash [ ] phlebitis	  Eyes: [ ] icterus [ ] pain  [ ] discharge	  ENMT: [ ] sore throat  [ ] thrush [ ] ulcers [ ] exudates [x] epistaxis  Respiratory: [ ] dyspnea [ ] hemoptysis [ ] cough [ ] sputum	  Cardiovascular:  [ ] chest pain [ ] palpitations [ ] edema	  Gastrointestinal:  [ ] nausea [ ] vomiting [ ] diarrhea [ ] constipation [ ] pain	  Genitourinary:  [ ] dysuria [ ] frequency [ ] hematuria [ ] discharge [ ] flank pain  [ ] incontinence  Musculoskeletal:  [ ] myalgias [ ] arthralgias [ ] arthritis  [ ] back pain  Neurological:  [ ] headache [ ] seizures  [ ] confusion/altered mental status  Psychiatric:  [ ] anxiety [ ] depression	  Hematology/Lymphatics:  [ ] lymphadenopathy  Endocrine:  [ ] adrenal [ ] thyroid  Allergic/Immunologic:	 [ ] transplant [ ] seasonal    Vital Signs Last 24 Hrs  T(F): 98 (21 @ 07:50), Max: 100.6 (21 @ 03:12)  Vital Signs Last 24 Hrs  HR: 89 (21 @ 07:50) (89 - 139)  BP: 110/78 (21 @ 07:50) (100/66 - 138/85)  RR: 15 (21 @ 07:50)  SpO2: 97% (21 @ 07:50) (96% - 100%)  Wt(kg): --    PHYSICAL EXAM:  Constitutional: non-toxic, no distress  HEAD/EYES: anicteric, no conjunctival injection  ENT:  supple, no thrush, + rhino rockets in place   Cardiovascular:   normal S1, S2, no murmur, no edema, + RUE PICC line in place   Respiratory:  clear BS bilaterally, no wheezes, no rales  GI:  soft, non-tender, normal bowel sounds  :  no CVA tenderness  Musculoskeletal:  no synovitis, normal ROM  Neurologic: awake and alert, normal strength, no focal findings  Skin:  no rash, no erythema, no phlebitis  Heme/Onc: no lymphadenopathy   Psychiatric:  awake, alert, appropriate mood                            9.0    0.73  )-----------( 15       ( 12 Dec 2021 07:51 )             25.3   12-11    138  |  101  |  18  ----------------------------<  101<H>  3.7   |  22  |  0.96    Ca    10.2      11 Dec 2021 19:19    TPro  7.5  /  Alb  4.5  /  TBili  0.4  /  DBili  x   /  AST  18  /  ALT  57<H>  /  AlkPhos  109  12-11    Urinalysis Basic - ( 12 Dec 2021 00:12 )    Color: Light Yellow / Appearance: Clear / S.011 / pH: x  Gluc: x / Ketone: Negative  / Bili: Negative / Urobili: Negative   Blood: x / Protein: Negative / Nitrite: Negative   Leuk Esterase: Negative / RBC: x / WBC x   Sq Epi: x / Non Sq Epi: x / Bacteria: x    MICROBIOLOGY:    BCx x 2 in process    UCx in process    COVID-19 PCR negative    Prior microbiology    21 + COVID-19 spike Ab, + COVID-19 nucleocapsid Ab  21 HSV 1 IgG +, HSV 2 IgG -  21 HIV Ag/Ab -, HBV sAb -, HBV sAg -, HBV cAb -, HCV Ab -             RADIOLOGY:    < from: Xray Chest 1 View AP/PA (21 @ 19:40) >  IMPRESSION:    Right upper extremity PICC terminates in the superior vena cava.  Clear lungs.    --- End of Report ---    < end of copied text >

## 2021-12-12 NOTE — CONSULT NOTE ADULT - SUBJECTIVE AND OBJECTIVE BOX
HPI: The patient is a 36 year old male with a past medical history significant for HTN, AML on chemo presents the emergency room at [facility] with a chief complaint of of the nose and mouth for the past several hours. Last chemo 12/1, since then had steadily worsening pancytopenia. Received platelets 12/6, 8, 10 and blood x2 PRBC U on 12/7. No hx of bleeding issues prior. Was blowing nose 2hr prior to arrival when L nostril started to bleed, held pressure w/o improvement so came to ED. Feels mild L sinus congestion, no fever, cough, SOB, abd pain, CP, NDVC. No headache or neuro findings. Does feel a little weaker than usual.    HIV:    HIV Test Questions:  · In accordance with [place] State law, we offer every patient who comes to our ED an HIV test. Would you like to be tested today?	Previously Negative (within the last year)    PAST MEDICAL/SURGICAL/FAMILY/SOCIAL HISTORY:    Past Medical, Past Surgical, and Family History:  PAST MEDICAL HISTORY:  AML (acute myeloid leukemia)     History of genital warts     Hypertension diagnosed 1 year ago    Kidney stone 5 years ago.     PAST SURGICAL HISTORY:  No significant past surgical history.     FAMILY HISTORY:  Father  Still living? Unknown  FH: CAD (coronary artery disease), Age at diagnosis: Age Unknown    Mother  Still living? Unknown  FH: CAD (coronary artery disease), Age at diagnosis: Age Unknown.     Tobacco Usage:  · Tobacco Usage	Never smoker    ALLERGIES AND HOME MEDICATIONS:   Allergies:        Allergies:  	No Known Allergies:        Intolerances:  	cefepime: Drug, Rash    Home Medications:   * Patient Currently Takes Medications as of 06-Dec-2021 18:08 documented in Structured Notes  · 	oxyCODONE 5 mg oral tablet: 1 tab(s) orally every 6 hours, As needed, Moderate Pain (4 - 6)  · 	Power PICC: Power PICC  	Heparin  	10 Units per mL (3mL)  	Daily to each Lumen.   · 	Power PICC: Power PICC  	Normal Saline 10mL  	in each lumen weekly  · 	prednisoLONE acetate 1% ophthalmic suspension: 2 drop(s) to each affected eye every 6 hours  	Continue for 2 days and then stop  · 	Reglan 10 mg oral tablet: 1 tab(s) orally every 6 hours, As Needed for nausea  · 	Zofran 8 mg oral tablet: 1 tab(s) orally every 8 hours, As Needed for nausea  · 	fluconazole 200 mg oral tablet: 1 tab(s) orally once a day, ask you outpatient provider when to stop it  · 	Levaquin 500 mg oral tablet: 1 tab(s) orally every 24 hours  	Ask your outpatient provider when to stop it     LABS:  CBC Full  -  ( 12 Dec 2021 07:51 )  WBC Count : 0.73 K/uL  Hemoglobin : 9.0 g/dL  Hematocrit : 25.3 %  Platelet Count - Automated : 15 K/uL  Mean Cell Volume : 86.3 fl  Mean Cell Hemoglobin : 30.7 pg  Mean Cell Hemoglobin Concentration : 35.6 gm/dL  Auto Neutrophil # : x  Auto Lymphocyte # : x  Auto Monocyte # : x  Auto Eosinophil # : x  Auto Basophil # : x  Auto Neutrophil % : x  Auto Lymphocyte % : x  Auto Monocyte % : x  Auto Eosinophil % : x  Auto Basophil % : x

## 2021-12-12 NOTE — CONSULT NOTE ADULT - SUBJECTIVE AND OBJECTIVE BOX
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 Last chemo 12/1, since then had steadily worsening pancytopenia. Received platelets 12/6, 8, 10 and blood x2 PRBC U on 12/7. No hx of bleeding issues prior. Was blowing nose 2hr prior to arrival when L nostril started to bleed, held pressure w/o improvement so came to ED. Feels mild L sinus congestion, no fever, cough, SOB, abd pain, CP, NDVC. No headache or neuro findings. (12 Dec 2021 08:41)    Hematologic History  - presented to the hospital on 7/30/21 with complaints of dizziness, fatigue and severe RUQ pain for 3 days. CT A/P revealed fatty liver and small R pleural effusion. WBC 12k with 17% blasts.Peripheral flow cytometry showed 13% myeloblasts. FLT3(-). BMbx was done on 8/4/21 - confirmed AML. 46,XY {20 }  Foundation: mutations in DNMT3A R882H, NRAS G12D, NPM1 W288fs*12. Pt consented to [facility]. Patient was offered sperm banking, but declined.  - On 8/6, induction with Dauno/Cytararbine was started (cytarabine 100/m2 and dauno 90/m2). Received a seven day course of Zosyn for presumed RUL PNA, During hospitalization with neutropenic fevers received cefepime and developed rash which was then switched to meropenem and rash resolved.   - Day 14 BMbx on 8/19 was hypocellular with chemotherapeutic effect; however, per hematopathology, appears to be earlier regeneration than would typically seen which is concerning for persistent disease at this point, and the earliest cells are CD34 positive and his myeloblasts on initial presentation were CD34 negative. Thus, this may simply represent early regeneration of his marrow. Plan is to await count recovery and repeat biopsy. Patient discharged home on 8/29/21 with ANC >500.   - BMBx done on 9/2: Cellular bone marrow with trilineage hematopoiesis with maturation and megakaryocytosis (history of AML).  - s/p C1 HIDAC 9/17-9/22   - C2 due on 10/15 postponed due to worsening diarrhea. Went to ED on 10/15 due to worsening watery diarrhea. GI PCR neg, C Diff neg. Given negative stool studies, suspect diarrhea was likely chemo-related.   - S/p cycle 2 Consolidation with HIDAC started on 10/25.   - Admitted on 11/13-11/17 s/p right first digit laceration repair on 11/12 and presenting with erythema and pain at the site of laceration repair.   - C3 HIDAC 11/26-12/1 s/p Fulphila on 12/2    PAST MEDICAL & SURGICAL HISTORY:  Hypertension  diagnosed 1 year ago    Kidney stone  5 years ago    History of genital warts    AML (acute myeloid leukemia)    No significant past surgical history        Allergies    No Known Allergies    Intolerances    cefepime (Rash)      MEDICATIONS  (STANDING):  fluconAZOLE   Tablet 200 milliGRAM(s) Oral daily  levoFLOXacin  Tablet 500 milliGRAM(s) Oral every 24 hours    MEDICATIONS  (PRN):      FAMILY HISTORY:  FH: CAD (coronary artery disease) (Father, Mother)        SOCIAL HISTORY: No EtOH, no tobacco    REVIEW OF SYSTEMS:    CONSTITUTIONAL: No weakness, fevers or chills  EYES/ENT: No visual changes;  No vertigo or throat pain   NECK: No pain or stiffness  RESPIRATORY: No cough, wheezing, hemoptysis; No shortness of breath  CARDIOVASCULAR: No chest pain or palpitations  GASTROINTESTINAL: No abdominal or epigastric pain. No nausea, vomiting, or hematemesis; No diarrhea or constipation. No melena or hematochezia.  GENITOURINARY: No dysuria, frequency or hematuria  NEUROLOGICAL: No numbness or weakness  SKIN: No itching, burning, rashes, or lesions   All other review of systems is negative unless indicated above.    Height (cm): 180.3 (12-11 @ 17:54)  Weight (kg): 88.5 (12-11 @ 17:54)  BMI (kg/m2): 27.2 (12-11 @ 17:54)  BSA (m2): 2.09 (12-11 @ 17:54)    T(F): 98 (12-12-21 @ 07:50), Max: 100.6 (12-12-21 @ 03:12)  HR: 89 (12-12-21 @ 07:50)  BP: 110/78 (12-12-21 @ 07:50)  RR: 15 (12-12-21 @ 07:50)  SpO2: 97% (12-12-21 @ 07:50)  Wt(kg): --    GENERAL: NAD, well-developed  HEAD:  Atraumatic, Normocephalic  EYES: EOMI, PERRLA, conjunctiva and sclera clear  NECK: Supple, No JVD  CHEST/LUNG: Clear to auscultation bilaterally; No wheeze  HEART: Regular rate and rhythm; No murmurs, rubs, or gallops  ABDOMEN: Soft, Nontender, Nondistended; Bowel sounds present  EXTREMITIES:  2+ Peripheral Pulses, No clubbing, cyanosis, or edema  NEUROLOGY: non-focal  SKIN: No rashes or lesions                          9.0    0.73  )-----------( 15       ( 12 Dec 2021 07:51 )             25.3       12-11    138  |  101  |  18  ----------------------------<  101<H>  3.7   |  22  |  0.96    Ca    10.2      11 Dec 2021 19:19    TPro  7.5  /  Alb  4.5  /  TBili  0.4  /  DBili  x   /  AST  18  /  ALT  57<H>  /  AlkPhos  109  12-11          PT/INR - ( 11 Dec 2021 19:19 )   PT: 13.8 sec;   INR: 1.16 ratio         PTT - ( 11 Dec 2021 19:19 )  PTT:43.4 sec    Clean Catch Clean Catch (Midstream)  11-13 @ 10:09   No growth  --  --      .Blood Blood-Peripheral  11-13 @ 03:50   No Growth Final  --  --      .Blood Blood-Peripheral  10-29 @ 14:55   No Growth Final  --  --      .Blood Blood-Peripheral  10-29 @ 09:29   No Growth Final  --  --      Clean Catch Clean Catch (Midstream)  10-26 @ 22:05   No growth  --  --      .Blood Blood-Catheter  10-26 @ 22:00   No Growth Final  --  --      .Stool Feces  10-15 @ 17:19   GI PCR Results: NOT detected  *******Please Note:*******  GI panel PCR evaluates for:  Campylobacter, Plesiomonas shigelloides, Salmonella,  Vibrio, Yersinia enterocolitica, Enteroaggregative  Escherichia coli (EAEC), Enteropathogenic E.coli (EPEC),  Enterotoxigenic E. coli (ETEC) lt/st, Shiga-like  toxin-producing E. coli (STEC) stx1/stx2,  Shigella/ Enteroinvasive E. coli (EIEC), Cryptosporidium,  Cyclospora cayetanensis, Entamoeba histolytica,  Giardia lamblia, Adenovirus F 40/41, Astrovirus,  Norovirus GI/GII, Rotavirus A, Sapovirus  --  --      .Blood Blood-Peripheral  10-09 @ 10:17   No Growth Final  --  --      Clean Catch Clean Catch (Midstream)  10-09 @ 10:15   No growth  --  --

## 2021-12-13 ENCOUNTER — APPOINTMENT (OUTPATIENT)
Dept: HEMATOLOGY ONCOLOGY | Facility: CLINIC | Age: 36
End: 2021-12-13

## 2021-12-13 ENCOUNTER — APPOINTMENT (OUTPATIENT)
Dept: INFUSION THERAPY | Facility: HOSPITAL | Age: 36
End: 2021-12-13

## 2021-12-13 LAB
A1C WITH ESTIMATED AVERAGE GLUCOSE RESULT: 5.4 % — SIGNIFICANT CHANGE UP (ref 4–5.6)
ALBUMIN SERPL ELPH-MCNC: 3.7 G/DL — SIGNIFICANT CHANGE UP (ref 3.3–5)
ALP SERPL-CCNC: 82 U/L — SIGNIFICANT CHANGE UP (ref 40–120)
ALT FLD-CCNC: 35 U/L — SIGNIFICANT CHANGE UP (ref 10–45)
ANION GAP SERPL CALC-SCNC: 13 MMOL/L — SIGNIFICANT CHANGE UP (ref 5–17)
AST SERPL-CCNC: 13 U/L — SIGNIFICANT CHANGE UP (ref 10–40)
BASOPHILS # BLD AUTO: 0 K/UL — SIGNIFICANT CHANGE UP (ref 0–0.2)
BASOPHILS NFR BLD AUTO: 0 % — SIGNIFICANT CHANGE UP (ref 0–2)
BILIRUB SERPL-MCNC: 0.6 MG/DL — SIGNIFICANT CHANGE UP (ref 0.2–1.2)
BUN SERPL-MCNC: 10 MG/DL — SIGNIFICANT CHANGE UP (ref 7–23)
CALCIUM SERPL-MCNC: 8.9 MG/DL — SIGNIFICANT CHANGE UP (ref 8.4–10.5)
CHLORIDE SERPL-SCNC: 103 MMOL/L — SIGNIFICANT CHANGE UP (ref 96–108)
CHOLEST SERPL-MCNC: 165 MG/DL — SIGNIFICANT CHANGE UP
CO2 SERPL-SCNC: 23 MMOL/L — SIGNIFICANT CHANGE UP (ref 22–31)
CREAT SERPL-MCNC: 0.77 MG/DL — SIGNIFICANT CHANGE UP (ref 0.5–1.3)
EOSINOPHIL # BLD AUTO: 0 K/UL — SIGNIFICANT CHANGE UP (ref 0–0.5)
EOSINOPHIL NFR BLD AUTO: 0 % — SIGNIFICANT CHANGE UP (ref 0–6)
ESTIMATED AVERAGE GLUCOSE: 108 MG/DL — SIGNIFICANT CHANGE UP (ref 68–114)
GLUCOSE SERPL-MCNC: 85 MG/DL — SIGNIFICANT CHANGE UP (ref 70–99)
HCT VFR BLD CALC: 21.5 % — LOW (ref 39–50)
HCT VFR BLD CALC: 26.2 % — LOW (ref 39–50)
HDLC SERPL-MCNC: 27 MG/DL — LOW
HGB BLD-MCNC: 7.5 G/DL — LOW (ref 13–17)
HGB BLD-MCNC: 9.2 G/DL — LOW (ref 13–17)
LDH SERPL L TO P-CCNC: 114 U/L — SIGNIFICANT CHANGE UP (ref 50–242)
LIPID PNL WITH DIRECT LDL SERPL: 109 MG/DL — HIGH
LYMPHOCYTES # BLD AUTO: 0.53 K/UL — LOW (ref 1–3.3)
LYMPHOCYTES # BLD AUTO: 44.8 % — HIGH (ref 13–44)
MANUAL SMEAR VERIFICATION: SIGNIFICANT CHANGE UP
MCHC RBC-ENTMCNC: 30.1 PG — SIGNIFICANT CHANGE UP (ref 27–34)
MCHC RBC-ENTMCNC: 30.3 PG — SIGNIFICANT CHANGE UP (ref 27–34)
MCHC RBC-ENTMCNC: 34.9 GM/DL — SIGNIFICANT CHANGE UP (ref 32–36)
MCHC RBC-ENTMCNC: 35.1 GM/DL — SIGNIFICANT CHANGE UP (ref 32–36)
MCV RBC AUTO: 86.2 FL — SIGNIFICANT CHANGE UP (ref 80–100)
MCV RBC AUTO: 86.3 FL — SIGNIFICANT CHANGE UP (ref 80–100)
MONOCYTES # BLD AUTO: 0.24 K/UL — SIGNIFICANT CHANGE UP (ref 0–0.9)
MONOCYTES NFR BLD AUTO: 20.7 % — HIGH (ref 2–14)
NEUTROPHILS # BLD AUTO: 0.41 K/UL — LOW (ref 1.8–7.4)
NEUTROPHILS NFR BLD AUTO: 29.3 % — LOW (ref 43–77)
NEUTS BAND # BLD: 5.2 % — SIGNIFICANT CHANGE UP (ref 0–8)
NON HDL CHOLESTEROL: 138 MG/DL — HIGH
NRBC # BLD: 0 /100 WBCS — SIGNIFICANT CHANGE UP (ref 0–0)
PLAT MORPH BLD: NORMAL — SIGNIFICANT CHANGE UP
PLATELET # BLD AUTO: 7 K/UL — CRITICAL LOW (ref 150–400)
PLATELET # BLD AUTO: 9 K/UL — CRITICAL LOW (ref 150–400)
POTASSIUM SERPL-MCNC: 3.7 MMOL/L — SIGNIFICANT CHANGE UP (ref 3.5–5.3)
POTASSIUM SERPL-SCNC: 3.7 MMOL/L — SIGNIFICANT CHANGE UP (ref 3.5–5.3)
PROT SERPL-MCNC: 6.4 G/DL — SIGNIFICANT CHANGE UP (ref 6–8.3)
RBC # BLD: 2.49 M/UL — LOW (ref 4.2–5.8)
RBC # BLD: 3.04 M/UL — LOW (ref 4.2–5.8)
RBC # FLD: 13 % — SIGNIFICANT CHANGE UP (ref 10.3–14.5)
RBC # FLD: 13.1 % — SIGNIFICANT CHANGE UP (ref 10.3–14.5)
RBC BLD AUTO: SIGNIFICANT CHANGE UP
SODIUM SERPL-SCNC: 139 MMOL/L — SIGNIFICANT CHANGE UP (ref 135–145)
TOXIC GRANULES BLD QL SMEAR: PRESENT — SIGNIFICANT CHANGE UP
TRIGL SERPL-MCNC: 148 MG/DL — SIGNIFICANT CHANGE UP
TSH SERPL-MCNC: 0.96 UIU/ML — SIGNIFICANT CHANGE UP (ref 0.27–4.2)
URATE SERPL-MCNC: 5.1 MG/DL — SIGNIFICANT CHANGE UP (ref 3.4–8.8)
WBC # BLD: 1.18 K/UL — LOW (ref 3.8–10.5)
WBC # BLD: 1.58 K/UL — LOW (ref 3.8–10.5)
WBC # FLD AUTO: 1.18 K/UL — LOW (ref 3.8–10.5)
WBC # FLD AUTO: 1.58 K/UL — LOW (ref 3.8–10.5)

## 2021-12-13 PROCEDURE — 99232 SBSQ HOSP IP/OBS MODERATE 35: CPT

## 2021-12-13 RX ORDER — CHLORHEXIDINE GLUCONATE 213 G/1000ML
1 SOLUTION TOPICAL
Refills: 0 | Status: DISCONTINUED | OUTPATIENT
Start: 2021-12-13 | End: 2021-12-16

## 2021-12-13 RX ORDER — INFLUENZA VIRUS VACCINE 15; 15; 15; 15 UG/.5ML; UG/.5ML; UG/.5ML; UG/.5ML
0.5 SUSPENSION INTRAMUSCULAR ONCE
Refills: 0 | Status: DISCONTINUED | OUTPATIENT
Start: 2021-12-13 | End: 2021-12-16

## 2021-12-13 RX ADMIN — HYDROMORPHONE HYDROCHLORIDE 1 MILLIGRAM(S): 2 INJECTION INTRAMUSCULAR; INTRAVENOUS; SUBCUTANEOUS at 23:01

## 2021-12-13 RX ADMIN — FLUCONAZOLE 200 MILLIGRAM(S): 150 TABLET ORAL at 11:58

## 2021-12-13 RX ADMIN — HYDROMORPHONE HYDROCHLORIDE 1 MILLIGRAM(S): 2 INJECTION INTRAMUSCULAR; INTRAVENOUS; SUBCUTANEOUS at 22:02

## 2021-12-13 RX ADMIN — HYDROMORPHONE HYDROCHLORIDE 1 MILLIGRAM(S): 2 INJECTION INTRAMUSCULAR; INTRAVENOUS; SUBCUTANEOUS at 06:45

## 2021-12-13 RX ADMIN — SODIUM CHLORIDE 100 MILLILITER(S): 9 INJECTION INTRAMUSCULAR; INTRAVENOUS; SUBCUTANEOUS at 11:59

## 2021-12-13 RX ADMIN — HYDROMORPHONE HYDROCHLORIDE 1 MILLIGRAM(S): 2 INJECTION INTRAMUSCULAR; INTRAVENOUS; SUBCUTANEOUS at 07:02

## 2021-12-13 RX ADMIN — LORATADINE 10 MILLIGRAM(S): 10 TABLET ORAL at 11:57

## 2021-12-13 NOTE — PROGRESS NOTE ADULT - ASSESSMENT
Patient is a 36 year old Male with a PMHx of HTN, AML on chemo who presents to the hospital due to epistaxis. Last chemo 12/1, since then had steadily worsening pancytopenia. Patient states that he received platelet transfusions on 12/06/2021, 12/08/2021 and 12/10/2021. Patient states he received 2 units of blood transfusion on Tuesday 12/07/2021. Per patient, he reports that he was feeling sinus congestion and pressure on his L side, blew his nose and bleeding began from L nare without improvement prompting arrival to the emergency department.  Feels mild L sinus congestion.        Epistaxis  --Monitor CBC with Differential daily   --Monitor for signs and symptoms of bleeding  --Transfuse Blood products and platelets as per Heme/Onc recommendations  --S/P 1 unit Platelet transfusion and 2 units PRBC transfusion in the ED  --ENT Consultation for evaluation; appreciated  --As per ENT-- L nasal cavity packed with absorbable packing, and Afrin nasal spray to begin 12/14; Patient will need outpatient F/U with ENT following discharge   --Receiving 1 unit of platelets today 12/13 for platelet level of 7; Continue to monitor CBC and platelet levels closely; Monitor for any signs or symptoms of bleeding and epistaxis     Fever  --Neutropenic fever   --Patient was febrile to 100.3   --ID consultation  --Per heme/Onc, continue with Levaquin and Diflucan for now  --Pan culture obtained--- F/U Urine culture and Blood culture X 2 results from 12/12-- with No growth currently; F/U Final results   --Monitor CBC and Temperature curve closely   --Per Heme/Onc if fever re-occurs, switch Levaquin to Meropenem   --Started on IVF NS at 100cc/hr   --ID eval called for further recommendations; appreciated       Pancytopenia   --S/P multiple platelet transfusions on 12/06, 12/08, 12/10 and 2 unit PRBCs transfusion on 12/07   --Monitor Platelet levels  --Transfuse as per Heme/Onc recommendations  --S/P 1 unit platelet transfusion and 2 units PRBCs in the emergency department   --Continue to monitor CBC with diff daily  --Transfuse as per Heme/Onc recommendations   --Receiving 1 unit of platelets today 12/13 for platelet level of 7; Continue to monitor CBC and platelet levels closely; Monitor for any signs or symptoms of bleeding and epistaxis       SVT  --Patient with a period of SVT in the Emergency department  --When seen in the ED, Patient was tachycardic and was on monitor; denies CP, Palpitations or SOB  --Continue to monitor patient closely   --Continue with monitor      AML  --S/P Chemo on 12/01  --Monitor CBC with differential daily   --F/U Heme/Onc recommendations      Hypertension  --Continue to monitor off of medication for now  --Monitor BP closely   --Per patient, he was on Amlodipine 5 at home for HTN. Continue to monitor BP closely and if elevates, resume home medication on this admission     Pain control   --Dilaudid       DVT PPX with Compression devices     Patient is a 36 year old Male with a PMHx of HTN, AML on chemo who presents to the hospital due to epistaxis. Last chemo 12/1, since then had steadily worsening pancytopenia. Patient states that he received platelet transfusions on 12/06/2021, 12/08/2021 and 12/10/2021. Patient states he received 2 units of blood transfusion on Tuesday 12/07/2021. Per patient, he reports that he was feeling sinus congestion and pressure on his L side, blew his nose and bleeding began from L nare without improvement prompting arrival to the emergency department.  Feels mild L sinus congestion.        Epistaxis  --Monitor CBC with Differential daily   --Monitor for signs and symptoms of bleeding  --Transfuse Blood products and platelets as per Heme/Onc recommendations  --S/P 1 unit Platelet transfusion and 2 units PRBC transfusion in the ED  --ENT Consultation for evaluation; appreciated  --As per ENT-- L nasal cavity packed with absorbable packing; F/U ENT Recommendations  --Patient will need outpatient F/U with ENT following discharge   --Receiving 1 unit of platelets today 12/13 for platelet level of 7; Continue to monitor CBC and platelet levels closely; Monitor for any signs or symptoms of bleeding and epistaxis     Fever  --Neutropenic fever   --Patient was febrile to 100.3   --ID consultation  --Per heme/Onc, continue with Levaquin and Diflucan for now  --Pan culture obtained--- F/U Urine culture and Blood culture X 2 results from 12/12-- with No growth currently; F/U Final results   --Monitor CBC and Temperature curve closely   --Per Heme/Onc if fever re-occurs, switch Levaquin to Meropenem   --Started on IVF NS at 100cc/hr   --ID eval called for further recommendations; appreciated       Pancytopenia   --S/P multiple platelet transfusions on 12/06, 12/08, 12/10 and 2 unit PRBCs transfusion on 12/07   --Monitor Platelet levels  --Transfuse as per Heme/Onc recommendations  --S/P 1 unit platelet transfusion and 2 units PRBCs in the emergency department   --Continue to monitor CBC with diff daily  --Transfuse as per Heme/Onc recommendations   --Receiving 1 unit of platelets today 12/13 for platelet level of 7; Continue to monitor CBC and platelet levels closely; Monitor for any signs or symptoms of bleeding and epistaxis       SVT  --Patient with a period of SVT in the Emergency department  --Patient denies CP, Palpitations   --Continue to monitor patient closely   --Continue with monitor      AML  --S/P Chemo on 12/01  --Monitor CBC with differential daily   --F/U Heme/Onc recommendations      Hypertension  --Continue to monitor off of medication for now  --Monitor BP closely   --Per patient, he was on Amlodipine 5 at home for HTN. Continue to monitor BP closely and if elevates, resume home medication on this admission     Pain control   --Dilaudid       DVT PPX with Compression devices     Patient is a 36 year old Male with a PMHx of HTN, AML on chemo who presents to the hospital due to epistaxis. Last chemo 12/1, since then had steadily worsening pancytopenia. Patient states that he received platelet transfusions on 12/06/2021, 12/08/2021 and 12/10/2021. Patient states he received 2 units of blood transfusion on Tuesday 12/07/2021. Per patient, he reports that he was feeling sinus congestion and pressure on his L side, blew his nose and bleeding began from L nare without improvement prompting arrival to the emergency department.  Feels mild L sinus congestion.        Epistaxis  --Monitor CBC with Differential daily   --Monitor for signs and symptoms of bleeding  --Transfuse Blood products and platelets as per Heme/Onc recommendations  --S/P 1 unit Platelet transfusion and 2 units PRBC transfusion in the ED  --ENT Consultation for evaluation; appreciated  --As per ENT-- L nasal cavity packed with absorbable packing; F/U ENT Recommendations  --Patient will need outpatient F/U with ENT following discharge   --Receiving 1 unit of platelets today 12/13 for platelet level of 7; Continue to monitor CBC and platelet levels closely; Monitor for any signs or symptoms of bleeding and epistaxis     Fever  --Neutropenic fever   --Patient was febrile to 100.3   --ID consultation  --Per heme/Onc, continue with Levaquin and Diflucan for now  --Pan culture obtained--- F/U Urine culture and Blood culture X 2 results from 12/12-- with No growth currently; F/U Final results   --Monitor CBC and Temperature curve closely   --Per Heme/Onc if fever re-occurs, switch Levaquin to Meropenem   --Started on IVF NS at 100cc/hr   --ID eval called for further recommendations; appreciated       Pancytopenia   --S/P multiple platelet transfusions on 12/06, 12/08, 12/10 and 2 unit PRBCs transfusion on 12/07   --Monitor Platelet levels  --Transfuse as per Heme/Onc recommendations  --S/P 1 unit platelet transfusion and 2 units PRBCs in the emergency department   --Continue to monitor CBC with diff daily  --Transfuse as per Heme/Onc recommendations   --Receiving 1 unit of platelets today 12/13 for platelet level of 7; Continue to monitor CBC and platelet levels closely; Monitor for any signs or symptoms of bleeding and epistaxis   -- Defer management to Hematology       Sinus tachy   --Patient with a period of SVT in the Emergency department  --Patient denies CP, Palpitations   --Resolved after hydration   -- can D/c tele       AML  --S/P Chemo on 12/01  --Monitor CBC with differential daily   --F/U Heme/Onc recommendations      Hypertension  --Continue to monitor off of medication for now  --Monitor BP closely   --Per patient, he was on Amlodipine 5 at home for HTN. Continue to monitor BP closely and if elevates, resume home medication on this admission     Pain control   --Dilaudid       DVT PPX with Compression devices

## 2021-12-13 NOTE — PATIENT PROFILE ADULT - FALL HARM RISK - UNIVERSAL INTERVENTIONS
Bed in lowest position, wheels locked, appropriate side rails in place/Call bell, personal items and telephone in reach/Instruct patient to call for assistance before getting out of bed or chair/Non-slip footwear when patient is out of bed/[place] to call system/Physically safe environment - no spills, clutter or unnecessary equipment/Purposeful Proactive Rounding/Room/bathroom lighting operational, light cord in reach

## 2021-12-13 NOTE — PROGRESS NOTE ADULT - SUBJECTIVE AND OBJECTIVE BOX
Follow Up: neutropenia    Interval History/ROS: epistaxis halted     Allergies  No Known Allergies    ANTIMICROBIALS:  fluconAZOLE   Tablet 200 daily  levoFLOXacin  Tablet 500 every 24 hours      OTHER MEDS:  MEDICATIONS  (STANDING):  HYDROmorphone  Injectable 1 every 6 hours PRN  influenza   Vaccine 0.5 once  loratadine 10 daily      Vital Signs Last 24 Hrs  T(C): 36.6 (13 Dec 2021 11:35), Max: 37.1 (13 Dec 2021 00:08)  T(F): 97.8 (13 Dec 2021 11:35), Max: 98.8 (13 Dec 2021 00:08)  HR: 107 (13 Dec 2021 11:35) (81 - 111)  BP: 118/73 (13 Dec 2021 11:35) (105/71 - 124/86)  BP(mean): --  RR: 18 (13 Dec 2021 11:35) (18 - 20)  SpO2: 99% (13 Dec 2021 11:35) (96% - 100%)    PHYSICAL EXAM:  General: WN/WD NAD, Non-toxic  Neurology: A&Ox3, nonfocal  Respiratory: Clear to auscultation bilaterally  CV: RRR, S1S2, no murmurs, rubs or gallops  Abdominal: Soft, Non-tender, non-distended, normal bowel sounds  Extremities: No edema  Line Sites: Clear  Skin: No rash                        7.5    1.18  )-----------( 7        ( 13 Dec 2021 06:59 )             21.5   WBC Count: 1.18 ( @ 06:59)  29.3 N  5.2 B  ANC = 407  WBC Count: 0.73 ( @ 07:51)  WBC Count: 0.62 ( @ 01:29)  WBC Count: 0.68 ( @ 19:19)  WBC Count: 0.48 (12-10 @ 08:10)          139  |  103  |  10  ----------------------------<  85  3.7   |  23  |  0.77    Ca    8.9      13 Dec 2021 07:06    TPro  6.4  /  Alb  3.7  /  TBili  0.6  /  DBili  x   /  AST  13  /  ALT  35  /  AlkPhos  82  12-13      Urinalysis Basic - ( 12 Dec 2021 00:12 )    Color: Light Yellow / Appearance: Clear / S.011 / pH: x  Gluc: x / Ketone: Negative  / Bili: Negative / Urobili: Negative   Blood: x / Protein: Negative / Nitrite: Negative   Leuk Esterase: Negative / RBC: x / WBC x   Sq Epi: x / Non Sq Epi: x / Bacteria: x        MICROBIOLOGY:  .Blood Blood  21   No growth to date.  --  --      Clean Catch Clean Catch (Midstream)  21   No growth  --  --      RADIOLOGY:  < from: Xray Chest 1 View AP/PA (21 @ 19:40) >  Right upper extremity PICC terminates in the superior vena cava.  Clear lungs.    < end of copied text >      Arnulfo Luevano MD; Division of Infectious Disease; Pager: [phone]; nights and weekends: [phone]

## 2021-12-13 NOTE — PROGRESS NOTE ADULT - SUBJECTIVE AND OBJECTIVE BOX
Name of Patient : JAK DANIELS  MRN: [id number]  Date of visit: 21 @ 10:54      Subjective: Patient seen and examined. No new events except as noted.   Patient seen at bedside receiving 1 unit of platelet transfusion for platelet level of 7.   Patient denies any more episodes of epistaxis. States that he was seen by ENT and nare was packed.   Patient is currently afebrile, saturating 98% on room air. Denies any chest pain or palpitations.     REVIEW OF SYSTEMS:    CONSTITUTIONAL: No weakness, fevers or chills  EYES/ENT: Denies Epistaxis; Nare was packed by ENT; No visual changes;  No vertigo or throat pain   NECK: No pain or stiffness  RESPIRATORY: No cough, wheezing, hemoptysis; No shortness of breath  CARDIOVASCULAR: No chest pain or palpitations  GASTROINTESTINAL: No abdominal or epigastric pain. No nausea, vomiting, or hematemesis; No diarrhea or constipation. No melena or hematochezia.  GENITOURINARY: No dysuria, frequency or hematuria  NEUROLOGICAL: No numbness or weakness  SKIN: No itching, burning, rashes, or lesions   All other review of systems is negative unless indicated above.    MEDICATIONS:  MEDICATIONS  (STANDING):  fluconAZOLE   Tablet 200 milliGRAM(s) Oral daily  influenza   Vaccine 0.5 milliLiter(s) IntraMuscular once  levoFLOXacin  Tablet 500 milliGRAM(s) Oral every 24 hours  loratadine 10 milliGRAM(s) Oral daily  sodium chloride 0.9%. 1000 milliLiter(s) (100 mL/Hr) IV Continuous <Continuous>      PHYSICAL EXAM:  T(C): 36.7 (21 @ 09:45), Max: 37.1 (21 @ 00:08)  HR: 96 (21 @ 09:45) (81 - 111)  BP: 120/84 (21 @ 09:45) (105/71 - 124/86)  RR: 18 (21 @ 09:45) (16 - 20)  SpO2: 98% (21 @ 09:45) (96% - 100%)  Wt(kg): --  I&O's Summary    12 Dec 2021 07:01  -  13 Dec 2021 07:00  --------------------------------------------------------  IN: 0 mL / OUT: 400 mL / NET: -400 mL          Appearance: Normal; calm; laying in bed 	  HEENT: No epistaxis noted   Lymphatic: No lymphadenopathy   Cardiovascular: Normal S1 S2, no JVD  Respiratory: normal effort , clear  Gastrointestinal:  Soft, Non-tender  Skin: No rashes,  warm to touch  Psychiatry:  Mood & affect appropriate  Musculoskeletal: No  LE edema      All labs, Imaging and EKGs personally reviewed       21 @ 07:01  -  21 @ 07:00  --------------------------------------------------------  IN: 0 mL / OUT: 400 mL / NET: -400 mL                            7.5    1.18  )-----------( 7        ( 13 Dec 2021 06:59 )             21.5                   139  |  103  |  10  ----------------------------<  85  3.7   |  23  |  0.77    Ca    8.9      13 Dec 2021 07:06    TPro  6.4  /  Alb  3.7  /  TBili  0.6  /  DBili  x   /  AST  13  /  ALT  35  /  AlkPhos  82  12    PT/INR - ( 11 Dec 2021 19:19 )   PT: 13.8 sec;   INR: 1.16 ratio         PTT - ( 11 Dec 2021 19:19 )  PTT:43.4 sec                   Urinalysis Basic - ( 12 Dec 2021 00:12 )    Color: Light Yellow / Appearance: Clear / S.011 / pH: x  Gluc: x / Ketone: Negative  / Bili: Negative / Urobili: Negative   Blood: x / Protein: Negative / Nitrite: Negative   Leuk Esterase: Negative / RBC: x / WBC x   Sq Epi: x / Non Sq Epi: x / Bacteria: x          Culture - Blood (21 @ 04:27)   Specimen Source: .Blood Blood   Culture Results: No growth to date.     Culture - Blood (21 @ 04:27)   Specimen Source: .Blood Blood   Culture Results: No growth to date.     Culture - Urine (21 @ 01:39)   Specimen Source: Clean Catch Clean Catch (Midstream)   Culture Results: No growth     < from: Xray Chest 1 View AP/PA (21 @ 19:40) >    IMPRESSION:    Right upper extremity PICC terminates in the superior vena cava.  Clear lungs.    --- End of Report ---    < end of copied text >

## 2021-12-13 NOTE — PROGRESS NOTE ADULT - ASSESSMENT
36 year old male with  HTN, AML (on daunorubicin/cytarabine now s/p C3 of HIDAC on 12/21), and genital warts who is admitted today 12/21/21  to the hospital due to epistaxis  cautery and packing by ENT  transient fever last night  history of rash to cefepime in August 2021  recovering Neutropenia  12/12 absorbable packing placed in  left nasal cavity  no additional bleeing    Suggest  nasal packing as per ENT  transfusional support  continue Levofloxacin, Acyclovir until ANC>500  trend CBC    monitor temps:  if febrile Zosyn

## 2021-12-13 NOTE — PATIENT PROFILE ADULT - NSPRESCRUSEDDRG_GEN_A_NUR
Stop torsemide  Decrease lisinopril to 10 mg twice a day  Monitor urine frequency, diarrhea, and blood pressure for the next week  Send me a phone update or my chart update next Wednesday  Ask Dr. Rita Diop about stopping digoxin.   May be the cause of diar
No

## 2021-12-14 DIAGNOSIS — R04.0 EPISTAXIS: ICD-10-CM

## 2021-12-14 LAB
ANION GAP SERPL CALC-SCNC: 12 MMOL/L — SIGNIFICANT CHANGE UP (ref 5–17)
BUN SERPL-MCNC: 15 MG/DL — SIGNIFICANT CHANGE UP (ref 7–23)
CALCIUM SERPL-MCNC: 9.7 MG/DL — SIGNIFICANT CHANGE UP (ref 8.4–10.5)
CHLORIDE SERPL-SCNC: 106 MMOL/L — SIGNIFICANT CHANGE UP (ref 96–108)
CO2 SERPL-SCNC: 24 MMOL/L — SIGNIFICANT CHANGE UP (ref 22–31)
CREAT SERPL-MCNC: 0.88 MG/DL — SIGNIFICANT CHANGE UP (ref 0.5–1.3)
GLUCOSE SERPL-MCNC: 94 MG/DL — SIGNIFICANT CHANGE UP (ref 70–99)
HCT VFR BLD CALC: 24.5 % — LOW (ref 39–50)
HCT VFR BLD CALC: 25.3 % — LOW (ref 39–50)
HGB BLD-MCNC: 8.8 G/DL — LOW (ref 13–17)
HGB BLD-MCNC: 8.9 G/DL — LOW (ref 13–17)
MAGNESIUM SERPL-MCNC: 2.4 MG/DL — SIGNIFICANT CHANGE UP (ref 1.6–2.6)
MCHC RBC-ENTMCNC: 30.7 PG — SIGNIFICANT CHANGE UP (ref 27–34)
MCHC RBC-ENTMCNC: 30.8 PG — SIGNIFICANT CHANGE UP (ref 27–34)
MCHC RBC-ENTMCNC: 35.2 GM/DL — SIGNIFICANT CHANGE UP (ref 32–36)
MCHC RBC-ENTMCNC: 35.9 GM/DL — SIGNIFICANT CHANGE UP (ref 32–36)
MCV RBC AUTO: 85.7 FL — SIGNIFICANT CHANGE UP (ref 80–100)
MCV RBC AUTO: 87.2 FL — SIGNIFICANT CHANGE UP (ref 80–100)
NRBC # BLD: 0 /100 WBCS — SIGNIFICANT CHANGE UP (ref 0–0)
NRBC # BLD: 0 /100 WBCS — SIGNIFICANT CHANGE UP (ref 0–0)
PHOSPHATE SERPL-MCNC: 4.7 MG/DL — HIGH (ref 2.5–4.5)
PLATELET # BLD AUTO: 17 K/UL — CRITICAL LOW (ref 150–400)
PLATELET # BLD AUTO: 25 K/UL — LOW (ref 150–400)
POTASSIUM SERPL-MCNC: 3.8 MMOL/L — SIGNIFICANT CHANGE UP (ref 3.5–5.3)
POTASSIUM SERPL-SCNC: 3.8 MMOL/L — SIGNIFICANT CHANGE UP (ref 3.5–5.3)
RBC # BLD: 2.86 M/UL — LOW (ref 4.2–5.8)
RBC # BLD: 2.9 M/UL — LOW (ref 4.2–5.8)
RBC # FLD: 13.1 % — SIGNIFICANT CHANGE UP (ref 10.3–14.5)
RBC # FLD: 13.2 % — SIGNIFICANT CHANGE UP (ref 10.3–14.5)
SODIUM SERPL-SCNC: 142 MMOL/L — SIGNIFICANT CHANGE UP (ref 135–145)
WBC # BLD: 2.07 K/UL — LOW (ref 3.8–10.5)
WBC # BLD: 2.26 K/UL — LOW (ref 3.8–10.5)
WBC # FLD AUTO: 2.07 K/UL — LOW (ref 3.8–10.5)
WBC # FLD AUTO: 2.26 K/UL — LOW (ref 3.8–10.5)

## 2021-12-14 PROCEDURE — 99231 SBSQ HOSP IP/OBS SF/LOW 25: CPT

## 2021-12-14 RX ADMIN — HYDROMORPHONE HYDROCHLORIDE 1 MILLIGRAM(S): 2 INJECTION INTRAMUSCULAR; INTRAVENOUS; SUBCUTANEOUS at 12:00

## 2021-12-14 RX ADMIN — SODIUM CHLORIDE 100 MILLILITER(S): 9 INJECTION INTRAMUSCULAR; INTRAVENOUS; SUBCUTANEOUS at 11:35

## 2021-12-14 RX ADMIN — FLUCONAZOLE 200 MILLIGRAM(S): 150 TABLET ORAL at 11:31

## 2021-12-14 RX ADMIN — HYDROMORPHONE HYDROCHLORIDE 1 MILLIGRAM(S): 2 INJECTION INTRAMUSCULAR; INTRAVENOUS; SUBCUTANEOUS at 11:31

## 2021-12-14 RX ADMIN — CHLORHEXIDINE GLUCONATE 1 APPLICATION(S): 213 SOLUTION TOPICAL at 05:16

## 2021-12-14 RX ADMIN — HYDROMORPHONE HYDROCHLORIDE 1 MILLIGRAM(S): 2 INJECTION INTRAMUSCULAR; INTRAVENOUS; SUBCUTANEOUS at 06:20

## 2021-12-14 RX ADMIN — LORATADINE 10 MILLIGRAM(S): 10 TABLET ORAL at 11:31

## 2021-12-14 RX ADMIN — HYDROMORPHONE HYDROCHLORIDE 1 MILLIGRAM(S): 2 INJECTION INTRAMUSCULAR; INTRAVENOUS; SUBCUTANEOUS at 05:21

## 2021-12-14 NOTE — PROGRESS NOTE ADULT - ASSESSMENT
Patient is a 36 year old Male with a PMHx of HTN, AML on chemo who presents to the hospital due to epistaxis. Last chemo 12/1, since then had steadily worsening pancytopenia. Patient states that he received platelet transfusions on 12/06/2021, 12/08/2021 and 12/10/2021. Patient states he received 2 units of blood transfusion on Tuesday 12/07/2021. Per patient, he reports that he was feeling sinus congestion and pressure on his L side, blew his nose and bleeding began from L nare without improvement prompting arrival to the emergency department.  Feels mild L sinus congestion.        Epistaxis  --Monitor CBC with Differential daily   --Monitor for signs and symptoms of bleeding  --Transfuse Blood products and platelets as per Heme/Onc recommendations  --S/P 1 unit Platelet transfusion and 2 units PRBC transfusion in the ED  --ENT Consultation for evaluation; appreciated  --As per ENT-- L nasal cavity packed with absorbable packing; F/U ENT Recommendations  --Patient will need outpatient F/U with ENT following discharge   --S/P 2 units of platelets on12/13 for platelet level of 7 and 9; Continue to monitor CBC and platelet levels closely; Monitor for any signs or symptoms of bleeding and epistaxis   --Platelets today noted to be 25 S/P transfusion on 12/13    Fever  --Neutropenic fever   --Patient was febrile to 100.3   --ID consultation  --Per heme/Onc, continue with Levaquin and Diflucan for now  --Pan culture obtained--- F/U Urine culture and Blood culture X 2 results from 12/12-- with No growth currently; F/U Final results   --Monitor CBC and Temperature curve closely   --Per Heme/Onc if fever re-occurs, switch Levaquin to Meropenem   --Started on IVF NS at 100cc/hr   --ID eval called for further recommendations; appreciated; F/U recommendations         Pancytopenia   --S/P multiple platelet transfusions on 12/06, 12/08, 12/10 and 2 unit PRBCs transfusion on 12/07   --Monitor Platelet levels  --Transfuse as per Heme/Onc recommendations  --S/P 1 unit platelet transfusion and 2 units PRBCs in the emergency department   --Continue to monitor CBC with diff daily  --Transfuse as per Heme/Onc recommendations   --S/P 2 units of platelets on 12/13 for platelet level of 7 and 9; Continue to monitor CBC and platelet levels closely; Monitor for any signs or symptoms of bleeding and epistaxis   -- Defer management to Hematology       Sinus tachy   --Patient with a period of SVT in the Emergency department  --Patient denies CP, Palpitations   --Resolved after hydration   --can D/C tele       AML  --S/P Chemo on 12/01  --Monitor CBC with differential daily   --F/U Heme/Onc recommendations      Hypertension  --Continue to monitor off of medication for now  --Monitor BP closely   --Per patient, he was on Amlodipine 5 at home for HTN. Continue to monitor BP closely and if elevates, resume home medication on this admission     Pain control   --Dilaudid       DVT PPX with Compression devices

## 2021-12-14 NOTE — PROGRESS NOTE ADULT - ATTENDING COMMENTS
Pt care and plan discussed and reviewed with PA. Plan as outlined above edited by me to reflect our discussion. I had a prolonged conversation with the patient regarding hospital course, differential diagnosis and results of diagnostic tests.  Plan of care discussed with patient after the evaluation. Patient expresses clear understanding and satisfaction with the plan of care. OMT on six regions for acute somatic dysfunctions done at the bedside. Sixty five minutes spent on encounter, of which more than fifty percent of the encounter was spent on counseling and/or coordinating care by the attending physician.
Pt care and plan discussed and reviewed with PA. Plan as outlined above edited by me to reflect our discussion. I had a prolonged conversation with the patient regarding hospital course, differential diagnosis and results of diagnostic tests.  Plan of care discussed with patient after the evaluation. Patient expresses clear understanding and satisfaction with the plan of care. OMT on six regions for acute somatic dysfunctions done at the bedside. Sixty five minutes spent on encounter, of which more than fifty percent of the encounter was spent on counseling and/or coordinating care by the attending physician. Advanced care planning/advanced directives discussed with patient/family. DNR status including forceful chest compressions to attempt to restart the heart, ventilator support/artificial breathing, electric shock, artificial nutrition, health care proxy, Molst form all discussed with pt. Pt wishes to consider.

## 2021-12-14 NOTE — PROGRESS NOTE ADULT - ASSESSMENT
36y s/p Left epistaxis controlled with dissolvable packing in place, no need to remove, will fall out if doesn't dissolve. portion trimmed todaY.

## 2021-12-14 NOTE — PROGRESS NOTE ADULT - SUBJECTIVE AND OBJECTIVE BOX
Follow Up:  epsitaxis    Interval History/ROS:  no distress - on phone call    Allergies  No Known Allergies    ANTIMICROBIALS:  fluconAZOLE   Tablet 200 daily  levoFLOXacin  Tablet 500 every 24 hours      OTHER MEDS:  MEDICATIONS  (STANDING):  HYDROmorphone  Injectable 1 every 6 hours PRN  influenza   Vaccine 0.5 once  loratadine 10 daily      Vital Signs Last 24 Hrs  T(C): 36.5 (14 Dec 2021 11:11), Max: 37 (13 Dec 2021 20:09)  T(F): 97.7 (14 Dec 2021 11:11), Max: 98.6 (13 Dec 2021 20:09)  HR: 92 (14 Dec 2021 11:11) (90 - 101)  BP: 115/80 (14 Dec 2021 11:11) (102/70 - 115/80)  BP(mean): --  RR: 18 (14 Dec 2021 11:11) (18 - 18)  SpO2: 100% (14 Dec 2021 11:11) (95% - 100%)    PHYSICAL EXAM:  General: WN/WD NAD, Non-toxic  Neurology: A&Ox3, nonfocal  Respiratory: no resp distress  Extremities: No edema, + peripheral pulses  Line Sites: Clear  Skin: No rash                          8.9    2.07  )-----------( 25       ( 14 Dec 2021 07:08 )             25.3   WBC Count: 2.07 (12-14 @ 07:08)  WBC Count: 1.58 (12-13 @ 18:34)  WBC Count: 1.18 (12-13 @ 06:59)  WBC Count: 0.73 (12-12 @ 07:51)  WBC Count: 0.62 (12-12 @ 01:29)  WBC Count: 0.68 (12-11 @ 19:19)  WBC Count: 0.48 (12-10 @ 08:10)    12-14    142  |  106  |  15  ----------------------------<  94  3.8   |  24  |  0.88    Ca    9.7      14 Dec 2021 07:07  Phos  4.7     12-14  Mg     2.4     12-14    TPro  6.4  /  Alb  3.7  /  TBili  0.6  /  DBili  x   /  AST  13  /  ALT  35  /  AlkPhos  82  12-13      MICROBIOLOGY:  .Blood Blood  12-12-21   No growth to date.  --  --      Clean Catch Clean Catch (Midstream)  12-12-21   No growth  --  --        .Blood Blood  Clean Catch Clean Catch (Midstream)    v          RADIOLOGY:    Arnulfo Luevano MD; Division of Infectious Disease; Pager: [phone]; nights and weekends: [phone]

## 2021-12-14 NOTE — PROGRESS NOTE ADULT - SUBJECTIVE AND OBJECTIVE BOX
Name of Patient : JAK DANIELS  MRN: [id number]  Date of visit: 12-14-21 @ 09:29      Subjective: Patient seen and examined. No new events except as noted.   Patient is S/P 2 units of platelet transfusion yesterday 12/13.     REVIEW OF SYSTEMS:    CONSTITUTIONAL: No weakness, fevers or chills  EYES/ENT: No visual changes;  No vertigo or throat pain   NECK: No pain or stiffness  RESPIRATORY: No cough, wheezing, hemoptysis; No shortness of breath  CARDIOVASCULAR: No chest pain or palpitations  GASTROINTESTINAL: No abdominal or epigastric pain. No nausea, vomiting, or hematemesis; No diarrhea or constipation. No melena or hematochezia.  GENITOURINARY: No dysuria, frequency or hematuria  NEUROLOGICAL: No numbness or weakness  SKIN: No itching, burning, rashes, or lesions   All other review of systems is negative unless indicated above.    MEDICATIONS:  MEDICATIONS  (STANDING):  chlorhexidine 2% Cloths 1 Application(s) Topical <User Schedule>  fluconAZOLE   Tablet 200 milliGRAM(s) Oral daily  influenza   Vaccine 0.5 milliLiter(s) IntraMuscular once  levoFLOXacin  Tablet 500 milliGRAM(s) Oral every 24 hours  loratadine 10 milliGRAM(s) Oral daily  sodium chloride 0.9%. 1000 milliLiter(s) (100 mL/Hr) IV Continuous <Continuous>      PHYSICAL EXAM:  T(C): 36.4 (12-14-21 @ 04:57), Max: 37 (12-13-21 @ 20:09)  HR: 90 (12-14-21 @ 04:57) (90 - 107)  BP: 109/70 (12-14-21 @ 04:57) (102/70 - 120/84)  RR: 18 (12-14-21 @ 04:57) (18 - 18)  SpO2: 95% (12-14-21 @ 04:57) (95% - 99%)  Wt(kg): --  I&O's Summary    13 Dec 2021 07:01  -  14 Dec 2021 07:00  --------------------------------------------------------  IN: 1660 mL / OUT: 0 mL / NET: 1660 mL          Appearance: Normal	  HEENT:  PERRLA   Lymphatic: No lymphadenopathy   Cardiovascular: Normal S1 S2, no JVD  Respiratory: normal effort , clear  Gastrointestinal:  Soft, Non-tender  Skin: No rashes,  warm to touch  Psychiatry:  Mood & affect appropriate  Musculuskeletal: No edema      All labs, Imaging and EKGs personally reviewed       12-13-21 @ 07:01  -  12-14-21 @ 07:00  --------------------------------------------------------  IN: 1660 mL / OUT: 0 mL / NET: 1660 mL                            8.9    2.07  )-----------( 25       ( 14 Dec 2021 07:08 )             25.3               12-14    142  |  106  |  15  ----------------------------<  94  3.8   |  24  |  0.88    Ca    9.7      14 Dec 2021 07:07  Phos  4.7     12-14  Mg     2.4     12-14    TPro  6.4  /  Alb  3.7  /  TBili  0.6  /  DBili  x   /  AST  13  /  ALT  35  /  AlkPhos  82  12-13           Culture - Blood (12.12.21 @ 04:27)   Specimen Source: .Blood Blood   Culture Results: No growth to date.     Culture - Blood (12.12.21 @ 04:27)   Specimen Source: .Blood Blood   Culture Results: No growth to date.     Culture - Urine (12.12.21 @ 01:39)   Specimen Source: Clean Catch Clean Catch (Midstream)   Culture Results: No growth                Name of Patient : JAK DANIELS  MRN: [id number]  Date of visit: 12-14-21 @ 09:29      Subjective: Patient seen and examined. No new events except as noted.   Patient is S/P 2 units of platelet transfusion yesterday 12/13.   Patient is currently afebrile and saturating 100% on room air.   Patient denies Epistaxis.     REVIEW OF SYSTEMS:    CONSTITUTIONAL: No weakness, fevers or chills  EYES/ENT: Denies epistaxis; No visual changes;  No vertigo or throat pain   NECK: No pain or stiffness  RESPIRATORY: No cough, wheezing, hemoptysis; No shortness of breath  CARDIOVASCULAR: No chest pain or palpitations  GASTROINTESTINAL: No abdominal or epigastric pain. No nausea, vomiting, or hematemesis; No diarrhea or constipation. No melena or hematochezia.  GENITOURINARY: No dysuria, frequency or hematuria  NEUROLOGICAL: No numbness or weakness  SKIN: No itching, burning, rashes, or lesions   All other review of systems is negative unless indicated above.    MEDICATIONS:  MEDICATIONS  (STANDING):  chlorhexidine 2% Cloths 1 Application(s) Topical <User Schedule>  fluconAZOLE   Tablet 200 milliGRAM(s) Oral daily  influenza   Vaccine 0.5 milliLiter(s) IntraMuscular once  levoFLOXacin  Tablet 500 milliGRAM(s) Oral every 24 hours  loratadine 10 milliGRAM(s) Oral daily  sodium chloride 0.9%. 1000 milliLiter(s) (100 mL/Hr) IV Continuous <Continuous>      PHYSICAL EXAM:  T(C): 36.4 (12-14-21 @ 04:57), Max: 37 (12-13-21 @ 20:09)  HR: 90 (12-14-21 @ 04:57) (90 - 107)  BP: 109/70 (12-14-21 @ 04:57) (102/70 - 120/84)  RR: 18 (12-14-21 @ 04:57) (18 - 18)  SpO2: 95% (12-14-21 @ 04:57) (95% - 99%)  Wt(kg): --  I&O's Summary    13 Dec 2021 07:01  -  14 Dec 2021 07:00  --------------------------------------------------------  IN: 1660 mL / OUT: 0 mL / NET: 1660 mL          Appearance: Normal; calm; laying in bed 	  HEENT: No epistaxis   Lymphatic: No lymphadenopathy   Cardiovascular: Normal S1 S2, no JVD  Respiratory: normal effort , clear  Gastrointestinal:  Soft, Non-tender  Skin: No rashes,  warm to touch  Psychiatry:  Mood & affect appropriate  Musculoskeletal: No LE edema      All labs, Imaging and EKGs personally reviewed       12-13-21 @ 07:01  -  12-14-21 @ 07:00  --------------------------------------------------------  IN: 1660 mL / OUT: 0 mL / NET: 1660 mL                            8.9    2.07  )-----------( 25       ( 14 Dec 2021 07:08 )             25.3               12-14    142  |  106  |  15  ----------------------------<  94  3.8   |  24  |  0.88    Ca    9.7      14 Dec 2021 07:07  Phos  4.7     12-14  Mg     2.4     12-14    TPro  6.4  /  Alb  3.7  /  TBili  0.6  /  DBili  x   /  AST  13  /  ALT  35  /  AlkPhos  82  12-13           Culture - Blood (12.12.21 @ 04:27)   Specimen Source: .Blood Blood   Culture Results: No growth to date.     Culture - Blood (12.12.21 @ 04:27)   Specimen Source: .Blood Blood   Culture Results: No growth to date.     Culture - Urine (12.12.21 @ 01:39)   Specimen Source: Clean Catch Clean Catch (Midstream)   Culture Results: No growth

## 2021-12-14 NOTE — PROGRESS NOTE ADULT - ASSESSMENT
36 year old male with  HTN, AML (on daunorubicin/cytarabine now s/p C3 of HIDAC on 12/21), and genital warts who is admitted today 12/21/21  to the hospital due to epistaxis  cautery and packing by ENT  transient fever last night  history of rash to cefepime in August 2021  recovering Neutropenia  12/12 absorbable packing placed in  left nasal cavity  no additional bleeding  12/14 continued marrow recovery    Suggest  nasal packing as per ENT  transfusional support  continue Levofloxacin, Acyclovir until ANC>500  trend CBC    monitor temps:  if febrile Zosyn    I will be out 12/15 - Please call ID if needed

## 2021-12-14 NOTE — PROGRESS NOTE ADULT - SUBJECTIVE AND OBJECTIVE BOX
ENT ISSUE/POD: packing coming out     HPI: PT with nasal packing that is hanging from left nare. Pt denies any h/a, recent URI, congestion, facial pain, facial tenderness, rhinorrhea, PND or changes in vision     PAST MEDICAL & SURGICAL HISTORY:  Hypertension  diagnosed 1 year ago    Kidney stone  5 years ago    History of genital warts    AML (acute myeloid leukemia)    No significant past surgical history      Allergies    No Known Allergies    Intolerances    cefepime (Rash)    MEDICATIONS  (STANDING):  chlorhexidine 2% Cloths 1 Application(s) Topical <User Schedule>  fluconAZOLE   Tablet 200 milliGRAM(s) Oral daily  influenza   Vaccine 0.5 milliLiter(s) IntraMuscular once  levoFLOXacin  Tablet 500 milliGRAM(s) Oral every 24 hours  loratadine 10 milliGRAM(s) Oral daily  sodium chloride 0.9%. 1000 milliLiter(s) (100 mL/Hr) IV Continuous <Continuous>    MEDICATIONS  (PRN):  HYDROmorphone  Injectable 1 milliGRAM(s) IV Push every 6 hours PRN Moderate Pain (4 - 6)      social history: see consult     family history: see consult   ROS:   ENT: all negative except as noted in HPI   Pulm: denies SOB, cough, hemoptysis  Neuro: denies numbness/tingling, loss of sensation  Endo: denies heat/cold intolerance, excessive sweating      Vital Signs Last 24 Hrs  T(C): 36.7 (14 Dec 2021 19:46), Max: 37 (13 Dec 2021 20:09)  T(F): 98.1 (14 Dec 2021 19:46), Max: 98.6 (13 Dec 2021 20:09)  HR: 92 (14 Dec 2021 11:11) (90 - 101)  BP: 117/75 (14 Dec 2021 19:46) (102/70 - 117/75)  BP(mean): --  RR: 18 (14 Dec 2021 19:46) (18 - 18)  SpO2: 99% (14 Dec 2021 19:46) (95% - 100%)                          8.9    2.07  )-----------( 25       ( 14 Dec 2021 07:08 )             25.3    12-14    142  |  106  |  15  ----------------------------<  94  3.8   |  24  |  0.88    Ca    9.7      14 Dec 2021 07:07  Phos  4.7     12-14  Mg     2.4     12-14    TPro  6.4  /  Alb  3.7  /  TBili  0.6  /  DBili  x   /  AST  13  /  ALT  35  /  AlkPhos  82  12-13       PHYSICAL EXAM:  Gen: NAD  Skin: No rashes, bruises, or lesions  Head: Normocephalic, Atraumatic  Face: no edema, erythema, or fluctuance. Parotid glands soft without mass  Eyes: no scleral injection  Nose: Nares bilaterally patent, no discharge guaze hangingfrom LEFT nare trimmed.   Mouth: No Stridor / Drooling / Trismus.  Mucosa moist, tongue/uvula midline, oropharynx clear  Neck: Flat, supple, no lymphadenopathy, trachea midline, no masses  Lymphatic: No lymphadenopathy  Resp: breathing easily, no stridor  Neuro: facial nerve intact, no facial droop

## 2021-12-15 ENCOUNTER — APPOINTMENT (OUTPATIENT)
Dept: INFUSION THERAPY | Facility: HOSPITAL | Age: 36
End: 2021-12-15

## 2021-12-15 LAB
BASOPHILS # BLD AUTO: 0 K/UL — SIGNIFICANT CHANGE UP (ref 0–0.2)
BASOPHILS NFR BLD AUTO: 0 % — SIGNIFICANT CHANGE UP (ref 0–2)
EOSINOPHIL # BLD AUTO: 0 K/UL — SIGNIFICANT CHANGE UP (ref 0–0.5)
EOSINOPHIL NFR BLD AUTO: 0 % — SIGNIFICANT CHANGE UP (ref 0–6)
HCT VFR BLD CALC: 25.3 % — LOW (ref 39–50)
HGB BLD-MCNC: 8.9 G/DL — LOW (ref 13–17)
LYMPHOCYTES # BLD AUTO: 0.69 K/UL — LOW (ref 1–3.3)
LYMPHOCYTES # BLD AUTO: 26.8 % — SIGNIFICANT CHANGE UP (ref 13–44)
MCHC RBC-ENTMCNC: 30.6 PG — SIGNIFICANT CHANGE UP (ref 27–34)
MCHC RBC-ENTMCNC: 35.2 GM/DL — SIGNIFICANT CHANGE UP (ref 32–36)
MCV RBC AUTO: 86.9 FL — SIGNIFICANT CHANGE UP (ref 80–100)
MONOCYTES # BLD AUTO: 0.55 K/UL — SIGNIFICANT CHANGE UP (ref 0–0.9)
MONOCYTES NFR BLD AUTO: 21.4 % — HIGH (ref 2–14)
NEUTROPHILS # BLD AUTO: 1.33 K/UL — LOW (ref 1.8–7.4)
NEUTROPHILS NFR BLD AUTO: 49.1 % — SIGNIFICANT CHANGE UP (ref 43–77)
PLATELET # BLD AUTO: 17 K/UL — CRITICAL LOW (ref 150–400)
RBC # BLD: 2.91 M/UL — LOW (ref 4.2–5.8)
RBC # FLD: 13.2 % — SIGNIFICANT CHANGE UP (ref 10.3–14.5)
WBC # BLD: 2.56 K/UL — LOW (ref 3.8–10.5)
WBC # FLD AUTO: 2.56 K/UL — LOW (ref 3.8–10.5)

## 2021-12-15 RX ADMIN — CHLORHEXIDINE GLUCONATE 1 APPLICATION(S): 213 SOLUTION TOPICAL at 06:07

## 2021-12-15 RX ADMIN — SODIUM CHLORIDE 100 MILLILITER(S): 9 INJECTION INTRAMUSCULAR; INTRAVENOUS; SUBCUTANEOUS at 12:05

## 2021-12-15 RX ADMIN — FLUCONAZOLE 200 MILLIGRAM(S): 150 TABLET ORAL at 12:04

## 2021-12-15 RX ADMIN — LORATADINE 10 MILLIGRAM(S): 10 TABLET ORAL at 12:04

## 2021-12-15 RX ADMIN — SODIUM CHLORIDE 100 MILLILITER(S): 9 INJECTION INTRAMUSCULAR; INTRAVENOUS; SUBCUTANEOUS at 22:19

## 2021-12-15 RX ADMIN — SODIUM CHLORIDE 100 MILLILITER(S): 9 INJECTION INTRAMUSCULAR; INTRAVENOUS; SUBCUTANEOUS at 01:54

## 2021-12-15 NOTE — PROGRESS NOTE ADULT - ASSESSMENT
Patient is a 36 year old Male with a PMHx of HTN, AML on chemo who presents to the hospital due to epistaxis. Last chemo 12/1, since then had steadily worsening pancytopenia. Patient states that he received platelet transfusions on 12/06/2021, 12/08/2021 and 12/10/2021. Patient states he received 2 units of blood transfusion on Tuesday 12/07/2021. Per patient, he reports that he was feeling sinus congestion and pressure on his L side, blew his nose and bleeding began from L nare without improvement prompting arrival to the emergency department.  Feels mild L sinus congestion.        Epistaxis  --Monitor CBC with Differential daily   --Monitor for signs and symptoms of bleeding  --Transfuse Blood products and platelets as per Heme/Onc recommendations  --S/P 1 unit Platelet transfusion and 2 units PRBC transfusion in the ED  --ENT Consultation for evaluation; appreciated  --As per ENT-- L nasal cavity packed with absorbable packing; F/U ENT Recommendations  --Patient will need outpatient F/U with ENT following discharge   --S/P 2 units of platelets on12/13 for platelet level of 7 and 9; Continue to monitor CBC and platelet levels closely; Monitor for any signs or symptoms of bleeding and epistaxis   --Platelets today noted to be 17 S/P transfusion on 12/13    Fever  --Neutropenic fever   --Patient was febrile to 100.3   --ID consultation  --Per heme/Onc, continue with Levaquin and Diflucan for now  --Pan culture obtained--- F/U Urine culture and Blood culture X 2 results from 12/12-- with No growth currently; F/U Final results   --Monitor CBC and Temperature curve closely   --Per Heme/Onc if fever re-occurs, switch Levaquin to Meropenem   --Started on IVF NS at 100cc/hr   --ID eval called for further recommendations; appreciated; F/U recommendations         Pancytopenia   --S/P multiple platelet transfusions on 12/06, 12/08, 12/10 and 2 unit PRBCs transfusion on 12/07   --Monitor Platelet levels  --Transfuse as per Heme/Onc recommendations  --S/P 1 unit platelet transfusion and 2 units PRBCs in the emergency department   --Continue to monitor CBC with diff daily  --Transfuse as per Heme/Onc recommendations   --S/P 2 units of platelets on 12/13 for platelet level of 7 and 9; Continue to monitor CBC and platelet levels closely; Monitor for any signs or symptoms of bleeding and epistaxis   -- Defer management to Hematology   -- improved neurophil counts  -- likely D/C planing for tomorrow     Sinus tachy   --Patient with a period of SVT in the Emergency department  --Patient denies CP, Palpitations   --Resolved after hydration   --can D/C tele       AML  --S/P Chemo on 12/01  --Monitor CBC with differential daily   --F/U Heme/Onc recommendations      Hypertension  --Continue to monitor off of medication for now  --Monitor BP closely   --Per patient, he was on Amlodipine 5 at home for HTN. Continue to monitor BP closely and if elevates, resume home medication on this admission     Pain control   --Dilaudid       DVT PPX with Compression devices

## 2021-12-15 NOTE — PROGRESS NOTE ADULT - SUBJECTIVE AND OBJECTIVE BOX
Name of Patient : JAK DANIELS  MRN: [id number]  Date of visit: 12-15-21       Subjective: Patient seen and examined. No new events except as noted.   Doing better  feels more energized  no further bleeding     REVIEW OF SYSTEMS:    CONSTITUTIONAL: No weakness, fevers or chills  EYES/ENT: No visual changes;  No vertigo or throat pain   NECK: No pain or stiffness  RESPIRATORY: No cough, wheezing, hemoptysis; No shortness of breath  CARDIOVASCULAR: No chest pain or palpitations  GASTROINTESTINAL: No abdominal or epigastric pain. No nausea, vomiting, or hematemesis; No diarrhea or constipation. No melena or hematochezia.  GENITOURINARY: No dysuria, frequency or hematuria  NEUROLOGICAL: No numbness or weakness  SKIN: No itching, burning, rashes, or lesions   All other review of systems is negative unless indicated above.    MEDICATIONS:  MEDICATIONS  (STANDING):  chlorhexidine 2% Cloths 1 Application(s) Topical <User Schedule>  fluconAZOLE   Tablet 200 milliGRAM(s) Oral daily  influenza   Vaccine 0.5 milliLiter(s) IntraMuscular once  levoFLOXacin  Tablet 500 milliGRAM(s) Oral every 24 hours  loratadine 10 milliGRAM(s) Oral daily  sodium chloride 0.9%. 1000 milliLiter(s) (100 mL/Hr) IV Continuous <Continuous>      PHYSICAL EXAM:  T(C): 36.8 (12-15-21 @ 20:14), Max: 36.8 (12-15-21 @ 20:14)  HR: 64 (12-15-21 @ 20:14) (64 - 75)  BP: 142/87 (12-15-21 @ 20:14) (129/86 - 142/87)  RR: 17 (12-15-21 @ 20:14) (17 - 18)  SpO2: 97% (12-15-21 @ 20:14) (97% - 100%)  Wt(kg): --  I&O's Summary    14 Dec 2021 07:01  -  15 Dec 2021 07:00  --------------------------------------------------------  IN: 1680 mL / OUT: 0 mL / NET: 1680 mL    15 Dec 2021 07:01  -  15 Dec 2021 22:15  --------------------------------------------------------  IN: 1760 mL / OUT: 0 mL / NET: 1760 mL          Appearance: Normal	  HEENT:  PERRLA   Lymphatic: No lymphadenopathy   Cardiovascular: Normal S1 S2, no JVD  Respiratory: normal effort , clear  Gastrointestinal:  Soft, Non-tender  Skin: No rashes,  warm to touch  Psychiatry:  Mood & affect appropriate  Musculuskeletal: No edema      All labs, Imaging and EKGs personally reviewed     12-14-21 @ 07:01  -  12-15-21 @ 07:00  --------------------------------------------------------  IN: 1680 mL / OUT: 0 mL / NET: 1680 mL    12-15-21 @ 07:01  -  12-15-21 @ 22:15  --------------------------------------------------------  IN: 1760 mL / OUT: 0 mL / NET: 1760 mL                            8.9    2.56  )-----------( 17       ( 15 Dec 2021 06:47 )             25.3               12-14    142  |  106  |  15  ----------------------------<  94  3.8   |  24  |  0.88    Ca    9.7      14 Dec 2021 07:07  Phos  4.7     12-14  Mg     2.4     12-14

## 2021-12-15 NOTE — PROGRESS NOTE ADULT - NSPROGADDITIONALINFOA_GEN_ALL_CORE
Differential diagnosis and plan of care discussed with patient after the evaluation. OMT on six regions for acute somatic dysfunctions done at the bedside   Importance of Fall prevention discussed. I had a prolonged conversation with patient. More than 50% of the visit was spent counseling and/or coordinating care by the attending physician.  total of sixty seven mins spent on total encounter

## 2021-12-16 ENCOUNTER — TRANSCRIPTION ENCOUNTER (OUTPATIENT)
Age: 36
End: 2021-12-16

## 2021-12-16 VITALS
HEART RATE: 81 BPM | DIASTOLIC BLOOD PRESSURE: 79 MMHG | RESPIRATION RATE: 18 BRPM | TEMPERATURE: 98 F | SYSTOLIC BLOOD PRESSURE: 117 MMHG | OXYGEN SATURATION: 98 %

## 2021-12-16 LAB
ANION GAP SERPL CALC-SCNC: 15 MMOL/L — SIGNIFICANT CHANGE UP (ref 5–17)
BLD GP AB SCN SERPL QL: NEGATIVE — SIGNIFICANT CHANGE UP
BUN SERPL-MCNC: 10 MG/DL — SIGNIFICANT CHANGE UP (ref 7–23)
CALCIUM SERPL-MCNC: 9.4 MG/DL — SIGNIFICANT CHANGE UP (ref 8.4–10.5)
CHLORIDE SERPL-SCNC: 103 MMOL/L — SIGNIFICANT CHANGE UP (ref 96–108)
CO2 SERPL-SCNC: 21 MMOL/L — LOW (ref 22–31)
CREAT SERPL-MCNC: 0.74 MG/DL — SIGNIFICANT CHANGE UP (ref 0.5–1.3)
GLUCOSE SERPL-MCNC: 96 MG/DL — SIGNIFICANT CHANGE UP (ref 70–99)
HCT VFR BLD CALC: 26.2 % — LOW (ref 39–50)
HGB BLD-MCNC: 9.3 G/DL — LOW (ref 13–17)
MANUAL SMEAR VERIFICATION: SIGNIFICANT CHANGE UP
MCHC RBC-ENTMCNC: 30.1 PG — SIGNIFICANT CHANGE UP (ref 27–34)
MCHC RBC-ENTMCNC: 35.5 GM/DL — SIGNIFICANT CHANGE UP (ref 32–36)
MCV RBC AUTO: 84.8 FL — SIGNIFICANT CHANGE UP (ref 80–100)
NEUTS BAND # BLD: 1 % — SIGNIFICANT CHANGE UP (ref 0–8)
NRBC # BLD: 0 /100 WBCS — SIGNIFICANT CHANGE UP (ref 0–0)
NRBC # BLD: 1 /100 — HIGH (ref 0–0)
PLAT MORPH BLD: NORMAL — SIGNIFICANT CHANGE UP
PLATELET # BLD AUTO: 17 K/UL — CRITICAL LOW (ref 150–400)
POTASSIUM SERPL-MCNC: 3.8 MMOL/L — SIGNIFICANT CHANGE UP (ref 3.5–5.3)
POTASSIUM SERPL-SCNC: 3.8 MMOL/L — SIGNIFICANT CHANGE UP (ref 3.5–5.3)
PROMYELOCYTES # FLD: 2 % — HIGH (ref 0–0)
RBC # BLD: 3.09 M/UL — LOW (ref 4.2–5.8)
RBC # FLD: 13.1 % — SIGNIFICANT CHANGE UP (ref 10.3–14.5)
RBC BLD AUTO: SIGNIFICANT CHANGE UP
RH IG SCN BLD-IMP: POSITIVE — SIGNIFICANT CHANGE UP
SODIUM SERPL-SCNC: 139 MMOL/L — SIGNIFICANT CHANGE UP (ref 135–145)
WBC # BLD: 3.18 K/UL — LOW (ref 3.8–10.5)
WBC # FLD AUTO: 3.18 K/UL — LOW (ref 3.8–10.5)

## 2021-12-16 PROCEDURE — U0005: CPT

## 2021-12-16 PROCEDURE — 99231 SBSQ HOSP IP/OBS SF/LOW 25: CPT

## 2021-12-16 PROCEDURE — 71045 X-RAY EXAM CHEST 1 VIEW: CPT

## 2021-12-16 PROCEDURE — 93005 ELECTROCARDIOGRAM TRACING: CPT

## 2021-12-16 PROCEDURE — 85730 THROMBOPLASTIN TIME PARTIAL: CPT

## 2021-12-16 PROCEDURE — U0003: CPT

## 2021-12-16 PROCEDURE — P9040: CPT

## 2021-12-16 PROCEDURE — 84443 ASSAY THYROID STIM HORMONE: CPT

## 2021-12-16 PROCEDURE — 86900 BLOOD TYPING SEROLOGIC ABO: CPT

## 2021-12-16 PROCEDURE — 87086 URINE CULTURE/COLONY COUNT: CPT

## 2021-12-16 PROCEDURE — 87040 BLOOD CULTURE FOR BACTERIA: CPT

## 2021-12-16 PROCEDURE — 99291 CRITICAL CARE FIRST HOUR: CPT

## 2021-12-16 PROCEDURE — P9037: CPT

## 2021-12-16 PROCEDURE — 83036 HEMOGLOBIN GLYCOSYLATED A1C: CPT

## 2021-12-16 PROCEDURE — 83735 ASSAY OF MAGNESIUM: CPT

## 2021-12-16 PROCEDURE — 85027 COMPLETE CBC AUTOMATED: CPT

## 2021-12-16 PROCEDURE — 36430 TRANSFUSION BLD/BLD COMPNT: CPT

## 2021-12-16 PROCEDURE — 85610 PROTHROMBIN TIME: CPT

## 2021-12-16 PROCEDURE — 36415 COLL VENOUS BLD VENIPUNCTURE: CPT

## 2021-12-16 PROCEDURE — 86923 COMPATIBILITY TEST ELECTRIC: CPT

## 2021-12-16 PROCEDURE — 84550 ASSAY OF BLOOD/URIC ACID: CPT

## 2021-12-16 PROCEDURE — 84100 ASSAY OF PHOSPHORUS: CPT

## 2021-12-16 PROCEDURE — 80048 BASIC METABOLIC PNL TOTAL CA: CPT

## 2021-12-16 PROCEDURE — 80061 LIPID PANEL: CPT

## 2021-12-16 PROCEDURE — 86901 BLOOD TYPING SEROLOGIC RH(D): CPT

## 2021-12-16 PROCEDURE — 81003 URINALYSIS AUTO W/O SCOPE: CPT

## 2021-12-16 PROCEDURE — 86850 RBC ANTIBODY SCREEN: CPT

## 2021-12-16 PROCEDURE — 83615 LACTATE (LD) (LDH) ENZYME: CPT

## 2021-12-16 PROCEDURE — 85025 COMPLETE CBC W/AUTO DIFF WBC: CPT

## 2021-12-16 PROCEDURE — 80053 COMPREHEN METABOLIC PANEL: CPT

## 2021-12-16 RX ADMIN — SODIUM CHLORIDE 100 MILLILITER(S): 9 INJECTION INTRAMUSCULAR; INTRAVENOUS; SUBCUTANEOUS at 11:39

## 2021-12-16 RX ADMIN — LORATADINE 10 MILLIGRAM(S): 10 TABLET ORAL at 11:39

## 2021-12-16 RX ADMIN — CHLORHEXIDINE GLUCONATE 1 APPLICATION(S): 213 SOLUTION TOPICAL at 05:54

## 2021-12-16 RX ADMIN — FLUCONAZOLE 200 MILLIGRAM(S): 150 TABLET ORAL at 13:31

## 2021-12-16 NOTE — DISCHARGE NOTE NURSING/CASE MANAGEMENT/SOCIAL WORK - NSDCPEFALRISK_GEN_ALL_CORE
For information on Fall & Injury Prevention, visit: [email or web address] OR  [email or web address] OR  https://www.cdc.gov/steadi/patient.html

## 2021-12-16 NOTE — DISCHARGE NOTE PROVIDER - NSDCMRMEDTOKEN_GEN_ALL_CORE_FT
fluconazole 200 mg oral tablet: 1 tab(s) orally once a day,     Note:Pt takes on and off still has some from oct. Vivo filled 1 month supply in oct.  Levaquin 500 mg oral tablet: 1 tab(s) orally every 24 hours    Note:Pt takes on and off still has some from oct. Vivo filled 1 month supply in oct.  Norvasc 5 mg oral tablet: 1 tab(s) orally once a day  oxyCODONE 5 mg oral tablet: 1 tab(s) orally every 6 hours, As needed, Moderate Pain (4 - 6)

## 2021-12-16 NOTE — PROVIDER CONTACT NOTE (CRITICAL VALUE NOTIFICATION) - BACKGROUND
36 year old male admitted 12/12/21 for epistaxis w/ history of AML, on chemo
Dx Epistaxis  Hx AML, HTN, Kidney stone, genital warts.
Dx Epistaxis  Hx AML, HTN

## 2021-12-16 NOTE — PROGRESS NOTE ADULT - ASSESSMENT
36 year old male with  HTN, AML (on daunorubicin/cytarabine now s/p C3 of HIDAC on 12/21), and genital warts who is admitted today 12/21/21  to the hospital due to epistaxis  cautery and packing by ENT  transient fever last night  history of rash to cefepime in August 2021  recovering Neutropenia  12/12 absorbable packing placed in  left nasal cavity  no additional bleeding  12/14 continued marrow recovery    Neutropenia resolved    Suggest  STOP  Levofloxacin, Acyclovir     discharge anticipated  signing off case - please reconsult if needed

## 2021-12-16 NOTE — DISCHARGE NOTE PROVIDER - HOSPITAL COURSE
Patient is a 36 year old Male with a PMHx of HTN, AML on chemo who presents to the hospital due to epistaxis. Last chemo 12/1, since then had steadily worsening pancytopenia. Patient states that he received platelet transfusions on 12/06/2021, 12/08/2021 and 12/10/2021. Patient states he received 2 units of blood transfusion on Tuesday 12/07/2021. Per patient, he reports that he was feeling sinus congestion and pressure on his L side, blew his nose and bleeding began from L nare without improvement prompting arrival to the emergency department.  Feels mild L sinus congestion.        Epistaxis  --Monitored CBC with Differential daily   --Monitored for signs and symptoms of bleeding  --S/P 1 unit Platelet transfusion and 2 units PRBC transfusion in the ED  --As per ENT-- L nasal cavity packed with absorbable packing; F/U ENT clinic outpatient   --S/P 2 units of platelets on12/13 for platelet level of 7 and 9;    --Platelets today 12/16  noted to be 17 S/P transfusion on 12/13  --Patient will be getting 1 unit of Platelet transfusion today 12/16 prior to DC  Heme/onc cleared for DC today, pt not neutropenic, f/u outpatient with Dr. Dutton    Fever  --Neutropenic fever on admission  --Patient was febrile to 100.3   --ID consulted  --Per heme/Onc, continue with Levaquin and Diflucan for now  --Pan culture obtained--- F/U Urine culture and Blood culture X 2 results from 12/12-- with No growth currently      Sinus tachy   --Patient with a period of SVT in the Emergency department  --Patient denies CP, Palpitations   --Resolved after hydration       AML  --S/P Chemo on 12/01      DVT PPX with Compression devices inpatient    pt stable for DC, heme cleared fro f/u outpatient.

## 2021-12-16 NOTE — DISCHARGE NOTE PROVIDER - NSDCCPCAREPLAN_GEN_ALL_CORE_FT
PRINCIPAL DISCHARGE DIAGNOSIS  Diagnosis: Epistaxis  Assessment and Plan of Treatment: 1) absorbable packing placed in  left nasal cavity   2) afrin nasal spray 2 sprays BID each nostril beginning 12/14/21  3) f/u in ENT clinic after discharge [phone]  If bleeding recurrs and persists go to the ER      SECONDARY DISCHARGE DIAGNOSES  Diagnosis: AML (acute myeloid leukemia)  Assessment and Plan of Treatment: Continue close follow up with Dr. Chelsea Dutton    Diagnosis: Anemia  Assessment and Plan of Treatment: status post 2 units of blood in the ED and one unit of platelet in ED    Diagnosis: Thrombocytopenia  Assessment and Plan of Treatment: status post 2 platelet transfustion on 12/13 and 1 unit on 12/16    Diagnosis: Neutropenic  Assessment and Plan of Treatment:

## 2021-12-16 NOTE — PROGRESS NOTE ADULT - PROVIDER SPECIALTY LIST ADULT
Internal Medicine
ENT
Infectious Disease
Internal Medicine

## 2021-12-16 NOTE — DISCHARGE NOTE NURSING/CASE MANAGEMENT/SOCIAL WORK - PATIENT PORTAL LINK FT
You can access the FollowMyHealth Patient Portal offered by [facility] by registering at the following website: [email or web address]. By joining [facility]’s FollowMyHealth portal, you will also be able to view your health information using other applications (apps) compatible with our system.

## 2021-12-16 NOTE — DISCHARGE NOTE PROVIDER - CARE PROVIDER_API CALL
Chelsea Yancey)  HematologyOncology; Internal Medicine; Medical Oncology  [address] [place] [address]  Phone: [phone]  Fax: [phone]  Follow Up Time: 1 week

## 2021-12-16 NOTE — PROVIDER CONTACT NOTE (CRITICAL VALUE NOTIFICATION) - ASSESSMENT
Pt aox4, responsive. No s/s of distress observed/reported. VSS.
Patient alert & oriented x 4, VSS, no signs of bleeding.
Pt aox4, responsive. No s/s of distress observed/reported. VSS, asymptomatic.

## 2021-12-16 NOTE — DISCHARGE NOTE PROVIDER - NSDCFUSCHEDAPPT_GEN_ALL_CORE_FT
JAK DANIELS ; 12/17/2021 ; NPP [facility]  JAK DANIELS ; 12/20/2021 ; NPP [facility]  JAK DANIELS ; 12/20/2021 ; NPP [facility]  JAK DANIELS ; 12/22/2021 ; NP [facility]

## 2021-12-16 NOTE — PROGRESS NOTE ADULT - ASSESSMENT
Patient is a 36 year old Male with a PMHx of HTN, AML on chemo who presents to the hospital due to epistaxis. Last chemo 12/1, since then had steadily worsening pancytopenia. Patient states that he received platelet transfusions on 12/06/2021, 12/08/2021 and 12/10/2021. Patient states he received 2 units of blood transfusion on Tuesday 12/07/2021. Per patient, he reports that he was feeling sinus congestion and pressure on his L side, blew his nose and bleeding began from L nare without improvement prompting arrival to the emergency department.  Feels mild L sinus congestion.        Epistaxis  --Monitor CBC with Differential daily   --Monitor for signs and symptoms of bleeding  --Transfuse Blood products and platelets as per Heme/Onc recommendations  --S/P 1 unit Platelet transfusion and 2 units PRBC transfusion in the ED  --ENT Consultation for evaluation; appreciated  --As per ENT-- L nasal cavity packed with absorbable packing; F/U ENT Recommendations  --Patient will need outpatient F/U with ENT following discharge   --S/P 2 units of platelets on12/13 for platelet level of 7 and 9; Continue to monitor CBC and platelet levels closely; Monitor for any signs or symptoms of bleeding and epistaxis   --Platelets today 12/16  noted to be 17 S/P transfusion on 12/13  --Awaiting Heme/Onc recommendations prior to discharge     Fever  --Neutropenic fever   --Patient was febrile to 100.3   --ID consultation  --Per heme/Onc, continue with Levaquin and Diflucan for now  --Pan culture obtained--- F/U Urine culture and Blood culture X 2 results from 12/12-- with No growth currently; F/U Final results   --Monitor CBC and Temperature curve closely   --Per Heme/Onc if fever re-occurs, switch Levaquin to Meropenem   --Started on IVF NS at 100cc/hr   --ID eval called for further recommendations; appreciated; F/U recommendations   --Patient is currently afebrile        Pancytopenia   --S/P multiple platelet transfusions on 12/06, 12/08, 12/10 and 2 unit PRBCs transfusion on 12/07   --Monitor Platelet levels  --Transfuse as per Heme/Onc recommendations  --S/P 1 unit platelet transfusion and 2 units PRBCs in the emergency department   --Continue to monitor CBC with diff daily  --Transfuse as per Heme/Onc recommendations   --S/P 2 units of platelets on 12/13 for platelet level of 7 and 9; Continue to monitor CBC and platelet levels closely; Monitor for any signs or symptoms of bleeding and epistaxis   -- Defer management to Hematology   -- improved neurophil counts  -- likely D/C planing for tomorrow     Sinus tachy   --Patient with a period of SVT in the Emergency department  --Patient denies CP, Palpitations   --Resolved after hydration   --can D/C tele       AML  --S/P Chemo on 12/01  --Monitor CBC with differential daily   --F/U Heme/Onc recommendations      Hypertension  --Continue to monitor off of medication for now  --Monitor BP closely   --Per patient, he was on Amlodipine 5 at home for HTN. Continue to monitor BP closely and if elevates, resume home medication on this admission     Pain control   --Dilaudid       DVT PPX with Compression devices    Awaiting heme/onc recommendations prior to discharge.     Patient is a 36 year old Male with a PMHx of HTN, AML on chemo who presents to the hospital due to epistaxis. Last chemo 12/1, since then had steadily worsening pancytopenia. Patient states that he received platelet transfusions on 12/06/2021, 12/08/2021 and 12/10/2021. Patient states he received 2 units of blood transfusion on Tuesday 12/07/2021. Per patient, he reports that he was feeling sinus congestion and pressure on his L side, blew his nose and bleeding began from L nare without improvement prompting arrival to the emergency department.  Feels mild L sinus congestion.        Epistaxis  --Monitor CBC with Differential daily   --Monitor for signs and symptoms of bleeding  --Transfuse Blood products and platelets as per Heme/Onc recommendations  --S/P 1 unit Platelet transfusion and 2 units PRBC transfusion in the ED  --ENT Consultation for evaluation; appreciated  --As per ENT-- L nasal cavity packed with absorbable packing; F/U ENT Recommendations  --Patient will need outpatient F/U with ENT following discharge   --S/P 2 units of platelets on12/13 for platelet level of 7 and 9; Continue to monitor CBC and platelet levels closely; Monitor for any signs or symptoms of bleeding and epistaxis   --Platelets today 12/16  noted to be 17 S/P transfusion on 12/13  --Patient will be getting 1 unit of Platelet transfusion today 12/16.   --Awaiting Heme/Onc recommendations prior to discharge     Fever  --Neutropenic fever   --Patient was febrile to 100.3   --ID consultation  --Per heme/Onc, continue with Levaquin and Diflucan for now  --Pan culture obtained--- F/U Urine culture and Blood culture X 2 results from 12/12-- with No growth currently; F/U Final results   --Monitor CBC and Temperature curve closely   --Per Heme/Onc if fever re-occurs, switch Levaquin to Meropenem   --Started on IVF NS at 100cc/hr   --ID eval called for further recommendations; appreciated; F/U recommendations   --Patient is currently afebrile        Pancytopenia   --S/P multiple platelet transfusions on 12/06, 12/08, 12/10 and 2 unit PRBCs transfusion on 12/07   --Monitor Platelet levels  --Transfuse as per Heme/Onc recommendations  --S/P 1 unit platelet transfusion and 2 units PRBCs in the emergency department   --Continue to monitor CBC with diff daily  --Transfuse as per Heme/Onc recommendations   --S/P 2 units of platelets on 12/13 for platelet level of 7 and 9; Continue to monitor CBC and platelet levels closely; Monitor for any signs or symptoms of bleeding and epistaxis   -- Defer management to Hematology   -- improved neurophil counts  -- likely D/C planing for today S/P 1 unit of platelet transfusion     Sinus tachy   --Patient with a period of SVT in the Emergency department  --Patient denies CP, Palpitations   --Resolved after hydration   --can D/C tele       AML  --S/P Chemo on 12/01  --Monitor CBC with differential daily   --F/U Heme/Onc recommendations      Hypertension  --Continue to monitor off of medication for now  --Monitor BP closely   --Per patient, he was on Amlodipine 5 at home for HTN. Continue to monitor BP closely and if elevates, resume home medication on this admission     Pain control   --Dilaudid       DVT PPX with Compression devices    Awaiting heme/onc recommendations prior to discharge.

## 2021-12-16 NOTE — PROGRESS NOTE ADULT - SUBJECTIVE AND OBJECTIVE BOX
Follow Up:  neutropenia    Interval History/ROS: epistaxis resolved, feels fine - looking forward to discharge    Allergies  No Known Allergies    ANTIMICROBIALS:  fluconAZOLE   Tablet 200 daily  levoFLOXacin  Tablet 500 every 24 hours      OTHER MEDS:  MEDICATIONS  (STANDING):  HYDROmorphone  Injectable 1 every 6 hours PRN  influenza   Vaccine 0.5 once  loratadine 10 daily      Vital Signs Last 24 Hrs  T(C): 36.5 (16 Dec 2021 11:01), Max: 36.8 (15 Dec 2021 20:14)  T(F): 97.7 (16 Dec 2021 11:01), Max: 98.3 (15 Dec 2021 20:14)  HR: 81 (16 Dec 2021 11:01) (64 - 81)  BP: 117/79 (16 Dec 2021 11:01) (113/73 - 142/87)  BP(mean): --  RR: 18 (16 Dec 2021 11:01) (17 - 18)  SpO2: 98% (16 Dec 2021 11:01) (97% - 98%)    PHYSICAL EXAM:  General: WN/WD NAD, Non-toxic  Neurology: A&Ox3, nonfocal  Respiratory: Clear to auscultation bilaterally  CV: RRR, S1S2, no murmurs, rubs or gallops  Abdominal: Soft, Non-tender, non-distended, normal bowel sounds  Extremities: No edema,   Line Sites: Clear  Skin: No rash                          9.3    3.18  )-----------( 17       ( 16 Dec 2021 07:48 )             26.2   WBC Count: 3.18 (12-16 @ 07:48)   Neut #: 1.33  WBC Count: 2.56 (12-15 @ 06:47)  WBC Count: 2.26 (12-14 @ 19:54)  WBC Count: 2.07 (12-14 @ 07:08)  WBC Count: 1.58 (12-13 @ 18:34)  WBC Count: 1.18 (12-13 @ 06:59)  WBC Count: 0.73 (12-12 @ 07:51)  WBC Count: 0.62 (12-12 @ 01:29)  WBC Count: 0.68 (12-11 @ 19:19)      12-16    139  |  103  |  10  ----------------------------<  96  3.8   |  21<L>  |  0.74    Ca    9.4      16 Dec 2021 07:48      MICROBIOLOGY:  .Blood Blood  12-12-21   No growth to date.  --  --      Clean Catch Clean Catch (Midstream)  12-12-21   No growth  --  --    RADIOLOGY:  < from: Xray Chest 1 View AP/PA (12.11.21 @ 19:40) >  Right upper extremity PICC terminates in the superior vena cava.  Clear lungs.    < end of copied text >      Arnulfo Luevano MD; Division of Infectious Disease; Pager: [phone]; nights and weekends: [phone]

## 2021-12-16 NOTE — CHART NOTE - NSCHARTNOTEFT_GEN_A_CORE
Patient was previously scheduled with Chelsea Marks on (12/17/2021) (8:30am) at ([address])  Patient was advised of follow up requests and asked for scheduling assistance. We Will call back to patient with appointment . (ENT CLINIC)

## 2021-12-16 NOTE — PROVIDER CONTACT NOTE (CRITICAL VALUE NOTIFICATION) - ACTION/TREATMENT ORDERED:
1unit platelets ordered. Repeat cbc.
PA notified, will reach out to primary team, transfuse 1unit platelets prior to discharge. No need to check post transfusion CBC per PA & primary team
No intervention at this time. Continue to monitor.

## 2021-12-16 NOTE — PROGRESS NOTE ADULT - SUBJECTIVE AND OBJECTIVE BOX
Name of Patient : JAK DANIELS  MRN: [id number]  Date of visit: 12-16-21 @ 13:03      Subjective: Patient seen and examined. No new events except as noted.  Patient wants to go home.   Patient denies any more epistaxis or bleeding.   Patient is currently afebrile and saturation 96% on room air.    States he has been having normal bowel movements.     REVIEW OF SYSTEMS:    CONSTITUTIONAL: No weakness, fevers or chills  EYES/ENT: Denies epistaxis; No visual changes;  No vertigo or throat pain   NECK: No pain or stiffness  RESPIRATORY: No cough, wheezing, hemoptysis; No shortness of breath  CARDIOVASCULAR: No chest pain or palpitations  GASTROINTESTINAL: No abdominal or epigastric pain. No nausea, vomiting, or hematemesis; No diarrhea or constipation. No melena or hematochezia.  GENITOURINARY: No dysuria, frequency or hematuria  NEUROLOGICAL: No numbness or weakness  SKIN: No itching, burning, rashes, or lesions   All other review of systems is negative unless indicated above.    MEDICATIONS:  MEDICATIONS  (STANDING):  chlorhexidine 2% Cloths 1 Application(s) Topical <User Schedule>  fluconAZOLE   Tablet 200 milliGRAM(s) Oral daily  influenza   Vaccine 0.5 milliLiter(s) IntraMuscular once  levoFLOXacin  Tablet 500 milliGRAM(s) Oral every 24 hours  loratadine 10 milliGRAM(s) Oral daily  sodium chloride 0.9%. 1000 milliLiter(s) (100 mL/Hr) IV Continuous <Continuous>      PHYSICAL EXAM:  T(C): 36.5 (12-16-21 @ 11:01), Max: 36.8 (12-15-21 @ 20:14)  HR: 81 (12-16-21 @ 11:01) (64 - 81)  BP: 117/79 (12-16-21 @ 11:01) (113/73 - 142/87)  RR: 18 (12-16-21 @ 11:01) (17 - 18)  SpO2: 98% (12-16-21 @ 11:01) (97% - 98%)  Wt(kg): --  I&O's Summary    15 Dec 2021 07:01  -  16 Dec 2021 07:00  --------------------------------------------------------  IN: 2560 mL / OUT: 0 mL / NET: 2560 mL    16 Dec 2021 07:01  -  16 Dec 2021 13:03  --------------------------------------------------------  IN: 300 mL / OUT: 0 mL / NET: 300 mL          Appearance: Normal; Calm; Laying in bed	  HEENT:  PERRLA   Lymphatic: No lymphadenopathy   Cardiovascular: Normal S1 S2, no JVD  Respiratory: normal effort , clear  Gastrointestinal:  Soft, Non-tender  Skin: No rashes,  warm to touch  Psychiatry:  Mood & affect appropriate  Musculoskeletal: No LE edema      All labs, Imaging and EKGs personally reviewed         12-15-21 @ 07:01  -  12-16-21 @ 07:00  --------------------------------------------------------  IN: 2560 mL / OUT: 0 mL / NET: 2560 mL    12-16-21 @ 07:01  -  12-16-21 @ 13:04  --------------------------------------------------------  IN: 300 mL / OUT: 0 mL / NET: 300 mL                              9.3    3.18  )-----------( 17       ( 16 Dec 2021 07:48 )             26.2               12-16    139  |  103  |  10  ----------------------------<  96  3.8   |  21<L>  |  0.74    Ca    9.4      16 Dec 2021 07:48             cCulture - Blood (12.12.21 @ 04:27)   Specimen Source: .Blood Blood   Culture Results: No growth to date.     Culture - Blood (12.12.21 @ 04:27)   Specimen Source: .Blood Blood   Culture Results: No growth to date.     Culture - Urine (12.12.21 @ 01:39)   Specimen Source: Clean Catch Clean Catch (Midstream)   Culture Results: No growth                    Name of Patient : JAK DANIELS  MRN: [id number]  Date of visit: 12-16-21 @ 13:03      Subjective: Patient seen and examined. No new events except as noted.  Patient wants to go home.   Patient denies any more epistaxis or bleeding.   Patient is currently afebrile and saturation 96% on room air.    States he has been having normal bowel movements.   Patient will be getting 1 unit of Platelets today.  Planning for discharge S/P Platelet transfusion today 12/16.     REVIEW OF SYSTEMS:    CONSTITUTIONAL: No weakness, fevers or chills  EYES/ENT: Denies epistaxis; No visual changes;  No vertigo or throat pain   NECK: No pain or stiffness  RESPIRATORY: No cough, wheezing, hemoptysis; No shortness of breath  CARDIOVASCULAR: No chest pain or palpitations  GASTROINTESTINAL: No abdominal or epigastric pain. No nausea, vomiting, or hematemesis; No diarrhea or constipation. No melena or hematochezia.  GENITOURINARY: No dysuria, frequency or hematuria  NEUROLOGICAL: No numbness or weakness  SKIN: No itching, burning, rashes, or lesions   All other review of systems is negative unless indicated above.    MEDICATIONS:  MEDICATIONS  (STANDING):  chlorhexidine 2% Cloths 1 Application(s) Topical <User Schedule>  fluconAZOLE   Tablet 200 milliGRAM(s) Oral daily  influenza   Vaccine 0.5 milliLiter(s) IntraMuscular once  levoFLOXacin  Tablet 500 milliGRAM(s) Oral every 24 hours  loratadine 10 milliGRAM(s) Oral daily  sodium chloride 0.9%. 1000 milliLiter(s) (100 mL/Hr) IV Continuous <Continuous>      PHYSICAL EXAM:  T(C): 36.5 (12-16-21 @ 11:01), Max: 36.8 (12-15-21 @ 20:14)  HR: 81 (12-16-21 @ 11:01) (64 - 81)  BP: 117/79 (12-16-21 @ 11:01) (113/73 - 142/87)  RR: 18 (12-16-21 @ 11:01) (17 - 18)  SpO2: 98% (12-16-21 @ 11:01) (97% - 98%)  Wt(kg): --  I&O's Summary    15 Dec 2021 07:01  -  16 Dec 2021 07:00  --------------------------------------------------------  IN: 2560 mL / OUT: 0 mL / NET: 2560 mL    16 Dec 2021 07:01  -  16 Dec 2021 13:03  --------------------------------------------------------  IN: 300 mL / OUT: 0 mL / NET: 300 mL          Appearance: Normal; Calm; Laying in bed	  HEENT:  PERRLA   Lymphatic: No lymphadenopathy   Cardiovascular: Normal S1 S2, no JVD  Respiratory: normal effort , clear  Gastrointestinal:  Soft, Non-tender  Skin: No rashes,  warm to touch  Psychiatry:  Mood & affect appropriate  Musculoskeletal: No LE edema      All labs, Imaging and EKGs personally reviewed         12-15-21 @ 07:01  -  12-16-21 @ 07:00  --------------------------------------------------------  IN: 2560 mL / OUT: 0 mL / NET: 2560 mL    12-16-21 @ 07:01  -  12-16-21 @ 13:04  --------------------------------------------------------  IN: 300 mL / OUT: 0 mL / NET: 300 mL                              9.3    3.18  )-----------( 17       ( 16 Dec 2021 07:48 )             26.2               12-16    139  |  103  |  10  ----------------------------<  96  3.8   |  21<L>  |  0.74    Ca    9.4      16 Dec 2021 07:48             cCulture - Blood (12.12.21 @ 04:27)   Specimen Source: .Blood Blood   Culture Results: No growth to date.     Culture - Blood (12.12.21 @ 04:27)   Specimen Source: .Blood Blood   Culture Results: No growth to date.     Culture - Urine (12.12.21 @ 01:39)   Specimen Source: Clean Catch Clean Catch (Midstream)   Culture Results: No growth

## 2021-12-16 NOTE — DISCHARGE NOTE PROVIDER - NSFOLLOWUPCLINICS_GEN_ALL_ED_FT
[facility] - ENT  Otolaryngology (ENT)  [address] [place] [address]  Phone: [phone]  Fax:   Follow Up Time: 1-3 days

## 2021-12-17 ENCOUNTER — RESULT REVIEW (OUTPATIENT)
Age: 36
End: 2021-12-17

## 2021-12-17 ENCOUNTER — APPOINTMENT (OUTPATIENT)
Dept: INFUSION THERAPY | Facility: HOSPITAL | Age: 36
End: 2021-12-17

## 2021-12-17 LAB
BASOPHILS # BLD AUTO: 0 K/UL — SIGNIFICANT CHANGE UP (ref 0–0.2)
BASOPHILS NFR BLD AUTO: 0 % — SIGNIFICANT CHANGE UP (ref 0–2)
CULTURE RESULTS: SIGNIFICANT CHANGE UP
CULTURE RESULTS: SIGNIFICANT CHANGE UP
EOSINOPHIL # BLD AUTO: 0 K/UL — SIGNIFICANT CHANGE UP (ref 0–0.5)
EOSINOPHIL NFR BLD AUTO: 0 % — SIGNIFICANT CHANGE UP (ref 0–6)
HCT VFR BLD CALC: 28.1 % — LOW (ref 39–50)
HGB BLD-MCNC: 10.3 G/DL — LOW (ref 13–17)
LYMPHOCYTES # BLD AUTO: 1.01 K/UL — SIGNIFICANT CHANGE UP (ref 1–3.3)
LYMPHOCYTES # BLD AUTO: 24 % — SIGNIFICANT CHANGE UP (ref 13–44)
MCHC RBC-ENTMCNC: 31.1 PG — SIGNIFICANT CHANGE UP (ref 27–34)
MCHC RBC-ENTMCNC: 36.7 G/DL — HIGH (ref 32–36)
MCV RBC AUTO: 84.9 FL — SIGNIFICANT CHANGE UP (ref 80–100)
MONOCYTES # BLD AUTO: 1.34 K/UL — HIGH (ref 0–0.9)
MONOCYTES NFR BLD AUTO: 32 % — HIGH (ref 2–14)
NEUTROPHILS # BLD AUTO: 1.85 K/UL — SIGNIFICANT CHANGE UP (ref 1.8–7.4)
NEUTROPHILS NFR BLD AUTO: 44 % — SIGNIFICANT CHANGE UP (ref 43–77)
NRBC # BLD: 0 /100 — SIGNIFICANT CHANGE UP (ref 0–0)
NRBC # BLD: SIGNIFICANT CHANGE UP /100 WBCS (ref 0–0)
PLAT MORPH BLD: NORMAL — SIGNIFICANT CHANGE UP
PLATELET # BLD AUTO: 50 K/UL — LOW (ref 150–400)
RBC # BLD: 3.31 M/UL — LOW (ref 4.2–5.8)
RBC # FLD: 13.1 % — SIGNIFICANT CHANGE UP (ref 10.3–14.5)
RBC BLD AUTO: SIGNIFICANT CHANGE UP
SPECIMEN SOURCE: SIGNIFICANT CHANGE UP
SPECIMEN SOURCE: SIGNIFICANT CHANGE UP
WBC # BLD: 4.2 K/UL — SIGNIFICANT CHANGE UP (ref 3.8–10.5)
WBC # FLD AUTO: 4.2 K/UL — SIGNIFICANT CHANGE UP (ref 3.8–10.5)

## 2021-12-20 ENCOUNTER — APPOINTMENT (OUTPATIENT)
Dept: HEMATOLOGY ONCOLOGY | Facility: CLINIC | Age: 36
End: 2021-12-20
Payer: COMMERCIAL

## 2021-12-20 ENCOUNTER — RESULT REVIEW (OUTPATIENT)
Age: 36
End: 2021-12-20

## 2021-12-20 ENCOUNTER — APPOINTMENT (OUTPATIENT)
Dept: INFUSION THERAPY | Facility: HOSPITAL | Age: 36
End: 2021-12-20

## 2021-12-20 VITALS
SYSTOLIC BLOOD PRESSURE: 110 MMHG | TEMPERATURE: 98.2 F | HEART RATE: 112 BPM | DIASTOLIC BLOOD PRESSURE: 76 MMHG | RESPIRATION RATE: 16 BRPM | HEIGHT: 70.67 IN | WEIGHT: 193.98 LBS | OXYGEN SATURATION: 99 % | BODY MASS INDEX: 27.16 KG/M2

## 2021-12-20 LAB
BASOPHILS # BLD AUTO: 0 K/UL — SIGNIFICANT CHANGE UP (ref 0–0.2)
BASOPHILS NFR BLD AUTO: 0 % — SIGNIFICANT CHANGE UP (ref 0–2)
EOSINOPHIL # BLD AUTO: 0 K/UL — SIGNIFICANT CHANGE UP (ref 0–0.5)
EOSINOPHIL NFR BLD AUTO: 0 % — SIGNIFICANT CHANGE UP (ref 0–6)
HCT VFR BLD CALC: 27.2 % — LOW (ref 39–50)
HGB BLD-MCNC: 10 G/DL — LOW (ref 13–17)
LYMPHOCYTES # BLD AUTO: 0.72 K/UL — LOW (ref 1–3.3)
LYMPHOCYTES # BLD AUTO: 14 % — SIGNIFICANT CHANGE UP (ref 13–44)
MCHC RBC-ENTMCNC: 31.1 PG — SIGNIFICANT CHANGE UP (ref 27–34)
MCHC RBC-ENTMCNC: 36.8 G/DL — HIGH (ref 32–36)
MCV RBC AUTO: 84.5 FL — SIGNIFICANT CHANGE UP (ref 80–100)
MONOCYTES # BLD AUTO: 1.65 K/UL — HIGH (ref 0–0.9)
MONOCYTES NFR BLD AUTO: 32 % — HIGH (ref 2–14)
NEUTROPHILS # BLD AUTO: 2.79 K/UL — SIGNIFICANT CHANGE UP (ref 1.8–7.4)
NEUTROPHILS NFR BLD AUTO: 54 % — SIGNIFICANT CHANGE UP (ref 43–77)
NRBC # BLD: 0 /100 — SIGNIFICANT CHANGE UP (ref 0–0)
NRBC # BLD: SIGNIFICANT CHANGE UP /100 WBCS (ref 0–0)
PLAT MORPH BLD: NORMAL — SIGNIFICANT CHANGE UP
PLATELET # BLD AUTO: 58 K/UL — LOW (ref 150–400)
RBC # BLD: 3.22 M/UL — LOW (ref 4.2–5.8)
RBC # FLD: 13 % — SIGNIFICANT CHANGE UP (ref 10.3–14.5)
RBC BLD AUTO: SIGNIFICANT CHANGE UP
WBC # BLD: 5.17 K/UL — SIGNIFICANT CHANGE UP (ref 3.8–10.5)
WBC # FLD AUTO: 5.17 K/UL — SIGNIFICANT CHANGE UP (ref 3.8–10.5)

## 2021-12-20 PROCEDURE — 99214 OFFICE O/P EST MOD 30 MIN: CPT

## 2021-12-21 NOTE — PHYSICAL EXAM
[Fully active, able to carry on all pre-disease performance without restriction] : Status 0 - Fully active, able to carry on all pre-disease performance without restriction [Normal] : affect appropriate [de-identified] : a 1 cm long laceration with sutures on the right thrum, healing well

## 2021-12-21 NOTE — ASSESSMENT
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 \par Started induction with Dauno/Cytarabine on 8/6. \par Pt's counts now recovered, remission marrow later done on 9/2- in remission. Proceed to consolidation with 4 cycles of HIDAC. C1 HIDAC on 9/17, c/b neutropenic fever and diarrhea. C2 HIDAC on 10/25, was postponed for 1 week due to admission for diarrhea, given negative stool studies, suspect diarrhea was likely chemo-related. Pt was admitted again 11/13-11/17 for sepsis due to thumb cellulitis after laceration. C3 on 11/26, will have Fulphilia today. C3 c/b epistaxis and neutropenic fever w/Temp 100.3. \par Reviewed CBC today with pt, Platelet 58, he does not need platelet transfusion today\par ANC today is 2.79, he is not on levaquine and fluconazole given he is not neutropenic yet\par will arrange admission for C4 after count recovered (plan to be admitted on 1/3), will recheck next week\par cont hemorrhoidal ointment and witch hazel. Sent oxycodone for pain relief\par All questions answered\par RTC in 3 weeks\par \par Case and management discussed with Dr. Yancey\par \par \par \par \par

## 2021-12-21 NOTE — HISTORY OF PRESENT ILLNESS
[de-identified] : 37yo M w/ HTN and newly diagnosed AML here for f/u. \par \par He presented to the hospital on 7/30/21 with complaints of dizziness, fatigue and severe RUQ pain for 3 days. CT A/P revealed fatty liver and small R pleural effusion. WBC 12k with 17% blasts.Peripheral flow cytometry showed 13% myeloblasts. FLT3(-). BMbx was done on 8/4/21 - confirmed AML. 46,XY         {20         }\par Foundation: mutations in DNMT3A R882H, NRAS G12D, NPM1 W288fs*12\par Pt consented to [facility]. Patient was offered sperm banking, but declined.\par On 8/6, induction with Dauno/Cytararbine was started (cytarabine 100/m2 and dauno 90/m2) \par He received a seven day course of Zosyn for presumed RUL PNA, then transitioned to  levaquin, posaconazole and Acyclovir for ppx for neutropenia. Course c/b neutropenic fevers, cultures negative, repeat CT 8/14 without infectious source. He was on Cefepime but developed a rash, changed to meropenem. Rash improved. \par Day 14 BMbx on 8/19 was hypocellular with chemotherapeutic effect; however, per hematopathology, appears to be earlier regeneration than would typically seen which is concerning for persistent disease at this point, and the earliest cells are CD34 positive and his myeloblasts on initial presentation were CD34 negative. Thus, this may simply represent early regeneration of his marrow. Plan is to await count recovery and repeat biopsy.  \par Patient discharged home on 8/29/21 when ANC >500 [de-identified] : Eating well since discharge. \par c/o hemorrhoidal pain. Hemorrhoids started a few days prior to discharge. He was sent home with rectal ointment and witch hazel. \par \par BMBx done on 9/2: Cellular bone marrow with trilineage hematopoiesis with maturation and megakaryocytosis (history of AML).\par Pt feels well, CBC today showed count stable\par \par s/p C1 HIDAC 9/17-9/22 \par c/o leg pain after chemo in the hospital \par \par He presented to the ED on 10/9 due to fever the night of presentation. The patient went to sleep around 10pm and woke up at 3am feeling warm and clammy. He took his temperature orally at home and it was 100.7. The day prior to presentation (10/8/21), the patient went to [facility] for an appointment to get his platelet count checked. He states he was feeling a bit run down and thought he was going to need a transfusion, but he did not need a transfusion. He was afebrile during the time of the appointment and went home afterwards. Around 3pm, the patient had bilateral crampy leg pain that was similar to the leg pain he felt during his consolidation therapy treatment. He took hydrocodone and the pain improved. The patient had one episode of diarrhea three days prior to presentation and has been bothered by rectal pain associated with his "large hemorrhoids. He denies any bleeding per rectum. He also feels like his mouth has been more dry than usual over the past several days, but denies all other symptoms. \par CT Abdomen and Pelvis w/ Oral Cont and w/ IV Conttrast on 10/9 revealed Region of hypoenhancement upper pole left kidney; please correlate clinically for possible pyelonephritis. No hydronephrosis or perinephric stranding. No rectal abscess.\par CT Chest No Contrast on 10/9 revealed Clear lungs.\par 10/9- BCX NGTD and 10/9- UCX (-)\par He ate some cold salad late last night, had upsetting stomach and diarrhea this morning. Will take Imodium for diarrhea. \par \par C2 is due on 10/15, pt wants deferring to next Monday after his diarrhea improved. \par Admission of  C2 was postponed due to worsening diarrhea. Went to ED on 10/15 due to worsening watery diarrhea. GI PCR neg, C Diff neg\par Given negative stool studies, suspect diarrhea was likely chemo-related. S/p cycle 2 Consolidation with HIDAC started on 10/25. Patient received IV hydration, strict I/O, antiemetics, monitoring of CBC and CMP and for cerebellar toxicity. PRedforte eye drops given to prevent chemical conjunctivitis. Patient with fever throughout course of treatment. Cultures negative. Due to fever probably related to HIDAC, patient received dexamethasone prior to the last 2 doses of cytatabine. \par He is seen today accompanied by his father, pt feels fatigue after chemo, other than that he feels fine. Denied any fever, chills diarrhea. \par \par Pt was admitted on 11/13-11/17 s/p right first digit laceration repair on 11/12 and presenting with erythema and pain at the site of laceration repair. Patient reported he was feeling unwell prior to suture placement with body aches, chills, night sweats and subjective fevers. Patient last BM 4 days ago. Has bruise to left inner thigh. Patient reports he keeps his PICC site clean and intact which was placed in 9/2021. Upon admission to the hospital ID was consulted started on Zosyn , GI consulted for rectal pain secondary to hemorrhoids and palliative consulted for pain control.\par  \par C3 on 11/26, tolerated well. He was seen today after discharge, accompanied by his father. He admitted feeling tired, denied any f/c, diarrhea. Right hand suture site healing well, no abnormal erythema or discharge. \par He will have Fulphilia today. \par C3 HIDAC 11/26-12/1\par He presents to the hospital due to epistaxis and neutropenic fever w/Temp 100.3 on 12/1. Per patient, he reports that he was feeling sinus congestion and pressure on his L side, blew his nose and bleeding began from L nare without improvement prompting arrival to the emergency department. Feels mild L sinus congestion.  L nasal cavity packed with absorbable packing; F/U ENT clinic outpatient. Pan culture obtained-with No growth currently\par \par

## 2021-12-22 ENCOUNTER — APPOINTMENT (OUTPATIENT)
Dept: INFUSION THERAPY | Facility: HOSPITAL | Age: 36
End: 2021-12-22

## 2021-12-27 NOTE — DISCHARGE NOTE NURSING/CASE MANAGEMENT/SOCIAL WORK - NSDCPETBCESMAN_GEN_ALL_CORE
Addended by: QUYEN SALAZAR on: 12/27/2021 05:10 PM     Modules accepted: Orders     If you are a smoker, it is important for your health to stop smoking. Please be aware that second hand smoke is also harmful.

## 2021-12-29 ENCOUNTER — RESULT REVIEW (OUTPATIENT)
Age: 36
End: 2021-12-29

## 2021-12-29 ENCOUNTER — APPOINTMENT (OUTPATIENT)
Dept: HEMATOLOGY ONCOLOGY | Facility: CLINIC | Age: 36
End: 2021-12-29
Payer: COMMERCIAL

## 2021-12-29 VITALS
OXYGEN SATURATION: 100 % | BODY MASS INDEX: 27.77 KG/M2 | TEMPERATURE: 97.9 F | WEIGHT: 198.39 LBS | SYSTOLIC BLOOD PRESSURE: 118 MMHG | HEART RATE: 122 BPM | RESPIRATION RATE: 18 BRPM | HEIGHT: 70.67 IN | DIASTOLIC BLOOD PRESSURE: 74 MMHG

## 2021-12-29 LAB
BASOPHILS # BLD AUTO: 0 K/UL — SIGNIFICANT CHANGE UP (ref 0–0.2)
BASOPHILS NFR BLD AUTO: 0 % — SIGNIFICANT CHANGE UP (ref 0–2)
EOSINOPHIL # BLD AUTO: 0 K/UL — SIGNIFICANT CHANGE UP (ref 0–0.5)
EOSINOPHIL NFR BLD AUTO: 0 % — SIGNIFICANT CHANGE UP (ref 0–6)
HCT VFR BLD CALC: 27.1 % — LOW (ref 39–50)
HGB BLD-MCNC: 9.3 G/DL — LOW (ref 13–17)
IMM GRANULOCYTES NFR BLD AUTO: 0.9 % — SIGNIFICANT CHANGE UP (ref 0–1.5)
LYMPHOCYTES # BLD AUTO: 0.46 K/UL — LOW (ref 1–3.3)
LYMPHOCYTES # BLD AUTO: 8 % — LOW (ref 13–44)
MCHC RBC-ENTMCNC: 31.7 PG — SIGNIFICANT CHANGE UP (ref 27–34)
MCHC RBC-ENTMCNC: 34.3 G/DL — SIGNIFICANT CHANGE UP (ref 32–36)
MCV RBC AUTO: 91.7 FL — SIGNIFICANT CHANGE UP (ref 80–100)
MONOCYTES # BLD AUTO: 1.37 K/UL — HIGH (ref 0–0.9)
MONOCYTES NFR BLD AUTO: 23.7 % — HIGH (ref 2–14)
NEUTROPHILS # BLD AUTO: 3.9 K/UL — SIGNIFICANT CHANGE UP (ref 1.8–7.4)
NEUTROPHILS NFR BLD AUTO: 67.4 % — SIGNIFICANT CHANGE UP (ref 43–77)
NRBC # BLD: 0 /100 WBCS — SIGNIFICANT CHANGE UP (ref 0–0)
PLATELET # BLD AUTO: 118 K/UL — LOW (ref 150–400)
RBC # BLD: 2.93 M/UL — LOW (ref 4.2–5.8)
RBC # FLD: 16.9 % — HIGH (ref 10.3–14.5)
WBC # BLD: 5.78 K/UL — SIGNIFICANT CHANGE UP (ref 3.8–10.5)
WBC # FLD AUTO: 5.78 K/UL — SIGNIFICANT CHANGE UP (ref 3.8–10.5)

## 2021-12-29 PROCEDURE — 99214 OFFICE O/P EST MOD 30 MIN: CPT

## 2022-01-01 ENCOUNTER — OUTPATIENT (OUTPATIENT)
Dept: OUTPATIENT SERVICES | Facility: HOSPITAL | Age: 37
LOS: 1 days | Discharge: HOME | End: 2022-01-01

## 2022-01-01 DIAGNOSIS — C92.00 ACUTE MYELOBLASTIC LEUKEMIA, NOT HAVING ACHIEVED REMISSION: ICD-10-CM

## 2022-01-03 ENCOUNTER — APPOINTMENT (OUTPATIENT)
Dept: HEMATOLOGY ONCOLOGY | Facility: CLINIC | Age: 37
End: 2022-01-03

## 2022-01-03 LAB
ALBUMIN SERPL ELPH-MCNC: 4.6 G/DL
ALP BLD-CCNC: 92 U/L
ALT SERPL-CCNC: 159 U/L
ANION GAP SERPL CALC-SCNC: 15 MMOL/L
AST SERPL-CCNC: 60 U/L
BILIRUB SERPL-MCNC: 0.3 MG/DL
BUN SERPL-MCNC: 12 MG/DL
CALCIUM SERPL-MCNC: 9.8 MG/DL
CHLORIDE SERPL-SCNC: 102 MMOL/L
CO2 SERPL-SCNC: 24 MMOL/L
CREAT SERPL-MCNC: 0.99 MG/DL
GLUCOSE SERPL-MCNC: 105 MG/DL
POTASSIUM SERPL-SCNC: 3.8 MMOL/L
PROT SERPL-MCNC: 6.9 G/DL
SODIUM SERPL-SCNC: 141 MMOL/L

## 2022-01-04 LAB — SARS-COV-2 N GENE NPH QL NAA+PROBE: NOT DETECTED

## 2022-01-04 NOTE — PHYSICAL EXAM
[Fully active, able to carry on all pre-disease performance without restriction] : Status 0 - Fully active, able to carry on all pre-disease performance without restriction [Normal] : affect appropriate [de-identified] : a 1 cm long laceration with sutures on the right thrum, healing well

## 2022-01-04 NOTE — ASSESSMENT
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 \par Started induction with Dauno/Cytarabine on 8/6. \par Pt's counts now recovered, remission marrow later done on 9/2- in remission. Proceed to consolidation with 4 cycles of HIDAC. C1 HIDAC on 9/17, c/b neutropenic fever and diarrhea. C2 HIDAC on 10/25, was postponed for 1 week due to admission for diarrhea, given negative stool studies, suspect diarrhea was likely chemo-related. Pt was admitted again 11/13-11/17 for sepsis due to thumb cellulitis after laceration. C3 on 11/26, had Fulphilia on C3 c/b epistaxis and neutropenic fever w/Temp 100.3. \par Reviewed CBC today with pt, Platelet 118, ANC 3.90, he is not on levaquine and fluconazole given he is not neutropenic \par will arrange admission for C4 next week, plan to be admitted on 1/4/21, will f/u CMP\par cont hemorrhoidal ointment and witch hazel. Sent oxycodone for pain relief\par All questions answered\par RTC after C4\par \par Case and management discussed with Dr. Yancey\par \par \par \par \par

## 2022-01-04 NOTE — HISTORY OF PRESENT ILLNESS
[de-identified] : 35yo M w/ HTN and newly diagnosed AML here for f/u. \par \par He presented to the hospital on 7/30/21 with complaints of dizziness, fatigue and severe RUQ pain for 3 days. CT A/P revealed fatty liver and small R pleural effusion. WBC 12k with 17% blasts.Peripheral flow cytometry showed 13% myeloblasts. FLT3(-). BMbx was done on 8/4/21 - confirmed AML. 46,XY          {20          }\par Foundation: mutations in DNMT3A R882H, NRAS G12D, NPM1 W288fs*12\par Pt consented to [facility]. Patient was offered sperm banking, but declined.\par On 8/6, induction with Dauno/Cytararbine was started (cytarabine 100/m2 and dauno 90/m2) \par He received a seven day course of Zosyn for presumed RUL PNA, then transitioned to  levaquin, posaconazole and Acyclovir for ppx for neutropenia. Course c/b neutropenic fevers, cultures negative, repeat CT 8/14 without infectious source. He was on Cefepime but developed a rash, changed to meropenem. Rash improved. \par Day 14 BMbx on 8/19 was hypocellular with chemotherapeutic effect; however, per hematopathology, appears to be earlier regeneration than would typically seen which is concerning for persistent disease at this point, and the earliest cells are CD34 positive and his myeloblasts on initial presentation were CD34 negative. Thus, this may simply represent early regeneration of his marrow. Plan is to await count recovery and repeat biopsy.  \par Patient discharged home on 8/29/21 when ANC >500 [de-identified] : Eating well since discharge. \par c/o hemorrhoidal pain. Hemorrhoids started a few days prior to discharge. He was sent home with rectal ointment and witch hazel. \par \par BMBx done on 9/2: Cellular bone marrow with trilineage hematopoiesis with maturation and megakaryocytosis (history of AML).\par Pt feels well, CBC today showed count stable\par \par s/p C1 HIDAC 9/17-9/22 \par c/o leg pain after chemo in the hospital \par \par He presented to the ED on 10/9 due to fever the night of presentation. The patient went to sleep around 10pm and woke up at 3am feeling warm and clammy. He took his temperature orally at home and it was 100.7. The day prior to presentation (10/8/21), the patient went to [facility] for an appointment to get his platelet count checked. He states he was feeling a bit run down and thought he was going to need a transfusion, but he did not need a transfusion. He was afebrile during the time of the appointment and went home afterwards. Around 3pm, the patient had bilateral crampy leg pain that was similar to the leg pain he felt during his consolidation therapy treatment. He took hydrocodone and the pain improved. The patient had one episode of diarrhea three days prior to presentation and has been bothered by rectal pain associated with his "large hemorrhoids. He denies any bleeding per rectum. He also feels like his mouth has been more dry than usual over the past several days, but denies all other symptoms. \par CT Abdomen and Pelvis w/ Oral Cont and w/ IV Conttrast on 10/9 revealed Region of hypoenhancement upper pole left kidney; please correlate clinically for possible pyelonephritis. No hydronephrosis or perinephric stranding. No rectal abscess.\par CT Chest No Contrast on 10/9 revealed Clear lungs.\par 10/9- BCX NGTD and 10/9- UCX (-)\par He ate some cold salad late last night, had upsetting stomach and diarrhea this morning. Will take Imodium for diarrhea. \par \par C2 is due on 10/15, pt wants deferring to next Monday after his diarrhea improved. \par Admission of  C2 was postponed due to worsening diarrhea. Went to ED on 10/15 due to worsening watery diarrhea. GI PCR neg, C Diff neg\par Given negative stool studies, suspect diarrhea was likely chemo-related. S/p cycle 2 Consolidation with HIDAC started on 10/25. Patient received IV hydration, strict I/O, antiemetics, monitoring of CBC and CMP and for cerebellar toxicity. PRedforte eye drops given to prevent chemical conjunctivitis. Patient with fever throughout course of treatment. Cultures negative. Due to fever probably related to HIDAC, patient received dexamethasone prior to the last 2 doses of cytatabine. \par He is seen today accompanied by his father, pt feels fatigue after chemo, other than that he feels fine. Denied any fever, chills diarrhea. \par \par Pt was admitted on 11/13-11/17 s/p right first digit laceration repair on 11/12 and presenting with erythema and pain at the site of laceration repair. Patient reported he was feeling unwell prior to suture placement with body aches, chills, night sweats and subjective fevers. Patient last BM 4 days ago. Has bruise to left inner thigh. Patient reports he keeps his PICC site clean and intact which was placed in 9/2021. Upon admission to the hospital ID was consulted started on Zosyn , GI consulted for rectal pain secondary to hemorrhoids and palliative consulted for pain control.\par  \par C3 on 11/26, tolerated well. He was seen today after discharge, accompanied by his father. He admitted feeling tired, denied any f/c, diarrhea. Right hand suture site healing well, no abnormal erythema or discharge. \par He will have Fulphilia today. \par C3 HIDAC 11/26-12/1\par He presents to the hospital due to epistaxis and neutropenic fever w/Temp 100.3 on 12/1. Per patient, he reports that he was feeling sinus congestion and pressure on his L side, blew his nose and bleeding began from L nare without improvement prompting arrival to the emergency department. Feels mild L sinus congestion.  L nasal cavity packed with absorbable packing; F/U ENT clinic outpatient. Pan culture obtained-with No growth currently\par CBC rechecked today recovered. He feels well overall , had lower abdominal pain last night after ate cheese, now improved. Denied any fever chills bloody stool or diarrhea. \par \par Pt presented today for count check, feels well denied any new complaints. \par \par

## 2022-01-05 ENCOUNTER — TRANSCRIPTION ENCOUNTER (OUTPATIENT)
Age: 37
End: 2022-01-05

## 2022-01-05 ENCOUNTER — INPATIENT (INPATIENT)
Facility: HOSPITAL | Age: 37
LOS: 4 days | Discharge: HOME | DRG: 837 | End: 2022-01-10
Attending: INTERNAL MEDICINE | Admitting: INTERNAL MEDICINE
Payer: COMMERCIAL

## 2022-01-05 VITALS
HEIGHT: 71 IN | OXYGEN SATURATION: 100 % | HEART RATE: 89 BPM | RESPIRATION RATE: 16 BRPM | DIASTOLIC BLOOD PRESSURE: 72 MMHG | WEIGHT: 197.53 LBS | TEMPERATURE: 98 F | SYSTOLIC BLOOD PRESSURE: 118 MMHG

## 2022-01-05 DIAGNOSIS — C92.00 ACUTE MYELOBLASTIC LEUKEMIA, NOT HAVING ACHIEVED REMISSION: ICD-10-CM

## 2022-01-05 LAB
ALBUMIN SERPL ELPH-MCNC: 4.4 G/DL — SIGNIFICANT CHANGE UP (ref 3.3–5)
ALP SERPL-CCNC: 94 U/L — SIGNIFICANT CHANGE UP (ref 40–120)
ALT FLD-CCNC: 264 U/L — HIGH (ref 10–45)
ANION GAP SERPL CALC-SCNC: 13 MMOL/L — SIGNIFICANT CHANGE UP (ref 5–17)
ANISOCYTOSIS BLD QL: SLIGHT — SIGNIFICANT CHANGE UP
APTT BLD: 24.7 SEC — LOW (ref 27.5–35.5)
AST SERPL-CCNC: 74 U/L — HIGH (ref 10–40)
BASOPHILS # BLD AUTO: 0 K/UL — SIGNIFICANT CHANGE UP (ref 0–0.2)
BASOPHILS NFR BLD AUTO: 0 % — SIGNIFICANT CHANGE UP (ref 0–2)
BILIRUB SERPL-MCNC: 0.5 MG/DL — SIGNIFICANT CHANGE UP (ref 0.2–1.2)
BLD GP AB SCN SERPL QL: NEGATIVE — SIGNIFICANT CHANGE UP
BUN SERPL-MCNC: 11 MG/DL — SIGNIFICANT CHANGE UP (ref 7–23)
CALCIUM SERPL-MCNC: 9.2 MG/DL — SIGNIFICANT CHANGE UP (ref 8.4–10.5)
CHLORIDE SERPL-SCNC: 102 MMOL/L — SIGNIFICANT CHANGE UP (ref 96–108)
CO2 SERPL-SCNC: 23 MMOL/L — SIGNIFICANT CHANGE UP (ref 22–31)
CREAT SERPL-MCNC: 0.9 MG/DL — SIGNIFICANT CHANGE UP (ref 0.5–1.3)
DACRYOCYTES BLD QL SMEAR: SLIGHT — SIGNIFICANT CHANGE UP
EOSINOPHIL # BLD AUTO: 0 K/UL — SIGNIFICANT CHANGE UP (ref 0–0.5)
EOSINOPHIL NFR BLD AUTO: 0 % — SIGNIFICANT CHANGE UP (ref 0–6)
GLUCOSE SERPL-MCNC: 104 MG/DL — HIGH (ref 70–99)
HCT VFR BLD CALC: 27.8 % — LOW (ref 39–50)
HGB BLD-MCNC: 9.5 G/DL — LOW (ref 13–17)
INR BLD: 1.01 RATIO — SIGNIFICANT CHANGE UP (ref 0.88–1.16)
LDH SERPL L TO P-CCNC: 243 U/L — HIGH (ref 50–242)
LYMPHOCYTES # BLD AUTO: 0.23 K/UL — LOW (ref 1–3.3)
LYMPHOCYTES # BLD AUTO: 7.1 % — LOW (ref 13–44)
MACROCYTES BLD QL: SLIGHT — SIGNIFICANT CHANGE UP
MAGNESIUM SERPL-MCNC: 2.2 MG/DL — SIGNIFICANT CHANGE UP (ref 1.6–2.6)
MANUAL SMEAR VERIFICATION: SIGNIFICANT CHANGE UP
MCHC RBC-ENTMCNC: 31.9 PG — SIGNIFICANT CHANGE UP (ref 27–34)
MCHC RBC-ENTMCNC: 34.2 GM/DL — SIGNIFICANT CHANGE UP (ref 32–36)
MCV RBC AUTO: 93.3 FL — SIGNIFICANT CHANGE UP (ref 80–100)
MONOCYTES # BLD AUTO: 0.8 K/UL — SIGNIFICANT CHANGE UP (ref 0–0.9)
MONOCYTES NFR BLD AUTO: 24.8 % — HIGH (ref 2–14)
NEUTROPHILS # BLD AUTO: 2.19 K/UL — SIGNIFICANT CHANGE UP (ref 1.8–7.4)
NEUTROPHILS NFR BLD AUTO: 67.2 % — SIGNIFICANT CHANGE UP (ref 43–77)
NEUTS BAND # BLD: 0.9 % — SIGNIFICANT CHANGE UP (ref 0–8)
NRBC # BLD: 1 /100 — HIGH (ref 0–0)
OVALOCYTES BLD QL SMEAR: SLIGHT — SIGNIFICANT CHANGE UP
PLAT MORPH BLD: NORMAL — SIGNIFICANT CHANGE UP
PLATELET # BLD AUTO: 141 K/UL — LOW (ref 150–400)
POIKILOCYTOSIS BLD QL AUTO: SLIGHT — SIGNIFICANT CHANGE UP
POLYCHROMASIA BLD QL SMEAR: SLIGHT — SIGNIFICANT CHANGE UP
POTASSIUM SERPL-MCNC: 3.9 MMOL/L — SIGNIFICANT CHANGE UP (ref 3.5–5.3)
POTASSIUM SERPL-SCNC: 3.9 MMOL/L — SIGNIFICANT CHANGE UP (ref 3.5–5.3)
PROT SERPL-MCNC: 7 G/DL — SIGNIFICANT CHANGE UP (ref 6–8.3)
PROTHROM AB SERPL-ACNC: 12.1 SEC — SIGNIFICANT CHANGE UP (ref 10.6–13.6)
RBC # BLD: 2.98 M/UL — LOW (ref 4.2–5.8)
RBC # FLD: 21.7 % — HIGH (ref 10.3–14.5)
RBC BLD AUTO: ABNORMAL
RH IG SCN BLD-IMP: POSITIVE — SIGNIFICANT CHANGE UP
SODIUM SERPL-SCNC: 138 MMOL/L — SIGNIFICANT CHANGE UP (ref 135–145)
URATE SERPL-MCNC: 8.1 MG/DL — SIGNIFICANT CHANGE UP (ref 3.4–8.8)
WBC # BLD: 3.21 K/UL — LOW (ref 3.8–10.5)
WBC # FLD AUTO: 3.21 K/UL — LOW (ref 3.8–10.5)

## 2022-01-05 PROCEDURE — 76700 US EXAM ABDOM COMPLETE: CPT | Mod: 26

## 2022-01-05 PROCEDURE — 71045 X-RAY EXAM CHEST 1 VIEW: CPT | Mod: 26

## 2022-01-05 PROCEDURE — 99221 1ST HOSP IP/OBS SF/LOW 40: CPT

## 2022-01-05 RX ORDER — PREDNISOLONE SODIUM PHOSPHATE 1 %
2 DROPS OPHTHALMIC (EYE)
Qty: 0 | Refills: 0 | DISCHARGE
Start: 2022-01-05

## 2022-01-05 RX ORDER — CHLORHEXIDINE GLUCONATE 213 G/1000ML
1 SOLUTION TOPICAL
Refills: 0 | Status: DISCONTINUED | OUTPATIENT
Start: 2022-01-05 | End: 2022-01-10

## 2022-01-05 RX ORDER — METOCLOPRAMIDE HCL 10 MG
10 TABLET ORAL EVERY 6 HOURS
Refills: 0 | Status: DISCONTINUED | OUTPATIENT
Start: 2022-01-05 | End: 2022-01-10

## 2022-01-05 RX ORDER — ENOXAPARIN SODIUM 100 MG/ML
40 INJECTION SUBCUTANEOUS AT BEDTIME
Refills: 0 | Status: DISCONTINUED | OUTPATIENT
Start: 2022-01-05 | End: 2022-01-10

## 2022-01-05 RX ORDER — FLUCONAZOLE 150 MG/1
1 TABLET ORAL
Qty: 0 | Refills: 0 | DISCHARGE

## 2022-01-05 RX ORDER — LANOLIN ALCOHOL/MO/W.PET/CERES
5 CREAM (GRAM) TOPICAL ONCE
Refills: 0 | Status: COMPLETED | OUTPATIENT
Start: 2022-01-05 | End: 2022-01-05

## 2022-01-05 RX ORDER — FOSAPREPITANT DIMEGLUMINE 150 MG/5ML
150 INJECTION, POWDER, LYOPHILIZED, FOR SOLUTION INTRAVENOUS ONCE
Refills: 0 | Status: COMPLETED | OUTPATIENT
Start: 2022-01-05 | End: 2022-01-05

## 2022-01-05 RX ORDER — OXYCODONE HYDROCHLORIDE 5 MG/1
5 TABLET ORAL EVERY 4 HOURS
Refills: 0 | Status: DISCONTINUED | OUTPATIENT
Start: 2022-01-05 | End: 2022-01-10

## 2022-01-05 RX ORDER — CYTARABINE 100 MG
6300 VIAL (EA) INJECTION EVERY 12 HOURS
Refills: 0 | Status: COMPLETED | OUTPATIENT
Start: 2022-01-05 | End: 2022-01-06

## 2022-01-05 RX ORDER — ONDANSETRON 8 MG/1
8 TABLET, FILM COATED ORAL EVERY 8 HOURS
Refills: 0 | Status: DISCONTINUED | OUTPATIENT
Start: 2022-01-05 | End: 2022-01-10

## 2022-01-05 RX ORDER — ACETAMINOPHEN 500 MG
650 TABLET ORAL EVERY 6 HOURS
Refills: 0 | Status: DISCONTINUED | OUTPATIENT
Start: 2022-01-05 | End: 2022-01-10

## 2022-01-05 RX ORDER — CIPROFLOXACIN LACTATE 400MG/40ML
1 VIAL (ML) INTRAVENOUS
Qty: 0 | Refills: 0 | DISCHARGE

## 2022-01-05 RX ORDER — SODIUM CHLORIDE 9 MG/ML
10 INJECTION INTRAMUSCULAR; INTRAVENOUS; SUBCUTANEOUS
Refills: 0 | Status: DISCONTINUED | OUTPATIENT
Start: 2022-01-05 | End: 2022-01-10

## 2022-01-05 RX ORDER — INFLUENZA VIRUS VACCINE 15; 15; 15; 15 UG/.5ML; UG/.5ML; UG/.5ML; UG/.5ML
0.5 SUSPENSION INTRAMUSCULAR ONCE
Refills: 0 | Status: DISCONTINUED | OUTPATIENT
Start: 2022-01-05 | End: 2022-01-10

## 2022-01-05 RX ORDER — SODIUM CHLORIDE 9 MG/ML
1000 INJECTION INTRAMUSCULAR; INTRAVENOUS; SUBCUTANEOUS
Refills: 0 | Status: DISCONTINUED | OUTPATIENT
Start: 2022-01-05 | End: 2022-01-10

## 2022-01-05 RX ORDER — AMLODIPINE BESYLATE 2.5 MG/1
5 TABLET ORAL DAILY
Refills: 0 | Status: DISCONTINUED | OUTPATIENT
Start: 2022-01-05 | End: 2022-01-10

## 2022-01-05 RX ORDER — PREDNISOLONE SODIUM PHOSPHATE 1 %
2 DROPS OPHTHALMIC (EYE) EVERY 6 HOURS
Refills: 0 | Status: DISCONTINUED | OUTPATIENT
Start: 2022-01-05 | End: 2022-01-10

## 2022-01-05 RX ADMIN — ONDANSETRON 8 MILLIGRAM(S): 8 TABLET, FILM COATED ORAL at 21:50

## 2022-01-05 RX ADMIN — Medication 5 MILLIGRAM(S): at 22:48

## 2022-01-05 RX ADMIN — Medication 2 DROP(S): at 18:32

## 2022-01-05 RX ADMIN — ENOXAPARIN SODIUM 40 MILLIGRAM(S): 100 INJECTION SUBCUTANEOUS at 21:50

## 2022-01-05 RX ADMIN — FOSAPREPITANT DIMEGLUMINE 300 MILLIGRAM(S): 150 INJECTION, POWDER, LYOPHILIZED, FOR SOLUTION INTRAVENOUS at 15:39

## 2022-01-05 RX ADMIN — ONDANSETRON 8 MILLIGRAM(S): 8 TABLET, FILM COATED ORAL at 15:38

## 2022-01-05 RX ADMIN — Medication 2 DROP(S): at 23:41

## 2022-01-05 RX ADMIN — Medication 187.67 MILLIGRAM(S): at 16:58

## 2022-01-05 NOTE — CONSULT NOTE ADULT - SUBJECTIVE AND OBJECTIVE BOX
HPI:  Patient is a 36 year old male with a PMHx of HTN,  fatty liver, kidney stones,  and now newly diagnosed AML, NPM1 mutated, 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  Now admitted for cycle 4 HIDAC. Patient denies chest pain, palpitations, shortness of breath, or difficulty breathing.        REVIEW OF SYSTEMS:    CONSTITUTIONAL: No weakness, fevers or chills  EYES/ENT: No visual changes;  No vertigo or throat pain   NECK: No pain or stiffness  RESPIRATORY: No cough, wheezing, hemoptysis; No shortness of breath  CARDIOVASCULAR: No chest pain or palpitations  GASTROINTESTINAL: No abdominal or epigastric pain. No nausea, vomiting, or hematemesis; No diarrhea or constipation. No melena or hematochezia.  GENITOURINARY: No dysuria, frequency or hematuria  NEUROLOGICAL: No numbness or weakness  SKIN: No itching, burning, rashes, or lesions   All other review of systems is negative unless indicated above.        PAST MEDICAL & SURGICAL HISTORY:  Hypertension  diagnosed 1 year ago    Kidney stone  5 years ago    History of genital warts    AML (acute myeloid leukemia)    No significant past surgical history      MEDICATIONS  (STANDING):  amLODIPine   Tablet 5 milliGRAM(s) Oral daily  chlorhexidine 2% Cloths 1 Application(s) Topical <User Schedule>  cytarabine IVPB (eMAR) 6300 milliGRAM(s) IV Intermittent every 12 hours  enoxaparin Injectable 40 milliGRAM(s) SubCutaneous at bedtime  fosaprepitant IVPB 150 milliGRAM(s) IV Intermittent once  influenza   Vaccine 0.5 milliLiter(s) IntraMuscular once  ondansetron Injectable 8 milliGRAM(s) IV Push every 8 hours  prednisoLONE acetate 1% Suspension 2 Drop(s) Both EYES every 6 hours  sodium chloride 0.9%. 1000 milliLiter(s) (75 mL/Hr) IV Continuous <Continuous>      Allergies    No Known Allergies    Intolerances    cefepime (Rash)      Vital Signs Last 24 Hrs  T(C): 36.7 (05 Jan 2022 13:53), Max: 36.7 (05 Jan 2022 13:53)  T(F): 98.1 (05 Jan 2022 13:53), Max: 98.1 (05 Jan 2022 13:53)  HR: 90 (05 Jan 2022 13:53) (89 - 90)  BP: 122/72 (05 Jan 2022 13:53) (118/72 - 122/72)  BP(mean): --  RR: 18 (05 Jan 2022 13:53) (16 - 18)  SpO2: 99% (05 Jan 2022 13:53) (99% - 100%)      Appearance: Normal	  HEENT:   Normal oral mucosa, PERRL, EOMI	  Lymphatic: No lymphadenopathy , no edema  Cardiovascular: Normal S1 S2, No JVD, No murmurs , Peripheral pulses palpable 2+ bilaterally  Respiratory: Lungs clear to auscultation, normal effort 	  Gastrointestinal:  Soft, Non-tender, + BS	  Skin: No rashes, No ecchymoses, No cyanosis, warm to touch  Musculoskeletal: Normal range of motion, normal strength  Psychiatry:  Mood & affect appropriate  Ext: RUE PICC line; no LE edema                             9.5    3.21  )-----------( 141      ( 05 Jan 2022 11:10 )             27.8               01-05    138  |  102  |  11  ----------------------------<  104<H>  3.9   |  23  |  0.90    Ca    9.2      05 Jan 2022 11:18  Mg     2.2     01-05    TPro  7.0  /  Alb  4.4  /  TBili  0.5  /  DBili  x   /  AST  74<H>  /  ALT  264<H>  /  AlkPhos  94  01-05    PT/INR - ( 05 Jan 2022 11:10 )   PT: 12.1 sec;   INR: 1.01 ratio         PTT - ( 05 Jan 2022 11:10 )  PTT:24.7 sec                         < from: Xray Chest 1 View- PORTABLE-Urgent (Xray Chest 1 View- PORTABLE-Urgent .) (01.05.22 @ 10:19) >  INTERPRETATION:  HISTORY: AML. Confirm PICC line placement    TECHNIQUE: A single AP view of the chest was obtained.    COMPARISON: 12/11/2021    IMPRESSION: There is a right upper extremity PICC line with tip in the   superior vena cava. No pneumothorax is seen. The lungs are clear.    --- End of Report ---        < end of copied text >

## 2022-01-05 NOTE — DISCHARGE NOTE PROVIDER - NSDCFUADDAPPT_GEN_ALL_CORE_FT
To [facility] on Wednesday 1/12 at 3:10PM for Fulfpila and possible platelet transfusion, please arrive 45 minutes earlier  To [facility] on Wednesday 1/12 at ____ to see GALINA Choi  To [facility] on Saturday 1/15 at 10 am for possible platelet   To [facility] on Wednesday 1/12 at 3:10PM for Fulfpila and possible platelet transfusion, please arrive 45 minutes earlier  To [facility] on Wednesday 1/12 at  2:30pm  to see GALINA Choi  To [facility] on Saturday 1/15 at 10 am for possible platelet

## 2022-01-05 NOTE — CONSULT NOTE ADULT - ATTENDING COMMENTS
Pt care and plan discussed and reviewed with PA. Plan as outlined above edited by me to reflect our discussion. I had a prolonged conversation with the patient regarding hospital course, differential diagnosis and results of diagnostic tests.  Plan of care discussed with patient after the evaluation. Patient expresses clear understanding and satisfaction with the plan of care. OMT on six regions for acute somatic dysfunctions done at the bedside. Sixty five minutes spent on encounter, of which more than fifty percent of the encounter was spent on counseling and/or coordinating care by the attending physician. Advanced care planning/advanced directives discussed with patient/family. DNR status including forceful chest compressions to attempt to restart the heart, ventilator support/artificial breathing, electric shock, artificial nutrition, health care proxy, Molst form all discussed with pt. Pt wishes to consider.

## 2022-01-05 NOTE — H&P ADULT - ASSESSMENT
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 Now admitted for cycle 4 HIDAC.

## 2022-01-05 NOTE — H&P ADULT - PROBLEM SELECTOR PLAN 1
Admitted for Cycle 4 HiDAC  Cytarabine 3 g/m2 IV over 3 hrs every 12 hrs on days 1,3,5   IV hydration, Antiemetics, daily weight   Monitor for Cerebellar toxicity, nystagmus checks  Follow CBCwith diff/lytes, transfuse/replete prn  Continue predforte eye drops to prevent chemical conjunctivitis  Discharge planning on 12/1/21  Transaminitis, await repeat CMP Admitted for Cycle 4 HiDAC  Cytarabine 3 g/m2 IV over 3 hrs every 12 hrs on days 1,3,5   IV hydration, Antiemetics, daily weight   Monitor for Cerebellar toxicity, nystagmus checks  Follow CBCwith diff/lytes, transfuse/replete prn  Continue predforte eye drops to prevent chemical conjunctivitis  Discharge planning on 12/1/21  Transaminitis, await repeat CMP  CXR confirmed PICC placement   grade 1 AST, grade 3 ALT transaminitis, continue same HIDAC dose per iesha Marks to assess Admitted for Cycle 4 HiDAC  Cytarabine 3 g/m2 IV over 3 hrs every 12 hrs on days 1,3,5   IV hydration, Antiemetics, daily weight   Monitor for Cerebellar toxicity, nystagmus checks  Follow CBCwith diff/lytes, transfuse/replete prn  Continue predforte eye drops to prevent chemical conjunctivitis  CXR confirmed PICC placement   grade 1 AST, grade 3 ALT transaminitis, continue same HIDAC dose per iesha Marks to assess

## 2022-01-05 NOTE — CONSULT NOTE ADULT - ASSESSMENT
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  Now admitted for cycle 4 HIDAC.       #AML  --Patient admitted for 4th cycle of consolidation   --F/U Heme/Onc recommendations      #Elevated LFTs  --AST of 74; ALT of 264  --Can be due to medication side effect-- Fluconazole or Cytarabine with known hepatic imapairment-- Defer chemo medications per Onc  --Avoid hepatotoxic medications   --Continue to trend on daily labs  --If persistently elevated, recommend to check abdominal US for further evaluation -- Has been ordered; F/U results     #HTN  --Continue with home dose of Amlodipine  --Monitor BP and Vital signs closely     #Anemia  --Monitor H/H Closely  --Transfusion per Heme/Onc recommendations      DVT PPX with Lovenox 40, continue to monitor Cr and Platelet count             Patient is a 36 year old male with a PMHx of HTN,  fatty liver, kidney stones,  and now newly diagnosed AML, NPM1 mutated, 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  Now admitted for cycle 4 HIDAC.       #AML  --Patient admitted for 4th cycle of consolidation   --F/U Heme/Onc recommendations  -- serial CBC   -- monitor for fever       #Elevated LFTs  --AST of 74; ALT of 264  --Can be due to medication side effect-- Fluconazole or Cytarabine with known hepatic impairment- Defer chemo medications per Onc  --Avoid hepatotoxic medications   --Continue to trend on daily labs  --recommend to check abdominal US for further evaluation -- Has been ordered; F/U results     #HTN  --Continue with home dose of Amlodipine  --Monitor BP and Vital signs closely     #Anemia  --Monitor H/H Closely  --Transfusion per Heme/Onc recommendations      DVT PPX with Lovenox 40, continue to monitor Cr and Platelet count

## 2022-01-05 NOTE — DISCHARGE NOTE PROVIDER - NSDCFUSCHEDAPPT_GEN_ALL_CORE_FT
JAK DANIELS ; 01/12/2022 ; [place] [facility]  JAK DANIELS ; 01/15/2022 ; [place] [facility] JAK DANIELS ; 01/12/2022 ; [place] [facility]  JAK DANIELS ; 01/12/2022 ; [place] [facility]  JAK DANIELS ; 01/15/2022 ; [place] [facility]

## 2022-01-05 NOTE — PATIENT PROFILE ADULT - FALL HARM RISK - UNIVERSAL INTERVENTIONS
Bed in lowest position, wheels locked, appropriate side rails in place/Call bell, personal items and telephone in reach/Instruct patient to call for assistance before getting out of bed or chair/Non-slip footwear when patient is out of bed/[place] to call system/Physically safe environment - no spills, clutter or unnecessary equipment/Purposeful Proactive Rounding/Room/bathroom lighting operational, light cord in reach

## 2022-01-05 NOTE — DISCHARGE NOTE PROVIDER - HOSPITAL COURSE
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 Now admitted for cycle 4 HIDAC.     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 Now admitted for cycle 4 HIDAC.  Pt has grade 1 AST and grade 3 ALT transaminitis, abd sono  revealed Borderline/mild hepatomegaly with hepatic steatosis. Splenomegaly.  Focal area of left upper pole increased renal cortical echogenicity, corresponding to an area of hypoattenuation seen on prior CT chest,   abdomen and pelvis dated 10/9/2021. This is nonspecific and may reflect   focal edema? Pt received HIDAC 3 g/m2 doses.      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 Now admitted for cycle 4 HIDAC.  Pt has known grade 1 AST and grade 3 ALT transaminitis. Presented this admission with transamintitis. Abd sono  performed and revealed Borderline/mild hepatomegaly with hepatic steatosis. Splenomegaly. Focal area of left upper pole increased renal cortical echogenicity, corresponding to an area of hypoattenuation seen on prior CT chest,   abdomen and pelvis dated 10/9/2021. This is nonspecific and may reflect focal edema. Patient received IV hydration, antiemetics, monitoring of CBC and electrolytes. Predforte eye drops provided to prevent chemical conjunctivitis. Patient was monitored for cerebellar toxicity. Nystagmus checks performed. Patient tolerated chemotherapy without adverse effects. To receive Fulphila post chemotherapy as an outpatient.     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 Now admitted for cycle 4 HIDAC.  Pt has known grade 1 AST and grade 3 ALT transaminitis. Presented this admission with transamintitis. Abd sono  performed and revealed Borderline/mild hepatomegaly with hepatic steatosis. Splenomegaly. Focal area of left upper pole increased renal cortical echogenicity, corresponding to an area of hypoattenuation seen on prior CT chest,   abdomen and pelvis dated 10/9/2021. This is nonspecific and may reflect focal edema. Patient received IV hydration, antiemetics, monitoring of CBC and electrolytes. Predforte eye drops provided to prevent chemical conjunctivitis. Patient was monitored for cerebellar toxicity. Nystagmus checks performed. Hospital course complicated by fever blood cultures showed __________, most likely febrile secondary to HIDAC treatment. To receive Fulphila post chemotherapy as an outpatient. Patient stable for discharge and follow up outpatient.     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 Now admitted for cycle 4 HIDAC.  Pt has known grade 1 AST and grade 3 ALT transaminitis. Presented this admission with transamintitis. Abd sono  performed and revealed Borderline/mild hepatomegaly with hepatic steatosis. Splenomegaly. Focal area of left upper pole increased renal cortical echogenicity, corresponding to an area of hypoattenuation seen on prior CT chest,   abdomen and pelvis dated 10/9/2021. This is nonspecific and may reflect focal edema. Patient received IV hydration, antiemetics, monitoring of CBC and electrolytes. Predforte eye drops provided to prevent chemical conjunctivitis. Patient was monitored for cerebellar toxicity. Nystagmus checks performed. Hospital course complicated by fever blood cultures showed (-), most likely febrile secondary to HIDAC treatment. To receive Fulphila post chemotherapy as an outpatient. Patient stable for discharge and follow up outpatient.

## 2022-01-05 NOTE — H&P ADULT - NSHPLABSRESULTS_GEN_ALL_CORE
LABS:                            9.5    3.21  )-----------( 141      ( 05 Jan 2022 11:10 )             27.8         Mean Cell Volume : 93.3 fl  Mean Cell Hemoglobin : 31.9 pg  Mean Cell Hemoglobin Concentration : 34.2 gm/dL  Auto Neutrophil # : x  Auto Lymphocyte # : x  Auto Monocyte # : x  Auto Eosinophil # : x  Auto Basophil # : x  Auto Neutrophil % : x  Auto Lymphocyte % : x  Auto Monocyte % : x  Auto Eosinophil % : x  Auto Basophil % : x                  PT/INR - ( 05 Jan 2022 11:10 )   PT: 12.1 sec;   INR: 1.01 ratio         PTT - ( 05 Jan 2022 11:10 )  PTT:24.7 sec    < from: Xray Chest 1 View- PORTABLE-Urgent (Xray Chest 1 View- PORTABLE-Urgent .) (01.05.22 @ 10:19) >  IMPRESSION: There is a right upper extremity PICC line with tip in the   superior vena cava. No pneumothorax is seen. The lungs are clear. LABS:                            9.5    3.21  )-----------( 141      ( 05 Jan 2022 11:10 )             27.8         Mean Cell Volume : 93.3 fl  Mean Cell Hemoglobin : 31.9 pg  Mean Cell Hemoglobin Concentration : 34.2 gm/dL  Auto Neutrophil # : 2.19 K/uL  Auto Lymphocyte # : 0.23 K/uL  Auto Monocyte # : 0.80 K/uL  Auto Eosinophil # : 0.00 K/uL  Auto Basophil # : 0.00 K/uL  Auto Neutrophil % : 67.2 %  Auto Lymphocyte % : 7.1 %  Auto Monocyte % : 24.8 %  Auto Eosinophil % : 0.0 %  Auto Basophil % : 0.0 %      01-05    138  |  102  |  11  ----------------------------<  104<H>  3.9   |  23  |  0.90    Ca    9.2      05 Jan 2022 11:18  Mg     2.2     01-05    TPro  7.0  /  Alb  4.4  /  TBili  0.5  /  DBili  x   /  AST  74<H>  /  ALT  264<H>  /  AlkPhos  94  01-05        PT/INR - ( 05 Jan 2022 11:10 )   PT: 12.1 sec;   INR: 1.01 ratio         PTT - ( 05 Jan 2022 11:10 )  PTT:24.7 sec      Uric Acid 8.1    1/3 COVID PCR(-)        < from: Xray Chest 1 View- PORTABLE-Urgent (Xray Chest 1 View- PORTABLE-Urgent .) (01.05.22 @ 10:19) >  IMPRESSION: There is a right upper extremity PICC line with tip in the   superior vena cava. No pneumothorax is seen. The lungs are clear.

## 2022-01-05 NOTE — H&P ADULT - HISTORY OF PRESENT ILLNESS
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  Now admitted for cycle 4 HIDAC.    Patient initially presented to the hospital on 7/30/21 with complaints of dizziness, fatigue and severe RUQ pain for 3 days. CT A/P revealed fatty liver and small R pleural effusion. WBC 12k with 17% blasts.Peripheral flow cytometry showed 13% myeloblasts. FLT3(-). BMbx was done on 8/4/21 - confirmed AML. 46,XY {20 } Foundation: mutations in DNMT3A R882H, NRAS G12D, NPM1   Pt consented to [facility]. Patient was offered sperm banking, but declined.  On 8/6/21,patient started induction with Dauno/Cytararbine . Day 14 BMbx on 8/19 was hypocellular with chemotherapeutic effect; however, per hematopathology, appears to be earlier regeneration than would typically seen which is concerning for persistent disease at this point, and the earliest cells are CD34 positive and his myeloblasts on initial presentation were CD34 negative. This may have represented early regeneration of his marrow.    Patients induction course complicated by a course of Zosyn for presumed RUL PNA, then transitioned to levaquin, posaconazole and Acyclovir for ppx for neutropenia. Course also complicated by  neutropenic fevers, with no identified source. He was on Cefepime but developed a rash, changed to meropenem. Rash improved. A BM bx on 8/19 showed CR. Pt received cycle 1 consolidation on 9/17, and  cycle 2 consolidation on 10/25, Cycle 3 on 11/26.   Upon admission, pt has no complaints except intermittent join pain required taking Oxycodone PRN

## 2022-01-05 NOTE — DISCHARGE NOTE PROVIDER - CARE PROVIDER_API CALL
Chelsea Yancey)  HematologyOncology; Internal Medicine; Medical Oncology  [address] [place] [address]  Phone: [phone]  Fax: [phone]  Follow Up Time:     Rosy PedersenPA)  Physician Assistant Services  [address] [place] [id number]  Phone: [phone]  Fax: [phone]  Follow Up Time:

## 2022-01-05 NOTE — H&P ADULT - PROBLEM SELECTOR PLAN 3
unknown etiology, likely from chemotherapies and long term  meds use  Monitor LFTS daily   Avoid hepatotoxic medications

## 2022-01-05 NOTE — DISCHARGE NOTE PROVIDER - NSDCMRMEDTOKEN_GEN_ALL_CORE_FT
fluconazole 200 mg oral tablet: 1 tab(s) orally once a day,     Note:Pt takes on and off still has some from oct. [facility] filled 1 month supply in oct.  Levaquin 500 mg oral tablet: 1 tab(s) orally every 24 hours    Note:Pt takes on and off still has some from oct. [facility] filled 1 month supply in oct.  Norvasc 5 mg oral tablet: 1 tab(s) orally once a day  oxyCODONE 5 mg oral tablet: 1 tab(s) orally every 6 hours, As needed, Moderate Pain (4 - 6)  Zofran 8 mg oral tablet: 1 tab(s) orally every 8 hours, As Needed nausea   fluconazole 200 mg oral tablet: 1 tab(s) orally once a day,   Check with outpatient provider when to stop it  Levaquin 500 mg oral tablet: 1 tab(s) orally every 24 hours  Check with outpatient provider when to stop it  Norvasc 5 mg oral tablet: 1 tab(s) orally once a day  oxyCODONE 5 mg oral tablet: 1 tab(s) orally every 6 hours, As needed, Moderate Pain (4 - 6)  prednisoLONE acetate 1% ophthalmic suspension: 2 drop(s) to each affected eye every 6 hours, stop after 2 days  Zofran 8 mg oral tablet: 1 tab(s) orally every 8 hours, As Needed nausea   fluconazole 200 mg oral tablet: 1 tab(s) orally once a day,     Levaquin 500 mg oral tablet: 1 tab(s) orally every 24 hours  Norvasc 5 mg oral tablet: 1 tab(s) orally once a day  oxyCODONE 5 mg oral tablet: 1 tab(s) orally every 6 hours, As needed, Moderate Pain (4 - 6)  prednisoLONE acetate 1% ophthalmic suspension: 2 drop(s) to each affected eye every 6 hours, stop after 2 days  Zofran 8 mg oral tablet: 1 tab(s) orally every 8 hours, As Needed nausea

## 2022-01-05 NOTE — DISCHARGE NOTE PROVIDER - CARE PROVIDERS DIRECT ADDRESSES
[email or web address]

## 2022-01-05 NOTE — DISCHARGE NOTE PROVIDER - NSDCCPCAREPLAN_GEN_ALL_CORE_FT
PRINCIPAL DISCHARGE DIAGNOSIS  Diagnosis: AML (acute myeloid leukemia)  Assessment and Plan of Treatment: Notify MD or report to ER for fever greater or equal to 100.4, persistent nausea, vomiting, diarrhea, bleeding.         PRINCIPAL DISCHARGE DIAGNOSIS  Diagnosis: AML (acute myeloid leukemia)  Assessment and Plan of Treatment: Notify MD or report to ER for fever greater or equal to 100.4, persistent nausea, vomiting, diarrhea, bleeding.  Complete 2 days of predforte eye drops  For fulphila injection post chemotherapy as an outpatient         PRINCIPAL DISCHARGE DIAGNOSIS  Diagnosis: AML (acute myeloid leukemia)  Assessment and Plan of Treatment: Notify MD or report to ER for fever greater or equal to 100.4, persistent nausea, vomiting, diarrhea, bleeding.  Complete 2 days of predforte eye drops  For fulphila injection post chemotherapy as an outpatient        SECONDARY DISCHARGE DIAGNOSES  Diagnosis: Hypertension  Assessment and Plan of Treatment: Continue taking medication as prescribed.

## 2022-01-06 LAB
ALBUMIN SERPL ELPH-MCNC: 3.9 G/DL — SIGNIFICANT CHANGE UP (ref 3.3–5)
ALP SERPL-CCNC: 87 U/L — SIGNIFICANT CHANGE UP (ref 40–120)
ALT FLD-CCNC: 215 U/L — HIGH (ref 10–45)
ANION GAP SERPL CALC-SCNC: 13 MMOL/L — SIGNIFICANT CHANGE UP (ref 5–17)
AST SERPL-CCNC: 58 U/L — HIGH (ref 10–40)
BASOPHILS # BLD AUTO: 0.01 K/UL — SIGNIFICANT CHANGE UP (ref 0–0.2)
BASOPHILS NFR BLD AUTO: 0.3 % — SIGNIFICANT CHANGE UP (ref 0–2)
BILIRUB SERPL-MCNC: 0.5 MG/DL — SIGNIFICANT CHANGE UP (ref 0.2–1.2)
BUN SERPL-MCNC: 14 MG/DL — SIGNIFICANT CHANGE UP (ref 7–23)
CALCIUM SERPL-MCNC: 9.1 MG/DL — SIGNIFICANT CHANGE UP (ref 8.4–10.5)
CHLORIDE SERPL-SCNC: 105 MMOL/L — SIGNIFICANT CHANGE UP (ref 96–108)
CO2 SERPL-SCNC: 22 MMOL/L — SIGNIFICANT CHANGE UP (ref 22–31)
CREAT SERPL-MCNC: 1 MG/DL — SIGNIFICANT CHANGE UP (ref 0.5–1.3)
EOSINOPHIL # BLD AUTO: 0 K/UL — SIGNIFICANT CHANGE UP (ref 0–0.5)
EOSINOPHIL NFR BLD AUTO: 0 % — SIGNIFICANT CHANGE UP (ref 0–6)
GLUCOSE SERPL-MCNC: 85 MG/DL — SIGNIFICANT CHANGE UP (ref 70–99)
HCT VFR BLD CALC: 26.6 % — LOW (ref 39–50)
HGB BLD-MCNC: 8.9 G/DL — LOW (ref 13–17)
IMM GRANULOCYTES NFR BLD AUTO: 0.5 % — SIGNIFICANT CHANGE UP (ref 0–1.5)
LYMPHOCYTES # BLD AUTO: 0.16 K/UL — LOW (ref 1–3.3)
LYMPHOCYTES # BLD AUTO: 4.4 % — LOW (ref 13–44)
MAGNESIUM SERPL-MCNC: 2.2 MG/DL — SIGNIFICANT CHANGE UP (ref 1.6–2.6)
MCHC RBC-ENTMCNC: 32 PG — SIGNIFICANT CHANGE UP (ref 27–34)
MCHC RBC-ENTMCNC: 33.5 GM/DL — SIGNIFICANT CHANGE UP (ref 32–36)
MCV RBC AUTO: 95.7 FL — SIGNIFICANT CHANGE UP (ref 80–100)
MONOCYTES # BLD AUTO: 0.41 K/UL — SIGNIFICANT CHANGE UP (ref 0–0.9)
MONOCYTES NFR BLD AUTO: 11.2 % — SIGNIFICANT CHANGE UP (ref 2–14)
NEUTROPHILS # BLD AUTO: 3.07 K/UL — SIGNIFICANT CHANGE UP (ref 1.8–7.4)
NEUTROPHILS NFR BLD AUTO: 83.6 % — HIGH (ref 43–77)
NRBC # BLD: 0 /100 WBCS — SIGNIFICANT CHANGE UP (ref 0–0)
PHOSPHATE SERPL-MCNC: 5.1 MG/DL — HIGH (ref 2.5–4.5)
PLATELET # BLD AUTO: 114 K/UL — LOW (ref 150–400)
POTASSIUM SERPL-MCNC: 4.1 MMOL/L — SIGNIFICANT CHANGE UP (ref 3.5–5.3)
POTASSIUM SERPL-SCNC: 4.1 MMOL/L — SIGNIFICANT CHANGE UP (ref 3.5–5.3)
PROT SERPL-MCNC: 6.3 G/DL — SIGNIFICANT CHANGE UP (ref 6–8.3)
RBC # BLD: 2.78 M/UL — LOW (ref 4.2–5.8)
RBC # FLD: 22 % — HIGH (ref 10.3–14.5)
SODIUM SERPL-SCNC: 140 MMOL/L — SIGNIFICANT CHANGE UP (ref 135–145)
WBC # BLD: 3.67 K/UL — LOW (ref 3.8–10.5)
WBC # FLD AUTO: 3.67 K/UL — LOW (ref 3.8–10.5)

## 2022-01-06 PROCEDURE — 99232 SBSQ HOSP IP/OBS MODERATE 35: CPT

## 2022-01-06 PROCEDURE — 71045 X-RAY EXAM CHEST 1 VIEW: CPT | Mod: 26

## 2022-01-06 RX ORDER — DEXAMETHASONE 0.5 MG/5ML
8 ELIXIR ORAL
Refills: 0 | Status: COMPLETED | OUTPATIENT
Start: 2022-01-06 | End: 2022-01-10

## 2022-01-06 RX ADMIN — Medication 650 MILLIGRAM(S): at 20:45

## 2022-01-06 RX ADMIN — OXYCODONE HYDROCHLORIDE 5 MILLIGRAM(S): 5 TABLET ORAL at 08:55

## 2022-01-06 RX ADMIN — CHLORHEXIDINE GLUCONATE 1 APPLICATION(S): 213 SOLUTION TOPICAL at 12:01

## 2022-01-06 RX ADMIN — OXYCODONE HYDROCHLORIDE 5 MILLIGRAM(S): 5 TABLET ORAL at 20:55

## 2022-01-06 RX ADMIN — Medication 650 MILLIGRAM(S): at 20:15

## 2022-01-06 RX ADMIN — Medication 2 DROP(S): at 05:22

## 2022-01-06 RX ADMIN — SODIUM CHLORIDE 75 MILLILITER(S): 9 INJECTION INTRAMUSCULAR; INTRAVENOUS; SUBCUTANEOUS at 05:22

## 2022-01-06 RX ADMIN — OXYCODONE HYDROCHLORIDE 5 MILLIGRAM(S): 5 TABLET ORAL at 14:50

## 2022-01-06 RX ADMIN — Medication 187.67 MILLIGRAM(S): at 05:09

## 2022-01-06 RX ADMIN — SODIUM CHLORIDE 75 MILLILITER(S): 9 INJECTION INTRAMUSCULAR; INTRAVENOUS; SUBCUTANEOUS at 22:27

## 2022-01-06 RX ADMIN — Medication 2 DROP(S): at 17:27

## 2022-01-06 RX ADMIN — ONDANSETRON 8 MILLIGRAM(S): 8 TABLET, FILM COATED ORAL at 22:27

## 2022-01-06 RX ADMIN — AMLODIPINE BESYLATE 5 MILLIGRAM(S): 2.5 TABLET ORAL at 05:22

## 2022-01-06 RX ADMIN — Medication 2 DROP(S): at 12:00

## 2022-01-06 RX ADMIN — ONDANSETRON 8 MILLIGRAM(S): 8 TABLET, FILM COATED ORAL at 14:10

## 2022-01-06 RX ADMIN — OXYCODONE HYDROCHLORIDE 5 MILLIGRAM(S): 5 TABLET ORAL at 09:51

## 2022-01-06 RX ADMIN — OXYCODONE HYDROCHLORIDE 5 MILLIGRAM(S): 5 TABLET ORAL at 20:25

## 2022-01-06 RX ADMIN — ONDANSETRON 8 MILLIGRAM(S): 8 TABLET, FILM COATED ORAL at 05:08

## 2022-01-06 RX ADMIN — Medication 650 MILLIGRAM(S): at 14:05

## 2022-01-06 NOTE — PROGRESS NOTE ADULT - ASSESSMENT
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 Now admitted for cycle 4 HIDAC.

## 2022-01-06 NOTE — CHART NOTE - NSCHARTNOTEFT_GEN_A_CORE
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 Now admitted for cycle 4 HIDAC.      Event Summary:   Notified by RN patient with headache.    Patient seen and examined patient at bedside.  Pt is A&O x4; reports right-sided frontal headache without radiation, headache has been consistent all day, characterized as dull, intermittent, alleviated by Tylenol/Oxycodone, denying aggravating factors, rated as a 10/10 presently. Pt denies this being the "worst headache of his life", endorsing that this headache is improved in comparison to the ones earlier today 1/6/2022. Pt with no other acute complaints; denying active visual changes, dizziness, loss of consciousness, nausea, vomiting, chest pain, palpitations, shortness of breath, cough, abdominal pain, diarrhea, or generalized weakness.    >VITAL SIGNS:  T(C): 38.5 (01-06-22 @ 20:23), Max: 38.5 (01-06-22 @ 20:23)  T(F): 101.3 (01-06-22 @ 20:23), Max: 101.3 (01-06-22 @ 20:23)  HR: 135 (01-06-22 @ 20:23) (81 - 135)  BP: 122/75 (01-06-22 @ 20:23) (105/61 - 142/85)  RR: 18 (01-06-22 @ 20:23) (16 - 18)  SpO2: 96% (01-06-22 @ 20:23) (96% - 100%)      >Review Of Systems:   CONSTITUTIONAL:   EYES: No visual changes.    ENT:  No throat pain.  No neck stiffness  RESPIRATORY: No cough, wheezing, hemoptysis; No shortness of breath  CARDIOVASCULAR: No chest pain or palpitations  GASTROINTESTINAL: No abdominal or epigastric pain.  No diarrhea.    GENITOURINARY: No dysuria, frequency or hematuria  NEUROLOGICAL: No numbness or weakness  SKIN: No itching, burning, rashes, or lesions       >PHYSICAL EXAM:  Constitutional: AOx3. Pt complaining of Right-sided headache,  Neuro: Intact Sensation to touch bilateral upper and lower extremities, Negative pronator drift bilateral upper and lower extremities. No facial droop, No gaze palsy, EOMI intact. Muscle Strength 5/5 bilateral upper and lower extremities. No nystagmus bilaterally. Gait and Romberg Test deferred.  Mouth: WNL  Cardiovascular: +S1, S2. Tachycardia  No murmurs. No LE edema.  Respiratory:  Even, unlabored.  Clear lungs to auscultation bilaterally. No wheezing, rhonchi, or crackles.  Gastrointestinal: +BS X4 active. Soft. Nontender. Nondistended   Extremities/Vascular: Warm to Touch, +2 pulses bilaterally.   Skin: Warm to touch     >MEDICATIONS:    MEDICATIONS  (STANDING):  amLODIPine   Tablet 5 milliGRAM(s) Oral daily  chlorhexidine 2% Cloths 1 Application(s) Topical <User Schedule>  dexAMETHasone  IVPB 8 milliGRAM(s) IV Intermittent <User Schedule>  enoxaparin Injectable 40 milliGRAM(s) SubCutaneous at bedtime  influenza   Vaccine 0.5 milliLiter(s) IntraMuscular once  ondansetron Injectable 8 milliGRAM(s) IV Push every 8 hours  prednisoLONE acetate 1% Suspension 2 Drop(s) Both EYES every 6 hours  sodium chloride 0.9%. 1000 milliLiter(s) (75 mL/Hr) IV Continuous <Continuous>      >LABORATORY:                          8.9    3.67  )-----------( 114      ( 06 Jan 2022 06:37 )             26.6       01-06    140  |  105  |  14  ----------------------------<  85  4.1   |  22  |  1.00    Ca    9.1      06 Jan 2022 06:41  Phos  5.1     01-06  Mg     2.2     01-06    TPro  6.3  /  Alb  3.9  /  TBili  0.5  /  DBili  x   /  AST  58<H>  /  ALT  215<H>  /  AlkPhos  87  01-06    >MICROBIOLOGY:     Urine Culture:   Blood Culture:    >RADIOLOGY:    CXR:     >ASSESSMENT/PLAN:   HPI:  35yo M w/ HTN fatty liver, kidney stones,  and now newly diagnosed AML, NPM1 mutated, 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  Now admitted for cycle 4 HIDAC.    Patient initially presented to the hospital on 7/30/21 with complaints of dizziness, fatigue and severe RUQ pain for 3 days. CT A/P revealed fatty liver and small R pleural effusion. WBC 12k with 17% blasts.Peripheral flow cytometry showed 13% myeloblasts. FLT3(-). BMbx was done on 8/4/21 - confirmed AML. 46,XY {20 } Foundation: mutations in DNMT3A R882H, NRAS G12D, NPM1   Pt consented to [place]. Patient was offered sperm banking, but declined.  On 8/6/21,patient started induction with Dauno/Cytararbine . Day 14 BMbx on 8/19 was hypocellular with chemotherapeutic effect; however, per hematopathology, appears to be earlier regeneration than would typically seen which is concerning for persistent disease at this point, and the earliest cells are CD34 positive and his myeloblasts on initial presentation were CD34 negative. This may have represented early regeneration of his marrow.    Patients induction course complicated by a course of Zosyn for presumed RUL PNA, then transitioned to levaquin, posaconazole and Acyclovir for ppx for neutropenia. Course also complicated by  neutropenic fevers, with no identified source. He was on Cefepime but developed a rash, changed to meropenem. Rash improved. A BM bx on 8/19 showed CR. Pt received cycle 1 consolidation on 9/17, and  cycle 2 consolidation on 10/25, Cycle 3 on 11/26.   Upon admission, pt has no complaints except intermittent join pain required taking Oxycodone PRN    (05 Jan 2022 10:22)    1) Headache with Fever 101.2 - Recurrent likely secondary to Cytarabine. (Pt not neutropenic)  - Tylenol 650mg PO x1 given, Cooling measures prn for Pyrexia.  - Oxycodone IR 5mg PO x1 given. No focal deficits, would defer CT imaging presently  - Decadron 8mg IV given prior to HIDAC administration.  - BC x2 drawn today 1/6/2022, WBC 3.67, UA ordered, pending, UCx  - CXR negative for acute pulmonary disease as of 1/5/2022.  - Repeat CXR ordered, pending.  - Observe off Abx presently  - Monitor VS and re-assess neuro exam.  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 Now admitted for cycle 4 HIDAC.      Event Summary:   Notified by RN patient with headache.    Patient seen and examined patient at bedside.  Pt is A&O x4; reports right-sided frontal headache without radiation, headache has been consistent all day, characterized as dull, intermittent, alleviated by Tylenol/Oxycodone, denying aggravating factors, rated as a 10/10 presently. Pt denies this being the "worst headache of his life", endorsing that this headache is improved in comparison to the ones earlier today 1/6/2022. Pt with no other acute complaints; denying active visual changes, dizziness, loss of consciousness, nausea, vomiting, chest pain, palpitations, shortness of breath, cough, abdominal pain, diarrhea, or generalized weakness.    >VITAL SIGNS:  T(C): 38.5 (01-06-22 @ 20:23), Max: 38.5 (01-06-22 @ 20:23)  T(F): 101.3 (01-06-22 @ 20:23), Max: 101.3 (01-06-22 @ 20:23)  HR: 135 (01-06-22 @ 20:23) (81 - 135)  BP: 122/75 (01-06-22 @ 20:23) (105/61 - 142/85)  RR: 18 (01-06-22 @ 20:23) (16 - 18)  SpO2: 96% (01-06-22 @ 20:23) (96% - 100%)      >Review Of Systems:   CONSTITUTIONAL:   EYES: No visual changes.    ENT:  No throat pain.  No neck stiffness  RESPIRATORY: No cough, wheezing, hemoptysis; No shortness of breath  CARDIOVASCULAR: No chest pain or palpitations  GASTROINTESTINAL: No abdominal or epigastric pain.  No diarrhea.    GENITOURINARY: No dysuria, frequency or hematuria  NEUROLOGICAL: No numbness or weakness  SKIN: No itching, burning, rashes, or lesions       >PHYSICAL EXAM:  Constitutional: AOx3. Pt complaining of Right-sided headache,  Neuro: Intact Sensation to touch bilateral upper and lower extremities, Negative pronator drift bilateral upper and lower extremities. No facial droop, No gaze palsy, EOMI intact. Muscle Strength 5/5 bilateral upper and lower extremities. No nystagmus bilaterally. Gait and Romberg Test deferred.  Mouth: WNL  Cardiovascular: +S1, S2. Tachycardia  No murmurs. No LE edema.  Respiratory:  Even, unlabored.  Clear lungs to auscultation bilaterally. No wheezing, rhonchi, or crackles.  Gastrointestinal: +BS X4 active. Soft. Nontender. Nondistended   Extremities/Vascular: Warm to Touch, +2 pulses bilaterally.   Skin: Warm to touch     >MEDICATIONS:    MEDICATIONS  (STANDING):  amLODIPine   Tablet 5 milliGRAM(s) Oral daily  chlorhexidine 2% Cloths 1 Application(s) Topical <User Schedule>  dexAMETHasone  IVPB 8 milliGRAM(s) IV Intermittent <User Schedule>  enoxaparin Injectable 40 milliGRAM(s) SubCutaneous at bedtime  influenza   Vaccine 0.5 milliLiter(s) IntraMuscular once  ondansetron Injectable 8 milliGRAM(s) IV Push every 8 hours  prednisoLONE acetate 1% Suspension 2 Drop(s) Both EYES every 6 hours  sodium chloride 0.9%. 1000 milliLiter(s) (75 mL/Hr) IV Continuous <Continuous>      >LABORATORY:                          8.9    3.67  )-----------( 114      ( 06 Jan 2022 06:37 )             26.6       01-06    140  |  105  |  14  ----------------------------<  85  4.1   |  22  |  1.00    Ca    9.1      06 Jan 2022 06:41  Phos  5.1     01-06  Mg     2.2     01-06    TPro  6.3  /  Alb  3.9  /  TBili  0.5  /  DBili  x   /  AST  58<H>  /  ALT  215<H>  /  AlkPhos  87  01-06    >MICROBIOLOGY:     Urine Culture:   Blood Culture:    >RADIOLOGY:    CXR:     >ASSESSMENT/PLAN:   HPI:  37yo M w/ HTN fatty liver, kidney stones,  and now newly diagnosed AML, NPM1 mutated, 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  Now admitted for cycle 4 HIDAC.    Patient initially presented to the hospital on 7/30/21 with complaints of dizziness, fatigue and severe RUQ pain for 3 days. CT A/P revealed fatty liver and small R pleural effusion. WBC 12k with 17% blasts.Peripheral flow cytometry showed 13% myeloblasts. FLT3(-). BMbx was done on 8/4/21 - confirmed AML. 46,XY {20 } Foundation: mutations in DNMT3A R882H, NRAS G12D, NPM1   Pt consented to [place]. Patient was offered sperm banking, but declined.  On 8/6/21,patient started induction with Dauno/Cytararbine . Day 14 BMbx on 8/19 was hypocellular with chemotherapeutic effect; however, per hematopathology, appears to be earlier regeneration than would typically seen which is concerning for persistent disease at this point, and the earliest cells are CD34 positive and his myeloblasts on initial presentation were CD34 negative. This may have represented early regeneration of his marrow.    Patients induction course complicated by a course of Zosyn for presumed RUL PNA, then transitioned to levaquin, posaconazole and Acyclovir for ppx for neutropenia. Course also complicated by  neutropenic fevers, with no identified source. He was on Cefepime but developed a rash, changed to meropenem. Rash improved. A BM bx on 8/19 showed CR. Pt received cycle 1 consolidation on 9/17, and  cycle 2 consolidation on 10/25, Cycle 3 on 11/26.   Upon admission, pt has no complaints except intermittent join pain required taking Oxycodone PRN    (05 Jan 2022 10:22)    1) Headache with Fever 101.2 - Recurrent likely secondary to Cytarabine. (Pt not neutropenic)  - Tylenol 650mg PO x1 given, Cooling measures prn for Pyrexia. repeat temperature 98.4F  - Oxycodone IR 5mg PO x1 given. No focal deficits, would defer CT imaging presently  - Decadron 8mg IV given prior to HIDAC administration.  - BC x2 drawn today 1/6/2022, WBC 3.67, UA ordered, pending, UCx  - CXR negative for acute pulmonary disease as of 1/5/2022.  - Repeat CXR ordered, pending.  - Observe off Abx presently  - Monitor VS and re-assess neuro exam.  - F/U Primary  care team in AM     5:01 addendum; notified by RN pt febrile to 102.9F. 1g IV Tylenol given. Pt reports feeling "hot" but without any other complaints; denying active visual changes, dizziness, loss of consciousness, nausea, vomiting, chest pain, palpitations, shortness of breath, cough, abdominal pain, diarrhea, or generalized weakness.  Will re-assess VS s/p anti-pyretics and monitor closely.    Mathew Mckeon PA-C  Department of Medicine  [facility]

## 2022-01-06 NOTE — PROGRESS NOTE ADULT - SUBJECTIVE AND OBJECTIVE BOX
Diagnosis:    Protocol/Chemo Regimen:    Day:     Pt endorsed:    Review of Systems:     Pain scale:     Diet:     Allergies    No Known Allergies    Intolerances    cefepime (Rash)      ANTIMICROBIALS      HEME/ONC MEDICATIONS  enoxaparin Injectable 40 milliGRAM(s) SubCutaneous at bedtime      STANDING MEDICATIONS  amLODIPine   Tablet 5 milliGRAM(s) Oral daily  chlorhexidine 2% Cloths 1 Application(s) Topical <User Schedule>  influenza   Vaccine 0.5 milliLiter(s) IntraMuscular once  ondansetron Injectable 8 milliGRAM(s) IV Push every 8 hours  prednisoLONE acetate 1% Suspension 2 Drop(s) Both EYES every 6 hours  sodium chloride 0.9%. 1000 milliLiter(s) IV Continuous <Continuous>      PRN MEDICATIONS  acetaminophen     Tablet .. 650 milliGRAM(s) Oral every 6 hours PRN  metoclopramide Injectable 10 milliGRAM(s) IV Push every 6 hours PRN  oxyCODONE    IR 5 milliGRAM(s) Oral every 4 hours PRN  sodium chloride 0.9% lock flush 10 milliLiter(s) IV Push every 1 hour PRN        Vital Signs Last 24 Hrs  T(C): 36.5 (06 Jan 2022 05:00), Max: 36.9 (05 Jan 2022 17:00)  T(F): 97.7 (06 Jan 2022 05:00), Max: 98.4 (05 Jan 2022 17:00)  HR: 81 (06 Jan 2022 05:00) (81 - 90)  BP: 106/70 (06 Jan 2022 05:00) (106/70 - 122/72)  BP(mean): --  RR: 17 (06 Jan 2022 05:00) (16 - 18)  SpO2: 97% (06 Jan 2022 05:00) (97% - 100%)    PHYSICAL EXAM  General: NAD  HEENT: clear oropharynx, anicteric sclera, pink conjunctiva  Neck: supple  CV: (+) S1/S2 RRR  Lungs: positive air movement b/l ant lungs, clear to auscultation, no wheezes, no rales  Abdomen: soft, non-tender, non-distended  Ext: no clubbing cyanosis or edema  Skin: no rashes and no petechiae  Neuro: alert and oriented X 3, no focal deficits  Central Line:     RECENT CULTURES:        LABS:                        8.9    3.67  )-----------( 114      ( 06 Jan 2022 06:37 )             26.6         Mean Cell Volume : 95.7 fl  Mean Cell Hemoglobin : 32.0 pg  Mean Cell Hemoglobin Concentration : 33.5 gm/dL  Auto Neutrophil # : 3.07 K/uL  Auto Lymphocyte # : 0.16 K/uL  Auto Monocyte # : 0.41 K/uL  Auto Eosinophil # : 0.00 K/uL  Auto Basophil # : 0.01 K/uL  Auto Neutrophil % : 83.6 %  Auto Lymphocyte % : 4.4 %  Auto Monocyte % : 11.2 %  Auto Eosinophil % : 0.0 %  Auto Basophil % : 0.3 %      01-06    140  |  105  |  14  ----------------------------<  85  4.1   |  22  |  1.00    Ca    9.1      06 Jan 2022 06:41  Phos  5.1     01-06  Mg     2.2     01-06    TPro  6.3  /  Alb  3.9  /  TBili  0.5  /  DBili  x   /  AST  58<H>  /  ALT  215<H>  /  AlkPhos  87  01-06      Mg 2.2  Phos 5.1  Mg 2.2  Phos --      PT/INR - ( 05 Jan 2022 11:10 )   PT: 12.1 sec;   INR: 1.01 ratio         PTT - ( 05 Jan 2022 11:10 )  PTT:24.7 sec      Uric Acid 8.1        RADIOLOGY & ADDITIONAL STUDIES:         Diagnosis: AML    Protocol/Chemo Regimen: cycle 4  HIDAC    Day: 2    Pt endorsed: No complaints.     Review of Systems: Denies N/V/D CP, SOB, ha, weakness.     Pain scale: 0    Diet: regular    Allergies    No Known Allergies    Intolerances    cefepime (Rash)      ANTIMICROBIALS      HEME/ONC MEDICATIONS  enoxaparin Injectable 40 milliGRAM(s) SubCutaneous at bedtime      STANDING MEDICATIONS  amLODIPine   Tablet 5 milliGRAM(s) Oral daily  chlorhexidine 2% Cloths 1 Application(s) Topical <User Schedule>  influenza   Vaccine 0.5 milliLiter(s) IntraMuscular once  ondansetron Injectable 8 milliGRAM(s) IV Push every 8 hours  prednisoLONE acetate 1% Suspension 2 Drop(s) Both EYES every 6 hours  sodium chloride 0.9%. 1000 milliLiter(s) IV Continuous <Continuous>      PRN MEDICATIONS  acetaminophen     Tablet .. 650 milliGRAM(s) Oral every 6 hours PRN  metoclopramide Injectable 10 milliGRAM(s) IV Push every 6 hours PRN  oxyCODONE    IR 5 milliGRAM(s) Oral every 4 hours PRN  sodium chloride 0.9% lock flush 10 milliLiter(s) IV Push every 1 hour PRN        Vital Signs Last 24 Hrs  T(C): 36.5 (06 Jan 2022 05:00), Max: 36.9 (05 Jan 2022 17:00)  T(F): 97.7 (06 Jan 2022 05:00), Max: 98.4 (05 Jan 2022 17:00)  HR: 81 (06 Jan 2022 05:00) (81 - 90)  BP: 106/70 (06 Jan 2022 05:00) (106/70 - 122/72)  BP(mean): --  RR: 17 (06 Jan 2022 05:00) (16 - 18)  SpO2: 97% (06 Jan 2022 05:00) (97% - 100%)    PHYSICAL EXAM  General: NAD  HEENT: clear oropharynx,   CV: (+) S1/S2 RRR  Lungs: positive air movement b/l ant lungs, clear to auscultation, no wheezes, no rales  Abdomen: soft, non-tender, non-distended  Ext: no edema  Skin: no rashes and no petechiae  Neuro: alert and oriented X 3, no focal deficits  Central Line: PIV      LABS:                        8.9    3.67  )-----------( 114      ( 06 Jan 2022 06:37 )             26.6         Mean Cell Volume : 95.7 fl  Mean Cell Hemoglobin : 32.0 pg  Mean Cell Hemoglobin Concentration : 33.5 gm/dL  Auto Neutrophil # : 3.07 K/uL  Auto Lymphocyte # : 0.16 K/uL  Auto Monocyte # : 0.41 K/uL  Auto Eosinophil # : 0.00 K/uL  Auto Basophil # : 0.01 K/uL  Auto Neutrophil % : 83.6 %  Auto Lymphocyte % : 4.4 %  Auto Monocyte % : 11.2 %  Auto Eosinophil % : 0.0 %  Auto Basophil % : 0.3 %      01-06    140  |  105  |  14  ----------------------------<  85  4.1   |  22  |  1.00    Ca    9.1      06 Jan 2022 06:41  Phos  5.1     01-06  Mg     2.2     01-06    TPro  6.3  /  Alb  3.9  /  TBili  0.5  /  DBili  x   /  AST  58<H>  /  ALT  215<H>  /  AlkPhos  87  01-06    PT/INR - ( 05 Jan 2022 11:10 )   PT: 12.1 sec;   INR: 1.01 ratio         PTT - ( 05 Jan 2022 11:10 )  PTT:24.7 sec      Uric Acid 8.1

## 2022-01-06 NOTE — PROGRESS NOTE ADULT - ATTENDING COMMENTS
Pt care and plan discussed and reviewed with PA. Plan as outlined above edited by me to reflect our discussion. Thirty five minutes spent on encounter, of which more than fifty percent of the encounter was spent on counseling and/or coordinating care by the attending physician. OMT on six regions for acute somatic dysfunctions done at the bedside. Advanced care planning/advanced directives discussed with patient/family. DNR status including forceful chest compressions to attempt to restart the heart, ventilator support/artificial breathing, electric shock, artificial nutrition, health care proxy, Molst form all discussed with pt. Pt wishes to consider.

## 2022-01-06 NOTE — PROGRESS NOTE ADULT - ATTENDING COMMENTS
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 Now admitted for cycle 4 HIDAC.  1/6/22: C4D2 doing well, no complaints.

## 2022-01-06 NOTE — PROGRESS NOTE ADULT - PROBLEM SELECTOR PLAN 3
unknown etiology, likely from chemotherapies and long term  meds use  Monitor LFTS daily   Avoid hepatotoxic medications unknown etiology, likely from chemotherapies and long term meds use  Monitor LFTS daily   Avoid hepatotoxic medications

## 2022-01-06 NOTE — PROGRESS NOTE ADULT - ASSESSMENT
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  Now admitted for cycle 4 HIDAC.       #AML  --Patient admitted for 4th cycle of consolidation   --F/U Heme/Onc recommendations  --serial CBC   --monitor for fever       #Elevated LFTs  --AST of 74--> 58 01/06; ALT of 264--> 215 01/06 (downtrending)  --Can be due to medication side effect-- Fluconazole or Cytarabine with known hepatic impairment- Defer chemo medications per Onc  --Avoid hepatotoxic medications   --Continue to trend on daily labs  --recommend to check abdominal US for further evaluation -- results as aove  --Recommed to F/U Outpatient for repeat abdominal US in 3-6 for monitoring       #HTN  --Continue with home dose of Amlodipine  --Monitor BP and Vital signs closely     #Anemia  --Monitor H/H Closely  --Transfusion per Heme/Onc recommendations      DVT PPX with Lovenox 40, continue to monitor Cr and Platelet count     #Splenomegaly  --Outpatient F/U in 3-6 months for monitoring with repeat abd US

## 2022-01-06 NOTE — PROGRESS NOTE ADULT - PROBLEM SELECTOR PLAN 1
Admitted for Cycle 4 HiDAC  Cytarabine 3 g/m2 IV over 3 hrs every 12 hrs on days 1,3,5   IV hydration, Antiemetics, daily weight   Monitor for Cerebellar toxicity, nystagmus checks  Follow CBCwith diff/lytes, transfuse/replete prn  Continue predforte eye drops to prevent chemical conjunctivitis  CXR confirmed PICC placement   grade 1 AST, grade 3 ALT transaminitis, continue same HIDAC dose per iesha Marks to assess Admitted for Cycle 4 HiDAC  Cytarabine 3 g/m2 IV over 3 hrs every 12 hrs on days 1,3,5   IV hydration, Antiemetics, daily weight   Monitor for Cerebellar toxicity, nystagmus checks  Follow CBCwith diff/lytes, transfuse/replete prn  Continue predforte eye drops to prevent chemical conjunctivitis  CXR confirmed PICC placement   grade 1 AST, grade 3 ALT transaminitis, continue same HIDAC dose per iesha Marks (-) Admitted for Cycle 4 HiDAC  Cytarabine 3 g/m2 IV over 3 hrs every 12 hrs on days 1,3,5   IV hydration, Antiemetics, daily weight   Monitor for Cerebellar toxicity, nystagmus checks  Follow CBCwith diff/lytes, transfuse/replete prn  Continue predforte eye drops to prevent chemical conjunctivitis  CXR confirmed PICC placement   grade 1 AST, grade 3 ALT transaminitis, continue same HIDAC dose per iesha Marks (-) Decadron 8mg prior to burak-c given propensity to spike fevers.

## 2022-01-06 NOTE — PROGRESS NOTE ADULT - SUBJECTIVE AND OBJECTIVE BOX
Name of Patient : JAK DANIELS  MRN: [id number]  Date of visit: 01-06-22 @ 16:47      Subjective: Patient seen and examined. No new events except as noted.   Patient seen early this AM. Going for 4th cycle of consolidation.     REVIEW OF SYSTEMS:    CONSTITUTIONAL: No weakness, fevers or chills  EYES/ENT: No visual changes;  No vertigo or throat pain   NECK: No pain or stiffness  RESPIRATORY: No cough, wheezing, hemoptysis; No shortness of breath  CARDIOVASCULAR: No chest pain or palpitations  GASTROINTESTINAL: No abdominal or epigastric pain. No nausea, vomiting, or hematemesis; No diarrhea or constipation. No melena or hematochezia.  GENITOURINARY: No dysuria, frequency or hematuria  NEUROLOGICAL: No numbness or weakness  SKIN: No itching, burning, rashes, or lesions   All other review of systems is negative unless indicated above.    MEDICATIONS:  MEDICATIONS  (STANDING):  amLODIPine   Tablet 5 milliGRAM(s) Oral daily  chlorhexidine 2% Cloths 1 Application(s) Topical <User Schedule>  dexAMETHasone  IVPB 8 milliGRAM(s) IV Intermittent <User Schedule>  enoxaparin Injectable 40 milliGRAM(s) SubCutaneous at bedtime  influenza   Vaccine 0.5 milliLiter(s) IntraMuscular once  ondansetron Injectable 8 milliGRAM(s) IV Push every 8 hours  prednisoLONE acetate 1% Suspension 2 Drop(s) Both EYES every 6 hours  sodium chloride 0.9%. 1000 milliLiter(s) (75 mL/Hr) IV Continuous <Continuous>      PHYSICAL EXAM:  T(C): 37.3 (01-06-22 @ 13:00), Max: 37.3 (01-06-22 @ 13:00)  HR: 117 (01-06-22 @ 13:00) (81 - 117)  BP: 142/85 (01-06-22 @ 13:00) (106/70 - 142/85)  RR: 18 (01-06-22 @ 13:00) (16 - 18)  SpO2: 100% (01-06-22 @ 13:00) (97% - 100%)  Wt(kg): --  I&O's Summary    05 Jan 2022 07:01  -  06 Jan 2022 07:00  --------------------------------------------------------  IN: 2172 mL / OUT: 500 mL / NET: 1672 mL    06 Jan 2022 07:01  -  06 Jan 2022 16:47  --------------------------------------------------------  IN: 571 mL / OUT: 0 mL / NET: 571 mL          Appearance: Normal	  HEENT:  PERRLA   Lymphatic: No lymphadenopathy   Cardiovascular: Normal S1 S2, no JVD  Respiratory: normal effort , clear  Gastrointestinal:  Soft, Non-tender  Skin: No rashes,  warm to touch  Psychiatry:  Mood & affect appropriate  Musculuskeletal: No edema; RUE PICC line      All labs, Imaging and EKGs personally reviewed                               8.9    3.67  )-----------( 114      ( 06 Jan 2022 06:37 )             26.6               01-06    140  |  105  |  14  ----------------------------<  85  4.1   |  22  |  1.00    Ca    9.1      06 Jan 2022 06:41  Phos  5.1     01-06  Mg     2.2     01-06    TPro  6.3  /  Alb  3.9  /  TBili  0.5  /  DBili  x   /  AST  58<H>  /  ALT  215<H>  /  AlkPhos  87  01-06    PT/INR - ( 05 Jan 2022 11:10 )   PT: 12.1 sec;   INR: 1.01 ratio         PTT - ( 05 Jan 2022 11:10 )  PTT:24.7 sec                             01-05-22 @ 07:01  -  01-06-22 @ 07:00  --------------------------------------------------------  IN: 2172 mL / OUT: 500 mL / NET: 1672 mL    01-06-22 @ 07:01  -  01-06-22 @ 16:47  --------------------------------------------------------  IN: 571 mL / OUT: 0 mL / NET: 571 mL        < from: US Abdomen Complete (US Abdomen Complete .) (01.05.22 @ 15:23) >  IMPRESSION:    Borderline/mild hepatomegaly with hepatic steatosis.    Splenomegaly.    Focal area of left upper pole increased renal cortical echogenicity,   corresponding to an area of hypoattenuation seen on prior CT chest,   abdomen and pelvis dated 10/9/2021. This is nonspecific and may reflect   focal edema?    --- End of Report ---        < end of copied text >

## 2022-01-07 LAB
ALBUMIN SERPL ELPH-MCNC: 4.2 G/DL — SIGNIFICANT CHANGE UP (ref 3.3–5)
ALP SERPL-CCNC: 85 U/L — SIGNIFICANT CHANGE UP (ref 40–120)
ALT FLD-CCNC: 152 U/L — HIGH (ref 10–45)
ANION GAP SERPL CALC-SCNC: 13 MMOL/L — SIGNIFICANT CHANGE UP (ref 5–17)
AST SERPL-CCNC: 32 U/L — SIGNIFICANT CHANGE UP (ref 10–40)
BASOPHILS # BLD AUTO: 0 K/UL — SIGNIFICANT CHANGE UP (ref 0–0.2)
BASOPHILS NFR BLD AUTO: 0 % — SIGNIFICANT CHANGE UP (ref 0–2)
BILIRUB SERPL-MCNC: 1 MG/DL — SIGNIFICANT CHANGE UP (ref 0.2–1.2)
BUN SERPL-MCNC: 12 MG/DL — SIGNIFICANT CHANGE UP (ref 7–23)
CALCIUM SERPL-MCNC: 9.4 MG/DL — SIGNIFICANT CHANGE UP (ref 8.4–10.5)
CHLORIDE SERPL-SCNC: 98 MMOL/L — SIGNIFICANT CHANGE UP (ref 96–108)
CO2 SERPL-SCNC: 22 MMOL/L — SIGNIFICANT CHANGE UP (ref 22–31)
CREAT SERPL-MCNC: 1.11 MG/DL — SIGNIFICANT CHANGE UP (ref 0.5–1.3)
CULTURE RESULTS: NO GROWTH — SIGNIFICANT CHANGE UP
EOSINOPHIL # BLD AUTO: 0 K/UL — SIGNIFICANT CHANGE UP (ref 0–0.5)
EOSINOPHIL NFR BLD AUTO: 0 % — SIGNIFICANT CHANGE UP (ref 0–6)
GLUCOSE SERPL-MCNC: 95 MG/DL — SIGNIFICANT CHANGE UP (ref 70–99)
HCT VFR BLD CALC: 25.3 % — LOW (ref 39–50)
HGB BLD-MCNC: 9 G/DL — LOW (ref 13–17)
IMM GRANULOCYTES NFR BLD AUTO: 0.2 % — SIGNIFICANT CHANGE UP (ref 0–1.5)
LYMPHOCYTES # BLD AUTO: 0.14 K/UL — LOW (ref 1–3.3)
LYMPHOCYTES # BLD AUTO: 3.4 % — LOW (ref 13–44)
MAGNESIUM SERPL-MCNC: 2 MG/DL — SIGNIFICANT CHANGE UP (ref 1.6–2.6)
MCHC RBC-ENTMCNC: 32.7 PG — SIGNIFICANT CHANGE UP (ref 27–34)
MCHC RBC-ENTMCNC: 35.6 GM/DL — SIGNIFICANT CHANGE UP (ref 32–36)
MCV RBC AUTO: 92 FL — SIGNIFICANT CHANGE UP (ref 80–100)
MONOCYTES # BLD AUTO: 0.41 K/UL — SIGNIFICANT CHANGE UP (ref 0–0.9)
MONOCYTES NFR BLD AUTO: 10.1 % — SIGNIFICANT CHANGE UP (ref 2–14)
NEUTROPHILS # BLD AUTO: 3.51 K/UL — SIGNIFICANT CHANGE UP (ref 1.8–7.4)
NEUTROPHILS NFR BLD AUTO: 86.3 % — HIGH (ref 43–77)
NRBC # BLD: 0 /100 WBCS — SIGNIFICANT CHANGE UP (ref 0–0)
PHOSPHATE SERPL-MCNC: 3.2 MG/DL — SIGNIFICANT CHANGE UP (ref 2.5–4.5)
PLATELET # BLD AUTO: 133 K/UL — LOW (ref 150–400)
POTASSIUM SERPL-MCNC: 3.6 MMOL/L — SIGNIFICANT CHANGE UP (ref 3.5–5.3)
POTASSIUM SERPL-SCNC: 3.6 MMOL/L — SIGNIFICANT CHANGE UP (ref 3.5–5.3)
PROT SERPL-MCNC: 7 G/DL — SIGNIFICANT CHANGE UP (ref 6–8.3)
RBC # BLD: 2.75 M/UL — LOW (ref 4.2–5.8)
RBC # FLD: 20.8 % — HIGH (ref 10.3–14.5)
SODIUM SERPL-SCNC: 133 MMOL/L — LOW (ref 135–145)
SPECIMEN SOURCE: SIGNIFICANT CHANGE UP
WBC # BLD: 4.07 K/UL — SIGNIFICANT CHANGE UP (ref 3.8–10.5)
WBC # FLD AUTO: 4.07 K/UL — SIGNIFICANT CHANGE UP (ref 3.8–10.5)

## 2022-01-07 PROCEDURE — 99232 SBSQ HOSP IP/OBS MODERATE 35: CPT

## 2022-01-07 RX ORDER — ACETAMINOPHEN 500 MG
1000 TABLET ORAL ONCE
Refills: 0 | Status: COMPLETED | OUTPATIENT
Start: 2022-01-07 | End: 2022-01-07

## 2022-01-07 RX ORDER — CYTARABINE 100 MG
6300 VIAL (EA) INJECTION EVERY 12 HOURS
Refills: 0 | Status: COMPLETED | OUTPATIENT
Start: 2022-01-07 | End: 2022-01-08

## 2022-01-07 RX ADMIN — Medication 2 DROP(S): at 05:32

## 2022-01-07 RX ADMIN — Medication 400 MILLIGRAM(S): at 05:32

## 2022-01-07 RX ADMIN — ONDANSETRON 8 MILLIGRAM(S): 8 TABLET, FILM COATED ORAL at 21:43

## 2022-01-07 RX ADMIN — Medication 2 DROP(S): at 00:41

## 2022-01-07 RX ADMIN — SODIUM CHLORIDE 75 MILLILITER(S): 9 INJECTION INTRAMUSCULAR; INTRAVENOUS; SUBCUTANEOUS at 05:32

## 2022-01-07 RX ADMIN — CHLORHEXIDINE GLUCONATE 1 APPLICATION(S): 213 SOLUTION TOPICAL at 08:39

## 2022-01-07 RX ADMIN — Medication 101.6 MILLIGRAM(S): at 16:09

## 2022-01-07 RX ADMIN — ONDANSETRON 8 MILLIGRAM(S): 8 TABLET, FILM COATED ORAL at 05:31

## 2022-01-07 RX ADMIN — Medication 187.67 MILLIGRAM(S): at 16:58

## 2022-01-07 RX ADMIN — Medication 2 DROP(S): at 18:41

## 2022-01-07 RX ADMIN — AMLODIPINE BESYLATE 5 MILLIGRAM(S): 2.5 TABLET ORAL at 05:31

## 2022-01-07 RX ADMIN — Medication 2 DROP(S): at 13:33

## 2022-01-07 RX ADMIN — ONDANSETRON 8 MILLIGRAM(S): 8 TABLET, FILM COATED ORAL at 13:34

## 2022-01-07 NOTE — PROGRESS NOTE ADULT - ASSESSMENT
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  Now admitted for cycle 4 HIDAC.       #AML  --Patient admitted for 4th cycle of consolidation   --F/U Heme/Onc recommendations  --serial CBC   --monitor for fever -- Patient was febrile on 01/06 and again this morning 01/07 with a Tmax of 102.9--Tylenol was given for fever  --Antipyretics PRN; cooling measures   --BCx2 and UCx from 01/06--Currently pending' F/U results  --CXR from 01/06 is clear  --Per Heme/Onc, Decadron prior to Tx  --Continue to monitor patient and temperature curve closely     #Fever   -- Patient was febrile on 01/06 and again this morning 01/07 with a Tmax of 102.9--Tylenol was given for fever  --Antipyretics PRN; cooling measures   --BCx2 and UCx from 01/06--Currently pending; F/U results  --CXR from 01/06 is clear  --Per Heme/Onc, Decadron prior to Tx; observe off of Abx for now  --Continue to monitor patient and temperature curve closely     #Headache  --neuro exam  --Monitor patient   --Pain control       #Elevated LFTs  --AST of 74--> 58--> 32 WNL  01/07; ALT of 264--> 215 ---> 152 01/07 (downtrending)  --Can be due to medication side effect-- Fluconazole or Cytarabine with known hepatic impairment- Defer chemo medications per Onc  --Avoid hepatotoxic medications   --Continue to trend on daily labs  --recommend to check abdominal US for further evaluation -- results as above  --Recommend to F/U Outpatient for repeat abdominal US in 3-6 for monitoring       #HTN  --Continue with home dose of Amlodipine  --Monitor BP and Vital signs closely     #Anemia  --Monitor H/H Closely  --Transfusion per Heme/Onc recommendations      DVT PPX with Lovenox 40, continue to monitor Cr and Platelet count     #Splenomegaly  --Outpatient F/U in 3-6 months for monitoring with repeat abd US

## 2022-01-07 NOTE — PROGRESS NOTE ADULT - ASSESSMENT
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 Now admitted for cycle 4 HIDAC. 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 Now admitted for cycle 4 HIDAC. Hospital admission complicated by fever, blood cultures pending. 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 Now admitted for cycle 4 HIDAC. Hospital admission complicated by fever, blood cultures pending. Anemia and thrombocytopenia secondary to disease and/or treatment.

## 2022-01-07 NOTE — PROGRESS NOTE ADULT - SUBJECTIVE AND OBJECTIVE BOX
Name of Patient : JAK DANIELS  MRN: [id number]  Date of visit: 01-07-22 @ 16:35      Subjective: Patient seen and examined. No new events except as noted. Patient was febrile last night with a Temperature of 101.3 and experienced headache. This morning, patient was febrile again ith a Tmax of 102.9. Patient was tachycardic earlier in the day. HR now is 94 and Temperature is 98.     REVIEW OF SYSTEMS:    CONSTITUTIONAL: +Febrile; + Headache overnight   EYES/ENT: No visual changes;  No vertigo or throat pain   NECK: No pain or stiffness  RESPIRATORY: No cough, wheezing, hemoptysis; No shortness of breath  CARDIOVASCULAR: +tachycardic   GASTROINTESTINAL: No abdominal or epigastric pain. No nausea, vomiting, or hematemesis; No diarrhea or constipation. No melena or hematochezia.  GENITOURINARY: No dysuria, frequency or hematuria  NEUROLOGICAL: No numbness or weakness  SKIN: No itching, burning, rashes, or lesions   All other review of systems is negative unless indicated above.    MEDICATIONS:  MEDICATIONS  (STANDING):  amLODIPine   Tablet 5 milliGRAM(s) Oral daily  chlorhexidine 2% Cloths 1 Application(s) Topical <User Schedule>  cytarabine IVPB (eMAR) 6300 milliGRAM(s) IV Intermittent every 12 hours  dexAMETHasone  IVPB 8 milliGRAM(s) IV Intermittent <User Schedule>  enoxaparin Injectable 40 milliGRAM(s) SubCutaneous at bedtime  influenza   Vaccine 0.5 milliLiter(s) IntraMuscular once  ondansetron Injectable 8 milliGRAM(s) IV Push every 8 hours  prednisoLONE acetate 1% Suspension 2 Drop(s) Both EYES every 6 hours  sodium chloride 0.9%. 1000 milliLiter(s) (75 mL/Hr) IV Continuous <Continuous>      PHYSICAL EXAM:  T(C): 36.7 (01-07-22 @ 13:05), Max: 39.4 (01-07-22 @ 05:00)  HR: 94 (01-07-22 @ 13:05) (94 - 135)  BP: 119/67 (01-07-22 @ 13:05) (104/64 - 122/75)  RR: 18 (01-07-22 @ 13:05) (16 - 18)  SpO2: 99% (01-07-22 @ 13:05) (96% - 99%)  Wt(kg): --  I&O's Summary    06 Jan 2022 07:01  -  07 Jan 2022 07:00  --------------------------------------------------------  IN: 2332.1 mL / OUT: 1000 mL / NET: 1332.1 mL    07 Jan 2022 07:01  -  07 Jan 2022 16:35  --------------------------------------------------------  IN: 240 mL / OUT: 0 mL / NET: 240 mL        Weight (kg): 90 (01-07 @ 10:54)    Appearance: Calm, laying in bed   HEENT:  PERRLA   Lymphatic: No lymphadenopathy   Cardiovascular: Normal S1 S2, no JVD  Respiratory: normal effort , clear  Gastrointestinal:  Soft, Non-tender  Skin: No rashes,  warm to touch  Psychiatry:  Mood & affect appropriate  Musculuskeletal: No edema          01-06-22 @ 07:01  -  01-07-22 @ 07:00  --------------------------------------------------------  IN: 2332.1 mL / OUT: 1000 mL / NET: 1332.1 mL    01-07-22 @ 07:01  -  01-07-22 @ 16:35  --------------------------------------------------------  IN: 240 mL / OUT: 0 mL / NET: 240 mL                            9.0    4.07  )-----------( 133      ( 07 Jan 2022 07:51 )             25.3               01-07    133<L>  |  98  |  12  ----------------------------<  95  3.6   |  22  |  1.11    Ca    9.4      07 Jan 2022 07:45  Phos  3.2     01-07  Mg     2.0     01-07    TPro  7.0  /  Alb  4.2  /  TBili  1.0  /  DBili  x   /  AST  32  /  ALT  152<H>  /  AlkPhos  85  01-07      < from: Xray Chest 1 View- PORTABLE-Urgent (Xray Chest 1 View- PORTABLE-Urgent .) (01.06.22 @ 21:48) >    ACC: [id number] EXAM:  XR CHEST PORTABLE URGENT 1V                          PROCEDURE DATE:  01/06/2022          INTERPRETATION:  EXAMINATION: XR CHEST URGENT    CLINICAL INDICATION: Fever    TECHNIQUE: Single frontal, portable view of the chest was obtained.    COMPARISON: Chest x-ray None.    FINDINGS:  Right upper extremity PICC terminates in the SVC.  The heart is normal in size.  The lungs are clear.  There is no pneumothorax or pleural effusion.    IMPRESSION:  Clear lungs.    --- End of Report ---    < end of copied text >

## 2022-01-07 NOTE — PROGRESS NOTE ADULT - PROBLEM SELECTOR PLAN 1
Admitted for Cycle 4 HiDAC  Cytarabine 3 g/m2 IV over 3 hrs every 12 hrs on days 1,3,5   IV hydration, Antiemetics, daily weight   Monitor for Cerebellar toxicity, nystagmus checks  Follow CBCwith diff/lytes, transfuse/replete prn  Continue predforte eye drops to prevent chemical conjunctivitis  CXR confirmed PICC placement   grade 1 AST, grade 3 ALT transaminitis, continue same HIDAC dose per iesha Marks (-) Decadron 8mg prior to burak-c given propensity to spike fevers.

## 2022-01-07 NOTE — PROGRESS NOTE ADULT - PROBLEM SELECTOR PLAN 3
unknown etiology, likely from chemotherapies and long term meds use  Monitor LFTS daily   Avoid hepatotoxic medications unknown etiology, likely from chemotherapies and long term meds use  Monitor LFTS daily   Avoid hepatotoxic medications  1/5 ABD US- mild hepatomegaly with hepatic steatosis/Splenomegaly

## 2022-01-07 NOTE — PROGRESS NOTE ADULT - SUBJECTIVE AND OBJECTIVE BOX
Diagnosis: AML    Protocol/Chemo Regimen: cycle 4  HIDAC    Day:     Overnight Events: Febrile 101.3    Pt endorsed:     Review of Systems:    Pain scale:     Diet: regular    Allergies    No Known Allergies    Intolerances    cefepime (Rash)      ANTIMICROBIALS      HEME/ONC MEDICATIONS  enoxaparin Injectable 40 milliGRAM(s) SubCutaneous at bedtime      STANDING MEDICATIONS  amLODIPine   Tablet 5 milliGRAM(s) Oral daily  chlorhexidine 2% Cloths 1 Application(s) Topical <User Schedule>  influenza   Vaccine 0.5 milliLiter(s) IntraMuscular once  ondansetron Injectable 8 milliGRAM(s) IV Push every 8 hours  prednisoLONE acetate 1% Suspension 2 Drop(s) Both EYES every 6 hours  sodium chloride 0.9%. 1000 milliLiter(s) IV Continuous <Continuous>      PRN MEDICATIONS  acetaminophen     Tablet .. 650 milliGRAM(s) Oral every 6 hours PRN  metoclopramide Injectable 10 milliGRAM(s) IV Push every 6 hours PRN  oxyCODONE    IR 5 milliGRAM(s) Oral every 4 hours PRN  sodium chloride 0.9% lock flush 10 milliLiter(s) IV Push every 1 hour PRN        Vital Signs Last 24 Hrs  T(C): 36.5 (06 Jan 2022 05:00), Max: 36.9 (05 Jan 2022 17:00)  T(F): 97.7 (06 Jan 2022 05:00), Max: 98.4 (05 Jan 2022 17:00)  HR: 81 (06 Jan 2022 05:00) (81 - 90)  BP: 106/70 (06 Jan 2022 05:00) (106/70 - 122/72)  BP(mean): --  RR: 17 (06 Jan 2022 05:00) (16 - 18)  SpO2: 97% (06 Jan 2022 05:00) (97% - 100%)    PHYSICAL EXAM  General: NAD  HEENT: clear oropharynx,   CV: (+) S1/S2 RRR  Lungs: positive air movement b/l ant lungs, clear to auscultation, no wheezes, no rales  Abdomen: soft, non-tender, non-distended  Ext: no edema  Skin: no rashes and no petechiae  Neuro: alert and oriented X 3, no focal deficits  Central Line: PIV      LABS:                        8.9    3.67  )-----------( 114      ( 06 Jan 2022 06:37 )             26.6         Mean Cell Volume : 95.7 fl  Mean Cell Hemoglobin : 32.0 pg  Mean Cell Hemoglobin Concentration : 33.5 gm/dL  Auto Neutrophil # : 3.07 K/uL  Auto Lymphocyte # : 0.16 K/uL  Auto Monocyte # : 0.41 K/uL  Auto Eosinophil # : 0.00 K/uL  Auto Basophil # : 0.01 K/uL  Auto Neutrophil % : 83.6 %  Auto Lymphocyte % : 4.4 %  Auto Monocyte % : 11.2 %  Auto Eosinophil % : 0.0 %  Auto Basophil % : 0.3 %      01-06    140  |  105  |  14  ----------------------------<  85  4.1   |  22  |  1.00    Ca    9.1      06 Jan 2022 06:41  Phos  5.1     01-06  Mg     2.2     01-06    TPro  6.3  /  Alb  3.9  /  TBili  0.5  /  DBili  x   /  AST  58<H>  /  ALT  215<H>  /  AlkPhos  87  01-06    PT/INR - ( 05 Jan 2022 11:10 )   PT: 12.1 sec;   INR: 1.01 ratio         PTT - ( 05 Jan 2022 11:10 )  PTT:24.7 sec      Uric Acid 8.1               Diagnosis: AML    Protocol/Chemo Regimen: cycle 4  HIDAC    Day: 3    Overnight Events: Febrile 101.3, panculture/CXR done.    Pt endorsed: "I feel exhausted." Decreased appetite    Review of Systems: Pt denies headaches/numbness/tingling/n/v/dizziness/sob/chest pain.    Pain scale: 0/10    Diet: regular    Allergies    No Known Allergies    Intolerances    cefepime (Rash)      ANTIMICROBIALS      HEME/ONC MEDICATIONS  enoxaparin Injectable 40 milliGRAM(s) SubCutaneous at bedtime      STANDING MEDICATIONS  amLODIPine   Tablet 5 milliGRAM(s) Oral daily  chlorhexidine 2% Cloths 1 Application(s) Topical <User Schedule>  influenza   Vaccine 0.5 milliLiter(s) IntraMuscular once  ondansetron Injectable 8 milliGRAM(s) IV Push every 8 hours  prednisoLONE acetate 1% Suspension 2 Drop(s) Both EYES every 6 hours  sodium chloride 0.9%. 1000 milliLiter(s) IV Continuous <Continuous>      PRN MEDICATIONS  acetaminophen     Tablet .. 650 milliGRAM(s) Oral every 6 hours PRN  metoclopramide Injectable 10 milliGRAM(s) IV Push every 6 hours PRN  oxyCODONE    IR 5 milliGRAM(s) Oral every 4 hours PRN  sodium chloride 0.9% lock flush 10 milliLiter(s) IV Push every 1 hour PRN        Vital Signs Last 24 Hrs  T(C): 39.4 (07 Jan 2022 05:00), Max: 39.4 (07 Jan 2022 05:00)  T(F): 102.9 (07 Jan 2022 05:00), Max: 102.9 (07 Jan 2022 05:00)  HR: 123 (07 Jan 2022 05:00) (115 - 135)  BP: 104/64 (07 Jan 2022 05:00) (104/64 - 142/85)  BP(mean): --  RR: 16 (07 Jan 2022 05:00) (16 - 18)  SpO2: 98% (07 Jan 2022 05:00) (96% - 100%)    PHYSICAL EXAM  General: NAD  HEENT: clear oropharynx,   CV: (+) S1/S2 RRR  Lungs: Breath sounds clear and equal b/l, no wheezes, no rales  Abdomen: soft, non-tender, non-distended  Ext: no edema  Skin: no rashes and no petechiae  Neuro: alert and oriented X 3, no focal deficits, no nystagmus/cerebellar toxicity.  Central Line: PICC clean dry and intact.      LABS:                        9.0    4.07  )-----------( 133      ( 07 Jan 2022 07:51 )             25.3     07 Jan 2022 07:45    133    |  98     |  12     ----------------------------<  95     3.6     |  22     |  1.11     Ca    9.4        07 Jan 2022 07:45  Phos  3.2       07 Jan 2022 07:45  Mg     2.0       07 Jan 2022 07:45    TPro  7.0    /  Alb  4.2    /  TBili  1.0    /  DBili  x      /  AST  32     /  ALT  152    /  AlkPhos  85     07 Jan 2022 07:45    PT/INR - ( 05 Jan 2022 11:10 )   PT: 12.1 sec;   INR: 1.01 ratio      PTT - ( 05 Jan 2022 11:10 )  PTT:24.7 sec    LIVER FUNCTIONS - ( 07 Jan 2022 07:45 )  Alb: 4.2 g/dL / Pro: 7.0 g/dL / ALK PHOS: 85 U/L / ALT: 152 U/L / AST: 32 U/L / GGT: x                      Diagnosis: AML    Protocol/Chemo Regimen: cycle 4  HIDAC    Day: 3    Overnight Events: Febrile 101.3, panculture/CXR done.    Pt endorsed: "I feel exhausted." Decreased appetite    Review of Systems: Pt denies headaches/numbness/tingling/n/v/dizziness/sob/chest pain.    Pain scale: 0/10    Diet: regular    Allergies    No Known Allergies    Intolerances    cefepime (Rash)      ANTIMICROBIALS      HEME/ONC MEDICATIONS  enoxaparin Injectable 40 milliGRAM(s) SubCutaneous at bedtime      STANDING MEDICATIONS  amLODIPine   Tablet 5 milliGRAM(s) Oral daily  chlorhexidine 2% Cloths 1 Application(s) Topical <User Schedule>  influenza   Vaccine 0.5 milliLiter(s) IntraMuscular once  ondansetron Injectable 8 milliGRAM(s) IV Push every 8 hours  prednisoLONE acetate 1% Suspension 2 Drop(s) Both EYES every 6 hours  sodium chloride 0.9%. 1000 milliLiter(s) IV Continuous <Continuous>      PRN MEDICATIONS  acetaminophen     Tablet .. 650 milliGRAM(s) Oral every 6 hours PRN  metoclopramide Injectable 10 milliGRAM(s) IV Push every 6 hours PRN  oxyCODONE    IR 5 milliGRAM(s) Oral every 4 hours PRN  sodium chloride 0.9% lock flush 10 milliLiter(s) IV Push every 1 hour PRN        Vital Signs Last 24 Hrs  T(C): 39.4 (07 Jan 2022 05:00), Max: 39.4 (07 Jan 2022 05:00)  T(F): 102.9 (07 Jan 2022 05:00), Max: 102.9 (07 Jan 2022 05:00)  HR: 123 (07 Jan 2022 05:00) (115 - 135)  BP: 104/64 (07 Jan 2022 05:00) (104/64 - 142/85)  BP(mean): --  RR: 16 (07 Jan 2022 05:00) (16 - 18)  SpO2: 98% (07 Jan 2022 05:00) (96% - 100%)    PHYSICAL EXAM  General: NAD  HEENT: clear oropharynx,   CV: (+) S1/S2 RRR  Lungs: Breath sounds clear and equal b/l, no wheezes, no rales  Abdomen: soft, non-tender, non-distended  Ext: no edema  Skin: no rashes and no petechiae  Neuro: alert and oriented X 3, no focal deficits, no nystagmus/cerebellar toxicity.  Central Line: RUE PICC clean dry and intact.      LABS:                        9.0    4.07  )-----------( 133      ( 07 Jan 2022 07:51 )             25.3     07 Jan 2022 07:45    133    |  98     |  12     ----------------------------<  95     3.6     |  22     |  1.11     Ca    9.4        07 Jan 2022 07:45  Phos  3.2       07 Jan 2022 07:45  Mg     2.0       07 Jan 2022 07:45    TPro  7.0    /  Alb  4.2    /  TBili  1.0    /  DBili  x      /  AST  32     /  ALT  152    /  AlkPhos  85     07 Jan 2022 07:45    PT/INR - ( 05 Jan 2022 11:10 )   PT: 12.1 sec;   INR: 1.01 ratio      PTT - ( 05 Jan 2022 11:10 )  PTT:24.7 sec    LIVER FUNCTIONS - ( 07 Jan 2022 07:45 )  Alb: 4.2 g/dL / Pro: 7.0 g/dL / ALK PHOS: 85 U/L / ALT: 152 U/L / AST: 32 U/L / GGT: x           IMAGING:    from: US Abdomen Complete (US Abdomen Complete .) (01.05.22 @ 15:23)  IMPRESSION:  Borderline/mild hepatomegaly with hepatic steatosis.  Splenomegaly.                 Diagnosis: AML    Protocol/Chemo Regimen: cycle 4  HIDAC    Day: 3    Overnight Events: Febrile 101.3, panculture/CXR done.    Pt endorsed: "I feel exhausted." Decreased appetite    Review of Systems: Pt denies headaches/numbness/tingling/n/v/dizziness/sob/chest pain.    Pain scale: 0/10    Diet: regular    Allergies    No Known Allergies    Intolerances    cefepime (Rash)      ANTIMICROBIALS      HEME/ONC MEDICATIONS  enoxaparin Injectable 40 milliGRAM(s) SubCutaneous at bedtime      STANDING MEDICATIONS  amLODIPine   Tablet 5 milliGRAM(s) Oral daily  chlorhexidine 2% Cloths 1 Application(s) Topical <User Schedule>  influenza   Vaccine 0.5 milliLiter(s) IntraMuscular once  ondansetron Injectable 8 milliGRAM(s) IV Push every 8 hours  prednisoLONE acetate 1% Suspension 2 Drop(s) Both EYES every 6 hours  sodium chloride 0.9%. 1000 milliLiter(s) IV Continuous <Continuous>      PRN MEDICATIONS  acetaminophen     Tablet .. 650 milliGRAM(s) Oral every 6 hours PRN  metoclopramide Injectable 10 milliGRAM(s) IV Push every 6 hours PRN  oxyCODONE    IR 5 milliGRAM(s) Oral every 4 hours PRN  sodium chloride 0.9% lock flush 10 milliLiter(s) IV Push every 1 hour PRN        Vital Signs Last 24 Hrs  T(C): 39.4 (07 Jan 2022 05:00), Max: 39.4 (07 Jan 2022 05:00)  T(F): 102.9 (07 Jan 2022 05:00), Max: 102.9 (07 Jan 2022 05:00)  HR: 123 (07 Jan 2022 05:00) (115 - 135)  BP: 104/64 (07 Jan 2022 05:00) (104/64 - 142/85)  BP(mean): --  RR: 16 (07 Jan 2022 05:00) (16 - 18)  SpO2: 98% (07 Jan 2022 05:00) (96% - 100%)    PHYSICAL EXAM  General: NAD  HEENT: clear oropharynx,   CV: (+) S1/S2 RRR  Lungs: Breath sounds clear and equal b/l, no wheezes, no rales  Abdomen: soft, non-tender, non-distended  Ext: no edema  Skin: no rashes and no petechiae  Neuro: alert and oriented X 3, no focal deficits, no nystagmus/cerebellar toxicity.  Central Line: RUE PICC clean dry and intact.      LABS:                        9.0    4.07  )-----------( 133      ( 07 Jan 2022 07:51 )             25.3     07 Jan 2022 07:45    133    |  98     |  12     ----------------------------<  95     3.6     |  22     |  1.11     Ca    9.4        07 Jan 2022 07:45  Phos  3.2       07 Jan 2022 07:45  Mg     2.0       07 Jan 2022 07:45    TPro  7.0    /  Alb  4.2    /  TBili  1.0    /  DBili  x      /  AST  32     /  ALT  152    /  AlkPhos  85     07 Jan 2022 07:45    PT/INR - ( 05 Jan 2022 11:10 )   PT: 12.1 sec;   INR: 1.01 ratio      PTT - ( 05 Jan 2022 11:10 )  PTT:24.7 sec    LIVER FUNCTIONS - ( 07 Jan 2022 07:45 )  Alb: 4.2 g/dL / Pro: 7.0 g/dL / ALK PHOS: 85 U/L / ALT: 152 U/L / AST: 32 U/L / GGT: x           IMAGING:    from: Xray Chest 1 View- PORTABLE-Urgent (01.06.22 @ 21:48)  IMPRESSION:  Clear lungs.      from: US Abdomen Complete (US Abdomen Complete .) (01.05.22 @ 15:23)  IMPRESSION:  Borderline/mild hepatomegaly with hepatic steatosis.  Splenomegaly.

## 2022-01-07 NOTE — PROGRESS NOTE ADULT - ATTENDING COMMENTS
35yo M w/ HTN fatty liver, kidney stones,  diagnosed AML, NPM1 mutated, FLT-3 negative s/p induction chemotherapy with Daunorubicin/Cytarabine in CR, and now s/p cycle 3 cycles of consolidation with HIDAC (high dose cytarabine)  Cycle 3 complicated by hospital admission for neutropenic fever. Now admitted for cycle 4 HIDAC.  1/6/22: C4D2 doing well, no complaints. 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 Now admitted for cycle 4 HIDAC.  1/6/22: C4D3 doing well, no complaints.  Fever this AM, cultured, CXR; ?  related to burak-c  LFTs better now, sono +hepatomegaly, steatosis  no neurotoxicity  OOB

## 2022-01-08 LAB
ALBUMIN SERPL ELPH-MCNC: 4.3 G/DL — SIGNIFICANT CHANGE UP (ref 3.3–5)
ALP SERPL-CCNC: 74 U/L — SIGNIFICANT CHANGE UP (ref 40–120)
ALT FLD-CCNC: 124 U/L — HIGH (ref 10–45)
ANION GAP SERPL CALC-SCNC: 14 MMOL/L — SIGNIFICANT CHANGE UP (ref 5–17)
AST SERPL-CCNC: 29 U/L — SIGNIFICANT CHANGE UP (ref 10–40)
BASOPHILS # BLD AUTO: 0 K/UL — SIGNIFICANT CHANGE UP (ref 0–0.2)
BASOPHILS NFR BLD AUTO: 0 % — SIGNIFICANT CHANGE UP (ref 0–2)
BILIRUB SERPL-MCNC: 0.6 MG/DL — SIGNIFICANT CHANGE UP (ref 0.2–1.2)
BUN SERPL-MCNC: 16 MG/DL — SIGNIFICANT CHANGE UP (ref 7–23)
CALCIUM SERPL-MCNC: 9.8 MG/DL — SIGNIFICANT CHANGE UP (ref 8.4–10.5)
CHLORIDE SERPL-SCNC: 103 MMOL/L — SIGNIFICANT CHANGE UP (ref 96–108)
CO2 SERPL-SCNC: 22 MMOL/L — SIGNIFICANT CHANGE UP (ref 22–31)
CREAT SERPL-MCNC: 0.79 MG/DL — SIGNIFICANT CHANGE UP (ref 0.5–1.3)
EOSINOPHIL # BLD AUTO: 0 K/UL — SIGNIFICANT CHANGE UP (ref 0–0.5)
EOSINOPHIL NFR BLD AUTO: 0 % — SIGNIFICANT CHANGE UP (ref 0–6)
GLUCOSE SERPL-MCNC: 127 MG/DL — HIGH (ref 70–99)
HCT VFR BLD CALC: 24.8 % — LOW (ref 39–50)
HGB BLD-MCNC: 8.4 G/DL — LOW (ref 13–17)
IMM GRANULOCYTES NFR BLD AUTO: 0.5 % — SIGNIFICANT CHANGE UP (ref 0–1.5)
LYMPHOCYTES # BLD AUTO: 0.04 K/UL — LOW (ref 1–3.3)
LYMPHOCYTES # BLD AUTO: 2.1 % — LOW (ref 13–44)
MAGNESIUM SERPL-MCNC: 2.3 MG/DL — SIGNIFICANT CHANGE UP (ref 1.6–2.6)
MCHC RBC-ENTMCNC: 31.7 PG — SIGNIFICANT CHANGE UP (ref 27–34)
MCHC RBC-ENTMCNC: 33.9 GM/DL — SIGNIFICANT CHANGE UP (ref 32–36)
MCV RBC AUTO: 93.6 FL — SIGNIFICANT CHANGE UP (ref 80–100)
MONOCYTES # BLD AUTO: 0.05 K/UL — SIGNIFICANT CHANGE UP (ref 0–0.9)
MONOCYTES NFR BLD AUTO: 2.6 % — SIGNIFICANT CHANGE UP (ref 2–14)
NEUTROPHILS # BLD AUTO: 1.85 K/UL — SIGNIFICANT CHANGE UP (ref 1.8–7.4)
NEUTROPHILS NFR BLD AUTO: 94.8 % — HIGH (ref 43–77)
NRBC # BLD: 0 /100 WBCS — SIGNIFICANT CHANGE UP (ref 0–0)
PHOSPHATE SERPL-MCNC: 3.2 MG/DL — SIGNIFICANT CHANGE UP (ref 2.5–4.5)
PLATELET # BLD AUTO: 127 K/UL — LOW (ref 150–400)
POTASSIUM SERPL-MCNC: 3.9 MMOL/L — SIGNIFICANT CHANGE UP (ref 3.5–5.3)
POTASSIUM SERPL-SCNC: 3.9 MMOL/L — SIGNIFICANT CHANGE UP (ref 3.5–5.3)
PROT SERPL-MCNC: 6.8 G/DL — SIGNIFICANT CHANGE UP (ref 6–8.3)
RBC # BLD: 2.65 M/UL — LOW (ref 4.2–5.8)
RBC # FLD: 20.7 % — HIGH (ref 10.3–14.5)
SODIUM SERPL-SCNC: 139 MMOL/L — SIGNIFICANT CHANGE UP (ref 135–145)
WBC # BLD: 1.95 K/UL — LOW (ref 3.8–10.5)
WBC # FLD AUTO: 1.95 K/UL — LOW (ref 3.8–10.5)

## 2022-01-08 PROCEDURE — 99232 SBSQ HOSP IP/OBS MODERATE 35: CPT | Mod: GC

## 2022-01-08 RX ORDER — SENNA PLUS 8.6 MG/1
2 TABLET ORAL AT BEDTIME
Refills: 0 | Status: DISCONTINUED | OUTPATIENT
Start: 2022-01-08 | End: 2022-01-10

## 2022-01-08 RX ADMIN — ONDANSETRON 8 MILLIGRAM(S): 8 TABLET, FILM COATED ORAL at 05:16

## 2022-01-08 RX ADMIN — Medication 101.6 MILLIGRAM(S): at 04:33

## 2022-01-08 RX ADMIN — SODIUM CHLORIDE 75 MILLILITER(S): 9 INJECTION INTRAMUSCULAR; INTRAVENOUS; SUBCUTANEOUS at 05:16

## 2022-01-08 RX ADMIN — Medication 2 DROP(S): at 18:17

## 2022-01-08 RX ADMIN — SODIUM CHLORIDE 75 MILLILITER(S): 9 INJECTION INTRAMUSCULAR; INTRAVENOUS; SUBCUTANEOUS at 14:49

## 2022-01-08 RX ADMIN — ONDANSETRON 8 MILLIGRAM(S): 8 TABLET, FILM COATED ORAL at 14:21

## 2022-01-08 RX ADMIN — ONDANSETRON 8 MILLIGRAM(S): 8 TABLET, FILM COATED ORAL at 21:46

## 2022-01-08 RX ADMIN — Medication 2 DROP(S): at 12:18

## 2022-01-08 RX ADMIN — CHLORHEXIDINE GLUCONATE 1 APPLICATION(S): 213 SOLUTION TOPICAL at 08:48

## 2022-01-08 RX ADMIN — Medication 2 DROP(S): at 00:18

## 2022-01-08 RX ADMIN — AMLODIPINE BESYLATE 5 MILLIGRAM(S): 2.5 TABLET ORAL at 06:13

## 2022-01-08 RX ADMIN — Medication 2 DROP(S): at 06:13

## 2022-01-08 RX ADMIN — Medication 187.67 MILLIGRAM(S): at 05:15

## 2022-01-08 RX ADMIN — ENOXAPARIN SODIUM 40 MILLIGRAM(S): 100 INJECTION SUBCUTANEOUS at 21:46

## 2022-01-08 RX ADMIN — Medication 2 DROP(S): at 23:37

## 2022-01-08 RX ADMIN — SENNA PLUS 2 TABLET(S): 8.6 TABLET ORAL at 21:46

## 2022-01-08 NOTE — PROGRESS NOTE ADULT - PROBLEM SELECTOR PLAN 1
Admitted for Cycle 4 HiDAC  Cytarabine 3 g/m2 IV over 3 hrs every 12 hrs on days 1,3,5   IV hydration, Antiemetics, daily weight   Monitor for Cerebellar toxicity, nystagmus checks  Follow CBCwith diff/lytes, transfuse/replete prn  Continue predforte eye drops to prevent chemical conjunctivitis  CXR confirmed PICC placement   grade 1 AST, grade 3 ALT transaminitis, continue same HIDAC dose per iesha Marks (-) Decadron 8mg prior to burak-c given propensity to spike fevers. Admitted for Cycle 4 HiDAC  Cytarabine 3 g/m2 IV over 3 hrs every 12 hrs on days 1,3,5   IV hydration, Antiemetics, daily weight   Monitor for Cerebellar toxicity, nystagmus checks  Follow CBCwith diff/lytes, transfuse/replete prn  Continue predforte eye drops to prevent chemical conjunctivitis  CXR confirmed PICC placement   grade 1 AST, grade 3 ALT transaminitis, continue same HIDAC dose per iesha Marks (-) Decadron 8mg prior to burak-c given propensity to spike fevers.  thrombocytopenia-replace plt.

## 2022-01-08 NOTE — PROGRESS NOTE ADULT - PROBLEM SELECTOR PLAN 2
Pt is not neutropenic , febrile  1/7 febrile, follow up cultures and CXR Pt is not neutropenic, febrile  BC NGTD

## 2022-01-08 NOTE — PROGRESS NOTE ADULT - ASSESSMENT
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 Now admitted for cycle 4 HIDAC. Hospital admission complicated by fever, blood cultures pending. Anemia and thrombocytopenia secondary to disease and/or treatment.

## 2022-01-08 NOTE — PROGRESS NOTE ADULT - TIME BILLING
The time was used discussed with patient, family, primary team, consultants, and acute management.

## 2022-01-08 NOTE — PROGRESS NOTE ADULT - ATTENDING COMMENTS
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 Now admitted for cycle 4 HIDAC.  1/6/22: C4D3 doing well, no complaints.  Fever this AM, cultured, CXR; ?  related to burak-c  LFTs better now, sono +hepatomegaly, steatosis  no neurotoxicity  OOB 37yo M w/ HTN fatty liver, kidney stones,  diagnosed AML, NPM1 mutated, FLT-3 negative s/p induction chemotherapy with Daunorubicin/Cytarabine in CR, and now s/p cycle 3 cycles of consolidation with HIDAC (high dose cytarabine)  Cycle 3 complicated by hospital admission for neutropenic fever. Now admitted for cycle 4 HIDAC.  C4D4 doing well, no complaints.  Fever on 1/7/22, follow up cultures, likely related to Ayaka-C  LFTs better now, sono +hepatomegaly, steatosis  no neurotoxicity  OOB

## 2022-01-08 NOTE — PROGRESS NOTE ADULT - SUBJECTIVE AND OBJECTIVE BOX
Diagnosis: AML    Protocol/Chemo Regimen: cycle 4  HIDAC    Day: 4    Overnight Events: Febrile 101.3, panculture/CXR done.    Pt endorsed: "I feel exhausted." Decreased appetite    Review of Systems: Pt denies headaches/numbness/tingling/n/v/dizziness/sob/chest pain.    Pain scale: 0/10    Diet: regular    Allergies    No Known Allergies    Intolerances    cefepime (Rash)      ANTIMICROBIALS      HEME/ONC MEDICATIONS  enoxaparin Injectable 40 milliGRAM(s) SubCutaneous at bedtime      STANDING MEDICATIONS  amLODIPine   Tablet 5 milliGRAM(s) Oral daily  chlorhexidine 2% Cloths 1 Application(s) Topical <User Schedule>  dexAMETHasone  IVPB 8 milliGRAM(s) IV Intermittent <User Schedule>  influenza   Vaccine 0.5 milliLiter(s) IntraMuscular once  ondansetron Injectable 8 milliGRAM(s) IV Push every 8 hours  prednisoLONE acetate 1% Suspension 2 Drop(s) Both EYES every 6 hours  senna 2 Tablet(s) Oral at bedtime  sodium chloride 0.9%. 1000 milliLiter(s) IV Continuous <Continuous>      PRN MEDICATIONS  acetaminophen     Tablet .. 650 milliGRAM(s) Oral every 6 hours PRN  metoclopramide Injectable 10 milliGRAM(s) IV Push every 6 hours PRN  oxyCODONE    IR 5 milliGRAM(s) Oral every 4 hours PRN  sodium chloride 0.9% lock flush 10 milliLiter(s) IV Push every 1 hour PRN        Vital Signs Last 24 Hrs  T(C): 36.4 (08 Jan 2022 05:16), Max: 37.2 (07 Jan 2022 10:21)  T(F): 97.6 (08 Jan 2022 05:16), Max: 99 (07 Jan 2022 10:21)  HR: 74 (08 Jan 2022 05:16) (74 - 104)  BP: 102/63 (08 Jan 2022 05:16) (100/60 - 119/67)  BP(mean): --  RR: 18 (08 Jan 2022 05:16) (18 - 18)  SpO2: 97% (08 Jan 2022 05:16) (95% - 99%)    PHYSICAL EXAM  General: NAD  HEENT: clear oropharynx,   CV: (+) S1/S2 RRR  Lungs: Breath sounds clear and equal b/l, no wheezes, no rales  Abdomen: soft, non-tender, non-distended  Ext: no edema  Skin: no rashes and no petechiae  Neuro: alert and oriented X 3, no focal deficits, no nystagmus/cerebellar toxicity.  Central Line: RUE PICC clean dry and intact.    LABS: Diagnosis: AML    Protocol/Chemo Regimen: cycle 4  HIDAC    Day: 4    Pt endorsed: No complaints.     Review of Systems: Pt denies headaches/numbness/tingling/n/v/dizziness/sob/chest pain.    Pain scale: 0/10    Diet: regular    Allergies    No Known Allergies    Intolerances    cefepime (Rash)      ANTIMICROBIALS      HEME/ONC MEDICATIONS  enoxaparin Injectable 40 milliGRAM(s) SubCutaneous at bedtime      STANDING MEDICATIONS  amLODIPine   Tablet 5 milliGRAM(s) Oral daily  chlorhexidine 2% Cloths 1 Application(s) Topical <User Schedule>  dexAMETHasone  IVPB 8 milliGRAM(s) IV Intermittent <User Schedule>  influenza   Vaccine 0.5 milliLiter(s) IntraMuscular once  ondansetron Injectable 8 milliGRAM(s) IV Push every 8 hours  prednisoLONE acetate 1% Suspension 2 Drop(s) Both EYES every 6 hours  senna 2 Tablet(s) Oral at bedtime  sodium chloride 0.9%. 1000 milliLiter(s) IV Continuous <Continuous>      PRN MEDICATIONS  acetaminophen     Tablet .. 650 milliGRAM(s) Oral every 6 hours PRN  metoclopramide Injectable 10 milliGRAM(s) IV Push every 6 hours PRN  oxyCODONE    IR 5 milliGRAM(s) Oral every 4 hours PRN  sodium chloride 0.9% lock flush 10 milliLiter(s) IV Push every 1 hour PRN        Vital Signs Last 24 Hrs  T(C): 36.4 (08 Jan 2022 05:16), Max: 37.2 (07 Jan 2022 10:21)  T(F): 97.6 (08 Jan 2022 05:16), Max: 99 (07 Jan 2022 10:21)  HR: 74 (08 Jan 2022 05:16) (74 - 104)  BP: 102/63 (08 Jan 2022 05:16) (100/60 - 119/67)  BP(mean): --  RR: 18 (08 Jan 2022 05:16) (18 - 18)  SpO2: 97% (08 Jan 2022 05:16) (95% - 99%)    PHYSICAL EXAM  General: NAD  HEENT: clear oropharynx,   CV: (+) S1/S2 RRR  Lungs: Breath sounds clear and equal b/l, no wheezes, no rales  Abdomen: soft, non-tender, non-distended  Ext: no edema  Skin: no rashes and no petechiae  Neuro: alert and oriented X 3, no focal deficits, no nystagmus/cerebellar toxicity.  Central Line: RUE PICC clean dry and intact.    LABS:                        8.4    1.95  )-----------( 127      ( 08 Jan 2022 07:28 )             24.8     Mean Cell Volume : 93.6 fl  Mean Cell Hemoglobin : 31.7 pg  Mean Cell Hemoglobin Concentration : 33.9 gm/dL  Auto Neutrophil # : 1.85 K/uL  Auto Lymphocyte # : 0.04 K/uL  Auto Monocyte # : 0.05 K/uL  Auto Eosinophil # : 0.00 K/uL  Auto Basophil # : 0.00 K/uL  Auto Neutrophil % : 94.8 %  Auto Lymphocyte % : 2.1 %  Auto Monocyte % : 2.6 %  Auto Eosinophil % : 0.0 %  Auto Basophil % : 0.0 %      01-08    139  |  103  |  16  ----------------------------<  127<H>  3.9   |  22  |  0.79    Ca    9.8      08 Jan 2022 07:26  Phos  3.2     01-08  Mg     2.3     01-08    TPro  6.8  /  Alb  4.3  /  TBili  0.6  /  DBili  x   /  AST  29  /  ALT  124<H>  /  AlkPhos  74  01-08

## 2022-01-08 NOTE — PROGRESS NOTE ADULT - SUBJECTIVE AND OBJECTIVE BOX
Name of Patient : JAK DANIELS  MRN: [id number]  Date of visit: 01-08-22       Subjective: Patient seen and examined. No new events except as noted.   Doingokay  more energy     REVIEW OF SYSTEMS:    CONSTITUTIONAL: No weakness, fevers or chills  EYES/ENT: No visual changes;  No vertigo or throat pain   NECK: No pain or stiffness  RESPIRATORY: No cough, wheezing, hemoptysis; No shortness of breath  CARDIOVASCULAR: No chest pain or palpitations  GASTROINTESTINAL: No abdominal or epigastric pain. No nausea, vomiting, or hematemesis; No diarrhea or constipation. No melena or hematochezia.  GENITOURINARY: No dysuria, frequency or hematuria  NEUROLOGICAL: No numbness or weakness  SKIN: No itching, burning, rashes, or lesions   All other review of systems is negative unless indicated above.    MEDICATIONS:  MEDICATIONS  (STANDING):  amLODIPine   Tablet 5 milliGRAM(s) Oral daily  chlorhexidine 2% Cloths 1 Application(s) Topical <User Schedule>  dexAMETHasone  IVPB 8 milliGRAM(s) IV Intermittent <User Schedule>  enoxaparin Injectable 40 milliGRAM(s) SubCutaneous at bedtime  influenza   Vaccine 0.5 milliLiter(s) IntraMuscular once  ondansetron Injectable 8 milliGRAM(s) IV Push every 8 hours  prednisoLONE acetate 1% Suspension 2 Drop(s) Both EYES every 6 hours  senna 2 Tablet(s) Oral at bedtime  sodium chloride 0.9%. 1000 milliLiter(s) (75 mL/Hr) IV Continuous <Continuous>      PHYSICAL EXAM:  T(C): 36.6 (01-08-22 @ 21:20), Max: 37.1 (01-08-22 @ 01:20)  HR: 91 (01-08-22 @ 21:20) (74 - 101)  BP: 110/69 (01-08-22 @ 21:20) (100/60 - 118/65)  RR: 18 (01-08-22 @ 21:20) (18 - 18)  SpO2: 98% (01-08-22 @ 21:20) (95% - 100%)  Wt(kg): --  I&O's Summary    07 Jan 2022 07:01  -  08 Jan 2022 07:00  --------------------------------------------------------  IN: 3640 mL / OUT: 0 mL / NET: 3640 mL    08 Jan 2022 07:01  -  08 Jan 2022 23:08  --------------------------------------------------------  IN: 1025 mL / OUT: 0 mL / NET: 1025 mL          Appearance: Normal	  HEENT:  PERRLA   Lymphatic: No lymphadenopathy   Cardiovascular: Normal S1 S2, no JVD  Respiratory: normal effort , clear  Gastrointestinal:  Soft, Non-tender  Skin: No rashes,  warm to touch  Psychiatry:  Mood & affect appropriate  Musculuskeletal: No edema      All labs, Imaging and EKGs personally reviewed       01-07-22 @ 07:01  -  01-08-22 @ 07:00  --------------------------------------------------------  IN: 3640 mL / OUT: 0 mL / NET: 3640 mL    01-08-22 @ 07:01 - 01-08-22 @ 23:08  --------------------------------------------------------  IN: 1025 mL / OUT: 0 mL / NET: 1025 mL                            8.4    1.95  )-----------( 127      ( 08 Jan 2022 07:28 )             24.8               01-08    139  |  103  |  16  ----------------------------<  127<H>  3.9   |  22  |  0.79    Ca    9.8      08 Jan 2022 07:26  Phos  3.2     01-08  Mg     2.3     01-08    TPro  6.8  /  Alb  4.3  /  TBili  0.6  /  DBili  x   /  AST  29  /  ALT  124<H>  /  AlkPhos  74  01-08

## 2022-01-08 NOTE — PROGRESS NOTE ADULT - PROBLEM SELECTOR PLAN 3
unknown etiology, likely from chemotherapies and long term meds use  Monitor LFTS daily   Avoid hepatotoxic medications  1/5 ABD US- mild hepatomegaly with hepatic steatosis/Splenomegaly

## 2022-01-09 LAB
ALBUMIN SERPL ELPH-MCNC: 3.8 G/DL — SIGNIFICANT CHANGE UP (ref 3.3–5)
ALP SERPL-CCNC: 60 U/L — SIGNIFICANT CHANGE UP (ref 40–120)
ALT FLD-CCNC: 89 U/L — HIGH (ref 10–45)
ANION GAP SERPL CALC-SCNC: 11 MMOL/L — SIGNIFICANT CHANGE UP (ref 5–17)
AST SERPL-CCNC: 27 U/L — SIGNIFICANT CHANGE UP (ref 10–40)
BASOPHILS # BLD AUTO: 0 K/UL — SIGNIFICANT CHANGE UP (ref 0–0.2)
BASOPHILS NFR BLD AUTO: 0 % — SIGNIFICANT CHANGE UP (ref 0–2)
BILIRUB SERPL-MCNC: 0.6 MG/DL — SIGNIFICANT CHANGE UP (ref 0.2–1.2)
BLD GP AB SCN SERPL QL: NEGATIVE — SIGNIFICANT CHANGE UP
BUN SERPL-MCNC: 14 MG/DL — SIGNIFICANT CHANGE UP (ref 7–23)
CALCIUM SERPL-MCNC: 9.1 MG/DL — SIGNIFICANT CHANGE UP (ref 8.4–10.5)
CHLORIDE SERPL-SCNC: 106 MMOL/L — SIGNIFICANT CHANGE UP (ref 96–108)
CO2 SERPL-SCNC: 23 MMOL/L — SIGNIFICANT CHANGE UP (ref 22–31)
CREAT SERPL-MCNC: 0.86 MG/DL — SIGNIFICANT CHANGE UP (ref 0.5–1.3)
EOSINOPHIL # BLD AUTO: 0 K/UL — SIGNIFICANT CHANGE UP (ref 0–0.5)
EOSINOPHIL NFR BLD AUTO: 0 % — SIGNIFICANT CHANGE UP (ref 0–6)
GLUCOSE SERPL-MCNC: 96 MG/DL — SIGNIFICANT CHANGE UP (ref 70–99)
HCT VFR BLD CALC: 20.6 % — CRITICAL LOW (ref 39–50)
HGB BLD-MCNC: 7 G/DL — CRITICAL LOW (ref 13–17)
LYMPHOCYTES # BLD AUTO: 0.05 K/UL — LOW (ref 1–3.3)
LYMPHOCYTES # BLD AUTO: 1.7 % — LOW (ref 13–44)
MAGNESIUM SERPL-MCNC: 2.2 MG/DL — SIGNIFICANT CHANGE UP (ref 1.6–2.6)
MANUAL SMEAR VERIFICATION: SIGNIFICANT CHANGE UP
MCHC RBC-ENTMCNC: 32.1 PG — SIGNIFICANT CHANGE UP (ref 27–34)
MCHC RBC-ENTMCNC: 34 GM/DL — SIGNIFICANT CHANGE UP (ref 32–36)
MCV RBC AUTO: 94.5 FL — SIGNIFICANT CHANGE UP (ref 80–100)
MONOCYTES # BLD AUTO: 0 K/UL — SIGNIFICANT CHANGE UP (ref 0–0.9)
MONOCYTES NFR BLD AUTO: 0 % — LOW (ref 2–14)
NEUTROPHILS # BLD AUTO: 3.12 K/UL — SIGNIFICANT CHANGE UP (ref 1.8–7.4)
NEUTROPHILS NFR BLD AUTO: 97.4 % — HIGH (ref 43–77)
PHOSPHATE SERPL-MCNC: 3.5 MG/DL — SIGNIFICANT CHANGE UP (ref 2.5–4.5)
PLAT MORPH BLD: NORMAL — SIGNIFICANT CHANGE UP
PLATELET # BLD AUTO: 107 K/UL — LOW (ref 150–400)
POTASSIUM SERPL-MCNC: 3.7 MMOL/L — SIGNIFICANT CHANGE UP (ref 3.5–5.3)
POTASSIUM SERPL-SCNC: 3.7 MMOL/L — SIGNIFICANT CHANGE UP (ref 3.5–5.3)
PROMYELOCYTES # FLD: 0.9 % — HIGH (ref 0–0)
PROT SERPL-MCNC: 6.1 G/DL — SIGNIFICANT CHANGE UP (ref 6–8.3)
RBC # BLD: 2.18 M/UL — LOW (ref 4.2–5.8)
RBC # FLD: 20.9 % — HIGH (ref 10.3–14.5)
RBC BLD AUTO: SIGNIFICANT CHANGE UP
RH IG SCN BLD-IMP: POSITIVE — SIGNIFICANT CHANGE UP
SODIUM SERPL-SCNC: 140 MMOL/L — SIGNIFICANT CHANGE UP (ref 135–145)
WBC # BLD: 3.2 K/UL — LOW (ref 3.8–10.5)
WBC # FLD AUTO: 3.2 K/UL — LOW (ref 3.8–10.5)

## 2022-01-09 PROCEDURE — 99232 SBSQ HOSP IP/OBS MODERATE 35: CPT | Mod: GC

## 2022-01-09 RX ORDER — CYTARABINE 100 MG
6300 VIAL (EA) INJECTION EVERY 12 HOURS
Refills: 0 | Status: COMPLETED | OUTPATIENT
Start: 2022-01-09 | End: 2022-01-10

## 2022-01-09 RX ADMIN — Medication 2 DROP(S): at 17:35

## 2022-01-09 RX ADMIN — ONDANSETRON 8 MILLIGRAM(S): 8 TABLET, FILM COATED ORAL at 05:47

## 2022-01-09 RX ADMIN — ONDANSETRON 8 MILLIGRAM(S): 8 TABLET, FILM COATED ORAL at 14:20

## 2022-01-09 RX ADMIN — ONDANSETRON 8 MILLIGRAM(S): 8 TABLET, FILM COATED ORAL at 21:40

## 2022-01-09 RX ADMIN — AMLODIPINE BESYLATE 5 MILLIGRAM(S): 2.5 TABLET ORAL at 05:44

## 2022-01-09 RX ADMIN — CHLORHEXIDINE GLUCONATE 1 APPLICATION(S): 213 SOLUTION TOPICAL at 12:41

## 2022-01-09 RX ADMIN — Medication 2 DROP(S): at 23:23

## 2022-01-09 RX ADMIN — Medication 2 DROP(S): at 12:14

## 2022-01-09 RX ADMIN — SODIUM CHLORIDE 75 MILLILITER(S): 9 INJECTION INTRAMUSCULAR; INTRAVENOUS; SUBCUTANEOUS at 17:47

## 2022-01-09 RX ADMIN — Medication 2 DROP(S): at 05:48

## 2022-01-09 RX ADMIN — Medication 187.67 MILLIGRAM(S): at 18:30

## 2022-01-09 RX ADMIN — SODIUM CHLORIDE 75 MILLILITER(S): 9 INJECTION INTRAMUSCULAR; INTRAVENOUS; SUBCUTANEOUS at 05:48

## 2022-01-09 RX ADMIN — Medication 101.6 MILLIGRAM(S): at 17:35

## 2022-01-09 NOTE — PROGRESS NOTE ADULT - ASSESSMENT
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  Now admitted for cycle 4 HIDAC.       #AML  --Patient admitted for 4th cycle of consolidation   --F/U Heme/Onc recommendations  --serial CBC   --monitor for fever -- Patient was febrile on 01/06 and again this morning 01/07 with a Tmax of 102.9--Tylenol was given for fever  --Antipyretics PRN; cooling measures   --BCx2 and UCx from 01/06--NGTD  --CXR from 01/06 is clear  --Per Heme/Onc, Decadron prior to Tx  --Continue to monitor patient and temperature curve closely     #Fever   -- Patient was febrile on 01/06 and again this morning 01/07 with a Tmax of 102.9--Tylenol was given for fever  --Antipyretics PRN; cooling measures   --BCx2 and UCx from 01/06--NGTD  --CXR from 01/06 is clear  --Per Heme/Onc, Decadron prior to Tx; observe off of Abx for now  --Continue to monitor patient and temperature curve closely     #Headache  --neuro exam  --Monitor patient   --Pain control       #Elevated LFTs  --AST of 74--> 58--> 32--> 27 WNL  01/09; ALT of 264--> 215 ---> 152-->89 01/09 (downtrending); continue to trend  --Can be due to medication side effect-- Fluconazole or Cytarabine with known hepatic impairment- Defer chemo medications per Onc  --Avoid hepatotoxic medications   --Continue to trend on daily labs  --recommend to check abdominal US for further evaluation -- results as above  --Recommend to F/U Outpatient for repeat abdominal US in 3-6 for monitoring       #HTN  --Continue with home dose of Amlodipine  --Monitor BP and Vital signs closely     #Anemia  --Monitor H/H Closely  --Transfusion per Heme/Onc recommendations  --Patient will be receiving 1 unit of PRBCs today 01/09 due to hgb of 7.0; F/U post transfusion CBC       DVT PPX with Lovenox 40, continue to monitor Cr and Platelet count     #Splenomegaly  --Outpatient F/U in 3-6 months for monitoring with repeat abd US

## 2022-01-09 NOTE — PROGRESS NOTE ADULT - PROBLEM SELECTOR PLAN 1
Admitted for Cycle 4 HiDAC  Cytarabine 3 g/m2 IV over 3 hrs every 12 hrs on days 1,3,5   IV hydration, Antiemetics, daily weight   Monitor for Cerebellar toxicity, nystagmus checks  Follow CBCwith diff/lytes, transfuse/replete prn  Continue predforte eye drops to prevent chemical conjunctivitis  CXR confirmed PICC placement   grade 1 AST, grade 3 ALT transaminitis, continue same HIDAC dose per iesha Marks (-) Decadron 8mg prior to burak-c given propensity to spike fevers.  thrombocytopenia-replace plt. Admitted for Cycle 4 HiDAC  Cytarabine 3 g/m2 IV over 3 hrs every 12 hrs on days 1,3,5   IV hydration, Antiemetics, daily weight   Monitor for Cerebellar toxicity, nystagmus checks  Follow CBCwith diff/lytes, transfuse/replete prn  Continue predforte eye drops to prevent chemical conjunctivitis  CXR confirmed PICC placement   grade 1 AST, grade 3 ALT transaminitis, continue same HIDAC dose per iesha Marks (-) Decadron 8mg prior to burak-c given propensity to spike fevers. Admitted for Cycle 4 HiDAC  Cytarabine 3 g/m2 IV over 3 hrs every 12 hrs on days 1,3,5   IV hydration, Antiemetics, daily weight   Monitor for Cerebellar toxicity, nystagmus checks  Follow CBCwith diff/lytes, transfuse/replete prn  Continue predforte eye drops to prevent chemical conjunctivitis  CXR confirmed PICC placement   grade 1 AST, grade 3 ALT transaminitis, continue same HIDAC dose per iesha Marks (-) Decadron 8mg prior to burak-c given propensity to spike fevers.  Anemia-replace prbc discharge tomorrow.

## 2022-01-09 NOTE — PROGRESS NOTE ADULT - SUBJECTIVE AND OBJECTIVE BOX
Diagnosis: AML    Protocol/Chemo Regimen: cycle 4  HIDAC    Day: 5    Pt endorsed: No complaints.     Review of Systems: Pt denies headaches/numbness/tingling/n/v/dizziness/sob/chest pain.    Pain scale: 0/10    Diet: regular    Allergies    No Known Allergies    Intolerances    cefepime (Rash)      ANTIMICROBIALS      HEME/ONC MEDICATIONS  enoxaparin Injectable 40 milliGRAM(s) SubCutaneous at bedtime      STANDING MEDICATIONS  amLODIPine   Tablet 5 milliGRAM(s) Oral daily  chlorhexidine 2% Cloths 1 Application(s) Topical <User Schedule>  dexAMETHasone  IVPB 8 milliGRAM(s) IV Intermittent <User Schedule>  influenza   Vaccine 0.5 milliLiter(s) IntraMuscular once  ondansetron Injectable 8 milliGRAM(s) IV Push every 8 hours  prednisoLONE acetate 1% Suspension 2 Drop(s) Both EYES every 6 hours  senna 2 Tablet(s) Oral at bedtime  sodium chloride 0.9%. 1000 milliLiter(s) IV Continuous <Continuous>      PRN MEDICATIONS  acetaminophen     Tablet .. 650 milliGRAM(s) Oral every 6 hours PRN  metoclopramide Injectable 10 milliGRAM(s) IV Push every 6 hours PRN  oxyCODONE    IR 5 milliGRAM(s) Oral every 4 hours PRN  sodium chloride 0.9% lock flush 10 milliLiter(s) IV Push every 1 hour PRN        Vital Signs Last 24 Hrs  T(C): 36.6 (09 Jan 2022 05:04), Max: 36.8 (08 Jan 2022 13:45)  T(F): 97.9 (09 Jan 2022 05:04), Max: 98.3 (08 Jan 2022 13:45)  HR: 78 (09 Jan 2022 05:04) (77 - 101)  BP: 106/65 (09 Jan 2022 05:04) (102/65 - 118/65)  BP(mean): --  RR: 18 (09 Jan 2022 05:04) (18 - 18)  SpO2: 97% (09 Jan 2022 05:04) (96% - 100%)    PHYSICAL EXAM  General: NAD  HEENT: clear oropharynx,   CV: (+) S1/S2 RRR  Lungs: Breath sounds clear and equal b/l, no wheezes, no rales  Abdomen: soft, non-tender, non-distended  Ext: no edema  Skin: no rashes and no petechiae  Neuro: alert and oriented X 3, no focal deficits, no nystagmus/cerebellar toxicity.  Central Line: RUE PICC clean dry and intact.    LABS: Diagnosis: AML    Protocol/Chemo Regimen: cycle 4  HIDAC    Day: 5    Pt endorsed: No complaints.     Review of Systems: Pt denies headaches/numbness/tingling/n/v/dizziness/sob/chest pain.    Pain scale: 0/10    Diet: regular    Allergies    No Known Allergies    Intolerances    cefepime (Rash)      ANTIMICROBIALS      HEME/ONC MEDICATIONS  enoxaparin Injectable 40 milliGRAM(s) SubCutaneous at bedtime      STANDING MEDICATIONS  amLODIPine   Tablet 5 milliGRAM(s) Oral daily  chlorhexidine 2% Cloths 1 Application(s) Topical <User Schedule>  dexAMETHasone  IVPB 8 milliGRAM(s) IV Intermittent <User Schedule>  influenza   Vaccine 0.5 milliLiter(s) IntraMuscular once  ondansetron Injectable 8 milliGRAM(s) IV Push every 8 hours  prednisoLONE acetate 1% Suspension 2 Drop(s) Both EYES every 6 hours  senna 2 Tablet(s) Oral at bedtime  sodium chloride 0.9%. 1000 milliLiter(s) IV Continuous <Continuous>      PRN MEDICATIONS  acetaminophen     Tablet .. 650 milliGRAM(s) Oral every 6 hours PRN  metoclopramide Injectable 10 milliGRAM(s) IV Push every 6 hours PRN  oxyCODONE    IR 5 milliGRAM(s) Oral every 4 hours PRN  sodium chloride 0.9% lock flush 10 milliLiter(s) IV Push every 1 hour PRN        Vital Signs Last 24 Hrs  T(C): 36.6 (09 Jan 2022 05:04), Max: 36.8 (08 Jan 2022 13:45)  T(F): 97.9 (09 Jan 2022 05:04), Max: 98.3 (08 Jan 2022 13:45)  HR: 78 (09 Jan 2022 05:04) (77 - 101)  BP: 106/65 (09 Jan 2022 05:04) (102/65 - 118/65)  BP(mean): --  RR: 18 (09 Jan 2022 05:04) (18 - 18)  SpO2: 97% (09 Jan 2022 05:04) (96% - 100%)    PHYSICAL EXAM  General: NAD  HEENT: clear oropharynx,   CV: (+) S1/S2 RRR  Lungs: Breath sounds clear and equal b/l, no wheezes, no rales  Abdomen: soft, non-tender, non-distended  Ext: no edema  Skin: no rashes and no petechiae  Neuro: alert and oriented X 3, no focal deficits, no nystagmus/cerebellar toxicity.  Central Line: RUE PICC clean dry and intact.    LABS:                          7.0    3.20  )-----------( 107      ( 09 Jan 2022 07:51 )             20.6         Mean Cell Volume : 94.5 fl  Mean Cell Hemoglobin : 32.1 pg  Mean Cell Hemoglobin Concentration : 34.0 gm/dL  Auto Neutrophil # : 3.12 K/uL  Auto Lymphocyte # : 0.05 K/uL  Auto Monocyte # : 0.00 K/uL  Auto Eosinophil # : 0.00 K/uL  Auto Basophil # : 0.00 K/uL  Auto Neutrophil % : 97.4 %  Auto Lymphocyte % : 1.7 %  Auto Monocyte % : 0.0 %  Auto Eosinophil % : 0.0 %  Auto Basophil % : 0.0 %      01-09    140  |  106  |  14  ----------------------------<  96  3.7   |  23  |  0.86    Ca    9.1      09 Jan 2022 07:45  Phos  3.5     01-09  Mg     2.2     01-09    TPro  6.1  /  Alb  3.8  /  TBili  0.6  /  DBili  x   /  AST  27  /  ALT  89<H>  /  AlkPhos  60  01-09

## 2022-01-09 NOTE — PROGRESS NOTE ADULT - ATTENDING COMMENTS
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 Now admitted for cycle 4 HIDAC.  C4D4 doing well, no complaints.  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 Now admitted for cycle 4 HIDAC.  C4D5 doing well, no complaints.  Fever on 1/7/22, follow up cultures, likely related to Ayaka-C  LFTs better now, sono +hepatomegaly, steatosis  no neurotoxicity  OOB

## 2022-01-09 NOTE — ADVANCED PRACTICE NURSE CONSULT - RECOMMEDATIONS
Primary RN made aware of present treatment. Monitor Patient 

## 2022-01-09 NOTE — PROGRESS NOTE ADULT - ATTENDING COMMENTS
Pt care and plan discussed and reviewed with PA. Plan as outlined above edited by me to reflect our discussion. Thirty five minutes spent on encounter, of which more than fifty percent of the encounter was spent on counseling and/or coordinating care by the attending physician. OMT on six regions for acute somatic dysfunctions done at the bedside.

## 2022-01-09 NOTE — PROGRESS NOTE ADULT - SUBJECTIVE AND OBJECTIVE BOX
Name of Patient : JAK DANIELS  MRN: [id number]  Date of visit: 01-09-22 @ 11:59      Subjective: Patient seen and examined. No new events except as noted.   Patient is currently afebrile. Patient will be receiving 1 unit of PRBCs transfusion today 01/09 due to Hgb level of 7.0 on AM labs.   Patient without any complaints at time of visit. Reports that he is eating well and having normal BM.     REVIEW OF SYSTEMS:    CONSTITUTIONAL: No weakness, fevers or chills  EYES/ENT: No visual changes;  No vertigo or throat pain   NECK: No pain or stiffness  RESPIRATORY: No cough, wheezing, hemoptysis; No shortness of breath  CARDIOVASCULAR: No chest pain or palpitations  GASTROINTESTINAL: No abdominal or epigastric pain. No nausea, vomiting, or hematemesis; No diarrhea or constipation. No melena or hematochezia.  GENITOURINARY: No dysuria, frequency or hematuria  NEUROLOGICAL: No numbness or weakness  SKIN: No itching, burning, rashes, or lesions   All other review of systems is negative unless indicated above.    MEDICATIONS:  MEDICATIONS  (STANDING):  amLODIPine   Tablet 5 milliGRAM(s) Oral daily  chlorhexidine 2% Cloths 1 Application(s) Topical <User Schedule>  cytarabine IVPB (eMAR) 6300 milliGRAM(s) IV Intermittent every 12 hours  dexAMETHasone  IVPB 8 milliGRAM(s) IV Intermittent <User Schedule>  enoxaparin Injectable 40 milliGRAM(s) SubCutaneous at bedtime  influenza   Vaccine 0.5 milliLiter(s) IntraMuscular once  ondansetron Injectable 8 milliGRAM(s) IV Push every 8 hours  prednisoLONE acetate 1% Suspension 2 Drop(s) Both EYES every 6 hours  senna 2 Tablet(s) Oral at bedtime  sodium chloride 0.9%. 1000 milliLiter(s) (75 mL/Hr) IV Continuous <Continuous>      PHYSICAL EXAM:  T(C): 36.9 (01-09-22 @ 10:49), Max: 36.9 (01-09-22 @ 10:49)  HR: 100 (01-09-22 @ 10:49) (77 - 101)  BP: 111/64 (01-09-22 @ 10:49) (106/65 - 118/65)  RR: 18 (01-09-22 @ 10:49) (18 - 18)  SpO2: 100% (01-09-22 @ 10:49) (96% - 100%)  Wt(kg): --  I&O's Summary    08 Jan 2022 07:01  -  09 Jan 2022 07:00  --------------------------------------------------------  IN: 2225 mL / OUT: 0 mL / NET: 2225 mL          Appearance: Normal; calm; sitting up in bed   HEENT:  PERRLA   Lymphatic: No lymphadenopathy   Cardiovascular: Normal S1 S2, no JVD  Respiratory: normal effort , clear  Gastrointestinal:  Soft, Non-tender   Skin: No rashes,  warm to touch  Psychiatry:  Mood & affect appropriate  Musculoskeletal: No edema      All labs, Imaging and EKGs personally reviewed                               7.0    3.20  )-----------( 107      ( 09 Jan 2022 07:51 )             20.6               01-09    140  |  106  |  14  ----------------------------<  96  3.7   |  23  |  0.86    Ca    9.1      09 Jan 2022 07:45  Phos  3.5     01-09  Mg     2.2     01-09    TPro  6.1  /  Alb  3.8  /  TBili  0.6  /  DBili  x   /  AST  27  /  ALT  89<H>  /  AlkPhos  60  01-09             Culture - Urine (01.07.22 @ 01:48)   Specimen Source: Clean Catch Clean Catch (Midstream)   Culture Results: No growth     Culture - Blood (01.06.22 @ 23:05)   Specimen Source: .Blood Blood-Peripheral   Culture Results: No growth to date.     Culture - Blood (01.06.22 @ 19:22)   Specimen Source: .Blood Blood-Peripheral   Culture Results: No growth to date.         01-08-22 @ 07:01  -  01-09-22 @ 07:00  --------------------------------------------------------  IN: 2225 mL / OUT: 0 mL / NET: 2225 mL

## 2022-01-10 ENCOUNTER — TRANSCRIPTION ENCOUNTER (OUTPATIENT)
Age: 37
End: 2022-01-10

## 2022-01-10 VITALS
OXYGEN SATURATION: 99 % | RESPIRATION RATE: 18 BRPM | HEART RATE: 98 BPM | TEMPERATURE: 98 F | DIASTOLIC BLOOD PRESSURE: 88 MMHG | SYSTOLIC BLOOD PRESSURE: 125 MMHG

## 2022-01-10 LAB
ALBUMIN SERPL ELPH-MCNC: 3.9 G/DL — SIGNIFICANT CHANGE UP (ref 3.3–5)
ALP SERPL-CCNC: 67 U/L — SIGNIFICANT CHANGE UP (ref 40–120)
ALT FLD-CCNC: 105 U/L — HIGH (ref 10–45)
ANION GAP SERPL CALC-SCNC: 12 MMOL/L — SIGNIFICANT CHANGE UP (ref 5–17)
AST SERPL-CCNC: 37 U/L — SIGNIFICANT CHANGE UP (ref 10–40)
BASOPHILS # BLD AUTO: 0 K/UL — SIGNIFICANT CHANGE UP (ref 0–0.2)
BASOPHILS NFR BLD AUTO: 0 % — SIGNIFICANT CHANGE UP (ref 0–2)
BILIRUB SERPL-MCNC: 0.5 MG/DL — SIGNIFICANT CHANGE UP (ref 0.2–1.2)
BUN SERPL-MCNC: 13 MG/DL — SIGNIFICANT CHANGE UP (ref 7–23)
CALCIUM SERPL-MCNC: 9.3 MG/DL — SIGNIFICANT CHANGE UP (ref 8.4–10.5)
CHLORIDE SERPL-SCNC: 105 MMOL/L — SIGNIFICANT CHANGE UP (ref 96–108)
CO2 SERPL-SCNC: 22 MMOL/L — SIGNIFICANT CHANGE UP (ref 22–31)
CREAT SERPL-MCNC: 0.71 MG/DL — SIGNIFICANT CHANGE UP (ref 0.5–1.3)
EOSINOPHIL # BLD AUTO: 0 K/UL — SIGNIFICANT CHANGE UP (ref 0–0.5)
EOSINOPHIL NFR BLD AUTO: 0 % — SIGNIFICANT CHANGE UP (ref 0–6)
GLUCOSE SERPL-MCNC: 114 MG/DL — HIGH (ref 70–99)
HCT VFR BLD CALC: 24 % — LOW (ref 39–50)
HGB BLD-MCNC: 8.2 G/DL — LOW (ref 13–17)
IMM GRANULOCYTES NFR BLD AUTO: 0.5 % — SIGNIFICANT CHANGE UP (ref 0–1.5)
LYMPHOCYTES # BLD AUTO: 0.04 K/UL — LOW (ref 1–3.3)
LYMPHOCYTES # BLD AUTO: 1.9 % — LOW (ref 13–44)
MAGNESIUM SERPL-MCNC: 2.3 MG/DL — SIGNIFICANT CHANGE UP (ref 1.6–2.6)
MCHC RBC-ENTMCNC: 31.8 PG — SIGNIFICANT CHANGE UP (ref 27–34)
MCHC RBC-ENTMCNC: 34.2 GM/DL — SIGNIFICANT CHANGE UP (ref 32–36)
MCV RBC AUTO: 93 FL — SIGNIFICANT CHANGE UP (ref 80–100)
MONOCYTES # BLD AUTO: 0.02 K/UL — SIGNIFICANT CHANGE UP (ref 0–0.9)
MONOCYTES NFR BLD AUTO: 0.9 % — LOW (ref 2–14)
NEUTROPHILS # BLD AUTO: 2.07 K/UL — SIGNIFICANT CHANGE UP (ref 1.8–7.4)
NEUTROPHILS NFR BLD AUTO: 96.7 % — HIGH (ref 43–77)
NRBC # BLD: 0 /100 WBCS — SIGNIFICANT CHANGE UP (ref 0–0)
PHOSPHATE SERPL-MCNC: 4.3 MG/DL — SIGNIFICANT CHANGE UP (ref 2.5–4.5)
PLATELET # BLD AUTO: 103 K/UL — LOW (ref 150–400)
POTASSIUM SERPL-MCNC: 4 MMOL/L — SIGNIFICANT CHANGE UP (ref 3.5–5.3)
POTASSIUM SERPL-SCNC: 4 MMOL/L — SIGNIFICANT CHANGE UP (ref 3.5–5.3)
PROT SERPL-MCNC: 6.3 G/DL — SIGNIFICANT CHANGE UP (ref 6–8.3)
RBC # BLD: 2.58 M/UL — LOW (ref 4.2–5.8)
RBC # FLD: 20.2 % — HIGH (ref 10.3–14.5)
SODIUM SERPL-SCNC: 139 MMOL/L — SIGNIFICANT CHANGE UP (ref 135–145)
WBC # BLD: 2.14 K/UL — LOW (ref 3.8–10.5)
WBC # FLD AUTO: 2.14 K/UL — LOW (ref 3.8–10.5)

## 2022-01-10 PROCEDURE — 36415 COLL VENOUS BLD VENIPUNCTURE: CPT

## 2022-01-10 PROCEDURE — 85610 PROTHROMBIN TIME: CPT

## 2022-01-10 PROCEDURE — 99239 HOSP IP/OBS DSCHRG MGMT >30: CPT | Mod: GC

## 2022-01-10 PROCEDURE — 87040 BLOOD CULTURE FOR BACTERIA: CPT

## 2022-01-10 PROCEDURE — 84100 ASSAY OF PHOSPHORUS: CPT

## 2022-01-10 PROCEDURE — 76700 US EXAM ABDOM COMPLETE: CPT

## 2022-01-10 PROCEDURE — 83735 ASSAY OF MAGNESIUM: CPT

## 2022-01-10 PROCEDURE — 87086 URINE CULTURE/COLONY COUNT: CPT

## 2022-01-10 PROCEDURE — 86850 RBC ANTIBODY SCREEN: CPT

## 2022-01-10 PROCEDURE — 86900 BLOOD TYPING SEROLOGIC ABO: CPT

## 2022-01-10 PROCEDURE — 71045 X-RAY EXAM CHEST 1 VIEW: CPT

## 2022-01-10 PROCEDURE — 85025 COMPLETE CBC W/AUTO DIFF WBC: CPT

## 2022-01-10 PROCEDURE — 84550 ASSAY OF BLOOD/URIC ACID: CPT

## 2022-01-10 PROCEDURE — 80053 COMPREHEN METABOLIC PANEL: CPT

## 2022-01-10 PROCEDURE — 86923 COMPATIBILITY TEST ELECTRIC: CPT

## 2022-01-10 PROCEDURE — P9040: CPT

## 2022-01-10 PROCEDURE — 85730 THROMBOPLASTIN TIME PARTIAL: CPT

## 2022-01-10 PROCEDURE — 86901 BLOOD TYPING SEROLOGIC RH(D): CPT

## 2022-01-10 PROCEDURE — 36430 TRANSFUSION BLD/BLD COMPNT: CPT

## 2022-01-10 PROCEDURE — 83615 LACTATE (LD) (LDH) ENZYME: CPT

## 2022-01-10 RX ORDER — FLUCONAZOLE 150 MG/1
1 TABLET ORAL
Qty: 0 | Refills: 0 | DISCHARGE

## 2022-01-10 RX ORDER — CIPROFLOXACIN LACTATE 400MG/40ML
1 VIAL (ML) INTRAVENOUS
Qty: 0 | Refills: 0 | DISCHARGE

## 2022-01-10 RX ADMIN — Medication 2 DROP(S): at 06:14

## 2022-01-10 RX ADMIN — SODIUM CHLORIDE 75 MILLILITER(S): 9 INJECTION INTRAMUSCULAR; INTRAVENOUS; SUBCUTANEOUS at 06:14

## 2022-01-10 RX ADMIN — Medication 101.6 MILLIGRAM(S): at 05:29

## 2022-01-10 RX ADMIN — ONDANSETRON 8 MILLIGRAM(S): 8 TABLET, FILM COATED ORAL at 13:36

## 2022-01-10 RX ADMIN — CHLORHEXIDINE GLUCONATE 1 APPLICATION(S): 213 SOLUTION TOPICAL at 10:30

## 2022-01-10 RX ADMIN — Medication 187.67 MILLIGRAM(S): at 06:31

## 2022-01-10 RX ADMIN — ONDANSETRON 8 MILLIGRAM(S): 8 TABLET, FILM COATED ORAL at 06:14

## 2022-01-10 RX ADMIN — AMLODIPINE BESYLATE 5 MILLIGRAM(S): 2.5 TABLET ORAL at 06:14

## 2022-01-10 NOTE — ADVANCED PRACTICE NURSE CONSULT - ASSESSMENT
Cytarabine 6300mg IV over 3 hours infusing via right D/L PICC. Port was noted with positive blood return, flushed without resistance. Bilateral nystagmus check was negative. 
Cytarabine 6300mg IV over 3 hours infusing via right D/L PICC. Positive blood return and port flushed with no resistance. Bilateral nystagmus check was negative. 
Patient is alert, oriented x4.Educated patient about the chemotherapy. Patient verbalized understanding. Nystagmus check done - negative Chemotherapy verified with second RN. Right double lumen picc in place. Picc site has no redness and swelling ,flushes without difficulty and has good blood return. Cytarabine 6300 mg IV started via purple lumen of PICC at 0631 to be infused over 3 hours Patient is tolerating chemotherapy.
Pt seen in bed, awake, alert and oriented x 4 .Denies any discomfort at this time. Rt upper arm double Piccline intact.  with dressing dry and intact with no s/s of bleeding, swelling or redness. Which has positive blood return and flushing well with 10cc Normal saline.  Lab results reviewed by Team and Dr. Jones .  Educated pt. about chemotherapy administration, possible side effects and expected outcome. Pt verbalized understandings. Zofran 8 mg ivp given as anti nausea and decadron 8 mg IVSS . Pt denies any nausea or vomitting. Chemo drug dosage verified with another RN. Bilateral Nystagmus remains negative. Cytarabine 3gm/m2 = 6300 mg  iv infusion given at 1830 over 3 hrs to Rt arm Piccline with positive blood return via Alaris pump.Pt resting in bed comfortably. Primary RN aware of present treatment.
Pt seen in bed, awake, alert and oriented x 4 .Denies any discomfort at this time. Rt upper arm double Piccline intact.  with dressing dry and intact with no s/s of bleeding, swelling or redness. Which has positive blood return and flushing well with 10cc Normal saline. Pt. has CXR for PICC placement. Lab results reviewed by Team and Dr. Yancey upon signing orders.  Pt. educate pt about chemotherapy administration, possible side effects and expected outcome. Pt verbalized understandings. Zofran 8 mg ivp and emend 150 mg IVSS given as anti nausea. Pt denies any nausea or vomitting. Chemo drug dosage verified with another RN. Bilateral Nystagmous remains negative. Cytarabine 3gm/m2 = 6300 mg  iv infusion given at 1658 over 3 hrs to Rt arm Piccline with positive blood return via Alaris pump.Pt resting in bed comfortably. Primary RN aware of present treatment.
Pt seen in bed, awake, alert and oriented x 4 .Denies any discomfort at this time. Rt upper arm double Piccline intact.  with dressing dry and intact with no s/s of bleeding, swelling or redness. Which has positive blood return and flushing well with 10cc Normal saline. Pt. has CXR for PICC placement. Lab results reviewed by Team and Dr. Yancey upon signing orders.  Pt. educate pt about chemotherapy administration, possible side effects and expected outcome. Pt verbalized understandings. Zofran 8 mg IVP and Decadron IVPB given prior chemotherapy. Pt denies any nausea or vomitting. Chemo drug dosage verified with another RN. Bilateral Nystagmous remains negative. Cytarabine 3gm/m2 = 6300 mg  iv infusion given at 1658 over 3 hrs to Rt arm Piccline with positive blood return via Alaris pump.Pt resting in bed comfortably. Primary RN aware of present treatment.

## 2022-01-10 NOTE — DISCHARGE NOTE NURSING/CASE MANAGEMENT/SOCIAL WORK - NSDCPEFALRISK_GEN_ALL_CORE
For information on Fall & Injury Prevention, visit: [email or web address] OR  [email or web address] OR  https://www.cdc.gov/steadi/patient.html

## 2022-01-10 NOTE — PROGRESS NOTE ADULT - ASSESSMENT
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  Now admitted for cycle 4 HIDAC.       #AML  --Patient admitted for 4th cycle of consolidation   --F/U Heme/Onc recommendations  --serial CBC   --monitor for fever -- Patient was febrile on 01/06 and again this morning 01/07 with a Tmax of 102.9--Tylenol was given for fever  --Antipyretics PRN; cooling measures   --BCx2 and UCx from 01/06--NGTD  --CXR from 01/06 is clear  --Per Heme/Onc, Decadron prior to Tx  --Continue to monitor patient and temperature curve closely     #Fever   -- Patient was febrile on 01/06 and again this morning 01/07 with a Tmax of 102.9--Tylenol was given for fever  --Antipyretics PRN; cooling measures   --BCx2 and UCx from 01/06--NGTD  --CXR from 01/06 is clear  --Per Heme/Onc, Decadron prior to Tx; observe off of Abx for now  --Continue to monitor patient and temperature curve closely     #Headache  --neuro exam  --Monitor patient   --Pain control       #Elevated LFTs  --AST of 74--> 58--> 32--> 27 WNL  01/09; ALT of 264--> 215 ---> 152-->89 01/09 (downtrending); continue to trend  --Can be due to medication side effect-- Fluconazole or Cytarabine with known hepatic impairment- Defer chemo medications per Onc  --Avoid hepatotoxic medications   --Continue to trend on daily labs  --recommend to check abdominal US for further evaluation -- results as above  --Recommend to F/U Outpatient for repeat abdominal US in 3-6 for monitoring       #HTN  --Continue with home dose of Amlodipine  --Monitor BP and Vital signs closely     #Anemia  --Monitor H/H Closely  --Transfusion per Heme/Onc recommendations  --Patient will be receiving 1 unit of PRBCs today 01/09 due to hgb of 7.0; F/U post transfusion CBC       DVT PPX with Lovenox 40, continue to monitor Cr and Platelet count     #Splenomegaly  --Outpatient F/U in 3-6 months for monitoring with repeat abd US        Patient is a 36 year old male with a PMHx of HTN,  fatty liver, kidney stones,  and now newly diagnosed AML, NPM1 mutated, 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  Now admitted for cycle 4 HIDAC.       #AML  --Patient admitted for 4th cycle of consolidation   --F/U Heme/Onc recommendations  --serial CBC   --monitor for fever -- Patient was febrile on 01/06 and again this morning 01/07 with a Tmax of 102.9--Tylenol was given for fever  --Antipyretics PRN; cooling measures   --BCx2 and UCx from 01/06--NGTD; F/U final resylts   --CXR from 01/06 is clear  --Per Heme/Onc, Decadron prior to Tx  --Continue to monitor patient and temperature curve closely     #Fever   -- Patient was febrile on 01/06 and again this morning 01/07 with a Tmax of 102.9--Tylenol was given for fever  --Antipyretics PRN; cooling measures   --BCx2 and UCx from 01/06--NGTD; F/U final results  --CXR from 01/06 is clear  --Per Heme/Onc, Decadron prior to Tx; observe off of Abx for now  --Continue to monitor patient and temperature curve closely   --Patient is currently afebrile and saturating well on RA    #Headache  --neuro exam  --Monitor patient   --Pain control       #Elevated LFTs  --AST of 74--> 58--> 32--> 27 WNL  01/09; ALT of 264--> 215 ---> 152-->89-->  105 01/10; continue to trend  --Can be due to medication side effect-- Fluconazole or Cytarabine with known hepatic impairment- Defer chemo medications per Onc  --Avoid hepatotoxic medications   --Continue to trend on daily labs  --recommend to check abdominal US for further evaluation -- results as above  --Recommend to F/U Outpatient for repeat abdominal US in 3-6 for monitoring       #HTN  --Continue with home dose of Amlodipine  --Monitor BP and Vital signs closely     #Anemia  --Monitor H/H Closely  --Transfusion per Heme/Onc recommendations  --Patient  is S/P 1 unit of PRBCs 01/09 due to hgb of 7.0  --AM Hgb noted to be 8.2 01/10    #Splenomegaly  --Outpatient F/U in 3-6 months for monitoring with repeat abd US    DVT PPX with Lovenox 40, continue to monitor Cr and Platelet count

## 2022-01-10 NOTE — PROGRESS NOTE ADULT - PROBLEM SELECTOR PLAN 5
Lovenox 40 mg SQ Daily   Hold when platelet count < 50  Encourage OOB  and ambulation

## 2022-01-10 NOTE — ADVANCED PRACTICE NURSE CONSULT - REASON FOR CONSULT
Chemotherapy Note: Day 1/5   HIDAC  ,cycle 4 consent in chart 
Chemotherapy Note: Day 5/5   HIDAC  ,cycle 4 consent in chart 
Cytarabine 6300mg IV over 3 hours. 
Cytarabine 6300mg IV over 3 hours. 
To administer Cytarabine.
Chemotherapy Note: Day 2/5   HIDAC  ,cycle 4 consent in chart

## 2022-01-10 NOTE — PROGRESS NOTE ADULT - ASSESSMENT
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 Now admitted for cycle 4 HIDAC. Hospital admission complicated by fever, blood cultures pending. Anemia and thrombocytopenia secondary to disease and/or treatment.

## 2022-01-10 NOTE — DISCHARGE NOTE NURSING/CASE MANAGEMENT/SOCIAL WORK - PATIENT PORTAL LINK FT
You can access the FollowMyHealth Patient Portal offered by [facility] by registering at the following website: [email or web address]. By joining [facility]’s FollowMyHealth portal, you will also be able to view your health information using other applications (apps) compatible with our system.

## 2022-01-10 NOTE — PROGRESS NOTE ADULT - SUBJECTIVE AND OBJECTIVE BOX
Diagnosis: AML    Protocol/Chemo Regimen: cycle 4  HIDAC    Day: 6    Pt endorsed: No complaints.     Review of Systems: Pt denies headaches/numbness/tingling/n/v/dizziness/sob/chest pain.    Pain scale: 0/10    Diet: regular    Allergies    No Known Allergies    Intolerances    cefepime (Rash)      ANTIMICROBIALS      HEME/ONC MEDICATIONS  enoxaparin Injectable 40 milliGRAM(s) SubCutaneous at bedtime      STANDING MEDICATIONS  amLODIPine   Tablet 5 milliGRAM(s) Oral daily  chlorhexidine 2% Cloths 1 Application(s) Topical <User Schedule>  influenza   Vaccine 0.5 milliLiter(s) IntraMuscular once  ondansetron Injectable 8 milliGRAM(s) IV Push every 8 hours  prednisoLONE acetate 1% Suspension 2 Drop(s) Both EYES every 6 hours  senna 2 Tablet(s) Oral at bedtime  sodium chloride 0.9%. 1000 milliLiter(s) IV Continuous <Continuous>      PRN MEDICATIONS  acetaminophen     Tablet .. 650 milliGRAM(s) Oral every 6 hours PRN  metoclopramide Injectable 10 milliGRAM(s) IV Push every 6 hours PRN  oxyCODONE    IR 5 milliGRAM(s) Oral every 4 hours PRN  sodium chloride 0.9% lock flush 10 milliLiter(s) IV Push every 1 hour PRN        Vital Signs Last 24 Hrs  T(C): 36.6 (10 Kit 2022 05:05), Max: 36.9 (09 Jan 2022 10:49)  T(F): 97.8 (10 Kit 2022 05:05), Max: 98.4 (09 Jan 2022 10:49)  HR: 65 (10 Kit 2022 05:05) (65 - 100)  BP: 98/60 (10 Kit 2022 05:05) (98/60 - 119/71)  BP(mean): --  RR: 18 (10 Kit 2022 05:05) (18 - 19)  SpO2: 97% (10 Kit 2022 05:05) (97% - 100%)    PHYSICAL EXAM  General: NAD  HEENT: clear oropharynx,   CV: (+) S1/S2 RRR  Lungs: Breath sounds clear and equal b/l, no wheezes, no rales  Abdomen: soft, non-tender, non-distended  Ext: no edema  Skin: no rashes and no petechiae  Neuro: alert and oriented X 3, no focal deficits, no nystagmus/cerebellar toxicity.  Central Line: RUE PICC clean dry and intact.    LABS: Diagnosis: AML    Protocol/Chemo Regimen: cycle 4 HIDAC    Day: 6    Pt endorsed: No complaints.     Review of Systems: Pt denies headaches/numbness/tingling/n/v/dizziness/sob/chest pain.    Pain scale: 0/10    Diet: regular    Allergies    No Known Allergies    Intolerances    cefepime (Rash)      ANTIMICROBIALS      HEME/ONC MEDICATIONS  enoxaparin Injectable 40 milliGRAM(s) SubCutaneous at bedtime      STANDING MEDICATIONS  amLODIPine   Tablet 5 milliGRAM(s) Oral daily  chlorhexidine 2% Cloths 1 Application(s) Topical <User Schedule>  influenza   Vaccine 0.5 milliLiter(s) IntraMuscular once  ondansetron Injectable 8 milliGRAM(s) IV Push every 8 hours  prednisoLONE acetate 1% Suspension 2 Drop(s) Both EYES every 6 hours  senna 2 Tablet(s) Oral at bedtime  sodium chloride 0.9%. 1000 milliLiter(s) IV Continuous <Continuous>      PRN MEDICATIONS  acetaminophen     Tablet .. 650 milliGRAM(s) Oral every 6 hours PRN  metoclopramide Injectable 10 milliGRAM(s) IV Push every 6 hours PRN  oxyCODONE    IR 5 milliGRAM(s) Oral every 4 hours PRN  sodium chloride 0.9% lock flush 10 milliLiter(s) IV Push every 1 hour PRN        Vital Signs Last 24 Hrs  T(C): 36.6 (10 Kit 2022 05:05), Max: 36.9 (09 Jan 2022 10:49)  T(F): 97.8 (10 Kit 2022 05:05), Max: 98.4 (09 Jan 2022 10:49)  HR: 65 (10 Kit 2022 05:05) (65 - 100)  BP: 98/60 (10 Kit 2022 05:05) (98/60 - 119/71)  BP(mean): --  RR: 18 (10 Kit 2022 05:05) (18 - 19)  SpO2: 97% (10 Kit 2022 05:05) (97% - 100%)    PHYSICAL EXAM  General: NAD  HEENT: clear oropharynx,   CV: (+) S1/S2 RRR  Lungs: Breath sounds clear and equal b/l, no wheezes, no rales  Abdomen: soft, non-tender, non-distended  Ext: no edema  Skin: no rashes and no petechiae  Neuro: alert and oriented X 3, no focal deficits, no nystagmus/cerebellar toxicity.  Central Line: RUE PICC clean dry and intact.    LABS:    Blood Cultures:                           8.2    2.14  )-----------( 103      ( 10 Kit 2022 07:41 )             24.0         Mean Cell Volume : 93.0 fl  Mean Cell Hemoglobin : 31.8 pg  Mean Cell Hemoglobin Concentration : 34.2 gm/dL  Auto Neutrophil # : 2.07 K/uL  Auto Lymphocyte # : 0.04 K/uL  Auto Monocyte # : 0.02 K/uL  Auto Eosinophil # : 0.00 K/uL  Auto Basophil # : 0.00 K/uL  Auto Neutrophil % : 96.7 %  Auto Lymphocyte % : 1.9 %  Auto Monocyte % : 0.9 %  Auto Eosinophil % : 0.0 %  Auto Basophil % : 0.0 %      01-10    139  |  105  |  13  ----------------------------<  114<H>  4.0   |  22  |  0.71    Ca    9.3      10 Kit 2022 07:37  Phos  4.3     01-10  Mg     2.3     01-10    TPro  6.3  /  Alb  3.9  /  TBili  0.5  /  DBili  x   /  AST  37  /  ALT  105<H>  /  AlkPhos  67  01-10      Mg 2.3  Phos 4.3

## 2022-01-10 NOTE — DISCHARGE NOTE NURSING/CASE MANAGEMENT/SOCIAL WORK - NSDCFUADDAPPT_GEN_ALL_CORE_FT
To [facility] on Wednesday 1/12 at 3:10PM for Fulfpila and possible platelet transfusion, please arrive 45 minutes earlier  To [facility] on Wednesday 1/12 at  2:30pm  to see GALINA Choi  To [facility] on Saturday 1/15 at 10 am for possible platelet

## 2022-01-10 NOTE — PROGRESS NOTE ADULT - PROVIDER SPECIALTY LIST ADULT
Heme/Onc
Internal Medicine
Heme/Onc

## 2022-01-10 NOTE — PROGRESS NOTE ADULT - SUBJECTIVE AND OBJECTIVE BOX
Name of Patient : JAK DANIELS  MRN: [id number]  Date of visit: 01-10-22 @ 13:40      Subjective: Patient seen and examined. No new events except as noted. Patient seen early this morning. Doing okay. No complaints.   States that he is eating well and having normal BM.   S/P PRBC transfusion yesterday 01/09, hemoglobin today noted to be 8.0.   Patient is scheduled to undergo treatment today 01/10.     REVIEW OF SYSTEMS:    CONSTITUTIONAL: No weakness, fevers or chills  EYES/ENT: No visual changes;  No vertigo or throat pain   NECK: No pain or stiffness  RESPIRATORY: No cough, wheezing, hemoptysis; No shortness of breath  CARDIOVASCULAR: No chest pain or palpitations  GASTROINTESTINAL: No abdominal or epigastric pain. No nausea, vomiting, or hematemesis; No diarrhea or constipation. No melena or hematochezia.  GENITOURINARY: No dysuria, frequency or hematuria  NEUROLOGICAL: No numbness or weakness  SKIN: No itching, burning, rashes, or lesions   All other review of systems is negative unless indicated above.    MEDICATIONS:  MEDICATIONS  (STANDING):  amLODIPine   Tablet 5 milliGRAM(s) Oral daily  chlorhexidine 2% Cloths 1 Application(s) Topical <User Schedule>  enoxaparin Injectable 40 milliGRAM(s) SubCutaneous at bedtime  influenza   Vaccine 0.5 milliLiter(s) IntraMuscular once  ondansetron Injectable 8 milliGRAM(s) IV Push every 8 hours  prednisoLONE acetate 1% Suspension 2 Drop(s) Both EYES every 6 hours  senna 2 Tablet(s) Oral at bedtime  sodium chloride 0.9%. 1000 milliLiter(s) (75 mL/Hr) IV Continuous <Continuous>      PHYSICAL EXAM:  T(C): 36.8 (01-10-22 @ 09:15), Max: 36.8 (01-09-22 @ 14:15)  HR: 92 (01-10-22 @ 09:15) (65 - 92)  BP: 114/73 (01-10-22 @ 09:15) (98/60 - 114/73)  RR: 18 (01-10-22 @ 09:15) (18 - 19)  SpO2: 98% (01-10-22 @ 09:15) (97% - 99%)  Wt(kg): --  I&O's Summary    09 Jan 2022 07:01  -  10 Kit 2022 07:00  --------------------------------------------------------  IN: 2805 mL / OUT: 2300 mL / NET: 505 mL    10 Kit 2022 07:01  -  10 Kit 2022 13:40  --------------------------------------------------------  IN: 0 mL / OUT: 725 mL / NET: -725 mL          Appearance: Normal, calm, sitting up in bed 	  HEENT:  PERRLA   Lymphatic: No lymphadenopathy   Cardiovascular: Normal S1 S2, no JVD  Respiratory: normal effort , clear  Gastrointestinal:  Soft, Non-tender  Skin: No rashes,  warm to touch  Psychiatry:  Mood & affect appropriate  Musculoskeletal: No edema          01-09-22 @ 07:01  -  01-10-22 @ 07:00  --------------------------------------------------------  IN: 2805 mL / OUT: 2300 mL / NET: 505 mL    01-10-22 @ 07:01  -  01-10-22 @ 13:40  --------------------------------------------------------  IN: 0 mL / OUT: 725 mL / NET: -725 mL                              8.2    2.14  )-----------( 103      ( 10 Kit 2022 07:41 )             24.0               01-10    139  |  105  |  13  ----------------------------<  114<H>  4.0   |  22  |  0.71    Ca    9.3      10 Kit 2022 07:37  Phos  4.3     01-10  Mg     2.3     01-10    TPro  6.3  /  Alb  3.9  /  TBili  0.5  /  DBili  x   /  AST  37  /  ALT  105<H>  /  AlkPhos  67  01-10                           Culture - Urine (01.07.22 @ 01:48)   Specimen Source: Clean Catch Clean Catch (Midstream)   Culture Results: No growth     Culture - Blood (01.06.22 @ 23:05)   Specimen Source: .Blood Blood-Peripheral   Culture Results: No growth to date.       Culture - Blood (01.06.22 @ 19:22)   Specimen Source: .Blood Blood-Peripheral   Culture Results: No growth to date.

## 2022-01-10 NOTE — PROGRESS NOTE ADULT - ATTENDING COMMENTS
35yo M w/ HTN fatty liver, kidney stones,  diagnosed AML, NPM1 mutated, FLT-3 negative s/p induction chemotherapy with Daunorubicin/Cytarabine in CR, and now s/p cycle 3 cycles of consolidation with HIDAC (high dose cytarabine)  Cycle 3 complicated by hospital admission for neutropenic fever. Now admitted for cycle 4 HIDAC.  C4D5 doing well, no complaints.  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 Now admitted for cycle 4 HIDAC.  C4D6 doing well, no complaints.  Fever on 1/7/22, follow up cultures, likely related to Ayaka-C  LFTs better now, sono +hepatomegaly, steatosis  no neurotoxicity  OOB

## 2022-01-10 NOTE — PROGRESS NOTE ADULT - PROBLEM SELECTOR PLAN 1
Admitted for Cycle 4 HiDAC  Cytarabine 3 g/m2 IV over 3 hrs every 12 hrs on days 1,3,5   IV hydration, Antiemetics, daily weight   Monitor for Cerebellar toxicity, nystagmus checks  Follow CBCwith diff/lytes, transfuse/replete prn  Continue predforte eye drops to prevent chemical conjunctivitis  CXR confirmed PICC placement   grade 1 AST, grade 3 ALT transaminitis, continue same HIDAC dose per iesha Marks (-) Decadron 8mg prior to burak-c given propensity to spike fevers.  Anemia-replace prbc discharge tomorrow. Admitted for Cycle 4 HiDAC  Cytarabine 3 g/m2 IV over 3 hrs every 12 hrs on days 1,3,5   IV hydration, Antiemetics, daily weight   Monitor for Cerebellar toxicity, nystagmus checks  Follow CBCwith diff/lytes, transfuse/replete prn  Continue predforte eye drops to prevent chemical conjunctivitis  CXR confirmed PICC placement   grade 1 AST, grade 3 ALT transaminitis, continue same HIDAC dose per iesha Marks (-) Decadron 8mg prior to burak-c given propensity to spike fevers.

## 2022-01-11 LAB
CULTURE RESULTS: SIGNIFICANT CHANGE UP
SPECIMEN SOURCE: SIGNIFICANT CHANGE UP

## 2022-01-12 ENCOUNTER — RESULT REVIEW (OUTPATIENT)
Age: 37
End: 2022-01-12

## 2022-01-12 ENCOUNTER — APPOINTMENT (OUTPATIENT)
Dept: HEMATOLOGY ONCOLOGY | Facility: CLINIC | Age: 37
End: 2022-01-12
Payer: COMMERCIAL

## 2022-01-12 ENCOUNTER — APPOINTMENT (OUTPATIENT)
Dept: INFUSION THERAPY | Facility: HOSPITAL | Age: 37
End: 2022-01-12

## 2022-01-12 VITALS
RESPIRATION RATE: 17 BRPM | OXYGEN SATURATION: 99 % | SYSTOLIC BLOOD PRESSURE: 125 MMHG | TEMPERATURE: 96.6 F | DIASTOLIC BLOOD PRESSURE: 77 MMHG | HEIGHT: 70.67 IN | WEIGHT: 200.62 LBS | HEART RATE: 98 BPM | BODY MASS INDEX: 28.09 KG/M2

## 2022-01-12 LAB
BASOPHILS # BLD AUTO: 0 K/UL — SIGNIFICANT CHANGE UP (ref 0–0.2)
BASOPHILS NFR BLD AUTO: 0 % — SIGNIFICANT CHANGE UP (ref 0–2)
CULTURE RESULTS: SIGNIFICANT CHANGE UP
EOSINOPHIL # BLD AUTO: 0 K/UL — SIGNIFICANT CHANGE UP (ref 0–0.5)
EOSINOPHIL NFR BLD AUTO: 0 % — SIGNIFICANT CHANGE UP (ref 0–6)
HCT VFR BLD CALC: 24.9 % — LOW (ref 39–50)
HGB BLD-MCNC: 9 G/DL — LOW (ref 13–17)
IMM GRANULOCYTES NFR BLD AUTO: 5.8 % — HIGH (ref 0–1.5)
LYMPHOCYTES # BLD AUTO: 0.1 K/UL — LOW (ref 1–3.3)
LYMPHOCYTES # BLD AUTO: 6.5 % — LOW (ref 13–44)
MCHC RBC-ENTMCNC: 32.7 PG — SIGNIFICANT CHANGE UP (ref 27–34)
MCHC RBC-ENTMCNC: 36.1 G/DL — HIGH (ref 32–36)
MCV RBC AUTO: 90.5 FL — SIGNIFICANT CHANGE UP (ref 80–100)
MONOCYTES # BLD AUTO: 0.01 K/UL — SIGNIFICANT CHANGE UP (ref 0–0.9)
MONOCYTES NFR BLD AUTO: 0.6 % — LOW (ref 2–14)
NEUTROPHILS # BLD AUTO: 1.34 K/UL — LOW (ref 1.8–7.4)
NEUTROPHILS NFR BLD AUTO: 87.1 % — HIGH (ref 43–77)
NRBC # BLD: 0 /100 WBCS — SIGNIFICANT CHANGE UP (ref 0–0)
PLATELET # BLD AUTO: 73 K/UL — LOW (ref 150–400)
RBC # BLD: 2.75 M/UL — LOW (ref 4.2–5.8)
RBC # FLD: 19 % — HIGH (ref 10.3–14.5)
SPECIMEN SOURCE: SIGNIFICANT CHANGE UP
WBC # BLD: 1.54 K/UL — LOW (ref 3.8–10.5)
WBC # FLD AUTO: 1.54 K/UL — LOW (ref 3.8–10.5)

## 2022-01-12 PROCEDURE — 99214 OFFICE O/P EST MOD 30 MIN: CPT

## 2022-01-13 DIAGNOSIS — Z51.89 ENCOUNTER FOR OTHER SPECIFIED AFTERCARE: ICD-10-CM

## 2022-01-13 LAB
ALBUMIN SERPL ELPH-MCNC: 4.1 G/DL
ALP BLD-CCNC: 86 U/L
ALT SERPL-CCNC: 257 U/L
ANION GAP SERPL CALC-SCNC: 17 MMOL/L
AST SERPL-CCNC: 102 U/L
BILIRUB SERPL-MCNC: 0.7 MG/DL
BUN SERPL-MCNC: 22 MG/DL
CALCIUM SERPL-MCNC: 9.2 MG/DL
CHLORIDE SERPL-SCNC: 102 MMOL/L
CO2 SERPL-SCNC: 21 MMOL/L
CREAT SERPL-MCNC: 0.85 MG/DL
GLUCOSE SERPL-MCNC: 84 MG/DL
POTASSIUM SERPL-SCNC: 4.1 MMOL/L
PROT SERPL-MCNC: 6.7 G/DL
SODIUM SERPL-SCNC: 140 MMOL/L

## 2022-01-13 NOTE — ASSESSMENT
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 \par Started induction with Dauno/Cytarabine on 8/6. \par Pt's counts now recovered, remission marrow later done on 9/2- in remission. Proceed to consolidation with 4 cycles of HIDAC. C1 HIDAC on 9/17, c/b neutropenic fever and diarrhea. C2 HIDAC on 10/25, was postponed for 1 week due to admission for diarrhea, given negative stool studies, suspect diarrhea was likely chemo-related. Pt was admitted again 11/13-11/17 for sepsis due to thumb cellulitis after laceration. C3 on 11/26, had Fulphilia on C3 c/b epistaxis and neutropenic fever w/Temp 100.3. \par Reviewed CBC today with pt, Platelet 73 ANC 1.34, he is not on levaquine and fluconazole given he is not neutropenic. Pt had sufficient ppx meds at home, he is aware that we will call him to restart Levaquine and fluconazole when ANC <1000\par cont hemorrhoidal ointment and witch hazel. Sent oxycodone for pain relief\par Recommend to repeat abdominal US in 3-6 for monitoring\par Will do BMBx to assess for response once count recover\par continue possible platelet 3x a week until count recover\par All questions answered\par RTC after C4\par \par Case and management discussed with Dr. Yancey\par \par \par \par \par

## 2022-01-13 NOTE — HISTORY OF PRESENT ILLNESS
[de-identified] : 35yo M w/ HTN and newly diagnosed AML here for f/u. \par \par He presented to the hospital on 7/30/21 with complaints of dizziness, fatigue and severe RUQ pain for 3 days. CT A/P revealed fatty liver and small R pleural effusion. WBC 12k with 17% blasts.Peripheral flow cytometry showed 13% myeloblasts. FLT3(-). BMbx was done on 8/4/21 - confirmed AML. 46,XY           {20           }\par Foundation: mutations in DNMT3A R882H, NRAS G12D, NPM1 W288fs*12\par Pt consented to [facility]. Patient was offered sperm banking, but declined.\par On 8/6, induction with Dauno/Cytararbine was started (cytarabine 100/m2 and dauno 90/m2) \par He received a seven day course of Zosyn for presumed RUL PNA, then transitioned to  levaquin, posaconazole and Acyclovir for ppx for neutropenia. Course c/b neutropenic fevers, cultures negative, repeat CT 8/14 without infectious source. He was on Cefepime but developed a rash, changed to meropenem. Rash improved. \par Day 14 BMbx on 8/19 was hypocellular with chemotherapeutic effect; however, per hematopathology, appears to be earlier regeneration than would typically seen which is concerning for persistent disease at this point, and the earliest cells are CD34 positive and his myeloblasts on initial presentation were CD34 negative. Thus, this may simply represent early regeneration of his marrow. Plan is to await count recovery and repeat biopsy.  \par Patient discharged home on 8/29/21 when ANC >500 [de-identified] : Eating well since discharge. \par c/o hemorrhoidal pain. Hemorrhoids started a few days prior to discharge. He was sent home with rectal ointment and witch hazel. \par \par BMBx done on 9/2: Cellular bone marrow with trilineage hematopoiesis with maturation and megakaryocytosis (history of AML).\par Pt feels well, CBC today showed count stable\par \par s/p C1 HIDAC 9/17-9/22 \par c/o leg pain after chemo in the hospital \par \par He presented to the ED on 10/9 due to fever the night of presentation. The patient went to sleep around 10pm and woke up at 3am feeling warm and clammy. He took his temperature orally at home and it was 100.7. The day prior to presentation (10/8/21), the patient went to [facility] for an appointment to get his platelet count checked. He states he was feeling a bit run down and thought he was going to need a transfusion, but he did not need a transfusion. He was afebrile during the time of the appointment and went home afterwards. Around 3pm, the patient had bilateral crampy leg pain that was similar to the leg pain he felt during his consolidation therapy treatment. He took hydrocodone and the pain improved. The patient had one episode of diarrhea three days prior to presentation and has been bothered by rectal pain associated with his "large hemorrhoids. He denies any bleeding per rectum. He also feels like his mouth has been more dry than usual over the past several days, but denies all other symptoms. \par CT Abdomen and Pelvis w/ Oral Cont and w/ IV Conttrast on 10/9 revealed Region of hypoenhancement upper pole left kidney; please correlate clinically for possible pyelonephritis. No hydronephrosis or perinephric stranding. No rectal abscess.\par CT Chest No Contrast on 10/9 revealed Clear lungs.\par 10/9- BCX NGTD and 10/9- UCX (-)\par He ate some cold salad late last night, had upsetting stomach and diarrhea this morning. Will take Imodium for diarrhea. \par \par C2 is due on 10/15, pt wants deferring to next Monday after his diarrhea improved. \par Admission of  C2 was postponed due to worsening diarrhea. Went to ED on 10/15 due to worsening watery diarrhea. GI PCR neg, C Diff neg\par Given negative stool studies, suspect diarrhea was likely chemo-related. S/p cycle 2 Consolidation with HIDAC started on 10/25. Patient received IV hydration, strict I/O, antiemetics, monitoring of CBC and CMP and for cerebellar toxicity. PRedforte eye drops given to prevent chemical conjunctivitis. Patient with fever throughout course of treatment. Cultures negative. Due to fever probably related to HIDAC, patient received dexamethasone prior to the last 2 doses of cytatabine. \par He is seen today accompanied by his father, pt feels fatigue after chemo, other than that he feels fine. Denied any fever, chills diarrhea. \par \par Pt was admitted on 11/13-11/17 s/p right first digit laceration repair on 11/12 and presenting with erythema and pain at the site of laceration repair. Patient reported he was feeling unwell prior to suture placement with body aches, chills, night sweats and subjective fevers. Patient last BM 4 days ago. Has bruise to left inner thigh. Patient reports he keeps his PICC site clean and intact which was placed in 9/2021. Upon admission to the hospital ID was consulted started on Zosyn , GI consulted for rectal pain secondary to hemorrhoids and palliative consulted for pain control.\par  \par C3 on 11/26, tolerated well. He was seen today after discharge, accompanied by his father. He admitted feeling tired, denied any f/c, diarrhea. Right hand suture site healing well, no abnormal erythema or discharge. \par He will have Fulphilia today. \par C3 HIDAC 11/26-12/1\par He presents to the hospital due to epistaxis and neutropenic fever w/Temp 100.3 on 12/1. Per patient, he reports that he was feeling sinus congestion and pressure on his L side, blew his nose and bleeding began from L nare without improvement prompting arrival to the emergency department. Feels mild L sinus congestion.  L nasal cavity packed with absorbable packing; F/U ENT clinic outpatient. Pan culture obtained-with No growth currently\par CBC rechecked today recovered. He feels well overall , had lower abdominal pain last night after ate cheese, now improved. Denied any fever chills bloody stool or diarrhea. \par \par s/p C4 HiDAC 1/5/22\par Pt has known grade 1 AST and grade 3 ALT transaminitis. Presented this admission with transamintitis. Abd sono  performed and revealed Borderline/mild hepatomegaly with hepatic steatosis. Splenomegaly. Focal area of left upper pole increased renal cortical echogenicity, corresponding to an area of hypoattenuation seen on prior CT chest, \par abdomen and pelvis dated 10/9/2021. This is nonspecific and may reflect focal edema. Patient received IV hydration, antiemetics, monitoring of CBC and electrolytes. Predforte eye drops provided to prevent chemical conjunctivitis. Patient was monitored for cerebellar toxicity. Nystagmus checks performed. Hospital course complicated by fever blood cultures showed (-), most likely febrile secondary to HIDAC treatment. To receive Fulphila today.\par \par

## 2022-01-13 NOTE — PHYSICAL EXAM
[Fully active, able to carry on all pre-disease performance without restriction] : Status 0 - Fully active, able to carry on all pre-disease performance without restriction [Normal] : affect appropriate [de-identified] : a 1 cm long laceration with sutures on the right thrum, healing well

## 2022-01-15 ENCOUNTER — RESULT REVIEW (OUTPATIENT)
Age: 37
End: 2022-01-15

## 2022-01-15 ENCOUNTER — OUTPATIENT (OUTPATIENT)
Dept: OUTPATIENT SERVICES | Facility: HOSPITAL | Age: 37
LOS: 1 days | End: 2022-01-15
Payer: COMMERCIAL

## 2022-01-15 ENCOUNTER — APPOINTMENT (OUTPATIENT)
Dept: INFUSION THERAPY | Facility: HOSPITAL | Age: 37
End: 2022-01-15

## 2022-01-15 DIAGNOSIS — C92.00 ACUTE MYELOBLASTIC LEUKEMIA, NOT HAVING ACHIEVED REMISSION: ICD-10-CM

## 2022-01-15 LAB
BASOPHILS # BLD AUTO: 0 K/UL — SIGNIFICANT CHANGE UP (ref 0–0.2)
BASOPHILS NFR BLD AUTO: 0 % — SIGNIFICANT CHANGE UP (ref 0–2)
EOSINOPHIL # BLD AUTO: 0 K/UL — SIGNIFICANT CHANGE UP (ref 0–0.5)
EOSINOPHIL NFR BLD AUTO: 0 % — SIGNIFICANT CHANGE UP (ref 0–6)
HCT VFR BLD CALC: 25.1 % — LOW (ref 39–50)
HGB BLD-MCNC: 9.2 G/DL — LOW (ref 13–17)
LYMPHOCYTES # BLD AUTO: 0.15 K/UL — LOW (ref 1–3.3)
LYMPHOCYTES # BLD AUTO: 16 % — SIGNIFICANT CHANGE UP (ref 13–44)
MCHC RBC-ENTMCNC: 32.7 PG — SIGNIFICANT CHANGE UP (ref 27–34)
MCHC RBC-ENTMCNC: 36.7 G/DL — HIGH (ref 32–36)
MCV RBC AUTO: 89.3 FL — SIGNIFICANT CHANGE UP (ref 80–100)
MONOCYTES # BLD AUTO: 0.03 K/UL — SIGNIFICANT CHANGE UP (ref 0–0.9)
MONOCYTES NFR BLD AUTO: 3 % — SIGNIFICANT CHANGE UP (ref 2–14)
NEUTROPHILS # BLD AUTO: 0.75 K/UL — LOW (ref 1.8–7.4)
NEUTROPHILS NFR BLD AUTO: 81 % — HIGH (ref 43–77)
NRBC # BLD: 0 /100 — SIGNIFICANT CHANGE UP (ref 0–0)
NRBC # BLD: SIGNIFICANT CHANGE UP /100 WBCS (ref 0–0)
PLAT MORPH BLD: NORMAL — SIGNIFICANT CHANGE UP
PLATELET # BLD AUTO: 12 K/UL — CRITICAL LOW (ref 150–400)
RBC # BLD: 2.81 M/UL — LOW (ref 4.2–5.8)
RBC # FLD: 18 % — HIGH (ref 10.3–14.5)
RBC BLD AUTO: SIGNIFICANT CHANGE UP
WBC # BLD: 0.92 K/UL — CRITICAL LOW (ref 3.8–10.5)
WBC # FLD AUTO: 0.92 K/UL — CRITICAL LOW (ref 3.8–10.5)

## 2022-01-17 ENCOUNTER — RESULT REVIEW (OUTPATIENT)
Age: 37
End: 2022-01-17

## 2022-01-17 ENCOUNTER — APPOINTMENT (OUTPATIENT)
Dept: INFUSION THERAPY | Facility: HOSPITAL | Age: 37
End: 2022-01-17

## 2022-01-17 LAB
HCT VFR BLD CALC: 20.2 % — CRITICAL LOW (ref 39–50)
HGB BLD-MCNC: 7.5 G/DL — LOW (ref 13–17)
MCHC RBC-ENTMCNC: 32.8 PG — SIGNIFICANT CHANGE UP (ref 27–34)
MCHC RBC-ENTMCNC: 37.1 G/DL — HIGH (ref 32–36)
MCV RBC AUTO: 88.2 FL — SIGNIFICANT CHANGE UP (ref 80–100)
NRBC # BLD: 0 /100 WBCS — SIGNIFICANT CHANGE UP (ref 0–0)
PLATELET # BLD AUTO: 5 K/UL — CRITICAL LOW (ref 150–400)
RBC # BLD: 2.29 M/UL — LOW (ref 4.2–5.8)
RBC # FLD: 17.4 % — HIGH (ref 10.3–14.5)
WBC # BLD: 0.16 K/UL — CRITICAL LOW (ref 3.8–10.5)
WBC # FLD AUTO: 0.16 K/UL — CRITICAL LOW (ref 3.8–10.5)

## 2022-01-17 PROCEDURE — 86923 COMPATIBILITY TEST ELECTRIC: CPT

## 2022-01-17 PROCEDURE — 86901 BLOOD TYPING SEROLOGIC RH(D): CPT

## 2022-01-17 PROCEDURE — 86850 RBC ANTIBODY SCREEN: CPT

## 2022-01-17 PROCEDURE — 86900 BLOOD TYPING SEROLOGIC ABO: CPT

## 2022-01-18 ENCOUNTER — INPATIENT (INPATIENT)
Facility: HOSPITAL | Age: 37
LOS: 4 days | Discharge: HOME | DRG: 809 | End: 2022-01-23
Attending: INTERNAL MEDICINE | Admitting: INTERNAL MEDICINE
Payer: COMMERCIAL

## 2022-01-18 VITALS
TEMPERATURE: 99 F | RESPIRATION RATE: 20 BRPM | HEIGHT: 71 IN | HEART RATE: 71 BPM | OXYGEN SATURATION: 100 % | SYSTOLIC BLOOD PRESSURE: 125 MMHG | DIASTOLIC BLOOD PRESSURE: 70 MMHG | WEIGHT: 195.11 LBS

## 2022-01-18 DIAGNOSIS — D70.9 NEUTROPENIA, UNSPECIFIED: ICD-10-CM

## 2022-01-18 LAB
ALBUMIN SERPL ELPH-MCNC: 4.3 G/DL — SIGNIFICANT CHANGE UP (ref 3.3–5)
ALP SERPL-CCNC: 118 U/L — SIGNIFICANT CHANGE UP (ref 40–120)
ALT FLD-CCNC: 83 U/L — HIGH (ref 10–45)
ANION GAP SERPL CALC-SCNC: 18 MMOL/L — HIGH (ref 5–17)
APPEARANCE UR: CLEAR — SIGNIFICANT CHANGE UP
APTT BLD: 31.4 SEC — SIGNIFICANT CHANGE UP (ref 27.5–35.5)
AST SERPL-CCNC: 14 U/L — SIGNIFICANT CHANGE UP (ref 10–40)
BASE EXCESS BLDV CALC-SCNC: 1.9 MMOL/L — SIGNIFICANT CHANGE UP (ref -2–2)
BILIRUB SERPL-MCNC: 1.4 MG/DL — HIGH (ref 0.2–1.2)
BILIRUB UR-MCNC: NEGATIVE — SIGNIFICANT CHANGE UP
BLD GP AB SCN SERPL QL: NEGATIVE — SIGNIFICANT CHANGE UP
BUN SERPL-MCNC: 16 MG/DL — SIGNIFICANT CHANGE UP (ref 7–23)
CA-I SERPL-SCNC: 1.25 MMOL/L — SIGNIFICANT CHANGE UP (ref 1.15–1.33)
CALCIUM SERPL-MCNC: 9.9 MG/DL — SIGNIFICANT CHANGE UP (ref 8.4–10.5)
CHLORIDE BLDV-SCNC: 102 MMOL/L — SIGNIFICANT CHANGE UP (ref 96–108)
CHLORIDE SERPL-SCNC: 99 MMOL/L — SIGNIFICANT CHANGE UP (ref 96–108)
CO2 BLDV-SCNC: 26 MMOL/L — SIGNIFICANT CHANGE UP (ref 22–26)
CO2 SERPL-SCNC: 20 MMOL/L — LOW (ref 22–31)
COLOR SPEC: SIGNIFICANT CHANGE UP
CREAT SERPL-MCNC: 0.95 MG/DL — SIGNIFICANT CHANGE UP (ref 0.5–1.3)
DIFF PNL FLD: NEGATIVE — SIGNIFICANT CHANGE UP
GAS PNL BLDV: 136 MMOL/L — SIGNIFICANT CHANGE UP (ref 136–145)
GAS PNL BLDV: SIGNIFICANT CHANGE UP
GAS PNL BLDV: SIGNIFICANT CHANGE UP
GLUCOSE BLDV-MCNC: 96 MG/DL — SIGNIFICANT CHANGE UP (ref 70–99)
GLUCOSE SERPL-MCNC: 96 MG/DL — SIGNIFICANT CHANGE UP (ref 70–99)
GLUCOSE UR QL: NEGATIVE — SIGNIFICANT CHANGE UP
HCO3 BLDV-SCNC: 25 MMOL/L — SIGNIFICANT CHANGE UP (ref 22–29)
HCT VFR BLD CALC: 17.8 % — CRITICAL LOW (ref 39–50)
HCT VFR BLD CALC: 18.6 % — CRITICAL LOW (ref 39–50)
HCT VFR BLDA CALC: 26 % — LOW (ref 39–51)
HGB BLD CALC-MCNC: 8.6 G/DL — LOW (ref 12.6–17.4)
HGB BLD-MCNC: 6.4 G/DL — CRITICAL LOW (ref 13–17)
HGB BLD-MCNC: 6.8 G/DL — CRITICAL LOW (ref 13–17)
INR BLD: 1.23 RATIO — HIGH (ref 0.88–1.16)
KETONES UR-MCNC: NEGATIVE — SIGNIFICANT CHANGE UP
LACTATE BLDV-MCNC: 2.4 MMOL/L — HIGH (ref 0.7–2)
LEUKOCYTE ESTERASE UR-ACNC: NEGATIVE — SIGNIFICANT CHANGE UP
MCHC RBC-ENTMCNC: 31.3 PG — SIGNIFICANT CHANGE UP (ref 27–34)
MCHC RBC-ENTMCNC: 32.2 PG — SIGNIFICANT CHANGE UP (ref 27–34)
MCHC RBC-ENTMCNC: 36 GM/DL — SIGNIFICANT CHANGE UP (ref 32–36)
MCHC RBC-ENTMCNC: 36.6 GM/DL — HIGH (ref 32–36)
MCV RBC AUTO: 85.7 FL — SIGNIFICANT CHANGE UP (ref 80–100)
MCV RBC AUTO: 89.4 FL — SIGNIFICANT CHANGE UP (ref 80–100)
NITRITE UR-MCNC: NEGATIVE — SIGNIFICANT CHANGE UP
NRBC # BLD: 0 /100 WBCS — SIGNIFICANT CHANGE UP (ref 0–0)
NRBC # BLD: 0 /100 WBCS — SIGNIFICANT CHANGE UP (ref 0–0)
PCO2 BLDV: 32 MMHG — LOW (ref 42–55)
PH BLDV: 7.5 — HIGH (ref 7.32–7.43)
PH UR: 7 — SIGNIFICANT CHANGE UP (ref 5–8)
PLATELET # BLD AUTO: 12 K/UL — CRITICAL LOW (ref 150–400)
PLATELET # BLD AUTO: 16 K/UL — CRITICAL LOW (ref 150–400)
PO2 BLDV: 23 MMHG — LOW (ref 25–45)
POTASSIUM BLDV-SCNC: 3.9 MMOL/L — SIGNIFICANT CHANGE UP (ref 3.5–5.1)
POTASSIUM SERPL-MCNC: 3.7 MMOL/L — SIGNIFICANT CHANGE UP (ref 3.5–5.3)
POTASSIUM SERPL-SCNC: 3.7 MMOL/L — SIGNIFICANT CHANGE UP (ref 3.5–5.3)
PROT SERPL-MCNC: 7.2 G/DL — SIGNIFICANT CHANGE UP (ref 6–8.3)
PROT UR-MCNC: NEGATIVE — SIGNIFICANT CHANGE UP
PROTHROM AB SERPL-ACNC: 14.6 SEC — HIGH (ref 10.6–13.6)
RBC # BLD: 1.99 M/UL — LOW (ref 4.2–5.8)
RBC # BLD: 2.17 M/UL — LOW (ref 4.2–5.8)
RBC # FLD: 16.8 % — HIGH (ref 10.3–14.5)
RBC # FLD: 17.2 % — HIGH (ref 10.3–14.5)
RH IG SCN BLD-IMP: POSITIVE — SIGNIFICANT CHANGE UP
SAO2 % BLDV: 44.5 % — LOW (ref 67–88)
SARS-COV-2 RNA SPEC QL NAA+PROBE: SIGNIFICANT CHANGE UP
SODIUM SERPL-SCNC: 137 MMOL/L — SIGNIFICANT CHANGE UP (ref 135–145)
SP GR SPEC: 1.01 — SIGNIFICANT CHANGE UP (ref 1.01–1.02)
UROBILINOGEN FLD QL: NEGATIVE — SIGNIFICANT CHANGE UP
WBC # BLD: 0.1 K/UL — CRITICAL LOW (ref 3.8–10.5)
WBC # BLD: 0.12 K/UL — CRITICAL LOW (ref 3.8–10.5)
WBC # FLD AUTO: 0.1 K/UL — CRITICAL LOW (ref 3.8–10.5)
WBC # FLD AUTO: 0.12 K/UL — CRITICAL LOW (ref 3.8–10.5)

## 2022-01-18 PROCEDURE — 71045 X-RAY EXAM CHEST 1 VIEW: CPT | Mod: 26

## 2022-01-18 PROCEDURE — 99291 CRITICAL CARE FIRST HOUR: CPT

## 2022-01-18 RX ORDER — ACETAMINOPHEN 500 MG
1000 TABLET ORAL ONCE
Refills: 0 | Status: COMPLETED | OUTPATIENT
Start: 2022-01-18 | End: 2022-01-18

## 2022-01-18 RX ORDER — ACETAMINOPHEN 500 MG
975 TABLET ORAL ONCE
Refills: 0 | Status: COMPLETED | OUTPATIENT
Start: 2022-01-18 | End: 2022-01-18

## 2022-01-18 RX ORDER — PIPERACILLIN AND TAZOBACTAM 4; .5 G/20ML; G/20ML
3.38 INJECTION, POWDER, LYOPHILIZED, FOR SOLUTION INTRAVENOUS EVERY 8 HOURS
Refills: 0 | Status: DISCONTINUED | OUTPATIENT
Start: 2022-01-18 | End: 2022-01-23

## 2022-01-18 RX ORDER — SODIUM CHLORIDE 9 MG/ML
1000 INJECTION INTRAMUSCULAR; INTRAVENOUS; SUBCUTANEOUS
Refills: 0 | Status: COMPLETED | OUTPATIENT
Start: 2022-01-18 | End: 2022-01-19

## 2022-01-18 RX ORDER — FLUCONAZOLE 150 MG/1
200 TABLET ORAL DAILY
Refills: 0 | Status: DISCONTINUED | OUTPATIENT
Start: 2022-01-18 | End: 2022-01-18

## 2022-01-18 RX ORDER — PIPERACILLIN AND TAZOBACTAM 4; .5 G/20ML; G/20ML
3.38 INJECTION, POWDER, LYOPHILIZED, FOR SOLUTION INTRAVENOUS ONCE
Refills: 0 | Status: COMPLETED | OUTPATIENT
Start: 2022-01-18 | End: 2022-01-18

## 2022-01-18 RX ORDER — HYDROMORPHONE HYDROCHLORIDE 2 MG/ML
1 INJECTION INTRAMUSCULAR; INTRAVENOUS; SUBCUTANEOUS EVERY 6 HOURS
Refills: 0 | Status: DISCONTINUED | OUTPATIENT
Start: 2022-01-18 | End: 2022-01-23

## 2022-01-18 RX ORDER — ONDANSETRON 8 MG/1
8 TABLET, FILM COATED ORAL EVERY 8 HOURS
Refills: 0 | Status: DISCONTINUED | OUTPATIENT
Start: 2022-01-18 | End: 2022-01-23

## 2022-01-18 RX ORDER — PANTOPRAZOLE SODIUM 20 MG/1
40 TABLET, DELAYED RELEASE ORAL
Refills: 0 | Status: DISCONTINUED | OUTPATIENT
Start: 2022-01-18 | End: 2022-01-23

## 2022-01-18 RX ORDER — HYDROMORPHONE HYDROCHLORIDE 2 MG/ML
1 INJECTION INTRAMUSCULAR; INTRAVENOUS; SUBCUTANEOUS ONCE
Refills: 0 | Status: DISCONTINUED | OUTPATIENT
Start: 2022-01-18 | End: 2022-01-18

## 2022-01-18 RX ORDER — INFLUENZA VIRUS VACCINE 15; 15; 15; 15 UG/.5ML; UG/.5ML; UG/.5ML; UG/.5ML
0.5 SUSPENSION INTRAMUSCULAR ONCE
Refills: 0 | Status: DISCONTINUED | OUTPATIENT
Start: 2022-01-18 | End: 2022-01-23

## 2022-01-18 RX ORDER — SODIUM CHLORIDE 9 MG/ML
1000 INJECTION INTRAMUSCULAR; INTRAVENOUS; SUBCUTANEOUS ONCE
Refills: 0 | Status: COMPLETED | OUTPATIENT
Start: 2022-01-18 | End: 2022-01-18

## 2022-01-18 RX ADMIN — PIPERACILLIN AND TAZOBACTAM 25 GRAM(S): 4; .5 INJECTION, POWDER, LYOPHILIZED, FOR SOLUTION INTRAVENOUS at 20:53

## 2022-01-18 RX ADMIN — HYDROMORPHONE HYDROCHLORIDE 1 MILLIGRAM(S): 2 INJECTION INTRAMUSCULAR; INTRAVENOUS; SUBCUTANEOUS at 17:27

## 2022-01-18 RX ADMIN — SODIUM CHLORIDE 1000 MILLILITER(S): 9 INJECTION INTRAMUSCULAR; INTRAVENOUS; SUBCUTANEOUS at 14:21

## 2022-01-18 RX ADMIN — SODIUM CHLORIDE 100 MILLILITER(S): 9 INJECTION INTRAMUSCULAR; INTRAVENOUS; SUBCUTANEOUS at 22:14

## 2022-01-18 RX ADMIN — Medication 975 MILLIGRAM(S): at 14:14

## 2022-01-18 RX ADMIN — Medication 400 MILLIGRAM(S): at 20:53

## 2022-01-18 RX ADMIN — Medication 975 MILLIGRAM(S): at 16:32

## 2022-01-18 RX ADMIN — HYDROMORPHONE HYDROCHLORIDE 1 MILLIGRAM(S): 2 INJECTION INTRAMUSCULAR; INTRAVENOUS; SUBCUTANEOUS at 22:14

## 2022-01-18 RX ADMIN — PIPERACILLIN AND TAZOBACTAM 200 GRAM(S): 4; .5 INJECTION, POWDER, LYOPHILIZED, FOR SOLUTION INTRAVENOUS at 14:16

## 2022-01-18 RX ADMIN — HYDROMORPHONE HYDROCHLORIDE 1 MILLIGRAM(S): 2 INJECTION INTRAMUSCULAR; INTRAVENOUS; SUBCUTANEOUS at 14:48

## 2022-01-18 NOTE — ED PROVIDER NOTE - CARE PLAN
1 Principal Discharge DX:	Neutropenic fever   Principal Discharge DX:	Neutropenic fever  Secondary Diagnosis:	Pancytopenia due to chemotherapy

## 2022-01-18 NOTE — H&P ADULT - ASSESSMENT
Patient is a 36 year old Male with a PMHx of HTN, fatty liver, AML chemotherapy and S/P consolidation who presents to the hospital due fever of 100.4, weakness, decreased WBC and shortness of breath while at home. Patient reports that his pain is more controlled s/p pain medication and his shortness of breath has resolved. Patient denies chills, cough, chest pain, palpitations.      #Neutropenic fever  --Monitor CBC closely   --Monitor patient and temperature curve closely   --BCx2-- results currently pending; F/U   --UCx-- pending collection; F/U  --CXR --performed; results currently pending   --Tylenol for fever  --S/P 1 dose of ZOsyn in the ED, will continue for now  --ID consult, F/U recommendations       #Pancytopenia   --Hgb of 6.4 on admission; WBC of 0.10; Plts of 16  --Currently receiving 1 unit of PRBCs in the ED  --Monitor Post transfusion CBC  --Transfuse to keep Hgb >7.0   --Transfuse platelets <10k   --Monitor CBC closely       #AML  --Heme/Onc F/U  --On Fluconazole, carried over on this admission  --On Levaquin, will hold for now as started on Zosyn, resume as indicated   --Discussed with attending       #Tachycardia  --Place on Telemetry   --Monitor HR closely   --Monitor patient and vital signs  --Started on  CC/ Hr X 24 hours      #HTN   --Was on Amlodipine 5mg at home  --Will hold for now  --Monitor BP and resume as needed       #PPX  --DVT PPX on hold, will start with compression devices for now  --GI PPX with PPI PO BID        This note is not complete until signed by the attending physician.    Patient is a 36 year old Male with a PMHx of HTN, fatty liver, AML chemotherapy and S/P consolidation who presents to the hospital due fever of 100.4, weakness, decreased WBC and shortness of breath while at home. Patient reports that his pain is more controlled s/p pain medication and his shortness of breath has resolved. Patient denies chills, cough, chest pain, palpitations.      #Neutropenic fever  --Monitor CBC closely with diffs  --Monitor patient and temperature curve closely   --BCx2-- results currently pending; F/U   --UCx-- pending collection; F/U  --CXR --performed; results currently pending   --Tylenol for fever  --S/P 1 dose of ZOsyn in the ED, will continue for now  --ID consult, F/U recommendations       #Pancytopenia   --Hgb of 6.4 on admission; WBC of 0.10; Plts of 16  --Currently receiving 1 unit of PRBCs in the ED  --Monitor Post transfusion CBC  --Transfuse to keep Hgb >7.0   --Transfuse platelets <10k   --Monitor CBC closely   -- hematology eval in AM       #AML  --Heme/Onc F/U  --On Fluconazole, carried over on this admission  --On Levaquin, will hold for now as started on Zosyn, resume as indicated   --Discussed with attending       #Tachycardia  --Place on Telemetry   --Monitor HR closely   --Monitor patient and vital signs  --Started on  CC/ Hr X 24 hours  -- pain control with dilaudid     #HTN   --Was on Amlodipine 5mg at home  --Will hold for now  --Monitor BP and resume as needed       #PPX  --DVT PPX on hold, will start with compression devices for now  --GI PPX with PPI PO BID       Patient is a 36 year old Male with a PMHx of HTN, fatty liver, AML chemotherapy and S/P consolidation who presents to the hospital due fever of 100.4, weakness, decreased WBC and shortness of breath while at home. Patient reports that his pain is more controlled s/p pain medication and his shortness of breath has resolved. Patient denies chills, cough, chest pain, palpitations.      #Neutropenic fever  --Monitor CBC closely with diffs  --Monitor patient and temperature curve closely   --BCx2-- results currently pending; F/U   --UCx-- pending collection; F/U  --CXR --performed; results currently pending   --Tylenol for fever  --S/P 1 dose of ZOsyn in the ED, will continue for now. unable to tolerate cefepime in the past   --ID consult, F/U recommendations       #Pancytopenia   --Hgb of 6.4 on admission; WBC of 0.10; Plts of 16  --Currently receiving 1 unit of PRBCs in the ED  --Monitor Post transfusion CBC  --Transfuse to keep Hgb >7.0   --Transfuse platelets <10k   --Monitor CBC closely   -- hematology eval in AM       #AML  --Heme/Onc F/U  --On Levaquin, will hold for now as started on Zosyn, resume as indicated   --Discussed with attending       #Tachycardia  --Place on Telemetry   --Monitor HR closely   --Monitor patient and vital signs  --Started on  CC/ Hr X 24 hours  -- pain control with dilaudid     #HTN   --Was on Amlodipine 5mg at home  --Will hold for now  --Monitor BP and resume as needed       #PPX  --DVT PPX on hold, will start with compression devices for now  --GI PPX with PPI PO BID

## 2022-01-18 NOTE — ED PROVIDER NOTE - CLINICAL SUMMARY MEDICAL DECISION MAKING FREE TEXT BOX
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 High concern for neutropenic fever. Abx, IVF, tylenol, labs, cultures, type and screen, pain control, cxr, ekg. Pt will be admitted.

## 2022-01-18 NOTE — ED PROVIDER NOTE - PHYSICAL EXAMINATION
Gen: In mild distress. A&Ox4. Non-toxic appearing.  HEENT: Normocephalic and atraumatic. PERRL, EOMI, no nasal discharge, mucous membranes dry, no scleral icterus.  CV: NSR. Tachycardic to 110s. +S1/S2, no M/R/G. No significant lower extremity edema. Radial and DP pulses present and symmetrical. Capillary refill less than 2 seconds.  Resp: Normal effort and rate. CTAB, no rales, rhonchi, or wheezes.  GI: Abdomen soft, non-distended, non tender to palpation. No masses appreciated. Bowel sounds present.  MSK: No open wounds and no bruising. No CVAT bilaterally.  Neuro: Following commands, speaking in full sentences, moving extremities spontaneously  Psych: Appropriate mood, cooperative

## 2022-01-18 NOTE — ED PROVIDER NOTE - ATTENDING CONTRIBUTION TO CARE
Patient with AML currently undergoing chemo presenting for fevers at home over last two days.  Last WBC count yesterday 0.16, with other cell lines low as well.  Denying specific symptoms other than shortness of breath which he reports he gets when he is anemic.  No known sick contacts.  Had COVID in past.  On exam patient tachycardic otherwise unremarkable exam.  Neutropenic fevers - labs, empiric antibiotics, pan cultures, transfusions as indicated, admission.

## 2022-01-18 NOTE — H&P ADULT - NSHPREVIEWOFSYSTEMS_GEN_ALL_CORE
REVIEW OF SYSTEMS:    CONSTITUTIONAL: +Weakness; +Fever; Denies chills  EYES/ENT: No visual changes;  No vertigo or throat pain   NECK: No pain or stiffness  RESPIRATORY: No cough, wheezing, hemoptysis; No shortness of breath  CARDIOVASCULAR: +Tachycardic   GASTROINTESTINAL: No abdominal or epigastric pain. No nausea, vomiting, or hematemesis; No diarrhea or constipation. No melena or hematochezia.  GENITOURINARY: No dysuria, frequency or hematuria  NEUROLOGICAL: No numbness or weakness  SKIN: No itching, burning, rashes, or lesions   All other review of systems is negative unless indicated above.

## 2022-01-18 NOTE — ED PROVIDER NOTE - PROGRESS NOTE DETAILS
Patient found to have critically low hemoglobin in setting of pancytopenia likely chemotherapy induced, will require emergent transfusion.  Prior notes reviewed on recent admission for chemotherapy treatment, ID recommending Zosyn for empiric coverage if febrile so started on same.  Discussed with Dr Hudson who recently admitted patient who will accept him back onto his service.  Dilaudid IVP ordered for pain.  Charles Salvador M.D.

## 2022-01-18 NOTE — H&P ADULT - HISTORY OF PRESENT ILLNESS
Patient is a 36 year old Male with a PMHx of HTN, fatty liver, AML S/P consolidation who presents to the hospital due fever of 100.4, weakness, decreased WBC and shortness of breath while at home. Patient reports that his pain is more controlled s/p pain medication and his shortness of breath has resolved. Patient denies chills, cough, chest pain, palpitations.

## 2022-01-18 NOTE — CHART NOTE - NSCHARTNOTEFT_GEN_A_CORE
Addendum to documentation:    Patient has pancytopenia due to chemo and disease. Patient had a fever due to probable non infectious SIRS(systemic inflammatory response syndrome) from chemotherapy but unable to clinically determine etiology of the fever.

## 2022-01-18 NOTE — PATIENT PROFILE ADULT - FALL HARM RISK - UNIVERSAL INTERVENTIONS
Bed in lowest position, wheels locked, appropriate side rails in place/Call bell, personal items and telephone in reach/Instruct patient to call for assistance before getting out of bed or chair/Non-slip footwear when patient is out of bed/[place] to call system/Physically safe environment - no spills, clutter or unnecessary equipment/Purposeful Proactive Rounding/Room/bathroom lighting operational, light cord in reach

## 2022-01-18 NOTE — H&P ADULT - NSHPLABSRESULTS_GEN_ALL_CORE
6.4    0.10  )-----------( 16       ( 18 Jan 2022 14:36 )             17.8               01-18    137  |  99  |  16  ----------------------------<  96  3.7   |  20<L>  |  0.95    Ca    9.9      18 Jan 2022 14:26  Phos  3.4     01-18  Mg     2.0     01-18    TPro  7.2  /  Alb  4.3  /  TBili  1.4<H>  /  DBili  x   /  AST  14  /  ALT  83<H>  /  AlkPhos  118  01-18    PT/INR - ( 18 Jan 2022 14:36 )   PT: 14.6 sec;   INR: 1.23 ratio         PTT - ( 18 Jan 2022 14:36 )  PTT:31.4 sec

## 2022-01-18 NOTE — H&P ADULT - NSHPPHYSICALEXAM_GEN_ALL_CORE
Vital Signs Last 24 Hrs  T(C): 37.1 (18 Jan 2022 12:48), Max: 37.1 (18 Jan 2022 12:48)  T(F): 98.8 (18 Jan 2022 12:48), Max: 98.8 (18 Jan 2022 12:48)  HR: 71 (18 Jan 2022 12:48) (71 - 71)  BP: 125/70 (18 Jan 2022 12:48) (125/70 - 125/70)  BP(mean): --  RR: 20 (18 Jan 2022 12:48) (20 - 20)  SpO2: 100% (18 Jan 2022 12:48) (100% - 100%)      Appearance: Pale, laying in bed, weak 	  HEENT:   Normal oral mucosa, PERRL, EOMI	  Lymphatic: No lymphadenopathy , no edema  Cardiovascular: Tachycardic   Respiratory: Lungs clear to auscultation, normal effort 	  Gastrointestinal:  Soft, Non-tender, + BS	  Skin: No rashes, No ecchymoses, No cyanosis, warm to touch  Musculoskeletal: Normal range of motion, normal strength  Psychiatry:  Mood & affect appropriate  Ext: No edema

## 2022-01-18 NOTE — ED PROVIDER NOTE - NS ED ROS FT
GENERAL: +fever, +generalized weakness  EYES: No change in vision  HEENT: No trouble swallowing or speaking  CARDIAC: No chest pain  PULMONARY: +SOB  GI: No abdominal pain, no nausea or no vomiting, no diarrhea or constipation  : No changes in urination  SKIN: No rashes  NEURO: No headache, no numbness  MSK: No joint pain  Otherwise as HPI or negative.

## 2022-01-18 NOTE — ED ADULT TRIAGE NOTE - WILL THE PATIENT ACCEPT THE PFIZER COVID-19 VACCINE IF ELIGIBLE AND IT IS AVAILABLE?
Is This A New Presentation, Or A Follow-Up?: Skin Lesions
How Severe Is Your Skin Lesion?: mild
Have Your Skin Lesions Been Treated?: not been treated
Not applicable

## 2022-01-18 NOTE — ED PROVIDER NOTE - OBJECTIVE STATEMENT
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 Cycle 3 complicated by hospital admission for neutropenic fever and epistaxis. Pt presenting with fever of Tmax of 100.4F , generalized weakness, low WBC, and sob since this morning. Pt was found to have wbc count of 0.16 yesterday. Didn't take any Tylenol or ibuprofen today. Denies cp, n/v/d, abd pain, leg swelling, urinary symptoms. No sick contacts. Allergic to cefepime (rash). Oncologist is Chelsea Yancey. 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 Cycle 3 complicated by hospital admission for neutropenic fever and epistaxis. Pt presenting with fever of Tmax of 100.4F, generalized weakness, low WBC, and sob for the last 2 days. Pt was found to have wbc count of 0.16 yesterday. Didn't take any Tylenol or ibuprofen today. Denies cp, n/v/d, abd pain, leg swelling, urinary symptoms. No sick contacts. Allergic to cefepime (rash). Oncologist is Chelsea Yancey.

## 2022-01-19 ENCOUNTER — APPOINTMENT (OUTPATIENT)
Dept: INFUSION THERAPY | Facility: HOSPITAL | Age: 37
End: 2022-01-19

## 2022-01-19 LAB
ALBUMIN SERPL ELPH-MCNC: 3.8 G/DL — SIGNIFICANT CHANGE UP (ref 3.3–5)
ALP SERPL-CCNC: 102 U/L — SIGNIFICANT CHANGE UP (ref 40–120)
ALT FLD-CCNC: 59 U/L — HIGH (ref 10–45)
ANION GAP SERPL CALC-SCNC: 11 MMOL/L — SIGNIFICANT CHANGE UP (ref 5–17)
ANION GAP SERPL CALC-SCNC: 13 MMOL/L — SIGNIFICANT CHANGE UP (ref 5–17)
AST SERPL-CCNC: 13 U/L — SIGNIFICANT CHANGE UP (ref 10–40)
BILIRUB SERPL-MCNC: 1.5 MG/DL — HIGH (ref 0.2–1.2)
BUN SERPL-MCNC: 16 MG/DL — SIGNIFICANT CHANGE UP (ref 7–23)
BUN SERPL-MCNC: 16 MG/DL — SIGNIFICANT CHANGE UP (ref 7–23)
CALCIUM SERPL-MCNC: 9.1 MG/DL — SIGNIFICANT CHANGE UP (ref 8.4–10.5)
CALCIUM SERPL-MCNC: 9.6 MG/DL — SIGNIFICANT CHANGE UP (ref 8.4–10.5)
CHLORIDE SERPL-SCNC: 102 MMOL/L — SIGNIFICANT CHANGE UP (ref 96–108)
CHLORIDE SERPL-SCNC: 102 MMOL/L — SIGNIFICANT CHANGE UP (ref 96–108)
CHOLEST SERPL-MCNC: 160 MG/DL — SIGNIFICANT CHANGE UP
CO2 SERPL-SCNC: 22 MMOL/L — SIGNIFICANT CHANGE UP (ref 22–31)
CO2 SERPL-SCNC: 24 MMOL/L — SIGNIFICANT CHANGE UP (ref 22–31)
CREAT SERPL-MCNC: 0.97 MG/DL — SIGNIFICANT CHANGE UP (ref 0.5–1.3)
CREAT SERPL-MCNC: 1.03 MG/DL — SIGNIFICANT CHANGE UP (ref 0.5–1.3)
CULTURE RESULTS: NO GROWTH — SIGNIFICANT CHANGE UP
GLUCOSE SERPL-MCNC: 102 MG/DL — HIGH (ref 70–99)
GLUCOSE SERPL-MCNC: 110 MG/DL — HIGH (ref 70–99)
HCT VFR BLD CALC: 11.2 % — CRITICAL LOW (ref 39–50)
HCT VFR BLD CALC: 21.6 % — LOW (ref 39–50)
HCT VFR BLD CALC: 29.2 % — LOW (ref 39–50)
HDLC SERPL-MCNC: 33 MG/DL — LOW
HGB BLD-MCNC: 10.7 G/DL — LOW (ref 13–17)
HGB BLD-MCNC: 4 G/DL — CRITICAL LOW (ref 13–17)
HGB BLD-MCNC: 7.7 G/DL — LOW (ref 13–17)
INR BLD: 1.19 RATIO — HIGH (ref 0.88–1.16)
LDH SERPL L TO P-CCNC: 156 U/L — SIGNIFICANT CHANGE UP (ref 50–242)
LIPID PNL WITH DIRECT LDL SERPL: 105 MG/DL — HIGH
MAGNESIUM SERPL-MCNC: 2.2 MG/DL — SIGNIFICANT CHANGE UP (ref 1.6–2.6)
MCHC RBC-ENTMCNC: 30.4 PG — SIGNIFICANT CHANGE UP (ref 27–34)
MCHC RBC-ENTMCNC: 30.4 PG — SIGNIFICANT CHANGE UP (ref 27–34)
MCHC RBC-ENTMCNC: 30.5 PG — SIGNIFICANT CHANGE UP (ref 27–34)
MCHC RBC-ENTMCNC: 35.6 GM/DL — SIGNIFICANT CHANGE UP (ref 32–36)
MCHC RBC-ENTMCNC: 35.7 GM/DL — SIGNIFICANT CHANGE UP (ref 32–36)
MCHC RBC-ENTMCNC: 36.6 GM/DL — HIGH (ref 32–36)
MCV RBC AUTO: 83 FL — SIGNIFICANT CHANGE UP (ref 80–100)
MCV RBC AUTO: 85.4 FL — SIGNIFICANT CHANGE UP (ref 80–100)
MCV RBC AUTO: 85.5 FL — SIGNIFICANT CHANGE UP (ref 80–100)
NON HDL CHOLESTEROL: 126 MG/DL — SIGNIFICANT CHANGE UP
NRBC # BLD: 0 /100 WBCS — SIGNIFICANT CHANGE UP (ref 0–0)
PHOSPHATE SERPL-MCNC: 4 MG/DL — SIGNIFICANT CHANGE UP (ref 2.5–4.5)
PLATELET # BLD AUTO: 12 K/UL — CRITICAL LOW (ref 150–400)
PLATELET # BLD AUTO: 25 K/UL — LOW (ref 150–400)
PLATELET # BLD AUTO: 8 K/UL — CRITICAL LOW (ref 150–400)
POTASSIUM SERPL-MCNC: 3.7 MMOL/L — SIGNIFICANT CHANGE UP (ref 3.5–5.3)
POTASSIUM SERPL-MCNC: 4 MMOL/L — SIGNIFICANT CHANGE UP (ref 3.5–5.3)
POTASSIUM SERPL-SCNC: 3.7 MMOL/L — SIGNIFICANT CHANGE UP (ref 3.5–5.3)
POTASSIUM SERPL-SCNC: 4 MMOL/L — SIGNIFICANT CHANGE UP (ref 3.5–5.3)
PROT SERPL-MCNC: 6.5 G/DL — SIGNIFICANT CHANGE UP (ref 6–8.3)
PROTHROM AB SERPL-ACNC: 14.2 SEC — HIGH (ref 10.6–13.6)
RBC # BLD: 1.31 M/UL — LOW (ref 4.2–5.8)
RBC # BLD: 2.53 M/UL — LOW (ref 4.2–5.8)
RBC # BLD: 3.52 M/UL — LOW (ref 4.2–5.8)
RBC # FLD: 16.4 % — HIGH (ref 10.3–14.5)
RBC # FLD: 16.5 % — HIGH (ref 10.3–14.5)
RBC # FLD: 16.8 % — HIGH (ref 10.3–14.5)
SODIUM SERPL-SCNC: 137 MMOL/L — SIGNIFICANT CHANGE UP (ref 135–145)
SODIUM SERPL-SCNC: 137 MMOL/L — SIGNIFICANT CHANGE UP (ref 135–145)
SPECIMEN SOURCE: SIGNIFICANT CHANGE UP
TRIGL SERPL-MCNC: 107 MG/DL — SIGNIFICANT CHANGE UP
TSH SERPL-MCNC: 1.19 UIU/ML — SIGNIFICANT CHANGE UP (ref 0.27–4.2)
URATE SERPL-MCNC: 3.7 MG/DL — SIGNIFICANT CHANGE UP (ref 3.4–8.8)
WBC # BLD: 0.1 K/UL — CRITICAL LOW (ref 3.8–10.5)
WBC # BLD: 0.11 K/UL — CRITICAL LOW (ref 3.8–10.5)
WBC # BLD: 0.13 K/UL — CRITICAL LOW (ref 3.8–10.5)
WBC # FLD AUTO: 0.1 K/UL — CRITICAL LOW (ref 3.8–10.5)
WBC # FLD AUTO: 0.11 K/UL — CRITICAL LOW (ref 3.8–10.5)
WBC # FLD AUTO: 0.13 K/UL — CRITICAL LOW (ref 3.8–10.5)

## 2022-01-19 PROCEDURE — 99223 1ST HOSP IP/OBS HIGH 75: CPT | Mod: GC

## 2022-01-19 PROCEDURE — 93010 ELECTROCARDIOGRAM REPORT: CPT

## 2022-01-19 PROCEDURE — 99254 IP/OBS CNSLTJ NEW/EST MOD 60: CPT

## 2022-01-19 PROCEDURE — 99255 IP/OBS CONSLTJ NEW/EST HI 80: CPT | Mod: GC

## 2022-01-19 RX ORDER — CHLORHEXIDINE GLUCONATE 213 G/1000ML
1 SOLUTION TOPICAL
Refills: 0 | Status: DISCONTINUED | OUTPATIENT
Start: 2022-01-19 | End: 2022-01-23

## 2022-01-19 RX ORDER — FLUCONAZOLE 150 MG/1
200 TABLET ORAL DAILY
Refills: 0 | Status: DISCONTINUED | OUTPATIENT
Start: 2022-01-19 | End: 2022-01-23

## 2022-01-19 RX ADMIN — PIPERACILLIN AND TAZOBACTAM 25 GRAM(S): 4; .5 INJECTION, POWDER, LYOPHILIZED, FOR SOLUTION INTRAVENOUS at 11:45

## 2022-01-19 RX ADMIN — HYDROMORPHONE HYDROCHLORIDE 1 MILLIGRAM(S): 2 INJECTION INTRAMUSCULAR; INTRAVENOUS; SUBCUTANEOUS at 11:34

## 2022-01-19 RX ADMIN — PANTOPRAZOLE SODIUM 40 MILLIGRAM(S): 20 TABLET, DELAYED RELEASE ORAL at 09:24

## 2022-01-19 RX ADMIN — PIPERACILLIN AND TAZOBACTAM 25 GRAM(S): 4; .5 INJECTION, POWDER, LYOPHILIZED, FOR SOLUTION INTRAVENOUS at 19:00

## 2022-01-19 RX ADMIN — PIPERACILLIN AND TAZOBACTAM 25 GRAM(S): 4; .5 INJECTION, POWDER, LYOPHILIZED, FOR SOLUTION INTRAVENOUS at 06:03

## 2022-01-19 RX ADMIN — HYDROMORPHONE HYDROCHLORIDE 1 MILLIGRAM(S): 2 INJECTION INTRAMUSCULAR; INTRAVENOUS; SUBCUTANEOUS at 20:00

## 2022-01-19 RX ADMIN — HYDROMORPHONE HYDROCHLORIDE 1 MILLIGRAM(S): 2 INJECTION INTRAMUSCULAR; INTRAVENOUS; SUBCUTANEOUS at 12:00

## 2022-01-19 RX ADMIN — HYDROMORPHONE HYDROCHLORIDE 1 MILLIGRAM(S): 2 INJECTION INTRAMUSCULAR; INTRAVENOUS; SUBCUTANEOUS at 19:08

## 2022-01-19 RX ADMIN — FLUCONAZOLE 200 MILLIGRAM(S): 150 TABLET ORAL at 12:13

## 2022-01-19 RX ADMIN — HYDROMORPHONE HYDROCHLORIDE 1 MILLIGRAM(S): 2 INJECTION INTRAMUSCULAR; INTRAVENOUS; SUBCUTANEOUS at 06:02

## 2022-01-19 NOTE — CONSULT NOTE ADULT - ASSESSMENT
37yo M w/ HTN, fatty liver, kidney stones, newly diagnosed AML, NPM1 mutated, FLT-3 negative s/p induction chemotherapy with Daunorubicin/Cytarabine in CR, and now s/p cycle 4cycles of consolidation with HIDAC (high dose cytarabine)  Cycle 4 complicated by hospital admission for neutropenic fever (admitted January 5th-January 10th),  now admitted with neutropenic fever with associated weakness and SOB, hematology consulted for pancytopenia.     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   > Patient with neutropenic fever, recommend broad coverage especially for gram negatives and anaerobes, ID following, f/u infectious workup  > Continue with patient's maintenance outpatient antifungal treatment, fluconazole 200mg daily   > send uric acid to assess need for allopurinol    NOTE NOT FINAL    37yo M w/ HTN, fatty liver, kidney stones, newly diagnosed AML, NPM1 mutated, FLT-3 negative s/p induction chemotherapy with Daunorubicin/Cytarabine in CR, and now s/p cycle 4cycles of consolidation with HIDAC (high dose cytarabine)  Cycle 4 complicated by hospital admission for neutropenic fever (admitted January 5th-January 10th),  now admitted with neutropenic fever with associated weakness and SOB, hematology consulted for pancytopenia.     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 Can add on a diff to CBC, patient presumably neutropenic.   > Patient with neutropenic fever, recommend broad coverage especially for gram negatives and anaerobes, ID following, f/u infectious workup  > Continue with patient's maintenance outpatient antifungal treatment, fluconazole 200mg daily   > send uric acid to assess need for allopurinol    NOTE NOT FINAL    35yo M w/ HTN, fatty liver, kidney stones, newly diagnosed AML, NPM1 mutated, FLT-3 negative s/p induction chemotherapy with Daunorubicin/Cytarabine in CR, and now s/p cycle 4cycles of consolidation with HIDAC (high dose cytarabine)  Cycle 4 complicated by hospital admission for neutropenic fever (admitted January 5th-January 10th),  now admitted with neutropenic fever with associated weakness and SOB, hematology consulted for pancytopenia.     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   > Patient with neutropenic fever, recommend broad coverage especially for gram negatives and anaerobes, ID following, f/u infectious workup  > Continue with patient's maintenance outpatient antifungal treatment, fluconazole 200mg daily   > send uric acid to assess need for allopurinol  > trend bili, elevated compared to hospitalization earlier in January    NOTE NOT FINAL    37yo M w/ HTN, fatty liver, kidney stones, newly diagnosed AML, NPM1 mutated, FLT-3 negative s/p induction chemotherapy with Daunorubicin/Cytarabine in CR, and now s/p cycle 4cycles of consolidation with HIDAC (high dose cytarabine)  Cycle 4 complicated by hospital admission for neutropenic fever (admitted January 5th-January 10th),  now admitted with neutropenic fever with associated weakness and SOB, hematology consulted for pancytopenia.     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   > Patient with neutropenic fever, recommend broad coverage especially for gram negatives and anaerobes, ID following, f/u infectious workup (of note patient with PICC, if cultures positive consider as a source)  > Continue with patient's maintenance outpatient antifungal treatment, fluconazole 200mg daily   > send uric acid to assess need for allopurinol  > trend bili, elevated compared to hospitalization earlier in January

## 2022-01-19 NOTE — PROGRESS NOTE ADULT - SUBJECTIVE AND OBJECTIVE BOX
Name of Patient : JAK DANIELS  MRN: [id number]  Date of visit: 22 @ 08:49      Subjective: Patient seen and examined. No new events except as noted.     REVIEW OF SYSTEMS:    CONSTITUTIONAL: No weakness, fevers or chills  EYES/ENT: No visual changes;  No vertigo or throat pain   NECK: No pain or stiffness  RESPIRATORY: No cough, wheezing, hemoptysis; No shortness of breath  CARDIOVASCULAR: No chest pain or palpitations  GASTROINTESTINAL: No abdominal or epigastric pain. No nausea, vomiting, or hematemesis; No diarrhea or constipation. No melena or hematochezia.  GENITOURINARY: No dysuria, frequency or hematuria  NEUROLOGICAL: No numbness or weakness  SKIN: No itching, burning, rashes, or lesions   All other review of systems is negative unless indicated above.    MEDICATIONS:  MEDICATIONS  (STANDING):  influenza   Vaccine 0.5 milliLiter(s) IntraMuscular once  pantoprazole    Tablet 40 milliGRAM(s) Oral before breakfast  piperacillin/tazobactam IVPB.. 3.375 Gram(s) IV Intermittent every 8 hours  sodium chloride 0.9%. 1000 milliLiter(s) (100 mL/Hr) IV Continuous <Continuous>      PHYSICAL EXAM:  T(C): 36.5 (22 @ 04:13), Max: 37.2 (22 @ 17:22)  HR: 103 (22 @ 04:13) (71 - 128)  BP: 115/77 (22 @ 04:13) (107/61 - 125/70)  RR: 20 (22 @ 04:13) (20 - 22)  SpO2: 100% (22 @ 04:13) (98% - 100%)  Wt(kg): --  I&O's Summary    2022 07:  -  2022 07:00  --------------------------------------------------------  IN: 240 mL / OUT: 520 mL / NET: -280 mL    2022 07:  -  2022 08:49  --------------------------------------------------------  IN: 240 mL / OUT: 0 mL / NET: 240 mL      Height (cm): 180.3 ( 12:48)  Weight (kg): 88.5 (:48)  BMI (kg/m2): 27.2 (:48)  BSA (m2): 2.09 (:48)    Appearance: Normal	  HEENT:  PERRLA   Lymphatic: No lymphadenopathy   Cardiovascular: Normal S1 S2, no JVD  Respiratory: normal effort , clear  Gastrointestinal:  Soft, Non-tender  Skin: No rashes,  warm to touch  Psychiatry:  Mood & affect appropriate  Musculuskeletal: No edema        22 @ 07:  -  22 @ 07:00  --------------------------------------------------------  IN: 240 mL / OUT: 520 mL / NET: -280 mL    22 @ 07:  -  22 @ 08:49  --------------------------------------------------------  IN: 240 mL / OUT: 0 mL / NET: 240 mL                            4.0    0.13  )-----------(        ( 2022 06:32 )             11.2                   137  |  102  |  16  ----------------------------<  102<H>  3.7   |  22  |  0.97    Ca    9.1      2022 06:32  Phos  4.0       Mg     2.2         TPro  6.5  /  Alb  3.8  /  TBili  1.5<H>  /  DBili  x   /  AST  13  /  ALT  59<H>  /  AlkPhos  102  -    PT/INR - ( 2022 14:36 )   PT: 14.6 sec;   INR: 1.23 ratio         PTT - ( 2022 14:36 )  PTT:31.4 sec                   Urinalysis Basic - ( 2022 19:26 )    Color: Light Yellow / Appearance: Clear / S.013 / pH: x  Gluc: x / Ketone: Negative  / Bili: Negative / Urobili: Negative   Blood: x / Protein: Negative / Nitrite: Negative   Leuk Esterase: Negative / RBC: x / WBC x   Sq Epi: x / Non Sq Epi: x / Bacteria: x      AM CBC is being repeated STAT. F/U results        Name of Patient : JAK DANIELS  MRN: [id number]  Date of visit: 22 @ 08:49      Subjective: Patient seen and examined. No new events except as noted.     REVIEW OF SYSTEMS:    CONSTITUTIONAL: No weakness, fevers or chills  EYES/ENT: No visual changes;  No vertigo or throat pain   NECK: No pain or stiffness  RESPIRATORY: No cough, wheezing, hemoptysis; No shortness of breath  CARDIOVASCULAR: No chest pain or palpitations  GASTROINTESTINAL: No abdominal or epigastric pain. No nausea, vomiting, or hematemesis; No diarrhea or constipation. No melena or hematochezia.  GENITOURINARY: No dysuria, frequency or hematuria  NEUROLOGICAL: No numbness or weakness  SKIN: No itching, burning, rashes, or lesions   All other review of systems is negative unless indicated above.    MEDICATIONS:  MEDICATIONS  (STANDING):  influenza   Vaccine 0.5 milliLiter(s) IntraMuscular once  pantoprazole    Tablet 40 milliGRAM(s) Oral before breakfast  piperacillin/tazobactam IVPB.. 3.375 Gram(s) IV Intermittent every 8 hours  sodium chloride 0.9%. 1000 milliLiter(s) (100 mL/Hr) IV Continuous <Continuous>      PHYSICAL EXAM:  T(C): 36.5 (22 @ 04:13), Max: 37.2 (22 @ 17:22)  HR: 103 (22 @ 04:13) (71 - 128)  BP: 115/77 (22 @ 04:13) (107/61 - 125/70)  RR: 20 (22 @ 04:13) (20 - 22)  SpO2: 100% (22 @ 04:13) (98% - 100%)  Wt(kg): --  I&O's Summary    2022 07:  -  2022 07:00  --------------------------------------------------------  IN: 240 mL / OUT: 520 mL / NET: -280 mL    2022 07:  -  2022 08:49  --------------------------------------------------------  IN: 240 mL / OUT: 0 mL / NET: 240 mL      Height (cm): 180.3 ( 12:48)  Weight (kg): 88.5 (:48)  BMI (kg/m2): 27.2 (:48)  BSA (m2): 2.09 (:48)    Appearance: Normal	  HEENT:  PERRLA   Lymphatic: No lymphadenopathy   Cardiovascular: Normal S1 S2, no JVD  Respiratory: normal effort , clear  Gastrointestinal:  Soft, Non-tender  Skin: No rashes,  warm to touch  Psychiatry:  Mood & affect appropriate  Musculuskeletal: No edema        22 @ 07:  -  22 @ 07:00  --------------------------------------------------------  IN: 240 mL / OUT: 520 mL / NET: -280 mL    22 @ 07:  -  22 @ 08:49  --------------------------------------------------------  IN: 240 mL / OUT: 0 mL / NET: 240 mL                            4.0    0.13  )-----------(        ( 2022 06:32 )             11.2                   137  |  102  |  16  ----------------------------<  102<H>  3.7   |  22  |  0.97    Ca    9.1      2022 06:32  Phos  4.0       Mg     2.2         TPro  6.5  /  Alb  3.8  /  TBili  1.5<H>  /  DBili  x   /  AST  13  /  ALT  59<H>  /  AlkPhos  102  -19    PT/INR - ( 2022 14:36 )   PT: 14.6 sec;   INR: 1.23 ratio         PTT - ( 2022 14:36 )  PTT:31.4 sec                   Urinalysis Basic - ( 2022 19:26 )    Color: Light Yellow / Appearance: Clear / S.013 / pH: x  Gluc: x / Ketone: Negative  / Bili: Negative / Urobili: Negative   Blood: x / Protein: Negative / Nitrite: Negative   Leuk Esterase: Negative / RBC: x / WBC x   Sq Epi: x / Non Sq Epi: x / Bacteria: x      AM CBC is being repeated STAT. F/U results-- as below:                        7.7    0.11  )-----------( 8        ( 2022 08:39 )             21.6                   137  |  102  |  16  ----------------------------<  110<H>  4.0   |  24  |  1.03    Ca    9.6      2022 08:39  Phos  4.0       Mg     2.2         TPro  6.5  /  Alb  3.8  /  TBili  1.5<H>  /  DBili  x   /  AST  13  /  ALT  59<H>  /  AlkPhos  102  -19    PT/INR - ( 2022 08:39 )   PT: 14.2 sec;   INR: 1.19 ratio         PTT - ( 2022 14:36 )  PTT:31.4 sec                   Urinalysis Basic - ( 2022 19:26 )    Color: Light Yellow / Appearance: Clear / S.013 / pH: x  Gluc: x / Ketone: Negative  / Bili: Negative / Urobili: Negative   Blood: x / Protein: Negative / Nitrite: Negative   Leuk Esterase: Negative / RBC: x / WBC x   Sq Epi: x / Non Sq Epi: x / Bacteria: x        x< from: Xray Chest 1 View- PORTABLE-Urgent (22 @ 14:06) >    CLINICAL INDICATION: Sepsis    TECHNIQUE: Single portable view of the chest was obtained.    COMPARISON: X-ray chest 2022.    FINDINGS:    Right upper extremity PICC line terminates in the SVC/right atrial   junction.  The heart is not enlarged.  The trachea is midline.  The mediastinum and jack appear unremarkable.  The lungs are clear.  No pleural effusion or pneumothorax is seen.  No acute bony abnormality is noted.    IMPRESSION:  Clear lungs.    --- End of Report ---    < end of copied text >         Name of Patient : JAK DANIELS  MRN: [id number]  Date of visit: 22 @ 08:49      Subjective: Patient seen and examined. No new events except as noted.   Patient is S/P 2 units of PRBCs yesterday . S/P 1 unit of Platelet transfusion today .   Afebrile    REVIEW OF SYSTEMS:    CONSTITUTIONAL: +generalized weakness; currently afebrile   EYES/ENT: No visual changes;  No vertigo or throat pain   NECK: No pain or stiffness  RESPIRATORY: No cough, wheezing, hemoptysis; No shortness of breath  CARDIOVASCULAR: +Tachycardic   GASTROINTESTINAL: No abdominal or epigastric pain. No nausea, vomiting, or hematemesis; No diarrhea or constipation. No melena or hematochezia.  GENITOURINARY: No dysuria, frequency or hematuria  NEUROLOGICAL: No numbness or weakness  SKIN: No itching, burning, rashes, or lesions   All other review of systems is negative unless indicated above.    MEDICATIONS:  MEDICATIONS  (STANDING):  influenza   Vaccine 0.5 milliLiter(s) IntraMuscular once  pantoprazole    Tablet 40 milliGRAM(s) Oral before breakfast  piperacillin/tazobactam IVPB.. 3.375 Gram(s) IV Intermittent every 8 hours  sodium chloride 0.9%. 1000 milliLiter(s) (100 mL/Hr) IV Continuous <Continuous>      PHYSICAL EXAM:  T(C): 36.5 (22 @ 04:13), Max: 37.2 (22 @ 17:22)  HR: 103 (22 @ 04:13) (71 - 128)  BP: 115/77 (22 @ 04:13) (107/61 - 125/70)  RR: 20 (22 @ 04:13) (20 - 22)  SpO2: 100% (22 @ 04:13) (98% - 100%)  Wt(kg): --  I&O's Summary    2022 07:01  -  2022 07:00  --------------------------------------------------------  IN: 240 mL / OUT: 520 mL / NET: -280 mL    2022 07:01  -  2022 08:49  --------------------------------------------------------  IN: 240 mL / OUT: 0 mL / NET: 240 mL      Height (cm): 180.3 (:48)  Weight (kg): 88.5 (:48)  BMI (kg/m2): 27.2 (:48)  BSA (m2): 2.09 (:48)    Appearance: Pale	  HEENT:  PERRLA   Lymphatic: No lymphadenopathy   Cardiovascular: Tachycardic   Respiratory: normal effort , clear  Gastrointestinal:  Soft, Non-tender  Skin: No rashes,  warm to touch  Psychiatry:  Mood & affect appropriate  Musculuskeletal: No edema        22 @ 07:  -  22 @ 07:00  --------------------------------------------------------  IN: 240 mL / OUT: 520 mL / NET: -280 mL    22 @ 07:01  -  22 @ 08:49  --------------------------------------------------------  IN: 240 mL / OUT: 0 mL / NET: 240 mL                            4.0    0.13  )-----------(        ( 2022 06:32 )             11.2                   137  |  102  |  16  ----------------------------<  102<H>  3.7   |  22  |  0.97    Ca    9.1      2022 06:32  Phos  4.0     01-19  Mg     2.2         TPro  6.5  /  Alb  3.8  /  TBili  1.5<H>  /  DBili  x   /  AST  13  /  ALT  59<H>  /  AlkPhos  102  -19    PT/INR - ( 2022 14:36 )   PT: 14.6 sec;   INR: 1.23 ratio         PTT - ( 2022 14:36 )  PTT:31.4 sec                   Urinalysis Basic - ( 2022 19:26 )    Color: Light Yellow / Appearance: Clear / S.013 / pH: x  Gluc: x / Ketone: Negative  / Bili: Negative / Urobili: Negative   Blood: x / Protein: Negative / Nitrite: Negative   Leuk Esterase: Negative / RBC: x / WBC x   Sq Epi: x / Non Sq Epi: x / Bacteria: x      AM CBC is being repeated STAT. F/U results-- as below:                        7.7    0.11  )-----------( 8        ( 2022 08:39 )             21.6                   137  |  102  |  16  ----------------------------<  110<H>  4.0   |  24  |  1.03    Ca    9.6      2022 08:39  Phos  4.0       Mg     2.2         TPro  6.5  /  Alb  3.8  /  TBili  1.5<H>  /  DBili  x   /  AST  13  /  ALT  59<H>  /  AlkPhos  102  -    PT/INR - ( 2022 08:39 )   PT: 14.2 sec;   INR: 1.19 ratio         PTT - ( 2022 14:36 )  PTT:31.4 sec                   Urinalysis Basic - ( 2022 19:26 )    Color: Light Yellow / Appearance: Clear / S.013 / pH: x  Gluc: x / Ketone: Negative  / Bili: Negative / Urobili: Negative   Blood: x / Protein: Negative / Nitrite: Negative   Leuk Esterase: Negative / RBC: x / WBC x   Sq Epi: x / Non Sq Epi: x / Bacteria: x        x< from: Xray Chest 1 View- PORTABLE-Urgent (22 @ 14:06) >    CLINICAL INDICATION: Sepsis    TECHNIQUE: Single portable view of the chest was obtained.    COMPARISON: X-ray chest 2022.    FINDINGS:    Right upper extremity PICC line terminates in the SVC/right atrial   junction.  The heart is not enlarged.  The trachea is midline.  The mediastinum and jack appear unremarkable.  The lungs are clear.  No pleural effusion or pneumothorax is seen.  No acute bony abnormality is noted.    IMPRESSION:  Clear lungs.    --- End of Report ---    < end of copied text >

## 2022-01-19 NOTE — CONSULT NOTE ADULT - ASSESSMENT
36 year old Male with a PMHx of HTN, fatty liver, AML S/P c4 hidac consolidation who presented due fever of 100.4, weakness, and SOB with pancytopenia and neutropenic fever.  Recent chemo hidac with steroids on 1/10.  CXR and CT chest show clear lungs.   Covid-19 pcr neg and no hx of covid vaccines but recent antibodies positives (unclear if from prior infection or possibly from transfusions)  Currently with generalized pain and non bloody hemorrhoids but no other localizing symptoms.  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 Recently on levofloxacin and fluconazole ppx.     #Neutropenic fever, pancytopenic, antibiotic allergy - likely due to recent chemo.   - agree with zosyn q8  - f/u bl and ur clx  - monitor cbc, transfusions per onc  - trend fever curve  - check EKG to monitor qtc with use of fluconazole and recent levofloxaicin  - would continue fluconazole only if qtc<500 if not will need other antifungal ppx for neutropenia    Jhonny Schaefer MD  Fellow, Infectious Diseases, PGY-5  Pager: [phone]  Before 9am or after 5pm/Weekends: Call [phone]

## 2022-01-19 NOTE — PROGRESS NOTE ADULT - ASSESSMENT
Patient is a 36 year old Male with a PMHx of HTN, fatty liver, AML chemotherapy and S/P consolidation who presents to the hospital due fever of 100.4, weakness, decreased WBC and shortness of breath while at home. Patient reports that his pain is more controlled s/p pain medication and his shortness of breath has resolved. Patient denies chills, cough, chest pain, palpitations.      #Neutropenic fever  --Monitor CBC closely with diffs  --Monitor patient and temperature curve closely   --BCx2-- results currently pending; F/U   --UCx-- pending collection; F/U  --CXR --performed; results currently pending   --Tylenol for fever  --S/P 1 dose of ZOsyn in the ED, will continue for now. unable to tolerate cefepime in the past   --ID consult, F/U recommendations       #Pancytopenia   --Hgb of 6.4 on admission; WBC of 0.10; Plts of 16  --S/P 2 units of PRBCs on 01/18  --Monitor Post transfusion CBC  --Transfuse to keep Hgb >7.0   --Transfuse platelets <10k   --Monitor CBC closely   --Hematology eval in AM   --AM Hgb of 4.0 noted; STAT CBC sent to lab for repeat to check level-- transfuse for hgb<7.0      #AML  --Heme/Onc F/U  --On Levaquin, will hold for now as started on Zosyn, resume as indicated   --Discussed with attending       #Tachycardia  --Place on Telemetry   --Monitor HR closely   --Monitor patient and vital signs  --Started on  CC/ Hr X 24 hours  -- pain control with dilaudid     #HTN   --Was on Amlodipine 5mg at home  --Will hold for now  --Monitor BP and resume as needed       #PPX  --DVT PPX on hold, will start with compression devices for now  --GI PPX with PPI PO BID       Patient is a 36 year old Male with a PMHx of HTN, fatty liver, AML chemotherapy and S/P consolidation who presents to the hospital due fever of 100.4, weakness, decreased WBC and shortness of breath while at home. Patient reports that his pain is more controlled s/p pain medication and his shortness of breath has resolved. Patient denies chills, cough, chest pain, palpitations.      #Neutropenic fever  --Monitor CBC closely with diffs  --Monitor patient and temperature curve closely   --BCx2-- results currently pending; F/U   --UCx-- results currently pending;  F/U  --CXR with-- IMPRESSION: Clear lungs -- Negative   --Tylenol for fever  --S/P 1 dose of Zosyn in the ED, will continue for now. Unable to tolerate cefepime in the past (rash)  --ID consult, F/U recommendations       #Pancytopenia   --Hgb of 6.4; WBC of 0.10; Plts of 16 on admission   --S/P 2 units of PRBCs on 01/18  --Monitor Post transfusion CBC  --Transfuse to keep Hgb >7.0   --Transfuse platelets <10k   --Monitor CBC closely  --bleeding precautions    --Hematology eval in AM   --AM 01/19 Hgb of 4.0 noted; STAT CBC sent to lab for repeat to check level-- transfuse for hgb<7.0; repeat Hgb of 7.7  --Platelet level of 8, patient will be receiving 1 unit of platelet transfusion today 01/19, continue to monitor closely       #AML  --Heme/Onc F/U  --On Levaquin, will hold for now as started on Zosyn, resume as indicated   --Discussed with attending       #Tachycardia  --Place on Telemetry   --Monitor HR closely   --Monitor patient and vital signs  --Started on  CC/ Hr X 24 hours  --Pain control with dilaudid   --Improving, 103 AM of 01/19     #HTN   --Was on Amlodipine 5mg at home  --Will hold for now   --Monitor BP and resume as needed       #PPX  --DVT PPX on hold, will start with compression devices for now  --GI PPX with PPI PO BID       Patient is a 36 year old Male with a PMHx of HTN, fatty liver, AML chemotherapy and S/P consolidation who presents to the hospital due fever of 100.4, weakness, decreased WBC and shortness of breath while at home. Patient reports that his pain is more controlled s/p pain medication and his shortness of breath has resolved. Patient denies chills, cough, chest pain, palpitations.      #Neutropenic fever  --Monitor CBC closely with diffs  --Monitor patient and temperature curve closely   --BCx2-- results currently pending; F/U   --UCx-- results currently pending;  F/U  --CXR with-- IMPRESSION: Clear lungs -- Negative   --Tylenol for fever  --S/P 1 dose of Zosyn in the ED, will continue for now. Unable to tolerate cefepime in the past (rash)  --ID consult, F/U recommendations -- continuing zosyn for now     #Pancytopenia   --Hgb of 6.4; WBC of 0.10; Plts of 16 on admission   --S/P 2 units of PRBCs on 01/18  --Monitor Post transfusion CBC  --Transfuse to keep Hgb >7.0   --Transfuse platelets <10k   --Monitor CBC closely  --bleeding precautions    --Hematology eval; F/U recommendations   --AM 01/19 Hgb of 4.0 noted; STAT CBC sent to lab for repeat to check level-- transfuse for hgb<7.0; repeat Hgb of 7.7  --Platelet level of 8, patient will be receiving 1 unit of platelet transfusion today 01/19, continue to monitor closely     #AML  --Heme/Onc evaluation; F/U recommendations   --On Levaquin, will hold for now as started on Zosyn, resume as indicated   --Check EKG and monitor QTC, If QTC >500 hold fluconazole  --Pain control     #Tachycardia  --Place on Telemetry   --Monitor HR closely   --Monitor patient and vital signs  --Started on  CC/ Hr X 24 hours  --Pain control with dilaudid   --Improving, 103 AM of 01/19     #Elevated LFTs  --Noted to be elevated on last admission   --ALT of 83-->59 01/19  --Continue to monitor on AM labs    #HTN   --Was on Amlodipine 5mg at home  --Will hold for now   --Monitor BP and resume as needed       #PPX  --DVT PPX on hold, will start with compression devices for now  --GI PPX with PPI PO BID

## 2022-01-19 NOTE — CONSULT NOTE ADULT - SUBJECTIVE AND OBJECTIVE BOX
HPI:  Patient is a 36 year old Male with a PMHx of HTN, fatty liver, AML S/P consolidation who presents to the hospital due fever of 100.4, weakness, decreased WBC and shortness of breath while at home. Patient reports that his pain is more controlled s/p pain medication and his shortness of breath has resolved. Patient denies chills, cough, chest pain, palpitations. (18 Jan 2022 16:16)      PAST MEDICAL & SURGICAL HISTORY:  Hypertension  diagnosed 1 year ago    Kidney stone  5 years ago    History of genital warts    AML (acute myeloid leukemia)    No significant past surgical history        Allergies    No Known Allergies    Intolerances    cefepime (Rash)      MEDICATIONS  (STANDING):  chlorhexidine 2% Cloths 1 Application(s) Topical <User Schedule>  fluconAZOLE   Tablet 200 milliGRAM(s) Oral daily  influenza   Vaccine 0.5 milliLiter(s) IntraMuscular once  pantoprazole    Tablet 40 milliGRAM(s) Oral before breakfast  piperacillin/tazobactam IVPB.. 3.375 Gram(s) IV Intermittent every 8 hours  sodium chloride 0.9%. 1000 milliLiter(s) (100 mL/Hr) IV Continuous <Continuous>    MEDICATIONS  (PRN):  HYDROmorphone  Injectable 1 milliGRAM(s) IV Push every 6 hours PRN Severe Pain (7 - 10)  ondansetron    Tablet 8 milliGRAM(s) Oral every 8 hours PRN Nausea and/or Vomiting      FAMILY HISTORY:  FH: CAD (coronary artery disease) (Father, Mother)        SOCIAL HISTORY: No EtOH, no tobacco    REVIEW OF SYSTEMS:    CONSTITUTIONAL: No weakness, fevers or chills  EYES/ENT: No visual changes;  No vertigo or throat pain   NECK: No pain or stiffness  RESPIRATORY: No cough, wheezing, hemoptysis; No shortness of breath  CARDIOVASCULAR: No chest pain or palpitations  GASTROINTESTINAL: No abdominal or epigastric pain. No nausea, vomiting, or hematemesis; No diarrhea or constipation. No melena or hematochezia.  GENITOURINARY: No dysuria, frequency or hematuria  NEUROLOGICAL: No numbness or weakness  SKIN: No itching, burning, rashes, or lesions   All other review of systems is negative unless indicated above.    Height (cm): 180.3 (01-18 @ 12:48)  Weight (kg): 88.5 (01-18 @ 12:48)  BMI (kg/m2): 27.2 (01-18 @ 12:48)  BSA (m2): 2.09 (01-18 @ 12:48)    T(F): 98.5 (01-19-22 @ 10:26), Max: 99 (01-18-22 @ 17:22)  HR: 106 (01-19-22 @ 10:26)  BP: 101/68 (01-19-22 @ 10:26)  RR: 20 (01-19-22 @ 10:26)  SpO2: 99% (01-19-22 @ 10:26)  Wt(kg): --    GENERAL: NAD, well-developed  HEAD:  Atraumatic, Normocephalic  EYES: EOMI, PERRLA, conjunctiva and sclera clear  NECK: Supple, No JVD  CHEST/LUNG: Clear to auscultation bilaterally; No wheeze  HEART: Regular rate and rhythm; No murmurs, rubs, or gallops  ABDOMEN: Soft, Nontender, Nondistended; Bowel sounds present  EXTREMITIES:  2+ Peripheral Pulses, No clubbing, cyanosis, or edema  NEUROLOGY: non-focal  SKIN: No rashes or lesions                          7.7    0.11  )-----------( 8        ( 19 Jan 2022 08:39 )             21.6       01-19    137  |  102  |  16  ----------------------------<  110<H>  4.0   |  24  |  1.03    Ca    9.6      19 Jan 2022 08:39  Phos  4.0     01-19  Mg     2.2     01-19    TPro  6.5  /  Alb  3.8  /  TBili  1.5<H>  /  DBili  x   /  AST  13  /  ALT  59<H>  /  AlkPhos  102  01-19      Magnesium, Serum: 2.2 mg/dL (01-19 @ 06:32)  Phosphorus Level, Serum: 4.0 mg/dL (01-19 @ 06:32)  Lactate Dehydrogenase, Serum: 156 U/L (01-19 @ 06:32)  Magnesium, Serum: 2.0 mg/dL (01-18 @ 14:26)  Phosphorus Level, Serum: 3.4 mg/dL (01-18 @ 14:26)      PT/INR - ( 19 Jan 2022 08:39 )   PT: 14.2 sec;   INR: 1.19 ratio         PTT - ( 18 Jan 2022 14:36 )  PTT:31.4 sec    Clean Catch Clean Catch (Midstream)  01-07 @ 01:48   No growth  --  --      .Blood Blood-Peripheral  01-06 @ 23:05   No Growth Final  --  --      .Blood Blood-Peripheral  01-06 @ 19:22   No Growth Final  --  --      .Blood Blood  12-12 @ 04:27   No Growth Final  --  --      Clean Catch Clean Catch (Midstream)  12-12 @ 01:39   No growth  --  --      Clean Catch Clean Catch (Midstream)  11-13 @ 10:09   No growth  --  --      .Blood Blood-Peripheral  11-13 @ 03:50   No Growth Final  --  --      .Blood Blood-Peripheral  10-29 @ 14:55   No Growth Final  --  --      .Blood Blood-Peripheral  10-29 @ 09:29   No Growth Final  --  --      Clean Catch Clean Catch (Midstream)  10-26 @ 22:05   No growth  --  --      .Blood Blood-Catheter  10-26 @ 22:00   No Growth Final  --  --       HPI:  Patient is a 36 year old Male with a PMHx of HTN, fatty liver, AML S/P consolidation who presents to the hospital due fever of 100.4, weakness, decreased WBC and shortness of breath while at home. Patient reports that his pain is more controlled s/p pain medication and his shortness of breath has resolved. Patient denies chills, cough, chest pain, palpitations. (18 Jan 2022 16:16)      PAST MEDICAL & SURGICAL HISTORY:  Hypertension  diagnosed 1 year ago    Kidney stone  5 years ago    History of genital warts    AML (acute myeloid leukemia)    No significant past surgical history        Allergies    No Known Allergies    Intolerances    cefepime (Rash)      MEDICATIONS  (STANDING):  chlorhexidine 2% Cloths 1 Application(s) Topical <User Schedule>  fluconAZOLE   Tablet 200 milliGRAM(s) Oral daily  influenza   Vaccine 0.5 milliLiter(s) IntraMuscular once  pantoprazole    Tablet 40 milliGRAM(s) Oral before breakfast  piperacillin/tazobactam IVPB.. 3.375 Gram(s) IV Intermittent every 8 hours  sodium chloride 0.9%. 1000 milliLiter(s) (100 mL/Hr) IV Continuous <Continuous>    MEDICATIONS  (PRN):  HYDROmorphone  Injectable 1 milliGRAM(s) IV Push every 6 hours PRN Severe Pain (7 - 10)  ondansetron    Tablet 8 milliGRAM(s) Oral every 8 hours PRN Nausea and/or Vomiting      FAMILY HISTORY:  FH: CAD (coronary artery disease) (Father, Mother)        SOCIAL HISTORY: No EtOH, no tobacco    REVIEW OF SYSTEMS:    CONSTITUTIONAL: +Weakness; +Fever; Denies chills  EYES/ENT: No visual changes;  No vertigo or throat pain   NECK: No pain or stiffness  RESPIRATORY: No cough, wheezing, hemoptysis; No shortness of breath  CARDIOVASCULAR: +Tachycardic   GASTROINTESTINAL: No abdominal or epigastric pain. No nausea, vomiting, or hematemesis; No diarrhea or constipation. No melena or hematochezia.  GENITOURINARY: No dysuria, frequency or hematuria  NEUROLOGICAL: No numbness or weakness  SKIN: No itching, burning, rashes, or lesions       Height (cm): 180.3 (01-18 @ 12:48)  Weight (kg): 88.5 (01-18 @ 12:48)  BMI (kg/m2): 27.2 (01-18 @ 12:48)  BSA (m2): 2.09 (01-18 @ 12:48)    T(F): 98.5 (01-19-22 @ 10:26), Max: 99 (01-18-22 @ 17:22)  HR: 106 (01-19-22 @ 10:26)  BP: 101/68 (01-19-22 @ 10:26)  RR: 20 (01-19-22 @ 10:26)  SpO2: 99% (01-19-22 @ 10:26)  Wt(kg): --    Physical Exam:   General: Pale, laying in bed, weak 	  HEENT:   Normal oral mucosa, PERRL, EOMI	  Lymphatic: No lymphadenopathy , no edema  Cardiovascular: Tachycardic, no murmurs   Respiratory: Lungs clear to auscultation, normal effort 	  Gastrointestinal:  Soft, Non-tender, + BS	  Skin: No rashes, No ecchymoses, No cyanosis, warm to touch  Musculoskeletal: Normal range of motion, normal strength  Psychiatry:  Mood & affect appropriate  Ext: No edema                            7.7    0.11  )-----------( 8        ( 19 Jan 2022 08:39 )             21.6       01-19    137  |  102  |  16  ----------------------------<  110<H>  4.0   |  24  |  1.03    Ca    9.6      19 Jan 2022 08:39  Phos  4.0     01-19  Mg     2.2     01-19    TPro  6.5  /  Alb  3.8  /  TBili  1.5<H>  /  DBili  x   /  AST  13  /  ALT  59<H>  /  AlkPhos  102  01-19      Magnesium, Serum: 2.2 mg/dL (01-19 @ 06:32)  Phosphorus Level, Serum: 4.0 mg/dL (01-19 @ 06:32)  Lactate Dehydrogenase, Serum: 156 U/L (01-19 @ 06:32)  Magnesium, Serum: 2.0 mg/dL (01-18 @ 14:26)  Phosphorus Level, Serum: 3.4 mg/dL (01-18 @ 14:26)      PT/INR - ( 19 Jan 2022 08:39 )   PT: 14.2 sec;   INR: 1.19 ratio         PTT - ( 18 Jan 2022 14:36 )  PTT:31.4 sec    Clean Catch Clean Catch (Midstream)  01-07 @ 01:48   No growth  --  --      .Blood Blood-Peripheral  01-06 @ 23:05   No Growth Final  --  --      .Blood Blood-Peripheral  01-06 @ 19:22   No Growth Final  --  --      .Blood Blood  12-12 @ 04:27   No Growth Final  --  --      Clean Catch Clean Catch (Midstream)  12-12 @ 01:39   No growth  --  --      Clean Catch Clean Catch (Midstream)  11-13 @ 10:09   No growth  --  --      .Blood Blood-Peripheral  11-13 @ 03:50   No Growth Final  --  --      .Blood Blood-Peripheral  10-29 @ 14:55   No Growth Final  --  --      .Blood Blood-Peripheral  10-29 @ 09:29   No Growth Final  --  --      Clean Catch Clean Catch (Midstream)  10-26 @ 22:05   No growth  --  --      .Blood Blood-Catheter  10-26 @ 22:00   No Growth Final  --  --       HPI:  Patient is a 36 year old Male with a PMHx of HTN, fatty liver, AML S/P consolidation who presents to the hospital due fever of 100.4, weakness, decreased WBC and shortness of breath while at home. Patient reports that his pain is more controlled s/p pain medication and his shortness of breath has resolved. Patient denies chills, cough, chest pain, palpitations. (18 Jan 2022 16:16)    Interval Events:  Patient seen at bedside. Patient states he is no longer SOB, attributes weakness and SOB to severe anemia, improved after transfusion. Patient states outpatient he had resumed ppx Levaquin and fluconazole. Denies cough, chest pain, abdominal pain, n/v/d/c, has hemorrhoids, no stool in past 2 days, denies BRBPR, melena. Patient endorses diffuse bone pain.       PAST MEDICAL & SURGICAL HISTORY:  Hypertension  diagnosed 1 year ago    Kidney stone  5 years ago    History of genital warts    AML (acute myeloid leukemia)    No significant past surgical history        Allergies    No Known Allergies    Intolerances    cefepime (Rash)      MEDICATIONS  (STANDING):  chlorhexidine 2% Cloths 1 Application(s) Topical <User Schedule>  fluconAZOLE   Tablet 200 milliGRAM(s) Oral daily  influenza   Vaccine 0.5 milliLiter(s) IntraMuscular once  pantoprazole    Tablet 40 milliGRAM(s) Oral before breakfast  piperacillin/tazobactam IVPB.. 3.375 Gram(s) IV Intermittent every 8 hours  sodium chloride 0.9%. 1000 milliLiter(s) (100 mL/Hr) IV Continuous <Continuous>    MEDICATIONS  (PRN):  HYDROmorphone  Injectable 1 milliGRAM(s) IV Push every 6 hours PRN Severe Pain (7 - 10)  ondansetron    Tablet 8 milliGRAM(s) Oral every 8 hours PRN Nausea and/or Vomiting      FAMILY HISTORY:  FH: CAD (coronary artery disease) (Father, Mother)        SOCIAL HISTORY: No EtOH, no tobacco    REVIEW OF SYSTEMS:    CONSTITUTIONAL: +Weakness; +Fever; Denies chills  EYES/ENT: No visual changes;  No vertigo or throat pain   NECK: No pain or stiffness  RESPIRATORY: No cough, wheezing, hemoptysis; No shortness of breath  CARDIOVASCULAR: +Tachycardic   GASTROINTESTINAL: No abdominal or epigastric pain. No nausea, vomiting, or hematemesis; No diarrhea or constipation. No melena or hematochezia.  GENITOURINARY: No dysuria, frequency or hematuria  NEUROLOGICAL: No numbness or weakness  SKIN: No itching, burning, rashes, or lesions       Height (cm): 180.3 (01-18 @ 12:48)  Weight (kg): 88.5 (01-18 @ 12:48)  BMI (kg/m2): 27.2 (01-18 @ 12:48)  BSA (m2): 2.09 (01-18 @ 12:48)    T(F): 98.5 (01-19-22 @ 10:26), Max: 99 (01-18-22 @ 17:22)  HR: 106 (01-19-22 @ 10:26)  BP: 101/68 (01-19-22 @ 10:26)  RR: 20 (01-19-22 @ 10:26)  SpO2: 99% (01-19-22 @ 10:26)  Wt(kg): --    Physical Exam:   General: Pale, laying in bed, uncomfortable appearing	  HEENT:   Normal oral mucosa, PERRL, EOMI	  Lymphatic: No lymphadenopathy , no edema  Cardiovascular: Tachycardic, no murmurs   Respiratory: Lungs clear to auscultation, normal effort 	  Gastrointestinal:  Soft, Non-tender, + BS	  Skin: No rashes, No ecchymoses, No cyanosis, warm to touch  Musculoskeletal: Normal range of motion, normal strength  Psychiatry:  Mood & affect appropriate  Ext: No edema                            7.7    0.11  )-----------( 8        ( 19 Jan 2022 08:39 )             21.6       01-19    137  |  102  |  16  ----------------------------<  110<H>  4.0   |  24  |  1.03    Ca    9.6      19 Jan 2022 08:39  Phos  4.0     01-19  Mg     2.2     01-19    TPro  6.5  /  Alb  3.8  /  TBili  1.5<H>  /  DBili  x   /  AST  13  /  ALT  59<H>  /  AlkPhos  102  01-19      Magnesium, Serum: 2.2 mg/dL (01-19 @ 06:32)  Phosphorus Level, Serum: 4.0 mg/dL (01-19 @ 06:32)  Lactate Dehydrogenase, Serum: 156 U/L (01-19 @ 06:32)  Magnesium, Serum: 2.0 mg/dL (01-18 @ 14:26)  Phosphorus Level, Serum: 3.4 mg/dL (01-18 @ 14:26)      PT/INR - ( 19 Jan 2022 08:39 )   PT: 14.2 sec;   INR: 1.19 ratio         PTT - ( 18 Jan 2022 14:36 )  PTT:31.4 sec    Clean Catch Clean Catch (Midstream)  01-07 @ 01:48   No growth  --  --      .Blood Blood-Peripheral  01-06 @ 23:05   No Growth Final  --  --      .Blood Blood-Peripheral  01-06 @ 19:22   No Growth Final  --  --      .Blood Blood  12-12 @ 04:27   No Growth Final  --  --      Clean Catch Clean Catch (Midstream)  12-12 @ 01:39   No growth  --  --      Clean Catch Clean Catch (Midstream)  11-13 @ 10:09   No growth  --  --      .Blood Blood-Peripheral  11-13 @ 03:50   No Growth Final  --  --      .Blood Blood-Peripheral  10-29 @ 14:55   No Growth Final  --  --      .Blood Blood-Peripheral  10-29 @ 09:29   No Growth Final  --  --      Clean Catch Clean Catch (Midstream)  10-26 @ 22:05   No growth  --  --      .Blood Blood-Catheter  10-26 @ 22:00   No Growth Final  --  --       HPI:  Patient is a 36 year old Male with a PMHx of HTN, fatty liver, AML S/P consolidation who presents to the hospital due fever of 100.4, weakness, decreased WBC and shortness of breath while at home. Patient reports that his pain is more controlled s/p pain medication and his shortness of breath has resolved. Patient denies chills, cough, chest pain, palpitations. (18 Jan 2022 16:16)    Interval Events:  Patient seen at bedside. Patient states he is no longer SOB, attributes weakness and SOB to severe anemia, improved after transfusion. Patient states outpatient he had resumed ppx Levaquin and fluconazole. Denies cough, chest pain, abdominal pain, n/v/d/c, has hemorrhoids, no stool in past 2 days, denies BRBPR, melena. Patient endorses diffuse bone pain.       PAST MEDICAL & SURGICAL HISTORY:  Hypertension  diagnosed 1 year ago    Kidney stone  5 years ago    History of genital warts    AML (acute myeloid leukemia)    No significant past surgical history        Allergies    No Known Allergies    Intolerances    cefepime (Rash)      MEDICATIONS  (STANDING):  chlorhexidine 2% Cloths 1 Application(s) Topical <User Schedule>  fluconAZOLE   Tablet 200 milliGRAM(s) Oral daily  influenza   Vaccine 0.5 milliLiter(s) IntraMuscular once  pantoprazole    Tablet 40 milliGRAM(s) Oral before breakfast  piperacillin/tazobactam IVPB.. 3.375 Gram(s) IV Intermittent every 8 hours  sodium chloride 0.9%. 1000 milliLiter(s) (100 mL/Hr) IV Continuous <Continuous>    MEDICATIONS  (PRN):  HYDROmorphone  Injectable 1 milliGRAM(s) IV Push every 6 hours PRN Severe Pain (7 - 10)  ondansetron    Tablet 8 milliGRAM(s) Oral every 8 hours PRN Nausea and/or Vomiting      FAMILY HISTORY:  FH: CAD (coronary artery disease) (Father, Mother)        SOCIAL HISTORY: No EtOH, no tobacco    REVIEW OF SYSTEMS:    CONSTITUTIONAL: +Weakness; +Fever; Denies chills  EYES/ENT: No visual changes;  No vertigo or throat pain   NECK: No pain or stiffness  RESPIRATORY: No cough, wheezing, hemoptysis; No shortness of breath  CARDIOVASCULAR: +Tachycardic   GASTROINTESTINAL: No abdominal or epigastric pain. No nausea, vomiting, or hematemesis; No diarrhea or constipation. No melena or hematochezia.  GENITOURINARY: No dysuria, frequency or hematuria  NEUROLOGICAL: No numbness or weakness  SKIN: No itching, burning, rashes, or lesions       Height (cm): 180.3 (01-18 @ 12:48)  Weight (kg): 88.5 (01-18 @ 12:48)  BMI (kg/m2): 27.2 (01-18 @ 12:48)  BSA (m2): 2.09 (01-18 @ 12:48)    T(F): 98.5 (01-19-22 @ 10:26), Max: 99 (01-18-22 @ 17:22)  HR: 106 (01-19-22 @ 10:26)  BP: 101/68 (01-19-22 @ 10:26)  RR: 20 (01-19-22 @ 10:26)  SpO2: 99% (01-19-22 @ 10:26)  Wt(kg): --    Physical Exam:   General: Pale, laying in bed, uncomfortable appearing	  HEENT:   Normal oral mucosa, no thrush,  PERRL, EOMI	  Lymphatic: No lymphadenopathy , no edema  Cardiovascular: Tachycardic, no murmurs   Respiratory: Lungs clear to auscultation, normal effort 	  Gastrointestinal:  Soft, Non-tender, + BS	  Skin: No rashes, No ecchymoses, No cyanosis, warm to touch  Musculoskeletal: Normal range of motion, normal strength  Psychiatry:  Mood & affect appropriate  Ext: No edema                            7.7    0.11  )-----------( 8        ( 19 Jan 2022 08:39 )             21.6       01-19    137  |  102  |  16  ----------------------------<  110<H>  4.0   |  24  |  1.03    Ca    9.6      19 Jan 2022 08:39  Phos  4.0     01-19  Mg     2.2     01-19    TPro  6.5  /  Alb  3.8  /  TBili  1.5<H>  /  DBili  x   /  AST  13  /  ALT  59<H>  /  AlkPhos  102  01-19      Magnesium, Serum: 2.2 mg/dL (01-19 @ 06:32)  Phosphorus Level, Serum: 4.0 mg/dL (01-19 @ 06:32)  Lactate Dehydrogenase, Serum: 156 U/L (01-19 @ 06:32)  Magnesium, Serum: 2.0 mg/dL (01-18 @ 14:26)  Phosphorus Level, Serum: 3.4 mg/dL (01-18 @ 14:26)      PT/INR - ( 19 Jan 2022 08:39 )   PT: 14.2 sec;   INR: 1.19 ratio         PTT - ( 18 Jan 2022 14:36 )  PTT:31.4 sec    Clean Catch Clean Catch (Midstream)  01-07 @ 01:48   No growth  --  --      .Blood Blood-Peripheral  01-06 @ 23:05   No Growth Final  --  --      .Blood Blood-Peripheral  01-06 @ 19:22   No Growth Final  --  --      .Blood Blood  12-12 @ 04:27   No Growth Final  --  --      Clean Catch Clean Catch (Midstream)  12-12 @ 01:39   No growth  --  --      Clean Catch Clean Catch (Midstream)  11-13 @ 10:09   No growth  --  --      .Blood Blood-Peripheral  11-13 @ 03:50   No Growth Final  --  --      .Blood Blood-Peripheral  10-29 @ 14:55   No Growth Final  --  --      .Blood Blood-Peripheral  10-29 @ 09:29   No Growth Final  --  --      Clean Catch Clean Catch (Midstream)  10-26 @ 22:05   No growth  --  --      .Blood Blood-Catheter  10-26 @ 22:00   No Growth Final  --  --

## 2022-01-19 NOTE — CONSULT NOTE ADULT - ATTENDING COMMENTS
36 year old Male with a PMHx of HTN, fatty liver, AML S/P c4 hidac consolidation who presented due fever of 100.4, weakness, and SOB with pancytopenia and neutropenic fever.  Recent chemo hidac with steroids on 1/10.  CXR and CT chest show clear lungs.   Covid-19 pcr neg and no hx of covid vaccines but recent antibodies positives (unclear if from prior infection or possibly from transfusions)  Currently with generalized pain and non bloody hemorrhoids but no other localizing symptoms.  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 Recently on levofloxacin and fluconazole ppx.     #Neutropenic fever, pancytopenic, antibiotic allergy - likely due to recent chemo.   - agree with zosyn q8  - f/u bl and ur clx  - monitor cbc, transfusions per onc  - trend fever curve  - check EKG to monitor qtc with use of fluconazole and recent levofloxaicin  - would continue fluconazole only if qtc<500 if not will need other antifungal ppx for neutropenia
Rohith Blackwood is a 34 year old male with a history of treated NPM mutated acute myeloid leukemia.  He is admitted for treatment and evaluation of post consolidation therapy resulting in  neutropenia with fever.  Physical examination is stable and he does not appear to have sepis or acute illenss while on antibiotic. We will continue to monitor blood testing including bacteriology reporting until diagnosis

## 2022-01-19 NOTE — CONSULT NOTE ADULT - SUBJECTIVE AND OBJECTIVE BOX
Patient is a 36y old  Male who presents with a chief complaint of Neutropenia (2022 08:49)    HPI:  Patient is a 36 year old Male with a PMHx of HTN, fatty liver, AML S/P consolidation who presents to the hospital due fever of 100.4, weakness, decreased WBC and shortness of breath while at home. Patient reports that his pain is more controlled s/p pain medication and his shortness of breath has resolved. Patient denies chills, cough, chest pain, palpitations. (2022 16:16)     prior hospital charts reviewed [  ]  primary team notes reviewed [  ]  other consultant notes reviewed [  ]    PAST MEDICAL & SURGICAL HISTORY:  Hypertension  diagnosed 1 year ago    Kidney stone  5 years ago    History of genital warts    AML (acute myeloid leukemia)    No significant past surgical history      Allergies  No Known Allergies    ANTIMICROBIALS (past 90 days)  MEDICATIONS  (STANDING):    piperacillin/tazobactam IVPB..   25 mL/Hr IV Intermittent (22 @ 06:03)   25 mL/Hr IV Intermittent (22 @ 20:53)    piperacillin/tazobactam IVPB...   200 mL/Hr IV Intermittent (22 @ 14:16)      ANTIMICROBIALS:    piperacillin/tazobactam IVPB.. 3.375 every 8 hours    OTHER MEDS: MEDICATIONS  (STANDING):  HYDROmorphone  Injectable 1 every 6 hours PRN  influenza   Vaccine 0.5 once  ondansetron    Tablet 8 every 8 hours PRN  pantoprazole    Tablet 40 before breakfast    SOCIAL HISTORY:       FAMILY HISTORY:  FH: CAD (coronary artery disease) (Father, Mother)      REVIEW OF SYSTEMS  [  ] ROS unobtainable because:    [  ] All other systems negative except as noted below:	    Constitutional:  [ ] fever [ ] chills  [ ] weight loss  [ ] weakness  Skin:  [ ] rash [ ] phlebitis	  Eyes: [ ] icterus [ ] pain  [ ] discharge	  ENMT: [ ] sore throat  [ ] thrush [ ] ulcers [ ] exudates  Respiratory: [ ] dyspnea [ ] hemoptysis [ ] cough [ ] sputum	  Cardiovascular:  [ ] chest pain [ ] palpitations [ ] edema	  Gastrointestinal:  [ ] nausea [ ] vomiting [ ] diarrhea [ ] constipation [ ] pain	  Genitourinary:  [ ] dysuria [ ] frequency [ ] hematuria [ ] discharge [ ] flank pain  [ ] incontinence  Musculoskeletal:  [ ] myalgias [ ] arthralgias [ ] arthritis  [ ] back pain  Neurological:  [ ] headache [ ] seizures  [ ] confusion/altered mental status  Psychiatric:  [ ] anxiety [ ] depression	  Hematology/Lymphatics:  [ ] lymphadenopathy  Endocrine:  [ ] adrenal [ ] thyroid  Allergic/Immunologic:	 [ ] transplant [ ] seasonal    Vital Signs Last 24 Hrs  T(F): 97.7 (22 @ 04:13), Max: 99 (22 @ 17:22)  Vital Signs Last 24 Hrs  HR: 103 (22 @ 04:13) (71 - 128)  BP: 115/77 (22 @ 04:13) (107/61 - 125/70)  RR: 20 (22 @ 04:13)  SpO2: 100% (22 @ 04:13) (98% - 100%)  Wt(kg): --    EXAM:  Constitutional: Not in acute distress  Eyes: pupils bilaterally reactive to light. No icterus.  Oral cavity: Clear, no lesions  Neck: No neck vein distension noted  RS: Chest clear to auscultation bilaterally. No wheeze/rhonchi/crepitations.  CVS: S1, S2 heard. Regular rate and rhythm. No murmurs/rubs/gallops.  Abdomen: Soft. No guarding/rigidity/tenderness.  : No acute abnormalities  Extremities: Warm. No pedal edema  Skin: No lesions noted  Vascular: No evidence of phlebitis  Neuro: Alert, oriented to time/place/person                          7.7    0.11  )-----------( 8        ( 2022 08:39 )             21.6         137  |  102  |  16  ----------------------------<  110<H>  4.0   |  24  |  1.03    Ca    9.6      2022 08:39  Phos  4.0       Mg     2.2         TPro  6.5  /  Alb  3.8  /  TBili  1.5<H>  /  DBili  x   /  AST  13  /  ALT  59<H>  /  AlkPhos  102      Urinalysis Basic - ( 2022 19:26 )    Color: Light Yellow / Appearance: Clear / S.013 / pH: x  Gluc: x / Ketone: Negative  / Bili: Negative / Urobili: Negative   Blood: x / Protein: Negative / Nitrite: Negative   Leuk Esterase: Negative / RBC: x / WBC x   Sq Epi: x / Non Sq Epi: x / Bacteria: x    MICROBIOLOGY:                COVID-19 PCR: NotDetec (22 @ 14:25)    RADIOLOGY:  imaging below personally reviewed    < from: Xray Chest 1 View- PORTABLE-Urgent (22 @ 14:06) >  IMPRESSION:  Clear lungs.    < end of copied text >    OTHER TESTS:   Patient is a 36y old  Male who presents with a chief complaint of Neutropenia (2022 08:49)    HPI:  Patient is a 36 year old Male with a PMHx of HTN, fatty liver, AML S/P c4 hidac consolidation who presents to the hospital due fever of 100.4, weakness, decreased WBC and shortness of breath while at home.  Reports he received a scheduled transfusion of platelets on monday and developed fever at night.    Patient reports that his pain is more controlled s/p pain medication and his shortness of breath has resolved. Patient denies chills, cough, chest pain, palpitations.      prior hospital charts reviewed [  ]  primary team notes reviewed [  ]  other consultant notes reviewed [  ]    PAST MEDICAL & SURGICAL HISTORY:  Hypertension  diagnosed 1 year ago    Kidney stone  5 years ago    History of genital warts    AML (acute myeloid leukemia)    No significant past surgical history      Allergies  No Known Allergies    ANTIMICROBIALS (past 90 days)  MEDICATIONS  (STANDING):    piperacillin/tazobactam IVPB..   25 mL/Hr IV Intermittent (22 @ 06:03)   25 mL/Hr IV Intermittent (22 @ 20:53)    piperacillin/tazobactam IVPB...   200 mL/Hr IV Intermittent (22 @ 14:16)      ANTIMICROBIALS:    piperacillin/tazobactam IVPB.. 3.375 every 8 hours    OTHER MEDS: MEDICATIONS  (STANDING):  HYDROmorphone  Injectable 1 every 6 hours PRN  influenza   Vaccine 0.5 once  ondansetron    Tablet 8 every 8 hours PRN  pantoprazole    Tablet 40 before breakfast    SOCIAL HISTORY:       FAMILY HISTORY:  FH: CAD (coronary artery disease) (Father, Mother)      REVIEW OF SYSTEMS  [  ] ROS unobtainable because:    [  ] All other systems negative except as noted below:	    Constitutional:  [ ] fever [ ] chills  [ ] weight loss  [ ] weakness  Skin:  [ ] rash [ ] phlebitis	  Eyes: [ ] icterus [ ] pain  [ ] discharge	  ENMT: [ ] sore throat  [ ] thrush [ ] ulcers [ ] exudates  Respiratory: [ ] dyspnea [ ] hemoptysis [ ] cough [ ] sputum	  Cardiovascular:  [ ] chest pain [ ] palpitations [ ] edema	  Gastrointestinal:  [ ] nausea [ ] vomiting [ ] diarrhea [ ] constipation [ ] pain	  Genitourinary:  [ ] dysuria [ ] frequency [ ] hematuria [ ] discharge [ ] flank pain  [ ] incontinence  Musculoskeletal:  [ ] myalgias [ ] arthralgias [ ] arthritis  [ ] back pain  Neurological:  [ ] headache [ ] seizures  [ ] confusion/altered mental status  Psychiatric:  [ ] anxiety [ ] depression	  Hematology/Lymphatics:  [ ] lymphadenopathy  Endocrine:  [ ] adrenal [ ] thyroid  Allergic/Immunologic:	 [ ] transplant [ ] seasonal    Vital Signs Last 24 Hrs  T(F): 97.7 (22 @ 04:13), Max: 99 (22 @ 17:22)  Vital Signs Last 24 Hrs  HR: 103 (22 @ 04:13) (71 - 128)  BP: 115/77 (22 @ 04:13) (107/61 - 125/70)  RR: 20 (22 @ 04:13)  SpO2: 100% (22 @ 04:13) (98% - 100%)  Wt(kg): --    EXAM:  Constitutional: Not in acute distress  Eyes: pupils bilaterally reactive to light. No icterus.  Oral cavity: Clear, no lesions  Neck: No neck vein distension noted  RS: Chest clear to auscultation bilaterally. No wheeze/rhonchi/crepitations.  CVS: S1, S2 heard. Regular rate and rhythm. No murmurs/rubs/gallops.  Abdomen: Soft. No guarding/rigidity/tenderness.  : No acute abnormalities  Extremities: Warm. No pedal edema  Skin: No lesions noted  Vascular: No evidence of phlebitis  Neuro: Alert, oriented to time/place/person                          7.7    0.11  )-----------( 8        ( 2022 08:39 )             21.6         137  |  102  |  16  ----------------------------<  110<H>  4.0   |  24  |  1.03    Ca    9.6      2022 08:39  Phos  4.0       Mg     2.2         TPro  6.5  /  Alb  3.8  /  TBili  1.5<H>  /  DBili  x   /  AST  13  /  ALT  59<H>  /  AlkPhos  102      Urinalysis Basic - ( 2022 19:26 )    Color: Light Yellow / Appearance: Clear / S.013 / pH: x  Gluc: x / Ketone: Negative  / Bili: Negative / Urobili: Negative   Blood: x / Protein: Negative / Nitrite: Negative   Leuk Esterase: Negative / RBC: x / WBC x   Sq Epi: x / Non Sq Epi: x / Bacteria: x    MICROBIOLOGY:                COVID-19 PCR: NotDetec (22 @ 14:25)    RADIOLOGY:  imaging below personally reviewed    < from: Xray Chest 1 View- PORTABLE-Urgent (22 @ 14:06) >  IMPRESSION:  Clear lungs.    < end of copied text >    OTHER TESTS:   Patient is a 36y old  Male who presents with a chief complaint of Neutropenia (2022 08:49)    HPI:  Patient is a 36 year old Male with a PMHx of HTN, fatty liver, AML S/P c4 hidac consolidation who presents to the hospital due fever of 100.4, weakness, decreased WBC and shortness of breath while at home.  Reports he received a scheduled transfusion of platelets on monday and developed fever at night. similar to prior episodes. Denies any other localizing symptoms.    Patient reports generalized bone pain is more controlled s/p pain medication and his shortness of breath has resolved. Patient denies chills, cough, chest pain, palpitations.    Unvaccinated for covid.     In the ED tmax 100.4. Received zosyn. Has a cefepime reaction with generalized rash in july, no anaphylaxis.     prior hospital charts reviewed [ x ]  primary team notes reviewed [ x ]  other consultant notes reviewed [ x ]    PAST MEDICAL & SURGICAL HISTORY:  Hypertension  diagnosed 1 year ago    Kidney stone  5 years ago    History of genital warts    AML (acute myeloid leukemia)    No significant past surgical history      Allergies  No Known Allergies    ANTIMICROBIALS (past 90 days)  MEDICATIONS  (STANDING):    piperacillin/tazobactam IVPB..   25 mL/Hr IV Intermittent (22 @ 06:03)   25 mL/Hr IV Intermittent (22 @ 20:53)    piperacillin/tazobactam IVPB...   200 mL/Hr IV Intermittent (22 @ 14:16)      ANTIMICROBIALS:    piperacillin/tazobactam IVPB.. 3.375 every 8 hours    OTHER MEDS: MEDICATIONS  (STANDING):  HYDROmorphone  Injectable 1 every 6 hours PRN  influenza   Vaccine 0.5 once  ondansetron    Tablet 8 every 8 hours PRN  pantoprazole    Tablet 40 before breakfast    SOCIAL HISTORY:   Works as . No kids. No drugs/etoh/tobacco use.     FAMILY HISTORY:  FH: CAD (coronary artery disease) (Father, Mother)      REVIEW OF SYSTEMS  [  ] ROS unobtainable because:    [ x ] All other systems negative except as noted below:	    Constitutional:  [ x] fever [ ] chills  [ ] weight loss  [ ] weakness  Skin:  [ ] rash [ ] phlebitis	  Eyes: [ ] icterus [ ] pain  [ ] discharge	  ENMT: [ ] sore throat  [ ] thrush [ ] ulcers [ ] exudates  Respiratory: [ ] dyspnea [ ] hemoptysis [ ] cough [ ] sputum	  Cardiovascular:  [ ] chest pain [ ] palpitations [ ] edema	  Gastrointestinal:  [ ] nausea [ ] vomiting [ ] diarrhea [ ] constipation [ ] pain	  Genitourinary:  [ ] dysuria [ ] frequency [ ] hematuria [ ] discharge [ ] flank pain  [ ] incontinence  Musculoskeletal:  [ ] myalgias [ ] arthralgias [ ] arthritis  [ ] back pain  Neurological:  [ ] headache [ ] seizures  [ ] confusion/altered mental status  Psychiatric:  [ ] anxiety [ ] depression	  Hematology/Lymphatics:  [ ] lymphadenopathy  Endocrine:  [ ] adrenal [ ] thyroid  Allergic/Immunologic:	 [ ] transplant [ ] seasonal    Vital Signs Last 24 Hrs  T(F): 97.7 (22 @ 04:13), Max: 99 (22 @ 17:22)  Vital Signs Last 24 Hrs  HR: 103 (22 @ 04:13) (71 - 128)  BP: 115/77 (22 @ 04:13) (107/61 - 125/70)  RR: 20 (22 @ 04:13)  SpO2: 100% (22 @ 04:13) (98% - 100%)  Wt(kg): --    EXAM:  Constitutional: uncomfortable with pain  Eyes: pupils bilaterally reactive to light. No icterus.  Oral cavity: Clear, no lesions  Neck: No neck vein distension noted  RS: Chest clear to auscultation bilaterally. No wheeze/rhonchi/crepitations.  CVS: S1, S2 heard. Regular rate and rhythm. No murmurs/rubs/gallops.  Abdomen: Soft. No guarding/rigidity/tenderness.  : No acute abnormalities  Extremities: Warm. No pedal edema  Skin: No lesions noted  Vascular: No evidence of phlebitis; R arm picc  Neuro: Alert, oriented to time/place/person                          7.7    0.11  )-----------( 8        ( 2022 08:39 )             21.6         137  |  102  |  16  ----------------------------<  110<H>  4.0   |  24  |  1.03    Ca    9.6      2022 08:39  Phos  4.0       Mg     2.2         TPro  6.5  /  Alb  3.8  /  TBili  1.5<H>  /  DBili  x   /  AST  13  /  ALT  59<H>  /  AlkPhos  102      Urinalysis Basic - ( 2022 19:26 )    Color: Light Yellow / Appearance: Clear / S.013 / pH: x  Gluc: x / Ketone: Negative  / Bili: Negative / Urobili: Negative   Blood: x / Protein: Negative / Nitrite: Negative   Leuk Esterase: Negative / RBC: x / WBC x   Sq Epi: x / Non Sq Epi: x / Bacteria: x    MICROBIOLOGY:  COVID-19 PCR: NotDetec (22 @ 14:25)    RADIOLOGY:  imaging below personally reviewed  < from: Xray Chest 1 View- PORTABLE-Urgent (22 @ 14:06) >  IMPRESSION:  Clear lungs.    < end of copied text >    < from: CT Chest No Cont (10.09.21 @ 10:54) >    FINDINGS:    LUNGS AND AIRWAYS: Patent central airways.  Lungs are clear.  PLEURA: No pleural effusion.  MEDIASTINUM AND PRABHJOT: No lymphadenopathy.  VESSELS: Right-sided PICC line with tip in the SVC.  HEART: Heart size is normal. No pericardial effusion.  CHEST WALL AND LOWER NECK: Within normal limits.  VISUALIZED UPPER ABDOMEN: Tiny stones and/or sludge in the gallbladder. For interpretation of the abdominal contents, please see report of the CT scan of the abdomen performed on the same day.  BONES: Within normal limits.    IMPRESSION:  Clear lungs.    < end of copied text >    OTHER TESTS:

## 2022-01-19 NOTE — CONSULT NOTE ADULT - SUBJECTIVE AND OBJECTIVE BOX
DATE OF SERVICE: 01-19-22      CHIEF COMPLAINT: Patient is a 36y old  Male who presents with a chief complaint of Neutropenia (19 Jan 2022 11:02)      HISTORY OF PRESENT ILLNESS:HPI:  Patient is a 36 year old Male with a PMHx of HTN, fatty liver, AML S/P consolidation who presents to the hospital due fever of 100.4, weakness, decreased WBC and shortness of breath while at home. Patient reports that his pain is more controlled s/p pain medication and his shortness of breath has resolved. Patient denies chills, cough, chest pain, palpitations. (18 Jan 2022 16:16)      PAST MEDICAL & SURGICAL HISTORY:  Hypertension  diagnosed 1 year ago    Kidney stone  5 years ago    History of genital warts    AML (acute myeloid leukemia)    No significant past surgical history            MEDICATIONS:  fluconAZOLE   Tablet 200 milliGRAM(s) Oral daily  piperacillin/tazobactam IVPB.. 3.375 Gram(s) IV Intermittent every 8 hours    HYDROmorphone  Injectable 1 milliGRAM(s) IV Push every 6 hours PRN  ondansetron    Tablet 8 milliGRAM(s) Oral every 8 hours PRN    pantoprazole    Tablet 40 milliGRAM(s) Oral before breakfast    chlorhexidine 2% Cloths 1 Application(s) Topical <User Schedule>  influenza   Vaccine 0.5 milliLiter(s) IntraMuscular once  sodium chloride 0.9%. 1000 milliLiter(s) IV Continuous <Continuous>      FAMILY HISTORY:  FH: CAD (coronary artery disease) (Father, Mother)        Non-contributory    SOCIAL HISTORY:    [ ] not a smoker    Allergies    No Known Allergies    Intolerances    cefepime (Rash)  	    REVIEW OF SYSTEMS:  CONSTITUTIONAL: + fever  EYES: No eye pain, visual disturbances, or discharge  ENMT:  No difficulty hearing, tinnitus  NECK: No pain or stiffness  RESPIRATORY: No cough, wheezing,  CARDIOVASCULAR: No chest pain, palpitations, passing out, dizziness, or leg swelling  GASTROINTESTINAL:  No nausea, vomiting, diarrhea or constipation. No melena.  GENITOURINARY: No dysuria, hematuria  NEUROLOGICAL: No stroke like symptoms  SKIN: No burning or lesions   ENDOCRINE: No heat or cold intolerance  MUSCULOSKELETAL: No joint pain or swelling  PSYCHIATRIC: No  anxiety, mood swings  HEME/LYMPH: No bleeding gums  ALLERGY AND IMMUNOLOGIC: No hives or eczema	    All other ROS negative    PHYSICAL EXAM:  T(C): 36.7 (01-19-22 @ 21:11), Max: 36.9 (01-19-22 @ 10:26)  HR: 117 (01-19-22 @ 21:11) (101 - 120)  BP: 114/75 (01-19-22 @ 21:11) (101/68 - 116/77)  RR: 19 (01-19-22 @ 21:11) (18 - 22)  SpO2: 99% (01-19-22 @ 21:11) (95% - 100%)  Wt(kg): --  I&O's Summary    18 Jan 2022 07:01  -  19 Jan 2022 07:00  --------------------------------------------------------  IN: 240 mL / OUT: 520 mL / NET: -280 mL    19 Jan 2022 07:01  -  19 Jan 2022 21:29  --------------------------------------------------------  IN: 480 mL / OUT: 0 mL / NET: 480 mL        Appearance: Normal	  HEENT:   Normal oral mucosa, EOMI	  Cardiovascular:  S1 S2, No JVD,    Respiratory: Lungs clear to auscultation	  Psychiatry: Alert  Gastrointestinal:  Soft, Non-tender, + BS	  Skin: No rashes   Neurologic: Non-focal  Extremities:  No edema  Vascular: Peripheral pulses palpable    	    	  	  CARDIAC MARKERS:  Labs personally reviewed by me                                  10.7   0.10  )-----------( 25       ( 19 Jan 2022 15:19 )             29.2     01-19    137  |  102  |  16  ----------------------------<  110<H>  4.0   |  24  |  1.03    Ca    9.6      19 Jan 2022 08:39  Phos  4.0     01-19  Mg     2.2     01-19    TPro  6.5  /  Alb  3.8  /  TBili  1.5<H>  /  DBili  x   /  AST  13  /  ALT  59<H>  /  AlkPhos  102  01-19          EKG: Personally reviewed by me - Sinus tach at 127bpm  Radiology: Personally reviewed by me - CXR clear lungs      Assessment and Plan:   · Assessment	  Patient is a 36 year old Male with a PMHx of HTN, fatty liver, AML chemotherapy and S/P consolidation who presents to the hospital due fever of 100.4, weakness, decreased WBC and shortness of breath while at home. Patient reports that his pain is more controlled s/p pain medication and his shortness of breath has resolved. Patient denies chills, cough, chest pain, palpitations.    1. Tachycardia  --likely reactive to pain, fevers and significant anemia  - EKG noted with tachy upto 130bpm  - will likely correct with PRBCs, treatment of fevers and pain control  -- Will monitor HR    -- Agree with IV resuscitation   -- Will obtain TTE if tachy persists      2. HTN   --Was on Amlodipine 5mg at home  --Will hold for now   --Monitor BP and resume as needed     3.  #Neutropenic fever  --Monitor CBC closely with diffs  --Monitor patient and temperature curve closely   --BCx2/UCx pending  -- Abx as per ID    4. Pancytopenia   --Hgb of 6.4; WBC of 0.10; Plts of 16 on admission   --S/P 2 units of PRBCs on 01/18  --Monitor CBC closely  --bleeding precautions    --Hematology eval; F/U recommendations     5. PPX  --DVT PPX on hold, will start with compression devices for now  --GI PPX with PPI PO BID      Differential diagnosis and plan of care discussed with patient after the evaluation. Counseling on diet, nutritional counseling, weight management, exercise and medication compliance was done.   Advanced care planning/advanced directives discussed with patient/family. DNR status including forceful chest compressions to attempt to restart the heart, ventilator support/artificial breathing, electric shock, artificial nutrition, health care proxy, Molst form all discussed with pt. Pt wishes to consider. More than fifteen minutes spent on discussing advanced directives.  OMT on six regions for acute somatic dysfunctions done at the bedside. One hundred ten minutes spent on encounter, of which more than fifty percent of the encounter was spent on counseling and/or coordinating care by the attending physician.        Mitul Zelaya DO [facility]  Cardiovascular Medicine  [address], [address]  Office [phone]  Cell [phone]

## 2022-01-20 LAB
ANION GAP SERPL CALC-SCNC: 14 MMOL/L — SIGNIFICANT CHANGE UP (ref 5–17)
BUN SERPL-MCNC: 15 MG/DL — SIGNIFICANT CHANGE UP (ref 7–23)
CALCIUM SERPL-MCNC: 9.5 MG/DL — SIGNIFICANT CHANGE UP (ref 8.4–10.5)
CHLORIDE SERPL-SCNC: 103 MMOL/L — SIGNIFICANT CHANGE UP (ref 96–108)
CO2 SERPL-SCNC: 21 MMOL/L — LOW (ref 22–31)
CREAT SERPL-MCNC: 0.99 MG/DL — SIGNIFICANT CHANGE UP (ref 0.5–1.3)
GLUCOSE SERPL-MCNC: 100 MG/DL — HIGH (ref 70–99)
HCT VFR BLD CALC: 19.4 % — CRITICAL LOW (ref 39–50)
HCT VFR BLD CALC: 20.2 % — CRITICAL LOW (ref 39–50)
HGB BLD-MCNC: 7.1 G/DL — LOW (ref 13–17)
HGB BLD-MCNC: 7.2 G/DL — LOW (ref 13–17)
MCHC RBC-ENTMCNC: 30.4 PG — SIGNIFICANT CHANGE UP (ref 27–34)
MCHC RBC-ENTMCNC: 31.1 PG — SIGNIFICANT CHANGE UP (ref 27–34)
MCHC RBC-ENTMCNC: 35.6 GM/DL — SIGNIFICANT CHANGE UP (ref 32–36)
MCHC RBC-ENTMCNC: 36.6 GM/DL — HIGH (ref 32–36)
MCV RBC AUTO: 85.1 FL — SIGNIFICANT CHANGE UP (ref 80–100)
MCV RBC AUTO: 85.2 FL — SIGNIFICANT CHANGE UP (ref 80–100)
NRBC # BLD: 0 /100 WBCS — SIGNIFICANT CHANGE UP (ref 0–0)
NRBC # BLD: 0 /100 WBCS — SIGNIFICANT CHANGE UP (ref 0–0)
PLATELET # BLD AUTO: 16 K/UL — CRITICAL LOW (ref 150–400)
PLATELET # BLD AUTO: 23 K/UL — LOW (ref 150–400)
POTASSIUM SERPL-MCNC: 4.1 MMOL/L — SIGNIFICANT CHANGE UP (ref 3.5–5.3)
POTASSIUM SERPL-SCNC: 4.1 MMOL/L — SIGNIFICANT CHANGE UP (ref 3.5–5.3)
RBC # BLD: 2.28 M/UL — LOW (ref 4.2–5.8)
RBC # BLD: 2.37 M/UL — LOW (ref 4.2–5.8)
RBC # FLD: 16.5 % — HIGH (ref 10.3–14.5)
RBC # FLD: 17.1 % — HIGH (ref 10.3–14.5)
SODIUM SERPL-SCNC: 138 MMOL/L — SIGNIFICANT CHANGE UP (ref 135–145)
WBC # BLD: 0.13 K/UL — CRITICAL LOW (ref 3.8–10.5)
WBC # BLD: 0.21 K/UL — CRITICAL LOW (ref 3.8–10.5)
WBC # FLD AUTO: 0.13 K/UL — CRITICAL LOW (ref 3.8–10.5)
WBC # FLD AUTO: 0.21 K/UL — CRITICAL LOW (ref 3.8–10.5)

## 2022-01-20 PROCEDURE — 99232 SBSQ HOSP IP/OBS MODERATE 35: CPT

## 2022-01-20 RX ORDER — PHENYLEPHRINE-SHARK LIVER OIL-MINERAL OIL-PETROLATUM RECTAL OINTMENT
1 OINTMENT (GRAM) RECTAL
Refills: 0 | Status: DISCONTINUED | OUTPATIENT
Start: 2022-01-20 | End: 2022-01-23

## 2022-01-20 RX ADMIN — FLUCONAZOLE 200 MILLIGRAM(S): 150 TABLET ORAL at 14:09

## 2022-01-20 RX ADMIN — PIPERACILLIN AND TAZOBACTAM 25 GRAM(S): 4; .5 INJECTION, POWDER, LYOPHILIZED, FOR SOLUTION INTRAVENOUS at 10:00

## 2022-01-20 RX ADMIN — HYDROMORPHONE HYDROCHLORIDE 1 MILLIGRAM(S): 2 INJECTION INTRAMUSCULAR; INTRAVENOUS; SUBCUTANEOUS at 07:52

## 2022-01-20 RX ADMIN — PIPERACILLIN AND TAZOBACTAM 25 GRAM(S): 4; .5 INJECTION, POWDER, LYOPHILIZED, FOR SOLUTION INTRAVENOUS at 17:43

## 2022-01-20 RX ADMIN — HYDROMORPHONE HYDROCHLORIDE 1 MILLIGRAM(S): 2 INJECTION INTRAMUSCULAR; INTRAVENOUS; SUBCUTANEOUS at 18:57

## 2022-01-20 RX ADMIN — HYDROMORPHONE HYDROCHLORIDE 1 MILLIGRAM(S): 2 INJECTION INTRAMUSCULAR; INTRAVENOUS; SUBCUTANEOUS at 19:30

## 2022-01-20 RX ADMIN — PIPERACILLIN AND TAZOBACTAM 25 GRAM(S): 4; .5 INJECTION, POWDER, LYOPHILIZED, FOR SOLUTION INTRAVENOUS at 01:17

## 2022-01-20 RX ADMIN — CHLORHEXIDINE GLUCONATE 1 APPLICATION(S): 213 SOLUTION TOPICAL at 07:30

## 2022-01-20 RX ADMIN — PANTOPRAZOLE SODIUM 40 MILLIGRAM(S): 20 TABLET, DELAYED RELEASE ORAL at 06:09

## 2022-01-20 RX ADMIN — HYDROMORPHONE HYDROCHLORIDE 1 MILLIGRAM(S): 2 INJECTION INTRAMUSCULAR; INTRAVENOUS; SUBCUTANEOUS at 08:30

## 2022-01-20 NOTE — PROGRESS NOTE ADULT - SUBJECTIVE AND OBJECTIVE BOX
Name of Patient : JAK DANIELS  MRN: [id number]  Date of visit: 22 @ 09:30      Subjective: Patient seen and examined. No new events except as noted.     REVIEW OF SYSTEMS:    CONSTITUTIONAL: No weakness, fevers or chills  EYES/ENT: No visual changes;  No vertigo or throat pain   NECK: No pain or stiffness  RESPIRATORY: No cough, wheezing, hemoptysis; No shortness of breath  CARDIOVASCULAR: No chest pain or palpitations  GASTROINTESTINAL: No abdominal or epigastric pain. No nausea, vomiting, or hematemesis; No diarrhea or constipation. No melena or hematochezia.  GENITOURINARY: No dysuria, frequency or hematuria  NEUROLOGICAL: No numbness or weakness  SKIN: No itching, burning, rashes, or lesions   All other review of systems is negative unless indicated above.    MEDICATIONS:  MEDICATIONS  (STANDING):  chlorhexidine 2% Cloths 1 Application(s) Topical <User Schedule>  fluconAZOLE   Tablet 200 milliGRAM(s) Oral daily  influenza   Vaccine 0.5 milliLiter(s) IntraMuscular once  pantoprazole    Tablet 40 milliGRAM(s) Oral before breakfast  piperacillin/tazobactam IVPB.. 3.375 Gram(s) IV Intermittent every 8 hours  sodium chloride 0.9%. 1000 milliLiter(s) (100 mL/Hr) IV Continuous <Continuous>      PHYSICAL EXAM:  T(C): 36.7 (22 @ 04:41), Max: 36.9 (22 @ 10:26)  HR: 88 (22 @ 04:41) (88 - 117)  BP: 102/68 (22 @ 04:41) (101/68 - 116/77)  RR: 19 (22 @ 04:41) (18 - 20)  SpO2: 97% (22 @ 04:41) (95% - 99%)  Wt(kg): --  I&O's Summary    2022 07:01  -  2022 07:00  --------------------------------------------------------  IN: 480 mL / OUT: 1400 mL / NET: -920 mL          Appearance: Normal	  HEENT:  PERRLA   Lymphatic: No lymphadenopathy   Cardiovascular: Normal S1 S2, no JVD  Respiratory: normal effort , clear  Gastrointestinal:  Soft, Non-tender  Skin: No rashes,  warm to touch  Psychiatry:  Mood & affect appropriate  Musculuskeletal: No edema                          7.2    0.13  )-----------( 23       ( 2022 07:21 )             20.2                   138  |  103  |  15  ----------------------------<  100<H>  4.1   |  21<L>  |  0.99    Ca    9.5      2022 07:26  Phos  4.0       Mg     2.2         TPro  6.5  /  Alb  3.8  /  TBili  1.5<H>  /  DBili  x   /  AST  13  /  ALT  59<H>  /  AlkPhos  102      PT/INR - ( 2022 08:39 )   PT: 14.2 sec;   INR: 1.19 ratio         PTT - ( 2022 14:36 )  PTT:31.4 sec                   Urinalysis Basic - ( 2022 19:26 )    Color: Light Yellow / Appearance: Clear / S.013 / pH: x  Gluc: x / Ketone: Negative  / Bili: Negative / Urobili: Negative   Blood: x / Protein: Negative / Nitrite: Negative   Leuk Esterase: Negative / RBC: x / WBC x   Sq Epi: x / Non Sq Epi: x / Bacteria: x          22 @ 07:01  -  22 @ 07:00  --------------------------------------------------------  IN: 480 mL / OUT: 1400 mL / NET: -920 mL        Culture - Urine (22 @ 22:46)   Specimen Source: Clean Catch Clean Catch (Midstream)   Culture Results: No growth     Culture - Blood (22 @ 18:28)   Specimen Source: .Blood Blood-Peripheral   Culture Results: No growth to date.     Culture - Blood (22 @ 18:28)   Specimen Source: .Blood Blood-Peripheral   Culture Results: No growth to date.      Name of Patient : JAK DANIELS  MRN: [id number]  Date of visit: 22 @ 09:30      Subjective: Patient seen and examined. No new events except as noted.   Patient reports that his pain is improving. Eating at time of visit.   Denies chest pain, palpitations, shortness of breath or difficulty breathing.   Reports normal BM.   S/P 1 unit platelet transfusion.     REVIEW OF SYSTEMS:    CONSTITUTIONAL: No weakness, fevers or chills  EYES/ENT: No visual changes;  No vertigo or throat pain   NECK: No pain or stiffness  RESPIRATORY: No cough, wheezing, hemoptysis; No shortness of breath  CARDIOVASCULAR: No chest pain or palpitations  GASTROINTESTINAL: No abdominal or epigastric pain. No nausea, vomiting, or hematemesis; No diarrhea or constipation. No melena or hematochezia.  GENITOURINARY: No dysuria, frequency or hematuria  NEUROLOGICAL: No numbness or weakness  SKIN: No itching, burning, rashes, or lesions   All other review of systems is negative unless indicated above.    MEDICATIONS:  MEDICATIONS  (STANDING):  chlorhexidine 2% Cloths 1 Application(s) Topical <User Schedule>  fluconAZOLE   Tablet 200 milliGRAM(s) Oral daily  influenza   Vaccine 0.5 milliLiter(s) IntraMuscular once  pantoprazole    Tablet 40 milliGRAM(s) Oral before breakfast  piperacillin/tazobactam IVPB.. 3.375 Gram(s) IV Intermittent every 8 hours  sodium chloride 0.9%. 1000 milliLiter(s) (100 mL/Hr) IV Continuous <Continuous>      PHYSICAL EXAM:  T(C): 36.7 (22 @ 04:41), Max: 36.9 (22 @ 10:26)  HR: 88 (22 @ 04:41) (88 - 117)  BP: 102/68 (22 @ 04:41) (101/68 - 116/77)  RR: 19 (22 @ 04:41) (18 - 20)  SpO2: 97% (22 @ 04:41) (95% - 99%)  Wt(kg): --  I&O's Summary    2022 07:01  -  2022 07:00  --------------------------------------------------------  IN: 480 mL / OUT: 1400 mL / NET: -920 mL          Appearance: Normal	  HEENT:  PERRLA   Lymphatic: No lymphadenopathy   Cardiovascular: Normal S1 S2, no JVD  Respiratory: normal effort , clear  Gastrointestinal:  Soft, Non-tender  Skin: No rashes,  warm to touch  Psychiatry:  Mood & affect appropriate  Musculuskeletal: No edema                          7.2    0.13  )-----------( 23       ( 2022 07:21 )             20.2                   138  |  103  |  15  ----------------------------<  100<H>  4.1   |  21<L>  |  0.99    Ca    9.5      2022 07:26  Phos  4.0       Mg     2.2         TPro  6.5  /  Alb  3.8  /  TBili  1.5<H>  /  DBili  x   /  AST  13  /  ALT  59<H>  /  AlkPhos  102      PT/INR - ( 2022 08:39 )   PT: 14.2 sec;   INR: 1.19 ratio         PTT - ( 2022 14:36 )  PTT:31.4 sec                   Urinalysis Basic - ( 2022 19:26 )    Color: Light Yellow / Appearance: Clear / S.013 / pH: x  Gluc: x / Ketone: Negative  / Bili: Negative / Urobili: Negative   Blood: x / Protein: Negative / Nitrite: Negative   Leuk Esterase: Negative / RBC: x / WBC x   Sq Epi: x / Non Sq Epi: x / Bacteria: x          22 @ 07:01  -  22 @ 07:00  --------------------------------------------------------  IN: 480 mL / OUT: 1400 mL / NET: -920 mL        Culture - Urine (22 @ 22:46)   Specimen Source: Clean Catch Clean Catch (Midstream)   Culture Results: No growth     Culture - Blood (22 @ 18:28)   Specimen Source: .Blood Blood-Peripheral   Culture Results: No growth to date.     Culture - Blood (22 @ 18:28)   Specimen Source: .Blood Blood-Peripheral   Culture Results: No growth to date.

## 2022-01-20 NOTE — PROGRESS NOTE ADULT - ASSESSMENT
Patient is a 36 year old Male with a PMHx of HTN, fatty liver, AML chemotherapy and S/P consolidation who presents to the hospital due fever of 100.4, weakness, decreased WBC and shortness of breath while at home. Patient reports that his pain is more controlled s/p pain medication and his shortness of breath has resolved. Patient denies chills, cough, chest pain, palpitations.      #Neutropenic fever  --Monitor CBC closely with diffs  --Monitor patient and temperature curve closely   --BCx2-- results currently pending; F/U   --UCx-- results currently pending;  F/U  --CXR with-- IMPRESSION: Clear lungs -- Negative   --Tylenol for fever  --S/P 1 dose of Zosyn in the ED, will continue for now. Unable to tolerate cefepime in the past (rash)  --ID consult, F/U recommendations -- continuing zosyn for now     #Pancytopenia   --Hgb of 6.4; WBC of 0.10; Plts of 16 on admission   --S/P 2 units of PRBCs on 01/18  --Monitor Post transfusion CBC  --Transfuse to keep Hgb >7.0   --Transfuse platelets <10k   --Monitor CBC closely  --bleeding precautions    --Hematology eval; F/U recommendations   --AM 01/19 Hgb of 4.0 noted; STAT CBC sent to lab for repeat to check level-- transfuse for hgb<7.0; repeat Hgb of 7.7  --Platelet level of 8, patient will be receiving 1 unit of platelet transfusion today 01/19, continue to monitor closely     #AML  --Heme/Onc evaluation; F/U recommendations   --On Levaquin, will hold for now as started on Zosyn, resume as indicated   --Check EKG and monitor QTC, If QTC >500 hold fluconazole  --Pain control     #Tachycardia  --Likely due to anemia and pain   --Place on Telemetry   --Monitor HR closely   --Monitor patient and vital signs  --Started on  CC/ Hr X 24 hours  --Pain control with dilaudid   --Cardio eval   --Improving     #Elevated LFTs  --Noted to be elevated on last admission   --ALT of 83-->59 01/19  --Continue to monitor on AM labs    #HTN   --Was on Amlodipine 5mg at home  --Will hold for now   --Monitor BP and resume as needed       #PPX  --DVT PPX on hold, will start with compression devices for now  --GI PPX with PPI PO BID       Patient is a 36 year old Male with a PMHx of HTN, fatty liver, AML chemotherapy and S/P consolidation who presents to the hospital due fever of 100.4, weakness, decreased WBC and shortness of breath while at home. Patient reports that his pain is more controlled s/p pain medication and his shortness of breath has resolved. Patient denies chills, cough, chest pain, palpitations.      #Neutropenic fever  --Monitor CBC w/ Diff closely   --Monitor patient and temperature curve closely   --BCx2-- NGTD; F/U final results   --UCx-- no growth  --CXR with-- IMPRESSION: Clear lungs -- Negative   --Tylenol for fever  --S/P 1 dose of Zosyn in the ED, will continue for now. Unable to tolerate cefepime in the past (rash)  --ID consult, F/U recommendations -- continuing with zosyn for now     #Pancytopenia   --Hgb of 6.4; WBC of 0.10; Plts of 16 on admission   --S/P 2 units of PRBCs on 01/18  --Monitor Post transfusion CBC  --Transfuse to keep Hgb >7.0   --Transfuse platelets <10k   --Monitor CBC closely  --bleeding precautions    --Hematology eval; F/U recommendations   --S/P 1 unit PRBCs transfusion  --Repeat CBC w/ Diff for 6pm 01/20 tonight (Order is in), monitor results and transfuse as needed       #AML  --Heme/Onc evaluation; F/U recommendations   --On Levaquin, will hold for now as started on Zosyn, resume as indicated   --Check EKG and monitor QTC, If QTC >500 hold fluconazole  --Pain control     #Tachycardia  --Likely due to anemia and pain   --Place on Telemetry   --Monitor HR closely   --Monitor patient and vital signs  --Started on  CC/ Hr X 24 hours  --Pain control with dilaudid   --Cardio eval   --Improving     #Elevated LFTs  --Noted to be elevated on last admission   --ALT of 83-->59 01/19  --Continue to monitor on AM labs  --CMP (ordered) for 01/21 AM     #HTN   --Was on Amlodipine 5mg at home  --Will hold for now   --Monitor BP and resume as needed       #PPX  --DVT PPX on hold, will start with compression devices for now  --GI PPX with PPI PO BID

## 2022-01-20 NOTE — PROGRESS NOTE ADULT - SUBJECTIVE AND OBJECTIVE BOX
Patient is a 36y old  Male who presents with a chief complaint of Neutropenia (2022 14:50)    Being followed by ID for        Interval history:  No other acute events      ROS:  No cough,SOB,CP  No N/V/D  No abd pain  No urinary complaints  No HA  No joint or limb pain  No other complaints    PAST MEDICAL & SURGICAL HISTORY:  Hypertension  diagnosed 1 year ago    Kidney stone  5 years ago    History of genital warts    AML (acute myeloid leukemia)    No significant past surgical history      Allergies    No Known Allergies    Intolerances    cefepime (Rash)    Antimicrobials:    fluconAZOLE   Tablet 200 milliGRAM(s) Oral daily  piperacillin/tazobactam IVPB.. 3.375 Gram(s) IV Intermittent every 8 hours    MEDICATIONS  (STANDING):  chlorhexidine 2% Cloths 1 Application(s) Topical <User Schedule>  fluconAZOLE   Tablet 200 milliGRAM(s) Oral daily  influenza   Vaccine 0.5 milliLiter(s) IntraMuscular once  pantoprazole    Tablet 40 milliGRAM(s) Oral before breakfast  piperacillin/tazobactam IVPB.. 3.375 Gram(s) IV Intermittent every 8 hours  sodium chloride 0.9%. 1000 milliLiter(s) (100 mL/Hr) IV Continuous <Continuous>      Vital Signs Last 24 Hrs  T(C): 37 (22 @ 12:10), Max: 37 (22 @ 12:10)  T(F): 98.6 (22 @ 12:10), Max: 98.6 (22 @ 12:10)  HR: 104 (22 @ 12:10) (88 - 117)  BP: 116/81 (22 @ 12:10) (102/68 - 116/81)  BP(mean): --  RR: 18 (22 @ 12:10) (18 - 19)  SpO2: 97% (22 @ 12:10) (97% - 99%)    Physical Exam:    Constitutional well preserved,comfortable,pleasant    HEENT PERRLA EOMI,No pallor or icterus    No oral exudate or erythema    Neck supple no JVD or LN    Chest Good AE,CTA    CVS RRR S1 S2 WNl No murmur or rub or gallop    Abd soft BS normal No tenderness no masses    Ext No cyanosis clubbing or edema    IV site no erythema tenderness or discharge    Joints no swelling or LOM    CNS AAO X 3 no focal    Lab Data:                          7.2    0.13  )-----------( 23       ( 2022 07:21 )             20.2           138  |  103  |  15  ----------------------------<  100<H>  4.1   |  21<L>  |  0.99    Ca    9.5      2022 07:26  Phos  4.0       Mg     2.2         TPro  6.5  /  Alb  3.8  /  TBili  1.5<H>  /  DBili  x   /  AST  13  /  ALT  59<H>  /  AlkPhos  102        Urinalysis Basic - ( 2022 19:26 )    Color: Light Yellow / Appearance: Clear / S.013 / pH: x  Gluc: x / Ketone: Negative  / Bili: Negative / Urobili: Negative   Blood: x / Protein: Negative / Nitrite: Negative   Leuk Esterase: Negative / RBC: x / WBC x   Sq Epi: x / Non Sq Epi: x / Bacteria: x        Clean Catch Clean Catch (Midstream)  22   No growth  --  --      .Blood Blood-Peripheral  22   No growth to date.  --  --        WBC Count: 0.13 (22 @ 07:21)  WBC Count: 0.10 (22 @ 15:19)  WBC Count: 0.11 (22 @ 08:39)  WBC Count: 0.13 (22 @ 06:32)  WBC Count: 0.12 (22 @ 21:36)  WBC Count: 0.10 (22 @ 14:36)  WBC Count: 0.16 (22 @ 11:27)  WBC Count: 0.92 (01-15-22 @ 09:30)             Patient is a 36y old  Male who presents with a chief complaint of Neutropenia (2022 14:50)    Being followed by ID for neutropenic fever        Interval history:  pt feeling much improved  no further fevers  no headache  no cough  no diarrhea  No other acute events        PAST MEDICAL & SURGICAL HISTORY:  Hypertension  diagnosed 1 year ago    Kidney stone  5 years ago    History of genital warts    AML (acute myeloid leukemia)    No significant past surgical history      Allergies    No Known Allergies    Intolerances    cefepime (Rash)    Antimicrobials:    fluconAZOLE   Tablet 200 milliGRAM(s) Oral daily  piperacillin/tazobactam IVPB.. 3.375 Gram(s) IV Intermittent every 8 hours    MEDICATIONS  (STANDING):  chlorhexidine 2% Cloths 1 Application(s) Topical <User Schedule>  fluconAZOLE   Tablet 200 milliGRAM(s) Oral daily  influenza   Vaccine 0.5 milliLiter(s) IntraMuscular once  pantoprazole    Tablet 40 milliGRAM(s) Oral before breakfast  piperacillin/tazobactam IVPB.. 3.375 Gram(s) IV Intermittent every 8 hours  sodium chloride 0.9%. 1000 milliLiter(s) (100 mL/Hr) IV Continuous <Continuous>      Vital Signs Last 24 Hrs  T(C): 37 (22 @ 12:10), Max: 37 (22 @ 12:10)  T(F): 98.6 (22 @ 12:10), Max: 98.6 (22 @ 12:10)  HR: 104 (22 @ 12:10) (88 - 117)  BP: 116/81 (22 @ 12:10) (102/68 - 116/81)  BP(mean): --  RR: 18 (22 @ 12:10) (18 - 19)  SpO2: 97% (22 @ 12:10) (97% - 99%)    Physical Exam:    Constitutional well preserved,comfortable,pleasant    HEENT PERRLA EOMI,No pallor or icterus    No oral exudate or erythema    Neck supple no JVD or LN    Chest Good AE,CTA    CVS  S1 S2     Abd soft BS normal No tenderness     Ext No cyanosis clubbing or edema    right UE PICC  left antecubital PIV    IV site no erythema tenderness or discharge    Joints no swelling or LOM    CNS AAO X 3 no focal    Lab Data:                          7.2    0.13  )-----------( 23       ( 2022 07:21 )             20.2           138  |  103  |  15  ----------------------------<  100<H>  4.1   |  21<L>  |  0.99    Ca    9.5      2022 07:26  Phos  4.0       Mg     2.2         TPro  6.5  /  Alb  3.8  /  TBili  1.5<H>  /  DBili  x   /  AST  13  /  ALT  59<H>  /  AlkPhos  102        Urinalysis Basic - ( 2022 19:26 )    Color: Light Yellow / Appearance: Clear / S.013 / pH: x  Gluc: x / Ketone: Negative  / Bili: Negative / Urobili: Negative   Blood: x / Protein: Negative / Nitrite: Negative   Leuk Esterase: Negative / RBC: x / WBC x   Sq Epi: x / Non Sq Epi: x / Bacteria: x        Clean Catch Clean Catch (Midstream)  22   No growth  --  --      .Blood Blood-Peripheral  22   No growth to date.  --  --        WBC Count: 0.13 (22 @ 07:21)  WBC Count: 0.10 (22 @ 15:19)  WBC Count: 0.11 (22 @ 08:39)  WBC Count: 0.13 (22 @ 06:32)  WBC Count: 0.12 (22 @ 21:36)  WBC Count: 0.10 (22 @ 14:36)  WBC Count: 0.16 (22 @ 11:27)  WBC Count: 0.92 (01-15-22 @ 09:30)      < from: 12 Lead ECG (22 @ 15:50) >  Ventricular Rate 108 BPM    Atrial Rate 108 BPM    P-R Interval 142 ms    QRS Duration 74 ms    Q-T Interval 324 ms    QTC Calculation(Bazett) 434 ms    P Axis 57 degrees    R Axis 73 degrees    T Axis 60 degrees    Diagnosis Line SINUS TACHYCARDIA  ST ELEVATION CONSIDER INFERIOR INJURY OR ACUTE INFARCT  *** ** ** ** * ACUTE MI  ** ** ** **  ABNORMAL ECG  WHEN COMPARED WITH ECG OF 2022 13:48,  SIGNIFICANT CHANGES HAVE OCCURRED  Confirmed by ZAK LARA, DIONNE ([id number]) on 2022 3:31:50PM    < end of copied text >

## 2022-01-20 NOTE — PROGRESS NOTE ADULT - SUBJECTIVE AND OBJECTIVE BOX
Date of Service   01-20-22 @ 14:50    Patient is a 36y old  Male who presents with a chief complaint of Neutropenia (20 Jan 2022 09:30)      INTERVAL HISTORY: pt feels ok     REVIEW OF SYSTEMS:   CONSTITUTIONAL: No weakness  EYES/ENT: No visual changes; No throat pain  Neck: No pain or stiffness  Respiratory: No cough, wheezing, No shortness of breath  CARDIOVASCULAR: no chest pain or palpitations  GASTROINTESTINAL: No abdominal pain, no nausea, vomiting or hematemesis  GENITOURINARY: No dysuria, frequency or hematuria  NEUROLOGICAL: No stroke like symptoms  SKIN: No rashes    	  MEDICATIONS:        PHYSICAL EXAM:  T(C): 37 (01-20-22 @ 12:10), Max: 37 (01-20-22 @ 12:10)  HR: 104 (01-20-22 @ 12:10) (88 - 117)  BP: 116/81 (01-20-22 @ 12:10) (102/68 - 116/81)  RR: 18 (01-20-22 @ 12:10) (18 - 19)  SpO2: 97% (01-20-22 @ 12:10) (97% - 99%)  Wt(kg): --  I&O's Summary    19 Jan 2022 07:01  -  20 Jan 2022 07:00  --------------------------------------------------------  IN: 480 mL / OUT: 1400 mL / NET: -920 mL    20 Jan 2022 07:01  -  20 Jan 2022 14:50  --------------------------------------------------------  IN: 360 mL / OUT: 300 mL / NET: 60 mL          Appearance: In no distress	  HEENT:    PERRL, EOMI	  Cardiovascular:  S1 S2, No JVD  Respiratory: Lungs clear to auscultation	  Gastrointestinal:  Soft, Non-tender, + BS	  Vascularature:  No edema of LE  Psychiatric: Appropriate affect   Neuro: no acute focal deficits                               7.2    0.13  )-----------( 23       ( 20 Jan 2022 07:21 )             20.2     01-20    138  |  103  |  15  ----------------------------<  100<H>  4.1   |  21<L>  |  0.99    Ca    9.5      20 Jan 2022 07:26  Phos  4.0     01-19  Mg     2.2     01-19    TPro  6.5  /  Alb  3.8  /  TBili  1.5<H>  /  DBili  x   /  AST  13  /  ALT  59<H>  /  AlkPhos  102  01-19        Labs personally reviewed      ASSESSMENT/PLAN: 	  Patient is a 36 year old Male with a PMHx of HTN, fatty liver, AML chemotherapy and S/P consolidation who presents to the hospital due fever of 100.4, weakness, decreased WBC and shortness of breath while at home. Patient reports that his pain is more controlled s/p pain medication and his shortness of breath has resolved. Patient denies chills, cough, chest pain, palpitations.    1. Tachycardia  - likely reactive to pain, fevers and significant anemia  - EKG noted with tachy up to 130bpm  - will likely correct with PRBCs, treatment of fevers and pain control  - Will obtain TTE if tachy persists  - HR stable now     2. HTN   - Was on Amlodipine 5mg at home  - Will hold for now   - Monitor BP and resume as needed     3.  #Neutropenic fever  - Monitor CBC closely with diffs  - Monitor patient and temperature curve closely   - BCx2/UCx pending  - Abx as per ID    4. Pancytopenia   - Hgb of 6.4; WBC of 0.10; Plts of 16 on admission   - S/P 2 units of PRBCs on 01/18  - Monitor CBC closely  - bleeding precautions    - Hematology eval; F/U recommendations   - Hgb 7.2 this am     5. PPX  - DVT PPX on hold, will start with compression devices for now  - GI PPX with PPI PO BID        Errol Winchester FNP-BC   Mitul Zelaya DO [facility]  Cardiovascular Medicine  [address], [address]  Office: [phone]  Cell: [phone]

## 2022-01-20 NOTE — PROGRESS NOTE ADULT - ASSESSMENT
36 year old Male with a PMHx of HTN, fatty liver, AML S/P c4 hidac consolidation who presented due fever of 100.4, weakness, and SOB with pancytopenia and neutropenic fever.  Recent chemo hidac with steroids on 1/10.  CXR and CT chest show clear lungs.   Covid-19 pcr neg and no hx of covid vaccines but recent antibodies positives (unclear if from prior infection or possibly from transfusions)  Currently with generalized pain and non bloody hemorrhoids but no other localizing symptoms.  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 Recently on levofloxacin and fluconazole ppx.     #Neutropenic fever, pancytopenic, antibiotic allergy - likely due to recent chemo.   - agree with zosyn q8  - f/u bl and ur clx  - monitor cbc, transfusions per onc  - trend fever curve  -  monitor qtc with use of fluconazole and recent levofloxaicin  - nurse to remove left antecubital IV- not using    #ABNORMAL ECG  Cardiology to address  denies chest pain or pressure     Lacey Barr M.D. ,   Pager [phone]     after 5PM/ weekends [phone]    Assessment and plan discussed with the primary team .

## 2022-01-21 ENCOUNTER — APPOINTMENT (OUTPATIENT)
Dept: INFUSION THERAPY | Facility: HOSPITAL | Age: 37
End: 2022-01-21

## 2022-01-21 ENCOUNTER — TRANSCRIPTION ENCOUNTER (OUTPATIENT)
Age: 37
End: 2022-01-21

## 2022-01-21 LAB
ALBUMIN SERPL ELPH-MCNC: 4 G/DL — SIGNIFICANT CHANGE UP (ref 3.3–5)
ALP SERPL-CCNC: 98 U/L — SIGNIFICANT CHANGE UP (ref 40–120)
ALT FLD-CCNC: 44 U/L — SIGNIFICANT CHANGE UP (ref 10–45)
ANION GAP SERPL CALC-SCNC: 11 MMOL/L — SIGNIFICANT CHANGE UP (ref 5–17)
AST SERPL-CCNC: 13 U/L — SIGNIFICANT CHANGE UP (ref 10–40)
BILIRUB SERPL-MCNC: 0.6 MG/DL — SIGNIFICANT CHANGE UP (ref 0.2–1.2)
BUN SERPL-MCNC: 13 MG/DL — SIGNIFICANT CHANGE UP (ref 7–23)
CALCIUM SERPL-MCNC: 9.5 MG/DL — SIGNIFICANT CHANGE UP (ref 8.4–10.5)
CHLORIDE SERPL-SCNC: 102 MMOL/L — SIGNIFICANT CHANGE UP (ref 96–108)
CO2 SERPL-SCNC: 24 MMOL/L — SIGNIFICANT CHANGE UP (ref 22–31)
CREAT SERPL-MCNC: 1.03 MG/DL — SIGNIFICANT CHANGE UP (ref 0.5–1.3)
GLUCOSE SERPL-MCNC: 97 MG/DL — SIGNIFICANT CHANGE UP (ref 70–99)
HCT VFR BLD CALC: 19.4 % — CRITICAL LOW (ref 39–50)
HGB BLD-MCNC: 6.8 G/DL — CRITICAL LOW (ref 13–17)
MCHC RBC-ENTMCNC: 30.5 PG — SIGNIFICANT CHANGE UP (ref 27–34)
MCHC RBC-ENTMCNC: 35.1 GM/DL — SIGNIFICANT CHANGE UP (ref 32–36)
MCV RBC AUTO: 87 FL — SIGNIFICANT CHANGE UP (ref 80–100)
NRBC # BLD: 0 /100 WBCS — SIGNIFICANT CHANGE UP (ref 0–0)
PLATELET # BLD AUTO: 11 K/UL — CRITICAL LOW (ref 150–400)
POTASSIUM SERPL-MCNC: 4 MMOL/L — SIGNIFICANT CHANGE UP (ref 3.5–5.3)
POTASSIUM SERPL-SCNC: 4 MMOL/L — SIGNIFICANT CHANGE UP (ref 3.5–5.3)
PROT SERPL-MCNC: 6.9 G/DL — SIGNIFICANT CHANGE UP (ref 6–8.3)
RBC # BLD: 2.23 M/UL — LOW (ref 4.2–5.8)
RBC # FLD: 16.4 % — HIGH (ref 10.3–14.5)
SODIUM SERPL-SCNC: 137 MMOL/L — SIGNIFICANT CHANGE UP (ref 135–145)
WBC # BLD: 0.42 K/UL — CRITICAL LOW (ref 3.8–10.5)
WBC # FLD AUTO: 0.42 K/UL — CRITICAL LOW (ref 3.8–10.5)

## 2022-01-21 PROCEDURE — 99232 SBSQ HOSP IP/OBS MODERATE 35: CPT

## 2022-01-21 PROCEDURE — 93010 ELECTROCARDIOGRAM REPORT: CPT

## 2022-01-21 RX ORDER — CHLORHEXIDINE GLUCONATE 213 G/1000ML
1 SOLUTION TOPICAL
Refills: 0 | Status: DISCONTINUED | OUTPATIENT
Start: 2022-01-21 | End: 2022-01-22

## 2022-01-21 RX ORDER — SODIUM CHLORIDE 9 MG/ML
10 INJECTION INTRAMUSCULAR; INTRAVENOUS; SUBCUTANEOUS
Refills: 0 | Status: DISCONTINUED | OUTPATIENT
Start: 2022-01-21 | End: 2022-01-23

## 2022-01-21 RX ORDER — LIDOCAINE 4 G/100G
1 CREAM TOPICAL
Refills: 0 | Status: DISCONTINUED | OUTPATIENT
Start: 2022-01-21 | End: 2022-01-21

## 2022-01-21 RX ORDER — SALIVA SUBSTITUTE COMB NO.11 351 MG
15 POWDER IN PACKET (EA) MUCOUS MEMBRANE
Refills: 0 | Status: DISCONTINUED | OUTPATIENT
Start: 2022-01-21 | End: 2022-01-23

## 2022-01-21 RX ORDER — ACYCLOVIR SODIUM 500 MG
400 VIAL (EA) INTRAVENOUS EVERY 8 HOURS
Refills: 0 | Status: DISCONTINUED | OUTPATIENT
Start: 2022-01-21 | End: 2022-01-23

## 2022-01-21 RX ORDER — LIDOCAINE 4 G/100G
1 CREAM TOPICAL
Refills: 0 | Status: DISCONTINUED | OUTPATIENT
Start: 2022-01-21 | End: 2022-01-23

## 2022-01-21 RX ADMIN — HYDROMORPHONE HYDROCHLORIDE 1 MILLIGRAM(S): 2 INJECTION INTRAMUSCULAR; INTRAVENOUS; SUBCUTANEOUS at 02:54

## 2022-01-21 RX ADMIN — HYDROMORPHONE HYDROCHLORIDE 1 MILLIGRAM(S): 2 INJECTION INTRAMUSCULAR; INTRAVENOUS; SUBCUTANEOUS at 00:00

## 2022-01-21 RX ADMIN — HYDROMORPHONE HYDROCHLORIDE 1 MILLIGRAM(S): 2 INJECTION INTRAMUSCULAR; INTRAVENOUS; SUBCUTANEOUS at 22:11

## 2022-01-21 RX ADMIN — Medication 400 MILLIGRAM(S): at 21:35

## 2022-01-21 RX ADMIN — PIPERACILLIN AND TAZOBACTAM 25 GRAM(S): 4; .5 INJECTION, POWDER, LYOPHILIZED, FOR SOLUTION INTRAVENOUS at 10:16

## 2022-01-21 RX ADMIN — HYDROMORPHONE HYDROCHLORIDE 1 MILLIGRAM(S): 2 INJECTION INTRAMUSCULAR; INTRAVENOUS; SUBCUTANEOUS at 21:41

## 2022-01-21 RX ADMIN — LIDOCAINE 1 APPLICATION(S): 4 CREAM TOPICAL at 18:52

## 2022-01-21 RX ADMIN — PIPERACILLIN AND TAZOBACTAM 25 GRAM(S): 4; .5 INJECTION, POWDER, LYOPHILIZED, FOR SOLUTION INTRAVENOUS at 02:18

## 2022-01-21 RX ADMIN — PIPERACILLIN AND TAZOBACTAM 25 GRAM(S): 4; .5 INJECTION, POWDER, LYOPHILIZED, FOR SOLUTION INTRAVENOUS at 18:24

## 2022-01-21 RX ADMIN — CHLORHEXIDINE GLUCONATE 1 APPLICATION(S): 213 SOLUTION TOPICAL at 18:24

## 2022-01-21 RX ADMIN — PANTOPRAZOLE SODIUM 40 MILLIGRAM(S): 20 TABLET, DELAYED RELEASE ORAL at 06:29

## 2022-01-21 RX ADMIN — Medication 15 MILLILITER(S): at 18:24

## 2022-01-21 RX ADMIN — HYDROMORPHONE HYDROCHLORIDE 1 MILLIGRAM(S): 2 INJECTION INTRAMUSCULAR; INTRAVENOUS; SUBCUTANEOUS at 03:30

## 2022-01-21 RX ADMIN — FLUCONAZOLE 200 MILLIGRAM(S): 150 TABLET ORAL at 15:41

## 2022-01-21 RX ADMIN — HYDROMORPHONE HYDROCHLORIDE 1 MILLIGRAM(S): 2 INJECTION INTRAMUSCULAR; INTRAVENOUS; SUBCUTANEOUS at 15:42

## 2022-01-21 RX ADMIN — SODIUM CHLORIDE 100 MILLILITER(S): 9 INJECTION INTRAMUSCULAR; INTRAVENOUS; SUBCUTANEOUS at 15:42

## 2022-01-21 RX ADMIN — HYDROMORPHONE HYDROCHLORIDE 1 MILLIGRAM(S): 2 INJECTION INTRAMUSCULAR; INTRAVENOUS; SUBCUTANEOUS at 08:50

## 2022-01-21 RX ADMIN — CHLORHEXIDINE GLUCONATE 1 APPLICATION(S): 213 SOLUTION TOPICAL at 06:29

## 2022-01-21 NOTE — DISCHARGE NOTE PROVIDER - NSRESEARCHGRANT_HIDDEN_GEN_A_CORE
"Reason For Visit:   Chief Complaint   Patient presents with     RECHECK     f/u left heel pain & Lef        Resp 17   Ht 1.6 m (5' 2.99\")   Wt 56.7 kg (125 lb)   BMI 22.15 kg/m      Pain Assessment  Patient Currently in Pain: Yes  0-10 Pain Scale: 7  Primary Pain Location: Leg    Erica Oviedo ATC     "
Yes

## 2022-01-21 NOTE — PROGRESS NOTE ADULT - SUBJECTIVE AND OBJECTIVE BOX
Date of Service   01-21-22 @ 14:57    Patient is a 36y old  Male who presents with a chief complaint of Neutropenia (21 Jan 2022 11:40)      INTERVAL HISTORY: pt feels ok   TELEMETRY Personally reviewed:  SR/ST 'S     REVIEW OF SYSTEMS:   CONSTITUTIONAL: No weakness  EYES/ENT: No visual changes; No throat pain  Neck: No pain or stiffness  Respiratory: No cough, wheezing, No shortness of breath  CARDIOVASCULAR: no chest pain or palpitations  GASTROINTESTINAL: No abdominal pain, no nausea, vomiting or hematemesis  GENITOURINARY: No dysuria, frequency or hematuria  NEUROLOGICAL: No stroke like symptoms  SKIN: No rashes    	  MEDICATIONS:        PHYSICAL EXAM:  T(C): 36.6 (01-21-22 @ 14:26), Max: 36.7 (01-21-22 @ 04:49)  HR: 112 (01-21-22 @ 14:26) (98 - 112)  BP: 106/58 (01-21-22 @ 14:26) (106/58 - 119/79)  RR: 18 (01-21-22 @ 14:26) (18 - 19)  SpO2: 100% (01-21-22 @ 14:26) (97% - 100%)  Wt(kg): --  I&O's Summary    20 Jan 2022 07:01  -  21 Jan 2022 07:00  --------------------------------------------------------  IN: 600 mL / OUT: 1200 mL / NET: -600 mL          Appearance: In no distress	  HEENT:    PERRL, EOMI	  Cardiovascular:  S1 S2, No JVD  Respiratory: Lungs clear to auscultation	  Gastrointestinal:  Soft, Non-tender, + BS	  Vascularature:  No edema of LE  Psychiatric: Appropriate affect   Neuro: no acute focal deficits                               6.8    0.42  )-----------( 11       ( 21 Jan 2022 08:03 )             19.4     01-21    137  |  102  |  13  ----------------------------<  97  4.0   |  24  |  1.03    Ca    9.5      21 Jan 2022 07:56    TPro  6.9  /  Alb  4.0  /  TBili  0.6  /  DBili  x   /  AST  13  /  ALT  44  /  AlkPhos  98  01-21        Labs personally reviewed      ASSESSMENT/PLAN: 	    Patient is a 36 year old Male with a PMHx of HTN, fatty liver, AML chemotherapy and S/P consolidation who presents to the hospital due fever of 100.4, weakness, decreased WBC and shortness of breath while at home. Patient reports that his pain is more controlled s/p pain medication and his shortness of breath has resolved. Patient denies chills, cough, chest pain, palpitations.    1. Tachycardia  - likely reactive to pain, fevers and significant anemia  - EKG on admin noted with tachy up to 130bpm  - will likely correct with PRBCs, treatment of fevers and pain control  - Will obtain TTE if tachy persists  - HR 'S     2. HTN   - Was on Amlodipine 5mg at home  - Will hold for now   - Monitor BP and resume as needed   - BP stable off meds     3.  #Neutropenic fever  - Monitor CBC closely with diffs  - Monitor patient and temperature curve closely   - BCx2/UCx pending  - Abx as per ID    4. Pancytopenia   - Hgb of 6.4; WBC of 0.10; Plts of 16 on admission   - S/P 2 units of PRBCs on 01/18  - Monitor CBC closely  - bleeding precautions    - Hematology eval; F/U recommendations   - Hgb 6.8 this am     5. PPX  - DVT PPX on hold, will start with compression devices for now  - GI PPX with PPI PO BID        Errol MARTINEZ-BC   Mitul Zelaya DO [facility]  Cardiovascular Medicine  [address] [place] [address], [address]  Office: [phone]  Cell: [phone] [Good] : ~his/her~  mood as  good Date of Service   01-21-22 @ 14:57    Patient is a 36y old  Male who presents with a chief complaint of Neutropenia (21 Jan 2022 11:40)      INTERVAL HISTORY: pt feels ok   TELEMETRY Personally reviewed:  SR/ST 'S     REVIEW OF SYSTEMS:   CONSTITUTIONAL: No weakness  EYES/ENT: No visual changes; No throat pain  Neck: No pain or stiffness  Respiratory: No cough, wheezing, No shortness of breath  CARDIOVASCULAR: no chest pain or palpitations  GASTROINTESTINAL: No abdominal pain, no nausea, vomiting or hematemesis  GENITOURINARY: No dysuria, frequency or hematuria  NEUROLOGICAL: No stroke like symptoms  SKIN: No rashes    	  MEDICATIONS:        PHYSICAL EXAM:  T(C): 36.6 (01-21-22 @ 14:26), Max: 36.7 (01-21-22 @ 04:49)  HR: 112 (01-21-22 @ 14:26) (98 - 112)  BP: 106/58 (01-21-22 @ 14:26) (106/58 - 119/79)  RR: 18 (01-21-22 @ 14:26) (18 - 19)  SpO2: 100% (01-21-22 @ 14:26) (97% - 100%)  Wt(kg): --  I&O's Summary    20 Jan 2022 07:01  -  21 Jan 2022 07:00  --------------------------------------------------------  IN: 600 mL / OUT: 1200 mL / NET: -600 mL          Appearance: In no distress	  HEENT:    PERRL, EOMI	  Cardiovascular:  S1 S2, No JVD  Respiratory: Lungs clear to auscultation	  Gastrointestinal:  Soft, Non-tender, + BS	  Vascularature:  No edema of LE  Psychiatric: Appropriate affect   Neuro: no acute focal deficits                               6.8    0.42  )-----------( 11       ( 21 Jan 2022 08:03 )             19.4     01-21    137  |  102  |  13  ----------------------------<  97  4.0   |  24  |  1.03    Ca    9.5      21 Jan 2022 07:56    TPro  6.9  /  Alb  4.0  /  TBili  0.6  /  DBili  x   /  AST  13  /  ALT  44  /  AlkPhos  98  01-21        Labs personally reviewed      ASSESSMENT/PLAN: 	    Patient is a 36 year old Male with a PMHx of HTN, fatty liver, AML chemotherapy and S/P consolidation who presents to the hospital due fever of 100.4, weakness, decreased WBC and shortness of breath while at home. Patient reports that his pain is more controlled s/p pain medication and his shortness of breath has resolved. Patient denies chills, cough, chest pain, palpitations.    1. Tachycardia  - likely reactive to pain, fevers and significant anemia  - EKG on admin noted with tachy up to 130bpm  - will likely correct with PRBCs, treatment of fevers and pain control  - Please obtain TTE as tachy is persisting  - HR 'S   - EKGs 1/21 reviewed, no ischemic changes but tachycardiac   - pt with no chest pain and asymptomatic    2. HTN   - Was on Amlodipine 5mg at home  - Will hold for now   - Monitor BP and resume as needed   - BP stable off meds     3.  Neutropenic fever  - Monitor CBC closely with diffs  - Monitor patient and temperature curve closely   - BCx2/UCx pending  - Abx as per ID    4. Pancytopenia   - Hgb of 6.4; WBC of 0.10; Plts of 16 on admission   - S/P 2 units of PRBCs on 01/18  - Monitor CBC closely  - bleeding precautions    - Hematology eval; F/U recommendations   - Hgb 6.8 this am     5. PPX  - DVT PPX on hold, will start with compression devices for now  - GI PPX with PPI PO BID        Errol Winchester FN-BC   Mitul Zelyaa DO [facility]  Cardiovascular Medicine  [address], [address]  Office: [phone]  Cell: [phone] [] : No [No falls in past year] : Patient reported no falls in the past year [0] : 2) Feeling down, depressed, or hopeless: Not at all (0) [[id number]] : 0 [HIV Test offered] : HIV Test offered [Hepatitis C test offered] : Hepatitis C test offered [None] : None [With Significant Other] : lives with significant other [Employed] : employed [] :  [# Of Children ___] : has [unfilled] children [Fully functional (bathing, dressing, toileting, transferring, walking, feeding)] : Fully functional (bathing, dressing, toileting, transferring, walking, feeding) [Fully functional (using the telephone, shopping, preparing meals, housekeeping, doing laundry, using] : Fully functional and needs no help or supervision to perform IADLs (using the telephone, shopping, preparing meals, housekeeping, doing laundry, using transportation, managing medications and managing finances) [Smoke Detector] : smoke detector [Seat Belt] :  uses seat belt [Discussed at today's visit] : Advance Directives Discussed at today's visit [FreeTextEntry4] : none

## 2022-01-21 NOTE — DISCHARGE NOTE PROVIDER - NSDCMRMEDTOKEN_GEN_ALL_CORE_FT
fluconazole 200 mg oral tablet: 1 tab(s) orally once a day, START AND STOP when told to do so by your DrAlessandro based on blood counts.     Levaquin 500 mg oral tablet: 1 tab(s) orally every 24 hours START and STOP when told to do so by your DrAlessandro based on blood counts.   Norvasc 5 mg oral tablet: 1 tab(s) orally once a day  oxyCODONE 5 mg oral tablet: 1 tab(s) orally every 6 hours, As needed, Moderate Pain (4 - 6)  power picc heparin 10 units per ml (3ml) daily to each lumen : once a day   power picc normal saline  10 ml in each lumen weekly : once a week   prednisoLONE acetate 1% ophthalmic suspension: 2 drop(s) to each affected eye every 6 hours, stop after 2 days  Zofran 8 mg oral tablet: 1 tab(s) orally every 8 hours, As Needed nausea   lidocaine 5% topical ointment: 1 application topically 2 times a day  Norvasc 5 mg oral tablet: 1 tab(s) orally once a day  oxyCODONE 5 mg oral tablet: 1 tab(s) orally every 6 hours, As needed, Moderate Pain (4 - 6)  pantoprazole 40 mg oral delayed release tablet: 1 tab(s) orally once a day (before a meal)  power picc heparin 10 units per ml (3ml) daily to each lumen : once a day   power picc normal saline  10 ml in each lumen weekly : once a week

## 2022-01-21 NOTE — DISCHARGE NOTE PROVIDER - HOSPITAL COURSE
Received request for medical records. Placed in [place] incoming forms box.     Patient is a 36 year old Male with a PMHx of HTN, fatty liver, AML S/P consolidation who presents to the hospital due fever of 100.4, weakness, decreased WBC and shortness of breath while at home. Patient reports that his pain is more controlled s/p pain medication and his shortness of breath has resolved. Patient denies chills, cough, chest pain, palpitations.  Pan cultures NGTD, CXR reveals clear lungs, Pt is on empiric Zosyn, Diflucan and Acyclovir added prophylactically for neutropenic fever.   Patient is a 36 year old Male with a PMHx of HTN, fatty liver, AML S/P consolidation who presents to the hospital due fever of 100.4, weakness, decreased WBC and shortness of breath while at home. Patient reports that his pain is more controlled s/p pain medication and his shortness of breath has resolved. He denied chills, cough, chest pain, palpitations.  Pan culture (blood +Ucx) were negative, CXR reveals clear lungs, COVID PCR - not detect.  Pt started on empiric Zosyn, Diflucan and Acyclovir added prophylactically for neutropenic fever. He was transfused with platelets and PRBC as per supportive parameters (>10k/>7) respectively.

## 2022-01-21 NOTE — DISCHARGE NOTE PROVIDER - NSDCFUADDAPPT_GEN_ALL_CORE_FT
Possible Platelet appointment at [facility] on  Wednesday 1/26/22 at 3:30pm  Friday 1/28/22 at 3:00pm  Monday 1/31/22 at 3:30pm

## 2022-01-21 NOTE — PROGRESS NOTE ADULT - ATTENDING COMMENTS
I had a prolonged conversation with the patient regarding hospital course, differential diagnosis and results of diagnostic tests.  Plan of care discussed with patient after the evaluation. Patient expresses clear understanding and satisfaction with the plan of care. OMT on six regions for acute somatic dysfunctions done at the bedside. Sixty five minutes spent on encounter, of which more than fifty percent of the encounter was spent on counseling and/or coordinating care by the attending physician.

## 2022-01-21 NOTE — DISCHARGE NOTE PROVIDER - NSDCCPCAREPLAN_GEN_ALL_CORE_FT
PRINCIPAL DISCHARGE DIAGNOSIS  Diagnosis: AML (acute myeloid leukemia)  Assessment and Plan of Treatment: Notify MD or report to ER for fever greater or equal to 100.4, persistent nausea, vomiting, diarrhea, bleeding.        SECONDARY DISCHARGE DIAGNOSES  Diagnosis: Pancytopenia due to chemotherapy  Assessment and Plan of Treatment: Follow up with hematology oncologist as outpatient   Outpatient labs work and possible Platelet transfusion as scheduled

## 2022-01-21 NOTE — DISCHARGE NOTE PROVIDER - CARE PROVIDER_API CALL
Chelsea Yancey)  HematologyOncology; Internal Medicine; Medical Oncology  [address] [place] [address]  Phone: [phone]  Fax: [phone]  Follow Up Time:

## 2022-01-21 NOTE — PROGRESS NOTE ADULT - ASSESSMENT
36 year old Male with a PMHx of HTN, fatty liver, AML S/P c4 hidac consolidation who presented due fever of 100.4, weakness, and SOB with pancytopenia and neutropenic fever.  Recent chemo hidac with steroids on 1/10.  CXR and CT chest show clear lungs.   Covid-19 pcr neg and no hx of covid vaccines but recent antibodies positives (unclear if from prior infection or possibly from transfusions)  Currently with generalized pain and non bloody hemorrhoids but no other localizing symptoms.  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 Recently on levofloxacin and fluconazole ppx.     #Neutropenic fever, pancytopenic, antibiotic allergy - likely due to recent chemo.   - agree with zosyn q8  - f/u bl and ur clx  - monitor cbc, transfusions per onc  - trend fever curve  -  monitor qtc with use of fluconazole and recent levofloxaicin  - nurse to remove left antecubital IV- not using    #ABNORMAL ECG  Cardiology to address  denies chest pain or pressure   repeat ECG    #hemorrhoids  stool softener  surgical input if worsens    Lacey Barr M.D. ,   Pager [phone]     after 5PM/ weekends [phone]    Assessment and plan discussed with the primary team .

## 2022-01-21 NOTE — PROGRESS NOTE ADULT - ASSESSMENT
Patient is a 36 year old Male with a PMHx of HTN, fatty liver, AML chemotherapy and S/P consolidation who presents to the hospital due fever of 100.4, weakness, decreased WBC and shortness of breath while at home. Patient reports that his pain is more controlled s/p pain medication and his shortness of breath has resolved. Patient denies chills, cough, chest pain, palpitations.      #Neutropenic fever  --Monitor CBC w/ Diff closely   --Monitor patient and temperature curve closely   --BCx2-- NGTD; F/U final results   --UCx-- no growth  --CXR with-- IMPRESSION: Clear lungs -- Negative   --Tylenol for fever  --S/P 1 dose of Zosyn in the ED, will continue for now. Unable to tolerate cefepime in the past (rash)  --ID consult, F/U recommendations -- continuing with zosyn for now     #Pancytopenia   --Hgb of 6.4; WBC of 0.10; Plts of 16 on admission   --S/P multiple PRBCs transfusions   --Monitor Post transfusion CBC  --Transfuse to keep Hgb >7.0   --Transfuse platelets <10k   --Monitor CBC closely  --bleeding precautions    --Hematology eval; F/U recommendations   --Patient will be receiving 1 unit of PRBCs and platelets today 01/21  --Monitor CBC w/diff and patient closely; transfuse as needed       #AML  --Heme/Onc evaluation; F/U recommendations   --On Levaquin, will hold for now as started on Zosyn, resume as indicated   --Check EKG and monitor QTC, If QTC >500 hold fluconazole  --Pain control     #Tachycardia  --Likely due to anemia and pain   --Place on Telemetry   --Monitor HR closely   --Monitor patient and vital signs  --Started on  CC/ Hr X 24 hours  --Pain control with dilaudid   --Cardio eval   --Improving       #Elevated LFTs  --Noted to be elevated on last admission   --ALT of 83-->59-->44 WNL 01/21  --Continue to monitor on AM labs    #HTN   --Was on Amlodipine 5mg at home  --Will hold for now   --Monitor BP and resume as needed       #PPX  --DVT PPX on hold, will start with compression devices for now  --GI PPX with PPI PO BID       Patient is a 36 year old Male with a PMHx of HTN, fatty liver, AML chemotherapy and S/P consolidation who presents to the hospital due fever of 100.4, weakness, decreased WBC and shortness of breath while at home. Patient reports that his pain is more controlled s/p pain medication and his shortness of breath has resolved. Patient denies chills, cough, chest pain, palpitations.      #Neutropenic fever  --Monitor CBC w/ Diff closely   --Monitor patient and temperature curve closely   --BCx2-- NGTD; F/U final results   --UCx-- no growth  --CXR with-- IMPRESSION: Clear lungs -- Negative   --Tylenol for fever  --S/P 1 dose of Zosyn in the ED, will continue for now. Unable to tolerate cefepime in the past (rash)  --ID consult, F/U recommendations -- continuing with zosyn for now     #Pancytopenia   --Hgb of 6.4; WBC of 0.10; Plts of 16 on admission   --S/P multiple PRBCs transfusions   --Monitor Post transfusion CBC  --Transfuse to keep Hgb >7.0   --Transfuse platelets <10k   --Monitor CBC closely  --bleeding precautions    --Hematology eval; F/U recommendations   --Patient will be receiving 1 unit of PRBCs and platelets today 01/21  --Monitor CBC w/diff and patient closely; transfuse as needed       #AML  --Heme/Onc evaluation; F/U recommendations   --On Levaquin, will hold for now as started on Zosyn, resume as indicated   --Check EKG and monitor QTC, If QTC >500 hold fluconazole  --Pain control     #Tachycardia  --Likely due to anemia and pain   --Place on Telemetry   --Monitor HR closely   --Monitor patient and vital signs  --Started on  CC/ Hr X 24 hours  --Pain control with dilaudid   --Cardio eval   --Improving       #Elevated LFTs  --Noted to be elevated on last admission   --ALT of 83-->59-->44 WNL 01/21  --Continue to monitor on AM labs    #HTN   --Was on Amlodipine 5mg at home  --Will hold for now   --Monitor BP and resume as needed     #Abnormal EKG  --Discussed with Attending and cardiology      #PPX  --DVT PPX on hold, will start with compression devices for now  --GI PPX with PPI PO BID       Patient is a 36 year old Male with a PMHx of HTN, fatty liver, AML chemotherapy and S/P consolidation who presents to the hospital due fever of 100.4, weakness, decreased WBC and shortness of breath while at home. Patient reports that his pain is more controlled s/p pain medication and his shortness of breath has resolved. Patient denies chills, cough, chest pain, palpitations.      #Neutropenic fever  --Monitor CBC w/ Diff closely   --Monitor patient and temperature curve closely   --BCx2-- NGTD; F/U final results   --UCx-- no growth  --CXR with-- IMPRESSION: Clear lungs -- Negative   --Tylenol for fever  --S/P 1 dose of Zosyn in the ED, will continue for now. Unable to tolerate cefepime in the past (rash)  --ID consult, F/U recommendations -- continuing with zosyn for now     #Pancytopenia   --Hgb of 6.4; WBC of 0.10; Plts of 16 on admission   --S/P multiple PRBCs transfusions   --Monitor Post transfusion CBC  --Transfuse to keep Hgb >7.0   --Transfuse platelets <10k   --Monitor CBC closely  --bleeding precautions    --Hematology eval; F/U recommendations   --Patient will be receiving 1 unit of PRBCs and platelets today 01/21  --Monitor CBC w/diff and patient closely; transfuse as needed       #AML  --Heme/Onc evaluation; F/U recommendations   --On Levaquin, will hold for now as started on Zosyn, resume as indicated   --Check EKG and monitor QTC, If QTC >500 hold fluconazole  --Pain control     #Tachycardia  --Likely due to anemia and pain   --Place on Telemetry   --Monitor HR closely   --Monitor patient and vital signs  --Started on  CC/ Hr X 24 hours  --Pain control with dilaudid   --Cardio eval   --Improving       #Elevated LFTs  --Noted to be elevated on last admission   --ALT of 83-->59-->44 WNL 01/21  --Continue to monitor on AM labs    #HTN   --Was on Amlodipine 5mg at home  --Will hold for now   --Monitor BP and resume as needed     #Abnormal EKG  --Discussed with Attending and cardiology  --Continue to monitor      #PPX  --DVT PPX on hold, will start with compression devices for now  --GI PPX with PPI PO BID

## 2022-01-21 NOTE — PROGRESS NOTE ADULT - SUBJECTIVE AND OBJECTIVE BOX
Patient is a 36y old  Male who presents with a chief complaint of Neutropenia (21 Jan 2022 14:57)    Being followed by ID for        Interval history:  pt c/o hemmorhoid pain  pt denies chest pain  breathing is stable  No other acute events      ROS:  No cough,SOB,CP  No N/V/D  No abd pain  No urinary complaints  No HA  No joint or limb pain  No other complaints    PAST MEDICAL & SURGICAL HISTORY:  Hypertension  diagnosed 1 year ago    Kidney stone  5 years ago    History of genital warts    AML (acute myeloid leukemia)    No significant past surgical history      Allergies    No Known Allergies    Intolerances    cefepime (Rash)    Antimicrobials:    acyclovir   Oral Tab/Cap 400 milliGRAM(s) Oral every 8 hours  fluconAZOLE   Tablet 200 milliGRAM(s) Oral daily  piperacillin/tazobactam IVPB.. 3.375 Gram(s) IV Intermittent every 8 hours    MEDICATIONS  (STANDING):  acyclovir   Oral Tab/Cap 400 milliGRAM(s) Oral every 8 hours  Biotene Dry Mouth Oral Rinse 15 milliLiter(s) Swish and Spit four times a day  chlorhexidine 2% Cloths 1 Application(s) Topical <User Schedule>  fluconAZOLE   Tablet 200 milliGRAM(s) Oral daily  influenza   Vaccine 0.5 milliLiter(s) IntraMuscular once  lidocaine 5% Ointment 1 Application(s) Topical two times a day  pantoprazole    Tablet 40 milliGRAM(s) Oral before breakfast  piperacillin/tazobactam IVPB.. 3.375 Gram(s) IV Intermittent every 8 hours  sodium chloride 0.9%. 1000 milliLiter(s) (100 mL/Hr) IV Continuous <Continuous>      Vital Signs Last 24 Hrs  T(C): 36.7 (01-21-22 @ 15:25), Max: 36.7 (01-21-22 @ 04:49)  T(F): 98.1 (01-21-22 @ 15:25), Max: 98.1 (01-21-22 @ 04:49)  HR: 111 (01-21-22 @ 15:25) (98 - 118)  BP: 144/77 (01-21-22 @ 15:25) (106/58 - 144/77)  BP(mean): --  RR: 18 (01-21-22 @ 15:25) (18 - 19)  SpO2: 100% (01-21-22 @ 15:25) (97% - 100%)    Physical Exam:    Constitutional well preserved,comfortable,pleasant    HEENT PERRLA EOMI,No pallor or icterus    No oral exudate or erythema    Neck supple no JVD or LN    Chest Good AE,CTA    CVS RRR S1 S2 WNl No murmur or rub or gallop    Abd soft BS normal No tenderness no masses    Ext No cyanosis clubbing or edema    IV site no erythema tenderness or discharge    Joints no swelling or LOM    CNS AAO X 3 no focal    Lab Data:                          6.8    0.42  )-----------( 11       ( 21 Jan 2022 08:03 )             19.4       01-21    137  |  102  |  13  ----------------------------<  97  4.0   |  24  |  1.03    Ca    9.5      21 Jan 2022 07:56    TPro  6.9  /  Alb  4.0  /  TBili  0.6  /  DBili  x   /  AST  13  /  ALT  44  /  AlkPhos  98  01-21      Clean Catch Clean Catch (Midstream)  01-18-22   No growth  --  --      .Blood Blood-Peripheral  01-18-22   No growth to date.  --  --        WBC Count: 0.42 (01-21-22 @ 08:03)  WBC Count: 0.21 (01-20-22 @ 18:39)  WBC Count: 0.13 (01-20-22 @ 07:21)  WBC Count: 0.10 (01-19-22 @ 15:19)  WBC Count: 0.11 (01-19-22 @ 08:39)  WBC Count: 0.13 (01-19-22 @ 06:32)  WBC Count: 0.12 (01-18-22 @ 21:36)  WBC Count: 0.10 (01-18-22 @ 14:36)  WBC Count: 0.16 (01-17-22 @ 11:27)  WBC Count: 0.92 (01-15-22 @ 09:30)      < from: 12 Lead ECG (01.19.22 @ 15:50) >    Ventricular Rate 108 BPM    Atrial Rate 108 BPM    P-R Interval 142 ms    QRS Duration 74 ms    Q-T Interval 324 ms    QTC Calculation(Bazett) 434 ms    P Axis 57 degrees    R Axis 73 degrees    T Axis 60 degrees    Diagnosis Line SINUS TACHYCARDIA  ST ELEVATION CONSIDER INFERIOR INJURY OR ACUTE INFARCT  *** ** ** ** * ACUTE MI  ** ** ** **  ABNORMAL ECG  WHEN COMPARED WITH ECG OF 18-JAN-2022 13:48,  SIGNIFICANT CHANGES HAVE OCCURRED  Confirmed by ZAK LARA, DIONNE ([id number]) on 1/20/2022 3:31:50P    < end of copied text >         Patient is a 36y old  Male who presents with a chief complaint of Neutropenia (21 Jan 2022 14:57)    Being followed by ID for neutropenic fever        Interval history:  pt c/o hemorrhoid pain  pt denies chest pain  breathing is stable  No other acute events        PAST MEDICAL & SURGICAL HISTORY:  Hypertension  diagnosed 1 year ago    Kidney stone  5 years ago    History of genital warts    AML (acute myeloid leukemia)    No significant past surgical history      Allergies    No Known Allergies    Intolerances    cefepime (Rash)    Antimicrobials:    acyclovir   Oral Tab/Cap 400 milliGRAM(s) Oral every 8 hours  fluconAZOLE   Tablet 200 milliGRAM(s) Oral daily  piperacillin/tazobactam IVPB.. 3.375 Gram(s) IV Intermittent every 8 hours    MEDICATIONS  (STANDING):  acyclovir   Oral Tab/Cap 400 milliGRAM(s) Oral every 8 hours  Biotene Dry Mouth Oral Rinse 15 milliLiter(s) Swish and Spit four times a day  chlorhexidine 2% Cloths 1 Application(s) Topical <User Schedule>  fluconAZOLE   Tablet 200 milliGRAM(s) Oral daily  influenza   Vaccine 0.5 milliLiter(s) IntraMuscular once  lidocaine 5% Ointment 1 Application(s) Topical two times a day  pantoprazole    Tablet 40 milliGRAM(s) Oral before breakfast  piperacillin/tazobactam IVPB.. 3.375 Gram(s) IV Intermittent every 8 hours  sodium chloride 0.9%. 1000 milliLiter(s) (100 mL/Hr) IV Continuous <Continuous>      Vital Signs Last 24 Hrs  T(C): 36.7 (01-21-22 @ 15:25), Max: 36.7 (01-21-22 @ 04:49)  T(F): 98.1 (01-21-22 @ 15:25), Max: 98.1 (01-21-22 @ 04:49)  HR: 111 (01-21-22 @ 15:25) (98 - 118)  BP: 144/77 (01-21-22 @ 15:25) (106/58 - 144/77)  BP(mean): --  RR: 18 (01-21-22 @ 15:25) (18 - 19)  SpO2: 100% (01-21-22 @ 15:25) (97% - 100%)    Physical Exam:    Constitutional well preserved,comfortable,pleasant    HEENT PERRLA EOMI,No pallor or icterus    No oral exudate or erythema    Neck supple no JVD or LN    Chest Good AE,CTA    CVS S1 S2     Abd soft BS normal No tenderness   rectal external hemorrhoids     Ext No cyanosis clubbing or edema    IV site no erythema tenderness or discharge    Joints no swelling or LOM    CNS AAO X 3 no focal    Lab Data:                          6.8    0.42  )-----------( 11       ( 21 Jan 2022 08:03 )             19.4       01-21    137  |  102  |  13  ----------------------------<  97  4.0   |  24  |  1.03    Ca    9.5      21 Jan 2022 07:56    TPro  6.9  /  Alb  4.0  /  TBili  0.6  /  DBili  x   /  AST  13  /  ALT  44  /  AlkPhos  98  01-21      Clean Catch Clean Catch (Midstream)  01-18-22   No growth  --  --      .Blood Blood-Peripheral  01-18-22   No growth to date.  --  --        WBC Count: 0.42 (01-21-22 @ 08:03)  WBC Count: 0.21 (01-20-22 @ 18:39)  WBC Count: 0.13 (01-20-22 @ 07:21)  WBC Count: 0.10 (01-19-22 @ 15:19)  WBC Count: 0.11 (01-19-22 @ 08:39)  WBC Count: 0.13 (01-19-22 @ 06:32)  WBC Count: 0.12 (01-18-22 @ 21:36)  WBC Count: 0.10 (01-18-22 @ 14:36)  WBC Count: 0.16 (01-17-22 @ 11:27)  WBC Count: 0.92 (01-15-22 @ 09:30)      < from: 12 Lead ECG (01.19.22 @ 15:50) >    Ventricular Rate 108 BPM    Atrial Rate 108 BPM    P-R Interval 142 ms    QRS Duration 74 ms    Q-T Interval 324 ms    QTC Calculation(Bazett) 434 ms    P Axis 57 degrees    R Axis 73 degrees    T Axis 60 degrees    Diagnosis Line SINUS TACHYCARDIA  ST ELEVATION CONSIDER INFERIOR INJURY OR ACUTE INFARCT  *** ** ** ** * ACUTE MI  ** ** ** **  ABNORMAL ECG  WHEN COMPARED WITH ECG OF 18-JAN-2022 13:48,  SIGNIFICANT CHANGES HAVE OCCURRED  Confirmed by ZAK LARA, DIONNE ([id number]) on 1/20/2022 3:31:50P    < end of copied text >

## 2022-01-21 NOTE — DISCHARGE NOTE PROVIDER - NSDCFUSCHEDAPPT_GEN_ALL_CORE_FT
JAK DANIELS ; 01/26/2022 ; [place]  JAK DANIELS ; 01/28/2022 ; [place] [facility]  JAK DANIELS ; 01/31/2022 ; [place] [facility]

## 2022-01-21 NOTE — PROGRESS NOTE ADULT - SUBJECTIVE AND OBJECTIVE BOX
Name of Patient : JAK DANIELS  MRN: [id number]  Date of visit: 01-21-22 @ 11:41      Subjective: Patient seen and examined. No new events except as noted.   Patient denies complaints. States he is eating well.   Reports pain earlier from hemorrhoid, was given pain medication. At time of visit reports that pain was well controlled.   Per discussion with RN, patient will be moved to 7Mon    REVIEW OF SYSTEMS:    CONSTITUTIONAL: No weakness, fevers or chills  EYES/ENT: No visual changes;  No vertigo or throat pain   NECK: No pain or stiffness  RESPIRATORY: No cough, wheezing, hemoptysis; No shortness of breath  CARDIOVASCULAR: No chest pain or palpitations  GASTROINTESTINAL: No abdominal or epigastric pain. No nausea, vomiting, or hematemesis; No diarrhea or constipation. No melena or hematochezia.  GENITOURINARY: No dysuria, frequency or hematuria  NEUROLOGICAL: No numbness or weakness  SKIN: No itching, burning, rashes, or lesions   All other review of systems is negative unless indicated above.    MEDICATIONS:  MEDICATIONS  (STANDING):  chlorhexidine 2% Cloths 1 Application(s) Topical <User Schedule>  fluconAZOLE   Tablet 200 milliGRAM(s) Oral daily  influenza   Vaccine 0.5 milliLiter(s) IntraMuscular once  pantoprazole    Tablet 40 milliGRAM(s) Oral before breakfast  piperacillin/tazobactam IVPB.. 3.375 Gram(s) IV Intermittent every 8 hours  sodium chloride 0.9%. 1000 milliLiter(s) (100 mL/Hr) IV Continuous <Continuous>      PHYSICAL EXAM:  T(C): 36.7 (01-21-22 @ 04:49), Max: 37 (01-20-22 @ 12:10)  HR: 101 (01-21-22 @ 04:49) (98 - 104)  BP: 107/69 (01-21-22 @ 04:49) (107/69 - 119/79)  RR: 18 (01-21-22 @ 04:49) (18 - 19)  SpO2: 97% (01-21-22 @ 04:49) (97% - 100%)  Wt(kg): --  I&O's Summary    20 Jan 2022 07:01  -  21 Jan 2022 07:00  --------------------------------------------------------  IN: 600 mL / OUT: 1200 mL / NET: -600 mL          Appearance: Normal	  HEENT:  PERRLA   Lymphatic: No lymphadenopathy   Cardiovascular: Normal S1 S2, no JVD  Respiratory: normal effort , clear  Gastrointestinal:  Soft, Non-tender  Skin: No rashes,  warm to touch  Psychiatry:  Mood & affect appropriate  Musculuskeletal: No edema      All labs, Imaging and EKGs personally reviewed       01-20-22 @ 07:01  -  01-21-22 @ 07:00  --------------------------------------------------------  IN: 600 mL / OUT: 1200 mL / NET: -600 mL                              6.8    0.42  )-----------( 11       ( 21 Jan 2022 08:03 )             19.4               01-21    137  |  102  |  13  ----------------------------<  97  4.0   |  24  |  1.03    Ca    9.5      21 Jan 2022 07:56    TPro  6.9  /  Alb  4.0  /  TBili  0.6  /  DBili  x   /  AST  13  /  ALT  44  /  AlkPhos  98  01-21      Culture - Urine (01.18.22 @ 22:46)   Specimen Source: Clean Catch Clean Catch (Midstream)   Culture Results: No growth     Culture - Blood (01.18.22 @ 18:28)   Specimen Source: .Blood Blood-Peripheral   Culture Results: No growth to date.     Culture - Blood (01.18.22 @ 18:28)   Specimen Source: .Blood Blood-Peripheral   Culture Results: No growth to date.

## 2022-01-22 DIAGNOSIS — I10 ESSENTIAL (PRIMARY) HYPERTENSION: ICD-10-CM

## 2022-01-22 DIAGNOSIS — Z29.9 ENCOUNTER FOR PROPHYLACTIC MEASURES, UNSPECIFIED: ICD-10-CM

## 2022-01-22 DIAGNOSIS — B99.9 UNSPECIFIED INFECTIOUS DISEASE: ICD-10-CM

## 2022-01-22 DIAGNOSIS — C92.00 ACUTE MYELOBLASTIC LEUKEMIA, NOT HAVING ACHIEVED REMISSION: ICD-10-CM

## 2022-01-22 LAB
ALBUMIN SERPL ELPH-MCNC: 3.8 G/DL — SIGNIFICANT CHANGE UP (ref 3.3–5)
ALP SERPL-CCNC: 98 U/L — SIGNIFICANT CHANGE UP (ref 40–120)
ALT FLD-CCNC: 43 U/L — SIGNIFICANT CHANGE UP (ref 10–45)
ANION GAP SERPL CALC-SCNC: 14 MMOL/L — SIGNIFICANT CHANGE UP (ref 5–17)
AST SERPL-CCNC: 17 U/L — SIGNIFICANT CHANGE UP (ref 10–40)
BASOPHILS # BLD AUTO: 0 K/UL — SIGNIFICANT CHANGE UP (ref 0–0.2)
BASOPHILS NFR BLD AUTO: 0 % — SIGNIFICANT CHANGE UP (ref 0–2)
BILIRUB SERPL-MCNC: 0.4 MG/DL — SIGNIFICANT CHANGE UP (ref 0.2–1.2)
BUN SERPL-MCNC: 12 MG/DL — SIGNIFICANT CHANGE UP (ref 7–23)
CALCIUM SERPL-MCNC: 9.1 MG/DL — SIGNIFICANT CHANGE UP (ref 8.4–10.5)
CHLORIDE SERPL-SCNC: 107 MMOL/L — SIGNIFICANT CHANGE UP (ref 96–108)
CO2 SERPL-SCNC: 20 MMOL/L — LOW (ref 22–31)
CREAT SERPL-MCNC: 0.97 MG/DL — SIGNIFICANT CHANGE UP (ref 0.5–1.3)
DOHLE BOD BLD QL SMEAR: PRESENT — SIGNIFICANT CHANGE UP
EOSINOPHIL # BLD AUTO: 0 K/UL — SIGNIFICANT CHANGE UP (ref 0–0.5)
EOSINOPHIL NFR BLD AUTO: 0 % — SIGNIFICANT CHANGE UP (ref 0–6)
GLUCOSE SERPL-MCNC: 129 MG/DL — HIGH (ref 70–99)
HCT VFR BLD CALC: 22.2 % — LOW (ref 39–50)
HGB BLD-MCNC: 7.6 G/DL — LOW (ref 13–17)
LDH SERPL L TO P-CCNC: 116 U/L — SIGNIFICANT CHANGE UP (ref 50–242)
LYMPHOCYTES # BLD AUTO: 0.11 K/UL — LOW (ref 1–3.3)
LYMPHOCYTES # BLD AUTO: 8 % — LOW (ref 13–44)
MANUAL SMEAR VERIFICATION: SIGNIFICANT CHANGE UP
MCHC RBC-ENTMCNC: 29.7 PG — SIGNIFICANT CHANGE UP (ref 27–34)
MCHC RBC-ENTMCNC: 34.2 GM/DL — SIGNIFICANT CHANGE UP (ref 32–36)
MCV RBC AUTO: 86.7 FL — SIGNIFICANT CHANGE UP (ref 80–100)
MONOCYTES # BLD AUTO: 0.3 K/UL — SIGNIFICANT CHANGE UP (ref 0–0.9)
MONOCYTES NFR BLD AUTO: 22 % — HIGH (ref 2–14)
NEUTROPHILS # BLD AUTO: 0.96 K/UL — LOW (ref 1.8–7.4)
NEUTROPHILS NFR BLD AUTO: 67 % — SIGNIFICANT CHANGE UP (ref 43–77)
NEUTS BAND # BLD: 3 % — SIGNIFICANT CHANGE UP (ref 0–8)
NRBC # BLD: 0 /100 — SIGNIFICANT CHANGE UP (ref 0–0)
PHOSPHATE SERPL-MCNC: 3.5 MG/DL — SIGNIFICANT CHANGE UP (ref 2.5–4.5)
PLAT MORPH BLD: NORMAL — SIGNIFICANT CHANGE UP
PLATELET # BLD AUTO: 23 K/UL — LOW (ref 150–400)
POTASSIUM SERPL-MCNC: 3.8 MMOL/L — SIGNIFICANT CHANGE UP (ref 3.5–5.3)
POTASSIUM SERPL-SCNC: 3.8 MMOL/L — SIGNIFICANT CHANGE UP (ref 3.5–5.3)
PROT SERPL-MCNC: 6.7 G/DL — SIGNIFICANT CHANGE UP (ref 6–8.3)
RBC # BLD: 2.56 M/UL — LOW (ref 4.2–5.8)
RBC # FLD: 16.1 % — HIGH (ref 10.3–14.5)
RBC BLD AUTO: SIGNIFICANT CHANGE UP
SODIUM SERPL-SCNC: 141 MMOL/L — SIGNIFICANT CHANGE UP (ref 135–145)
TOXIC GRANULES BLD QL SMEAR: PRESENT — SIGNIFICANT CHANGE UP
URATE SERPL-MCNC: 4 MG/DL — SIGNIFICANT CHANGE UP (ref 3.4–8.8)
WBC # BLD: 1.37 K/UL — LOW (ref 3.8–10.5)
WBC # FLD AUTO: 1.37 K/UL — LOW (ref 3.8–10.5)

## 2022-01-22 PROCEDURE — 99232 SBSQ HOSP IP/OBS MODERATE 35: CPT

## 2022-01-22 RX ORDER — LANOLIN ALCOHOL/MO/W.PET/CERES
5 CREAM (GRAM) TOPICAL AT BEDTIME
Refills: 0 | Status: DISCONTINUED | OUTPATIENT
Start: 2022-01-22 | End: 2022-01-23

## 2022-01-22 RX ADMIN — Medication 15 MILLILITER(S): at 11:01

## 2022-01-22 RX ADMIN — LIDOCAINE 1 APPLICATION(S): 4 CREAM TOPICAL at 17:24

## 2022-01-22 RX ADMIN — HYDROMORPHONE HYDROCHLORIDE 1 MILLIGRAM(S): 2 INJECTION INTRAMUSCULAR; INTRAVENOUS; SUBCUTANEOUS at 21:14

## 2022-01-22 RX ADMIN — PIPERACILLIN AND TAZOBACTAM 25 GRAM(S): 4; .5 INJECTION, POWDER, LYOPHILIZED, FOR SOLUTION INTRAVENOUS at 02:02

## 2022-01-22 RX ADMIN — Medication 15 MILLILITER(S): at 06:30

## 2022-01-22 RX ADMIN — PIPERACILLIN AND TAZOBACTAM 25 GRAM(S): 4; .5 INJECTION, POWDER, LYOPHILIZED, FOR SOLUTION INTRAVENOUS at 09:05

## 2022-01-22 RX ADMIN — Medication 400 MILLIGRAM(S): at 21:16

## 2022-01-22 RX ADMIN — PANTOPRAZOLE SODIUM 40 MILLIGRAM(S): 20 TABLET, DELAYED RELEASE ORAL at 06:29

## 2022-01-22 RX ADMIN — Medication 15 MILLILITER(S): at 17:25

## 2022-01-22 RX ADMIN — CHLORHEXIDINE GLUCONATE 1 APPLICATION(S): 213 SOLUTION TOPICAL at 06:39

## 2022-01-22 RX ADMIN — Medication 5 MILLIGRAM(S): at 21:17

## 2022-01-22 RX ADMIN — LIDOCAINE 1 APPLICATION(S): 4 CREAM TOPICAL at 06:30

## 2022-01-22 RX ADMIN — HYDROMORPHONE HYDROCHLORIDE 1 MILLIGRAM(S): 2 INJECTION INTRAMUSCULAR; INTRAVENOUS; SUBCUTANEOUS at 06:59

## 2022-01-22 RX ADMIN — FLUCONAZOLE 200 MILLIGRAM(S): 150 TABLET ORAL at 11:01

## 2022-01-22 RX ADMIN — Medication 15 MILLILITER(S): at 00:52

## 2022-01-22 RX ADMIN — Medication 400 MILLIGRAM(S): at 13:00

## 2022-01-22 RX ADMIN — PIPERACILLIN AND TAZOBACTAM 25 GRAM(S): 4; .5 INJECTION, POWDER, LYOPHILIZED, FOR SOLUTION INTRAVENOUS at 17:25

## 2022-01-22 RX ADMIN — HYDROMORPHONE HYDROCHLORIDE 1 MILLIGRAM(S): 2 INJECTION INTRAMUSCULAR; INTRAVENOUS; SUBCUTANEOUS at 06:29

## 2022-01-22 RX ADMIN — Medication 400 MILLIGRAM(S): at 06:36

## 2022-01-22 RX ADMIN — CHLORHEXIDINE GLUCONATE 1 APPLICATION(S): 213 SOLUTION TOPICAL at 06:40

## 2022-01-22 RX ADMIN — Medication 15 MILLILITER(S): at 21:16

## 2022-01-22 RX ADMIN — HYDROMORPHONE HYDROCHLORIDE 1 MILLIGRAM(S): 2 INJECTION INTRAMUSCULAR; INTRAVENOUS; SUBCUTANEOUS at 20:10

## 2022-01-22 NOTE — PROGRESS NOTE ADULT - ASSESSMENT
Patient is a 36 year old Male with a PMHx of HTN, fatty liver, AML chemotherapy and S/P consolidation who presents to the hospital due fever of 100.4, weakness, decreased WBC and shortness of breath while at home. Patient reports that his pain is more controlled s/p pain medication and his shortness of breath has resolved. Patient denies chills, cough, chest pain, palpitations.      #Neutropenic fever  --Monitor CBC w/ Diff closely   --Monitor patient and temperature curve closely   --BCx2-- NGTD; F/U final results   --UCx-- no growth  --CXR with-- IMPRESSION: Clear lungs -- Negative   --Tylenol for fever  --S/P 1 dose of Zosyn in the ED, will continue for now. Unable to tolerate cefepime in the past (rash)  --ID consult, F/U recommendations -- continuing with zosyn for now     #Pancytopenia   --Hgb of 6.4; WBC of 0.10; Plts of 16 on admission   --S/P multiple PRBCs transfusions   --Monitor Post transfusion CBC  --Transfuse to keep Hgb >7.0   --Transfuse platelets <10k   --Monitor CBC closely  --bleeding precautions    --Hematology eval; F/U recommendations   --S/P 1 unit of PRBCs and platelets today 01/21  --Monitor CBC w/diff and patient closely; transfuse as needed       #AML  --Heme/Onc evaluation; F/U recommendations   --On Levaquin, will hold for now as started on Zosyn, resume as indicated   --Check EKG and monitor QTC, If QTC >500 hold fluconazole  --Pain control     #Tachycardia  --Likely due to anemia and pain   --Place on Telemetry   --Monitor HR closely   --Monitor patient and vital signs  --Started on  CC/ Hr X 24 hours  --Pain control with dilaudid   --Cardio eval   --Improving       #Elevated LFTs  --Noted to be elevated on last admission   --ALT of 83-->59-->44 WNL 01/21  --Continue to monitor on AM labs    #HTN   --Was on Amlodipine 5mg at home  --Will hold for now   --Monitor BP and resume as needed     #Abnormal EKG  --Discussed with Attending and cardiology  --Continue to monitor      #PPX  --DVT PPX on hold, will start with compression devices for now  --GI PPX with PPI PO BID

## 2022-01-22 NOTE — PROGRESS NOTE ADULT - ATTENDING COMMENTS
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 Now admitted for cycle 4 HIDAC.  C4D6 doing well, no complaints.  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 Now s/p cycle 4 HIDAC, admitted for neutropenic fevers  C4D17     Cx's NGTD. CXR clear lungs. ID following, on Zosyn  Cont fluconazole and Acyclovir PPx  ANC appears to be recovering  LFTs better now, sono +hepatomegaly, steatosis  OOB  Supportive care

## 2022-01-22 NOTE — PROGRESS NOTE ADULT - ASSESSMENT
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  Cycle 4 complicated by hospital admission for neutropenic fever (likely secondary to HIDAC), now admitted with neutropenic fever with associated weakness and SOB.

## 2022-01-22 NOTE — PROGRESS NOTE ADULT - SUBJECTIVE AND OBJECTIVE BOX
Name of Patient : JAK DANIELS  MRN: [id number]  Date of visit: 01-22-22 @ 22:36      Subjective: Patient seen and examined. No new events except as noted.     REVIEW OF SYSTEMS:    CONSTITUTIONAL: No weakness, fevers or chills  EYES/ENT: No visual changes;  No vertigo or throat pain   NECK: No pain or stiffness  RESPIRATORY: No cough, wheezing, hemoptysis; No shortness of breath  CARDIOVASCULAR: No chest pain or palpitations  GASTROINTESTINAL: No abdominal or epigastric pain. No nausea, vomiting, or hematemesis; No diarrhea or constipation. No melena or hematochezia.  GENITOURINARY: No dysuria, frequency or hematuria  NEUROLOGICAL: No numbness or weakness  SKIN: No itching, burning, rashes, or lesions   All other review of systems is negative unless indicated above.    MEDICATIONS:  MEDICATIONS  (STANDING):  acyclovir   Oral Tab/Cap 400 milliGRAM(s) Oral every 8 hours  Biotene Dry Mouth Oral Rinse 15 milliLiter(s) Swish and Spit four times a day  chlorhexidine 2% Cloths 1 Application(s) Topical <User Schedule>  fluconAZOLE   Tablet 200 milliGRAM(s) Oral daily  influenza   Vaccine 0.5 milliLiter(s) IntraMuscular once  lidocaine 5% Ointment 1 Application(s) Topical two times a day  melatonin 5 milliGRAM(s) Oral at bedtime  pantoprazole    Tablet 40 milliGRAM(s) Oral before breakfast  piperacillin/tazobactam IVPB.. 3.375 Gram(s) IV Intermittent every 8 hours      PHYSICAL EXAM:  T(C): 36.7 (01-22-22 @ 22:14), Max: 37.1 (01-22-22 @ 18:06)  HR: 97 (01-22-22 @ 22:14) (88 - 99)  BP: 122/84 (01-22-22 @ 22:14) (99/63 - 125/77)  RR: 18 (01-22-22 @ 22:14) (18 - 18)  SpO2: 99% (01-22-22 @ 22:14) (98% - 100%)  Wt(kg): --  I&O's Summary    21 Jan 2022 07:01  -  22 Jan 2022 07:00  --------------------------------------------------------  IN: 1035 mL / OUT: 0 mL / NET: 1035 mL    22 Jan 2022 07:01  -  22 Jan 2022 22:36  --------------------------------------------------------  IN: 680 mL / OUT: 0 mL / NET: 680 mL          Appearance: Normal	  HEENT:  PERRLA   Lymphatic: No lymphadenopathy   Cardiovascular: Normal S1 S2, no JVD  Respiratory: normal effort , clear  Gastrointestinal:  Soft, Non-tender  Skin: No rashes,  warm to touch  Psychiatry:  Mood & affect appropriate  Musculuskeletal: No edema      All labs, Imaging and EKGs personally reviewed       01-21-22 @ 07:01  -  01-22-22 @ 07:00  --------------------------------------------------------  IN: 1035 mL / OUT: 0 mL / NET: 1035 mL    01-22-22 @ 07:01  -  01-22-22 @ 22:36  --------------------------------------------------------  IN: 680 mL / OUT: 0 mL / NET: 680 mL                          7.6    1.37  )-----------( 23       ( 22 Jan 2022 07:10 )             22.2               01-22    141  |  107  |  12  ----------------------------<  129<H>  3.8   |  20<L>  |  0.97    Ca    9.1      22 Jan 2022 07:10  Phos  3.5     01-22    TPro  6.7  /  Alb  3.8  /  TBili  0.4  /  DBili  x   /  AST  17  /  ALT  43  /  AlkPhos  98  01-22

## 2022-01-22 NOTE — PROGRESS NOTE ADULT - SUBJECTIVE AND OBJECTIVE BOX
Diagnosis: AML    Protocol/Chemo Regimen: cycle 4 HIDAC    Day: 18    Pt endorsed:    Review of Systems:     Pain scale: 0/10    Diet: regular    Allergies    No Known Allergies    Intolerances    cefepime (Rash)      MEDICATIONS  (STANDING):  acyclovir   Oral Tab/Cap 400 milliGRAM(s) Oral every 8 hours  Biotene Dry Mouth Oral Rinse 15 milliLiter(s) Swish and Spit four times a day  chlorhexidine 2% Cloths 1 Application(s) Topical <User Schedule>  chlorhexidine 4% Liquid 1 Application(s) Topical <User Schedule>  fluconAZOLE   Tablet 200 milliGRAM(s) Oral daily  influenza   Vaccine 0.5 milliLiter(s) IntraMuscular once  lidocaine 5% Ointment 1 Application(s) Topical two times a day  pantoprazole    Tablet 40 milliGRAM(s) Oral before breakfast  piperacillin/tazobactam IVPB.. 3.375 Gram(s) IV Intermittent every 8 hours    MEDICATIONS  (PRN):  hemorrhoidal Ointment 1 Application(s) Rectal two times a day PRN Hemorrhoids  HYDROmorphone  Injectable 1 milliGRAM(s) IV Push every 6 hours PRN Severe Pain (7 - 10)  ondansetron    Tablet 8 milliGRAM(s) Oral every 8 hours PRN Nausea and/or Vomiting  sodium chloride 0.9% lock flush 10 milliLiter(s) IV Push every 1 hour PRN Pre/post blood products, medications, blood draw, and to maintain line patency        Vital Signs Last 24 Hrs  T(C): 36.8 (22 Jan 2022 09:11), Max: 36.8 (21 Jan 2022 18:05)  T(F): 98.3 (22 Jan 2022 09:11), Max: 98.3 (22 Jan 2022 09:11)  HR: 96 (22 Jan 2022 09:11) (90 - 118)  BP: 118/67 (22 Jan 2022 09:11) (99/63 - 144/77)  BP(mean): --  RR: 18 (22 Jan 2022 09:11) (18 - 18)  SpO2: 100% (22 Jan 2022 09:11) (98% - 100%)    PHYSICAL EXAM  General: NAD  HEENT: clear oropharynx,   CV: (+) S1/S2 RRR  Lungs: Breath sounds clear and equal b/l, no wheezes, no rales  Abdomen: soft, non-tender, non-distended  Ext: no edema  Skin: no rashes and no petechiae  Neuro: alert and oriented X 3, no focal deficits, no nystagmus/cerebellar toxicity.  Central Line: RUE PICC clean dry and intact.    LABS:                        7.6    1.37  )-----------( 23       ( 22 Jan 2022 07:10 )             22.2     22 Jan 2022 07:10    141    |  107    |  12     ----------------------------<  129    3.8     |  20     |  0.97     Ca    9.1        22 Jan 2022 07:10  Phos  3.5       22 Jan 2022 07:10    TPro  6.7    /  Alb  3.8    /  TBili  0.4    /  DBili  x      /  AST  17     /  ALT  43     /  AlkPhos  98     22 Jan 2022 07:10      LIVER FUNCTIONS - ( 22 Jan 2022 07:10 )  Alb: 3.8 g/dL / Pro: 6.7 g/dL / ALK PHOS: 98 U/L / ALT: 43 U/L / AST: 17 U/L / GGT: x             Blood Cultures:     Culture - Urine (01.18.22 @ 22:46)   Specimen Source: Clean Catch Clean Catch (Midstream)   Culture Results:   No growth Culture - Blood (01.18.22 @ 18:28)   Specimen Source: .Blood Blood-Peripheral   Culture Results:   No growth to date. Culture - Blood (01.18.22 @ 18:28)   Specimen Source: .Blood Blood-Peripheral   Culture Results:   No growth to date. COVID-19 PCR (01.18.22 @ 14:25)   COVID-19 PCR: NotDetec:                 Diagnosis: AML    Protocol/Chemo Regimen: cycle 4 HIDAC    Day: 18    Pt endorsed: No overnight events, afebrile. Asking about discharge    Review of Systems: Denies chills, cough, SOB, CP/palp's    Pain scale: 0/10    Diet: regular    Allergies    No Known Allergies    Intolerances    cefepime (Rash)      MEDICATIONS  (STANDING):  acyclovir   Oral Tab/Cap 400 milliGRAM(s) Oral every 8 hours  Biotene Dry Mouth Oral Rinse 15 milliLiter(s) Swish and Spit four times a day  chlorhexidine 2% Cloths 1 Application(s) Topical <User Schedule>  chlorhexidine 4% Liquid 1 Application(s) Topical <User Schedule>  fluconAZOLE   Tablet 200 milliGRAM(s) Oral daily  influenza   Vaccine 0.5 milliLiter(s) IntraMuscular once  lidocaine 5% Ointment 1 Application(s) Topical two times a day  pantoprazole    Tablet 40 milliGRAM(s) Oral before breakfast  piperacillin/tazobactam IVPB.. 3.375 Gram(s) IV Intermittent every 8 hours    MEDICATIONS  (PRN):  hemorrhoidal Ointment 1 Application(s) Rectal two times a day PRN Hemorrhoids  HYDROmorphone  Injectable 1 milliGRAM(s) IV Push every 6 hours PRN Severe Pain (7 - 10)  ondansetron    Tablet 8 milliGRAM(s) Oral every 8 hours PRN Nausea and/or Vomiting  sodium chloride 0.9% lock flush 10 milliLiter(s) IV Push every 1 hour PRN Pre/post blood products, medications, blood draw, and to maintain line patency        Vital Signs Last 24 Hrs  T(C): 36.8 (22 Jan 2022 09:11), Max: 36.8 (21 Jan 2022 18:05)  T(F): 98.3 (22 Jan 2022 09:11), Max: 98.3 (22 Jan 2022 09:11)  HR: 96 (22 Jan 2022 09:11) (90 - 118)  BP: 118/67 (22 Jan 2022 09:11) (99/63 - 144/77)  BP(mean): --  RR: 18 (22 Jan 2022 09:11) (18 - 18)  SpO2: 100% (22 Jan 2022 09:11) (98% - 100%)    PHYSICAL EXAM  General: NAD  HEENT: clear oropharynx,   CV: (+) S1/S2 RRR  Lungs: Breath sounds clear and equal b/l, no wheezes, no rales  Abdomen: soft, non-tender, non-distended  Ext: no edema  Skin: no rashes and no petechiae  Neuro: alert and oriented X 3, no focal deficits, no nystagmus/cerebellar toxicity.  Central Line: RUE PICC clean dry and intact.    LABS:                        7.6    1.37  )-----------( 23       ( 22 Jan 2022 07:10 )             22.2     22 Jan 2022 07:10    141    |  107    |  12     ----------------------------<  129    3.8     |  20     |  0.97     Ca    9.1        22 Jan 2022 07:10  Phos  3.5       22 Jan 2022 07:10    TPro  6.7    /  Alb  3.8    /  TBili  0.4    /  DBili  x      /  AST  17     /  ALT  43     /  AlkPhos  98     22 Jan 2022 07:10      LIVER FUNCTIONS - ( 22 Jan 2022 07:10 )  Alb: 3.8 g/dL / Pro: 6.7 g/dL / ALK PHOS: 98 U/L / ALT: 43 U/L / AST: 17 U/L / GGT: x             Blood Cultures:     Culture - Urine (01.18.22 @ 22:46)   Specimen Source: Clean Catch Clean Catch (Midstream)   Culture Results:   No growth Culture - Blood (01.18.22 @ 18:28)   Specimen Source: .Blood Blood-Peripheral   Culture Results:   No growth to date. Culture - Blood (01.18.22 @ 18:28)   Specimen Source: .Blood Blood-Peripheral   Culture Results:   No growth to date. COVID-19 PCR (01.18.22 @ 14:25)   COVID-19 PCR: NotDetec:

## 2022-01-23 ENCOUNTER — TRANSCRIPTION ENCOUNTER (OUTPATIENT)
Age: 37
End: 2022-01-23

## 2022-01-23 VITALS
RESPIRATION RATE: 18 BRPM | SYSTOLIC BLOOD PRESSURE: 127 MMHG | OXYGEN SATURATION: 99 % | TEMPERATURE: 98 F | HEART RATE: 77 BPM | DIASTOLIC BLOOD PRESSURE: 81 MMHG

## 2022-01-23 LAB
ALBUMIN SERPL ELPH-MCNC: 3.9 G/DL — SIGNIFICANT CHANGE UP (ref 3.3–5)
ALP SERPL-CCNC: 98 U/L — SIGNIFICANT CHANGE UP (ref 40–120)
ALT FLD-CCNC: 42 U/L — SIGNIFICANT CHANGE UP (ref 10–45)
ANION GAP SERPL CALC-SCNC: 14 MMOL/L — SIGNIFICANT CHANGE UP (ref 5–17)
AST SERPL-CCNC: 15 U/L — SIGNIFICANT CHANGE UP (ref 10–40)
BASOPHILS # BLD AUTO: 0.02 K/UL — SIGNIFICANT CHANGE UP (ref 0–0.2)
BASOPHILS NFR BLD AUTO: 0.7 % — SIGNIFICANT CHANGE UP (ref 0–2)
BILIRUB SERPL-MCNC: 0.5 MG/DL — SIGNIFICANT CHANGE UP (ref 0.2–1.2)
BUN SERPL-MCNC: 12 MG/DL — SIGNIFICANT CHANGE UP (ref 7–23)
CALCIUM SERPL-MCNC: 9.6 MG/DL — SIGNIFICANT CHANGE UP (ref 8.4–10.5)
CHLORIDE SERPL-SCNC: 103 MMOL/L — SIGNIFICANT CHANGE UP (ref 96–108)
CO2 SERPL-SCNC: 25 MMOL/L — SIGNIFICANT CHANGE UP (ref 22–31)
CREAT SERPL-MCNC: 1.05 MG/DL — SIGNIFICANT CHANGE UP (ref 0.5–1.3)
CULTURE RESULTS: SIGNIFICANT CHANGE UP
CULTURE RESULTS: SIGNIFICANT CHANGE UP
EOSINOPHIL # BLD AUTO: 0 K/UL — SIGNIFICANT CHANGE UP (ref 0–0.5)
EOSINOPHIL NFR BLD AUTO: 0 % — SIGNIFICANT CHANGE UP (ref 0–6)
GLUCOSE SERPL-MCNC: 94 MG/DL — SIGNIFICANT CHANGE UP (ref 70–99)
HCT VFR BLD CALC: 22.5 % — LOW (ref 39–50)
HGB BLD-MCNC: 7.8 G/DL — LOW (ref 13–17)
IMM GRANULOCYTES NFR BLD AUTO: 7.4 % — HIGH (ref 0–1.5)
LYMPHOCYTES # BLD AUTO: 0.25 K/UL — LOW (ref 1–3.3)
LYMPHOCYTES # BLD AUTO: 8.4 % — LOW (ref 13–44)
MAGNESIUM SERPL-MCNC: 2.2 MG/DL — SIGNIFICANT CHANGE UP (ref 1.6–2.6)
MCHC RBC-ENTMCNC: 29.5 PG — SIGNIFICANT CHANGE UP (ref 27–34)
MCHC RBC-ENTMCNC: 34.7 GM/DL — SIGNIFICANT CHANGE UP (ref 32–36)
MCV RBC AUTO: 85.2 FL — SIGNIFICANT CHANGE UP (ref 80–100)
MONOCYTES # BLD AUTO: 0.83 K/UL — SIGNIFICANT CHANGE UP (ref 0–0.9)
MONOCYTES NFR BLD AUTO: 28 % — HIGH (ref 2–14)
NEUTROPHILS # BLD AUTO: 1.64 K/UL — LOW (ref 1.8–7.4)
NEUTROPHILS NFR BLD AUTO: 55.5 % — SIGNIFICANT CHANGE UP (ref 43–77)
NRBC # BLD: 0 /100 WBCS — SIGNIFICANT CHANGE UP (ref 0–0)
PHOSPHATE SERPL-MCNC: 4.7 MG/DL — HIGH (ref 2.5–4.5)
PLATELET # BLD AUTO: 17 K/UL — CRITICAL LOW (ref 150–400)
POTASSIUM SERPL-MCNC: 3.9 MMOL/L — SIGNIFICANT CHANGE UP (ref 3.5–5.3)
POTASSIUM SERPL-SCNC: 3.9 MMOL/L — SIGNIFICANT CHANGE UP (ref 3.5–5.3)
PROT SERPL-MCNC: 6.9 G/DL — SIGNIFICANT CHANGE UP (ref 6–8.3)
RBC # BLD: 2.64 M/UL — LOW (ref 4.2–5.8)
RBC # FLD: 15.9 % — HIGH (ref 10.3–14.5)
SODIUM SERPL-SCNC: 142 MMOL/L — SIGNIFICANT CHANGE UP (ref 135–145)
SPECIMEN SOURCE: SIGNIFICANT CHANGE UP
SPECIMEN SOURCE: SIGNIFICANT CHANGE UP
WBC # BLD: 2.96 K/UL — LOW (ref 3.8–10.5)
WBC # FLD AUTO: 2.96 K/UL — LOW (ref 3.8–10.5)

## 2022-01-23 PROCEDURE — 80048 BASIC METABOLIC PNL TOTAL CA: CPT

## 2022-01-23 PROCEDURE — 87040 BLOOD CULTURE FOR BACTERIA: CPT

## 2022-01-23 PROCEDURE — 96375 TX/PRO/DX INJ NEW DRUG ADDON: CPT

## 2022-01-23 PROCEDURE — 87086 URINE CULTURE/COLONY COUNT: CPT

## 2022-01-23 PROCEDURE — 84132 ASSAY OF SERUM POTASSIUM: CPT

## 2022-01-23 PROCEDURE — 85610 PROTHROMBIN TIME: CPT

## 2022-01-23 PROCEDURE — 82330 ASSAY OF CALCIUM: CPT

## 2022-01-23 PROCEDURE — 85027 COMPLETE CBC AUTOMATED: CPT

## 2022-01-23 PROCEDURE — 93005 ELECTROCARDIOGRAM TRACING: CPT

## 2022-01-23 PROCEDURE — 86850 RBC ANTIBODY SCREEN: CPT

## 2022-01-23 PROCEDURE — 84100 ASSAY OF PHOSPHORUS: CPT

## 2022-01-23 PROCEDURE — 99239 HOSP IP/OBS DSCHRG MGMT >30: CPT

## 2022-01-23 PROCEDURE — 81003 URINALYSIS AUTO W/O SCOPE: CPT

## 2022-01-23 PROCEDURE — 85025 COMPLETE CBC W/AUTO DIFF WBC: CPT

## 2022-01-23 PROCEDURE — 85018 HEMOGLOBIN: CPT

## 2022-01-23 PROCEDURE — P9040: CPT

## 2022-01-23 PROCEDURE — 86901 BLOOD TYPING SEROLOGIC RH(D): CPT

## 2022-01-23 PROCEDURE — 86900 BLOOD TYPING SEROLOGIC ABO: CPT

## 2022-01-23 PROCEDURE — 80061 LIPID PANEL: CPT

## 2022-01-23 PROCEDURE — P9037: CPT

## 2022-01-23 PROCEDURE — 99285 EMERGENCY DEPT VISIT HI MDM: CPT | Mod: 25

## 2022-01-23 PROCEDURE — 36430 TRANSFUSION BLD/BLD COMPNT: CPT

## 2022-01-23 PROCEDURE — 82803 BLOOD GASES ANY COMBINATION: CPT

## 2022-01-23 PROCEDURE — 84295 ASSAY OF SERUM SODIUM: CPT

## 2022-01-23 PROCEDURE — 84443 ASSAY THYROID STIM HORMONE: CPT

## 2022-01-23 PROCEDURE — 80053 COMPREHEN METABOLIC PANEL: CPT

## 2022-01-23 PROCEDURE — 71045 X-RAY EXAM CHEST 1 VIEW: CPT

## 2022-01-23 PROCEDURE — 82565 ASSAY OF CREATININE: CPT

## 2022-01-23 PROCEDURE — 83605 ASSAY OF LACTIC ACID: CPT

## 2022-01-23 PROCEDURE — 82435 ASSAY OF BLOOD CHLORIDE: CPT

## 2022-01-23 PROCEDURE — 82947 ASSAY GLUCOSE BLOOD QUANT: CPT

## 2022-01-23 PROCEDURE — 85730 THROMBOPLASTIN TIME PARTIAL: CPT

## 2022-01-23 PROCEDURE — 84550 ASSAY OF BLOOD/URIC ACID: CPT

## 2022-01-23 PROCEDURE — 36415 COLL VENOUS BLD VENIPUNCTURE: CPT

## 2022-01-23 PROCEDURE — U0003: CPT

## 2022-01-23 PROCEDURE — 83615 LACTATE (LD) (LDH) ENZYME: CPT

## 2022-01-23 PROCEDURE — 85014 HEMATOCRIT: CPT

## 2022-01-23 PROCEDURE — 83735 ASSAY OF MAGNESIUM: CPT

## 2022-01-23 PROCEDURE — 86923 COMPATIBILITY TEST ELECTRIC: CPT

## 2022-01-23 PROCEDURE — U0005: CPT

## 2022-01-23 PROCEDURE — 96374 THER/PROPH/DIAG INJ IV PUSH: CPT

## 2022-01-23 RX ORDER — FLUCONAZOLE 150 MG/1
1 TABLET ORAL
Qty: 0 | Refills: 0 | DISCHARGE

## 2022-01-23 RX ORDER — PANTOPRAZOLE SODIUM 20 MG/1
1 TABLET, DELAYED RELEASE ORAL
Qty: 0 | Refills: 0 | DISCHARGE
Start: 2022-01-23

## 2022-01-23 RX ORDER — CIPROFLOXACIN LACTATE 400MG/40ML
1 VIAL (ML) INTRAVENOUS
Qty: 0 | Refills: 0 | DISCHARGE

## 2022-01-23 RX ORDER — CALCIUM ACETATE 667 MG
667 TABLET ORAL
Refills: 0 | Status: DISCONTINUED | OUTPATIENT
Start: 2022-01-23 | End: 2022-01-23

## 2022-01-23 RX ORDER — ONDANSETRON 8 MG/1
1 TABLET, FILM COATED ORAL
Qty: 0 | Refills: 0 | DISCHARGE

## 2022-01-23 RX ORDER — LIDOCAINE 4 G/100G
1 CREAM TOPICAL
Qty: 0 | Refills: 0 | DISCHARGE
Start: 2022-01-23

## 2022-01-23 RX ORDER — PANTOPRAZOLE SODIUM 20 MG/1
1 TABLET, DELAYED RELEASE ORAL
Qty: 20 | Refills: 0
Start: 2022-01-23 | End: 2022-02-11

## 2022-01-23 RX ORDER — PANTOPRAZOLE SODIUM 20 MG/1
1 TABLET, DELAYED RELEASE ORAL
Qty: 0 | Refills: 0 | DISCHARGE
Start: 2022-01-23 | End: 2022-02-11

## 2022-01-23 RX ADMIN — Medication 400 MILLIGRAM(S): at 06:11

## 2022-01-23 RX ADMIN — FLUCONAZOLE 200 MILLIGRAM(S): 150 TABLET ORAL at 11:49

## 2022-01-23 RX ADMIN — Medication 15 MILLILITER(S): at 06:12

## 2022-01-23 RX ADMIN — LIDOCAINE 1 APPLICATION(S): 4 CREAM TOPICAL at 06:10

## 2022-01-23 RX ADMIN — Medication 15 MILLILITER(S): at 11:49

## 2022-01-23 RX ADMIN — Medication 667 MILLIGRAM(S): at 11:49

## 2022-01-23 RX ADMIN — PIPERACILLIN AND TAZOBACTAM 25 GRAM(S): 4; .5 INJECTION, POWDER, LYOPHILIZED, FOR SOLUTION INTRAVENOUS at 02:00

## 2022-01-23 RX ADMIN — CHLORHEXIDINE GLUCONATE 1 APPLICATION(S): 213 SOLUTION TOPICAL at 06:12

## 2022-01-23 RX ADMIN — PANTOPRAZOLE SODIUM 40 MILLIGRAM(S): 20 TABLET, DELAYED RELEASE ORAL at 06:10

## 2022-01-23 NOTE — DISCHARGE NOTE NURSING/CASE MANAGEMENT/SOCIAL WORK - NSDCPEFALRISK_GEN_ALL_CORE
For information on Fall & Injury Prevention, visit: [email or web address] OR  [email or web address] OR  https://www.cdc.gov/steadi/patient.html

## 2022-01-23 NOTE — PROGRESS NOTE ADULT - SUBJECTIVE AND OBJECTIVE BOX
Date of Service   01-23-22 @ 12:37    Patient is a 36y old  Male who presents with a chief complaint of Neutropenia (23 Jan 2022 12:05)      INTERVAL HISTORY: Pt feels ok     REVIEW OF SYSTEMS:   CONSTITUTIONAL: No weakness  EYES/ENT: No visual changes; No throat pain  Neck: No pain or stiffness  Respiratory: No cough, wheezing, No shortness of breath  CARDIOVASCULAR: no chest pain or palpitations  GASTROINTESTINAL: No abdominal pain, no nausea, vomiting or hematemesis  GENITOURINARY: No dysuria, frequency or hematuria  NEUROLOGICAL: No stroke like symptoms  SKIN: No rashes    	  MEDICATIONS:        PHYSICAL EXAM:  T(C): 36.6 (01-23-22 @ 10:43), Max: 37.1 (01-22-22 @ 18:06)  HR: 77 (01-23-22 @ 10:43) (68 - 97)  BP: 127/81 (01-23-22 @ 10:43) (106/66 - 127/81)  RR: 18 (01-23-22 @ 10:43) (16 - 18)  SpO2: 99% (01-23-22 @ 10:43) (96% - 100%)  Wt(kg): --  I&O's Summary    22 Jan 2022 07:01  -  23 Jan 2022 07:00  --------------------------------------------------------  IN: 680 mL / OUT: 0 mL / NET: 680 mL    23 Jan 2022 07:01  -  23 Jan 2022 12:37  --------------------------------------------------------  IN: 465 mL / OUT: 700 mL / NET: -235 mL          Appearance: In no distress	  HEENT:    PERRL, EOMI	  Cardiovascular:  S1 S2, No JVD  Respiratory: Lungs clear to auscultation	  Gastrointestinal:  Soft, Non-tender, + BS	  Vascularature:  No edema of LE  Psychiatric: Appropriate affect   Neuro: no acute focal deficits                               7.8    2.96  )-----------( 17       ( 23 Jan 2022 07:20 )             22.5     01-23    142  |  103  |  12  ----------------------------<  94  3.9   |  25  |  1.05    Ca    9.6      23 Jan 2022 07:41  Phos  4.7     01-23  Mg     2.2     01-23    TPro  6.9  /  Alb  3.9  /  TBili  0.5  /  DBili  x   /  AST  15  /  ALT  42  /  AlkPhos  98  01-23        Labs personally reviewed      ASSESSMENT/PLAN: 	    Patient is a 36 year old Male with a PMHx of HTN, fatty liver, AML chemotherapy and S/P consolidation who presents to the hospital due fever of 100.4, weakness, decreased WBC and shortness of breath while at home. Patient reports that his pain is more controlled s/p pain medication and his shortness of breath has resolved. Patient denies chills, cough, chest pain, palpitations.    Problem 1. Tachycardia  - likely reactive to pain, fevers and significant anemia  - EKG on admin noted with tachy up to 130 bpm  - will likely correct with PRBCs, treatment of fevers and pain control  - Please obtain TTE as tachy is persisting  - HR 70-90's   - EKGs 1/21 reviewed, no ischemic changes but tachycardiac   - pt with no chest pain and asymptomatic    Problem 2. HTN   - Was on Amlodipine 5mg at home  - Will hold for now   - Monitor BP and resume as needed   - BP stable off meds     Problem 3.  Neutropenic fever  - Monitor CBC closely with diffs  - Monitor patient and temperature curve closely   - BCx2/UCx pending  - Abx as per ID    Problem 4. Pancytopenia   - Hgb of 6.4; WBC of 0.10; Plts of 16 on admission   - S/P 2 units of PRBCs on 01/18  - Monitor CBC closely  - bleeding precautions    - Hematology eval; F/U recommendations   - Hgb 7.8 this am     Problem 5. PPX  - DVT PPX on hold, will start with compression devices for now  - GI PPX with PPI PO BID              Errol Winchester [facility]   Mitul Zelaya DO [facility]  Cardiovascular Medicine  [address], [address]  Office: [phone]  Cell: [phone]

## 2022-01-23 NOTE — DISCHARGE NOTE NURSING/CASE MANAGEMENT/SOCIAL WORK - NSDCFUADDAPPT_GEN_ALL_CORE_FT
Possible Platelet appointment at [facility] on  Wednesday 1/26/22 at 3:30pm  Friday 1/28/22 at 3:00pm  Monday 1/31/22 at 3:30pm

## 2022-01-23 NOTE — PROGRESS NOTE ADULT - ASSESSMENT
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  Cycle 4 complicated by hospital admission for neutropenic fever (likely secondary to HIDAC), now admitted with neutropenic fever with associated weakness and SOB.

## 2022-01-23 NOTE — PROGRESS NOTE ADULT - ATTENDING COMMENTS
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 Now s/p cycle 4 HIDAC, admitted for neutropenic fevers  C4D17     Cx's NGTD. CXR clear lungs. 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 Now s/p cycle 4 HIDAC, admitted for neutropenic fevers  C4D18     Cx's NGTD. CXR clear lungs. ID following, on Zosyn. NO longer neutropenic. Can d/c Zosyn, fluconazole and Acyclovir PPx  LFTs better now, sono +hepatomegaly, steatosis  OOB  Supportive care  Dc home today

## 2022-01-23 NOTE — PROGRESS NOTE ADULT - PROBLEM SELECTOR PLAN 2
+Neutropenic fevers, as of 1/23 no longer neutropenic  Afebrile since admission 1/18  ALL Cx's NTD   s/p Zosyn empiric x 4 days   Appreciate ID consult
Afebrile since admission 1/18  ALL Cx's NTD  Continue with Zosyn  Appreciate ID consult

## 2022-01-23 NOTE — PROGRESS NOTE ADULT - SUBJECTIVE AND OBJECTIVE BOX
Diagnosis: AML    Protocol/Chemo Regimen: cycle 4 HIDAC    Day: 19    Pt endorsed: afebrile overnight, feels well, taking po's, +OOB walking    Review of Systems: Denies, chills, cough, CP/palp's, ARMSTRONG, HA or dizziness    Pain scale: 0/10    Diet: regular    Allergies  No Known Allergies    Intolerances    cefepime (Rash)      MEDICATIONS  (STANDING):  acyclovir   Oral Tab/Cap 400 milliGRAM(s) Oral every 8 hours  Biotene Dry Mouth Oral Rinse 15 milliLiter(s) Swish and Spit four times a day  chlorhexidine 2% Cloths 1 Application(s) Topical <User Schedule>  chlorhexidine 4% Liquid 1 Application(s) Topical <User Schedule>  fluconAZOLE   Tablet 200 milliGRAM(s) Oral daily  influenza   Vaccine 0.5 milliLiter(s) IntraMuscular once  lidocaine 5% Ointment 1 Application(s) Topical two times a day  pantoprazole    Tablet 40 milliGRAM(s) Oral before breakfast  piperacillin/tazobactam IVPB.. 3.375 Gram(s) IV Intermittent every 8 hours    MEDICATIONS  (PRN):  hemorrhoidal Ointment 1 Application(s) Rectal two times a day PRN Hemorrhoids  HYDROmorphone  Injectable 1 milliGRAM(s) IV Push every 6 hours PRN Severe Pain (7 - 10)  ondansetron    Tablet 8 milliGRAM(s) Oral every 8 hours PRN Nausea and/or Vomiting  sodium chloride 0.9% lock flush 10 milliLiter(s) IV Push every 1 hour PRN Pre/post blood products, medications, blood draw, and to maintain line patency    Vital Signs Last 24 Hrs  T(C): 36.6 (23 Jan 2022 10:43), Max: 37.1 (22 Jan 2022 18:06)  T(F): 97.9 (23 Jan 2022 10:43), Max: 98.7 (22 Jan 2022 18:06)  HR: 77 (23 Jan 2022 10:43) (68 - 97)  BP: 127/81 (23 Jan 2022 10:43) (106/66 - 127/81)  BP(mean): --  RR: 18 (23 Jan 2022 10:43) (16 - 18)  SpO2: 99% (23 Jan 2022 10:43) (96% - 100%)    PHYSICAL EXAM  General: NAD  HEENT: clear oropharynx,   CV: (+) S1/S2, reg  Lungs: CTA b/l no wheezes, no rales  Abdomen: +BS,  soft, non-tender, non-distended  Ext: no edema BLE's  Skin: no rashes and no petechiae  Neuro: alert and oriented X 3, no focal deficits, no nystagmus/cerebellar toxicity.  Central Line: RUE PICC clean dry and intact.    LABS:                        7.8    2.96  )-----------( 17       ( 23 Jan 2022 07:20 )             22.5     23 Jan 2022 07:41    142    |  103    |  12     ----------------------------<  94     3.9     |  25     |  1.05     Ca    9.6        23 Jan 2022 07:41  Phos  4.7       23 Jan 2022 07:41  Mg     2.2       23 Jan 2022 07:41    TPro  6.9    /  Alb  3.9    /  TBili  0.5    /  DBili  x      /  AST  15     /  ALT  42     /  AlkPhos  98     23 Jan 2022 07:41      LIVER FUNCTIONS - ( 23 Jan 2022 07:41 )  Alb: 3.9 g/dL / Pro: 6.9 g/dL / ALK PHOS: 98 U/L / ALT: 42 U/L / AST: 15 U/L / GGT: x                 Blood Cultures:     Culture - Urine (01.18.22 @ 22:46)   Specimen Source: Clean Catch Clean Catch (Midstream)   Culture Results:   No growth Culture - Blood (01.18.22 @ 18:28)   Specimen Source: .Blood Blood-Peripheral   Culture Results:   No growth to date. Culture - Blood (01.18.22 @ 18:28)   Specimen Source: .Blood Blood-Peripheral   Culture Results:   No growth to date. COVID-19 PCR (01.18.22 @ 14:25)   COVID-19 PCR: NotDetec:

## 2022-01-23 NOTE — PROGRESS NOTE ADULT - PROBLEM SELECTOR PLAN 1
S/P C4 HIDAC, a/w recurrent neutropenic fevers (+prior admits in between cycles)   Follow CBC daily - transfuse for hemoglobin <7, platelets <10K, <20K and febrile, or <50K and bleeding  Follow daily lytes, replete prn  D/C today 1/23 with OP follow up
S/P C4 HIDAC, a/w recurrent neutropenic fevers (+prior admits in between cycles)   Follow CBC daily - transfuse for hemoglobin <7, platelets <10K, <20K and febrile, or <50K and bleeding  Follow daily lytes, replete prn

## 2022-01-23 NOTE — DISCHARGE NOTE NURSING/CASE MANAGEMENT/SOCIAL WORK - PATIENT PORTAL LINK FT
You can access the FollowMyHealth Patient Portal offered by [facility] by registering at the following website: [email or web address]. By joining [facility]’s FollowMyHealth portal, you will also be able to view your health information using other applications (apps) compatible with our system.

## 2022-01-23 NOTE — PROGRESS NOTE ADULT - REASON FOR ADMISSION
Neutropenia
Neutropenic fever
Neutropenia

## 2022-01-23 NOTE — PROGRESS NOTE ADULT - SUBJECTIVE AND OBJECTIVE BOX
Name of Patient : JAK DANIELS  MRN: [id number]  Date of visit: 01-23-22 @ 13:06      Subjective: Patient seen and examined. No new events except as noted.   Patient seen walking around floor and then in room. Jaswinder any complaints. No chest pain, palpitations, SOB, or difficulty breathing.   Patient reports eating well.   Getting 1 unit of Platelets this morning at time of visit.   Discharge planning home for today 01/23.     REVIEW OF SYSTEMS:    CONSTITUTIONAL: No weakness, fevers or chills  EYES/ENT: No visual changes;  No vertigo or throat pain   NECK: No pain or stiffness  RESPIRATORY: No cough, wheezing, hemoptysis; No shortness of breath  CARDIOVASCULAR: No chest pain or palpitations  GASTROINTESTINAL: No abdominal or epigastric pain. No nausea, vomiting, or hematemesis; No diarrhea or constipation. No melena or hematochezia.  GENITOURINARY: No dysuria, frequency or hematuria  NEUROLOGICAL: No numbness or weakness  SKIN: No itching, burning, rashes, or lesions   All other review of systems is negative unless indicated above.    MEDICATIONS:  MEDICATIONS  (STANDING):  acyclovir   Oral Tab/Cap 400 milliGRAM(s) Oral every 8 hours  Biotene Dry Mouth Oral Rinse 15 milliLiter(s) Swish and Spit four times a day  calcium acetate 667 milliGRAM(s) Oral three times a day with meals  chlorhexidine 2% Cloths 1 Application(s) Topical <User Schedule>  fluconAZOLE   Tablet 200 milliGRAM(s) Oral daily  influenza   Vaccine 0.5 milliLiter(s) IntraMuscular once  lidocaine 5% Ointment 1 Application(s) Topical two times a day  melatonin 5 milliGRAM(s) Oral at bedtime  pantoprazole    Tablet 40 milliGRAM(s) Oral before breakfast      PHYSICAL EXAM:  T(C): 36.6 (01-23-22 @ 10:43), Max: 37.1 (01-22-22 @ 18:06)  HR: 77 (01-23-22 @ 10:43) (68 - 97)  BP: 127/81 (01-23-22 @ 10:43) (106/66 - 127/81)  RR: 18 (01-23-22 @ 10:43) (16 - 18)  SpO2: 99% (01-23-22 @ 10:43) (96% - 100%)  Wt(kg): --  I&O's Summary    22 Jan 2022 07:01  -  23 Jan 2022 07:00  --------------------------------------------------------  IN: 680 mL / OUT: 0 mL / NET: 680 mL    23 Jan 2022 07:01  -  23 Jan 2022 13:06  --------------------------------------------------------  IN: 465 mL / OUT: 700 mL / NET: -235 mL          Appearance: Normal	  HEENT:  PERRLA   Lymphatic: No lymphadenopathy   Cardiovascular: Normal S1 S2, no JVD  Respiratory: normal effort , clear  Gastrointestinal:  Soft, Non-tender  Skin: No rashes,  warm to touch  Psychiatry:  Mood & affect appropriate  Musculuskeletal: No edema      All labs, Imaging and EKGs personally reviewed         01-22-22 @ 07:01  -  01-23-22 @ 07:00  --------------------------------------------------------  IN: 680 mL / OUT: 0 mL / NET: 680 mL    01-23-22 @ 07:01  -  01-23-22 @ 13:06  --------------------------------------------------------  IN: 465 mL / OUT: 700 mL / NET: -235 mL                              7.8    2.96  )-----------( 17       ( 23 Jan 2022 07:20 )             22.5               01-23    142  |  103  |  12  ----------------------------<  94  3.9   |  25  |  1.05    Ca    9.6      23 Jan 2022 07:41  Phos  4.7     01-23  Mg     2.2     01-23    TPro  6.9  /  Alb  3.9  /  TBili  0.5  /  DBili  x   /  AST  15  /  ALT  42  /  AlkPhos  98  01-23             Culture - Urine (01.18.22 @ 22:46)   Specimen Source: Clean Catch Clean Catch (Midstream)   Culture Results: No growth     Culture - Blood (01.18.22 @ 18:28)   Specimen Source: .Blood Blood-Peripheral   Culture Results: No growth to date.     Culture - Blood (01.18.22 @ 18:28)   Specimen Source: .Blood Blood-Peripheral   Culture Results: No growth to date.

## 2022-01-23 NOTE — PROGRESS NOTE ADULT - ASSESSMENT
Patient is a 36 year old Male with a PMHx of HTN, fatty liver, AML chemotherapy and S/P consolidation who presents to the hospital due fever of 100.4, weakness, decreased WBC and shortness of breath while at home. Patient reports that his pain is more controlled s/p pain medication and his shortness of breath has resolved. Patient denies chills, cough, chest pain, palpitations.      #Neutropenic fever  --Monitor CBC w/ Diff closely   --Monitor patient and temperature curve closely   --BCx2-- NGTD; F/U final results   --UCx-- no growth  --CXR with-- IMPRESSION: Clear lungs -- Negative   --Tylenol for fever  --S/P 1 dose of Zosyn in the ED, will continue for now. Unable to tolerate cefepime in the past (rash)  --ID consult, F/U recommendations -- continuing with zosyn for now     #Pancytopenia   --Hgb of 6.4; WBC of 0.10; Plts of 16 on admission   --S/P multiple PRBCs transfusions   --Monitor Post transfusion CBC  --Transfuse to keep Hgb >7.0   --Transfuse platelets <10k   --Monitor CBC closely  --bleeding precautions    --Hematology eval; F/U recommendations   --S/P 1 unit of PRBCs and platelets 01/21  --Receiving 1 unit of Plts at time of visit this morning  --Hgb noted to be 7.8 this morning  --Patient will require outpatient F/U   --Monitor CBC w/diff and patient closely; transfuse as needed       #AML  --Heme/Onc evaluation; F/U recommendations   --On Levaquin, will hold for now as started on Zosyn, resume as indicated   --Check EKG and monitor QTC, If QTC >500 hold fluconazole  --Continue with Fluconazole and Acyclovir per heme/onc   --Pain control   --Outpatient F/u     #Tachycardia  --Likely due to anemia and pain   --Place on Telemetry   --Monitor HR closely   --Monitor patient and vital signs  --S/P  CC/ Hr X 24 hours  --Pain control with dilaudid   --Cardio eval   --Improving       #Elevated LFTs  --Noted to be elevated on last admission   --ALT of 83-->59-->44-->43-->42 WNL 01/23  --Continue to monitor on AM labs    #HTN   --Was on Amlodipine 5mg at home  --Will hold for now   --Monitor BP and resume as needed     #Abnormal EKG  --Discussed with Attending and cardiology  --No ischemic changes on repeat EKG 01/21 per chart review noted   --Continue to monitor      #PPX  --DVT PPX on hold, will start with compression devices for now  --GI PPX with PPI PO BID      Discharge planning with outpatient F/u

## 2022-01-23 NOTE — PROGRESS NOTE ADULT - PROVIDER SPECIALTY LIST ADULT
Heme/Onc
Internal Medicine
Internal Medicine
Cardiology
Cardiology
Infectious Disease
Infectious Disease
Internal Medicine
Cardiology
Internal Medicine
Internal Medicine
Heme/Onc

## 2022-01-24 ENCOUNTER — APPOINTMENT (OUTPATIENT)
Dept: INFUSION THERAPY | Facility: HOSPITAL | Age: 37
End: 2022-01-24

## 2022-01-26 ENCOUNTER — RESULT REVIEW (OUTPATIENT)
Age: 37
End: 2022-01-26

## 2022-01-26 ENCOUNTER — APPOINTMENT (OUTPATIENT)
Dept: INFUSION THERAPY | Facility: HOSPITAL | Age: 37
End: 2022-01-26

## 2022-01-26 DIAGNOSIS — R11.2 NAUSEA WITH VOMITING, UNSPECIFIED: ICD-10-CM

## 2022-01-26 LAB
BASOPHILS # BLD AUTO: 0 K/UL — SIGNIFICANT CHANGE UP (ref 0–0.2)
BASOPHILS NFR BLD AUTO: 0 % — SIGNIFICANT CHANGE UP (ref 0–2)
EOSINOPHIL # BLD AUTO: 0 K/UL — SIGNIFICANT CHANGE UP (ref 0–0.5)
EOSINOPHIL NFR BLD AUTO: 0 % — SIGNIFICANT CHANGE UP (ref 0–6)
HCT VFR BLD CALC: 23.8 % — LOW (ref 39–50)
HGB BLD-MCNC: 8.7 G/DL — LOW (ref 13–17)
LYMPHOCYTES # BLD AUTO: 0.24 K/UL — LOW (ref 1–3.3)
LYMPHOCYTES # BLD AUTO: 5 % — LOW (ref 13–44)
MCHC RBC-ENTMCNC: 30.4 PG — SIGNIFICANT CHANGE UP (ref 27–34)
MCHC RBC-ENTMCNC: 36 G/DL — SIGNIFICANT CHANGE UP (ref 32–36)
MCV RBC AUTO: 84.6 FL — SIGNIFICANT CHANGE UP (ref 80–100)
MONOCYTES # BLD AUTO: 1.35 K/UL — HIGH (ref 0–0.9)
MONOCYTES NFR BLD AUTO: 28 % — HIGH (ref 2–14)
NEUTROPHILS # BLD AUTO: 3.23 K/UL — SIGNIFICANT CHANGE UP (ref 1.8–7.4)
NEUTROPHILS NFR BLD AUTO: 67 % — SIGNIFICANT CHANGE UP (ref 43–77)
NRBC # BLD: 1 /100 — HIGH (ref 0–0)
NRBC # BLD: SIGNIFICANT CHANGE UP /100 WBCS (ref 0–0)
PLAT MORPH BLD: NORMAL — SIGNIFICANT CHANGE UP
PLATELET # BLD AUTO: 21 K/UL — LOW (ref 150–400)
RBC # BLD: 2.86 M/UL — LOW (ref 4.2–5.8)
RBC # FLD: 15 % — HIGH (ref 10.3–14.5)
RBC BLD AUTO: SIGNIFICANT CHANGE UP
WBC # BLD: 4.82 K/UL — SIGNIFICANT CHANGE UP (ref 3.8–10.5)
WBC # FLD AUTO: 4.82 K/UL — SIGNIFICANT CHANGE UP (ref 3.8–10.5)

## 2022-01-26 PROCEDURE — 86078 PHYS BLOOD BANK SERV REACTJ: CPT

## 2022-01-28 ENCOUNTER — APPOINTMENT (OUTPATIENT)
Dept: HEMATOLOGY ONCOLOGY | Facility: CLINIC | Age: 37
End: 2022-01-28
Payer: COMMERCIAL

## 2022-01-28 ENCOUNTER — RESULT REVIEW (OUTPATIENT)
Age: 37
End: 2022-01-28

## 2022-01-28 ENCOUNTER — APPOINTMENT (OUTPATIENT)
Dept: INFUSION THERAPY | Facility: HOSPITAL | Age: 37
End: 2022-01-28

## 2022-01-28 VITALS
RESPIRATION RATE: 16 BRPM | HEART RATE: 118 BPM | DIASTOLIC BLOOD PRESSURE: 83 MMHG | OXYGEN SATURATION: 99 % | SYSTOLIC BLOOD PRESSURE: 126 MMHG | WEIGHT: 192.02 LBS | TEMPERATURE: 98.4 F | BODY MASS INDEX: 27.03 KG/M2

## 2022-01-28 LAB
BASOPHILS # BLD AUTO: 0 K/UL — SIGNIFICANT CHANGE UP (ref 0–0.2)
BASOPHILS NFR BLD AUTO: 0 % — SIGNIFICANT CHANGE UP (ref 0–2)
EOSINOPHIL # BLD AUTO: 0 K/UL — SIGNIFICANT CHANGE UP (ref 0–0.5)
EOSINOPHIL NFR BLD AUTO: 0 % — SIGNIFICANT CHANGE UP (ref 0–6)
HCT VFR BLD CALC: 25.6 % — LOW (ref 39–50)
HGB BLD-MCNC: 9 G/DL — LOW (ref 13–17)
IMM GRANULOCYTES NFR BLD AUTO: 0.6 % — SIGNIFICANT CHANGE UP (ref 0–1.5)
LYMPHOCYTES # BLD AUTO: 0.34 K/UL — LOW (ref 1–3.3)
LYMPHOCYTES # BLD AUTO: 6.9 % — LOW (ref 13–44)
MCHC RBC-ENTMCNC: 30.3 PG — SIGNIFICANT CHANGE UP (ref 27–34)
MCHC RBC-ENTMCNC: 35.2 G/DL — SIGNIFICANT CHANGE UP (ref 32–36)
MCV RBC AUTO: 86.2 FL — SIGNIFICANT CHANGE UP (ref 80–100)
MONOCYTES # BLD AUTO: 1.39 K/UL — HIGH (ref 0–0.9)
MONOCYTES NFR BLD AUTO: 28 % — HIGH (ref 2–14)
NEUTROPHILS # BLD AUTO: 3.2 K/UL — SIGNIFICANT CHANGE UP (ref 1.8–7.4)
NEUTROPHILS NFR BLD AUTO: 64.5 % — SIGNIFICANT CHANGE UP (ref 43–77)
NRBC # BLD: 0 /100 WBCS — SIGNIFICANT CHANGE UP (ref 0–0)
PLATELET # BLD AUTO: 52 K/UL — LOW (ref 150–400)
RBC # BLD: 2.97 M/UL — LOW (ref 4.2–5.8)
RBC # FLD: 15 % — HIGH (ref 10.3–14.5)
WBC # BLD: 4.96 K/UL — SIGNIFICANT CHANGE UP (ref 3.8–10.5)
WBC # FLD AUTO: 4.96 K/UL — SIGNIFICANT CHANGE UP (ref 3.8–10.5)

## 2022-01-28 PROCEDURE — 99214 OFFICE O/P EST MOD 30 MIN: CPT

## 2022-01-29 ENCOUNTER — OUTPATIENT (OUTPATIENT)
Dept: OUTPATIENT SERVICES | Facility: HOSPITAL | Age: 37
LOS: 1 days | Discharge: HOME | End: 2022-01-29

## 2022-01-29 DIAGNOSIS — C92.00 ACUTE MYELOBLASTIC LEUKEMIA, NOT HAVING ACHIEVED REMISSION: ICD-10-CM

## 2022-01-31 ENCOUNTER — RESULT REVIEW (OUTPATIENT)
Age: 37
End: 2022-01-31

## 2022-01-31 ENCOUNTER — APPOINTMENT (OUTPATIENT)
Dept: INFUSION THERAPY | Facility: HOSPITAL | Age: 37
End: 2022-01-31

## 2022-01-31 LAB
BASOPHILS # BLD AUTO: 0.01 K/UL — SIGNIFICANT CHANGE UP (ref 0–0.2)
BASOPHILS NFR BLD AUTO: 0.3 % — SIGNIFICANT CHANGE UP (ref 0–2)
EOSINOPHIL # BLD AUTO: 0 K/UL — SIGNIFICANT CHANGE UP (ref 0–0.5)
EOSINOPHIL NFR BLD AUTO: 0 % — SIGNIFICANT CHANGE UP (ref 0–6)
HCT VFR BLD CALC: 23.1 % — LOW (ref 39–50)
HGB BLD-MCNC: 8.5 G/DL — LOW (ref 13–17)
IMM GRANULOCYTES NFR BLD AUTO: 1 % — SIGNIFICANT CHANGE UP (ref 0–1.5)
LYMPHOCYTES # BLD AUTO: 0.3 K/UL — LOW (ref 1–3.3)
LYMPHOCYTES # BLD AUTO: 7.8 % — LOW (ref 13–44)
MCHC RBC-ENTMCNC: 31.4 PG — SIGNIFICANT CHANGE UP (ref 27–34)
MCHC RBC-ENTMCNC: 36.8 G/DL — HIGH (ref 32–36)
MCV RBC AUTO: 85.2 FL — SIGNIFICANT CHANGE UP (ref 80–100)
MONOCYTES # BLD AUTO: 1.01 K/UL — HIGH (ref 0–0.9)
MONOCYTES NFR BLD AUTO: 26.3 % — HIGH (ref 2–14)
NEUTROPHILS # BLD AUTO: 2.48 K/UL — SIGNIFICANT CHANGE UP (ref 1.8–7.4)
NEUTROPHILS NFR BLD AUTO: 64.6 % — SIGNIFICANT CHANGE UP (ref 43–77)
NRBC # BLD: 0 /100 WBCS — SIGNIFICANT CHANGE UP (ref 0–0)
PLATELET # BLD AUTO: 66 K/UL — LOW (ref 150–400)
RBC # BLD: 2.71 M/UL — LOW (ref 4.2–5.8)
RBC # FLD: 15.1 % — HIGH (ref 10.3–14.5)
WBC # BLD: 3.84 K/UL — SIGNIFICANT CHANGE UP (ref 3.8–10.5)
WBC # FLD AUTO: 3.84 K/UL — SIGNIFICANT CHANGE UP (ref 3.8–10.5)

## 2022-02-02 ENCOUNTER — APPOINTMENT (OUTPATIENT)
Dept: INFUSION THERAPY | Facility: HOSPITAL | Age: 37
End: 2022-02-02

## 2022-02-02 ENCOUNTER — RESULT REVIEW (OUTPATIENT)
Age: 37
End: 2022-02-02

## 2022-02-02 LAB
ALBUMIN SERPL ELPH-MCNC: 4.8 G/DL
ALP BLD-CCNC: 113 U/L
ALT SERPL-CCNC: 66 U/L
ANION GAP SERPL CALC-SCNC: 14 MMOL/L
AST SERPL-CCNC: 30 U/L
BASOPHILS # BLD AUTO: 0.01 K/UL — SIGNIFICANT CHANGE UP (ref 0–0.2)
BASOPHILS NFR BLD AUTO: 0.3 % — SIGNIFICANT CHANGE UP (ref 0–2)
BILIRUB SERPL-MCNC: 0.3 MG/DL
BUN SERPL-MCNC: 17 MG/DL
CALCIUM SERPL-MCNC: 10.1 MG/DL
CHLORIDE SERPL-SCNC: 101 MMOL/L
CO2 SERPL-SCNC: 24 MMOL/L
CREAT SERPL-MCNC: 1 MG/DL
EOSINOPHIL # BLD AUTO: 0 K/UL — SIGNIFICANT CHANGE UP (ref 0–0.5)
EOSINOPHIL NFR BLD AUTO: 0 % — SIGNIFICANT CHANGE UP (ref 0–6)
GLUCOSE SERPL-MCNC: 109 MG/DL
HCT VFR BLD CALC: 25.8 % — LOW (ref 39–50)
HGB BLD-MCNC: 9.1 G/DL — LOW (ref 13–17)
IMM GRANULOCYTES NFR BLD AUTO: 1.4 % — SIGNIFICANT CHANGE UP (ref 0–1.5)
LYMPHOCYTES # BLD AUTO: 0.27 K/UL — LOW (ref 1–3.3)
LYMPHOCYTES # BLD AUTO: 7.8 % — LOW (ref 13–44)
MCHC RBC-ENTMCNC: 30.3 PG — SIGNIFICANT CHANGE UP (ref 27–34)
MCHC RBC-ENTMCNC: 35.3 G/DL — SIGNIFICANT CHANGE UP (ref 32–36)
MCV RBC AUTO: 86 FL — SIGNIFICANT CHANGE UP (ref 80–100)
MONOCYTES # BLD AUTO: 1 K/UL — HIGH (ref 0–0.9)
MONOCYTES NFR BLD AUTO: 28.8 % — HIGH (ref 2–14)
NEUTROPHILS # BLD AUTO: 2.14 K/UL — SIGNIFICANT CHANGE UP (ref 1.8–7.4)
NEUTROPHILS NFR BLD AUTO: 61.7 % — SIGNIFICANT CHANGE UP (ref 43–77)
NRBC # BLD: 0 /100 WBCS — SIGNIFICANT CHANGE UP (ref 0–0)
PLATELET # BLD AUTO: 75 K/UL — LOW (ref 150–400)
POTASSIUM SERPL-SCNC: 4.2 MMOL/L
PROT SERPL-MCNC: 7.2 G/DL
RBC # BLD: 3 M/UL — LOW (ref 4.2–5.8)
RBC # FLD: 15.1 % — HIGH (ref 10.3–14.5)
SODIUM SERPL-SCNC: 139 MMOL/L
WBC # BLD: 3.47 K/UL — LOW (ref 3.8–10.5)
WBC # FLD AUTO: 3.47 K/UL — LOW (ref 3.8–10.5)

## 2022-02-03 NOTE — ASSESSMENT
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 \par Started induction with Dauno/Cytarabine on 8/6. \par Pt's counts now recovered, remission marrow later done on 9/2- in remission. Proceed to consolidation with 4 cycles of HIDAC. C1 HIDAC on 9/17, c/b neutropenic fever and diarrhea. C2 HIDAC on 10/25, was postponed for 1 week due to admission for diarrhea, given negative stool studies, suspect diarrhea was likely chemo-related. Pt was admitted again 11/13-11/17 for sepsis due to thumb cellulitis after laceration. C3 on 11/26, had Fulphilia on C3 c/b epistaxis and neutropenic fever w/Temp 100.3. C4 HiDAC 1/5/22, c/b transaminitis and neutropenic fever\par Reviewed CBC today with pt, Platelet 52  ANC 3.2, he is not on levaquine and fluconazole given he is not neutropenic. Pt had sufficient ppx meds at home, he is aware that we will call him to restart Levaquine and fluconazole when ANC <1000\par cont hemorrhoidal ointment and witch hazel. Sent oxycodone for pain relief\par Recommend to repeat abdominal US in 3-6 for monitoring\par continue possible platelet 3x a week until count recover, will recheck CBC on 2/9, will schedule BMBx if count recovered\par All questions answered\par RTC after BMBx\par \par Case and management discussed with Dr. Yancey\par \par \par \par \par

## 2022-02-03 NOTE — PHYSICAL EXAM
[Fully active, able to carry on all pre-disease performance without restriction] : Status 0 - Fully active, able to carry on all pre-disease performance without restriction [Normal] : affect appropriate [de-identified] : a 1 cm long laceration with sutures on the right thrum, healing well

## 2022-02-03 NOTE — HISTORY OF PRESENT ILLNESS
[de-identified] : 37yo M w/ HTN and newly diagnosed AML here for f/u. \par \par He presented to the hospital on 7/30/21 with complaints of dizziness, fatigue and severe RUQ pain for 3 days. CT A/P revealed fatty liver and small R pleural effusion. WBC 12k with 17% blasts.Peripheral flow cytometry showed 13% myeloblasts. FLT3(-). BMbx was done on 8/4/21 - confirmed AML. 46,XY            {20            }\par Foundation: mutations in DNMT3A R882H, NRAS G12D, NPM1 W288fs*12\par Pt consented to [facility]. Patient was offered sperm banking, but declined.\par On 8/6, induction with Dauno/Cytararbine was started (cytarabine 100/m2 and dauno 90/m2) \par He received a seven day course of Zosyn for presumed RUL PNA, then transitioned to  levaquin, posaconazole and Acyclovir for ppx for neutropenia. Course c/b neutropenic fevers, cultures negative, repeat CT 8/14 without infectious source. He was on Cefepime but developed a rash, changed to meropenem. Rash improved. \par Day 14 BMbx on 8/19 was hypocellular with chemotherapeutic effect; however, per hematopathology, appears to be earlier regeneration than would typically seen which is concerning for persistent disease at this point, and the earliest cells are CD34 positive and his myeloblasts on initial presentation were CD34 negative. Thus, this may simply represent early regeneration of his marrow. Plan is to await count recovery and repeat biopsy.  \par Patient discharged home on 8/29/21 when ANC >500 [de-identified] : Eating well since discharge. \par c/o hemorrhoidal pain. Hemorrhoids started a few days prior to discharge. He was sent home with rectal ointment and witch hazel. \par \par BMBx done on 9/2: Cellular bone marrow with trilineage hematopoiesis with maturation and megakaryocytosis (history of AML).\par Pt feels well, CBC today showed count stable\par \par s/p C1 HIDAC 9/17-9/22 \par c/o leg pain after chemo in the hospital \par \par He presented to the ED on 10/9 due to fever the night of presentation. The patient went to sleep around 10pm and woke up at 3am feeling warm and clammy. He took his temperature orally at home and it was 100.7. The day prior to presentation (10/8/21), the patient went to [facility] for an appointment to get his platelet count checked. He states he was feeling a bit run down and thought he was going to need a transfusion, but he did not need a transfusion. He was afebrile during the time of the appointment and went home afterwards. Around 3pm, the patient had bilateral crampy leg pain that was similar to the leg pain he felt during his consolidation therapy treatment. He took hydrocodone and the pain improved. The patient had one episode of diarrhea three days prior to presentation and has been bothered by rectal pain associated with his "large hemorrhoids. He denies any bleeding per rectum. He also feels like his mouth has been more dry than usual over the past several days, but denies all other symptoms. \par CT Abdomen and Pelvis w/ Oral Cont and w/ IV Conttrast on 10/9 revealed Region of hypoenhancement upper pole left kidney; please correlate clinically for possible pyelonephritis. No hydronephrosis or perinephric stranding. No rectal abscess.\par CT Chest No Contrast on 10/9 revealed Clear lungs.\par 10/9- BCX NGTD and 10/9- UCX (-)\par He ate some cold salad late last night, had upsetting stomach and diarrhea this morning. Will take Imodium for diarrhea. \par \par C2 is due on 10/15, pt wants deferring to next Monday after his diarrhea improved. \par Admission of  C2 was postponed due to worsening diarrhea. Went to ED on 10/15 due to worsening watery diarrhea. GI PCR neg, C Diff neg\par Given negative stool studies, suspect diarrhea was likely chemo-related. S/p cycle 2 Consolidation with HIDAC started on 10/25. Patient received IV hydration, strict I/O, antiemetics, monitoring of CBC and CMP and for cerebellar toxicity. PRedforte eye drops given to prevent chemical conjunctivitis. Patient with fever throughout course of treatment. Cultures negative. Due to fever probably related to HIDAC, patient received dexamethasone prior to the last 2 doses of cytatabine. \par He is seen today accompanied by his father, pt feels fatigue after chemo, other than that he feels fine. Denied any fever, chills diarrhea. \par \par Pt was admitted on 11/13-11/17 s/p right first digit laceration repair on 11/12 and presenting with erythema and pain at the site of laceration repair. Patient reported he was feeling unwell prior to suture placement with body aches, chills, night sweats and subjective fevers. Patient last BM 4 days ago. Has bruise to left inner thigh. Patient reports he keeps his PICC site clean and intact which was placed in 9/2021. Upon admission to the hospital ID was consulted started on Zosyn , GI consulted for rectal pain secondary to hemorrhoids and palliative consulted for pain control.\par  \par C3 on 11/26, tolerated well. He was seen today after discharge, accompanied by his father. He admitted feeling tired, denied any f/c, diarrhea. Right hand suture site healing well, no abnormal erythema or discharge. \par He will have Fulphilia today. \par C3 HIDAC 11/26-12/1\par He presents to the hospital due to epistaxis and neutropenic fever w/Temp 100.3 on 12/1. Per patient, he reports that he was feeling sinus congestion and pressure on his L side, blew his nose and bleeding began from L nare without improvement prompting arrival to the emergency department. Feels mild L sinus congestion.  L nasal cavity packed with absorbable packing; F/U ENT clinic outpatient. Pan culture obtained-with No growth currently\par CBC rechecked today recovered. He feels well overall , had lower abdominal pain last night after ate cheese, now improved. Denied any fever chills bloody stool or diarrhea. \par \par s/p C4 HiDAC 1/5/22\par Pt has known grade 1 AST and grade 3 ALT transaminitis. Presented this admission with transamintitis. Abd sono  performed and revealed Borderline/mild hepatomegaly with hepatic steatosis. Splenomegaly. Focal area of left upper pole increased renal cortical echogenicity, corresponding to an area of hypoattenuation seen on prior CT chest, \par abdomen and pelvis dated 10/9/2021. This is nonspecific and may reflect focal edema. Patient received IV hydration, antiemetics, monitoring of CBC and electrolytes. Predforte eye drops provided to prevent chemical conjunctivitis. Patient was monitored for cerebellar toxicity. Nystagmus checks performed. Hospital course complicated by fever blood cultures showed (-), most likely febrile secondary to HIDAC treatment. To receive Fulphila today.\par \par Pt presented to the hospital on 1/18/22 due fever of 100.4, weakness, decreased WBC and shortness of breath while at home. Patient reports that his pain is more controlled s/p pain medication and his shortness of breath has resolved. He denied chills, cough, chest pain, palpitations.\par Pan culture (blood +Ucx) were negative, CXR reveals clear lungs, COVID PCR - not detect. Pt started on empiric Zosyn, Diflucan and Acyclovir added prophylactically for neutropenic fever. He was transfused with platelets and PRBC as per supportive parameters (>10k/>7) respectively.\par He feels well overall now, denied any fever, chills or pain. \par

## 2022-02-04 ENCOUNTER — APPOINTMENT (OUTPATIENT)
Dept: INFUSION THERAPY | Facility: HOSPITAL | Age: 37
End: 2022-02-04

## 2022-02-07 ENCOUNTER — APPOINTMENT (OUTPATIENT)
Dept: INFUSION THERAPY | Facility: HOSPITAL | Age: 37
End: 2022-02-07

## 2022-02-08 NOTE — CHART NOTE - NSCHARTNOTEFT_GEN_A_CORE
1/21 repeated EKG with NSR with sinus arrythmia  VR 87, QTc 423, no ST elevation, sent  via text & reviewed  by Dr Mitul Zelaya office covering physician  Cell: [phone]    < from: 12 Lead ECG (01.19.22 @ 15:50) >  Diagnosis Line SINUS TACHYCARDIA  ST ELEVATION CONSIDER INFERIOR INJURY OR ACUTE INFARCT  *** ** ** ** * ACUTE MI  ** ** ** **  ABNORMAL ECG  WHEN COMPARED WITH ECG OF 18-JAN-2022 13:48,  SIGNIFICANT CHANGES HAVE OCCURRED  Confirmed by ZAK LARA, DIONNE ([id number]) on 1/20/2022 3:31:50PM    < from: 12 Lead ECG (01.18.22 @ 13:48) >  Diagnosis Line SINUS TACHYCARDIA  OTHERWISE NORMAL ECG  WHEN COMPARED WITH ECG OF 11-DEC-2021 21:31,  NO SIGNIFICANT CHANGE WAS FOUND  Confirmed by MD MELISSA, SAMM ([id number]) on 1/19/2022 2:46:07 PM
Left a message for patient in regards to follow up care with call back information.
CC: Notified by RN with critical lab result. wbc 0.12 (from 0.10), plt 12, and hgb 6.8 (from 6.4).    36 year old Male with a PMHx of HTN, fatty liver, AML chemotherapy admitted with neutropenic fever now with pancytopenia.  Patient is currently alert and oriented, NAD, hemodynamically stable, non-toxic, no overt signs of bleeding. Patient c/o generalized pain and requesting pain meds.    # Pancytopenia  - s/p 1uPRBC in ED, will transfuse 2nd unit. transfuse for >7. F/u post transfusion CBC  - transfuse plt<10  - c/w zosyn for neutropenic fever  - bleeding precautions  - monitor VS closely  - trend H/H  - Heme consult in am    #generalized pain  - start Dilaudid 1mg q6hrs prn per Attending    Discussed above with DR. Hudson and in agreement with the plan.    Manuela Willingham, NP  Medicine  [id number]

## 2022-02-09 ENCOUNTER — RESULT REVIEW (OUTPATIENT)
Age: 37
End: 2022-02-09

## 2022-02-09 ENCOUNTER — APPOINTMENT (OUTPATIENT)
Dept: HEMATOLOGY ONCOLOGY | Facility: CLINIC | Age: 37
End: 2022-02-09

## 2022-02-09 ENCOUNTER — APPOINTMENT (OUTPATIENT)
Dept: INFUSION THERAPY | Facility: HOSPITAL | Age: 37
End: 2022-02-09

## 2022-02-09 LAB
BASOPHILS # BLD AUTO: 0 K/UL — SIGNIFICANT CHANGE UP (ref 0–0.2)
BASOPHILS NFR BLD AUTO: 0 % — SIGNIFICANT CHANGE UP (ref 0–2)
EOSINOPHIL # BLD AUTO: 0 K/UL — SIGNIFICANT CHANGE UP (ref 0–0.5)
EOSINOPHIL NFR BLD AUTO: 0 % — SIGNIFICANT CHANGE UP (ref 0–6)
HCT VFR BLD CALC: 27 % — LOW (ref 39–50)
HGB BLD-MCNC: 9.4 G/DL — LOW (ref 13–17)
LYMPHOCYTES # BLD AUTO: 0.33 K/UL — LOW (ref 1–3.3)
LYMPHOCYTES # BLD AUTO: 12 % — LOW (ref 13–44)
MCHC RBC-ENTMCNC: 31.6 PG — SIGNIFICANT CHANGE UP (ref 27–34)
MCHC RBC-ENTMCNC: 34.8 G/DL — SIGNIFICANT CHANGE UP (ref 32–36)
MCV RBC AUTO: 90.9 FL — SIGNIFICANT CHANGE UP (ref 80–100)
METAMYELOCYTES # FLD: 1 % — HIGH (ref 0–0)
MONOCYTES # BLD AUTO: 0.77 K/UL — SIGNIFICANT CHANGE UP (ref 0–0.9)
MONOCYTES NFR BLD AUTO: 28 % — HIGH (ref 2–14)
NEUTROPHILS # BLD AUTO: 1.63 K/UL — LOW (ref 1.8–7.4)
NEUTROPHILS NFR BLD AUTO: 59 % — SIGNIFICANT CHANGE UP (ref 43–77)
NRBC # BLD: 3 /100 — HIGH (ref 0–0)
NRBC # BLD: SIGNIFICANT CHANGE UP /100 WBCS (ref 0–0)
PLAT MORPH BLD: NORMAL — SIGNIFICANT CHANGE UP
PLATELET # BLD AUTO: 123 K/UL — LOW (ref 150–400)
RBC # BLD: 2.97 M/UL — LOW (ref 4.2–5.8)
RBC # FLD: 20.7 % — HIGH (ref 10.3–14.5)
RBC BLD AUTO: SIGNIFICANT CHANGE UP
WBC # BLD: 2.76 K/UL — LOW (ref 3.8–10.5)
WBC # FLD AUTO: 2.76 K/UL — LOW (ref 3.8–10.5)

## 2022-02-11 ENCOUNTER — LABORATORY RESULT (OUTPATIENT)
Age: 37
End: 2022-02-11

## 2022-02-11 ENCOUNTER — APPOINTMENT (OUTPATIENT)
Dept: HEMATOLOGY ONCOLOGY | Facility: CLINIC | Age: 37
End: 2022-02-11
Payer: COMMERCIAL

## 2022-02-11 ENCOUNTER — RESULT REVIEW (OUTPATIENT)
Age: 37
End: 2022-02-11

## 2022-02-11 VITALS
OXYGEN SATURATION: 99 % | RESPIRATION RATE: 16 BRPM | BODY MASS INDEX: 27.84 KG/M2 | WEIGHT: 197.73 LBS | SYSTOLIC BLOOD PRESSURE: 119 MMHG | TEMPERATURE: 97.2 F | HEART RATE: 95 BPM | DIASTOLIC BLOOD PRESSURE: 82 MMHG

## 2022-02-11 LAB
BASOPHILS # BLD AUTO: 0.01 K/UL — SIGNIFICANT CHANGE UP (ref 0–0.2)
BASOPHILS NFR BLD AUTO: 0.3 % — SIGNIFICANT CHANGE UP (ref 0–2)
EOSINOPHIL # BLD AUTO: 0.01 K/UL — SIGNIFICANT CHANGE UP (ref 0–0.5)
EOSINOPHIL NFR BLD AUTO: 0.3 % — SIGNIFICANT CHANGE UP (ref 0–6)
HCT VFR BLD CALC: 27.1 % — LOW (ref 39–50)
HGB BLD-MCNC: 9.2 G/DL — LOW (ref 13–17)
IMM GRANULOCYTES NFR BLD AUTO: 5 % — HIGH (ref 0–1.5)
LYMPHOCYTES # BLD AUTO: 0.37 K/UL — LOW (ref 1–3.3)
LYMPHOCYTES # BLD AUTO: 11.7 % — LOW (ref 13–44)
MCHC RBC-ENTMCNC: 31.6 PG — SIGNIFICANT CHANGE UP (ref 27–34)
MCHC RBC-ENTMCNC: 33.9 G/DL — SIGNIFICANT CHANGE UP (ref 32–36)
MCV RBC AUTO: 93.1 FL — SIGNIFICANT CHANGE UP (ref 80–100)
MONOCYTES # BLD AUTO: 0.75 K/UL — SIGNIFICANT CHANGE UP (ref 0–0.9)
MONOCYTES NFR BLD AUTO: 23.7 % — HIGH (ref 2–14)
NEUTROPHILS # BLD AUTO: 1.87 K/UL — SIGNIFICANT CHANGE UP (ref 1.8–7.4)
NEUTROPHILS NFR BLD AUTO: 59 % — SIGNIFICANT CHANGE UP (ref 43–77)
NRBC # BLD: 2 /100 WBCS — HIGH (ref 0–0)
PLATELET # BLD AUTO: 120 K/UL — LOW (ref 150–400)
RBC # BLD: 2.91 M/UL — LOW (ref 4.2–5.8)
RBC # FLD: 21.8 % — HIGH (ref 10.3–14.5)
WBC # BLD: 3.17 K/UL — LOW (ref 3.8–10.5)
WBC # FLD AUTO: 3.17 K/UL — LOW (ref 3.8–10.5)

## 2022-02-11 PROCEDURE — 38222 DX BONE MARROW BX & ASPIR: CPT | Mod: LT

## 2022-02-11 NOTE — PROCEDURE
[Bone Marrow Biopsy] : bone marrow biopsy [Bone Marrow Aspiration] : bone marrow aspiration  [Patient] : the patient [Patient identification verified] : patient identification verified [Procedure verified and consent obtained] : procedure verified and consent obtained [Correct positioning] : correct positioning [Prone] : prone [Lidocaine was injected and into the periosteum overlying the site.] : Lidocaine was injected and into the periosteum overlying the site. [Aspirate] : aspirate [Cytogenetics] : cytogenetics [FISH] : FISH [Biopsy] : biopsy [Flow Cytometry] : flow cytometry [] : The patient was instructed to remove the bandage the following AM. The patient may bathe. Acetaminophen may be taken for discomfort, as per package directions.If there are any other problems, the patient was instructed to call the office. The patient verbalized understanding, and is aware of the office contact numbers. [FreeTextEntry1] : 35 yo male with AML s/p consolidation with 4 cycles of HIDAC, BMBx to assess for remission [FreeTextEntry2] : CBC prior to procedure\par WBC 3.17\par Hgb  9.2 Hct 27.1\par Plt 120 \par Bx and aspiration was performed by GALINA Pedersen. 2 lavender + 2 green top tubes of BM aspirate and 1 cassette of BM core specimen sent to lab.\par \par

## 2022-02-11 NOTE — REASON FOR VISIT
[Bone Marrow Biopsy] : bone marrow biopsy [Bone Marrow Aspiration] : bone marrow aspiration [FreeTextEntry2] : 35 yo male with AML s/p consolidation with 4 cycles of HIDAC, BMBx to assess for remission

## 2022-02-14 NOTE — PROGRESS NOTE ADULT - SUBJECTIVE AND OBJECTIVE BOX
Eye Shield Used: No Diagnosis: rule out acute leukemia    Protocol/Chemo Regimen: TBD    Day: N/A    Pt endorsed: Right side abd pain-stable    Review of Systems: Denies nausea, vomiting, diarrhea, chest pain, SOB     Pain scale: 2/10    Diet: DASH    Allergies: No Known Allergies      ----------------           Diagnosis: rule out acute leukemia    Protocol/Chemo Regimen: TBD    Day: N/A    Pt endorsed: Right side abd pain-stable    Review of Systems: Denies nausea, vomiting, diarrhea, chest pain, SOB     Pain scale: 2/10    Diet: DASH    Allergies: No Known Allergies    ANTIMICROBIALS  piperacillin/tazobactam IVPB.. 3.375 Gram(s) IV Intermittent every 8 hours    STANDING MEDICATIONS  allopurinol 300 milliGRAM(s) Oral daily  amLODIPine   Tablet 5 milliGRAM(s) Oral daily  Biotene Dry Mouth Oral Rinse 5 milliLiter(s) Swish and Spit five times a day  polyethylene glycol 3350 17 Gram(s) Oral daily  senna 2 Tablet(s) Oral at bedtime  sodium chloride 0.9%. 1000 milliLiter(s) IV Continuous <Continuous>    PRN MEDICATIONS  acetaminophen   Tablet .. 650 milliGRAM(s) Oral every 6 hours PRN  HYDROmorphone  Injectable 1 milliGRAM(s) IV Push every 4 hours PRN  sodium chloride 0.65% Nasal 1 Spray(s) Both Nostrils three times a day PRN    Vital Signs Last 24 Hrs  T(C): 37.2 (02 Aug 2021 06:30), Max: 38.4 (01 Aug 2021 21:08)  T(F): 99 (02 Aug 2021 06:30), Max: 101.1 (01 Aug 2021 21:08)  HR: 100 (02 Aug 2021 06:30) (98 - 135)  BP: 122/78 (02 Aug 2021 06:30) (116/78 - 145/82)  BP(mean): --  RR: 18 (02 Aug 2021 06:30) (16 - 18)  SpO2: 97% (02 Aug 2021 06:30) (94% - 97%)    PHYSICAL EXAM  General: adult in NAD  HEENT: clear oropharynx, no erythema, no ulcers  Neck: supple  CV: normal S1, S2, RRR  Lungs: clear to auscultation, no wheezes, no rales  Abdomen: soft, nontender, nondistended, normal BS  Ext: no edema  Skin: no rash  Neuro: alert and oriented x 3    LABS:                        7.8    11.57 )-----------( 46       ( 01 Aug 2021 19:46 )             23.2     Mean Cell Volume : 105.0 fl  Mean Cell Hemoglobin : 35.3 pg  Mean Cell Hemoglobin Concentration : 33.6 gm/dL  Auto Neutrophil # : x  Auto Lymphocyte # : x  Auto Monocyte # : x  Auto Eosinophil # : x  Auto Basophil # : x  Auto Neutrophil % : x  Auto Lymphocyte % : x  Auto Monocyte % : x  Auto Eosinophil % : x  Auto Basophil % : x    08-01  139  |  100  |  10  ----------------------------<  96  3.9   |  28  |  1.11    Ca    9.4      01 Aug 2021 19:46  Phos  3.4     08-01  Mg     2.4     08-01    TPro  7.6  /  Alb  4.1  /  TBili  0.4  /  DBili  x   /  AST  27  /  ALT  43  /  AlkPhos  72  08-01    Mg 2.4  Phos 3.4      Uric Acid 3.0    RADIOLOGY & ADDITIONAL STUDIES:  < from: US Abdomen Upper Quadrant Right (08.01.21 @ 09:20) >  IMPRESSION:  Normal gallbladder.  Hepatomegaly and hepatic steatosis.  Right pleural effusion.

## 2022-03-01 NOTE — ADVANCED PRACTICE NURSE CONSULT - APN SPECIALTY LIST
Chemotherapy Quality 111:Pneumonia Vaccination Status For Older Adults: Pneumococcal Vaccination Previously Received Quality 110: Preventive Care And Screening: Influenza Immunization: Influenza Immunization previously received during influenza season Quality 130: Documentation Of Current Medications In The Medical Record: Current Medications Documented Detail Level: Detailed

## 2022-03-05 ENCOUNTER — EMERGENCY (EMERGENCY)
Facility: HOSPITAL | Age: 37
LOS: 1 days | Discharge: HOME | End: 2022-03-05
Attending: EMERGENCY MEDICINE
Payer: COMMERCIAL

## 2022-03-05 VITALS
OXYGEN SATURATION: 97 % | RESPIRATION RATE: 20 BRPM | SYSTOLIC BLOOD PRESSURE: 142 MMHG | DIASTOLIC BLOOD PRESSURE: 87 MMHG | HEIGHT: 71 IN | HEART RATE: 96 BPM | TEMPERATURE: 98 F | WEIGHT: 195.11 LBS

## 2022-03-05 LAB
ALBUMIN SERPL ELPH-MCNC: 4.4 G/DL — SIGNIFICANT CHANGE UP (ref 3.3–5)
ALP SERPL-CCNC: 109 U/L — SIGNIFICANT CHANGE UP (ref 40–120)
ALT FLD-CCNC: 158 U/L — HIGH (ref 10–45)
ANION GAP SERPL CALC-SCNC: 15 MMOL/L — SIGNIFICANT CHANGE UP (ref 5–17)
AST SERPL-CCNC: 118 U/L — HIGH (ref 10–40)
BASE EXCESS BLDV CALC-SCNC: 1.5 MMOL/L — SIGNIFICANT CHANGE UP (ref -2–2)
BILIRUB SERPL-MCNC: 0.6 MG/DL — SIGNIFICANT CHANGE UP (ref 0.2–1.2)
BUN SERPL-MCNC: 16 MG/DL — SIGNIFICANT CHANGE UP (ref 7–23)
CA-I SERPL-SCNC: 1.26 MMOL/L — SIGNIFICANT CHANGE UP (ref 1.15–1.33)
CALCIUM SERPL-MCNC: 9.8 MG/DL — SIGNIFICANT CHANGE UP (ref 8.4–10.5)
CHLORIDE BLDV-SCNC: 110 MMOL/L — HIGH (ref 96–108)
CHLORIDE SERPL-SCNC: 105 MMOL/L — SIGNIFICANT CHANGE UP (ref 96–108)
CO2 BLDV-SCNC: 25 MMOL/L — SIGNIFICANT CHANGE UP (ref 22–26)
CO2 SERPL-SCNC: 19 MMOL/L — LOW (ref 22–31)
CREAT SERPL-MCNC: 0.97 MG/DL — SIGNIFICANT CHANGE UP (ref 0.5–1.3)
EGFR: 104 ML/MIN/1.73M2 — SIGNIFICANT CHANGE UP
GAS PNL BLDV: 142 MMOL/L — SIGNIFICANT CHANGE UP (ref 136–145)
GAS PNL BLDV: SIGNIFICANT CHANGE UP
GAS PNL BLDV: SIGNIFICANT CHANGE UP
GLUCOSE BLDV-MCNC: 77 MG/DL — SIGNIFICANT CHANGE UP (ref 70–99)
GLUCOSE SERPL-MCNC: 104 MG/DL — HIGH (ref 70–99)
HCO3 BLDV-SCNC: 24 MMOL/L — SIGNIFICANT CHANGE UP (ref 22–29)
HCT VFR BLDA CALC: 34 % — LOW (ref 39–51)
HGB BLD CALC-MCNC: 11.4 G/DL — LOW (ref 12.6–17.4)
LACTATE BLDV-MCNC: 2.6 MMOL/L — HIGH (ref 0.7–2)
LIDOCAIN IGE QN: 32 U/L — SIGNIFICANT CHANGE UP (ref 7–60)
PCO2 BLDV: 30 MMHG — LOW (ref 42–55)
PH BLDV: 7.51 — HIGH (ref 7.32–7.43)
PO2 BLDV: 33 MMHG — SIGNIFICANT CHANGE UP (ref 25–45)
POTASSIUM BLDV-SCNC: 3.8 MMOL/L — SIGNIFICANT CHANGE UP (ref 3.5–5.1)
POTASSIUM SERPL-MCNC: 3.5 MMOL/L — SIGNIFICANT CHANGE UP (ref 3.5–5.3)
POTASSIUM SERPL-SCNC: 3.5 MMOL/L — SIGNIFICANT CHANGE UP (ref 3.5–5.3)
PROT SERPL-MCNC: 6.6 G/DL — SIGNIFICANT CHANGE UP (ref 6–8.3)
SAO2 % BLDV: 62 % — LOW (ref 67–88)
SODIUM SERPL-SCNC: 139 MMOL/L — SIGNIFICANT CHANGE UP (ref 135–145)

## 2022-03-05 PROCEDURE — 99285 EMERGENCY DEPT VISIT HI MDM: CPT

## 2022-03-05 PROCEDURE — 71045 X-RAY EXAM CHEST 1 VIEW: CPT | Mod: 26

## 2022-03-05 RX ORDER — HYDROMORPHONE HYDROCHLORIDE 2 MG/ML
1 INJECTION INTRAMUSCULAR; INTRAVENOUS; SUBCUTANEOUS ONCE
Refills: 0 | Status: DISCONTINUED | OUTPATIENT
Start: 2022-03-05 | End: 2022-03-05

## 2022-03-05 RX ADMIN — HYDROMORPHONE HYDROCHLORIDE 1 MILLIGRAM(S): 2 INJECTION INTRAMUSCULAR; INTRAVENOUS; SUBCUTANEOUS at 23:21

## 2022-03-05 RX ADMIN — HYDROMORPHONE HYDROCHLORIDE 1 MILLIGRAM(S): 2 INJECTION INTRAMUSCULAR; INTRAVENOUS; SUBCUTANEOUS at 22:52

## 2022-03-05 NOTE — ED PROVIDER NOTE - NSFOLLOWUPCLINICS_GEN_ALL_ED_FT
[facility]  General Surgery  [address] [place] [address] - [address] [place] [address]  Phone: [phone]  Fax:

## 2022-03-05 NOTE — ED PROVIDER NOTE - OBJECTIVE STATEMENT
Patient is a 36y M PMHX leukemia s/p chemo 6 weeks ago, presenting today with sudden onset abdominal pain that woke him up from sleep this AM. Patient states he has had constant pain all day. Started in his upper quadrant, now diffuse. Also with bilateral back pain. Denies n/v/d, fevers, chills, CP, SOB, gum bleeding. Patient is a 36y M PMHX leukemia s/p chemo 6 weeks ago, presenting today with sudden onset abdominal pain that woke him up from sleep this AM. Patient states he has had constant pain all day. Started in his upper quadrant, now diffuse. Rates the pain as a 10/10. Also with bilateral back pain. Denies n/v/d, fevers, chills, CP, SOB, gum bleeding.

## 2022-03-05 NOTE — ED ADULT NURSE NOTE - OBJECTIVE STATEMENT
Pt is 37 y/o male presenting to the ED c/o abdominal pain x2 days. Pt reports taking oxycodone @ home w/ no relief. AML pt, last chemo x1 month. Upon assessment, pt AxO x3, tearful, hunched over c/o 10/10 diffuse abdominal pain. Breathing spontaneously and unlabored. Abdomen is soft and non-tender w/ palpation. Ambulates w/o difficultly, moves all extremities w/ = strength. Skin is warm, dry, and intact, w/ +peripheral pulses. R PICC line noted. Pt denies chest pain, SOB, n/v/d, and fevers. Safety and comfort measures provided- bed in lowest position, locked, blanket given. Pt father @ bedside.

## 2022-03-05 NOTE — ED PROVIDER NOTE - CLINICAL SUMMARY MEDICAL DECISION MAKING FREE TEXT BOX
Patient is a 36y M PMHX leukemia s/p chemo 6 weeks ago, presenting today with sudden onset abdominal pain that woke him up from sleep this AM. Abdominal pain severe and diffuse, started in the upper quadrants. Concern for GB etiology. Less likely SBO given no abdominal distension. No n/v, sudden severe onset, less likely pancreatitis, but will get lipase to evaluate. Will get labs, RUQ u/s, pain control, reassess. Consider CT if ultrasound negative.

## 2022-03-05 NOTE — ED ADULT NURSE NOTE - HOW OFTEN DO YOU HAVE A DRINK CONTAINING ALCOHOL?
Never Complex Repair And Rotation Flap Text: The defect edges were debeveled with a #15 scalpel blade.  The primary defect was closed partially with a complex linear closure.  Given the location of the remaining defect, shape of the defect and the proximity to free margins a rotation flap was deemed most appropriate for complete closure of the defect.  Using a sterile surgical marker, an appropriate advancement flap was drawn incorporating the defect and placing the expected incisions within the relaxed skin tension lines where possible.    The area thus outlined was incised deep to adipose tissue with a #15 scalpel blade.  The skin margins were undermined to an appropriate distance in all directions utilizing iris scissors.

## 2022-03-05 NOTE — ED PROVIDER NOTE - PHYSICAL EXAMINATION
GENERAL: non-toxic appearing  HEENT: PERRLA, EOMI, normal conjunctiva, oral mucosa moist, no bleeding from gums or nose  CARDIAC: regular rate and rhythm  PULM: clear to ascultation bilaterally  GI: abdomen nondistended, soft, tender diffusely to palpation  : no CVA tenderness  NEURO: alert and oriented x 3, normal speech, no gross neurologic deficit  MSK: no visible deformities, no peripheral edema, calf tenderness/redness/swelling  SKIN: no visible rashes, dry, well-perfused  PSYCH: appropriate mood and affect

## 2022-03-05 NOTE — ED PROVIDER NOTE - ATTENDING CONTRIBUTION TO CARE
Patient presenting complaining of severe RUQ/epigastric abdominal pains beginning earlier this evening not responding to home oxycodone, no similar pains in past.  No fevers, chills, nausea, or vomiting.  No prior abdominal surgeries.  On exam patient very uncomfortable appearing but vital signs within normal limits, abdomen soft, point tender in RUQ.  Plan for labs, pain control RUQ US and re-evaluate.

## 2022-03-05 NOTE — ED PROVIDER NOTE - PROGRESS NOTE DETAILS
Elisa Dodge MD PGY1: Patient signed out to oncoming resident. Currently pending RUQ ultrasound and lab results. May require CT scan and further work-up. New providers aware.

## 2022-03-05 NOTE — ED PROVIDER NOTE - NSFOLLOWUPINSTRUCTIONS_ED_ALL_ED_FT
You were seen in the emergency department for abdominal pain.    You had an ultrasound which showed gallstones. This may require surgery if it causes significant problems and pain in the future. For this, please follow up with a general surgeon.    Please also follow up with your primary care physician, especially for repeat blood tests to check your liver function tests because today, they were mildly elevated.    A prescription for Oxycodone was sent to your pharmacy. Please take it as prescribed. Please do not drive or operate heavy machinery while taking this medication, as it can make you drowsy.    You may take Acetaminophen over the counter as needed for pain and/or fever. Use as directed and see medication warnings.  You may take Ibuprofen over the counter as needed for pain and/or fever. Use as directed and see medication warnings.    Please bring a copy of your results with you.  Please return to the emergency department for worsening of your symptoms.

## 2022-03-05 NOTE — ED PROVIDER NOTE - PATIENT PORTAL LINK FT
You can access the FollowMyHealth Patient Portal offered by Health system by registering at the following website: [email or web address]. By joining [facility]’s FollowMyHealth portal, you will also be able to view your health information using other applications (apps) compatible with our system.

## 2022-03-06 VITALS
HEART RATE: 93 BPM | TEMPERATURE: 98 F | SYSTOLIC BLOOD PRESSURE: 103 MMHG | DIASTOLIC BLOOD PRESSURE: 69 MMHG | RESPIRATION RATE: 18 BRPM | OXYGEN SATURATION: 98 %

## 2022-03-06 LAB
ANISOCYTOSIS BLD QL: SLIGHT — SIGNIFICANT CHANGE UP
APPEARANCE UR: CLEAR — SIGNIFICANT CHANGE UP
BACTERIA # UR AUTO: NEGATIVE — SIGNIFICANT CHANGE UP
BASOPHILS # BLD AUTO: 0 K/UL — SIGNIFICANT CHANGE UP (ref 0–0.2)
BASOPHILS NFR BLD AUTO: 0 % — SIGNIFICANT CHANGE UP (ref 0–2)
BILIRUB UR-MCNC: NEGATIVE — SIGNIFICANT CHANGE UP
COLOR SPEC: COLORLESS — SIGNIFICANT CHANGE UP
DACRYOCYTES BLD QL SMEAR: SLIGHT — SIGNIFICANT CHANGE UP
DIFF PNL FLD: NEGATIVE — SIGNIFICANT CHANGE UP
ELLIPTOCYTES BLD QL SMEAR: SLIGHT — SIGNIFICANT CHANGE UP
EOSINOPHIL # BLD AUTO: 0.07 K/UL — SIGNIFICANT CHANGE UP (ref 0–0.5)
EOSINOPHIL NFR BLD AUTO: 2.3 % — SIGNIFICANT CHANGE UP (ref 0–6)
EPI CELLS # UR: 0 — SIGNIFICANT CHANGE UP
GLUCOSE UR QL: NEGATIVE — SIGNIFICANT CHANGE UP
HCT VFR BLD CALC: 33.3 % — LOW (ref 39–50)
HGB BLD-MCNC: 11.3 G/DL — LOW (ref 13–17)
HYALINE CASTS # UR AUTO: 1 /LPF — SIGNIFICANT CHANGE UP (ref 0–2)
KETONES UR-MCNC: NEGATIVE — SIGNIFICANT CHANGE UP
LEUKOCYTE ESTERASE UR-ACNC: NEGATIVE — SIGNIFICANT CHANGE UP
LYMPHOCYTES # BLD AUTO: 0.58 K/UL — LOW (ref 1–3.3)
LYMPHOCYTES # BLD AUTO: 20 % — SIGNIFICANT CHANGE UP (ref 13–44)
MACROCYTES BLD QL: SLIGHT — SIGNIFICANT CHANGE UP
MANUAL SMEAR VERIFICATION: SIGNIFICANT CHANGE UP
MCHC RBC-ENTMCNC: 33.9 GM/DL — SIGNIFICANT CHANGE UP (ref 32–36)
MCHC RBC-ENTMCNC: 33.9 PG — SIGNIFICANT CHANGE UP (ref 27–34)
MCV RBC AUTO: 100 FL — SIGNIFICANT CHANGE UP (ref 80–100)
MONOCYTES # BLD AUTO: 0.48 K/UL — SIGNIFICANT CHANGE UP (ref 0–0.9)
MONOCYTES NFR BLD AUTO: 16.5 % — HIGH (ref 2–14)
NEUTROPHILS # BLD AUTO: 1.77 K/UL — LOW (ref 1.8–7.4)
NEUTROPHILS NFR BLD AUTO: 61.2 % — SIGNIFICANT CHANGE UP (ref 43–77)
NITRITE UR-MCNC: NEGATIVE — SIGNIFICANT CHANGE UP
PH UR: 7 — SIGNIFICANT CHANGE UP (ref 5–8)
PLAT MORPH BLD: NORMAL — SIGNIFICANT CHANGE UP
PLATELET # BLD AUTO: 120 K/UL — LOW (ref 150–400)
POIKILOCYTOSIS BLD QL AUTO: SLIGHT — SIGNIFICANT CHANGE UP
POLYCHROMASIA BLD QL SMEAR: SLIGHT — SIGNIFICANT CHANGE UP
PROT UR-MCNC: NEGATIVE — SIGNIFICANT CHANGE UP
RBC # BLD: 3.33 M/UL — LOW (ref 4.2–5.8)
RBC # FLD: 19.9 % — HIGH (ref 10.3–14.5)
RBC BLD AUTO: ABNORMAL
RBC CASTS # UR COMP ASSIST: 0 /HPF — SIGNIFICANT CHANGE UP (ref 0–4)
SMUDGE CELLS # BLD: PRESENT — SIGNIFICANT CHANGE UP
SP GR SPEC: 1 — LOW (ref 1.01–1.02)
UROBILINOGEN FLD QL: NEGATIVE — SIGNIFICANT CHANGE UP
WBC # BLD: 2.89 K/UL — LOW (ref 3.8–10.5)
WBC # FLD AUTO: 2.89 K/UL — LOW (ref 3.8–10.5)
WBC UR QL: 0 /HPF — SIGNIFICANT CHANGE UP (ref 0–5)

## 2022-03-06 PROCEDURE — 83605 ASSAY OF LACTIC ACID: CPT

## 2022-03-06 PROCEDURE — 82330 ASSAY OF CALCIUM: CPT

## 2022-03-06 PROCEDURE — 96374 THER/PROPH/DIAG INJ IV PUSH: CPT

## 2022-03-06 PROCEDURE — 84132 ASSAY OF SERUM POTASSIUM: CPT

## 2022-03-06 PROCEDURE — 85014 HEMATOCRIT: CPT

## 2022-03-06 PROCEDURE — 81001 URINALYSIS AUTO W/SCOPE: CPT

## 2022-03-06 PROCEDURE — 83690 ASSAY OF LIPASE: CPT

## 2022-03-06 PROCEDURE — 80053 COMPREHEN METABOLIC PANEL: CPT

## 2022-03-06 PROCEDURE — 71045 X-RAY EXAM CHEST 1 VIEW: CPT

## 2022-03-06 PROCEDURE — 85018 HEMOGLOBIN: CPT

## 2022-03-06 PROCEDURE — 82803 BLOOD GASES ANY COMBINATION: CPT

## 2022-03-06 PROCEDURE — 82435 ASSAY OF BLOOD CHLORIDE: CPT

## 2022-03-06 PROCEDURE — 84295 ASSAY OF SERUM SODIUM: CPT

## 2022-03-06 PROCEDURE — 76700 US EXAM ABDOM COMPLETE: CPT | Mod: 26

## 2022-03-06 PROCEDURE — 36415 COLL VENOUS BLD VENIPUNCTURE: CPT

## 2022-03-06 PROCEDURE — 82947 ASSAY GLUCOSE BLOOD QUANT: CPT

## 2022-03-06 PROCEDURE — 76700 US EXAM ABDOM COMPLETE: CPT

## 2022-03-06 PROCEDURE — 85025 COMPLETE CBC W/AUTO DIFF WBC: CPT

## 2022-03-06 PROCEDURE — 99284 EMERGENCY DEPT VISIT MOD MDM: CPT | Mod: 25

## 2022-03-06 RX ORDER — OXYCODONE HYDROCHLORIDE 5 MG/1
1 TABLET ORAL
Qty: 9 | Refills: 0
Start: 2022-03-06 | End: 2022-03-08

## 2022-03-15 ENCOUNTER — INPATIENT (INPATIENT)
Facility: HOSPITAL | Age: 37
LOS: 2 days | Discharge: HOME HEALTH | DRG: 552 | End: 2022-03-18
Attending: INTERNAL MEDICINE | Admitting: INTERNAL MEDICINE
Payer: COMMERCIAL

## 2022-03-15 VITALS
WEIGHT: 195.11 LBS | OXYGEN SATURATION: 100 % | SYSTOLIC BLOOD PRESSURE: 118 MMHG | HEIGHT: 71 IN | HEART RATE: 112 BPM | DIASTOLIC BLOOD PRESSURE: 67 MMHG | RESPIRATION RATE: 18 BRPM | TEMPERATURE: 98 F

## 2022-03-15 LAB
ALBUMIN SERPL ELPH-MCNC: 4.5 G/DL — SIGNIFICANT CHANGE UP (ref 3.3–5)
ALP SERPL-CCNC: 82 U/L — SIGNIFICANT CHANGE UP (ref 40–120)
ALT FLD-CCNC: 64 U/L — HIGH (ref 10–45)
ANION GAP SERPL CALC-SCNC: 14 MMOL/L — SIGNIFICANT CHANGE UP (ref 5–17)
AST SERPL-CCNC: 23 U/L — SIGNIFICANT CHANGE UP (ref 10–40)
BILIRUB SERPL-MCNC: 0.6 MG/DL — SIGNIFICANT CHANGE UP (ref 0.2–1.2)
BUN SERPL-MCNC: 15 MG/DL — SIGNIFICANT CHANGE UP (ref 7–23)
CALCIUM SERPL-MCNC: 9.9 MG/DL — SIGNIFICANT CHANGE UP (ref 8.4–10.5)
CHLORIDE SERPL-SCNC: 104 MMOL/L — SIGNIFICANT CHANGE UP (ref 96–108)
CO2 SERPL-SCNC: 23 MMOL/L — SIGNIFICANT CHANGE UP (ref 22–31)
CREAT SERPL-MCNC: 1.2 MG/DL — SIGNIFICANT CHANGE UP (ref 0.5–1.3)
CRP SERPL-MCNC: <3 MG/L — SIGNIFICANT CHANGE UP (ref 0–4)
EGFR: 80 ML/MIN/1.73M2 — SIGNIFICANT CHANGE UP
GLUCOSE SERPL-MCNC: 81 MG/DL — SIGNIFICANT CHANGE UP (ref 70–99)
HCT VFR BLD CALC: 34 % — LOW (ref 39–50)
HGB BLD-MCNC: 11.7 G/DL — LOW (ref 13–17)
MCHC RBC-ENTMCNC: 33.8 PG — SIGNIFICANT CHANGE UP (ref 27–34)
MCHC RBC-ENTMCNC: 34.4 GM/DL — SIGNIFICANT CHANGE UP (ref 32–36)
MCV RBC AUTO: 98.3 FL — SIGNIFICANT CHANGE UP (ref 80–100)
POTASSIUM SERPL-MCNC: 3.4 MMOL/L — LOW (ref 3.5–5.3)
POTASSIUM SERPL-SCNC: 3.4 MMOL/L — LOW (ref 3.5–5.3)
PROT SERPL-MCNC: 6.7 G/DL — SIGNIFICANT CHANGE UP (ref 6–8.3)
RBC # BLD: 3.46 M/UL — LOW (ref 4.2–5.8)
RBC # FLD: 16.2 % — HIGH (ref 10.3–14.5)
SODIUM SERPL-SCNC: 141 MMOL/L — SIGNIFICANT CHANGE UP (ref 135–145)
WBC # BLD: 3.67 K/UL — LOW (ref 3.8–10.5)
WBC # FLD AUTO: 3.67 K/UL — LOW (ref 3.8–10.5)

## 2022-03-15 PROCEDURE — 99285 EMERGENCY DEPT VISIT HI MDM: CPT

## 2022-03-15 PROCEDURE — 72132 CT LUMBAR SPINE W/DYE: CPT | Mod: 26,MD

## 2022-03-15 RX ORDER — MORPHINE SULFATE 50 MG/1
4 CAPSULE, EXTENDED RELEASE ORAL ONCE
Refills: 0 | Status: DISCONTINUED | OUTPATIENT
Start: 2022-03-15 | End: 2022-03-15

## 2022-03-15 RX ADMIN — MORPHINE SULFATE 4 MILLIGRAM(S): 50 CAPSULE, EXTENDED RELEASE ORAL at 23:13

## 2022-03-15 NOTE — ED PROVIDER NOTE - NS ED ROS FT
Gen: Denies fevers  CV: Denies chest pain  Resp: Denies SOB  GI: Denies nausea, vomiting  Msk: + lumbar back pain  : Denies bladder incontinence  Neuro: + b/l LE weakness  all other ROS negative unless indicated in HPI

## 2022-03-15 NOTE — ED PROVIDER NOTE - OBJECTIVE STATEMENT
37YO M hx AML last chemo 6w prior, p/w low back pain. onset 6d prior. worsening since onset. pt able to ambulate with significant pain. pain located in lumbar back. endorses weakness b/l LE. denies fevers. had bone marrow biopsy 8w prior. pt denies saddle anesthesia, bowel/bladder incontinence.

## 2022-03-15 NOTE — ED PROVIDER NOTE - ATTENDING CONTRIBUTION TO CARE
MD Landaverde:  patient seen and evaluated personally.   I agree with the History & Physical,  Impression & Plan other than what was detailed in my note.  MD Landaverde  37 y/o m hx of aml, presenting to ed w/ worsened back pain lower lumbar area, mod/severe, no associated weakness, fevers chills, recent traumas, started on thursday, no saddle anesthesia, bladder incont, pain not radiating, afebrile vitals stable,  non toxic, appears in pain , NC/AT,  conjunctiva non conjected, sclera anicteric, moist mucous membranes, neck supple, heart sounds, normal, no mrg, lungs cta b/l no wrr, abd soft non distended w/ no tenderness, no visual deformities of extremities, axox3, pt moving all extrem, no erythema around back however, does have mild lower lumbar ttp where recent biopsy was done, normal mood and affect, plan for pain meds, get ct iv to screen for abscess, sed rate, crp.

## 2022-03-15 NOTE — ED PROVIDER NOTE - PROGRESS NOTE DETAILS
Attending Saima:  spoke w/ radiology, requested they call in tech for emergent mri to r/o epidural abscess, they are currently doing that Nicky Vanegas MD, PGY-2: decision made to get mri given no improvement in neuro exam s/p pain control. Attending Saima:  pt had rlq pain and tenderness albeit exam was limited as pt was in severe pain, ct scan performed of abd/pelvis to r/o appendicitis kidney stone, since neg did feel necessary to get mri to r/o epidural abscess. spoke w/ radiology, requested they call in tech for emergent mri to r/o epidural abscess, they are currently doing that Attending Saima:  pt had rlq pain and tenderness albeit exam was limited as pt was in severe pain, ct scan performed of abd/pelvis to r/o appendicitis kidney stone, since neg did feel necessary to get mri to r/o epidural abscess. spoke w/ radiology, requested they call in tech for emergent mri to r/o epidural abscess. they are now calling tech

## 2022-03-15 NOTE — ED PROVIDER NOTE - PHYSICAL EXAMINATION
Gen: WDWN, NAD  HEENT: EOMI, no nasal discharge, mucous membranes moist  CV: 2+ radial pulses b/l  Resp: no accessory muscle use, no increased work of breathing  GI: Abdomen soft non-distended, NTTP  MSK: No open wounds, no bruising, no LE edema. + lumbar spine midline TTP, + paraspinal.   Neuro: A&Ox4, following commands, moving all four extremities spontaneously. sensation equal b/l LE, strength limited due to pain, 3/5 b/l LE.   Psych: appropriate mood

## 2022-03-15 NOTE — ED ADULT TRIAGE NOTE - CHIEF COMPLAINT QUOTE
Lower back pain radiating to pelvis, no falls or trauma, no bowel/bladder incontinence. Hx of AML (in remission) finished chemo 6 weeks ago.

## 2022-03-16 DIAGNOSIS — M54.50 LOW BACK PAIN, UNSPECIFIED: ICD-10-CM

## 2022-03-16 LAB
BASOPHILS # BLD AUTO: 0 K/UL — SIGNIFICANT CHANGE UP (ref 0–0.2)
BASOPHILS NFR BLD AUTO: 0 % — SIGNIFICANT CHANGE UP (ref 0–2)
EOSINOPHIL # BLD AUTO: 0.06 K/UL — SIGNIFICANT CHANGE UP (ref 0–0.5)
EOSINOPHIL NFR BLD AUTO: 1.7 % — SIGNIFICANT CHANGE UP (ref 0–6)
ERYTHROCYTE [SEDIMENTATION RATE] IN BLOOD: 12 MM/HR — SIGNIFICANT CHANGE UP (ref 0–15)
LYMPHOCYTES # BLD AUTO: 0.48 K/UL — LOW (ref 1–3.3)
LYMPHOCYTES # BLD AUTO: 13 % — SIGNIFICANT CHANGE UP (ref 13–44)
MANUAL SMEAR VERIFICATION: SIGNIFICANT CHANGE UP
MONOCYTES # BLD AUTO: 0.22 K/UL — SIGNIFICANT CHANGE UP (ref 0–0.9)
MONOCYTES NFR BLD AUTO: 6.1 % — SIGNIFICANT CHANGE UP (ref 2–14)
NEUTROPHILS # BLD AUTO: 2.91 K/UL — SIGNIFICANT CHANGE UP (ref 1.8–7.4)
NEUTROPHILS NFR BLD AUTO: 78.3 % — HIGH (ref 43–77)
NEUTS BAND # BLD: 0.9 % — SIGNIFICANT CHANGE UP (ref 0–8)
PLAT MORPH BLD: NORMAL — SIGNIFICANT CHANGE UP
PLATELET # BLD AUTO: 93 K/UL — LOW (ref 150–400)
RBC BLD AUTO: NORMAL — SIGNIFICANT CHANGE UP
SARS-COV-2 RNA SPEC QL NAA+PROBE: SIGNIFICANT CHANGE UP

## 2022-03-16 PROCEDURE — 74177 CT ABD & PELVIS W/CONTRAST: CPT | Mod: 26,MD

## 2022-03-16 PROCEDURE — 72148 MRI LUMBAR SPINE W/O DYE: CPT | Mod: 26,MD

## 2022-03-16 RX ORDER — HYDROMORPHONE HYDROCHLORIDE 2 MG/ML
2 INJECTION INTRAMUSCULAR; INTRAVENOUS; SUBCUTANEOUS ONCE
Refills: 0 | Status: DISCONTINUED | OUTPATIENT
Start: 2022-03-16 | End: 2022-03-16

## 2022-03-16 RX ORDER — AMLODIPINE BESYLATE 2.5 MG/1
1 TABLET ORAL
Qty: 0 | Refills: 0 | DISCHARGE

## 2022-03-16 RX ORDER — SODIUM CHLORIDE 9 MG/ML
1000 INJECTION, SOLUTION INTRAVENOUS ONCE
Refills: 0 | Status: COMPLETED | OUTPATIENT
Start: 2022-03-16 | End: 2022-03-16

## 2022-03-16 RX ORDER — HYDROMORPHONE HYDROCHLORIDE 2 MG/ML
0.5 INJECTION INTRAMUSCULAR; INTRAVENOUS; SUBCUTANEOUS ONCE
Refills: 0 | Status: DISCONTINUED | OUTPATIENT
Start: 2022-03-16 | End: 2022-03-16

## 2022-03-16 RX ORDER — DEXAMETHASONE 0.5 MG/5ML
4 ELIXIR ORAL EVERY 6 HOURS
Refills: 0 | Status: COMPLETED | OUTPATIENT
Start: 2022-03-16 | End: 2022-03-17

## 2022-03-16 RX ORDER — OXYCODONE HYDROCHLORIDE 5 MG/1
10 TABLET ORAL EVERY 4 HOURS
Refills: 0 | Status: DISCONTINUED | OUTPATIENT
Start: 2022-03-16 | End: 2022-03-18

## 2022-03-16 RX ORDER — POTASSIUM CHLORIDE 20 MEQ
40 PACKET (EA) ORAL ONCE
Refills: 0 | Status: COMPLETED | OUTPATIENT
Start: 2022-03-16 | End: 2022-03-16

## 2022-03-16 RX ORDER — HYDROMORPHONE HYDROCHLORIDE 2 MG/ML
2 INJECTION INTRAMUSCULAR; INTRAVENOUS; SUBCUTANEOUS EVERY 4 HOURS
Refills: 0 | Status: DISCONTINUED | OUTPATIENT
Start: 2022-03-16 | End: 2022-03-18

## 2022-03-16 RX ORDER — LIDOCAINE 4 G/100G
1 CREAM TOPICAL EVERY 24 HOURS
Refills: 0 | Status: DISCONTINUED | OUTPATIENT
Start: 2022-03-16 | End: 2022-03-18

## 2022-03-16 RX ORDER — HEPARIN SODIUM 5000 [USP'U]/ML
5000 INJECTION INTRAVENOUS; SUBCUTANEOUS EVERY 8 HOURS
Refills: 0 | Status: DISCONTINUED | OUTPATIENT
Start: 2022-03-16 | End: 2022-03-18

## 2022-03-16 RX ORDER — PANTOPRAZOLE SODIUM 20 MG/1
40 TABLET, DELAYED RELEASE ORAL
Refills: 0 | Status: DISCONTINUED | OUTPATIENT
Start: 2022-03-16 | End: 2022-03-18

## 2022-03-16 RX ORDER — SODIUM CHLORIDE 9 MG/ML
1000 INJECTION INTRAMUSCULAR; INTRAVENOUS; SUBCUTANEOUS
Refills: 0 | Status: DISCONTINUED | OUTPATIENT
Start: 2022-03-16 | End: 2022-03-18

## 2022-03-16 RX ORDER — INFLUENZA VIRUS VACCINE 15; 15; 15; 15 UG/.5ML; UG/.5ML; UG/.5ML; UG/.5ML
0.5 SUSPENSION INTRAMUSCULAR ONCE
Refills: 0 | Status: DISCONTINUED | OUTPATIENT
Start: 2022-03-16 | End: 2022-03-18

## 2022-03-16 RX ORDER — DIAZEPAM 5 MG
5 TABLET ORAL ONCE
Refills: 0 | Status: DISCONTINUED | OUTPATIENT
Start: 2022-03-16 | End: 2022-03-16

## 2022-03-16 RX ADMIN — HYDROMORPHONE HYDROCHLORIDE 2 MILLIGRAM(S): 2 INJECTION INTRAMUSCULAR; INTRAVENOUS; SUBCUTANEOUS at 20:28

## 2022-03-16 RX ADMIN — Medication 4 MILLIGRAM(S): at 13:02

## 2022-03-16 RX ADMIN — SODIUM CHLORIDE 100 MILLILITER(S): 9 INJECTION INTRAMUSCULAR; INTRAVENOUS; SUBCUTANEOUS at 16:45

## 2022-03-16 RX ADMIN — HYDROMORPHONE HYDROCHLORIDE 0.5 MILLIGRAM(S): 2 INJECTION INTRAMUSCULAR; INTRAVENOUS; SUBCUTANEOUS at 09:30

## 2022-03-16 RX ADMIN — HYDROMORPHONE HYDROCHLORIDE 0.5 MILLIGRAM(S): 2 INJECTION INTRAMUSCULAR; INTRAVENOUS; SUBCUTANEOUS at 09:15

## 2022-03-16 RX ADMIN — OXYCODONE HYDROCHLORIDE 10 MILLIGRAM(S): 5 TABLET ORAL at 17:34

## 2022-03-16 RX ADMIN — HYDROMORPHONE HYDROCHLORIDE 2 MILLIGRAM(S): 2 INJECTION INTRAMUSCULAR; INTRAVENOUS; SUBCUTANEOUS at 13:02

## 2022-03-16 RX ADMIN — HYDROMORPHONE HYDROCHLORIDE 0.5 MILLIGRAM(S): 2 INJECTION INTRAMUSCULAR; INTRAVENOUS; SUBCUTANEOUS at 04:57

## 2022-03-16 RX ADMIN — HYDROMORPHONE HYDROCHLORIDE 0.5 MILLIGRAM(S): 2 INJECTION INTRAMUSCULAR; INTRAVENOUS; SUBCUTANEOUS at 00:10

## 2022-03-16 RX ADMIN — HYDROMORPHONE HYDROCHLORIDE 2 MILLIGRAM(S): 2 INJECTION INTRAMUSCULAR; INTRAVENOUS; SUBCUTANEOUS at 13:39

## 2022-03-16 RX ADMIN — Medication 4 MILLIGRAM(S): at 19:42

## 2022-03-16 RX ADMIN — LIDOCAINE 1 PATCH: 4 CREAM TOPICAL at 13:02

## 2022-03-16 RX ADMIN — HYDROMORPHONE HYDROCHLORIDE 2 MILLIGRAM(S): 2 INJECTION INTRAMUSCULAR; INTRAVENOUS; SUBCUTANEOUS at 20:43

## 2022-03-16 RX ADMIN — HYDROMORPHONE HYDROCHLORIDE 0.5 MILLIGRAM(S): 2 INJECTION INTRAMUSCULAR; INTRAVENOUS; SUBCUTANEOUS at 04:35

## 2022-03-16 RX ADMIN — SODIUM CHLORIDE 1000 MILLILITER(S): 9 INJECTION, SOLUTION INTRAVENOUS at 00:23

## 2022-03-16 RX ADMIN — MORPHINE SULFATE 4 MILLIGRAM(S): 50 CAPSULE, EXTENDED RELEASE ORAL at 00:03

## 2022-03-16 RX ADMIN — LIDOCAINE 1 PATCH: 4 CREAM TOPICAL at 19:06

## 2022-03-16 RX ADMIN — OXYCODONE HYDROCHLORIDE 10 MILLIGRAM(S): 5 TABLET ORAL at 21:11

## 2022-03-16 RX ADMIN — Medication 5 MILLIGRAM(S): at 11:35

## 2022-03-16 RX ADMIN — OXYCODONE HYDROCHLORIDE 10 MILLIGRAM(S): 5 TABLET ORAL at 18:33

## 2022-03-16 RX ADMIN — SODIUM CHLORIDE 1000 MILLILITER(S): 9 INJECTION, SOLUTION INTRAVENOUS at 04:57

## 2022-03-16 RX ADMIN — HYDROMORPHONE HYDROCHLORIDE 0.5 MILLIGRAM(S): 2 INJECTION INTRAMUSCULAR; INTRAVENOUS; SUBCUTANEOUS at 01:24

## 2022-03-16 RX ADMIN — Medication 40 MILLIEQUIVALENT(S): at 00:13

## 2022-03-16 RX ADMIN — HYDROMORPHONE HYDROCHLORIDE 0.5 MILLIGRAM(S): 2 INJECTION INTRAMUSCULAR; INTRAVENOUS; SUBCUTANEOUS at 00:45

## 2022-03-16 NOTE — ED ADULT NURSE REASSESSMENT NOTE - NS ED NURSE REASSESS COMMENT FT1
Rec'd report from JORDY Dial RN. Pt A/O x3, c/o 8/10 back pain. Pt to MRI. Rec'd report from JORDY Dial RN. Pt A/O x3, c/o back pain. Pt to MRI.

## 2022-03-16 NOTE — ED ADULT NURSE REASSESSMENT NOTE - NS ED NURSE REASSESS COMMENT FT1
0855 Pt seen and evaluated by GALINA Anderson, who will write for pain meds. 0899 Pt returned from MRI. C/O 10/10 pain in lower back, sacral area, sharp, intense. Pt able to lift legs and flex feet. Pt seen and evaluated by GALINA Anderson, who will write for pain meds.

## 2022-03-16 NOTE — H&P ADULT - ASSESSMENT
patient is a 37 Y/O Male with PMHX of AML last chemo 6w prior, HTN presenting with low back pain which has been worsening for past week with inability to walk.  worsening since onset. pt able to ambulate with significant pain. pain located in lumbar back. endorses weakness b/l LE. denies fevers. had bone marrow biopsy 8w prior. pt denies saddle anesthesia, bowel/bladder incontinence.      # Severe lower back pain  Pan CT noted   MRI of lumbar spine noted   Aggressive pain control with dilaudid and decadron  lidocain patch   PT as tolerated  IV hydration        # STEFANY   MOnitor BUN/Cr  IV hydration  monitor urine output     # AML  follow with house hematology   completed course of treatment   Heme.Onc eval PRN     DVT and gI PPX

## 2022-03-16 NOTE — H&P ADULT - NSHPREVIEWOFSYSTEMS_GEN_ALL_CORE
REVIEW OF SYSTEMS:    CONSTITUTIONAL: No weakness, fevers or chills  EYES/ENT: No visual changes;  No vertigo or throat pain   NECK: No pain or stiffness  RESPIRATORY: No cough, wheezing, hemoptysis; No shortness of breath  CARDIOVASCULAR: No chest pain or palpitations  GASTROINTESTINAL: No abdominal or epigastric pain. No nausea, vomiting, or hematemesis; No diarrhea or constipation. No melena or hematochezia.  GENITOURINARY: No dysuria, frequency or hematuria  NEUROLOGICAL: Lower back pain radiating to front groin   SKIN: No itching, burning, rashes, or lesions   All other review of systems is negative unless indicated above.

## 2022-03-16 NOTE — H&P ADULT - NSHPPHYSICALEXAM_GEN_ALL_CORE
Vital Signs Last 24 Hrs  T(C): 36.6 (16 Mar 2022 12:55), Max: 36.7 (16 Mar 2022 07:20)  T(F): 97.9 (16 Mar 2022 12:55), Max: 98.1 (16 Mar 2022 07:20)  HR: 97 (16 Mar 2022 12:55) (56 - 112)  BP: 104/72 (16 Mar 2022 12:55) (104/72 - 131/94)  BP(mean): 90 (16 Mar 2022 01:22) (90 - 99)  RR: 17 (16 Mar 2022 12:55) (13 - 26)  SpO2: 100% (16 Mar 2022 12:55) (94% - 100%)    Appearance: Normal	  HEENT:   Normal oral mucosa, PERRL, EOMI	  Lymphatic: No lymphadenopathy , no edema  Cardiovascular: Normal S1 S2, No JVD, No murmurs , Peripheral pulses palpable 2+ bilaterally  Respiratory: Lungs clear to auscultation, normal effort 	  Gastrointestinal:  Soft, Non-tender, + BS	  Skin: No rashes, No ecchymoses, No cyanosis, warm to touch  Musculoskeletal: Severe back pain , pain with ROM    Psychiatry:  Mood & affect appropriate  Ext: No edema

## 2022-03-16 NOTE — H&P ADULT - HISTORY OF PRESENT ILLNESS
patient 37 Y/O Male hx AML last chemo 6w prior, p/w low back pain. onset 6d prior. worsening since onset. pt able to ambulate with significant pain. pain located in lumbar back. endorses weakness b/l LE. denies fevers. had bone marrow biopsy 8w prior. pt denies saddle anesthesia, bowel/bladder incontinence. patient is a 35 Y/O Male with PMHX of AML last chemo 6w prior, HTN presenting with low back pain which has been worsening for past week with inability to walk.  worsening since onset. pt able to ambulate with significant pain. pain located in lumbar back. endorses weakness b/l LE. denies fevers. had bone marrow biopsy 8w prior. pt denies saddle anesthesia, bowel/bladder incontinence.

## 2022-03-16 NOTE — CONSULT NOTE ADULT - SUBJECTIVE AND OBJECTIVE BOX
DATE OF SERVICE: 03-16-22      CHIEF COMPLAINT:Patient is a 36y old  Male who presents with a chief complaint of severe lower back (16 Mar 2022 10:27)      HISTORY OF PRESENT ILLNESS:HPI:  patient is a 37 Y/O Male with PMHX of AML last chemo 6w prior, HTN presenting with low back pain which has been worsening for past week with inability to walk.  worsening since onset. pt able to ambulate with significant pain. pain located in lumbar back. endorses weakness b/l LE. denies fevers. had bone marrow biopsy 8w prior. pt denies saddle anesthesia, bowel/bladder incontinence. (16 Mar 2022 10:27)      PAST MEDICAL & SURGICAL HISTORY:  Hypertension  diagnosed 1 year ago    Kidney stone  5 years ago    History of genital warts    AML (acute myeloid leukemia)    No significant past surgical history            MEDICATIONS:  heparin   Injectable 5000 Unit(s) SubCutaneous every 8 hours        HYDROmorphone  Injectable 2 milliGRAM(s) IV Push every 4 hours PRN  oxyCODONE    IR 10 milliGRAM(s) Oral every 4 hours    pantoprazole    Tablet 40 milliGRAM(s) Oral before breakfast    dexAMETHasone  Injectable 4 milliGRAM(s) IV Push every 6 hours    influenza   Vaccine 0.5 milliLiter(s) IntraMuscular once  lidocaine   4% Patch 1 Patch Transdermal every 24 hours  sodium chloride 0.9%. 1000 milliLiter(s) IV Continuous <Continuous>      FAMILY HISTORY:  FH: CAD (coronary artery disease) (Father, Mother)        Non-contributory    SOCIAL HISTORY:    [ ] not a smoker    Allergies    No Known Allergies    Intolerances    cefepime (Rash)  	    REVIEW OF SYSTEMS:  CONSTITUTIONAL: No fever  EYES: No eye pain, visual disturbances, or discharge  ENMT:  No difficulty hearing, tinnitus  NECK: No pain or stiffness  RESPIRATORY: No cough, wheezing,  CARDIOVASCULAR: No chest pain, palpitations, passing out, dizziness, or leg swelling  GASTROINTESTINAL:  No nausea, vomiting, diarrhea or constipation. No melena.  GENITOURINARY: No dysuria, hematuria  NEUROLOGICAL: No stroke like symptoms  SKIN: No burning or lesions   ENDOCRINE: No heat or cold intolerance  MUSCULOSKELETAL: +back pain  PSYCHIATRIC: No  anxiety, mood swings  HEME/LYMPH: No bleeding gums  ALLERGY AND IMMUNOLOGIC: No hives or eczema	    All other ROS negative    PHYSICAL EXAM:  T(C): 36.8 (03-16-22 @ 20:01), Max: 36.9 (03-16-22 @ 16:55)  HR: 89 (03-16-22 @ 20:01) (56 - 106)  BP: 114/78 (03-16-22 @ 20:01) (104/72 - 131/94)  RR: 18 (03-16-22 @ 20:01) (13 - 26)  SpO2: 99% (03-16-22 @ 20:01) (94% - 100%)  Wt(kg): --  I&O's Summary      Appearance: Normal	  HEENT:   Normal oral mucosa, EOMI	  Cardiovascular:  S1 S2, No JVD,    Respiratory: Lungs clear to auscultation	  Psychiatry: Alert  Gastrointestinal:  Soft, Non-tender, + BS	  Skin: No rashes   Neurologic: Non-focal  Extremities:  No edema  Vascular: Peripheral pulses palpable    	    	  	  CARDIAC MARKERS:  Labs personally reviewed by me                                  11.7   3.67  )-----------( 93       ( 15 Mar 2022 23:16 )             34.0     03-15    141  |  104  |  15  ----------------------------<  81  3.4<L>   |  23  |  1.20    Ca    9.9      15 Mar 2022 23:16    TPro  6.7  /  Alb  4.5  /  TBili  0.6  /  DBili  x   /  AST  23  /  ALT  64<H>  /  AlkPhos  82  03-15          EKG: Personally reviewed by me - EKG 1/21 sinus at 87bpm  Radiology: Personally reviewed by me - 3/5 CXR clear lungs          Assessment and Plan:   Assessment:  · Assessment	  patient is a 37 Y/O Male with PMHX of AML last chemo 6w prior, HTN presenting with low back pain which has been worsening for past week with inability to walk.  worsening since onset. pt able to ambulate with significant pain. pain located in lumbar back. endorses weakness b/l LE. denies fevers. had bone marrow biopsy 8w prior. pt denies saddle anesthesia, bowel/bladder incontinence.      1. tachycardia - some chronic component with exacerbation 2/2 acute back pain and prerenal state  - should improve with treatment of pain and IV hydration     2. Severe lower back pain  Pan CT noted   MRI of lumbar spine noted   Aggressive pain control with dilaudid and decadron  lidocain patch   PT as tolerated     3 STEFANY   MOnitor BUN/Cr  IV hydration  monitor urine output     4. AML  follow with hematology   completed course of treatment now in remission       5. DVT and gI PPX               Differential diagnosis and plan of care discussed with patient after the evaluation. Counseling on diet, nutritional counseling, weight management, exercise and medication compliance was done.   Advanced care planning/advanced directives discussed with patient/family. DNR status including forceful chest compressions to attempt to restart the heart, ventilator support/artificial breathing, electric shock, artificial nutrition, health care proxy, Molst form all discussed with pt. Pt wishes to consider. More than fifteen minutes spent on discussing advanced directives. One hundred ten minutes spent on encounter, of which more than fifty percent of the encounter was spent counseling and/or coordinating care by the attending physician        Mitul Zelaya DO [facility]  Cardiovascular Medicine  [address] [place] [address], [address]  Office [phone]  Cell [phone]

## 2022-03-16 NOTE — ED ADULT NURSE NOTE - NSIMPLEMENTINTERV_GEN_ALL_ED
Implemented All Fall Risk Interventions:  [place] to call system. Call bell, personal items and telephone within reach. Instruct patient to call for assistance. Room bathroom lighting operational. Non-slip footwear when patient is off stretcher. Physically safe environment: no spills, clutter or unnecessary equipment. Stretcher in lowest position, wheels locked, appropriate side rails in place. Provide visual cue, wrist band, yellow gown, etc. Monitor gait and stability. Monitor for mental status changes and reorient to person, place, and time. Review medications for side effects contributing to fall risk. Reinforce activity limits and safety measures with patient and family.

## 2022-03-17 ENCOUNTER — OUTPATIENT (OUTPATIENT)
Dept: OUTPATIENT SERVICES | Facility: HOSPITAL | Age: 37
LOS: 1 days | Discharge: HOME | End: 2022-03-17

## 2022-03-17 DIAGNOSIS — C92.00 ACUTE MYELOBLASTIC LEUKEMIA, NOT HAVING ACHIEVED REMISSION: ICD-10-CM

## 2022-03-17 LAB
ALBUMIN SERPL ELPH-MCNC: 4.5 G/DL — SIGNIFICANT CHANGE UP (ref 3.3–5)
ALP SERPL-CCNC: 85 U/L — SIGNIFICANT CHANGE UP (ref 40–120)
ALT FLD-CCNC: 55 U/L — HIGH (ref 10–45)
ANION GAP SERPL CALC-SCNC: 14 MMOL/L — SIGNIFICANT CHANGE UP (ref 5–17)
AST SERPL-CCNC: 21 U/L — SIGNIFICANT CHANGE UP (ref 10–40)
BASOPHILS # BLD AUTO: 0 K/UL — SIGNIFICANT CHANGE UP (ref 0–0.2)
BASOPHILS NFR BLD AUTO: 0 % — SIGNIFICANT CHANGE UP (ref 0–2)
BILIRUB SERPL-MCNC: 0.4 MG/DL — SIGNIFICANT CHANGE UP (ref 0.2–1.2)
BUN SERPL-MCNC: 12 MG/DL — SIGNIFICANT CHANGE UP (ref 7–23)
CALCIUM SERPL-MCNC: 9.8 MG/DL — SIGNIFICANT CHANGE UP (ref 8.4–10.5)
CHLORIDE SERPL-SCNC: 105 MMOL/L — SIGNIFICANT CHANGE UP (ref 96–108)
CO2 SERPL-SCNC: 22 MMOL/L — SIGNIFICANT CHANGE UP (ref 22–31)
CREAT SERPL-MCNC: 0.84 MG/DL — SIGNIFICANT CHANGE UP (ref 0.5–1.3)
CRP SERPL-MCNC: 7 MG/L — HIGH (ref 0–4)
EGFR: 116 ML/MIN/1.73M2 — SIGNIFICANT CHANGE UP
EOSINOPHIL # BLD AUTO: 0 K/UL — SIGNIFICANT CHANGE UP (ref 0–0.5)
EOSINOPHIL NFR BLD AUTO: 0 % — SIGNIFICANT CHANGE UP (ref 0–6)
ERYTHROCYTE [SEDIMENTATION RATE] IN BLOOD: 22 MM/HR — HIGH (ref 0–15)
GLUCOSE SERPL-MCNC: 118 MG/DL — HIGH (ref 70–99)
HCT VFR BLD CALC: 37.5 % — LOW (ref 39–50)
HGB BLD-MCNC: 12.6 G/DL — LOW (ref 13–17)
IMM GRANULOCYTES NFR BLD AUTO: 0.4 % — SIGNIFICANT CHANGE UP (ref 0–1.5)
LYMPHOCYTES # BLD AUTO: 0.33 K/UL — LOW (ref 1–3.3)
LYMPHOCYTES # BLD AUTO: 7.4 % — LOW (ref 13–44)
MCHC RBC-ENTMCNC: 33.6 GM/DL — SIGNIFICANT CHANGE UP (ref 32–36)
MCHC RBC-ENTMCNC: 33.6 PG — SIGNIFICANT CHANGE UP (ref 27–34)
MCV RBC AUTO: 100 FL — SIGNIFICANT CHANGE UP (ref 80–100)
MONOCYTES # BLD AUTO: 0.09 K/UL — SIGNIFICANT CHANGE UP (ref 0–0.9)
MONOCYTES NFR BLD AUTO: 2 % — SIGNIFICANT CHANGE UP (ref 2–14)
NEUTROPHILS # BLD AUTO: 4.01 K/UL — SIGNIFICANT CHANGE UP (ref 1.8–7.4)
NEUTROPHILS NFR BLD AUTO: 90.2 % — HIGH (ref 43–77)
NRBC # BLD: 0 /100 WBCS — SIGNIFICANT CHANGE UP (ref 0–0)
PLATELET # BLD AUTO: 100 K/UL — LOW (ref 150–400)
POTASSIUM SERPL-MCNC: 4.1 MMOL/L — SIGNIFICANT CHANGE UP (ref 3.5–5.3)
POTASSIUM SERPL-SCNC: 4.1 MMOL/L — SIGNIFICANT CHANGE UP (ref 3.5–5.3)
PROT SERPL-MCNC: 7.1 G/DL — SIGNIFICANT CHANGE UP (ref 6–8.3)
RBC # BLD: 3.75 M/UL — LOW (ref 4.2–5.8)
RBC # FLD: 16 % — HIGH (ref 10.3–14.5)
SODIUM SERPL-SCNC: 141 MMOL/L — SIGNIFICANT CHANGE UP (ref 135–145)
WBC # BLD: 4.45 K/UL — SIGNIFICANT CHANGE UP (ref 3.8–10.5)
WBC # FLD AUTO: 4.45 K/UL — SIGNIFICANT CHANGE UP (ref 3.8–10.5)

## 2022-03-17 RX ORDER — SENNA PLUS 8.6 MG/1
2 TABLET ORAL AT BEDTIME
Refills: 0 | Status: DISCONTINUED | OUTPATIENT
Start: 2022-03-17 | End: 2022-03-18

## 2022-03-17 RX ORDER — CHLORHEXIDINE GLUCONATE 213 G/1000ML
1 SOLUTION TOPICAL
Refills: 0 | Status: DISCONTINUED | OUTPATIENT
Start: 2022-03-17 | End: 2022-03-18

## 2022-03-17 RX ORDER — POLYETHYLENE GLYCOL 3350 17 G/17G
17 POWDER, FOR SOLUTION ORAL ONCE
Refills: 0 | Status: COMPLETED | OUTPATIENT
Start: 2022-03-17 | End: 2022-03-17

## 2022-03-17 RX ORDER — POLYETHYLENE GLYCOL 3350 17 G/17G
17 POWDER, FOR SOLUTION ORAL ONCE
Refills: 0 | Status: COMPLETED | OUTPATIENT
Start: 2022-03-17 | End: 2022-03-18

## 2022-03-17 RX ADMIN — POLYETHYLENE GLYCOL 3350 17 GRAM(S): 17 POWDER, FOR SOLUTION ORAL at 12:28

## 2022-03-17 RX ADMIN — OXYCODONE HYDROCHLORIDE 10 MILLIGRAM(S): 5 TABLET ORAL at 13:21

## 2022-03-17 RX ADMIN — SODIUM CHLORIDE 100 MILLILITER(S): 9 INJECTION INTRAMUSCULAR; INTRAVENOUS; SUBCUTANEOUS at 12:15

## 2022-03-17 RX ADMIN — HEPARIN SODIUM 5000 UNIT(S): 5000 INJECTION INTRAVENOUS; SUBCUTANEOUS at 05:44

## 2022-03-17 RX ADMIN — OXYCODONE HYDROCHLORIDE 10 MILLIGRAM(S): 5 TABLET ORAL at 23:56

## 2022-03-17 RX ADMIN — HYDROMORPHONE HYDROCHLORIDE 2 MILLIGRAM(S): 2 INJECTION INTRAMUSCULAR; INTRAVENOUS; SUBCUTANEOUS at 00:20

## 2022-03-17 RX ADMIN — HYDROMORPHONE HYDROCHLORIDE 2 MILLIGRAM(S): 2 INJECTION INTRAMUSCULAR; INTRAVENOUS; SUBCUTANEOUS at 04:54

## 2022-03-17 RX ADMIN — OXYCODONE HYDROCHLORIDE 10 MILLIGRAM(S): 5 TABLET ORAL at 18:14

## 2022-03-17 RX ADMIN — OXYCODONE HYDROCHLORIDE 10 MILLIGRAM(S): 5 TABLET ORAL at 19:02

## 2022-03-17 RX ADMIN — LIDOCAINE 1 PATCH: 4 CREAM TOPICAL at 12:28

## 2022-03-17 RX ADMIN — Medication 4 MILLIGRAM(S): at 12:28

## 2022-03-17 RX ADMIN — Medication 4 MILLIGRAM(S): at 00:21

## 2022-03-17 RX ADMIN — HYDROMORPHONE HYDROCHLORIDE 2 MILLIGRAM(S): 2 INJECTION INTRAMUSCULAR; INTRAVENOUS; SUBCUTANEOUS at 01:35

## 2022-03-17 RX ADMIN — OXYCODONE HYDROCHLORIDE 10 MILLIGRAM(S): 5 TABLET ORAL at 01:42

## 2022-03-17 RX ADMIN — Medication 4 MILLIGRAM(S): at 05:44

## 2022-03-17 RX ADMIN — SENNA PLUS 2 TABLET(S): 8.6 TABLET ORAL at 22:10

## 2022-03-17 RX ADMIN — PANTOPRAZOLE SODIUM 40 MILLIGRAM(S): 20 TABLET, DELAYED RELEASE ORAL at 05:44

## 2022-03-17 RX ADMIN — HYDROMORPHONE HYDROCHLORIDE 2 MILLIGRAM(S): 2 INJECTION INTRAMUSCULAR; INTRAVENOUS; SUBCUTANEOUS at 04:34

## 2022-03-17 RX ADMIN — HEPARIN SODIUM 5000 UNIT(S): 5000 INJECTION INTRAVENOUS; SUBCUTANEOUS at 14:52

## 2022-03-17 RX ADMIN — LIDOCAINE 1 PATCH: 4 CREAM TOPICAL at 01:00

## 2022-03-17 RX ADMIN — OXYCODONE HYDROCHLORIDE 10 MILLIGRAM(S): 5 TABLET ORAL at 14:10

## 2022-03-17 NOTE — PROGRESS NOTE ADULT - SUBJECTIVE AND OBJECTIVE BOX
Name of Patient : JAK DANIELS  MRN: [id number]  Date of visit: 03-17-22 @ 11:31      Subjective: Patient seen and examined. No new events except as noted.   Patient reports improvement and relief in pain and symptoms. Reports ambulating around the unit. States eating well and voiding.   States that he has not yet had a BM since arrival at [facility].      REVIEW OF SYSTEMS:    CONSTITUTIONAL: No weakness, fevers or chills  EYES/ENT: No visual changes;  No vertigo or throat pain   NECK: No pain or stiffness  RESPIRATORY: No cough, wheezing, hemoptysis; No shortness of breath  CARDIOVASCULAR: No chest pain or palpitations  GASTROINTESTINAL: No abdominal or epigastric pain. No nausea, vomiting, or hematemesis; No BM since arrival to hospital   GENITOURINARY: No dysuria, frequency or hematuria  NEUROLOGICAL: +Reports back pain has improved, able to ambulate around unit   SKIN: No itching, burning, rashes, or lesions   All other review of systems is negative unless indicated above.    MEDICATIONS:  MEDICATIONS  (STANDING):  chlorhexidine 2% Cloths 1 Application(s) Topical <User Schedule>  dexAMETHasone  Injectable 4 milliGRAM(s) IV Push every 6 hours  heparin   Injectable 5000 Unit(s) SubCutaneous every 8 hours  influenza   Vaccine 0.5 milliLiter(s) IntraMuscular once  lidocaine   4% Patch 1 Patch Transdermal every 24 hours  oxyCODONE    IR 10 milliGRAM(s) Oral every 4 hours  pantoprazole    Tablet 40 milliGRAM(s) Oral before breakfast  polyethylene glycol 3350 17 Gram(s) Oral once  sodium chloride 0.9%. 1000 milliLiter(s) (100 mL/Hr) IV Continuous <Continuous>      PHYSICAL EXAM:  T(C): 36.5 (03-17-22 @ 04:28), Max: 36.9 (03-16-22 @ 16:55)  HR: 69 (03-17-22 @ 04:28) (69 - 106)  BP: 103/68 (03-17-22 @ 04:28) (103/68 - 123/83)  RR: 18 (03-17-22 @ 04:28) (16 - 22)  SpO2: 96% (03-17-22 @ 04:28) (96% - 100%)  Wt(kg): --  I&O's Summary    17 Mar 2022 07:01  -  17 Mar 2022 11:31  --------------------------------------------------------  IN: 240 mL / OUT: 0 mL / NET: 240 mL          Appearance: Normal, calm 	  HEENT:  PERRLA   Lymphatic: No lymphadenopathy   Cardiovascular: Normal S1 S2, no JVD  Respiratory: normal effort , clear  Gastrointestinal:  Soft, Non-TTP  Skin: No rashes,  warm to touch  Psychiatry:  Mood & affect appropriate  Musculoskeletal: No LE edema      03-17-22 @ 07:01  -  03-17-22 @ 11:31  --------------------------------------------------------  IN: 240 mL / OUT: 0 mL / NET: 240 mL                            12.6   4.45  )-----------( 100      ( 17 Mar 2022 08:35 )             37.5               03-17    141  |  105  |  12  ----------------------------<  118<H>  4.1   |  22  |  0.84    Ca    9.8      17 Mar 2022 08:35    TPro  7.1  /  Alb  4.5  /  TBili  0.4  /  DBili  x   /  AST  21  /  ALT  55<H>  /  AlkPhos  85  03-17                           < from: CT Abdomen and Pelvis w/ IV Cont (03.16.22 @ 01:36) >    IMPRESSION:  1. No bowel obstruction or inflammation. No evidence of appendicitis.  2. No nephrolithiasis, hydronephrosis or obstructive uropathy.      < end of copied text >        < from: CT Lumbar Spine w/ IV Cont (03.15.22 @ 23:34) >    IMPRESSION:  Evaluation of the intrathecal contents is limited on CT. No gross   evidence of a rim-enhancing fluid collection in the spinal canal to   suggest an abscess. If there is continued clinical concern for an   epidural collection, a contrast-enhanced MR of the lumbar spine would be   a more sensitive examination.    --- End of Report ---    < end of copied text >      < from: MR Lumbar Spine No Cont (03.16.22 @ 08:10) >    IMPRESSION:  *  PATIENT REFUSED INTRAVENOUS CONTRAST. NO EPIDURAL ABSCESS SEEN ON   NONCONTRAST IMAGES.  *  SMALL CENTRAL DISCPROTRUSION L5-S1.  *  MILD DISC BULGE L4-5.    --- End of Report ---    < end of copied text >       Name of Patient : JAK DANIELS  MRN: [id number]  Date of visit: 03-17-22 @ 11:31      Subjective: Patient seen and examined. No new events except as noted.   Patient reports improvement and relief in pain and symptoms. Reports ambulating around the unit. States eating well and voiding.   States that he has not yet had a BM since arrival at [facility].      REVIEW OF SYSTEMS:    CONSTITUTIONAL: No weakness, fevers or chills  EYES/ENT: No visual changes;  No vertigo or throat pain   NECK: No pain or stiffness  RESPIRATORY: No cough, wheezing, hemoptysis; No shortness of breath  CARDIOVASCULAR: No chest pain or palpitations  GASTROINTESTINAL: No abdominal or epigastric pain. No nausea, vomiting, or hematemesis; No BM since arrival to hospital   GENITOURINARY: No dysuria, frequency or hematuria  NEUROLOGICAL: +Reports back pain has improved, able to ambulate around unit   SKIN: No itching, burning, rashes, or lesions   All other review of systems is negative unless indicated above.    MEDICATIONS:  MEDICATIONS  (STANDING):  chlorhexidine 2% Cloths 1 Application(s) Topical <User Schedule>  dexAMETHasone  Injectable 4 milliGRAM(s) IV Push every 6 hours  heparin   Injectable 5000 Unit(s) SubCutaneous every 8 hours  influenza   Vaccine 0.5 milliLiter(s) IntraMuscular once  lidocaine   4% Patch 1 Patch Transdermal every 24 hours  oxyCODONE    IR 10 milliGRAM(s) Oral every 4 hours  pantoprazole    Tablet 40 milliGRAM(s) Oral before breakfast  polyethylene glycol 3350 17 Gram(s) Oral once  sodium chloride 0.9%. 1000 milliLiter(s) (100 mL/Hr) IV Continuous <Continuous>      PHYSICAL EXAM:  T(C): 36.5 (03-17-22 @ 04:28), Max: 36.9 (03-16-22 @ 16:55)  HR: 69 (03-17-22 @ 04:28) (69 - 106)  BP: 103/68 (03-17-22 @ 04:28) (103/68 - 123/83)  RR: 18 (03-17-22 @ 04:28) (16 - 22)  SpO2: 96% (03-17-22 @ 04:28) (96% - 100%)  Wt(kg): --  I&O's Summary    17 Mar 2022 07:01  -  17 Mar 2022 11:31  --------------------------------------------------------  IN: 240 mL / OUT: 0 mL / NET: 240 mL          Appearance: Normal, calm 	  HEENT:  PERRLA   Lymphatic: No lymphadenopathy   Cardiovascular: Normal S1 S2, no JVD  Respiratory: normal effort , clear  Gastrointestinal:  Soft, Non-TTP  Skin: No rashes,  warm to touch  Psychiatry:  Mood & affect appropriate  Musculoskeletal: No LE edema      03-17-22 @ 07:01  -  03-17-22 @ 11:31  --------------------------------------------------------  IN: 240 mL / OUT: 0 mL / NET: 240 mL                            12.6   4.45  )-----------( 100      ( 17 Mar 2022 08:35 )             37.5               03-17    141  |  105  |  12  ----------------------------<  118<H>  4.1   |  22  |  0.84    Ca    9.8      17 Mar 2022 08:35    TPro  7.1  /  Alb  4.5  /  TBili  0.4  /  DBili  x   /  AST  21  /  ALT  55<H>  /  AlkPhos  85  03-17                           < from: CT Abdomen and Pelvis w/ IV Cont (03.16.22 @ 01:36) >    IMPRESSION:  1. No bowel obstruction or inflammation. No evidence of appendicitis.  2. No nephrolithiasis, hydronephrosis or obstructive uropathy.      < end of copied text >        < from: CT Lumbar Spine w/ IV Cont (03.15.22 @ 23:34) >    IMPRESSION:  Evaluation of the intrathecal contents is limited on CT. No gross   evidence of a rim-enhancing fluid collection in the spinal canal to   suggest an abscess. If there is continued clinical concern for an   epidural collection, a contrast-enhanced MR of the lumbar spine would be   a more sensitive examination.        IMPRESSION:  *  PATIENT REFUSED INTRAVENOUS CONTRAST. NO EPIDURAL ABSCESS SEEN ON   NONCONTRAST IMAGES.  *  SMALL CENTRAL DISCPROTRUSION L5-S1.  *  MILD DISC BULGE L4-5.

## 2022-03-17 NOTE — PROGRESS NOTE ADULT - SUBJECTIVE AND OBJECTIVE BOX
Date of Service   03-17-22 @ 11:15    Patient is a 36y old  Male who presents with a chief complaint of severe lower back (16 Mar 2022 18:05)      INTERVAL HISTORY: pt feels ok   REVIEW OF SYSTEMS:   CONSTITUTIONAL: No weakness  EYES/ENT: No visual changes; No throat pain  Neck: No pain or stiffness  Respiratory: No cough, wheezing, No shortness of breath  CARDIOVASCULAR: no chest pain or palpitations  GASTROINTESTINAL: No abdominal pain, no nausea, vomiting or hematemesis  GENITOURINARY: No dysuria, frequency or hematuria  NEUROLOGICAL: No stroke like symptoms  SKIN: No rashes    	  MEDICATIONS:        PHYSICAL EXAM:  T(C): 36.5 (03-17-22 @ 04:28), Max: 36.9 (03-16-22 @ 16:55)  HR: 69 (03-17-22 @ 04:28) (69 - 106)  BP: 103/68 (03-17-22 @ 04:28) (103/68 - 123/83)  RR: 18 (03-17-22 @ 04:28) (16 - 22)  SpO2: 96% (03-17-22 @ 04:28) (96% - 100%)  Wt(kg): --  I&O's Summary    17 Mar 2022 07:01  -  17 Mar 2022 11:15  --------------------------------------------------------  IN: 240 mL / OUT: 0 mL / NET: 240 mL          Appearance: In no distress	  HEENT:    PERRL, EOMI	  Cardiovascular:  S1 S2, No JVD  Respiratory: Lungs clear to auscultation	  Gastrointestinal:  Soft, Non-tender, + BS	  Vascularature:  No edema of LE  Psychiatric: Appropriate affect   Neuro: no acute focal deficits                               12.6   4.45  )-----------( 100      ( 17 Mar 2022 08:35 )             37.5     03-17    141  |  105  |  12  ----------------------------<  118<H>  4.1   |  22  |  0.84    Ca    9.8      17 Mar 2022 08:35    TPro  7.1  /  Alb  4.5  /  TBili  0.4  /  DBili  x   /  AST  21  /  ALT  55<H>  /  AlkPhos  85  03-17        Labs personally reviewed      ASSESSMENT/PLAN: 	  patient is a 37 Y/O Male with PMHX of AML last chemo 6w prior, HTN presenting with low back pain which has been worsening for past week with inability to walk.  worsening since onset. pt able to ambulate with significant pain. pain located in lumbar back. endorses weakness b/l LE. denies fevers. had bone marrow biopsy 8w prior. pt denies saddle anesthesia, bowel/bladder incontinence.      1. tachycardia - some chronic component with exacerbation 2/2 acute back pain and prerenal state  - should improve with treatment of pain and IV hydration   - HR improved now 60-90's     2. Severe lower back pain  Pan CT noted   MRI of lumbar spine noted   Aggressive pain control with dilaudid and decadron  lidocaine patch   PT as tolerated     3 STEFANY   MOnitor BUN/Cr  IV hydration  monitor urine output     4. AML  follow with hematology   completed course of treatment now in remission       5. DVT and gI PPX               Errol Winchester [facility]   Mitul Zelaya DO [facility]  Cardiovascular Medicine  [address], [address]  Office: [phone]   Date of Service   03-17-22 @ 11:15    Patient is a 36y old  Male who presents with a chief complaint of severe lower back (16 Mar 2022 18:05)      INTERVAL HISTORY: pt feels ok   REVIEW OF SYSTEMS:   CONSTITUTIONAL: No weakness  EYES/ENT: No visual changes; No throat pain  Neck: No pain or stiffness  Respiratory: No cough, wheezing, No shortness of breath  CARDIOVASCULAR: no chest pain or palpitations  GASTROINTESTINAL: No abdominal pain, no nausea, vomiting or hematemesis  GENITOURINARY: No dysuria, frequency or hematuria  NEUROLOGICAL: No stroke like symptoms  SKIN: No rashes    	  MEDICATIONS:        PHYSICAL EXAM:  T(C): 36.5 (03-17-22 @ 04:28), Max: 36.9 (03-16-22 @ 16:55)  HR: 69 (03-17-22 @ 04:28) (69 - 106)  BP: 103/68 (03-17-22 @ 04:28) (103/68 - 123/83)  RR: 18 (03-17-22 @ 04:28) (16 - 22)  SpO2: 96% (03-17-22 @ 04:28) (96% - 100%)  Wt(kg): --  I&O's Summary    17 Mar 2022 07:01  -  17 Mar 2022 11:15  --------------------------------------------------------  IN: 240 mL / OUT: 0 mL / NET: 240 mL          Appearance: In no distress	  HEENT:    PERRL, EOMI	  Cardiovascular:  S1 S2, No JVD  Respiratory: Lungs clear to auscultation	  Gastrointestinal:  Soft, Non-tender, + BS	  Vascularature:  No edema of LE  Psychiatric: Appropriate affect   Neuro: no acute focal deficits                               12.6   4.45  )-----------( 100      ( 17 Mar 2022 08:35 )             37.5     03-17    141  |  105  |  12  ----------------------------<  118<H>  4.1   |  22  |  0.84    Ca    9.8      17 Mar 2022 08:35    TPro  7.1  /  Alb  4.5  /  TBili  0.4  /  DBili  x   /  AST  21  /  ALT  55<H>  /  AlkPhos  85  03-17        Labs personally reviewed      ASSESSMENT/PLAN: 	  patient is a 37 Y/O Male with PMHX of AML last chemo 6w prior, HTN presenting with low back pain which has been worsening for past week with inability to walk.  worsening since onset. pt able to ambulate with significant pain. pain located in lumbar back. endorses weakness b/l LE. denies fevers. had bone marrow biopsy 8w prior. pt denies saddle anesthesia, bowel/bladder incontinence.      1. Tachycardia - some chronic component with exacerbation 2/2 acute back pain and prerenal state  - should improve with treatment of pain and IV hydration   - HR improved now 60-90's     2. Severe lower back pain  Pan CT noted   MRI of lumbar spine noted   Aggressive pain control with dilaudid and decadron  lidocaine patch   PT as tolerated     3 STEFANY   MOnitor BUN/Cr  IV hydration  monitor urine output     4. AML  follow with hematology   completed course of treatment now in remission       5. DVT and gI PPX               Errol Winchester [facility]   Mitul Zelaya DO [facility]  Cardiovascular Medicine  [address], [address]  Office: [phone]

## 2022-03-18 ENCOUNTER — TRANSCRIPTION ENCOUNTER (OUTPATIENT)
Age: 37
End: 2022-03-18

## 2022-03-18 VITALS
SYSTOLIC BLOOD PRESSURE: 119 MMHG | RESPIRATION RATE: 18 BRPM | DIASTOLIC BLOOD PRESSURE: 79 MMHG | HEART RATE: 72 BPM | OXYGEN SATURATION: 99 % | TEMPERATURE: 98 F

## 2022-03-18 LAB — ERYTHROCYTE [SEDIMENTATION RATE] IN BLOOD: 9 MM/HR — SIGNIFICANT CHANGE UP (ref 0–15)

## 2022-03-18 PROCEDURE — 85652 RBC SED RATE AUTOMATED: CPT

## 2022-03-18 PROCEDURE — 99285 EMERGENCY DEPT VISIT HI MDM: CPT

## 2022-03-18 PROCEDURE — 86140 C-REACTIVE PROTEIN: CPT

## 2022-03-18 PROCEDURE — 74177 CT ABD & PELVIS W/CONTRAST: CPT | Mod: MD

## 2022-03-18 PROCEDURE — G0378: CPT

## 2022-03-18 PROCEDURE — 85025 COMPLETE CBC W/AUTO DIFF WBC: CPT

## 2022-03-18 PROCEDURE — 72148 MRI LUMBAR SPINE W/O DYE: CPT | Mod: MD

## 2022-03-18 PROCEDURE — 96374 THER/PROPH/DIAG INJ IV PUSH: CPT | Mod: XU

## 2022-03-18 PROCEDURE — 80053 COMPREHEN METABOLIC PANEL: CPT

## 2022-03-18 PROCEDURE — 96361 HYDRATE IV INFUSION ADD-ON: CPT

## 2022-03-18 PROCEDURE — 72132 CT LUMBAR SPINE W/DYE: CPT | Mod: MD

## 2022-03-18 PROCEDURE — 87635 SARS-COV-2 COVID-19 AMP PRB: CPT

## 2022-03-18 PROCEDURE — 96376 TX/PRO/DX INJ SAME DRUG ADON: CPT

## 2022-03-18 PROCEDURE — 97161 PT EVAL LOW COMPLEX 20 MIN: CPT

## 2022-03-18 PROCEDURE — 36415 COLL VENOUS BLD VENIPUNCTURE: CPT

## 2022-03-18 PROCEDURE — 96375 TX/PRO/DX INJ NEW DRUG ADDON: CPT | Mod: XU

## 2022-03-18 RX ORDER — LIDOCAINE 4 G/100G
1 CREAM TOPICAL
Qty: 0 | Refills: 0 | DISCHARGE
Start: 2022-03-18

## 2022-03-18 RX ORDER — OXYCODONE HYDROCHLORIDE 5 MG/1
1 TABLET ORAL
Qty: 16 | Refills: 0
Start: 2022-03-18 | End: 2022-03-21

## 2022-03-18 RX ORDER — OXYCODONE HYDROCHLORIDE 5 MG/1
1 TABLET ORAL
Qty: 16 | Refills: 0
Start: 2022-03-18 | End: 2022-03-22

## 2022-03-18 RX ORDER — LIDOCAINE 4 G/100G
1 CREAM TOPICAL
Qty: 7 | Refills: 0
Start: 2022-03-18 | End: 2022-03-24

## 2022-03-18 RX ORDER — SENNA PLUS 8.6 MG/1
2 TABLET ORAL
Qty: 0 | Refills: 0 | DISCHARGE
Start: 2022-03-18

## 2022-03-18 RX ORDER — PANTOPRAZOLE SODIUM 20 MG/1
1 TABLET, DELAYED RELEASE ORAL
Qty: 0 | Refills: 0 | DISCHARGE
Start: 2022-03-18

## 2022-03-18 RX ADMIN — PANTOPRAZOLE SODIUM 40 MILLIGRAM(S): 20 TABLET, DELAYED RELEASE ORAL at 05:35

## 2022-03-18 RX ADMIN — CHLORHEXIDINE GLUCONATE 1 APPLICATION(S): 213 SOLUTION TOPICAL at 05:39

## 2022-03-18 RX ADMIN — LIDOCAINE 1 PATCH: 4 CREAM TOPICAL at 12:14

## 2022-03-18 RX ADMIN — POLYETHYLENE GLYCOL 3350 17 GRAM(S): 17 POWDER, FOR SOLUTION ORAL at 05:34

## 2022-03-18 NOTE — PROGRESS NOTE ADULT - SUBJECTIVE AND OBJECTIVE BOX
Name of Patient : JAK DANIELS  MRN: [id number]  Date of visit: 03-18-22 @ 13:05      Subjective: Patient seen and examined. No new events except as noted.   Patient reports significant improvement in his back pain.   Worked with PT this AM; reports ambulating around unit  Reports eating well and having normal BM yesterday and today S/P miralax.  Denies CP, palpitations, SOB or dyspnea.     REVIEW OF SYSTEMS:    CONSTITUTIONAL: No weakness, fevers or chills  EYES/ENT: No visual changes;  No vertigo or throat pain   NECK: No pain or stiffness  RESPIRATORY: No cough, wheezing, hemoptysis; No shortness of breath  CARDIOVASCULAR: No chest pain or palpitations  GASTROINTESTINAL: No abdominal or epigastric pain. No nausea, vomiting, or hematemesis; No diarrhea or constipation. No melena or hematochezia.  GENITOURINARY: No dysuria, frequency or hematuria  NEUROLOGICAL: +Back pain improving   SKIN: No itching, burning, rashes, or lesions   All other review of systems is negative unless indicated above.    MEDICATIONS:  MEDICATIONS  (STANDING):  chlorhexidine 2% Cloths 1 Application(s) Topical <User Schedule>  heparin   Injectable 5000 Unit(s) SubCutaneous every 8 hours  influenza   Vaccine 0.5 milliLiter(s) IntraMuscular once  lidocaine   4% Patch 1 Patch Transdermal every 24 hours  oxyCODONE    IR 10 milliGRAM(s) Oral every 4 hours  pantoprazole    Tablet 40 milliGRAM(s) Oral before breakfast  senna 2 Tablet(s) Oral at bedtime  sodium chloride 0.9%. 1000 milliLiter(s) (100 mL/Hr) IV Continuous <Continuous>      PHYSICAL EXAM:  T(C): 36.9 (03-18-22 @ 12:12), Max: 36.9 (03-18-22 @ 12:12)  HR: 72 (03-18-22 @ 12:12) (59 - 90)  BP: 119/79 (03-18-22 @ 12:12) (113/71 - 129/72)  RR: 18 (03-18-22 @ 12:12) (18 - 20)  SpO2: 99% (03-18-22 @ 12:12) (98% - 99%)  Wt(kg): --  I&O's Summary    17 Mar 2022 07:01  -  18 Mar 2022 07:00  --------------------------------------------------------  IN: 2280 mL / OUT: 0 mL / NET: 2280 mL    18 Mar 2022 07:01  -  18 Mar 2022 13:05  --------------------------------------------------------  IN: 240 mL / OUT: 0 mL / NET: 240 mL          Appearance: Normal	  HEENT:  PERRLA   Lymphatic: No lymphadenopathy   Cardiovascular: Normal S1 S2, no JVD  Respiratory: normal effort , clear  Gastrointestinal:  Soft, Non-tender  Skin: No rashes,  warm to touch  Psychiatry:  Mood & affect appropriate  Musculoskeletal: No edema          03-17-22 @ 07:01  -  03-18-22 @ 07:00  --------------------------------------------------------  IN: 2280 mL / OUT: 0 mL / NET: 2280 mL    03-18-22 @ 07:01  -  03-18-22 @ 13:05  --------------------------------------------------------  IN: 240 mL / OUT: 0 mL / NET: 240 mL                              12.6   4.45  )-----------( 100      ( 17 Mar 2022 08:35 )             37.5               03-17    141  |  105  |  12  ----------------------------<  118<H>  4.1   |  22  |  0.84    Ca    9.8      17 Mar 2022 08:35    TPro  7.1  /  Alb  4.5  /  TBili  0.4  /  DBili  x   /  AST  21  /  ALT  55<H>  /  AlkPhos  85  03-17

## 2022-03-18 NOTE — DISCHARGE NOTE PROVIDER - NSDCMRMEDTOKEN_GEN_ALL_CORE_FT
lidocaine 4% topical film: Apply topically to affected area once a day  MethylPREDNISolone Dose Pack 4 mg oral tablet: 1 tab(s) orally once a day   oxyCODONE 10 mg oral tablet: 1 tab(s) orally every 6 hours MDD:4 tab  pantoprazole 40 mg oral delayed release tablet: 1 tab(s) orally once a day (before a meal)  PT consult : 3 to 4 times a week   senna oral tablet: 2 tab(s) orally once a day (at bedtime)

## 2022-03-18 NOTE — DISCHARGE NOTE NURSING/CASE MANAGEMENT/SOCIAL WORK - PATIENT PORTAL LINK FT
You can access the FollowMyHealth Patient Portal offered by [facility] by registering at the following website: [email or web address] By joining [email or web address] [email or web address] portal, you will also be able to view your health information using other applications (apps) compatible with our system.

## 2022-03-18 NOTE — DISCHARGE NOTE PROVIDER - HOSPITAL COURSE
patient is a 35 Y/O Male with PMHX of AML last chemo 6w prior, HTN presenting with low back pain which has been worsening for past week with inability to walk.  worsening since onset. pt able to ambulate with significant pain. pain located in lumbar back. endorses weakness b/l LE. denies fevers. had bone marrow biopsy 8w prior. pt denies saddle anesthesia, bowel/bladder incontinence.    Severe lower back pain  -Pan CT noted   -MRI of lumbar spine noted   -Aggressive pain control with Oxy /medrol dose pack.  -lidocaine patch   -stool softner/ laxative OTC.  -PT recomm : Out patient PT  -Reports improvement and relief in symptoms, and insisting to go home.    STEFANY     -IV hydration  -Cr improving 1.20--> 0.84 on AM labs    Mildly elevated LFTs  - Alt of 64--> 55, downtrending      AML  -Follow with house hematology   -completed course of treatment   -Heme.Onc eval as recommended.    Medically stable for discharge by  with follow up as advised.

## 2022-03-18 NOTE — PHYSICAL THERAPY INITIAL EVALUATION ADULT - PERTINENT HX OF CURRENT PROBLEM, REHAB EVAL
Pt is a 35 y/o M w/ PMHX of AML last chemo 6 weeks prior & HTN who presented with LBP which has been worsening for past week. Pt has able to ambulate with significant pain, located in lumbar spine. Pt endorsed weakness BLE, denied fevers. Pt denied saddle anesthesia, bowel/bladder incontinence.

## 2022-03-18 NOTE — DISCHARGE NOTE NURSING/CASE MANAGEMENT/SOCIAL WORK - NSDCPEFALRISK_GEN_ALL_CORE
For information on Fall & Injury Prevention, visit: [email or web address] OR  [email or web address] OR  https://www.cdc.gov/steadi/patient.html

## 2022-03-18 NOTE — DISCHARGE NOTE PROVIDER - NSDCCPCAREPLAN_GEN_ALL_CORE_FT
PRINCIPAL DISCHARGE DIAGNOSIS  Diagnosis: Acute lumbar back pain  Assessment and Plan of Treatment: -Aggressive pain control with Oxy /medrol dose pack.  -lidocaine patch   -stool softner/ laxative OTC.  -PT recomm : Out patient PT

## 2022-03-18 NOTE — DISCHARGE NOTE PROVIDER - NSRESEARCHGRANT_PROPHYLAXISRECOMFT_GEN_A_CORE
Patient Returning Call  Reason for call:  LMTCB  Information relayed to patient:  Let patient know be will need to wait for Gilberto Arnlod MD to return to clinic or see a covering provider to get medication refilled.  Patient reports he was out of medication yesterday.  Transferred to scheduling to get an appt scheduled with a covering provider.  Patient has additional questions:  No  If YES, what are your questions/concerns:  N/A  Okay to leave a detailed message?: No call back needed   IMPROVE-DD Application Not Available

## 2022-03-18 NOTE — PHYSICAL THERAPY INITIAL EVALUATION ADULT - ADDITIONAL COMMENTS
Pt lives in a private home with his father. There is one flight of stairs within the house. PTA, pt was independent with all functional mobility & ADL's without the use of an AD. Pt lives in a private home with his father. There is one flight of stairs within the house. PTA, pt was independent with all functional mobility & ADL's without the use of an AD.    MRI Spine: No Epidural abscess seen on non contrast images. Small Central Disc Protrusion L5-S1. Mild Disc Bulge L4-5.

## 2022-03-18 NOTE — PROGRESS NOTE ADULT - SUBJECTIVE AND OBJECTIVE BOX
DATE OF SERVICE: 03-18-22      Patient is a 36y old  Male who presents with a chief complaint of severe lower back (18 Mar 2022 13:05)      INTERVAL HISTORY: feels well     REVIEW OF SYSTEMS:  CONSTITUTIONAL: No weakness  EYES/ENT: No visual changes;  No throat pain   NECK: No pain or stiffness  RESPIRATORY: No cough, wheezing; No shortness of breath  CARDIOVASCULAR: No chest pain or palpitations  GASTROINTESTINAL: No abdominal  pain. No nausea, vomiting, or hematemesis  GENITOURINARY: No dysuria, frequency or hematuria  NEUROLOGICAL: No stroke like symptoms  SKIN: No rashes            PHYSICAL EXAM:  T(C): 36.9 (03-18-22 @ 12:12), Max: 36.9 (03-18-22 @ 12:12)  HR: 72 (03-18-22 @ 12:12) (72 - 90)  BP: 119/79 (03-18-22 @ 12:12) (113/71 - 119/79)  RR: 18 (03-18-22 @ 12:12) (18 - 18)  SpO2: 99% (03-18-22 @ 12:12) (99% - 99%)  Wt(kg): --  I&O's Summary    18 Mar 2022 07:01  -  19 Mar 2022 07:00  --------------------------------------------------------  IN: 480 mL / OUT: 0 mL / NET: 480 mL          Appearance: In no distress	  HEENT:    PERRL, EOMI	  Cardiovascular:  S1 S2, No JVD  Respiratory: Lungs clear to auscultation	  Gastrointestinal:  Soft, Non-tender, + BS	  Vascularature:  No edema of LE  Psychiatric: Appropriate affect   Neuro: no acute focal deficits                               12.6   4.45  )-----------( 100      ( 17 Mar 2022 08:35 )             37.5     03-17    141  |  105  |  12  ----------------------------<  118<H>  4.1   |  22  |  0.84    Ca    9.8      17 Mar 2022 08:35    TPro  7.1  /  Alb  4.5  /  TBili  0.4  /  DBili  x   /  AST  21  /  ALT  55<H>  /  AlkPhos  85  03-17        Labs personally reviewed      ASSESSMENT/PLAN: 	  patient is a 37 Y/O Male with PMHX of AML last chemo 6w prior, HTN presenting with low back pain which has been worsening for past week with inability to walk.  worsening since onset. pt able to ambulate with significant pain. pain located in lumbar back. endorses weakness b/l LE. denies fevers. had bone marrow biopsy 8w prior. pt denies saddle anesthesia, bowel/bladder incontinence.      1. Tachycardia - some chronic component with exacerbation 2/2 acute back pain and prerenal state  - should improve with treatment of pain and IV hydration   - HR improved now 60-90's     2. Severe lower back pain  Pan CT noted   MRI of lumbar spine noted   Aggressive pain control with dilaudid and decadron  lidocaine patch   PT as tolerated     3 STEFANY   MOnitor BUN/Cr  IV hydration  monitor urine output     4. AML  follow with hematology   completed course of treatment now in remission       5. Pericardial effusion - reports mild pericardial effusion as OP with cardio Dr Myrick  - advised repeat echo in 2-4 weeks to reassess for resolution  - effusion likely secondary to heme disorder  	    I had a prolonged conversation with the patient regarding hospital course, differential diagnosis and results of diagnostic tests.  Plan of care discussed with patient after the evaluation. Patient expresses clear understanding and satisfaction with the plan of care. OMT on six regions for acute somatic dysfunctions done at the bedside. Sixty five minutes spent on encounter, of which more than fifty percent of the encounter was spent on counseling and/or coordinating care by the attending physician.      Mitul Zelaya DO [facility]  Cardiovascular Medicine  [address] [place] [address], [address]  Office: [phone]  Cell: [phone]

## 2022-03-18 NOTE — PROGRESS NOTE ADULT - ASSESSMENT
patient is a 35 Y/O Male with PMHX of AML last chemo 6w prior, HTN presenting with low back pain which has been worsening for past week with inability to walk.  worsening since onset. pt able to ambulate with significant pain. pain located in lumbar back. endorses weakness b/l LE. denies fevers. had bone marrow biopsy 8w prior. pt denies saddle anesthesia, bowel/bladder incontinence.      Severe lower back pain  -Pan CT noted   -MRI of lumbar spine noted   -Aggressive pain control with dilaudid and decadron  -lidocaine patch   -PT as tolerated, seen this AM 03/18  -IV hydration   -Reports improvement and relief in symptoms 03/18    STEFANY   -Monitor BUN/Cr  -IV hydration  -Monitor urine output   -Cr improving 1.20--> 0.84 on AM labs    Mildly elevated LFTs  - Alt of 64--> 55, downtrending  - Continue to monitor and trend  - Non-TTP on exam    AML  -Follow with house hematology   -completed course of treatment   -Heme.Onc eval PRN     DVT and GI PPX     Discharge planning  patient is a 35 Y/O Male with PMHX of AML last chemo 6w prior, HTN presenting with low back pain which has been worsening for past week with inability to walk.  worsening since onset. pt able to ambulate with significant pain. pain located in lumbar back. endorses weakness b/l LE. denies fevers. had bone marrow biopsy 8w prior. pt denies saddle anesthesia, bowel/bladder incontinence.      Severe lower back pain  -Pan CT noted   -MRI of lumbar spine noted   -Aggressive pain control with dilaudid and decadron  -lidocaine patch   -PT as tolerated, seen this AM 03/18  -IV hydration   -Reports improvement and relief in symptoms 03/18    STEFANY   -Monitor BUN/Cr  -IV hydration  -Monitor urine output   -Cr improving 1.20--> 0.84 on AM labs    Mildly elevated LFTs  - Alt of 64--> 55, downtrending  - Continue to monitor and trend  - Non-TTP on exam    AML  -Follow with house hematology   -completed course of treatment   -Heme.Onc eval PRN     DVT and GI PPX     Discharge planning w/ Medrol steroid pack, Lidocaine patch, Oxycodone and outpatient F/U in office

## 2022-03-21 ENCOUNTER — RESULT REVIEW (OUTPATIENT)
Age: 37
End: 2022-03-21

## 2022-03-21 ENCOUNTER — APPOINTMENT (OUTPATIENT)
Dept: HEMATOLOGY ONCOLOGY | Facility: CLINIC | Age: 37
End: 2022-03-21
Payer: COMMERCIAL

## 2022-03-21 VITALS
HEART RATE: 90 BPM | TEMPERATURE: 97 F | RESPIRATION RATE: 16 BRPM | BODY MASS INDEX: 27.62 KG/M2 | WEIGHT: 196.21 LBS | DIASTOLIC BLOOD PRESSURE: 78 MMHG | OXYGEN SATURATION: 99 % | SYSTOLIC BLOOD PRESSURE: 114 MMHG

## 2022-03-21 LAB
BASOPHILS # BLD AUTO: 0.01 K/UL — SIGNIFICANT CHANGE UP (ref 0–0.2)
BASOPHILS NFR BLD AUTO: 0.3 % — SIGNIFICANT CHANGE UP (ref 0–2)
EOSINOPHIL # BLD AUTO: 0.04 K/UL — SIGNIFICANT CHANGE UP (ref 0–0.5)
EOSINOPHIL NFR BLD AUTO: 1.3 % — SIGNIFICANT CHANGE UP (ref 0–6)
HCT VFR BLD CALC: 40.9 % — SIGNIFICANT CHANGE UP (ref 39–50)
HGB BLD-MCNC: 14.1 G/DL — SIGNIFICANT CHANGE UP (ref 13–17)
IMM GRANULOCYTES NFR BLD AUTO: 0 % — SIGNIFICANT CHANGE UP (ref 0–1.5)
LYMPHOCYTES # BLD AUTO: 0.37 K/UL — LOW (ref 1–3.3)
LYMPHOCYTES # BLD AUTO: 12 % — LOW (ref 13–44)
MCHC RBC-ENTMCNC: 34.3 PG — HIGH (ref 27–34)
MCHC RBC-ENTMCNC: 35.2 G/DL — SIGNIFICANT CHANGE UP (ref 32–36)
MCV RBC AUTO: 97.4 FL — SIGNIFICANT CHANGE UP (ref 80–100)
MONOCYTES # BLD AUTO: 0.28 K/UL — SIGNIFICANT CHANGE UP (ref 0–0.9)
MONOCYTES NFR BLD AUTO: 9.1 % — SIGNIFICANT CHANGE UP (ref 2–14)
NEUTROPHILS # BLD AUTO: 2.39 K/UL — SIGNIFICANT CHANGE UP (ref 1.8–7.4)
NEUTROPHILS NFR BLD AUTO: 77.3 % — HIGH (ref 43–77)
NRBC # BLD: 0 /100 WBCS — SIGNIFICANT CHANGE UP (ref 0–0)
PLATELET # BLD AUTO: 87 K/UL — LOW (ref 150–400)
RBC # BLD: 4.2 M/UL — SIGNIFICANT CHANGE UP (ref 4.2–5.8)
RBC # FLD: 15 % — HIGH (ref 10.3–14.5)
WBC # BLD: 3.09 K/UL — LOW (ref 3.8–10.5)
WBC # FLD AUTO: 3.09 K/UL — LOW (ref 3.8–10.5)

## 2022-03-21 PROCEDURE — 99213 OFFICE O/P EST LOW 20 MIN: CPT

## 2022-03-21 NOTE — ASSESSMENT
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 \par Started induction with Dauno/Cytarabine on 8/6/21. \par Pt's counts now recovered, remission marrow done on 9/2- in remission. Proceed to consolidation with 4 cycles of HIDAC. C1 HIDAC on 9/17, c/b neutropenic fever and diarrhea. C2 HIDAC on 10/25, was postponed for 1 week due to admission for diarrhea, given negative stool studies, suspect diarrhea was likely chemo-related. Pt was admitted again 11/13-11/17 for sepsis due to thumb cellulitis after laceration. C3 on 11/26, had Fulphilia on C3 c/b epistaxis and neutropenic fever w/Temp 100.3. C4 HiDAC 1/5/22, c/b transaminitis and neutropenic fever\par \par s/p BMbx 2/11/22 showing CR.\par He has non-necrotizing granulomas noted in BMbx, unclear significance\par All questions answered\par RTC monthly\par \par \par \par \par

## 2022-03-21 NOTE — PHYSICAL EXAM
[Fully active, able to carry on all pre-disease performance without restriction] : Status 0 - Fully active, able to carry on all pre-disease performance without restriction [Normal] : affect appropriate [de-identified] : a 1 cm long laceration with sutures on the right thrum, healing well

## 2022-03-21 NOTE — HISTORY OF PRESENT ILLNESS
[de-identified] : 37yo M w/ HTN and newly diagnosed AML here for f/u. \par \par He presented to the hospital on 7/30/21 with complaints of dizziness, fatigue and severe RUQ pain for 3 days. CT A/P revealed fatty liver and small R pleural effusion. WBC 12k with 17% blasts.Peripheral flow cytometry showed 13% myeloblasts. FLT3(-). BMbx was done on 8/4/21 - confirmed AML. 46,XY             {20             }\par Foundation: mutations in DNMT3A R882H, NRAS G12D, NPM1 W288fs*12\par Pt consented to [place]. Patient was offered sperm banking, but declined.\par On 8/6, induction with Dauno/Cytararbine was started (cytarabine 100/m2 and dauno 90/m2) \par He received a seven day course of Zosyn for presumed RUL PNA, then transitioned to  levaquin, posaconazole and Acyclovir for ppx for neutropenia. Course c/b neutropenic fevers, cultures negative, repeat CT 8/14 without infectious source. He was on Cefepime but developed a rash, changed to meropenem. Rash improved. \par Day 14 BMbx on 8/19 was hypocellular with chemotherapeutic effect; however, per hematopathology, appears to be earlier regeneration than would typically seen which is concerning for persistent disease at this point, and the earliest cells are CD34 positive and his myeloblasts on initial presentation were CD34 negative. Thus, this may simply represent early regeneration of his marrow. Plan is to await count recovery and repeat biopsy.  \par Patient discharged home on 8/29/21 when ANC >500 [de-identified] : Eating well since discharge. \par c/o hemorrhoidal pain. Hemorrhoids started a few days prior to discharge. He was sent home with rectal ointment and witch hazel. \par \par BMBx done on 9/2: Cellular bone marrow with trilineage hematopoiesis with maturation and megakaryocytosis (history of AML).\par Pt feels well, CBC today showed count stable\par \par s/p C1 HIDAC 9/17-9/22 \par c/o leg pain after chemo in the hospital \par \par He presented to the ED on 10/9 due to fever the night of presentation. The patient went to sleep around 10pm and woke up at 3am feeling warm and clammy. He took his temperature orally at home and it was 100.7. The day prior to presentation (10/8/21), the patient went to [facility] for an appointment to get his platelet count checked. He states he was feeling a bit run down and thought he was going to need a transfusion, but he did not need a transfusion. He was afebrile during the time of the appointment and went home afterwards. Around 3pm, the patient had bilateral crampy leg pain that was similar to the leg pain he felt during his consolidation therapy treatment. He took hydrocodone and the pain improved. The patient had one episode of diarrhea three days prior to presentation and has been bothered by rectal pain associated with his "large hemorrhoids. He denies any bleeding per rectum. He also feels like his mouth has been more dry than usual over the past several days, but denies all other symptoms. \par CT Abdomen and Pelvis w/ Oral Cont and w/ IV Conttrast on 10/9 revealed Region of hypoenhancement upper pole left kidney; please correlate clinically for possible pyelonephritis. No hydronephrosis or perinephric stranding. No rectal abscess.\par CT Chest No Contrast on 10/9 revealed Clear lungs.\par 10/9- BCX NGTD and 10/9- UCX (-)\par He ate some cold salad late last night, had upsetting stomach and diarrhea this morning. Will take Imodium for diarrhea. \par \par C2 is due on 10/15, pt wants deferring to next Monday after his diarrhea improved. \par Admission of  C2 was postponed due to worsening diarrhea. Went to ED on 10/15 due to worsening watery diarrhea. GI PCR neg, C Diff neg\par Given negative stool studies, suspect diarrhea was likely chemo-related. S/p cycle 2 Consolidation with HIDAC started on 10/25. Patient received IV hydration, strict I/O, antiemetics, monitoring of CBC and CMP and for cerebellar toxicity. PRedforte eye drops given to prevent chemical conjunctivitis. Patient with fever throughout course of treatment. Cultures negative. Due to fever probably related to HIDAC, patient received dexamethasone prior to the last 2 doses of cytatabine. \par He is seen today accompanied by his father, pt feels fatigue after chemo, other than that he feels fine. Denied any fever, chills diarrhea. \par \par Pt was admitted on 11/13-11/17 s/p right first digit laceration repair on 11/12 and presenting with erythema and pain at the site of laceration repair. Patient reported he was feeling unwell prior to suture placement with body aches, chills, night sweats and subjective fevers. Patient last BM 4 days ago. Has bruise to left inner thigh. Patient reports he keeps his PICC site clean and intact which was placed in 9/2021. Upon admission to the hospital ID was consulted started on Zosyn , GI consulted for rectal pain secondary to hemorrhoids and palliative consulted for pain control.\par  \par C3 on 11/26, tolerated well. He was seen today after discharge, accompanied by his father. He admitted feeling tired, denied any f/c, diarrhea. Right hand suture site healing well, no abnormal erythema or discharge. \par He will have Fulphilia today. \par C3 HIDAC 11/26-12/1\par He presents to the hospital due to epistaxis and neutropenic fever w/Temp 100.3 on 12/1. Per patient, he reports that he was feeling sinus congestion and pressure on his L side, blew his nose and bleeding began from L nare without improvement prompting arrival to the emergency department. Feels mild L sinus congestion.  L nasal cavity packed with absorbable packing; F/U ENT clinic outpatient. Pan culture obtained-with No growth currently\par CBC rechecked today recovered. He feels well overall , had lower abdominal pain last night after ate cheese, now improved. Denied any fever chills bloody stool or diarrhea. \par \par s/p C4 HiDAC 1/5/22\par Pt has known grade 1 AST and grade 3 ALT transaminitis. Presented this admission with transamintitis. Abd sono  performed and revealed Borderline/mild hepatomegaly with hepatic steatosis. Splenomegaly. Focal area of left upper pole increased renal cortical echogenicity, corresponding to an area of hypoattenuation seen on prior CT chest, \par abdomen and pelvis dated 10/9/2021. This is nonspecific and may reflect focal edema. Patient received IV hydration, antiemetics, monitoring of CBC and electrolytes. Predforte eye drops provided to prevent chemical conjunctivitis. Patient was monitored for cerebellar toxicity. Nystagmus checks performed. Hospital course complicated by fever blood cultures showed (-), most likely febrile secondary to HIDAC treatment. To receive Fulphila today.\par \par Pt presented to the hospital on 1/18/22 due fever of 100.4, weakness, decreased WBC and shortness of breath while at home. Patient reports that his pain is more controlled s/p pain medication and his shortness of breath has resolved. He denied chills, cough, chest pain, palpitations.\par Pan culture (blood +Ucx) were negative, CXR reveals clear lungs, COVID PCR - not detect. Pt started on empiric Zosyn, Diflucan and Acyclovir added prophylactically for neutropenic fever. He was transfused with platelets and PRBC as per supportive parameters (>10k/>7) respectively.\par He feels well overall now, denied any fever, chills or pain. \par \par BMbx 2/11/22: Cellular bone marrow with erythroid predominant trilineage hematopoiesis\par No morphologic or immunophenotypic evidence of persistant acute myeloid leukemia\par Non-necrotizing granulomas\par Normal male karyotype and normal onkosight myeloid NGS panel study.\par \par He was admitted for back pain 2/2 herniated disk - Rx'd pain meds and a Medrol pack.

## 2022-03-24 LAB
ALBUMIN SERPL ELPH-MCNC: 5.2 G/DL
ALP BLD-CCNC: 88 U/L
ALT SERPL-CCNC: 63 U/L
ANION GAP SERPL CALC-SCNC: 17 MMOL/L
AST SERPL-CCNC: 29 U/L
BILIRUB SERPL-MCNC: 0.4 MG/DL
BUN SERPL-MCNC: 16 MG/DL
CALCIUM SERPL-MCNC: 10.4 MG/DL
CHLORIDE SERPL-SCNC: 105 MMOL/L
CO2 SERPL-SCNC: 23 MMOL/L
CREAT SERPL-MCNC: 0.96 MG/DL
EGFR: 105 ML/MIN/1.73M2
GLUCOSE SERPL-MCNC: 92 MG/DL
LDH SERPL-CCNC: 176 U/L
POTASSIUM SERPL-SCNC: 4.2 MMOL/L
PROT SERPL-MCNC: 7.4 G/DL
SODIUM SERPL-SCNC: 145 MMOL/L

## 2022-04-01 RX ORDER — OXYCODONE HYDROCHLORIDE 5 MG/1
1 TABLET ORAL
Qty: 60 | Refills: 0
Start: 2022-04-01 | End: 2022-04-30

## 2022-04-16 ENCOUNTER — NON-APPOINTMENT (OUTPATIENT)
Age: 37
End: 2022-04-16

## 2022-04-16 ENCOUNTER — INPATIENT (INPATIENT)
Facility: HOSPITAL | Age: 37
LOS: 2 days | Discharge: HOME | DRG: 418 | End: 2022-04-19
Attending: SURGERY | Admitting: SURGERY
Payer: COMMERCIAL

## 2022-04-16 VITALS
RESPIRATION RATE: 24 BRPM | SYSTOLIC BLOOD PRESSURE: 137 MMHG | HEIGHT: 71 IN | OXYGEN SATURATION: 100 % | TEMPERATURE: 98 F | HEART RATE: 90 BPM | WEIGHT: 195.11 LBS | DIASTOLIC BLOOD PRESSURE: 85 MMHG

## 2022-04-16 DIAGNOSIS — K81.9 CHOLECYSTITIS, UNSPECIFIED: ICD-10-CM

## 2022-04-16 LAB
ALBUMIN SERPL ELPH-MCNC: 4.7 G/DL — SIGNIFICANT CHANGE UP (ref 3.3–5)
ALP SERPL-CCNC: 101 U/L — SIGNIFICANT CHANGE UP (ref 40–120)
ALT FLD-CCNC: 108 U/L — HIGH (ref 10–45)
ANION GAP SERPL CALC-SCNC: 18 MMOL/L — HIGH (ref 5–17)
APPEARANCE UR: CLEAR — SIGNIFICANT CHANGE UP
APTT BLD: 29.6 SEC — SIGNIFICANT CHANGE UP (ref 27.5–35.5)
AST SERPL-CCNC: 89 U/L — HIGH (ref 10–40)
BASE EXCESS BLDV CALC-SCNC: 3.2 MMOL/L — HIGH (ref -2–2)
BASOPHILS # BLD AUTO: 0.02 K/UL — SIGNIFICANT CHANGE UP (ref 0–0.2)
BASOPHILS NFR BLD AUTO: 0.9 % — SIGNIFICANT CHANGE UP (ref 0–2)
BILIRUB SERPL-MCNC: 0.5 MG/DL — SIGNIFICANT CHANGE UP (ref 0.2–1.2)
BILIRUB UR-MCNC: NEGATIVE — SIGNIFICANT CHANGE UP
BLD GP AB SCN SERPL QL: NEGATIVE — SIGNIFICANT CHANGE UP
BUN SERPL-MCNC: 17 MG/DL — SIGNIFICANT CHANGE UP (ref 7–23)
CA-I SERPL-SCNC: 1.21 MMOL/L — SIGNIFICANT CHANGE UP (ref 1.15–1.33)
CALCIUM SERPL-MCNC: 10 MG/DL — SIGNIFICANT CHANGE UP (ref 8.4–10.5)
CHLORIDE BLDV-SCNC: 104 MMOL/L — SIGNIFICANT CHANGE UP (ref 96–108)
CHLORIDE SERPL-SCNC: 102 MMOL/L — SIGNIFICANT CHANGE UP (ref 96–108)
CO2 BLDV-SCNC: 28 MMOL/L — HIGH (ref 22–26)
CO2 SERPL-SCNC: 21 MMOL/L — LOW (ref 22–31)
COLOR SPEC: SIGNIFICANT CHANGE UP
CREAT SERPL-MCNC: 1.04 MG/DL — SIGNIFICANT CHANGE UP (ref 0.5–1.3)
DIFF PNL FLD: NEGATIVE — SIGNIFICANT CHANGE UP
EGFR: 95 ML/MIN/1.73M2 — SIGNIFICANT CHANGE UP
EOSINOPHIL # BLD AUTO: 0 K/UL — SIGNIFICANT CHANGE UP (ref 0–0.5)
EOSINOPHIL NFR BLD AUTO: 0 % — SIGNIFICANT CHANGE UP (ref 0–6)
GAS PNL BLDV: 138 MMOL/L — SIGNIFICANT CHANGE UP (ref 136–145)
GAS PNL BLDV: SIGNIFICANT CHANGE UP
GAS PNL BLDV: SIGNIFICANT CHANGE UP
GLUCOSE BLDV-MCNC: 108 MG/DL — HIGH (ref 70–99)
GLUCOSE SERPL-MCNC: 108 MG/DL — HIGH (ref 70–99)
GLUCOSE UR QL: NEGATIVE — SIGNIFICANT CHANGE UP
HCO3 BLDV-SCNC: 27 MMOL/L — SIGNIFICANT CHANGE UP (ref 22–29)
HCT VFR BLD CALC: 38.4 % — LOW (ref 39–50)
HCT VFR BLDA CALC: 42 % — SIGNIFICANT CHANGE UP (ref 39–51)
HGB BLD CALC-MCNC: 14 G/DL — SIGNIFICANT CHANGE UP (ref 12.6–17.4)
HGB BLD-MCNC: 13.4 G/DL — SIGNIFICANT CHANGE UP (ref 13–17)
INR BLD: 0.99 RATIO — SIGNIFICANT CHANGE UP (ref 0.88–1.16)
KETONES UR-MCNC: NEGATIVE — SIGNIFICANT CHANGE UP
LACTATE BLDV-MCNC: 2.7 MMOL/L — HIGH (ref 0.7–2)
LEUKOCYTE ESTERASE UR-ACNC: NEGATIVE — SIGNIFICANT CHANGE UP
LIDOCAIN IGE QN: 29 U/L — SIGNIFICANT CHANGE UP (ref 7–60)
LYMPHOCYTES # BLD AUTO: 0.36 K/UL — LOW (ref 1–3.3)
LYMPHOCYTES # BLD AUTO: 17.7 % — SIGNIFICANT CHANGE UP (ref 13–44)
MANUAL SMEAR VERIFICATION: SIGNIFICANT CHANGE UP
MCHC RBC-ENTMCNC: 33.3 PG — SIGNIFICANT CHANGE UP (ref 27–34)
MCHC RBC-ENTMCNC: 34.9 GM/DL — SIGNIFICANT CHANGE UP (ref 32–36)
MCV RBC AUTO: 95.5 FL — SIGNIFICANT CHANGE UP (ref 80–100)
MONOCYTES # BLD AUTO: 0.35 K/UL — SIGNIFICANT CHANGE UP (ref 0–0.9)
MONOCYTES NFR BLD AUTO: 16.8 % — HIGH (ref 2–14)
NEUTROPHILS # BLD AUTO: 1.24 K/UL — LOW (ref 1.8–7.4)
NEUTROPHILS NFR BLD AUTO: 60.2 % — SIGNIFICANT CHANGE UP (ref 43–77)
NITRITE UR-MCNC: NEGATIVE — SIGNIFICANT CHANGE UP
PCO2 BLDV: 37 MMHG — LOW (ref 42–55)
PH BLDV: 7.47 — HIGH (ref 7.32–7.43)
PH UR: 7 — SIGNIFICANT CHANGE UP (ref 5–8)
PLAT MORPH BLD: NORMAL — SIGNIFICANT CHANGE UP
PLATELET # BLD AUTO: 108 K/UL — LOW (ref 150–400)
PO2 BLDV: 18 MMHG — LOW (ref 25–45)
POTASSIUM BLDV-SCNC: 4.1 MMOL/L — SIGNIFICANT CHANGE UP (ref 3.5–5.1)
POTASSIUM SERPL-MCNC: 4 MMOL/L — SIGNIFICANT CHANGE UP (ref 3.5–5.3)
POTASSIUM SERPL-SCNC: 4 MMOL/L — SIGNIFICANT CHANGE UP (ref 3.5–5.3)
PROT SERPL-MCNC: 7.2 G/DL — SIGNIFICANT CHANGE UP (ref 6–8.3)
PROT UR-MCNC: NEGATIVE — SIGNIFICANT CHANGE UP
PROTHROM AB SERPL-ACNC: 11.4 SEC — SIGNIFICANT CHANGE UP (ref 10.5–13.4)
RBC # BLD: 4.02 M/UL — LOW (ref 4.2–5.8)
RBC # FLD: 13 % — SIGNIFICANT CHANGE UP (ref 10.3–14.5)
RBC BLD AUTO: SIGNIFICANT CHANGE UP
RH IG SCN BLD-IMP: POSITIVE — SIGNIFICANT CHANGE UP
SAO2 % BLDV: 25.8 % — LOW (ref 67–88)
SARS-COV-2 RNA SPEC QL NAA+PROBE: SIGNIFICANT CHANGE UP
SARS-COV-2 RNA SPEC QL NAA+PROBE: SIGNIFICANT CHANGE UP
SODIUM SERPL-SCNC: 141 MMOL/L — SIGNIFICANT CHANGE UP (ref 135–145)
SP GR SPEC: 1.01 — LOW (ref 1.01–1.02)
UROBILINOGEN FLD QL: NEGATIVE — SIGNIFICANT CHANGE UP
VARIANT LYMPHS # BLD: 4.4 % — SIGNIFICANT CHANGE UP (ref 0–6)
WBC # BLD: 2.06 K/UL — LOW (ref 3.8–10.5)
WBC # FLD AUTO: 2.06 K/UL — LOW (ref 3.8–10.5)

## 2022-04-16 PROCEDURE — 99255 IP/OBS CONSLTJ NEW/EST HI 80: CPT | Mod: GC

## 2022-04-16 PROCEDURE — 76705 ECHO EXAM OF ABDOMEN: CPT | Mod: 26

## 2022-04-16 PROCEDURE — 99222 1ST HOSP IP/OBS MODERATE 55: CPT

## 2022-04-16 PROCEDURE — 99285 EMERGENCY DEPT VISIT HI MDM: CPT

## 2022-04-16 RX ORDER — PANTOPRAZOLE SODIUM 20 MG/1
40 TABLET, DELAYED RELEASE ORAL
Refills: 0 | Status: DISCONTINUED | OUTPATIENT
Start: 2022-04-16 | End: 2022-04-19

## 2022-04-16 RX ORDER — LEVOCARNITINE 330 MG/1
750 TABLET ORAL ONCE
Refills: 0 | Status: DISCONTINUED | OUTPATIENT
Start: 2022-04-16 | End: 2022-04-16

## 2022-04-16 RX ORDER — ERTAPENEM SODIUM 1 G/1
1000 INJECTION, POWDER, LYOPHILIZED, FOR SOLUTION INTRAMUSCULAR; INTRAVENOUS ONCE
Refills: 0 | Status: COMPLETED | OUTPATIENT
Start: 2022-04-16 | End: 2022-04-16

## 2022-04-16 RX ORDER — HYDROMORPHONE HYDROCHLORIDE 2 MG/ML
0.5 INJECTION INTRAMUSCULAR; INTRAVENOUS; SUBCUTANEOUS ONCE
Refills: 0 | Status: DISCONTINUED | OUTPATIENT
Start: 2022-04-16 | End: 2022-04-16

## 2022-04-16 RX ORDER — SENNA PLUS 8.6 MG/1
2 TABLET ORAL AT BEDTIME
Refills: 0 | Status: DISCONTINUED | OUTPATIENT
Start: 2022-04-16 | End: 2022-04-19

## 2022-04-16 RX ORDER — SODIUM CHLORIDE 9 MG/ML
1000 INJECTION INTRAMUSCULAR; INTRAVENOUS; SUBCUTANEOUS ONCE
Refills: 0 | Status: COMPLETED | OUTPATIENT
Start: 2022-04-16 | End: 2022-04-16

## 2022-04-16 RX ORDER — ACETAMINOPHEN 500 MG
1000 TABLET ORAL EVERY 6 HOURS
Refills: 0 | Status: DISCONTINUED | OUTPATIENT
Start: 2022-04-16 | End: 2022-04-17

## 2022-04-16 RX ORDER — ACETAMINOPHEN 500 MG
1000 TABLET ORAL ONCE
Refills: 0 | Status: COMPLETED | OUTPATIENT
Start: 2022-04-16 | End: 2022-04-16

## 2022-04-16 RX ORDER — MORPHINE SULFATE 50 MG/1
4 CAPSULE, EXTENDED RELEASE ORAL ONCE
Refills: 0 | Status: DISCONTINUED | OUTPATIENT
Start: 2022-04-16 | End: 2022-04-16

## 2022-04-16 RX ORDER — HYDROMORPHONE HYDROCHLORIDE 2 MG/ML
1 INJECTION INTRAMUSCULAR; INTRAVENOUS; SUBCUTANEOUS ONCE
Refills: 0 | Status: DISCONTINUED | OUTPATIENT
Start: 2022-04-16 | End: 2022-04-16

## 2022-04-16 RX ORDER — ENOXAPARIN SODIUM 100 MG/ML
40 INJECTION SUBCUTANEOUS EVERY 24 HOURS
Refills: 0 | Status: DISCONTINUED | OUTPATIENT
Start: 2022-04-16 | End: 2022-04-19

## 2022-04-16 RX ORDER — KETOROLAC TROMETHAMINE 30 MG/ML
30 SYRINGE (ML) INJECTION ONCE
Refills: 0 | Status: DISCONTINUED | OUTPATIENT
Start: 2022-04-16 | End: 2022-04-16

## 2022-04-16 RX ADMIN — HYDROMORPHONE HYDROCHLORIDE 1 MILLIGRAM(S): 2 INJECTION INTRAMUSCULAR; INTRAVENOUS; SUBCUTANEOUS at 08:33

## 2022-04-16 RX ADMIN — HYDROMORPHONE HYDROCHLORIDE 1 MILLIGRAM(S): 2 INJECTION INTRAMUSCULAR; INTRAVENOUS; SUBCUTANEOUS at 09:46

## 2022-04-16 RX ADMIN — MORPHINE SULFATE 4 MILLIGRAM(S): 50 CAPSULE, EXTENDED RELEASE ORAL at 08:19

## 2022-04-16 RX ADMIN — HYDROMORPHONE HYDROCHLORIDE 0.5 MILLIGRAM(S): 2 INJECTION INTRAMUSCULAR; INTRAVENOUS; SUBCUTANEOUS at 14:19

## 2022-04-16 RX ADMIN — Medication 400 MILLIGRAM(S): at 09:46

## 2022-04-16 RX ADMIN — SODIUM CHLORIDE 1000 MILLILITER(S): 9 INJECTION INTRAMUSCULAR; INTRAVENOUS; SUBCUTANEOUS at 08:19

## 2022-04-16 RX ADMIN — Medication 30 MILLIGRAM(S): at 08:26

## 2022-04-16 RX ADMIN — ERTAPENEM SODIUM 120 MILLIGRAM(S): 1 INJECTION, POWDER, LYOPHILIZED, FOR SOLUTION INTRAMUSCULAR; INTRAVENOUS at 16:13

## 2022-04-16 RX ADMIN — SENNA PLUS 2 TABLET(S): 8.6 TABLET ORAL at 21:59

## 2022-04-16 RX ADMIN — ENOXAPARIN SODIUM 40 MILLIGRAM(S): 100 INJECTION SUBCUTANEOUS at 21:59

## 2022-04-16 RX ADMIN — HYDROMORPHONE HYDROCHLORIDE 0.5 MILLIGRAM(S): 2 INJECTION INTRAMUSCULAR; INTRAVENOUS; SUBCUTANEOUS at 16:12

## 2022-04-16 RX ADMIN — Medication 1000 MILLIGRAM(S): at 11:36

## 2022-04-16 RX ADMIN — Medication 30 MILLIGRAM(S): at 09:46

## 2022-04-16 RX ADMIN — HYDROMORPHONE HYDROCHLORIDE 0.5 MILLIGRAM(S): 2 INJECTION INTRAMUSCULAR; INTRAVENOUS; SUBCUTANEOUS at 12:42

## 2022-04-16 RX ADMIN — MORPHINE SULFATE 4 MILLIGRAM(S): 50 CAPSULE, EXTENDED RELEASE ORAL at 08:33

## 2022-04-16 RX ADMIN — Medication 400 MILLIGRAM(S): at 15:03

## 2022-04-16 RX ADMIN — SODIUM CHLORIDE 1000 MILLILITER(S): 9 INJECTION INTRAMUSCULAR; INTRAVENOUS; SUBCUTANEOUS at 11:00

## 2022-04-16 NOTE — ED PROVIDER NOTE - ATTENDING CONTRIBUTION TO CARE
I have personally seen and examined this patient.  I have fully participated in the care of this patient. I performed a substantive portion of the visit including all aspects of the medical decision making. I have reviewed all pertinent clinical information, including history, physical exam, plan and the Resident’s note and agree except as noted. - MD Sarah.    35 yo M, with h/o remission of leukemia, cholelithiasis, p/w RUQ, ++ sever pain, pt is in sever distress, + Fontana's signs, high suspicion for cholesistis, will get stat ultrsounds, if no signs, will get ct abd due to the potential surgical abdomen, pain meds, labs, us, +/- CT abd.

## 2022-04-16 NOTE — CONSULT NOTE ADULT - ATTENDING COMMENTS
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 Patient is afebrile and has no evidence of disease (AML). His long-term survival probability is high. He has low risk for infectious complication. No absolute contraindication for surgery; cleared for the procedure.

## 2022-04-16 NOTE — ED ADULT TRIAGE NOTE - CHIEF COMPLAINT QUOTE
R sided abd pain, hx gallstones, hx AML, seen here recently for dx: gallstones - "no interventions because I was on chemo", chemo completed x 2 mos ago

## 2022-04-16 NOTE — H&P ADULT - ASSESSMENT
Mr. Tian is a 36 year old man with PMHx AML, known cholelithiasis presenting with acute cholecystitis.    Plan  - admit to Surgery, Dr. Marcelo  - CLD  - Ertapenum  - hematology consult, patient reports AML in full remission however most recent outpatient note does not state so  - pain control PRN    discussed with attending, Dr. Davian Ward, PGY2  Acute Care Surgery  [phone]

## 2022-04-16 NOTE — ED ADULT NURSE NOTE - OBJECTIVE STATEMENT
36 Y M PMHX OF AML, gallstones presents to the ED with RUQ abdominal pain for a week, reports that it has been intermittent but today it is unbearable. On assessment, A&Ox4. Denies lightheadedness, dizziness, headaches, numbness and tingling. Breathing spontaneously and unlabored on Room air. Denies cough, SOB and CP. No Peripheral edema. Peripheral pulses strong and equal bilaterally. Denies CP, SOB and palpitations. Abdomen soft, nondistended. Pt is continent. Denies n/v/d, dysuria, melena and hematuria. IV placed 20g in RAC. Labs drawn and sent. Pt safety maintained. Call bell within reach. Side rails in upward position. Pt awaiting dispo.

## 2022-04-16 NOTE — ED PROVIDER NOTE - OBJECTIVE STATEMENT
Patient is a 35 y/o M with PMH AML s/p chemotherapy (finished 2.5 months ago) and gallstones who presents to ED with RUQ pain that woke him from sleep thia AM. Pain is severe, constant, non-radiating. No a Patient is a 35 y/o M with PMH AML s/p chemotherapy (finished 2.5 months ago) and gallstones who presents to ED with RUQ pain that woke him from sleep thia AM. Pain is severe, constant, non-radiating. No associated f/c, n/v, urinary or bowel changes. Has had similar, but less painful, episodes over past week, these subsided within 2 hours. Has not taken anything for pain. Not associated w/ PO intake. No surgical hx. No hx of renal stone.

## 2022-04-16 NOTE — ED PROVIDER NOTE - PHYSICAL EXAMINATION
General: Alert and Orientated x 3. In severe pain.   Head: Normocephalic and atraumatic.  Eyes: PERRLA with EOMI.  Neck: Supple. Trachea midline.   Cardiac: Normal S1 and S2 w/ RRR. No murmurs appreciated.   Pulmonary: CTA bilaterally. No increased WOB. No wheezes or crackles.  Abdominal: Soft, +murphys, neg mcburneys. No peritoneal signs. No skin leisions on abd.  (+) bowel sounds appreciated in all 4 quadrants. No hepatosplenomegaly.   Neurologic: No focal sensory or motor deficits.  Musculoskeletal: Strength appropriate in all 4 extremities for age with no limited ROM.  Skin: Color appropriate for race. Intact, warm, and well-perfused. Diaphoretic   Psychiatric: Appropriate mood and affect. No apparent risk to self or others.

## 2022-04-16 NOTE — ED PROVIDER NOTE - CLINICAL SUMMARY MEDICAL DECISION MAKING FREE TEXT BOX
37 y/o m w/ pmh gallstones p/w RUQ pain, severe, diaphoretic. Vitals stable. Exam w/ + murphys. Concern for leni/billiary pathology. Less likely  related or appendicitis. Will get RUQ US. Will get pre-op labs, lipase, UA +cx. If neg RUQ US, will consider CT A/P. Dispo- pending labs and imaging.

## 2022-04-16 NOTE — H&P ADULT - HISTORY OF PRESENT ILLNESS
History of Present Illness  Mr. Tian is a 36 year old man with PMHx AML, known cholelithiasis presenting with acute on chronic RUQ pain. Patient reports that he was diagnosed with cholelithiasis earlier in the year when he was admitted with pneumonia and neutropenia. He was told it was unsafe to have his gallbladder removed at that time. He followed up with GI Dr. Boateng and had an endoscopy demonstrating gastritis. He was started on PPI and *** however his pain persisted. Today he woke up with 10/10 RUQ pain that was not resolving. He denies fevers, chills, nausea, vomiting, diarrhea. He reports that his AML is in full remission over the last 2 months and his last chemo was at that time.    Upon presentation to the ED patient is afebrile and hemodynamically normal. Labs are notable for WBC 2.06. RUQ US demonstrating cholelithiasis and pericholecystic fluid.

## 2022-04-16 NOTE — H&P ADULT - NSHPPHYSICALEXAM_GEN_ALL_CORE
PT called saying that she has had vomiting a diarrhea all weekend and that she would like Dr. Morales to call something in. I advised her that Dr. Morales was out today. I asked her about her blood sugar and if she thought it was related, she said no but that she hasn't checked. her blood sugar in forever because she moved and doesn't know where her meter is. I asked if she had any pain or other symptoms she said no. I advised her to call her PCP and see what they recommended and see if they would possibly call her some Zofran in. She was refusing to do that for various reasons. I then advised her to use over the counter medication like pepto bismol, she voiced understanding.    VITAL SIGNS:  ICU Vital Signs Last 24 Hrs  T(C): 36.7 (16 Apr 2022 11:45), Max: 36.7 (16 Apr 2022 08:15)  T(F): 98.1 (16 Apr 2022 11:45), Max: 98.1 (16 Apr 2022 11:45)  HR: 88 (16 Apr 2022 11:45) (77 - 90)  BP: 133/88 (16 Apr 2022 11:45) (104/59 - 137/85)  BP(mean): --  ABP: --  ABP(mean): --  RR: 22 (16 Apr 2022 11:45) (22 - 25)  SpO2: 100% (16 Apr 2022 11:45) (98% - 100%)      PHYSICAL EXAMINATION:  General - well-nourished, no acute distress  Neuro - awake, alert, oriented x4, no acute focal deficits  HEENT - normocephalic, PERRL, moist mucous membranes  Lungs - breathing comfortably on room air  Heart - pulse regular  Abdomen - soft, nondistended, tender to palpation in epigastrium and RUQ  Extremities - all four extremities are warm & pink with 2+ pulses, strength 5/5, sensation intact

## 2022-04-16 NOTE — H&P ADULT - ATTENDING COMMENTS
ATTENDING ATTESTATION  I have seen and examined this patient with the resident housestaff. I have reviewed all labs, imaging and reports. I have participated in formulating the plan, and have read and agree with the history, ROS, exam, assessment and plan as stated above.     Patient has had persistent RUQ pain with biliary colic +/- acute cholecystitis but has not been a surgical candidate due to chemotherapy for AML.     Presents now with similar symptoms and US that is equivocal for acute cholecystitis.   Neutropenic to WBC 2.   Unclear based on notes vs. patient reports whether or not he is in remission from his AML or not. Not on current chemo.   Will plan for admission and treatment for cholecystitis given his neutropenia, and follow up with his primary oncologist tomorrow.   Will further discuss role of surgery.     Total time spent in the care of this patient today (excluding critical care & procedures): 55 min                Over 50% of the total time was spent on counseling and coordination of care.     Annalise Marcelo M.D., M.S.  Division of Acute Care Surgery

## 2022-04-16 NOTE — CONSULT NOTE ADULT - ASSESSMENT
35 yo M with a PMH of AML (NPM1+, FLT3 negative, s/p 7+3 w/HiDAC consolidation x4, in remission since 2/11/22) and known cholelithiasis who p/w with acute on chronic RUQ pain, found to have acute cholecystitis. Hematology consulted for clearance to have surgery from leukemia standpoint.    #AML  - NPM1 positive, FLT3 negative  - Diagnosed 08/2021, s/p induction chemotherapy with Daunorubicin/Cytarabine (7+3)  - Completed 4 cycles of consolidation with hi-dose AraC, last completed C4 from 1/5/22 - 1/10/22  - BMBx 2/11 shows complete remission  - Patient is slightly leukopenic (not neutropenic) in the setting of acute process but blood counts to be at or near patient's baseline for the last few months  - No contra-indication to surgery for cholecystitis from Hematology standpoint  - F/u with Dr. Chelsea Yancey at [facility] outpatient (next appt 4/25 at 11AM)      Aryles Hedjar, MD, PGY-4  Hematology/Oncology Fellow  [facility]  Pager: [phone]  After 5PM and on weekends and holidays, please call the inpatient fellow on call.

## 2022-04-17 ENCOUNTER — RESULT REVIEW (OUTPATIENT)
Age: 37
End: 2022-04-17

## 2022-04-17 LAB
ALBUMIN SERPL ELPH-MCNC: 4.3 G/DL — SIGNIFICANT CHANGE UP (ref 3.3–5)
ALBUMIN SERPL ELPH-MCNC: 4.9 G/DL — SIGNIFICANT CHANGE UP (ref 3.3–5)
ALP SERPL-CCNC: 114 U/L — SIGNIFICANT CHANGE UP (ref 40–120)
ALP SERPL-CCNC: 119 U/L — SIGNIFICANT CHANGE UP (ref 40–120)
ALT FLD-CCNC: 272 U/L — HIGH (ref 10–45)
ALT FLD-CCNC: 294 U/L — HIGH (ref 10–45)
ANION GAP SERPL CALC-SCNC: 15 MMOL/L — SIGNIFICANT CHANGE UP (ref 5–17)
ANION GAP SERPL CALC-SCNC: 21 MMOL/L — HIGH (ref 5–17)
APTT BLD: 34.8 SEC — SIGNIFICANT CHANGE UP (ref 27.5–35.5)
AST SERPL-CCNC: 110 U/L — HIGH (ref 10–40)
AST SERPL-CCNC: 89 U/L — HIGH (ref 10–40)
BASE EXCESS BLDV CALC-SCNC: 0 MMOL/L — SIGNIFICANT CHANGE UP (ref -2–2)
BASOPHILS # BLD AUTO: 0 K/UL — SIGNIFICANT CHANGE UP (ref 0–0.2)
BASOPHILS NFR BLD AUTO: 0 % — SIGNIFICANT CHANGE UP (ref 0–2)
BILIRUB SERPL-MCNC: 0.5 MG/DL — SIGNIFICANT CHANGE UP (ref 0.2–1.2)
BILIRUB SERPL-MCNC: 0.6 MG/DL — SIGNIFICANT CHANGE UP (ref 0.2–1.2)
BLD GP AB SCN SERPL QL: NEGATIVE — SIGNIFICANT CHANGE UP
BUN SERPL-MCNC: 12 MG/DL — SIGNIFICANT CHANGE UP (ref 7–23)
BUN SERPL-MCNC: 13 MG/DL — SIGNIFICANT CHANGE UP (ref 7–23)
CA-I SERPL-SCNC: 1.22 MMOL/L — SIGNIFICANT CHANGE UP (ref 1.15–1.33)
CALCIUM SERPL-MCNC: 9.8 MG/DL — SIGNIFICANT CHANGE UP (ref 8.4–10.5)
CALCIUM SERPL-MCNC: 9.9 MG/DL — SIGNIFICANT CHANGE UP (ref 8.4–10.5)
CHLORIDE BLDV-SCNC: 99 MMOL/L — SIGNIFICANT CHANGE UP (ref 96–108)
CHLORIDE SERPL-SCNC: 105 MMOL/L — SIGNIFICANT CHANGE UP (ref 96–108)
CHLORIDE SERPL-SCNC: 98 MMOL/L — SIGNIFICANT CHANGE UP (ref 96–108)
CO2 BLDV-SCNC: 27 MMOL/L — HIGH (ref 22–26)
CO2 SERPL-SCNC: 21 MMOL/L — LOW (ref 22–31)
CO2 SERPL-SCNC: 22 MMOL/L — SIGNIFICANT CHANGE UP (ref 22–31)
CREAT SERPL-MCNC: 1.02 MG/DL — SIGNIFICANT CHANGE UP (ref 0.5–1.3)
CREAT SERPL-MCNC: 1.16 MG/DL — SIGNIFICANT CHANGE UP (ref 0.5–1.3)
CULTURE RESULTS: NO GROWTH — SIGNIFICANT CHANGE UP
EGFR: 84 ML/MIN/1.73M2 — SIGNIFICANT CHANGE UP
EGFR: 98 ML/MIN/1.73M2 — SIGNIFICANT CHANGE UP
EOSINOPHIL # BLD AUTO: 0 K/UL — SIGNIFICANT CHANGE UP (ref 0–0.5)
EOSINOPHIL NFR BLD AUTO: 0 % — SIGNIFICANT CHANGE UP (ref 0–6)
GAS PNL BLDV: 136 MMOL/L — SIGNIFICANT CHANGE UP (ref 136–145)
GAS PNL BLDV: SIGNIFICANT CHANGE UP
GLUCOSE BLDC GLUCOMTR-MCNC: 128 MG/DL — HIGH (ref 70–99)
GLUCOSE BLDV-MCNC: 120 MG/DL — HIGH (ref 70–99)
GLUCOSE SERPL-MCNC: 120 MG/DL — HIGH (ref 70–99)
GLUCOSE SERPL-MCNC: 85 MG/DL — SIGNIFICANT CHANGE UP (ref 70–99)
HCO3 BLDV-SCNC: 25 MMOL/L — SIGNIFICANT CHANGE UP (ref 22–29)
HCT VFR BLD CALC: 38.9 % — LOW (ref 39–50)
HCT VFR BLD CALC: 39 % — SIGNIFICANT CHANGE UP (ref 39–50)
HCT VFR BLDA CALC: 43 % — SIGNIFICANT CHANGE UP (ref 39–51)
HGB BLD CALC-MCNC: 14.4 G/DL — SIGNIFICANT CHANGE UP (ref 12.6–17.4)
HGB BLD-MCNC: 13.3 G/DL — SIGNIFICANT CHANGE UP (ref 13–17)
HGB BLD-MCNC: 13.9 G/DL — SIGNIFICANT CHANGE UP (ref 13–17)
IMM GRANULOCYTES NFR BLD AUTO: 0.3 % — SIGNIFICANT CHANGE UP (ref 0–1.5)
INR BLD: 1.13 RATIO — SIGNIFICANT CHANGE UP (ref 0.88–1.16)
LACTATE BLDV-MCNC: 3.2 MMOL/L — HIGH (ref 0.7–2)
LACTATE SERPL-SCNC: 2.4 MMOL/L — HIGH (ref 0.7–2)
LYMPHOCYTES # BLD AUTO: 0.4 K/UL — LOW (ref 1–3.3)
LYMPHOCYTES # BLD AUTO: 12 % — LOW (ref 13–44)
MAGNESIUM SERPL-MCNC: 2.3 MG/DL — SIGNIFICANT CHANGE UP (ref 1.6–2.6)
MAGNESIUM SERPL-MCNC: 2.3 MG/DL — SIGNIFICANT CHANGE UP (ref 1.6–2.6)
MCHC RBC-ENTMCNC: 33.2 PG — SIGNIFICANT CHANGE UP (ref 27–34)
MCHC RBC-ENTMCNC: 33.5 PG — SIGNIFICANT CHANGE UP (ref 27–34)
MCHC RBC-ENTMCNC: 34.1 GM/DL — SIGNIFICANT CHANGE UP (ref 32–36)
MCHC RBC-ENTMCNC: 35.7 GM/DL — SIGNIFICANT CHANGE UP (ref 32–36)
MCV RBC AUTO: 93.7 FL — SIGNIFICANT CHANGE UP (ref 80–100)
MCV RBC AUTO: 97.3 FL — SIGNIFICANT CHANGE UP (ref 80–100)
MONOCYTES # BLD AUTO: 0.3 K/UL — SIGNIFICANT CHANGE UP (ref 0–0.9)
MONOCYTES NFR BLD AUTO: 9 % — SIGNIFICANT CHANGE UP (ref 2–14)
NEUTROPHILS # BLD AUTO: 2.63 K/UL — SIGNIFICANT CHANGE UP (ref 1.8–7.4)
NEUTROPHILS NFR BLD AUTO: 78.7 % — HIGH (ref 43–77)
NRBC # BLD: 0 /100 WBCS — SIGNIFICANT CHANGE UP (ref 0–0)
NRBC # BLD: 0 /100 WBCS — SIGNIFICANT CHANGE UP (ref 0–0)
PCO2 BLDV: 43 MMHG — SIGNIFICANT CHANGE UP (ref 42–55)
PH BLDV: 7.38 — SIGNIFICANT CHANGE UP (ref 7.32–7.43)
PHOSPHATE SERPL-MCNC: 3.5 MG/DL — SIGNIFICANT CHANGE UP (ref 2.5–4.5)
PHOSPHATE SERPL-MCNC: 4.3 MG/DL — SIGNIFICANT CHANGE UP (ref 2.5–4.5)
PLATELET # BLD AUTO: 102 K/UL — LOW (ref 150–400)
PLATELET # BLD AUTO: 107 K/UL — LOW (ref 150–400)
PO2 BLDV: 28 MMHG — SIGNIFICANT CHANGE UP (ref 25–45)
POTASSIUM BLDV-SCNC: 3.7 MMOL/L — SIGNIFICANT CHANGE UP (ref 3.5–5.1)
POTASSIUM SERPL-MCNC: 3.7 MMOL/L — SIGNIFICANT CHANGE UP (ref 3.5–5.3)
POTASSIUM SERPL-MCNC: 3.8 MMOL/L — SIGNIFICANT CHANGE UP (ref 3.5–5.3)
POTASSIUM SERPL-SCNC: 3.7 MMOL/L — SIGNIFICANT CHANGE UP (ref 3.5–5.3)
POTASSIUM SERPL-SCNC: 3.8 MMOL/L — SIGNIFICANT CHANGE UP (ref 3.5–5.3)
PROT SERPL-MCNC: 6.9 G/DL — SIGNIFICANT CHANGE UP (ref 6–8.3)
PROT SERPL-MCNC: 7.7 G/DL — SIGNIFICANT CHANGE UP (ref 6–8.3)
PROTHROM AB SERPL-ACNC: 13.1 SEC — SIGNIFICANT CHANGE UP (ref 10.5–13.4)
RBC # BLD: 4.01 M/UL — LOW (ref 4.2–5.8)
RBC # BLD: 4.15 M/UL — LOW (ref 4.2–5.8)
RBC # FLD: 12.8 % — SIGNIFICANT CHANGE UP (ref 10.3–14.5)
RBC # FLD: 12.8 % — SIGNIFICANT CHANGE UP (ref 10.3–14.5)
RH IG SCN BLD-IMP: POSITIVE — SIGNIFICANT CHANGE UP
SAO2 % BLDV: 44.5 % — LOW (ref 67–88)
SODIUM SERPL-SCNC: 140 MMOL/L — SIGNIFICANT CHANGE UP (ref 135–145)
SODIUM SERPL-SCNC: 142 MMOL/L — SIGNIFICANT CHANGE UP (ref 135–145)
SPECIMEN SOURCE: SIGNIFICANT CHANGE UP
WBC # BLD: 1.36 K/UL — LOW (ref 3.8–10.5)
WBC # BLD: 3.34 K/UL — LOW (ref 3.8–10.5)
WBC # FLD AUTO: 1.36 K/UL — LOW (ref 3.8–10.5)
WBC # FLD AUTO: 3.34 K/UL — LOW (ref 3.8–10.5)

## 2022-04-17 PROCEDURE — 93010 ELECTROCARDIOGRAM REPORT: CPT | Mod: 76

## 2022-04-17 PROCEDURE — 88304 TISSUE EXAM BY PATHOLOGIST: CPT | Mod: 26

## 2022-04-17 DEVICE — LIGATING CLIPS WECK HEMOLOK POLYMER MEDIUM-LARGE (GREEN) 6: Type: IMPLANTABLE DEVICE | Status: FUNCTIONAL

## 2022-04-17 DEVICE — CLIP APPLIER COVIDIEN ENDOCLIP III 5MM: Type: IMPLANTABLE DEVICE | Status: FUNCTIONAL

## 2022-04-17 RX ORDER — SODIUM CHLORIDE 9 MG/ML
1000 INJECTION, SOLUTION INTRAVENOUS
Refills: 0 | Status: DISCONTINUED | OUTPATIENT
Start: 2022-04-17 | End: 2022-04-17

## 2022-04-17 RX ORDER — ACETAMINOPHEN 500 MG
1000 TABLET ORAL EVERY 6 HOURS
Refills: 0 | Status: DISCONTINUED | OUTPATIENT
Start: 2022-04-17 | End: 2022-04-18

## 2022-04-17 RX ORDER — OXYCODONE HYDROCHLORIDE 5 MG/1
5 TABLET ORAL EVERY 4 HOURS
Refills: 0 | Status: DISCONTINUED | OUTPATIENT
Start: 2022-04-17 | End: 2022-04-18

## 2022-04-17 RX ORDER — HYDROMORPHONE HYDROCHLORIDE 2 MG/ML
0.5 INJECTION INTRAMUSCULAR; INTRAVENOUS; SUBCUTANEOUS
Refills: 0 | Status: DISCONTINUED | OUTPATIENT
Start: 2022-04-17 | End: 2022-04-17

## 2022-04-17 RX ORDER — ONDANSETRON 8 MG/1
4 TABLET, FILM COATED ORAL EVERY 4 HOURS
Refills: 0 | Status: DISCONTINUED | OUTPATIENT
Start: 2022-04-17 | End: 2022-04-18

## 2022-04-17 RX ORDER — OXYCODONE HYDROCHLORIDE 5 MG/1
2.5 TABLET ORAL EVERY 4 HOURS
Refills: 0 | Status: DISCONTINUED | OUTPATIENT
Start: 2022-04-17 | End: 2022-04-18

## 2022-04-17 RX ORDER — PIPERACILLIN AND TAZOBACTAM 4; .5 G/20ML; G/20ML
3.38 INJECTION, POWDER, LYOPHILIZED, FOR SOLUTION INTRAVENOUS EVERY 8 HOURS
Refills: 0 | Status: DISCONTINUED | OUTPATIENT
Start: 2022-04-18 | End: 2022-04-18

## 2022-04-17 RX ORDER — PIPERACILLIN AND TAZOBACTAM 4; .5 G/20ML; G/20ML
3.38 INJECTION, POWDER, LYOPHILIZED, FOR SOLUTION INTRAVENOUS ONCE
Refills: 0 | Status: COMPLETED | OUTPATIENT
Start: 2022-04-17 | End: 2022-04-17

## 2022-04-17 RX ORDER — ACETAMINOPHEN 500 MG
1000 TABLET ORAL ONCE
Refills: 0 | Status: COMPLETED | OUTPATIENT
Start: 2022-04-17 | End: 2022-04-17

## 2022-04-17 RX ORDER — ONDANSETRON 8 MG/1
4 TABLET, FILM COATED ORAL ONCE
Refills: 0 | Status: DISCONTINUED | OUTPATIENT
Start: 2022-04-17 | End: 2022-04-17

## 2022-04-17 RX ADMIN — Medication 1000 MILLIGRAM(S): at 22:00

## 2022-04-17 RX ADMIN — SENNA PLUS 2 TABLET(S): 8.6 TABLET ORAL at 21:38

## 2022-04-17 RX ADMIN — OXYCODONE HYDROCHLORIDE 5 MILLIGRAM(S): 5 TABLET ORAL at 19:38

## 2022-04-17 RX ADMIN — ENOXAPARIN SODIUM 40 MILLIGRAM(S): 100 INJECTION SUBCUTANEOUS at 21:38

## 2022-04-17 RX ADMIN — Medication 1000 MILLIGRAM(S): at 20:03

## 2022-04-17 RX ADMIN — HYDROMORPHONE HYDROCHLORIDE 0.5 MILLIGRAM(S): 2 INJECTION INTRAMUSCULAR; INTRAVENOUS; SUBCUTANEOUS at 16:55

## 2022-04-17 RX ADMIN — HYDROMORPHONE HYDROCHLORIDE 0.5 MILLIGRAM(S): 2 INJECTION INTRAMUSCULAR; INTRAVENOUS; SUBCUTANEOUS at 16:42

## 2022-04-17 RX ADMIN — OXYCODONE HYDROCHLORIDE 5 MILLIGRAM(S): 5 TABLET ORAL at 20:08

## 2022-04-17 RX ADMIN — Medication 400 MILLIGRAM(S): at 19:33

## 2022-04-17 RX ADMIN — HYDROMORPHONE HYDROCHLORIDE 0.5 MILLIGRAM(S): 2 INJECTION INTRAMUSCULAR; INTRAVENOUS; SUBCUTANEOUS at 16:35

## 2022-04-17 RX ADMIN — HYDROMORPHONE HYDROCHLORIDE 0.5 MILLIGRAM(S): 2 INJECTION INTRAMUSCULAR; INTRAVENOUS; SUBCUTANEOUS at 16:25

## 2022-04-17 RX ADMIN — Medication 400 MILLIGRAM(S): at 22:00

## 2022-04-17 RX ADMIN — PIPERACILLIN AND TAZOBACTAM 200 GRAM(S): 4; .5 INJECTION, POWDER, LYOPHILIZED, FOR SOLUTION INTRAVENOUS at 23:23

## 2022-04-17 NOTE — PROGRESS NOTE ADULT - SUBJECTIVE AND OBJECTIVE BOX
GENERAL SURGERY PROGRESS NOTE   ___________________________________________________________________    JAK DANIELS | [id number] | 36y Male | [facility] 2MON 215 W1 | LOS 1d    Attending: Annalise Marcelo    ___________________________________________________________________    CC: Patient is a 36y old  Male who presents with a chief complaint of acute cholecystitis (16 Apr 2022 19:12)      SUBJECTIVE:   Patient seen today during morning rounds at bedside and found to be without acute distress.     Overnight: Unremarkable    Allegies: cefepime (Rash)   NKDA    OBJECTIVE:  Vitals:  Height (cm): 180.3  Weight (kg): 88.5  BMI (kg/m2): 27.2  T(C): 36.4 (04-17-22 @ 00:58), Max: 36.7 (04-16-22 @ 08:15)  HR: 67 (04-17-22 @ 00:58) (62 - 90)  BP: 123/77 (04-17-22 @ 00:58) (104/59 - 137/85)  RR: 18 (04-17-22 @ 00:58) (18 - 25)  SpO2: 98% (04-17-22 @ 00:58) (98% - 100%)      OUT:  Total OUT: 0 mL        Physical Exam:   General - well-nourished, no acute distress  Neuro - awake, alert, oriented x4, no acute focal deficits  HEENT - normocephalic, PERRL, moist mucous membranes  Lungs - breathing comfortably on room air  Heart - pulse regular  Abdomen - soft, nondistended, tender to palpation in epigastrium and RUQ      Medications:  enoxaparin Injectable 40 SubCutaneous every 24 hours    pantoprazole    Tablet 40 milliGRAM(s) Oral before breakfast  senna 2 Tablet(s) Oral at bedtime        Laboratory:  WBC: 2.06 H&H: 13.4/38.4 Plt: 108    Chemistry:  04-16                             Phos: xx Mg: xx  141  |  102  |  17  ----------------------------<  108  4.0   |  21  |  1.04          04-16   TPro 7.2 / Alb 4.7 / TBili 0.5 / DBili x  / AST/AST 89<H>/108<H> / AlkPhos 101  PTT 29.6 PT/INR 11.4/0.99          Reviewed laboratory and imaging     GENERAL SURGERY PROGRESS NOTE   ___________________________________________________________________    JAK DANIELS | [id number] | 36y Male | [facility] 2MON 215 W1 | LOS 1d    Attending: Annalise Marcelo    ___________________________________________________________________    CC: Patient is a 36y old  Male who presents with a chief complaint of acute cholecystitis (16 Apr 2022 19:12)      SUBJECTIVE:   Patient seen today during morning rounds at bedside and found to be without acute distress. Pain under control. NPO since midnight.     Overnight: Unremarkable    Allegies: cefepime (Rash)   NKDA    OBJECTIVE:  Vitals:  Height (cm): 180.3  Weight (kg): 88.5  BMI (kg/m2): 27.2  T(C): 36.4 (04-17-22 @ 00:58), Max: 36.7 (04-16-22 @ 08:15)  HR: 67 (04-17-22 @ 00:58) (62 - 90)  BP: 123/77 (04-17-22 @ 00:58) (104/59 - 137/85)  RR: 18 (04-17-22 @ 00:58) (18 - 25)  SpO2: 98% (04-17-22 @ 00:58) (98% - 100%)      OUT:  Total OUT: 0 mL        Physical Exam:   General - well-nourished, no acute distress  Neuro - awake, alert, oriented x4, no acute focal deficits  HEENT - normocephalic, PERRL, moist mucous membranes  Lungs - breathing comfortably on room air  Heart - pulse regular  Abdomen - soft, nondistended, tender to palpation in epigastrium and RUQ      Medications:  enoxaparin Injectable 40 SubCutaneous every 24 hours    pantoprazole    Tablet 40 milliGRAM(s) Oral before breakfast  senna 2 Tablet(s) Oral at bedtime        Laboratory:  WBC: 2.06 H&H: 13.4/38.4 Plt: 108    Chemistry:  04-16                             Phos: xx Mg: xx  141  |  102  |  17  ----------------------------<  108  4.0   |  21  |  1.04          04-16   TPro 7.2 / Alb 4.7 / TBili 0.5 / DBili x  / AST/AST 89<H>/108<H> / AlkPhos 101  PTT 29.6 PT/INR 11.4/0.99          Reviewed laboratory and imaging

## 2022-04-17 NOTE — CONSULT NOTE ADULT - ASSESSMENT
36 year old man with PMHx AML, known cholelithiasis presenting with acute cholecystitis    #Acute cholecystitis  NPO for OR 4/17/22  Pt is medically optimized to proceed to OR  S/p one dose of ertapenem 4/16/22  Appreciate general surgery    #AML  No contraindication for OR  CBC with diff daily  Appreciate heme/onc    #Need for DVT prophylaxis  SC lovenox  36 year old man with PMHx AML, known cholelithiasis presenting with acute cholecystitis    #Acute cholecystitis  NPO for OR 4/17/22  Pt is medically optimized to proceed to OR  S/p one dose of ertapenem 4/16/22  Appreciate general surgery    #AML in remission  No contraindication for OR  CBC with diff daily  Appreciate heme/onc    #Need for DVT prophylaxis  SC lovenox

## 2022-04-17 NOTE — BRIEF OPERATIVE NOTE - OPERATION/FINDINGS
Thin walled gallbladder, punctured during dissection with spillage of bile and numerous small stones. Critical view obtained and cystic duct and artery triply clipped with green Hem-o-Jamie clips. Structures transected sharply. An additional clip was applied to proximal cystic artery because clips on patient side were very close to where artery was cut. Gallbladder removed and gallstones were meticulously removed. Gallbladder fossa and RUQ were copiously irrigated and suctioned, no visible stones remained after thorough inspection.

## 2022-04-17 NOTE — RAPID RESPONSE TEAM SUMMARY - NSSITUATIONBACKGROUNDRRT_GEN_ALL_CORE
35 yo M with a PMH of AML (NPM1+, FLT3 negative, in remission) and known cholelithiasis who p/w with acute on chronic RUQ pain. Patient reports that he was diagnosed with cholelithiasis. Per Hem/onc note the patient is in full remission. Today RRT was called for rigors.

## 2022-04-17 NOTE — CHART NOTE - NSCHARTNOTEFT_GEN_A_CORE
Acute Care Surgery  Post Operative Check Note  Patient: JAK DANIELS 36y (1985) Male   MRN: [id number]  Location: [facility] 2MON 215 W1  Visit: 04-16-22 Inpatient  Date: 04-17-22 @ 20:13    Procedure: S/P Laparoscopic cholecystectomy    Subjective: Patient seen and examined at bedside. No acute events since surgery. Pain controlled. No nausea/vomiting.      Objective:  Vitals: T(F): 97.5 (04-17-22 @ 19:30), Max: 98.4 (04-17-22 @ 09:22)  HR: 107 (04-17-22 @ 19:30)  BP: 125/79 (04-17-22 @ 19:30) (111/58 - 151/81)  RR: 18 (04-17-22 @ 19:30)  SpO2: 97% (04-17-22 @ 19:30)  Vent Settings:     In:   04-16-22 @ 07:01  -  04-17-22 @ 07:00  --------------------------------------------------------  IN: 50 mL    04-17-22 @ 07:01  -  04-17-22 @ 20:13  --------------------------------------------------------  IN: 75 mL      IV Fluids:     Out:   04-16-22 @ 07:01  -  04-17-22 @ 07:00  --------------------------------------------------------  OUT: 0 mL    04-17-22 @ 07:01  -  04-17-22 @ 20:13  --------------------------------------------------------  OUT: 1125 mL      EBL:     Voided Urine:   04-16-22 @ 07:01  -  04-17-22 @ 07:00  --------------------------------------------------------  OUT: 0 mL    04-17-22 @ 07:01  -  04-17-22 @ 20:13  --------------------------------------------------------  OUT: 1125 mL          Physical Examination:  General: NAD, resting comfortably in bed  CV: Regular rate  Resp: Nonlabored breathing on room air  Abd: Softly distended, nontender, port sites with dressings in place that are c/d/i with minor strikethrough noted, midline periumbilical incision site with saturated dressing which was replaced and now is c/d/i (no active oozing or drainage noted)  Ext: BLE warm and well perfused      Imaging:  No post-op imaging studies    Assessment:  36yMale patient S/P laparoscopic cholecystectomy for acute cholecystitis.    Plan:  - Pain control  - Diet: Regular  - Anti-emetics prn  - Activity: OOB and ambulate as tolerated  - DVT ppx: LVX      Date/Time: 04-17-22 @ 20:13  ACS/Trauma Surgery  [id number]

## 2022-04-17 NOTE — PROGRESS NOTE ADULT - ASSESSMENT
36M with PMHx AML, known cholelithiasis presenting with acute cholecystitis.    - OR today  - NPO, IVF  - Ertapenum  - hematology recs appreciated  - pain control PRN    Acute Care Surgery  [id number]

## 2022-04-17 NOTE — CONSULT NOTE ADULT - SUBJECTIVE AND OBJECTIVE BOX
37 yo M with a PMH of AML (NPM1+, FLT3 negative, in remission) and known cholelithiasis who p/w with acute on chronic RUQ pain. Patient reports that he was diagnosed with cholelithiasis earlier in the year when he was admitted with pneumonia and neutropenia. He was told it was unsafe to have his gallbladder removed at that time as he was recently on chemotherapy. He followed up with GI Dr. Boateng and had an endoscopy demonstrating gastritis. He was started on PPI, but his pain persisted. Today he woke up with 10/10 RUQ pain that was not resolving. He denies fevers, chills, nausea, vomiting, diarrhea.    Of note, he received induction chemotherapy with Cytarabine/Daunorubicin (7+3) 08/2021 and completed 4 cycles of consolidation with HiDAC (C4 from 1/5 - 1/10/22 with Fulphila on 1/12/22). His bone marrow biopsy from 2/11/22 shows complete remission.    In the ED, imaging was concerning for acute cholecystitis. Hematology was consulted to determine if able to have surgery from Hematology standpoint given his AML.      Allergies    No Known Allergies    Intolerances    cefepime (Rash)      MEDICATIONS  (STANDING):  enoxaparin Injectable 40 milliGRAM(s) SubCutaneous every 24 hours  pantoprazole    Tablet 40 milliGRAM(s) Oral before breakfast  senna 2 Tablet(s) Oral at bedtime    MEDICATIONS  (PRN):  acetaminophen   IVPB .. 1000 milliGRAM(s) IV Intermittent every 6 hours PRN Mild Pain (1 - 3)      PAST MEDICAL & SURGICAL HISTORY:  Hypertension  diagnosed 1 year ago    Kidney stone  5 years ago    History of genital warts    AML (acute myeloid leukemia)    No significant past surgical history        FAMILY HISTORY:  FH: CAD (coronary artery disease) (Father, Mother)        SOCIAL HISTORY: No significant alcohol, tobacco, or drug use.    REVIEW OF SYSTEMS:  CONSTITUTIONAL: no fever  EYES/ENT: No visual changes; no throat pain  NECK: No pain or stiffness  RESPIRATORY: no SOB or cough  CARDIOVASCULAR: No chest pain or palpitations  GASTROINTESTINAL: + abdominal pain. No N/V/D/C  GENITOURINARY: No dysuria, change in frequency, or hematuria  NEUROLOGICAL: No numbness or focal weakness  SKIN: No itching, burning, rashes, or lesions  Psych: No depression  MSK: no joint pain  Allergy: no urticaria    Height (cm): 180.3 (04-16 @ 07:48)  Weight (kg): 88.5 (04-16 @ 07:48)  BMI (kg/m2): 27.2 (04-16 @ 07:48)  BSA (m2): 2.09 (04-16 @ 07:48)    T(F): 97.5 (04-16-22 @ 15:37), Max: 98.1 (04-16-22 @ 11:45)  HR: 64 (04-16-22 @ 15:37)  BP: 137/80 (04-16-22 @ 15:37)  RR: 18 (04-16-22 @ 15:37)  SpO2: 99% (04-16-22 @ 15:37)  Wt(kg): --    GENERAL: NAD  HEENT: EOMI, MMM, no oropharyngeal lesions or erythema appreciated  Pulm: no increased WOB, CTAB/L  CV: RRR, S1, S2, no m/g/r  ABDOMEN: soft, + RUQ TTP, ND, no masses felt, no HSM  MSK: nl ROM  EXTREMITIES: no appreciable edema in b/l LE  Neuro: A&Ox3, no focal deficits  SKIN: warm and dry, no visible rash                          13.4   2.06  )-----------( 108      ( 16 Apr 2022 08:29 )             38.4       04-16    141  |  102  |  17  ----------------------------<  108<H>  4.0   |  21<L>  |  1.04    Ca    10.0      16 Apr 2022 08:29    TPro  7.2  /  Alb  4.7  /  TBili  0.5  /  DBili  x   /  AST  89<H>  /  ALT  108<H>  /  AlkPhos  101  04-16          
Patient is a 36y old  Male who presents with a chief complaint of acute cholecystitis (2022 04:26)      HPI:  History of Present Illness  Mr. Tian is a 36 year old man with PMHx AML, known cholelithiasis presenting with acute on chronic RUQ pain. Patient reports that he was diagnosed with cholelithiasis earlier in the year when he was admitted with pneumonia and neutropenia. He was told it was unsafe to have his gallbladder removed at that time. He followed up with GI Dr. Boateng and had an endoscopy demonstrating gastritis. He was started on PPI and *** however his pain persisted. Today he woke up with 10/10 RUQ pain that was not resolving. He denies fevers, chills, nausea, vomiting, diarrhea. He reports that his AML is in full remission over the last 2 months and his last chemo was at that time.    Upon presentation to the ED patient is afebrile and hemodynamically normal. Labs are notable for WBC 2.06. RUQ US demonstrating cholelithiasis and pericholecystic fluid.     (2022 13:55)    Pt seen and examined with father at bedside  Feels a bit anxious but denies minimal pain  No N/V      PAST MEDICAL & SURGICAL HISTORY:  Hypertension  diagnosed 1 year ago    Kidney stone  5 years ago    History of genital warts    AML (acute myeloid leukemia)    No significant past surgical history        FAMILY HISTORY:  FH: CAD (coronary artery disease) (Father, Mother)        SOCIAL HISTORY:    Allergies    No Known Allergies    Intolerances    cefepime (Rash)      REVIEW OF SYSTEMS:    CONSTITUTIONAL: (-) dizziness (-) weakness (-) fevers (-) chills (-) weight loss (-) sweats (-) poor appetite (-) falls  HEENT: (-) visual changes (-) HA (-) vertigo (-) rhinorrhea (-) epistaxis (-) swelling (-) hearing changes (-) sore throat (-) hoarseness  NECK: (-) stiffness (-) pain  RESPIRATORY: (-) cough (-) SOB (-) ARMSTRONG (-) hemoptysis  CARDIOVASCULAR: (-) chest pain (-) palpitations  GASTROINTESTINAL: (-) abd pain (-) nausea (-) vomiting (-) diarrhea (-) constipation (-) hematemesis (-) melena (-) BRBPR (-) dysphagia (-) odynophagia (-) incontinence (-) bloatedness  GENITOURINARY: (-) dysuria  (-) frequency (-) hematuria (-) incontinence (-) abnormal discharge (-) incomplete emptying  NEUROLOGICAL: (-) confusion (-) slurred speech (-) focal weakness (-) tingling/numbness (-) difficulty walking (-) tremors  MUSCULOSKELETAL: (-) back pain (-) joint pain (-) joint swelling (-) reduced ROM (-) extremity pain (-) extremity swelling  PSYCH: (+) anxious (-) depressed (-) aural hallucinations (-) visual hallucinations  SKIN: (-) itching (-) burning (-) rashes (-) swelling (-) bruising (-) pain  All other review of systems is negative unless indicated above.        Vital Signs Last 24 Hrs  T(C): 36.9 (2022 09:22), Max: 36.9 (2022 09:22)  T(F): 98.4 (2022 09:22), Max: 98.4 (2022 09:22)  HR: 62 (:22) (62 - 71)  BP: 137/92 (2022 09:22) (111/52 - 137/92)  BP(mean): --  RR: 18 (:22) (18 - 18)  SpO2: 98% (:22) (98% - 100%)  I&O's Summary    2022 07:  -  2022 07:00  --------------------------------------------------------  IN: 50 mL / OUT: 0 mL / NET: 50 mL    2022 07:  -  2022 12:32  --------------------------------------------------------  IN: 0 mL / OUT: 1125 mL / NET: -1125 mL        PHYSICAL EXAM:  GENERAL: NAD, well-developed  HEAD:  Atraumatic, Normocephalic  EYES: EOMI, PERRLA, conjunctiva and sclera clear  NECK: Supple, No JVD, No carotid bruits, No thyromegaly  CHEST/LUNG: Clear to auscultation bilaterally; Nl work of breathing  HEART: Regular rate and rhythm; Nl S1/S2; No murmurs, rubs, or gallops  ABDOMEN: Soft, Nontender, Nondistended; Bowel sounds present; No hepatosplenomegaly  EXTREMITIES:  2+ Peripheral Pulses, No clubbing, cyanosis, or edema  SKIN: No rashes or lesions; Normal turgor, texture  NEURO: A+O x 3; nonfocal CN/motor/sensory/reflexes  PSYCH: Nl affect; no agitation or delirium; no suicidal or homicidal ideation    LABS:                        13.3   1.36  )-----------( 102      ( 2022 07:00 )             39.0     04-    142  |  105  |  12  ----------------------------<  85  3.8   |  22  |  1.02    Ca    9.8      2022 07:00  Phos  4.3     -  Mg     2.3     -17    TPro  6.9  /  Alb  4.3  /  TBili  0.5  /  DBili  x   /  AST  89<H>  /  ALT  272<H>  /  AlkPhos  114  04-17    PT/INR - ( 2022 07:00 )   PT: 13.1 sec;   INR: 1.13 ratio         PTT - ( 2022 07:00 )  PTT:34.8 sec  CAPILLARY BLOOD GLUCOSE            Urinalysis Basic - ( 2022 10:06 )    Color: Light Yellow / Appearance: Clear / S.008 / pH: x  Gluc: x / Ketone: Negative  / Bili: Negative / Urobili: Negative   Blood: x / Protein: Negative / Nitrite: Negative   Leuk Esterase: Negative / RBC: x / WBC x   Sq Epi: x / Non Sq Epi: x / Bacteria: x        RADIOLOGY & ADDITIONAL TESTS:    Imaging Personally Reviewed:  [x] YES  [ ] NO    Will obtain old records:  [x] YES  [ ] NO      MEDICATIONS  (STANDING):  enoxaparin Injectable 40 milliGRAM(s) SubCutaneous every 24 hours  pantoprazole    Tablet 40 milliGRAM(s) Oral before breakfast  senna 2 Tablet(s) Oral at bedtime    MEDICATIONS  (PRN):  acetaminophen   IVPB .. 1000 milliGRAM(s) IV Intermittent every 6 hours PRN Mild Pain (1 - 3)      Care Discussed with Consultants/Other Providers [x] YES  [ ] NO    HEALTH ISSUES - PROBLEM Dx:

## 2022-04-17 NOTE — RAPID RESPONSE TEAM SUMMARY - NSADDTLFINDINGSRRT_GEN_ALL_CORE
Upon arrival the Patient was noted to have full body shaking, Awake and alert Pale, Hrt rate 150's 's unable to obtain a core temp but reported non febrile. Stat labs ordered cbc cmp mag phos pt Ptt and blood cultures were ordered. Surgical team arrived to RRT and has now resumed care.

## 2022-04-17 NOTE — CHART NOTE - NSCHARTNOTEFT_GEN_A_CORE
House hematologist Gabriel Faria cleared the patient for cholecystectomy. Patient's AML is in CR. Patient was following up with Hematologist Chelsea Marks. Talked to patient and his father at bedside. Patient wants to proceed with surgery today giving Dr. Dia's clearance. Patient does not want us to call Dr. Yancey for more evals. Patient was consented for cholecystectomy with nurse as witness.

## 2022-04-17 NOTE — CHART NOTE - NSCHARTNOTEFT_GEN_A_CORE
Surgical Rapid Response Note    Team called for patient having "crushing chest pain." Patient HR as noted to be in the 160's. EKG demonstrated normal sinus rhythm. Upon examination, patient was rigorring. Afebrile. Denied abdominal pain.    Labs drawn (CBC, CMP, Mag, Phos, Lactate, VBG). Blood cultures sent. 5mg Lopressor IV push given.    Patient's heart rate came down, with no Surgical Rapid Response Note    Team called for patient having "crushing chest pain." Patient HR as noted to be in the 160's. EKG demonstrated normal sinus rhythm. Upon examination, patient was rigorring. Afebrile. Hemodynamically stable. Denied abdominal pain.    Labs drawn (CBC, CMP, Mag, Phos, Lactate, VBG). Blood cultures sent. 5mg Lopressor IV push given. Zosyn started.    Patient's heart rate came down to 100-110. Repeat EKG also demonstrated normal sinus rhythm.    Will continue to monitor heart rate and vitals.      ACS/Trauma Surgery  [id number] Surgical Rapid Response Note    Team called for patient having "crushing chest pain." Patient HR as noted to be in the 160's. EKG demonstrated normal sinus rhythm. Upon examination, patient was rigorring. Afebrile. Hemodynamically stable. Denied abdominal pain.    Labs drawn (CBC, CMP, Mag, Phos, Lactate, VBG). Blood cultures sent. 5mg Lopressor IV push given. Zosyn started.    Patient's heart rate came down to 100-110. Repeat EKG also demonstrated normal sinus rhythm.    Will continue to monitor heart rate and vitals. Will repeat labs at 4AM.      ACS/Trauma Surgery  [id number] Surgical Rapid Response Note    Team called for patient having "crushing chest pain." Patient HR as noted to be in the 160's. EKG demonstrated normal sinus rhythm. Upon examination, patient was rigorring. Afebrile. Hemodynamically stable. Denied abdominal pain.    Labs drawn (CBC, CMP, Mag, Phos, Lactate, VBG, cardaic enzymes). Blood cultures sent. 5mg Lopressor IV push given. Zosyn started.    Patient's heart rate came down to 100-110. Repeat EKG also demonstrated normal sinus rhythm.    Will continue to monitor heart rate and vitals. Will repeat labs at 4AM.      ACS/Trauma Surgery  [id number]

## 2022-04-18 ENCOUNTER — TRANSCRIPTION ENCOUNTER (OUTPATIENT)
Age: 37
End: 2022-04-18

## 2022-04-18 LAB
ALBUMIN SERPL ELPH-MCNC: 3.9 G/DL — SIGNIFICANT CHANGE UP (ref 3.3–5)
ALP SERPL-CCNC: 91 U/L — SIGNIFICANT CHANGE UP (ref 40–120)
ALT FLD-CCNC: 211 U/L — HIGH (ref 10–45)
ANION GAP SERPL CALC-SCNC: 13 MMOL/L — SIGNIFICANT CHANGE UP (ref 5–17)
AST SERPL-CCNC: 65 U/L — HIGH (ref 10–40)
BASE EXCESS BLDV CALC-SCNC: 4.7 MMOL/L — HIGH (ref -2–2)
BILIRUB SERPL-MCNC: 0.4 MG/DL — SIGNIFICANT CHANGE UP (ref 0.2–1.2)
BUN SERPL-MCNC: 12 MG/DL — SIGNIFICANT CHANGE UP (ref 7–23)
CA-I SERPL-SCNC: 1.28 MMOL/L — SIGNIFICANT CHANGE UP (ref 1.15–1.33)
CALCIUM SERPL-MCNC: 9.1 MG/DL — SIGNIFICANT CHANGE UP (ref 8.4–10.5)
CHLORIDE BLDV-SCNC: 104 MMOL/L — SIGNIFICANT CHANGE UP (ref 96–108)
CHLORIDE SERPL-SCNC: 103 MMOL/L — SIGNIFICANT CHANGE UP (ref 96–108)
CK MB BLD-MCNC: 1.8 % — SIGNIFICANT CHANGE UP (ref 0–3.5)
CK MB CFR SERPL CALC: 8 NG/ML — HIGH (ref 0–6.7)
CK SERPL-CCNC: 452 U/L — HIGH (ref 30–200)
CO2 BLDV-SCNC: 33 MMOL/L — HIGH (ref 22–26)
CO2 SERPL-SCNC: 24 MMOL/L — SIGNIFICANT CHANGE UP (ref 22–31)
CREAT SERPL-MCNC: 1.06 MG/DL — SIGNIFICANT CHANGE UP (ref 0.5–1.3)
EGFR: 93 ML/MIN/1.73M2 — SIGNIFICANT CHANGE UP
GAS PNL BLDV: 140 MMOL/L — SIGNIFICANT CHANGE UP (ref 136–145)
GAS PNL BLDV: SIGNIFICANT CHANGE UP
GLUCOSE BLDV-MCNC: 104 MG/DL — HIGH (ref 70–99)
GLUCOSE SERPL-MCNC: 103 MG/DL — HIGH (ref 70–99)
HCO3 BLDV-SCNC: 31 MMOL/L — HIGH (ref 22–29)
HCT VFR BLD CALC: 33.4 % — LOW (ref 39–50)
HCT VFR BLDA CALC: 37 % — LOW (ref 39–51)
HGB BLD CALC-MCNC: 12.3 G/DL — LOW (ref 12.6–17.4)
HGB BLD-MCNC: 11.9 G/DL — LOW (ref 13–17)
LACTATE BLDV-MCNC: 1.1 MMOL/L — SIGNIFICANT CHANGE UP (ref 0.7–2)
MAGNESIUM SERPL-MCNC: 2.2 MG/DL — SIGNIFICANT CHANGE UP (ref 1.6–2.6)
MCHC RBC-ENTMCNC: 33.8 PG — SIGNIFICANT CHANGE UP (ref 27–34)
MCHC RBC-ENTMCNC: 35.6 GM/DL — SIGNIFICANT CHANGE UP (ref 32–36)
MCV RBC AUTO: 94.9 FL — SIGNIFICANT CHANGE UP (ref 80–100)
NRBC # BLD: 0 /100 WBCS — SIGNIFICANT CHANGE UP (ref 0–0)
PCO2 BLDV: 54 MMHG — SIGNIFICANT CHANGE UP (ref 42–55)
PH BLDV: 7.37 — SIGNIFICANT CHANGE UP (ref 7.32–7.43)
PHOSPHATE SERPL-MCNC: 4.5 MG/DL — SIGNIFICANT CHANGE UP (ref 2.5–4.5)
PLATELET # BLD AUTO: 90 K/UL — LOW (ref 150–400)
PO2 BLDV: 37 MMHG — SIGNIFICANT CHANGE UP (ref 25–45)
POTASSIUM BLDV-SCNC: 3.9 MMOL/L — SIGNIFICANT CHANGE UP (ref 3.5–5.1)
POTASSIUM SERPL-MCNC: 3.9 MMOL/L — SIGNIFICANT CHANGE UP (ref 3.5–5.3)
POTASSIUM SERPL-SCNC: 3.9 MMOL/L — SIGNIFICANT CHANGE UP (ref 3.5–5.3)
PROT SERPL-MCNC: 6.1 G/DL — SIGNIFICANT CHANGE UP (ref 6–8.3)
RBC # BLD: 3.52 M/UL — LOW (ref 4.2–5.8)
RBC # FLD: 12.9 % — SIGNIFICANT CHANGE UP (ref 10.3–14.5)
SAO2 % BLDV: 62.6 % — LOW (ref 67–88)
SODIUM SERPL-SCNC: 140 MMOL/L — SIGNIFICANT CHANGE UP (ref 135–145)
TROPONIN T, HIGH SENSITIVITY RESULT: 9 NG/L — SIGNIFICANT CHANGE UP (ref 0–51)
WBC # BLD: 2.47 K/UL — LOW (ref 3.8–10.5)
WBC # FLD AUTO: 2.47 K/UL — LOW (ref 3.8–10.5)

## 2022-04-18 PROCEDURE — 99233 SBSQ HOSP IP/OBS HIGH 50: CPT | Mod: GC

## 2022-04-18 RX ORDER — OXYCODONE HYDROCHLORIDE 5 MG/1
5 TABLET ORAL EVERY 4 HOURS
Refills: 0 | Status: DISCONTINUED | OUTPATIENT
Start: 2022-04-18 | End: 2022-04-19

## 2022-04-18 RX ORDER — OXYCODONE HYDROCHLORIDE 5 MG/1
10 TABLET ORAL EVERY 4 HOURS
Refills: 0 | Status: DISCONTINUED | OUTPATIENT
Start: 2022-04-18 | End: 2022-04-19

## 2022-04-18 RX ORDER — IBUPROFEN 200 MG
200 TABLET ORAL ONCE
Refills: 0 | Status: COMPLETED | OUTPATIENT
Start: 2022-04-18 | End: 2022-04-18

## 2022-04-18 RX ORDER — ACETAMINOPHEN 500 MG
975 TABLET ORAL EVERY 6 HOURS
Refills: 0 | Status: DISCONTINUED | OUTPATIENT
Start: 2022-04-18 | End: 2022-04-19

## 2022-04-18 RX ORDER — OXYCODONE HYDROCHLORIDE 5 MG/1
5 TABLET ORAL ONCE
Refills: 0 | Status: DISCONTINUED | OUTPATIENT
Start: 2022-04-18 | End: 2022-04-18

## 2022-04-18 RX ORDER — SIMETHICONE 80 MG/1
80 TABLET, CHEWABLE ORAL EVERY 12 HOURS
Refills: 0 | Status: DISCONTINUED | OUTPATIENT
Start: 2022-04-18 | End: 2022-04-19

## 2022-04-18 RX ADMIN — OXYCODONE HYDROCHLORIDE 10 MILLIGRAM(S): 5 TABLET ORAL at 18:35

## 2022-04-18 RX ADMIN — Medication 1 TABLET(S): at 18:35

## 2022-04-18 RX ADMIN — OXYCODONE HYDROCHLORIDE 5 MILLIGRAM(S): 5 TABLET ORAL at 14:50

## 2022-04-18 RX ADMIN — Medication 975 MILLIGRAM(S): at 18:35

## 2022-04-18 RX ADMIN — SIMETHICONE 80 MILLIGRAM(S): 80 TABLET, CHEWABLE ORAL at 10:16

## 2022-04-18 RX ADMIN — OXYCODONE HYDROCHLORIDE 5 MILLIGRAM(S): 5 TABLET ORAL at 09:12

## 2022-04-18 RX ADMIN — Medication 975 MILLIGRAM(S): at 13:18

## 2022-04-18 RX ADMIN — Medication 200 MILLIGRAM(S): at 10:45

## 2022-04-18 RX ADMIN — Medication 400 MILLIGRAM(S): at 06:51

## 2022-04-18 RX ADMIN — PIPERACILLIN AND TAZOBACTAM 25 GRAM(S): 4; .5 INJECTION, POWDER, LYOPHILIZED, FOR SOLUTION INTRAVENOUS at 10:17

## 2022-04-18 RX ADMIN — Medication 1000 MILLIGRAM(S): at 07:12

## 2022-04-18 RX ADMIN — SENNA PLUS 2 TABLET(S): 8.6 TABLET ORAL at 21:09

## 2022-04-18 RX ADMIN — OXYCODONE HYDROCHLORIDE 5 MILLIGRAM(S): 5 TABLET ORAL at 13:17

## 2022-04-18 RX ADMIN — OXYCODONE HYDROCHLORIDE 5 MILLIGRAM(S): 5 TABLET ORAL at 13:48

## 2022-04-18 RX ADMIN — OXYCODONE HYDROCHLORIDE 5 MILLIGRAM(S): 5 TABLET ORAL at 00:51

## 2022-04-18 RX ADMIN — OXYCODONE HYDROCHLORIDE 5 MILLIGRAM(S): 5 TABLET ORAL at 14:23

## 2022-04-18 RX ADMIN — OXYCODONE HYDROCHLORIDE 5 MILLIGRAM(S): 5 TABLET ORAL at 01:22

## 2022-04-18 RX ADMIN — Medication 975 MILLIGRAM(S): at 13:48

## 2022-04-18 RX ADMIN — Medication 975 MILLIGRAM(S): at 19:16

## 2022-04-18 RX ADMIN — ENOXAPARIN SODIUM 40 MILLIGRAM(S): 100 INJECTION SUBCUTANEOUS at 22:06

## 2022-04-18 RX ADMIN — OXYCODONE HYDROCHLORIDE 10 MILLIGRAM(S): 5 TABLET ORAL at 19:16

## 2022-04-18 RX ADMIN — PIPERACILLIN AND TAZOBACTAM 25 GRAM(S): 4; .5 INJECTION, POWDER, LYOPHILIZED, FOR SOLUTION INTRAVENOUS at 03:27

## 2022-04-18 RX ADMIN — PANTOPRAZOLE SODIUM 40 MILLIGRAM(S): 20 TABLET, DELAYED RELEASE ORAL at 06:51

## 2022-04-18 RX ADMIN — Medication 200 MILLIGRAM(S): at 10:17

## 2022-04-18 RX ADMIN — OXYCODONE HYDROCHLORIDE 5 MILLIGRAM(S): 5 TABLET ORAL at 08:36

## 2022-04-18 NOTE — PROVIDER CONTACT NOTE (OTHER) - ACTION/TREATMENT ORDERED:
MD made aware. Awaiting further instructions for patient. EKG done. RRT called by RN, awaiting surgical team.

## 2022-04-18 NOTE — DISCHARGE NOTE PROVIDER - CARE PROVIDERS DIRECT ADDRESSES
[email or web address]

## 2022-04-18 NOTE — DISCHARGE NOTE PROVIDER - HOSPITAL COURSE
Mr. Tian is a 36 year old man with PMHx AML, known cholelithiasis presenting with acute on chronic RUQ pain. Patient reports that he was diagnosed with cholelithiasis earlier in the year when he was admitted with pneumonia and neutropenia. He was told it was unsafe to have his gallbladder removed at that time. He followed up with GI Dr. Boateng and had an endoscopy demonstrating gastritis. He was started on PPI and *** however his pain persisted. Today he woke up with 10/10 RUQ pain that was not resolving. He denies fevers, chills, nausea, vomiting, diarrhea. He reports that his AML is in full remission over the last 2 months and his last chemo was at that time.    Upon presentation to the ED patient is afebrile and hemodynamically normal. Labs are notable for WBC 2.06. RUQ US demonstrating cholelithiasis and pericholecystic fluid. Heme/onc was consulted.Diagnosed 08/2021, s/p BMBx 2/11 shows complete remission. No contra-indication to surgery for cholecystitis from Hematology standpoint. F/u with Dr. Chelsea Yancey at [facility] outpatient (next appt 4/25 at 11AM). On 4/17 patient underwent laparoscopic cholecystotomy. The patient tolerated the procedure well without complications, was extubated, and transferred to the PACU in stable condition. 4/17 patient had chest pain with rapid HR.     INCOMPLETE   Mr. Tian is a 36 year old man with PMHx AML, known cholelithiasis presenting with acute on chronic RUQ pain. Patient reports that he was diagnosed with cholelithiasis earlier in the year when he was admitted with pneumonia and neutropenia. He was told it was unsafe to have his gallbladder removed at that time. He followed up with GI Dr. Boateng and had an endoscopy demonstrating gastritis. He was started on PPI however his pain persisted. Today he woke up with 10/10 RUQ pain that was not resolving. He denies fevers, chills, nausea, vomiting, diarrhea. He reports that his AML is in full remission over the last 2 months and his last chemo was at that time.    Upon presentation to the ED patient is afebrile and hemodynamically normal. Labs are notable for WBC 2.06. RUQ US demonstrating cholelithiasis and pericholecystic fluid. Heme/onc was consulted.Diagnosed 08/2021, s/p BMBx 2/11 shows complete remission. No contra-indication to surgery for cholecystitis from Hematology standpoint. F/u with Dr. Chelsea Yancey at [facility] outpatient (next appt 4/25 at 11AM). On 4/17 patient underwent laparoscopic cholecystotomy. The patient tolerated the procedure well without complications, was extubated, and transferred to the PACU in stable condition. 4/17 patient had chest pain with   Team called for patient having "crushing chest pain." Patient HR as noted to be in the 160's. EKG demonstrated normal sinus rhythm. Upon examination, patient was rigorring. Afebrile. Hemodynamically stable. Denied abdominal pain.    Labs drawn (CBC, CMP, Mag, Phos, Lactate, VBG, cardaic enzymes). Blood cultures sent. 5mg Lopressor IV push given. Zosyn started.      Given a litre bolus of LR with resolution of symptoms. No fever. However, due to leukopenia on admission and recently history of chemo for AML, restarted abx, will plan for a 4 day course. Most likely post-operative symptoms with some dehydration.     POD 1 with some abdominal pain monitored.   Pain imporved - seen by heme onc.     Patient discharged home on PO Abx

## 2022-04-18 NOTE — DISCHARGE NOTE PROVIDER - CARE PROVIDER_API CALL
Alva Geiger (MD)  Surgery; Surgical Critical Care  [address] [address]  Phone: [phone]  Fax: [phone]  Follow Up Time: 1 month   Alva Geiger (MD)  Surgery; Surgical Critical Care  [address] [address]  Phone: [phone]  Fax: [phone]  Follow Up Time: 2 weeks   Alva Geiger)  Surgery; Surgical Critical Care  [address] [place] [address]  Phone: [phone]  Fax: [phone]  Follow Up Time: 2 weeks    Chelsea Yancey)  HematologyOncology; Internal Medicine; Medical Oncology  [address] [place] [address]  Phone: [phone]  Fax: [phone]  Follow Up Time: 1 week

## 2022-04-18 NOTE — PROGRESS NOTE ADULT - SUBJECTIVE AND OBJECTIVE BOX
Hematology Follow-up    INTERVAL HPI/OVERNIGHT EVENTS:  Patient S&E at bedside. No o/n events, patient resting comfortably. Pt with R shoulder pain. Tolerating PO. No BMs.    VITAL SIGNS:  T(F): 97.9 (04-18-22 @ 16:30)  HR: 99 (04-18-22 @ 16:30)  BP: 125/78 (04-18-22 @ 16:30)  RR: 18 (04-18-22 @ 16:30)  SpO2: 100% (04-18-22 @ 16:30)  Wt(kg): --    PHYSICAL EXAM:    Constitutional: AAOx3, NAD,   Eyes: PERRL, EOMI, sclera non-icteric  Neck: supple, no masses, no JVD  Respiratory: CTA b/l, good air entry b/l, no wheezing, rhonchi, rales, with normal respiratory effort and no intercostal retractions  Cardiovascular: RRR, normal S1S2, no M/R/G  Gastrointestinal: soft, tender non distended, no masses palpable, BS normal in all four quadrants, no HSM. Small bandage over umbilicus with slight bright red blood  Extremities:  no c/c/e  Neurological: Grossly intact  Skin: Normal temperature    MEDICATIONS  (STANDING):  acetaminophen     Tablet .. 975 milliGRAM(s) Oral every 6 hours  amoxicillin  875 milliGRAM(s)/clavulanate 1 Tablet(s) Oral two times a day  enoxaparin Injectable 40 milliGRAM(s) SubCutaneous every 24 hours  pantoprazole    Tablet 40 milliGRAM(s) Oral before breakfast  senna 2 Tablet(s) Oral at bedtime    MEDICATIONS  (PRN):  oxyCODONE    IR 10 milliGRAM(s) Oral every 4 hours PRN Severe Pain (7 - 10)  oxyCODONE    IR 5 milliGRAM(s) Oral every 4 hours PRN Moderate Pain (4 - 6)  simethicone 80 milliGRAM(s) Chew every 12 hours PRN Gas      No Known Allergies      LABS:                        11.9   2.47  )-----------( 90       ( 18 Apr 2022 04:43 )             33.4     04-18    140  |  103  |  12  ----------------------------<  103<H>  3.9   |  24  |  1.06    Ca    9.1      18 Apr 2022 04:43  Phos  4.5     04-18  Mg     2.2     04-18    TPro  6.1  /  Alb  3.9  /  TBili  0.4  /  DBili  x   /  AST  65<H>  /  ALT  211<H>  /  AlkPhos  91  04-18    PT/INR - ( 17 Apr 2022 07:00 )   PT: 13.1 sec;   INR: 1.13 ratio         PTT - ( 17 Apr 2022 07:00 )  PTT:34.8 sec       RADIOLOGY & ADDITIONAL TESTS:  Studies reviewed.

## 2022-04-18 NOTE — PROGRESS NOTE ADULT - ASSESSMENT
36M with PMHx AML, known cholelithiasis presenting with acute cholecystitis.    - OR today  - NPO, IVF  - Ertapenum  - hematology recs appreciated  - pain control PRN    Acute Care Surgery  [id number] 36M with PMHx AML, known cholelithiasis presenting with acute cholecystitis.    Plan:  - Reg diet as tolerated   - zosyn  - hematology recs appreciated  - pain control PRN  - Likely d/c this afternoon     Acute Care Surgery  [id number]

## 2022-04-18 NOTE — PROGRESS NOTE ADULT - SUBJECTIVE AND OBJECTIVE BOX
Name of Patient : JAK DANIELS  MRN: [id number]  Date of visit: 04-18-22 @ 11:58      Subjective: Patient seen and examined. No new events except as noted.   Patient seen earlier this AM. OOB TC. Overnight, RRT called for Tachycardia to 160 and rigors, improved with IVF  C/O Right shoulder pain. Was given pain medication 10 minutes prior to visit.   Denies CP, palpitations, SOB or dyspnea.   Post op for lap choley     REVIEW OF SYSTEMS:    CONSTITUTIONAL: No weakness, fevers or chills  EYES/ENT: No visual changes;  No vertigo or throat pain   NECK: No pain or stiffness  RESPIRATORY: No cough, wheezing, hemoptysis; No shortness of breath  CARDIOVASCULAR: No chest pain or palpitations  GASTROINTESTINAL: +S/P Lap choley; +Passing flatus; + shoulder pain   GENITOURINARY: No dysuria, frequency or hematuria  NEUROLOGICAL: No numbness or weakness  SKIN: No itching, burning, rashes, or lesions   All other review of systems is negative unless indicated above.    MEDICATIONS:  MEDICATIONS  (STANDING):  acetaminophen     Tablet .. 975 milliGRAM(s) Oral every 6 hours  enoxaparin Injectable 40 milliGRAM(s) SubCutaneous every 24 hours  pantoprazole    Tablet 40 milliGRAM(s) Oral before breakfast  piperacillin/tazobactam IVPB.. 3.375 Gram(s) IV Intermittent every 8 hours  senna 2 Tablet(s) Oral at bedtime      PHYSICAL EXAM:  T(C): 36.6 (04-18-22 @ 09:22), Max: 36.8 (04-17-22 @ 18:32)  HR: 88 (04-18-22 @ 09:22) (76 - 160)  BP: 153/79 (04-18-22 @ 09:22) (121/83 - 153/96)  RR: 18 (04-18-22 @ 09:22) (17 - 20)  SpO2: 98% (04-18-22 @ 09:22) (95% - 100%)  Wt(kg): --  I&O's Summary    17 Apr 2022 07:01  -  18 Apr 2022 07:00  --------------------------------------------------------  IN: 795 mL / OUT: 3025 mL / NET: -2230 mL    18 Apr 2022 07:01  -  18 Apr 2022 11:58  --------------------------------------------------------  IN: 200 mL / OUT: 850 mL / NET: -650 mL      Height (cm): 180.3 (04-17 @ 13:30)  Weight (kg): 88.5 (04-17 @ 13:30)  BMI (kg/m2): 27.2 (04-17 @ 13:30)  BSA (m2): 2.09 (04-17 @ 13:30)    Appearance: Normal, OOB TC  HEENT:  PERRLA   Lymphatic: No lymphadenopathy   Cardiovascular: Normal S1 S2, no JVD  Respiratory: normal effort , clear  Gastrointestinal:  Soft, + Lap choley incisions with dressings   Skin: No rashes,  warm to touch  Psychiatry:  Mood & affect appropriate  Musculoskeletal: No edema            04-17-22 @ 07:01  -  04-18-22 @ 07:00  --------------------------------------------------------  IN: 795 mL / OUT: 3025 mL / NET: -2230 mL    04-18-22 @ 07:01  -  04-18-22 @ 11:58  --------------------------------------------------------  IN: 200 mL / OUT: 850 mL / NET: -650 mL                              11.9   2.47  )-----------( 90       ( 18 Apr 2022 04:43 )             33.4               04-18    140  |  103  |  12  ----------------------------<  103<H>  3.9   |  24  |  1.06    Ca    9.1      18 Apr 2022 04:43  Phos  4.5     04-18  Mg     2.2     04-18    TPro  6.1  /  Alb  3.9  /  TBili  0.4  /  DBili  x   /  AST  65<H>  /  ALT  211<H>  /  AlkPhos  91  04-18    PT/INR - ( 17 Apr 2022 07:00 )   PT: 13.1 sec;   INR: 1.13 ratio         PTT - ( 17 Apr 2022 07:00 )  PTT:34.8 sec       CARDIAC MARKERS ( 17 Apr 2022 22:49 )  x     / x     / 452 U/L / x     / 8.0 ng/mL                Culture - Urine (04.16.22 @ 12:58)   Specimen Source: Clean Catch Clean Catch (Midstream)   Culture Results:   No growth

## 2022-04-18 NOTE — DISCHARGE NOTE PROVIDER - PROVIDER TOKENS
PROVIDER:[TOKEN:[[id number]],FOLLOWUP:[1 month]] PROVIDER:[TOKEN:[[id number]],FOLLOWUP:[2 weeks]] PROVIDER:[TOKEN:[[id number]],FOLLOWUP:[2 weeks]],PROVIDER:[TOKEN:[[id number]],FOLLOWUP:[1 week]]

## 2022-04-18 NOTE — DISCHARGE NOTE PROVIDER - NSDCCPCAREPLAN_GEN_ALL_CORE_FT
PRINCIPAL DISCHARGE DIAGNOSIS  Diagnosis: Cholecystitis  Assessment and Plan of Treatment: s/p laparoscopic cholecytectomy   WOUND CARE: You may shower. Pat Dry abdomen. Leave the white steri strips in place, they will fall off on their own in approximately 5-7 days.  BATHING: Please do not submerge wound underwater. You may shower and/or sponge bathe.  ACTIVITY: No heavy lifting anything more than 10-15lbs or straining. Otherwise, you may return to your usual level of physical activity. If you are taking narcotic pain medication (such as Percocet), do NOT drive a car, operate machinery or make important decisions.  DIET: Low fiber diet  NOTIFY YOUR SURGEON IF: You have any bleeding that does not stop, any pus draining from your wound, any fever (over 100.4 F) or chills, persistent nausea/vomiting with inability to tolerate food or liquids, persistent diarrhea, or if your pain is not controlled on your discharge pain medications.  FOLLOW-UP:  1. Please call to make a follow-up appointment within one week of discharge   2. Please follow up with your primary care physician in one week regarding your hospitalization.        SECONDARY DISCHARGE DIAGNOSES  Diagnosis: AML (acute myeloid leukemia)  Assessment and Plan of Treatment: follow up with Dr. Chelsea Yancey at [facility] outpatient (next appt 4/25 at 11AM)     PRINCIPAL DISCHARGE DIAGNOSIS  Diagnosis: Cholecystitis  Assessment and Plan of Treatment: s/p laparoscopic cholecytectomy   WOUND CARE: You may shower. Pat Dry abdomen. Leave the white steri strips in place, they will fall off on their own in approximately 5-7 days.  BATHING: Please do not submerge wound underwater. You may shower and/or sponge bathe.  ACTIVITY: No heavy lifting anything more than 10-15lbs or straining. Otherwise, you may return to your usual level of physical activity. If you are taking narcotic pain medication (such as Percocet), do NOT drive a car, operate machinery or make important decisions.  DIET: Low fiber diet  NOTIFY YOUR SURGEON IF: You have any bleeding that does not stop, any pus draining from your wound, any fever (over 100.4 F) or chills, persistent nausea/vomiting with inability to tolerate food or liquids, persistent diarrhea, or if your pain is not controlled on your discharge pain medications.  FOLLOW-UP:  1. Please call to make a follow-up appointment  with one of the surgery doctors Dr. Geiger or one of her partners Dr. Ledezma, Dr. Gonzalez, Dr. Sheikh, Dr. Marcelo, Dr. Avalos, Dr. Nj,  Dr. Guerrero, or Dr. Peterson please call [phone] to schedule an appointment within one week of discharge   2. Please follow up with your primary care physician in one week regarding your hospitalization.        SECONDARY DISCHARGE DIAGNOSES  Diagnosis: AML (acute myeloid leukemia)  Assessment and Plan of Treatment: follow up with Dr. Chelsea Yancey at [facility] outpatient (next appt 4/25 at 11AM)

## 2022-04-18 NOTE — DISCHARGE NOTE PROVIDER - NSDCMRMEDTOKEN_GEN_ALL_CORE_FT
pantoprazole 40 mg oral delayed release tablet: 1 tab(s) orally once a day (before a meal)  PT consult : 3 to 4 times a week   senna oral tablet: 2 tab(s) orally once a day (at bedtime)   acetaminophen 325 mg oral tablet: 3 tab(s) orally every 6 hours  amoxicillin-clavulanate 875 mg-125 mg oral tablet: 1 tab(s) orally 2 times a day  oxyCODONE 5 mg oral tablet: 1 tab(s) orally every 4 hours, As needed, Moderate Pain (4 - 6) MDD:6  pantoprazole 40 mg oral delayed release tablet: 1 tab(s) orally once a day (before a meal)  PT consult : 3 to 4 times a week   senna oral tablet: 2 tab(s) orally once a day (at bedtime)

## 2022-04-18 NOTE — PROGRESS NOTE ADULT - SUBJECTIVE AND OBJECTIVE BOX
Surgery Progress Note     Subjective/24hour Events:   Patient seen and examined.       Vital Signs:  Vital Signs Last 24 Hrs  T(C): 36.3 (18 Apr 2022 00:12), Max: 36.9 (17 Apr 2022 09:22)  T(F): 97.3 (18 Apr 2022 00:12), Max: 98.4 (17 Apr 2022 09:22)  HR: 86 (18 Apr 2022 00:12) (62 - 160)  BP: 121/83 (18 Apr 2022 00:12) (121/83 - 153/96)  BP(mean): 94 (17 Apr 2022 18:00) (94 - 112)  RR: 18 (18 Apr 2022 00:12) (17 - 20)  SpO2: 98% (18 Apr 2022 00:12) (95% - 100%)    CAPILLARY BLOOD GLUCOSE      POCT Blood Glucose.: 128 mg/dL (17 Apr 2022 22:18)      I&O's Detail    16 Apr 2022 07:01  -  17 Apr 2022 07:00  --------------------------------------------------------  IN:    IV PiggyBack: 50 mL  Total IN: 50 mL    OUT:  Total OUT: 0 mL    Total NET: 50 mL      17 Apr 2022 07:01  -  18 Apr 2022 01:09  --------------------------------------------------------  IN:    Lactated Ringers: 75 mL    Oral Fluid: 320 mL  Total IN: 395 mL    OUT:    Voided (mL): 3025 mL  Total OUT: 3025 mL    Total NET: -2630 mL          Physical Exam:  General - well-nourished, no acute distress  Neuro - awake, alert, oriented x4, no acute focal deficits  HEENT - normocephalic, PERRL, moist mucous membranes  Lungs - breathing comfortably on room air  Heart - pulse regular  Abdomen - soft, nondistended, tender to palpation in epigastrium and RUQ    Labs:    04-17    140  |  98  |  13  ----------------------------<  120<H>  3.7   |  21<L>  |  1.16    Ca    9.9      17 Apr 2022 22:49  Phos  3.5     04-17  Mg     2.3     04-17    TPro  7.7  /  Alb  4.9  /  TBili  0.6  /  DBili  x   /  AST  110<H>  /  ALT  294<H>  /  AlkPhos  119  04-17    LIVER FUNCTIONS - ( 17 Apr 2022 22:49 )  Alb: 4.9 g/dL / Pro: 7.7 g/dL / ALK PHOS: 119 U/L / ALT: 294 U/L / AST: 110 U/L / GGT: x                                 13.9   3.34  )-----------( 107      ( 17 Apr 2022 22:49 )             38.9     PT/INR - ( 17 Apr 2022 07:00 )   PT: 13.1 sec;   INR: 1.13 ratio         PTT - ( 17 Apr 2022 07:00 )  PTT:34.8 sec     Surgery Progress Note     Subjective/24hour Events:   POD 1 lap leni. Overnight, rigourous and tachycardic to 160, improved with fluids, feels better this morning. No new complaints, no fevers.     OBJECTIVE  Vital Signs Last 24 Hrs  T(C): 36.4 (18 Apr 2022 04:44), Max: 36.9 (17 Apr 2022 09:22)  T(F): 97.6 (18 Apr 2022 04:44), Max: 98.4 (17 Apr 2022 09:22)  HR: 76 (18 Apr 2022 04:44) (62 - 160)  BP: 126/81 (18 Apr 2022 04:44) (121/83 - 153/96)  BP(mean): 94 (17 Apr 2022 18:00) (94 - 112)  RR: 18 (18 Apr 2022 04:44) (17 - 20)  SpO2: 96% (18 Apr 2022 04:44) (95% - 100%)  I&O's Detail    17 Apr 2022 07:01  -  18 Apr 2022 07:00  --------------------------------------------------------  IN:    IV PiggyBack: 200 mL    IV PiggyBack: 200 mL    Lactated Ringers: 75 mL    Oral Fluid: 320 mL  Total IN: 795 mL    OUT:    Voided (mL): 3025 mL  Total OUT: 3025 mL    Total NET: -2230 mL      Physical Exam:  General - well-nourished, no acute distress  Neuro - awake, alert, oriented x4, no acute focal deficits  HEENT - normocephalic, PERRL, moist mucous membranes  Lungs - breathing comfortably on room air  Heart - pulse regular  Abdomen - soft, nondistended, tender to palpation in epigastrium and RUQ, opsites c/d/i    Labs:                          11.9   2.47  )-----------( 90       ( 18 Apr 2022 04:43 )             33.4   04-18    140  |  103  |  12  ----------------------------<  103<H>  3.9   |  24  |  1.06    Ca    9.1      18 Apr 2022 04:43  Phos  4.5     04-18  Mg     2.2     04-18    TPro  6.1  /  Alb  3.9  /  TBili  0.4  /  DBili  x   /  AST  65<H>  /  ALT  211<H>  /  AlkPhos  91  04-18

## 2022-04-18 NOTE — PROGRESS NOTE ADULT - ASSESSMENT
36 year old man with PMHx AML, known cholelithiasis presenting with acute cholecystitis    #Acute cholecystitis  Pt is medically optimized to proceed to OR  S/p one dose of ertapenem 4/16/22--> now on Zosyn   Appreciate general surgery  S/P OR for lap choley   Pain control  Monitor for diet tolerance, BM   Elevated LFTs, cont to monitor and trend     #AML in remission  No contraindication for OR  CBC with diff daily  Appreciate heme/onc    #S/P RRT  - F/U blood cultures  - Monitor VS, patient closely  - Monitor CBC, temp curve     #Need for DVT prophylaxis  SC lovenox

## 2022-04-18 NOTE — PROGRESS NOTE ADULT - ASSESSMENT
37 yo M with a PMH of AML (NPM1+, FLT3 negative, s/p 7+3 w/HiDAC consolidation x4, in remission since 2/11/22) and known cholelithiasis who p/w with acute on chronic RUQ pain, found to have acute cholecystitis. Hematology consulted for clearance to have surgery from leukemia standpoint now s/p lap leni.    #AML  - NPM1 positive, FLT3 negative  - Diagnosed 08/2021, s/p induction chemotherapy with Daunorubicin/Cytarabine (7+3)  - Completed 4 cycles of consolidation with hi-dose AraC, last completed C4 from 1/5/22 - 1/10/22  - BMBx 2/11 shows complete remission  - Patient is slightly leukopenic (not neutropenic) in the setting of acute process but blood counts to be at or near patient's baseline for the last few months  - F/u with Dr. Chelsea Yancey at [facility] outpatient (next appt 4/25 at 11AM)  -CBC daily  -transfuse hgb <7 and plts <10K    #Acute Cholecystitis  -s/p lap leni  -Pain control  -management per surgery      Please page with questions or concerns. Will Follow with you.      Dwayne Kohler M.D.  Hematology/Oncology Fellow PGY4  Pager [phone]  After 5pm, please contact on-call team.

## 2022-04-18 NOTE — PROVIDER CONTACT NOTE (OTHER) - ASSESSMENT
Pt tachycardic , pt complaining of L side shoulder to chest pain that comes and goes, abdominal incisions remain CDI, BP high 153/96.

## 2022-04-19 ENCOUNTER — OUTPATIENT (OUTPATIENT)
Dept: OUTPATIENT SERVICES | Facility: HOSPITAL | Age: 37
LOS: 1 days | Discharge: HOME | End: 2022-04-19

## 2022-04-19 ENCOUNTER — TRANSCRIPTION ENCOUNTER (OUTPATIENT)
Age: 37
End: 2022-04-19

## 2022-04-19 VITALS
SYSTOLIC BLOOD PRESSURE: 130 MMHG | OXYGEN SATURATION: 99 % | RESPIRATION RATE: 18 BRPM | HEART RATE: 96 BPM | DIASTOLIC BLOOD PRESSURE: 90 MMHG | TEMPERATURE: 97 F

## 2022-04-19 DIAGNOSIS — C92.00 ACUTE MYELOBLASTIC LEUKEMIA, NOT HAVING ACHIEVED REMISSION: ICD-10-CM

## 2022-04-19 LAB
ALBUMIN SERPL ELPH-MCNC: 4 G/DL — SIGNIFICANT CHANGE UP (ref 3.3–5)
ALP SERPL-CCNC: 80 U/L — SIGNIFICANT CHANGE UP (ref 40–120)
ALT FLD-CCNC: 141 U/L — HIGH (ref 10–45)
ANION GAP SERPL CALC-SCNC: 13 MMOL/L — SIGNIFICANT CHANGE UP (ref 5–17)
AST SERPL-CCNC: 30 U/L — SIGNIFICANT CHANGE UP (ref 10–40)
BASOPHILS # BLD AUTO: 0 K/UL — SIGNIFICANT CHANGE UP (ref 0–0.2)
BASOPHILS NFR BLD AUTO: 0 % — SIGNIFICANT CHANGE UP (ref 0–2)
BILIRUB SERPL-MCNC: 0.5 MG/DL — SIGNIFICANT CHANGE UP (ref 0.2–1.2)
BUN SERPL-MCNC: 13 MG/DL — SIGNIFICANT CHANGE UP (ref 7–23)
CALCIUM SERPL-MCNC: 9.5 MG/DL — SIGNIFICANT CHANGE UP (ref 8.4–10.5)
CHLORIDE SERPL-SCNC: 103 MMOL/L — SIGNIFICANT CHANGE UP (ref 96–108)
CO2 SERPL-SCNC: 24 MMOL/L — SIGNIFICANT CHANGE UP (ref 22–31)
CREAT SERPL-MCNC: 0.96 MG/DL — SIGNIFICANT CHANGE UP (ref 0.5–1.3)
DACRYOCYTES BLD QL SMEAR: SLIGHT — SIGNIFICANT CHANGE UP
EGFR: 105 ML/MIN/1.73M2 — SIGNIFICANT CHANGE UP
EOSINOPHIL # BLD AUTO: 0 K/UL — SIGNIFICANT CHANGE UP (ref 0–0.5)
EOSINOPHIL NFR BLD AUTO: 0 % — SIGNIFICANT CHANGE UP (ref 0–6)
GLUCOSE SERPL-MCNC: 88 MG/DL — SIGNIFICANT CHANGE UP (ref 70–99)
HCT VFR BLD CALC: 33.3 % — LOW (ref 39–50)
HGB BLD-MCNC: 11.6 G/DL — LOW (ref 13–17)
LYMPHOCYTES # BLD AUTO: 0.19 K/UL — LOW (ref 1–3.3)
LYMPHOCYTES # BLD AUTO: 12.3 % — LOW (ref 13–44)
MAGNESIUM SERPL-MCNC: 2.2 MG/DL — SIGNIFICANT CHANGE UP (ref 1.6–2.6)
MANUAL SMEAR VERIFICATION: SIGNIFICANT CHANGE UP
MCHC RBC-ENTMCNC: 34 PG — SIGNIFICANT CHANGE UP (ref 27–34)
MCHC RBC-ENTMCNC: 34.8 GM/DL — SIGNIFICANT CHANGE UP (ref 32–36)
MCV RBC AUTO: 97.7 FL — SIGNIFICANT CHANGE UP (ref 80–100)
MONOCYTES # BLD AUTO: 0.18 K/UL — SIGNIFICANT CHANGE UP (ref 0–0.9)
MONOCYTES NFR BLD AUTO: 11.4 % — SIGNIFICANT CHANGE UP (ref 2–14)
NEUTROPHILS # BLD AUTO: 1.12 K/UL — LOW (ref 1.8–7.4)
NEUTROPHILS NFR BLD AUTO: 72.8 % — SIGNIFICANT CHANGE UP (ref 43–77)
NRBC # BLD: 0 /100 WBCS — SIGNIFICANT CHANGE UP (ref 0–0)
PHOSPHATE SERPL-MCNC: 3.4 MG/DL — SIGNIFICANT CHANGE UP (ref 2.5–4.5)
PLAT MORPH BLD: NORMAL — SIGNIFICANT CHANGE UP
PLATELET # BLD AUTO: 83 K/UL — LOW (ref 150–400)
POIKILOCYTOSIS BLD QL AUTO: SLIGHT — SIGNIFICANT CHANGE UP
POTASSIUM SERPL-MCNC: 3.9 MMOL/L — SIGNIFICANT CHANGE UP (ref 3.5–5.3)
POTASSIUM SERPL-SCNC: 3.9 MMOL/L — SIGNIFICANT CHANGE UP (ref 3.5–5.3)
PROT SERPL-MCNC: 6.5 G/DL — SIGNIFICANT CHANGE UP (ref 6–8.3)
RBC # BLD: 3.41 M/UL — LOW (ref 4.2–5.8)
RBC # FLD: 13 % — SIGNIFICANT CHANGE UP (ref 10.3–14.5)
RBC BLD AUTO: ABNORMAL
SODIUM SERPL-SCNC: 140 MMOL/L — SIGNIFICANT CHANGE UP (ref 135–145)
VARIANT LYMPHS # BLD: 3.5 % — SIGNIFICANT CHANGE UP (ref 0–6)
WBC # BLD: 1.54 K/UL — LOW (ref 3.8–10.5)
WBC # FLD AUTO: 1.54 K/UL — LOW (ref 3.8–10.5)

## 2022-04-19 PROCEDURE — 84484 ASSAY OF TROPONIN QUANT: CPT

## 2022-04-19 PROCEDURE — 86900 BLOOD TYPING SEROLOGIC ABO: CPT

## 2022-04-19 PROCEDURE — 82962 GLUCOSE BLOOD TEST: CPT

## 2022-04-19 PROCEDURE — 96361 HYDRATE IV INFUSION ADD-ON: CPT

## 2022-04-19 PROCEDURE — 86850 RBC ANTIBODY SCREEN: CPT

## 2022-04-19 PROCEDURE — 84295 ASSAY OF SERUM SODIUM: CPT

## 2022-04-19 PROCEDURE — 82435 ASSAY OF BLOOD CHLORIDE: CPT

## 2022-04-19 PROCEDURE — U0003: CPT

## 2022-04-19 PROCEDURE — 82330 ASSAY OF CALCIUM: CPT

## 2022-04-19 PROCEDURE — 82565 ASSAY OF CREATININE: CPT

## 2022-04-19 PROCEDURE — 82947 ASSAY GLUCOSE BLOOD QUANT: CPT

## 2022-04-19 PROCEDURE — 84100 ASSAY OF PHOSPHORUS: CPT

## 2022-04-19 PROCEDURE — 82550 ASSAY OF CK (CPK): CPT

## 2022-04-19 PROCEDURE — 85025 COMPLETE CBC W/AUTO DIFF WBC: CPT

## 2022-04-19 PROCEDURE — 85027 COMPLETE CBC AUTOMATED: CPT

## 2022-04-19 PROCEDURE — U0005: CPT

## 2022-04-19 PROCEDURE — 81003 URINALYSIS AUTO W/O SCOPE: CPT

## 2022-04-19 PROCEDURE — 84132 ASSAY OF SERUM POTASSIUM: CPT

## 2022-04-19 PROCEDURE — 85610 PROTHROMBIN TIME: CPT

## 2022-04-19 PROCEDURE — 80053 COMPREHEN METABOLIC PANEL: CPT

## 2022-04-19 PROCEDURE — 99285 EMERGENCY DEPT VISIT HI MDM: CPT

## 2022-04-19 PROCEDURE — 83605 ASSAY OF LACTIC ACID: CPT

## 2022-04-19 PROCEDURE — 83735 ASSAY OF MAGNESIUM: CPT

## 2022-04-19 PROCEDURE — 85014 HEMATOCRIT: CPT

## 2022-04-19 PROCEDURE — 82803 BLOOD GASES ANY COMBINATION: CPT

## 2022-04-19 PROCEDURE — 87086 URINE CULTURE/COLONY COUNT: CPT

## 2022-04-19 PROCEDURE — 88304 TISSUE EXAM BY PATHOLOGIST: CPT

## 2022-04-19 PROCEDURE — 83690 ASSAY OF LIPASE: CPT

## 2022-04-19 PROCEDURE — 76705 ECHO EXAM OF ABDOMEN: CPT

## 2022-04-19 PROCEDURE — 36415 COLL VENOUS BLD VENIPUNCTURE: CPT

## 2022-04-19 PROCEDURE — 85018 HEMOGLOBIN: CPT

## 2022-04-19 PROCEDURE — 85730 THROMBOPLASTIN TIME PARTIAL: CPT

## 2022-04-19 PROCEDURE — 93005 ELECTROCARDIOGRAM TRACING: CPT

## 2022-04-19 PROCEDURE — 87040 BLOOD CULTURE FOR BACTERIA: CPT

## 2022-04-19 PROCEDURE — 96375 TX/PRO/DX INJ NEW DRUG ADDON: CPT

## 2022-04-19 PROCEDURE — C9399: CPT

## 2022-04-19 PROCEDURE — 82553 CREATINE MB FRACTION: CPT

## 2022-04-19 PROCEDURE — 96374 THER/PROPH/DIAG INJ IV PUSH: CPT

## 2022-04-19 PROCEDURE — C1889: CPT

## 2022-04-19 PROCEDURE — 86901 BLOOD TYPING SEROLOGIC RH(D): CPT

## 2022-04-19 RX ORDER — ACETAMINOPHEN 500 MG
3 TABLET ORAL
Qty: 0 | Refills: 0 | DISCHARGE
Start: 2022-04-19

## 2022-04-19 RX ORDER — OXYCODONE HYDROCHLORIDE 5 MG/1
1 TABLET ORAL
Qty: 10 | Refills: 0
Start: 2022-04-19 | End: 2022-04-20

## 2022-04-19 RX ADMIN — Medication 975 MILLIGRAM(S): at 00:20

## 2022-04-19 RX ADMIN — PANTOPRAZOLE SODIUM 40 MILLIGRAM(S): 20 TABLET, DELAYED RELEASE ORAL at 05:01

## 2022-04-19 RX ADMIN — OXYCODONE HYDROCHLORIDE 5 MILLIGRAM(S): 5 TABLET ORAL at 05:31

## 2022-04-19 RX ADMIN — Medication 1 TABLET(S): at 05:01

## 2022-04-19 RX ADMIN — Medication 975 MILLIGRAM(S): at 05:31

## 2022-04-19 RX ADMIN — Medication 975 MILLIGRAM(S): at 11:24

## 2022-04-19 RX ADMIN — Medication 975 MILLIGRAM(S): at 05:01

## 2022-04-19 RX ADMIN — Medication 975 MILLIGRAM(S): at 00:50

## 2022-04-19 RX ADMIN — OXYCODONE HYDROCHLORIDE 5 MILLIGRAM(S): 5 TABLET ORAL at 05:01

## 2022-04-19 NOTE — PROGRESS NOTE ADULT - SUBJECTIVE AND OBJECTIVE BOX
GENERAL SURGERY PROGRESS NOTE   ___________________________________________________________________    JAK DANIELS | [id number] | 36y Male | [facility] [address] W1 | LOS 3d    Attending: Annalise Marcelo    ___________________________________________________________________    CC: Patient is a 36y old  Male who presents with a chief complaint of acute cholecystitis (2022 16:43)      SUBJECTIVE:   Patient seen today during morning rounds at bedside and found to be without acute distress. Denies chest pain, fever, severe pain, or SOB.     Overnight: Unremarkable    Allegies: cefepime (Rash)   NKDA    OBJECTIVE:  Vitals:    T(C): 36.4 (22 @ 00:32), Max: 36.6 (22 @ 09:22)  HR: 78 (22 @ 00:32) (78 - 99)  BP: 121/76 (22 @ 00:32) (115/89 - 153/79)  RR: 18 (22 @ 00:32) (18 - 18)  SpO2: 100% (22 @ 00:32) (98% - 100%)      OUT:    Voided (mL): 3025 mL  Total OUT: 3025 mL      OUT:    Voided (mL): 600 mL    Voided (mL): 3150 mL  Total OUT: 3750 mL        Physical Exam:  General - well-nourished, no acute distress  Neuro - awake, alert, oriented x4, no acute focal deficits  HEENT - normocephalic, PERRL, moist mucous membranes  Lungs - breathing comfortably on room air  Heart - pulse regular  Abdomen - soft, nondistended, tender to palpation in epigastrium and RUQ, opsites c/d/i    Medications:  amoxicillin  875 milliGRAM(s)/clavulanate 1 Oral two times a day  enoxaparin Injectable 40 SubCutaneous every 24 hours    acetaminophen     Tablet .. 975 milliGRAM(s) Oral every 6 hours  pantoprazole    Tablet 40 milliGRAM(s) Oral before breakfast  senna 2 Tablet(s) Oral at bedtime        Laboratory:  WBC: 2.47 H&H: 11.9/33.4 Plt: 90  WBC: 3.34 H&H: 13.9/38.9 Plt: 107    Chemistry:                               Phos: 4.5 M.2  140  |  103  |  12  ----------------------------<  103  3.9   |  24  |  1.06        ,                              Phos: 3.5 M.3  140  |  98  |  13  ----------------------------<  120  3.7   |  21  |  1.16             TPro 6.1 / Alb 3.9 / TBili 0.4 / DBili x  / AST/AST 65<H>/211<H> / AlkPhos 91     TPro 7.7 / Alb 4.9 / TBili 0.6 / DBili x  / AST/<H>/294<H> / AlkPhos 119  PTT 34.8 PT/INR 13.1/1.13          Reviewed laboratory and imaging     GENERAL SURGERY PROGRESS NOTE   ___________________________________________________________________    JAK DANIELS | [id number] | 36y Male | [facility] [address] | LOS 3d    Attending: Annalise Marcelo    ___________________________________________________________________    CC: Patient is a 36y old  Male who presents with a chief complaint of acute cholecystitis (2022 16:43)      SUBJECTIVE:   Patient seen today during morning rounds at bedside and found to be without acute distress. Denies chest pain, fever, severe pain, or SOB.     Overnight: Unremarkable    Allegies: cefepime (Rash)   NKDA    OBJECTIVE:  Vitals:    T(C): 36.4 (22 @ 00:32), Max: 36.6 (22 @ 09:22)  HR: 78 (22 @ 00:32) (78 - 99)  BP: 121/76 (22 @ 00:32) (115/89 - 153/79)  RR: 18 (22 @ 00:32) (18 - 18)  SpO2: 100% (22 @ 00:32) (98% - 100%)      OUT:    Voided (mL): 3025 mL  Total OUT: 3025 mL      OUT:    Voided (mL): 600 mL    Voided (mL): 3150 mL  Total OUT: 3750 mL        Physical Exam:  General - well-nourished, no acute distress  Neuro - awake, alert, oriented x4, no acute focal deficits  HEENT - normocephalic, PERRL, moist mucous membranes  Lungs - breathing comfortably on room air  Heart - pulse regular  Abdomen - soft, nondistended, tender to palpation in epigastrium and RUQ, steris c/d/i     Medications:  amoxicillin  875 milliGRAM(s)/clavulanate 1 Oral two times a day  enoxaparin Injectable 40 SubCutaneous every 24 hours    acetaminophen     Tablet .. 975 milliGRAM(s) Oral every 6 hours  pantoprazole    Tablet 40 milliGRAM(s) Oral before breakfast  senna 2 Tablet(s) Oral at bedtime        Laboratory:  WBC: 2.47 H&H: 11.9/33.4 Plt: 90  WBC: 3.34 H&H: 13.9/38.9 Plt: 107    Chemistry:                               Phos: 4.5 M.2  140  |  103  |  12  ----------------------------<  103  3.9   |  24  |  1.06        ,                              Phos: 3.5 M.3  140  |  98  |  13  ----------------------------<  120  3.7   |  21  |  1.16             TPro 6.1 / Alb 3.9 / TBili 0.4 / DBili x  / AST/AST 65<H>/211<H> / AlkPhos 91     TPro 7.7 / Alb 4.9 / TBili 0.6 / DBili x  / AST/<H>/294<H> / AlkPhos 119  PTT 34.8 PT/INR 13.1/1.13          Reviewed laboratory and imaging

## 2022-04-19 NOTE — DISCHARGE NOTE NURSING/CASE MANAGEMENT/SOCIAL WORK - NSDCPEFALRISK_GEN_ALL_CORE
For information on Fall & Injury Prevention, visit: [email or web address] OR  [email or web address] OR  https://www.cdc.gov/steadi/patient.html

## 2022-04-19 NOTE — PROGRESS NOTE ADULT - NS ATTEND AMEND GEN_ALL_CORE FT
Pt care and plan discussed and reviewed with PA. Plan as outlined above edited by me to reflect our discussion. I had a prolonged conversation with the patient regarding hospital course, differential diagnosis and results of diagnostic tests.  Plan of care discussed with patient after the evaluation. Patient expresses clear understanding and satisfaction with the plan of care. OMT on six regions for acute somatic dysfunctions done at the bedside. Sixty five minutes spent on encounter, of which more than fifty percent of the encounter was spent on counseling and/or coordinating care by the attending physician. Advanced care planning/advanced directives discussed with patient/family. DNR status including forceful chest compressions to attempt to restart the heart, ventilator support/artificial breathing, electric shock, artificial nutrition, health care proxy, Molst form all discussed with pt. Pt wishes to consider.
Pt care and plan discussed and reviewed with PA. Plan as outlined above edited by me to reflect our discussion.

## 2022-04-19 NOTE — PROGRESS NOTE ADULT - SUBJECTIVE AND OBJECTIVE BOX
Name of Patient : JAK DANIELS  MRN: [id number]  Date of visit: 04-19-22 @ 13:17      Subjective: Patient seen and examined. No new events except as noted.   Patient seen earlier this AM. Laying down in bed. Reports ambulating around floor. Admits to passing of flatus. Has now yet had a BM.  Shoulder pain and discomfort has improved per patient.     REVIEW OF SYSTEMS:    CONSTITUTIONAL: No weakness, fevers or chills  EYES/ENT: No visual changes;  No vertigo or throat pain   NECK: No pain or stiffness  RESPIRATORY: No cough, wheezing, hemoptysis; No shortness of breath  CARDIOVASCULAR: No chest pain or palpitations  GASTROINTESTINAL: +Abdominal discomfort improving per patient; Steri-strips in place overlying laparoscopic incisions   GENITOURINARY: No dysuria, frequency or hematuria  NEUROLOGICAL: No numbness or weakness  SKIN: No itching, burning, rashes, or lesions   All other review of systems is negative unless indicated above.    MEDICATIONS:  MEDICATIONS  (STANDING):  acetaminophen     Tablet .. 975 milliGRAM(s) Oral every 6 hours  amoxicillin  875 milliGRAM(s)/clavulanate 1 Tablet(s) Oral two times a day  enoxaparin Injectable 40 milliGRAM(s) SubCutaneous every 24 hours  pantoprazole    Tablet 40 milliGRAM(s) Oral before breakfast  senna 2 Tablet(s) Oral at bedtime      PHYSICAL EXAM:  T(C): 36.9 (04-19-22 @ 09:50), Max: 36.9 (04-19-22 @ 09:50)  HR: 91 (04-19-22 @ 09:50) (74 - 99)  BP: 116/78 (04-19-22 @ 09:50) (116/78 - 129/84)  RR: 18 (04-19-22 @ 09:50) (18 - 18)  SpO2: 99% (04-19-22 @ 09:50) (96% - 100%)  Wt(kg): --  I&O's Summary    18 Apr 2022 07:01  -  19 Apr 2022 07:00  --------------------------------------------------------  IN: 460 mL / OUT: 4550 mL / NET: -4090 mL    19 Apr 2022 07:01  -  19 Apr 2022 13:17  --------------------------------------------------------  IN: 200 mL / OUT: 575 mL / NET: -375 mL          Appearance: Normal  HEENT:  PERRLA   Lymphatic: No lymphadenopathy   Cardiovascular: Normal S1 S2, no JVD  Respiratory: normal effort , clear  Gastrointestinal:  Soft, No- pain upon palpation, steri-strips in place  Skin: No rashes,  warm to touch  Psychiatry:  Mood & affect appropriate  Musculoskeletal: No edema            04-18-22 @ 07:01  -  04-19-22 @ 07:00  --------------------------------------------------------  IN: 460 mL / OUT: 4550 mL / NET: -4090 mL    04-19-22 @ 07:01  -  04-19-22 @ 13:17  --------------------------------------------------------  IN: 200 mL / OUT: 575 mL / NET: -375 mL                              11.6   1.54  )-----------( 83       ( 19 Apr 2022 07:23 )             33.3               04-19    140  |  103  |  13  ----------------------------<  88  3.9   |  24  |  0.96    Ca    9.5      19 Apr 2022 07:22  Phos  3.4     04-19  Mg     2.2     04-19    TPro  6.5  /  Alb  4.0  /  TBili  0.5  /  DBili  x   /  AST  30  /  ALT  141<H>  /  AlkPhos  80  04-19           CARDIAC MARKERS ( 17 Apr 2022 22:49 )  x     / x     / 452 U/L / x     / 8.0 ng/mL                  Culture - Blood (04.18.22 @ 01:49)   Specimen Source: .Blood Blood-Peripheral   Culture Results:   No growth to date.     Culture - Blood (04.18.22 @ 01:49)   Specimen Source: .Blood Blood-Peripheral   Culture Results:   No growth to date.

## 2022-04-19 NOTE — PROGRESS NOTE ADULT - ASSESSMENT
36M with PMHx AML, known cholelithiasis presenting with acute cholecystitis.    Plan:  - Reg diet as tolerated   - zosyn  - hematology recs appreciated  - pain control PRN    Acute Care Surgery  [id number] 36M with PMHx AML, known cholelithiasis presenting with acute cholecystitis.    Plan:  - Reg diet as tolerated   - Augmentin till 4/21/22    - hematology recs appreciated  - pain control and antiemetics PRN  -discharge home today     Acute Care Surgery  [id number]

## 2022-04-19 NOTE — PROGRESS NOTE ADULT - ATTENDING COMMENTS
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 Patient is afebrile and has no evidence of disease (AML). His long-term survival probability is high. He has low risk for infectious complication. s/p surgical intervention on 4/17. Still with pain, managed appropriately. Management as per primary team. No evidence of leukemic relapse at this time.
I have seen and examined this patient on rounds thismorning with the surgery team. I have reviewed all new labs, imaging and reports. I have participated in formulating the plan for the day, and have read and agree with the history, ROS, exam, assessment and plan as stated above.     POD from lap leni. Overnight had rigors associated with sinus tachycardia to 150's. Given a litre bolus of LR with resolution of symptoms. No fever. However, due to leukopenia on admission and recently history of chemo for AML, restarted abx, will plan for a 4 day course. Most likely post-operative symptoms with some dehydration.     Annalise Marcelo M.D., M.S.  Division of Acute Care Surgery
ATTENDING ATTESTATION  I have seen and examined this patient on rounds thismorning with the surgery team. I have reviewed all new labs, imaging and reports. I have participated in formulating the plan for the day, and have read and agree with the history, ROS, exam, assessment and plan as stated above.     POD 2 lap leni.   Pain better controlled today than yesterday.   Will complete 4 days total of augmentin started yesterday for rigors post-op.   Discussed post op follow up, expectations for pain, and core muscle use restriction.     Annalise Marcelo M.D., M.S.  Division of Acute Care Surgery

## 2022-04-19 NOTE — PROGRESS NOTE ADULT - REASON FOR ADMISSION
acute cholecystitis

## 2022-04-19 NOTE — PROGRESS NOTE ADULT - ASSESSMENT
36 year old man with PMHx AML, known cholelithiasis presenting with acute cholecystitis    #Acute cholecystitis  Pt is medically optimized to proceed to OR  S/p one dose of ertapenem 4/16/22--> now on Zosyn   Appreciate general surgery  S/P OR for lap choley   Pain control  Monitor for diet tolerance, BM   Elevated LFTs, cont to monitor and trend-- down trending, recommend to f/u outpatient for blood work   D/C planning per surg team, plan to d/c on PO Augmentin 04/21    #AML in remission  No contraindication for OR  CBC with diff daily  Appreciate heme/onc    #S/P RRT  - F/U blood cultures-- NGTD; F/u final   - Monitor VS, patient closely  - Monitor CBC, temp curve     #Need for DVT prophylaxis  SC lovenox

## 2022-04-19 NOTE — DISCHARGE NOTE NURSING/CASE MANAGEMENT/SOCIAL WORK - PATIENT PORTAL LINK FT
You can access the FollowMyHealth Patient Portal offered by [facility] by registering at the following website: [email or web address]. By joining [employer]’s FollowMyHealth portal, you will also be able to view your health information using other applications (apps) compatible with our system.

## 2022-04-22 LAB — SURGICAL PATHOLOGY STUDY: SIGNIFICANT CHANGE UP

## 2022-04-26 NOTE — RAPID RESPONSE TEAM SUMMARY - NSOTHERSPECIFYRRT_GEN_ALL_CORE
Rigors  +L lower trapezius ttp, FROM of the neck w/o cervical spine ttp; no thoracic or lumbar ttp, +L lumbar paraspinal ttp w/ FROM of spine; +L lateral malleolus ttp; FROM, no joint swelling

## 2022-04-27 ENCOUNTER — RESULT REVIEW (OUTPATIENT)
Age: 37
End: 2022-04-27

## 2022-04-27 ENCOUNTER — APPOINTMENT (OUTPATIENT)
Dept: HEMATOLOGY ONCOLOGY | Facility: CLINIC | Age: 37
End: 2022-04-27
Payer: COMMERCIAL

## 2022-04-27 VITALS
DIASTOLIC BLOOD PRESSURE: 78 MMHG | RESPIRATION RATE: 18 BRPM | SYSTOLIC BLOOD PRESSURE: 135 MMHG | OXYGEN SATURATION: 93 % | BODY MASS INDEX: 27 KG/M2 | WEIGHT: 191.8 LBS | TEMPERATURE: 97.4 F | HEART RATE: 114 BPM

## 2022-04-27 LAB
BASOPHILS # BLD AUTO: 0.01 K/UL — SIGNIFICANT CHANGE UP (ref 0–0.2)
BASOPHILS NFR BLD AUTO: 0.4 % — SIGNIFICANT CHANGE UP (ref 0–2)
EOSINOPHIL # BLD AUTO: 0.02 K/UL — SIGNIFICANT CHANGE UP (ref 0–0.5)
EOSINOPHIL NFR BLD AUTO: 0.7 % — SIGNIFICANT CHANGE UP (ref 0–6)
HCT VFR BLD CALC: 40.7 % — SIGNIFICANT CHANGE UP (ref 39–50)
HGB BLD-MCNC: 13.8 G/DL — SIGNIFICANT CHANGE UP (ref 13–17)
IMM GRANULOCYTES NFR BLD AUTO: 0.4 % — SIGNIFICANT CHANGE UP (ref 0–1.5)
LYMPHOCYTES # BLD AUTO: 0.6 K/UL — LOW (ref 1–3.3)
LYMPHOCYTES # BLD AUTO: 21.2 % — SIGNIFICANT CHANGE UP (ref 13–44)
MCHC RBC-ENTMCNC: 33.3 PG — SIGNIFICANT CHANGE UP (ref 27–34)
MCHC RBC-ENTMCNC: 33.9 G/DL — SIGNIFICANT CHANGE UP (ref 32–36)
MCV RBC AUTO: 98.3 FL — SIGNIFICANT CHANGE UP (ref 80–100)
MONOCYTES # BLD AUTO: 0.35 K/UL — SIGNIFICANT CHANGE UP (ref 0–0.9)
MONOCYTES NFR BLD AUTO: 12.4 % — SIGNIFICANT CHANGE UP (ref 2–14)
NEUTROPHILS # BLD AUTO: 1.84 K/UL — SIGNIFICANT CHANGE UP (ref 1.8–7.4)
NEUTROPHILS NFR BLD AUTO: 64.9 % — SIGNIFICANT CHANGE UP (ref 43–77)
NRBC # BLD: 0 /100 WBCS — SIGNIFICANT CHANGE UP (ref 0–0)
PLATELET # BLD AUTO: 152 K/UL — SIGNIFICANT CHANGE UP (ref 150–400)
RBC # BLD: 4.14 M/UL — LOW (ref 4.2–5.8)
RBC # FLD: 12.5 % — SIGNIFICANT CHANGE UP (ref 10.3–14.5)
WBC # BLD: 2.83 K/UL — LOW (ref 3.8–10.5)
WBC # FLD AUTO: 2.83 K/UL — LOW (ref 3.8–10.5)

## 2022-04-27 PROCEDURE — 99214 OFFICE O/P EST MOD 30 MIN: CPT

## 2022-04-27 NOTE — ASSESSMENT
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 \par Started induction with Dauno/Cytarabine on 8/6/21. \par Pt's counts now recovered, remission marrow done on 9/2- in remission. Proceed to consolidation with 4 cycles of HIDAC. C1 HIDAC on 9/17, c/b neutropenic fever and diarrhea. C2 HIDAC on 10/25, was postponed for 1 week due to admission for diarrhea, given negative stool studies, suspect diarrhea was likely chemo-related. Pt was admitted again 11/13-11/17 for sepsis due to thumb cellulitis after laceration. C3 on 11/26, had Fulphilia on C3 c/b epistaxis and neutropenic fever w/Temp 100.3. C4 HiDAC 1/5/22, c/b transaminitis and neutropenic fever\par He had laparoscopic cholecystotomy on 4/17. \par s/p BMbx 2/11/22 showing CR.\par He has non-necrotizing granulomas noted in BMbx, unclear significance\par CBC today stable\par All questions answered\par RTC monthly\par \par Case and management discussed with Dr. Yancey\par \par \par \par \par

## 2022-04-27 NOTE — PHYSICAL EXAM
[Fully active, able to carry on all pre-disease performance without restriction] : Status 0 - Fully active, able to carry on all pre-disease performance without restriction [Normal] : affect appropriate [de-identified] : 3 surgical tape strips on the abdomen, surgical site C/D/I [de-identified] : a 1 cm long laceration with sutures on the right thrum, healing well

## 2022-04-27 NOTE — HISTORY OF PRESENT ILLNESS
[de-identified] : 35yo M w/ HTN and newly diagnosed AML here for f/u. \par \par He presented to the hospital on 7/30/21 with complaints of dizziness, fatigue and severe RUQ pain for 3 days. CT A/P revealed fatty liver and small R pleural effusion. WBC 12k with 17% blasts.Peripheral flow cytometry showed 13% myeloblasts. FLT3(-). BMbx was done on 8/4/21 - confirmed AML. 46,XY              {20              }\par Foundation: mutations in DNMT3A R882H, NRAS G12D, NPM1 W288fs*12\par Pt consented to [place]. Patient was offered sperm banking, but declined.\par On 8/6, induction with Dauno/Cytararbine was started (cytarabine 100/m2 and dauno 90/m2) \par He received a seven day course of Zosyn for presumed RUL PNA, then transitioned to  levaquin, posaconazole and Acyclovir for ppx for neutropenia. Course c/b neutropenic fevers, cultures negative, repeat CT 8/14 without infectious source. He was on Cefepime but developed a rash, changed to meropenem. Rash improved. \par Day 14 BMbx on 8/19 was hypocellular with chemotherapeutic effect; however, per hematopathology, appears to be earlier regeneration than would typically seen which is concerning for persistent disease at this point, and the earliest cells are CD34 positive and his myeloblasts on initial presentation were CD34 negative. Thus, this may simply represent early regeneration of his marrow. Plan is to await count recovery and repeat biopsy.  \par Patient discharged home on 8/29/21 when ANC >500 [de-identified] : Eating well since discharge. \par c/o hemorrhoidal pain. Hemorrhoids started a few days prior to discharge. He was sent home with rectal ointment and witch hazel. \par \par BMBx done on 9/2: Cellular bone marrow with trilineage hematopoiesis with maturation and megakaryocytosis (history of AML).\par Pt feels well, CBC today showed count stable\par \par s/p C1 HIDAC 9/17-9/22 \par c/o leg pain after chemo in the hospital \par \par He presented to the ED on 10/9 due to fever the night of presentation. The patient went to sleep around 10pm and woke up at 3am feeling warm and clammy. He took his temperature orally at home and it was 100.7. The day prior to presentation (10/8/21), the patient went to [facility] for an appointment to get his platelet count checked. He states he was feeling a bit run down and thought he was going to need a transfusion, but he did not need a transfusion. He was afebrile during the time of the appointment and went home afterwards. Around 3pm, the patient had bilateral crampy leg pain that was similar to the leg pain he felt during his consolidation therapy treatment. He took hydrocodone and the pain improved. The patient had one episode of diarrhea three days prior to presentation and has been bothered by rectal pain associated with his "large hemorrhoids. He denies any bleeding per rectum. He also feels like his mouth has been more dry than usual over the past several days, but denies all other symptoms. \par CT Abdomen and Pelvis w/ Oral Cont and w/ IV Conttrast on 10/9 revealed Region of hypoenhancement upper pole left kidney; please correlate clinically for possible pyelonephritis. No hydronephrosis or perinephric stranding. No rectal abscess.\par CT Chest No Contrast on 10/9 revealed Clear lungs.\par 10/9- BCX NGTD and 10/9- UCX (-)\par He ate some cold salad late last night, had upsetting stomach and diarrhea this morning. Will take Imodium for diarrhea. \par \par C2 is due on 10/15, pt wants deferring to next Monday after his diarrhea improved. \par Admission of  C2 was postponed due to worsening diarrhea. Went to ED on 10/15 due to worsening watery diarrhea. GI PCR neg, C Diff neg\par Given negative stool studies, suspect diarrhea was likely chemo-related. S/p cycle 2 Consolidation with HIDAC started on 10/25. Patient received IV hydration, strict I/O, antiemetics, monitoring of CBC and CMP and for cerebellar toxicity. PRedforte eye drops given to prevent chemical conjunctivitis. Patient with fever throughout course of treatment. Cultures negative. Due to fever probably related to HIDAC, patient received dexamethasone prior to the last 2 doses of cytatabine. \par He is seen today accompanied by his father, pt feels fatigue after chemo, other than that he feels fine. Denied any fever, chills diarrhea. \par \par Pt was admitted on 11/13-11/17 s/p right first digit laceration repair on 11/12 and presenting with erythema and pain at the site of laceration repair. Patient reported he was feeling unwell prior to suture placement with body aches, chills, night sweats and subjective fevers. Patient last BM 4 days ago. Has bruise to left inner thigh. Patient reports he keeps his PICC site clean and intact which was placed in 9/2021. Upon admission to the hospital ID was consulted started on Zosyn , GI consulted for rectal pain secondary to hemorrhoids and palliative consulted for pain control.\par  \par C3 on 11/26, tolerated well. He was seen today after discharge, accompanied by his father. He admitted feeling tired, denied any f/c, diarrhea. Right hand suture site healing well, no abnormal erythema or discharge. \par He will have Fulphilia today. \par C3 HIDAC 11/26-12/1\par He presents to the hospital due to epistaxis and neutropenic fever w/Temp 100.3 on 12/1. Per patient, he reports that he was feeling sinus congestion and pressure on his L side, blew his nose and bleeding began from L nare without improvement prompting arrival to the emergency department. Feels mild L sinus congestion.  L nasal cavity packed with absorbable packing; F/U ENT clinic outpatient. Pan culture obtained-with No growth currently\par CBC rechecked today recovered. He feels well overall , had lower abdominal pain last night after ate cheese, now improved. Denied any fever chills bloody stool or diarrhea. \par \par s/p C4 HiDAC 1/5/22\par Pt has known grade 1 AST and grade 3 ALT transaminitis. Presented this admission with transamintitis. Abd sono  performed and revealed Borderline/mild hepatomegaly with hepatic steatosis. Splenomegaly. Focal area of left upper pole increased renal cortical echogenicity, corresponding to an area of hypoattenuation seen on prior CT chest, \par abdomen and pelvis dated 10/9/2021. This is nonspecific and may reflect focal edema. Patient received IV hydration, antiemetics, monitoring of CBC and electrolytes. Predforte eye drops provided to prevent chemical conjunctivitis. Patient was monitored for cerebellar toxicity. Nystagmus checks performed. Hospital course complicated by fever blood cultures showed (-), most likely febrile secondary to HIDAC treatment. To receive Fulphila today.\par \par Pt presented to the hospital on 1/18/22 due fever of 100.4, weakness, decreased WBC and shortness of breath while at home. Patient reports that his pain is more controlled s/p pain medication and his shortness of breath has resolved. He denied chills, cough, chest pain, palpitations.\par Pan culture (blood +Ucx) were negative, CXR reveals clear lungs, COVID PCR - not detect. Pt started on empiric Zosyn, Diflucan and Acyclovir added prophylactically for neutropenic fever. He was transfused with platelets and PRBC as per supportive parameters (>10k/>7) respectively.\par He feels well overall now, denied any fever, chills or pain. \par \par BMbx 2/11/22: Cellular bone marrow with erythroid predominant trilineage hematopoiesis\par No morphologic or immunophenotypic evidence of persistent acute myeloid leukemia\par Non-necrotizing granulomas\par Normal male karyotype and normal onkosight myeloid NGS panel study.\par \par He was admitted for back pain 2/2 herniated disk - Rx'd pain meds and a Medrol pack. \par \par Pt went to ED due to acute RUQ pain on 4/16, patient underwent laparoscopic cholecystotomy on 4/17.

## 2022-05-04 ENCOUNTER — APPOINTMENT (OUTPATIENT)
Dept: TRAUMA SURGERY | Facility: CLINIC | Age: 37
End: 2022-05-04
Payer: COMMERCIAL

## 2022-05-04 DIAGNOSIS — K81.1 CHRONIC CHOLECYSTITIS: ICD-10-CM

## 2022-05-04 PROCEDURE — 99024 POSTOP FOLLOW-UP VISIT: CPT

## 2022-05-04 NOTE — HISTORY OF PRESENT ILLNESS
[de-identified] : Mr. Tian returns for follow up after lap leni for acute cholecystitis. He's feeling well, eating normally, having normal bowel function. \par \par Abd soft, nontender, nondistended. Well-healed incisions.\par Path reviewed.\par \par Pt feeling well and recovering well after surgery. Questions answered. Return to office as needed.

## 2022-05-06 ENCOUNTER — EMERGENCY (EMERGENCY)
Facility: HOSPITAL | Age: 37
LOS: 1 days | Discharge: HOME | End: 2022-05-06
Attending: EMERGENCY MEDICINE
Payer: COMMERCIAL

## 2022-05-06 VITALS
SYSTOLIC BLOOD PRESSURE: 113 MMHG | OXYGEN SATURATION: 100 % | DIASTOLIC BLOOD PRESSURE: 79 MMHG | HEART RATE: 77 BPM | RESPIRATION RATE: 18 BRPM

## 2022-05-06 VITALS
HEIGHT: 71 IN | DIASTOLIC BLOOD PRESSURE: 96 MMHG | RESPIRATION RATE: 19 BRPM | WEIGHT: 195.11 LBS | OXYGEN SATURATION: 100 % | TEMPERATURE: 98 F | HEART RATE: 106 BPM | SYSTOLIC BLOOD PRESSURE: 145 MMHG

## 2022-05-06 LAB
ALBUMIN SERPL ELPH-MCNC: 4.9 G/DL — SIGNIFICANT CHANGE UP (ref 3.3–5)
ALP SERPL-CCNC: 92 U/L — SIGNIFICANT CHANGE UP (ref 40–120)
ALT FLD-CCNC: 65 U/L — HIGH (ref 10–45)
ANION GAP SERPL CALC-SCNC: 11 MMOL/L — SIGNIFICANT CHANGE UP (ref 5–17)
APTT BLD: 29.6 SEC — SIGNIFICANT CHANGE UP (ref 27.5–35.5)
AST SERPL-CCNC: 29 U/L — SIGNIFICANT CHANGE UP (ref 10–40)
BASOPHILS # BLD AUTO: 0 K/UL — SIGNIFICANT CHANGE UP (ref 0–0.2)
BASOPHILS NFR BLD AUTO: 0 % — SIGNIFICANT CHANGE UP (ref 0–2)
BILIRUB SERPL-MCNC: 0.5 MG/DL — SIGNIFICANT CHANGE UP (ref 0.2–1.2)
BUN SERPL-MCNC: 16 MG/DL — SIGNIFICANT CHANGE UP (ref 7–23)
CALCIUM SERPL-MCNC: 10.3 MG/DL — SIGNIFICANT CHANGE UP (ref 8.4–10.5)
CHLORIDE SERPL-SCNC: 104 MMOL/L — SIGNIFICANT CHANGE UP (ref 96–108)
CO2 SERPL-SCNC: 27 MMOL/L — SIGNIFICANT CHANGE UP (ref 22–31)
CREAT SERPL-MCNC: 0.95 MG/DL — SIGNIFICANT CHANGE UP (ref 0.5–1.3)
DACRYOCYTES BLD QL SMEAR: SLIGHT — SIGNIFICANT CHANGE UP
EGFR: 106 ML/MIN/1.73M2 — SIGNIFICANT CHANGE UP
EOSINOPHIL # BLD AUTO: 0 K/UL — SIGNIFICANT CHANGE UP (ref 0–0.5)
EOSINOPHIL NFR BLD AUTO: 0 % — SIGNIFICANT CHANGE UP (ref 0–6)
GLUCOSE SERPL-MCNC: 91 MG/DL — SIGNIFICANT CHANGE UP (ref 70–99)
HCT VFR BLD CALC: 36.8 % — LOW (ref 39–50)
HGB BLD-MCNC: 12.7 G/DL — LOW (ref 13–17)
INR BLD: 1.03 RATIO — SIGNIFICANT CHANGE UP (ref 0.88–1.16)
LYMPHOCYTES # BLD AUTO: 0.32 K/UL — LOW (ref 1–3.3)
LYMPHOCYTES # BLD AUTO: 12.1 % — LOW (ref 13–44)
MAGNESIUM SERPL-MCNC: 2.2 MG/DL — SIGNIFICANT CHANGE UP (ref 1.6–2.6)
MANUAL SMEAR VERIFICATION: SIGNIFICANT CHANGE UP
MCHC RBC-ENTMCNC: 33.9 PG — SIGNIFICANT CHANGE UP (ref 27–34)
MCHC RBC-ENTMCNC: 34.5 GM/DL — SIGNIFICANT CHANGE UP (ref 32–36)
MCV RBC AUTO: 98.1 FL — SIGNIFICANT CHANGE UP (ref 80–100)
MONOCYTES # BLD AUTO: 0.37 K/UL — SIGNIFICANT CHANGE UP (ref 0–0.9)
MONOCYTES NFR BLD AUTO: 13.8 % — SIGNIFICANT CHANGE UP (ref 2–14)
NEUTROPHILS # BLD AUTO: 1.85 K/UL — SIGNIFICANT CHANGE UP (ref 1.8–7.4)
NEUTROPHILS NFR BLD AUTO: 69.8 % — SIGNIFICANT CHANGE UP (ref 43–77)
NRBC # BLD: 2 /100 — HIGH (ref 0–0)
PLAT MORPH BLD: NORMAL — SIGNIFICANT CHANGE UP
PLATELET # BLD AUTO: 130 K/UL — LOW (ref 150–400)
POTASSIUM SERPL-MCNC: 4.3 MMOL/L — SIGNIFICANT CHANGE UP (ref 3.5–5.3)
POTASSIUM SERPL-SCNC: 4.3 MMOL/L — SIGNIFICANT CHANGE UP (ref 3.5–5.3)
PROT SERPL-MCNC: 7.5 G/DL — SIGNIFICANT CHANGE UP (ref 6–8.3)
PROTHROM AB SERPL-ACNC: 11.8 SEC — SIGNIFICANT CHANGE UP (ref 10.5–13.4)
RBC # BLD: 3.75 M/UL — LOW (ref 4.2–5.8)
RBC # FLD: 12.5 % — SIGNIFICANT CHANGE UP (ref 10.3–14.5)
RBC BLD AUTO: SIGNIFICANT CHANGE UP
SODIUM SERPL-SCNC: 142 MMOL/L — SIGNIFICANT CHANGE UP (ref 135–145)
TROPONIN T, HIGH SENSITIVITY RESULT: 10 NG/L — SIGNIFICANT CHANGE UP (ref 0–51)
VARIANT LYMPHS # BLD: 4.3 % — SIGNIFICANT CHANGE UP (ref 0–6)
WBC # BLD: 2.65 K/UL — LOW (ref 3.8–10.5)
WBC # FLD AUTO: 2.65 K/UL — LOW (ref 3.8–10.5)

## 2022-05-06 PROCEDURE — 93010 ELECTROCARDIOGRAM REPORT: CPT

## 2022-05-06 PROCEDURE — 85730 THROMBOPLASTIN TIME PARTIAL: CPT

## 2022-05-06 PROCEDURE — 71275 CT ANGIOGRAPHY CHEST: CPT | Mod: MA

## 2022-05-06 PROCEDURE — 93005 ELECTROCARDIOGRAM TRACING: CPT

## 2022-05-06 PROCEDURE — 71046 X-RAY EXAM CHEST 2 VIEWS: CPT | Mod: 26

## 2022-05-06 PROCEDURE — 71046 X-RAY EXAM CHEST 2 VIEWS: CPT

## 2022-05-06 PROCEDURE — 71275 CT ANGIOGRAPHY CHEST: CPT | Mod: 26,MA

## 2022-05-06 PROCEDURE — 80053 COMPREHEN METABOLIC PANEL: CPT

## 2022-05-06 PROCEDURE — 85610 PROTHROMBIN TIME: CPT

## 2022-05-06 PROCEDURE — 99285 EMERGENCY DEPT VISIT HI MDM: CPT

## 2022-05-06 PROCEDURE — 84484 ASSAY OF TROPONIN QUANT: CPT

## 2022-05-06 PROCEDURE — 99285 EMERGENCY DEPT VISIT HI MDM: CPT | Mod: 25

## 2022-05-06 PROCEDURE — 83735 ASSAY OF MAGNESIUM: CPT

## 2022-05-06 PROCEDURE — 96374 THER/PROPH/DIAG INJ IV PUSH: CPT | Mod: XU

## 2022-05-06 PROCEDURE — 85025 COMPLETE CBC W/AUTO DIFF WBC: CPT

## 2022-05-06 RX ORDER — KETOROLAC TROMETHAMINE 30 MG/ML
15 SYRINGE (ML) INJECTION ONCE
Refills: 0 | Status: DISCONTINUED | OUTPATIENT
Start: 2022-05-06 | End: 2022-05-06

## 2022-05-06 RX ADMIN — Medication 15 MILLIGRAM(S): at 15:14

## 2022-05-06 RX ADMIN — Medication 15 MILLIGRAM(S): at 15:47

## 2022-05-06 NOTE — ED PROVIDER NOTE - ATTENDING CONTRIBUTION TO CARE
36M PMH AML currently in remission, recent cholecystectomy here with c/o intermittent L sided CP x2 days assoc w SOB. NO NV, diaphoresis, leg swelling, syncope. On exam pt is anxious appearing, no reproducible chest wall TTP, no abd TTP, no skin rashes, no le edema, no calf swelling or TTP. No FND. EKG sinus tach, no ischemic changes. With sinus tachycardia, hx of cancer, and recent hospitalization and surgery, plan for CT to r/o PE, CE to eval for ACS.

## 2022-05-06 NOTE — ED PROVIDER NOTE - PATIENT PORTAL LINK FT
You can access the FollowMyHealth Patient Portal offered by [facility] by registering at the following website: [email or web address]. By joining [employer]’s FollowMyHealth portal, you will also be able to view your health information using other applications (apps) compatible with our system.

## 2022-05-06 NOTE — ED ADULT NURSE NOTE - ED CARDIAC RHYTHM
Suspect temporary lactose intolerance due to frequent infections  [ ] Continue breastfeeding and soy based formula  [ ] Ok to start yogurt after 4 weeks; then can liberalize use of dairy products  [ ] Ok to trial cow's milk at 1 year of age    If you have any questions during regular office hours, please contact the Call Center at [phone]. For urgent concerns such as worsening symptoms, ask to have the Peds GI Nurse paged. If acute urgent concerns arise after hours, you can call [phone] and ask to speak to the pediatric gastroenterologist on call.  Lab and Imaging orders may take up to 24 hours to be entered. It is most efficient if you use an [facility] site to have those completed.   Outside lab and imaging results should be faxed to [phone]. If you go to a lab outside of [facility] we will not automatically get those results. You will need to ask them to send them to us.  If you have clinic scheduling needs, please call the Call Center at [phone].  If you need to schedule Radiology tests, call [phone].  My Chart messages are for routine communication and questions and are usually answered within 48-72 hours. If you have an urgent concern or require sooner response, please call us.       regular

## 2022-05-06 NOTE — ED PROVIDER NOTE - NSFOLLOWUPINSTRUCTIONS_ED_ALL_ED_FT
Please follow up with cardiology, the hospital will reach out to you to established rapid appointment. Your primary care doctor Dr. Joy suggested an outpatient CT coronary and stress test.   For pain you can take acetaminophen and ibuprofen.  Please follow up with your primary care doctor within 1 week.     Chest Pain    Chest pain can be caused by many different conditions which may or may not be dangerous. Causes include heartburn, lung infections, heart attack, blood clot in lungs, skin infections, strain or damage to muscle, cartilage, or bones, etc. In addition to a history and physical examination, an electrocardiogram (ECG) or other lab tests may have been performed to determine the cause of your chest pain. Follow up with your primary care provider or with a cardiologist as instructed.       SEEK IMMEDIATE MEDICAL CARE IF YOU HAVE ANY OF THE FOLLOWING SYMPTOMS: worsening chest pain, coughing up blood, unexplained back/neck/jaw pain, severe abdominal pain, dizziness or lightheadedness, fainting, shortness of breath, sweaty or clammy skin, vomiting, or racing heart beat. These symptoms may represent a serious problem that is an emergency. Do not wait to see if the symptoms will go away. Get medical help right away. Call 911 and do not drive yourself to the hospital.

## 2022-05-06 NOTE — ED PROVIDER NOTE - PROGRESS NOTE DETAILS
Keli Reid DO (PGY1): As per Dr. Joy (pt pcp), plan for CTA to eval for PE with trop x2. Will either admit to CDU for CT coronary or dc with out pt cards, will speak with pt pending CTA r Keli Reid DO (PGY1): Pt chest pain improved. Trop neg x2, pt would like to follow up with cardiology outpt. Will plan for rapid referral. Pt agrees with plan. Questions regarding their symptoms were addressed. Advised to follow up with cardiology and PCP. Given strict return precautions. Pt verbalized understanding. Keli Reid DO (PGY1): As per d/w Dr. Joy (pt pcp), pending neg CTA and trop x2. CDU for CT coronary or dc with out pt cards.

## 2022-05-06 NOTE — ED PROVIDER NOTE - NSFOLLOWUPCLINICS_GEN_ALL_ED_FT
[facility]  Cardiology  [address] [place] [id number]  Phone: [phone]  Fax:

## 2022-05-06 NOTE — ED PROVIDER NOTE - OBJECTIVE STATEMENT
36M PMH AML last chemo 3 mo ago, cholecystectomy 3 weeks ago presenting with 2 days of intermittent sharp L sided chest pain, non exertional associated with SOB. Denies n/v/d. Denies abdominal pain. Denies fever, chills, cough, headache, lightheadedness, calf pain/swelling.

## 2022-05-06 NOTE — ED PROVIDER NOTE - CLINICAL SUMMARY MEDICAL DECISION MAKING FREE TEXT BOX
36M PMH AML last chemo 3 mo ago, cholecystectomy 3 weeks ago presenting with 2 days of intermittent sharp L sided chest pain, non exertional associated with SOB. Pt tachycardic, normotensive, non-tachypneic. Given hx and physical, will eval for PE with CTA. Low concern for infectious etiology at this time, low concern for ACS given ecg with no ischemic changes. Labs, CTA, pain control, reassess

## 2022-05-06 NOTE — CONSULT NOTE ADULT - SUBJECTIVE AND OBJECTIVE BOX
Patient is a 36M PMH AML last chemo 3 mo ago, cholecystectomy 3 weeks ago presenting with 2 days of intermittent sharp L sided chest pain, non exertional associated with SOB. Denies n/v/d. Denies abdominal pain. Denies fever, chills, cough, headache, lightheadedness, calf pain/swelling.    REVIEW OF SYSTEMS:    CONSTITUTIONAL: No weakness, fevers or chills  EYES/ENT: No visual changes;  No vertigo or throat pain   NECK: No pain or stiffness  RESPIRATORY: No cough, wheezing, hemoptysis; No shortness of breath  CARDIOVASCULAR: + chest pain, ARMSTRONG   GASTROINTESTINAL: No abdominal or epigastric pain. No nausea, vomiting, or hematemesis; No diarrhea or constipation. No melena or hematochezia.  GENITOURINARY: No dysuria, frequency or hematuria  NEUROLOGICAL: No numbness or weakness  SKIN: No itching, burning, rashes, or lesions   All other review of systems is negative unless indicated above.    PAST MEDICAL & SURGICAL HISTORY:  Hypertension  diagnosed 1 year ago    Kidney stone  5 years ago    History of genital warts    AML (acute myeloid leukemia)    No significant past surgical history      Home Medications:  acetaminophen 325 mg oral tablet: 3 tab(s) orally every 6 hours (19 Apr 2022 12:56)  pantoprazole 40 mg oral delayed release tablet: 1 tab(s) orally once a day (before a meal) (18 Mar 2022 12:08)  senna oral tablet: 2 tab(s) orally once a day (at bedtime) (18 Mar 2022 12:08)    Allergies    No Known Allergies    Intolerances    cefepime (Rash)    Vital Signs Last 24 Hrs  T(C): 36.8 (06 May 2022 12:45), Max: 36.8 (06 May 2022 12:45)  T(F): 98.3 (06 May 2022 12:45), Max: 98.3 (06 May 2022 12:45)  HR: 77 (06 May 2022 15:26) (77 - 106)  BP: 113/79 (06 May 2022 15:26) (113/79 - 145/96)  BP(mean): --  RR: 18 (06 May 2022 15:26) (18 - 19)  SpO2: 100% (06 May 2022 15:26) (100% - 100%)    Appearance: Normal	  HEENT:   Normal oral mucosa, PERRL, EOMI	  Lymphatic: No lymphadenopathy , no edema  Cardiovascular: Normal S1 S2, No JVD, No murmurs , Peripheral pulses palpable 2+ bilaterally  Respiratory: Lungs clear to auscultation, normal effort 	  Gastrointestinal:  Soft, Non-tender, + BS	  Skin: No rashes, No ecchymoses, No cyanosis, warm to touch  Musculoskeletal: Normal range of motion, normal strength  Psychiatry:  Mood & affect appropriate  Ext: No edema                          12.7   2.65  )-----------( 130      ( 06 May 2022 15:18 )             36.8               05-06    142  |  104  |  16  ----------------------------<  91  4.3   |  27  |  0.95    Ca    10.3      06 May 2022 15:18  Mg     2.2     05-06    TPro  7.5  /  Alb  4.9  /  TBili  0.5  /  DBili  x   /  AST  29  /  ALT  65<H>  /  AlkPhos  92  05-06    PT/INR - ( 06 May 2022 15:18 )   PT: 11.8 sec;   INR: 1.03 ratio         PTT - ( 06 May 2022 15:18 )  PTT:29.6 sec                   < from: CT Angio Chest PE Protocol w/ IV Cont (05.06.22 @ 16:32) >  INTERPRETATION:  Clinical information: Chest pain. Evaluate for pulmonary   embolus. Exam is compared to previous study of 10/9/2021.    CT angiogram of the chest was obtained following administration of   intravenous contrast. Approximately 65 cc of Omnipaque 350 was   administered and 45 cc was discarded. Coronal, sagittal and MIP images   were submitted for review.    No hilar andor mediastinal adenopathy is noted.    Heart is normal in size. No pericardial effusion is noted. Pulmonary   arteries are normal in caliber. No filling defects are noted.    No endobronchial lesions are noted. Lungs are clear. No pleural effusions   are noted.    Below the diaphragm, visualized portions of the abdomen are unremarkable.    Visualized osseous structures are within normal limits.    IMPRESSION: No pulmonary embolus is noted.

## 2022-05-06 NOTE — CONSULT NOTE ADULT - ASSESSMENT
Patient is a 36M PMH AML last chemo 3 mo ago, cholecystectomy 3 weeks ago presenting with 2 days of intermittent sharp L sided chest pain, non exertional associated with SOB. Denies n/v/d. Denies abdominal pain. Denies fever, chills, cough, headache, lightheadedness, calf pain/swelling.    3 Chest pain  atypical  Serial CE and EKG  CTA negative  nonreproducable   outpatient follow up for CT coronary adn Reginald W/U     # AML   remission  follow with KINA     # HTN   stable     # S/P Lap Minerva  recent surg   doing well  tolerating oral feeding       Discussed with patient and ED team  follow up with me in office in 1 week on thursday

## 2022-05-06 NOTE — ED ADULT NURSE NOTE - OBJECTIVE STATEMENT
Patient is alert and oriented x3. Color is good and skin warm to touch.  He  is c/o mid abdominal pain. He denies  nausea or vomiting.   CM is  reading  NSR .

## 2022-05-06 NOTE — ED PROVIDER NOTE - PHYSICAL EXAMINATION
GENERAL: Awake. Alert. NAD. Well nourished.  HEENT: NC/AT, Conjunctiva pink, no scleral icterus. Airway patent. Moist mucous membranes.  LUNGS: CTAB. No wheezes or rales noted.  CARDIAC: Chest non-tender to palpation. RRR.   ABDOMEN: No masses noted. Soft, NT, ND, no rebound, no guarding.  EXT: No edema, no calf tenderness, distal pulses 2+ bilaterally  NEURO: A&Ox3. Moving all extremities.   SKIN: Warm and dry.   PSYCH: Normal affect. GENERAL: Awake. Alert. NAD. Well nourished.  HEENT: NC/AT, Conjunctiva pink, no scleral icterus. Airway patent. Moist mucous membranes.  LUNGS: CTAB. No wheezes or rales noted.  CARDIAC: Chest non-tender to palpation. RRR.   ABDOMEN: No masses noted. Soft, NT, ND, no rebound, no guarding.  EXT: No edema, no calf tenderness, distal pulses 2+ bilaterally  NEURO: A&Ox3. Moving all extremities.   SKIN: Warm and dry. no rash   PSYCH: Normal affect.

## 2022-05-09 LAB
ALBUMIN SERPL ELPH-MCNC: 4.9 G/DL
ALP BLD-CCNC: 109 U/L
ALT SERPL-CCNC: 85 U/L
ANION GAP SERPL CALC-SCNC: 15 MMOL/L
AST SERPL-CCNC: 32 U/L
BILIRUB SERPL-MCNC: 0.4 MG/DL
BUN SERPL-MCNC: 13 MG/DL
CALCIUM SERPL-MCNC: 10.3 MG/DL
CHLORIDE SERPL-SCNC: 105 MMOL/L
CO2 SERPL-SCNC: 23 MMOL/L
CREAT SERPL-MCNC: 0.88 MG/DL
EGFR: 114 ML/MIN/1.73M2
GLUCOSE SERPL-MCNC: 93 MG/DL
LDH SERPL-CCNC: 200 U/L
POTASSIUM SERPL-SCNC: 4.1 MMOL/L
PROT SERPL-MCNC: 7.6 G/DL
SODIUM SERPL-SCNC: 143 MMOL/L

## 2022-05-26 ENCOUNTER — OUTPATIENT (OUTPATIENT)
Dept: OUTPATIENT SERVICES | Facility: HOSPITAL | Age: 37
LOS: 1 days | Discharge: HOME | End: 2022-05-26

## 2022-05-26 DIAGNOSIS — C92.00 ACUTE MYELOBLASTIC LEUKEMIA, NOT HAVING ACHIEVED REMISSION: ICD-10-CM

## 2022-05-28 ENCOUNTER — EMERGENCY (EMERGENCY)
Facility: HOSPITAL | Age: 37
LOS: 1 days | Discharge: HOME | End: 2022-05-28
Attending: EMERGENCY MEDICINE | Admitting: EMERGENCY MEDICINE
Payer: COMMERCIAL

## 2022-05-28 VITALS
SYSTOLIC BLOOD PRESSURE: 110 MMHG | OXYGEN SATURATION: 99 % | DIASTOLIC BLOOD PRESSURE: 64 MMHG | TEMPERATURE: 98 F | RESPIRATION RATE: 16 BRPM | HEART RATE: 77 BPM

## 2022-05-28 VITALS
SYSTOLIC BLOOD PRESSURE: 122 MMHG | HEART RATE: 102 BPM | HEIGHT: 71 IN | OXYGEN SATURATION: 99 % | TEMPERATURE: 98 F | DIASTOLIC BLOOD PRESSURE: 88 MMHG | RESPIRATION RATE: 16 BRPM | WEIGHT: 195.11 LBS

## 2022-05-28 LAB
ALBUMIN SERPL ELPH-MCNC: 4.6 G/DL — SIGNIFICANT CHANGE UP (ref 3.3–5)
ALP SERPL-CCNC: 85 U/L — SIGNIFICANT CHANGE UP (ref 40–120)
ALT FLD-CCNC: 65 U/L — HIGH (ref 10–45)
ANION GAP SERPL CALC-SCNC: 13 MMOL/L — SIGNIFICANT CHANGE UP (ref 5–17)
AST SERPL-CCNC: 31 U/L — SIGNIFICANT CHANGE UP (ref 10–40)
BASE EXCESS BLDV CALC-SCNC: 3.1 MMOL/L — HIGH (ref -2–2)
BASOPHILS # BLD AUTO: 0 K/UL — SIGNIFICANT CHANGE UP (ref 0–0.2)
BASOPHILS NFR BLD AUTO: 0 % — SIGNIFICANT CHANGE UP (ref 0–2)
BILIRUB SERPL-MCNC: 0.5 MG/DL — SIGNIFICANT CHANGE UP (ref 0.2–1.2)
BUN SERPL-MCNC: 17 MG/DL — SIGNIFICANT CHANGE UP (ref 7–23)
CA-I SERPL-SCNC: 1.32 MMOL/L — SIGNIFICANT CHANGE UP (ref 1.15–1.33)
CALCIUM SERPL-MCNC: 10 MG/DL — SIGNIFICANT CHANGE UP (ref 8.4–10.5)
CHLORIDE BLDV-SCNC: 104 MMOL/L — SIGNIFICANT CHANGE UP (ref 96–108)
CHLORIDE SERPL-SCNC: 103 MMOL/L — SIGNIFICANT CHANGE UP (ref 96–108)
CO2 BLDV-SCNC: 31 MMOL/L — HIGH (ref 22–26)
CO2 SERPL-SCNC: 26 MMOL/L — SIGNIFICANT CHANGE UP (ref 22–31)
CREAT SERPL-MCNC: 1 MG/DL — SIGNIFICANT CHANGE UP (ref 0.5–1.3)
EGFR: 100 ML/MIN/1.73M2 — SIGNIFICANT CHANGE UP
EOSINOPHIL # BLD AUTO: 0 K/UL — SIGNIFICANT CHANGE UP (ref 0–0.5)
EOSINOPHIL NFR BLD AUTO: 0 % — SIGNIFICANT CHANGE UP (ref 0–6)
GAS PNL BLDV: 137 MMOL/L — SIGNIFICANT CHANGE UP (ref 136–145)
GAS PNL BLDV: SIGNIFICANT CHANGE UP
GAS PNL BLDV: SIGNIFICANT CHANGE UP
GLUCOSE BLDV-MCNC: 94 MG/DL — SIGNIFICANT CHANGE UP (ref 70–99)
GLUCOSE SERPL-MCNC: 96 MG/DL — SIGNIFICANT CHANGE UP (ref 70–99)
HCO3 BLDV-SCNC: 30 MMOL/L — HIGH (ref 22–29)
HCT VFR BLD CALC: 38.1 % — LOW (ref 39–50)
HCT VFR BLDA CALC: 41 % — SIGNIFICANT CHANGE UP (ref 39–51)
HGB BLD CALC-MCNC: 13.8 G/DL — SIGNIFICANT CHANGE UP (ref 12.6–17.4)
HGB BLD-MCNC: 13.3 G/DL — SIGNIFICANT CHANGE UP (ref 13–17)
LACTATE BLDV-MCNC: 1.2 MMOL/L — SIGNIFICANT CHANGE UP (ref 0.7–2)
LIDOCAIN IGE QN: 20 U/L — SIGNIFICANT CHANGE UP (ref 7–60)
LYMPHOCYTES # BLD AUTO: 0.19 K/UL — LOW (ref 1–3.3)
LYMPHOCYTES # BLD AUTO: 6.9 % — LOW (ref 13–44)
MANUAL SMEAR VERIFICATION: SIGNIFICANT CHANGE UP
MCHC RBC-ENTMCNC: 33.5 PG — SIGNIFICANT CHANGE UP (ref 27–34)
MCHC RBC-ENTMCNC: 34.9 GM/DL — SIGNIFICANT CHANGE UP (ref 32–36)
MCV RBC AUTO: 96 FL — SIGNIFICANT CHANGE UP (ref 80–100)
MONOCYTES # BLD AUTO: 0.26 K/UL — SIGNIFICANT CHANGE UP (ref 0–0.9)
MONOCYTES NFR BLD AUTO: 9.6 % — SIGNIFICANT CHANGE UP (ref 2–14)
NEUTROPHILS # BLD AUTO: 2.3 K/UL — SIGNIFICANT CHANGE UP (ref 1.8–7.4)
NEUTROPHILS NFR BLD AUTO: 83.5 % — HIGH (ref 43–77)
PCO2 BLDV: 51 MMHG — SIGNIFICANT CHANGE UP (ref 42–55)
PH BLDV: 7.37 — SIGNIFICANT CHANGE UP (ref 7.32–7.43)
PLAT MORPH BLD: NORMAL — SIGNIFICANT CHANGE UP
PLATELET # BLD AUTO: 129 K/UL — LOW (ref 150–400)
PO2 BLDV: 30 MMHG — SIGNIFICANT CHANGE UP (ref 25–45)
POTASSIUM BLDV-SCNC: 4 MMOL/L — SIGNIFICANT CHANGE UP (ref 3.5–5.1)
POTASSIUM SERPL-MCNC: 4.2 MMOL/L — SIGNIFICANT CHANGE UP (ref 3.5–5.3)
POTASSIUM SERPL-SCNC: 4.2 MMOL/L — SIGNIFICANT CHANGE UP (ref 3.5–5.3)
PROT SERPL-MCNC: 7.5 G/DL — SIGNIFICANT CHANGE UP (ref 6–8.3)
RBC # BLD: 3.97 M/UL — LOW (ref 4.2–5.8)
RBC # FLD: 12.5 % — SIGNIFICANT CHANGE UP (ref 10.3–14.5)
RBC BLD AUTO: SIGNIFICANT CHANGE UP
SAO2 % BLDV: 45.4 % — LOW (ref 67–88)
SODIUM SERPL-SCNC: 142 MMOL/L — SIGNIFICANT CHANGE UP (ref 135–145)
WBC # BLD: 2.76 K/UL — LOW (ref 3.8–10.5)
WBC # FLD AUTO: 2.76 K/UL — LOW (ref 3.8–10.5)

## 2022-05-28 PROCEDURE — 82435 ASSAY OF BLOOD CHLORIDE: CPT

## 2022-05-28 PROCEDURE — 82947 ASSAY GLUCOSE BLOOD QUANT: CPT

## 2022-05-28 PROCEDURE — 82803 BLOOD GASES ANY COMBINATION: CPT

## 2022-05-28 PROCEDURE — 84132 ASSAY OF SERUM POTASSIUM: CPT

## 2022-05-28 PROCEDURE — 83690 ASSAY OF LIPASE: CPT

## 2022-05-28 PROCEDURE — 85025 COMPLETE CBC W/AUTO DIFF WBC: CPT

## 2022-05-28 PROCEDURE — 74177 CT ABD & PELVIS W/CONTRAST: CPT | Mod: MA

## 2022-05-28 PROCEDURE — 96374 THER/PROPH/DIAG INJ IV PUSH: CPT | Mod: XU

## 2022-05-28 PROCEDURE — 83605 ASSAY OF LACTIC ACID: CPT

## 2022-05-28 PROCEDURE — 80053 COMPREHEN METABOLIC PANEL: CPT

## 2022-05-28 PROCEDURE — 85018 HEMOGLOBIN: CPT

## 2022-05-28 PROCEDURE — 82330 ASSAY OF CALCIUM: CPT

## 2022-05-28 PROCEDURE — 74177 CT ABD & PELVIS W/CONTRAST: CPT | Mod: 26,MA

## 2022-05-28 PROCEDURE — 84295 ASSAY OF SERUM SODIUM: CPT

## 2022-05-28 PROCEDURE — 85014 HEMATOCRIT: CPT

## 2022-05-28 PROCEDURE — 99285 EMERGENCY DEPT VISIT HI MDM: CPT

## 2022-05-28 PROCEDURE — 99284 EMERGENCY DEPT VISIT MOD MDM: CPT | Mod: 25

## 2022-05-28 RX ORDER — ONDANSETRON 8 MG/1
4 TABLET, FILM COATED ORAL ONCE
Refills: 0 | Status: COMPLETED | OUTPATIENT
Start: 2022-05-28 | End: 2022-05-28

## 2022-05-28 RX ORDER — SODIUM CHLORIDE 9 MG/ML
1000 INJECTION INTRAMUSCULAR; INTRAVENOUS; SUBCUTANEOUS ONCE
Refills: 0 | Status: COMPLETED | OUTPATIENT
Start: 2022-05-28 | End: 2022-05-28

## 2022-05-28 RX ADMIN — SODIUM CHLORIDE 1000 MILLILITER(S): 9 INJECTION INTRAMUSCULAR; INTRAVENOUS; SUBCUTANEOUS at 11:12

## 2022-05-28 RX ADMIN — ONDANSETRON 4 MILLIGRAM(S): 8 TABLET, FILM COATED ORAL at 11:11

## 2022-05-28 NOTE — ED PROVIDER NOTE - PROGRESS NOTE DETAILS
Work up found hepatic steatosis, non obstructing kidney stone L, and diverticulosis. Will PO challenge PO tolerated

## 2022-05-28 NOTE — ED PROVIDER NOTE - NS ED ROS FT
CONST: no fevers, no chills  EYES: no pain, no vision changes  ENT: no sore throat, no ear pain, no change in hearing  CV: no chest pain, no leg swelling  RESP: no shortness of breath, no cough  ABD: no abdominal pain. +n/v/d  : no dysuria, no flank pain, no hematuria  MSK: no back pain, no extremity pain  NEURO: no headache or additional neurologic complaints  SKIN:  no rash

## 2022-05-28 NOTE — ED ADULT TRIAGE NOTE - CHIEF COMPLAINT QUOTE
vomiting this morning, noticed bruising to legs for 2-3 days  Hx Leukemia, last treatment was 3 months ago

## 2022-05-28 NOTE — ED ADULT NURSE NOTE - OBJECTIVE STATEMENT
36 year old male PMH of AML - last chemo 3 months ago, lap leni 1 month ago, presents to the ED ambulatory through triage with father for 3 episodes of vomiting since waking up this morning. Patient is A&Ox3, well appearing, states vomit was bilious, non-bloody. Pt. also endorses diarrhea earlier in the week. Abdomen is soft, non-distended, non-tender to palpation. Denies abdominal pain. Denies fevers/chills, SOB, cough, dysuria, hematuria, recent travel. 18g peripheral IV placed in R AC and labs drawn and sent to lab. Patient undressed and placed into gown, call bell in hand and side rails up with bed in lowest position for safety. blanket provided. Comfort and safety provided.

## 2022-05-28 NOTE — ED PROVIDER NOTE - ATTENDING APP SHARED VISIT CONTRIBUTION OF CARE
PMD Akhil Refoua  36y m pmh AML last chemo approx 3m ago. Htn, Renal colic, SP lap leni approx 6w ago, Pt comes to ED c/o PMD Akhil Refoua  36y m pmh AML last chemo approx 3m ago. Htn, Renal colic, SP lap leni approx 6w ago, Pt comes to ED c/o nausea and vomiting bilious upon awaking this am. No fever chills. cough, abd pain. ha. PE WDWN male awake alert looking mildly ill heent normocephalic atraumatic neck supple chest clear anterior & posterior cv no rubs, gallops or murmurs abd soft +bs no mass guarding neuro no focal defects.   Segundo Pedersen MD, Facep

## 2022-05-28 NOTE — ED PROVIDER NOTE - NS ED ATTENDING STATEMENT MOD
This was a shared visit with the YULISA. I reviewed and verified the documentation and independently performed the documented:

## 2022-05-28 NOTE — ED PROVIDER NOTE - PATIENT PORTAL LINK FT
You can access the FollowMyHealth Patient Portal offered by [facility] by registering at the following website: [email or web address]. By joining [employer]’s FollowMyHealth portal, you will also be able to view your health information using other applications (apps) compatible with our system.

## 2022-05-28 NOTE — ED PROVIDER NOTE - NSFOLLOWUPCLINICS_GEN_ALL_ED_FT
Gastroenterology at [facility]  Gastroenterology  [address] [place] [id number]  Phone: [phone]  Fax:

## 2022-05-28 NOTE — ED PROVIDER NOTE - PHYSICAL EXAMINATION
GENERAL: Awake, alert, NAD  HEENT: NC/AT, moist mucous membranes   LUNGS: CTAB, no wheezes or crackles   CARDIAC: RRR, no m/r/g  ABDOMEN: Soft, non tender, non distended  BACK: no CVA tenderness  EXT: No edema, no calf tenderness, PT 2+ pulses  NEURO: A&Ox3. Moving all extremities.  SKIN: Warm and dry. No rash.

## 2022-05-28 NOTE — ED PROVIDER NOTE - OBJECTIVE STATEMENT
37yo male pt PMHx leukemia who presents to ED for nausea and emesis this AM x3 described as bilious. Patient refers recently finishing chemo 3 months ago, and 1 month ago underwent cholecystectomy. Of note, has noticed 1 week history of intermittent diarrhea.

## 2022-05-28 NOTE — ED PROVIDER NOTE - NSFOLLOWUPINSTRUCTIONS_ED_ALL_ED_FT
1. Please follow up with your doctors and share your results.     2. Avoid irritants in your diet such as coffee, chocolate, spices, etc. Symptoms might be from viral gastritis.     3. Return if worsening symptoms, abdominal pain or any other concerns.     4. Follow up with GI for hepatic steatosis.

## 2022-05-28 NOTE — ED PROVIDER NOTE - CLINICAL SUMMARY MEDICAL DECISION MAKING FREE TEXT BOX
36y male pt 36y male pt hx of leukemia who presents to ED for n/v. Non tender abdomen on exam. Will assess with imaging, labs. reassess.

## 2022-06-02 ENCOUNTER — RESULT REVIEW (OUTPATIENT)
Age: 37
End: 2022-06-02

## 2022-06-02 ENCOUNTER — APPOINTMENT (OUTPATIENT)
Dept: HEMATOLOGY ONCOLOGY | Facility: CLINIC | Age: 37
End: 2022-06-02
Payer: COMMERCIAL

## 2022-06-02 VITALS
WEIGHT: 191.14 LBS | RESPIRATION RATE: 16 BRPM | TEMPERATURE: 97.4 F | HEIGHT: 70.67 IN | DIASTOLIC BLOOD PRESSURE: 90 MMHG | BODY MASS INDEX: 26.76 KG/M2 | SYSTOLIC BLOOD PRESSURE: 123 MMHG | HEART RATE: 112 BPM | OXYGEN SATURATION: 98 %

## 2022-06-02 LAB
BASOPHILS # BLD AUTO: 0.01 K/UL — SIGNIFICANT CHANGE UP (ref 0–0.2)
BASOPHILS NFR BLD AUTO: 0.4 % — SIGNIFICANT CHANGE UP (ref 0–2)
EOSINOPHIL # BLD AUTO: 0.02 K/UL — SIGNIFICANT CHANGE UP (ref 0–0.5)
EOSINOPHIL NFR BLD AUTO: 0.9 % — SIGNIFICANT CHANGE UP (ref 0–6)
HCT VFR BLD CALC: 40.1 % — SIGNIFICANT CHANGE UP (ref 39–50)
HGB BLD-MCNC: 13.8 G/DL — SIGNIFICANT CHANGE UP (ref 13–17)
IMM GRANULOCYTES NFR BLD AUTO: 0 % — SIGNIFICANT CHANGE UP (ref 0–1.5)
LYMPHOCYTES # BLD AUTO: 0.43 K/UL — LOW (ref 1–3.3)
LYMPHOCYTES # BLD AUTO: 18.4 % — SIGNIFICANT CHANGE UP (ref 13–44)
MCHC RBC-ENTMCNC: 33.4 PG — SIGNIFICANT CHANGE UP (ref 27–34)
MCHC RBC-ENTMCNC: 34.4 G/DL — SIGNIFICANT CHANGE UP (ref 32–36)
MCV RBC AUTO: 97.1 FL — SIGNIFICANT CHANGE UP (ref 80–100)
MONOCYTES # BLD AUTO: 0.29 K/UL — SIGNIFICANT CHANGE UP (ref 0–0.9)
MONOCYTES NFR BLD AUTO: 12.4 % — SIGNIFICANT CHANGE UP (ref 2–14)
NEUTROPHILS # BLD AUTO: 1.59 K/UL — LOW (ref 1.8–7.4)
NEUTROPHILS NFR BLD AUTO: 67.9 % — SIGNIFICANT CHANGE UP (ref 43–77)
NRBC # BLD: 0 /100 WBCS — SIGNIFICANT CHANGE UP (ref 0–0)
PLATELET # BLD AUTO: 143 K/UL — LOW (ref 150–400)
RBC # BLD: 4.13 M/UL — LOW (ref 4.2–5.8)
RBC # FLD: 12.8 % — SIGNIFICANT CHANGE UP (ref 10.3–14.5)
WBC # BLD: 2.34 K/UL — LOW (ref 3.8–10.5)
WBC # FLD AUTO: 2.34 K/UL — LOW (ref 3.8–10.5)

## 2022-06-02 PROCEDURE — 99213 OFFICE O/P EST LOW 20 MIN: CPT

## 2022-06-02 NOTE — PHYSICAL EXAM
[Fully active, able to carry on all pre-disease performance without restriction] : Status 0 - Fully active, able to carry on all pre-disease performance without restriction [Normal] : affect appropriate [de-identified] : a 1 cm long laceration with sutures on the right thrum, healing well

## 2022-06-02 NOTE — ASSESSMENT
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 \par Started induction with Dauno/Cytarabine on 8/6/21. \par Pt's counts now recovered, remission marrow done on 9/2- in remission. Proceed to consolidation with 4 cycles of HIDAC. C1 HIDAC on 9/17, c/b neutropenic fever and diarrhea. C2 HIDAC on 10/25, was postponed for 1 week due to admission for diarrhea, given negative stool studies, suspect diarrhea was likely chemo-related. Pt was admitted again 11/13-11/17 for sepsis due to thumb cellulitis after laceration. C3 on 11/26, had Fulphilia on C3 c/b epistaxis and neutropenic fever w/Temp 100.3. C4 HiDAC 1/5/22, c/b transaminitis and neutropenic fever\par He had laparoscopic cholecystotomy on 4/17. \par s/p BMbx 2/11/22 showing CR.\par He has non-necrotizing granulomas noted in BMbx, unclear significance\par CBC today stable\par All questions answered\par RTC monthly\par \par \par \par \par

## 2022-06-02 NOTE — HISTORY OF PRESENT ILLNESS
[de-identified] : 37yo M w/ HTN and newly diagnosed AML here for f/u. \par \par He presented to the hospital on 7/30/21 with complaints of dizziness, fatigue and severe RUQ pain for 3 days. CT A/P revealed fatty liver and small R pleural effusion. WBC 12k with 17% blasts.Peripheral flow cytometry showed 13% myeloblasts. FLT3(-). BMbx was done on 8/4/21 - confirmed AML. 46,XY               {20               }\par Foundation: mutations in DNMT3A R882H, NRAS G12D, NPM1 W288fs*12\par Pt consented to [place]. Patient was offered sperm banking, but declined.\par On 8/6, induction with Dauno/Cytararbine was started (cytarabine 100/m2 and dauno 90/m2) \par He received a seven day course of Zosyn for presumed RUL PNA, then transitioned to  levaquin, posaconazole and Acyclovir for ppx for neutropenia. Course c/b neutropenic fevers, cultures negative, repeat CT 8/14 without infectious source. He was on Cefepime but developed a rash, changed to meropenem. Rash improved. \par Day 14 BMbx on 8/19 was hypocellular with chemotherapeutic effect; however, per hematopathology, appears to be earlier regeneration than would typically seen which is concerning for persistent disease at this point, and the earliest cells are CD34 positive and his myeloblasts on initial presentation were CD34 negative. Thus, this may simply represent early regeneration of his marrow. Plan is to await count recovery and repeat biopsy.  \par Patient discharged home on 8/29/21 when ANC >500 [de-identified] : Eating well since discharge. \par c/o hemorrhoidal pain. Hemorrhoids started a few days prior to discharge. He was sent home with rectal ointment and witch hazel. \par \par BMBx done on 9/2: Cellular bone marrow with trilineage hematopoiesis with maturation and megakaryocytosis (history of AML).\par Pt feels well, CBC today showed count stable\par \par s/p C1 HIDAC 9/17-9/22 \par c/o leg pain after chemo in the hospital \par \par He presented to the ED on 10/9 due to fever the night of presentation. The patient went to sleep around 10pm and woke up at 3am feeling warm and clammy. He took his temperature orally at home and it was 100.7. The day prior to presentation (10/8/21), the patient went to [facility] for an appointment to get his platelet count checked. He states he was feeling a bit run down and thought he was going to need a transfusion, but he did not need a transfusion. He was afebrile during the time of the appointment and went home afterwards. Around 3pm, the patient had bilateral crampy leg pain that was similar to the leg pain he felt during his consolidation therapy treatment. He took hydrocodone and the pain improved. The patient had one episode of diarrhea three days prior to presentation and has been bothered by rectal pain associated with his "large hemorrhoids. He denies any bleeding per rectum. He also feels like his mouth has been more dry than usual over the past several days, but denies all other symptoms. \par CT Abdomen and Pelvis w/ Oral Cont and w/ IV Conttrast on 10/9 revealed Region of hypoenhancement upper pole left kidney; please correlate clinically for possible pyelonephritis. No hydronephrosis or perinephric stranding. No rectal abscess.\par CT Chest No Contrast on 10/9 revealed Clear lungs.\par 10/9- BCX NGTD and 10/9- UCX (-)\par He ate some cold salad late last night, had upsetting stomach and diarrhea this morning. Will take Imodium for diarrhea. \par \par C2 is due on 10/15, pt wants deferring to next Monday after his diarrhea improved. \par Admission of  C2 was postponed due to worsening diarrhea. Went to ED on 10/15 due to worsening watery diarrhea. GI PCR neg, C Diff neg\par Given negative stool studies, suspect diarrhea was likely chemo-related. S/p cycle 2 Consolidation with HIDAC started on 10/25. Patient received IV hydration, strict I/O, antiemetics, monitoring of CBC and CMP and for cerebellar toxicity. PRedforte eye drops given to prevent chemical conjunctivitis. Patient with fever throughout course of treatment. Cultures negative. Due to fever probably related to HIDAC, patient received dexamethasone prior to the last 2 doses of cytatabine. \par He is seen today accompanied by his father, pt feels fatigue after chemo, other than that he feels fine. Denied any fever, chills diarrhea. \par \par Pt was admitted on 11/13-11/17 s/p right first digit laceration repair on 11/12 and presenting with erythema and pain at the site of laceration repair. Patient reported he was feeling unwell prior to suture placement with body aches, chills, night sweats and subjective fevers. Patient last BM 4 days ago. Has bruise to left inner thigh. Patient reports he keeps his PICC site clean and intact which was placed in 9/2021. Upon admission to the hospital ID was consulted started on Zosyn , GI consulted for rectal pain secondary to hemorrhoids and palliative consulted for pain control.\par  \par C3 on 11/26, tolerated well. He was seen today after discharge, accompanied by his father. He admitted feeling tired, denied any f/c, diarrhea. Right hand suture site healing well, no abnormal erythema or discharge. \par He will have Fulphilia today. \par C3 HIDAC 11/26-12/1\par He presents to the hospital due to epistaxis and neutropenic fever w/Temp 100.3 on 12/1. Per patient, he reports that he was feeling sinus congestion and pressure on his L side, blew his nose and bleeding began from L nare without improvement prompting arrival to the emergency department. Feels mild L sinus congestion.  L nasal cavity packed with absorbable packing; F/U ENT clinic outpatient. Pan culture obtained-with No growth currently\par CBC rechecked today recovered. He feels well overall , had lower abdominal pain last night after ate cheese, now improved. Denied any fever chills bloody stool or diarrhea. \par \par s/p C4 HiDAC 1/5/22\par Pt has known grade 1 AST and grade 3 ALT transaminitis. Presented this admission with transamintitis. Abd sono  performed and revealed Borderline/mild hepatomegaly with hepatic steatosis. Splenomegaly. Focal area of left upper pole increased renal cortical echogenicity, corresponding to an area of hypoattenuation seen on prior CT chest, \par abdomen and pelvis dated 10/9/2021. This is nonspecific and may reflect focal edema. Patient received IV hydration, antiemetics, monitoring of CBC and electrolytes. Predforte eye drops provided to prevent chemical conjunctivitis. Patient was monitored for cerebellar toxicity. Nystagmus checks performed. Hospital course complicated by fever blood cultures showed (-), most likely febrile secondary to HIDAC treatment. To receive Fulphila today.\par \par Pt presented to the hospital on 1/18/22 due fever of 100.4, weakness, decreased WBC and shortness of breath while at home. Patient reports that his pain is more controlled s/p pain medication and his shortness of breath has resolved. He denied chills, cough, chest pain, palpitations.\par Pan culture (blood +Ucx) were negative, CXR reveals clear lungs, COVID PCR - not detect. Pt started on empiric Zosyn, Diflucan and Acyclovir added prophylactically for neutropenic fever. He was transfused with platelets and PRBC as per supportive parameters (>10k/>7) respectively.\par He feels well overall now, denied any fever, chills or pain. \par \par BMbx 2/11/22: Cellular bone marrow with erythroid predominant trilineage hematopoiesis\par No morphologic or immunophenotypic evidence of persistent acute myeloid leukemia\par Non-necrotizing granulomas\par Normal male karyotype and normal onkosight myeloid NGS panel study.\par \par He was admitted for back pain 2/2 herniated disk - Rx'd pain meds and a Medrol pack. \par \par Pt went to ED due to acute RUQ pain on 4/16, patient underwent laparoscopic cholecystotomy on 4/17. \par \par He reports feeling well. He reports having severe anxiety when he sees any bruising or gum bleeding. He has seen a therapist but would like to see someone else.

## 2022-06-03 LAB
ALBUMIN SERPL ELPH-MCNC: 5.1 G/DL
ALP BLD-CCNC: 88 U/L
ALT SERPL-CCNC: 47 U/L
ANION GAP SERPL CALC-SCNC: 16 MMOL/L
AST SERPL-CCNC: 25 U/L
BILIRUB SERPL-MCNC: 0.6 MG/DL
BUN SERPL-MCNC: 13 MG/DL
CALCIUM SERPL-MCNC: 10.3 MG/DL
CHLORIDE SERPL-SCNC: 104 MMOL/L
CO2 SERPL-SCNC: 23 MMOL/L
CREAT SERPL-MCNC: 0.96 MG/DL
EGFR: 105 ML/MIN/1.73M2
GLUCOSE SERPL-MCNC: 82 MG/DL
LDH SERPL-CCNC: 172 U/L
POTASSIUM SERPL-SCNC: 4.1 MMOL/L
PROT SERPL-MCNC: 7.5 G/DL
SODIUM SERPL-SCNC: 142 MMOL/L

## 2022-06-15 ENCOUNTER — APPOINTMENT (OUTPATIENT)
Dept: ORTHOPEDIC SURGERY | Facility: CLINIC | Age: 37
End: 2022-06-15

## 2022-06-20 NOTE — CONSULT NOTE ADULT - RESPIRATORY AND THORAX
Onset: approximately 3 times since tonsil and adenoid surgery on 5/26/22  Location/description: \"pain in his chest area\" (left side; below his heart).  Patient said his heart was beating really fast as well a few times per mom.  Mom is NICU nurse and listened to patient and heard murmur yesterday.  Hurts more when \"exhaling\" per patient.  Does not last for \"no more than 5 minutes\" per mom when episode does occur.   Associated Symptoms: \"pretty persistent cougher\". no relation that mom is aware of with patients symptoms and exercise, heat, ect. No known asthma history.   What improves/worsens symptoms: \"I think it just goes away on its own\"  Symptom specific medications: none  Input and Output: normal  Activity level: alert & active  Temperature (route and time): no fevers    Reason for Disposition  • [1] MILD chest pain (doesn't interfere with normal activities) AND [2] unexplained (Exception: transient pain, brief pains, heartburn, pain due to coughing or sore muscles)    Protocols used: CHEST PAIN-P-AH    Plan: See PCP within 3 days.  Per mom, she is unsure what an appointment would do as this is not constantly occurring and has only occurred approximately 3 times since patients surgery.  Mom mostly concerned about relation to patients innocent heart murmur or his recent surgery.  Mom hoping to further discuss with Dr. Lama is able before making an appointment unless he would recommend appointment at this time.      Routing to Dr. Lama to review.   negative

## 2022-06-23 NOTE — ED PROVIDER NOTE - NS ED MD EM SELECTION
FYI:  Wound care x2/week began for patient, performed by Preceptor [facility]   [id number] Comprehensive

## 2022-07-07 ENCOUNTER — APPOINTMENT (OUTPATIENT)
Dept: HEMATOLOGY ONCOLOGY | Facility: CLINIC | Age: 37
End: 2022-07-07

## 2022-07-07 ENCOUNTER — RESULT REVIEW (OUTPATIENT)
Age: 37
End: 2022-07-07

## 2022-07-07 VITALS
WEIGHT: 190.48 LBS | DIASTOLIC BLOOD PRESSURE: 82 MMHG | RESPIRATION RATE: 17 BRPM | TEMPERATURE: 97.4 F | BODY MASS INDEX: 26.82 KG/M2 | HEART RATE: 95 BPM | OXYGEN SATURATION: 99 % | SYSTOLIC BLOOD PRESSURE: 119 MMHG

## 2022-07-07 LAB
BASOPHILS # BLD AUTO: 0.01 K/UL — SIGNIFICANT CHANGE UP (ref 0–0.2)
BASOPHILS NFR BLD AUTO: 0.3 % — SIGNIFICANT CHANGE UP (ref 0–2)
EOSINOPHIL # BLD AUTO: 0.02 K/UL — SIGNIFICANT CHANGE UP (ref 0–0.5)
EOSINOPHIL NFR BLD AUTO: 0.7 % — SIGNIFICANT CHANGE UP (ref 0–6)
HCT VFR BLD CALC: 42.6 % — SIGNIFICANT CHANGE UP (ref 39–50)
HGB BLD-MCNC: 14.6 G/DL — SIGNIFICANT CHANGE UP (ref 13–17)
IMM GRANULOCYTES NFR BLD AUTO: 0.3 % — SIGNIFICANT CHANGE UP (ref 0–1.5)
LYMPHOCYTES # BLD AUTO: 0.52 K/UL — LOW (ref 1–3.3)
LYMPHOCYTES # BLD AUTO: 18.1 % — SIGNIFICANT CHANGE UP (ref 13–44)
MCHC RBC-ENTMCNC: 33.6 PG — SIGNIFICANT CHANGE UP (ref 27–34)
MCHC RBC-ENTMCNC: 34.3 G/DL — SIGNIFICANT CHANGE UP (ref 32–36)
MCV RBC AUTO: 98.2 FL — SIGNIFICANT CHANGE UP (ref 80–100)
MONOCYTES # BLD AUTO: 0.33 K/UL — SIGNIFICANT CHANGE UP (ref 0–0.9)
MONOCYTES NFR BLD AUTO: 11.5 % — SIGNIFICANT CHANGE UP (ref 2–14)
NEUTROPHILS # BLD AUTO: 1.98 K/UL — SIGNIFICANT CHANGE UP (ref 1.8–7.4)
NEUTROPHILS NFR BLD AUTO: 69.1 % — SIGNIFICANT CHANGE UP (ref 43–77)
NRBC # BLD: 0 /100 WBCS — SIGNIFICANT CHANGE UP (ref 0–0)
PLATELET # BLD AUTO: 130 K/UL — LOW (ref 150–400)
RBC # BLD: 4.34 M/UL — SIGNIFICANT CHANGE UP (ref 4.2–5.8)
RBC # FLD: 12.2 % — SIGNIFICANT CHANGE UP (ref 10.3–14.5)
WBC # BLD: 2.87 K/UL — LOW (ref 3.8–10.5)
WBC # FLD AUTO: 2.87 K/UL — LOW (ref 3.8–10.5)

## 2022-07-07 PROCEDURE — 99213 OFFICE O/P EST LOW 20 MIN: CPT

## 2022-07-11 LAB
ALBUMIN SERPL ELPH-MCNC: 5 G/DL
ALP BLD-CCNC: 95 U/L
ALT SERPL-CCNC: 43 U/L
ANION GAP SERPL CALC-SCNC: 16 MMOL/L
AST SERPL-CCNC: 22 U/L
BILIRUB SERPL-MCNC: 0.4 MG/DL
BUN SERPL-MCNC: 17 MG/DL
CALCIUM SERPL-MCNC: 10.2 MG/DL
CHLORIDE SERPL-SCNC: 106 MMOL/L
CO2 SERPL-SCNC: 22 MMOL/L
CREAT SERPL-MCNC: 0.85 MG/DL
EGFR: 115 ML/MIN/1.73M2
GLUCOSE SERPL-MCNC: 98 MG/DL
LDH SERPL-CCNC: 151 U/L
POTASSIUM SERPL-SCNC: 4.7 MMOL/L
PROT SERPL-MCNC: 7.3 G/DL
SODIUM SERPL-SCNC: 144 MMOL/L

## 2022-07-14 NOTE — PHYSICAL EXAM
[Fully active, able to carry on all pre-disease performance without restriction] : Status 0 - Fully active, able to carry on all pre-disease performance without restriction [Normal] : full range of motion and no deformities appreciated [de-identified] : a 1 cm long laceration with sutures on the right thrum, healing well

## 2022-07-14 NOTE — REVIEW OF SYSTEMS
[Negative] : Allergic/Immunologic [FreeTextEntry7] : hemorrhoidal pain [FreeTextEntry9] : lower back pain

## 2022-07-14 NOTE — HISTORY OF PRESENT ILLNESS
[de-identified] : 35yo M w/ HTN and newly diagnosed AML here for f/u. \par \par He presented to the hospital on 7/30/21 with complaints of dizziness, fatigue and severe RUQ pain for 3 days. CT A/P revealed fatty liver and small R pleural effusion. WBC 12k with 17% blasts.Peripheral flow cytometry showed 13% myeloblasts. FLT3(-). BMbx was done on 8/4/21 - confirmed AML. 46,XY                {20                }\par Foundation: mutations in DNMT3A R882H, NRAS G12D, NPM1 W288fs*12\par Pt consented to [place]. Patient was offered sperm banking, but declined.\par On 8/6, induction with Dauno/Cytararbine was started (cytarabine 100/m2 and dauno 90/m2) \par He received a seven day course of Zosyn for presumed RUL PNA, then transitioned to  levaquin, posaconazole and Acyclovir for ppx for neutropenia. Course c/b neutropenic fevers, cultures negative, repeat CT 8/14 without infectious source. He was on Cefepime but developed a rash, changed to meropenem. Rash improved. \par Day 14 BMbx on 8/19 was hypocellular with chemotherapeutic effect; however, per hematopathology, appears to be earlier regeneration than would typically seen which is concerning for persistent disease at this point, and the earliest cells are CD34 positive and his myeloblasts on initial presentation were CD34 negative. Thus, this may simply represent early regeneration of his marrow. Plan is to await count recovery and repeat biopsy.  \par Patient discharged home on 8/29/21 when ANC >500 [de-identified] : Eating well since discharge. \par c/o hemorrhoidal pain. Hemorrhoids started a few days prior to discharge. He was sent home with rectal ointment and witch hazel. \par \par BMBx done on 9/2: Cellular bone marrow with trilineage hematopoiesis with maturation and megakaryocytosis (history of AML).\par Pt feels well, CBC today showed count stable\par \par s/p C1 HIDAC 9/17-9/22 \par c/o leg pain after chemo in the hospital \par \par He presented to the ED on 10/9 due to fever the night of presentation. The patient went to sleep around 10pm and woke up at 3am feeling warm and clammy. He took his temperature orally at home and it was 100.7. The day prior to presentation (10/8/21), the patient went to [facility] for an appointment to get his platelet count checked. He states he was feeling a bit run down and thought he was going to need a transfusion, but he did not need a transfusion. He was afebrile during the time of the appointment and went home afterwards. Around 3pm, the patient had bilateral crampy leg pain that was similar to the leg pain he felt during his consolidation therapy treatment. He took hydrocodone and the pain improved. The patient had one episode of diarrhea three days prior to presentation and has been bothered by rectal pain associated with his "large hemorrhoids. He denies any bleeding per rectum. He also feels like his mouth has been more dry than usual over the past several days, but denies all other symptoms. \par CT Abdomen and Pelvis w/ Oral Cont and w/ IV Conttrast on 10/9 revealed Region of hypoenhancement upper pole left kidney; please correlate clinically for possible pyelonephritis. No hydronephrosis or perinephric stranding. No rectal abscess.\par CT Chest No Contrast on 10/9 revealed Clear lungs.\par 10/9- BCX NGTD and 10/9- UCX (-)\par He ate some cold salad late last night, had upsetting stomach and diarrhea this morning. Will take Imodium for diarrhea. \par \par C2 is due on 10/15, pt wants deferring to next Monday after his diarrhea improved. \par Admission of  C2 was postponed due to worsening diarrhea. Went to ED on 10/15 due to worsening watery diarrhea. GI PCR neg, C Diff neg\par Given negative stool studies, suspect diarrhea was likely chemo-related. S/p cycle 2 Consolidation with HIDAC started on 10/25. Patient received IV hydration, strict I/O, antiemetics, monitoring of CBC and CMP and for cerebellar toxicity. PRedforte eye drops given to prevent chemical conjunctivitis. Patient with fever throughout course of treatment. Cultures negative. Due to fever probably related to HIDAC, patient received dexamethasone prior to the last 2 doses of cytatabine. \par He is seen today accompanied by his father, pt feels fatigue after chemo, other than that he feels fine. Denied any fever, chills diarrhea. \par \par Pt was admitted on 11/13-11/17 s/p right first digit laceration repair on 11/12 and presenting with erythema and pain at the site of laceration repair. Patient reported he was feeling unwell prior to suture placement with body aches, chills, night sweats and subjective fevers. Patient last BM 4 days ago. Has bruise to left inner thigh. Patient reports he keeps his PICC site clean and intact which was placed in 9/2021. Upon admission to the hospital ID was consulted started on Zosyn , GI consulted for rectal pain secondary to hemorrhoids and palliative consulted for pain control.\par  \par C3 on 11/26, tolerated well. He was seen today after discharge, accompanied by his father. He admitted feeling tired, denied any f/c, diarrhea. Right hand suture site healing well, no abnormal erythema or discharge. \par He will have Fulphilia today. \par C3 HIDAC 11/26-12/1\par He presents to the hospital due to epistaxis and neutropenic fever w/Temp 100.3 on 12/1. Per patient, he reports that he was feeling sinus congestion and pressure on his L side, blew his nose and bleeding began from L nare without improvement prompting arrival to the emergency department. Feels mild L sinus congestion.  L nasal cavity packed with absorbable packing; F/U ENT clinic outpatient. Pan culture obtained-with No growth currently\par CBC rechecked today recovered. He feels well overall , had lower abdominal pain last night after ate cheese, now improved. Denied any fever chills bloody stool or diarrhea. \par \par s/p C4 HiDAC 1/5/22\par Pt has known grade 1 AST and grade 3 ALT transaminitis. Presented this admission with transamintitis. Abd sono  performed and revealed Borderline/mild hepatomegaly with hepatic steatosis. Splenomegaly. Focal area of left upper pole increased renal cortical echogenicity, corresponding to an area of hypoattenuation seen on prior CT chest, \par abdomen and pelvis dated 10/9/2021. This is nonspecific and may reflect focal edema. Patient received IV hydration, antiemetics, monitoring of CBC and electrolytes. Predforte eye drops provided to prevent chemical conjunctivitis. Patient was monitored for cerebellar toxicity. Nystagmus checks performed. Hospital course complicated by fever blood cultures showed (-), most likely febrile secondary to HIDAC treatment. To receive Fulphila today.\par \par Pt presented to the hospital on 1/18/22 due fever of 100.4, weakness, decreased WBC and shortness of breath while at home. Patient reports that his pain is more controlled s/p pain medication and his shortness of breath has resolved. He denied chills, cough, chest pain, palpitations.\par Pan culture (blood +Ucx) were negative, CXR reveals clear lungs, COVID PCR - not detect. Pt started on empiric Zosyn, Diflucan and Acyclovir added prophylactically for neutropenic fever. He was transfused with platelets and PRBC as per supportive parameters (>10k/>7) respectively.\par He feels well overall now, denied any fever, chills or pain. \par \par BMbx 2/11/22: Cellular bone marrow with erythroid predominant trilineage hematopoiesis\par No morphologic or immunophenotypic evidence of persistent acute myeloid leukemia\par Non-necrotizing granulomas\par Normal male karyotype and normal onkosight myeloid NGS panel study.\par \par He was admitted for back pain 2/2 herniated disk - Rx'd pain meds and a Medrol pack. \par \par Pt went to ED due to acute RUQ pain on 4/16, patient underwent laparoscopic cholecystotomy on 4/17. \par \par He reports feeling well. He reports having severe anxiety when he sees any bruising or gum bleeding. He has seen a therapist but would like to see someone else. \par will see Gavi Lozano on 7/18 TEB\padmini Has 2 bruising on the left inside thigh. c/o sometimes lower back pain due to herniated disk, he prefers conservative observation for now. \par

## 2022-07-18 ENCOUNTER — APPOINTMENT (OUTPATIENT)
Dept: HEMATOLOGY ONCOLOGY | Facility: CLINIC | Age: 37
End: 2022-07-18

## 2022-07-18 ENCOUNTER — NON-APPOINTMENT (OUTPATIENT)
Age: 37
End: 2022-07-18

## 2022-07-18 DIAGNOSIS — F41.9 ANXIETY DISORDER, UNSPECIFIED: ICD-10-CM

## 2022-07-18 DIAGNOSIS — F06.4 ANXIETY DISORDER DUE TO KNOWN PHYSIOLOGICAL CONDITION: ICD-10-CM

## 2022-07-18 DIAGNOSIS — G47.00 INSOMNIA, UNSPECIFIED: ICD-10-CM

## 2022-07-18 PROCEDURE — 99205 OFFICE O/P NEW HI 60 MIN: CPT | Mod: 95

## 2022-08-01 ENCOUNTER — OUTPATIENT (OUTPATIENT)
Dept: OUTPATIENT SERVICES | Facility: HOSPITAL | Age: 37
LOS: 1 days | Discharge: HOME | End: 2022-08-01

## 2022-08-01 DIAGNOSIS — C92.00 ACUTE MYELOBLASTIC LEUKEMIA, NOT HAVING ACHIEVED REMISSION: ICD-10-CM

## 2022-08-04 ENCOUNTER — APPOINTMENT (OUTPATIENT)
Dept: HEMATOLOGY ONCOLOGY | Facility: CLINIC | Age: 37
End: 2022-08-04

## 2022-08-04 ENCOUNTER — RESULT REVIEW (OUTPATIENT)
Age: 37
End: 2022-08-04

## 2022-08-04 VITALS
HEART RATE: 87 BPM | DIASTOLIC BLOOD PRESSURE: 81 MMHG | RESPIRATION RATE: 16 BRPM | BODY MASS INDEX: 27 KG/M2 | TEMPERATURE: 97.2 F | OXYGEN SATURATION: 100 % | SYSTOLIC BLOOD PRESSURE: 112 MMHG | WEIGHT: 191.8 LBS

## 2022-08-04 LAB
ALBUMIN SERPL ELPH-MCNC: 5 G/DL
ALP BLD-CCNC: 87 U/L
ALT SERPL-CCNC: 36 U/L
ANION GAP SERPL CALC-SCNC: 12 MMOL/L
AST SERPL-CCNC: 21 U/L
BASOPHILS # BLD AUTO: 0.01 K/UL — SIGNIFICANT CHANGE UP (ref 0–0.2)
BASOPHILS NFR BLD AUTO: 0.3 % — SIGNIFICANT CHANGE UP (ref 0–2)
BILIRUB SERPL-MCNC: 0.4 MG/DL
BUN SERPL-MCNC: 13 MG/DL
CALCIUM SERPL-MCNC: 10.1 MG/DL
CHLORIDE SERPL-SCNC: 103 MMOL/L
CO2 SERPL-SCNC: 26 MMOL/L
CREAT SERPL-MCNC: 0.94 MG/DL
EGFR: 107 ML/MIN/1.73M2
EOSINOPHIL # BLD AUTO: 0.04 K/UL — SIGNIFICANT CHANGE UP (ref 0–0.5)
EOSINOPHIL NFR BLD AUTO: 1.3 % — SIGNIFICANT CHANGE UP (ref 0–6)
GLUCOSE SERPL-MCNC: 91 MG/DL
HCT VFR BLD CALC: 40.6 % — SIGNIFICANT CHANGE UP (ref 39–50)
HGB BLD-MCNC: 14 G/DL — SIGNIFICANT CHANGE UP (ref 13–17)
IMM GRANULOCYTES NFR BLD AUTO: 0.3 % — SIGNIFICANT CHANGE UP (ref 0–1.5)
LDH SERPL-CCNC: 132 U/L
LYMPHOCYTES # BLD AUTO: 0.54 K/UL — LOW (ref 1–3.3)
LYMPHOCYTES # BLD AUTO: 17.3 % — SIGNIFICANT CHANGE UP (ref 13–44)
MCHC RBC-ENTMCNC: 33.9 PG — SIGNIFICANT CHANGE UP (ref 27–34)
MCHC RBC-ENTMCNC: 34.5 G/DL — SIGNIFICANT CHANGE UP (ref 32–36)
MCV RBC AUTO: 98.3 FL — SIGNIFICANT CHANGE UP (ref 80–100)
MONOCYTES # BLD AUTO: 0.37 K/UL — SIGNIFICANT CHANGE UP (ref 0–0.9)
MONOCYTES NFR BLD AUTO: 11.8 % — SIGNIFICANT CHANGE UP (ref 2–14)
NEUTROPHILS # BLD AUTO: 2.16 K/UL — SIGNIFICANT CHANGE UP (ref 1.8–7.4)
NEUTROPHILS NFR BLD AUTO: 69 % — SIGNIFICANT CHANGE UP (ref 43–77)
NRBC # BLD: 0 /100 WBCS — SIGNIFICANT CHANGE UP (ref 0–0)
PLATELET # BLD AUTO: 120 K/UL — LOW (ref 150–400)
POTASSIUM SERPL-SCNC: 4.6 MMOL/L
PROT SERPL-MCNC: 6.9 G/DL
RBC # BLD: 4.13 M/UL — LOW (ref 4.2–5.8)
RBC # FLD: 12.1 % — SIGNIFICANT CHANGE UP (ref 10.3–14.5)
SODIUM SERPL-SCNC: 141 MMOL/L
WBC # BLD: 3.13 K/UL — LOW (ref 3.8–10.5)
WBC # FLD AUTO: 3.13 K/UL — LOW (ref 3.8–10.5)

## 2022-08-04 PROCEDURE — 99213 OFFICE O/P EST LOW 20 MIN: CPT

## 2022-08-04 NOTE — ASSESSMENT
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 \par Started induction with Dauno/Cytarabine on 8/6/21. \par Pt's counts now recovered, remission marrow done on 9/2- in remission. Proceed to consolidation with 4 cycles of HIDAC. C1 HIDAC on 9/17, c/b neutropenic fever and diarrhea. C2 HIDAC on 10/25, was postponed for 1 week due to admission for diarrhea, given negative stool studies, suspect diarrhea was likely chemo-related. Pt was admitted again 11/13-11/17 for sepsis due to thumb cellulitis after laceration. C3 on 11/26, had Fulphilia on C3 c/b epistaxis and neutropenic fever w/Temp 100.3. C4 HiDAC 1/5/22, c/b transaminitis and neutropenic fever\par He had laparoscopic cholecystotomy on 4/17. \par s/p BMbx 2/11/22 showing CR.\par He has non-necrotizing granulomas noted in BMbx, unclear significance\par CBC today stable\par Cont f/u with Dr. Rich\par Dental eval\par All questions answered\par RTC monthly\par \par \par

## 2022-08-04 NOTE — PHYSICAL EXAM
[Fully active, able to carry on all pre-disease performance without restriction] : Status 0 - Fully active, able to carry on all pre-disease performance without restriction [Normal] : affect appropriate [de-identified] : a 1 cm long laceration with sutures on the right thrum, healing well

## 2022-08-04 NOTE — HISTORY OF PRESENT ILLNESS
[de-identified] : 35yo M w/ HTN and newly diagnosed AML here for f/u. \par \par He presented to the hospital on 7/30/21 with complaints of dizziness, fatigue and severe RUQ pain for 3 days. CT A/P revealed fatty liver and small R pleural effusion. WBC 12k with 17% blasts. Peripheral flow cytometry showed 13% myeloblasts. FLT3(-). BMbx was done on 8/4/21 - confirmed AML. 46,XY                  {20                  }\par Foundation: mutations in DNMT3A R882H, NRAS G12D, NPM1 W288fs*12\par Pt consented to [place]. Patient was offered sperm banking, but declined.\par On 8/6, induction with Dauno/Cytararbine was started (cytarabine 100/m2 and dauno 90/m2) \par He received a seven day course of Zosyn for presumed RUL PNA, then transitioned to  levaquin, posaconazole and Acyclovir for ppx for neutropenia. Course c/b neutropenic fevers, cultures negative, repeat CT 8/14 without infectious source. He was on Cefepime but developed a rash, changed to meropenem. Rash improved. \par Day 14 BMbx on 8/19 was hypocellular with chemotherapeutic effect; however, per hematopathology, appears to be earlier regeneration than would typically seen which is concerning for persistent disease at this point, and the earliest cells are CD34 positive and his myeloblasts on initial presentation were CD34 negative. Thus, this may simply represent early regeneration of his marrow. Plan is to await count recovery and repeat biopsy.  \par Patient discharged home on 8/29/21 when ANC >500 [de-identified] : Eating well since discharge. \par c/o hemorrhoidal pain. Hemorrhoids started a few days prior to discharge. He was sent home with rectal ointment and witch hazel. \par \par BMBx done on 9/2: Cellular bone marrow with trilineage hematopoiesis with maturation and megakaryocytosis (history of AML).\par Pt feels well, CBC today showed count stable\par \par s/p C1 HIDAC 9/17-9/22 \par c/o leg pain after chemo in the hospital \par \par He presented to the ED on 10/9 due to fever the night of presentation. The patient went to sleep around 10pm and woke up at 3am feeling warm and clammy. He took his temperature orally at home and it was 100.7. The day prior to presentation (10/8/21), the patient went to [facility] for an appointment to get his platelet count checked. He states he was feeling a bit run down and thought he was going to need a transfusion, but he did not need a transfusion. He was afebrile during the time of the appointment and went home afterwards. Around 3pm, the patient had bilateral crampy leg pain that was similar to the leg pain he felt during his consolidation therapy treatment. He took hydrocodone and the pain improved. The patient had one episode of diarrhea three days prior to presentation and has been bothered by rectal pain associated with his "large hemorrhoids. He denies any bleeding per rectum. He also feels like his mouth has been more dry than usual over the past several days, but denies all other symptoms. \par CT Abdomen and Pelvis w/ Oral Cont and w/ IV Conttrast on 10/9 revealed Region of hypoenhancement upper pole left kidney; please correlate clinically for possible pyelonephritis. No hydronephrosis or perinephric stranding. No rectal abscess.\par CT Chest No Contrast on 10/9 revealed Clear lungs.\par 10/9- BCX NGTD and 10/9- UCX (-)\par He ate some cold salad late last night, had upsetting stomach and diarrhea this morning. Will take Imodium for diarrhea. \par \par C2 is due on 10/15, pt wants deferring to next Monday after his diarrhea improved. \par Admission of  C2 was postponed due to worsening diarrhea. Went to ED on 10/15 due to worsening watery diarrhea. GI PCR neg, C Diff neg\par Given negative stool studies, suspect diarrhea was likely chemo-related. S/p cycle 2 Consolidation with HIDAC started on 10/25. Patient received IV hydration, strict I/O, antiemetics, monitoring of CBC and CMP and for cerebellar toxicity. PRedforte eye drops given to prevent chemical conjunctivitis. Patient with fever throughout course of treatment. Cultures negative. Due to fever probably related to HIDAC, patient received dexamethasone prior to the last 2 doses of cytatabine. \par He is seen today accompanied by his father, pt feels fatigue after chemo, other than that he feels fine. Denied any fever, chills diarrhea. \par \par Pt was admitted on 11/13-11/17 s/p right first digit laceration repair on 11/12 and presenting with erythema and pain at the site of laceration repair. Patient reported he was feeling unwell prior to suture placement with body aches, chills, night sweats and subjective fevers. Patient last BM 4 days ago. Has bruise to left inner thigh. Patient reports he keeps his PICC site clean and intact which was placed in 9/2021. Upon admission to the hospital ID was consulted started on Zosyn , GI consulted for rectal pain secondary to hemorrhoids and palliative consulted for pain control.\par  \par C3 on 11/26, tolerated well. He was seen today after discharge, accompanied by his father. He admitted feeling tired, denied any f/c, diarrhea. Right hand suture site healing well, no abnormal erythema or discharge. \par He will have Fulphilia today. \par C3 HIDAC 11/26-12/1\par He presents to the hospital due to epistaxis and neutropenic fever w/Temp 100.3 on 12/1. Per patient, he reports that he was feeling sinus congestion and pressure on his L side, blew his nose and bleeding began from L nare without improvement prompting arrival to the emergency department. Feels mild L sinus congestion.  L nasal cavity packed with absorbable packing; F/U ENT clinic outpatient. Pan culture obtained-with No growth currently\par CBC rechecked today recovered. He feels well overall , had lower abdominal pain last night after ate cheese, now improved. Denied any fever chills bloody stool or diarrhea. \par \par s/p C4 HiDAC 1/5/22\par Pt has known grade 1 AST and grade 3 ALT transaminitis. Presented this admission with transamintitis. Abd sono  performed and revealed Borderline/mild hepatomegaly with hepatic steatosis. Splenomegaly. Focal area of left upper pole increased renal cortical echogenicity, corresponding to an area of hypoattenuation seen on prior CT chest, \par abdomen and pelvis dated 10/9/2021. This is nonspecific and may reflect focal edema. Patient received IV hydration, antiemetics, monitoring of CBC and electrolytes. Predforte eye drops provided to prevent chemical conjunctivitis. Patient was monitored for cerebellar toxicity. Nystagmus checks performed. Hospital course complicated by fever blood cultures showed (-), most likely febrile secondary to HIDAC treatment. To receive Fulphila today.\par \par Pt presented to the hospital on 1/18/22 due fever of 100.4, weakness, decreased WBC and shortness of breath while at home. Patient reports that his pain is more controlled s/p pain medication and his shortness of breath has resolved. He denied chills, cough, chest pain, palpitations.\par Pan culture (blood +Ucx) were negative, CXR reveals clear lungs, COVID PCR - not detect. Pt started on empiric Zosyn, Diflucan and Acyclovir added prophylactically for neutropenic fever. He was transfused with platelets and PRBC as per supportive parameters (>10k/>7) respectively.\par He feels well overall now, denied any fever, chills or pain. \par \par BMbx 2/11/22: Cellular bone marrow with erythroid predominant trilineage hematopoiesis\par No morphologic or immunophenotypic evidence of persistent acute myeloid leukemia\par Non-necrotizing granulomas\par Normal male karyotype and normal onkosight myeloid NGS panel study.\par \par He was admitted for back pain 2/2 herniated disk - Rx'd pain meds and a Medrol pack. \par \par Pt went to ED due to acute RUQ pain on 4/16, patient underwent laparoscopic cholecystotomy on 4/17. \par \par He reports feeling well. He reports having severe anxiety when he sees any bruising or gum bleeding. He has seen a therapist but would like to see someone else. \par Saw psych Dr. Lozano on 7/18 TEB\par He is getting PT for his back pain. \par He occasionally has some gum bleeding still.

## 2022-09-01 ENCOUNTER — RESULT REVIEW (OUTPATIENT)
Age: 37
End: 2022-09-01

## 2022-09-01 ENCOUNTER — APPOINTMENT (OUTPATIENT)
Dept: HEMATOLOGY ONCOLOGY | Facility: CLINIC | Age: 37
End: 2022-09-01

## 2022-09-01 VITALS
WEIGHT: 194 LBS | RESPIRATION RATE: 18 BRPM | BODY MASS INDEX: 27.31 KG/M2 | OXYGEN SATURATION: 96 % | HEART RATE: 106 BPM | TEMPERATURE: 97.5 F | DIASTOLIC BLOOD PRESSURE: 84 MMHG | SYSTOLIC BLOOD PRESSURE: 131 MMHG

## 2022-09-01 LAB
BASOPHILS # BLD AUTO: 0.01 K/UL — SIGNIFICANT CHANGE UP (ref 0–0.2)
BASOPHILS NFR BLD AUTO: 0.3 % — SIGNIFICANT CHANGE UP (ref 0–2)
EOSINOPHIL # BLD AUTO: 0.04 K/UL — SIGNIFICANT CHANGE UP (ref 0–0.5)
EOSINOPHIL NFR BLD AUTO: 1.1 % — SIGNIFICANT CHANGE UP (ref 0–6)
HCT VFR BLD CALC: 41 % — SIGNIFICANT CHANGE UP (ref 39–50)
HGB BLD-MCNC: 14.1 G/DL — SIGNIFICANT CHANGE UP (ref 13–17)
IMM GRANULOCYTES NFR BLD AUTO: 0.3 % — SIGNIFICANT CHANGE UP (ref 0–1.5)
LYMPHOCYTES # BLD AUTO: 0.76 K/UL — LOW (ref 1–3.3)
LYMPHOCYTES # BLD AUTO: 21.7 % — SIGNIFICANT CHANGE UP (ref 13–44)
MCHC RBC-ENTMCNC: 33.6 PG — SIGNIFICANT CHANGE UP (ref 27–34)
MCHC RBC-ENTMCNC: 34.4 G/DL — SIGNIFICANT CHANGE UP (ref 32–36)
MCV RBC AUTO: 97.6 FL — SIGNIFICANT CHANGE UP (ref 80–100)
MONOCYTES # BLD AUTO: 0.32 K/UL — SIGNIFICANT CHANGE UP (ref 0–0.9)
MONOCYTES NFR BLD AUTO: 9.1 % — SIGNIFICANT CHANGE UP (ref 2–14)
NEUTROPHILS # BLD AUTO: 2.37 K/UL — SIGNIFICANT CHANGE UP (ref 1.8–7.4)
NEUTROPHILS NFR BLD AUTO: 67.5 % — SIGNIFICANT CHANGE UP (ref 43–77)
NRBC # BLD: 0 /100 WBCS — SIGNIFICANT CHANGE UP (ref 0–0)
PLATELET # BLD AUTO: 129 K/UL — LOW (ref 150–400)
RBC # BLD: 4.2 M/UL — SIGNIFICANT CHANGE UP (ref 4.2–5.8)
RBC # FLD: 12.2 % — SIGNIFICANT CHANGE UP (ref 10.3–14.5)
WBC # BLD: 3.51 K/UL — LOW (ref 3.8–10.5)
WBC # FLD AUTO: 3.51 K/UL — LOW (ref 3.8–10.5)

## 2022-09-01 PROCEDURE — 99214 OFFICE O/P EST MOD 30 MIN: CPT

## 2022-09-02 ENCOUNTER — NON-APPOINTMENT (OUTPATIENT)
Age: 37
End: 2022-09-02

## 2022-09-02 LAB
ALBUMIN SERPL ELPH-MCNC: 4.7 G/DL
ALP BLD-CCNC: 98 U/L
ALT SERPL-CCNC: 29 U/L
ANION GAP SERPL CALC-SCNC: 13 MMOL/L
AST SERPL-CCNC: 21 U/L
BILIRUB SERPL-MCNC: 0.4 MG/DL
BUN SERPL-MCNC: 18 MG/DL
CALCIUM SERPL-MCNC: 10 MG/DL
CHLORIDE SERPL-SCNC: 102 MMOL/L
CO2 SERPL-SCNC: 25 MMOL/L
CREAT SERPL-MCNC: 0.9 MG/DL
EGFR: 113 ML/MIN/1.73M2
GLUCOSE SERPL-MCNC: 82 MG/DL
LDH SERPL-CCNC: 156 U/L
POTASSIUM SERPL-SCNC: 3.9 MMOL/L
PROT SERPL-MCNC: 7.1 G/DL
SODIUM SERPL-SCNC: 140 MMOL/L

## 2022-09-07 ENCOUNTER — APPOINTMENT (OUTPATIENT)
Dept: GASTROENTEROLOGY | Facility: CLINIC | Age: 37
End: 2022-09-07

## 2022-09-07 VITALS
DIASTOLIC BLOOD PRESSURE: 88 MMHG | OXYGEN SATURATION: 100 % | BODY MASS INDEX: 26.88 KG/M2 | SYSTOLIC BLOOD PRESSURE: 146 MMHG | HEIGHT: 70.67 IN | RESPIRATION RATE: 18 BRPM | WEIGHT: 192 LBS | HEART RATE: 94 BPM | TEMPERATURE: 97.3 F

## 2022-09-07 PROCEDURE — 99204 OFFICE O/P NEW MOD 45 MIN: CPT

## 2022-09-07 PROCEDURE — 99214 OFFICE O/P EST MOD 30 MIN: CPT

## 2022-09-07 PROCEDURE — 99244 OFF/OP CNSLTJ NEW/EST MOD 40: CPT

## 2022-09-07 NOTE — HISTORY OF PRESENT ILLNESS
[de-identified] : 36 yo male with medical h/o Leukemia in remission, c/o Hemorrhoids  - reports "feels like glass"\par \par Occ BRB \par \par Loose stools

## 2022-09-07 NOTE — PHYSICAL EXAM
[General Appearance - Alert] : alert [General Appearance - In No Acute Distress] : in no acute distress [Sclera] : the sclera and conjunctiva were normal [PERRL With Normal Accommodation] : pupils were equal in size, round, and reactive to light [Extraocular Movements] : extraocular movements were intact [Outer Ear] : the ears and nose were normal in appearance [Oropharynx] : the oropharynx was normal [Neck Appearance] : the appearance of the neck was normal [Neck Cervical Mass (___cm)] : no neck mass was observed [Jugular Venous Distention Increased] : there was no jugular-venous distention [Thyroid Diffuse Enlargement] : the thyroid was not enlarged [Thyroid Nodule] : there were no palpable thyroid nodules [Auscultation Breath Sounds / Voice Sounds] : lungs were clear to auscultation bilaterally [Normal] : normal [Soft, Nontender] : the abdomen was soft and nontender [No Mass] : no masses were palpated [No HSM] : no hepatosplenomegaly noted [External Hemorrhoid] : external hemorrhoids [No CVA Tenderness] : no ~M costovertebral angle tenderness [No Spinal Tenderness] : no spinal tenderness [Abnormal Walk] : normal gait [Nail Clubbing] : no clubbing  or cyanosis of the fingernails [Musculoskeletal - Swelling] : no joint swelling seen [Motor Tone] : muscle strength and tone were normal [Skin Color & Pigmentation] : normal skin color and pigmentation [Skin Turgor] : normal skin turgor [] : no rash

## 2022-09-07 NOTE — ASSESSMENT
[FreeTextEntry1] : CRS for Hem \par \par Needs Colon \par \par I discussed the risks benefits and alternatives of Hemorrhoid Electrical Treatment at length. we discussed the procedure and the recovery period. We also discussed that periodically additional / surgical hemorrhoid treatment may become necessary.\par \par Trial [facility] \par \par I spent 40 minutes reviewing the patients records prior to arrival, with patient , and reviewing records after visit. All questions were answered.\par

## 2022-09-14 ENCOUNTER — RESULT REVIEW (OUTPATIENT)
Age: 37
End: 2022-09-14

## 2022-09-19 ENCOUNTER — NON-APPOINTMENT (OUTPATIENT)
Age: 37
End: 2022-09-19

## 2022-09-30 NOTE — HISTORY OF PRESENT ILLNESS
[de-identified] : 37yo M w/ HTN and newly diagnosed AML here for f/u. \par \par He presented to the hospital on 7/30/21 with complaints of dizziness, fatigue and severe RUQ pain for 3 days. CT A/P revealed fatty liver and small R pleural effusion. WBC 12k with 17% blasts. Peripheral flow cytometry showed 13% myeloblasts. FLT3(-). BMbx was done on 8/4/21 - confirmed AML. 46,XY                   {20                   }\par Foundation: mutations in DNMT3A R882H, NRAS G12D, NPM1 W288fs*12\par Pt consented to [facility]. Patient was offered sperm banking, but declined.\par On 8/6, induction with Dauno/Cytararbine was started (cytarabine 100/m2 and dauno 90/m2) \par He received a seven day course of Zosyn for presumed RUL PNA, then transitioned to  levaquin, posaconazole and Acyclovir for ppx for neutropenia. Course c/b neutropenic fevers, cultures negative, repeat CT 8/14 without infectious source. He was on Cefepime but developed a rash, changed to meropenem. Rash improved. \par Day 14 BMbx on 8/19 was hypocellular with chemotherapeutic effect; however, per hematopathology, appears to be earlier regeneration than would typically seen which is concerning for persistent disease at this point, and the earliest cells are CD34 positive and his myeloblasts on initial presentation were CD34 negative. Thus, this may simply represent early regeneration of his marrow. Plan is to await count recovery and repeat biopsy.  \par Patient discharged home on 8/29/21 when ANC >500 [de-identified] : Eating well since discharge. \par c/o hemorrhoidal pain. Hemorrhoids started a few days prior to discharge. He was sent home with rectal ointment and witch hazel. \par \par BMBx done on 9/2: Cellular bone marrow with trilineage hematopoiesis with maturation and megakaryocytosis (history of AML).\par Pt feels well, CBC today showed count stable\par \par s/p C1 HIDAC 9/17-9/22 \par c/o leg pain after chemo in the hospital \par \par He presented to the ED on 10/9 due to fever the night of presentation. The patient went to sleep around 10pm and woke up at 3am feeling warm and clammy. He took his temperature orally at home and it was 100.7. The day prior to presentation (10/8/21), the patient went to [facility] for an appointment to get his platelet count checked. He states he was feeling a bit run down and thought he was going to need a transfusion, but he did not need a transfusion. He was afebrile during the time of the appointment and went home afterwards. Around 3pm, the patient had bilateral crampy leg pain that was similar to the leg pain he felt during his consolidation therapy treatment. He took hydrocodone and the pain improved. The patient had one episode of diarrhea three days prior to presentation and has been bothered by rectal pain associated with his "large hemorrhoids. He denies any bleeding per rectum. He also feels like his mouth has been more dry than usual over the past several days, but denies all other symptoms. \par CT Abdomen and Pelvis w/ Oral Cont and w/ IV Conttrast on 10/9 revealed Region of hypoenhancement upper pole left kidney; please correlate clinically for possible pyelonephritis. No hydronephrosis or perinephric stranding. No rectal abscess.\par CT Chest No Contrast on 10/9 revealed Clear lungs.\par 10/9- BCX NGTD and 10/9- UCX (-)\par He ate some cold salad late last night, had upsetting stomach and diarrhea this morning. Will take Imodium for diarrhea. \par \par C2 is due on 10/15, pt wants deferring to next Monday after his diarrhea improved. \par Admission of  C2 was postponed due to worsening diarrhea. Went to ED on 10/15 due to worsening watery diarrhea. GI PCR neg, C Diff neg\par Given negative stool studies, suspect diarrhea was likely chemo-related. S/p cycle 2 Consolidation with HIDAC started on 10/25. Patient received IV hydration, strict I/O, antiemetics, monitoring of CBC and CMP and for cerebellar toxicity. PRedforte eye drops given to prevent chemical conjunctivitis. Patient with fever throughout course of treatment. Cultures negative. Due to fever probably related to HIDAC, patient received dexamethasone prior to the last 2 doses of cytatabine. \par He is seen today accompanied by his father, pt feels fatigue after chemo, other than that he feels fine. Denied any fever, chills diarrhea. \par \par Pt was admitted on 11/13-11/17 s/p right first digit laceration repair on 11/12 and presenting with erythema and pain at the site of laceration repair. Patient reported he was feeling unwell prior to suture placement with body aches, chills, night sweats and subjective fevers. Patient last BM 4 days ago. Has bruise to left inner thigh. Patient reports he keeps his PICC site clean and intact which was placed in 9/2021. Upon admission to the hospital ID was consulted started on Zosyn , GI consulted for rectal pain secondary to hemorrhoids and palliative consulted for pain control.\par  \par C3 on 11/26, tolerated well. He was seen today after discharge, accompanied by his father. He admitted feeling tired, denied any f/c, diarrhea. Right hand suture site healing well, no abnormal erythema or discharge. \par He will have Fulphilia today. \par C3 HIDAC 11/26-12/1\par He presents to the hospital due to epistaxis and neutropenic fever w/Temp 100.3 on 12/1. Per patient, he reports that he was feeling sinus congestion and pressure on his L side, blew his nose and bleeding began from L nare without improvement prompting arrival to the emergency department. Feels mild L sinus congestion.  L nasal cavity packed with absorbable packing; F/U ENT clinic outpatient. Pan culture obtained-with No growth currently\par CBC rechecked today recovered. He feels well overall , had lower abdominal pain last night after ate cheese, now improved. Denied any fever chills bloody stool or diarrhea. \par \par s/p C4 HiDAC 1/5/22\par Pt has known grade 1 AST and grade 3 ALT transaminitis. Presented this admission with transamintitis. Abd sono  performed and revealed Borderline/mild hepatomegaly with hepatic steatosis. Splenomegaly. Focal area of left upper pole increased renal cortical echogenicity, corresponding to an area of hypoattenuation seen on prior CT chest, \par abdomen and pelvis dated 10/9/2021. This is nonspecific and may reflect focal edema. Patient received IV hydration, antiemetics, monitoring of CBC and electrolytes. Predforte eye drops provided to prevent chemical conjunctivitis. Patient was monitored for cerebellar toxicity. Nystagmus checks performed. Hospital course complicated by fever blood cultures showed (-), most likely febrile secondary to HIDAC treatment. To receive Fulphila today.\par \par Pt presented to the hospital on 1/18/22 due fever of 100.4, weakness, decreased WBC and shortness of breath while at home. Patient reports that his pain is more controlled s/p pain medication and his shortness of breath has resolved. He denied chills, cough, chest pain, palpitations.\par Pan culture (blood +Ucx) were negative, CXR reveals clear lungs, COVID PCR - not detect. Pt started on empiric Zosyn, Diflucan and Acyclovir added prophylactically for neutropenic fever. He was transfused with platelets and PRBC as per supportive parameters (>10k/>7) respectively.\par He feels well overall now, denied any fever, chills or pain. \par \par BMbx 2/11/22: Cellular bone marrow with erythroid predominant trilineage hematopoiesis\par No morphologic or immunophenotypic evidence of persistent acute myeloid leukemia\par Non-necrotizing granulomas\par Normal male karyotype and normal onkosight myeloid NGS panel study.\par \par He was admitted for back pain 2/2 herniated disk - Rx'd pain meds and a Medrol pack. \par \par Pt went to ED due to acute RUQ pain on 4/16, patient underwent laparoscopic cholecystotomy on 4/17. \par \par He reports feeling well. He reports having severe anxiety when he sees any bruising or gum bleeding. He has seen a therapist but would like to see someone else. \par Saw psych Dr. Lozano on 7/18 TEB\par He is getting PT for his back pain. \par He occasionally has some gum bleeding still. \par \par Pt was seen today accompanied by his father. He feels well, eating healthy food and doing exercise. Will go back to work this month. \par He f/u w/psych Dr. Lozano monthly, currently is on Zoloft 100mg dialy, and Zolpidem aHS prn for insomnia\par Denied any new complaints.

## 2022-09-30 NOTE — ASSESSMENT
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 \par Started induction with Dauno/Cytarabine on 8/6/21. \par Pt's counts now recovered, remission marrow done on 9/2- in remission. Proceed to consolidation with 4 cycles of HIDAC. C1 HIDAC on 9/17, c/b neutropenic fever and diarrhea. C2 HIDAC on 10/25, was postponed for 1 week due to admission for diarrhea, given negative stool studies, suspect diarrhea was likely chemo-related. Pt was admitted again 11/13-11/17 for sepsis due to thumb cellulitis after laceration. C3 on 11/26, had Fulphilia on C3 c/b epistaxis and neutropenic fever w/Temp 100.3. C4 HiDAC 1/5/22, c/b transaminitis and neutropenic fever\par He had laparoscopic cholecystotomy on 4/17. \par s/p BMbx 2/11/22 showing CR.\par He has non-necrotizing granulomas noted in BMbx, unclear significance\par CBC today stable\par Will order NPM1Q to [facility] lab next visit, order placed in [employer] for 10/13\par Cont f/u with Dr. Rich monthly\par Dental eval\par All questions answered\par RTC monthly\par \par \par Case and management discussed with Dr. Yancey\par \par \par \par

## 2022-09-30 NOTE — PHYSICAL EXAM
[Fully active, able to carry on all pre-disease performance without restriction] : Status 0 - Fully active, able to carry on all pre-disease performance without restriction [Normal] : affect appropriate [de-identified] : a 1 cm long laceration with sutures on the right thrum, healing well

## 2022-10-07 ENCOUNTER — OUTPATIENT (OUTPATIENT)
Dept: OUTPATIENT SERVICES | Facility: HOSPITAL | Age: 37
LOS: 1 days | Discharge: HOME | End: 2022-10-07

## 2022-10-07 DIAGNOSIS — C92.00 ACUTE MYELOBLASTIC LEUKEMIA, NOT HAVING ACHIEVED REMISSION: ICD-10-CM

## 2022-10-19 ENCOUNTER — RESULT REVIEW (OUTPATIENT)
Age: 37
End: 2022-10-19

## 2022-10-19 ENCOUNTER — APPOINTMENT (OUTPATIENT)
Dept: HEMATOLOGY ONCOLOGY | Facility: CLINIC | Age: 37
End: 2022-10-19

## 2022-10-19 VITALS
RESPIRATION RATE: 18 BRPM | SYSTOLIC BLOOD PRESSURE: 130 MMHG | WEIGHT: 188.25 LBS | BODY MASS INDEX: 26.5 KG/M2 | TEMPERATURE: 98.1 F | DIASTOLIC BLOOD PRESSURE: 90 MMHG | OXYGEN SATURATION: 99 % | HEART RATE: 94 BPM

## 2022-10-19 LAB
BASOPHILS # BLD AUTO: 0.01 K/UL — SIGNIFICANT CHANGE UP (ref 0–0.2)
BASOPHILS NFR BLD AUTO: 0.3 % — SIGNIFICANT CHANGE UP (ref 0–2)
EOSINOPHIL # BLD AUTO: 0.03 K/UL — SIGNIFICANT CHANGE UP (ref 0–0.5)
EOSINOPHIL NFR BLD AUTO: 0.8 % — SIGNIFICANT CHANGE UP (ref 0–6)
HCT VFR BLD CALC: 41.3 % — SIGNIFICANT CHANGE UP (ref 39–50)
HGB BLD-MCNC: 14.5 G/DL — SIGNIFICANT CHANGE UP (ref 13–17)
IMM GRANULOCYTES NFR BLD AUTO: 0.3 % — SIGNIFICANT CHANGE UP (ref 0–0.9)
LYMPHOCYTES # BLD AUTO: 0.88 K/UL — LOW (ref 1–3.3)
LYMPHOCYTES # BLD AUTO: 24.8 % — SIGNIFICANT CHANGE UP (ref 13–44)
MCHC RBC-ENTMCNC: 32.4 PG — SIGNIFICANT CHANGE UP (ref 27–34)
MCHC RBC-ENTMCNC: 35.1 G/DL — SIGNIFICANT CHANGE UP (ref 32–36)
MCV RBC AUTO: 92.2 FL — SIGNIFICANT CHANGE UP (ref 80–100)
MONOCYTES # BLD AUTO: 0.26 K/UL — SIGNIFICANT CHANGE UP (ref 0–0.9)
MONOCYTES NFR BLD AUTO: 7.3 % — SIGNIFICANT CHANGE UP (ref 2–14)
NEUTROPHILS # BLD AUTO: 2.36 K/UL — SIGNIFICANT CHANGE UP (ref 1.8–7.4)
NEUTROPHILS NFR BLD AUTO: 66.5 % — SIGNIFICANT CHANGE UP (ref 43–77)
NRBC # BLD: 0 /100 WBCS — SIGNIFICANT CHANGE UP (ref 0–0)
PLATELET # BLD AUTO: 173 K/UL — SIGNIFICANT CHANGE UP (ref 150–400)
RBC # BLD: 4.48 M/UL — SIGNIFICANT CHANGE UP (ref 4.2–5.8)
RBC # FLD: 11.9 % — SIGNIFICANT CHANGE UP (ref 10.3–14.5)
WBC # BLD: 3.55 K/UL — LOW (ref 3.8–10.5)
WBC # FLD AUTO: 3.55 K/UL — LOW (ref 3.8–10.5)

## 2022-10-19 PROCEDURE — 99214 OFFICE O/P EST MOD 30 MIN: CPT

## 2022-10-21 ENCOUNTER — APPOINTMENT (OUTPATIENT)
Dept: GASTROENTEROLOGY | Facility: HOSPITAL | Age: 37
End: 2022-10-21

## 2022-10-21 LAB
ALBUMIN SERPL ELPH-MCNC: 5.2 G/DL
ALP BLD-CCNC: 100 U/L
ALT SERPL-CCNC: 41 U/L
ANION GAP SERPL CALC-SCNC: 15 MMOL/L
AST SERPL-CCNC: 32 U/L
BILIRUB SERPL-MCNC: 0.5 MG/DL
BUN SERPL-MCNC: 15 MG/DL
CALCIUM SERPL-MCNC: 10.4 MG/DL
CHLORIDE SERPL-SCNC: 105 MMOL/L
CO2 SERPL-SCNC: 23 MMOL/L
CREAT SERPL-MCNC: 0.84 MG/DL
EGFR: 115 ML/MIN/1.73M2
GLUCOSE SERPL-MCNC: 91 MG/DL
LDH SERPL-CCNC: 167 U/L
POTASSIUM SERPL-SCNC: 4.1 MMOL/L
PROT SERPL-MCNC: 7.8 G/DL
SODIUM SERPL-SCNC: 143 MMOL/L

## 2022-10-21 NOTE — HISTORY OF PRESENT ILLNESS
[de-identified] : 35yo M w/ HTN and newly diagnosed AML here for f/u. \par \par He presented to the hospital on 7/30/21 with complaints of dizziness, fatigue and severe RUQ pain for 3 days. CT A/P revealed fatty liver and small R pleural effusion. WBC 12k with 17% blasts. Peripheral flow cytometry showed 13% myeloblasts. FLT3(-). BMbx was done on 8/4/21 - confirmed AML. 46,XY                    {20                    }\par Foundation: mutations in DNMT3A R882H, NRAS G12D, NPM1 W288fs*12\par Pt consented to [facility]. Patient was offered sperm banking, but declined.\par On 8/6, induction with Dauno/Cytararbine was started (cytarabine 100/m2 and dauno 90/m2) \par He received a seven day course of Zosyn for presumed RUL PNA, then transitioned to  levaquin, posaconazole and Acyclovir for ppx for neutropenia. Course c/b neutropenic fevers, cultures negative, repeat CT 8/14 without infectious source. He was on Cefepime but developed a rash, changed to meropenem. Rash improved. \par Day 14 BMbx on 8/19 was hypocellular with chemotherapeutic effect; however, per hematopathology, appears to be earlier regeneration than would typically seen which is concerning for persistent disease at this point, and the earliest cells are CD34 positive and his myeloblasts on initial presentation were CD34 negative. Thus, this may simply represent early regeneration of his marrow. Plan is to await count recovery and repeat biopsy.  \par Patient discharged home on 8/29/21 when ANC >500 [de-identified] : Eating well since discharge. \par c/o hemorrhoidal pain. Hemorrhoids started a few days prior to discharge. He was sent home with rectal ointment and witch hazel. \par \par BMBx done on 9/2: Cellular bone marrow with trilineage hematopoiesis with maturation and megakaryocytosis (history of AML).\par Pt feels well, CBC today showed count stable\par \par s/p C1 HIDAC 9/17-9/22 \par c/o leg pain after chemo in the hospital \par \par He presented to the ED on 10/9 due to fever the night of presentation. The patient went to sleep around 10pm and woke up at 3am feeling warm and clammy. He took his temperature orally at home and it was 100.7. The day prior to presentation (10/8/21), the patient went to [facility] for an appointment to get his platelet count checked. He states he was feeling a bit run down and thought he was going to need a transfusion, but he did not need a transfusion. He was afebrile during the time of the appointment and went home afterwards. Around 3pm, the patient had bilateral crampy leg pain that was similar to the leg pain he felt during his consolidation therapy treatment. He took hydrocodone and the pain improved. The patient had one episode of diarrhea three days prior to presentation and has been bothered by rectal pain associated with his "large hemorrhoids. He denies any bleeding per rectum. He also feels like his mouth has been more dry than usual over the past several days, but denies all other symptoms. \par CT Abdomen and Pelvis w/ Oral Cont and w/ IV Conttrast on 10/9 revealed Region of hypoenhancement upper pole left kidney; please correlate clinically for possible pyelonephritis. No hydronephrosis or perinephric stranding. No rectal abscess.\par CT Chest No Contrast on 10/9 revealed Clear lungs.\par 10/9- BCX NGTD and 10/9- UCX (-)\par He ate some cold salad late last night, had upsetting stomach and diarrhea this morning. Will take Imodium for diarrhea. \par \par C2 is due on 10/15, pt wants deferring to next Monday after his diarrhea improved. \par Admission of  C2 was postponed due to worsening diarrhea. Went to ED on 10/15 due to worsening watery diarrhea. GI PCR neg, C Diff neg\par Given negative stool studies, suspect diarrhea was likely chemo-related. S/p cycle 2 Consolidation with HIDAC started on 10/25. Patient received IV hydration, strict I/O, antiemetics, monitoring of CBC and CMP and for cerebellar toxicity. PRedforte eye drops given to prevent chemical conjunctivitis. Patient with fever throughout course of treatment. Cultures negative. Due to fever probably related to HIDAC, patient received dexamethasone prior to the last 2 doses of cytatabine. \par He is seen today accompanied by his father, pt feels fatigue after chemo, other than that he feels fine. Denied any fever, chills diarrhea. \par \par Pt was admitted on 11/13-11/17 s/p right first digit laceration repair on 11/12 and presenting with erythema and pain at the site of laceration repair. Patient reported he was feeling unwell prior to suture placement with body aches, chills, night sweats and subjective fevers. Patient last BM 4 days ago. Has bruise to left inner thigh. Patient reports he keeps his PICC site clean and intact which was placed in 9/2021. Upon admission to the hospital ID was consulted started on Zosyn , GI consulted for rectal pain secondary to hemorrhoids and palliative consulted for pain control.\par  \par C3 on 11/26, tolerated well. He was seen today after discharge, accompanied by his father. He admitted feeling tired, denied any f/c, diarrhea. Right hand suture site healing well, no abnormal erythema or discharge. \par He will have Fulphilia today. \par C3 HIDAC 11/26-12/1\par He presents to the hospital due to epistaxis and neutropenic fever w/Temp 100.3 on 12/1. Per patient, he reports that he was feeling sinus congestion and pressure on his L side, blew his nose and bleeding began from L nare without improvement prompting arrival to the emergency department. Feels mild L sinus congestion.  L nasal cavity packed with absorbable packing; F/U ENT clinic outpatient. Pan culture obtained-with No growth currently\par CBC rechecked today recovered. He feels well overall , had lower abdominal pain last night after ate cheese, now improved. Denied any fever chills bloody stool or diarrhea. \par \par s/p C4 HiDAC 1/5/22\par Pt has known grade 1 AST and grade 3 ALT transaminitis. Presented this admission with transamintitis. Abd sono  performed and revealed Borderline/mild hepatomegaly with hepatic steatosis. Splenomegaly. Focal area of left upper pole increased renal cortical echogenicity, corresponding to an area of hypoattenuation seen on prior CT chest, \par abdomen and pelvis dated 10/9/2021. This is nonspecific and may reflect focal edema. Patient received IV hydration, antiemetics, monitoring of CBC and electrolytes. Predforte eye drops provided to prevent chemical conjunctivitis. Patient was monitored for cerebellar toxicity. Nystagmus checks performed. Hospital course complicated by fever blood cultures showed (-), most likely febrile secondary to HIDAC treatment. To receive Fulphila today.\par \par Pt presented to the hospital on 1/18/22 due fever of 100.4, weakness, decreased WBC and shortness of breath while at home. Patient reports that his pain is more controlled s/p pain medication and his shortness of breath has resolved. He denied chills, cough, chest pain, palpitations.\par Pan culture (blood +Ucx) were negative, CXR reveals clear lungs, COVID PCR - not detect. Pt started on empiric Zosyn, Diflucan and Acyclovir added prophylactically for neutropenic fever. He was transfused with platelets and PRBC as per supportive parameters (>10k/>7) respectively.\par He feels well overall now, denied any fever, chills or pain. \par \par BMbx 2/11/22: Cellular bone marrow with erythroid predominant trilineage hematopoiesis\par No morphologic or immunophenotypic evidence of persistent acute myeloid leukemia\par Non-necrotizing granulomas\par Normal male karyotype and normal onkosight myeloid NGS panel study.\par \par He was admitted for back pain 2/2 herniated disk - Rx'd pain meds and a Medrol pack. \par \par Pt went to ED due to acute RUQ pain on 4/16, patient underwent laparoscopic cholecystotomy on 4/17. \par \par He reports feeling well. He reports having severe anxiety when he sees any bruising or gum bleeding. He has seen a therapist but would like to see someone else. \par Saw psych Dr. Lozano on 7/18 TEB\par He is getting PT for his back pain. \par He occasionally has some gum bleeding still. \par \par Pt was seen today accompanied by his father. He feels well, eating healthy food and doing exercise. Will go back to work this month. \par He f/u w/psych Dr. Lozano monthly, currently is on Zoloft 100mg dialy, and Zolpidem aHS prn for insomnia\par Denied any new complaints.\par \par Pt missed last week's apt due to COVID 19. He had scratch throat, fatigue and low grade fever, Rapid test at home showed COVID 19 positive on last Jie. 10/12. Patient's father was tested positive on Friday as well. Pt didn't receive any COVID 19 meds, admitted he felt better, only slight fatigue, denied fever/chill/SOB. \par CBC today stable.

## 2022-10-21 NOTE — ASSESSMENT
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 \par Started induction with Dauno/Cytarabine on 8/6/21. \par Pt's counts now recovered, remission marrow done on 9/2- in remission. Proceed to consolidation with 4 cycles of HIDAC. C1 HIDAC on 9/17, c/b neutropenic fever and diarrhea. C2 HIDAC on 10/25, was postponed for 1 week due to admission for diarrhea, given negative stool studies, suspect diarrhea was likely chemo-related. Pt was admitted again 11/13-11/17 for sepsis due to thumb cellulitis after laceration. C3 on 11/26, had Fulphilia on C3 c/b epistaxis and neutropenic fever w/Temp 100.3. C4 HiDAC 1/5/22, c/b transaminitis and neutropenic fever\par He had laparoscopic cholecystotomy on 4/17. \par s/p BMbx 2/11/22 showing CR.\par He has non-necrotizing granulomas noted in BMbx, unclear significance\par CBC today stable\par Ordered NPM1Q to [facility] lab today \par Cont f/u with Dr. Rich monthly\par Dental eval\par All questions answered\par RTC monthly\par \par \par Case and management discussed with Dr. Yancey\par \par \par \par

## 2022-10-21 NOTE — PHYSICAL EXAM
[Fully active, able to carry on all pre-disease performance without restriction] : Status 0 - Fully active, able to carry on all pre-disease performance without restriction [Normal] : affect appropriate [de-identified] : a 1 cm long laceration with sutures on the right thrum, healing well

## 2022-10-28 LAB
MISCELLANEOUS TEST: NORMAL
PROC NAME: NORMAL

## 2022-11-09 NOTE — DISCHARGE NOTE NURSING/CASE MANAGEMENT/SOCIAL WORK - WILL THE PATIENT ACCEPT THE PFIZER COVID-19 VACCINE IF ELIGIBLE AND IT IS AVAILABLE?
Therapist Impression:   Initiated Alter-G running 50%, attempted 55% on 3rd set but did not tolerate.  Progress as tolerated.    GOALS: End of February/March return to football    NEXT: calf raises end range isometrics, BFR, step ups    PTRX: online    Subjective:  Did have some ankle/claf discomfort, but doing better.    Objective:  No TTP along medial ankle    
Not applicable

## 2022-11-17 ENCOUNTER — APPOINTMENT (OUTPATIENT)
Dept: HEMATOLOGY ONCOLOGY | Facility: CLINIC | Age: 37
End: 2022-11-17

## 2022-11-18 ENCOUNTER — OUTPATIENT (OUTPATIENT)
Dept: OUTPATIENT SERVICES | Facility: HOSPITAL | Age: 37
LOS: 1 days | Discharge: HOME | End: 2022-11-18

## 2022-11-18 DIAGNOSIS — C92.00 ACUTE MYELOBLASTIC LEUKEMIA, NOT HAVING ACHIEVED REMISSION: ICD-10-CM

## 2022-11-19 ENCOUNTER — EMERGENCY (EMERGENCY)
Facility: HOSPITAL | Age: 37
LOS: 1 days | Discharge: HOME | End: 2022-11-19
Attending: EMERGENCY MEDICINE
Payer: SELF-PAY

## 2022-11-19 VITALS
RESPIRATION RATE: 15 BRPM | OXYGEN SATURATION: 99 % | HEART RATE: 132 BPM | TEMPERATURE: 98 F | WEIGHT: 195.11 LBS | SYSTOLIC BLOOD PRESSURE: 108 MMHG | DIASTOLIC BLOOD PRESSURE: 67 MMHG | HEIGHT: 71 IN

## 2022-11-19 VITALS
HEART RATE: 74 BPM | OXYGEN SATURATION: 100 % | TEMPERATURE: 98 F | DIASTOLIC BLOOD PRESSURE: 76 MMHG | RESPIRATION RATE: 17 BRPM | SYSTOLIC BLOOD PRESSURE: 115 MMHG

## 2022-11-19 LAB
ALBUMIN SERPL ELPH-MCNC: 4.6 G/DL — SIGNIFICANT CHANGE UP (ref 3.3–5)
ALP SERPL-CCNC: 89 U/L — SIGNIFICANT CHANGE UP (ref 40–120)
ALT FLD-CCNC: 23 U/L — SIGNIFICANT CHANGE UP (ref 10–45)
ANION GAP SERPL CALC-SCNC: 11 MMOL/L — SIGNIFICANT CHANGE UP (ref 5–17)
APPEARANCE UR: CLEAR — SIGNIFICANT CHANGE UP
APTT BLD: 30 SEC — SIGNIFICANT CHANGE UP (ref 27.5–35.5)
AST SERPL-CCNC: 16 U/L — SIGNIFICANT CHANGE UP (ref 10–40)
BASE EXCESS BLDV CALC-SCNC: 4.3 MMOL/L — HIGH (ref -2–3)
BASOPHILS # BLD AUTO: 0.01 K/UL — SIGNIFICANT CHANGE UP (ref 0–0.2)
BASOPHILS NFR BLD AUTO: 0.1 % — SIGNIFICANT CHANGE UP (ref 0–2)
BILIRUB SERPL-MCNC: 0.9 MG/DL — SIGNIFICANT CHANGE UP (ref 0.2–1.2)
BILIRUB UR-MCNC: NEGATIVE — SIGNIFICANT CHANGE UP
BUN SERPL-MCNC: 10 MG/DL — SIGNIFICANT CHANGE UP (ref 7–23)
CA-I SERPL-SCNC: 1.25 MMOL/L — SIGNIFICANT CHANGE UP (ref 1.15–1.33)
CALCIUM SERPL-MCNC: 9.9 MG/DL — SIGNIFICANT CHANGE UP (ref 8.4–10.5)
CHLORIDE BLDV-SCNC: 101 MMOL/L — SIGNIFICANT CHANGE UP (ref 96–108)
CHLORIDE SERPL-SCNC: 101 MMOL/L — SIGNIFICANT CHANGE UP (ref 96–108)
CO2 BLDV-SCNC: 31 MMOL/L — HIGH (ref 22–26)
CO2 SERPL-SCNC: 26 MMOL/L — SIGNIFICANT CHANGE UP (ref 22–31)
COLOR SPEC: SIGNIFICANT CHANGE UP
CREAT SERPL-MCNC: 1.08 MG/DL — SIGNIFICANT CHANGE UP (ref 0.5–1.3)
DIFF PNL FLD: NEGATIVE — SIGNIFICANT CHANGE UP
EGFR: 91 ML/MIN/1.73M2 — SIGNIFICANT CHANGE UP
EOSINOPHIL # BLD AUTO: 0.01 K/UL — SIGNIFICANT CHANGE UP (ref 0–0.5)
EOSINOPHIL NFR BLD AUTO: 0.1 % — SIGNIFICANT CHANGE UP (ref 0–6)
FLUAV AG NPH QL: SIGNIFICANT CHANGE UP
FLUBV AG NPH QL: SIGNIFICANT CHANGE UP
GAS PNL BLDV: 135 MMOL/L — LOW (ref 136–145)
GAS PNL BLDV: SIGNIFICANT CHANGE UP
GAS PNL BLDV: SIGNIFICANT CHANGE UP
GLUCOSE BLDV-MCNC: 99 MG/DL — SIGNIFICANT CHANGE UP (ref 70–99)
GLUCOSE SERPL-MCNC: 95 MG/DL — SIGNIFICANT CHANGE UP (ref 70–99)
GLUCOSE UR QL: NEGATIVE — SIGNIFICANT CHANGE UP
HCO3 BLDV-SCNC: 30 MMOL/L — HIGH (ref 22–29)
HCT VFR BLD CALC: 37.5 % — LOW (ref 39–50)
HCT VFR BLDA CALC: 40 % — SIGNIFICANT CHANGE UP (ref 39–51)
HGB BLD CALC-MCNC: 13.2 G/DL — SIGNIFICANT CHANGE UP (ref 12.6–17.4)
HGB BLD-MCNC: 12.7 G/DL — LOW (ref 13–17)
IMM GRANULOCYTES NFR BLD AUTO: 0.4 % — SIGNIFICANT CHANGE UP (ref 0–0.9)
INR BLD: 1.15 RATIO — SIGNIFICANT CHANGE UP (ref 0.88–1.16)
KETONES UR-MCNC: NEGATIVE — SIGNIFICANT CHANGE UP
LACTATE BLDV-MCNC: 1.1 MMOL/L — SIGNIFICANT CHANGE UP (ref 0.5–2)
LEUKOCYTE ESTERASE UR-ACNC: NEGATIVE — SIGNIFICANT CHANGE UP
LYMPHOCYTES # BLD AUTO: 0.82 K/UL — LOW (ref 1–3.3)
LYMPHOCYTES # BLD AUTO: 11 % — LOW (ref 13–44)
MCHC RBC-ENTMCNC: 32.8 PG — SIGNIFICANT CHANGE UP (ref 27–34)
MCHC RBC-ENTMCNC: 33.9 GM/DL — SIGNIFICANT CHANGE UP (ref 32–36)
MCV RBC AUTO: 96.9 FL — SIGNIFICANT CHANGE UP (ref 80–100)
MONOCYTES # BLD AUTO: 0.79 K/UL — SIGNIFICANT CHANGE UP (ref 0–0.9)
MONOCYTES NFR BLD AUTO: 10.6 % — SIGNIFICANT CHANGE UP (ref 2–14)
NEUTROPHILS # BLD AUTO: 5.8 K/UL — SIGNIFICANT CHANGE UP (ref 1.8–7.4)
NEUTROPHILS NFR BLD AUTO: 77.8 % — HIGH (ref 43–77)
NITRITE UR-MCNC: NEGATIVE — SIGNIFICANT CHANGE UP
NRBC # BLD: 0 /100 WBCS — SIGNIFICANT CHANGE UP (ref 0–0)
PCO2 BLDV: 47 MMHG — SIGNIFICANT CHANGE UP (ref 42–55)
PH BLDV: 7.41 — SIGNIFICANT CHANGE UP (ref 7.32–7.43)
PH UR: 6 — SIGNIFICANT CHANGE UP (ref 5–8)
PLATELET # BLD AUTO: 141 K/UL — LOW (ref 150–400)
PO2 BLDV: 23 MMHG — LOW (ref 25–45)
POTASSIUM BLDV-SCNC: 3.7 MMOL/L — SIGNIFICANT CHANGE UP (ref 3.5–5.1)
POTASSIUM SERPL-MCNC: 3.8 MMOL/L — SIGNIFICANT CHANGE UP (ref 3.5–5.3)
POTASSIUM SERPL-SCNC: 3.8 MMOL/L — SIGNIFICANT CHANGE UP (ref 3.5–5.3)
PROT SERPL-MCNC: 7.5 G/DL — SIGNIFICANT CHANGE UP (ref 6–8.3)
PROT UR-MCNC: NEGATIVE — SIGNIFICANT CHANGE UP
PROTHROM AB SERPL-ACNC: 13.4 SEC — SIGNIFICANT CHANGE UP (ref 10.5–13.4)
RBC # BLD: 3.87 M/UL — LOW (ref 4.2–5.8)
RBC # FLD: 12.6 % — SIGNIFICANT CHANGE UP (ref 10.3–14.5)
RSV RNA NPH QL NAA+NON-PROBE: SIGNIFICANT CHANGE UP
SAO2 % BLDV: 33.8 % — LOW (ref 67–88)
SARS-COV-2 RNA SPEC QL NAA+PROBE: SIGNIFICANT CHANGE UP
SODIUM SERPL-SCNC: 138 MMOL/L — SIGNIFICANT CHANGE UP (ref 135–145)
SP GR SPEC: 1.01 — LOW (ref 1.01–1.02)
UROBILINOGEN FLD QL: NEGATIVE — SIGNIFICANT CHANGE UP
WBC # BLD: 7.46 K/UL — SIGNIFICANT CHANGE UP (ref 3.8–10.5)
WBC # FLD AUTO: 7.46 K/UL — SIGNIFICANT CHANGE UP (ref 3.8–10.5)

## 2022-11-19 PROCEDURE — 99285 EMERGENCY DEPT VISIT HI MDM: CPT

## 2022-11-19 PROCEDURE — 85014 HEMATOCRIT: CPT

## 2022-11-19 PROCEDURE — 36415 COLL VENOUS BLD VENIPUNCTURE: CPT

## 2022-11-19 PROCEDURE — 80053 COMPREHEN METABOLIC PANEL: CPT

## 2022-11-19 PROCEDURE — 46050 I&D PERIANAL ABSCESS SUPFC: CPT

## 2022-11-19 PROCEDURE — 99053 MED SERV 10PM-8AM 24 HR FAC: CPT

## 2022-11-19 PROCEDURE — 84132 ASSAY OF SERUM POTASSIUM: CPT

## 2022-11-19 PROCEDURE — 82947 ASSAY GLUCOSE BLOOD QUANT: CPT

## 2022-11-19 PROCEDURE — 82803 BLOOD GASES ANY COMBINATION: CPT

## 2022-11-19 PROCEDURE — 81003 URINALYSIS AUTO W/O SCOPE: CPT

## 2022-11-19 PROCEDURE — 85018 HEMOGLOBIN: CPT

## 2022-11-19 PROCEDURE — 85610 PROTHROMBIN TIME: CPT

## 2022-11-19 PROCEDURE — 87040 BLOOD CULTURE FOR BACTERIA: CPT

## 2022-11-19 PROCEDURE — 96375 TX/PRO/DX INJ NEW DRUG ADDON: CPT | Mod: XU

## 2022-11-19 PROCEDURE — 74177 CT ABD & PELVIS W/CONTRAST: CPT | Mod: MA

## 2022-11-19 PROCEDURE — 82435 ASSAY OF BLOOD CHLORIDE: CPT

## 2022-11-19 PROCEDURE — 83605 ASSAY OF LACTIC ACID: CPT

## 2022-11-19 PROCEDURE — 87637 SARSCOV2&INF A&B&RSV AMP PRB: CPT

## 2022-11-19 PROCEDURE — 84295 ASSAY OF SERUM SODIUM: CPT

## 2022-11-19 PROCEDURE — 85730 THROMBOPLASTIN TIME PARTIAL: CPT

## 2022-11-19 PROCEDURE — 85025 COMPLETE CBC W/AUTO DIFF WBC: CPT

## 2022-11-19 PROCEDURE — 96365 THER/PROPH/DIAG IV INF INIT: CPT | Mod: XU

## 2022-11-19 PROCEDURE — 82330 ASSAY OF CALCIUM: CPT

## 2022-11-19 PROCEDURE — 74177 CT ABD & PELVIS W/CONTRAST: CPT | Mod: 26,MA

## 2022-11-19 PROCEDURE — 96376 TX/PRO/DX INJ SAME DRUG ADON: CPT | Mod: XU

## 2022-11-19 PROCEDURE — 99285 EMERGENCY DEPT VISIT HI MDM: CPT | Mod: 25

## 2022-11-19 PROCEDURE — 93005 ELECTROCARDIOGRAM TRACING: CPT | Mod: XU

## 2022-11-19 PROCEDURE — 87086 URINE CULTURE/COLONY COUNT: CPT

## 2022-11-19 RX ORDER — HYDROMORPHONE HYDROCHLORIDE 2 MG/ML
0.5 INJECTION INTRAMUSCULAR; INTRAVENOUS; SUBCUTANEOUS ONCE
Refills: 0 | Status: DISCONTINUED | OUTPATIENT
Start: 2022-11-19 | End: 2022-11-19

## 2022-11-19 RX ORDER — SODIUM CHLORIDE 9 MG/ML
1000 INJECTION INTRAMUSCULAR; INTRAVENOUS; SUBCUTANEOUS ONCE
Refills: 0 | Status: COMPLETED | OUTPATIENT
Start: 2022-11-19 | End: 2022-11-19

## 2022-11-19 RX ORDER — LIDOCAINE HCL 20 MG/ML
40 VIAL (ML) INJECTION ONCE
Refills: 0 | Status: COMPLETED | OUTPATIENT
Start: 2022-11-19 | End: 2022-11-19

## 2022-11-19 RX ORDER — OXYCODONE HYDROCHLORIDE 5 MG/1
1 TABLET ORAL
Qty: 5 | Refills: 0
Start: 2022-11-19 | End: 2022-11-23

## 2022-11-19 RX ORDER — KETOROLAC TROMETHAMINE 30 MG/ML
15 SYRINGE (ML) INJECTION ONCE
Refills: 0 | Status: DISCONTINUED | OUTPATIENT
Start: 2022-11-19 | End: 2022-11-19

## 2022-11-19 RX ADMIN — Medication 15 MILLIGRAM(S): at 07:28

## 2022-11-19 RX ADMIN — HYDROMORPHONE HYDROCHLORIDE 0.5 MILLIGRAM(S): 2 INJECTION INTRAMUSCULAR; INTRAVENOUS; SUBCUTANEOUS at 10:28

## 2022-11-19 RX ADMIN — HYDROMORPHONE HYDROCHLORIDE 0.5 MILLIGRAM(S): 2 INJECTION INTRAMUSCULAR; INTRAVENOUS; SUBCUTANEOUS at 15:13

## 2022-11-19 RX ADMIN — Medication 15 MILLIGRAM(S): at 08:15

## 2022-11-19 RX ADMIN — SODIUM CHLORIDE 1000 MILLILITER(S): 9 INJECTION INTRAMUSCULAR; INTRAVENOUS; SUBCUTANEOUS at 06:53

## 2022-11-19 RX ADMIN — Medication 40 MILLILITER(S): at 17:01

## 2022-11-19 RX ADMIN — SODIUM CHLORIDE 1000 MILLILITER(S): 9 INJECTION INTRAMUSCULAR; INTRAVENOUS; SUBCUTANEOUS at 08:00

## 2022-11-19 RX ADMIN — Medication 900 MILLIGRAM(S): at 07:15

## 2022-11-19 RX ADMIN — Medication 100 MILLIGRAM(S): at 06:44

## 2022-11-19 RX ADMIN — HYDROMORPHONE HYDROCHLORIDE 0.5 MILLIGRAM(S): 2 INJECTION INTRAMUSCULAR; INTRAVENOUS; SUBCUTANEOUS at 14:43

## 2022-11-19 RX ADMIN — HYDROMORPHONE HYDROCHLORIDE 0.5 MILLIGRAM(S): 2 INJECTION INTRAMUSCULAR; INTRAVENOUS; SUBCUTANEOUS at 09:58

## 2022-11-19 NOTE — ED ADULT NURSE NOTE - OBJECTIVE STATEMENT
NEONATOLOGY DAILY PROGRESS NOTE      Subjective     Lakesha Tillman is a 10 day old old former Gestational Age: 34w1d, date of Birth 2022, birthweight 2535 g male infant admitted 2022 11:25 AM and is a   baby.    Corrected Gestational Age: 35w4d      Lakesha Tillman requires  Intensive Care for Prematurity. With the need for continuous cardiorespiratory monitoring, monitoring of feeding tolerance, and temperature control.    Overnight Events  Stable OVN, no new issues    Objective     Vital signs reviewed  Visit Vitals  BP (!) 87/47 (BP Location: RLE - Right lower extremity)   Pulse 178   Temp 98.6 °F (37 °C) (Axillary)   Resp 56   Ht 18.31\" (46.5 cm)   Wt (!) 2390 g   HC 32.5 cm (12.8\")   SpO2 100%   BMI 11.05 kg/m²        Current Weight (!) 2390 g (22 1445)   Most recent weights:  Weight    22 1900 22 0000 22 1445 22 1445   Weight: (!) 2380 g (!) 2345 g (!) 2315 g (!) 2390 g     Weight Change Since yesterday: Weight change:  75g over 2 days    Weight Change Since Birth: -6%       Apnea/Bradycardia/Desaturation in last 24 hours    No data found.        Lines,Tube Drains    Intubation  Necessity/Indication: Not Intubated  Readiness for Extubation discussed - N/A 2022    Urinary Catheter  Necessity/Indication: Not Catheterized  Need for Urinary Catheter discussed - N/A 2022    Central Line  Type of Central Line:   Peripheral IV 01/15/22 Right Hand 24 (Active)     Necessity/Indication: No Central line in place  Need for Central Line discussed N/A 2022    Chest Tube  No 2022    Fluids  Based off a Dosing Weight: 2390 g    Last 24H:     Intake/Output  Report       07 0659  06    P.O. (mL/kg) 156 (65.27) 58 (24.27)    NG/GT (mL/kg) 173 (72.38) 83 (34.73)    Total Intake(mL/kg) 329 (137.66) 141 (59)    Urine  (mL/kg/hr)  72 (2.69)    Stool (mL/kg/hr)  0 (0)    Total Output(mL/kg)  72 (30.13)    Net +329 +69          Urine Occurrence 2 x     Emesis Occurrence 1 x           Urine: Urine Occurrence  Min: 1  Max: 1 Last Stool: 1 (22 0600)    Labs (Last 24 hours)  No results found for this or any previous visit (from the past 24 hour(s)).       LABS REVIEWED    Medications  Current Facility-Administered Medications   Medication   • pediatric multivitamin with iron (POLY-VI-SOL WITH IRON) oral solution 0.5 mL   • cholecalciferol (VITAMIN D) 5 mcg (200 units)/0.5 mL oral liquid 5 mcg   • hepatitis B (ENGERIX-B) 10 MCG/0.5ML vaccine 10 mcg          Vitals    24 Hour Range   Temperature   Temp  Min: 98.2 °F (36.8 °C)  Max: 98.8 °F (37.1 °C)   Pulse   Pulse  Min: 128  Max: 178   Respiratory   Resp  Min: 27  Max: 86   Blood Pressure   BP  Min: 82/58  Max: 87/47   Pulse Oximetry    SpO2  Min: 90 %  Max: 100 %       Physical Exam    General:  No acute distress.    Eye:  Pupils are equal, round and reactive to light, Red reflex (deferred).    HENT:  Normocephalic, Anterior fontanelle open/soft/flat, Ears normally set and rotated, Palate intact, nondysmorphic.    Neck:  No thyromegaly, clavicles intact.    Respiratory:  Lungs are clear to auscultation, Respirations are non-labored, Breath sounds are equal.    Cardiovascular:  Normal rate, Regular rhythm, Normal peripheral perfusion.    Gastrointestinal:  Soft, Non-distended, No organomegaly, Anus patent.    Genitourinary:  Normal genitalia for age and sex.    Musculoskeletal     No deformity.     Integumentary:  Pink, No pallor, No rash, no abornormal nevi or hemangiomas noted   Neurologic:  Normal deep tendon reflexes, No focal deficits, normal tone for gestational age.        Assessment & Plan  by system     Former Gestational Age: 34w1d male infant, now corrected to 35w4d        Fluids, Electrolytes and Nutrition:  Assessment:  infant; immature feeder      - NPO: No  -  NG/OG: Yes  - TFI: 148  - PO% - 47  - Feeds - MBM/DBM - 47 ml Q3h PO/NG 22 tee    Plan:  - Feeds -  Increase to 50ml q3hrs PO/NG  - Goal   - Continue Fortifying BM to 22 tee/oz    Respiratory System:  Assessment: Respiratory Distress Syndrome (RDS)     HFNC 1/15 - 1/21    - Surfactant: Not given    Plan:  - Clinically monitor    Apnea/Bradycardia/Desaturations:  Assessment:  At Risk for Apnea of Prematurity    - Last Apnea:     - Intervention: Self limiting (01/19/22 1820)  - Last Desaturation: Event SpO2: 100 (01/19/22 1820)  Desaturation (secs): 13 secs (01/18/22 0634)  - Intervention: Intervention: Self limiting (01/19/22 1820)    - Last Bradycardia: 12 secs (01/19/22 1820);  - Interventions: Intervention: Self limiting (01/19/22 1820)    - Activity Prior to Event: Sleeping    - Caffeine Loading dose 20mg/kg/dose - No  - Caffeine maintenance 10mg/kg qday - No    Plan:  - - Continuous cardiorespiratory monitoring  - Monitor clinically for spells     Cardio Vascular System:   Assessment: No active cardiac issues  -     Plan:  - - Continuous Cardiopulmonary monitoring      Gastrointestinal System:  Assessment: None  -     Plan:  - No active issues     Renal System:  Assessment: None  -   Plan:  - No active issues    Hematology:  Assessment: None     Baby's blood type is A Rh Positive;  Sridhar: N/A  Maternal blood type is   Information for the patient's mother:  Kacy Tillman Sandy [[id number]]   O Rh Positive     Last Bilirubin:   Recent Labs   Lab 01/24/22  0914 01/20/22  0731 01/20/22  0544 01/19/22  0537   BILIRUBIN 6.7* 5.5 5.9 4.5      Last Hg:   HGB (g/dL)   Date Value   2022 17.3     Phototherapy 1/17 - 1/19    Number of PRBC transfusions: 0  Most Recent transfusion:     Plan:  - follow clinically; routine bilirubin screening not indicated.     Infectious Disease:  Assessment: No concerns for infection at this time    Early onset sepsis screen:  Received 36 hours of empiric antibiotics at birth.   Blood culture negative    : Noted to be hypothermic with subsequent bradycardia on 22.  ROS unremarkable.  Placed under radiant warmer, improved.      Endocrine System:  Assessment: None  -     Plan:  - STAT Bedside Glucose on admission - Yes  - Subsequent Bedside Glucose - Yes  - No active issues       Central Nervous System:  Assessment: None  -     Plan:   - No active issues      Skin:  Assessment:  -    Plan:      Other:       Therapies:   PT/OT consults per unit guidelines            Screenings & Procedures   Immunizations:   Most Recent Immunizations   Administered Date(s) Administered   • None   Pended Date(s) Pended   • Hep B, adolescent or pediatric 2022      Hearing Test: Pass R, Pass L (22 1100)   ROP Eye Exam Needed?: No (22 1000) No  Car Seat Screen:    CCHD Screening:   Screening complete: Done (22 1202)  Right hand reading %: 99 %  Foot reading %: 98 %  CHD: Normal  Circumcision: Family does not desire (22 1700)  [place] Screen- date drawn (most recent results): 22 (22 0502)  Last 3 results: 22 (22 0502)  Synagis Candidate: No (22 1000)      Parents plan to follow-up as an outpatient following discharge home with - TBD  Update Parents routinely.  I have spoken with the nursing staff and the healthcare team and reviewed findings and plan of management.  *  -  updated both parents at bedside.     Updated Problem List under \" Problem List \" section - Yes 2022   Principal Problem:    Baby premature 34 weeks  Active Problems:    Twin birth delivered by  section in hospital    Slow feeding in  related to prematurity  Resolved Problems:    Need for observation and evaluation of  for sepsis    Jaundice of     RDS (respiratory distress syndrome of )     Pt is a 37y M PMH leukemia p/w rectal pain x days. Pt had recent hemorrhoidectomy. Now c/o rectal pain, fevers, and diarrhea with mucus. Denies N/V, chest pain, SOB. A&Ox4, EDGE, lungs clear, distal pulses intact, abdomen soft, skin intact. Side rails up for safety, call bell and personal items within reach, instructed to call for assistance, verbalizes understanding. Will continue to monitor.

## 2022-11-19 NOTE — ED ADULT NURSE NOTE - CAS TRG GENERAL AIRWAY, MLM
Subjective    Kory Bobby is a 4 month old male who presents to clinic today with mother because of:  Cough; Nasal Congestion; and Health Maintenance (UTD)     HPI   ENT/Cough Symptoms    Problem started: 3 days ago  Fever: YES  Runny nose: YES  Congestion: YES  Sore Throat: no  Cough: YES  Eye discharge/redness:  YES- discharge  Ear Pain: no  Wheeze: YES   Sick contacts: Family member (Parents and Sibling);  Strep exposure: None;  Therapies Tried: none    Here with concerns about nasal congestion x 5 days, worse in the last 3 days. Parents have been trying to do nasal suctioning with no improvement, can't get anything out. Productive cough with some wheezing and increased work of breathing. He has been drooling and putting his hand in his mouth. He has been feeding OK. Normal output. Mother has noted some tactile fevers at home, presently afebrile. He is not taking any medications. Mother and 2 siblings are sick with similar symptoms.       Review of Systems  Constitutional, eye, ENT, skin, respiratory, cardiac, and GI are normal except as otherwise noted.    Problem List  Patient Active Problem List    Diagnosis Date Noted     Family history of neurofibromatosis 2019     Priority: Medium     Monitor for cafe au lait spots, and if develop refer to  NF clinic (brothers go there)        Medications  cholecalciferol (VITAMIN D/ D-VI-SOL) 10 MCG/ML LIQD liquid, Take 1 mL (10 mcg) by mouth daily  Selenium Sulfide 2.25 % SHAM, Externally apply 1 Application topically twice a week  [] hydrocortisone (CORTAID) 1 % external ointment, Apply topically 3 times daily for 7 days    No current facility-administered medications on file prior to visit.     Allergies  No Known Allergies  Reviewed and updated as needed this visit by Provider           Objective    There were no vitals taken for this visit.  No weight on file for this encounter.    Physical Exam  GENERAL: Active, alert, in no acute distress.  SKIN:  Clear. No significant rash, abnormal pigmentation or lesions  HEAD: Normocephalic. Normal fontanels and sutures.  EYES:  No discharge or erythema. Normal pupils and EOM  EARS: Normal canals. Tympanic membranes are normal; gray and translucent.  NOSE: purulent rhinorrhea and crusty nasal discharge  MOUTH/THROAT: normal gums and pharynx, thick white plaques on the buccal mucosa   NECK: Supple, no masses.  LYMPH NODES: No adenopathy  LUNGS: Clear. No rales, rhonchi, wheezing or retractions  HEART: Regular rhythm. Normal S1/S2. No murmurs. Normal femoral pulses.  NEUROLOGIC: Normal tone throughout. Normal reflexes for age    Diagnostics: None      Assessment & Plan      ICD-10-CM    1. Viral upper respiratory illness J06.9    2. Oral thrush B37.0 nystatin (MYCOSTATIN) 619477 UNIT/ML suspension     DISCONTINUED: nystatin (MYCOSTATIN) 677063 UNIT/ML suspension     1. Consistent with viral illness, reassuring exam with no signs/symptoms of respiratory distress. Encouraged lots of nasal suctioning and very close monitoring. Mother wants antibiotics but reviewed none indicated. Reviewed that given time of year, RSV season, should watch him very closely and have a low threshold to bring him to the ED with any concerns about his breathing or signs/symptoms of respiratory distress or any new fevers. Otherwise reviewed typical course of illness.     2. Will treat with oral nystatin, reviewed instructions for use. Encouraged mom to treat nipples with antifungal ointment and steam bottle tops. F/U with persistent or worsening symptoms.     Follow Up  No follow-ups on file.  If not improving or if worsening    DANYELL Santos CNP           Patent

## 2022-11-19 NOTE — ED PROVIDER NOTE - PHYSICAL EXAMINATION
GENERAL: Awake, alert, NAD  HEENT: NC/AT, moist mucous membranes, PERRL, EOMI  LUNGS: CTAB, no wheezes or crackles   CARDIAC: RRR, no m/r/g  ABDOMEN: Soft, non tender, non distended, no rebound, no guarding. rectal exam found on inspection with erythema over 9oclock aspect. On palpation induration appreciated but no fluctuance or drainage.  EXT: No edema, no calf tenderness, 2+ DP pulses bilaterally, no deformities.  NEURO: A&Ox3. Moving all extremities.  SKIN: Warm and dry.   PSYCH: Normal affect.

## 2022-11-19 NOTE — ED PROVIDER NOTE - NS ED ROS FT
CONST: +fevers  EYES: no pain, no vision changes  ENT: no sore throat, no ear pain, no change in hearing  CV: no chest pain, no leg swelling  RESP: no shortness of breath, no cough  ABD: no abdominal pain, no nausea, no vomiting. +diarrhea  : no dysuria, no flank pain, no hematuria  MSK: no back pain, no extremity pain  NEURO: no headache or additional neurologic complaints  SKIN:  no rash

## 2022-11-19 NOTE — CONSULT NOTE ADULT - SUBJECTIVE AND OBJECTIVE BOX
COLORECTAL SURGERY CONSULT  37m PMHx leukemia (remission, last chemo 2022) presents to ED for rectal pain since Wed. Pt reports episodes of diarrhea since receiving chemo. During the latest episode, he developed rectal pain. Pt reports fever to 101F. Patient endorsing hx of hemorrhoidectomy 1 month ago. Of note, patient with fevers since last night controlled with tylenol. Denies bloody stools, emesis, chest pain, sob.      PAST MEDICAL & SURGICAL HISTORY:  Hypertension  diagnosed 1 year ago  Kidney stone  5 years ago      History of genital warts      AML (acute myeloid leukemia)      No significant past surgical history          MEDICATIONS  (STANDING):    MEDICATIONS  (PRN):      Allergies    No Known Allergies    Intolerances    cefepime (Rash)      SOCIAL HISTORY:    FAMILY HISTORY:  FH: CAD (coronary artery disease) (Father, Mother)            Physical Exam:  General: NAD, resting comfortably  HEENT: NC/AT, EOMI, normal hearing, no oral lesions, no LAD, neck supple  Pulmonary: normal resp effort, CTA-B  Cardiovascular: NSR, no murmurs  Abdominal: soft, ND/NT, no organomegaly  Extremities: WWP, normal strength, no clubbing/cyanosis/edema  Neuro: A/O x 3, CNs II-XII grossly intact, normal sensation, no focal deficits  Pulses: palpable distal pulses    Vital Signs Last 24 Hrs  T(C): 36.9 (2022 14:32), Max: 36.9 (2022 10:12)  T(F): 98.4 (2022 14:32), Max: 98.4 (2022 10:12)  HR: 76 (2022 14:32) (76 - 132)  BP: 120/78 (2022 14:32) (108/67 - 126/70)  BP(mean): 92 (2022 06:41) (92 - 92)  RR: 17 (2022 14:32) (15 - 17)  SpO2: 100% (2022 14:32) (99% - 100%)    Parameters below as of 2022 14:32  Patient On (Oxygen Delivery Method): room air        I&O's Summary          LABS:                        12.7   7.46  )-----------( 141      ( 2022 07:02 )             37.5     -    138  |  101  |  10  ----------------------------<  95  3.8   |  26  |  1.08    Ca    9.9      2022 07:02    TPro  7.5  /  Alb  4.6  /  TBili  0.9  /  DBili  x   /  AST  16  /  ALT  23  /  AlkPhos  89  11-    PT/INR - ( 2022 07:02 )   PT: 13.4 sec;   INR: 1.15 ratio         PTT - ( 2022 07:02 )  PTT:30.0 sec  Urinalysis Basic - ( 2022 12:37 )    Color: Light Yellow / Appearance: Clear / S.009 / pH: x  Gluc: x / Ketone: Negative  / Bili: Negative / Urobili: Negative   Blood: x / Protein: Negative / Nitrite: Negative   Leuk Esterase: Negative / RBC: x / WBC x   Sq Epi: x / Non Sq Epi: x / Bacteria: x      CAPILLARY BLOOD GLUCOSE        LIVER FUNCTIONS - ( 2022 07:02 )  Alb: 4.6 g/dL / Pro: 7.5 g/dL / ALK PHOS: 89 U/L / ALT: 23 U/L / AST: 16 U/L / GGT: x             Cultures:      RADIOLOGY & ADDITIONAL STUDIES:      Plan:           COLORECTAL SURGERY CONSULT  37M PMHx leukemia (remission, last chemo 2022) presents to ED for rectal pain since Wed. Pt reports ongoing GI distress with episodes of diarrhea since receiving chemo. During the latest episode, he developed this rectal pain. Pt reports fever to 101F. Patient endorsing hx of hemorrhoidectomy (Hemorrhoid Electrical Treatment) 1 month ago. Denies bloody stools, emesis, chest pain, sob, cough.    PAST MEDICAL & SURGICAL HISTORY:  Hypertension  diagnosed 1 year ago  Kidney stone  5 years ago  History of genital warts  AML (acute myeloid leukemia)  No significant past surgical history      MEDICATIONS  (STANDING):    MEDICATIONS  (PRN):      Allergies    No Known Allergies    Intolerances    cefepime (Rash)      SOCIAL HISTORY:    FAMILY HISTORY:  FH: CAD (coronary artery disease) (Father, Mother)    Physical Exam:  General: uncomfortable apperaing  HEENT: NC/AT, EOMI, normal hearing  Pulmonary: normal resp effort, patent airway  Abdominal: soft, ND/NT, no organomegaly  Extremities: WWP, normal strength, no clubbing/cyanosis/edema  Neuro: A/O x 3, CNs II-XII grossly intact, normal sensation, no focal deficits  Rectal: discrete area of fluctuance at 9 o'clock, tender to light palpation, no visible hemorrhoids, rectal exam deferred 2/2 pain    Vital Signs Last 24 Hrs  T(C): 36.9 (2022 14:32), Max: 36.9 (2022 10:12)  T(F): 98.4 (2022 14:32), Max: 98.4 (2022 10:12)  HR: 76 (2022 14:32) (76 - 132)  BP: 120/78 (2022 14:32) (108/67 - 126/70)  BP(mean): 92 (2022 06:41) (92 - 92)  RR: 17 (2022 14:32) (15 - 17)  SpO2: 100% (2022 14:32) (99% - 100%)    Parameters below as of 2022 14:32  Patient On (Oxygen Delivery Method): room air    I&O's Summary      LABS:                        12.7   7.46  )-----------( 141      ( 2022 07:02 )             37.5     11-    138  |  101  |  10  ----------------------------<  95  3.8   |  26  |  1.08    Ca    9.9      2022 07:02    TPro  7.5  /  Alb  4.6  /  TBili  0.9  /  DBili  x   /  AST  16  /  ALT  23  /  AlkPhos  89      PT/INR - ( 2022 07:02 )   PT: 13.4 sec;   INR: 1.15 ratio         PTT - ( 2022 07:02 )  PTT:30.0 sec  Urinalysis Basic - ( 2022 12:37 )    Color: Light Yellow / Appearance: Clear / S.009 / pH: x  Gluc: x / Ketone: Negative  / Bili: Negative / Urobili: Negative   Blood: x / Protein: Negative / Nitrite: Negative   Leuk Esterase: Negative / RBC: x / WBC x   Sq Epi: x / Non Sq Epi: x / Bacteria: x      CAPILLARY BLOOD GLUCOSE        LIVER FUNCTIONS - ( 2022 07:02 )  Alb: 4.6 g/dL / Pro: 7.5 g/dL / ALK PHOS: 89 U/L / ALT: 23 U/L / AST: 16 U/L / GGT: x           RADIOLOGY & ADDITIONAL STUDIES:    < from: CT Abdomen and Pelvis w/ IV Cont (22 @ 09:36) >  ACC: [id number] EXAM:  CT ABDOMEN AND PELVIS IC                          PROCEDURE DATE:  2022          INTERPRETATION:  CLINICAL INFORMATION: Rectal pain, fever and diarrhea.   Hemorrhoidectomy one month ago.    COMPARISON: CT abdomen pelvis 2022.    CONTRAST/COMPLICATIONS:  IV Contrast: Omnipaque 350  90 cc administered   10 cc discarded  Oral Contrast: NONE  Complications: None reported at time of study completion    PROCEDURE:  CT of the Abdomen and Pelvis was performed.  Sagittal and coronal reformats were performed.    FINDINGS:  LOWER CHEST: Within normal limits.    LIVER: Within normal limits.  BILE DUCTS: Normal caliber.  GALLBLADDER: Cholecystectomy.  SPLEEN: Within normal limits.  PANCREAS: Within normal limits.  ADRENALS: Within normal limits.  KIDNEYS/URETERS: Symmetric renal enhancement without hydronephrosis.   Nonobstructing 2 mm left renal calculus (2:55).    BLADDER: Within normal limits.  REPRODUCTIVE ORGANS: Prostate within normal limits.    BOWEL: No bowel obstruction. Appendix is normal.  PERITONEUM: No ascites.  VESSELS: Within normal limits.  RETROPERITONEUM/LYMPH NODES: No lymphadenopathy.  ABDOMINAL WALL: A left perianal hypodense collection with adjacent   subcutaneous fat stranding measuring 1.5 x 1.1 x 1.3 cm (2, 131 and 602,   75), likely representing a perianal abscess.  BONES: Within normal limits.    IMPRESSION:  A 1.5 cm left perianal abscess.

## 2022-11-19 NOTE — ED PROVIDER NOTE - CARE PROVIDER_API CALL
Segundo Feldman)  ColonRectal Surgery; Surgery  [address], [address]  [place] [address]  Phone: [phone]  Fax: [phone]  Follow Up Time:

## 2022-11-19 NOTE — ED PROVIDER NOTE - ATTENDING CONTRIBUTION TO CARE
Pt with hx CA in remission here w rectal pain, fever. on exam, pt is tachycardic, has 4 cm area of fluctuance and induration in perirectal area with surrounding erythema, no palpable tracking rectally on PERRY. pt will get sepsis w/u including CT a/p to assess depth of abscess and r/o rectal involvement, will get fluids, abx, pain meds, and may require surgical consultation depending on imaging results.

## 2022-11-19 NOTE — ED PROVIDER NOTE - OBJECTIVE STATEMENT
36yo male pt PMHx leukemia (remission) who presents to ED for rectal pain a/w mucoid diarrheal episodes since a couple of days ago. Patient endorsing hx of hemorrhoidectomy 1 month ago. Of note, patient with fevers since last night controlled with tylenol. Denies bloody stools, emesis, chest pain, sob.

## 2022-11-19 NOTE — ED PROVIDER NOTE - NSFOLLOWUPINSTRUCTIONS_ED_ALL_ED_FT
You were seen in the Emergency Department for an abscess.    You may take 975 mg Tylenol (acetaminophen) every six hours as needed for pain.    You may take 600mg Ibuprofen (Advil) once every 6 hours as needed for pain. See medication label for warnings and use instructions.    Use sitz baths several times a day if your symptoms persist.    Follow up with Dr. Feldman at the end of next week.    You are being sent a prescription for Augmentin to be taken twice a day for the next 10 days. Please see medication labels for instructions and warnings.     You are being sent a prescription for oxycodone to take as needed for pain. Please see medication labels for instructions and warnings.     If you have worsening pain, fever, chills, nausea, vomiting, new or worsening pain, or if you have any new symptoms return to the Emergency Department.

## 2022-11-19 NOTE — ED PROVIDER NOTE - CLINICAL SUMMARY MEDICAL DECISION MAKING FREE TEXT BOX
38yo male pt PMHx leukemia (remission, not on chemo) who presents to ED for worsening rectal pain a/w diarrhea and fevers. Found with concerns for rectal cellulitis vs abscess. Will draw sepsis labs and assess with CT. Pain management and IV abx. Most likely TBA.

## 2022-11-19 NOTE — PROCEDURE NOTE - NSFINDINGS_GEN_A_CORE
----- Message from Luis Laguna sent at 7/3/2017  1:40 PM CDT -----  Contact: Patient  Patient stated that his insurance no longer covers Rx insulin glargine (LANTUS SOLOSTAR) 100 unit/mL (3 mL) InPn pen    Please send NEW Rx to [facility] [phone] (Phone)  [phone] (fax)    Please call [phone].  
blood/purulent drainage

## 2022-11-19 NOTE — PROCEDURE NOTE - ADDITIONAL PROCEDURE DETAILS
20cc lidocaine given  approximately 10cc of solitario purulence expressed  Circular incision with no packing

## 2022-11-19 NOTE — ED PROVIDER NOTE - PATIENT PORTAL LINK FT
You can access the FollowMyHealth Patient Portal offered by [facility] by registering at the following website: [email or web address]. By joining [employer]’s FollowMyHealth portal, you will also be able to view your health information using other applications (apps) compatible with our system.

## 2022-11-19 NOTE — ED PROVIDER NOTE - PROGRESS NOTE DETAILS
Hesham Roblero, DO PGY-3: Spoke w/ surgery, will drain, follow up w/ colorectal surgery Hesham Roblero, DO PGY-3: abscess drained, will follow up w/ dr sorto

## 2022-11-19 NOTE — ED ADULT TRIAGE NOTE - CHIEF COMPLAINT QUOTE
rectal pain; pt suspects hemorrhoids; had temp 100.7 tonight, took Tylenol; last bm yesterday; in remission from leukemia

## 2022-11-20 LAB
CULTURE RESULTS: NO GROWTH — SIGNIFICANT CHANGE UP
SPECIMEN SOURCE: SIGNIFICANT CHANGE UP

## 2022-11-28 ENCOUNTER — APPOINTMENT (OUTPATIENT)
Dept: SURGERY | Facility: CLINIC | Age: 37
End: 2022-11-28

## 2022-11-30 NOTE — ED ADULT TRIAGE NOTE - SPO2 (%)
What Type Of Note Output Would You Prefer (Optional)?: Standard Output How Severe Is Your Skin Lesion?: mild Has Your Skin Lesion Been Treated?: not been treated Is This A New Presentation, Or A Follow-Up?: Growth 99

## 2022-12-01 ENCOUNTER — RESULT REVIEW (OUTPATIENT)
Age: 37
End: 2022-12-01

## 2022-12-01 ENCOUNTER — APPOINTMENT (OUTPATIENT)
Dept: HEMATOLOGY ONCOLOGY | Facility: CLINIC | Age: 37
End: 2022-12-01

## 2022-12-01 VITALS
WEIGHT: 188.49 LBS | DIASTOLIC BLOOD PRESSURE: 76 MMHG | HEART RATE: 77 BPM | RESPIRATION RATE: 16 BRPM | TEMPERATURE: 97.2 F | OXYGEN SATURATION: 99 % | BODY MASS INDEX: 26.54 KG/M2 | SYSTOLIC BLOOD PRESSURE: 116 MMHG

## 2022-12-01 LAB
BASOPHILS # BLD AUTO: 0.01 K/UL — SIGNIFICANT CHANGE UP (ref 0–0.2)
BASOPHILS NFR BLD AUTO: 0.3 % — SIGNIFICANT CHANGE UP (ref 0–2)
EOSINOPHIL # BLD AUTO: 0.05 K/UL — SIGNIFICANT CHANGE UP (ref 0–0.5)
EOSINOPHIL NFR BLD AUTO: 1.3 % — SIGNIFICANT CHANGE UP (ref 0–6)
HCT VFR BLD CALC: 37.5 % — LOW (ref 39–50)
HGB BLD-MCNC: 13.1 G/DL — SIGNIFICANT CHANGE UP (ref 13–17)
IMM GRANULOCYTES NFR BLD AUTO: 0.3 % — SIGNIFICANT CHANGE UP (ref 0–0.9)
LYMPHOCYTES # BLD AUTO: 0.79 K/UL — LOW (ref 1–3.3)
LYMPHOCYTES # BLD AUTO: 20.3 % — SIGNIFICANT CHANGE UP (ref 13–44)
MCHC RBC-ENTMCNC: 33 PG — SIGNIFICANT CHANGE UP (ref 27–34)
MCHC RBC-ENTMCNC: 34.9 G/DL — SIGNIFICANT CHANGE UP (ref 32–36)
MCV RBC AUTO: 94.5 FL — SIGNIFICANT CHANGE UP (ref 80–100)
MONOCYTES # BLD AUTO: 0.35 K/UL — SIGNIFICANT CHANGE UP (ref 0–0.9)
MONOCYTES NFR BLD AUTO: 9 % — SIGNIFICANT CHANGE UP (ref 2–14)
NEUTROPHILS # BLD AUTO: 2.69 K/UL — SIGNIFICANT CHANGE UP (ref 1.8–7.4)
NEUTROPHILS NFR BLD AUTO: 68.8 % — SIGNIFICANT CHANGE UP (ref 43–77)
NRBC # BLD: 0 /100 WBCS — SIGNIFICANT CHANGE UP (ref 0–0)
PLATELET # BLD AUTO: 150 K/UL — SIGNIFICANT CHANGE UP (ref 150–400)
RBC # BLD: 3.97 M/UL — LOW (ref 4.2–5.8)
RBC # FLD: 12.4 % — SIGNIFICANT CHANGE UP (ref 10.3–14.5)
WBC # BLD: 3.9 K/UL — SIGNIFICANT CHANGE UP (ref 3.8–10.5)
WBC # FLD AUTO: 3.9 K/UL — SIGNIFICANT CHANGE UP (ref 3.8–10.5)

## 2022-12-01 PROCEDURE — 99214 OFFICE O/P EST MOD 30 MIN: CPT

## 2022-12-02 ENCOUNTER — APPOINTMENT (OUTPATIENT)
Dept: GASTROENTEROLOGY | Facility: HOSPITAL | Age: 37
End: 2022-12-02

## 2022-12-02 LAB
ALBUMIN SERPL ELPH-MCNC: 4.8 G/DL
ALP BLD-CCNC: 92 U/L
ALT SERPL-CCNC: 37 U/L
ANION GAP SERPL CALC-SCNC: 12 MMOL/L
AST SERPL-CCNC: 22 U/L
BILIRUB SERPL-MCNC: 0.4 MG/DL
BUN SERPL-MCNC: 16 MG/DL
CALCIUM SERPL-MCNC: 9.6 MG/DL
CHLORIDE SERPL-SCNC: 104 MMOL/L
CO2 SERPL-SCNC: 24 MMOL/L
CREAT SERPL-MCNC: 0.82 MG/DL
EGFR: 116 ML/MIN/1.73M2
GLUCOSE SERPL-MCNC: 101 MG/DL
LDH SERPL-CCNC: 130 U/L
POTASSIUM SERPL-SCNC: 3.9 MMOL/L
PROT SERPL-MCNC: 6.9 G/DL
SODIUM SERPL-SCNC: 141 MMOL/L

## 2022-12-13 NOTE — HISTORY OF PRESENT ILLNESS
[de-identified] : 37yo M w/ HTN and newly diagnosed AML here for f/u. \par \par He presented to the hospital on 7/30/21 with complaints of dizziness, fatigue and severe RUQ pain for 3 days. CT A/P revealed fatty liver and small R pleural effusion. WBC 12k with 17% blasts. Peripheral flow cytometry showed 13% myeloblasts. FLT3(-). BMbx was done on 8/4/21 - confirmed AML. 46,XY                     {20                     }\par Foundation: mutations in DNMT3A R882H, NRAS G12D, NPM1 W288fs*12\par Pt consented to [facility]. Patient was offered sperm banking, but declined.\par On 8/6, induction with Dauno/Cytararbine was started (cytarabine 100/m2 and dauno 90/m2) \par He received a seven day course of Zosyn for presumed RUL PNA, then transitioned to  levaquin, posaconazole and Acyclovir for ppx for neutropenia. Course c/b neutropenic fevers, cultures negative, repeat CT 8/14 without infectious source. He was on Cefepime but developed a rash, changed to meropenem. Rash improved. \par Day 14 BMbx on 8/19 was hypocellular with chemotherapeutic effect; however, per hematopathology, appears to be earlier regeneration than would typically seen which is concerning for persistent disease at this point, and the earliest cells are CD34 positive and his myeloblasts on initial presentation were CD34 negative. Thus, this may simply represent early regeneration of his marrow. Plan is to await count recovery and repeat biopsy.  \par Patient discharged home on 8/29/21 when ANC >500 [de-identified] : Eating well since discharge. \par c/o hemorrhoidal pain. Hemorrhoids started a few days prior to discharge. He was sent home with rectal ointment and witch hazel. \par \par BMBx done on 9/2: Cellular bone marrow with trilineage hematopoiesis with maturation and megakaryocytosis (history of AML).\par Pt feels well, CBC today showed count stable\par \par s/p C1 HIDAC 9/17-9/22 \par c/o leg pain after chemo in the hospital \par \par He presented to the ED on 10/9 due to fever the night of presentation. The patient went to sleep around 10pm and woke up at 3am feeling warm and clammy. He took his temperature orally at home and it was 100.7. The day prior to presentation (10/8/21), the patient went to [facility] for an appointment to get his platelet count checked. He states he was feeling a bit run down and thought he was going to need a transfusion, but he did not need a transfusion. He was afebrile during the time of the appointment and went home afterwards. Around 3pm, the patient had bilateral crampy leg pain that was similar to the leg pain he felt during his consolidation therapy treatment. He took hydrocodone and the pain improved. The patient had one episode of diarrhea three days prior to presentation and has been bothered by rectal pain associated with his "large hemorrhoids. He denies any bleeding per rectum. He also feels like his mouth has been more dry than usual over the past several days, but denies all other symptoms. \par CT Abdomen and Pelvis w/ Oral Cont and w/ IV Conttrast on 10/9 revealed Region of hypoenhancement upper pole left kidney; please correlate clinically for possible pyelonephritis. No hydronephrosis or perinephric stranding. No rectal abscess.\par CT Chest No Contrast on 10/9 revealed Clear lungs.\par 10/9- BCX NGTD and 10/9- UCX (-)\par He ate some cold salad late last night, had upsetting stomach and diarrhea this morning. Will take Imodium for diarrhea. \par \par C2 is due on 10/15, pt wants deferring to next Monday after his diarrhea improved. \par Admission of  C2 was postponed due to worsening diarrhea. Went to ED on 10/15 due to worsening watery diarrhea. GI PCR neg, C Diff neg\par Given negative stool studies, suspect diarrhea was likely chemo-related. S/p cycle 2 Consolidation with HIDAC started on 10/25. Patient received IV hydration, strict I/O, antiemetics, monitoring of CBC and CMP and for cerebellar toxicity. PRedforte eye drops given to prevent chemical conjunctivitis. Patient with fever throughout course of treatment. Cultures negative. Due to fever probably related to HIDAC, patient received dexamethasone prior to the last 2 doses of cytatabine. \par He is seen today accompanied by his father, pt feels fatigue after chemo, other than that he feels fine. Denied any fever, chills diarrhea. \par \par Pt was admitted on 11/13-11/17 s/p right first digit laceration repair on 11/12 and presenting with erythema and pain at the site of laceration repair. Patient reported he was feeling unwell prior to suture placement with body aches, chills, night sweats and subjective fevers. Patient last BM 4 days ago. Has bruise to left inner thigh. Patient reports he keeps his PICC site clean and intact which was placed in 9/2021. Upon admission to the hospital ID was consulted started on Zosyn , GI consulted for rectal pain secondary to hemorrhoids and palliative consulted for pain control.\par  \par C3 on 11/26, tolerated well. He was seen today after discharge, accompanied by his father. He admitted feeling tired, denied any f/c, diarrhea. Right hand suture site healing well, no abnormal erythema or discharge. \par He will have Fulphilia today. \par C3 HIDAC 11/26-12/1\par He presents to the hospital due to epistaxis and neutropenic fever w/Temp 100.3 on 12/1. Per patient, he reports that he was feeling sinus congestion and pressure on his L side, blew his nose and bleeding began from L nare without improvement prompting arrival to the emergency department. Feels mild L sinus congestion.  L nasal cavity packed with absorbable packing; F/U ENT clinic outpatient. Pan culture obtained-with No growth currently\par CBC rechecked today recovered. He feels well overall , had lower abdominal pain last night after ate cheese, now improved. Denied any fever chills bloody stool or diarrhea. \par \par s/p C4 HiDAC 1/5/22\par Pt has known grade 1 AST and grade 3 ALT transaminitis. Presented this admission with transamintitis. Abd sono  performed and revealed Borderline/mild hepatomegaly with hepatic steatosis. Splenomegaly. Focal area of left upper pole increased renal cortical echogenicity, corresponding to an area of hypoattenuation seen on prior CT chest, \par abdomen and pelvis dated 10/9/2021. This is nonspecific and may reflect focal edema. Patient received IV hydration, antiemetics, monitoring of CBC and electrolytes. Predforte eye drops provided to prevent chemical conjunctivitis. Patient was monitored for cerebellar toxicity. Nystagmus checks performed. Hospital course complicated by fever blood cultures showed (-), most likely febrile secondary to HIDAC treatment. To receive Fulphila today.\par \par Pt presented to the hospital on 1/18/22 due fever of 100.4, weakness, decreased WBC and shortness of breath while at home. Patient reports that his pain is more controlled s/p pain medication and his shortness of breath has resolved. He denied chills, cough, chest pain, palpitations.\par Pan culture (blood +Ucx) were negative, CXR reveals clear lungs, COVID PCR - not detect. Pt started on empiric Zosyn, Diflucan and Acyclovir added prophylactically for neutropenic fever. He was transfused with platelets and PRBC as per supportive parameters (>10k/>7) respectively.\par He feels well overall now, denied any fever, chills or pain. \par \par BMbx 2/11/22: Cellular bone marrow with erythroid predominant trilineage hematopoiesis\par No morphologic or immunophenotypic evidence of persistent acute myeloid leukemia\par Non-necrotizing granulomas\par Normal male karyotype and normal onkosight myeloid NGS panel study.\par \par He was admitted for back pain 2/2 herniated disk - Rx'd pain meds and a Medrol pack. \par \par Pt went to ED due to acute RUQ pain on 4/16, patient underwent laparoscopic cholecystotomy on 4/17. \par \par He reports feeling well. He reports having severe anxiety when he sees any bruising or gum bleeding. He has seen a therapist but would like to see someone else. \par Saw psych Dr. Lozano on 7/18 TEB\par He is getting PT for his back pain. \par He occasionally has some gum bleeding still. \par \par Pt was seen today accompanied by his father. He feels well, eating healthy food and doing exercise. Will go back to work this month. \par He f/u w/psych Dr. Lozano monthly, currently is on Zoloft 100mg dialy, and Zolpidem aHS prn for insomnia\par Denied any new complaints.\par \par Pt missed last week's apt due to COVID 19. He had scratch throat, fatigue and low grade fever, Rapid test at home showed COVID 19 positive on last Jie. 10/12. Patient's father was tested positive on Friday as well. Pt didn't receive any COVID 19 meds, admitted he felt better, only slight fatigue, denied fever/chill/SOB. \par CBC today stable.  \par NPM1Q to [facility] lab on 10/19: negative\par He went to ED on 11/19 for rectal abscess, I&D done in the ED. Colonoscopy scheduled to be done on 12/2.

## 2022-12-13 NOTE — ASSESSMENT
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 \par Started induction with Dauno/Cytarabine on 8/6/21. \par Pt's counts now recovered, remission marrow done on 9/2- in remission. Proceed to consolidation with 4 cycles of HIDAC. C1 HIDAC on 9/17, c/b neutropenic fever and diarrhea. C2 HIDAC on 10/25, was postponed for 1 week due to admission for diarrhea, given negative stool studies, suspect diarrhea was likely chemo-related. Pt was admitted again 11/13-11/17 for sepsis due to thumb cellulitis after laceration. C3 on 11/26, had Fulphilia on C3 c/b epistaxis and neutropenic fever w/Temp 100.3. C4 HiDAC 1/5/22, c/b transaminitis and neutropenic fever\par He had laparoscopic cholecystotomy on 4/17. \par s/p BMbx 2/11/22 showing CR.\par He has non-necrotizing granulomas noted in BMbx, unclear significance\par CBC today stable\par NPM1Q to [facility] lab done on 11/19: negative\par Cont f/u with Dr. Rich monthly\par Dental eval\par All questions answered\par RTC monthly\par \par \par Case and management discussed with Dr. Yancey\par \par \par \par

## 2022-12-13 NOTE — PHYSICAL EXAM
[Fully active, able to carry on all pre-disease performance without restriction] : Status 0 - Fully active, able to carry on all pre-disease performance without restriction [Normal] : affect appropriate [de-identified] : a 1 cm long laceration with sutures on the right thrum, healing well

## 2022-12-14 ENCOUNTER — TRANSCRIPTION ENCOUNTER (OUTPATIENT)
Age: 37
End: 2022-12-14

## 2022-12-19 NOTE — H&P ADULT - NSICDXPASTMEDICALHX_GEN_ALL_CORE_FT
PAST MEDICAL HISTORY:  AML (acute myeloid leukemia)     History of genital warts     Hypertension diagnosed 1 year ago    Kidney stone 5 years ago     pt c/o pain and laceration to bilateral lower leg [place] x [place]. Denies LOC.

## 2023-01-11 ENCOUNTER — OUTPATIENT (OUTPATIENT)
Dept: OUTPATIENT SERVICES | Facility: HOSPITAL | Age: 38
LOS: 1 days | Discharge: HOME | End: 2023-01-11

## 2023-01-11 DIAGNOSIS — C92.00 ACUTE MYELOBLASTIC LEUKEMIA, NOT HAVING ACHIEVED REMISSION: ICD-10-CM

## 2023-01-12 ENCOUNTER — RESULT REVIEW (OUTPATIENT)
Age: 38
End: 2023-01-12

## 2023-01-12 ENCOUNTER — APPOINTMENT (OUTPATIENT)
Dept: HEMATOLOGY ONCOLOGY | Facility: CLINIC | Age: 38
End: 2023-01-12
Payer: COMMERCIAL

## 2023-01-12 VITALS
BODY MASS INDEX: 27.75 KG/M2 | DIASTOLIC BLOOD PRESSURE: 87 MMHG | TEMPERATURE: 97 F | HEART RATE: 107 BPM | SYSTOLIC BLOOD PRESSURE: 124 MMHG | RESPIRATION RATE: 16 BRPM | OXYGEN SATURATION: 99 % | WEIGHT: 197.09 LBS

## 2023-01-12 LAB
ALBUMIN SERPL ELPH-MCNC: 4.8 G/DL
ALP BLD-CCNC: 103 U/L
ALT SERPL-CCNC: 27 U/L
ANION GAP SERPL CALC-SCNC: 13 MMOL/L
AST SERPL-CCNC: 18 U/L
BASOPHILS # BLD AUTO: 0.01 K/UL — SIGNIFICANT CHANGE UP (ref 0–0.2)
BASOPHILS NFR BLD AUTO: 0.3 % — SIGNIFICANT CHANGE UP (ref 0–2)
BILIRUB SERPL-MCNC: 0.3 MG/DL
BUN SERPL-MCNC: 11 MG/DL
CALCIUM SERPL-MCNC: 9.7 MG/DL
CHLORIDE SERPL-SCNC: 106 MMOL/L
CO2 SERPL-SCNC: 23 MMOL/L
CREAT SERPL-MCNC: 0.91 MG/DL
EGFR: 111 ML/MIN/1.73M2
EOSINOPHIL # BLD AUTO: 0.04 K/UL — SIGNIFICANT CHANGE UP (ref 0–0.5)
EOSINOPHIL NFR BLD AUTO: 1.1 % — SIGNIFICANT CHANGE UP (ref 0–6)
GLUCOSE SERPL-MCNC: 94 MG/DL
HCT VFR BLD CALC: 39.7 % — SIGNIFICANT CHANGE UP (ref 39–50)
HGB BLD-MCNC: 14 G/DL — SIGNIFICANT CHANGE UP (ref 13–17)
IMM GRANULOCYTES NFR BLD AUTO: 0 % — SIGNIFICANT CHANGE UP (ref 0–0.9)
LDH SERPL-CCNC: 133 U/L
LYMPHOCYTES # BLD AUTO: 0.94 K/UL — LOW (ref 1–3.3)
LYMPHOCYTES # BLD AUTO: 25 % — SIGNIFICANT CHANGE UP (ref 13–44)
MCHC RBC-ENTMCNC: 33.2 PG — SIGNIFICANT CHANGE UP (ref 27–34)
MCHC RBC-ENTMCNC: 35.3 G/DL — SIGNIFICANT CHANGE UP (ref 32–36)
MCV RBC AUTO: 94.1 FL — SIGNIFICANT CHANGE UP (ref 80–100)
MONOCYTES # BLD AUTO: 0.39 K/UL — SIGNIFICANT CHANGE UP (ref 0–0.9)
MONOCYTES NFR BLD AUTO: 10.4 % — SIGNIFICANT CHANGE UP (ref 2–14)
NEUTROPHILS # BLD AUTO: 2.38 K/UL — SIGNIFICANT CHANGE UP (ref 1.8–7.4)
NEUTROPHILS NFR BLD AUTO: 63.2 % — SIGNIFICANT CHANGE UP (ref 43–77)
NRBC # BLD: 0 /100 WBCS — SIGNIFICANT CHANGE UP (ref 0–0)
PLATELET # BLD AUTO: 150 K/UL — SIGNIFICANT CHANGE UP (ref 150–400)
POTASSIUM SERPL-SCNC: 3.9 MMOL/L
PROT SERPL-MCNC: 7 G/DL
RBC # BLD: 4.22 M/UL — SIGNIFICANT CHANGE UP (ref 4.2–5.8)
RBC # FLD: 12.7 % — SIGNIFICANT CHANGE UP (ref 10.3–14.5)
SODIUM SERPL-SCNC: 142 MMOL/L
WBC # BLD: 3.76 K/UL — LOW (ref 3.8–10.5)
WBC # FLD AUTO: 3.76 K/UL — LOW (ref 3.8–10.5)

## 2023-01-12 PROCEDURE — 99213 OFFICE O/P EST LOW 20 MIN: CPT

## 2023-01-12 NOTE — ASSESSMENT
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 \par Started induction with Dauno/Cytarabine on 8/6/21. \par Pt's counts now recovered, remission marrow done on 9/2- in remission. Proceed to consolidation with 4 cycles of HIDAC. C1 HIDAC on 9/17, c/b neutropenic fever and diarrhea. C2 HIDAC on 10/25, was postponed for 1 week due to admission for diarrhea, given negative stool studies, suspect diarrhea was likely chemo-related. Pt was admitted again 11/13-11/17 for sepsis due to thumb cellulitis after laceration. C3 on 11/26, had Fulphilia on C3 c/b epistaxis and neutropenic fever w/Temp 100.3. C4 HiDAC 1/5/22, c/b transaminitis and neutropenic fever\par He had laparoscopic cholecystotomy on 4/17. \par s/p BMbx 2/11/22 showing CR.\par He has non-necrotizing granulomas noted in BMbx, unclear significance\par CBC today stable\par NPM1Q to [facility] lab done on 11/19: negative\par Cont f/u with Dr. Rich monthly\par All questions answered\par RTC monthly\par \par \par \par \par

## 2023-01-12 NOTE — PHYSICAL EXAM
[Fully active, able to carry on all pre-disease performance without restriction] : Status 0 - Fully active, able to carry on all pre-disease performance without restriction [Normal] : affect appropriate [de-identified] : a 1 cm long laceration with sutures on the right thrum, healing well

## 2023-01-12 NOTE — HISTORY OF PRESENT ILLNESS
[de-identified] : 35yo M w/ HTN and newly diagnosed AML here for f/u. \par \par He presented to the hospital on 7/30/21 with complaints of dizziness, fatigue and severe RUQ pain for 3 days. CT A/P revealed fatty liver and small R pleural effusion. WBC 12k with 17% blasts. Peripheral flow cytometry showed 13% myeloblasts. FLT3(-). BMbx was done on 8/4/21 - confirmed AML. 46,XY                      {20                      }\par Foundation: mutations in DNMT3A R882H, NRAS G12D, NPM1 W288fs*12\par Pt consented to [place]. Patient was offered sperm banking, but declined.\par On 8/6, induction with Dauno/Cytararbine was started (cytarabine 100/m2 and dauno 90/m2) \par He received a seven day course of Zosyn for presumed RUL PNA, then transitioned to  levaquin, posaconazole and Acyclovir for ppx for neutropenia. Course c/b neutropenic fevers, cultures negative, repeat CT 8/14 without infectious source. He was on Cefepime but developed a rash, changed to meropenem. Rash improved. \par Day 14 BMbx on 8/19 was hypocellular with chemotherapeutic effect; however, per hematopathology, appears to be earlier regeneration than would typically seen which is concerning for persistent disease at this point, and the earliest cells are CD34 positive and his myeloblasts on initial presentation were CD34 negative. Thus, this may simply represent early regeneration of his marrow. Plan is to await count recovery and repeat biopsy.  \par Patient discharged home on 8/29/21 when ANC >500 [de-identified] : Eating well since discharge. \par c/o hemorrhoidal pain. Hemorrhoids started a few days prior to discharge. He was sent home with rectal ointment and witch hazel. \par \par BMBx done on 9/2: Cellular bone marrow with trilineage hematopoiesis with maturation and megakaryocytosis (history of AML).\par Pt feels well, CBC today showed count stable\par \par s/p C1 HIDAC 9/17-9/22 \par c/o leg pain after chemo in the hospital \par \par He presented to the ED on 10/9 due to fever the night of presentation. The patient went to sleep around 10pm and woke up at 3am feeling warm and clammy. He took his temperature orally at home and it was 100.7. The day prior to presentation (10/8/21), the patient went to [facility] for an appointment to get his platelet count checked. He states he was feeling a bit run down and thought he was going to need a transfusion, but he did not need a transfusion. He was afebrile during the time of the appointment and went home afterwards. Around 3pm, the patient had bilateral crampy leg pain that was similar to the leg pain he felt during his consolidation therapy treatment. He took hydrocodone and the pain improved. The patient had one episode of diarrhea three days prior to presentation and has been bothered by rectal pain associated with his "large hemorrhoids. He denies any bleeding per rectum. He also feels like his mouth has been more dry than usual over the past several days, but denies all other symptoms. \par CT Abdomen and Pelvis w/ Oral Cont and w/ IV Conttrast on 10/9 revealed Region of hypoenhancement upper pole left kidney; please correlate clinically for possible pyelonephritis. No hydronephrosis or perinephric stranding. No rectal abscess.\par CT Chest No Contrast on 10/9 revealed Clear lungs.\par 10/9- BCX NGTD and 10/9- UCX (-)\par He ate some cold salad late last night, had upsetting stomach and diarrhea this morning. Will take Imodium for diarrhea. \par \par C2 is due on 10/15, pt wants deferring to next Monday after his diarrhea improved. \par Admission of  C2 was postponed due to worsening diarrhea. Went to ED on 10/15 due to worsening watery diarrhea. GI PCR neg, C Diff neg\par Given negative stool studies, suspect diarrhea was likely chemo-related. S/p cycle 2 Consolidation with HIDAC started on 10/25. Patient received IV hydration, strict I/O, antiemetics, monitoring of CBC and CMP and for cerebellar toxicity. PRedforte eye drops given to prevent chemical conjunctivitis. Patient with fever throughout course of treatment. Cultures negative. Due to fever probably related to HIDAC, patient received dexamethasone prior to the last 2 doses of cytatabine. \par He is seen today accompanied by his father, pt feels fatigue after chemo, other than that he feels fine. Denied any fever, chills diarrhea. \par \par Pt was admitted on 11/13-11/17 s/p right first digit laceration repair on 11/12 and presenting with erythema and pain at the site of laceration repair. Patient reported he was feeling unwell prior to suture placement with body aches, chills, night sweats and subjective fevers. Patient last BM 4 days ago. Has bruise to left inner thigh. Patient reports he keeps his PICC site clean and intact which was placed in 9/2021. Upon admission to the hospital ID was consulted started on Zosyn , GI consulted for rectal pain secondary to hemorrhoids and palliative consulted for pain control.\par  \par C3 on 11/26, tolerated well. He was seen today after discharge, accompanied by his father. He admitted feeling tired, denied any f/c, diarrhea. Right hand suture site healing well, no abnormal erythema or discharge. \par He will have Fulphilia today. \par C3 HIDAC 11/26-12/1\par He presents to the hospital due to epistaxis and neutropenic fever w/Temp 100.3 on 12/1. Per patient, he reports that he was feeling sinus congestion and pressure on his L side, blew his nose and bleeding began from L nare without improvement prompting arrival to the emergency department. Feels mild L sinus congestion.  L nasal cavity packed with absorbable packing; F/U ENT clinic outpatient. Pan culture obtained-with No growth currently\par CBC rechecked today recovered. He feels well overall , had lower abdominal pain last night after ate cheese, now improved. Denied any fever chills bloody stool or diarrhea. \par \par s/p C4 HiDAC 1/5/22\par Pt has known grade 1 AST and grade 3 ALT transaminitis. Presented this admission with transamintitis. Abd sono  performed and revealed Borderline/mild hepatomegaly with hepatic steatosis. Splenomegaly. Focal area of left upper pole increased renal cortical echogenicity, corresponding to an area of hypoattenuation seen on prior CT chest, \par abdomen and pelvis dated 10/9/2021. This is nonspecific and may reflect focal edema. Patient received IV hydration, antiemetics, monitoring of CBC and electrolytes. Predforte eye drops provided to prevent chemical conjunctivitis. Patient was monitored for cerebellar toxicity. Nystagmus checks performed. Hospital course complicated by fever blood cultures showed (-), most likely febrile secondary to HIDAC treatment. To receive Fulphila today.\par \par Pt presented to the hospital on 1/18/22 due fever of 100.4, weakness, decreased WBC and shortness of breath while at home. Patient reports that his pain is more controlled s/p pain medication and his shortness of breath has resolved. He denied chills, cough, chest pain, palpitations.\par Pan culture (blood +Ucx) were negative, CXR reveals clear lungs, COVID PCR - not detect. Pt started on empiric Zosyn, Diflucan and Acyclovir added prophylactically for neutropenic fever. He was transfused with platelets and PRBC as per supportive parameters (>10k/>7) respectively.\par He feels well overall now, denied any fever, chills or pain. \par \par BMbx 2/11/22: Cellular bone marrow with erythroid predominant trilineage hematopoiesis\par No morphologic or immunophenotypic evidence of persistent acute myeloid leukemia\par Non-necrotizing granulomas\par Normal male karyotype and normal onkosight myeloid NGS panel study.\par \par He was admitted for back pain 2/2 herniated disk - Rx'd pain meds and a Medrol pack. \par \par Pt went to ED due to acute RUQ pain on 4/16, patient underwent laparoscopic cholecystotomy on 4/17. \par \par He reports feeling well. He reports having severe anxiety when he sees any bruising or gum bleeding. He has seen a therapist but would like to see someone else. \par Saw psych Dr. Lozano on 7/18 TEB\par He is getting PT for his back pain. \par He occasionally has some gum bleeding still. \par \par Pt was seen today accompanied by his father. He feels well, eating healthy food and doing exercise. Will go back to work this month. \par He f/u w/psych Dr. Lozano monthly, currently is on Zoloft 100mg dialy, and Zolpidem aHS prn for insomnia\par Denied any new complaints.\par \par Rapid test at home showed COVID 19 positive on last Wed 10/12. Patient's father was tested positive on Friday as well. He had scratch throat, fatigue and low grade fever. Pt didn't receive any COVID 19 meds, admitted he felt better, only slight fatigue, denied fever/chill/SOB. \par CBC today stable.  \par NPM1Q to [facility] lab on 10/19: negative\par He went to ED on 11/19 for rectal abscess, I&D done in the ED. \par \par He is doing well. No new complaints.

## 2023-01-19 NOTE — ED ADULT TRIAGE NOTE - TEMPERATURE IN CELSIUS (DEGREES C)
Left voicemail message for patient to return call  Clinic number provided for call back  Attempt #1    Also sending information below to surgery team per Dr. Ramírez  
Nicolasa Ramírez DO sent to [facility] Nurse Msg Pool  His HBA1c is 8.5 which is higher. He sees DM ed tomorrow so they can discuss as well. I am ok with surgery but there could be a cut off with his surgeon so give them a heads up what his HBA1c is     
Please see message below,Pt returning clinic call  call back anytime   
Pt was informed of note below.    FYI sent to Dr. Joshua's office to see if there is concern regarding A1C of 8.5.  
36.6

## 2023-01-20 LAB
MISCELLANEOUS TEST: NORMAL
PROC NAME: NORMAL

## 2023-01-24 NOTE — ED PROVIDER NOTE - CARDIAC RATE
----- Message from Yimi Troncoso sent at 1/24/2023  8:06 AM EST -----  Regarding: Cold symptoms   Good Morning,  When I saw you two weeks ago I was experiencing cold symptoms, I took COVID Test, negative, and you tested me for influenza, also negative. I am still experiencing symptoms such as headaches, sore throat, cough, and now a burning pain in sinuses. The discharge from the nose does have a green tint and sometimes some blood. Wondering if you could prescribe something?       Thanks    Chester Rosenberg TACHYCARDIC

## 2023-01-25 NOTE — H&P ADULT - PATIENT'S PREFERRED PRONOUN
Review of Systems  Sedated on ventilator   Objective:     Vital Signs (Most Recent):  Temp: 98.2 °F (36.8 °C) (01/25/23 0701)  Pulse: (!) 59 (01/25/23 1000)  Resp: (!) 21 (01/25/23 1000)  BP: 111/62 (01/25/23 1000)  SpO2: 99 % (01/25/23 1000)   Vital Signs (24h Range):  Temp:  [98.2 °F (36.8 °C)-99.2 °F (37.3 °C)] 98.2 °F (36.8 °C)  Pulse:  [52-73] 59  Resp:  [20-21] 21  SpO2:  [97 %-100 %] 99 %  BP: ()/(51-83) 111/62     Weight: 55.8 kg (123 lb) (new weight with tortoise and wedge)  Body mass index is 21.11 kg/m².    Intake/Output Summary (Last 24 hours) at 1/25/2023 1116  Last data filed at 1/25/2023 0800  Gross per 24 hour   Intake 2026.09 ml   Output 1295 ml   Net 731.09 ml      Physical Exam  Vitals and nursing note reviewed.   Constitutional:       Comments: Sedated on vent   HENT:      Head: Normocephalic and atraumatic.   Cardiovascular:      Rate and Rhythm: Normal rate.      Heart sounds: Normal heart sounds.   Pulmonary:      Effort: Pulmonary effort is normal.      Breath sounds: Normal breath sounds.      Comments: On vent  Abdominal:      General: Abdomen is flat.      Palpations: Abdomen is soft.       Significant Labs: All pertinent labs within the past 24 hours have been reviewed.    Significant Imaging: I have reviewed all pertinent imaging results/findings within the past 24 hours.   Him/He

## 2023-02-01 NOTE — PATIENT PROFILE ADULT - STATED REASON FOR ADMISSION
0700 Bedside and Verbal shift change report given to [employer] (oncoming nurse) by Chris Camejo RN (offgoing nurse). Report included the following information SBAR, Kardex, ED Summary, Intake/Output, MAR, Recent Results, and Cardiac Rhythm NSR .     1537 orders received for VBG potassium check, Camilo Nichole MD to clarify order- stated that a plasma potassium lab draw was needed. Spoke to lab - stated to draw potassium in a red tube for testing    1553 Critical result received, MD notified    End of Shift Note    Bedside shift change report given to Donald Solorzano (oncoming nurse) by Angela Aguilera RN (offgoing nurse). Report included the following information SBAR, Kardex, ED Summary, Intake/Output, MAR, Recent Results, and Cardiac Rhythm NSR    Shift worked:  7a to 7p     Shift summary and any significant changes:     Unable to collect plasma potassium, night RN made aware    Family visited pt today     Concerns for physician to address:        Zone phone for oncoming shift:           Activity:  Activity Level: Bed Rest  Number times ambulated in hallways past shift: 0  Number of times OOB to chair past shift: 0    Cardiac:   Cardiac Monitoring: Yes      Cardiac Rhythm: Sinus Rhythm    Access:  Current line(s): PIV     Genitourinary:   Urinary status: alvarado    Respiratory:   O2 Device: Heated, Hi flow nasal cannula  Chronic home O2 use?: NO  Incentive spirometer at bedside: NO       GI:  Last Bowel Movement Date: 01/27/23  Current diet:  ADULT DIET Regular  Passing flatus: YES  Tolerating current diet: YES       Pain Management:   Patient states pain is manageable on current regimen: YES    Skin:  Javon Score: 14  Interventions: turn team, speciality bed, float heels, increase time out of bed, foam dressing, PT/OT consult, limit briefs, internal/external urinary devices, and nutritional support     Patient Safety:  Fall Score:  Total Score: 4  Interventions: bed/chair alarm, assistive device (walker, cane, etc), gripper socks, pt to call before getting OOB, stay with me (per policy), and gait belt  High Fall Risk: Yes    Length of Stay:  Expected LOS: 3d 12h  Actual LOS: Samuel Jenkins, RN nosebleed

## 2023-02-08 ENCOUNTER — RESULT REVIEW (OUTPATIENT)
Age: 38
End: 2023-02-08

## 2023-02-08 ENCOUNTER — APPOINTMENT (OUTPATIENT)
Dept: HEMATOLOGY ONCOLOGY | Facility: CLINIC | Age: 38
End: 2023-02-08

## 2023-02-08 ENCOUNTER — APPOINTMENT (OUTPATIENT)
Dept: HEMATOLOGY ONCOLOGY | Facility: CLINIC | Age: 38
End: 2023-02-08
Payer: COMMERCIAL

## 2023-02-08 VITALS
OXYGEN SATURATION: 99 % | RESPIRATION RATE: 16 BRPM | HEART RATE: 71 BPM | WEIGHT: 196.43 LBS | SYSTOLIC BLOOD PRESSURE: 127 MMHG | BODY MASS INDEX: 27.65 KG/M2 | DIASTOLIC BLOOD PRESSURE: 83 MMHG | TEMPERATURE: 97.1 F

## 2023-02-08 LAB
ALBUMIN SERPL ELPH-MCNC: 4.7 G/DL
ALP BLD-CCNC: 103 U/L
ALT SERPL-CCNC: 37 U/L
ANION GAP SERPL CALC-SCNC: 11 MMOL/L
AST SERPL-CCNC: 20 U/L
BASOPHILS # BLD AUTO: 0.01 K/UL — SIGNIFICANT CHANGE UP (ref 0–0.2)
BASOPHILS NFR BLD AUTO: 0.3 % — SIGNIFICANT CHANGE UP (ref 0–2)
BILIRUB SERPL-MCNC: 0.2 MG/DL
BUN SERPL-MCNC: 18 MG/DL
CALCIUM SERPL-MCNC: 9.7 MG/DL
CHLORIDE SERPL-SCNC: 104 MMOL/L
CO2 SERPL-SCNC: 25 MMOL/L
CREAT SERPL-MCNC: 0.93 MG/DL
EGFR: 108 ML/MIN/1.73M2
EOSINOPHIL # BLD AUTO: 0.04 K/UL — SIGNIFICANT CHANGE UP (ref 0–0.5)
EOSINOPHIL NFR BLD AUTO: 1.2 % — SIGNIFICANT CHANGE UP (ref 0–6)
GLUCOSE SERPL-MCNC: 102 MG/DL
HCT VFR BLD CALC: 40.8 % — SIGNIFICANT CHANGE UP (ref 39–50)
HGB BLD-MCNC: 13.8 G/DL — SIGNIFICANT CHANGE UP (ref 13–17)
IMM GRANULOCYTES NFR BLD AUTO: 0 % — SIGNIFICANT CHANGE UP (ref 0–0.9)
LDH SERPL-CCNC: 118 U/L
LYMPHOCYTES # BLD AUTO: 0.93 K/UL — LOW (ref 1–3.3)
LYMPHOCYTES # BLD AUTO: 27 % — SIGNIFICANT CHANGE UP (ref 13–44)
MCHC RBC-ENTMCNC: 32.6 PG — SIGNIFICANT CHANGE UP (ref 27–34)
MCHC RBC-ENTMCNC: 33.8 G/DL — SIGNIFICANT CHANGE UP (ref 32–36)
MCV RBC AUTO: 96.5 FL — SIGNIFICANT CHANGE UP (ref 80–100)
MONOCYTES # BLD AUTO: 0.41 K/UL — SIGNIFICANT CHANGE UP (ref 0–0.9)
MONOCYTES NFR BLD AUTO: 11.9 % — SIGNIFICANT CHANGE UP (ref 2–14)
NEUTROPHILS # BLD AUTO: 2.05 K/UL — SIGNIFICANT CHANGE UP (ref 1.8–7.4)
NEUTROPHILS NFR BLD AUTO: 59.6 % — SIGNIFICANT CHANGE UP (ref 43–77)
NRBC # BLD: 0 /100 WBCS — SIGNIFICANT CHANGE UP (ref 0–0)
PLATELET # BLD AUTO: 140 K/UL — LOW (ref 150–400)
POTASSIUM SERPL-SCNC: 4.1 MMOL/L
PROT SERPL-MCNC: 7.2 G/DL
RBC # BLD: 4.23 M/UL — SIGNIFICANT CHANGE UP (ref 4.2–5.8)
RBC # FLD: 13 % — SIGNIFICANT CHANGE UP (ref 10.3–14.5)
SODIUM SERPL-SCNC: 140 MMOL/L
WBC # BLD: 3.44 K/UL — LOW (ref 3.8–10.5)
WBC # FLD AUTO: 3.44 K/UL — LOW (ref 3.8–10.5)

## 2023-02-08 PROCEDURE — 99214 OFFICE O/P EST MOD 30 MIN: CPT

## 2023-02-10 NOTE — PHYSICAL EXAM
[Fully active, able to carry on all pre-disease performance without restriction] : Status 0 - Fully active, able to carry on all pre-disease performance without restriction [Normal] : affect appropriate [de-identified] : a 1 cm long laceration with sutures on the right thrum, healing well

## 2023-02-10 NOTE — HISTORY OF PRESENT ILLNESS
[de-identified] : 37yo M w/ HTN and newly diagnosed AML here for f/u. \par \par He presented to the hospital on 7/30/21 with complaints of dizziness, fatigue and severe RUQ pain for 3 days. CT A/P revealed fatty liver and small R pleural effusion. WBC 12k with 17% blasts. Peripheral flow cytometry showed 13% myeloblasts. FLT3(-). BMbx was done on 8/4/21 - confirmed AML. 46,XY                       {20                       }\par Foundation: mutations in DNMT3A R882H, NRAS G12D, NPM1 W288fs*12\par Pt consented to Hyrum. Patient was offered sperm banking, but declined.\par On 8/6, induction with Dauno/Cytararbine was started (cytarabine 100/m2 and dauno 90/m2) \par He received a seven day course of Zosyn for presumed RUL PNA, then transitioned to  levaquin, posaconazole and Acyclovir for ppx for neutropenia. Course c/b neutropenic fevers, cultures negative, repeat CT 8/14 without infectious source. He was on Cefepime but developed a rash, changed to meropenem. Rash improved. \par Day 14 BMbx on 8/19 was hypocellular with chemotherapeutic effect; however, per hematopathology, appears to be earlier regeneration than would typically seen which is concerning for persistent disease at this point, and the earliest cells are CD34 positive and his myeloblasts on initial presentation were CD34 negative. Thus, this may simply represent early regeneration of his marrow. Plan is to await count recovery and repeat biopsy.  \par Patient discharged home on 8/29/21 when ANC >500 [de-identified] : Eating well since discharge. \par c/o hemorrhoidal pain. Hemorrhoids started a few days prior to discharge. He was sent home with rectal ointment and witch hazel. \par \par BMBx done on 9/2: Cellular bone marrow with trilineage hematopoiesis with maturation and megakaryocytosis (history of AML).\par Pt feels well, CBC today showed count stable\par \par s/p C1 HIDAC 9/17-9/22 \par c/o leg pain after chemo in the hospital \par \par He presented to the ED on 10/9 due to fever the night of presentation. The patient went to sleep around 10pm and woke up at 3am feeling warm and clammy. He took his temperature orally at home and it was 100.7. The day prior to presentation (10/8/21), the patient went to [facility] for an appointment to get his platelet count checked. He states he was feeling a bit run down and thought he was going to need a transfusion, but he did not need a transfusion. He was afebrile during the time of the appointment and went home afterwards. Around 3pm, the patient had bilateral crampy leg pain that was similar to the leg pain he felt during his consolidation therapy treatment. He took hydrocodone and the pain improved. The patient had one episode of diarrhea three days prior to presentation and has been bothered by rectal pain associated with his "large hemorrhoids. He denies any bleeding per rectum. He also feels like his mouth has been more dry than usual over the past several days, but denies all other symptoms. \par CT Abdomen and Pelvis w/ Oral Cont and w/ IV Conttrast on 10/9 revealed Region of hypoenhancement upper pole left kidney; please correlate clinically for possible pyelonephritis. No hydronephrosis or perinephric stranding. No rectal abscess.\par CT Chest No Contrast on 10/9 revealed Clear lungs.\par 10/9- BCX NGTD and 10/9- UCX (-)\par He ate some cold salad late last night, had upsetting stomach and diarrhea this morning. Will take Imodium for diarrhea. \par \par C2 is due on 10/15, pt wants deferring to next Monday after his diarrhea improved. \par Admission of  C2 was postponed due to worsening diarrhea. Went to ED on 10/15 due to worsening watery diarrhea. GI PCR neg, C Diff neg\par Given negative stool studies, suspect diarrhea was likely chemo-related. S/p cycle 2 Consolidation with HIDAC started on 10/25. Patient received IV hydration, strict I/O, antiemetics, monitoring of CBC and CMP and for cerebellar toxicity. PRedforte eye drops given to prevent chemical conjunctivitis. Patient with fever throughout course of treatment. Cultures negative. Due to fever probably related to HIDAC, patient received dexamethasone prior to the last 2 doses of cytatabine. \par He is seen today accompanied by his father, pt feels fatigue after chemo, other than that he feels fine. Denied any fever, chills diarrhea. \par \par Pt was admitted on 11/13-11/17 s/p right first digit laceration repair on 11/12 and presenting with erythema and pain at the site of laceration repair. Patient reported he was feeling unwell prior to suture placement with body aches, chills, night sweats and subjective fevers. Patient last BM 4 days ago. Has bruise to left inner thigh. Patient reports he keeps his PICC site clean and intact which was placed in 9/2021. Upon admission to the hospital ID was consulted started on Zosyn , GI consulted for rectal pain secondary to hemorrhoids and palliative consulted for pain control.\par  \par C3 on 11/26, tolerated well. He was seen today after discharge, accompanied by his father. He admitted feeling tired, denied any f/c, diarrhea. Right hand suture site healing well, no abnormal erythema or discharge. \par He will have Fulphilia today. \par C3 HIDAC 11/26-12/1\par He presents to the hospital due to epistaxis and neutropenic fever w/Temp 100.3 on 12/1. Per patient, he reports that he was feeling sinus congestion and pressure on his L side, blew his nose and bleeding began from L nare without improvement prompting arrival to the emergency department. Feels mild L sinus congestion.  L nasal cavity packed with absorbable packing; F/U ENT clinic outpatient. Pan culture obtained-with No growth currently\par CBC rechecked today recovered. He feels well overall , had lower abdominal pain last night after ate cheese, now improved. Denied any fever chills bloody stool or diarrhea. \par \par s/p C4 HiDAC 1/5/22\par Pt has known grade 1 AST and grade 3 ALT transaminitis. Presented this admission with transamintitis. Abd sono  performed and revealed Borderline/mild hepatomegaly with hepatic steatosis. Splenomegaly. Focal area of left upper pole increased renal cortical echogenicity, corresponding to an area of hypoattenuation seen on prior CT chest, \par abdomen and pelvis dated 10/9/2021. This is nonspecific and may reflect focal edema. Patient received IV hydration, antiemetics, monitoring of CBC and electrolytes. Predforte eye drops provided to prevent chemical conjunctivitis. Patient was monitored for cerebellar toxicity. Nystagmus checks performed. Hospital course complicated by fever blood cultures showed (-), most likely febrile secondary to HIDAC treatment. To receive Fulphila today.\par \par Pt presented to the hospital on 1/18/22 due fever of 100.4, weakness, decreased WBC and shortness of breath while at home. Patient reports that his pain is more controlled s/p pain medication and his shortness of breath has resolved. He denied chills, cough, chest pain, palpitations.\par Pan culture (blood +Ucx) were negative, CXR reveals clear lungs, COVID PCR - not detect. Pt started on empiric Zosyn, Diflucan and Acyclovir added prophylactically for neutropenic fever. He was transfused with platelets and PRBC as per supportive parameters (>10k/>7) respectively.\par He feels well overall now, denied any fever, chills or pain. \par \par BMbx 2/11/22: Cellular bone marrow with erythroid predominant trilineage hematopoiesis\par No morphologic or immunophenotypic evidence of persistent acute myeloid leukemia\par Non-necrotizing granulomas\par Normal male karyotype and normal onkosight myeloid NGS panel study.\par \par He was admitted for back pain 2/2 herniated disk - Rx'd pain meds and a Medrol pack. \par \par Pt went to ED due to acute RUQ pain on 4/16, patient underwent laparoscopic cholecystotomy on 4/17. \par \par He reports feeling well. He reports having severe anxiety when he sees any bruising or gum bleeding. He has seen a therapist but would like to see someone else. \par Saw psych Dr. Lozano on 7/18 TEB\par He is getting PT for his back pain. \par He occasionally has some gum bleeding still. \par \par Pt was seen today accompanied by his father. He feels well, eating healthy food and doing exercise. Will go back to work this month. \par He f/u w/psych Dr. Lozano monthly, currently is on Zoloft 100mg dialy, and Zolpidem aHS prn for insomnia\par Denied any new complaints.\par \par Rapid test at home showed COVID 19 positive on last Wed 10/12. Patient's father was tested positive on Friday as well. He had scratch throat, fatigue and low grade fever. Pt didn't receive any COVID 19 meds, admitted he felt better, only slight fatigue, denied fever/chill/SOB. \par CBC today stable.  \par NPM1Q to [facility] lab on 10/19: negative\par He went to ED on 11/19 for rectal abscess, I&D done in the ED. \par \par He is doing well. No new complaints. \par He is not back to work yet. \par Last NPM1Q was checked 1/12/23: negative

## 2023-02-20 NOTE — ED PROVIDER NOTE - CCCP TRG CHIEF CMPLNT
epistaxis, low plts, HX AML Metronidazole Counseling:  I discussed with the patient the risks of metronidazole including but not limited to seizures, nausea/vomiting, a metallic taste in the mouth, nausea/vomiting and severe allergy.

## 2023-02-27 NOTE — ED ADULT TRIAGE NOTE - TEMPERATURE IN CELSIUS (DEGREES C)
Refill passed per [employer], Zenedy protocol.   Requested Prescriptions   Pending Prescriptions Disp Refills    LISINOPRIL 5 MG Oral Tab [Pharmacy Med Name: Lisinopril Oral Tablet 5 MG] 90 tablet 0     Sig: TAKE ONE TABLET BY MOUTH ONE TIME DAILY       Hypertensive Medications Protocol Passed - 2/27/2023  2:13 PM        Passed - In person appointment in the past 12 or next 3 months     Recent Outpatient Visits              1 month ago Giovana Greene MD    Office Visit    1 month ago Administrative encounter    [address], Virtual Visit Charissa Goff PA-C    E-Visit    2 months ago Screen for colon cancer    [address], [address], [place]    Nurse Only    3 months ago Maxime Ricks, [address] [place]    Office Visit    3 years ago Uncontrolled type 2 diabetes mellitus without complication, without long-term current use of insulin [facility])    Giovana Vidal MD    Office Visit          Future Appointments         Provider Department Appt Notes    In 1 week MD Celi Pavon [place] np dm  ee *policy informed to daughter    In 2 weeks [facility], [address], [address], [place] Miguel @ [address] [place] [address] - Last BP reading less than 140/90     BP Readings from Last 1 Encounters:  01/24/23 : 120/78              Passed - CMP or BMP in past 6 months     Recent Results (from the past 4392 hour(s))   COMP METABOLIC PANEL (14)    Collection Time: 11/11/22  9:20 AM   Result Value Ref Range    Glucose 148 (H) 70 - 99 mg/dL    Sodium 140 136 - 145 mmol/L    Potassium 4.3 3.5 - 5.1 mmol/L    Chloride 106 98 - 112 mmol/L    CO2 24.0 21.0 - 32.0 mmol/L    Anion Gap 10 0 - 18 mmol/L    BUN 14 7 - 18 mg/dL    Creatinine 0.65 0.55 - 1.02 mg/dL    BUN/CREA Ratio 21.5 (H) 10.0 - 20.0    Calcium, Total 9.7 8.5 - 10.1 mg/dL    Calculated Osmolality 293 275 - 295 mOsm/kg    eGFR-Cr 98 >=60 mL/min/1.73m2    ALT 39 13 - 56 U/L    AST 31 15 - 37 U/L    Alkaline Phosphatase 76 50 - 130 U/L    Bilirubin, Total 0.4 0.1 - 2.0 mg/dL    Total Protein 7.5 6.4 - 8.2 g/dL    Albumin 3.6 3.4 - 5.0 g/dL    Globulin  3.9 2.8 - 4.4 g/dL    A/G Ratio 0.9 (L) 1.0 - 2.0    Patient Fasting for CMP? Yes      *Note: Due to a large number of results and/or encounters for the requested time period, some results have not been displayed. A complete set of results can be found in Results Review.                Passed - In person appointment or virtual visit in the past 6 months     Recent Outpatient Visits              1 month ago Jose Miguel Huber MD    Office Visit    1 month ago Administrative encounter    [address], Virtual Visit Елена Kelly PA-C    E-Visit    2 months ago Screen for colon cancer    [address], [address], [address]    Nurse Only    3 months ago Mohan Kulkarni MD    Office Visit    3 years ago Uncontrolled type 2 diabetes mellitus without complication, without long-term current use of insulin [facility])    Mohan Ingram MD    Office Visit          Future Appointments         Provider Department Appt Notes    In 1 week Eduardo Andino MD [address] [facility], [place] np dm  ee *policy informed to daughter    In 2 weeks [facility], [address], [address], [place] @ [address] or GFRNAA > 50     GFR Evaluation  EGFRCR: 98 , resulted on 11/11/2022             Recent Outpatient Visits              1 month ago [address] [facility] Jasper England MD    Office Visit    1 month ago Administrative encounter    [address], Virtual Visit Darlin Morrissey PA-C    E-Visit    2 months ago Screen for colon cancer    [address], [address], [place]    Nurse Only    3 months ago Oscar Santiago MD    Office Visit    3 years ago Uncontrolled type 2 diabetes mellitus without complication, without long-term current use of insulin [facility])    Oscar Solano MD    Office Visit          Future Appointments         Provider Department Appt Notes    In 1 week Timi Devi MD [address] [facility], [place] np dm  ee *policy informed to daughter    In 2 weeks [facility], [address], [address], [place] Miguel @ [address] [place] [address] 36.8

## 2023-03-09 ENCOUNTER — APPOINTMENT (OUTPATIENT)
Dept: HEMATOLOGY ONCOLOGY | Facility: CLINIC | Age: 38
End: 2023-03-09
Payer: COMMERCIAL

## 2023-03-09 ENCOUNTER — RESULT REVIEW (OUTPATIENT)
Age: 38
End: 2023-03-09

## 2023-03-09 VITALS
OXYGEN SATURATION: 99 % | WEIGHT: 199.08 LBS | HEIGHT: 70.67 IN | TEMPERATURE: 98 F | BODY MASS INDEX: 27.87 KG/M2 | HEART RATE: 74 BPM | DIASTOLIC BLOOD PRESSURE: 83 MMHG | RESPIRATION RATE: 16 BRPM | SYSTOLIC BLOOD PRESSURE: 123 MMHG

## 2023-03-09 LAB
BASOPHILS # BLD AUTO: 0.01 K/UL — SIGNIFICANT CHANGE UP (ref 0–0.2)
BASOPHILS NFR BLD AUTO: 0.3 % — SIGNIFICANT CHANGE UP (ref 0–2)
EOSINOPHIL # BLD AUTO: 0.04 K/UL — SIGNIFICANT CHANGE UP (ref 0–0.5)
EOSINOPHIL NFR BLD AUTO: 1 % — SIGNIFICANT CHANGE UP (ref 0–6)
HCT VFR BLD CALC: 40.6 % — SIGNIFICANT CHANGE UP (ref 39–50)
HGB BLD-MCNC: 14.2 G/DL — SIGNIFICANT CHANGE UP (ref 13–17)
IMM GRANULOCYTES NFR BLD AUTO: 0.3 % — SIGNIFICANT CHANGE UP (ref 0–0.9)
LYMPHOCYTES # BLD AUTO: 0.99 K/UL — LOW (ref 1–3.3)
LYMPHOCYTES # BLD AUTO: 25.5 % — SIGNIFICANT CHANGE UP (ref 13–44)
MCHC RBC-ENTMCNC: 32.8 PG — SIGNIFICANT CHANGE UP (ref 27–34)
MCHC RBC-ENTMCNC: 35 G/DL — SIGNIFICANT CHANGE UP (ref 32–36)
MCV RBC AUTO: 93.8 FL — SIGNIFICANT CHANGE UP (ref 80–100)
MONOCYTES # BLD AUTO: 0.42 K/UL — SIGNIFICANT CHANGE UP (ref 0–0.9)
MONOCYTES NFR BLD AUTO: 10.8 % — SIGNIFICANT CHANGE UP (ref 2–14)
NEUTROPHILS # BLD AUTO: 2.41 K/UL — SIGNIFICANT CHANGE UP (ref 1.8–7.4)
NEUTROPHILS NFR BLD AUTO: 62.1 % — SIGNIFICANT CHANGE UP (ref 43–77)
NRBC # BLD: 0 /100 WBCS — SIGNIFICANT CHANGE UP (ref 0–0)
PLATELET # BLD AUTO: 134 K/UL — LOW (ref 150–400)
RBC # BLD: 4.33 M/UL — SIGNIFICANT CHANGE UP (ref 4.2–5.8)
RBC # FLD: 12.7 % — SIGNIFICANT CHANGE UP (ref 10.3–14.5)
WBC # BLD: 3.88 K/UL — SIGNIFICANT CHANGE UP (ref 3.8–10.5)
WBC # FLD AUTO: 3.88 K/UL — SIGNIFICANT CHANGE UP (ref 3.8–10.5)

## 2023-03-09 PROCEDURE — 99213 OFFICE O/P EST LOW 20 MIN: CPT

## 2023-03-09 NOTE — ASSESSMENT
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 \par Started induction with Dauno/Cytarabine on 8/6/21. \par Pt's counts now recovered, remission marrow done on 9/2- in remission. Proceed to consolidation with 4 cycles of HIDAC. C1 HIDAC on 9/17, c/b neutropenic fever and diarrhea. C2 HIDAC on 10/25, was postponed for 1 week due to admission for diarrhea, given negative stool studies, suspect diarrhea was likely chemo-related. Pt was admitted again 11/13-11/17 for sepsis due to thumb cellulitis after laceration. C3 on 11/26, had Fulphilia on C3 c/b epistaxis and neutropenic fever w/Temp 100.3. C4 HiDAC 1/5/22, c/b transaminitis and neutropenic fever\par He had laparoscopic cholecystotomy on 4/17. \par s/p BMbx 2/11/22 showing CR.\par He has non-necrotizing granulomas noted in BMbx, unclear significance\par CBC today stable; platelets slightly below normal but stable. \par NPM1Q to [facility] lab done on 1/12/23: negative, will monitor every 3 months, will check in April again\par Discussed supportive groups to help with anxiety of cancer relapse -information provided\par Discussed to follow up with PT for back pain and GI for the loose stools. \par Will check testosterone levels\par Cont f/u with Dr. Rich monthly\par All questions answered\par RTC 2 months\par \par Case and management discussed with Dr. Yancey\par \par \par \par \par \par

## 2023-03-09 NOTE — HISTORY OF PRESENT ILLNESS
[de-identified] : 37yo M w/ HTN and newly diagnosed AML here for f/u. \par \par He presented to the hospital on 7/30/21 with complaints of dizziness, fatigue and severe RUQ pain for 3 days. CT A/P revealed fatty liver and small R pleural effusion. WBC 12k with 17% blasts. Peripheral flow cytometry showed 13% myeloblasts. FLT3(-). BMbx was done on 8/4/21 - confirmed AML. 46,XY                        {20                        }\par Foundation: mutations in DNMT3A R882H, NRAS G12D, NPM1 W288fs*12\par Pt consented to [place]. Patient was offered sperm banking, but declined.\par On 8/6, induction with Dauno/Cytararbine was started (cytarabine 100/m2 and dauno 90/m2) \par He received a seven day course of Zosyn for presumed RUL PNA, then transitioned to  levaquin, posaconazole and Acyclovir for ppx for neutropenia. Course c/b neutropenic fevers, cultures negative, repeat CT 8/14 without infectious source. He was on Cefepime but developed a rash, changed to meropenem. Rash improved. \par Day 14 BMbx on 8/19 was hypocellular with chemotherapeutic effect; however, per hematopathology, appears to be earlier regeneration than would typically seen which is concerning for persistent disease at this point, and the earliest cells are CD34 positive and his myeloblasts on initial presentation were CD34 negative. Thus, this may simply represent early regeneration of his marrow. Plan is to await count recovery and repeat biopsy.  \par Patient discharged home on 8/29/21 when ANC >500 [de-identified] : Eating well since discharge. \par c/o hemorrhoidal pain. Hemorrhoids started a few days prior to discharge. He was sent home with rectal ointment and witch hazel. \par \par BMBx done on 9/2: Cellular bone marrow with trilineage hematopoiesis with maturation and megakaryocytosis (history of AML).\par Pt feels well, CBC today showed count stable\par \par s/p C1 HIDAC 9/17-9/22 \par c/o leg pain after chemo in the hospital \par \par He presented to the ED on 10/9 due to fever the night of presentation. The patient went to sleep around 10pm and woke up at 3am feeling warm and clammy. He took his temperature orally at home and it was 100.7. The day prior to presentation (10/8/21), the patient went to [facility] for an appointment to get his platelet count checked. He states he was feeling a bit run down and thought he was going to need a transfusion, but he did not need a transfusion. He was afebrile during the time of the appointment and went home afterwards. Around 3pm, the patient had bilateral crampy leg pain that was similar to the leg pain he felt during his consolidation therapy treatment. He took hydrocodone and the pain improved. The patient had one episode of diarrhea three days prior to presentation and has been bothered by rectal pain associated with his "large hemorrhoids. He denies any bleeding per rectum. He also feels like his mouth has been more dry than usual over the past several days, but denies all other symptoms. \par CT Abdomen and Pelvis w/ Oral Cont and w/ IV Conttrast on 10/9 revealed Region of hypoenhancement upper pole left kidney; please correlate clinically for possible pyelonephritis. No hydronephrosis or perinephric stranding. No rectal abscess.\par CT Chest No Contrast on 10/9 revealed Clear lungs.\par 10/9- BCX NGTD and 10/9- UCX (-)\par He ate some cold salad late last night, had upsetting stomach and diarrhea this morning. Will take Imodium for diarrhea. \par \par C2 is due on 10/15, pt wants deferring to next Monday after his diarrhea improved. \par Admission of  C2 was postponed due to worsening diarrhea. Went to ED on 10/15 due to worsening watery diarrhea. GI PCR neg, C Diff neg\par Given negative stool studies, suspect diarrhea was likely chemo-related. S/p cycle 2 Consolidation with HIDAC started on 10/25. Patient received IV hydration, strict I/O, antiemetics, monitoring of CBC and CMP and for cerebellar toxicity. PRedforte eye drops given to prevent chemical conjunctivitis. Patient with fever throughout course of treatment. Cultures negative. Due to fever probably related to HIDAC, patient received dexamethasone prior to the last 2 doses of cytatabine. \par He is seen today accompanied by his father, pt feels fatigue after chemo, other than that he feels fine. Denied any fever, chills diarrhea. \par \par Pt was admitted on 11/13-11/17 s/p right first digit laceration repair on 11/12 and presenting with erythema and pain at the site of laceration repair. Patient reported he was feeling unwell prior to suture placement with body aches, chills, night sweats and subjective fevers. Patient last BM 4 days ago. Has bruise to left inner thigh. Patient reports he keeps his PICC site clean and intact which was placed in 9/2021. Upon admission to the hospital ID was consulted started on Zosyn , GI consulted for rectal pain secondary to hemorrhoids and palliative consulted for pain control.\par  \par C3 on 11/26, tolerated well. He was seen today after discharge, accompanied by his father. He admitted feeling tired, denied any f/c, diarrhea. Right hand suture site healing well, no abnormal erythema or discharge. \par He will have Fulphilia today. \par C3 HIDAC 11/26-12/1\par He presents to the hospital due to epistaxis and neutropenic fever w/Temp 100.3 on 12/1. Per patient, he reports that he was feeling sinus congestion and pressure on his L side, blew his nose and bleeding began from L nare without improvement prompting arrival to the emergency department. Feels mild L sinus congestion.  L nasal cavity packed with absorbable packing; F/U ENT clinic outpatient. Pan culture obtained-with No growth currently\par CBC rechecked today recovered. He feels well overall , had lower abdominal pain last night after ate cheese, now improved. Denied any fever chills bloody stool or diarrhea. \par \par s/p C4 HiDAC 1/5/22\par Pt has known grade 1 AST and grade 3 ALT transaminitis. Presented this admission with transamintitis. Abd sono  performed and revealed Borderline/mild hepatomegaly with hepatic steatosis. Splenomegaly. Focal area of left upper pole increased renal cortical echogenicity, corresponding to an area of hypoattenuation seen on prior CT chest, \par abdomen and pelvis dated 10/9/2021. This is nonspecific and may reflect focal edema. Patient received IV hydration, antiemetics, monitoring of CBC and electrolytes. Predforte eye drops provided to prevent chemical conjunctivitis. Patient was monitored for cerebellar toxicity. Nystagmus checks performed. Hospital course complicated by fever blood cultures showed (-), most likely febrile secondary to HIDAC treatment. To receive Fulphila today.\par \par Pt presented to the hospital on 1/18/22 due fever of 100.4, weakness, decreased WBC and shortness of breath while at home. Patient reports that his pain is more controlled s/p pain medication and his shortness of breath has resolved. He denied chills, cough, chest pain, palpitations.\par Pan culture (blood +Ucx) were negative, CXR reveals clear lungs, COVID PCR - not detect. Pt started on empiric Zosyn, Diflucan and Acyclovir added prophylactically for neutropenic fever. He was transfused with platelets and PRBC as per supportive parameters (>10k/>7) respectively.\par He feels well overall now, denied any fever, chills or pain. \par \par BMbx 2/11/22: Cellular bone marrow with erythroid predominant trilineage hematopoiesis\par No morphologic or immunophenotypic evidence of persistent acute myeloid leukemia\par Non-necrotizing granulomas\par Normal male karyotype and normal onkosight myeloid NGS panel study.\par \par He was admitted for back pain 2/2 herniated disk - Rx'd pain meds and a Medrol pack. \par \par Pt went to ED due to acute RUQ pain on 4/16, patient underwent laparoscopic cholecystotomy on 4/17. \par \par He reports feeling well. He reports having severe anxiety when he sees any bruising or gum bleeding. He has seen a therapist but would like to see someone else. \par Saw psych Dr. Lozano on 7/18 TEB\par He is getting PT for his back pain. \par He occasionally has some gum bleeding still. \par \par Pt was seen today accompanied by his father. He feels well, eating healthy food and doing exercise. Will go back to work this month. \par He f/u w/psych Dr. Lozano monthly, currently is on Zoloft 100mg dialy, and Zolpidem aHS prn for insomnia\par Denied any new complaints.\par \par Rapid test at home showed COVID 19 positive on last Wed 10/12. Patient's father was tested positive on Friday as well. He had scratch throat, fatigue and low grade fever. Pt didn't receive any COVID 19 meds, admitted he felt better, only slight fatigue, denied fever/chill/SOB. \par CBC today stable.  \par NPM1Q to [facility] lab on 10/19: negative\par He went to ED on 11/19 for rectal abscess, I&D done in the ED. \par \par He is doing well. No new complaints. \par He is not back to work yet. \par Last NPM1Q was checked 1/12/23: negative\par \par 3/9/23: PAtient is here today for follow up. Reports to be doing well. He does state that he continues to have some loose stools for which he will be following up with Gi for. He also reports that he has been having back pain. He was previously on PT and will be returning to PT now that insurance issues are resolved. He reports to be having a low sex drive and noticing that his testicles are shrunken.

## 2023-03-09 NOTE — PHYSICAL EXAM
[Fully active, able to carry on all pre-disease performance without restriction] : Status 0 - Fully active, able to carry on all pre-disease performance without restriction [Normal] : affect appropriate [de-identified] : a 1 cm long laceration with sutures on the right thrum, healing well

## 2023-03-14 LAB
ALBUMIN SERPL ELPH-MCNC: 4.8 G/DL
ALP BLD-CCNC: 88 U/L
ALT SERPL-CCNC: 71 U/L
ANION GAP SERPL CALC-SCNC: 13 MMOL/L
AST SERPL-CCNC: 43 U/L
BILIRUB SERPL-MCNC: 0.6 MG/DL
BUN SERPL-MCNC: 20 MG/DL
CALCIUM SERPL-MCNC: 10.1 MG/DL
CHLORIDE SERPL-SCNC: 99 MMOL/L
CO2 SERPL-SCNC: 25 MMOL/L
CREAT SERPL-MCNC: 0.87 MG/DL
EGFR: 114 ML/MIN/1.73M2
GLUCOSE SERPL-MCNC: 87 MG/DL
LDH SERPL-CCNC: 143 U/L
POTASSIUM SERPL-SCNC: 4.2 MMOL/L
PROT SERPL-MCNC: 7.2 G/DL
SODIUM SERPL-SCNC: 138 MMOL/L
TESTOST SERPL-MCNC: 311 NG/DL

## 2023-03-23 ENCOUNTER — APPOINTMENT (OUTPATIENT)
Dept: GASTROENTEROLOGY | Facility: CLINIC | Age: 38
End: 2023-03-23
Payer: COMMERCIAL

## 2023-03-23 VITALS
OXYGEN SATURATION: 99 % | TEMPERATURE: 98 F | HEIGHT: 71 IN | WEIGHT: 195 LBS | SYSTOLIC BLOOD PRESSURE: 138 MMHG | DIASTOLIC BLOOD PRESSURE: 84 MMHG | BODY MASS INDEX: 27.3 KG/M2 | HEART RATE: 100 BPM

## 2023-03-23 DIAGNOSIS — R19.5 OTHER FECAL ABNORMALITIES: ICD-10-CM

## 2023-03-23 PROCEDURE — 99214 OFFICE O/P EST MOD 30 MIN: CPT

## 2023-03-23 NOTE — PHYSICAL EXAM

## 2023-03-23 NOTE — ASSESSMENT
[FreeTextEntry1] : A low acid / reflux diet was discussed in great detail including not smoking, not drinking alcohol, and not\par consuming foods that irritate the esophagus. It is helpful to eat small meals throughout the day instead\par of large meals. You should avoid eating before bedtime or lying down after you eat. It can be helpful to\par raise the head of your bed six inches. Additionally, you should maintain a healthy weight and good\par posture.. The patient was given written material to take home and review. \par \par We discussed diarrhea at length. Treatment depends on the cause and severity of your diarrhea. You\par should drink plenty of fluids to avoid dehydration. You should avoid drinks that contain caffeine and\par milk. Milk may make diarrhea worse. Your doctor may recommend hydration drinks for your infant or\par child. People with severe dehydration may need fluid replacement via an IV line and hospitalization.\par Avoid eating greasy foods, fatty foods, and alcohol. Bananas, applesauce, rice, and toast are helpful\par foods to eat. If you feel too sick to eat, try sucking on ice chips until you can tolerate food.\par \par Pantoprazole 40 mg PO daily. Medication reviewed side effects and adverse reactions. \par \par Ibs panel to be completed. Stool cxs ordered instructions provided how to obtain sample and where to return specimen. \par \par EGD and Colonoscopy procedure ordered The risks benefits alternatives and complications of the procedure/s were explained to the patient at\par length. The patient was agreeable and we will proceed. Preparation for procedure discussed reviewed side effects and adverse reactions to prep.\par \par I spent 30 minutes with the patient as well as reviewing documents prior to and after the office visit\par \par Patient verbalized understanding of all information provided. All questions answered and reviewed.

## 2023-03-23 NOTE — HISTORY OF PRESENT ILLNESS
[FreeTextEntry1] : 38 yo male with medical h/o Leukemia in remission, patient with complaints of abdominal pain and diarrhea ongoing for a few weeks. Diarrhea may occur 8 times per day stool color at times yellow. He also complains of intermittent nausea. Denies rectal bleeding, blood in the stool, melena or hematemesis.

## 2023-03-27 ENCOUNTER — LABORATORY RESULT (OUTPATIENT)
Age: 38
End: 2023-03-27

## 2023-03-28 ENCOUNTER — TRANSCRIPTION ENCOUNTER (OUTPATIENT)
Age: 38
End: 2023-03-28

## 2023-03-28 LAB
HEMOCCULT STL QL IA: NEGATIVE
TSH SERPL-ACNC: 0.79 UIU/ML

## 2023-03-28 NOTE — PATIENT PROFILE ADULT - CAREGIVER ADDRESS
hx HTN bladder cancer s/p nephrostomy tubes now with ileal conduit presents for 5d of dislodged wire and generalized weakness. no other localizing or infectious symptoms. contrary to triage note pt is animated with normal skin color and interactive on interview. he has no abd pain, distension, sob, cp, focal weakness. notes had some decreased appetite over the last 5d but now is feeling hungry, asking for food. no n/v/d rashes or uri sx. states he was seen on friday in this ER when the tube initially dislodged, at the recommendation of Dr. Esteban (uro). No changes or adjustments were made to the tubing at that visit. He received a call yesterday from Dr. Esteban that he could not adjust the tube, so he came to the ER. states "all I need is my tube fixed". Contrary to triage note pt does not have nephrostomy tubes.
[phone] [phone]

## 2023-03-29 LAB — H PYLORI AG STL QL: NEGATIVE

## 2023-03-30 ENCOUNTER — TRANSCRIPTION ENCOUNTER (OUTPATIENT)
Age: 38
End: 2023-03-30

## 2023-03-30 LAB
BACTERIA STL CULT: NORMAL
BARLEY IGE QN: <0.1 KUA/L
CALPROTECTIN FECAL: 17 UG/G
CHERRY IGE QN: <0.1 KUA/L
COW MILK IGE QN: <0.1 KUA/L
CRAB IGE QN: <0.1 KUA/L
DEPRECATED BARLEY IGE RAST QL: 0
DEPRECATED CHERRY IGE RAST QL: 0
DEPRECATED COW MILK IGE RAST QL: 0
DEPRECATED CRAB IGE RAST QL: 0
DEPRECATED EGG WHITE IGE RAST QL: 0
DEPRECATED OAT IGE RAST QL: 0
DEPRECATED PEANUT IGE RAST QL: 0
DEPRECATED RYE IGE RAST QL: 0
DEPRECATED SOYBEAN IGE RAST QL: 0
DEPRECATED WHEAT IGE RAST QL: 0
EGG WHITE IGE QN: <0.1 KUA/L
OAT IGE QN: <0.1 KUA/L
PEANUT IGE QN: <0.1 KUA/L
RYE IGE QN: <0.1 KUA/L
SOYBEAN IGE QN: <0.1 KUA/L
TOTAL IGE SMQN RAST: 26 KU/L
WHEAT IGE QN: <0.1 KUA/L

## 2023-04-01 LAB
CELIAC DISEASE INTERPRETATION: NORMAL
CELIAC GENE PAIRS PRESENT: YES
DQ ALPHA 1: NORMAL
DQ BETA 1: NORMAL
IMMUNOGLOBULIN A (IGA): 226 MG/DL

## 2023-04-03 ENCOUNTER — APPOINTMENT (OUTPATIENT)
Dept: UROLOGY | Facility: CLINIC | Age: 38
End: 2023-04-03
Payer: COMMERCIAL

## 2023-04-03 VITALS
RESPIRATION RATE: 16 BRPM | DIASTOLIC BLOOD PRESSURE: 84 MMHG | HEART RATE: 76 BPM | SYSTOLIC BLOOD PRESSURE: 136 MMHG | BODY MASS INDEX: 27.3 KG/M2 | WEIGHT: 195 LBS | HEIGHT: 71 IN

## 2023-04-03 PROCEDURE — 99204 OFFICE O/P NEW MOD 45 MIN: CPT

## 2023-04-03 NOTE — HISTORY OF PRESENT ILLNESS
[FreeTextEntry1] : 04/03/2023: Mr. DANIELS is a 37 year old male presenting today for testicular atrophy. He works in construction. He finished chemotherapy 8 months ago for AML. He reports he started experienced extreme fatigue at work. His gum then started bleeding. He went to the ER for back pain which he attributed to kidney stones, but he was diagnosed with 17 percent AML. He underwent about 50 rounds of radiation. Now his testicles are retracted and he complains of very low sex drive. His erections are good and adequate. He says his PCP found his testosterone low in March 2023. \par \par On examination, the patient is a healthy-appearing gentleman in no acute distress. He is alert and oriented follows commands. He has normal mood and affect. Pt is circumcised. He has normal male external genitalia. Normal meatus. No fibrous plaque. No penile lesion noted and there is no discharge from the urethra. Right and Left Testicles are normal volume, descended intrascrotally, slightly retracted, normal on palpation, non tender and without masses or lesions. The scrotum is without induration, erythema, or edema. No hernias palpated in the inguinal canals. \par \par ASSESSMENT: Testicular atrophy, decreased libido, normal erections, retractile testicles. S/P chemotherapy\par \par PLAN: Referral to [facility]. Pt will have testes Ultrasound. I have discussed at length the risks and benefits and rationale behind the testes Ultrasound and the patient seems to understand and wants to proceed. Follow up PRN\par \par I counseled the patient. I discussed the various etiologies of his symptoms. Risks and alternatives were discussed. I answered the patient questions. The patient will follow-up as directed and will contact me with any questions or concerns. Thank you for the opportunity to participate in the care of this patient. I'll keep you updated on his progress.

## 2023-04-03 NOTE — REVIEW OF SYSTEMS
[Negative] : Heme/Lymph [Erectile Dysfunction] : no erectile dysfunction [FreeTextEntry1] : low sex drive, low libido

## 2023-04-03 NOTE — PHYSICAL EXAM
[General Appearance - Well Developed] : well developed [General Appearance - Well Nourished] : well nourished [Normal Appearance] : normal appearance [Well Groomed] : well groomed [General Appearance - In No Acute Distress] : no acute distress [Normal Station and Gait] : the gait and station were normal for the patient's age [Skin Color & Pigmentation] : normal skin color and pigmentation [Oriented To Time, Place, And Person] : oriented to person, place, and time [Affect] : the affect was normal [Mood] : the mood was normal [Not Anxious] : not anxious [FreeTextEntry1] : On examination, the patient is a healthy-appearing gentleman in no acute distress. He is alert and oriented follows commands. He has normal mood and affect. Pt is circumcised. He has normal male external genitalia. Normal meatus. No fibrous plaque. No penile lesion noted and there is no discharge from the urethra. Right and Left Testicles are normal volume, descended intrascrotally, slightly retracted, normal on palpation, non tender and without masses or lesions. The scrotum is without induration, erythema, or edema. No hernias palpated in the inguinal canals.

## 2023-04-03 NOTE — ADDENDUM
[FreeTextEntry1] : This note was authored by Dariusz Clark working as a scribe for Dr. Ronn Lopez. I, Dr. Ronn Lopez have reviewed the content of this note and confirm it is true and accurate. I personally performed the history and physical examination and made all the decisions. 04/03/2023

## 2023-04-04 ENCOUNTER — TRANSCRIPTION ENCOUNTER (OUTPATIENT)
Age: 38
End: 2023-04-04

## 2023-04-04 ENCOUNTER — NON-APPOINTMENT (OUTPATIENT)
Age: 38
End: 2023-04-04

## 2023-04-04 LAB — PANCREATIC ELASTASE, FECAL: 310 CD:794062645

## 2023-04-06 LAB — LACTOFERRIN STL-MCNC: <1 CD:794062635

## 2023-04-13 ENCOUNTER — OUTPATIENT (OUTPATIENT)
Dept: OUTPATIENT SERVICES | Facility: HOSPITAL | Age: 38
LOS: 1 days | End: 2023-04-13
Payer: COMMERCIAL

## 2023-04-13 ENCOUNTER — APPOINTMENT (OUTPATIENT)
Dept: ULTRASOUND IMAGING | Facility: CLINIC | Age: 38
End: 2023-04-13
Payer: COMMERCIAL

## 2023-04-13 DIAGNOSIS — E29.1 TESTICULAR HYPOFUNCTION: ICD-10-CM

## 2023-04-13 DIAGNOSIS — C92.01 ACUTE MYELOBLASTIC LEUKEMIA, IN REMISSION: ICD-10-CM

## 2023-04-13 PROCEDURE — 76870 US EXAM SCROTUM: CPT | Mod: 26

## 2023-04-13 PROCEDURE — 76870 US EXAM SCROTUM: CPT

## 2023-04-14 ENCOUNTER — APPOINTMENT (OUTPATIENT)
Dept: UROLOGY | Facility: CLINIC | Age: 38
End: 2023-04-14
Payer: COMMERCIAL

## 2023-04-14 ENCOUNTER — NON-APPOINTMENT (OUTPATIENT)
Age: 38
End: 2023-04-14

## 2023-04-14 VITALS
OXYGEN SATURATION: 100 % | TEMPERATURE: 97.3 F | HEART RATE: 89 BPM | SYSTOLIC BLOOD PRESSURE: 132 MMHG | DIASTOLIC BLOOD PRESSURE: 91 MMHG | RESPIRATION RATE: 16 BRPM

## 2023-04-14 DIAGNOSIS — E78.5 HYPERLIPIDEMIA, UNSPECIFIED: ICD-10-CM

## 2023-04-14 DIAGNOSIS — G47.00 INSOMNIA, UNSPECIFIED: ICD-10-CM

## 2023-04-14 DIAGNOSIS — Z86.19 PERSONAL HISTORY OF OTHER INFECTIOUS AND PARASITIC DISEASES: ICD-10-CM

## 2023-04-14 LAB
BASOPHILS # BLD AUTO: 0.01 K/UL
BASOPHILS NFR BLD AUTO: 0.2 %
EOSINOPHIL # BLD AUTO: 0.07 K/UL
EOSINOPHIL NFR BLD AUTO: 1.5 %
HCT VFR BLD CALC: 43.1 %
HGB BLD-MCNC: 14.5 G/DL
IMM GRANULOCYTES NFR BLD AUTO: 0.2 %
LYMPHOCYTES # BLD AUTO: 0.85 K/UL
LYMPHOCYTES NFR BLD AUTO: 18.5 %
MAN DIFF?: NORMAL
MCHC RBC-ENTMCNC: 32.2 PG
MCHC RBC-ENTMCNC: 33.6 GM/DL
MCV RBC AUTO: 95.8 FL
MONOCYTES # BLD AUTO: 0.51 K/UL
MONOCYTES NFR BLD AUTO: 11.1 %
NEUTROPHILS # BLD AUTO: 3.14 K/UL
NEUTROPHILS NFR BLD AUTO: 68.5 %
PLATELET # BLD AUTO: 168 K/UL
RBC # BLD: 4.5 M/UL
RBC # FLD: 12.6 %
WBC # FLD AUTO: 4.59 K/UL

## 2023-04-14 PROCEDURE — 36415 COLL VENOUS BLD VENIPUNCTURE: CPT

## 2023-04-14 PROCEDURE — 99214 OFFICE O/P EST MOD 30 MIN: CPT

## 2023-04-14 RX ORDER — POLYETHYLENE GLYCOL 3350, SODIUM CHLORIDE, SODIUM BICARBONATE AND POTASSIUM CHLORIDE WITH LEMON FLAVOR 420; 11.2; 5.72; 1.48 G/4L; G/4L; G/4L; G/4L
420 POWDER, FOR SOLUTION ORAL
Qty: 1 | Refills: 0 | Status: COMPLETED | COMMUNITY
Start: 2023-03-23 | End: 2023-04-14

## 2023-04-14 RX ORDER — AMLODIPINE BESYLATE 5 MG/1
5 TABLET ORAL DAILY
Qty: 30 | Refills: 5 | Status: COMPLETED | COMMUNITY
Start: 2021-08-31 | End: 2023-04-14

## 2023-04-14 RX ORDER — ATORVASTATIN CALCIUM 10 MG/1
10 TABLET, FILM COATED ORAL
Qty: 30 | Refills: 0 | Status: ACTIVE | COMMUNITY
Start: 2023-04-14

## 2023-04-14 RX ORDER — PANTOPRAZOLE 40 MG/1
40 TABLET, DELAYED RELEASE ORAL DAILY
Qty: 1 | Refills: 3 | Status: COMPLETED | COMMUNITY
Start: 2023-03-23 | End: 2023-04-14

## 2023-04-14 RX ORDER — SERTRALINE HYDROCHLORIDE 100 MG/1
100 TABLET, FILM COATED ORAL DAILY
Qty: 30 | Refills: 5 | Status: COMPLETED | COMMUNITY
Start: 2022-07-18 | End: 2023-04-14

## 2023-04-14 RX ORDER — OXYCODONE AND ACETAMINOPHEN 2.5; 325 MG/1; MG/1
2.5-325 TABLET ORAL 4 TIMES DAILY
Qty: 80 | Refills: 0 | Status: COMPLETED | COMMUNITY
Start: 2021-10-13 | End: 2023-04-14

## 2023-04-14 RX ORDER — ACYCLOVIR 400 MG/1
400 TABLET ORAL EVERY 8 HOURS
Qty: 90 | Refills: 0 | Status: COMPLETED | COMMUNITY
Start: 2021-08-31 | End: 2023-04-14

## 2023-04-14 RX ORDER — OXYCODONE 5 MG/1
5 TABLET ORAL
Qty: 60 | Refills: 0 | Status: COMPLETED | COMMUNITY
Start: 2021-08-31 | End: 2023-04-14

## 2023-04-14 RX ORDER — SODIUM SULFATE, POTASSIUM SULFATE, MAGNESIUM SULFATE 17.5; 3.13; 1.6 G/ML; G/ML; G/ML
17.5-3.13-1.6 SOLUTION, CONCENTRATE ORAL
Qty: 1 | Refills: 0 | Status: COMPLETED | COMMUNITY
Start: 2022-09-07 | End: 2023-04-14

## 2023-04-14 RX ORDER — LEVOFLOXACIN 500 MG/1
500 TABLET, FILM COATED ORAL DAILY
Qty: 30 | Refills: 2 | Status: COMPLETED | COMMUNITY
Start: 2021-08-31 | End: 2023-04-14

## 2023-04-14 RX ORDER — CHOLESTYRAMINE 4 G/9G
4 POWDER, FOR SUSPENSION ORAL DAILY
Qty: 30 | Refills: 3 | Status: COMPLETED | COMMUNITY
Start: 2022-09-07 | End: 2023-04-14

## 2023-04-14 RX ORDER — ZOLPIDEM TARTRATE 10 MG/1
10 TABLET ORAL
Qty: 30 | Refills: 0 | Status: COMPLETED | COMMUNITY
Start: 2022-07-18 | End: 2023-04-14

## 2023-04-14 NOTE — ASSESSMENT
[FreeTextEntry1] : Low Libido\par -taking Zoloft 100mg daily - tendency to cause Libido issues\par -dr switched to Wellbutrin 100mg 6 weeks ago - hasn't seen any changes in libido\par -will start on TRT - advised patient low dose of topical only option due to patient's hx of leukemia. \par \par Low T\par -start on low dose TRT topical\par -check labs A1C, CBC, CMP, Lipid, FRET  - labs drawn in office\par -start Cialis 5mg PO daily for penile shortening\par \par Retractile testis\par -referral to Dr. Amaya - for consult for orchiopexy\par \par This entire visit was conducted in the presence of FNP Elizabeth Moreno.  \par \par \par \par \par

## 2023-04-14 NOTE — PHYSICAL EXAM
[General Appearance - Well Developed] : well developed [General Appearance - Well Nourished] : well nourished [Normal Appearance] : normal appearance [Well Groomed] : well groomed [General Appearance - In No Acute Distress] : no acute distress [] : no respiratory distress [Respiration, Rhythm And Depth] : normal respiratory rhythm and effort [Exaggerated Use Of Accessory Muscles For Inspiration] : no accessory muscle use [Oriented To Time, Place, And Person] : oriented to person, place, and time [Affect] : the affect was normal [Mood] : the mood was normal [Not Anxious] : not anxious [Normal Station and Gait] : the gait and station were normal for the patient's age [No Focal Deficits] : no focal deficits [Urethral Meatus] : meatus normal [Penis Abnormality] : normal circumcised penis [FreeTextEntry1] : retractile testes

## 2023-04-14 NOTE — HISTORY OF PRESENT ILLNESS
[FreeTextEntry1] : JAK DANIELS, a 37 year old male, presented to the office with the chief complaint of low libido and low testosterone. \par \par Patient finished chemotherapy 8 months ago for AML. He reports he started experienced extreme fatigue at work. His gums then started bleeding. He went to the ER for back pain which he attributed to kidney stones, but he was diagnosed with 17 percent AML. He underwent about 50 rounds of chemotherapy. Now his testicles are retracted and he complains of very low sex drive. His erections are good and adequate. He says his PCP found his testosterone low in March 2023. \par \par Patient reports Fatigue, Low Libido, and retractile testis, as well as shortening of penis after chemotherapy. \par \par No morning erections.\par \par Masturbates 3-4x a week \par \par Had sexual intercourse last about 2 months ago\par \par Denies LUTS - no nocturia \par \par \par \par \par

## 2023-04-16 LAB
ALBUMIN SERPL ELPH-MCNC: 5.1 G/DL
ALP BLD-CCNC: 82 U/L
ALT SERPL-CCNC: 33 U/L
ANION GAP SERPL CALC-SCNC: 15 MMOL/L
AST SERPL-CCNC: 31 U/L
BILIRUB SERPL-MCNC: 0.6 MG/DL
BUN SERPL-MCNC: 18 MG/DL
CALCIUM SERPL-MCNC: 10.9 MG/DL
CHLORIDE SERPL-SCNC: 100 MMOL/L
CHOLEST SERPL-MCNC: 220 MG/DL
CO2 SERPL-SCNC: 25 MMOL/L
CREAT SERPL-MCNC: 1.24 MG/DL
EGFR: 77 ML/MIN/1.73M2
ESTIMATED AVERAGE GLUCOSE: 100 MG/DL
GLUCOSE SERPL-MCNC: 106 MG/DL
HBA1C MFR BLD HPLC: 5.1 %
HDLC SERPL-MCNC: 52 MG/DL
LDLC SERPL CALC-MCNC: 134 MG/DL
NONHDLC SERPL-MCNC: 169 MG/DL
POTASSIUM SERPL-SCNC: 4.4 MMOL/L
PROT SERPL-MCNC: 7.6 G/DL
SODIUM SERPL-SCNC: 141 MMOL/L
TRIGL SERPL-MCNC: 174 MG/DL

## 2023-04-18 ENCOUNTER — NON-APPOINTMENT (OUTPATIENT)
Age: 38
End: 2023-04-18

## 2023-04-18 LAB
TESTOST FREE SERPL-MCNC: 11.4 PG/ML
TESTOST SERPL-MCNC: 245 NG/DL

## 2023-04-18 RX ORDER — TESTOSTERONE 20.25 MG/1.25G
1.62 GEL, METERED TRANSDERMAL
Qty: 2 | Refills: 0 | Status: COMPLETED | COMMUNITY
Start: 2023-04-14 | End: 2023-04-18

## 2023-04-27 ENCOUNTER — OUTPATIENT (OUTPATIENT)
Dept: OUTPATIENT SERVICES | Facility: HOSPITAL | Age: 38
LOS: 1 days | Discharge: HOME | End: 2023-04-27

## 2023-04-27 ENCOUNTER — APPOINTMENT (OUTPATIENT)
Dept: UROLOGY | Facility: CLINIC | Age: 38
End: 2023-04-27

## 2023-04-27 DIAGNOSIS — C92.00 ACUTE MYELOBLASTIC LEUKEMIA, NOT HAVING ACHIEVED REMISSION: ICD-10-CM

## 2023-05-01 NOTE — ED PROVIDER NOTE - CLINICAL SUMMARY MEDICAL DECISION MAKING FREE TEXT BOX
[PHQ-9 Positive] : PHQ-9 Positive [I have developed a follow-up plan documented below in the note.] : I have developed a follow-up plan documented below in the note. Nicky Vanegas MD, PGY-2: 35YO M hx AML last chemo 6w prior, p/w low back pain and b/l LE weakness. vss, pe weakness b/l LE, sensation equal. concern for possible epidural abscess given hx of BM biopsy, consider spinal fracture, urolithiasis. plan for basic labs, ct lumbar spine, ct a/p, consider MRI if no abnormalities/no improvement in neuro exam with pain control.

## 2023-05-08 ENCOUNTER — APPOINTMENT (OUTPATIENT)
Dept: HEMATOLOGY ONCOLOGY | Facility: CLINIC | Age: 38
End: 2023-05-08
Payer: COMMERCIAL

## 2023-05-08 ENCOUNTER — RESULT REVIEW (OUTPATIENT)
Age: 38
End: 2023-05-08

## 2023-05-08 VITALS
DIASTOLIC BLOOD PRESSURE: 90 MMHG | WEIGHT: 202.83 LBS | SYSTOLIC BLOOD PRESSURE: 134 MMHG | TEMPERATURE: 96.8 F | BODY MASS INDEX: 28.4 KG/M2 | HEIGHT: 70.98 IN | HEART RATE: 96 BPM | RESPIRATION RATE: 12 BRPM | OXYGEN SATURATION: 99 %

## 2023-05-08 VITALS — DIASTOLIC BLOOD PRESSURE: 90 MMHG | SYSTOLIC BLOOD PRESSURE: 143 MMHG

## 2023-05-08 LAB
BASOPHILS # BLD AUTO: 0.01 K/UL — SIGNIFICANT CHANGE UP (ref 0–0.2)
BASOPHILS NFR BLD AUTO: 0.2 % — SIGNIFICANT CHANGE UP (ref 0–2)
EOSINOPHIL # BLD AUTO: 0.05 K/UL — SIGNIFICANT CHANGE UP (ref 0–0.5)
EOSINOPHIL NFR BLD AUTO: 1.2 % — SIGNIFICANT CHANGE UP (ref 0–6)
HCT VFR BLD CALC: 38.7 % — LOW (ref 39–50)
HGB BLD-MCNC: 13.5 G/DL — SIGNIFICANT CHANGE UP (ref 13–17)
IMM GRANULOCYTES NFR BLD AUTO: 0.2 % — SIGNIFICANT CHANGE UP (ref 0–0.9)
LYMPHOCYTES # BLD AUTO: 1.21 K/UL — SIGNIFICANT CHANGE UP (ref 1–3.3)
LYMPHOCYTES # BLD AUTO: 29.3 % — SIGNIFICANT CHANGE UP (ref 13–44)
MCHC RBC-ENTMCNC: 32.9 PG — SIGNIFICANT CHANGE UP (ref 27–34)
MCHC RBC-ENTMCNC: 34.9 G/DL — SIGNIFICANT CHANGE UP (ref 32–36)
MCV RBC AUTO: 94.4 FL — SIGNIFICANT CHANGE UP (ref 80–100)
MONOCYTES # BLD AUTO: 0.42 K/UL — SIGNIFICANT CHANGE UP (ref 0–0.9)
MONOCYTES NFR BLD AUTO: 10.2 % — SIGNIFICANT CHANGE UP (ref 2–14)
NEUTROPHILS # BLD AUTO: 2.43 K/UL — SIGNIFICANT CHANGE UP (ref 1.8–7.4)
NEUTROPHILS NFR BLD AUTO: 58.9 % — SIGNIFICANT CHANGE UP (ref 43–77)
NRBC # BLD: 0 /100 WBCS — SIGNIFICANT CHANGE UP (ref 0–0)
PLATELET # BLD AUTO: 144 K/UL — LOW (ref 150–400)
RBC # BLD: 4.1 M/UL — LOW (ref 4.2–5.8)
RBC # FLD: 12.2 % — SIGNIFICANT CHANGE UP (ref 10.3–14.5)
WBC # BLD: 4.13 K/UL — SIGNIFICANT CHANGE UP (ref 3.8–10.5)
WBC # FLD AUTO: 4.13 K/UL — SIGNIFICANT CHANGE UP (ref 3.8–10.5)

## 2023-05-08 PROCEDURE — 99214 OFFICE O/P EST MOD 30 MIN: CPT

## 2023-05-09 LAB
ALBUMIN SERPL ELPH-MCNC: 4.8 G/DL
ALP BLD-CCNC: 86 U/L
ALT SERPL-CCNC: 31 U/L
ANION GAP SERPL CALC-SCNC: 13 MMOL/L
AST SERPL-CCNC: 21 U/L
BILIRUB SERPL-MCNC: 0.4 MG/DL
BUN SERPL-MCNC: 14 MG/DL
CALCIUM SERPL-MCNC: 10.5 MG/DL
CHLORIDE SERPL-SCNC: 105 MMOL/L
CO2 SERPL-SCNC: 26 MMOL/L
CREAT SERPL-MCNC: 0.99 MG/DL
EGFR: 101 ML/MIN/1.73M2
GLUCOSE SERPL-MCNC: 95 MG/DL
LDH SERPL-CCNC: 136 U/L
POTASSIUM SERPL-SCNC: 4.3 MMOL/L
PROT SERPL-MCNC: 7 G/DL
SODIUM SERPL-SCNC: 143 MMOL/L

## 2023-05-16 NOTE — PHYSICAL EXAM
[Fully active, able to carry on all pre-disease performance without restriction] : Status 0 - Fully active, able to carry on all pre-disease performance without restriction [Normal] : affect appropriate [de-identified] : a 1 cm long laceration with sutures on the right thrum, healing well

## 2023-05-16 NOTE — ASSESSMENT
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 \par Started induction with Dauno/Cytarabine on 8/6/21. \par Pt's counts now recovered, remission marrow done on 9/2- in remission. Proceed to consolidation with 4 cycles of HIDAC. C1 HIDAC on 9/17, c/b neutropenic fever and diarrhea. C2 HIDAC on 10/25, was postponed for 1 week due to admission for diarrhea, given negative stool studies, suspect diarrhea was likely chemo-related. Pt was admitted again 11/13-11/17 for sepsis due to thumb cellulitis after laceration. C3 on 11/26, had Fulphilia on C3 c/b epistaxis and neutropenic fever w/Temp 100.3. C4 HiDAC 1/5/22, c/b transaminitis and neutropenic fever\par He had laparoscopic cholecystotomy on 4/17. \par s/p BMbx 2/11/22 showing CR.\par He has non-necrotizing granulomas noted in BMbx, unclear significance\par CBC today stable; platelets slightly below normal but stable. \par NPM1Q to [facility] lab done on 1/12/23: negative, will monitor every 3 months, will check today.\par Discussed supportive groups to help with anxiety of cancer relapse -information provided\par Discussed to follow up with PT for back pain and GI for the loose stools. \par cont f/u URO Dr. Molina for low testosterone levels, he started taking testosterone 2 wks ago.\par Cont f/u with Dr. Rich monthly\par All questions answered\par RTC 2 months\par \par Case and management discussed with Dr. Yancey\par \par \par \par \par \par

## 2023-05-16 NOTE — HISTORY OF PRESENT ILLNESS
[de-identified] : 37yo M w/ HTN and newly diagnosed AML here for f/u. \par \par He presented to the hospital on 7/30/21 with complaints of dizziness, fatigue and severe RUQ pain for 3 days. CT A/P revealed fatty liver and small R pleural effusion. WBC 12k with 17% blasts. Peripheral flow cytometry showed 13% myeloblasts. FLT3(-). BMbx was done on 8/4/21 - confirmed AML. 46,XY                         {20                         }\par Foundation: mutations in DNMT3A R882H, NRAS G12D, NPM1 W288fs*12\par Pt consented to [place]. Patient was offered sperm banking, but declined.\par On 8/6, induction with Dauno/Cytararbine was started (cytarabine 100/m2 and dauno 90/m2) \par He received a seven day course of Zosyn for presumed RUL PNA, then transitioned to  levaquin, posaconazole and Acyclovir for ppx for neutropenia. Course c/b neutropenic fevers, cultures negative, repeat CT 8/14 without infectious source. He was on Cefepime but developed a rash, changed to meropenem. Rash improved. \par Day 14 BMbx on 8/19 was hypocellular with chemotherapeutic effect; however, per hematopathology, appears to be earlier regeneration than would typically seen which is concerning for persistent disease at this point, and the earliest cells are CD34 positive and his myeloblasts on initial presentation were CD34 negative. Thus, this may simply represent early regeneration of his marrow. Plan is to await count recovery and repeat biopsy.  \par Patient discharged home on 8/29/21 when ANC >500 [de-identified] : Eating well since discharge. \par c/o hemorrhoidal pain. Hemorrhoids started a few days prior to discharge. He was sent home with rectal ointment and witch hazel. \par \par BMBx done on 9/2: Cellular bone marrow with trilineage hematopoiesis with maturation and megakaryocytosis (history of AML).\par Pt feels well, CBC today showed count stable\par \par s/p C1 HIDAC 9/17-9/22 \par c/o leg pain after chemo in the hospital \par \par He presented to the ED on 10/9 due to fever the night of presentation. The patient went to sleep around 10pm and woke up at 3am feeling warm and clammy. He took his temperature orally at home and it was 100.7. The day prior to presentation (10/8/21), the patient went to [facility] for an appointment to get his platelet count checked. He states he was feeling a bit run down and thought he was going to need a transfusion, but he did not need a transfusion. He was afebrile during the time of the appointment and went home afterwards. Around 3pm, the patient had bilateral crampy leg pain that was similar to the leg pain he felt during his consolidation therapy treatment. He took hydrocodone and the pain improved. The patient had one episode of diarrhea three days prior to presentation and has been bothered by rectal pain associated with his "large hemorrhoids. He denies any bleeding per rectum. He also feels like his mouth has been more dry than usual over the past several days, but denies all other symptoms. \par CT Abdomen and Pelvis w/ Oral Cont and w/ IV Conttrast on 10/9 revealed Region of hypoenhancement upper pole left kidney; please correlate clinically for possible pyelonephritis. No hydronephrosis or perinephric stranding. No rectal abscess.\par CT Chest No Contrast on 10/9 revealed Clear lungs.\par 10/9- BCX NGTD and 10/9- UCX (-)\par He ate some cold salad late last night, had upsetting stomach and diarrhea this morning. Will take Imodium for diarrhea. \par \par C2 is due on 10/15, pt wants deferring to next Monday after his diarrhea improved. \par Admission of  C2 was postponed due to worsening diarrhea. Went to ED on 10/15 due to worsening watery diarrhea. GI PCR neg, C Diff neg\par Given negative stool studies, suspect diarrhea was likely chemo-related. S/p cycle 2 Consolidation with HIDAC started on 10/25. Patient received IV hydration, strict I/O, antiemetics, monitoring of CBC and CMP and for cerebellar toxicity. PRedforte eye drops given to prevent chemical conjunctivitis. Patient with fever throughout course of treatment. Cultures negative. Due to fever probably related to HIDAC, patient received dexamethasone prior to the last 2 doses of cytatabine. \par He is seen today accompanied by his father, pt feels fatigue after chemo, other than that he feels fine. Denied any fever, chills diarrhea. \par \par Pt was admitted on 11/13-11/17 s/p right first digit laceration repair on 11/12 and presenting with erythema and pain at the site of laceration repair. Patient reported he was feeling unwell prior to suture placement with body aches, chills, night sweats and subjective fevers. Patient last BM 4 days ago. Has bruise to left inner thigh. Patient reports he keeps his PICC site clean and intact which was placed in 9/2021. Upon admission to the hospital ID was consulted started on Zosyn , GI consulted for rectal pain secondary to hemorrhoids and palliative consulted for pain control.\par  \par C3 on 11/26, tolerated well. He was seen today after discharge, accompanied by his father. He admitted feeling tired, denied any f/c, diarrhea. Right hand suture site healing well, no abnormal erythema or discharge. \par He will have Fulphilia today. \par C3 HIDAC 11/26-12/1\par He presents to the hospital due to epistaxis and neutropenic fever w/Temp 100.3 on 12/1. Per patient, he reports that he was feeling sinus congestion and pressure on his L side, blew his nose and bleeding began from L nare without improvement prompting arrival to the emergency department. Feels mild L sinus congestion.  L nasal cavity packed with absorbable packing; F/U ENT clinic outpatient. Pan culture obtained-with No growth currently\par CBC rechecked today recovered. He feels well overall , had lower abdominal pain last night after ate cheese, now improved. Denied any fever chills bloody stool or diarrhea. \par \par s/p C4 HiDAC 1/5/22\par Pt has known grade 1 AST and grade 3 ALT transaminitis. Presented this admission with transamintitis. Abd sono  performed and revealed Borderline/mild hepatomegaly with hepatic steatosis. Splenomegaly. Focal area of left upper pole increased renal cortical echogenicity, corresponding to an area of hypoattenuation seen on prior CT chest, \par abdomen and pelvis dated 10/9/2021. This is nonspecific and may reflect focal edema. Patient received IV hydration, antiemetics, monitoring of CBC and electrolytes. Predforte eye drops provided to prevent chemical conjunctivitis. Patient was monitored for cerebellar toxicity. Nystagmus checks performed. Hospital course complicated by fever blood cultures showed (-), most likely febrile secondary to HIDAC treatment. To receive Fulphila today.\par \par Pt presented to the hospital on 1/18/22 due fever of 100.4, weakness, decreased WBC and shortness of breath while at home. Patient reports that his pain is more controlled s/p pain medication and his shortness of breath has resolved. He denied chills, cough, chest pain, palpitations.\par Pan culture (blood +Ucx) were negative, CXR reveals clear lungs, COVID PCR - not detect. Pt started on empiric Zosyn, Diflucan and Acyclovir added prophylactically for neutropenic fever. He was transfused with platelets and PRBC as per supportive parameters (>10k/>7) respectively.\par He feels well overall now, denied any fever, chills or pain. \par \par BMbx 2/11/22: Cellular bone marrow with erythroid predominant trilineage hematopoiesis\par No morphologic or immunophenotypic evidence of persistent acute myeloid leukemia\par Non-necrotizing granulomas\par Normal male karyotype and normal onkosight myeloid NGS panel study.\par \par He was admitted for back pain 2/2 herniated disk - Rx'd pain meds and a Medrol pack. \par \par Pt went to ED due to acute RUQ pain on 4/16, patient underwent laparoscopic cholecystotomy on 4/17. \par \par He reports feeling well. He reports having severe anxiety when he sees any bruising or gum bleeding. He has seen a therapist but would like to see someone else. \par Saw psych Dr. Lozano on 7/18 TEB\par He is getting PT for his back pain. \par He occasionally has some gum bleeding still. \par \par Pt was seen today accompanied by his father. He feels well, eating healthy food and doing exercise. Will go back to work this month. \par He f/u w/psych Dr. Lozano monthly, currently is on Zoloft 100mg dialy, and Zolpidem aHS prn for insomnia\par Denied any new complaints.\par \par Rapid test at home showed COVID 19 positive on last Wed 10/12. Patient's father was tested positive on Friday as well. He had scratch throat, fatigue and low grade fever. Pt didn't receive any COVID 19 meds, admitted he felt better, only slight fatigue, denied fever/chill/SOB. \par CBC today stable.  \par NPM1Q to [facility] lab on 10/19: negative\par He went to ED on 11/19 for rectal abscess, I&D done in the ED. \par \par He is doing well. No new complaints. \par He is not back to work yet. \par Last NPM1Q was checked 1/12/23: negative\par \par 3/9/23: PAtient is here today for follow up. Reports to be doing well. He does state that he continues to have some loose stools for which he will be following up with Gi for. He also reports that he has been having back pain. He was previously on PT and will be returning to PT now that insurance issues are resolved. He reports to be having a low sex drive and noticing that his testicles are shrunken. \par \par 5/8: pt was seen today for a f/u apt accompanied by his father. pt f/u w/ Uro Dr. Molina and started testosterone 2 wks ago for low testosterone level. \par He is doing well otherwise, denied any complaints.

## 2023-05-19 LAB
MISCELLANEOUS TEST: NORMAL
PROC NAME: NORMAL

## 2023-06-02 ENCOUNTER — APPOINTMENT (OUTPATIENT)
Dept: GASTROENTEROLOGY | Facility: HOSPITAL | Age: 38
End: 2023-06-02

## 2023-06-05 NOTE — DISCHARGE NOTE PROVIDER - NSDCFUSCHEDAPPT_GEN_ALL_CORE_FT
JAK DANIELS ; 04/25/2022 ; [facility]  JAK DANIELS ; 06/02/2022 ; [facility] JAK DANIELS ; 04/27/2022 ; [facility]  JAK DANIELS ; 06/02/2022 ; [facility] No lymphadedenopathy

## 2023-06-09 ENCOUNTER — EMERGENCY (EMERGENCY)
Facility: HOSPITAL | Age: 38
LOS: 1 days | Discharge: HOME | End: 2023-06-09
Attending: EMERGENCY MEDICINE
Payer: COMMERCIAL

## 2023-06-09 VITALS
RESPIRATION RATE: 18 BRPM | DIASTOLIC BLOOD PRESSURE: 87 MMHG | TEMPERATURE: 98 F | OXYGEN SATURATION: 87 % | WEIGHT: 195.11 LBS | HEART RATE: 113 BPM | HEIGHT: 71 IN | SYSTOLIC BLOOD PRESSURE: 147 MMHG

## 2023-06-09 PROCEDURE — 99284 EMERGENCY DEPT VISIT MOD MDM: CPT

## 2023-06-10 VITALS
SYSTOLIC BLOOD PRESSURE: 128 MMHG | HEART RATE: 89 BPM | OXYGEN SATURATION: 99 % | TEMPERATURE: 99 F | DIASTOLIC BLOOD PRESSURE: 74 MMHG | RESPIRATION RATE: 18 BRPM

## 2023-06-10 LAB
ALBUMIN SERPL ELPH-MCNC: 4.4 G/DL — SIGNIFICANT CHANGE UP (ref 3.3–5)
ALP SERPL-CCNC: 81 U/L — SIGNIFICANT CHANGE UP (ref 40–120)
ALT FLD-CCNC: 33 U/L — SIGNIFICANT CHANGE UP (ref 10–45)
ANION GAP SERPL CALC-SCNC: 12 MMOL/L — SIGNIFICANT CHANGE UP (ref 5–17)
AST SERPL-CCNC: 20 U/L — SIGNIFICANT CHANGE UP (ref 10–40)
BASE EXCESS BLDV CALC-SCNC: 6.2 MMOL/L — HIGH (ref -2–3)
BASOPHILS # BLD AUTO: 0.01 K/UL — SIGNIFICANT CHANGE UP (ref 0–0.2)
BASOPHILS NFR BLD AUTO: 0.1 % — SIGNIFICANT CHANGE UP (ref 0–2)
BILIRUB SERPL-MCNC: 0.2 MG/DL — SIGNIFICANT CHANGE UP (ref 0.2–1.2)
BUN SERPL-MCNC: 17 MG/DL — SIGNIFICANT CHANGE UP (ref 7–23)
CA-I SERPL-SCNC: 1.26 MMOL/L — SIGNIFICANT CHANGE UP (ref 1.15–1.33)
CALCIUM SERPL-MCNC: 9.5 MG/DL — SIGNIFICANT CHANGE UP (ref 8.4–10.5)
CHLORIDE BLDV-SCNC: 103 MMOL/L — SIGNIFICANT CHANGE UP (ref 96–108)
CHLORIDE SERPL-SCNC: 102 MMOL/L — SIGNIFICANT CHANGE UP (ref 96–108)
CO2 BLDV-SCNC: 34 MMOL/L — HIGH (ref 22–26)
CO2 SERPL-SCNC: 27 MMOL/L — SIGNIFICANT CHANGE UP (ref 22–31)
CREAT SERPL-MCNC: 1.11 MG/DL — SIGNIFICANT CHANGE UP (ref 0.5–1.3)
EGFR: 88 ML/MIN/1.73M2 — SIGNIFICANT CHANGE UP
EOSINOPHIL # BLD AUTO: 0.06 K/UL — SIGNIFICANT CHANGE UP (ref 0–0.5)
EOSINOPHIL NFR BLD AUTO: 0.8 % — SIGNIFICANT CHANGE UP (ref 0–6)
GAS PNL BLDV: 138 MMOL/L — SIGNIFICANT CHANGE UP (ref 136–145)
GAS PNL BLDV: SIGNIFICANT CHANGE UP
GLUCOSE BLDV-MCNC: 111 MG/DL — HIGH (ref 70–99)
GLUCOSE SERPL-MCNC: 111 MG/DL — HIGH (ref 70–99)
HCO3 BLDV-SCNC: 33 MMOL/L — HIGH (ref 22–29)
HCT VFR BLD CALC: 36.3 % — LOW (ref 39–50)
HCT VFR BLDA CALC: 39 % — SIGNIFICANT CHANGE UP (ref 39–51)
HGB BLD CALC-MCNC: 13 G/DL — SIGNIFICANT CHANGE UP (ref 12.6–17.4)
HGB BLD-MCNC: 12.5 G/DL — LOW (ref 13–17)
IMM GRANULOCYTES NFR BLD AUTO: 0.4 % — SIGNIFICANT CHANGE UP (ref 0–0.9)
LACTATE BLDV-MCNC: 1.6 MMOL/L — SIGNIFICANT CHANGE UP (ref 0.5–2)
LYMPHOCYTES # BLD AUTO: 1.33 K/UL — SIGNIFICANT CHANGE UP (ref 1–3.3)
LYMPHOCYTES # BLD AUTO: 17.4 % — SIGNIFICANT CHANGE UP (ref 13–44)
MCHC RBC-ENTMCNC: 32.5 PG — SIGNIFICANT CHANGE UP (ref 27–34)
MCHC RBC-ENTMCNC: 34.4 GM/DL — SIGNIFICANT CHANGE UP (ref 32–36)
MCV RBC AUTO: 94.3 FL — SIGNIFICANT CHANGE UP (ref 80–100)
MONOCYTES # BLD AUTO: 0.79 K/UL — SIGNIFICANT CHANGE UP (ref 0–0.9)
MONOCYTES NFR BLD AUTO: 10.4 % — SIGNIFICANT CHANGE UP (ref 2–14)
NEUTROPHILS # BLD AUTO: 5.41 K/UL — SIGNIFICANT CHANGE UP (ref 1.8–7.4)
NEUTROPHILS NFR BLD AUTO: 70.9 % — SIGNIFICANT CHANGE UP (ref 43–77)
NRBC # BLD: 0 /100 WBCS — SIGNIFICANT CHANGE UP (ref 0–0)
PCO2 BLDV: 54 MMHG — SIGNIFICANT CHANGE UP (ref 42–55)
PH BLDV: 7.39 — SIGNIFICANT CHANGE UP (ref 7.32–7.43)
PLATELET # BLD AUTO: 162 K/UL — SIGNIFICANT CHANGE UP (ref 150–400)
PO2 BLDV: 30 MMHG — SIGNIFICANT CHANGE UP (ref 25–45)
POTASSIUM BLDV-SCNC: 3.9 MMOL/L — SIGNIFICANT CHANGE UP (ref 3.5–5.1)
POTASSIUM SERPL-MCNC: 3.9 MMOL/L — SIGNIFICANT CHANGE UP (ref 3.5–5.3)
POTASSIUM SERPL-SCNC: 3.9 MMOL/L — SIGNIFICANT CHANGE UP (ref 3.5–5.3)
PROT SERPL-MCNC: 7 G/DL — SIGNIFICANT CHANGE UP (ref 6–8.3)
RBC # BLD: 3.85 M/UL — LOW (ref 4.2–5.8)
RBC # FLD: 11.9 % — SIGNIFICANT CHANGE UP (ref 10.3–14.5)
SAO2 % BLDV: 38.1 % — LOW (ref 67–88)
SODIUM SERPL-SCNC: 141 MMOL/L — SIGNIFICANT CHANGE UP (ref 135–145)
WBC # BLD: 7.63 K/UL — SIGNIFICANT CHANGE UP (ref 3.8–10.5)
WBC # FLD AUTO: 7.63 K/UL — SIGNIFICANT CHANGE UP (ref 3.8–10.5)

## 2023-06-10 PROCEDURE — 96374 THER/PROPH/DIAG INJ IV PUSH: CPT | Mod: XU

## 2023-06-10 PROCEDURE — 87070 CULTURE OTHR SPECIMN AEROBIC: CPT

## 2023-06-10 PROCEDURE — 87186 SC STD MICRODIL/AGAR DIL: CPT

## 2023-06-10 PROCEDURE — 83605 ASSAY OF LACTIC ACID: CPT

## 2023-06-10 PROCEDURE — 99284 EMERGENCY DEPT VISIT MOD MDM: CPT | Mod: 25

## 2023-06-10 PROCEDURE — 82803 BLOOD GASES ANY COMBINATION: CPT

## 2023-06-10 PROCEDURE — 82435 ASSAY OF BLOOD CHLORIDE: CPT

## 2023-06-10 PROCEDURE — 76705 ECHO EXAM OF ABDOMEN: CPT | Mod: 26

## 2023-06-10 PROCEDURE — 82565 ASSAY OF CREATININE: CPT

## 2023-06-10 PROCEDURE — 46050 I&D PERIANAL ABSCESS SUPFC: CPT

## 2023-06-10 PROCEDURE — 96375 TX/PRO/DX INJ NEW DRUG ADDON: CPT | Mod: XU

## 2023-06-10 PROCEDURE — 87077 CULTURE AEROBIC IDENTIFY: CPT

## 2023-06-10 PROCEDURE — 85025 COMPLETE CBC W/AUTO DIFF WBC: CPT

## 2023-06-10 PROCEDURE — 85018 HEMOGLOBIN: CPT

## 2023-06-10 PROCEDURE — 87205 SMEAR GRAM STAIN: CPT

## 2023-06-10 PROCEDURE — 82947 ASSAY GLUCOSE BLOOD QUANT: CPT

## 2023-06-10 PROCEDURE — 84132 ASSAY OF SERUM POTASSIUM: CPT

## 2023-06-10 PROCEDURE — 84295 ASSAY OF SERUM SODIUM: CPT

## 2023-06-10 PROCEDURE — 76705 ECHO EXAM OF ABDOMEN: CPT

## 2023-06-10 PROCEDURE — 85014 HEMATOCRIT: CPT

## 2023-06-10 PROCEDURE — 80053 COMPREHEN METABOLIC PANEL: CPT

## 2023-06-10 PROCEDURE — 82330 ASSAY OF CALCIUM: CPT

## 2023-06-10 RX ORDER — ACETAMINOPHEN 500 MG
1000 TABLET ORAL ONCE
Refills: 0 | Status: COMPLETED | OUTPATIENT
Start: 2023-06-10 | End: 2023-06-10

## 2023-06-10 RX ORDER — MORPHINE SULFATE 50 MG/1
4 CAPSULE, EXTENDED RELEASE ORAL ONCE
Refills: 0 | Status: DISCONTINUED | OUTPATIENT
Start: 2023-06-10 | End: 2023-06-10

## 2023-06-10 RX ADMIN — Medication 1 TABLET(S): at 04:11

## 2023-06-10 RX ADMIN — Medication 400 MILLIGRAM(S): at 02:32

## 2023-06-10 RX ADMIN — MORPHINE SULFATE 4 MILLIGRAM(S): 50 CAPSULE, EXTENDED RELEASE ORAL at 02:35

## 2023-06-10 NOTE — CONSULT NOTE ADULT - SUBJECTIVE AND OBJECTIVE BOX
General Surgery Consult  Consulting surgical team: Colorectal Surgery  Consulting attending: Emmanuel Phillips    HPI: 37M hx leukemia s/p treatment currently in remission, perianal abscess (11/2022) presenting with perianal pain. Started a few days ago, similar to previous pain from a perianal abscess. He made an appointment to see Dr. Phillips on Tuesday but he could not wait until then, prompting him to come to the ED for further evaluation. Denies fevers but endorses the pain is associated with headaches and chills.       PAST MEDICAL HISTORY:      Hypertension    Kidney stone    History of genital warts    AML (acute myeloid leukemia)        PAST SURGICAL HISTORY:  No significant past surgical history    No significant past surgical history        MEDICATIONS:      ALLERGIES:  No Known Allergies  cefepime (Rash)      VITALS & I/Os:  Vital Signs Last 24 Hrs  T(C): 37 ([address] 2023 02:30), Max: 37 ([address] 2023 02:30)  T(F): 98.6 ([address] 2023 02:30), Max: 98.6 (10 [address] 2023 02:30)  HR: 89 ([address] 2023 02:30) (89 - 113)  BP: 128/74 ([address] 2023 02:30) (128/74 - 147/87)  BP(mean): --  RR: 18 ([address] 2023 02:30) (18 - 18)  SpO2: 99% ([address] 2023 02:30) (87% - 99%)    Parameters below as of [address] 2023 02:30  Patient On (Oxygen Delivery Method): nasal cannula        I&O's Summary      PHYSICAL EXAM:  General: No acute distress  Respiratory: Nonlabored  Cardiovascular: RRR  Abdominal: Soft, nondistended, nontender  Perineum: fluctuant, indurated area at 4 o'clock, tender, no drainage  Extremities: Warm    LABS:                        12.5   7.63  )-----------( 162      ( [address] 2023 02:40 )             36.3     06-10    141  |  102  |  17  ----------------------------<  111<H>  3.9   |  27  |  1.11    Ca    9.5      10 [address] 2023 02:40    TPro  7.0  /  Alb  4.4  /  TBili  0.2  /  DBili  x   /  AST  20  /  ALT  33  /  AlkPhos  81  06-10    Lactate:  06-10 @ 02:30  1.6        IMAGING:  < from: POCUS ED Abdomen Limited (06.10.23 @ 03:47) >  Procedure was performed in the Emergency Department by a credentialed   Emergency Medicine Attending Physician    EXAM:  ER US ABDOMEN LTD      ORDER COMMENTS:      PROCEDURE DATE:  06/10/2023    FOCUSED ED ULTRASOUND REPORT      INTERPRETATION:  A point of care ultrasound of the rectal area was   performed  A high frequency linear probe was used  the area of tenderness was evaluated in multiple planes  A complex appearing cystic structure was visualized in the perianal area   measuring 3.2cm by 2.4cm  Posterior acoustic enhancement was present    Impression: findings suggestive of an abscess    --- End of Report ---    BRANDI KENYON MD; Attending Emergency Medicine  This document has been electronically signed. Lane 10 2023  3:49AM    < end of copied text >

## 2023-06-10 NOTE — ED PROVIDER NOTE - PROGRESS NOTE DETAILS
Naina Johnston, PGY-1 DO:  Patient had abscess drained at bedside by surgery. Patient tolerated procedure well. Patient to be dc'd. Patient has f/u and given abx. Patient agreeable to plan.

## 2023-06-10 NOTE — ED PROVIDER NOTE - CLINICAL SUMMARY MEDICAL DECISION MAKING FREE TEXT BOX
37-year-old male history of leukemia currently in remission 2 years presenting with a chief complaint of abscess.  Patient states that he has been having rectal pain for the past few days. Patient denies nausea, vomiting, chest pain, shortness of breath, fevers, abdominal pain, diarrhea or change in urinary pattern. VS. WNL. PE. Right perianal area of fluctuance.     Concern for perianal abscess, bedside ultrasound does not show tracking or fistula formation.  Will consult surgery given surgery draining previous abscess.  We will hold on CT scan.  Will obtain basic labs.  Dispo pending drainage and labs. 37-year-old male history of leukemia currently in remission 2 years presenting with a chief complaint of abscess.  Patient states that he has been having rectal pain for the past few days. Patient denies nausea, vomiting, chest pain, shortness of breath, fevers, abdominal pain, diarrhea or change in urinary pattern. VS. WNL. PE. Right perianal area of fluctuance.     Concern for perianal abscess, bedside ultrasound does not show tracking or fistula formation.  Will consult surgery given surgery draining previous abscess.  We will hold on CT scan.  Will obtain basic labs.  Dispo pending drainage and labs.  Attending Katie Garza: 38 yo male h/o leukemia currently in remission with prior perianal abscess presenting with rectal pain. upon arrival pt hemodynamically stable. on exam pt with ttp rectal area. pocus pwerformed showing evidence of an abscess. no fevers or systemic systems. d/w surgery who performed incision and drainage. d/w surgery less likey fistula or deep space infection with well defined margins on ultrasound do not feel pt needs ct scan at this time. will start abx. follow up with colorectal

## 2023-06-10 NOTE — ED PROVIDER NOTE - OBJECTIVE STATEMENT
37-year-old male history of leukemia currently in remission 2 years presenting with a chief complaint of abscess.  Patient states that he has been having rectal pain for the past few days.  Was supposed to follow-up with his colorectal surgeon but pain was too severe so he decided to present to the ED.  Patient states 1 year ago he had similar symptoms in the same region and had abscess drained in the emergency department.  Patient states pain is localized and does not radiate, has not taken anything for symptom relief.  Patient is currently having associated headache and chills.  Patient denies nausea, vomiting, chest pain, shortness of breath, fevers, abdominal pain, diarrhea or change in urinary pattern.

## 2023-06-10 NOTE — ED ADULT NURSE NOTE - NSFALLUNIVINTERV_ED_ALL_ED
Bed/Stretcher in lowest position, wheels locked, appropriate side rails in place/Call bell, personal items and telephone in reach/Instruct patient to call for assistance before getting out of bed/chair/stretcher/Non-slip footwear applied when patient is off stretcher/[place] to call system/Physically safe environment - no spills, clutter or unnecessary equipment/Purposeful proactive rounding/Room/bathroom lighting operational, light cord in reach

## 2023-06-10 NOTE — ED PROVIDER NOTE - CARE PROVIDER_API CALL
Emmanuel Phillips  Surgery  [address], [address]  [place] [address]  Phone: [phone]  Fax: [phone]  Follow Up Time: Urgent

## 2023-06-10 NOTE — ED PROVIDER NOTE - PHYSICAL EXAMINATION
GENERAL: Awake, alert, NAD  HEENT: NC/AT, moist mucous membranes, PERRL, EOMI  LUNGS: CTAB, no wheezes or crackles   CARDIAC: RRR, no m/r/g  ABDOMEN: Soft, non tender, non distended, no rebound, no guarding  GI: Right perianal area of fluctuance.  BACK: No midline spinal tenderness, no CVA tenderness  EXT: No edema, no calf tenderness, 2+ DP pulses bilaterally, no deformities.  NEURO: A&Ox3. Moving all extremities.  SKIN: Warm and dry. No rash.  PSYCH: Normal affect. GENERAL: Awake, alert, NAD  HEENT: NC/AT, moist mucous membranes, PERRL, EOMI  LUNGS: CTAB, no wheezes or crackles   CARDIAC: RRR, no m/r/g  ABDOMEN: Soft, non tender, non distended, no rebound, no guarding  GI: Right perianal area of fluctuance.  BACK: No midline spinal tenderness, no CVA tenderness  EXT: No edema, no calf tenderness, 2+ DP pulses bilaterally, no deformities.  NEURO: A&Ox3. Moving all extremities.  SKIN: Warm and dry. No rash.  PSYCH: Normal affect.  Attending Katie Garza: Gen: NAD, heent: atrauamtic, mmm, op pink,  neck; nttp,  cv: rrr, no murmurs, lungs: ctab, abd: soft, nontender, nondistended, no peritoneal signs, , no guarding, ext: wwp,, skin: no rash, ttp right perirectal area, with fluctuence neuro: awake and alert, following commands, speech clear, sensation and strength intact, no focal deficits

## 2023-06-10 NOTE — CONSULT NOTE ADULT - ASSESSMENT
37M hx AML in remission, perianal abscess presenting with recurrent perianal abscess    - s/p bedside I&D, pus sent for culture  - Augmentin x4 days  - Patient has an appointment to see Dr. Phillips in the office next Tuesday  - Instructed to remove packing tomorrow, sitz baths for comfort    To be d/w Dr. Phillips in the AM    DMITRIY Ball, PGY-5  Colorectal Surgery (Red)  [id number] with questions

## 2023-06-10 NOTE — ED PROVIDER NOTE - ATTENDING CONTRIBUTION TO CARE
Attending MD Katie Garza:  I personally have seen and examined this patient.  Resident note reviewed and agree on plan of care and except where noted.  See HPI, PE, and MDM for details.

## 2023-06-10 NOTE — ED ADULT NURSE NOTE - OBJECTIVE STATEMENT
36 yo M AOx4 from home with PMH of AML, HTN, and kidney stones c/o right perianal abscess and pain. Pt states onset of abscess and pain today. Pt states he had an abscess that was I&D one year ago in the same area. Pt denies drainage near the abscess and use of pain medications at home. Pt initially presents to [facility] ED in no acute distress with unlabored breathing. Pt abdomen soft and nondistended. Call bell in reach. Stretcher in lowest position locked with blanket given. Comfort care and safety measures provided. Pt denies c/p, sob, abd pain, N/V/D, fevers, chills, dysuria, blood in urine and stool.

## 2023-06-10 NOTE — ED PROVIDER NOTE - NSFOLLOWUPINSTRUCTIONS_ED_ALL_ED_FT
Please follow up with your Dr. Phillips within the next week.   Return to the ER for any new or concerning symptoms: fevers, chills, symptoms that persist, pain that won't go away.     You may take 650 mg acetaminophen every eight hours as needed for pain or 600 mg Motrin.   Pleases take antibiotics as prescribed.   Drink plenty of fluids and rest.    Anorectal Abscess  An abscess is an infected area that contains a collection of pus. An anorectal abscess is an abscess that is near the opening of the anus or around the rectum. Without treatment, an anorectal abscess can become larger and cause other problems, such as a more serious body-wide infection or pain, especially during bowel movements.    What are the causes?  This condition is caused by plugged glands or an infection in one of these areas:  The anus.  The area between the anus and the scrotum in males or between the anus and the vagina in females (perineum).  What increases the risk?  The following factors may make you more likely to develop this condition:  Diabetes or inflammatory bowel disease.  Having a body defense system (immune system) that is weak.  Engaging in anal sex.  Having a sexually transmitted infection (STI).  Certain kinds of cancer, such as rectal carcinoma, leukemia, or lymphoma.  What are the signs or symptoms?  The main symptom of this condition is pain. The pain may be a throbbing pain that gets worse during bowel movements. Other symptoms include:  Swelling and redness in the area of the abscess. The redness may go beyond the abscess and appear as a red streak on the skin.  A visible, painful lump, or a lump that can be felt when touched.  Bleeding or pus-like discharge from the area.  Fever.  General weakness.  Constipation.  Diarrhea.  How is this diagnosed?  This condition is diagnosed based on your medical history and a physical exam of the affected area.  This may involve examining the rectal area with a gloved hand (digital rectal exam).  Sometimes, the health care provider needs to look into the rectum using a probe, scope, or imaging test.  For women, it may require a careful vaginal exam.  How is this treated?  Treatment for this condition may include:  Incision and drainage surgery. This involves making an incision over the abscess to drain the pus.  Medicines, including antibiotic medicine, pain medicine, stool softeners, or laxatives.  Follow these instructions at home:  Medicines    Take over-the-counter and prescription medicines only as told by your health care provider.  If you were prescribed an antibiotic medicine, use it as told by your health care provider. Do not stop using the antibiotic even if you start to feel better.  Do not drive or use heavy machinery while taking prescription pain medicine.  Wound care    Two stitched wounds. One is normal. The other is red with pus and infected.  If gauze was used in the abscess, follow instructions from your health care provider about removing or changing the gauze. It can usually be removed in 2–3 days.  Wash your hands with soap and water before you remove or change your gauze. If soap and water are not available, use hand .  If one or more drains were placed in the abscess cavity, be careful not to pull at them. Your health care provider will tell you how long they need to remain in place.  Check your incision area every day for signs of infection. Check for:  More redness, swelling, or pain.  More fluid or blood.  Warmth.  Pus or a bad smell.  Managing pain, stiffness, and swelling    Bag of ice on a towel on the skin.  Take a sitz bath 3–4 times a day and after bowel movements. This will help reduce pain and swelling.  To relieve pain, try sitting:  On a heating pad with the setting on low.  On an inflatable donut-shaped cushion.  If directed, put ice on the affected area:  Put ice in a plastic bag.  Place a towel between your skin and the bag.  Leave the ice on for 20 minutes, 2–3 times a day.  General instructions    Follow any diet instructions given by your health care provider.  Keep all follow-up visits as told by your health care provider. This is important.  Contact a health care provider if you have:  Bleeding from your incision.  Pain, swelling, or redness that does not improve or gets worse.  Trouble passing stool or urine.  Symptoms that return after treatment.  Get help right away if you:  Have problems moving or using your legs.  Have severe or increasing pain.  Have swelling in the affected area that suddenly gets worse.  Have a large increase in bleeding or passing of pus.  Develop chills or a fever.  Summary  An anorectal abscess is an abscess that is near the opening of the anus or around the rectum. An abscess is an infected area that contains a collection of pus.  The main symptom of this condition is pain. It may be a throbbing pain that gets worse during bowel movements.  Treatment for an anorectal abscess may include surgery to drain the pus from the abscess. Medicines and sitz baths may also be a part of your treatment plan.  This information is not intended to replace advice given to you by your health care provider. Make sure you discuss any questions you have with your health care provider.

## 2023-06-12 ENCOUNTER — APPOINTMENT (OUTPATIENT)
Dept: UROLOGY | Facility: CLINIC | Age: 38
End: 2023-06-12
Payer: COMMERCIAL

## 2023-06-12 VITALS
WEIGHT: 199.19 LBS | BODY MASS INDEX: 28.52 KG/M2 | OXYGEN SATURATION: 98 % | DIASTOLIC BLOOD PRESSURE: 83 MMHG | RESPIRATION RATE: 18 BRPM | TEMPERATURE: 97.7 F | HEIGHT: 70 IN | HEART RATE: 91 BPM | SYSTOLIC BLOOD PRESSURE: 134 MMHG

## 2023-06-12 LAB
-  AMIKACIN: SIGNIFICANT CHANGE UP
-  AMIKACIN: SIGNIFICANT CHANGE UP
-  AMOXICILLIN/CLAVULANIC ACID: SIGNIFICANT CHANGE UP
-  AMOXICILLIN/CLAVULANIC ACID: SIGNIFICANT CHANGE UP
-  AMPICILLIN/SULBACTAM: SIGNIFICANT CHANGE UP
-  AMPICILLIN/SULBACTAM: SIGNIFICANT CHANGE UP
-  AMPICILLIN: SIGNIFICANT CHANGE UP
-  AMPICILLIN: SIGNIFICANT CHANGE UP
-  AZTREONAM: SIGNIFICANT CHANGE UP
-  AZTREONAM: SIGNIFICANT CHANGE UP
-  CEFAZOLIN: SIGNIFICANT CHANGE UP
-  CEFAZOLIN: SIGNIFICANT CHANGE UP
-  CEFEPIME: SIGNIFICANT CHANGE UP
-  CEFEPIME: SIGNIFICANT CHANGE UP
-  CEFOXITIN: SIGNIFICANT CHANGE UP
-  CEFOXITIN: SIGNIFICANT CHANGE UP
-  CEFTRIAXONE: SIGNIFICANT CHANGE UP
-  CEFTRIAXONE: SIGNIFICANT CHANGE UP
-  CIPROFLOXACIN: SIGNIFICANT CHANGE UP
-  CIPROFLOXACIN: SIGNIFICANT CHANGE UP
-  ERTAPENEM: SIGNIFICANT CHANGE UP
-  ERTAPENEM: SIGNIFICANT CHANGE UP
-  GENTAMICIN: SIGNIFICANT CHANGE UP
-  GENTAMICIN: SIGNIFICANT CHANGE UP
-  IMIPENEM: SIGNIFICANT CHANGE UP
-  IMIPENEM: SIGNIFICANT CHANGE UP
-  LEVOFLOXACIN: SIGNIFICANT CHANGE UP
-  LEVOFLOXACIN: SIGNIFICANT CHANGE UP
-  MEROPENEM: SIGNIFICANT CHANGE UP
-  MEROPENEM: SIGNIFICANT CHANGE UP
-  PIPERACILLIN/TAZOBACTAM: SIGNIFICANT CHANGE UP
-  PIPERACILLIN/TAZOBACTAM: SIGNIFICANT CHANGE UP
-  TOBRAMYCIN: SIGNIFICANT CHANGE UP
-  TOBRAMYCIN: SIGNIFICANT CHANGE UP
-  TRIMETHOPRIM/SULFAMETHOXAZOLE: SIGNIFICANT CHANGE UP
-  TRIMETHOPRIM/SULFAMETHOXAZOLE: SIGNIFICANT CHANGE UP
CULTURE RESULTS: SIGNIFICANT CHANGE UP
METHOD TYPE: SIGNIFICANT CHANGE UP
METHOD TYPE: SIGNIFICANT CHANGE UP
ORGANISM # SPEC MICROSCOPIC CNT: SIGNIFICANT CHANGE UP
SPECIMEN SOURCE: SIGNIFICANT CHANGE UP

## 2023-06-12 PROCEDURE — 99214 OFFICE O/P EST MOD 30 MIN: CPT

## 2023-06-12 NOTE — PHYSICAL EXAM
[Normal Appearance] : normal appearance [Abdomen Tenderness] : non-tender [Urethral Meatus] : meatus normal [Epididymis] : the epididymides were normal [Testes Tenderness] : no tenderness of the testes [Testes Mass (___cm)] : there were no testicular masses [FreeTextEntry1] : retractile testicles

## 2023-06-12 NOTE — HISTORY OF PRESENT ILLNESS
[FreeTextEntry1] : JAK DANIELS, a 37 year old male, presented to the office with the chief complaint of low libido and low testosterone. \par \par He has previously seen Dr Lopez and Balaji Molina. 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 He reports he started experienced extreme fatigue at work. His gums then started bleeding. He went to the ER for back pain which he attributed to kidney stones, but he was diagnosed with 17 percent AML. He underwent about 50 rounds of chemotherapy. Now his testicles are retracted and he complains of very low sex drive. His erections are good and adequate. He says his PCP found his testosterone low in March 2023. \par \par Patient reports Fatigue, Low Libido, and retractile testis, as well as shortening of penis after chemotherapy. \par No morning erections.\par Masturbates 3-4x a week \par Had sexual intercourse last about 2 months ago\par Denies LUTS - no nocturia \par \par Was started on TRT by Balaji Molina 4/2023\par \par Was referred for consideration of possible orchidopexy\par \par Today he reports he still has fatigue, low libido. He is able to have sex and have erections\par He is using two packets of the 25mg testosterone (1%) per day\par Also reports his testicles are high riding - causes pain during sex because they get in the way

## 2023-06-12 NOTE — ASSESSMENT
[FreeTextEntry1] : 37 y.o. M with:\par \par #Low testosterone\par - On TRT - renewed\par - CBC, testosterone today\par - RPA 3 months\par \par #Retractile testicles\par - Offered orchidopexy.  Discussed how the procedure would be performed.  Discussed I would place stitches to secure the testicle, and also strip some cremaster muscle. Discussed the risk, bleeding, infection, damage to surrounding structures, infertility, chronic pain. He will think about this

## 2023-06-13 ENCOUNTER — TRANSCRIPTION ENCOUNTER (OUTPATIENT)
Age: 38
End: 2023-06-13

## 2023-06-14 LAB — TESTOST SERPL-MCNC: 333 NG/DL

## 2023-06-19 NOTE — ADVANCED PRACTICE NURSE CONSULT - RECOMMEDATIONS
Primary RN made aware of present treatment. Monitor Patient 
Ok to proceed with chemo
Cytarabine 6300 mg IV was started at 0412 & will infuse over 3hrs.  Decadron IV was given prior.  Continue to monitor.
132

## 2023-07-20 ENCOUNTER — OUTPATIENT (OUTPATIENT)
Dept: OUTPATIENT SERVICES | Facility: HOSPITAL | Age: 38
LOS: 1 days | Discharge: HOME | End: 2023-07-20

## 2023-07-20 DIAGNOSIS — C92.00 ACUTE MYELOBLASTIC LEUKEMIA, NOT HAVING ACHIEVED REMISSION: ICD-10-CM

## 2023-07-28 NOTE — ED ADULT TRIAGE NOTE - PAIN: PRESENCE, MLM
denies pain/discomfort Topical Sulfur Applications Pregnancy And Lactation Text: This medication is Pregnancy Category C and has an unknown safety profile during pregnancy. It is unknown if this topical medication is excreted in breast milk.

## 2023-07-31 ENCOUNTER — RESULT REVIEW (OUTPATIENT)
Age: 38
End: 2023-07-31

## 2023-07-31 ENCOUNTER — APPOINTMENT (OUTPATIENT)
Dept: HEMATOLOGY ONCOLOGY | Facility: CLINIC | Age: 38
End: 2023-07-31
Payer: COMMERCIAL

## 2023-07-31 VITALS
WEIGHT: 205.03 LBS | BODY MASS INDEX: 29.35 KG/M2 | HEART RATE: 88 BPM | DIASTOLIC BLOOD PRESSURE: 93 MMHG | TEMPERATURE: 98 F | RESPIRATION RATE: 16 BRPM | OXYGEN SATURATION: 100 % | HEIGHT: 70 IN | SYSTOLIC BLOOD PRESSURE: 143 MMHG

## 2023-07-31 LAB
BASOPHILS # BLD AUTO: 0.01 K/UL — SIGNIFICANT CHANGE UP (ref 0–0.2)
BASOPHILS NFR BLD AUTO: 0.2 % — SIGNIFICANT CHANGE UP (ref 0–2)
EOSINOPHIL # BLD AUTO: 0.04 K/UL — SIGNIFICANT CHANGE UP (ref 0–0.5)
EOSINOPHIL NFR BLD AUTO: 1 % — SIGNIFICANT CHANGE UP (ref 0–6)
HCT VFR BLD CALC: 43.1 % — SIGNIFICANT CHANGE UP (ref 39–50)
HGB BLD-MCNC: 14.9 G/DL — SIGNIFICANT CHANGE UP (ref 13–17)
IMM GRANULOCYTES NFR BLD AUTO: 0.2 % — SIGNIFICANT CHANGE UP (ref 0–0.9)
LYMPHOCYTES # BLD AUTO: 1.08 K/UL — SIGNIFICANT CHANGE UP (ref 1–3.3)
LYMPHOCYTES # BLD AUTO: 26 % — SIGNIFICANT CHANGE UP (ref 13–44)
MCHC RBC-ENTMCNC: 31.9 PG — SIGNIFICANT CHANGE UP (ref 27–34)
MCHC RBC-ENTMCNC: 34.6 G/DL — SIGNIFICANT CHANGE UP (ref 32–36)
MCV RBC AUTO: 92.3 FL — SIGNIFICANT CHANGE UP (ref 80–100)
MONOCYTES # BLD AUTO: 0.33 K/UL — SIGNIFICANT CHANGE UP (ref 0–0.9)
MONOCYTES NFR BLD AUTO: 7.9 % — SIGNIFICANT CHANGE UP (ref 2–14)
NEUTROPHILS # BLD AUTO: 2.69 K/UL — SIGNIFICANT CHANGE UP (ref 1.8–7.4)
NEUTROPHILS NFR BLD AUTO: 64.7 % — SIGNIFICANT CHANGE UP (ref 43–77)
NRBC # BLD: 0 /100 WBCS — SIGNIFICANT CHANGE UP (ref 0–0)
PLATELET # BLD AUTO: 165 K/UL — SIGNIFICANT CHANGE UP (ref 150–400)
RBC # BLD: 4.67 M/UL — SIGNIFICANT CHANGE UP (ref 4.2–5.8)
RBC # FLD: 12.3 % — SIGNIFICANT CHANGE UP (ref 10.3–14.5)
WBC # BLD: 4.16 K/UL — SIGNIFICANT CHANGE UP (ref 3.8–10.5)
WBC # FLD AUTO: 4.16 K/UL — SIGNIFICANT CHANGE UP (ref 3.8–10.5)

## 2023-07-31 PROCEDURE — 99213 OFFICE O/P EST LOW 20 MIN: CPT

## 2023-07-31 NOTE — ASSESSMENT
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  Started induction with Dauno/Cytarabine on 8/6/21.  Pt's counts now recovered, remission marrow done on 9/2- in remission. Proceed to consolidation with 4 cycles of HIDAC. C1 HIDAC on 9/17, c/b neutropenic fever and diarrhea. C2 HIDAC on 10/25, was postponed for 1 week due to admission for diarrhea, given negative stool studies, suspect diarrhea was likely chemo-related. Pt was admitted again 11/13-11/17 for sepsis due to thumb cellulitis after laceration. C3 on 11/26, had Fulphilia on C3 c/b epistaxis and neutropenic fever w/Temp 100.3. C4 HiDAC 1/5/22, c/b transaminitis and neutropenic fever He had laparoscopic cholecystotomy on 4/17.  s/p BMbx 2/11/22 showing CR. He has non-necrotizing granulomas noted in BMbx, unclear significance CBC today stable -results reviewed with pt  NPM1Q to [facility] lab done in 5/2023: negative, will monitor every 3 months, will check today. Discussed supportive groups to help with anxiety of cancer relapse -information provided Discussed to follow up with PT for back pain. He will see spine specialist on Friday cont f/u with Dr. Molina for low testosterone levels, he started taking testosterone end of April.  All questions answered RTC 6 wks

## 2023-07-31 NOTE — PHYSICAL EXAM
[Fully active, able to carry on all pre-disease performance without restriction] : Status 0 - Fully active, able to carry on all pre-disease performance without restriction [Normal] : affect appropriate [de-identified] : a 1 cm long laceration with sutures on the right thrum, healing well

## 2023-07-31 NOTE — HISTORY OF PRESENT ILLNESS
[de-identified] : 35yo M w/ HTN and newly diagnosed AML here for f/u. \par  \par  He presented to the hospital on 7/30/21 with complaints of dizziness, fatigue and severe RUQ pain for 3 days. CT A/P revealed fatty liver and small R pleural effusion. WBC 12k with 17% blasts. Peripheral flow cytometry showed 13% myeloblasts. FLT3(-). BMbx was done on 8/4/21 - confirmed AML. 46,XY                          {20                           }\par  Foundation: mutations in DNMT3A R882H, NRAS G12D, NPM1 W288fs*12\par  Pt consented to [facility]. Patient was offered sperm banking, but declined.\par  On 8/6, induction with Dauno/Cytararbine was started (cytarabine 100/m2 and dauno 90/m2) \par  He received a seven day course of Zosyn for presumed RUL PNA, then transitioned to  levaquin, posaconazole and Acyclovir for ppx for neutropenia. Course c/b neutropenic fevers, cultures negative, repeat CT 8/14 without infectious source. He was on Cefepime but developed a rash, changed to meropenem. Rash improved. \par  Day 14 BMbx on 8/19 was hypocellular with chemotherapeutic effect; however, per hematopathology, appears to be earlier regeneration than would typically seen which is concerning for persistent disease at this point, and the earliest cells are CD34 positive and his myeloblasts on initial presentation were CD34 negative. Thus, this may simply represent early regeneration of his marrow. Plan is to await count recovery and repeat biopsy.  \par  Patient discharged home on 8/29/21 when ANC >500 [de-identified] : Eating well since discharge.  c/o hemorrhoidal pain. Hemorrhoids started a few days prior to discharge. He was sent home with rectal ointment and witch hazel.   BMBx done on 9/2: Cellular bone marrow with trilineage hematopoiesis with maturation and megakaryocytosis (history of AML). Pt feels well, CBC today showed count stable  s/p C1 HIDAC 9/17-9/22  c/o leg pain after chemo in the hospital   He presented to the ED on 10/9 due to fever the night of presentation. The patient went to sleep around 10pm and woke up at 3am feeling warm and clammy. He took his temperature orally at home and it was 100.7. The day prior to presentation (10/8/21), the patient went to [facility] for an appointment to get his platelet count checked. He states he was feeling a bit run down and thought he was going to need a transfusion, but he did not need a transfusion. He was afebrile during the time of the appointment and went home afterwards. Around 3pm, the patient had bilateral crampy leg pain that was similar to the leg pain he felt during his consolidation therapy treatment. He took hydrocodone and the pain improved. The patient had one episode of diarrhea three days prior to presentation and has been bothered by rectal pain associated with his "large hemorrhoids. He denies any bleeding per rectum. He also feels like his mouth has been more dry than usual over the past several days, but denies all other symptoms.  CT Abdomen and Pelvis w/ Oral Cont and w/ IV Conttrast on 10/9 revealed Region of hypoenhancement upper pole left kidney; please correlate clinically for possible pyelonephritis. No hydronephrosis or perinephric stranding. No rectal abscess. CT Chest No Contrast on 10/9 revealed Clear lungs. 10/9- BCX NGTD and 10/9- UCX (-) He ate some cold salad late last night, had upsetting stomach and diarrhea this morning. Will take Imodium for diarrhea.   C2 is due on 10/15, pt wants deferring to next Monday after his diarrhea improved.  Admission of  C2 was postponed due to worsening diarrhea. Went to ED on 10/15 due to worsening watery diarrhea. GI PCR neg, C Diff neg Given negative stool studies, suspect diarrhea was likely chemo-related. S/p cycle 2 Consolidation with HIDAC started on 10/25. Patient received IV hydration, strict I/O, antiemetics, monitoring of CBC and CMP and for cerebellar toxicity. PRedforte eye drops given to prevent chemical conjunctivitis. Patient with fever throughout course of treatment. Cultures negative. Due to fever probably related to HIDAC, patient received dexamethasone prior to the last 2 doses of cytatabine.  He is seen today accompanied by his father, pt feels fatigue after chemo, other than that he feels fine. Denied any fever, chills diarrhea.   Pt was admitted on 11/13-11/17 s/p right first digit laceration repair on 11/12 and presenting with erythema and pain at the site of laceration repair. Patient reported he was feeling unwell prior to suture placement with body aches, chills, night sweats and subjective fevers. Patient last BM 4 days ago. Has bruise to left inner thigh. Patient reports he keeps his PICC site clean and intact which was placed in 9/2021. Upon admission to the hospital ID was consulted started on Zosyn , GI consulted for rectal pain secondary to hemorrhoids and palliative consulted for pain control.   C3 on 11/26, tolerated well. He was seen today after discharge, accompanied by his father. He admitted feeling tired, denied any f/c, diarrhea. Right hand suture site healing well, no abnormal erythema or discharge.  He will have Fulphilia today.  C3 HIDAC 11/26-12/1 He presents to the hospital due to epistaxis and neutropenic fever w/Temp 100.3 on 12/1. Per patient, he reports that he was feeling sinus congestion and pressure on his L side, blew his nose and bleeding began from L nare without improvement prompting arrival to the emergency department. Feels mild L sinus congestion.  L nasal cavity packed with absorbable packing; F/U ENT clinic outpatient. Pan culture obtained-with No growth currently CBC rechecked today recovered. He feels well overall , had lower abdominal pain last night after ate cheese, now improved. Denied any fever chills bloody stool or diarrhea.   s/p C4 HiDAC 1/5/22 Pt has known grade 1 AST and grade 3 ALT transaminitis. Presented this admission with transamintitis. Abd sono  performed and revealed Borderline/mild hepatomegaly with hepatic steatosis. Splenomegaly. Focal area of left upper pole increased renal cortical echogenicity, corresponding to an area of hypoattenuation seen on prior CT chest,  abdomen and pelvis dated 10/9/2021. This is nonspecific and may reflect focal edema. Patient received IV hydration, antiemetics, monitoring of CBC and electrolytes. Predforte eye drops provided to prevent chemical conjunctivitis. Patient was monitored for cerebellar toxicity. Nystagmus checks performed. Hospital course complicated by fever blood cultures showed (-), most likely febrile secondary to HIDAC treatment. To receive Fulphila today.  Pt presented to the hospital on 1/18/22 due fever of 100.4, weakness, decreased WBC and shortness of breath while at home. Patient reports that his pain is more controlled s/p pain medication and his shortness of breath has resolved. He denied chills, cough, chest pain, palpitations. Pan culture (blood +Ucx) were negative, CXR reveals clear lungs, COVID PCR - not detect. Pt started on empiric Zosyn, Diflucan and Acyclovir added prophylactically for neutropenic fever. He was transfused with platelets and PRBC as per supportive parameters (>10k/>7) respectively. He feels well overall now, denied any fever, chills or pain.   BMbx 2/11/22: Cellular bone marrow with erythroid predominant trilineage hematopoiesis No morphologic or immunophenotypic evidence of persistent acute myeloid leukemia Non-necrotizing granulomas Normal male karyotype and normal onkosight myeloid NGS panel study.  He was admitted for back pain 2/2 herniated disk - Rx'd pain meds and a Medrol pack.   Pt went to ED due to acute RUQ pain on 4/16, patient underwent laparoscopic cholecystotomy on 4/17.   He reports feeling well. He reports having severe anxiety when he sees any bruising or gum bleeding. He has seen a therapist but would like to see someone else.  Saw psych Dr. Lozano on 7/18 TEB He is getting PT for his back pain.  He occasionally has some gum bleeding still.   Pt was seen today accompanied by his father. He feels well, eating healthy food and doing exercise. Will go back to work this month.  He f/u w/psych Dr. Lozano monthly, currently is on Zoloft 100mg dialy, and Zolpidem aHS prn for insomnia Denied any new complaints.  Rapid test at home showed COVID 19 positive on last Wed 10/12. Patient's father was tested positive on Friday as well. He had scratch throat, fatigue and low grade fever. Pt didn't receive any COVID 19 meds, admitted he felt better, only slight fatigue, denied fever/chill/SOB.  CBC today stable.   NPM1Q to [facility] lab on 10/19: negative He went to ED on 11/19 for rectal abscess, I&D done in the ED.   He is doing well. No new complaints.  He is not back to work yet.  Last NPM1Q was checked 1/12/23: negative  3/9/23: PAtient is here today for follow up. Reports to be doing well. He does state that he continues to have some loose stools for which he will be following up with Gi for. He also reports that he has been having back pain. He was previously on PT and will be returning to PT now that insurance issues are resolved. He reports to be having a low sex drive and noticing that his testicles are shrunken.   5/8: pt was seen today for a f/u apt accompanied by his father. pt f/u w/ Uro Dr. Molina and started testosterone 2 wks ago for low testosterone level.  He is doing well otherwise, denied any complaints.   7/31: Having worsening back pain radiating down legs b/l. He has appointment with spine specialist on Fri.

## 2023-08-01 LAB
ALBUMIN SERPL ELPH-MCNC: 5.2 G/DL
ALP BLD-CCNC: 83 U/L
ALT SERPL-CCNC: 48 U/L
ANION GAP SERPL CALC-SCNC: 13 MMOL/L
AST SERPL-CCNC: 25 U/L
BILIRUB SERPL-MCNC: 0.8 MG/DL
BUN SERPL-MCNC: 20 MG/DL
CALCIUM SERPL-MCNC: 10.3 MG/DL
CHLORIDE SERPL-SCNC: 103 MMOL/L
CO2 SERPL-SCNC: 25 MMOL/L
CREAT SERPL-MCNC: 1.04 MG/DL
EGFR: 94 ML/MIN/1.73M2
GLUCOSE SERPL-MCNC: 99 MG/DL
LDH SERPL-CCNC: 136 U/L
POTASSIUM SERPL-SCNC: 4.1 MMOL/L
PROT SERPL-MCNC: 7.7 G/DL
SODIUM SERPL-SCNC: 141 MMOL/L

## 2023-08-03 ENCOUNTER — NON-APPOINTMENT (OUTPATIENT)
Age: 38
End: 2023-08-03

## 2023-08-04 ENCOUNTER — NON-APPOINTMENT (OUTPATIENT)
Age: 38
End: 2023-08-04

## 2023-08-04 ENCOUNTER — APPOINTMENT (OUTPATIENT)
Dept: ORTHOPEDIC SURGERY | Facility: CLINIC | Age: 38
End: 2023-08-04
Payer: COMMERCIAL

## 2023-08-04 VITALS — DIASTOLIC BLOOD PRESSURE: 89 MMHG | HEART RATE: 93 BPM | SYSTOLIC BLOOD PRESSURE: 140 MMHG

## 2023-08-04 VITALS — HEIGHT: 72 IN | BODY MASS INDEX: 27.09 KG/M2 | WEIGHT: 200 LBS

## 2023-08-04 DIAGNOSIS — M54.9 DORSALGIA, UNSPECIFIED: ICD-10-CM

## 2023-08-04 PROCEDURE — 99214 OFFICE O/P EST MOD 30 MIN: CPT

## 2023-08-04 NOTE — HISTORY OF PRESENT ILLNESS
[de-identified] : This is a 38-year-old male here today for evaluation of his low back and bilateral radicular type pain.  He states he has been having the symptoms for a year.  He does have a history of leukemia.  He states the symptoms can be disabling.  He states that he can walk even a block without severe intractable pain.

## 2023-08-04 NOTE — PHYSICAL EXAM
[de-identified] : Lumbar Physical Exam  Gait - Normal  Station - Normal  Sagittal balance - Normal  Compensatory mechanism? - None  Heel walk - Normal  Toe walk - Normal  Reflexes Patellar - normal Gastroc - normal Clonus - No  Hip Exam - Normal  Straight leg raise - none  Pulses - 2+ dp/pt  Range of motion - normal  Sensation  Sensation intact to light touch in L1, L2, L3, L4, L5 and S1 dermatomes bilaterally  Motor 	IP	Quad	HS	TA	Gastroc	EHL Right	4/5	5/5	5/5	5/5	5/5	5/5 Left	4/5	5/5	5/5	5/5	5/5	5/5 [de-identified] : CT scan reviewed of lumbar spine, through the abdominal CT Facet arthropathy mild Disc degeneration mild

## 2023-08-09 ENCOUNTER — OUTPATIENT (OUTPATIENT)
Dept: OUTPATIENT SERVICES | Facility: HOSPITAL | Age: 38
LOS: 1 days | End: 2023-08-09
Payer: COMMERCIAL

## 2023-08-09 ENCOUNTER — APPOINTMENT (OUTPATIENT)
Dept: MRI IMAGING | Facility: CLINIC | Age: 38
End: 2023-08-09
Payer: COMMERCIAL

## 2023-08-09 DIAGNOSIS — Z00.8 ENCOUNTER FOR OTHER GENERAL EXAMINATION: ICD-10-CM

## 2023-08-09 DIAGNOSIS — M54.9 DORSALGIA, UNSPECIFIED: ICD-10-CM

## 2023-08-09 PROCEDURE — 72148 MRI LUMBAR SPINE W/O DYE: CPT

## 2023-08-09 PROCEDURE — 72148 MRI LUMBAR SPINE W/O DYE: CPT | Mod: 26

## 2023-09-07 DIAGNOSIS — C92.00 ACUTE MYELOBLASTIC LEUKEMIA, NOT HAVING ACHIEVED REMISSION: ICD-10-CM

## 2023-09-11 ENCOUNTER — RESULT REVIEW (OUTPATIENT)
Age: 38
End: 2023-09-11

## 2023-09-11 ENCOUNTER — APPOINTMENT (OUTPATIENT)
Dept: HEMATOLOGY ONCOLOGY | Facility: CLINIC | Age: 38
End: 2023-09-11
Payer: COMMERCIAL

## 2023-09-11 ENCOUNTER — LABORATORY RESULT (OUTPATIENT)
Age: 38
End: 2023-09-11

## 2023-09-11 VITALS
WEIGHT: 206.13 LBS | OXYGEN SATURATION: 98 % | HEART RATE: 96 BPM | DIASTOLIC BLOOD PRESSURE: 83 MMHG | TEMPERATURE: 97.4 F | RESPIRATION RATE: 16 BRPM | BODY MASS INDEX: 27.96 KG/M2 | SYSTOLIC BLOOD PRESSURE: 124 MMHG

## 2023-09-11 LAB
BASOPHILS # BLD AUTO: 0.01 K/UL — SIGNIFICANT CHANGE UP (ref 0–0.2)
BASOPHILS NFR BLD AUTO: 0.3 % — SIGNIFICANT CHANGE UP (ref 0–2)
EOSINOPHIL # BLD AUTO: 0.07 K/UL — SIGNIFICANT CHANGE UP (ref 0–0.5)
EOSINOPHIL NFR BLD AUTO: 1.8 % — SIGNIFICANT CHANGE UP (ref 0–6)
HCT VFR BLD CALC: 41.3 % — SIGNIFICANT CHANGE UP (ref 39–50)
HGB BLD-MCNC: 14.2 G/DL — SIGNIFICANT CHANGE UP (ref 13–17)
IMM GRANULOCYTES NFR BLD AUTO: 0.3 % — SIGNIFICANT CHANGE UP (ref 0–0.9)
LYMPHOCYTES # BLD AUTO: 1.02 K/UL — SIGNIFICANT CHANGE UP (ref 1–3.3)
LYMPHOCYTES # BLD AUTO: 26.1 % — SIGNIFICANT CHANGE UP (ref 13–44)
MCHC RBC-ENTMCNC: 31.8 PG — SIGNIFICANT CHANGE UP (ref 27–34)
MCHC RBC-ENTMCNC: 34.4 G/DL — SIGNIFICANT CHANGE UP (ref 32–36)
MCV RBC AUTO: 92.6 FL — SIGNIFICANT CHANGE UP (ref 80–100)
MONOCYTES # BLD AUTO: 0.34 K/UL — SIGNIFICANT CHANGE UP (ref 0–0.9)
MONOCYTES NFR BLD AUTO: 8.7 % — SIGNIFICANT CHANGE UP (ref 2–14)
NEUTROPHILS # BLD AUTO: 2.46 K/UL — SIGNIFICANT CHANGE UP (ref 1.8–7.4)
NEUTROPHILS NFR BLD AUTO: 62.8 % — SIGNIFICANT CHANGE UP (ref 43–77)
NRBC # BLD: 0 /100 WBCS — SIGNIFICANT CHANGE UP (ref 0–0)
PLATELET # BLD AUTO: 158 K/UL — SIGNIFICANT CHANGE UP (ref 150–400)
RBC # BLD: 4.46 M/UL — SIGNIFICANT CHANGE UP (ref 4.2–5.8)
RBC # FLD: 12.3 % — SIGNIFICANT CHANGE UP (ref 10.3–14.5)
WBC # BLD: 3.91 K/UL — SIGNIFICANT CHANGE UP (ref 3.8–10.5)
WBC # FLD AUTO: 3.91 K/UL — SIGNIFICANT CHANGE UP (ref 3.8–10.5)

## 2023-09-11 PROCEDURE — 99214 OFFICE O/P EST MOD 30 MIN: CPT

## 2023-09-12 LAB
ALBUMIN SERPL ELPH-MCNC: 5 G/DL
ALP BLD-CCNC: 99 U/L
ALT SERPL-CCNC: 42 U/L
ANION GAP SERPL CALC-SCNC: 16 MMOL/L
AST SERPL-CCNC: 27 U/L
BILIRUB SERPL-MCNC: 0.5 MG/DL
BUN SERPL-MCNC: 15 MG/DL
CALCIUM SERPL-MCNC: 10.3 MG/DL
CHLORIDE SERPL-SCNC: 106 MMOL/L
CO2 SERPL-SCNC: 21 MMOL/L
CREAT SERPL-MCNC: 0.93 MG/DL
EGFR: 108 ML/MIN/1.73M2
GLUCOSE SERPL-MCNC: 88 MG/DL
LDH SERPL-CCNC: 144 U/L
POTASSIUM SERPL-SCNC: 3.8 MMOL/L
PROT SERPL-MCNC: 7.5 G/DL
SODIUM SERPL-SCNC: 142 MMOL/L

## 2023-09-13 ENCOUNTER — APPOINTMENT (OUTPATIENT)
Dept: COLORECTAL SURGERY | Facility: CLINIC | Age: 38
End: 2023-09-13
Payer: COMMERCIAL

## 2023-09-13 VITALS
HEIGHT: 71 IN | WEIGHT: 206 LBS | BODY MASS INDEX: 28.84 KG/M2 | TEMPERATURE: 98.3 F | OXYGEN SATURATION: 100 % | HEART RATE: 86 BPM | RESPIRATION RATE: 15 BRPM

## 2023-09-13 DIAGNOSIS — Z78.9 OTHER SPECIFIED HEALTH STATUS: ICD-10-CM

## 2023-09-13 PROCEDURE — 46600 DIAGNOSTIC ANOSCOPY SPX: CPT

## 2023-09-13 PROCEDURE — 99204 OFFICE O/P NEW MOD 45 MIN: CPT | Mod: 25

## 2023-09-13 RX ORDER — SERTRALINE HYDROCHLORIDE 25 MG/1
TABLET, FILM COATED ORAL
Refills: 0 | Status: ACTIVE | COMMUNITY

## 2023-09-13 RX ORDER — BUPROPION HYDROCHLORIDE 100 MG/1
100 TABLET, FILM COATED, EXTENDED RELEASE ORAL DAILY
Qty: 30 | Refills: 0 | Status: DISCONTINUED | COMMUNITY
Start: 2023-04-14 | End: 2023-09-13

## 2023-09-21 ENCOUNTER — APPOINTMENT (OUTPATIENT)
Dept: UROLOGY | Facility: CLINIC | Age: 38
End: 2023-09-21
Payer: COMMERCIAL

## 2023-09-21 VITALS
HEART RATE: 74 BPM | SYSTOLIC BLOOD PRESSURE: 126 MMHG | DIASTOLIC BLOOD PRESSURE: 74 MMHG | RESPIRATION RATE: 16 BRPM | TEMPERATURE: 98.2 F

## 2023-09-21 DIAGNOSIS — Q55.62 HYPOPLASIA OF PENIS: ICD-10-CM

## 2023-09-21 PROCEDURE — 99214 OFFICE O/P EST MOD 30 MIN: CPT

## 2023-09-21 NOTE — ED ADULT NURSE NOTE - CINV DISCH MEDS REVIEWED YN
PPE for this encounter included facial mask    Patient seen at: 2300    Chief Complaint   Patient presents with   •  Symptoms        History Of Present Illness  Jordon is a 70 year old male presenting with complaints of inability to urinate.  The patient says that he has been having difficulty urinating now for 4 days.  Patient has had problems somewhat similar to this but never to this degree and has never had a Fry catheter before.  He does not take any medicines.  He had just a little bit of alcohol on the day that the symptoms started.  Patient feels that his abdomen is bloated and has just been dribbling a small amount of urine.  He does have a little bit of left-sided flank pain.      Past Medical History  No past medical history on file.     Surgical History  No past surgical history on file.     Social History       Family History  No family history on file.     Allergies  ALLERGIES:  Patient has no known allergies.    Medications  (Not in a hospital admission)      Review of Systems  Review of Systems   Respiratory: Negative for shortness of breath.    Cardiovascular: Negative for chest pain.   Gastrointestinal: Positive for abdominal pain.   Genitourinary: Positive for difficulty urinating and flank pain.   Musculoskeletal: Positive for back pain.         Last Recorded Vitals  Blood pressure (!) 149/79, pulse 61, temperature 97.5 °F (36.4 °C), temperature source Oral, resp. rate 18, height 6' 1\" (1.854 m), weight 95 kg (209 lb 7 oz), SpO2 96 %.    Physical Exam  General Examination:  Patient is alert and pleasant but does appear uncomfortable  Skin: Warm and Dry, normal for ethnicity, no rashes  Head: Normocephalic, atraumatic  Eye: Normal conjunctiva, non icteric, Extra occular muscles are intact, vision normal for patient  ENT: Oropharynx is well hydrated, normal external ears and nose,   Neck: Trachea in the midline, supple  Lungs: No respiratory distress, equal breath sounds, lungs are clear to  auscultation   Cardiac: Regular in rate and rhythm, no murmurs   Abdomen: Soft in the upper abdomen but distended.  Very full and tender in the lower abdomen all the way to the umbilicus.  Extremities: No deformity, no edema, normal range of motion  Back: Normal range of motion, normal alignment, mild right-sided CVA tenderness  Neurologic: Alert and oriented x4, normal strength, normal speech and normal thought process  Psychiatric: Cooperative, normal mood and affect       Imaging  Imaging Results    None        Labs     Results for orders placed or performed during the hospital encounter of 09/20/23   Urinalysis With Microscopy & Culture If Indicated   Result Value    COLOR, URINALYSIS Straw    APPEARANCE, URINALYSIS Clear    GLUCOSE, URINALYSIS Negative    BILIRUBIN, URINALYSIS Negative    KETONES, URINALYSIS Negative    SPECIFIC GRAVITY, URINALYSIS 1.013     Comment: Measured by refractometry    OCCULT BLOOD, URINALYSIS Small (A)    PH, URINALYSIS 6.0    PROTEIN, URINALYSIS Negative    UROBILINOGEN, URINALYSIS 0.2    NITRITE, URINALYSIS Negative    LEUKOCYTE ESTERASE, URINALYSIS Negative    SQUAMOUS EPITHELIAL, URINALYSIS None Seen    ERYTHROCYTES, URINALYSIS 6 to 10 (A)    LEUKOCYTES, URINALYSIS 1 to 5    BACTERIA, URINALYSIS None Seen    HYALINE CASTS, URINALYSIS None Seen   Basic Metabolic Panel   Result Value    Fasting Status     Sodium 137    Potassium 4.4    Chloride 105    Carbon Dioxide 26    Anion Gap 10    Glucose 117 (H)    BUN 17    Creatinine 1.03    Glomerular Filtration Rate 78     Comment: eGFR results = or >60 mL/min/1.73m2 = Normal kidney function. Estimated GFR calculated using the CKD-EPI-R (2021) equation that does not include race in the creatinine calculation.    BUN/Cr 17    Calcium 8.9   CBC with Automated Differential (performable only)   Result Value    WBC 10.2    RBC 4.88    HGB 14.4    HCT 43.5    MCV 89.1    MCH 29.5    MCHC 33.1    RDW-CV 12.9    RDW-SD 42.1        NRBC 0     Neutrophil, Percent 69    Lymphocytes, Percent 19    Mono, Percent 8    Eosinophils, Percent 4    Basophils, Percent 0    Immature Granulocytes 0    Absolute Neutrophils 6.9    Absolute Lymphocytes 2.0    Absolute Monocytes 0.9    Absolute Eosinophils  0.4    Absolute Basophils 0.0    Absolute Immature Granulocytes 0.0         ED Course as of 09/21/23 0018   Wed Sep 20, 2023   2334 Patient is feeling significantly better after about 3 to 400 mL of urine is removed.  We did clamp the Fry.  We will check some lab tests and slowly let the patient drain the rest of his bladder.  Abdomen already feels softer but he continues to have fullness in the lower abdomen. [GT]   Thu Sep 21, 2023   0016 Patient on reexamination feels markedly improved.  No longer has any abdominal fullness or tenderness.  His flank pain is resolved.  Blood pressure is improved significantly.  But the patient follow-up with his urologist with the Fry intact.  We will discharge the patient home with Flomax as well until he can follow-up with his urologist. [GT]      ED Course User Index  [GT] Ezekiel Mcdonald MD      Assessment & Plan   ED Diagnosis     Diagnosis Comment Associated Orders       Final diagnosis    Acute urinary retention -- --           Medications   lidocaine 2% urethral (UROJET) 2 % jelly 10 mL (10 mLs Transurethral Given 9/20/23 2311)   tamsulosin (FLOMAX) capsule 0.4 mg (has no administration in time range)      MDM    Differential Diagnosis: Urinary retention, urinary tract infection, BPH, flank pain, kidney failure.    ED COURSE:    Fry catheter was placed and the patient had a large amount of clear urine that came out.  Urinalysis does not show evidence of infection.  Blood work shows a normal creatinine.  The patient felt markedly improved after treatment and will go home with a Fry and Flomax.    Code Status    Code Status: Not on file    Primary Care Physician  No primary care provider on file.         Ezekiel ELLIS  MD Tamara     This documentation accurately reflects the work and decisions made by me, Ezekiel Mcdonald MD.     Ezekiel Mcdonald MD  09/21/23 0018     Yes

## 2023-09-22 LAB
TESTOST FREE SERPL-MCNC: 11.2 PG/ML
TESTOST SERPL-MCNC: 457 NG/DL

## 2023-09-29 ENCOUNTER — APPOINTMENT (OUTPATIENT)
Dept: ORTHOPEDIC SURGERY | Facility: CLINIC | Age: 38
End: 2023-09-29

## 2023-10-02 ENCOUNTER — OUTPATIENT (OUTPATIENT)
Dept: OUTPATIENT SERVICES | Facility: HOSPITAL | Age: 38
LOS: 1 days | End: 2023-10-02
Payer: COMMERCIAL

## 2023-10-02 VITALS
TEMPERATURE: 98 F | SYSTOLIC BLOOD PRESSURE: 147 MMHG | WEIGHT: 205.03 LBS | OXYGEN SATURATION: 100 % | HEIGHT: 71 IN | DIASTOLIC BLOOD PRESSURE: 82 MMHG | HEART RATE: 82 BPM | RESPIRATION RATE: 18 BRPM

## 2023-10-02 DIAGNOSIS — K60.3 ANAL FISTULA: ICD-10-CM

## 2023-10-02 DIAGNOSIS — C92.00 ACUTE MYELOBLASTIC LEUKEMIA, NOT HAVING ACHIEVED REMISSION: ICD-10-CM

## 2023-10-02 DIAGNOSIS — Z01.818 ENCOUNTER FOR OTHER PREPROCEDURAL EXAMINATION: ICD-10-CM

## 2023-10-02 DIAGNOSIS — Z98.890 OTHER SPECIFIED POSTPROCEDURAL STATES: Chronic | ICD-10-CM

## 2023-10-02 DIAGNOSIS — Z90.49 ACQUIRED ABSENCE OF OTHER SPECIFIED PARTS OF DIGESTIVE TRACT: Chronic | ICD-10-CM

## 2023-10-02 PROCEDURE — G0463: CPT

## 2023-10-02 PROCEDURE — 85027 COMPLETE CBC AUTOMATED: CPT

## 2023-10-02 PROCEDURE — 80048 BASIC METABOLIC PNL TOTAL CA: CPT

## 2023-10-02 RX ORDER — LIDOCAINE HCL 20 MG/ML
0.2 VIAL (ML) INJECTION ONCE
Refills: 0 | Status: DISCONTINUED | OUTPATIENT
Start: 2023-10-20 | End: 2023-11-03

## 2023-10-02 RX ORDER — SODIUM CHLORIDE 9 MG/ML
1000 INJECTION, SOLUTION INTRAVENOUS
Refills: 0 | Status: DISCONTINUED | OUTPATIENT
Start: 2023-10-20 | End: 2023-11-03

## 2023-10-02 NOTE — H&P PST ADULT - HISTORY OF PRESENT ILLNESS
38yr old male with hx of anal fistula and reoccurring rectal abscess that was I&D in ED. Pt has hx of AML in remission 16months Dx 7/2021. Pt states the chemo treatments tore up his stomach and he developed hemorrhoids and abscess.  Now coming in for  fistulotomy possible seton.

## 2023-10-02 NOTE — H&P PST ADULT - NSICDXPASTSURGICALHX_GEN_ALL_CORE_FT
PAST SURGICAL HISTORY:  History of hemorrhoidectomy     History of laparoscopic cholecystectomy

## 2023-10-02 NOTE — H&P PST ADULT - NSICDXPASTMEDICALHX_GEN_ALL_CORE_FT
PAST MEDICAL HISTORY:  AML (acute myeloid leukemia)     Anal fistula     Anxiety     Herniated nucleus pulposus, L4-5     History of genital warts     Hyperlipidemia     Hypertension diagnosed 1 year ago    Kidney stone 5 years ago

## 2023-10-03 ENCOUNTER — TRANSCRIPTION ENCOUNTER (OUTPATIENT)
Age: 38
End: 2023-10-03

## 2023-10-04 ENCOUNTER — NON-APPOINTMENT (OUTPATIENT)
Age: 38
End: 2023-10-04

## 2023-10-05 PROBLEM — K60.3 ANAL FISTULA: Chronic | Status: ACTIVE | Noted: 2023-10-02

## 2023-10-05 PROBLEM — M51.26 OTHER INTERVERTEBRAL DISC DISPLACEMENT, LUMBAR REGION: Chronic | Status: ACTIVE | Noted: 2023-10-02

## 2023-10-05 PROBLEM — F41.9 ANXIETY DISORDER, UNSPECIFIED: Chronic | Status: ACTIVE | Noted: 2023-10-02

## 2023-10-05 PROBLEM — E78.5 HYPERLIPIDEMIA, UNSPECIFIED: Chronic | Status: ACTIVE | Noted: 2023-10-02

## 2023-10-12 NOTE — DISCHARGE NOTE PROVIDER - NSDCHC_MEDRECSTATUS_GEN_ALL_CORE
Informed pt to stop 2 days prior to surgery and restart evening after surgery unless surgeon gives other instructions. Admission Reconciliation is Completed  Discharge Reconciliation is Completed

## 2023-10-20 ENCOUNTER — TRANSCRIPTION ENCOUNTER (OUTPATIENT)
Age: 38
End: 2023-10-20

## 2023-10-20 ENCOUNTER — OUTPATIENT (OUTPATIENT)
Dept: OUTPATIENT SERVICES | Facility: HOSPITAL | Age: 38
LOS: 1 days | End: 2023-10-20
Payer: COMMERCIAL

## 2023-10-20 ENCOUNTER — APPOINTMENT (OUTPATIENT)
Dept: COLORECTAL SURGERY | Facility: HOSPITAL | Age: 38
End: 2023-10-20

## 2023-10-20 VITALS
TEMPERATURE: 97 F | DIASTOLIC BLOOD PRESSURE: 80 MMHG | SYSTOLIC BLOOD PRESSURE: 129 MMHG | OXYGEN SATURATION: 98 % | WEIGHT: 205.03 LBS | HEART RATE: 98 BPM | RESPIRATION RATE: 16 BRPM | HEIGHT: 71 IN

## 2023-10-20 VITALS
DIASTOLIC BLOOD PRESSURE: 56 MMHG | SYSTOLIC BLOOD PRESSURE: 107 MMHG | RESPIRATION RATE: 16 BRPM | OXYGEN SATURATION: 99 % | HEART RATE: 78 BPM

## 2023-10-20 DIAGNOSIS — Z90.49 ACQUIRED ABSENCE OF OTHER SPECIFIED PARTS OF DIGESTIVE TRACT: Chronic | ICD-10-CM

## 2023-10-20 DIAGNOSIS — Z98.890 OTHER SPECIFIED POSTPROCEDURAL STATES: Chronic | ICD-10-CM

## 2023-10-20 DIAGNOSIS — K60.3 ANAL FISTULA: ICD-10-CM

## 2023-10-20 PROCEDURE — 46270 REMOVE ANAL FIST SUBQ: CPT

## 2023-10-20 PROCEDURE — C1889: CPT

## 2023-10-20 DEVICE — SURGICEL FIBRILLAR 2 X 4": Type: IMPLANTABLE DEVICE | Status: FUNCTIONAL

## 2023-10-20 RX ORDER — IBUPROFEN 200 MG
1 TABLET ORAL
Qty: 20 | Refills: 0
Start: 2023-10-20 | End: 2023-10-24

## 2023-10-20 RX ORDER — CHOLECALCIFEROL (VITAMIN D3) 125 MCG
1 CAPSULE ORAL
Refills: 0 | DISCHARGE

## 2023-10-20 RX ORDER — SERTRALINE 25 MG/1
1 TABLET, FILM COATED ORAL
Refills: 0 | DISCHARGE

## 2023-10-20 RX ORDER — ACETAMINOPHEN 500 MG
1 TABLET ORAL
Qty: 28 | Refills: 0
Start: 2023-10-20 | End: 2023-10-26

## 2023-10-20 RX ORDER — OXYCODONE HYDROCHLORIDE 5 MG/1
1 TABLET ORAL
Qty: 16 | Refills: 0
Start: 2023-10-20 | End: 2023-10-23

## 2023-10-20 RX ORDER — ATORVASTATIN CALCIUM 80 MG/1
1 TABLET, FILM COATED ORAL
Refills: 0 | DISCHARGE

## 2023-10-20 RX ORDER — OMEGA-3 ACID ETHYL ESTERS 1 G
1 CAPSULE ORAL
Refills: 0 | DISCHARGE

## 2023-10-20 NOTE — ASU DISCHARGE PLAN (ADULT/PEDIATRIC) - NS MD DC FALL RISK RISK
For information on Fall & Injury Prevention, visit: [email or web address] OR  [email or web address] OR  https://www.cdc.gov/steadi/patient.html

## 2023-10-20 NOTE — ASU DISCHARGE PLAN (ADULT/PEDIATRIC) - CARE PROVIDER_API CALL
Mikhail Martinez  Colon/Rectal Surgery  [address], [address]  [place] [address]  Phone: [phone]  Fax: [phone]  Follow Up Time: 1 month

## 2023-10-20 NOTE — BRIEF OPERATIVE NOTE - OPERATION/FINDINGS
Left perianal superficial fistula identified. Probe inserted and fistulotomy performed with adequate hemostasis. Dressing applied.

## 2023-10-30 ENCOUNTER — APPOINTMENT (OUTPATIENT)
Dept: UROLOGY | Facility: CLINIC | Age: 38
End: 2023-10-30

## 2023-10-31 ENCOUNTER — OUTPATIENT (OUTPATIENT)
Dept: OUTPATIENT SERVICES | Facility: HOSPITAL | Age: 38
LOS: 1 days | Discharge: HOME | End: 2023-10-31

## 2023-10-31 DIAGNOSIS — Z98.890 OTHER SPECIFIED POSTPROCEDURAL STATES: Chronic | ICD-10-CM

## 2023-10-31 DIAGNOSIS — C92.00 ACUTE MYELOBLASTIC LEUKEMIA, NOT HAVING ACHIEVED REMISSION: ICD-10-CM

## 2023-10-31 DIAGNOSIS — Z90.49 ACQUIRED ABSENCE OF OTHER SPECIFIED PARTS OF DIGESTIVE TRACT: Chronic | ICD-10-CM

## 2023-11-01 ENCOUNTER — APPOINTMENT (OUTPATIENT)
Dept: COLORECTAL SURGERY | Facility: CLINIC | Age: 38
End: 2023-11-01
Payer: COMMERCIAL

## 2023-11-01 DIAGNOSIS — K60.3 ANAL FISTULA: ICD-10-CM

## 2023-11-01 PROCEDURE — 99024 POSTOP FOLLOW-UP VISIT: CPT

## 2023-11-06 ENCOUNTER — RESULT REVIEW (OUTPATIENT)
Age: 38
End: 2023-11-06

## 2023-11-06 ENCOUNTER — APPOINTMENT (OUTPATIENT)
Dept: HEMATOLOGY ONCOLOGY | Facility: CLINIC | Age: 38
End: 2023-11-06
Payer: COMMERCIAL

## 2023-11-06 VITALS
RESPIRATION RATE: 16 BRPM | SYSTOLIC BLOOD PRESSURE: 131 MMHG | HEART RATE: 104 BPM | BODY MASS INDEX: 28.96 KG/M2 | WEIGHT: 207.67 LBS | OXYGEN SATURATION: 97 % | TEMPERATURE: 97.5 F | DIASTOLIC BLOOD PRESSURE: 88 MMHG

## 2023-11-06 LAB
BASOPHILS # BLD AUTO: 0.02 K/UL — SIGNIFICANT CHANGE UP (ref 0–0.2)
BASOPHILS # BLD AUTO: 0.02 K/UL — SIGNIFICANT CHANGE UP (ref 0–0.2)
BASOPHILS NFR BLD AUTO: 0.4 % — SIGNIFICANT CHANGE UP (ref 0–2)
BASOPHILS NFR BLD AUTO: 0.4 % — SIGNIFICANT CHANGE UP (ref 0–2)
EOSINOPHIL # BLD AUTO: 0.06 K/UL — SIGNIFICANT CHANGE UP (ref 0–0.5)
EOSINOPHIL # BLD AUTO: 0.06 K/UL — SIGNIFICANT CHANGE UP (ref 0–0.5)
EOSINOPHIL NFR BLD AUTO: 1.1 % — SIGNIFICANT CHANGE UP (ref 0–6)
EOSINOPHIL NFR BLD AUTO: 1.1 % — SIGNIFICANT CHANGE UP (ref 0–6)
HCT VFR BLD CALC: 40.5 % — SIGNIFICANT CHANGE UP (ref 39–50)
HCT VFR BLD CALC: 40.5 % — SIGNIFICANT CHANGE UP (ref 39–50)
HGB BLD-MCNC: 14.2 G/DL — SIGNIFICANT CHANGE UP (ref 13–17)
HGB BLD-MCNC: 14.2 G/DL — SIGNIFICANT CHANGE UP (ref 13–17)
IMM GRANULOCYTES NFR BLD AUTO: 0.2 % — SIGNIFICANT CHANGE UP (ref 0–0.9)
IMM GRANULOCYTES NFR BLD AUTO: 0.2 % — SIGNIFICANT CHANGE UP (ref 0–0.9)
LYMPHOCYTES # BLD AUTO: 1.4 K/UL — SIGNIFICANT CHANGE UP (ref 1–3.3)
LYMPHOCYTES # BLD AUTO: 1.4 K/UL — SIGNIFICANT CHANGE UP (ref 1–3.3)
LYMPHOCYTES # BLD AUTO: 26 % — SIGNIFICANT CHANGE UP (ref 13–44)
LYMPHOCYTES # BLD AUTO: 26 % — SIGNIFICANT CHANGE UP (ref 13–44)
MCHC RBC-ENTMCNC: 32.7 PG — SIGNIFICANT CHANGE UP (ref 27–34)
MCHC RBC-ENTMCNC: 32.7 PG — SIGNIFICANT CHANGE UP (ref 27–34)
MCHC RBC-ENTMCNC: 35.1 G/DL — SIGNIFICANT CHANGE UP (ref 32–36)
MCHC RBC-ENTMCNC: 35.1 G/DL — SIGNIFICANT CHANGE UP (ref 32–36)
MCV RBC AUTO: 93.3 FL — SIGNIFICANT CHANGE UP (ref 80–100)
MCV RBC AUTO: 93.3 FL — SIGNIFICANT CHANGE UP (ref 80–100)
MONOCYTES # BLD AUTO: 0.44 K/UL — SIGNIFICANT CHANGE UP (ref 0–0.9)
MONOCYTES # BLD AUTO: 0.44 K/UL — SIGNIFICANT CHANGE UP (ref 0–0.9)
MONOCYTES NFR BLD AUTO: 8.2 % — SIGNIFICANT CHANGE UP (ref 2–14)
MONOCYTES NFR BLD AUTO: 8.2 % — SIGNIFICANT CHANGE UP (ref 2–14)
NEUTROPHILS # BLD AUTO: 3.45 K/UL — SIGNIFICANT CHANGE UP (ref 1.8–7.4)
NEUTROPHILS # BLD AUTO: 3.45 K/UL — SIGNIFICANT CHANGE UP (ref 1.8–7.4)
NEUTROPHILS NFR BLD AUTO: 64.1 % — SIGNIFICANT CHANGE UP (ref 43–77)
NEUTROPHILS NFR BLD AUTO: 64.1 % — SIGNIFICANT CHANGE UP (ref 43–77)
NRBC # BLD: 0 /100 WBCS — SIGNIFICANT CHANGE UP (ref 0–0)
NRBC # BLD: 0 /100 WBCS — SIGNIFICANT CHANGE UP (ref 0–0)
PLATELET # BLD AUTO: 171 K/UL — SIGNIFICANT CHANGE UP (ref 150–400)
PLATELET # BLD AUTO: 171 K/UL — SIGNIFICANT CHANGE UP (ref 150–400)
RBC # BLD: 4.34 M/UL — SIGNIFICANT CHANGE UP (ref 4.2–5.8)
RBC # BLD: 4.34 M/UL — SIGNIFICANT CHANGE UP (ref 4.2–5.8)
RBC # FLD: 12.4 % — SIGNIFICANT CHANGE UP (ref 10.3–14.5)
RBC # FLD: 12.4 % — SIGNIFICANT CHANGE UP (ref 10.3–14.5)
WBC # BLD: 5.38 K/UL — SIGNIFICANT CHANGE UP (ref 3.8–10.5)
WBC # BLD: 5.38 K/UL — SIGNIFICANT CHANGE UP (ref 3.8–10.5)
WBC # FLD AUTO: 5.38 K/UL — SIGNIFICANT CHANGE UP (ref 3.8–10.5)
WBC # FLD AUTO: 5.38 K/UL — SIGNIFICANT CHANGE UP (ref 3.8–10.5)

## 2023-11-06 PROCEDURE — 99213 OFFICE O/P EST LOW 20 MIN: CPT

## 2023-11-14 LAB
ALBUMIN SERPL ELPH-MCNC: 5.2 G/DL
ALP BLD-CCNC: 95 U/L
ALT SERPL-CCNC: 47 U/L
ANION GAP SERPL CALC-SCNC: 12 MMOL/L
AST SERPL-CCNC: 26 U/L
BILIRUB SERPL-MCNC: 0.5 MG/DL
BUN SERPL-MCNC: 20 MG/DL
CALCIUM SERPL-MCNC: 10.5 MG/DL
CHLORIDE SERPL-SCNC: 102 MMOL/L
CO2 SERPL-SCNC: 26 MMOL/L
CREAT SERPL-MCNC: 1.03 MG/DL
EGFR: 95 ML/MIN/1.73M2
GLUCOSE SERPL-MCNC: 90 MG/DL
LDH SERPL-CCNC: 154 U/L
POTASSIUM SERPL-SCNC: 4.2 MMOL/L
PROT SERPL-MCNC: 7.3 G/DL
SODIUM SERPL-SCNC: 141 MMOL/L

## 2023-11-28 ENCOUNTER — APPOINTMENT (OUTPATIENT)
Dept: CT IMAGING | Facility: CLINIC | Age: 38
End: 2023-11-28

## 2023-12-22 ENCOUNTER — APPOINTMENT (OUTPATIENT)
Dept: HEMATOLOGY ONCOLOGY | Facility: CLINIC | Age: 38
End: 2023-12-22
Payer: COMMERCIAL

## 2023-12-22 ENCOUNTER — LABORATORY RESULT (OUTPATIENT)
Age: 38
End: 2023-12-22

## 2023-12-22 ENCOUNTER — APPOINTMENT (OUTPATIENT)
Dept: HEMATOLOGY ONCOLOGY | Facility: CLINIC | Age: 38
End: 2023-12-22

## 2023-12-22 ENCOUNTER — RESULT REVIEW (OUTPATIENT)
Age: 38
End: 2023-12-22

## 2023-12-22 VITALS
RESPIRATION RATE: 16 BRPM | BODY MASS INDEX: 28.17 KG/M2 | SYSTOLIC BLOOD PRESSURE: 125 MMHG | WEIGHT: 201.94 LBS | OXYGEN SATURATION: 98 % | HEART RATE: 115 BPM | DIASTOLIC BLOOD PRESSURE: 85 MMHG | TEMPERATURE: 97.9 F

## 2023-12-22 LAB
BASOPHILS # BLD AUTO: 0.02 K/UL — SIGNIFICANT CHANGE UP (ref 0–0.2)
BASOPHILS # BLD AUTO: 0.02 K/UL — SIGNIFICANT CHANGE UP (ref 0–0.2)
BASOPHILS NFR BLD AUTO: 0.4 % — SIGNIFICANT CHANGE UP (ref 0–2)
BASOPHILS NFR BLD AUTO: 0.4 % — SIGNIFICANT CHANGE UP (ref 0–2)
EOSINOPHIL # BLD AUTO: 0.04 K/UL — SIGNIFICANT CHANGE UP (ref 0–0.5)
EOSINOPHIL # BLD AUTO: 0.04 K/UL — SIGNIFICANT CHANGE UP (ref 0–0.5)
EOSINOPHIL NFR BLD AUTO: 0.8 % — SIGNIFICANT CHANGE UP (ref 0–6)
EOSINOPHIL NFR BLD AUTO: 0.8 % — SIGNIFICANT CHANGE UP (ref 0–6)
HCT VFR BLD CALC: 41.6 % — SIGNIFICANT CHANGE UP (ref 39–50)
HCT VFR BLD CALC: 41.6 % — SIGNIFICANT CHANGE UP (ref 39–50)
HGB BLD-MCNC: 14.6 G/DL — SIGNIFICANT CHANGE UP (ref 13–17)
HGB BLD-MCNC: 14.6 G/DL — SIGNIFICANT CHANGE UP (ref 13–17)
IMM GRANULOCYTES NFR BLD AUTO: 0.2 % — SIGNIFICANT CHANGE UP (ref 0–0.9)
IMM GRANULOCYTES NFR BLD AUTO: 0.2 % — SIGNIFICANT CHANGE UP (ref 0–0.9)
LYMPHOCYTES # BLD AUTO: 1.17 K/UL — SIGNIFICANT CHANGE UP (ref 1–3.3)
LYMPHOCYTES # BLD AUTO: 1.17 K/UL — SIGNIFICANT CHANGE UP (ref 1–3.3)
LYMPHOCYTES # BLD AUTO: 23.4 % — SIGNIFICANT CHANGE UP (ref 13–44)
LYMPHOCYTES # BLD AUTO: 23.4 % — SIGNIFICANT CHANGE UP (ref 13–44)
MCHC RBC-ENTMCNC: 32.2 PG — SIGNIFICANT CHANGE UP (ref 27–34)
MCHC RBC-ENTMCNC: 32.2 PG — SIGNIFICANT CHANGE UP (ref 27–34)
MCHC RBC-ENTMCNC: 35.1 G/DL — SIGNIFICANT CHANGE UP (ref 32–36)
MCHC RBC-ENTMCNC: 35.1 G/DL — SIGNIFICANT CHANGE UP (ref 32–36)
MCV RBC AUTO: 91.6 FL — SIGNIFICANT CHANGE UP (ref 80–100)
MCV RBC AUTO: 91.6 FL — SIGNIFICANT CHANGE UP (ref 80–100)
MONOCYTES # BLD AUTO: 0.44 K/UL — SIGNIFICANT CHANGE UP (ref 0–0.9)
MONOCYTES # BLD AUTO: 0.44 K/UL — SIGNIFICANT CHANGE UP (ref 0–0.9)
MONOCYTES NFR BLD AUTO: 8.8 % — SIGNIFICANT CHANGE UP (ref 2–14)
MONOCYTES NFR BLD AUTO: 8.8 % — SIGNIFICANT CHANGE UP (ref 2–14)
NEUTROPHILS # BLD AUTO: 3.31 K/UL — SIGNIFICANT CHANGE UP (ref 1.8–7.4)
NEUTROPHILS # BLD AUTO: 3.31 K/UL — SIGNIFICANT CHANGE UP (ref 1.8–7.4)
NEUTROPHILS NFR BLD AUTO: 66.4 % — SIGNIFICANT CHANGE UP (ref 43–77)
NEUTROPHILS NFR BLD AUTO: 66.4 % — SIGNIFICANT CHANGE UP (ref 43–77)
NRBC # BLD: 0 /100 WBCS — SIGNIFICANT CHANGE UP (ref 0–0)
NRBC # BLD: 0 /100 WBCS — SIGNIFICANT CHANGE UP (ref 0–0)
PLATELET # BLD AUTO: 148 K/UL — LOW (ref 150–400)
PLATELET # BLD AUTO: 148 K/UL — LOW (ref 150–400)
RBC # BLD: 4.54 M/UL — SIGNIFICANT CHANGE UP (ref 4.2–5.8)
RBC # BLD: 4.54 M/UL — SIGNIFICANT CHANGE UP (ref 4.2–5.8)
RBC # FLD: 11.9 % — SIGNIFICANT CHANGE UP (ref 10.3–14.5)
RBC # FLD: 11.9 % — SIGNIFICANT CHANGE UP (ref 10.3–14.5)
WBC # BLD: 4.99 K/UL — SIGNIFICANT CHANGE UP (ref 3.8–10.5)
WBC # BLD: 4.99 K/UL — SIGNIFICANT CHANGE UP (ref 3.8–10.5)
WBC # FLD AUTO: 4.99 K/UL — SIGNIFICANT CHANGE UP (ref 3.8–10.5)
WBC # FLD AUTO: 4.99 K/UL — SIGNIFICANT CHANGE UP (ref 3.8–10.5)

## 2023-12-22 PROCEDURE — 99214 OFFICE O/P EST MOD 30 MIN: CPT

## 2023-12-28 NOTE — PROGRESS NOTE ADULT - PROBLEM SELECTOR PLAN 3
Patient identification verified with 2 identifiers.    Location: [facility] - [address] [id number] [address] [place], [place] [address] [phone]     Referring Physician: Melvin Bahena  Enrollment/ Re-enrollment date: 05/01/2024   INR Goal: 2.0-3.0  INR monitoring is per [facility] protocol.  Anticoagulation Medication: warfarin 3 mg tabs  Indication: atrial fibrillation      Subjective   Bleeding signs/symptoms: No    Bruising: No   Major bleeding event: No  Thrombosis signs/symptoms: No  Thromboembolic event: No  Missed doses: No  Extra doses: No  Medication changes: No holding warfarin for procedure on 12/19  Dietary changes: No  Change in health: No  Change in activity: No  Alcohol: No  Other concerns: No    Upcoming Surgeries:  Does the Patient Have any upcoming surgeries that require interruption in anticoagulation therapy? yes  Does the patient require bridging? No       Anticoagulation Summary  As of 12/28/2023      INR goal:  2.0-3.0   TTR:  94.0 % (2 mo)   INR used for dosing:  3.00 (12/28/2023)   Weekly warfarin total:  40.5 mg               Assessment/Plan   Therapeutic     1. New dose: no change    2. Next INR: 1 month    Education provided to patient during the visit:  Patient instructed to call in interim with questions, concerns and changes.   Patient educated on interactions between medications and warfarin.   Patient educated on dietary consistency in vitamin k consumption.   Patient educated on signs of bleeding/clotting.   Patient educated on compliance with dosing, follow up appointments, and prescribed plan of care.       Continue Norvasc  Monitor B/P

## 2023-12-29 LAB
ALBUMIN SERPL ELPH-MCNC: 5 G/DL
ALP BLD-CCNC: 97 U/L
ALT SERPL-CCNC: 49 U/L
ANION GAP SERPL CALC-SCNC: 13 MMOL/L
AST SERPL-CCNC: 27 U/L
BILIRUB SERPL-MCNC: 0.5 MG/DL
BUN SERPL-MCNC: 27 MG/DL
CALCIUM SERPL-MCNC: 10.2 MG/DL
CHLORIDE SERPL-SCNC: 103 MMOL/L
CO2 SERPL-SCNC: 24 MMOL/L
CREAT SERPL-MCNC: 1.03 MG/DL
EGFR: 95 ML/MIN/1.73M2
GLUCOSE SERPL-MCNC: 93 MG/DL
LDH SERPL-CCNC: 127 U/L
POTASSIUM SERPL-SCNC: 4 MMOL/L
PROT SERPL-MCNC: 7.4 G/DL
SODIUM SERPL-SCNC: 140 MMOL/L

## 2023-12-29 NOTE — ED PROVIDER NOTE - NSICDXPASTSURGICALHX_GEN_ALL_CORE_FT
Patient called and wanted to know why her order was cancelled at [facility] for the Zonisamide- I stated that you cancelled it and then sent it to the [employer] mail service. She said understood and said she was going to get in contact with them and see how long it would take to get this medication. She called back and stated that they told her since they are not members they will not fill this- she stated they are now in the process of trying to become members. But in the mean time she wants to know if you can send it to the [facility] again for this refill and hopefully next time she can [facility] the [facility] one.    PAST SURGICAL HISTORY:  No significant past surgical history

## 2024-01-11 ENCOUNTER — APPOINTMENT (OUTPATIENT)
Dept: UROLOGY | Facility: CLINIC | Age: 39
End: 2024-01-11

## 2024-01-19 NOTE — ASSESSMENT
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 Started induction with Dauno/Cytarabine on 8/6/21. Pt's counts now recovered, remission marrow done on 9/2- in remission. Proceed to consolidation with 4 cycles of HIDAC. C1 HIDAC on 9/17, c/b neutropenic fever and diarrhea. C2 HIDAC on 10/25, was postponed for 1 week due to admission for diarrhea, given negative stool studies, suspect diarrhea was likely chemo-related. Pt was admitted again 11/13-11/17 for sepsis due to thumb cellulitis after laceration. C3 on 11/26, had Fulphilia on C3 c/b epistaxis and neutropenic fever w/Temp 100.3. C4 HiDAC 1/5/22, c/b transaminitis and neutropenic fever He had laparoscopic cholecystotomy on 4/17. s/p BMbx 2/11/22 showing CR. He has non-necrotizing granulomas noted in BMbx, unclear significance CBC today stable w/ slightly thrombocytopenia.-results reviewed with pt;   NPM1Q to [facility] lab done in 9/2023: negative, will monitor every 3 months, will check today. Discussed supportive groups to help with anxiety of cancer relapse -now on Zoloft and Wellbutrin which helping cont f/u with Dr. Molina for low testosterone levels, he started taking testosterone end of April. s/p fistulotomy on 10/20/23 All questions answered RTC 6 wks  Case and management discussed with Dr. Yancey

## 2024-01-19 NOTE — PHYSICAL EXAM
[Fully active, able to carry on all pre-disease performance without restriction] : Status 0 - Fully active, able to carry on all pre-disease performance without restriction [Normal] : affect appropriate [de-identified] : a 1 cm long laceration with sutures on the right thrum, healing well

## 2024-01-19 NOTE — HISTORY OF PRESENT ILLNESS
[de-identified] : 35yo M w/ HTN and newly diagnosed AML here for f/u.   He presented to the hospital on 7/30/21 with complaints of dizziness, fatigue and severe RUQ pain for 3 days. CT A/P revealed fatty liver and small R pleural effusion. WBC 12k with 17% blasts. Peripheral flow cytometry showed 13% myeloblasts. FLT3(-). BMbx was done on 8/4/21 - confirmed AML. 46,XY                              {20                                   } Foundation: mutations in DNMT3A R882H, NRAS G12D, NPM1 W288fs*12 Pt consented to [facility]. Patient was offered sperm banking, but declined. On 8/6, induction with Dauno/Cytararbine was started (cytarabine 100/m2 and dauno 90/m2)  He received a seven day course of Zosyn for presumed RUL PNA, then transitioned to  levaquin, posaconazole and Acyclovir for ppx for neutropenia. Course c/b neutropenic fevers, cultures negative, repeat CT 8/14 without infectious source. He was on Cefepime but developed a rash, changed to meropenem. Rash improved.  Day 14 BMbx on 8/19 was hypocellular with chemotherapeutic effect; however, per hematopathology, appears to be earlier regeneration than would typically seen which is concerning for persistent disease at this point, and the earliest cells are CD34 positive and his myeloblasts on initial presentation were CD34 negative. Thus, this may simply represent early regeneration of his marrow. Plan is to await count recovery and repeat biopsy.   c/o hemorrhoidal pain. Hemorrhoids started a few days prior to discharge. He was sent home with rectal ointment and witch hazel.  Patient discharged home on 8/29/21 when ANC >500 [de-identified] : BMBx done on 9/2/221: Cellular bone marrow with trilineage hematopoiesis with maturation and megakaryocytosis (history of AML). Pt feels well, CBC today showed count stable  s/p C1 HIDAC 9/17-9/22  c/o leg pain after chemo in the hospital   He presented to the ED on 10/9 due to fever the night of presentation. The patient went to sleep around 10pm and woke up at 3am feeling warm and clammy. He took his temperature orally at home and it was 100.7. The day prior to presentation (10/8/21), the patient went to [facility] for an appointment to get his platelet count checked. He states he was feeling a bit run down and thought he was going to need a transfusion, but he did not need a transfusion. He was afebrile during the time of the appointment and went home afterwards. Around 3pm, the patient had bilateral crampy leg pain that was similar to the leg pain he felt during his consolidation therapy treatment. He took hydrocodone and the pain improved. The patient had one episode of diarrhea three days prior to presentation and has been bothered by rectal pain associated with his "large hemorrhoids. He denies any bleeding per rectum. He also feels like his mouth has been more dry than usual over the past several days, but denies all other symptoms.  CT Abdomen and Pelvis w/ Oral Cont and w/ IV Conttrast on 10/9 revealed Region of hypoenhancement upper pole left kidney; please correlate clinically for possible pyelonephritis. No hydronephrosis or perinephric stranding. No rectal abscess. CT Chest No Contrast on 10/9 revealed Clear lungs. 10/9- BCX NGTD and 10/9- UCX (-) He ate some cold salad late last night, had upsetting stomach and diarrhea this morning. Will take Imodium for diarrhea.   C2 is due on 10/15, pt wants deferring to next Monday after his diarrhea improved.  Admission of  C2 was postponed due to worsening diarrhea. Went to ED on 10/15 due to worsening watery diarrhea. GI PCR neg, C Diff neg Given negative stool studies, suspect diarrhea was likely chemo-related. S/p cycle 2 Consolidation with HIDAC started on 10/25. Patient received IV hydration, strict I/O, antiemetics, monitoring of CBC and CMP and for cerebellar toxicity. PRedforte eye drops given to prevent chemical conjunctivitis. Patient with fever throughout course of treatment. Cultures negative. Due to fever probably related to HIDAC, patient received dexamethasone prior to the last 2 doses of cytatabine.  He is seen today accompanied by his father, pt feels fatigue after chemo, other than that he feels fine. Denied any fever, chills diarrhea.   Pt was admitted on 11/13-11/17 s/p right first digit laceration repair on 11/12 and presenting with erythema and pain at the site of laceration repair. Patient reported he was feeling unwell prior to suture placement with body aches, chills, night sweats and subjective fevers. Patient last BM 4 days ago. Has bruise to left inner thigh. Patient reports he keeps his PICC site clean and intact which was placed in 9/2021. Upon admission to the hospital ID was consulted started on Zosyn , GI consulted for rectal pain secondary to hemorrhoids and palliative consulted for pain control.   C3 on 11/26, tolerated well. He was seen today after discharge, accompanied by his father. He admitted feeling tired, denied any f/c, diarrhea. Right hand suture site healing well, no abnormal erythema or discharge.  He will have Fulphilia today.  C3 HIDAC 11/26-12/1 He presents to the hospital due to epistaxis and neutropenic fever w/Temp 100.3 on 12/1. Per patient, he reports that he was feeling sinus congestion and pressure on his L side, blew his nose and bleeding began from L nare without improvement prompting arrival to the emergency department. Feels mild L sinus congestion.  L nasal cavity packed with absorbable packing; F/U ENT clinic outpatient. Pan culture obtained-with No growth currently CBC rechecked today recovered. He feels well overall , had lower abdominal pain last night after ate cheese, now improved. Denied any fever chills bloody stool or diarrhea.   s/p C4 HiDAC 1/5/22 Pt has known grade 1 AST and grade 3 ALT transaminitis. Presented this admission with transamintitis. Abd sono  performed and revealed Borderline/mild hepatomegaly with hepatic steatosis. Splenomegaly. Focal area of left upper pole increased renal cortical echogenicity, corresponding to an area of hypoattenuation seen on prior CT chest,  abdomen and pelvis dated 10/9/2021. This is nonspecific and may reflect focal edema. Patient received IV hydration, antiemetics, monitoring of CBC and electrolytes. Predforte eye drops provided to prevent chemical conjunctivitis. Patient was monitored for cerebellar toxicity. Nystagmus checks performed. Hospital course complicated by fever blood cultures showed (-), most likely febrile secondary to HIDAC treatment. To receive Fulphila today.  Pt presented to the hospital on 1/18/22 due fever of 100.4, weakness, decreased WBC and shortness of breath while at home. Patient reports that his pain is more controlled s/p pain medication and his shortness of breath has resolved. He denied chills, cough, chest pain, palpitations. Pan culture (blood +Ucx) were negative, CXR reveals clear lungs, COVID PCR - not detect. Pt started on empiric Zosyn, Diflucan and Acyclovir added prophylactically for neutropenic fever. He was transfused with platelets and PRBC as per supportive parameters (>10k/>7) respectively. He feels well overall now, denied any fever, chills or pain.   BMbx 2/11/22: Cellular bone marrow with erythroid predominant trilineage hematopoiesis No morphologic or immunophenotypic evidence of persistent acute myeloid leukemia Non-necrotizing granulomas Normal male karyotype and normal onkosit myeloid NGS panel study.  He was admitted for back pain 2/2 herniated disk - Rx'd pain meds and a Medrol pack.   Pt went to ED due to acute RUQ pain on 4/16, patient underwent laparoscopic cholecystotomy on 4/17.   He reports feeling well. He reports having severe anxiety when he sees any bruising or gum bleeding. He has seen a therapist but would like to see someone else.  Saw psych Dr. Lozano on 7/18 TEB He is getting PT for his back pain.  He occasionally has some gum bleeding still.   Pt was seen today accompanied by his father. He feels well, eating healthy food and doing exercise. Will go back to work this month.  He f/u w/psych Dr. Lozano monthly, currently is on Zoloft 100mg dialy, and Zolpidem aHS prn for insomnia Denied any new complaints.  Rapid test at home showed COVID 19 positive on last Wed 10/12. Patient's father was tested positive on Friday as well. He had scratch throat, fatigue and low grade fever. Pt didn't receive any COVID 19 meds, admitted he felt better, only slight fatigue, denied fever/chill/SOB.  CBC today stable.   NPM1Q to [facility] lab on 10/19: negative He went to ED on 11/19 for rectal abscess, I&D done in the ED.   He is doing well. No new complaints.  He is not back to work yet.  Last NPM1Q was checked 1/12/23: negative  3/9/23: PAtient is here today for follow up. Reports to be doing well. He does state that he continues to have some loose stools for which he will be following up with Gi for. He also reports that he has been having back pain. He was previously on PT and will be returning to PT now that insurance issues are resolved. He reports to be having a low sex drive and noticing that his testicles are shrunken.   5/8: pt was seen today for a f/u apt accompanied by his father. pt f/u w/ Uro Dr. Molina and started testosterone 2 wks ago for low testosterone level.  He is doing well otherwise, denied any complaints.   7/31: Having worsening back pain radiating down legs b/l. He has appointment with spine specialist on Fri.  9/11: pt followed up w/orth spine specialist Dr. Solis last month for worsening back pain, L spine MR done 8/9/23: Multilevel degenerative changes throughout the lumbar spine. Back pain improved with PT.  He will have anal fissure and hemorrhoid surgery, waiting for apt to be scheduled.   11/6/23: s/p fistulotomy on 10/20/23.  Doing well.  12/22:  Patient is seen today for a f/u apt accompanied by his father. He feels well overall. Denied any new complaints.  HR elevated today 115 bpm after he drank [employer] coffee.

## 2024-01-24 NOTE — ED ADULT NURSE NOTE - WILL THE PATIENT ACCEPT THE PFIZER COVID-19 VACCINE IF ELIGIBLE AND IT IS AVAILABLE?
[FreeTextEntry1] : This note was partly authored by Sriram Ward working as a scribe for IVA Palmer. I, IVA Palmer, have reviewed the content of this note and confirm it is true and accurate. I personally performed the history and physical examination and made all the decisions. 01/24/2024.     No

## 2024-02-05 ENCOUNTER — APPOINTMENT (OUTPATIENT)
Dept: HEMATOLOGY ONCOLOGY | Facility: CLINIC | Age: 39
End: 2024-02-05
Payer: MEDICARE

## 2024-02-05 ENCOUNTER — RESULT REVIEW (OUTPATIENT)
Age: 39
End: 2024-02-05

## 2024-02-05 ENCOUNTER — OUTPATIENT (OUTPATIENT)
Dept: OUTPATIENT SERVICES | Facility: HOSPITAL | Age: 39
LOS: 1 days | Discharge: HOME | End: 2024-02-05

## 2024-02-05 VITALS
TEMPERATURE: 97.4 F | RESPIRATION RATE: 16 BRPM | SYSTOLIC BLOOD PRESSURE: 137 MMHG | WEIGHT: 201.72 LBS | DIASTOLIC BLOOD PRESSURE: 87 MMHG | OXYGEN SATURATION: 100 % | BODY MASS INDEX: 28.13 KG/M2 | HEART RATE: 82 BPM

## 2024-02-05 DIAGNOSIS — Z98.890 OTHER SPECIFIED POSTPROCEDURAL STATES: Chronic | ICD-10-CM

## 2024-02-05 DIAGNOSIS — C92.00 ACUTE MYELOBLASTIC LEUKEMIA, NOT HAVING ACHIEVED REMISSION: ICD-10-CM

## 2024-02-05 DIAGNOSIS — Z90.49 ACQUIRED ABSENCE OF OTHER SPECIFIED PARTS OF DIGESTIVE TRACT: Chronic | ICD-10-CM

## 2024-02-05 LAB
BASOPHILS # BLD AUTO: 0.02 K/UL — SIGNIFICANT CHANGE UP (ref 0–0.2)
BASOPHILS NFR BLD AUTO: 0.4 % — SIGNIFICANT CHANGE UP (ref 0–2)
EOSINOPHIL # BLD AUTO: 0.06 K/UL — SIGNIFICANT CHANGE UP (ref 0–0.5)
EOSINOPHIL NFR BLD AUTO: 1.2 % — SIGNIFICANT CHANGE UP (ref 0–6)
HCT VFR BLD CALC: 42.5 % — SIGNIFICANT CHANGE UP (ref 39–50)
HGB BLD-MCNC: 14.6 G/DL — SIGNIFICANT CHANGE UP (ref 13–17)
IMM GRANULOCYTES NFR BLD AUTO: 0.4 % — SIGNIFICANT CHANGE UP (ref 0–0.9)
LYMPHOCYTES # BLD AUTO: 1.42 K/UL — SIGNIFICANT CHANGE UP (ref 1–3.3)
LYMPHOCYTES # BLD AUTO: 27.4 % — SIGNIFICANT CHANGE UP (ref 13–44)
MCHC RBC-ENTMCNC: 31.7 PG — SIGNIFICANT CHANGE UP (ref 27–34)
MCHC RBC-ENTMCNC: 34.4 G/DL — SIGNIFICANT CHANGE UP (ref 32–36)
MCV RBC AUTO: 92.4 FL — SIGNIFICANT CHANGE UP (ref 80–100)
MONOCYTES # BLD AUTO: 0.38 K/UL — SIGNIFICANT CHANGE UP (ref 0–0.9)
MONOCYTES NFR BLD AUTO: 7.3 % — SIGNIFICANT CHANGE UP (ref 2–14)
NEUTROPHILS # BLD AUTO: 3.28 K/UL — SIGNIFICANT CHANGE UP (ref 1.8–7.4)
NEUTROPHILS NFR BLD AUTO: 63.3 % — SIGNIFICANT CHANGE UP (ref 43–77)
NRBC # BLD: 0 /100 WBCS — SIGNIFICANT CHANGE UP (ref 0–0)
PLATELET # BLD AUTO: 149 K/UL — LOW (ref 150–400)
RBC # BLD: 4.6 M/UL — SIGNIFICANT CHANGE UP (ref 4.2–5.8)
RBC # FLD: 12.3 % — SIGNIFICANT CHANGE UP (ref 10.3–14.5)
WBC # BLD: 5.18 K/UL — SIGNIFICANT CHANGE UP (ref 3.8–10.5)
WBC # FLD AUTO: 5.18 K/UL — SIGNIFICANT CHANGE UP (ref 3.8–10.5)

## 2024-02-05 PROCEDURE — G2211 COMPLEX E/M VISIT ADD ON: CPT

## 2024-02-05 PROCEDURE — 99213 OFFICE O/P EST LOW 20 MIN: CPT

## 2024-02-05 NOTE — PHYSICAL EXAM
[Fully active, able to carry on all pre-disease performance without restriction] : Status 0 - Fully active, able to carry on all pre-disease performance without restriction [Normal] : affect appropriate [de-identified] : a 1 cm long laceration with sutures on the right thrum, healing well

## 2024-02-05 NOTE — ASSESSMENT
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 Started induction with Dauno/Cytarabine on 8/6/21. Pt's counts now recovered, remission marrow done on 9/2- in remission. Proceed to consolidation with 4 cycles of HIDAC. C1 HIDAC on 9/17, c/b neutropenic fever and diarrhea. C2 HIDAC on 10/25, was postponed for 1 week due to admission for diarrhea, given negative stool studies, suspect diarrhea was likely chemo-related. Pt was admitted again 11/13-11/17 for sepsis due to thumb cellulitis after laceration. C3 on 11/26, had Fulphilia on C3 c/b epistaxis and neutropenic fever w/Temp 100.3. C4 HiDAC 1/5/22, c/b transaminitis and neutropenic fever He had laparoscopic cholecystostomy on 4/17. s/p BMbx 2/11/22 showing CR. He has non-necrotizing granulomas noted in BMbx, unclear significance CBC today stable w/ slightly thrombocytopenia -results reviewed with pt;   NPM1Q to [facility] lab done in 12/2023: negative, will monitor every 3 months Discussed supportive groups to help with anxiety of cancer relapse -now on Zoloft and Wellbutrin which helping cont f/u with Dr. Molina for low testosterone levels, he started taking testosterone end of April. s/p fistulotomy on 10/20/23 All questions answered RTC 6 wks and then every 3 mo

## 2024-02-05 NOTE — HISTORY OF PRESENT ILLNESS
[de-identified] : 37yo M w/ HTN and newly diagnosed AML here for f/u.   He presented to the hospital on 7/30/21 with complaints of dizziness, fatigue and severe RUQ pain for 3 days. CT A/P revealed fatty liver and small R pleural effusion. WBC 12k with 17% blasts. Peripheral flow cytometry showed 13% myeloblasts. FLT3(-). BMbx was done on 8/4/21 - confirmed AML. 46,XY                               {20                                     } Foundation: mutations in DNMT3A R882H, NRAS G12D, NPM1 W288fs*12 Pt consented to [place]. Patient was offered sperm banking, but declined. On 8/6, induction with Dauno/Cytararbine was started (cytarabine 100/m2 and dauno 90/m2)  He received a seven day course of Zosyn for presumed RUL PNA, then transitioned to  levaquin, posaconazole and Acyclovir for ppx for neutropenia. Course c/b neutropenic fevers, cultures negative, repeat CT 8/14 without infectious source. He was on Cefepime but developed a rash, changed to meropenem. Rash improved.  Day 14 BMbx on 8/19 was hypocellular with chemotherapeutic effect; however, per hematopathology, appears to be earlier regeneration than would typically seen which is concerning for persistent disease at this point, and the earliest cells are CD34 positive and his myeloblasts on initial presentation were CD34 negative. Thus, this may simply represent early regeneration of his marrow. Plan is to await count recovery and repeat biopsy.   c/o hemorrhoidal pain. Hemorrhoids started a few days prior to discharge. He was sent home with rectal ointment and witch hazel.  Patient discharged home on 8/29/21 when ANC >500 [de-identified] : BMBx done on 9/2/221: Cellular bone marrow with trilineage hematopoiesis with maturation and megakaryocytosis (history of AML). Pt feels well, CBC today showed count stable  s/p C1 HIDAC 9/17-9/22  c/o leg pain after chemo in the hospital   He presented to the ED on 10/9 due to fever the night of presentation. The patient went to sleep around 10pm and woke up at 3am feeling warm and clammy. He took his temperature orally at home and it was 100.7. The day prior to presentation (10/8/21), the patient went to [facility] for an appointment to get his platelet count checked. He states he was feeling a bit run down and thought he was going to need a transfusion, but he did not need a transfusion. He was afebrile during the time of the appointment and went home afterwards. Around 3pm, the patient had bilateral crampy leg pain that was similar to the leg pain he felt during his consolidation therapy treatment. He took hydrocodone and the pain improved. The patient had one episode of diarrhea three days prior to presentation and has been bothered by rectal pain associated with his "large hemorrhoids. He denies any bleeding per rectum. He also feels like his mouth has been more dry than usual over the past several days, but denies all other symptoms.  CT Abdomen and Pelvis w/ Oral Cont and w/ IV Conttrast on 10/9 revealed Region of hypoenhancement upper pole left kidney; please correlate clinically for possible pyelonephritis. No hydronephrosis or perinephric stranding. No rectal abscess. CT Chest No Contrast on 10/9 revealed Clear lungs. 10/9- BCX NGTD and 10/9- UCX (-) He ate some cold salad late last night, had upsetting stomach and diarrhea this morning. Will take Imodium for diarrhea.   C2 is due on 10/15, pt wants deferring to next Monday after his diarrhea improved.  Admission of  C2 was postponed due to worsening diarrhea. Went to ED on 10/15 due to worsening watery diarrhea. GI PCR neg, C Diff neg Given negative stool studies, suspect diarrhea was likely chemo-related. S/p cycle 2 Consolidation with HIDAC started on 10/25. Patient received IV hydration, strict I/O, antiemetics, monitoring of CBC and CMP and for cerebellar toxicity. PRedforte eye drops given to prevent chemical conjunctivitis. Patient with fever throughout course of treatment. Cultures negative. Due to fever probably related to HIDAC, patient received dexamethasone prior to the last 2 doses of cytatabine.  He is seen today accompanied by his father, pt feels fatigue after chemo, other than that he feels fine. Denied any fever, chills diarrhea.   Pt was admitted on 11/13-11/17 s/p right first digit laceration repair on 11/12 and presenting with erythema and pain at the site of laceration repair. Patient reported he was feeling unwell prior to suture placement with body aches, chills, night sweats and subjective fevers. Patient last BM 4 days ago. Has bruise to left inner thigh. Patient reports he keeps his PICC site clean and intact which was placed in 9/2021. Upon admission to the hospital ID was consulted started on Zosyn , GI consulted for rectal pain secondary to hemorrhoids and palliative consulted for pain control.   C3 on 11/26, tolerated well. He was seen today after discharge, accompanied by his father. He admitted feeling tired, denied any f/c, diarrhea. Right hand suture site healing well, no abnormal erythema or discharge.  He will have Fulphilia today.  C3 HIDAC 11/26-12/1 He presents to the hospital due to epistaxis and neutropenic fever w/Temp 100.3 on 12/1. Per patient, he reports that he was feeling sinus congestion and pressure on his L side, blew his nose and bleeding began from L nare without improvement prompting arrival to the emergency department. Feels mild L sinus congestion.  L nasal cavity packed with absorbable packing; F/U ENT clinic outpatient. Pan culture obtained-with No growth currently CBC rechecked today recovered. He feels well overall , had lower abdominal pain last night after ate cheese, now improved. Denied any fever chills bloody stool or diarrhea.   s/p C4 HiDAC 1/5/22 Pt has known grade 1 AST and grade 3 ALT transaminitis. Presented this admission with transamintitis. Abd sono  performed and revealed Borderline/mild hepatomegaly with hepatic steatosis. Splenomegaly. Focal area of left upper pole increased renal cortical echogenicity, corresponding to an area of hypoattenuation seen on prior CT chest,  abdomen and pelvis dated 10/9/2021. This is nonspecific and may reflect focal edema. Patient received IV hydration, antiemetics, monitoring of CBC and electrolytes. Predforte eye drops provided to prevent chemical conjunctivitis. Patient was monitored for cerebellar toxicity. Nystagmus checks performed. Hospital course complicated by fever blood cultures showed (-), most likely febrile secondary to HIDAC treatment. To receive Fulphila today.  Pt presented to the hospital on 1/18/22 due fever of 100.4, weakness, decreased WBC and shortness of breath while at home. Patient reports that his pain is more controlled s/p pain medication and his shortness of breath has resolved. He denied chills, cough, chest pain, palpitations. Pan culture (blood +Ucx) were negative, CXR reveals clear lungs, COVID PCR - not detect. Pt started on empiric Zosyn, Diflucan and Acyclovir added prophylactically for neutropenic fever. He was transfused with platelets and PRBC as per supportive parameters (>10k/>7) respectively. He feels well overall now, denied any fever, chills or pain.   BMbx 2/11/22: Cellular bone marrow with erythroid predominant trilineage hematopoiesis No morphologic or immunophenotypic evidence of persistent acute myeloid leukemia Non-necrotizing granulomas Normal male karyotype and normal onkosit myeloid NGS panel study.  He was admitted for back pain 2/2 herniated disk - Rx'd pain meds and a Medrol pack.   Pt went to ED due to acute RUQ pain on 4/16, patient underwent laparoscopic cholecystotomy on 4/17.   He reports feeling well. He reports having severe anxiety when he sees any bruising or gum bleeding. He has seen a therapist but would like to see someone else.  Saw psych Dr. Lozano on 7/18 TEB He is getting PT for his back pain.  He occasionally has some gum bleeding still.   Pt was seen today accompanied by his father. He feels well, eating healthy food and doing exercise. Will go back to work this month.  He f/u w/psych Dr. Lozano monthly, currently is on Zoloft 100mg dialy, and Zolpidem aHS prn for insomnia Denied any new complaints.  Rapid test at home showed COVID 19 positive on last Wed 10/12. Patient's father was tested positive on Friday as well. He had scratch throat, fatigue and low grade fever. Pt didn't receive any COVID 19 meds, admitted he felt better, only slight fatigue, denied fever/chill/SOB.  CBC today stable.   NPM1Q to [facility] lab on 10/19: negative He went to ED on 11/19 for rectal abscess, I&D done in the ED.   He is doing well. No new complaints.  He is not back to work yet.  Last NPM1Q was checked 1/12/23: negative  3/9/23: PAtient is here today for follow up. Reports to be doing well. He does state that he continues to have some loose stools for which he will be following up with Gi for. He also reports that he has been having back pain. He was previously on PT and will be returning to PT now that insurance issues are resolved. He reports to be having a low sex drive and noticing that his testicles are shrunken.   5/8: pt was seen today for a f/u apt accompanied by his father. pt f/u w/ Uro Dr. Molina and started testosterone 2 wks ago for low testosterone level.  He is doing well otherwise, denied any complaints.   7/31: Having worsening back pain radiating down legs b/l. He has appointment with spine specialist on Fri.  9/11: pt followed up w/orth spine specialist Dr. Solis last month for worsening back pain, L spine MR done 8/9/23: Multilevel degenerative changes throughout the lumbar spine. Back pain improved with PT.  He will have anal fissure and hemorrhoid surgery, waiting for apt to be scheduled.   11/6/23: s/p fistulotomy on 10/20/23.  Doing well.  12/22:  Patient is seen today for a f/u apt accompanied by his father. He feels well overall. Denied any new complaints.  HR elevated today 115 bpm after he drank [employer] coffee.   2/5: He is going back to work next month.

## 2024-02-06 LAB
ALBUMIN SERPL ELPH-MCNC: 4.8 G/DL
ALP BLD-CCNC: 108 U/L
ALT SERPL-CCNC: 36 U/L
ANION GAP SERPL CALC-SCNC: 13 MMOL/L
AST SERPL-CCNC: 18 U/L
BILIRUB SERPL-MCNC: 0.4 MG/DL
BUN SERPL-MCNC: 18 MG/DL
CALCIUM SERPL-MCNC: 10 MG/DL
CHLORIDE SERPL-SCNC: 103 MMOL/L
CO2 SERPL-SCNC: 26 MMOL/L
CREAT SERPL-MCNC: 0.99 MG/DL
EGFR: 100 ML/MIN/1.73M2
GLUCOSE SERPL-MCNC: 90 MG/DL
LDH SERPL-CCNC: 139 U/L
POTASSIUM SERPL-SCNC: 3.9 MMOL/L
PROT SERPL-MCNC: 7.2 G/DL
SODIUM SERPL-SCNC: 142 MMOL/L

## 2024-03-16 NOTE — ASU PATIENT PROFILE, ADULT - TOBACCO USE
RN Hospice Note    Luis Peacock Jr. is a Hospice Patient.   Hospice terminal diagnosis: COPD  Physician: Dr. Warner Rodas  Visit type: GIP Visit    Comments/recommendations: Pt unresponsive.  Actively declining.  Tachypnea and labored respirations observed.  VS taken per significant other request.  Temperature 39.3.  Message sent to MD with recommendation to add Acetaminophen 650mg rectally every 4 hrs as needed for fever.  Bedside RN to medicate with ordered morphine for respiratory distress.  Lucia, significant other continues to struggle in accepting pt actively declining state.  Support/presence/active listening provided.  Discussed need for discharge should pt continue to linger.  Previously, Lucia expressed interest in having pt return home, however in discussion today regarding pt status and need for 24/7 care as well as only intermittent nature of hospice visits at home, Lucia unsure of ability to care for pt at home.  Hospice inpatient facility discussed.  Discussed need to identify name of  home of choice prior to pt being able to transfer to hospice inpatient facility.  Encouraged Lucia to discuss with pt siblings.  Hospice to follow up 3/17/2024.      Plan of care reviewed with patient/family members:   1) supa Roy other     Plan of care reviewed with hospital staff members:   1) Audrey Garcia RN  2) Dr. Warner Nuñez     Please notify [facility] of any changes in condition. Thank you.  Office: [phone] (8 am-6:30 pm M-F and 8 am-4:30 pm weekends and holidays)   [phone] (6:30 pm-8 am M-F and 4:30 pm-8 am weekends and holidays)    Debi Cazares RN       Never smoker

## 2024-03-29 ENCOUNTER — INPATIENT (INPATIENT)
Facility: HOSPITAL | Age: 39
LOS: 3 days | Discharge: HOME | DRG: 372 | End: 2024-04-02
Attending: INTERNAL MEDICINE | Admitting: INTERNAL MEDICINE
Payer: MEDICARE

## 2024-03-29 ENCOUNTER — OUTPATIENT (OUTPATIENT)
Dept: OUTPATIENT SERVICES | Facility: HOSPITAL | Age: 39
LOS: 1 days | Discharge: HOME | End: 2024-03-29

## 2024-03-29 VITALS
WEIGHT: 199.96 LBS | RESPIRATION RATE: 25 BRPM | DIASTOLIC BLOOD PRESSURE: 92 MMHG | OXYGEN SATURATION: 100 % | TEMPERATURE: 99 F | SYSTOLIC BLOOD PRESSURE: 121 MMHG | HEART RATE: 142 BPM

## 2024-03-29 DIAGNOSIS — Z90.49 ACQUIRED ABSENCE OF OTHER SPECIFIED PARTS OF DIGESTIVE TRACT: Chronic | ICD-10-CM

## 2024-03-29 DIAGNOSIS — Z98.890 OTHER SPECIFIED POSTPROCEDURAL STATES: Chronic | ICD-10-CM

## 2024-03-29 DIAGNOSIS — R10.9 UNSPECIFIED ABDOMINAL PAIN: ICD-10-CM

## 2024-03-29 DIAGNOSIS — C92.00 ACUTE MYELOBLASTIC LEUKEMIA, NOT HAVING ACHIEVED REMISSION: ICD-10-CM

## 2024-03-29 LAB
ALBUMIN SERPL ELPH-MCNC: 5 G/DL — SIGNIFICANT CHANGE UP (ref 3.3–5)
ALP SERPL-CCNC: 96 U/L — SIGNIFICANT CHANGE UP (ref 40–120)
ALT FLD-CCNC: 39 U/L — SIGNIFICANT CHANGE UP (ref 10–45)
ANION GAP SERPL CALC-SCNC: 17 MMOL/L — SIGNIFICANT CHANGE UP (ref 5–17)
APPEARANCE UR: CLEAR — SIGNIFICANT CHANGE UP
APTT BLD: 28.3 SEC — SIGNIFICANT CHANGE UP (ref 24.5–35.6)
AST SERPL-CCNC: 25 U/L — SIGNIFICANT CHANGE UP (ref 10–40)
BACTERIA # UR AUTO: NEGATIVE /HPF — SIGNIFICANT CHANGE UP
BASE EXCESS BLDV CALC-SCNC: -2.4 MMOL/L — LOW (ref -2–3)
BASE EXCESS BLDV CALC-SCNC: 4.5 MMOL/L — HIGH (ref -2–3)
BASOPHILS # BLD AUTO: 0.01 K/UL — SIGNIFICANT CHANGE UP (ref 0–0.2)
BASOPHILS NFR BLD AUTO: 0.1 % — SIGNIFICANT CHANGE UP (ref 0–2)
BILIRUB SERPL-MCNC: 0.9 MG/DL — SIGNIFICANT CHANGE UP (ref 0.2–1.2)
BILIRUB UR-MCNC: NEGATIVE — SIGNIFICANT CHANGE UP
BUN SERPL-MCNC: 22 MG/DL — SIGNIFICANT CHANGE UP (ref 7–23)
CA-I SERPL-SCNC: 1.18 MMOL/L — SIGNIFICANT CHANGE UP (ref 1.15–1.33)
CA-I SERPL-SCNC: 1.23 MMOL/L — SIGNIFICANT CHANGE UP (ref 1.15–1.33)
CALCIUM SERPL-MCNC: 10.3 MG/DL — SIGNIFICANT CHANGE UP (ref 8.4–10.5)
CAST: 0 /LPF — SIGNIFICANT CHANGE UP (ref 0–4)
CHLORIDE BLDV-SCNC: 102 MMOL/L — SIGNIFICANT CHANGE UP (ref 96–108)
CHLORIDE BLDV-SCNC: 106 MMOL/L — SIGNIFICANT CHANGE UP (ref 96–108)
CHLORIDE SERPL-SCNC: 102 MMOL/L — SIGNIFICANT CHANGE UP (ref 96–108)
CO2 BLDV-SCNC: 25 MMOL/L — SIGNIFICANT CHANGE UP (ref 22–26)
CO2 BLDV-SCNC: 26 MMOL/L — SIGNIFICANT CHANGE UP (ref 22–26)
CO2 SERPL-SCNC: 21 MMOL/L — LOW (ref 22–31)
COLOR SPEC: YELLOW — SIGNIFICANT CHANGE UP
CREAT SERPL-MCNC: 1.14 MG/DL — SIGNIFICANT CHANGE UP (ref 0.5–1.3)
CRP SERPL-MCNC: 12 MG/L — HIGH
DIFF PNL FLD: NEGATIVE — SIGNIFICANT CHANGE UP
EGFR: 84 ML/MIN/1.73M2 — SIGNIFICANT CHANGE UP
EOSINOPHIL # BLD AUTO: 0.03 K/UL — SIGNIFICANT CHANGE UP (ref 0–0.5)
EOSINOPHIL NFR BLD AUTO: 0.3 % — SIGNIFICANT CHANGE UP (ref 0–6)
ERYTHROCYTE [SEDIMENTATION RATE] IN BLOOD: 7 MM/HR — SIGNIFICANT CHANGE UP (ref 0–15)
GAS PNL BLDV: 136 MMOL/L — SIGNIFICANT CHANGE UP (ref 136–145)
GAS PNL BLDV: 137 MMOL/L — SIGNIFICANT CHANGE UP (ref 136–145)
GAS PNL BLDV: SIGNIFICANT CHANGE UP
GLUCOSE BLDV-MCNC: 115 MG/DL — HIGH (ref 70–99)
GLUCOSE BLDV-MCNC: 134 MG/DL — HIGH (ref 70–99)
GLUCOSE SERPL-MCNC: 124 MG/DL — HIGH (ref 70–99)
GLUCOSE UR QL: NEGATIVE MG/DL — SIGNIFICANT CHANGE UP
HCO3 BLDV-SCNC: 24 MMOL/L — SIGNIFICANT CHANGE UP (ref 22–29)
HCO3 BLDV-SCNC: 24 MMOL/L — SIGNIFICANT CHANGE UP (ref 22–29)
HCT VFR BLD CALC: 43.6 % — SIGNIFICANT CHANGE UP (ref 39–50)
HCT VFR BLDA CALC: 38 % — LOW (ref 39–51)
HCT VFR BLDA CALC: 47 % — SIGNIFICANT CHANGE UP (ref 39–51)
HGB BLD CALC-MCNC: 12.6 G/DL — SIGNIFICANT CHANGE UP (ref 12.6–17.4)
HGB BLD CALC-MCNC: 15.6 G/DL — SIGNIFICANT CHANGE UP (ref 12.6–17.4)
HGB BLD-MCNC: 15.1 G/DL — SIGNIFICANT CHANGE UP (ref 13–17)
IMM GRANULOCYTES NFR BLD AUTO: 0.5 % — SIGNIFICANT CHANGE UP (ref 0–0.9)
INR BLD: 1.05 RATIO — SIGNIFICANT CHANGE UP (ref 0.85–1.18)
KETONES UR-MCNC: NEGATIVE MG/DL — SIGNIFICANT CHANGE UP
LACTATE BLDV-MCNC: 1.7 MMOL/L — SIGNIFICANT CHANGE UP (ref 0.5–2)
LACTATE BLDV-MCNC: 4.3 MMOL/L — CRITICAL HIGH (ref 0.5–2)
LEUKOCYTE ESTERASE UR-ACNC: NEGATIVE — SIGNIFICANT CHANGE UP
LIDOCAIN IGE QN: 29 U/L — SIGNIFICANT CHANGE UP (ref 7–60)
LYMPHOCYTES # BLD AUTO: 0.67 K/UL — LOW (ref 1–3.3)
LYMPHOCYTES # BLD AUTO: 7.7 % — LOW (ref 13–44)
MAGNESIUM SERPL-MCNC: 1.8 MG/DL — SIGNIFICANT CHANGE UP (ref 1.6–2.6)
MCHC RBC-ENTMCNC: 31 PG — SIGNIFICANT CHANGE UP (ref 27–34)
MCHC RBC-ENTMCNC: 34.6 GM/DL — SIGNIFICANT CHANGE UP (ref 32–36)
MCV RBC AUTO: 89.5 FL — SIGNIFICANT CHANGE UP (ref 80–100)
MONOCYTES # BLD AUTO: 0.69 K/UL — SIGNIFICANT CHANGE UP (ref 0–0.9)
MONOCYTES NFR BLD AUTO: 7.9 % — SIGNIFICANT CHANGE UP (ref 2–14)
NEUTROPHILS # BLD AUTO: 7.3 K/UL — SIGNIFICANT CHANGE UP (ref 1.8–7.4)
NEUTROPHILS NFR BLD AUTO: 83.5 % — HIGH (ref 43–77)
NITRITE UR-MCNC: NEGATIVE — SIGNIFICANT CHANGE UP
NRBC # BLD: 0 /100 WBCS — SIGNIFICANT CHANGE UP (ref 0–0)
NT-PROBNP SERPL-SCNC: <36 PG/ML — SIGNIFICANT CHANGE UP (ref 0–300)
PCO2 BLDV: 25 MMHG — LOW (ref 42–55)
PCO2 BLDV: 48 MMHG — SIGNIFICANT CHANGE UP (ref 42–55)
PH BLDV: 7.31 — LOW (ref 7.32–7.43)
PH BLDV: 7.6 — CRITICAL HIGH (ref 7.32–7.43)
PH UR: 6 — SIGNIFICANT CHANGE UP (ref 5–8)
PLATELET # BLD AUTO: 202 K/UL — SIGNIFICANT CHANGE UP (ref 150–400)
PO2 BLDV: 16 MMHG — LOW (ref 25–45)
PO2 BLDV: 46 MMHG — HIGH (ref 25–45)
POTASSIUM BLDV-SCNC: 3.9 MMOL/L — SIGNIFICANT CHANGE UP (ref 3.5–5.1)
POTASSIUM BLDV-SCNC: 4.2 MMOL/L — SIGNIFICANT CHANGE UP (ref 3.5–5.1)
POTASSIUM SERPL-MCNC: 4.1 MMOL/L — SIGNIFICANT CHANGE UP (ref 3.5–5.3)
POTASSIUM SERPL-SCNC: 4.1 MMOL/L — SIGNIFICANT CHANGE UP (ref 3.5–5.3)
PROT SERPL-MCNC: 8.1 G/DL — SIGNIFICANT CHANGE UP (ref 6–8.3)
PROT UR-MCNC: NEGATIVE MG/DL — SIGNIFICANT CHANGE UP
PROTHROM AB SERPL-ACNC: 11 SEC — SIGNIFICANT CHANGE UP (ref 9.5–13)
RBC # BLD: 4.87 M/UL — SIGNIFICANT CHANGE UP (ref 4.2–5.8)
RBC # FLD: 12.8 % — SIGNIFICANT CHANGE UP (ref 10.3–14.5)
RBC CASTS # UR COMP ASSIST: 0 /HPF — SIGNIFICANT CHANGE UP (ref 0–4)
SAO2 % BLDV: 24.6 % — LOW (ref 67–88)
SAO2 % BLDV: 74.3 % — SIGNIFICANT CHANGE UP (ref 67–88)
SODIUM SERPL-SCNC: 140 MMOL/L — SIGNIFICANT CHANGE UP (ref 135–145)
SP GR SPEC: >1.03 — HIGH (ref 1–1.03)
SQUAMOUS # UR AUTO: 0 /HPF — SIGNIFICANT CHANGE UP (ref 0–5)
TROPONIN T, HIGH SENSITIVITY RESULT: <6 NG/L — SIGNIFICANT CHANGE UP (ref 0–51)
UROBILINOGEN FLD QL: 0.2 MG/DL — SIGNIFICANT CHANGE UP (ref 0.2–1)
WBC # BLD: 8.74 K/UL — SIGNIFICANT CHANGE UP (ref 3.8–10.5)
WBC # FLD AUTO: 8.74 K/UL — SIGNIFICANT CHANGE UP (ref 3.8–10.5)
WBC UR QL: 0 /HPF — SIGNIFICANT CHANGE UP (ref 0–5)

## 2024-03-29 PROCEDURE — 99283 EMERGENCY DEPT VISIT LOW MDM: CPT

## 2024-03-29 PROCEDURE — 99285 EMERGENCY DEPT VISIT HI MDM: CPT | Mod: GC

## 2024-03-29 PROCEDURE — 93975 VASCULAR STUDY: CPT | Mod: 26

## 2024-03-29 PROCEDURE — 71275 CT ANGIOGRAPHY CHEST: CPT | Mod: 26,MC

## 2024-03-29 PROCEDURE — 71045 X-RAY EXAM CHEST 1 VIEW: CPT | Mod: 26

## 2024-03-29 PROCEDURE — 74174 CTA ABD&PLVS W/CONTRAST: CPT | Mod: 26,MC

## 2024-03-29 RX ORDER — SODIUM CHLORIDE 9 MG/ML
1000 INJECTION, SOLUTION INTRAVENOUS
Refills: 0 | Status: COMPLETED | OUTPATIENT
Start: 2024-03-29 | End: 2024-03-29

## 2024-03-29 RX ORDER — ACETAMINOPHEN 500 MG
650 TABLET ORAL EVERY 6 HOURS
Refills: 0 | Status: DISCONTINUED | OUTPATIENT
Start: 2024-03-29 | End: 2024-04-02

## 2024-03-29 RX ORDER — SERTRALINE 25 MG/1
100 TABLET, FILM COATED ORAL DAILY
Refills: 0 | Status: DISCONTINUED | OUTPATIENT
Start: 2024-03-29 | End: 2024-04-02

## 2024-03-29 RX ORDER — SERTRALINE 25 MG/1
1 TABLET, FILM COATED ORAL
Refills: 0 | DISCHARGE

## 2024-03-29 RX ORDER — SODIUM CHLORIDE 9 MG/ML
1000 INJECTION INTRAMUSCULAR; INTRAVENOUS; SUBCUTANEOUS ONCE
Refills: 0 | Status: COMPLETED | OUTPATIENT
Start: 2024-03-29 | End: 2024-03-29

## 2024-03-29 RX ORDER — BUPROPION HYDROCHLORIDE 150 MG/1
300 TABLET, EXTENDED RELEASE ORAL DAILY
Refills: 0 | Status: DISCONTINUED | OUTPATIENT
Start: 2024-03-29 | End: 2024-04-02

## 2024-03-29 RX ORDER — BUPROPION HYDROCHLORIDE 150 MG/1
1 TABLET, EXTENDED RELEASE ORAL
Refills: 0 | DISCHARGE

## 2024-03-29 RX ORDER — SODIUM CHLORIDE 9 MG/ML
1000 INJECTION INTRAMUSCULAR; INTRAVENOUS; SUBCUTANEOUS
Refills: 0 | Status: DISCONTINUED | OUTPATIENT
Start: 2024-03-29 | End: 2024-04-02

## 2024-03-29 RX ORDER — MORPHINE SULFATE 50 MG/1
4 CAPSULE, EXTENDED RELEASE ORAL ONCE
Refills: 0 | Status: DISCONTINUED | OUTPATIENT
Start: 2024-03-29 | End: 2024-03-29

## 2024-03-29 RX ORDER — ONDANSETRON 8 MG/1
4 TABLET, FILM COATED ORAL ONCE
Refills: 0 | Status: COMPLETED | OUTPATIENT
Start: 2024-03-29 | End: 2024-03-29

## 2024-03-29 RX ORDER — AZITHROMYCIN 500 MG/1
500 TABLET, FILM COATED ORAL DAILY
Refills: 0 | Status: COMPLETED | OUTPATIENT
Start: 2024-03-29 | End: 2024-04-01

## 2024-03-29 RX ORDER — OXYCODONE HYDROCHLORIDE 5 MG/1
5 TABLET ORAL ONCE
Refills: 0 | Status: DISCONTINUED | OUTPATIENT
Start: 2024-03-29 | End: 2024-03-29

## 2024-03-29 RX ORDER — SODIUM CHLORIDE 9 MG/ML
1000 INJECTION INTRAMUSCULAR; INTRAVENOUS; SUBCUTANEOUS ONCE
Refills: 0 | Status: DISCONTINUED | OUTPATIENT
Start: 2024-03-29 | End: 2024-03-29

## 2024-03-29 RX ORDER — ACETAMINOPHEN 500 MG
1000 TABLET ORAL ONCE
Refills: 0 | Status: COMPLETED | OUTPATIENT
Start: 2024-03-29 | End: 2024-03-29

## 2024-03-29 RX ADMIN — OXYCODONE HYDROCHLORIDE 5 MILLIGRAM(S): 5 TABLET ORAL at 22:00

## 2024-03-29 RX ADMIN — Medication 400 MILLIGRAM(S): at 06:01

## 2024-03-29 RX ADMIN — Medication 650 MILLIGRAM(S): at 14:50

## 2024-03-29 RX ADMIN — SODIUM CHLORIDE 1000 MILLILITER(S): 9 INJECTION INTRAMUSCULAR; INTRAVENOUS; SUBCUTANEOUS at 02:35

## 2024-03-29 RX ADMIN — Medication 650 MILLIGRAM(S): at 20:27

## 2024-03-29 RX ADMIN — OXYCODONE HYDROCHLORIDE 5 MILLIGRAM(S): 5 TABLET ORAL at 21:52

## 2024-03-29 RX ADMIN — SODIUM CHLORIDE 1000 MILLILITER(S): 9 INJECTION INTRAMUSCULAR; INTRAVENOUS; SUBCUTANEOUS at 06:06

## 2024-03-29 RX ADMIN — MORPHINE SULFATE 4 MILLIGRAM(S): 50 CAPSULE, EXTENDED RELEASE ORAL at 06:00

## 2024-03-29 RX ADMIN — ONDANSETRON 4 MILLIGRAM(S): 8 TABLET, FILM COATED ORAL at 06:00

## 2024-03-29 RX ADMIN — ONDANSETRON 4 MILLIGRAM(S): 8 TABLET, FILM COATED ORAL at 02:34

## 2024-03-29 RX ADMIN — MORPHINE SULFATE 4 MILLIGRAM(S): 50 CAPSULE, EXTENDED RELEASE ORAL at 02:34

## 2024-03-29 RX ADMIN — SODIUM CHLORIDE 100 MILLILITER(S): 9 INJECTION, SOLUTION INTRAVENOUS at 12:08

## 2024-03-29 RX ADMIN — SERTRALINE 100 MILLIGRAM(S): 25 TABLET, FILM COATED ORAL at 18:58

## 2024-03-29 RX ADMIN — BUPROPION HYDROCHLORIDE 300 MILLIGRAM(S): 150 TABLET, EXTENDED RELEASE ORAL at 18:59

## 2024-03-29 RX ADMIN — Medication 650 MILLIGRAM(S): at 14:20

## 2024-03-29 NOTE — CONSULT NOTE ADULT - SUBJECTIVE AND OBJECTIVE BOX
Vascular Surgery Consult    Consulting attending: Jose      HPI:  38M w/ PMH acute myeloid leukemia in remission, recurrent perianal abscess s/p fistulotomy (2023. [facility]), cholecystectomy (2022, [facility]), kidney stone presents after sudden onset emesis at 11:30 pm with shortness of breath prompting him to present from home to the ED early in the morning 3/29. After vomiting, he developed epigastric abdominal, lower chest, back pain with further vomiting in the ED. Tachy to 140's, Lactate 4.3 and appearing uncomfortable he was sent for CTA. CTA showed SMA with linear lucency that is likely artefact however can't exclude dissection flap. Vascular surgery consulted. HR improved to 110 at time of exam, normotensive. Stable appearing. Denies history of abdominal pain. only endorses epigastric pain after initiation of emesis. Patient with daily BMs that fluctuate between soft and diarrhea, denies constipation. Mother with history of IBS.         PAST MEDICAL HISTORY:  No pertinent past medical history    Hypertension    Kidney stone    History of genital warts    AML (acute myeloid leukemia)    Anal fistula    Herniated nucleus pulposus, L4-5    Anxiety    Hyperlipidemia          PAST SURGICAL HISTORY:  No significant past surgical history    No significant past surgical history    History of laparoscopic cholecystectomy    History of hemorrhoidectomy          MEDICATIONS:        ALLERGIES:  No Known Allergies  cefepime (Rash)        VITALS & I/Os:  Vital Signs Last 24 Hrs  T(C): 37.1 (29 Mar 2024 02:42), Max: 37.2 (29 Mar 2024 02:02)  T(F): 98.8 (29 Mar 2024 02:42), Max: 98.9 (29 Mar 2024 02:02)  HR: 109 (29 Mar 2024 02:42) (109 - 142)  BP: 125/83 (29 Mar 2024 02:42) (121/92 - 125/83)  BP(mean): --  RR: 22 (29 Mar 2024 02:42) (22 - 25)  SpO2: 100% (29 Mar 2024 02:42) (100% - 100%)    Parameters below as of 29 Mar 2024 02:42  Patient On (Oxygen Delivery Method): room air        I&O's Summary        PHYSICAL EXAM:  General: No acute distress  Respiratory: Nonlabored  Cardiovascular: RRR  Abdominal: Soft, nondistended, nontender. No rebound or guarding. No organomegaly, no palpable mass.  Extremities: Warm  Vascular: brisk cap refill      Upper Extremities: Palpable Radial/Ulnar b/l, palmar arch signal+ b/l      Lower Extremities: Palpable DP/PT b/l        LABS:                        15.1   8.74  )-----------( 202      ( 29 Mar 2024 02:41 )             43.6     03-29    140  |  102  |  22  ----------------------------<  124<H>  4.1   |  21<L>  |  1.14    Ca    10.3      29 Mar 2024 02:41  Mg     1.8     03-29    TPro  8.1  /  Alb  5.0  /  TBili  0.9  /  DBili  x   /  AST  25  /  ALT  39  /  AlkPhos  96  03-29    Lactate: Lactate, Blood: 2.1 mmol/L (03-29 @ 05:34)   03-29 @ 02:41  4.3    PT/INR - ( 29 Mar 2024 02:41 )   PT: 11.0 sec;   INR: 1.05 ratio         PTT - ( 29 Mar 2024 02:41 )  PTT:28.3 sec          Urinalysis Basic - ( 29 Mar 2024 02:41 )    Color: x / Appearance: x / SG: x / pH: x  Gluc: 124 mg/dL / Ketone: x  / Bili: x / Urobili: x   Blood: x / Protein: x / Nitrite: x   Leuk Esterase: x / RBC: x / WBC x   Sq Epi: x / Non Sq Epi: x / Bacteria: x          IMAGING:                                                                                               Vascular Surgery Consult    Consulting attending: Jose      HPI:  38M w/ PMH acute myeloid leukemia in remission, recurrent perianal abscess s/p fistulotomy (2023. [facility]), cholecystectomy (2022, [facility]), kidney stone presents after sudden onset emesis at 11:30 pm with shortness of breath prompting him to present from home to the ED early in the morning 3/29. After vomiting, he developed epigastric abdominal, lower chest, back pain with further vomiting in the ED. Tachy to 140's, Lactate 4.3 and appearing uncomfortable he was sent for CTA. CTA showed SMA with linear lucency that is likely artefact however can't exclude dissection flap. Vascular surgery consulted. HR improved to 110 at time of exam, normotensive. Stable appearing. Denies history of abdominal pain. only endorses epigastric pain after initiation of emesis. Patient with daily BMs that fluctuate between soft and diarrhea, denies constipation. Mother with history of IBS.         PAST MEDICAL HISTORY:  No pertinent past medical history    Hypertension    Kidney stone    History of genital warts    AML (acute myeloid leukemia)    Anal fistula    Herniated nucleus pulposus, L4-5    Anxiety    Hyperlipidemia          PAST SURGICAL HISTORY:  No significant past surgical history    No significant past surgical history    History of laparoscopic cholecystectomy    History of hemorrhoidectomy          MEDICATIONS:        ALLERGIES:  No Known Allergies  cefepime (Rash)        VITALS & I/Os:  Vital Signs Last 24 Hrs  T(C): 37.1 (29 Mar 2024 02:42), Max: 37.2 (29 Mar 2024 02:02)  T(F): 98.8 (29 Mar 2024 02:42), Max: 98.9 (29 Mar 2024 02:02)  HR: 109 (29 Mar 2024 02:42) (109 - 142)  BP: 125/83 (29 Mar 2024 02:42) (121/92 - 125/83)  BP(mean): --  RR: 22 (29 Mar 2024 02:42) (22 - 25)  SpO2: 100% (29 Mar 2024 02:42) (100% - 100%)    Parameters below as of 29 Mar 2024 02:42  Patient On (Oxygen Delivery Method): room air        I&O's Summary        PHYSICAL EXAM:  General: No acute distress  Respiratory: Nonlabored  Cardiovascular: RRR  Abdominal: Soft, nondistended, nontender. No rebound or guarding. No organomegaly, no palpable mass.  Extremities: Warm  Vascular: brisk cap refill      Upper Extremities: Palpable Radial b/l      Lower Extremities: Palpable DP b/l        LABS:                        15.1   8.74  )-----------( 202      ( 29 Mar 2024 02:41 )             43.6     03-29    140  |  102  |  22  ----------------------------<  124<H>  4.1   |  21<L>  |  1.14    Ca    10.3      29 Mar 2024 02:41  Mg     1.8     03-29    TPro  8.1  /  Alb  5.0  /  TBili  0.9  /  DBili  x   /  AST  25  /  ALT  39  /  AlkPhos  96  03-29    Lactate: Lactate, Blood: 2.1 mmol/L (03-29 @ 05:34)   03-29 @ 02:41  4.3    PT/INR - ( 29 Mar 2024 02:41 )   PT: 11.0 sec;   INR: 1.05 ratio         PTT - ( 29 Mar 2024 02:41 )  PTT:28.3 sec          Urinalysis Basic - ( 29 Mar 2024 02:41 )    Color: x / Appearance: x / SG: x / pH: x  Gluc: 124 mg/dL / Ketone: x  / Bili: x / Urobili: x   Blood: x / Protein: x / Nitrite: x   Leuk Esterase: x / RBC: x / WBC x   Sq Epi: x / Non Sq Epi: x / Bacteria: x          IMAGING:  < from: CT Angio Abdomen and Pelvis w/ IV Cont (03.29.24 @ 04:07) >  AORTA: There is no aortic aneurysm. There is no aortic dissection. There   is a faint linear intraluminal lucency seen of the proximal SMA, image   302:135. This is likely artifactual, the possibility of incomplete focal   dissection flap is not excluded.    At the level of the JULIET takeoff there is focal stranding seen along the   anterior aspect of the aorta, alongside minimal focal wall thickening.   This is nonspecific and of uncertain significance.    CHEST:  LUNGS AND LARGE AIRWAYS: Patent central airways. No pulmonary nodules.  PLEURA: No pleural effusion.  VESSELS: The aorta and pulmonary artery of normal caliber.  HEART: Heart size is normal. No pericardial effusion.  MEDIASTINUM AND PRABHJOT: No lymphadenopathy.  CHEST WALL AND LOWER NECK: Within normal limits.    ABDOMEN AND PELVIS:  LIVER: Within normal limits.  BILE DUCTS: Normal caliber.  GALLBLADDER: Cholecystectomy.  SPLEEN: Within normal limits.  PANCREAS: Within normal limits.  ADRENALS: Within normal limits.  KIDNEYS/URETERS: No hydronephrosis. Nonobstructing 3 mm left renal stone.    BLADDER: Within normal limits.  REPRODUCTIVE ORGANS: Prostate within normal limits.    BOWEL: No bowel obstruction. Nonspecific fluid-filled small and large   bowel including the rectum. Appendix is normal.  PERITONEUM: No ascites.  VESSELS: No abdominal aortic dissection or aneurysm.  RETROPERITONEUM/LYMPH NODES: No lymphadenopathy.  ABDOMINAL WALL: Within normal limits.  BONES: Nonspecific subtle sclerosis of the right posterior lateral   seventh and left anterolateral fifth ribs are appreciated..    IMPRESSION:  No aortic dissection or aneurysm.    Linear lucency along the proximal SMA, which may be artifactual though   possibility of a focal incomplete fenestrated dissection flap cannot be   entirely excluded. Additional focal area of mural wall thickening and   adjacent focus of para-aortic fat stranding is nonspecific. Differential   considerations include sequelae of vasculitis, penetrating ulcer or tiny   intramural hematoma. Please note this portion of the aorta is not imaged   on thenoncontrast series. MRA may be helpful for further delineation, if   clinically indicated.    Nonspecific fluid-filled small and large bowel, including the rectum.   This can be seen in the setting of enteritis colitis or ileus formation.   Correlateclinically.    < end of copied text >

## 2024-03-29 NOTE — ED ADULT NURSE REASSESSMENT NOTE - NS ED NURSE REASSESS COMMENT FT1
Received report from Anand DUNCAN. Pt introduced to oncoming RN and updated on plan of care. Pt A&Ox4, awake and alert, and resting comfortably in stretcher. Pt denies any SOB, CP, dizziness. Stretcher locked in place at lowest position with side rails up, call bell within reach. Awaiting lab results.

## 2024-03-29 NOTE — ED PROVIDER NOTE - PROGRESS NOTE DETAILS
Keli Reid DO (PGY3): Pt signed out to me by night team. Lactate downtrending, HR improved, now 99. On reassessment, pt sx improved after second round of zofran and morphine. Surgery evaluated pt at bedside regarding possible dissection flap vs intramural hematoma vs vasculitis. Pending final surg recommendations. received s/o hx of AML in remission dx in 2021 s/p chemo in 2022, here w/ n/v abd pain, pt w/ prior leni, pt ?dissection vs hematoma vs vasculitis, pt w/ improvement in HR awaiting sx final recs, abd soft w/ no tenderness, lac down trending, Keli Reid DO (PGY3): Spoke with surgery, recommending VA duplex for further eval of SMA. Pt sx improving, abd soft/nt, pt  while sleeping, 118-120s when awake. Spoke with Dr. Hudson, will admit to his service for further monitoring of HR and US eval of SMA.

## 2024-03-29 NOTE — H&P ADULT - NSHPPHYSICALEXAM_GEN_ALL_CORE
Vital Signs Last 24 Hrs  T(C): 37.8 (29 Mar 2024 12:31), Max: 37.8 (29 Mar 2024 12:31)  T(F): 100 (29 Mar 2024 12:31), Max: 100 (29 Mar 2024 12:31)  HR: 113 (29 Mar 2024 12:31) (106 - 142)  BP: 96/58 (29 Mar 2024 12:31) (96/58 - 125/83)  BP(mean): --  RR: 18 (29 Mar 2024 12:31) (14 - 25)  SpO2: 100% (29 Mar 2024 12:31) (100% - 100%)    Appearance: awake, calm, AOx4  HEENT:   Normal oral mucosa, PERRL, EOMI	  Lymphatic: No lymphadenopathy , no edema  Cardiovascular: Normal S1 S2, No JVD, No murmurs , Peripheral pulses palpable 2+ bilaterally  Respiratory: Lungs clear to auscultation, normal effort 	  Gastrointestinal:  Soft, mild pain on deep palpation   Skin: No rashes, No ecchymoses, No cyanosis, warm to touch  Musculoskeletal: Normal range of motion, normal strength  Psychiatry:  Mood & affect appropriate  Ext: No edema

## 2024-03-29 NOTE — ED ADULT NURSE NOTE - OBJECTIVE STATEMENT
38y male w/ pmh of leukemia in remission presents to ED with vomiting. Pt states he woke up at 11 pm with sudden onset nausea and vomiting. Pt states he has vomited  countless times and is now only throwing up bile; pt actively vomiting in ED on arrival. Pt abdomen is tender to palpation in right upper and left upper quadrants. Abdomen soft and non distended. Pt states after vomiting began, he began feeling right sided chest pain and difficulty breathing. Pt denies fever, chills, diarrhea, urinary symptoms.

## 2024-03-29 NOTE — ED PROVIDER NOTE - PHYSICAL EXAMINATION
General appearance: uncomfortable, conversant, afebrile    Eyes: anicteric sclerae, MAHENDRA, EOMI   HENT: Atraumatic; oropharynx clear, MMM and no ulcerations, no pharyngeal erythema or exudate   Neck: Trachea midline; Full range of motion, supple   Pulm: CTA bl, normal respiratory effort and no intercostal retractions, normal work of breathing   CV: tachycardic, No murmurs, rubs, or gallops.    Abdomen: RUQ ttp, Soft, non-distended; no guarding or rebound   Extremities: No peripheral edema or extremity lymphadenopathy. 5/5 strength in all four extremities.   Skin: Dry, normal temperature, turgor and texture; no rash, ulcers or subcutaneous nodules   Psych: Appropriate affect, cooperative; alert and oriented to person, place and time

## 2024-03-29 NOTE — CONSULT NOTE ADULT - ASSESSMENT
This is a 38M w/ PMH acute myeloid leukemia in remission, recurrent perianal abscess s/p fistulotomy (2023. [facility]), cholecystectomy (2022, Juanjo), kidney stone presenting with acute n/v, abdominal pain.     1. n/v, abdominal pain  This is a 38M w/ PMH acute myeloid leukemia in remission, recurrent perianal abscess s/p fistulotomy (2023. [facility] Gamma), cholecystectomy (2022, Juanjo), kidney stone presenting with acute n/v, abdominal pain.     1. n/v, diarrhea  Consistent with gastroenteritis   Labs and CT A/P grossly unremarkable   check GI PCR   start Azithromycin 500 mg once daily x 3 days    IVF, supportive care   Zofran PRN for nausea  advance diet as tolerated     2. hx of leukemia, in remisson     3. s/p cholecystectomy       I had a prolonged conversation with the patient regarding the hospital course, differential diagnosis, results of diagnostic tests this far, and therapeutic modalities available. Plan of care discussed with the patient after the evaluation. Patient expresses a clear understanding of the plan of care. Fifty minutes spent on the total encounter, of which more than fifty percent of the encounter was spent on counseling and/or coordinating care by the attending physician.  Advanced care planning forms were discussed. Code status including forceful chest compressions, defibrillation and intubation were discussed. The risks benefits and alternatives to pertinent gastrointestinal procedures and interventions were discussed in detail and all questions were answered. Duration: 15 Minutes.      Clint Ang D.O.  Gastroenterology and Hepatology  [address] [facility], [address]  [place], [place]  Office: [phone]

## 2024-03-29 NOTE — H&P ADULT - NSHPREVIEWOFSYSTEMS_GEN_ALL_CORE
REVIEW OF SYSTEMS:    CONSTITUTIONAL: + weakness, low grade fever   EYES/ENT: No visual changes;  No vertigo or throat pain   NECK: No pain or stiffness  RESPIRATORY: No cough, wheezing, hemoptysis; No shortness of breath  CARDIOVASCULAR: No chest pain or palpitations  GASTROINTESTINAL: abdominal pain, vomiting, diarrhea   GENITOURINARY: No dysuria, frequency or hematuria  NEUROLOGICAL: No numbness or weakness  SKIN: No itching, burning, rashes, or lesions   All other review of systems is negative unless indicated above.

## 2024-03-29 NOTE — CONSULT NOTE ADULT - ATTENDING COMMENTS
pt presented with vomiting/abdominal pain, now resolved  CT reviewed: questionable SMA dissection vs. artifact   Duplex shows no dissection    no indication for any further vascular imaging or interventions

## 2024-03-29 NOTE — CONSULT NOTE ADULT - SUBJECTIVE AND OBJECTIVE BOX
Chief Complaint:  Patient is a 38y old  Male who presents with a chief complaint of     Date of service: 03-29-24 @ 14:03    HPI:    This is a 38M w/ PMH acute myeloid leukemia in remission, recurrent perianal abscess s/p fistulotomy (2023. [facility] Gamma), cholecystectomy (2022, Juanjo), kidney stone presents after sudden onset emesis at 11:30 pm with shortness of breath prompting him to present from home to the ED early in the morning 3/29. After vomiting, he developed epigastric abdominal, lower chest, back pain with further vomiting in the ED. Tachy to 140's, Lactate 4.3 and appearing uncomfortable he was sent for CTA. CTA showed SMA with linear lucency that is likely artefact however can't exclude dissection flap.     The patient denies dysphagia, nausea and vomiting, abdominal pain, diarrhea, unintentional weight loss, change in bowel habits or NSAID use.      Allergies:  No Known Allergies  cefepime (Rash)      Home Medications:    Hospital Medications:  buPROPion XL (24-Hour) . 300 milliGRAM(s) Oral daily  sertraline 100 milliGRAM(s) Oral daily      PMHX/PSHX:  No pertinent past medical history    Hypertension    Kidney stone    History of genital warts    AML (acute myeloid leukemia)    Anal fistula    Herniated nucleus pulposus, L4-5    Anxiety    Hyperlipidemia    No significant past surgical history    No significant past surgical history    History of laparoscopic cholecystectomy    History of hemorrhoidectomy        Family history:  FH: CAD (coronary artery disease) (Father, Mother)        Social History:   Denies ethanol use.  Denies illicit drug use.    ROS:     General:  No wt loss, fevers, chills, night sweats, fatigue,   Eyes:  Good vision, no reported pain  ENT:  No sore throat, pain, runny nose, dysphagia  CV:  No pain, palpitations, hypo/hypertension  Resp:  No dyspnea, cough, tachypnea, wheezing  GI:  See HPI  :  No pain, bleeding, incontinence, nocturia  Muscle:  No pain, weakness  Neuro:  No weakness, tingling, memory problems  Psych:  No fatigue, insomnia, mood problems, depression  Endocrine:  No polyuria, polydipsia, cold/heat intolerance  Heme:  No petechiae, ecchymosis, easy bruisability  Integumentary:  No rash, edema      PHYSICAL EXAM:     GENERAL:  Appears stated age, well-groomed, well-nourished, no distress  HEENT:  NC/AT,  conjunctivae anicteric, clear and pink,   NECK: supple, trachea midline  CHEST:  Full & symmetric excursion, no increased effort, breath sounds clear  HEART:  Regular rhythm, no JVD  ABDOMEN:  Soft, non-tender, non-distended, normoactive bowel sounds,  no masses , no hepatosplenomegaly  EXTREMITIES:  no cyanosis,clubbing or edema  SKIN:  No rash, erythema, or, ecchymoses, no jaundice  NEURO:  Alert, non-focal, no asterixis  PSYCH: Appropriate affect, oriented to place and time  RECTAL: Deferred      Vital Signs:  Vital Signs Last 24 Hrs  T(C): 37.8 (29 Mar 2024 12:31), Max: 37.8 (29 Mar 2024 12:31)  T(F): 100 (29 Mar 2024 12:31), Max: 100 (29 Mar 2024 12:31)  HR: 113 (29 Mar 2024 12:31) (106 - 142)  BP: 96/58 (29 Mar 2024 12:31) (96/58 - 125/83)  BP(mean): --  RR: 18 (29 Mar 2024 12:31) (14 - 25)  SpO2: 100% (29 Mar 2024 12:31) (100% - 100%)    Parameters below as of 29 Mar 2024 12:31  Patient On (Oxygen Delivery Method): room air      Daily     Daily     LABS: Labs personally reviewed by me:                        15.1   8.74  )-----------( 202      ( 29 Mar 2024 02:41 )             43.6     03-29    140  |  102  |  22  ----------------------------<  124<H>  4.1   |  21<L>  |  1.14    Ca    10.3      29 Mar 2024 02:41  Mg     1.8     03-29    TPro  8.1  /  Alb  5.0  /  TBili  0.9  /  DBili  x   /  AST  25  /  ALT  39  /  AlkPhos  96  03-29    LIVER FUNCTIONS - ( 29 Mar 2024 02:41 )  Alb: 5.0 g/dL / Pro: 8.1 g/dL / ALK PHOS: 96 U/L / ALT: 39 U/L / AST: 25 U/L / GGT: x           PT/INR - ( 29 Mar 2024 02:41 )   PT: 11.0 sec;   INR: 1.05 ratio         PTT - ( 29 Mar 2024 02:41 )  PTT:28.3 sec  Urinalysis Basic - ( 29 Mar 2024 09:11 )    Color: Yellow / Appearance: Clear / SG: >1.030 / pH: x  Gluc: x / Ketone: Negative mg/dL  / Bili: Negative / Urobili: 0.2 mg/dL   Blood: x / Protein: Negative mg/dL / Nitrite: Negative   Leuk Esterase: Negative / RBC: 0 /HPF / WBC 0 /HPF   Sq Epi: x / Non Sq Epi: 0 /HPF / Bacteria: Negative /HPF      Amylase Serum--      Lipase serum29       Ammonia--      Imaging personally reviewed by me:           Chief Complaint:  Patient is a 38y old  Male who presents with a chief complaint of     Date of service: 03-29-24 @ 14:03    HPI:    This is a 38M w/ PMH acute myeloid leukemia (2022) in remission, recurrent perianal abscess s/p fistulotomy (2023. [facility]), cholecystectomy (2022, [facility]jo), kidney stone presents after sudden onset n/v/d. He had pasta for dinner ~ 6 PM last night. At 12 AM woke up with nausea/vomiting, chest pain after retching. Also with multiple bouts of non-bloody diarrhea. No sick contacts or recent travel. Tachy to 140's (hx of tachycardia during prior admission, cardiac w/u reportedly negative). Lactate 4.3, labs otherwise normal. CTA neg aside for SMA with linear lucency that is likely artefact however can't exclude dissection flap. Subsequent mesenteric duplex wnl. Currently he reports nausea is improved. Still with diarrhea. Low grade fever Tm 100. Denies dysphagia, bleeding symptoms or recent weight loss.       Allergies:  No Known Allergies  cefepime (Rash)      Home Medications:    Hospital Medications:  buPROPion XL (24-Hour) . 300 milliGRAM(s) Oral daily  sertraline 100 milliGRAM(s) Oral daily      PMHX/PSHX:  No pertinent past medical history    Hypertension    Kidney stone    History of genital warts    AML (acute myeloid leukemia)    Anal fistula    Herniated nucleus pulposus, L4-5    Anxiety    Hyperlipidemia    No significant past surgical history    No significant past surgical history    History of laparoscopic cholecystectomy    History of hemorrhoidectomy        Family history:  FH: CAD (coronary artery disease) (Father, Mother)        Social History:   Denies ethanol use.  Denies illicit drug use.    ROS:     General:  No wt loss, fevers, chills, night sweats, fatigue,   Eyes:  Good vision, no reported pain  ENT:  No sore throat, pain, runny nose, dysphagia  CV:  No pain, palpitations, hypo/hypertension  Resp:  No dyspnea, cough, tachypnea, wheezing  GI:  See HPI  :  No pain, bleeding, incontinence, nocturia  Muscle:  No pain, weakness  Neuro:  No weakness, tingling, memory problems  Psych:  No fatigue, insomnia, mood problems, depression  Endocrine:  No polyuria, polydipsia, cold/heat intolerance  Heme:  No petechiae, ecchymosis, easy bruisability  Integumentary:  No rash, edema      PHYSICAL EXAM:     GENERAL:  Appears stated age, well-groomed, well-nourished, no distress  HEENT:  NC/AT,  conjunctivae anicteric, clear and pink,   NECK: supple, trachea midline  CHEST:  Full & symmetric excursion, no increased effort, breath sounds clear  HEART:  Regular rhythm, no JVD  ABDOMEN:  Soft, non-tender, non-distended, normoactive bowel sounds,  no masses , no hepatosplenomegaly  EXTREMITIES:  no cyanosis,clubbing or edema  SKIN:  No rash, erythema, or, ecchymoses, no jaundice  NEURO:  Alert, non-focal, no asterixis  PSYCH: Appropriate affect, oriented to place and time  RECTAL: Deferred      Vital Signs:  Vital Signs Last 24 Hrs  T(C): 37.8 (29 Mar 2024 12:31), Max: 37.8 (29 Mar 2024 12:31)  T(F): 100 (29 Mar 2024 12:31), Max: 100 (29 Mar 2024 12:31)  HR: 113 (29 Mar 2024 12:31) (106 - 142)  BP: 96/58 (29 Mar 2024 12:31) (96/58 - 125/83)  BP(mean): --  RR: 18 (29 Mar 2024 12:31) (14 - 25)  SpO2: 100% (29 Mar 2024 12:31) (100% - 100%)    Parameters below as of 29 Mar 2024 12:31  Patient On (Oxygen Delivery Method): room air      Daily     Daily     LABS: Labs personally reviewed by me:                        15.1   8.74  )-----------( 202      ( 29 Mar 2024 02:41 )             43.6     03-29    140  |  102  |  22  ----------------------------<  124<H>  4.1   |  21<L>  |  1.14    Ca    10.3      29 Mar 2024 02:41  Mg     1.8     03-29    TPro  8.1  /  Alb  5.0  /  TBili  0.9  /  DBili  x   /  AST  25  /  ALT  39  /  AlkPhos  96  03-29    LIVER FUNCTIONS - ( 29 Mar 2024 02:41 )  Alb: 5.0 g/dL / Pro: 8.1 g/dL / ALK PHOS: 96 U/L / ALT: 39 U/L / AST: 25 U/L / GGT: x           PT/INR - ( 29 Mar 2024 02:41 )   PT: 11.0 sec;   INR: 1.05 ratio         PTT - ( 29 Mar 2024 02:41 )  PTT:28.3 sec  Urinalysis Basic - ( 29 Mar 2024 09:11 )    Color: Yellow / Appearance: Clear / SG: >1.030 / pH: x  Gluc: x / Ketone: Negative mg/dL  / Bili: Negative / Urobili: 0.2 mg/dL   Blood: x / Protein: Negative mg/dL / Nitrite: Negative   Leuk Esterase: Negative / RBC: 0 /HPF / WBC 0 /HPF   Sq Epi: x / Non Sq Epi: 0 /HPF / Bacteria: Negative /HPF      Amylase Serum--      Lipase serum29       Ammonia--      Imaging personally reviewed by me:

## 2024-03-29 NOTE — ED PROVIDER NOTE - CLINICAL SUMMARY MEDICAL DECISION MAKING FREE TEXT BOX
38M w/ hx leukemia in remission, perianal abscess, cholecystectomy, kidney stone presenting with vomiting. Last night pt developed RUQ abd/R lower chest pain with vomiting and occasional back pain. Pt tachy to 140's and appears uncomfortable. Feels sob with the pain. No fever, leg swelling/pain. Pt states pain does not feel like his usual kidney stone pain. Exam shows uncomfortable appearing tachycardic male, RUQ ttp. c/f choledocholithiasis vs. less likely dissection. Will get labs, CT, pain meds.

## 2024-03-29 NOTE — ED PROVIDER NOTE - ATTENDING CONTRIBUTION TO CARE
I have personally performed a face to face medical and diagnostic evaluation of the patient. I have discussed with and reviewed the Resident's note and agree with the History, ROS, Physical Exam and MDM unless otherwise indicated. A brief summary of my personal evaluation and impression can be found below.    38M w/ hx leukemia in remission, perianal abscess, cholecystectomy, kidney stone presenting with vomiting. Last night pt developed RUQ abd/R lower chest pain with vomiting and occasional back pain. Pt tachy to 140's and appears uncomfortable. Feels sob with the pain. No fever, leg swelling/pain. Pt states pain does not feel like his usual kidney stone pain. Exam shows uncomfortable appearing tachycardic male, RUQ ttp. c/f choledocholithiasis vs. less likely dissection. Will get labs, CT, pain meds.Nausea could be secondary to dehydration given fluid loss.  Will give IV hydration.  Will reassess to dispo. Monisha Rodriguez, ED Attending

## 2024-03-29 NOTE — CONSULT NOTE ADULT - ASSESSMENT
38M w/ PMH acute myeloid leukemia in remission in the ED after sudden onset emesis overnight and subsequent development of epigastric, lower chest, back pain with further vomiting in the ED. Vascular surgery consulted after CTA showed SMA with linear lucency that is likely artefact however can't exclude dissection flap. Denies history of abdominal pain, only endorses epigastric pain after initiation of emesis. Patient with daily BMs that fluctuate between soft and diarrhea, denies constipation. Mother with history of IBS. Lactate and HR improving with fluid resuscitation.    Plan/Recs     38M w/ PMH acute myeloid leukemia in remission in the ED after sudden onset emesis overnight and subsequent development of epigastric, lower chest, back pain with further vomiting in the ED. Vascular surgery consulted after CTA showed SMA with linear lucency that is likely artefact however can't exclude dissection flap. Denies history of abdominal pain, only endorses epigastric pain after initiation of emesis. Patient with daily BMs that fluctuate between soft and diarrhea, denies constipation. Mother with history of IBS. Lactate and HR improving with fluid resuscitation.    Plan/Recs  - no acute surgical intervention  - patient with benign abdominal exam, and no history of abdominal pain. In setting of possible SMA dissection on CT. Recommend Mesenteric duplex for further evaluation.    Patient and plan discussed with Vascular Surgery Fellow on call  Vascular Surgery  [id number]

## 2024-03-29 NOTE — H&P ADULT - HISTORY OF PRESENT ILLNESS
Patient is a 37 Y/O Male with Pmhx of leukemia in remission, perianal abscess, cholecystectomy, kidney stone presenting with vomiting. Last night pt developed RUQ abd/R lower chest pain with vomiting and occasional back pain. Pt tachy to 140's and appears uncomfortable. Feels sob with the pain. No fever, leg swelling/pain. Pt states pain does not feel like his usual kidney stone pain.

## 2024-03-29 NOTE — ED PROVIDER NOTE - OBJECTIVE STATEMENT
38M w/ hx leukemia in remission, perianal abscess, cholecystectomy, kidney stone presenting with vomiting. Last night pt developed RUQ abd/R lower chest pain with vomiting. Pt tachy to 140's and appears uncomfortable. Feels sob with the pain. No fever, leg swelling/pain. Pt states pain does not feel like his usual kidney stone pain. 38M w/ hx leukemia in remission, perianal abscess, cholecystectomy, kidney stone presenting with vomiting. Last night pt developed RUQ abd/R lower chest pain with vomiting and occasional back pain. Pt tachy to 140's and appears uncomfortable. Feels sob with the pain. No fever, leg swelling/pain. Pt states pain does not feel like his usual kidney stone pain.

## 2024-03-29 NOTE — ED ADULT NURSE NOTE - NSFALLUNIVINTERV_ED_ALL_ED
Bed/Stretcher in lowest position, wheels locked, appropriate side rails in place/Call bell, personal items and telephone in reach/Instruct patient to call for assistance before getting out of bed/chair/stretcher/Non-slip footwear applied when patient is off stretcher/[place] to call system/Physically safe environment - no spills, clutter or unnecessary equipment/Purposeful proactive rounding/Room/bathroom lighting operational, light cord in reach

## 2024-03-29 NOTE — CONSULT NOTE ADULT - SUBJECTIVE AND OBJECTIVE BOX
DATE OF SERVICE: 03-29-24 @ 21:20    CHIEF COMPLAINT:Patient is a 38y old  Male who presents with a chief complaint of     HISTORY OF PRESENT ILLNESS:HPI:  Patient is a 37 Y/O Male with Pmhx of leukemia in remission, perianal abscess, cholecystectomy, kidney stone presenting with vomiting. Last night pt developed RUQ abd/R lower chest pain with vomiting and occasional back pain. Pt tachy to 140's and appears uncomfortable. Feels sob with the pain. No fever, leg swelling/pain. Pt states pain does not feel like his usual kidney stone pain. (29 Mar 2024 12:58)      PAST MEDICAL & SURGICAL HISTORY:  Hypertension  diagnosed 1 year ago      Kidney stone  5 years ago      History of genital warts      AML (acute myeloid leukemia)      Anal fistula      Herniated nucleus pulposus, L4-5      Anxiety      Hyperlipidemia      History of laparoscopic cholecystectomy      History of hemorrhoidectomy              MEDICATIONS:    azithromycin   Tablet 500 milliGRAM(s) Oral daily      acetaminophen     Tablet .. 650 milliGRAM(s) Oral every 6 hours PRN  buPROPion XL (24-Hour) . 300 milliGRAM(s) Oral daily  sertraline 100 milliGRAM(s) Oral daily            FAMILY HISTORY:  FH: CAD (coronary artery disease) (Father, Mother)        Non-contributory    SOCIAL HISTORY:    [ ] Tobacco  [ ] Drugs  [ ] Alcohol    Allergies    No Known Allergies    Intolerances    cefepime (Rash)  	    REVIEW OF SYSTEMS:  CONSTITUTIONAL: No fever  EYES: No eye pain, visual disturbances, or discharge  ENMT:  No difficulty hearing, tinnitus  NECK: No pain or stiffness  RESPIRATORY: No cough, wheezing,  CARDIOVASCULAR: No chest pain, palpitations, passing out, dizziness, or leg swelling  GASTROINTESTINAL:  No nausea, vomiting, diarrhea or constipation. No melena.  GENITOURINARY: No dysuria, hematuria  NEUROLOGICAL: No stroke like symptoms  SKIN: No burning or lesions   ENDOCRINE: No heat or cold intolerance  MUSCULOSKELETAL: No joint pain or swelling  PSYCHIATRIC: No  anxiety, mood swings  HEME/LYMPH: No bleeding gums  ALLERGY AND IMMUNOLOGIC: No hives or eczema	    All other ROS negative    PHYSICAL EXAM:  T(C): 36.3 (03-29-24 @ 21:12), Max: 37.8 (03-29-24 @ 12:31)  HR: 116 (03-29-24 @ 21:12) (106 - 142)  BP: 98/65 (03-29-24 @ 21:12) (96/58 - 125/83)  RR: 18 (03-29-24 @ 21:12) (14 - 25)  SpO2: 96% (03-29-24 @ 21:12) (96% - 100%)  Wt(kg): --  I&O's Summary      Appearance: Normal	  HEENT:   Normal oral mucosa, EOMI	  Cardiovascular:  S1 S2, No JVD,    Respiratory: Lungs clear to auscultation	  Psychiatry: Alert  Gastrointestinal:  Soft, Non-tender, + BS	  Skin: No rashes   Neurologic: Non-focal  Extremities:  No edema  Vascular: Peripheral pulses palpable    	    	  	  CARDIAC MARKERS:  Labs personally reviewed by me                                  15.1   8.74  )-----------( 202      ( 29 Mar 2024 02:41 )             43.6     03-29    140  |  102  |  22  ----------------------------<  124<H>  4.1   |  21<L>  |  1.14    Ca    10.3      29 Mar 2024 02:41  Mg     1.8     03-29    TPro  8.1  /  Alb  5.0  /  TBili  0.9  /  DBili  x   /  AST  25  /  ALT  39  /  AlkPhos  96  03-29          EKG: Personally reviewed by me -   Radiology: Personally reviewed by me -       Assessment /Plan:     CTA heart Monday given strong family hx of premature CAD    Full recs to follow      Differential diagnosis and plan of care discussed with patient after the evaluation. Counseling on diet, nutritional counseling, weight management, exercise and medication compliance was done.   Advanced care planning/advanced directives discussed with patient/family. DNR status including forceful chest compressions to attempt to restart the heart, ventilator support/artificial breathing, electric shock, artificial nutrition, health care proxy, Molst form all discussed with pt. Pt wishes to consider. Sixteen minutes spent on discussing advanced directives.            Mitul Zelaya DO [facility]  Cardiovascular Medicine  [address] [facility] [address], [address]  Office [phone]  Available via call/text on [employer] Teams DATE OF SERVICE: 03-29-24 @ 21:20    CHIEF COMPLAINT:Patient is a 38y old  Male who presents with a chief complaint of     HISTORY OF PRESENT ILLNESS:HPI:  Patient is a 39 Y/O Male with Pmhx of leukemia in remission, perianal abscess, cholecystectomy, kidney stone presenting with vomiting. Last night pt developed RUQ abd/R lower chest pain with vomiting and occasional back pain. Pt tachy to 140's and appears uncomfortable. Feels sob with the pain. No fever, leg swelling/pain. Pt states pain does not feel like his usual kidney stone pain. (29 Mar 2024 12:58)      PAST MEDICAL & SURGICAL HISTORY:  Hypertension  diagnosed 1 year ago      Kidney stone  5 years ago      History of genital warts      AML (acute myeloid leukemia)      Anal fistula      Herniated nucleus pulposus, L4-5      Anxiety      Hyperlipidemia      History of laparoscopic cholecystectomy      History of hemorrhoidectomy              MEDICATIONS:    azithromycin   Tablet 500 milliGRAM(s) Oral daily      acetaminophen     Tablet .. 650 milliGRAM(s) Oral every 6 hours PRN  buPROPion XL (24-Hour) . 300 milliGRAM(s) Oral daily  sertraline 100 milliGRAM(s) Oral daily            FAMILY HISTORY:  FH: CAD (coronary artery disease) (Father, Mother)        Non-contributory    SOCIAL HISTORY:    [ ] not a smoker    Allergies    No Known Allergies    Intolerances    cefepime (Rash)  	    REVIEW OF SYSTEMS:  CONSTITUTIONAL: No fever  EYES: No eye pain, visual disturbances, or discharge  ENMT:  No difficulty hearing, tinnitus  NECK: No pain or stiffness  RESPIRATORY: No cough, wheezing,  CARDIOVASCULAR: No chest pain, palpitations, passing out, dizziness, or leg swelling  GASTROINTESTINAL:  No nausea, vomiting, diarrhea or constipation. No melena.  GENITOURINARY: No dysuria, hematuria  NEUROLOGICAL: No stroke like symptoms  SKIN: No burning or lesions   ENDOCRINE: No heat or cold intolerance  MUSCULOSKELETAL: No joint pain or swelling  PSYCHIATRIC: No  anxiety, mood swings  HEME/LYMPH: No bleeding gums  ALLERGY AND IMMUNOLOGIC: No hives or eczema	    All other ROS negative    PHYSICAL EXAM:  T(C): 36.3 (03-29-24 @ 21:12), Max: 37.8 (03-29-24 @ 12:31)  HR: 116 (03-29-24 @ 21:12) (106 - 142)  BP: 98/65 (03-29-24 @ 21:12) (96/58 - 125/83)  RR: 18 (03-29-24 @ 21:12) (14 - 25)  SpO2: 96% (03-29-24 @ 21:12) (96% - 100%)  Wt(kg): --  I&O's Summary      Appearance: Normal	  HEENT:   Normal oral mucosa, EOMI	  Cardiovascular:  S1 S2, No JVD,    Respiratory: Lungs clear to auscultation	  Psychiatry: Alert  Gastrointestinal:  Soft, Non-tender, + BS	  Skin: No rashes   Neurologic: Non-focal  Extremities:  No edema  Vascular: Peripheral pulses palpable    	    	  	  CARDIAC MARKERS:  Labs personally reviewed by me                                  15.1   8.74  )-----------( 202      ( 29 Mar 2024 02:41 )             43.6     03-29    140  |  102  |  22  ----------------------------<  124<H>  4.1   |  21<L>  |  1.14    Ca    10.3      29 Mar 2024 02:41  Mg     1.8     03-29    TPro  8.1  /  Alb  5.0  /  TBili  0.9  /  DBili  x   /  AST  25  /  ALT  39  /  AlkPhos  96  03-29          EKG: Personally reviewed by me - ST, no ischemic changes  Radiology: Personally reviewed by me - CTA chest IMPRESSION: No aortic dissection or aneurysm.         ASSESSMENT/PLAN: 	  39 Y/O Male with Pmhx of leukemia in remission, perianal abscess, cholecystectomy, kidney stone presenting with vomiting. Last night pt developed RUQ abd/R lower chest pain with vomiting and occasional back pain. Pt tachy to 140's and appears uncomfortable. Feels sob with the pain. No fever, leg swelling/pain. Pt states pain does not feel like his usual kidney stone pain.    1. Chest Pain  - Likely assocated with vomiting   - CTA Heart Monday as strong Fhx CAD   - EKG sinus tach no ischemic changes  - Trop neg x1   - monitor on tele     2. Nausea/Vomiting/Diarrhea   Consistent with gastroenteritis   Labs and CT A/P grossly unremarkable         Differential diagnosis and plan of care discussed with patient after the evaluation. Counseling on diet, nutritional counseling, weight management, exercise and medication compliance was done.   Advanced care planning/advanced directives discussed with patient/family. DNR status including forceful chest compressions to attempt to restart the heart, ventilator support/artificial breathing, electric shock, artificial nutrition, health care proxy, Molst form all discussed with pt. Pt wishes to consider. Sixteen minutes spent on discussing advanced directives.            Mitul Zelaya DO [facility]  Cardiovascular Medicine  [address] [place] [address], [address]  Office [phone]  Available via call/text on [employer] Teams

## 2024-03-29 NOTE — ED PROVIDER NOTE - DATE/TIME 2
Fall as cause of accidental injury in sport or athletic area as place of occurrence
29-Mar-2024 08:36

## 2024-03-29 NOTE — H&P ADULT - ASSESSMENT
Patient is a 39 Y/O Male with Pmhx of leukemia in remission, perianal abscess, cholecystectomy, kidney stone presenting with vomiting. Last night pt developed RUQ abd/R lower chest pain with vomiting and occasional back pain. Pt tachy to 140's and appears uncomfortable. Feels sob with the pain. No fever, leg swelling/pain. Pt states pain does not feel like his usual kidney stone pain.    # ABdominal pain  nausea and vomiting   abdominal pain, acute onset   mild elevation in lactate  IV hydration  Pan CT noted   SMA dissection? on CT   vacular eval called  , appreicated  abdominal doppler   oral feeding as tolerated   GI eval called       # Chest pain  episode of chest pain   likely due to aggressive vomiting however patient has a extensive family hx of HD in both side  card eval called  may benefit from CT coronary on this admission     # Leukemia   in remission  follows with montr    DVT and gI PPX

## 2024-03-30 LAB
A1C WITH ESTIMATED AVERAGE GLUCOSE RESULT: 5.5 % — SIGNIFICANT CHANGE UP (ref 4–5.6)
ALBUMIN SERPL ELPH-MCNC: 3.8 G/DL — SIGNIFICANT CHANGE UP (ref 3.3–5)
ALP SERPL-CCNC: 60 U/L — SIGNIFICANT CHANGE UP (ref 40–120)
ALT FLD-CCNC: 31 U/L — SIGNIFICANT CHANGE UP (ref 10–45)
ANION GAP SERPL CALC-SCNC: 12 MMOL/L — SIGNIFICANT CHANGE UP (ref 5–17)
AST SERPL-CCNC: 18 U/L — SIGNIFICANT CHANGE UP (ref 10–40)
BASOPHILS # BLD AUTO: 0 K/UL — SIGNIFICANT CHANGE UP (ref 0–0.2)
BASOPHILS NFR BLD AUTO: 0 % — SIGNIFICANT CHANGE UP (ref 0–2)
BILIRUB SERPL-MCNC: 0.6 MG/DL — SIGNIFICANT CHANGE UP (ref 0.2–1.2)
BUN SERPL-MCNC: 12 MG/DL — SIGNIFICANT CHANGE UP (ref 7–23)
CALCIUM SERPL-MCNC: 8.8 MG/DL — SIGNIFICANT CHANGE UP (ref 8.4–10.5)
CHLORIDE SERPL-SCNC: 103 MMOL/L — SIGNIFICANT CHANGE UP (ref 96–108)
CHOLEST SERPL-MCNC: 138 MG/DL — SIGNIFICANT CHANGE UP
CO2 SERPL-SCNC: 23 MMOL/L — SIGNIFICANT CHANGE UP (ref 22–31)
CREAT SERPL-MCNC: 1.13 MG/DL — SIGNIFICANT CHANGE UP (ref 0.5–1.3)
CULTURE RESULTS: NO GROWTH — SIGNIFICANT CHANGE UP
EGFR: 85 ML/MIN/1.73M2 — SIGNIFICANT CHANGE UP
EOSINOPHIL # BLD AUTO: 0 K/UL — SIGNIFICANT CHANGE UP (ref 0–0.5)
EOSINOPHIL NFR BLD AUTO: 0 % — SIGNIFICANT CHANGE UP (ref 0–6)
ESTIMATED AVERAGE GLUCOSE: 111 MG/DL — SIGNIFICANT CHANGE UP (ref 68–114)
GLUCOSE SERPL-MCNC: 102 MG/DL — HIGH (ref 70–99)
HCT VFR BLD CALC: 35.4 % — LOW (ref 39–50)
HDLC SERPL-MCNC: 36 MG/DL — LOW
HGB BLD-MCNC: 12.3 G/DL — LOW (ref 13–17)
LIPID PNL WITH DIRECT LDL SERPL: 83 MG/DL — SIGNIFICANT CHANGE UP
LYMPHOCYTES # BLD AUTO: 0.6 K/UL — LOW (ref 1–3.3)
LYMPHOCYTES # BLD AUTO: 22.3 % — SIGNIFICANT CHANGE UP (ref 13–44)
MANUAL SMEAR VERIFICATION: SIGNIFICANT CHANGE UP
MCHC RBC-ENTMCNC: 31.9 PG — SIGNIFICANT CHANGE UP (ref 27–34)
MCHC RBC-ENTMCNC: 34.7 GM/DL — SIGNIFICANT CHANGE UP (ref 32–36)
MCV RBC AUTO: 91.9 FL — SIGNIFICANT CHANGE UP (ref 80–100)
MONOCYTES # BLD AUTO: 0.24 K/UL — SIGNIFICANT CHANGE UP (ref 0–0.9)
MONOCYTES NFR BLD AUTO: 8.9 % — SIGNIFICANT CHANGE UP (ref 2–14)
NEUTROPHILS # BLD AUTO: 1.83 K/UL — SIGNIFICANT CHANGE UP (ref 1.8–7.4)
NEUTROPHILS NFR BLD AUTO: 67 % — SIGNIFICANT CHANGE UP (ref 43–77)
NEUTS BAND # BLD: 0.9 % — SIGNIFICANT CHANGE UP (ref 0–8)
NON HDL CHOLESTEROL: 102 MG/DL — SIGNIFICANT CHANGE UP
PLAT MORPH BLD: NORMAL — SIGNIFICANT CHANGE UP
PLATELET # BLD AUTO: 130 K/UL — LOW (ref 150–400)
POTASSIUM SERPL-MCNC: 3.7 MMOL/L — SIGNIFICANT CHANGE UP (ref 3.5–5.3)
POTASSIUM SERPL-SCNC: 3.7 MMOL/L — SIGNIFICANT CHANGE UP (ref 3.5–5.3)
PROT SERPL-MCNC: 6.1 G/DL — SIGNIFICANT CHANGE UP (ref 6–8.3)
RBC # BLD: 3.85 M/UL — LOW (ref 4.2–5.8)
RBC # FLD: 12.9 % — SIGNIFICANT CHANGE UP (ref 10.3–14.5)
RBC BLD AUTO: NORMAL — SIGNIFICANT CHANGE UP
SODIUM SERPL-SCNC: 138 MMOL/L — SIGNIFICANT CHANGE UP (ref 135–145)
SPECIMEN SOURCE: SIGNIFICANT CHANGE UP
TRIGL SERPL-MCNC: 100 MG/DL — SIGNIFICANT CHANGE UP
TSH SERPL-MCNC: 0.27 UIU/ML — SIGNIFICANT CHANGE UP (ref 0.27–4.2)
VARIANT LYMPHS # BLD: 0.9 % — SIGNIFICANT CHANGE UP (ref 0–6)
WBC # BLD: 2.69 K/UL — LOW (ref 3.8–10.5)
WBC # FLD AUTO: 2.69 K/UL — LOW (ref 3.8–10.5)

## 2024-03-30 RX ADMIN — BUPROPION HYDROCHLORIDE 300 MILLIGRAM(S): 150 TABLET, EXTENDED RELEASE ORAL at 11:08

## 2024-03-30 RX ADMIN — SERTRALINE 100 MILLIGRAM(S): 25 TABLET, FILM COATED ORAL at 11:09

## 2024-03-30 RX ADMIN — AZITHROMYCIN 500 MILLIGRAM(S): 500 TABLET, FILM COATED ORAL at 11:08

## 2024-03-30 RX ADMIN — Medication 650 MILLIGRAM(S): at 08:33

## 2024-03-30 RX ADMIN — Medication 650 MILLIGRAM(S): at 17:18

## 2024-03-31 LAB
C DIFF GDH STL QL: NEGATIVE — SIGNIFICANT CHANGE UP
C DIFF GDH STL QL: SIGNIFICANT CHANGE UP
GI PCR PANEL: DETECTED
HCT VFR BLD CALC: 35.3 % — LOW (ref 39–50)
HGB BLD-MCNC: 11.8 G/DL — LOW (ref 13–17)
MCHC RBC-ENTMCNC: 31.1 PG — SIGNIFICANT CHANGE UP (ref 27–34)
MCHC RBC-ENTMCNC: 33.4 GM/DL — SIGNIFICANT CHANGE UP (ref 32–36)
MCV RBC AUTO: 92.9 FL — SIGNIFICANT CHANGE UP (ref 80–100)
NOROVIRUS GI+II RNA STL QL NAA+NON-PROBE: DETECTED
NRBC # BLD: 0 /100 WBCS — SIGNIFICANT CHANGE UP (ref 0–0)
PLATELET # BLD AUTO: 139 K/UL — LOW (ref 150–400)
RBC # BLD: 3.8 M/UL — LOW (ref 4.2–5.8)
RBC # FLD: 13 % — SIGNIFICANT CHANGE UP (ref 10.3–14.5)
SALMONELLA DNA STL QL NAA+PROBE: DETECTED
WBC # BLD: 3.38 K/UL — LOW (ref 3.8–10.5)
WBC # FLD AUTO: 3.38 K/UL — LOW (ref 3.8–10.5)

## 2024-03-31 RX ORDER — CHLORHEXIDINE GLUCONATE 213 G/1000ML
1 SOLUTION TOPICAL DAILY
Refills: 0 | Status: DISCONTINUED | OUTPATIENT
Start: 2024-03-31 | End: 2024-04-02

## 2024-03-31 RX ADMIN — Medication 650 MILLIGRAM(S): at 21:39

## 2024-03-31 RX ADMIN — AZITHROMYCIN 500 MILLIGRAM(S): 500 TABLET, FILM COATED ORAL at 12:08

## 2024-03-31 RX ADMIN — BUPROPION HYDROCHLORIDE 300 MILLIGRAM(S): 150 TABLET, EXTENDED RELEASE ORAL at 12:07

## 2024-03-31 RX ADMIN — SERTRALINE 100 MILLIGRAM(S): 25 TABLET, FILM COATED ORAL at 12:08

## 2024-03-31 RX ADMIN — Medication 650 MILLIGRAM(S): at 22:30

## 2024-03-31 RX ADMIN — SODIUM CHLORIDE 100 MILLILITER(S): 9 INJECTION INTRAMUSCULAR; INTRAVENOUS; SUBCUTANEOUS at 19:34

## 2024-04-01 LAB
ALBUMIN SERPL ELPH-MCNC: 4 G/DL — SIGNIFICANT CHANGE UP (ref 3.3–5)
ALP SERPL-CCNC: 62 U/L — SIGNIFICANT CHANGE UP (ref 40–120)
ALT FLD-CCNC: 28 U/L — SIGNIFICANT CHANGE UP (ref 10–45)
ANION GAP SERPL CALC-SCNC: 14 MMOL/L — SIGNIFICANT CHANGE UP (ref 5–17)
AST SERPL-CCNC: 14 U/L — SIGNIFICANT CHANGE UP (ref 10–40)
BILIRUB SERPL-MCNC: 0.4 MG/DL — SIGNIFICANT CHANGE UP (ref 0.2–1.2)
BUN SERPL-MCNC: 7 MG/DL — SIGNIFICANT CHANGE UP (ref 7–23)
CALCIUM SERPL-MCNC: 9.1 MG/DL — SIGNIFICANT CHANGE UP (ref 8.4–10.5)
CHLORIDE SERPL-SCNC: 111 MMOL/L — HIGH (ref 96–108)
CO2 SERPL-SCNC: 19 MMOL/L — LOW (ref 22–31)
CREAT SERPL-MCNC: 0.9 MG/DL — SIGNIFICANT CHANGE UP (ref 0.5–1.3)
EGFR: 112 ML/MIN/1.73M2 — SIGNIFICANT CHANGE UP
GLUCOSE SERPL-MCNC: 84 MG/DL — SIGNIFICANT CHANGE UP (ref 70–99)
HCT VFR BLD CALC: 38.3 % — LOW (ref 39–50)
HGB BLD-MCNC: 13.3 G/DL — SIGNIFICANT CHANGE UP (ref 13–17)
MCHC RBC-ENTMCNC: 31.6 PG — SIGNIFICANT CHANGE UP (ref 27–34)
MCHC RBC-ENTMCNC: 34.7 GM/DL — SIGNIFICANT CHANGE UP (ref 32–36)
MCV RBC AUTO: 91 FL — SIGNIFICANT CHANGE UP (ref 80–100)
NRBC # BLD: 0 /100 WBCS — SIGNIFICANT CHANGE UP (ref 0–0)
PLATELET # BLD AUTO: 143 K/UL — LOW (ref 150–400)
POTASSIUM SERPL-MCNC: 3.5 MMOL/L — SIGNIFICANT CHANGE UP (ref 3.5–5.3)
POTASSIUM SERPL-SCNC: 3.5 MMOL/L — SIGNIFICANT CHANGE UP (ref 3.5–5.3)
PROT SERPL-MCNC: 6.7 G/DL — SIGNIFICANT CHANGE UP (ref 6–8.3)
RBC # BLD: 4.21 M/UL — SIGNIFICANT CHANGE UP (ref 4.2–5.8)
RBC # FLD: 12.8 % — SIGNIFICANT CHANGE UP (ref 10.3–14.5)
SODIUM SERPL-SCNC: 144 MMOL/L — SIGNIFICANT CHANGE UP (ref 135–145)
WBC # BLD: 3.1 K/UL — LOW (ref 3.8–10.5)
WBC # FLD AUTO: 3.1 K/UL — LOW (ref 3.8–10.5)

## 2024-04-01 RX ORDER — ATORVASTATIN CALCIUM 80 MG/1
40 TABLET, FILM COATED ORAL AT BEDTIME
Refills: 0 | Status: DISCONTINUED | OUTPATIENT
Start: 2024-04-01 | End: 2024-04-02

## 2024-04-01 RX ORDER — LOPERAMIDE HCL 2 MG
2 TABLET ORAL
Refills: 0 | Status: DISCONTINUED | OUTPATIENT
Start: 2024-04-01 | End: 2024-04-02

## 2024-04-01 RX ORDER — OXYCODONE HYDROCHLORIDE 5 MG/1
5 TABLET ORAL ONCE
Refills: 0 | Status: DISCONTINUED | OUTPATIENT
Start: 2024-04-01 | End: 2024-04-01

## 2024-04-01 RX ORDER — METOPROLOL TARTRATE 50 MG
25 TABLET ORAL ONCE
Refills: 0 | Status: COMPLETED | OUTPATIENT
Start: 2024-04-01 | End: 2024-04-01

## 2024-04-01 RX ADMIN — OXYCODONE HYDROCHLORIDE 5 MILLIGRAM(S): 5 TABLET ORAL at 17:06

## 2024-04-01 RX ADMIN — Medication 2 MILLIGRAM(S): at 21:38

## 2024-04-01 RX ADMIN — OXYCODONE HYDROCHLORIDE 5 MILLIGRAM(S): 5 TABLET ORAL at 16:52

## 2024-04-01 RX ADMIN — SODIUM CHLORIDE 100 MILLILITER(S): 9 INJECTION INTRAMUSCULAR; INTRAVENOUS; SUBCUTANEOUS at 21:38

## 2024-04-01 RX ADMIN — Medication 25 MILLIGRAM(S): at 14:30

## 2024-04-01 RX ADMIN — BUPROPION HYDROCHLORIDE 300 MILLIGRAM(S): 150 TABLET, EXTENDED RELEASE ORAL at 11:29

## 2024-04-01 RX ADMIN — CHLORHEXIDINE GLUCONATE 1 APPLICATION(S): 213 SOLUTION TOPICAL at 11:19

## 2024-04-01 RX ADMIN — AZITHROMYCIN 500 MILLIGRAM(S): 500 TABLET, FILM COATED ORAL at 11:29

## 2024-04-01 RX ADMIN — SERTRALINE 100 MILLIGRAM(S): 25 TABLET, FILM COATED ORAL at 11:28

## 2024-04-01 RX ADMIN — Medication 2 MILLIGRAM(S): at 11:28

## 2024-04-01 RX ADMIN — ATORVASTATIN CALCIUM 40 MILLIGRAM(S): 80 TABLET, FILM COATED ORAL at 21:38

## 2024-04-01 NOTE — ADVANCED PRACTICE NURSE CONSULT - ASSESSMENT
Midline Catheter Insertion Note    Catheter type: 4F  : Bard  Power injectable: Yes  LOT# [id number]                                                                                                                                                                                                                    Procedure assisted by: TEVIN Smith RN  Time out was preformed, confirming the patient's first and last name, date of birth, procedure, and correct site prior to state of procedure.    Patient was placed with HOB 30 degrees. Patient placement site was prepped with chlorhexidine solution, then draped using maximum sterile barrier protection. Using the Bard Site Rite 8, the catheter was placed using the Modified Seldinger Technique. Strict adherence to outline aseptic technique including handwashing, glove and gown, utilizing mask and cap, plus draping the patient with a sterile drape was observed. Upon completion of line placement, the insertion site was covered with a sterile occlusive CHG dressing. Pt tolerated procedure well.     All materials used for catheter insertion, including the intact guide wires, were accounted for at the end of the procedure.  Number of attempts: 1  Complications/Comments: None    Emergency Placement: No  Site: New  Anatomical Site of insertion: Right Brachial  Catheter size/length: 4F, 20cm  US guided Bard single lumen power midline placed

## 2024-04-02 ENCOUNTER — TRANSCRIPTION ENCOUNTER (OUTPATIENT)
Age: 39
End: 2024-04-02

## 2024-04-02 VITALS
OXYGEN SATURATION: 97 % | SYSTOLIC BLOOD PRESSURE: 114 MMHG | HEART RATE: 85 BPM | RESPIRATION RATE: 16 BRPM | DIASTOLIC BLOOD PRESSURE: 77 MMHG | TEMPERATURE: 98 F

## 2024-04-02 LAB
ALBUMIN SERPL ELPH-MCNC: 3.5 G/DL — SIGNIFICANT CHANGE UP (ref 3.3–5)
ALP SERPL-CCNC: 60 U/L — SIGNIFICANT CHANGE UP (ref 40–120)
ALT FLD-CCNC: 27 U/L — SIGNIFICANT CHANGE UP (ref 10–45)
ANION GAP SERPL CALC-SCNC: 14 MMOL/L — SIGNIFICANT CHANGE UP (ref 5–17)
AST SERPL-CCNC: 16 U/L — SIGNIFICANT CHANGE UP (ref 10–40)
BASOPHILS # BLD AUTO: 0.01 K/UL — SIGNIFICANT CHANGE UP (ref 0–0.2)
BASOPHILS NFR BLD AUTO: 0.2 % — SIGNIFICANT CHANGE UP (ref 0–2)
BILIRUB SERPL-MCNC: 0.3 MG/DL — SIGNIFICANT CHANGE UP (ref 0.2–1.2)
BUN SERPL-MCNC: 9 MG/DL — SIGNIFICANT CHANGE UP (ref 7–23)
CALCIUM SERPL-MCNC: 8.8 MG/DL — SIGNIFICANT CHANGE UP (ref 8.4–10.5)
CHLORIDE SERPL-SCNC: 108 MMOL/L — SIGNIFICANT CHANGE UP (ref 96–108)
CO2 SERPL-SCNC: 22 MMOL/L — SIGNIFICANT CHANGE UP (ref 22–31)
CREAT SERPL-MCNC: 0.86 MG/DL — SIGNIFICANT CHANGE UP (ref 0.5–1.3)
CULTURE RESULTS: SIGNIFICANT CHANGE UP
EGFR: 114 ML/MIN/1.73M2 — SIGNIFICANT CHANGE UP
EOSINOPHIL # BLD AUTO: 0.04 K/UL — SIGNIFICANT CHANGE UP (ref 0–0.5)
EOSINOPHIL NFR BLD AUTO: 0.8 % — SIGNIFICANT CHANGE UP (ref 0–6)
GLUCOSE SERPL-MCNC: 87 MG/DL — SIGNIFICANT CHANGE UP (ref 70–99)
HCT VFR BLD CALC: 35.3 % — LOW (ref 39–50)
HGB BLD-MCNC: 12.5 G/DL — LOW (ref 13–17)
IMM GRANULOCYTES NFR BLD AUTO: 0.2 % — SIGNIFICANT CHANGE UP (ref 0–0.9)
LYMPHOCYTES # BLD AUTO: 1.96 K/UL — SIGNIFICANT CHANGE UP (ref 1–3.3)
LYMPHOCYTES # BLD AUTO: 37.2 % — SIGNIFICANT CHANGE UP (ref 13–44)
MAGNESIUM SERPL-MCNC: 1.9 MG/DL — SIGNIFICANT CHANGE UP (ref 1.6–2.6)
MCHC RBC-ENTMCNC: 31.9 PG — SIGNIFICANT CHANGE UP (ref 27–34)
MCHC RBC-ENTMCNC: 35.4 GM/DL — SIGNIFICANT CHANGE UP (ref 32–36)
MCV RBC AUTO: 90.1 FL — SIGNIFICANT CHANGE UP (ref 80–100)
MONOCYTES # BLD AUTO: 0.43 K/UL — SIGNIFICANT CHANGE UP (ref 0–0.9)
MONOCYTES NFR BLD AUTO: 8.2 % — SIGNIFICANT CHANGE UP (ref 2–14)
NEUTROPHILS # BLD AUTO: 2.82 K/UL — SIGNIFICANT CHANGE UP (ref 1.8–7.4)
NEUTROPHILS NFR BLD AUTO: 53.4 % — SIGNIFICANT CHANGE UP (ref 43–77)
NRBC # BLD: 0 /100 WBCS — SIGNIFICANT CHANGE UP (ref 0–0)
PHOSPHATE SERPL-MCNC: 3.6 MG/DL — SIGNIFICANT CHANGE UP (ref 2.5–4.5)
PLATELET # BLD AUTO: 167 K/UL — SIGNIFICANT CHANGE UP (ref 150–400)
POTASSIUM SERPL-MCNC: 3.2 MMOL/L — LOW (ref 3.5–5.3)
POTASSIUM SERPL-SCNC: 3.2 MMOL/L — LOW (ref 3.5–5.3)
PROT SERPL-MCNC: 5.9 G/DL — LOW (ref 6–8.3)
RBC # BLD: 3.92 M/UL — LOW (ref 4.2–5.8)
RBC # FLD: 12.8 % — SIGNIFICANT CHANGE UP (ref 10.3–14.5)
SODIUM SERPL-SCNC: 144 MMOL/L — SIGNIFICANT CHANGE UP (ref 135–145)
SPECIMEN SOURCE: SIGNIFICANT CHANGE UP
WBC # BLD: 5.27 K/UL — SIGNIFICANT CHANGE UP (ref 3.8–10.5)
WBC # FLD AUTO: 5.27 K/UL — SIGNIFICANT CHANGE UP (ref 3.8–10.5)

## 2024-04-02 PROCEDURE — 75574 CT ANGIO HRT W/3D IMAGE: CPT | Mod: MC

## 2024-04-02 PROCEDURE — 87507 IADNA-DNA/RNA PROBE TQ 12-25: CPT

## 2024-04-02 PROCEDURE — 36569 INSJ PICC 5 YR+ W/O IMAGING: CPT

## 2024-04-02 PROCEDURE — 85610 PROTHROMBIN TIME: CPT

## 2024-04-02 PROCEDURE — 87324 CLOSTRIDIUM AG IA: CPT

## 2024-04-02 PROCEDURE — C1751: CPT

## 2024-04-02 PROCEDURE — 82330 ASSAY OF CALCIUM: CPT

## 2024-04-02 PROCEDURE — 80053 COMPREHEN METABOLIC PANEL: CPT

## 2024-04-02 PROCEDURE — 85014 HEMATOCRIT: CPT

## 2024-04-02 PROCEDURE — 71275 CT ANGIOGRAPHY CHEST: CPT | Mod: MC

## 2024-04-02 PROCEDURE — 82803 BLOOD GASES ANY COMBINATION: CPT

## 2024-04-02 PROCEDURE — 85018 HEMOGLOBIN: CPT

## 2024-04-02 PROCEDURE — 36415 COLL VENOUS BLD VENIPUNCTURE: CPT

## 2024-04-02 PROCEDURE — 82435 ASSAY OF BLOOD CHLORIDE: CPT

## 2024-04-02 PROCEDURE — 87449 NOS EACH ORGANISM AG IA: CPT

## 2024-04-02 PROCEDURE — 87045 FECES CULTURE AEROBIC BACT: CPT

## 2024-04-02 PROCEDURE — 85379 FIBRIN DEGRADATION QUANT: CPT

## 2024-04-02 PROCEDURE — 81001 URINALYSIS AUTO W/SCOPE: CPT

## 2024-04-02 PROCEDURE — 96375 TX/PRO/DX INJ NEW DRUG ADDON: CPT

## 2024-04-02 PROCEDURE — 74174 CTA ABD&PLVS W/CONTRAST: CPT | Mod: MC

## 2024-04-02 PROCEDURE — 99285 EMERGENCY DEPT VISIT HI MDM: CPT | Mod: 25

## 2024-04-02 PROCEDURE — 75574 CT ANGIO HRT W/3D IMAGE: CPT | Mod: 26

## 2024-04-02 PROCEDURE — 83690 ASSAY OF LIPASE: CPT

## 2024-04-02 PROCEDURE — 85652 RBC SED RATE AUTOMATED: CPT

## 2024-04-02 PROCEDURE — 85027 COMPLETE CBC AUTOMATED: CPT

## 2024-04-02 PROCEDURE — 83880 ASSAY OF NATRIURETIC PEPTIDE: CPT

## 2024-04-02 PROCEDURE — 86140 C-REACTIVE PROTEIN: CPT

## 2024-04-02 PROCEDURE — 85025 COMPLETE CBC W/AUTO DIFF WBC: CPT

## 2024-04-02 PROCEDURE — 96376 TX/PRO/DX INJ SAME DRUG ADON: CPT

## 2024-04-02 PROCEDURE — 84132 ASSAY OF SERUM POTASSIUM: CPT

## 2024-04-02 PROCEDURE — 80061 LIPID PANEL: CPT

## 2024-04-02 PROCEDURE — 85730 THROMBOPLASTIN TIME PARTIAL: CPT

## 2024-04-02 PROCEDURE — 84100 ASSAY OF PHOSPHORUS: CPT

## 2024-04-02 PROCEDURE — 83735 ASSAY OF MAGNESIUM: CPT

## 2024-04-02 PROCEDURE — 71045 X-RAY EXAM CHEST 1 VIEW: CPT

## 2024-04-02 PROCEDURE — 82947 ASSAY GLUCOSE BLOOD QUANT: CPT

## 2024-04-02 PROCEDURE — 83605 ASSAY OF LACTIC ACID: CPT

## 2024-04-02 PROCEDURE — 87086 URINE CULTURE/COLONY COUNT: CPT

## 2024-04-02 PROCEDURE — 84295 ASSAY OF SERUM SODIUM: CPT

## 2024-04-02 PROCEDURE — 96374 THER/PROPH/DIAG INJ IV PUSH: CPT

## 2024-04-02 PROCEDURE — 84443 ASSAY THYROID STIM HORMONE: CPT

## 2024-04-02 PROCEDURE — 93975 VASCULAR STUDY: CPT

## 2024-04-02 PROCEDURE — 84484 ASSAY OF TROPONIN QUANT: CPT

## 2024-04-02 PROCEDURE — 83036 HEMOGLOBIN GLYCOSYLATED A1C: CPT

## 2024-04-02 RX ORDER — POTASSIUM CHLORIDE 20 MEQ
40 PACKET (EA) ORAL EVERY 4 HOURS
Refills: 0 | Status: COMPLETED | OUTPATIENT
Start: 2024-04-02 | End: 2024-04-02

## 2024-04-02 RX ORDER — MAGNESIUM SULFATE 500 MG/ML
1 VIAL (ML) INJECTION ONCE
Refills: 0 | Status: COMPLETED | OUTPATIENT
Start: 2024-04-02 | End: 2024-04-02

## 2024-04-02 RX ORDER — ATORVASTATIN CALCIUM 80 MG/1
1 TABLET, FILM COATED ORAL
Qty: 0 | Refills: 0 | DISCHARGE

## 2024-04-02 RX ADMIN — Medication 100 GRAM(S): at 08:45

## 2024-04-02 RX ADMIN — SERTRALINE 100 MILLIGRAM(S): 25 TABLET, FILM COATED ORAL at 11:09

## 2024-04-02 RX ADMIN — Medication 2 MILLIGRAM(S): at 05:15

## 2024-04-02 RX ADMIN — CHLORHEXIDINE GLUCONATE 1 APPLICATION(S): 213 SOLUTION TOPICAL at 12:33

## 2024-04-02 RX ADMIN — Medication 40 MILLIEQUIVALENT(S): at 11:09

## 2024-04-02 RX ADMIN — BUPROPION HYDROCHLORIDE 300 MILLIGRAM(S): 150 TABLET, EXTENDED RELEASE ORAL at 11:09

## 2024-04-02 RX ADMIN — Medication 40 MILLIEQUIVALENT(S): at 08:45

## 2024-04-02 NOTE — DISCHARGE NOTE NURSING/CASE MANAGEMENT/SOCIAL WORK - NSDCPEFALRISK_GEN_ALL_CORE
For information on Fall & Injury Prevention, visit: [email or web address] OR  [email or web address] OR  https://www.cdc.gov/steadi/patient.html

## 2024-04-02 NOTE — DISCHARGE NOTE PROVIDER - NSDCFUADDAPPT_GEN_ALL_CORE_FT
APPTS ARE READY TO BE MADE: [X ] YES    Best Family or Patient Contact (if needed):    Additional Information about above appointments (if needed):    1: PCP  2: Cardiology  3:     Other comments or requests:    APPTS ARE READY TO BE MADE: [X ] YES    Best Family or Patient Contact (if needed):    Additional Information about above appointments (if needed):    1: PCP  2: Cardiology  3:     Other comments or requests:     Patient informed us they already have secured a follow up appointment which is not visible on [facility] and declined to provide appointment details.

## 2024-04-02 NOTE — PROGRESS NOTE ADULT - SUBJECTIVE AND OBJECTIVE BOX
DATE OF SERVICE: 04-02-24 @ 15:46    Patient is a 38y old  Male who presents with a chief complaint of Chest pain (29 Mar 2024 14:20)      INTERVAL HISTORY: Feels better today. Planned for CTA heart today.     REVIEW OF SYSTEMS:  CONSTITUTIONAL: No weakness  EYES/ENT: No visual changes;  No throat pain   NECK: No pain or stiffness  RESPIRATORY: No cough, wheezing; No shortness of breath  CARDIOVASCULAR: No chest pain or palpitations  GASTROINTESTINAL: No abdominal  pain. No nausea, vomiting, or hematemesis  GENITOURINARY: No dysuria, frequency or hematuria  NEUROLOGICAL: No stroke like symptoms  SKIN: No rashes    TELEMETRY Personally reviewed: SR 70s  	  MEDICATIONS:        PHYSICAL EXAM:  T(C): 36.9 (04-02-24 @ 15:00), Max: 36.9 (04-02-24 @ 11:29)  HR: 85 (04-02-24 @ 15:00) (61 - 85)  BP: 114/77 (04-02-24 @ 15:00) (96/61 - 114/77)  RR: 16 (04-02-24 @ 15:00) (16 - 18)  SpO2: 97% (04-02-24 @ 15:00) (95% - 98%)  Wt(kg): --  I&O's Summary    01 Apr 2024 07:01  -  02 Apr 2024 07:00  --------------------------------------------------------  IN: 2040 mL / OUT: 0 mL / NET: 2040 mL    02 Apr 2024 07:01  -  02 Apr 2024 15:46  --------------------------------------------------------  IN: 240 mL / OUT: 0 mL / NET: 240 mL          Appearance: In no distress	  HEENT:    PERRL, EOMI	  Cardiovascular:  S1 S2, No JVD  Respiratory: Lungs clear to auscultation	  Gastrointestinal:  Soft, Non-tender, + BS	  Vascularature:  No edema of LE  Psychiatric: Appropriate affect   Neuro: no acute focal deficits                               12.5   5.27  )-----------( 167      ( 02 Apr 2024 05:39 )             35.3     04-02    144  |  108  |  9   ----------------------------<  87  3.2<L>   |  22  |  0.86    Ca    8.8      02 Apr 2024 05:39  Phos  3.6     04-02  Mg     1.9     04-02    TPro  5.9<L>  /  Alb  3.5  /  TBili  0.3  /  DBili  x   /  AST  16  /  ALT  27  /  AlkPhos  60  04-02        Labs personally reviewed      ASSESSMENT/PLAN: 	    37 Y/O Male with Pmhx of leukemia in remission, perianal abscess, cholecystectomy, kidney stone presenting with vomiting. Last night pt developed RUQ abd/R lower chest pain with vomiting and occasional back pain. Pt tachy to 140's and appears uncomfortable. Feels sob with the pain. No fever, leg swelling/pain. Pt states pain does not feel like his usual kidney stone pain.    1. Chest Pain  - Likely assocated with vomiting   - CTA Heart as strong Fhx CAD   - EKG sinus tach no ischemic changes  - Trop neg x1   - monitor on tele     2. Nausea/Vomiting/Diarrhea   Consistent with gastroenteritis   Labs and CT A/P grossly unremarkable   Improving        Roxanna Horan, LAUREEN-NP   Mitul Zelaya DO [facility]  Cardiovascular Medicine  [address], [address]  Available through call or text on [employer] TEAM[employer]  Office: [phone]  
INTERVAL HPI/OVERNIGHT EVENTS:    only 1 bm this morning thus far, had some consistency not just water  no abd pain    MEDICATIONS  (STANDING):  atorvastatin 40 milliGRAM(s) Oral at bedtime  buPROPion XL (24-Hour) . 300 milliGRAM(s) Oral daily  chlorhexidine 2% Cloths 1 Application(s) Topical daily  loperamide 2 milliGRAM(s) Oral two times a day  potassium chloride    Tablet ER 40 milliEquivalent(s) Oral every 4 hours  sertraline 100 milliGRAM(s) Oral daily  sodium chloride 0.9%. 1000 milliLiter(s) (100 mL/Hr) IV Continuous <Continuous>    MEDICATIONS  (PRN):  acetaminophen     Tablet .. 650 milliGRAM(s) Oral every 6 hours PRN Mild Pain (1 - 3)      Allergies    No Known Allergies    Intolerances    cefepime (Rash)      Review of Systems:    General:  No wt loss, fevers, chills, night sweats, fatigue   Eyes:  Good vision, no reported pain  ENT:  No sore throat, pain, runny nose, dysphagia  CV:  No pain, palpitations, hypo/hypertension  Resp:  No dyspnea, cough, tachypnea, wheezing  GI:  No pain, No nausea, No vomiting, No diarrhea, No constipation, No weight loss, No fever, No pruritis, No rectal bleeding, No melena, No dysphagia  :  No pain, bleeding, incontinence, nocturia  Muscle:  No pain, weakness  Neuro:  No weakness, tingling, memory problems  Psych:  No fatigue, insomnia, mood problems, depression  Endocrine:  No polyuria, polydypsia, cold/heat intolerance  Heme:  No petechiae, ecchymosis, easy bruisability  Skin:  No rash, tattoos, scars, edema      Vital Signs Last 24 Hrs  T(C): 36.7 (02 Apr 2024 05:18), Max: 37.2 (01 Apr 2024 11:26)  T(F): 98 (02 Apr 2024 05:18), Max: 99 (01 Apr 2024 11:26)  HR: 73 (02 Apr 2024 08:36) (61 - 80)  BP: 107/66 (02 Apr 2024 08:36) (98/62 - 130/75)  BP(mean): --  RR: 18 (02 Apr 2024 05:18) (18 - 18)  SpO2: 95% (02 Apr 2024 07:51) (95% - 99%)    Parameters below as of 02 Apr 2024 07:51  Patient On (Oxygen Delivery Method): room air        PHYSICAL EXAM:    Constitutional: NAD  HEENT: EOMI, throat clear  Neck: No LAD, supple  Respiratory: CTA and P  Cardiovascular: S1 and S2, RRR, no M  Gastrointestinal: BS+, soft, NT/ND, neg HSM,  Extremities: No peripheral edema, neg clubbing, cyanosis  Vascular: 2+ peripheral pulses  Neurological: A/O x3  Psychiatric: Normal mood, normal affect  Skin: No rashes      LABS:                        12.5   5.27  )-----------( 167      ( 02 Apr 2024 05:39 )             35.3     04-02    144  |  108  |  9   ----------------------------<  87  3.2<L>   |  22  |  0.86    Ca    8.8      02 Apr 2024 05:39  Phos  3.6     04-02  Mg     1.9     04-02    TPro  5.9<L>  /  Alb  3.5  /  TBili  0.3  /  DBili  x   /  AST  16  /  ALT  27  /  AlkPhos  60  04-02      Urinalysis Basic - ( 02 Apr 2024 05:39 )    Color: x / Appearance: x / SG: x / pH: x  Gluc: 87 mg/dL / Ketone: x  / Bili: x / Urobili: x   Blood: x / Protein: x / Nitrite: x   Leuk Esterase: x / RBC: x / WBC x   Sq Epi: x / Non Sq Epi: x / Bacteria: x        RADIOLOGY & ADDITIONAL TESTS:  
Name of Patient : JAK DANIELS  MRN: [id number]  Date of visit: 03-30-24       Subjective: Patient seen and examined. No new events except as noted.   cont to have diarrhea     REVIEW OF SYSTEMS:    CONSTITUTIONAL: No weakness, fevers or chills  EYES/ENT: No visual changes;  No vertigo or throat pain   NECK: No pain or stiffness  RESPIRATORY: No cough, wheezing, hemoptysis; No shortness of breath  CARDIOVASCULAR: No chest pain or palpitations  GASTROINTESTINAL: + diarrhea  GENITOURINARY: No dysuria, frequency or hematuria  NEUROLOGICAL: No numbness or weakness  SKIN: No itching, burning, rashes, or lesions   All other review of systems is negative unless indicated above.    MEDICATIONS:  MEDICATIONS  (STANDING):  azithromycin   Tablet 500 milliGRAM(s) Oral daily  buPROPion XL (24-Hour) . 300 milliGRAM(s) Oral daily  sertraline 100 milliGRAM(s) Oral daily  sodium chloride 0.9%. 1000 milliLiter(s) (100 mL/Hr) IV Continuous <Continuous>      PHYSICAL EXAM:  T(C): 36.8 (03-30-24 @ 20:59), Max: 36.9 (03-30-24 @ 10:36)  HR: 80 (03-30-24 @ 20:59) (80 - 95)  BP: 109/71 (03-30-24 @ 20:59) (100/61 - 112/72)  RR: 18 (03-30-24 @ 20:59) (18 - 18)  SpO2: 97% (03-30-24 @ 20:59) (95% - 97%)  Wt(kg): --  I&O's Summary    30 Mar 2024 07:01  -  30 Mar 2024 23:16  --------------------------------------------------------  IN: 636 mL / OUT: 0 mL / NET: 636 mL    Appearance: Normal	  HEENT:  PERRLA   Lymphatic: No lymphadenopathy   Cardiovascular: Normal S1 S2, no JVD  Respiratory: normal effort , clear  Gastrointestinal:  Soft, Non-tender  Skin: No rashes,  warm to touch  Psychiatry:  Mood & affect appropriate  Musculuskeletal: No edema    recent labs, Imaging and EKGs personally reviewed     03-30-24 @ 07:01  -  03-30-24 @ 23:16  --------------------------------------------------------  IN: 636 mL / OUT: 0 mL / NET: 636 mL                          12.3   2.69  )-----------( 130      ( 30 Mar 2024 06:07 )             35.4               03-30    138  |  103  |  12  ----------------------------<  102<H>  3.7   |  23  |  1.13    Ca    8.8      30 Mar 2024 06:08  Mg     1.8     03-29    TPro  6.1  /  Alb  3.8  /  TBili  0.6  /  DBili  x   /  AST  18  /  ALT  31  /  AlkPhos  60  03-30    PT/INR - ( 29 Mar 2024 02:41 )   PT: 11.0 sec;   INR: 1.05 ratio         PTT - ( 29 Mar 2024 02:41 )  PTT:28.3 sec                   Urinalysis Basic - ( 30 Mar 2024 06:08 )    Color: x / Appearance: x / SG: x / pH: x  Gluc: 102 mg/dL / Ketone: x  / Bili: x / Urobili: x   Blood: x / Protein: x / Nitrite: x   Leuk Esterase: x / RBC: x / WBC x   Sq Epi: x / Non Sq Epi: x / Bacteria: x              
ongoing diarrhea    acetaminophen     Tablet .. 650 milliGRAM(s) Oral every 6 hours PRN  azithromycin   Tablet 500 milliGRAM(s) Oral daily  buPROPion XL (24-Hour) . 300 milliGRAM(s) Oral daily  sertraline 100 milliGRAM(s) Oral daily  sodium chloride 0.9%. 1000 milliLiter(s) IV Continuous <Continuous>                            12.3   2.69  )-----------( 130      ( 30 Mar 2024 06:07 )             35.4       Hemoglobin: 12.3 g/dL (03-30 @ 06:07)  Hemoglobin: 15.1 g/dL (03-29 @ 02:41)      03-30    138  |  103  |  12  ----------------------------<  102<H>  3.7   |  23  |  1.13    Ca    8.8      30 Mar 2024 06:08  Mg     1.8     03-29    TPro  6.1  /  Alb  3.8  /  TBili  0.6  /  DBili  x   /  AST  18  /  ALT  31  /  AlkPhos  60  03-30    Creatinine Trend: 1.13<--, 1.14<--    COAGS:           T(C): 36.9 (03-30-24 @ 10:36), Max: 37.8 (03-29-24 @ 12:31)  HR: 87 (03-30-24 @ 10:36) (87 - 118)  BP: 112/72 (03-30-24 @ 10:36) (96/58 - 112/72)  RR: 18 (03-30-24 @ 10:36) (18 - 18)  SpO2: 95% (03-30-24 @ 10:36) (95% - 100%)  Wt(kg): --    I&O's Summary    ROS:     General:  No wt loss, fevers, chills, night sweats, fatigue,   Eyes:  Good vision, no reported pain  ENT:  No sore throat, pain, runny nose, dysphagia  CV:  No pain, palpitations, hypo/hypertension  Resp:  No dyspnea, cough, tachypnea, wheezing  GI:  See HPI  :  No pain, bleeding, incontinence, nocturia  Muscle:  No pain, weakness  Neuro:  No weakness, tingling, memory problems  Psych:  No fatigue, insomnia, mood problems, depression  Endocrine:  No polyuria, polydipsia, cold/heat intolerance  Heme:  No petechiae, ecchymosis, easy bruisability  Integumentary:  No rash, edema      PHYSICAL EXAM:     GENERAL:  Appears stated age, well-groomed, well-nourished, no distress  HEENT:  NC/AT,  conjunctivae anicteric, clear and pink,   NECK: supple, trachea midline  CHEST:  Full & symmetric excursion, no increased effort, breath sounds clear  HEART:  Regular rhythm, no JVD  ABDOMEN:  Soft, non-tender, non-distended, normoactive bowel sounds,  no masses , no hepatosplenomegaly  EXTREMITIES:  no cyanosis,clubbing or edema  SKIN:  No rash, erythema, or, ecchymoses, no jaundice  NEURO:  Alert, non-focal, no asterixis  PSYCH: Appropriate affect, oriented to place and time  RECTAL: Deferred          
DATE OF SERVICE: 03-30-24 @ 12:39    Patient is a 38y old  Male who presents with a chief complaint of Chest pain (29 Mar 2024 14:20)      INTERVAL HISTORY: no complaints     REVIEW OF SYSTEMS:  CONSTITUTIONAL: No weakness  EYES/ENT: No visual changes;  No throat pain   NECK: No pain or stiffness  RESPIRATORY: No cough, wheezing; No shortness of breath  CARDIOVASCULAR: No chest pain or palpitations  GASTROINTESTINAL: No abdominal  pain. No nausea, vomiting, or hematemesis  GENITOURINARY: No dysuria, frequency or hematuria  NEUROLOGICAL: No stroke like symptoms  SKIN: No rashes    	  MEDICATIONS:        PHYSICAL EXAM:  T(C): 36.9 (03-30-24 @ 10:36), Max: 37.8 (03-29-24 @ 19:44)  HR: 87 (03-30-24 @ 10:36) (87 - 116)  BP: 112/72 (03-30-24 @ 10:36) (98/65 - 112/72)  RR: 18 (03-30-24 @ 10:36) (18 - 18)  SpO2: 95% (03-30-24 @ 10:36) (95% - 98%)  Wt(kg): --  I&O's Summary        Appearance: In no distress	  HEENT:    PERRL, EOMI	  Cardiovascular:  S1 S2, No JVD  Respiratory: Lungs clear to auscultation	  Gastrointestinal:  Soft, Non-tender, + BS	  Vascularature:  No edema of LE  Psychiatric: Appropriate affect   Neuro: no acute focal deficits                               12.3   2.69  )-----------( 130      ( 30 Mar 2024 06:07 )             35.4     03-30    138  |  103  |  12  ----------------------------<  102<H>  3.7   |  23  |  1.13    Ca    8.8      30 Mar 2024 06:08  Mg     1.8     03-29    TPro  6.1  /  Alb  3.8  /  TBili  0.6  /  DBili  x   /  AST  18  /  ALT  31  /  AlkPhos  60  03-30        Labs personally reviewed      ASSESSMENT/PLAN: 	    39 Y/O Male with Pmhx of leukemia in remission, perianal abscess, cholecystectomy, kidney stone presenting with vomiting. Last night pt developed RUQ abd/R lower chest pain with vomiting and occasional back pain. Pt tachy to 140's and appears uncomfortable. Feels sob with the pain. No fever, leg swelling/pain. Pt states pain does not feel like his usual kidney stone pain.    1. Chest Pain  - Likely assocated with vomiting   - CTA Heart Monday as strong Fhx CAD   - EKG sinus tach no ischemic changes  - Trop neg x1   - monitor on tele     2. Nausea/Vomiting/Diarrhea   Consistent with gastroenteritis   Labs and CT A/P grossly unremarkable           CITLALY Oshea DO [facility]  Cardiovascular Medicine  [address], [address]  Office: [phone]  Available via call/text on [employer] Teams 
DATE OF SERVICE: 03-31-24 @ 12:51    Patient is a 38y old  Male who presents with a chief complaint of Chest pain (29 Mar 2024 14:20)      INTERVAL HISTORY: no complaints     REVIEW OF SYSTEMS:  CONSTITUTIONAL: No weakness  EYES/ENT: No visual changes;  No throat pain   NECK: No pain or stiffness  RESPIRATORY: No cough, wheezing; No shortness of breath  CARDIOVASCULAR: No chest pain or palpitations  GASTROINTESTINAL: No abdominal  pain. No nausea, vomiting, or hematemesis  GENITOURINARY: No dysuria, frequency or hematuria  NEUROLOGICAL: No stroke like symptoms  SKIN: No rashes    	  MEDICATIONS:        PHYSICAL EXAM:  T(C): 36.7 (03-31-24 @ 11:01), Max: 36.8 (03-30-24 @ 20:59)  HR: 71 (03-31-24 @ 11:01) (71 - 80)  BP: 107/64 (03-31-24 @ 11:01) (104/67 - 109/71)  RR: 18 (03-31-24 @ 11:01) (18 - 18)  SpO2: 99% (03-31-24 @ 11:01) (96% - 99%)  Wt(kg): --  I&O's Summary    30 Mar 2024 07:01  -  31 Mar 2024 07:00  --------------------------------------------------------  IN: 636 mL / OUT: 0 mL / NET: 636 mL          Appearance: In no distress	  HEENT:    PERRL, EOMI	  Cardiovascular:  S1 S2, No JVD  Respiratory: Lungs clear to auscultation	  Gastrointestinal:  Soft, Non-tender, + BS	  Vascularature:  No edema of LE  Psychiatric: Appropriate affect   Neuro: no acute focal deficits                               11.8   3.38  )-----------( 139      ( 31 Mar 2024 07:06 )             35.3     03-30    138  |  103  |  12  ----------------------------<  102<H>  3.7   |  23  |  1.13    Ca    8.8      30 Mar 2024 06:08    TPro  6.1  /  Alb  3.8  /  TBili  0.6  /  DBili  x   /  AST  18  /  ALT  31  /  AlkPhos  60  03-30        Labs personally reviewed      ASSESSMENT/PLAN: 	    37 Y/O Male with Pmhx of leukemia in remission, perianal abscess, cholecystectomy, kidney stone presenting with vomiting. Last night pt developed RUQ abd/R lower chest pain with vomiting and occasional back pain. Pt tachy to 140's and appears uncomfortable. Feels sob with the pain. No fever, leg swelling/pain. Pt states pain does not feel like his usual kidney stone pain.    1. Chest Pain  - Likely assocated with vomiting   - CTA Heart Monday as strong Fhx CAD   - EKG sinus tach no ischemic changes  - Trop neg x1   - monitor on tele     2. Nausea/Vomiting/Diarrhea   Consistent with gastroenteritis   Labs and CT A/P grossly unremarkable           CITLALY Oshea DO [facility]  Cardiovascular Medicine  [address], [address]  Office: [phone]  Available via call/text on [employer] Teams 
Name of Patient : JAK DANIELS  MRN: [id number]  Date of visit: 04-02-24 @ 13:25      Subjective: Patient seen and examined. No new events except as noted.   Patient seen earlier this AM. Lying down in bed. Reports feeling better. States that he had a BM that was loose, however with improvement. Was not liquid like prior BM.  Tolerating PO intake. Denies nausea or emesis.  Planned for CT heart today.    REVIEW OF SYSTEMS:    CONSTITUTIONAL: Generalized weakness   EYES/ENT: No visual changes;  No vertigo or throat pain   NECK: No pain or stiffness  RESPIRATORY: No cough, wheezing, hemoptysis; No shortness of breath  CARDIOVASCULAR: No chest pain or palpitations  GASTROINTESTINAL: + Loose BM - not watery; No abdominal or epigastric pain. No nausea, vomiting, or hematemesis; No diarrhea or constipation. No melena or hematochezia.  GENITOURINARY: No dysuria, frequency or hematuria  NEUROLOGICAL: No numbness or weakness  SKIN: No itching, burning, rashes, or lesions   All other review of systems is negative unless indicated above.    MEDICATIONS:  MEDICATIONS  (STANDING):  atorvastatin 40 milliGRAM(s) Oral at bedtime  buPROPion XL (24-Hour) . 300 milliGRAM(s) Oral daily  chlorhexidine 2% Cloths 1 Application(s) Topical daily  loperamide 2 milliGRAM(s) Oral two times a day  sertraline 100 milliGRAM(s) Oral daily  sodium chloride 0.9%. 1000 milliLiter(s) (100 mL/Hr) IV Continuous <Continuous>      PHYSICAL EXAM:  T(C): 36.9 (04-02-24 @ 11:29), Max: 36.9 (04-02-24 @ 11:29)  HR: 74 (04-02-24 @ 11:29) (61 - 74)  BP: 96/61 (04-02-24 @ 11:29) (96/61 - 130/75)  RR: 16 (04-02-24 @ 11:29) (16 - 18)  SpO2: 98% (04-02-24 @ 11:29) (95% - 98%)  Wt(kg): --  I&O's Summary    01 Apr 2024 07:01  -  02 Apr 2024 07:00  --------------------------------------------------------  IN: 2040 mL / OUT: 0 mL / NET: 2040 mL          Appearance: Awake, lying down in bed 	  HEENT: Eyes are open   Lymphatic: No lymphadenopathy   Cardiovascular: Normal    Respiratory: normal effort , clear  Gastrointestinal:  Soft, Non-tender to palpitation   Skin: No rashes,  warm to touch  Psychiatry:  Mood & affect appropriate  Musculoskeletal: No edema        04-01-24 @ 07:01  -  04-02-24 @ 07:00  --------------------------------------------------------  IN: 2040 mL / OUT: 0 mL / NET: 2040 mL                                  12.5   5.27  )-----------( 167      ( 02 Apr 2024 05:39 )             35.3               04-02    144  |  108  |  9   ----------------------------<  87  3.2<L>   |  22  |  0.86    Ca    8.8      02 Apr 2024 05:39  Phos  3.6     04-02  Mg     1.9     04-02    TPro  5.9<L>  /  Alb  3.5  /  TBili  0.3  /  DBili  x   /  AST  16  /  ALT  27  /  AlkPhos  60  04-02                       Urinalysis Basic - ( 02 Apr 2024 05:39 )    Color: x / Appearance: x / SG: x / pH: x  Gluc: 87 mg/dL / Ketone: x  / Bili: x / Urobili: x   Blood: x / Protein: x / Nitrite: x   Leuk Esterase: x / RBC: x / WBC x   Sq Epi: x / Non Sq Epi: x / Bacteria: x      Culture - Reflex Stool (03.31.24 @ 03:39)   Specimen Source: .Stool  Culture Results: Salmonella species negative by culture    Culture - Urine (03.29.24 @ 09:11)   Specimen Source: Clean Catch Clean Catch (Midstream)  Culture Results: No growth
some diarrhea    acetaminophen     Tablet .. 650 milliGRAM(s) Oral every 6 hours PRN  azithromycin   Tablet 500 milliGRAM(s) Oral daily  buPROPion XL (24-Hour) . 300 milliGRAM(s) Oral daily  sertraline 100 milliGRAM(s) Oral daily  sodium chloride 0.9%. 1000 milliLiter(s) IV Continuous <Continuous>                            12.3   2.69  )-----------( 130      ( 30 Mar 2024 06:07 )             35.4       Hemoglobin: 12.3 g/dL (03-30 @ 06:07)  Hemoglobin: 15.1 g/dL (03-29 @ 02:41)      03-30    138  |  103  |  12  ----------------------------<  102<H>  3.7   |  23  |  1.13    Ca    8.8      30 Mar 2024 06:08  Mg     1.8     03-29    TPro  6.1  /  Alb  3.8  /  TBili  0.6  /  DBili  x   /  AST  18  /  ALT  31  /  AlkPhos  60  03-30    Creatinine Trend: 1.13<--, 1.14<--    COAGS:           T(C): 36.9 (03-30-24 @ 10:36), Max: 37.8 (03-29-24 @ 12:31)  HR: 87 (03-30-24 @ 10:36) (87 - 118)  BP: 112/72 (03-30-24 @ 10:36) (96/58 - 112/72)  RR: 18 (03-30-24 @ 10:36) (18 - 18)  SpO2: 95% (03-30-24 @ 10:36) (95% - 100%)  Wt(kg): --    I&O's Summary    ROS:     General:  No wt loss, fevers, chills, night sweats, fatigue,   Eyes:  Good vision, no reported pain  ENT:  No sore throat, pain, runny nose, dysphagia  CV:  No pain, palpitations, hypo/hypertension  Resp:  No dyspnea, cough, tachypnea, wheezing  GI:  See HPI  :  No pain, bleeding, incontinence, nocturia  Muscle:  No pain, weakness  Neuro:  No weakness, tingling, memory problems  Psych:  No fatigue, insomnia, mood problems, depression  Endocrine:  No polyuria, polydipsia, cold/heat intolerance  Heme:  No petechiae, ecchymosis, easy bruisability  Integumentary:  No rash, edema      PHYSICAL EXAM:     GENERAL:  Appears stated age, well-groomed, well-nourished, no distress  HEENT:  NC/AT,  conjunctivae anicteric, clear and pink,   NECK: supple, trachea midline  CHEST:  Full & symmetric excursion, no increased effort, breath sounds clear  HEART:  Regular rhythm, no JVD  ABDOMEN:  Soft, non-tender, non-distended, normoactive bowel sounds,  no masses , no hepatosplenomegaly  EXTREMITIES:  no cyanosis,clubbing or edema  SKIN:  No rash, erythema, or, ecchymoses, no jaundice  NEURO:  Alert, non-focal, no asterixis  PSYCH: Appropriate affect, oriented to place and time  RECTAL: Deferred          
Name of Patient : JAK DANIELS  MRN: [id number]  Date of visit: 03-31-24       Subjective: Patient seen and examined. No new events except as noted.     REVIEW OF SYSTEMS:    CONSTITUTIONAL: No weakness, fevers or chills  EYES/ENT: No visual changes;  No vertigo or throat pain   NECK: No pain or stiffness  RESPIRATORY: No cough, wheezing, hemoptysis; No shortness of breath  CARDIOVASCULAR: No chest pain or palpitations  GASTROINTESTINAL: + diarrhea  GENITOURINARY: No dysuria, frequency or hematuria  NEUROLOGICAL: No numbness or weakness  SKIN: No itching, burning, rashes, or lesions   All other review of systems is negative unless indicated above.    MEDICATIONS:  MEDICATIONS  (STANDING):  azithromycin   Tablet 500 milliGRAM(s) Oral daily  buPROPion XL (24-Hour) . 300 milliGRAM(s) Oral daily  chlorhexidine 2% Cloths 1 Application(s) Topical daily  sertraline 100 milliGRAM(s) Oral daily  sodium chloride 0.9%. 1000 milliLiter(s) (100 mL/Hr) IV Continuous <Continuous>      PHYSICAL EXAM:  T(C): 36.8 (03-31-24 @ 21:43), Max: 36.8 (03-31-24 @ 17:19)  HR: 77 (03-31-24 @ 21:43) (71 - 90)  BP: 111/69 (03-31-24 @ 21:43) (104/67 - 111/79)  RR: 18 (03-31-24 @ 21:43) (18 - 18)  SpO2: 96% (03-31-24 @ 21:43) (96% - 100%)  Wt(kg): --  I&O's Summary    30 Mar 2024 07:01  -  31 Mar 2024 07:00  --------------------------------------------------------  IN: 636 mL / OUT: 0 mL / NET: 636 mL    31 Mar 2024 07:01  -  31 Mar 2024 23:40  --------------------------------------------------------  IN: 1080 mL / OUT: 0 mL / NET: 1080 mL          Appearance: Normal	  HEENT:  PERRLA   Lymphatic: No lymphadenopathy   Cardiovascular: Normal S1 S2, no JVD  Respiratory: normal effort , clear  Gastrointestinal:  Soft, Non-tender  Skin: No rashes,  warm to touch  Psychiatry:  Mood & affect appropriate  Musculuskeletal: No edema    recent labs, Imaging and EKGs personally reviewed     03-30-24 @ 07:01  -  03-31-24 @ 07:00  --------------------------------------------------------  IN: 636 mL / OUT: 0 mL / NET: 636 mL    03-31-24 @ 07:01  -  03-31-24 @ 23:40  --------------------------------------------------------  IN: 1080 mL / OUT: 0 mL / NET: 1080 mL                            11.8   3.38  )-----------( 139      ( 31 Mar 2024 07:06 )             35.3               03-30    138  |  103  |  12  ----------------------------<  102<H>  3.7   |  23  |  1.13    Ca    8.8      30 Mar 2024 06:08    TPro  6.1  /  Alb  3.8  /  TBili  0.6  /  DBili  x   /  AST  18  /  ALT  31  /  AlkPhos  60  03-30                       Urinalysis Basic - ( 30 Mar 2024 06:08 )    Color: x / Appearance: x / SG: x / pH: x  Gluc: 102 mg/dL / Ketone: x  / Bili: x / Urobili: x   Blood: x / Protein: x / Nitrite: x   Leuk Esterase: x / RBC: x / WBC x   Sq Epi: x / Non Sq Epi: x / Bacteria: x            
Name of Patient : JAK DANIELS  MRN: [id number]  Date of visit: 04-01-24 @ 14:50      Subjective: Patient seen and examined. No new events except as noted.   Patient seen earlier this AM. Reports continued diarrhea and abdominal cramping.   S/P 3 day course of Azithromycin.  Denies nausea, vomiting, chills.     REVIEW OF SYSTEMS:    CONSTITUTIONAL: Generalized weakness; Afebrile   EYES/ENT: No visual changes;  No vertigo or throat pain   NECK: No pain or stiffness  RESPIRATORY: No cough, wheezing, hemoptysis; No shortness of breath  CARDIOVASCULAR: No chest pain or palpitations  GASTROINTESTINAL: + Diarrhea; + Abdominal cramping; No nausea, vomiting, or hematemesis; No melena or hematochezia.  GENITOURINARY: No dysuria, frequency or hematuria  NEUROLOGICAL: No numbness or weakness  SKIN: No itching, burning, rashes, or lesions   All other review of systems is negative unless indicated above.    MEDICATIONS:  MEDICATIONS  (STANDING):  buPROPion XL (24-Hour) . 300 milliGRAM(s) Oral daily  chlorhexidine 2% Cloths 1 Application(s) Topical daily  loperamide 2 milliGRAM(s) Oral two times a day  sertraline 100 milliGRAM(s) Oral daily  sodium chloride 0.9%. 1000 milliLiter(s) (100 mL/Hr) IV Continuous <Continuous>      PHYSICAL EXAM:  T(C): 37.2 (04-01-24 @ 11:26), Max: 37.2 (04-01-24 @ 11:26)  HR: 80 (04-01-24 @ 11:26) (72 - 96)  BP: 117/73 (04-01-24 @ 11:26) (98/59 - 117/73)  RR: 18 (04-01-24 @ 11:26) (18 - 18)  SpO2: 99% (04-01-24 @ 11:26) (96% - 100%)  Wt(kg): --  I&O's Summary    31 Mar 2024 07:01  -  01 Apr 2024 07:00  --------------------------------------------------------  IN: 2280 mL / OUT: 0 mL / NET: 2280 mL    01 Apr 2024 07:01  -  01 Apr 2024 14:50  --------------------------------------------------------  IN: 360 mL / OUT: 0 mL / NET: 360 mL          Appearance: Awake, lying down in bed 	  HEENT:  Eyes are open   Lymphatic: No lymphadenopathy grossly   Cardiovascular: Normal   Respiratory: normal effort , clear  Gastrointestinal:  Soft, minimal tenderness to palpitation   Skin: No rashes,  warm to touch  Psychiatry:  Mood & affect appropriate  Musculoskeletal: No edema          03-31-24 @ 07:01  -  04-01-24 @ 07:00  --------------------------------------------------------  IN: 2280 mL / OUT: 0 mL / NET: 2280 mL    04-01-24 @ 07:01  -  04-01-24 @ 14:50  --------------------------------------------------------  IN: 360 mL / OUT: 0 mL / NET: 360 mL                            13.3   3.10  )-----------( 143      ( 01 Apr 2024 08:58 )             38.3               04-01    144  |  111<H>  |  7   ----------------------------<  84  3.5   |  19<L>  |  0.90    Ca    9.1      01 Apr 2024 08:58    TPro  6.7  /  Alb  4.0  /  TBili  0.4  /  DBili  x   /  AST  14  /  ALT  28  /  AlkPhos  62  04-01                       Urinalysis Basic - ( 01 Apr 2024 08:58 )    Color: x / Appearance: x / SG: x / pH: x  Gluc: 84 mg/dL / Ketone: x  / Bili: x / Urobili: x   Blood: x / Protein: x / Nitrite: x   Leuk Esterase: x / RBC: x / WBC x   Sq Epi: x / Non Sq Epi: x / Bacteria: x          Culture - Reflex Stool (03.31.24 @ 03:39)   Specimen Source: .Stool  Culture Results: Culture in progress    Culture - Urine (03.29.24 @ 09:11)   Specimen Source: Clean Catch Clean Catch (Midstream)  Culture Results: No growth    < from: CT Angio Abdomen and Pelvis w/ IV Cont (03.29.24 @ 04:07) >    ACC: [id number] EXAM:  CT ANGIO ABD PELV (W)AW IC   ORDERED BY: YELITZA MEJIA     ACC: [id number] EXAM:  CT ANGIO CHEST AORTA WAWIC   ORDERED BY:  ZAK MERCER     PROCEDURE DATE:  03/29/2024          INTERPRETATION:  CLINICAL INFORMATION: Chest pain, vomiting, and back   pain. Evaluate for aortic dissection.    COMPARISON: CT chest 5/6/2022. CT abdomen pelvis 11/19/2022.    CONTRAST/COMPLICATIONS:  IV Contrast: Omnipaque 350 (accession [id number]), IV contrast documented   in unlinked concurrent exam (accession [id number])  90 cc administered   10   cc discarded  Oral Contrast: NONE  Complications: None reported at time of study completion    PROCEDURE:  CT Angiography of the Chest, Abdomen and Pelvis.  Precontrast imaging was performed through the chest followed by arterial   phase imaging of the chest, abdomen and pelvis.  Sagittal and coronal reformats were performed as well as 3D (MIP)   reconstructions.    FINDINGS:    AORTA: There is no aortic aneurysm. There is no aortic dissection. There   is a faint linear intraluminal lucency seen of the proximal SMA, image   302:135. This is likely artifactual, the possibility of incomplete focal   dissection flap is not excluded.    At the level of the JULIET takeoff there is focal stranding seen along the   anterior aspect of the aorta, alongside minimal focal wall thickening.   This is nonspecific and of uncertain significance.    CHEST:  LUNGS AND LARGE AIRWAYS: Patent central airways. No pulmonary nodules.  PLEURA: No pleural effusion.  VESSELS: The aorta and pulmonary artery of normal caliber.  HEART: Heart size is normal. No pericardial effusion.  MEDIASTINUM AND PRABHJOT: No lymphadenopathy.  CHEST WALL AND LOWER NECK: Within normal limits.    ABDOMEN AND PELVIS:  LIVER: Within normal limits.  BILE DUCTS: Normal caliber.  GALLBLADDER: Cholecystectomy.  SPLEEN: Within normal limits.  PANCREAS: Within normal limits.  ADRENALS: Within normal limits.  KIDNEYS/URETERS: No hydronephrosis. Nonobstructing 3 mm left renal stone.    BLADDER: Within normal limits.  REPRODUCTIVE ORGANS: Prostate within normal limits.    BOWEL: No bowel obstruction. Nonspecific fluid-filled small and large   bowel including the rectum. Appendix is normal.  PERITONEUM: No ascites.  VESSELS: No abdominal aortic dissection or aneurysm.  RETROPERITONEUM/LYMPH NODES: No lymphadenopathy.  ABDOMINAL WALL: Within normal limits.  BONES: Nonspecific subtle sclerosis of the right posterior lateral   seventh and left anterolateral fifth ribs are appreciated..    IMPRESSION:  No aortic dissection or aneurysm.    Linear lucency along the proximal SMA, which may be artifactual though   possibility of a focal incomplete fenestrated dissection flap cannot be   entirely excluded. Additional focal area of mural wall thickening and   adjacent focus of para-aortic fat stranding is nonspecific. Differential   considerations include sequelae of vasculitis, penetrating ulcer or tiny   intramural hematoma. Please note this portion of the aorta is not imaged   on thenoncontrast series. MRA may be helpful for further delineation, if   clinically indicated.    Nonspecific fluid-filled small and large bowel, including the rectum.   This can be seen in the setting of enteritis colitis or ileus formation.   Correlateclinically.        --- End of Report ---           JEREMIAS ALBRIGHT MD; Resident Radiologist  This document has been electronically signed.  JAVIER VALLECILLO M.D., Attending Radiologist  This document has been electronically signed. Mar 29 2024  5:12AM    < end of copied text >        < from: US Doppler Mesenteric (03.29.24 @ 11:44) >  IMPRESSION:  No dissection seen.  Patent mesenteric arteries without evidence of hemodynamically   significant stenosis.    < end of copied text >    
No complaints, ROS - .        acetaminophen     Tablet .. 650 milliGRAM(s) Oral every 6 hours PRN  azithromycin   Tablet 500 milliGRAM(s) Oral daily  buPROPion XL (24-Hour) . 300 milliGRAM(s) Oral daily  sertraline 100 milliGRAM(s) Oral daily  sodium chloride 0.9%. 1000 milliLiter(s) IV Continuous <Continuous>                            12.3   2.69  )-----------( 130      ( 30 Mar 2024 06:07 )             35.4       Hemoglobin: 12.3 g/dL (03-30 @ 06:07)  Hemoglobin: 15.1 g/dL (03-29 @ 02:41)      03-30    138  |  103  |  12  ----------------------------<  102<H>  3.7   |  23  |  1.13    Ca    8.8      30 Mar 2024 06:08  Mg     1.8     03-29    TPro  6.1  /  Alb  3.8  /  TBili  0.6  /  DBili  x   /  AST  18  /  ALT  31  /  AlkPhos  60  03-30    Creatinine Trend: 1.13<--, 1.14<--    COAGS:           T(C): 36.9 (03-30-24 @ 10:36), Max: 37.8 (03-29-24 @ 12:31)  HR: 87 (03-30-24 @ 10:36) (87 - 118)  BP: 112/72 (03-30-24 @ 10:36) (96/58 - 112/72)  RR: 18 (03-30-24 @ 10:36) (18 - 18)  SpO2: 95% (03-30-24 @ 10:36) (95% - 100%)  Wt(kg): --    I&O's Summary    ROS:     General:  No wt loss, fevers, chills, night sweats, fatigue,   Eyes:  Good vision, no reported pain  ENT:  No sore throat, pain, runny nose, dysphagia  CV:  No pain, palpitations, hypo/hypertension  Resp:  No dyspnea, cough, tachypnea, wheezing  GI:  See HPI  :  No pain, bleeding, incontinence, nocturia  Muscle:  No pain, weakness  Neuro:  No weakness, tingling, memory problems  Psych:  No fatigue, insomnia, mood problems, depression  Endocrine:  No polyuria, polydipsia, cold/heat intolerance  Heme:  No petechiae, ecchymosis, easy bruisability  Integumentary:  No rash, edema      PHYSICAL EXAM:     GENERAL:  Appears stated age, well-groomed, well-nourished, no distress  HEENT:  NC/AT,  conjunctivae anicteric, clear and pink,   NECK: supple, trachea midline  CHEST:  Full & symmetric excursion, no increased effort, breath sounds clear  HEART:  Regular rhythm, no JVD  ABDOMEN:  Soft, non-tender, non-distended, normoactive bowel sounds,  no masses , no hepatosplenomegaly  EXTREMITIES:  no cyanosis,clubbing or edema  SKIN:  No rash, erythema, or, ecchymoses, no jaundice  NEURO:  Alert, non-focal, no asterixis  PSYCH: Appropriate affect, oriented to place and time  RECTAL: Deferred          
no

## 2024-04-02 NOTE — PROGRESS NOTE ADULT - NS ATTEND AMEND GEN_ALL_CORE FT
Patient care and plan discussed and reviewed with Advanced Care Provider. Plan as outlined above edited by me to reflect our discussion.
Pt care and plan discussed and reviewed with PA. Plan as outlined above edited by me to reflect our discussion.
done

## 2024-04-02 NOTE — PROGRESS NOTE ADULT - PROVIDER SPECIALTY LIST ADULT
Cardiology
Cardiology
Internal Medicine
Internal Medicine
Cardiology
Gastroenterology
Gastroenterology
Internal Medicine
Internal Medicine
Gastroenterology
Gastroenterology

## 2024-04-02 NOTE — DISCHARGE NOTE PROVIDER - NSDCCPCAREPLAN_GEN_ALL_CORE_FT
PRINCIPAL DISCHARGE DIAGNOSIS  Diagnosis: Abdominal pain  Assessment and Plan of Treatment: Found to have Salmonella/Norovirus Gastroenteritis. You finished 3 days of antibiotics. Diarrhea improved.   Please promptly follow up with your PCP Dr. Hudson for continued management and care.      SECONDARY DISCHARGE DIAGNOSES  Diagnosis: Chest pain  Assessment and Plan of Treatment: Chest Pain Likely assocated with vomiting. Seen by cardiology, work up unremarkable. Please follow up with cardiology Dr. Zelaya for continued management and care as significant family history of cardiac disease.    Diagnosis: Hypokalemia  Assessment and Plan of Treatment: likely associated with GI losses from diarrhea. Supplemented. Maintain close follow up with Dr. Hudson for continued management and care.    Diagnosis: AML (acute myeloblastic leukemia)  Assessment and Plan of Treatment: in remission. Maintain close follow up with your hematologist/oncologist for continued management and care.

## 2024-04-02 NOTE — DISCHARGE NOTE NURSING/CASE MANAGEMENT/SOCIAL WORK - NSDCFUADDAPPT_GEN_ALL_CORE_FT
APPTS ARE READY TO BE MADE: [X ] YES    Best Family or Patient Contact (if needed):    Additional Information about above appointments (if needed):    1: PCP  2: Cardiology  3:     Other comments or requests:

## 2024-04-02 NOTE — PROGRESS NOTE ADULT - ASSESSMENT
Patient is a 37 Y/O Male with Pmhx of leukemia in remission, perianal abscess, cholecystectomy, kidney stone presenting with vomiting. Last night pt developed RUQ abd/R lower chest pain with vomiting and occasional back pain. Pt tachy to 140's and appears uncomfortable. Feels sob with the pain. No fever, leg swelling/pain. Pt states pain does not feel like his usual kidney stone pain.    # ABdominal pain  nausea and vomiting   abdominal pain, acute onset   mild elevation in lactate  IV hydration  Pan CT noted   SMA dissection? on CT   vacular eval called  , appreciated  abdominal doppler   oral feeding as tolerated   GI eval appreciated  check stool PCR  Check C diff  azithro IV       # Chest pain  episode of chest pain   likely due to aggressive vomiting however patient has a extensive family hx of HD in both side  card eval appreciated   may benefit from CT coronary on this admission     # Leukemia   in remission  follows with montr    DVT and gI PPX 
· Assessment	  This is a 38M w/ PMH acute myeloid leukemia in remission, recurrent perianal abscess s/p fistulotomy (2023. [facility]), cholecystectomy (2022, [facility]), kidney stone presenting with acute n/v, abdominal pain.     1. n/v, diarrhea  Consistent with gastroenteritis   Labs and CT A/P grossly unremarkable     cont  Azithromycin 500 mg once daily x 3 days    IVF, supportive care   Zofran PRN for nausea  advance diet as tolerated     2. hx of leukemia, in remisson     3. s/p cholecystectomy      
· Assessment	  This is a 38M w/ PMH acute myeloid leukemia in remission, recurrent perianal abscess s/p fistulotomy (2023. [facility]), cholecystectomy (2022, [facility]), kidney stone presenting with acute n/v, abdominal pain.     1.salmonella/norovrius gastrorenteritis  -complete 3d of azithromycin  -diet as tolerated    2. hx of leukemia, in remisson     3. s/p cholecystectomy      
Patient is a 39 Y/O Male with Pmhx of leukemia in remission, perianal abscess, cholecystectomy, kidney stone presenting with vomiting. Last night pt developed RUQ abd/R lower chest pain with vomiting and occasional back pain. Pt tachy to 140's and appears uncomfortable. Feels sob with the pain. No fever, leg swelling/pain. Pt states pain does not feel like his usual kidney stone pain.    # Abdominal pain, found to have Norovirus   - Pan CT C/A/P w/ noted, ? dissection, non-specific fluid filled small and large bowel, possible enteritis, colitis or ileus formation   - ABD Doppler negative for dissection or significant stenosis   - Reports resolution in nausea and vomiting   - Lactate down-trended to WNL   - Stool PCR + Salmonella, Norovirus  - C diff negative   - S/P 3 day course of Azithromycin   - C/w IVF for hydration. Monitor and replete electrolytes PRN   - C/w PO intake as tolerated   - As per Vascular --> No further intervention at this time   - GI and Vascular surgery evals appreciated; F/u recs      Chest pain  - Reports 1 episode of chest pain   - Likely due to aggressive vomiting however patient has a extensive family hx of HD in both side  - Planned for CT Heart  - Lipitor 40   - Cardio eval appreciated; F/u recs     Leukemia   - In remission  - Follows with arleen. Outpatient follow up       DVT and GI PPX       Discussed with Attending and ACP. 
· Assessment	  This is a 38M w/ PMH acute myeloid leukemia in remission, recurrent perianal abscess s/p fistulotomy (2023. [facility]), cholecystectomy (2022, [facility]), kidney stone presenting with acute n/v, abdominal pain.     1.salmonella/norovrius gastrorenteritis  - improving   -completed 3d of azithromycin  -diet as tolerated  -cont loperamide    2. hx of leukemia, in remission     3. s/p cholecystectomy      
Patient is a 37 Y/O Male with Pmhx of leukemia in remission, perianal abscess, cholecystectomy, kidney stone presenting with vomiting. Last night pt developed RUQ abd/R lower chest pain with vomiting and occasional back pain. Pt tachy to 140's and appears uncomfortable. Feels sob with the pain. No fever, leg swelling/pain. Pt states pain does not feel like his usual kidney stone pain.    # ABdominal pain  nausea and vomiting   abdominal pain, acute onset   mild elevation in lactate  IV hydration  Pan CT noted   SMA dissection? on CT   vacular eval called  , appreciated  abdominal doppler   oral feeding as tolerated   GI eval appreciated  check stool PCR  Check C diff  azithro IV       # Chest pain  episode of chest pain   likely due to aggressive vomiting however patient has a extensive family hx of HD in both side  card eval appreciated   may benefit from CT coronary on this admission     # Leukemia   in remission  follows with montr    DVT and gI PPX 
Patient is a 37 Y/O Male with Pmhx of leukemia in remission, perianal abscess, cholecystectomy, kidney stone presenting with vomiting. Last night pt developed RUQ abd/R lower chest pain with vomiting and occasional back pain. Pt tachy to 140's and appears uncomfortable. Feels sob with the pain. No fever, leg swelling/pain. Pt states pain does not feel like his usual kidney stone pain.    # Abdominal pain, found to have Norovirus   - Pan CT C/A/P w/ noted, ? dissection, non-specific fluid filled small and large bowel, possible enteritis, colitis or ileus formation   - ABD Doppler negative for dissection or significant stenosis   - Reports resolution in nausea and vomiting   - Lactate down-trended to WNL   - Stool PCR + Salmonella, Norovirus  - C diff negative   - S/P 3 day course of Azithromycin   - C/w IVF for hydration. Monitor and replete electrolytes PRN   - C/w PO intake as tolerated   - On Imodium as per GI. Improving. Continue to monitor  - As per Vascular --> No further intervention at this time   - GI and Vascular surgery evals appreciated; F/u recs      Chest pain  - Reports 1 episode of chest pain   - Likely due to aggressive vomiting however patient has a extensive family hx of HD in both side  - Planned for CT Heart   - Lipitor 40   - Cardio eval appreciated; F/u recs     Electrolyte dyscrasias  - Likely 2/2 GI losses  - Monitor and replete electrolytes PRN     Leukemia   - In remission  - Follows with montr. Outpatient follow up       DVT and GI PPX       Discussed with Attending and ACP. 
· Assessment	  This is a 38M w/ PMH acute myeloid leukemia in remission, recurrent perianal abscess s/p fistulotomy (2023. [facility]), cholecystectomy (2022, [facility]), kidney stone presenting with acute n/v, abdominal pain.     1.salmonella/norovrius gastrorenteritis  -complete 3d of azithromycin  -diet as tolerated  -added loperamide    2. hx of leukemia, in remisson     3. s/p cholecystectomy

## 2024-04-02 NOTE — DISCHARGE NOTE PROVIDER - NSDCFUSCHEDAPPT_GEN_ALL_CORE_FT
[facility]  [facility]  Scheduled Appointment: 04/08/2024    [facility]  [facility]  Scheduled Appointment: 04/08/2024    [facility]  [facility]  Scheduled Appointment: 06/17/2024    Chelsea Yancey  [facility]  [facility]  Scheduled Appointment: 06/17/2024

## 2024-04-02 NOTE — DISCHARGE NOTE PROVIDER - HOSPITAL COURSE
HPI:  Patient is a 39 Y/O Male with Pmhx of leukemia in remission, perianal abscess, cholecystectomy, kidney stone presenting with vomiting. Last night pt developed RUQ abd/R lower chest pain with vomiting and occasional back pain. Pt tachy to 140's and appears uncomfortable. Feels sob with the pain. No fever, leg swelling/pain. Pt states pain does not feel like his usual kidney stone pain. (29 Mar 2024 12:58)    Hospital Course: Patient is a 39 Y/O Male with pmhx of leukemia in remission, perianal abscess, cholecystectomy, kidney stone presenting with vomiting. Prior to arrival pt developed RUQ abd/R lower chest pain with vomiting and occasional back pain. Pt presented tachy to 140's and appeared uncomfortable. Endorsed sob with the pain. No fever, leg swelling/pain. Pt stated pain did not feel like his usual kidney stone pain. Admitted for further work up. Abdominal pain and diarrhea, Stool PCR + Salmonella, Norovirus. GI consulted. Lactate down-trended to WNL. C diff negative. S/P 3 day course of Azithromycin, imodium BID with improvement in stool count and now formed. S/p IVF for hydration, tolerating regular diet.  Nausea and vomiting resolved. Hypokalemia 2/2 GI losses, repleted. No need for repeat prior to discharge per attending. Pan CT C/A/P w/ noted, ? dissection, non-specific fluid filled small and large bowel, possible enteritis, colitis or ileus formation. ABD Doppler negative for dissection or significant stenosis. Vascular consulted, no further intervention at this time.      Chest pain. Reported 1 episode of chest pain. Likely due to aggressive vomiting however patient has a extensive family hx of HD in both side. EKG sinus tach no ischemic changes. Trop neg x1. Cardiology consulted. CTA heart and coronaries negative. Continue Lipitor 40.    Leukemia, In remission. Follows with arleen. Outpatient follow up.    Discharge/dispo/med rec discussed with Dr. Hudson who determined patient stable and medically cleared for discharge home with outpatient follow up for continued management and care.     Important Medication Changes and Reason:  Dx - Salmonella, Norovirus: Imodium PRN for diarrhea    Active or Pending Issues Requiring Follow-up:  F/U Dr. Hudson for continued management and care  F/U Dr. Zelaya for continued management and care    Advanced Directives:   [X ] Full code  [ ] DNR  [ ] Hospice    Discharge Diagnoses:  salmonella/norovrius gastrorenteritis  hypokalemia  chest pain       HPI:  Patient is a 39 Y/O Male with Pmhx of leukemia in remission, perianal abscess, cholecystectomy, kidney stone presenting with vomiting. Last night pt developed RUQ abd/R lower chest pain with vomiting and occasional back pain. Pt tachy to 140's and appears uncomfortable. Feels sob with the pain. No fever, leg swelling/pain. Pt states pain does not feel like his usual kidney stone pain. (29 Mar 2024 12:58)    Hospital Course: Patient is a 39 Y/O Male with pmhx of leukemia in remission, perianal abscess, cholecystectomy, kidney stone presenting with vomiting. Prior to arrival pt developed RUQ abd/R lower chest pain with vomiting and occasional back pain. Pt presented tachy to 140's and appeared uncomfortable. Endorsed sob with the pain. No fever, leg swelling/pain. Pt stated pain did not feel like his usual kidney stone pain. Admitted for further work up. Abdominal pain and diarrhea, Stool PCR + Salmonella, Norovirus. GI consulted. Lactate down-trended to WNL. C diff negative. S/P 3 day course of Azithromycin, imodium BID with improvement in stool count and now formed. S/p IVF for hydration, tolerating regular diet.  Nausea and vomiting resolved. Hypokalemia 2/2 GI losses, repleted. No need for repeat prior to discharge per attending. Pan CT C/A/P w/ noted, ? dissection, non-specific fluid filled small and large bowel, possible enteritis, colitis or ileus formation. ABD Doppler negative for dissection or significant stenosis. Vascular consulted, no further intervention at this time.      Chest pain. Reported 1 episode of chest pain. Likely due to aggressive vomiting however patient has a extensive family hx of HD in both side. EKG sinus tach no ischemic changes. Trop neg x1. Cardiology consulted. CTA heart and coronaries negative. Continue Lipitor 40.    Leukemia, In remission. Follows with arleen. Outpatient follow up.    Discharge/dispo/med rec discussed with Dr. Hudson who determined patient stable and medically cleared for discharge home with outpatient follow up for continued management and care.     Important Medication Changes and Reason:  Dx - Salmonella, Norovirus: Imodium PRN for diarrhea    Active or Pending Issues Requiring Follow-up:  F/U Dr. Hudson for continued management and care  F/U Dr. Zelaya for continued management and care    Advanced Directives:   [X ] Full code  [ ] DNR  [ ] Hospice    Discharge Diagnoses:  salmonella/norovrius gastroenteritis  hypokalemia  chest pain

## 2024-04-02 NOTE — DISCHARGE NOTE NURSING/CASE MANAGEMENT/SOCIAL WORK - PATIENT PORTAL LINK FT
You can access the FollowMyHealth Patient Portal offered by [facility] by registering at the following website: [email or web address]. By joining [facility]’s FollowMyHealth portal, you will also be able to view your health information using other applications (apps) compatible with our system.

## 2024-04-02 NOTE — DISCHARGE NOTE PROVIDER - NSDCQMSTAIRS_GEN_ALL_CORE
October 2, 2023      Tj Zavala  [address]  [place] [address]        To Whom It May Concern:    Tj Zavala  was seen on ***.  Please excuse him  until *** due to {WORK EXCUSE:694570}.        Sincerely,        Grace Ulloa MD     No

## 2024-04-02 NOTE — DISCHARGE NOTE PROVIDER - CARE PROVIDERS DIRECT ADDRESSES
,[email or web address]

## 2024-04-02 NOTE — DISCHARGE NOTE PROVIDER - CARE PROVIDER_API CALL
Martin Hudson  Internal Medicine  [address]  [place] [address]  Phone: [phone]  Fax: [phone]  Follow Up Time: 1 week    Mitul Zelaya  Cardiovascular Disease  [address], [address]  [place] [address]  Phone: [phone]  Fax: [phone]  Follow Up Time:

## 2024-04-02 NOTE — DISCHARGE NOTE PROVIDER - NSDCMRMEDTOKEN_GEN_ALL_CORE_FT
atorvastatin 40 mg oral tablet: 1 tab(s) orally once a day (in the morning)  OTCs: Multivitamin &amp; Fish Oil: once daily  sertraline 100 mg oral tablet: 1 tab(s) orally once a day (in the morning)  Wellbutrin  mg/24 hours oral tablet, extended release: 1 tab(s) orally once a day (in the morning)   atorvastatin 40 mg oral tablet: 1 tab(s) orally once a day (at bedtime)  OTCs: Multivitamin &amp; Fish Oil: once daily  sertraline 100 mg oral tablet: 1 tab(s) orally once a day (in the morning)  Wellbutrin  mg/24 hours oral tablet, extended release: 1 tab(s) orally once a day (in the morning)

## 2024-04-08 ENCOUNTER — RESULT REVIEW (OUTPATIENT)
Age: 39
End: 2024-04-08

## 2024-04-08 ENCOUNTER — APPOINTMENT (OUTPATIENT)
Dept: HEMATOLOGY ONCOLOGY | Facility: CLINIC | Age: 39
End: 2024-04-08
Payer: MEDICARE

## 2024-04-08 ENCOUNTER — APPOINTMENT (OUTPATIENT)
Dept: HEMATOLOGY ONCOLOGY | Facility: CLINIC | Age: 39
End: 2024-04-08

## 2024-04-08 ENCOUNTER — LABORATORY RESULT (OUTPATIENT)
Age: 39
End: 2024-04-08

## 2024-04-08 VITALS
DIASTOLIC BLOOD PRESSURE: 79 MMHG | OXYGEN SATURATION: 98 % | SYSTOLIC BLOOD PRESSURE: 127 MMHG | TEMPERATURE: 98.4 F | WEIGHT: 195 LBS | HEART RATE: 94 BPM | RESPIRATION RATE: 16 BRPM | HEIGHT: 71 IN | BODY MASS INDEX: 27.3 KG/M2

## 2024-04-08 LAB
BASOPHILS # BLD AUTO: 0.01 K/UL — SIGNIFICANT CHANGE UP (ref 0–0.2)
BASOPHILS NFR BLD AUTO: 0.2 % — SIGNIFICANT CHANGE UP (ref 0–2)
EOSINOPHIL # BLD AUTO: 0.03 K/UL — SIGNIFICANT CHANGE UP (ref 0–0.5)
EOSINOPHIL NFR BLD AUTO: 0.5 % — SIGNIFICANT CHANGE UP (ref 0–6)
HCT VFR BLD CALC: 39.3 % — SIGNIFICANT CHANGE UP (ref 39–50)
HGB BLD-MCNC: 13.7 G/DL — SIGNIFICANT CHANGE UP (ref 13–17)
IMM GRANULOCYTES NFR BLD AUTO: 0.5 % — SIGNIFICANT CHANGE UP (ref 0–0.9)
LYMPHOCYTES # BLD AUTO: 1.57 K/UL — SIGNIFICANT CHANGE UP (ref 1–3.3)
LYMPHOCYTES # BLD AUTO: 26.8 % — SIGNIFICANT CHANGE UP (ref 13–44)
MCHC RBC-ENTMCNC: 31.5 PG — SIGNIFICANT CHANGE UP (ref 27–34)
MCHC RBC-ENTMCNC: 34.9 G/DL — SIGNIFICANT CHANGE UP (ref 32–36)
MCV RBC AUTO: 90.3 FL — SIGNIFICANT CHANGE UP (ref 80–100)
MONOCYTES # BLD AUTO: 0.37 K/UL — SIGNIFICANT CHANGE UP (ref 0–0.9)
MONOCYTES NFR BLD AUTO: 6.3 % — SIGNIFICANT CHANGE UP (ref 2–14)
NEUTROPHILS # BLD AUTO: 3.84 K/UL — SIGNIFICANT CHANGE UP (ref 1.8–7.4)
NEUTROPHILS NFR BLD AUTO: 65.7 % — SIGNIFICANT CHANGE UP (ref 43–77)
NRBC # BLD: 0 /100 WBCS — SIGNIFICANT CHANGE UP (ref 0–0)
PLATELET # BLD AUTO: 223 K/UL — SIGNIFICANT CHANGE UP (ref 150–400)
RBC # BLD: 4.35 M/UL — SIGNIFICANT CHANGE UP (ref 4.2–5.8)
RBC # FLD: 13.1 % — SIGNIFICANT CHANGE UP (ref 10.3–14.5)
WBC # BLD: 5.85 K/UL — SIGNIFICANT CHANGE UP (ref 3.8–10.5)
WBC # FLD AUTO: 5.85 K/UL — SIGNIFICANT CHANGE UP (ref 3.8–10.5)

## 2024-04-08 PROCEDURE — 99214 OFFICE O/P EST MOD 30 MIN: CPT

## 2024-04-08 PROCEDURE — G2211 COMPLEX E/M VISIT ADD ON: CPT

## 2024-04-09 LAB
ALBUMIN SERPL ELPH-MCNC: 4.9 G/DL
ALP BLD-CCNC: 92 U/L
ALT SERPL-CCNC: 107 U/L
ANION GAP SERPL CALC-SCNC: 14 MMOL/L
AST SERPL-CCNC: 41 U/L
BILIRUB SERPL-MCNC: 0.7 MG/DL
BUN SERPL-MCNC: 16 MG/DL
CALCIUM SERPL-MCNC: 10 MG/DL
CHLORIDE SERPL-SCNC: 103 MMOL/L
CO2 SERPL-SCNC: 26 MMOL/L
CREAT SERPL-MCNC: 1.15 MG/DL
EGFR: 84 ML/MIN/1.73M2
GLUCOSE SERPL-MCNC: 93 MG/DL
LDH SERPL-CCNC: 175 U/L
POTASSIUM SERPL-SCNC: 3.8 MMOL/L
PROT SERPL-MCNC: 7.1 G/DL
SODIUM SERPL-SCNC: 142 MMOL/L

## 2024-04-12 NOTE — ASSESSMENT
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 Started induction with Dauno/Cytarabine on 8/6/21. Pt's counts now recovered, remission marrow done on 9/2- in remission. Proceed to consolidation with 4 cycles of HIDAC. C1 HIDAC on 9/17, c/b neutropenic fever and diarrhea. C2 HIDAC on 10/25, was postponed for 1 week due to admission for diarrhea, given negative stool studies, suspect diarrhea was likely chemo-related. Pt was admitted again 11/13-11/17 for sepsis due to thumb cellulitis after laceration. C3 on 11/26, had Fulphilia on C3 c/b epistaxis and neutropenic fever w/Temp 100.3. C4 HiDAC 1/5/22, c/b transaminitis and neutropenic fever He had laparoscopic cholecystostomy on 4/17. s/p BMbx 2/11/22 showing CR. He has non-necrotizing granulomas noted in BMbx, unclear significance CBC today stable w/ slightly thrombocytopenia -results reviewed with pt;   He was admitted last week due to Salmonella, Norovirus gastritis, resolved now.  NPM1Q to [facility] lab done in 12/2023: negative, will monitor every 3 months, ordered today.  Discussed supportive groups to help with anxiety of cancer relapse -now on Zoloft and Wellbutrin which helping cont f/u with Dr. Molina for low testosterone levels, he started taking testosterone end of April. s/p fistulotomy on 10/20/23 All questions answered RTC 6 wks and then every 3 mo  Case and management discussed with Dr. Yancey

## 2024-04-12 NOTE — PHYSICAL EXAM
[Fully active, able to carry on all pre-disease performance without restriction] : Status 0 - Fully active, able to carry on all pre-disease performance without restriction [Normal] : affect appropriate [de-identified] : a 1 cm long laceration with sutures on the right thrum, healing well

## 2024-04-12 NOTE — HISTORY OF PRESENT ILLNESS
[de-identified] : 37yo M w/ HTN and newly diagnosed AML here for f/u.   He presented to the hospital on 7/30/21 with complaints of dizziness, fatigue and severe RUQ pain for 3 days. CT A/P revealed fatty liver and small R pleural effusion. WBC 12k with 17% blasts. Peripheral flow cytometry showed 13% myeloblasts. FLT3(-). BMbx was done on 8/4/21 - confirmed AML. 46,XY                                {20                                       } Foundation: mutations in DNMT3A R882H, NRAS G12D, NPM1 W288fs*12 Pt consented to [place]. Patient was offered sperm banking, but declined. On 8/6, induction with Dauno/Cytararbine was started (cytarabine 100/m2 and dauno 90/m2)  He received a seven day course of Zosyn for presumed RUL PNA, then transitioned to  levaquin, posaconazole and Acyclovir for ppx for neutropenia. Course c/b neutropenic fevers, cultures negative, repeat CT 8/14 without infectious source. He was on Cefepime but developed a rash, changed to meropenem. Rash improved.  Day 14 BMbx on 8/19 was hypocellular with chemotherapeutic effect; however, per hematopathology, appears to be earlier regeneration than would typically seen which is concerning for persistent disease at this point, and the earliest cells are CD34 positive and his myeloblasts on initial presentation were CD34 negative. Thus, this may simply represent early regeneration of his marrow. Plan is to await count recovery and repeat biopsy.   c/o hemorrhoidal pain. Hemorrhoids started a few days prior to discharge. He was sent home with rectal ointment and witch hazel.  Patient discharged home on 8/29/21 when ANC >500 [de-identified] : BMBx done on 9/2/221: Cellular bone marrow with trilineage hematopoiesis with maturation and megakaryocytosis (history of AML). Pt feels well, CBC today showed count stable  s/p C1 HIDAC 9/17-9/22  c/o leg pain after chemo in the hospital   He presented to the ED on 10/9 due to fever the night of presentation. The patient went to sleep around 10pm and woke up at 3am feeling warm and clammy. He took his temperature orally at home and it was 100.7. The day prior to presentation (10/8/21), the patient went to [facility] for an appointment to get his platelet count checked. He states he was feeling a bit run down and thought he was going to need a transfusion, but he did not need a transfusion. He was afebrile during the time of the appointment and went home afterwards. Around 3pm, the patient had bilateral crampy leg pain that was similar to the leg pain he felt during his consolidation therapy treatment. He took hydrocodone and the pain improved. The patient had one episode of diarrhea three days prior to presentation and has been bothered by rectal pain associated with his "large hemorrhoids. He denies any bleeding per rectum. He also feels like his mouth has been more dry than usual over the past several days, but denies all other symptoms.  CT Abdomen and Pelvis w/ Oral Cont and w/ IV Conttrast on 10/9 revealed Region of hypoenhancement upper pole left kidney; please correlate clinically for possible pyelonephritis. No hydronephrosis or perinephric stranding. No rectal abscess. CT Chest No Contrast on 10/9 revealed Clear lungs. 10/9- BCX NGTD and 10/9- UCX (-) He ate some cold salad late last night, had upsetting stomach and diarrhea this morning. Will take Imodium for diarrhea.   C2 is due on 10/15, pt wants deferring to next Monday after his diarrhea improved.  Admission of  C2 was postponed due to worsening diarrhea. Went to ED on 10/15 due to worsening watery diarrhea. GI PCR neg, C Diff neg Given negative stool studies, suspect diarrhea was likely chemo-related. S/p cycle 2 Consolidation with HIDAC started on 10/25. Patient received IV hydration, strict I/O, antiemetics, monitoring of CBC and CMP and for cerebellar toxicity. PRedforte eye drops given to prevent chemical conjunctivitis. Patient with fever throughout course of treatment. Cultures negative. Due to fever probably related to HIDAC, patient received dexamethasone prior to the last 2 doses of cytatabine.  He is seen today accompanied by his father, pt feels fatigue after chemo, other than that he feels fine. Denied any fever, chills diarrhea.   Pt was admitted on 11/13-11/17 s/p right first digit laceration repair on 11/12 and presenting with erythema and pain at the site of laceration repair. Patient reported he was feeling unwell prior to suture placement with body aches, chills, night sweats and subjective fevers. Patient last BM 4 days ago. Has bruise to left inner thigh. Patient reports he keeps his PICC site clean and intact which was placed in 9/2021. Upon admission to the hospital ID was consulted started on Zosyn , GI consulted for rectal pain secondary to hemorrhoids and palliative consulted for pain control.   C3 on 11/26, tolerated well. He was seen today after discharge, accompanied by his father. He admitted feeling tired, denied any f/c, diarrhea. Right hand suture site healing well, no abnormal erythema or discharge.  He will have Fulphilia today.  C3 HIDAC 11/26-12/1 He presents to the hospital due to epistaxis and neutropenic fever w/Temp 100.3 on 12/1. Per patient, he reports that he was feeling sinus congestion and pressure on his L side, blew his nose and bleeding began from L nare without improvement prompting arrival to the emergency department. Feels mild L sinus congestion.  L nasal cavity packed with absorbable packing; F/U ENT clinic outpatient. Pan culture obtained-with No growth currently CBC rechecked today recovered. He feels well overall , had lower abdominal pain last night after ate cheese, now improved. Denied any fever chills bloody stool or diarrhea.   s/p C4 HiDAC 1/5/22 Pt has known grade 1 AST and grade 3 ALT transaminitis. Presented this admission with transamintitis. Abd sono  performed and revealed Borderline/mild hepatomegaly with hepatic steatosis. Splenomegaly. Focal area of left upper pole increased renal cortical echogenicity, corresponding to an area of hypoattenuation seen on prior CT chest,  abdomen and pelvis dated 10/9/2021. This is nonspecific and may reflect focal edema. Patient received IV hydration, antiemetics, monitoring of CBC and electrolytes. Predforte eye drops provided to prevent chemical conjunctivitis. Patient was monitored for cerebellar toxicity. Nystagmus checks performed. Hospital course complicated by fever blood cultures showed (-), most likely febrile secondary to HIDAC treatment. To receive Fulphila today.  Pt presented to the hospital on 1/18/22 due fever of 100.4, weakness, decreased WBC and shortness of breath while at home. Patient reports that his pain is more controlled s/p pain medication and his shortness of breath has resolved. He denied chills, cough, chest pain, palpitations. Pan culture (blood +Ucx) were negative, CXR reveals clear lungs, COVID PCR - not detect. Pt started on empiric Zosyn, Diflucan and Acyclovir added prophylactically for neutropenic fever. He was transfused with platelets and PRBC as per supportive parameters (>10k/>7) respectively. He feels well overall now, denied any fever, chills or pain.   BMbx 2/11/22: Cellular bone marrow with erythroid predominant trilineage hematopoiesis No morphologic or immunophenotypic evidence of persistent acute myeloid leukemia Non-necrotizing granulomas Normal male karyotype and normal onkosit myeloid NGS panel study.  He was admitted for back pain 2/2 herniated disk - Rx'd pain meds and a Medrol pack.   Pt went to ED due to acute RUQ pain on 4/16, patient underwent laparoscopic cholecystotomy on 4/17.   He reports feeling well. He reports having severe anxiety when he sees any bruising or gum bleeding. He has seen a therapist but would like to see someone else.  Saw psych Dr. Lozano on 7/18 TEB He is getting PT for his back pain.  He occasionally has some gum bleeding still.   Pt was seen today accompanied by his father. He feels well, eating healthy food and doing exercise. Will go back to work this month.  He f/u w/psych Dr. Lozano monthly, currently is on Zoloft 100mg dialy, and Zolpidem aHS prn for insomnia Denied any new complaints.  Rapid test at home showed COVID 19 positive on last Wed 10/12. Patient's father was tested positive on Friday as well. He had scratch throat, fatigue and low grade fever. Pt didn't receive any COVID 19 meds, admitted he felt better, only slight fatigue, denied fever/chill/SOB.  CBC today stable.   NPM1Q to [facility] lab on 10/19: negative He went to ED on 11/19 for rectal abscess, I&D done in the ED.   He is doing well. No new complaints.  He is not back to work yet.  Last NPM1Q was checked 1/12/23: negative  3/9/23: PAtient is here today for follow up. Reports to be doing well. He does state that he continues to have some loose stools for which he will be following up with Gi for. He also reports that he has been having back pain. He was previously on PT and will be returning to PT now that insurance issues are resolved. He reports to be having a low sex drive and noticing that his testicles are shrunken.   5/8: pt was seen today for a f/u apt accompanied by his father. pt f/u w/ Uro Dr. Molina and started testosterone 2 wks ago for low testosterone level.  He is doing well otherwise, denied any complaints.   7/31: Having worsening back pain radiating down legs b/l. He has appointment with spine specialist on Fri.  9/11: pt followed up w/orth spine specialist Dr. Solis last month for worsening back pain, L spine MR done 8/9/23: Multilevel degenerative changes throughout the lumbar spine. Back pain improved with PT.  He will have anal fissure and hemorrhoid surgery, waiting for apt to be scheduled.   11/6/23: s/p fistulotomy on 10/20/23.  Doing well.  12/22:  Patient is seen today for a f/u apt accompanied by his father. He feels well overall. Denied any new complaints.  HR elevated today 115 bpm after he drank [employer] coffee.   2/5: He is going back to work next month.  4/8:  Patient is seen today for a f/u apt accompanied by his father. s/p admitted in [facility] on 3/29 for RUQ abd/R lower chest pain with vomiting and occasional back pain, found had Salmonella, Norovirus gastritis. Pan CT C/A/P w/ noted, ?dissection, non-specific fluid filled small and large bowel, possible enteritis, colitis or ileus formation; ABD Doppler negative for dissection or significant stenosis.  Vomiting and diarrhea resolved after a course of antibiotics.  He lost a few Lbs after hospitalization.  CBC today WNL. Denied any nausea, vomiting or diarrhea since discharged.

## 2024-04-23 NOTE — PROGRESS NOTE ADULT - PROBLEM SELECTOR PROBLEM 2
46YM c/c of GI bleed Pt state that he been having GI bleed and abd pain for the last few days Pt state that he been having dry heaving   
Infectious disease

## 2024-05-06 NOTE — H&P ADULT - NSRESEARCHGRANT_MLMHIDDEN_GEN_A_CORE
Assumed pt care at 1930  Aox4, RA, VSS  Tele-NSR  Lovenox DVT Prophylaxis  Regular diet  Noncardiac electrolyte replacement protocol  Continent  ambulatory  Safety precautions in place  Bed in lowest position and call light within reach  Updated with plan of care  All needs met at this time  Will continue plan of care              Problem: METABOLIC/FLUID AND ELECTROLYTES - ADULT  Goal: Hemodynamic stability and optimal renal function maintained  Description: INTERVENTIONS:  - Monitor labs and assess for signs and symptoms of volume excess or deficit  - Monitor intake, output and patient weight  - Monitor urine specific gravity, serum osmolarity and serum sodium as indicated or ordered  - Monitor response to interventions for patient's volume status, including labs, urine output, blood pressure (other measures as available)  - Encourage oral intake as appropriate  - Instruct patient on fluid and nutrition restrictions as appropriate  5/5/2024 2300 by Princess Yolanda Maldonado RN  Outcome: Progressing  5/5/2024 2259 by Princess Yolanda Maldonado RN  Outcome: Progressing     Problem: NEUROLOGICAL - ADULT  Goal: Achieves stable or improved neurological status  Description: INTERVENTIONS  - Assess for and report changes in neurological status  - Initiate measures to prevent increased intracranial pressure  - Maintain blood pressure and fluid volume within ordered parameters to optimize cerebral perfusion and minimize risk of hemorrhage  - Monitor temperature, glucose, and sodium. Initiate appropriate interventions as ordered  5/5/2024 2300 by Princess Yolanda Maldonado RN  Outcome: Progressing  5/5/2024 2259 by Princess Yolanda Maldonado RN  Outcome: Progressing     Problem: SAFETY ADULT - FALL  Goal: Free from fall injury  Description: INTERVENTIONS:  - Assess pt frequently for physical needs  - Identify cognitive and physical deficits and behaviors that affect risk of falls.  - [place] fall precautions as indicated by assessment.  -  Educate pt/family on patient safety including physical limitations  - Instruct pt to call for assistance with activity based on assessment  - Modify environment to reduce risk of injury  - Provide assistive devices as appropriate  - Consider OT/PT consult to assist with strengthening/mobility  - Encourage toileting schedule  5/5/2024 2300 by Princess Yolanda Maldonado, RN  Outcome: Progressing  5/5/2024 2259 by Princess Yolanda Maldonado, RN  Outcome: Progressing     Problem: DISCHARGE PLANNING  Goal: Discharge to home or other facility with appropriate resources  Description: INTERVENTIONS:  - Identify barriers to discharge w/pt and caregiver  - Include patient/family/discharge partner in discharge planning  - Arrange for needed discharge resources and transportation as appropriate  - Identify discharge learning needs (meds, wound care, etc)  - Arrange for interpreters to assist at discharge as needed  - Consider post-discharge preferences of patient/family/discharge partner  - Complete POLST form as appropriate  - Assess patient's ability to be responsible for managing their own health  - Refer to Case Management Department for coordinating discharge planning if the patient needs post-hospital services based on physician/LIP order or complex needs related to functional status, cognitive ability or social support system  5/5/2024 2300 by Princess Yolanda Maldonado, RN  Outcome: Progressing  5/5/2024 2259 by Princess Yolanda Maldonado, RN  Outcome: Progressing      yes

## 2024-05-09 ENCOUNTER — TRANSCRIPTION ENCOUNTER (OUTPATIENT)
Age: 39
End: 2024-05-09

## 2024-05-15 ENCOUNTER — APPOINTMENT (OUTPATIENT)
Dept: COLORECTAL SURGERY | Facility: CLINIC | Age: 39
End: 2024-05-15
Payer: COMMERCIAL

## 2024-05-15 DIAGNOSIS — R10.9 UNSPECIFIED ABDOMINAL PAIN: ICD-10-CM

## 2024-05-15 DIAGNOSIS — K64.9 UNSPECIFIED HEMORRHOIDS: ICD-10-CM

## 2024-05-15 PROCEDURE — 46600 DIAGNOSTIC ANOSCOPY SPX: CPT

## 2024-05-15 PROCEDURE — 99213 OFFICE O/P EST LOW 20 MIN: CPT | Mod: 25

## 2024-05-15 RX ORDER — HYDROCORTISONE 25 MG/G
2.5 CREAM TOPICAL
Qty: 1 | Refills: 3 | Status: ACTIVE | COMMUNITY
Start: 2024-05-15 | End: 1900-01-01

## 2024-05-15 NOTE — PHYSICAL EXAM
[Abdomen Masses] : No abdominal masses [Normal rectal exam] : exam was normal [None] : no anal fissures seen [Excoriation] : no perianal excoriation [Multiple Sinus Tracts] : no perianal sinus tracts [Fistula] : no fistulas [Wart] : no warts [Ulcer ___ cm] : no ulcers [Pilonidal Cyst] : no pilonidal cysts [Pilonidal Sinus] : no pilonidal sinus [Pilonidal Sinus Draining] : no pilonidal sinus drainage [Nonprolapsing] : a nonprolapsing (grade I) [Tender, Swollen] : nontender, non-swollen [Thrombosed] : that was not thrombosed [Skin Tags] : residual hemorrhoidal skin tags were noted [Normal] : was normal [JVD] : no jugular venous distention  [Normal Breath Sounds] : Normal breath sounds [Wheezing] : no wheezing was heard [Normal Heart Sounds] : normal heart sounds [Normal Rate and Rhythm] : normal rate and rhythm [No Rash or Lesion] : No rash or lesion [Purpura] : no purpura  [Petechiae] : no petechiae [Skin Ulcer] : no ulcer [Skin Induration] : no induration [Alert] : alert [Oriented to Person] : oriented to person [Oriented to Place] : oriented to place [Oriented to Time] : oriented to time [Anxious] : anxious [de-identified] : benign [de-identified] : interneal hemorrhoids are inflamed, no evidence of recurrent fistula/fissure.  Mucous discharge. [de-identified] : NAD, anxious [de-identified] : VIVI  [de-identified] : FROM

## 2024-05-15 NOTE — REVIEW OF SYSTEMS
[Fever] : no fever [As Noted in HPI] : as noted in HPI [Abdominal Pain] : abdominal pain [Diarrhea] : diarrhea [Melena] : meljose [Negative] : Heme/Lymph

## 2024-05-15 NOTE — HISTORY OF PRESENT ILLNESS
[FreeTextEntry1] : 30-year-old male with a history of AML in remission complaints of 2 weeks of increasing frequency of bowel movements and mucous discharge from his rectum. He sees blood on the toilet tissue intermittently. He is scheduled to have an upper and lower endoscopy in 2023 canceled because he was sick.

## 2024-05-15 NOTE — ASSESSMENT
[FreeTextEntry1] : 38-year-old male with history of AML and anal fistula presents with 2 weeks of mucous filled diarrhea and blood on toilet tissue. His hemorrhoids appear to be irritated from the diarrhea. He was supposed to undergo colonoscopy last year but canceled.   Plan: Sitz baths, Proctosol cream 3 times daily, follow up with gastroenterology to schedule for lower endoscopy, ffollow up with me as needed

## 2024-06-06 ENCOUNTER — APPOINTMENT (OUTPATIENT)
Dept: UROLOGY | Facility: CLINIC | Age: 39
End: 2024-06-06
Payer: COMMERCIAL

## 2024-06-06 VITALS
TEMPERATURE: 98.3 F | RESPIRATION RATE: 17 BRPM | WEIGHT: 210 LBS | DIASTOLIC BLOOD PRESSURE: 73 MMHG | BODY MASS INDEX: 29.4 KG/M2 | HEART RATE: 95 BPM | SYSTOLIC BLOOD PRESSURE: 124 MMHG | HEIGHT: 71 IN

## 2024-06-06 DIAGNOSIS — Q55.22 RETRACTILE TESTIS: ICD-10-CM

## 2024-06-06 DIAGNOSIS — E29.1 TESTICULAR HYPOFUNCTION: ICD-10-CM

## 2024-06-06 DIAGNOSIS — R68.82 DECREASED LIBIDO: ICD-10-CM

## 2024-06-06 PROCEDURE — 99214 OFFICE O/P EST MOD 30 MIN: CPT

## 2024-06-06 RX ORDER — TADALAFIL 5 MG/1
5 TABLET ORAL
Qty: 90 | Refills: 3 | Status: ACTIVE | COMMUNITY
Start: 2023-04-14 | End: 1900-01-01

## 2024-06-06 RX ORDER — OXYCODONE HYDROCHLORIDE 5 MG/1
5 CAPSULE ORAL
Refills: 0 | Status: COMPLETED | COMMUNITY
End: 2024-06-06

## 2024-06-06 NOTE — HISTORY OF PRESENT ILLNESS
[FreeTextEntry1] : JAK DANIELS, a 38-year-old male, presented to the office for a follow up regarding his erectile dysfunction, low T, and retractile testis.  Patient advised he is feeling so much better; libido marked increased. Energy has increased. Able to complete sexual intercourse and maintain erections. Patient is unhappy with topical TRT because it dries out his skin and he is now prone to rashes.   Morning erections present.  Able to complete intercourse.

## 2024-06-06 NOTE — ASSESSMENT
[FreeTextEntry1] : ED -patient taking 5mg Cialis daily -morning erections present -able to complete sexual intercourse -renewed script  Primary hypogonadism -check labs: CBC, FRET -Patient wants to try Xyosted 50mg SQ - topical treatments have been causing rashes and skin lesions - prior auth started for Xyosted 50 mg.   Patient wants to test semen. Printed script.

## 2024-06-06 NOTE — PHYSICAL EXAM
[General Appearance - Well Developed] : well developed [Normal Appearance] : normal appearance [] : no respiratory distress [Normal Station and Gait] : the gait and station were normal for the patient's age [No Focal Deficits] : no focal deficits [Oriented To Time, Place, And Person] : oriented to person, place, and time [Affect] : the affect was normal [Mood] : the mood was normal [Not Anxious] : not anxious [de-identified] :  Deferred [de-identified] :  Deferred [de-identified] :  Deferred [de-identified] : Atopic dermatitis on bilateral upper extremities in areas where TRT applied [de-identified] :  Deferred

## 2024-06-07 LAB
ALBUMIN SERPL ELPH-MCNC: 4.9 G/DL
ALP BLD-CCNC: 95 U/L
ALT SERPL-CCNC: 75 U/L
ANION GAP SERPL CALC-SCNC: 14 MMOL/L
AST SERPL-CCNC: 37 U/L
BILIRUB SERPL-MCNC: 0.5 MG/DL
BUN SERPL-MCNC: 27 MG/DL
CALCIUM SERPL-MCNC: 9.8 MG/DL
CHLORIDE SERPL-SCNC: 103 MMOL/L
CO2 SERPL-SCNC: 23 MMOL/L
CREAT SERPL-MCNC: 1.15 MG/DL
EGFR: 84 ML/MIN/1.73M2
ESTRADIOL SERPL-MCNC: 14 PG/ML
FSH SERPL-MCNC: 1.7 IU/L
GLUCOSE SERPL-MCNC: 90 MG/DL
HCT VFR BLD CALC: 41.5 %
HGB BLD-MCNC: 13.9 G/DL
MCHC RBC-ENTMCNC: 31.8 PG
MCHC RBC-ENTMCNC: 33.5 GM/DL
MCV RBC AUTO: 95 FL
PLATELET # BLD AUTO: 195 K/UL
POTASSIUM SERPL-SCNC: 4.6 MMOL/L
PROLACTIN SERPL-MCNC: 9.7 NG/ML
PROT SERPL-MCNC: 7.5 G/DL
RBC # BLD: 4.37 M/UL
RBC # FLD: 13.1 %
SHBG SERPL-SCNC: 21.8 NMOL/L
SODIUM SERPL-SCNC: 140 MMOL/L
WBC # FLD AUTO: 4.26 K/UL

## 2024-06-08 LAB
TESTOST FREE SERPL-MCNC: 6.7 PG/ML
TESTOST SERPL-MCNC: 241 NG/DL

## 2024-06-13 ENCOUNTER — NON-APPOINTMENT (OUTPATIENT)
Age: 39
End: 2024-06-13

## 2024-06-14 ENCOUNTER — OUTPATIENT (OUTPATIENT)
Dept: OUTPATIENT SERVICES | Facility: HOSPITAL | Age: 39
LOS: 1 days | Discharge: HOME | End: 2024-06-14

## 2024-06-14 DIAGNOSIS — Z90.49 ACQUIRED ABSENCE OF OTHER SPECIFIED PARTS OF DIGESTIVE TRACT: Chronic | ICD-10-CM

## 2024-06-14 DIAGNOSIS — Z98.890 OTHER SPECIFIED POSTPROCEDURAL STATES: Chronic | ICD-10-CM

## 2024-06-14 DIAGNOSIS — C92.00 ACUTE MYELOBLASTIC LEUKEMIA, NOT HAVING ACHIEVED REMISSION: ICD-10-CM

## 2024-06-17 ENCOUNTER — RESULT REVIEW (OUTPATIENT)
Age: 39
End: 2024-06-17

## 2024-06-17 ENCOUNTER — APPOINTMENT (OUTPATIENT)
Dept: HEMATOLOGY ONCOLOGY | Facility: CLINIC | Age: 39
End: 2024-06-17
Payer: MEDICARE

## 2024-06-17 VITALS
TEMPERATURE: 97.7 F | HEART RATE: 77 BPM | RESPIRATION RATE: 16 BRPM | BODY MASS INDEX: 29.21 KG/M2 | DIASTOLIC BLOOD PRESSURE: 82 MMHG | WEIGHT: 209.44 LBS | SYSTOLIC BLOOD PRESSURE: 120 MMHG | OXYGEN SATURATION: 95 %

## 2024-06-17 DIAGNOSIS — C92.01 ACUTE MYELOBLASTIC LEUKEMIA, IN REMISSION: ICD-10-CM

## 2024-06-17 LAB
BASOPHILS # BLD AUTO: 0.03 K/UL — SIGNIFICANT CHANGE UP (ref 0–0.2)
BASOPHILS NFR BLD AUTO: 0.6 % — SIGNIFICANT CHANGE UP (ref 0–2)
EOSINOPHIL # BLD AUTO: 0.04 K/UL — SIGNIFICANT CHANGE UP (ref 0–0.5)
EOSINOPHIL NFR BLD AUTO: 0.8 % — SIGNIFICANT CHANGE UP (ref 0–6)
HCT VFR BLD CALC: 42 % — SIGNIFICANT CHANGE UP (ref 39–50)
HGB BLD-MCNC: 14.5 G/DL — SIGNIFICANT CHANGE UP (ref 13–17)
IMM GRANULOCYTES NFR BLD AUTO: 0.2 % — SIGNIFICANT CHANGE UP (ref 0–0.9)
LYMPHOCYTES # BLD AUTO: 1.66 K/UL — SIGNIFICANT CHANGE UP (ref 1–3.3)
LYMPHOCYTES # BLD AUTO: 31.6 % — SIGNIFICANT CHANGE UP (ref 13–44)
MCHC RBC-ENTMCNC: 32.3 PG — SIGNIFICANT CHANGE UP (ref 27–34)
MCHC RBC-ENTMCNC: 34.5 G/DL — SIGNIFICANT CHANGE UP (ref 32–36)
MCV RBC AUTO: 93.5 FL — SIGNIFICANT CHANGE UP (ref 80–100)
MONOCYTES # BLD AUTO: 0.39 K/UL — SIGNIFICANT CHANGE UP (ref 0–0.9)
MONOCYTES NFR BLD AUTO: 7.4 % — SIGNIFICANT CHANGE UP (ref 2–14)
NEUTROPHILS # BLD AUTO: 3.13 K/UL — SIGNIFICANT CHANGE UP (ref 1.8–7.4)
NEUTROPHILS NFR BLD AUTO: 59.4 % — SIGNIFICANT CHANGE UP (ref 43–77)
NRBC # BLD: 0 /100 WBCS — SIGNIFICANT CHANGE UP (ref 0–0)
PLATELET # BLD AUTO: 159 K/UL — SIGNIFICANT CHANGE UP (ref 150–400)
RBC # BLD: 4.49 M/UL — SIGNIFICANT CHANGE UP (ref 4.2–5.8)
RBC # FLD: 12.3 % — SIGNIFICANT CHANGE UP (ref 10.3–14.5)
WBC # BLD: 5.26 K/UL — SIGNIFICANT CHANGE UP (ref 3.8–10.5)
WBC # FLD AUTO: 5.26 K/UL — SIGNIFICANT CHANGE UP (ref 3.8–10.5)

## 2024-06-17 PROCEDURE — G2211 COMPLEX E/M VISIT ADD ON: CPT

## 2024-06-17 PROCEDURE — 99213 OFFICE O/P EST LOW 20 MIN: CPT

## 2024-06-17 RX ORDER — TESTOSTERONE 25 MG/2.5G
25 MG/2.5GM GEL TRANSDERMAL
Qty: 12 | Refills: 0 | Status: DISCONTINUED | COMMUNITY
Start: 2023-04-18 | End: 2024-06-17

## 2024-06-17 NOTE — PHYSICAL EXAM
[Fully active, able to carry on all pre-disease performance without restriction] : Status 0 - Fully active, able to carry on all pre-disease performance without restriction [Normal] : affect appropriate [de-identified] : a 1 cm long laceration with sutures on the right thrum, healing well

## 2024-06-17 NOTE — HISTORY OF PRESENT ILLNESS
[de-identified] : 37yo M w/ HTN and newly diagnosed AML here for f/u.   He presented to the hospital on 7/30/21 with complaints of dizziness, fatigue and severe RUQ pain for 3 days. CT A/P revealed fatty liver and small R pleural effusion. WBC 12k with 17% blasts. Peripheral flow cytometry showed 13% myeloblasts. FLT3(-). BMbx was done on 8/4/21 - confirmed AML. 46,XY                               {20                                     \} Foundation: mutations in DNMT3A R882H, NRAS G12D, NPM1 W288fs*12 Pt consented to [place]. Patient was offered sperm banking, but declined. On 8/6, induction with Dauno/Cytararbine was started (cytarabine 100/m2 and dauno 90/m2)  He received a seven day course of Zosyn for presumed RUL PNA, then transitioned to  levaquin, posaconazole and Acyclovir for ppx for neutropenia. Course c/b neutropenic fevers, cultures negative, repeat CT 8/14 without infectious source. He was on Cefepime but developed a rash, changed to meropenem. Rash improved.  Day 14 BMbx on 8/19 was hypocellular with chemotherapeutic effect; however, per hematopathology, appears to be earlier regeneration than would typically seen which is concerning for persistent disease at this point, and the earliest cells are CD34 positive and his myeloblasts on initial presentation were CD34 negative. Thus, this may simply represent early regeneration of his marrow. Plan is to await count recovery and repeat biopsy.   c/o hemorrhoidal pain. Hemorrhoids started a few days prior to discharge. He was sent home with rectal ointment and witch hazel.  Patient discharged home on 8/29/21 when ANC >500 [de-identified] : BMBx done on 9/2/2021: Cellular bone marrow with trilineage hematopoiesis with maturation and megakaryocytosis (history of AML). Pt feels well, CBC today showed count stable  s/p C1 HIDAC 9/17-9/22  c/o leg pain after chemo in the hospital   He presented to the ED on 10/9 due to fever the night of presentation. The patient went to sleep around 10pm and woke up at 3am feeling warm and clammy. He took his temperature orally at home and it was 100.7. The day prior to presentation (10/8/21), the patient went to [facility] for an appointment to get his platelet count checked. He states he was feeling a bit run down and thought he was going to need a transfusion, but he did not need a transfusion. He was afebrile during the time of the appointment and went home afterwards. Around 3pm, the patient had bilateral crampy leg pain that was similar to the leg pain he felt during his consolidation therapy treatment. He took hydrocodone and the pain improved. The patient had one episode of diarrhea three days prior to presentation and has been bothered by rectal pain associated with his "large hemorrhoids. He denies any bleeding per rectum. He also feels like his mouth has been more dry than usual over the past several days, but denies all other symptoms.  CT Abdomen and Pelvis w/ Oral Cont and w/ IV Conttrast on 10/9 revealed Region of hypoenhancement upper pole left kidney; please correlate clinically for possible pyelonephritis. No hydronephrosis or perinephric stranding. No rectal abscess. CT Chest No Contrast on 10/9 revealed Clear lungs. 10/9- BCX NGTD and 10/9- UCX (-) He ate some cold salad late last night, had upsetting stomach and diarrhea this morning. Will take Imodium for diarrhea.   C2 is due on 10/15, pt wants deferring to next Monday after his diarrhea improved.  Admission of  C2 was postponed due to worsening diarrhea. Went to ED on 10/15 due to worsening watery diarrhea. GI PCR neg, C Diff neg Given negative stool studies, suspect diarrhea was likely chemo-related. S/p cycle 2 Consolidation with HIDAC started on 10/25. Patient received IV hydration, strict I/O, antiemetics, monitoring of CBC and CMP and for cerebellar toxicity. PRedforte eye drops given to prevent chemical conjunctivitis. Patient with fever throughout course of treatment. Cultures negative. Due to fever probably related to HIDAC, patient received dexamethasone prior to the last 2 doses of cytatabine.  He is seen today accompanied by his father, pt feels fatigue after chemo, other than that he feels fine. Denied any fever, chills diarrhea.   Pt was admitted on 11/13-11/17 s/p right first digit laceration repair on 11/12 and presenting with erythema and pain at the site of laceration repair. Patient reported he was feeling unwell prior to suture placement with body aches, chills, night sweats and subjective fevers. Patient last BM 4 days ago. Has bruise to left inner thigh. Patient reports he keeps his PICC site clean and intact which was placed in 9/2021. Upon admission to the hospital ID was consulted started on Zosyn , GI consulted for rectal pain secondary to hemorrhoids and palliative consulted for pain control.   C3 on 11/26, tolerated well. He was seen today after discharge, accompanied by his father. He admitted feeling tired, denied any f/c, diarrhea. Right hand suture site healing well, no abnormal erythema or discharge.  He will have Fulphilia today.  C3 HIDAC 11/26-12/1 He presents to the hospital due to epistaxis and neutropenic fever w/Temp 100.3 on 12/1. Per patient, he reports that he was feeling sinus congestion and pressure on his L side, blew his nose and bleeding began from L nare without improvement prompting arrival to the emergency department. Feels mild L sinus congestion.  L nasal cavity packed with absorbable packing; F/U ENT clinic outpatient. Pan culture obtained-with No growth currently CBC rechecked today recovered. He feels well overall , had lower abdominal pain last night after ate cheese, now improved. Denied any fever chills bloody stool or diarrhea.   s/p C4 HiDAC 1/5/22 Pt has known grade 1 AST and grade 3 ALT transaminitis. Presented this admission with transamintitis. Abd sono  performed and revealed Borderline/mild hepatomegaly with hepatic steatosis. Splenomegaly. Focal area of left upper pole increased renal cortical echogenicity, corresponding to an area of hypoattenuation seen on prior CT chest,  abdomen and pelvis dated 10/9/2021. This is nonspecific and may reflect focal edema. Patient received IV hydration, antiemetics, monitoring of CBC and electrolytes. Predforte eye drops provided to prevent chemical conjunctivitis. Patient was monitored for cerebellar toxicity. Nystagmus checks performed. Hospital course complicated by fever blood cultures showed (-), most likely febrile secondary to HIDAC treatment. To receive Fulphila today.  Pt presented to the hospital on 1/18/22 due fever of 100.4, weakness, decreased WBC and shortness of breath while at home. Patient reports that his pain is more controlled s/p pain medication and his shortness of breath has resolved. He denied chills, cough, chest pain, palpitations. Pan culture (blood +Ucx) were negative, CXR reveals clear lungs, COVID PCR - not detect. Pt started on empiric Zosyn, Diflucan and Acyclovir added prophylactically for neutropenic fever. He was transfused with platelets and PRBC as per supportive parameters (>10k/>7) respectively. He feels well overall now, denied any fever, chills or pain.   BMbx 2/11/22: Cellular bone marrow with erythroid predominant trilineage hematopoiesis No morphologic or immunophenotypic evidence of persistent acute myeloid leukemia Non-necrotizing granulomas Normal male karyotype and normal onkosit myeloid NGS panel study.  He was admitted for back pain 2/2 herniated disk - Rx'd pain meds and a Medrol pack.   Pt went to ED due to acute RUQ pain on 4/16, patient underwent laparoscopic cholecystotomy on 4/17.   He reports feeling well. He reports having severe anxiety when he sees any bruising or gum bleeding. He has seen a therapist but would like to see someone else.  Saw psych Dr. Lozano on 7/18 TEB He is getting PT for his back pain.  He occasionally has some gum bleeding still.   Pt was seen today accompanied by his father. He feels well, eating healthy food and doing exercise. Will go back to work this month.  He f/u w/psych Dr. Lozano monthly, currently is on Zoloft 100mg dialy, and Zolpidem aHS prn for insomnia Denied any new complaints.  Rapid test at home showed COVID 19 positive on last Wed 10/12. Patient's father was tested positive on Friday as well. He had scratch throat, fatigue and low grade fever. Pt didn't receive any COVID 19 meds, admitted he felt better, only slight fatigue, denied fever/chill/SOB.  CBC today stable.   NPM1Q to [facility] lab on 10/19: negative He went to ED on 11/19 for rectal abscess, I&D done in the ED.   He is doing well. No new complaints.  He is not back to work yet.  Last NPM1Q was checked 1/12/23: negative  3/9/23: PAtient is here today for follow up. Reports to be doing well. He does state that he continues to have some loose stools for which he will be following up with Gi for. He also reports that he has been having back pain. He was previously on PT and will be returning to PT now that insurance issues are resolved. He reports to be having a low sex drive and noticing that his testicles are shrunken.   5/8: pt was seen today for a f/u apt accompanied by his father. pt f/u w/ Uro Dr. Molina and started testosterone 2 wks ago for low testosterone level.  He is doing well otherwise, denied any complaints.   7/31: Having worsening back pain radiating down legs b/l. He has appointment with spine specialist on Fri.  9/11: pt followed up w/orth spine specialist Dr. Solis last month for worsening back pain, L spine MR done 8/9/23: Multilevel degenerative changes throughout the lumbar spine. Back pain improved with PT.  He will have anal fissure and hemorrhoid surgery, waiting for apt to be scheduled.   11/6/23: s/p fistulotomy on 10/20/23.  Doing well.  12/22:  Patient is seen today for a f/u apt accompanied by his father. He feels well overall. Denied any new complaints.  HR elevated today 115 bpm after he drank [employer] coffee.   2/5/2024: He is going back to work next month.  6/17/24: last NMP1 test negative in 4/2024. No acute problems, denies fatigue/f/c. Labs normal today

## 2024-06-17 NOTE — ASSESSMENT
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 Started induction with Dauno/Cytarabine on 8/6/21. Pt's counts now recovered, remission marrow done on 9/2- in remission. Proceed to consolidation with 4 cycles of HIDAC. C1 HIDAC on 9/17, c/b neutropenic fever and diarrhea. C2 HIDAC on 10/25, was postponed for 1 week due to admission for diarrhea, given negative stool studies, suspect diarrhea was likely chemo-related. Pt was admitted again 11/13-11/17 for sepsis due to thumb cellulitis after laceration. C3 on 11/26, had Fulphilia on C3 c/b epistaxis and neutropenic fever w/Temp 100.3. C4 HiDAC 1/5/22, c/b transaminitis and neutropenic fever He had laparoscopic cholecystostomy on 4/17. s/p BMbx 2/11/22 showing CR. He has non-necrotizing granulomas noted in BMbx, unclear significance CBC today stable w/ slightly thrombocytopenia -results reviewed with pt;   NPM1Q to [facility] lab done in 04/2024: negative, will monitor every 3 months (next due in July 2024) Discussed supportive groups to help with anxiety of cancer relapse -now on Zoloft and Wellbutrin which helping cont f/u with Dr. Molina for low testosterone levels, he started taking testosterone end of April. s/p fistulotomy on 10/20/23 All questions answered RTC every 3 mo

## 2024-06-18 LAB
ALBUMIN SERPL ELPH-MCNC: 4.9 G/DL
ALP BLD-CCNC: 105 U/L
ALT SERPL-CCNC: 50 U/L
ANION GAP SERPL CALC-SCNC: 14 MMOL/L
AST SERPL-CCNC: 26 U/L
BILIRUB SERPL-MCNC: 0.6 MG/DL
BUN SERPL-MCNC: 19 MG/DL
CALCIUM SERPL-MCNC: 10.3 MG/DL
CHLORIDE SERPL-SCNC: 103 MMOL/L
CO2 SERPL-SCNC: 25 MMOL/L
CREAT SERPL-MCNC: 0.97 MG/DL
EGFR: 102 ML/MIN/1.73M2
GLUCOSE SERPL-MCNC: 91 MG/DL
LDH SERPL-CCNC: 148 U/L
POTASSIUM SERPL-SCNC: 4.2 MMOL/L
PROT SERPL-MCNC: 7.4 G/DL
SODIUM SERPL-SCNC: 141 MMOL/L

## 2024-06-18 RX ORDER — TESTOSTERONE 20.25 MG/1.25G
1.62 GEL, METERED TRANSDERMAL
Qty: 2 | Refills: 0 | Status: ACTIVE | COMMUNITY
Start: 2024-06-18

## 2024-08-12 ENCOUNTER — RESULT REVIEW (OUTPATIENT)
Age: 39
End: 2024-08-12

## 2024-08-12 ENCOUNTER — APPOINTMENT (OUTPATIENT)
Dept: HEMATOLOGY ONCOLOGY | Facility: CLINIC | Age: 39
End: 2024-08-12
Payer: MEDICARE

## 2024-08-12 ENCOUNTER — LABORATORY RESULT (OUTPATIENT)
Age: 39
End: 2024-08-12

## 2024-08-12 VITALS
TEMPERATURE: 98.6 F | OXYGEN SATURATION: 100 % | HEART RATE: 103 BPM | BODY MASS INDEX: 30.9 KG/M2 | WEIGHT: 221.56 LBS | DIASTOLIC BLOOD PRESSURE: 88 MMHG | RESPIRATION RATE: 16 BRPM | SYSTOLIC BLOOD PRESSURE: 143 MMHG

## 2024-08-12 DIAGNOSIS — C92.01 ACUTE MYELOBLASTIC LEUKEMIA, IN REMISSION: ICD-10-CM

## 2024-08-12 LAB
BASOPHILS # BLD AUTO: 0.01 K/UL — SIGNIFICANT CHANGE UP (ref 0–0.2)
BASOPHILS NFR BLD AUTO: 0.2 % — SIGNIFICANT CHANGE UP (ref 0–2)
EOSINOPHIL # BLD AUTO: 0.06 K/UL — SIGNIFICANT CHANGE UP (ref 0–0.5)
EOSINOPHIL NFR BLD AUTO: 1.2 % — SIGNIFICANT CHANGE UP (ref 0–6)
HCT VFR BLD CALC: 39 % — SIGNIFICANT CHANGE UP (ref 39–50)
HGB BLD-MCNC: 13.5 G/DL — SIGNIFICANT CHANGE UP (ref 13–17)
IMM GRANULOCYTES NFR BLD AUTO: 0.2 % — SIGNIFICANT CHANGE UP (ref 0–0.9)
LYMPHOCYTES # BLD AUTO: 1.56 K/UL — SIGNIFICANT CHANGE UP (ref 1–3.3)
LYMPHOCYTES # BLD AUTO: 31.2 % — SIGNIFICANT CHANGE UP (ref 13–44)
MCHC RBC-ENTMCNC: 32.1 PG — SIGNIFICANT CHANGE UP (ref 27–34)
MCHC RBC-ENTMCNC: 34.6 G/DL — SIGNIFICANT CHANGE UP (ref 32–36)
MCV RBC AUTO: 92.9 FL — SIGNIFICANT CHANGE UP (ref 80–100)
MONOCYTES # BLD AUTO: 0.52 K/UL — SIGNIFICANT CHANGE UP (ref 0–0.9)
MONOCYTES NFR BLD AUTO: 10.4 % — SIGNIFICANT CHANGE UP (ref 2–14)
NEUTROPHILS # BLD AUTO: 2.84 K/UL — SIGNIFICANT CHANGE UP (ref 1.8–7.4)
NEUTROPHILS NFR BLD AUTO: 56.8 % — SIGNIFICANT CHANGE UP (ref 43–77)
NRBC # BLD: 0 /100 WBCS — SIGNIFICANT CHANGE UP (ref 0–0)
PLATELET # BLD AUTO: 180 K/UL — SIGNIFICANT CHANGE UP (ref 150–400)
RBC # BLD: 4.2 M/UL — SIGNIFICANT CHANGE UP (ref 4.2–5.8)
RBC # FLD: 12.6 % — SIGNIFICANT CHANGE UP (ref 10.3–14.5)
WBC # BLD: 5 K/UL — SIGNIFICANT CHANGE UP (ref 3.8–10.5)
WBC # FLD AUTO: 5 K/UL — SIGNIFICANT CHANGE UP (ref 3.8–10.5)

## 2024-08-12 PROCEDURE — 99214 OFFICE O/P EST MOD 30 MIN: CPT

## 2024-08-12 PROCEDURE — G2211 COMPLEX E/M VISIT ADD ON: CPT

## 2024-08-14 NOTE — PHYSICAL EXAM
[Fully active, able to carry on all pre-disease performance without restriction] : Status 0 - Fully active, able to carry on all pre-disease performance without restriction [Normal] : affect appropriate [de-identified] : a 1 cm long laceration with sutures on the right thrum, healing well

## 2024-08-14 NOTE — PHYSICAL EXAM
[Fully active, able to carry on all pre-disease performance without restriction] : Status 0 - Fully active, able to carry on all pre-disease performance without restriction [Normal] : affect appropriate [de-identified] : a 1 cm long laceration with sutures on the right thrum, healing well

## 2024-08-14 NOTE — ASSESSMENT
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 Started induction with Dauno/Cytarabine on 8/6/21. Pt's counts now recovered, remission marrow done on 9/2- in remission. Proceed to consolidation with 4 cycles of HIDAC. C1 HIDAC on 9/17, c/b neutropenic fever and diarrhea. C2 HIDAC on 10/25, was postponed for 1 week due to admission for diarrhea, given negative stool studies, suspect diarrhea was likely chemo-related. Pt was admitted again 11/13-11/17 for sepsis due to thumb cellulitis after laceration. C3 on 11/26, had Fulphilia on C3 c/b epistaxis and neutropenic fever w/Temp 100.3. C4 HiDAC 1/5/22, c/b transaminitis and neutropenic fever He had laparoscopic cholecystostomy on 4/17. s/p BMbx 2/11/22 showing CR. He has non-necrotizing granulomas noted in BMbx, unclear significance CBC today stable w/ slightly thrombocytopenia -results reviewed with pt;   NPM1Q to [facility] lab done in 04/2024: negative, will monitor every 3 months, ordered today, will f/u  Discussed supportive groups to help with anxiety of cancer relapse -now on Zoloft and Wellbutrin which helping cont f/u with Dr. Molina for low testosterone levels, he started taking testosterone end of April. s/p fistulotomy on 10/20/23 All questions answered RTC every 3 mo  Case and management discussed with Dr. Yancey

## 2024-08-14 NOTE — HISTORY OF PRESENT ILLNESS
[de-identified] : 35yo M w/ HTN and newly diagnosed AML here for f/u.   He presented to the hospital on 7/30/21 with complaints of dizziness, fatigue and severe RUQ pain for 3 days. CT A/P revealed fatty liver and small R pleural effusion. WBC 12k with 17% blasts. Peripheral flow cytometry showed 13% myeloblasts. FLT3(-). BMbx was done on 8/4/21 - confirmed AML. 46,XY                               {20                                     \\} Foundation: mutations in DNMT3A R882H, NRAS G12D, NPM1 W288fs*12 Pt consented to [place]. Patient was offered sperm banking, but declined. On 8/6, induction with Dauno/Cytararbine was started (cytarabine 100/m2 and dauno 90/m2)  He received a seven day course of Zosyn for presumed RUL PNA, then transitioned to  levaquin, posaconazole and Acyclovir for ppx for neutropenia. Course c/b neutropenic fevers, cultures negative, repeat CT 8/14 without infectious source. He was on Cefepime but developed a rash, changed to meropenem. Rash improved.  Day 14 BMbx on 8/19 was hypocellular with chemotherapeutic effect; however, per hematopathology, appears to be earlier regeneration than would typically seen which is concerning for persistent disease at this point, and the earliest cells are CD34 positive and his myeloblasts on initial presentation were CD34 negative. Thus, this may simply represent early regeneration of his marrow. Plan is to await count recovery and repeat biopsy.   c/o hemorrhoidal pain. Hemorrhoids started a few days prior to discharge. He was sent home with rectal ointment and witch hazel.  Patient discharged home on 8/29/21 when ANC >500 [de-identified] : BMBx done on 9/2/2021: Cellular bone marrow with trilineage hematopoiesis with maturation and megakaryocytosis (history of AML). Pt feels well, CBC today showed count stable  s/p C1 HIDAC 9/17-9/22  c/o leg pain after chemo in the hospital   He presented to the ED on 10/9 due to fever the night of presentation. The patient went to sleep around 10pm and woke up at 3am feeling warm and clammy. He took his temperature orally at home and it was 100.7. The day prior to presentation (10/8/21), the patient went to [facility] for an appointment to get his platelet count checked. He states he was feeling a bit run down and thought he was going to need a transfusion, but he did not need a transfusion. He was afebrile during the time of the appointment and went home afterwards. Around 3pm, the patient had bilateral crampy leg pain that was similar to the leg pain he felt during his consolidation therapy treatment. He took hydrocodone and the pain improved. The patient had one episode of diarrhea three days prior to presentation and has been bothered by rectal pain associated with his "large hemorrhoids. He denies any bleeding per rectum. He also feels like his mouth has been more dry than usual over the past several days, but denies all other symptoms.  CT Abdomen and Pelvis w/ Oral Cont and w/ IV Conttrast on 10/9 revealed Region of hypoenhancement upper pole left kidney; please correlate clinically for possible pyelonephritis. No hydronephrosis or perinephric stranding. No rectal abscess. CT Chest No Contrast on 10/9 revealed Clear lungs. 10/9- BCX NGTD and 10/9- UCX (-) He ate some cold salad late last night, had upsetting stomach and diarrhea this morning. Will take Imodium for diarrhea.   C2 is due on 10/15, pt wants deferring to next Monday after his diarrhea improved.  Admission of  C2 was postponed due to worsening diarrhea. Went to ED on 10/15 due to worsening watery diarrhea. GI PCR neg, C Diff neg Given negative stool studies, suspect diarrhea was likely chemo-related. S/p cycle 2 Consolidation with HIDAC started on 10/25. Patient received IV hydration, strict I/O, antiemetics, monitoring of CBC and CMP and for cerebellar toxicity. PRedforte eye drops given to prevent chemical conjunctivitis. Patient with fever throughout course of treatment. Cultures negative. Due to fever probably related to HIDAC, patient received dexamethasone prior to the last 2 doses of cytatabine.  He is seen today accompanied by his father, pt feels fatigue after chemo, other than that he feels fine. Denied any fever, chills diarrhea.   Pt was admitted on 11/13-11/17 s/p right first digit laceration repair on 11/12 and presenting with erythema and pain at the site of laceration repair. Patient reported he was feeling unwell prior to suture placement with body aches, chills, night sweats and subjective fevers. Patient last BM 4 days ago. Has bruise to left inner thigh. Patient reports he keeps his PICC site clean and intact which was placed in 9/2021. Upon admission to the hospital ID was consulted started on Zosyn , GI consulted for rectal pain secondary to hemorrhoids and palliative consulted for pain control.   C3 on 11/26, tolerated well. He was seen today after discharge, accompanied by his father. He admitted feeling tired, denied any f/c, diarrhea. Right hand suture site healing well, no abnormal erythema or discharge.  He will have Fulphilia today.  C3 HIDAC 11/26-12/1 He presents to the hospital due to epistaxis and neutropenic fever w/Temp 100.3 on 12/1. Per patient, he reports that he was feeling sinus congestion and pressure on his L side, blew his nose and bleeding began from L nare without improvement prompting arrival to the emergency department. Feels mild L sinus congestion.  L nasal cavity packed with absorbable packing; F/U ENT clinic outpatient. Pan culture obtained-with No growth currently CBC rechecked today recovered. He feels well overall , had lower abdominal pain last night after ate cheese, now improved. Denied any fever chills bloody stool or diarrhea.   s/p C4 HiDAC 1/5/22 Pt has known grade 1 AST and grade 3 ALT transaminitis. Presented this admission with transamintitis. Abd sono  performed and revealed Borderline/mild hepatomegaly with hepatic steatosis. Splenomegaly. Focal area of left upper pole increased renal cortical echogenicity, corresponding to an area of hypoattenuation seen on prior CT chest,  abdomen and pelvis dated 10/9/2021. This is nonspecific and may reflect focal edema. Patient received IV hydration, antiemetics, monitoring of CBC and electrolytes. Predforte eye drops provided to prevent chemical conjunctivitis. Patient was monitored for cerebellar toxicity. Nystagmus checks performed. Hospital course complicated by fever blood cultures showed (-), most likely febrile secondary to HIDAC treatment. To receive Fulphila today.  Pt presented to the hospital on 1/18/22 due fever of 100.4, weakness, decreased WBC and shortness of breath while at home. Patient reports that his pain is more controlled s/p pain medication and his shortness of breath has resolved. He denied chills, cough, chest pain, palpitations. Pan culture (blood +Ucx) were negative, CXR reveals clear lungs, COVID PCR - not detect. Pt started on empiric Zosyn, Diflucan and Acyclovir added prophylactically for neutropenic fever. He was transfused with platelets and PRBC as per supportive parameters (>10k/>7) respectively. He feels well overall now, denied any fever, chills or pain.   BMbx 2/11/22: Cellular bone marrow with erythroid predominant trilineage hematopoiesis No morphologic or immunophenotypic evidence of persistent acute myeloid leukemia Non-necrotizing granulomas Normal male karyotype and normal onkosit myeloid NGS panel study.  He was admitted for back pain 2/2 herniated disk - Rx'd pain meds and a Medrol pack.   Pt went to ED due to acute RUQ pain on 4/16, patient underwent laparoscopic cholecystotomy on 4/17.   He reports feeling well. He reports having severe anxiety when he sees any bruising or gum bleeding. He has seen a therapist but would like to see someone else.  Saw psych Dr. Lozano on 7/18 TEB He is getting PT for his back pain.  He occasionally has some gum bleeding still.   Pt was seen today accompanied by his father. He feels well, eating healthy food and doing exercise. Will go back to work this month.  He f/u w/psych Dr. Lozano monthly, currently is on Zoloft 100mg dialy, and Zolpidem aHS prn for insomnia Denied any new complaints.  Rapid test at home showed COVID 19 positive on last Wed 10/12. Patient's father was tested positive on Friday as well. He had scratch throat, fatigue and low grade fever. Pt didn't receive any COVID 19 meds, admitted he felt better, only slight fatigue, denied fever/chill/SOB.  CBC today stable.   NPM1Q to [facility] lab on 10/19: negative He went to ED on 11/19 for rectal abscess, I&D done in the ED.   He is doing well. No new complaints.  He is not back to work yet.  Last NPM1Q was checked 1/12/23: negative  3/9/23: PAtient is here today for follow up. Reports to be doing well. He does state that he continues to have some loose stools for which he will be following up with Gi for. He also reports that he has been having back pain. He was previously on PT and will be returning to PT now that insurance issues are resolved. He reports to be having a low sex drive and noticing that his testicles are shrunken.   5/8: pt was seen today for a f/u apt accompanied by his father. pt f/u w/ Uro Dr. Molina and started testosterone 2 wks ago for low testosterone level.  He is doing well otherwise, denied any complaints.   7/31: Having worsening back pain radiating down legs b/l. He has appointment with spine specialist on Fri.  9/11: pt followed up w/orth spine specialist Dr. Solis last month for worsening back pain, L spine MR done 8/9/23: Multilevel degenerative changes throughout the lumbar spine. Back pain improved with PT.  He will have anal fissure and hemorrhoid surgery, waiting for apt to be scheduled.   11/6/23: s/p fistulotomy on 10/20/23.  Doing well.  12/22:  Patient is seen today for a f/u apt accompanied by his father. He feels well overall. Denied any new complaints.  HR elevated today 115 bpm after he drank [employer] coffee.   2/5/2024: He is going back to work next month.  6/17/24: last NMP1 test negative in 4/2024. No acute problems, denies fatigue/f/c. Labs normal today 8/14/24:  Patient is seen today for a f/u apt accompanied by his father. He feels well overall. Denied any new complaints. Gained weight.

## 2024-08-14 NOTE — HISTORY OF PRESENT ILLNESS
[de-identified] : 37yo M w/ HTN and newly diagnosed AML here for f/u.   He presented to the hospital on 7/30/21 with complaints of dizziness, fatigue and severe RUQ pain for 3 days. CT A/P revealed fatty liver and small R pleural effusion. WBC 12k with 17% blasts. Peripheral flow cytometry showed 13% myeloblasts. FLT3(-). BMbx was done on 8/4/21 - confirmed AML. 46,XY                               {20                                     \\} Foundation: mutations in DNMT3A R882H, NRAS G12D, NPM1 W288fs*12 Pt consented to [place]. Patient was offered sperm banking, but declined. On 8/6, induction with Dauno/Cytararbine was started (cytarabine 100/m2 and dauno 90/m2)  He received a seven day course of Zosyn for presumed RUL PNA, then transitioned to  levaquin, posaconazole and Acyclovir for ppx for neutropenia. Course c/b neutropenic fevers, cultures negative, repeat CT 8/14 without infectious source. He was on Cefepime but developed a rash, changed to meropenem. Rash improved.  Day 14 BMbx on 8/19 was hypocellular with chemotherapeutic effect; however, per hematopathology, appears to be earlier regeneration than would typically seen which is concerning for persistent disease at this point, and the earliest cells are CD34 positive and his myeloblasts on initial presentation were CD34 negative. Thus, this may simply represent early regeneration of his marrow. Plan is to await count recovery and repeat biopsy.   c/o hemorrhoidal pain. Hemorrhoids started a few days prior to discharge. He was sent home with rectal ointment and witch hazel.  Patient discharged home on 8/29/21 when ANC >500 [de-identified] : BMBx done on 9/2/2021: Cellular bone marrow with trilineage hematopoiesis with maturation and megakaryocytosis (history of AML). Pt feels well, CBC today showed count stable  s/p C1 HIDAC 9/17-9/22  c/o leg pain after chemo in the hospital   He presented to the ED on 10/9 due to fever the night of presentation. The patient went to sleep around 10pm and woke up at 3am feeling warm and clammy. He took his temperature orally at home and it was 100.7. The day prior to presentation (10/8/21), the patient went to [facility] for an appointment to get his platelet count checked. He states he was feeling a bit run down and thought he was going to need a transfusion, but he did not need a transfusion. He was afebrile during the time of the appointment and went home afterwards. Around 3pm, the patient had bilateral crampy leg pain that was similar to the leg pain he felt during his consolidation therapy treatment. He took hydrocodone and the pain improved. The patient had one episode of diarrhea three days prior to presentation and has been bothered by rectal pain associated with his "large hemorrhoids. He denies any bleeding per rectum. He also feels like his mouth has been more dry than usual over the past several days, but denies all other symptoms.  CT Abdomen and Pelvis w/ Oral Cont and w/ IV Conttrast on 10/9 revealed Region of hypoenhancement upper pole left kidney; please correlate clinically for possible pyelonephritis. No hydronephrosis or perinephric stranding. No rectal abscess. CT Chest No Contrast on 10/9 revealed Clear lungs. 10/9- BCX NGTD and 10/9- UCX (-) He ate some cold salad late last night, had upsetting stomach and diarrhea this morning. Will take Imodium for diarrhea.   C2 is due on 10/15, pt wants deferring to next Monday after his diarrhea improved.  Admission of  C2 was postponed due to worsening diarrhea. Went to ED on 10/15 due to worsening watery diarrhea. GI PCR neg, C Diff neg Given negative stool studies, suspect diarrhea was likely chemo-related. S/p cycle 2 Consolidation with HIDAC started on 10/25. Patient received IV hydration, strict I/O, antiemetics, monitoring of CBC and CMP and for cerebellar toxicity. PRedforte eye drops given to prevent chemical conjunctivitis. Patient with fever throughout course of treatment. Cultures negative. Due to fever probably related to HIDAC, patient received dexamethasone prior to the last 2 doses of cytatabine.  He is seen today accompanied by his father, pt feels fatigue after chemo, other than that he feels fine. Denied any fever, chills diarrhea.   Pt was admitted on 11/13-11/17 s/p right first digit laceration repair on 11/12 and presenting with erythema and pain at the site of laceration repair. Patient reported he was feeling unwell prior to suture placement with body aches, chills, night sweats and subjective fevers. Patient last BM 4 days ago. Has bruise to left inner thigh. Patient reports he keeps his PICC site clean and intact which was placed in 9/2021. Upon admission to the hospital ID was consulted started on Zosyn , GI consulted for rectal pain secondary to hemorrhoids and palliative consulted for pain control.   C3 on 11/26, tolerated well. He was seen today after discharge, accompanied by his father. He admitted feeling tired, denied any f/c, diarrhea. Right hand suture site healing well, no abnormal erythema or discharge.  He will have Fulphilia today.  C3 HIDAC 11/26-12/1 He presents to the hospital due to epistaxis and neutropenic fever w/Temp 100.3 on 12/1. Per patient, he reports that he was feeling sinus congestion and pressure on his L side, blew his nose and bleeding began from L nare without improvement prompting arrival to the emergency department. Feels mild L sinus congestion.  L nasal cavity packed with absorbable packing; F/U ENT clinic outpatient. Pan culture obtained-with No growth currently CBC rechecked today recovered. He feels well overall , had lower abdominal pain last night after ate cheese, now improved. Denied any fever chills bloody stool or diarrhea.   s/p C4 HiDAC 1/5/22 Pt has known grade 1 AST and grade 3 ALT transaminitis. Presented this admission with transamintitis. Abd sono  performed and revealed Borderline/mild hepatomegaly with hepatic steatosis. Splenomegaly. Focal area of left upper pole increased renal cortical echogenicity, corresponding to an area of hypoattenuation seen on prior CT chest,  abdomen and pelvis dated 10/9/2021. This is nonspecific and may reflect focal edema. Patient received IV hydration, antiemetics, monitoring of CBC and electrolytes. Predforte eye drops provided to prevent chemical conjunctivitis. Patient was monitored for cerebellar toxicity. Nystagmus checks performed. Hospital course complicated by fever blood cultures showed (-), most likely febrile secondary to HIDAC treatment. To receive Fulphila today.  Pt presented to the hospital on 1/18/22 due fever of 100.4, weakness, decreased WBC and shortness of breath while at home. Patient reports that his pain is more controlled s/p pain medication and his shortness of breath has resolved. He denied chills, cough, chest pain, palpitations. Pan culture (blood +Ucx) were negative, CXR reveals clear lungs, COVID PCR - not detect. Pt started on empiric Zosyn, Diflucan and Acyclovir added prophylactically for neutropenic fever. He was transfused with platelets and PRBC as per supportive parameters (>10k/>7) respectively. He feels well overall now, denied any fever, chills or pain.   BMbx 2/11/22: Cellular bone marrow with erythroid predominant trilineage hematopoiesis No morphologic or immunophenotypic evidence of persistent acute myeloid leukemia Non-necrotizing granulomas Normal male karyotype and normal onkosit myeloid NGS panel study.  He was admitted for back pain 2/2 herniated disk - Rx'd pain meds and a Medrol pack.   Pt went to ED due to acute RUQ pain on 4/16, patient underwent laparoscopic cholecystotomy on 4/17.   He reports feeling well. He reports having severe anxiety when he sees any bruising or gum bleeding. He has seen a therapist but would like to see someone else.  Saw psych Dr. Lozano on 7/18 TEB He is getting PT for his back pain.  He occasionally has some gum bleeding still.   Pt was seen today accompanied by his father. He feels well, eating healthy food and doing exercise. Will go back to work this month.  He f/u w/psych Dr. Lozano monthly, currently is on Zoloft 100mg dialy, and Zolpidem aHS prn for insomnia Denied any new complaints.  Rapid test at home showed COVID 19 positive on last Wed 10/12. Patient's father was tested positive on Friday as well. He had scratch throat, fatigue and low grade fever. Pt didn't receive any COVID 19 meds, admitted he felt better, only slight fatigue, denied fever/chill/SOB.  CBC today stable.   NPM1Q to [facility] lab on 10/19: negative He went to ED on 11/19 for rectal abscess, I&D done in the ED.   He is doing well. No new complaints.  He is not back to work yet.  Last NPM1Q was checked 1/12/23: negative  3/9/23: PAtient is here today for follow up. Reports to be doing well. He does state that he continues to have some loose stools for which he will be following up with Gi for. He also reports that he has been having back pain. He was previously on PT and will be returning to PT now that insurance issues are resolved. He reports to be having a low sex drive and noticing that his testicles are shrunken.   5/8: pt was seen today for a f/u apt accompanied by his father. pt f/u w/ Uro Dr. Molina and started testosterone 2 wks ago for low testosterone level.  He is doing well otherwise, denied any complaints.   7/31: Having worsening back pain radiating down legs b/l. He has appointment with spine specialist on Fri.  9/11: pt followed up w/orth spine specialist Dr. Solis last month for worsening back pain, L spine MR done 8/9/23: Multilevel degenerative changes throughout the lumbar spine. Back pain improved with PT.  He will have anal fissure and hemorrhoid surgery, waiting for apt to be scheduled.   11/6/23: s/p fistulotomy on 10/20/23.  Doing well.  12/22:  Patient is seen today for a f/u apt accompanied by his father. He feels well overall. Denied any new complaints.  HR elevated today 115 bpm after he drank [employer] coffee.   2/5/2024: He is going back to work next month.  6/17/24: last NMP1 test negative in 4/2024. No acute problems, denies fatigue/f/c. Labs normal today 8/14/24:  Patient is seen today for a f/u apt accompanied by his father. He feels well overall. Denied any new complaints. Gained weight.

## 2024-08-14 NOTE — ASSESSMENT
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 Started induction with Dauno/Cytarabine on 8/6/21. Pt's counts now recovered, remission marrow done on 9/2- in remission. Proceed to consolidation with 4 cycles of HIDAC. C1 HIDAC on 9/17, c/b neutropenic fever and diarrhea. C2 HIDAC on 10/25, was postponed for 1 week due to admission for diarrhea, given negative stool studies, suspect diarrhea was likely chemo-related. Pt was admitted again 11/13-11/17 for sepsis due to thumb cellulitis after laceration. C3 on 11/26, had Fulphilia on C3 c/b epistaxis and neutropenic fever w/Temp 100.3. C4 HiDAC 1/5/22, c/b transaminitis and neutropenic fever He had laparoscopic cholecystostomy on 4/17. s/p BMbx 2/11/22 showing CR. He has non-necrotizing granulomas noted in BMbx, unclear significance CBC today stable w/ slightly thrombocytopenia -results reviewed with pt;   NPM1Q to [facility] lab done in 04/2024: negative, will monitor every 3 months, ordered today, will f/u  Discussed supportive groups to help with anxiety of cancer relapse -now on Zoloft and Wellbutrin which helping cont f/u with Dr. Molina for low testosterone levels, he started taking testosterone end of April. s/p fistulotomy on 10/20/23 All questions answered RTC every 3 mo  Case and management discussed with Dr. Yancey

## 2024-08-19 NOTE — DISCHARGE NOTE NURSING/CASE MANAGEMENT/SOCIAL WORK - NSDCPETBCESMAN_GEN_ALL_CORE
None
If you are a smoker, it is important for your health to stop smoking. Please be aware that second hand smoke is also harmful.

## 2024-08-19 NOTE — ED PROVIDER NOTE - DISPOSITION TYPE
Hide Include Location In Plan Question?: No Detail Level: Zone Include Location In Plan?: Yes DISCHARGE

## 2024-08-26 NOTE — PHYSICAL THERAPY INITIAL EVALUATION ADULT - STANDING BALANCE: STATIC
I received a fax from [facility] on 8.22.24 with an INR of 2.1 and on 8.1.24 with an INR of 2.0. I got a hold of Mrs. Magana's daughter Goldy today. Mrs. Magana has had no changes in her medications or diet. She typically does not eat greens. No signs or symptoms of bleeding. She is on levothyroxine. Given her INR is in range at 2.1, we will continue 1.25mg Tues, Thurs, Sat and 2.5mg all other days. We will follow up next week.  
good balance

## 2024-09-23 ENCOUNTER — TRANSCRIPTION ENCOUNTER (OUTPATIENT)
Age: 39
End: 2024-09-23

## 2024-09-29 ENCOUNTER — NON-APPOINTMENT (OUTPATIENT)
Age: 39
End: 2024-09-29

## 2024-09-30 ENCOUNTER — APPOINTMENT (OUTPATIENT)
Dept: HEMATOLOGY ONCOLOGY | Facility: CLINIC | Age: 39
End: 2024-09-30
Payer: MEDICARE

## 2024-09-30 ENCOUNTER — RESULT REVIEW (OUTPATIENT)
Age: 39
End: 2024-09-30

## 2024-09-30 ENCOUNTER — OUTPATIENT (OUTPATIENT)
Dept: OUTPATIENT SERVICES | Facility: HOSPITAL | Age: 39
LOS: 1 days | Discharge: HOME | End: 2024-09-30

## 2024-09-30 VITALS
RESPIRATION RATE: 16 BRPM | SYSTOLIC BLOOD PRESSURE: 127 MMHG | WEIGHT: 229.5 LBS | TEMPERATURE: 98.2 F | OXYGEN SATURATION: 97 % | DIASTOLIC BLOOD PRESSURE: 93 MMHG | BODY MASS INDEX: 32.01 KG/M2 | HEART RATE: 99 BPM

## 2024-09-30 DIAGNOSIS — C92.01 ACUTE MYELOBLASTIC LEUKEMIA, IN REMISSION: ICD-10-CM

## 2024-09-30 DIAGNOSIS — Z98.890 OTHER SPECIFIED POSTPROCEDURAL STATES: Chronic | ICD-10-CM

## 2024-09-30 DIAGNOSIS — C92.00 ACUTE MYELOBLASTIC LEUKEMIA, NOT HAVING ACHIEVED REMISSION: ICD-10-CM

## 2024-09-30 DIAGNOSIS — Z90.49 ACQUIRED ABSENCE OF OTHER SPECIFIED PARTS OF DIGESTIVE TRACT: Chronic | ICD-10-CM

## 2024-09-30 LAB
BASOPHILS # BLD AUTO: 0.02 K/UL — SIGNIFICANT CHANGE UP (ref 0–0.2)
BASOPHILS NFR BLD AUTO: 0.4 % — SIGNIFICANT CHANGE UP (ref 0–2)
EOSINOPHIL # BLD AUTO: 0.05 K/UL — SIGNIFICANT CHANGE UP (ref 0–0.5)
EOSINOPHIL NFR BLD AUTO: 0.9 % — SIGNIFICANT CHANGE UP (ref 0–6)
HCT VFR BLD CALC: 42.3 % — SIGNIFICANT CHANGE UP (ref 39–50)
HGB BLD-MCNC: 15.1 G/DL — SIGNIFICANT CHANGE UP (ref 13–17)
IMM GRANULOCYTES NFR BLD AUTO: 0.2 % — SIGNIFICANT CHANGE UP (ref 0–0.9)
LYMPHOCYTES # BLD AUTO: 1.95 K/UL — SIGNIFICANT CHANGE UP (ref 1–3.3)
LYMPHOCYTES # BLD AUTO: 34.9 % — SIGNIFICANT CHANGE UP (ref 13–44)
MCHC RBC-ENTMCNC: 32.3 PG — SIGNIFICANT CHANGE UP (ref 27–34)
MCHC RBC-ENTMCNC: 35.7 G/DL — SIGNIFICANT CHANGE UP (ref 32–36)
MCV RBC AUTO: 90.6 FL — SIGNIFICANT CHANGE UP (ref 80–100)
MONOCYTES # BLD AUTO: 0.47 K/UL — SIGNIFICANT CHANGE UP (ref 0–0.9)
MONOCYTES NFR BLD AUTO: 8.4 % — SIGNIFICANT CHANGE UP (ref 2–14)
NEUTROPHILS # BLD AUTO: 3.08 K/UL — SIGNIFICANT CHANGE UP (ref 1.8–7.4)
NEUTROPHILS NFR BLD AUTO: 55.2 % — SIGNIFICANT CHANGE UP (ref 43–77)
NRBC # BLD: 0 /100 WBCS — SIGNIFICANT CHANGE UP (ref 0–0)
NRBC BLD-RTO: 0 /100 WBCS — SIGNIFICANT CHANGE UP (ref 0–0)
PLATELET # BLD AUTO: 181 K/UL — SIGNIFICANT CHANGE UP (ref 150–400)
RBC # BLD: 4.67 M/UL — SIGNIFICANT CHANGE UP (ref 4.2–5.8)
RBC # FLD: 12.4 % — SIGNIFICANT CHANGE UP (ref 10.3–14.5)
WBC # BLD: 5.58 K/UL — SIGNIFICANT CHANGE UP (ref 3.8–10.5)
WBC # FLD AUTO: 5.58 K/UL — SIGNIFICANT CHANGE UP (ref 3.8–10.5)

## 2024-09-30 PROCEDURE — G2211 COMPLEX E/M VISIT ADD ON: CPT

## 2024-09-30 PROCEDURE — 99214 OFFICE O/P EST MOD 30 MIN: CPT

## 2024-09-30 NOTE — ASSESSMENT
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 Started induction with Dauno/Cytarabine on 8/6/21. Pt's counts now recovered, remission marrow done on 9/2- in remission. Proceed to consolidation with 4 cycles of HIDAC. C1 HIDAC on 9/17, c/b neutropenic fever and diarrhea. C2 HIDAC on 10/25, was postponed for 1 week due to admission for diarrhea, given negative stool studies, suspect diarrhea was likely chemo-related. Pt was admitted again 11/13-11/17 for sepsis due to thumb cellulitis after laceration. C3 on 11/26, had Fulphilia on C3 c/b epistaxis and neutropenic fever w/Temp 100.3. C4 HiDAC 1/5/22, c/b transaminitis and neutropenic fever He had laparoscopic cholecystostomy on 4/17. s/p BMbx 2/11/22 showing CR. He has non-necrotizing granulomas noted in BMbx, unclear significance CBC today WNL, prior slightly thrombocytopenia resolved -results reviewed with pt;  NPM1Q to [facility] lab done in 08/2024: negative, will monitor every 3 months Discussed supportive groups to help with anxiety of cancer relapse -now on Zoloft and Wellbutrin which helping cont f/u with Dr. Molina for low testosterone levels, he started taking testosterone end of April. s/p fistulotomy on 10/20/23 All questions answered RTC every 3 mo.    Case and management discussed with Dr. Yancey

## 2024-09-30 NOTE — REVIEW OF SYSTEMS
[Negative] : Allergic/Immunologic [FreeTextEntry9] : lower back pain [FreeTextEntry7] : hemorrhoidal pain

## 2024-09-30 NOTE — BEGINNING OF VISIT
[0] : 2) Feeling down, depressed, or hopeless: Not at all (0) [PHQ-2 Negative] : PHQ-2 Negative [Pain Scale: ___] : On a scale of 1-10, today the patient's pain is a(n) [unfilled]. [Never] : Never

## 2024-09-30 NOTE — PHYSICAL EXAM
[Fully active, able to carry on all pre-disease performance without restriction] : Status 0 - Fully active, able to carry on all pre-disease performance without restriction [Normal] : affect appropriate [de-identified] : a 1 cm long laceration with sutures on the right thrum, healing well

## 2024-09-30 NOTE — HISTORY OF PRESENT ILLNESS
[de-identified] : 35yo M w/ HTN and newly diagnosed AML here for f/u.   He presented to the hospital on 7/30/21 with complaints of dizziness, fatigue and severe RUQ pain for 3 days. CT A/P revealed fatty liver and small R pleural effusion. WBC 12k with 17% blasts. Peripheral flow cytometry showed 13% myeloblasts. FLT3(-). BMbx was done on 8/4/21 - confirmed AML. 46,XY                                {20                                         } Foundation: mutations in DNMT3A R882H, NRAS G12D, NPM1 W288fs*12 Pt consented to [place]. Patient was offered sperm banking, but declined. On 8/6, induction with Dauno/Cytararbine was started (cytarabine 100/m2 and dauno 90/m2)  He received a seven day course of Zosyn for presumed RUL PNA, then transitioned to  levaquin, posaconazole and Acyclovir for ppx for neutropenia. Course c/b neutropenic fevers, cultures negative, repeat CT 8/14 without infectious source. He was on Cefepime but developed a rash, changed to meropenem. Rash improved.  Day 14 BMbx on 8/19 was hypocellular with chemotherapeutic effect; however, per hematopathology, appears to be earlier regeneration than would typically seen which is concerning for persistent disease at this point, and the earliest cells are CD34 positive and his myeloblasts on initial presentation were CD34 negative. Thus, this may simply represent early regeneration of his marrow. Plan is to await count recovery and repeat biopsy.   c/o hemorrhoidal pain. Hemorrhoids started a few days prior to discharge. He was sent home with rectal ointment and witch hazel.  Patient discharged home on 8/29/21 when ANC >500 [de-identified] : BMBx done on 9/2/2021: Cellular bone marrow with trilineage hematopoiesis with maturation and megakaryocytosis (history of AML). Pt feels well, CBC today showed count stable  s/p C1 HIDAC 9/17-9/22  c/o leg pain after chemo in the hospital   He presented to the ED on 10/9 due to fever the night of presentation. The patient went to sleep around 10pm and woke up at 3am feeling warm and clammy. He took his temperature orally at home and it was 100.7. The day prior to presentation (10/8/21), the patient went to [facility] for an appointment to get his platelet count checked. He states he was feeling a bit run down and thought he was going to need a transfusion, but he did not need a transfusion. He was afebrile during the time of the appointment and went home afterwards. Around 3pm, the patient had bilateral crampy leg pain that was similar to the leg pain he felt during his consolidation therapy treatment. He took hydrocodone and the pain improved. The patient had one episode of diarrhea three days prior to presentation and has been bothered by rectal pain associated with his "large hemorrhoids. He denies any bleeding per rectum. He also feels like his mouth has been more dry than usual over the past several days, but denies all other symptoms.  CT Abdomen and Pelvis w/ Oral Cont and w/ IV Conttrast on 10/9 revealed Region of hypoenhancement upper pole left kidney; please correlate clinically for possible pyelonephritis. No hydronephrosis or perinephric stranding. No rectal abscess. CT Chest No Contrast on 10/9 revealed Clear lungs. 10/9- BCX NGTD and 10/9- UCX (-) He ate some cold salad late last night, had upsetting stomach and diarrhea this morning. Will take Imodium for diarrhea.   C2 is due on 10/15, pt wants deferring to next Monday after his diarrhea improved.  Admission of  C2 was postponed due to worsening diarrhea. Went to ED on 10/15 due to worsening watery diarrhea. GI PCR neg, C Diff neg Given negative stool studies, suspect diarrhea was likely chemo-related. S/p cycle 2 Consolidation with HIDAC started on 10/25. Patient received IV hydration, strict I/O, antiemetics, monitoring of CBC and CMP and for cerebellar toxicity. PRedforte eye drops given to prevent chemical conjunctivitis. Patient with fever throughout course of treatment. Cultures negative. Due to fever probably related to HIDAC, patient received dexamethasone prior to the last 2 doses of cytatabine.  He is seen today accompanied by his father, pt feels fatigue after chemo, other than that he feels fine. Denied any fever, chills diarrhea.   Pt was admitted on 11/13-11/17 s/p right first digit laceration repair on 11/12 and presenting with erythema and pain at the site of laceration repair. Patient reported he was feeling unwell prior to suture placement with body aches, chills, night sweats and subjective fevers. Patient last BM 4 days ago. Has bruise to left inner thigh. Patient reports he keeps his PICC site clean and intact which was placed in 9/2021. Upon admission to the hospital ID was consulted started on Zosyn , GI consulted for rectal pain secondary to hemorrhoids and palliative consulted for pain control.   C3 on 11/26, tolerated well. He was seen today after discharge, accompanied by his father. He admitted feeling tired, denied any f/c, diarrhea. Right hand suture site healing well, no abnormal erythema or discharge.  He will have Fulphilia today.  C3 HIDAC 11/26-12/1 He presents to the hospital due to epistaxis and neutropenic fever w/Temp 100.3 on 12/1. Per patient, he reports that he was feeling sinus congestion and pressure on his L side, blew his nose and bleeding began from L nare without improvement prompting arrival to the emergency department. Feels mild L sinus congestion.  L nasal cavity packed with absorbable packing; F/U ENT clinic outpatient. Pan culture obtained-with No growth currently CBC rechecked today recovered. He feels well overall , had lower abdominal pain last night after ate cheese, now improved. Denied any fever chills bloody stool or diarrhea.   s/p C4 HiDAC 1/5/22 Pt has known grade 1 AST and grade 3 ALT transaminitis. Presented this admission with transamintitis. Abd sono  performed and revealed Borderline/mild hepatomegaly with hepatic steatosis. Splenomegaly. Focal area of left upper pole increased renal cortical echogenicity, corresponding to an area of hypoattenuation seen on prior CT chest,  abdomen and pelvis dated 10/9/2021. This is nonspecific and may reflect focal edema. Patient received IV hydration, antiemetics, monitoring of CBC and electrolytes. Predforte eye drops provided to prevent chemical conjunctivitis. Patient was monitored for cerebellar toxicity. Nystagmus checks performed. Hospital course complicated by fever blood cultures showed (-), most likely febrile secondary to HIDAC treatment. To receive Fulphila today.  Pt presented to the hospital on 1/18/22 due fever of 100.4, weakness, decreased WBC and shortness of breath while at home. Patient reports that his pain is more controlled s/p pain medication and his shortness of breath has resolved. He denied chills, cough, chest pain, palpitations. Pan culture (blood +Ucx) were negative, CXR reveals clear lungs, COVID PCR - not detect. Pt started on empiric Zosyn, Diflucan and Acyclovir added prophylactically for neutropenic fever. He was transfused with platelets and PRBC as per supportive parameters (>10k/>7) respectively. He feels well overall now, denied any fever, chills or pain.   BMbx 2/11/22: Cellular bone marrow with erythroid predominant trilineage hematopoiesis No morphologic or immunophenotypic evidence of persistent acute myeloid leukemia Non-necrotizing granulomas Normal male karyotype and normal onkosit myeloid NGS panel study.  He was admitted for back pain 2/2 herniated disk - Rx'd pain meds and a Medrol pack.   Pt went to ED due to acute RUQ pain on 4/16, patient underwent laparoscopic cholecystotomy on 4/17.   He reports feeling well. He reports having severe anxiety when he sees any bruising or gum bleeding. He has seen a therapist but would like to see someone else.  Saw psych Dr. Lozano on 7/18 TEB He is getting PT for his back pain.  He occasionally has some gum bleeding still.   Pt was seen today accompanied by his father. He feels well, eating healthy food and doing exercise. Will go back to work this month.  He f/u w/psych Dr. Lozano monthly, currently is on Zoloft 100mg dialy, and Zolpidem aHS prn for insomnia Denied any new complaints.  Rapid test at home showed COVID 19 positive on last Wed 10/12. Patient's father was tested positive on Friday as well. He had scratch throat, fatigue and low grade fever. Pt didn't receive any COVID 19 meds, admitted he felt better, only slight fatigue, denied fever/chill/SOB.  CBC today stable.   NPM1Q to [facility] lab on 10/19: negative He went to ED on 11/19 for rectal abscess, I&D done in the ED.   He is doing well. No new complaints.  He is not back to work yet.  Last NPM1Q was checked 1/12/23: negative  3/9/23: PAtient is here today for follow up. Reports to be doing well. He does state that he continues to have some loose stools for which he will be following up with Gi for. He also reports that he has been having back pain. He was previously on PT and will be returning to PT now that insurance issues are resolved. He reports to be having a low sex drive and noticing that his testicles are shrunken.   5/8: pt was seen today for a f/u apt accompanied by his father. pt f/u w/ Uro Dr. Molina and started testosterone 2 wks ago for low testosterone level.  He is doing well otherwise, denied any complaints.   7/31: Having worsening back pain radiating down legs b/l. He has appointment with spine specialist on Fri.  9/11: pt followed up w/orth spine specialist Dr. Solis last month for worsening back pain, L spine MR done 8/9/23: Multilevel degenerative changes throughout the lumbar spine. Back pain improved with PT.  He will have anal fissure and hemorrhoid surgery, waiting for apt to be scheduled.   11/6/23: s/p fistulotomy on 10/20/23.  Doing well.  12/22:  Patient is seen today for a f/u apt accompanied by his father. He feels well overall. Denied any new complaints.  HR elevated today 115 bpm after he drank [employer] coffee.   2/5/2024: He is going back to work next month.  6/17/24: last NMP1 test negative in 4/2024. No acute problems, denies fatigue/f/c. Labs normal today 8/14/24:  Patient is seen today for a f/u apt accompanied by his father. He feels well overall. Denied any new complaints. Gained weight.  9/30/24: pt presented today for an urgent apt, c/o felt tired recently after he started a new job that he worked 6 days a week. Denied any fever chills or bleeding issues.  CBC today WNL  and pt is much relieved.

## 2024-09-30 NOTE — PHYSICAL EXAM
[Fully active, able to carry on all pre-disease performance without restriction] : Status 0 - Fully active, able to carry on all pre-disease performance without restriction [Normal] : affect appropriate [de-identified] : a 1 cm long laceration with sutures on the right thrum, healing well

## 2024-09-30 NOTE — HISTORY OF PRESENT ILLNESS
[de-identified] : 35yo M w/ HTN and newly diagnosed AML here for f/u.   He presented to the hospital on 7/30/21 with complaints of dizziness, fatigue and severe RUQ pain for 3 days. CT A/P revealed fatty liver and small R pleural effusion. WBC 12k with 17% blasts. Peripheral flow cytometry showed 13% myeloblasts. FLT3(-). BMbx was done on 8/4/21 - confirmed AML. 46,XY                                {20                                         } Foundation: mutations in DNMT3A R882H, NRAS G12D, NPM1 W288fs*12 Pt consented to [place]. Patient was offered sperm banking, but declined. On 8/6, induction with Dauno/Cytararbine was started (cytarabine 100/m2 and dauno 90/m2)  He received a seven day course of Zosyn for presumed RUL PNA, then transitioned to  levaquin, posaconazole and Acyclovir for ppx for neutropenia. Course c/b neutropenic fevers, cultures negative, repeat CT 8/14 without infectious source. He was on Cefepime but developed a rash, changed to meropenem. Rash improved.  Day 14 BMbx on 8/19 was hypocellular with chemotherapeutic effect; however, per hematopathology, appears to be earlier regeneration than would typically seen which is concerning for persistent disease at this point, and the earliest cells are CD34 positive and his myeloblasts on initial presentation were CD34 negative. Thus, this may simply represent early regeneration of his marrow. Plan is to await count recovery and repeat biopsy.   c/o hemorrhoidal pain. Hemorrhoids started a few days prior to discharge. He was sent home with rectal ointment and witch hazel.  Patient discharged home on 8/29/21 when ANC >500 [de-identified] : BMBx done on 9/2/2021: Cellular bone marrow with trilineage hematopoiesis with maturation and megakaryocytosis (history of AML). Pt feels well, CBC today showed count stable  s/p C1 HIDAC 9/17-9/22  c/o leg pain after chemo in the hospital   He presented to the ED on 10/9 due to fever the night of presentation. The patient went to sleep around 10pm and woke up at 3am feeling warm and clammy. He took his temperature orally at home and it was 100.7. The day prior to presentation (10/8/21), the patient went to [facility] for an appointment to get his platelet count checked. He states he was feeling a bit run down and thought he was going to need a transfusion, but he did not need a transfusion. He was afebrile during the time of the appointment and went home afterwards. Around 3pm, the patient had bilateral crampy leg pain that was similar to the leg pain he felt during his consolidation therapy treatment. He took hydrocodone and the pain improved. The patient had one episode of diarrhea three days prior to presentation and has been bothered by rectal pain associated with his "large hemorrhoids. He denies any bleeding per rectum. He also feels like his mouth has been more dry than usual over the past several days, but denies all other symptoms.  CT Abdomen and Pelvis w/ Oral Cont and w/ IV Conttrast on 10/9 revealed Region of hypoenhancement upper pole left kidney; please correlate clinically for possible pyelonephritis. No hydronephrosis or perinephric stranding. No rectal abscess. CT Chest No Contrast on 10/9 revealed Clear lungs. 10/9- BCX NGTD and 10/9- UCX (-) He ate some cold salad late last night, had upsetting stomach and diarrhea this morning. Will take Imodium for diarrhea.   C2 is due on 10/15, pt wants deferring to next Monday after his diarrhea improved.  Admission of  C2 was postponed due to worsening diarrhea. Went to ED on 10/15 due to worsening watery diarrhea. GI PCR neg, C Diff neg Given negative stool studies, suspect diarrhea was likely chemo-related. S/p cycle 2 Consolidation with HIDAC started on 10/25. Patient received IV hydration, strict I/O, antiemetics, monitoring of CBC and CMP and for cerebellar toxicity. PRedforte eye drops given to prevent chemical conjunctivitis. Patient with fever throughout course of treatment. Cultures negative. Due to fever probably related to HIDAC, patient received dexamethasone prior to the last 2 doses of cytatabine.  He is seen today accompanied by his father, pt feels fatigue after chemo, other than that he feels fine. Denied any fever, chills diarrhea.   Pt was admitted on 11/13-11/17 s/p right first digit laceration repair on 11/12 and presenting with erythema and pain at the site of laceration repair. Patient reported he was feeling unwell prior to suture placement with body aches, chills, night sweats and subjective fevers. Patient last BM 4 days ago. Has bruise to left inner thigh. Patient reports he keeps his PICC site clean and intact which was placed in 9/2021. Upon admission to the hospital ID was consulted started on Zosyn , GI consulted for rectal pain secondary to hemorrhoids and palliative consulted for pain control.   C3 on 11/26, tolerated well. He was seen today after discharge, accompanied by his father. He admitted feeling tired, denied any f/c, diarrhea. Right hand suture site healing well, no abnormal erythema or discharge.  He will have Fulphilia today.  C3 HIDAC 11/26-12/1 He presents to the hospital due to epistaxis and neutropenic fever w/Temp 100.3 on 12/1. Per patient, he reports that he was feeling sinus congestion and pressure on his L side, blew his nose and bleeding began from L nare without improvement prompting arrival to the emergency department. Feels mild L sinus congestion.  L nasal cavity packed with absorbable packing; F/U ENT clinic outpatient. Pan culture obtained-with No growth currently CBC rechecked today recovered. He feels well overall , had lower abdominal pain last night after ate cheese, now improved. Denied any fever chills bloody stool or diarrhea.   s/p C4 HiDAC 1/5/22 Pt has known grade 1 AST and grade 3 ALT transaminitis. Presented this admission with transamintitis. Abd sono  performed and revealed Borderline/mild hepatomegaly with hepatic steatosis. Splenomegaly. Focal area of left upper pole increased renal cortical echogenicity, corresponding to an area of hypoattenuation seen on prior CT chest,  abdomen and pelvis dated 10/9/2021. This is nonspecific and may reflect focal edema. Patient received IV hydration, antiemetics, monitoring of CBC and electrolytes. Predforte eye drops provided to prevent chemical conjunctivitis. Patient was monitored for cerebellar toxicity. Nystagmus checks performed. Hospital course complicated by fever blood cultures showed (-), most likely febrile secondary to HIDAC treatment. To receive Fulphila today.  Pt presented to the hospital on 1/18/22 due fever of 100.4, weakness, decreased WBC and shortness of breath while at home. Patient reports that his pain is more controlled s/p pain medication and his shortness of breath has resolved. He denied chills, cough, chest pain, palpitations. Pan culture (blood +Ucx) were negative, CXR reveals clear lungs, COVID PCR - not detect. Pt started on empiric Zosyn, Diflucan and Acyclovir added prophylactically for neutropenic fever. He was transfused with platelets and PRBC as per supportive parameters (>10k/>7) respectively. He feels well overall now, denied any fever, chills or pain.   BMbx 2/11/22: Cellular bone marrow with erythroid predominant trilineage hematopoiesis No morphologic or immunophenotypic evidence of persistent acute myeloid leukemia Non-necrotizing granulomas Normal male karyotype and normal onkosit myeloid NGS panel study.  He was admitted for back pain 2/2 herniated disk - Rx'd pain meds and a Medrol pack.   Pt went to ED due to acute RUQ pain on 4/16, patient underwent laparoscopic cholecystotomy on 4/17.   He reports feeling well. He reports having severe anxiety when he sees any bruising or gum bleeding. He has seen a therapist but would like to see someone else.  Saw psych Dr. Lozano on 7/18 TEB He is getting PT for his back pain.  He occasionally has some gum bleeding still.   Pt was seen today accompanied by his father. He feels well, eating healthy food and doing exercise. Will go back to work this month.  He f/u w/psych Dr. Lozano monthly, currently is on Zoloft 100mg dialy, and Zolpidem aHS prn for insomnia Denied any new complaints.  Rapid test at home showed COVID 19 positive on last Wed 10/12. Patient's father was tested positive on Friday as well. He had scratch throat, fatigue and low grade fever. Pt didn't receive any COVID 19 meds, admitted he felt better, only slight fatigue, denied fever/chill/SOB.  CBC today stable.   NPM1Q to [facility] lab on 10/19: negative He went to ED on 11/19 for rectal abscess, I&D done in the ED.   He is doing well. No new complaints.  He is not back to work yet.  Last NPM1Q was checked 1/12/23: negative  3/9/23: PAtient is here today for follow up. Reports to be doing well. He does state that he continues to have some loose stools for which he will be following up with Gi for. He also reports that he has been having back pain. He was previously on PT and will be returning to PT now that insurance issues are resolved. He reports to be having a low sex drive and noticing that his testicles are shrunken.   5/8: pt was seen today for a f/u apt accompanied by his father. pt f/u w/ Uro Dr. Molina and started testosterone 2 wks ago for low testosterone level.  He is doing well otherwise, denied any complaints.   7/31: Having worsening back pain radiating down legs b/l. He has appointment with spine specialist on Fri.  9/11: pt followed up w/orth spine specialist Dr. Solis last month for worsening back pain, L spine MR done 8/9/23: Multilevel degenerative changes throughout the lumbar spine. Back pain improved with PT.  He will have anal fissure and hemorrhoid surgery, waiting for apt to be scheduled.   11/6/23: s/p fistulotomy on 10/20/23.  Doing well.  12/22:  Patient is seen today for a f/u apt accompanied by his father. He feels well overall. Denied any new complaints.  HR elevated today 115 bpm after he drank [employer] coffee.   2/5/2024: He is going back to work next month.  6/17/24: last NMP1 test negative in 4/2024. No acute problems, denies fatigue/f/c. Labs normal today 8/14/24:  Patient is seen today for a f/u apt accompanied by his father. He feels well overall. Denied any new complaints. Gained weight.  9/30/24: pt presented today for an urgent apt, c/o felt tired recently after he started a new job that he worked 6 days a week. Denied any fever chills or bleeding issues.  CBC today WNL  and pt is much relieved.

## 2024-09-30 NOTE — ASSESSMENT
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 Started induction with Dauno/Cytarabine on 8/6/21. Pt's counts now recovered, remission marrow done on 9/2- in remission. Proceed to consolidation with 4 cycles of HIDAC. C1 HIDAC on 9/17, c/b neutropenic fever and diarrhea. C2 HIDAC on 10/25, was postponed for 1 week due to admission for diarrhea, given negative stool studies, suspect diarrhea was likely chemo-related. Pt was admitted again 11/13-11/17 for sepsis due to thumb cellulitis after laceration. C3 on 11/26, had Fulphilia on C3 c/b epistaxis and neutropenic fever w/Temp 100.3. C4 HiDAC 1/5/22, c/b transaminitis and neutropenic fever He had laparoscopic cholecystostomy on 4/17. s/p BMbx 2/11/22 showing CR. He has non-necrotizing granulomas noted in BMbx, unclear significance CBC today WNL, prior slightly thrombocytopenia resolved -results reviewed with pt;  NPM1Q to [facility] lab done in 08/2024: negative, will monitor every 3 months Discussed supportive groups to help with anxiety of cancer relapse -now on Zoloft and Wellbutrin which helping cont f/u with Dr. Molina for low testosterone levels, he started taking testosterone end of April. s/p fistulotomy on 10/20/23 All questions answered RTC every 3 mo.    Case and management discussed with Dr. Yancey

## 2024-10-01 NOTE — REVIEW OF SYSTEMS
[Negative] : Allergic/Immunologic [FreeTextEntry7] : hemorrhoidal pain [FreeTextEntry9] : lower back pain Patient/Caregiver provided printed discharge information.

## 2024-10-03 LAB
ALBUMIN SERPL ELPH-MCNC: 4.6 G/DL
ALP BLD-CCNC: 104 U/L
ALT SERPL-CCNC: 73 U/L
ANION GAP SERPL CALC-SCNC: 13 MMOL/L
AST SERPL-CCNC: 31 U/L
BILIRUB SERPL-MCNC: 0.3 MG/DL
BUN SERPL-MCNC: 18 MG/DL
CALCIUM SERPL-MCNC: 10.3 MG/DL
CHLORIDE SERPL-SCNC: 103 MMOL/L
CO2 SERPL-SCNC: 24 MMOL/L
CREAT SERPL-MCNC: 1.18 MG/DL
EGFR: 80 ML/MIN/1.73M2
GLUCOSE SERPL-MCNC: 93 MG/DL
LDH SERPL-CCNC: 152 U/L
POTASSIUM SERPL-SCNC: 4.2 MMOL/L
PROT SERPL-MCNC: 7.1 G/DL
SODIUM SERPL-SCNC: 139 MMOL/L

## 2024-10-10 NOTE — ASU PATIENT PROFILE, ADULT - TRANSFUSION PREMEDICATION REQUIRED
Addended by: MICHELLE MALLOY on: 10/10/2024 12:42 PM     Modules accepted: Orders    
diphenhydramine

## 2024-11-07 NOTE — ED ADULT NURSE NOTE - CAS EDN DISCHARGE ASSESSMENT
Spoke to mom to see if she wanted to schedule the preop visit. Mom stated that they are unsure if they are going to go through with the surgery. They are going to have a second opinion soon. Mom stated that she will call to schedule if they decide to go forward with the surgery. Mom denied questions   Alert and oriented to person, place and time/Patient baseline mental status/Awake

## 2024-11-11 ENCOUNTER — RESULT REVIEW (OUTPATIENT)
Age: 39
End: 2024-11-11

## 2024-11-11 ENCOUNTER — APPOINTMENT (OUTPATIENT)
Dept: HEMATOLOGY ONCOLOGY | Facility: CLINIC | Age: 39
End: 2024-11-11
Payer: MEDICARE

## 2024-11-11 VITALS
DIASTOLIC BLOOD PRESSURE: 80 MMHG | RESPIRATION RATE: 16 BRPM | OXYGEN SATURATION: 97 % | BODY MASS INDEX: 32.13 KG/M2 | TEMPERATURE: 97.1 F | SYSTOLIC BLOOD PRESSURE: 112 MMHG | WEIGHT: 230.38 LBS | HEART RATE: 101 BPM

## 2024-11-11 DIAGNOSIS — C92.01 ACUTE MYELOBLASTIC LEUKEMIA, IN REMISSION: ICD-10-CM

## 2024-11-11 LAB
BASOPHILS # BLD AUTO: 0.02 K/UL — SIGNIFICANT CHANGE UP (ref 0–0.2)
BASOPHILS NFR BLD AUTO: 0.4 % — SIGNIFICANT CHANGE UP (ref 0–2)
EOSINOPHIL # BLD AUTO: 0.05 K/UL — SIGNIFICANT CHANGE UP (ref 0–0.5)
EOSINOPHIL NFR BLD AUTO: 0.9 % — SIGNIFICANT CHANGE UP (ref 0–6)
HCT VFR BLD CALC: 43.6 % — SIGNIFICANT CHANGE UP (ref 39–50)
HGB BLD-MCNC: 14.9 G/DL — SIGNIFICANT CHANGE UP (ref 13–17)
IMM GRANULOCYTES NFR BLD AUTO: 0.2 % — SIGNIFICANT CHANGE UP (ref 0–0.9)
LYMPHOCYTES # BLD AUTO: 1.75 K/UL — SIGNIFICANT CHANGE UP (ref 1–3.3)
LYMPHOCYTES # BLD AUTO: 32.2 % — SIGNIFICANT CHANGE UP (ref 13–44)
MCHC RBC-ENTMCNC: 31.5 PG — SIGNIFICANT CHANGE UP (ref 27–34)
MCHC RBC-ENTMCNC: 34.2 G/DL — SIGNIFICANT CHANGE UP (ref 32–36)
MCV RBC AUTO: 92.2 FL — SIGNIFICANT CHANGE UP (ref 80–100)
MONOCYTES # BLD AUTO: 0.39 K/UL — SIGNIFICANT CHANGE UP (ref 0–0.9)
MONOCYTES NFR BLD AUTO: 7.2 % — SIGNIFICANT CHANGE UP (ref 2–14)
NEUTROPHILS # BLD AUTO: 3.22 K/UL — SIGNIFICANT CHANGE UP (ref 1.8–7.4)
NEUTROPHILS NFR BLD AUTO: 59.1 % — SIGNIFICANT CHANGE UP (ref 43–77)
NRBC # BLD: 0 /100 WBCS — SIGNIFICANT CHANGE UP (ref 0–0)
NRBC BLD-RTO: 0 /100 WBCS — SIGNIFICANT CHANGE UP (ref 0–0)
PLATELET # BLD AUTO: 184 K/UL — SIGNIFICANT CHANGE UP (ref 150–400)
RBC # BLD: 4.73 M/UL — SIGNIFICANT CHANGE UP (ref 4.2–5.8)
RBC # FLD: 12.1 % — SIGNIFICANT CHANGE UP (ref 10.3–14.5)
WBC # BLD: 5.44 K/UL — SIGNIFICANT CHANGE UP (ref 3.8–10.5)
WBC # FLD AUTO: 5.44 K/UL — SIGNIFICANT CHANGE UP (ref 3.8–10.5)

## 2024-11-11 PROCEDURE — G2211 COMPLEX E/M VISIT ADD ON: CPT

## 2024-11-11 PROCEDURE — 99213 OFFICE O/P EST LOW 20 MIN: CPT

## 2024-11-15 LAB
ALBUMIN SERPL ELPH-MCNC: 5 G/DL
ALP BLD-CCNC: 99 U/L
ALT SERPL-CCNC: 77 U/L
ANION GAP SERPL CALC-SCNC: 15 MMOL/L
AST SERPL-CCNC: 35 U/L
BILIRUB SERPL-MCNC: 0.7 MG/DL
BUN SERPL-MCNC: 18 MG/DL
CALCIUM SERPL-MCNC: 10.4 MG/DL
CHLORIDE SERPL-SCNC: 101 MMOL/L
CO2 SERPL-SCNC: 23 MMOL/L
CREAT SERPL-MCNC: 1.25 MG/DL
EGFR: 75 ML/MIN/1.73M2
GLUCOSE SERPL-MCNC: 98 MG/DL
POTASSIUM SERPL-SCNC: 4 MMOL/L
PROT SERPL-MCNC: 7.7 G/DL
SODIUM SERPL-SCNC: 139 MMOL/L

## 2024-11-18 LAB
INTERPRETATION: NORMAL
SIGNING PATHOLOGIST: NORMAL
SPECIMEN TYPE: NORMAL

## 2024-11-18 NOTE — PROVIDER CONTACT NOTE (OTHER) - BACKGROUND
Scheduled date of EGD(as of today): 12/11/24  Physician performing EGD: Elva  Location of EGD: [facility]  Instructions reviewed with patient by: MARIA FERNANDA  Clearances: N/A    : Aurea Mccauley [phone]  
AML
AML

## 2024-11-19 ENCOUNTER — NON-APPOINTMENT (OUTPATIENT)
Age: 39
End: 2024-11-19

## 2024-11-26 NOTE — PATIENT PROFILE ADULT - DOES PATIENT HAVE ADVANCE DIRECTIVE
Intubation    Date/Time: 11/25/2024 5:26 PM    Performed by: Kristian Tavares CRNA  Authorized by: Yola Murillo MD    Intubation:     Induction:  Intravenous    Intubated:  Postinduction    Mask Ventilation:  Easy mask    Attempts:  1    Attempted By:  Student    Method of Intubation:  Video laryngoscopy    Blade:  Duran 4    Laryngeal View Grade: Grade I - full view of cords      Difficult Airway Encountered?: No      Complications:  None    Airway Device:  Oral endotracheal tube    Airway Device Size:  7.0    Style/Cuff Inflation:  Cuffed (inflated to minimal occlusive pressure)    Tube secured:  21    Secured at:  The lips    Placement Verified By:  Capnometry    Complicating Factors:  None    Findings Post-Intubation:  BS equal bilateral and atraumatic/condition of teeth unchanged      
No

## 2024-12-04 NOTE — ED ADULT TRIAGE NOTE - BSA (M2)
Noted. Thanks     [place] maybe get the note from Dr Crisostomo just to have in chart.   
Received response from Dr Crisostomo office and pt was a no show for scheduled appt   
requested  
2.09

## 2024-12-24 NOTE — H&P PST ADULT - GASTROINTESTINAL
"RN notified by YANI Lu of pt found standing in the doorway of the bathroom, covered in his own feces. Room appeared to have feces and urine throughout on the floor. Pt stated "I really had to go." Pt typically known as compliant to call before getting up post fall on 12/18. Orientation questions asked, pt seemed disoriented to situation and mentally foggy. RN educated pt on importance of calling prior to getting OOB since pt is high fall risk. Pt verbalized understanding by reiterating education. PCT close to room for closer monitoring. Side rails up x3 for safety.   " [facility] OCCUPATIONAL THERAPY EVALUATION - ACUTE     NAME: Jenny Monahan  : 1937 (80 y.o.)  MRN: [id number]  CODE STATUS: Full Code  Room: Z683/A721-47    Date of Service: 5/3/2021    Patient Diagnosis(es): Heart failure, unspecified ([facility] [address] [I50.9]  Acute on chronic combined systolic and diastolic CHF (congestive heart failure) ([facility] [address] [I50.43]   Chief Complaint   Patient presents with    Shortness of Breath     ongoing for 2-3 weeks; per pt's wife, pt has also been confused since yesterday, had rick colored urine, and bright-red rectal bleeding     Patient Active Problem List    Diagnosis Date Noted    Acute on chronic combined systolic and diastolic CHF (congestive heart failure) ([facility] [address] [facility] 2021    Heart failure, unspecified ([facility] [address] 2021    Neurogenic orthostatic hypotension ([facility] [address] 10/05/2020    Generalized osteoarthritis 10/02/2020    PD (Parkinson's disease) ([address] 2019    Long term current use of anticoagulant therapy 2019    Ischemic cardiomyopathy 2019    Hx of CABG 2019    Macular degeneration of left eye 2019    Legally blind 2019    Diverticulosis of colon (without mention of hemorrhage) 2019    Obesity (BMI 30-39.9) 2019    Hearing loss 2019    Anemia 2019    Glaucoma of left eye 2019    NSVT (nonsustained ventricular tachycardia) ([facility] 2019    Abnormal gait 2019    Atrial fibrillation ([address] 2019    Hypertensive heart disease with heart failure ([address] 2019    Cardiomyopathy ([facility] [address] 2019    Peripheral vascular disease, unspecified ([facility] [address] 2019    H/O: drug dependency ([address] 2019    Hyperlipidemia, unspecified 2019    Transient ischemic attack 2018    Blindness, one eye, unspecified eye 2018    Long term current use of aspirin 2018    Status post colostomy ([address] 2018    Presence of cardiac pacemaker 2018    Chronic obstructive pulmonary disease ([address] 06/21/2017    Pelvic mass 02/13/2017    Normocytic anemia 03/11/2016    CKD (chronic kidney disease) stage 3, GFR 30-59 ml/min ([facility] 09/25/2015    Stage 2 chronic kidney disease 09/25/2015    Squamous cell carcinoma of lung ([facility] [address] 04/07/2015    Essential (primary) hypertension 10/02/2014    Tremor 10/02/2014    Generalized anxiety disorder 10/02/2014    Presence of automatic (implantable) cardiac defibrillator 10/02/2014    H/O fracture 10/02/2014    Anxiety 10/02/2014    Congestive heart failure, unspecified 07/01/2014    History of malignant neoplasm of thoracic cavity structure 01/01/1999        Past Medical History:   Diagnosis Date    Cardiac defibrillator in place     CKD (chronic kidney disease) stage 2, GFR 60-89 ml/min     COPD (chronic obstructive pulmonary disease) ([facility])     Dr Tessie Tellez    Coronary artery disease involving native heart 2004    managed by Dr Logan Barr.  Diastolic dysfunction     managed by Dr Logan Barr.  Diverticulosis of colon (without mention of hemorrhage)     Generalized anxiety disorder     Generalized osteoarthritis 10/02/2020    Glaucoma, left eye     managed by Dr Bruno Jones History of CHF (congestive heart failure) 07/2014    managed by Dr Logan Barr.  History of lung cancer 2017    squamous cell, left base, had wedge resection Dr Maryt Humphries History of prostate cancer 1999    prostatectomy --remission    History of rib fracture     left 9th and 10th ribs.     Hx of CABG 2008    Hyperlipidemia     Hypertension 2004    Hyperuricemia     Macular degeneration, left eye     managed by Dr Mark Segura    Mild cognitive impairment     Neurogenic orthostatic hypotension ([address]     Nocturnal hypoxemia     PAF (paroxysmal atrial fibrillation) ([facility])     [facility], Dr Rubi Rad disease [facility])     Dr Nelia Causey Primary hypothyroidism 02/05/2015    Primary lung squamous cell carcinoma ([address]     Prostate cancer ([address] 01/01/1999 prostatectomy --remission    Status post colostomy ([facility] [address] 08/2014    Dr Manjeet Piedra, perforated diverticulitis    Tubular adenoma of colon 2015    Dr Joel Fitch     Past Surgical History:   Procedure Laterality Date    CARDIAC DEFIBRILLATOR PLACEMENT  2013    [facility] [employer] Defibrillator NOT MRI Compatable [id number]    COLONOSCOPY  6/4/15    DR. Jasmyn Ernst COLOSTOMY  8/13/14    Dr Krystian Clemente GRAFT  2004    CABG X 4    HEMIARTHROPLASTY HIP Right 4/28/2019    HIP HEMIARTHROPLASTY performed by Chantale Jones MD at [address], PARTIAL Left 3/13/2015    wedge resection of left lung lower lobe    OTHER SURGICAL HISTORY  8/13/14    Exploratory laparotomy with sigmoid colectomy of end sigmoid colosotomy        Restrictions:FAll,colostomy        Safety Devices: Safety Devices  Safety Devices in place: Yes  Type of devices: All fall risk precautions in place   Initially in place: No    Subjective:\"I'm not going back to that place. \"       Pain Reassessment: 0/10 pain reported. Prior Level of Function:  Social/Functional History  Lives With: Significant other  Type of Home: House  Home Layout: One level  Home Access: Stairs to enter without rails  Entrance Stairs - Number of Steps: 1  Bathroom Shower/Tub: Walk-in shower  Bathroom Equipment: Grab bars in shower, Shower chair  Home Equipment: Rolling walker, Cane, [address] Help From: Home health  ADL Assistance: Independent  Homemaking Responsibilities: No  Transfer Assistance: Independent  Additional Comments: Pt was recently in SNF and discharged home approximately 1 week prior to admission    OBJECTIVE:     Orientation Status:  Orientation  Overall Orientation Status: Within Functional Limits    Observation:  Observation/Palpation  Posture: Fair  Observation: bilateral shoulders rounded and head forward.   colostomy present    Cognition Status:  Cognition  Cognition Comment: follows one step commands consistently    Perception Status:  Perception  Overall Perceptual Status: WFL    Sensation Status:  Sensation  Overall Sensation Status: [facility]    Vision and Hearing Status:  Vision  Vision: Impaired  Vision Exceptions: Wears glasses for reading  Hearing  Hearing: Exceptions to [facility]  Hearing Exceptions: Hard of hearing/hearing concerns, No hearing aid(Pt reports that he has hearing aids that he does not wear.)     ROM:   LUE AROM (degrees)  LUE AROM : WFL  Left Hand AROM (degrees)  Left Hand AROM: WFL  RUE AROM (degrees)  RUE AROM : WFL  Right Hand AROM (degrees)  Right Hand AROM: WFL    Strength:  LUE Strength  Gross LUE Strength: WFL  L Hand General: 4/5  RUE Strength  Gross RUE Strength: WFL  R Hand General: 4/5    Coordination, Tone, Quality of Movement:    Tone RUE  RUE Tone: Normotonic  Tone LUE  LUE Tone: Normotonic  Coordination  Movements Are Fluid And Coordinated: No  Coordination and Movement description: Decreased speed, Right UE, Left UE    Hand Dominance:  Hand Dominance  Hand Dominance: Left    ADL Status:  ADL  Feeding: Unable to assess(comment)  Grooming: Stand by assistance  UE Bathing: Minimal assistance  LE Bathing: Minimal assistance  UE Dressing: Minimal assistance  LE Dressing: Minimal assistance  Toileting: Unable to assess(comment)          Therapy key for assistance levels -   Independent = Pt. is able to perform task with no assistance but may require a device   Stand by assistance = Pt. does not perform task at an independent level but does not need physical assistance, requires verbal cues  Minimal, Moderate, Maximal Assistance = Pt. requires physical assistance (25%, 50%, 75% assist from helper) for task but is able to actively participate in task   Dependent = Pt. requires total assistance with task and is not able to actively participate with task completion     Functional Mobility:     Transfers  Sit to stand: Contact guard assistance  Stand to sit: Contact guard assistance    Bed Mobility  Bed mobility  Supine to Sit: Stand by assistance  Sit to Supine: Stand by assistance  Scooting: Stand by assistance    Seated and Standing Balance:  Balance  Sitting Balance: Supervision  Standing Balance: Minimal assistance    Functional Endurance:  Activity Tolerance  Activity Tolerance: Patient limited by fatigue    D/C Recommendations:  OT D/C RECOMMENDATIONS  REQUIRES OT FOLLOW UP: Yes    Equipment Recommendations:       OT Education:        OT Follow Up:  OT D/C RECOMMENDATIONS  REQUIRES OT FOLLOW UP: Yes       Assessment/Discharge Disposition:     Performance deficits / Impairments: Decreased functional mobility , Decreased balance, Decreased ADL status, Decreased endurance, Decreased strength, Decreased posture  Prognosis: Good  Discharge Recommendations: Continue to assess pending progress  Decision Making: Medium Complexity  History: multiple  Exam: 6 deficits  Assistance / Modification: Min A    Six Click Score    How much help for putting on and taking off regular lower body clothing?: A Little  How much help for Bathing?: A Little  How much help for Toileting?: None  How much help for putting on and taking off regular upper body clothing?: None  How much help for taking care of personal grooming?: None  How much help for eating meals?: None  AM-[facility] Inpatient Daily Activity Raw Score: 22  AM-PAC Inpatient ADL T-Scale Score : 47.1  ADL Inpatient CMS 0-100% Score: 25.8    Plan:  Plan  Times per week: 1-4x/wk  Current Treatment Recommendations: Strengthening, Endurance Training, Neuromuscular Re-education, Self-Care / ADL, Balance Training, Functional Mobility Training    Goals:   Patient will:    - Improve functional endurance to tolerate/complete 8-12 mins of ADL's  - Be supervision in UB ADLs   - Be set up in LB ADLs  - Be supervision in ADL transfers without LOB  - Be independent in toileting tasks  - Improve bilateral UE strength and endurance to Fair in order to participate in self-care activities as projected.     Patient Goal: Patient goals : Not stated at this time      Discussed and agreed upon: Yes Comments:     Therapy Time:   OT Individual Minutes  Time In: 5896  Time Out: 1510  Minutes: 25    Eval: 25 minutes     Electronically signed by:    ERIKA Fine  2/7/6147, 3:33 PM Electronically signed by ERIKA Fine on 5/3/78 at 3:31 PM EDT negative nontender

## 2025-03-05 ENCOUNTER — OUTPATIENT (OUTPATIENT)
Dept: OUTPATIENT SERVICES | Facility: HOSPITAL | Age: 40
LOS: 1 days | Discharge: HOME | End: 2025-03-05

## 2025-03-05 DIAGNOSIS — Z98.890 OTHER SPECIFIED POSTPROCEDURAL STATES: Chronic | ICD-10-CM

## 2025-03-05 DIAGNOSIS — C92.00 ACUTE MYELOBLASTIC LEUKEMIA, NOT HAVING ACHIEVED REMISSION: ICD-10-CM

## 2025-03-05 DIAGNOSIS — Z90.49 ACQUIRED ABSENCE OF OTHER SPECIFIED PARTS OF DIGESTIVE TRACT: Chronic | ICD-10-CM

## 2025-03-06 ENCOUNTER — RESULT REVIEW (OUTPATIENT)
Age: 40
End: 2025-03-06

## 2025-03-06 ENCOUNTER — APPOINTMENT (OUTPATIENT)
Dept: HEMATOLOGY ONCOLOGY | Facility: CLINIC | Age: 40
End: 2025-03-06
Payer: COMMERCIAL

## 2025-03-06 ENCOUNTER — APPOINTMENT (OUTPATIENT)
Dept: HEMATOLOGY ONCOLOGY | Facility: CLINIC | Age: 40
End: 2025-03-06

## 2025-03-06 VITALS
RESPIRATION RATE: 17 BRPM | BODY MASS INDEX: 32.28 KG/M2 | WEIGHT: 230.58 LBS | SYSTOLIC BLOOD PRESSURE: 131 MMHG | OXYGEN SATURATION: 97 % | DIASTOLIC BLOOD PRESSURE: 85 MMHG | TEMPERATURE: 97.3 F | HEIGHT: 71 IN | HEART RATE: 100 BPM

## 2025-03-06 DIAGNOSIS — C92.01 ACUTE MYELOBLASTIC LEUKEMIA, IN REMISSION: ICD-10-CM

## 2025-03-06 LAB
BASOPHILS # BLD AUTO: 0.02 K/UL — SIGNIFICANT CHANGE UP (ref 0–0.2)
BASOPHILS NFR BLD AUTO: 0.3 % — SIGNIFICANT CHANGE UP (ref 0–2)
EOSINOPHIL # BLD AUTO: 0.06 K/UL — SIGNIFICANT CHANGE UP (ref 0–0.5)
EOSINOPHIL NFR BLD AUTO: 1 % — SIGNIFICANT CHANGE UP (ref 0–6)
HCT VFR BLD CALC: 40 % — SIGNIFICANT CHANGE UP (ref 39–50)
HGB BLD-MCNC: 14.2 G/DL — SIGNIFICANT CHANGE UP (ref 13–17)
IMM GRANULOCYTES NFR BLD AUTO: 0.3 % — SIGNIFICANT CHANGE UP (ref 0–0.9)
LYMPHOCYTES # BLD AUTO: 2.22 K/UL — SIGNIFICANT CHANGE UP (ref 1–3.3)
LYMPHOCYTES # BLD AUTO: 35.7 % — SIGNIFICANT CHANGE UP (ref 13–44)
MCHC RBC-ENTMCNC: 31.8 PG — SIGNIFICANT CHANGE UP (ref 27–34)
MCHC RBC-ENTMCNC: 35.5 G/DL — SIGNIFICANT CHANGE UP (ref 32–36)
MCV RBC AUTO: 89.5 FL — SIGNIFICANT CHANGE UP (ref 80–100)
MONOCYTES # BLD AUTO: 0.49 K/UL — SIGNIFICANT CHANGE UP (ref 0–0.9)
MONOCYTES NFR BLD AUTO: 7.9 % — SIGNIFICANT CHANGE UP (ref 2–14)
NEUTROPHILS # BLD AUTO: 3.41 K/UL — SIGNIFICANT CHANGE UP (ref 1.8–7.4)
NEUTROPHILS NFR BLD AUTO: 54.8 % — SIGNIFICANT CHANGE UP (ref 43–77)
NRBC BLD AUTO-RTO: 0 /100 WBCS — SIGNIFICANT CHANGE UP (ref 0–0)
PLATELET # BLD AUTO: 145 K/UL — LOW (ref 150–400)
RBC # BLD: 4.47 M/UL — SIGNIFICANT CHANGE UP (ref 4.2–5.8)
RBC # FLD: 12.6 % — SIGNIFICANT CHANGE UP (ref 10.3–14.5)
WBC # BLD: 6.22 K/UL — SIGNIFICANT CHANGE UP (ref 3.8–10.5)
WBC # FLD AUTO: 6.22 K/UL — SIGNIFICANT CHANGE UP (ref 3.8–10.5)

## 2025-03-06 PROCEDURE — 99213 OFFICE O/P EST LOW 20 MIN: CPT

## 2025-03-06 PROCEDURE — G2211 COMPLEX E/M VISIT ADD ON: CPT | Mod: NC

## 2025-03-11 LAB
ALBUMIN SERPL ELPH-MCNC: 4.9 G/DL
ALP BLD-CCNC: 95 U/L
ALT SERPL-CCNC: 81 U/L
ANION GAP SERPL CALC-SCNC: 14 MMOL/L
AST SERPL-CCNC: 45 U/L
BILIRUB SERPL-MCNC: 0.6 MG/DL
BUN SERPL-MCNC: 26 MG/DL
CALCIUM SERPL-MCNC: 9.9 MG/DL
CHLORIDE SERPL-SCNC: 103 MMOL/L
CO2 SERPL-SCNC: 22 MMOL/L
CREAT SERPL-MCNC: 1.11 MG/DL
EGFRCR SERPLBLD CKD-EPI 2021: 87 ML/MIN/1.73M2
GLUCOSE SERPL-MCNC: 81 MG/DL
LDH SERPL-CCNC: 151 U/L
POTASSIUM SERPL-SCNC: 3.8 MMOL/L
PROT SERPL-MCNC: 7.4 G/DL
SODIUM SERPL-SCNC: 139 MMOL/L

## 2025-04-12 NOTE — PATIENT PROFILE ADULT - DOES PATIENT HAVE ADVANCE DIRECTIVE
Plan of Care Review  Plan of Care Reviewed With: patient  Progress: improving  Outcome Evaluation: Fall/safety precautions maintained. Cervical sutures intact, open to air. Tolerates and assists with enteral feeding. Ambulates with rolling walker.   No

## 2025-05-23 ENCOUNTER — TRANSCRIPTION ENCOUNTER (OUTPATIENT)
Age: 40
End: 2025-05-23

## 2025-06-04 ENCOUNTER — OUTPATIENT (OUTPATIENT)
Dept: OUTPATIENT SERVICES | Facility: HOSPITAL | Age: 40
LOS: 1 days | Discharge: HOME | End: 2025-06-04

## 2025-06-04 DIAGNOSIS — C92.00 ACUTE MYELOBLASTIC LEUKEMIA, NOT HAVING ACHIEVED REMISSION: ICD-10-CM

## 2025-06-04 DIAGNOSIS — Z98.890 OTHER SPECIFIED POSTPROCEDURAL STATES: Chronic | ICD-10-CM

## 2025-06-04 DIAGNOSIS — Z90.49 ACQUIRED ABSENCE OF OTHER SPECIFIED PARTS OF DIGESTIVE TRACT: Chronic | ICD-10-CM

## 2025-06-05 ENCOUNTER — APPOINTMENT (OUTPATIENT)
Dept: HEMATOLOGY ONCOLOGY | Facility: CLINIC | Age: 40
End: 2025-06-05
Payer: COMMERCIAL

## 2025-06-05 ENCOUNTER — RESULT REVIEW (OUTPATIENT)
Age: 40
End: 2025-06-05

## 2025-06-05 VITALS
RESPIRATION RATE: 18 BRPM | OXYGEN SATURATION: 98 % | BODY MASS INDEX: 32.2 KG/M2 | SYSTOLIC BLOOD PRESSURE: 136 MMHG | HEART RATE: 103 BPM | TEMPERATURE: 97.4 F | DIASTOLIC BLOOD PRESSURE: 96 MMHG | WEIGHT: 230 LBS | HEIGHT: 71 IN

## 2025-06-05 DIAGNOSIS — C92.01 ACUTE MYELOBLASTIC LEUKEMIA, IN REMISSION: ICD-10-CM

## 2025-06-05 LAB
BASOPHILS # BLD AUTO: 0.02 K/UL — SIGNIFICANT CHANGE UP (ref 0–0.2)
BASOPHILS NFR BLD AUTO: 0.3 % — SIGNIFICANT CHANGE UP (ref 0–2)
EOSINOPHIL # BLD AUTO: 0.04 K/UL — SIGNIFICANT CHANGE UP (ref 0–0.5)
EOSINOPHIL NFR BLD AUTO: 0.7 % — SIGNIFICANT CHANGE UP (ref 0–6)
HCT VFR BLD CALC: 44.7 % — SIGNIFICANT CHANGE UP (ref 39–50)
HGB BLD-MCNC: 15.5 G/DL — SIGNIFICANT CHANGE UP (ref 13–17)
IMM GRANULOCYTES NFR BLD AUTO: 0.3 % — SIGNIFICANT CHANGE UP (ref 0–0.9)
LYMPHOCYTES # BLD AUTO: 1.85 K/UL — SIGNIFICANT CHANGE UP (ref 1–3.3)
LYMPHOCYTES # BLD AUTO: 32.2 % — SIGNIFICANT CHANGE UP (ref 13–44)
MCHC RBC-ENTMCNC: 31.3 PG — SIGNIFICANT CHANGE UP (ref 27–34)
MCHC RBC-ENTMCNC: 34.7 G/DL — SIGNIFICANT CHANGE UP (ref 32–36)
MCV RBC AUTO: 90.3 FL — SIGNIFICANT CHANGE UP (ref 80–100)
MONOCYTES # BLD AUTO: 0.49 K/UL — SIGNIFICANT CHANGE UP (ref 0–0.9)
MONOCYTES NFR BLD AUTO: 8.5 % — SIGNIFICANT CHANGE UP (ref 2–14)
NEUTROPHILS # BLD AUTO: 3.32 K/UL — SIGNIFICANT CHANGE UP (ref 1.8–7.4)
NEUTROPHILS NFR BLD AUTO: 58 % — SIGNIFICANT CHANGE UP (ref 43–77)
NRBC BLD AUTO-RTO: 0 /100 WBCS — SIGNIFICANT CHANGE UP (ref 0–0)
PLATELET # BLD AUTO: 184 K/UL — SIGNIFICANT CHANGE UP (ref 150–400)
RBC # BLD: 4.95 M/UL — SIGNIFICANT CHANGE UP (ref 4.2–5.8)
RBC # FLD: 12.2 % — SIGNIFICANT CHANGE UP (ref 10.3–14.5)
WBC # BLD: 5.74 K/UL — SIGNIFICANT CHANGE UP (ref 3.8–10.5)
WBC # FLD AUTO: 5.74 K/UL — SIGNIFICANT CHANGE UP (ref 3.8–10.5)

## 2025-06-05 PROCEDURE — G2211 COMPLEX E/M VISIT ADD ON: CPT | Mod: NC

## 2025-06-05 PROCEDURE — 99214 OFFICE O/P EST MOD 30 MIN: CPT

## 2025-06-10 LAB
ALBUMIN SERPL ELPH-MCNC: 4.8 G/DL
ALP BLD-CCNC: 111 U/L
ALT SERPL-CCNC: 66 U/L
ANION GAP SERPL CALC-SCNC: 14 MMOL/L
AST SERPL-CCNC: 38 U/L
BILIRUB SERPL-MCNC: 0.4 MG/DL
BUN SERPL-MCNC: 18 MG/DL
CALCIUM SERPL-MCNC: 10.5 MG/DL
CHLORIDE SERPL-SCNC: 107 MMOL/L
CO2 SERPL-SCNC: 22 MMOL/L
CREAT SERPL-MCNC: 1.06 MG/DL
EGFRCR SERPLBLD CKD-EPI 2021: 92 ML/MIN/1.73M2
GLUCOSE SERPL-MCNC: 87 MG/DL
LDH SERPL-CCNC: 236 U/L
POTASSIUM SERPL-SCNC: 4.9 MMOL/L
PROT SERPL-MCNC: 7.4 G/DL
SODIUM SERPL-SCNC: 143 MMOL/L

## 2025-06-13 LAB
INTERPRETATION: NORMAL
SIGNING PATHOLOGIST: NORMAL
SPECIMEN TYPE: NORMAL

## 2025-08-01 ENCOUNTER — TRANSCRIPTION ENCOUNTER (OUTPATIENT)
Age: 40
End: 2025-08-01

## 2025-08-04 ENCOUNTER — TRANSCRIPTION ENCOUNTER (OUTPATIENT)
Age: 40
End: 2025-08-04

## 2025-08-05 ENCOUNTER — RESULT REVIEW (OUTPATIENT)
Age: 40
End: 2025-08-05

## 2025-08-05 ENCOUNTER — APPOINTMENT (OUTPATIENT)
Dept: HEMATOLOGY ONCOLOGY | Facility: CLINIC | Age: 40
End: 2025-08-05

## 2025-08-05 LAB
ALBUMIN SERPL ELPH-MCNC: 4.9 G/DL
ALP BLD-CCNC: 117 U/L
ALT SERPL-CCNC: 64 U/L
ANION GAP SERPL CALC-SCNC: 14 MMOL/L
AST SERPL-CCNC: 29 U/L
BILIRUB SERPL-MCNC: 0.5 MG/DL
BUN SERPL-MCNC: 18 MG/DL
CALCIUM SERPL-MCNC: 10.4 MG/DL
CHLORIDE SERPL-SCNC: 103 MMOL/L
CO2 SERPL-SCNC: 24 MMOL/L
CREAT SERPL-MCNC: 1.01 MG/DL
EGFRCR SERPLBLD CKD-EPI 2021: 96 ML/MIN/1.73M2
GLUCOSE SERPL-MCNC: 104 MG/DL
LDH SERPL-CCNC: 147 U/L
POTASSIUM SERPL-SCNC: 4.5 MMOL/L
PROT SERPL-MCNC: 7.5 G/DL
SODIUM SERPL-SCNC: 141 MMOL/L

## 2025-09-18 ENCOUNTER — LABORATORY RESULT (OUTPATIENT)
Age: 40
End: 2025-09-18

## 2025-09-18 ENCOUNTER — RESULT REVIEW (OUTPATIENT)
Age: 40
End: 2025-09-18

## 2025-09-18 ENCOUNTER — APPOINTMENT (OUTPATIENT)
Dept: HEMATOLOGY ONCOLOGY | Facility: CLINIC | Age: 40
End: 2025-09-18
Payer: COMMERCIAL

## 2025-09-18 VITALS
OXYGEN SATURATION: 97 % | RESPIRATION RATE: 16 BRPM | TEMPERATURE: 97.2 F | BODY MASS INDEX: 33.02 KG/M2 | SYSTOLIC BLOOD PRESSURE: 141 MMHG | DIASTOLIC BLOOD PRESSURE: 91 MMHG | HEART RATE: 66 BPM | WEIGHT: 236.78 LBS

## 2025-09-18 DIAGNOSIS — C92.01 ACUTE MYELOBLASTIC LEUKEMIA, IN REMISSION: ICD-10-CM

## 2025-09-18 PROCEDURE — 99213 OFFICE O/P EST LOW 20 MIN: CPT

## 2025-09-18 PROCEDURE — G2211 COMPLEX E/M VISIT ADD ON: CPT | Mod: NC

## 2025-09-24 LAB
ALBUMIN SERPL ELPH-MCNC: 4.9 G/DL
ALP BLD-CCNC: 90 U/L
ALT SERPL-CCNC: 79 U/L
ANION GAP SERPL CALC-SCNC: 12 MMOL/L
AST SERPL-CCNC: 40 U/L
BILIRUB SERPL-MCNC: 0.8 MG/DL
BUN SERPL-MCNC: 18 MG/DL
CALCIUM SERPL-MCNC: 10.1 MG/DL
CHLORIDE SERPL-SCNC: 102 MMOL/L
CO2 SERPL-SCNC: 26 MMOL/L
CREAT SERPL-MCNC: 1.27 MG/DL
EGFRCR SERPLBLD CKD-EPI 2021: 73 ML/MIN/1.73M2
GLUCOSE SERPL-MCNC: 93 MG/DL
LDH SERPL-CCNC: 166 U/L
POTASSIUM SERPL-SCNC: 4.2 MMOL/L
PROT SERPL-MCNC: 7.1 G/DL
SODIUM SERPL-SCNC: 140 MMOL/L

## (undated) DEVICE — PREP CHLORAPREP HI-LITE ORANGE 26ML

## (undated) DEVICE — DISSECTOR ENDO PEANUT 5MM

## (undated) DEVICE — TUBING STRYKEFLOW II SUCTION / IRRIGATOR

## (undated) DEVICE — ENDOCATCH 10MM SPECIMEN POUCH

## (undated) DEVICE — NDL BIOPSY MONOPTY 18G X 20CM

## (undated) DEVICE — VENODYNE/SCD SLEEVE CALF LARGE

## (undated) DEVICE — DRAPE INSTRUMENT POUCH 6.75" X 11"

## (undated) DEVICE — LUBRICATING JELLY ONESHOT 1.25OZ

## (undated) DEVICE — SOL IRR POUR H2O 250ML

## (undated) DEVICE — POSITIONER FOAM EGG CRATE ULNAR 2PCS (PINK)

## (undated) DEVICE — POSITIONER PATIENT SAFETY STRAP 3X60"

## (undated) DEVICE — DRSG TELFA 3 X 8

## (undated) DEVICE — TROCAR COVIDIEN VERSAPORT BLADELESS OPTICAL 5MM STANDARD

## (undated) DEVICE — GLV 7.5 PROTEXIS (WHITE)

## (undated) DEVICE — SUT CHROMIC 3-0 30" V-20

## (undated) DEVICE — DRAPE TOWEL BLUE 17" X 24"

## (undated) DEVICE — D HELP - CLEARVIEW CLEARIFY SYSTEM

## (undated) DEVICE — VENODYNE/SCD SLEEVE CALF MEDIUM

## (undated) DEVICE — WARMING BLANKET UPPER ADULT

## (undated) DEVICE — TROCAR COVIDIEN BLUNT TIP HASSAN 10MM

## (undated) DEVICE — TUBING IRRIGATION DAVOL SYSTEM X STREAM

## (undated) DEVICE — SYR LUER LOK 20CC

## (undated) DEVICE — STOPCOCK 3 WAY W TUBE 35"

## (undated) DEVICE — SUT BIOSYN 4-0 18" P-12

## (undated) DEVICE — SPECIMEN CONTAINER 100ML

## (undated) DEVICE — TUBING TRUWAVE PRESSURE MALE/FEMALE 72"

## (undated) DEVICE — DISSECTOR COVIDIEN ROTICULATOR 5MM W MONOPOLAR CAUTERY

## (undated) DEVICE — ELCTR CORD FOOTSWITCH 1PLR LAPSCP 10FT

## (undated) DEVICE — TROCAR APPLIED MEDICAL KII BALLOON BLUNT TIP 12MM X 100MM

## (undated) DEVICE — TUBING INSUFFLATION LAP FILTER 10FT

## (undated) DEVICE — CATH IV SAFE INSYTE 14G X 1.75" (ORANGE)

## (undated) DEVICE — DRSG STERISTRIPS 0.5 X 4"

## (undated) DEVICE — DRAPE MAYO STAND 30"

## (undated) DEVICE — GOWN TRIMAX LG

## (undated) DEVICE — DRAPE THYROID 77" X 123"

## (undated) DEVICE — GLV 8.5 PROTEXIS (WHITE)

## (undated) DEVICE — DRAPE C ARM UNIVERSAL

## (undated) DEVICE — SUT POLYSORB 0 36" GU-46

## (undated) DEVICE — CATH IV SAFE BC 18G X 1.16" (GREEN)

## (undated) DEVICE — MEDICATION LABELS W MARKER

## (undated) DEVICE — SUT POLYSORB 3-0 30" V-20 UNDYED

## (undated) DEVICE — SOL IRR POUR NS 0.9% 500ML

## (undated) DEVICE — GLV 7.5 PROTEXIS (BLUE)

## (undated) DEVICE — GLV 7 PROTEXIS (WHITE)

## (undated) DEVICE — PACK MINOR

## (undated) DEVICE — TUBING TUR 2 PRONG

## (undated) DEVICE — PREP BETADINE SPONGE STICKS

## (undated) DEVICE — GLV 6.5 PROTEXIS (WHITE)

## (undated) DEVICE — DRSG OPSITE 2.5 X 2"

## (undated) DEVICE — TAPE SILK 3"

## (undated) DEVICE — SUT PDS II 0 18" ENDOLOOP LIGATURE

## (undated) DEVICE — SYR LUER LOK 30CC

## (undated) DEVICE — DRSG TAPE MEDIPORE 3"

## (undated) DEVICE — PACK ADVANCED LAPAROSCOPIC NS

## (undated) DEVICE — GLV 8 PROTEXIS (WHITE)

## (undated) DEVICE — SUT SOFSILK 2 60" TIES

## (undated) DEVICE — DRSG COMBINE 5X9"

## (undated) DEVICE — SUT SILK 0 18" TIES